# Patient Record
Sex: FEMALE | Race: WHITE | Employment: OTHER | URBAN - METROPOLITAN AREA
[De-identification: names, ages, dates, MRNs, and addresses within clinical notes are randomized per-mention and may not be internally consistent; named-entity substitution may affect disease eponyms.]

---

## 2017-05-08 LAB
CHOLEST SERPL-MCNC: 200 MG/DL (ref 100–199)
CREAT SERPL-MCNC: 0.79 MG/DL (ref 0.57–1.11)
GFR SERPL CREATININE-BSD FRML MDRD: >60 ML/MIN/1.73M2
GLUCOSE SERPL-MCNC: 250 MG/DL (ref 65–100)
HDLC SERPL-MCNC: 49 MG/DL
LDLC SERPL CALC-MCNC: 126 MG/DL
NONHDLC SERPL-MCNC: 151 MG/DL
POTASSIUM SERPL-SCNC: 3.7 MMOL/L (ref 3.5–5)
TRIGL SERPL-MCNC: 127 MG/DL

## 2017-11-30 ENCOUNTER — TRANSFERRED RECORDS (OUTPATIENT)
Dept: HEALTH INFORMATION MANAGEMENT | Facility: CLINIC | Age: 67
End: 2017-11-30

## 2017-11-30 LAB — HBA1C MFR BLD: 7.6 % (ref 0–5.7)

## 2018-01-18 ENCOUNTER — OFFICE VISIT (OUTPATIENT)
Dept: FAMILY MEDICINE | Facility: CLINIC | Age: 68
End: 2018-01-18
Payer: COMMERCIAL

## 2018-01-18 VITALS
DIASTOLIC BLOOD PRESSURE: 60 MMHG | HEART RATE: 88 BPM | SYSTOLIC BLOOD PRESSURE: 130 MMHG | HEIGHT: 65 IN | TEMPERATURE: 98 F | BODY MASS INDEX: 27.32 KG/M2 | WEIGHT: 164 LBS

## 2018-01-18 DIAGNOSIS — M81.0 AGE-RELATED OSTEOPOROSIS WITHOUT CURRENT PATHOLOGICAL FRACTURE: ICD-10-CM

## 2018-01-18 DIAGNOSIS — E78.5 HYPERLIPIDEMIA LDL GOAL <100: ICD-10-CM

## 2018-01-18 DIAGNOSIS — Z79.4 TYPE 2 DIABETES MELLITUS WITHOUT COMPLICATION, WITH LONG-TERM CURRENT USE OF INSULIN (H): Primary | ICD-10-CM

## 2018-01-18 DIAGNOSIS — E11.9 TYPE 2 DIABETES MELLITUS WITHOUT COMPLICATION, WITH LONG-TERM CURRENT USE OF INSULIN (H): Primary | ICD-10-CM

## 2018-01-18 DIAGNOSIS — Z11.59 NEED FOR HEPATITIS C SCREENING TEST: ICD-10-CM

## 2018-01-18 DIAGNOSIS — G47.00 INSOMNIA, UNSPECIFIED TYPE: ICD-10-CM

## 2018-01-18 LAB
ALBUMIN SERPL-MCNC: 3.9 G/DL (ref 3.4–5)
ALP SERPL-CCNC: 70 U/L (ref 40–150)
ALT SERPL W P-5'-P-CCNC: 16 U/L (ref 0–50)
ANION GAP SERPL CALCULATED.3IONS-SCNC: 9 MMOL/L (ref 3–14)
AST SERPL W P-5'-P-CCNC: 17 U/L (ref 0–45)
BILIRUB SERPL-MCNC: 0.8 MG/DL (ref 0.2–1.3)
BUN SERPL-MCNC: 10 MG/DL (ref 7–30)
CALCIUM SERPL-MCNC: 8.8 MG/DL (ref 8.5–10.1)
CHLORIDE SERPL-SCNC: 106 MMOL/L (ref 94–109)
CHOLEST SERPL-MCNC: 167 MG/DL
CO2 SERPL-SCNC: 23 MMOL/L (ref 20–32)
CREAT SERPL-MCNC: 0.47 MG/DL (ref 0.52–1.04)
CREAT UR-MCNC: 19 MG/DL
GFR SERPL CREATININE-BSD FRML MDRD: >90 ML/MIN/1.7M2
GLUCOSE SERPL-MCNC: 130 MG/DL (ref 70–99)
HBA1C MFR BLD: 6.4 % (ref 4.3–6)
HCV AB SERPL QL IA: NONREACTIVE
HDLC SERPL-MCNC: 68 MG/DL
LDLC SERPL CALC-MCNC: 83 MG/DL
MICROALBUMIN UR-MCNC: 9 MG/L
MICROALBUMIN/CREAT UR: 46.09 MG/G CR (ref 0–25)
NONHDLC SERPL-MCNC: 99 MG/DL
POTASSIUM SERPL-SCNC: 3.9 MMOL/L (ref 3.4–5.3)
PROT SERPL-MCNC: 7.3 G/DL (ref 6.8–8.8)
SODIUM SERPL-SCNC: 138 MMOL/L (ref 133–144)
TRIGL SERPL-MCNC: 78 MG/DL

## 2018-01-18 PROCEDURE — 80053 COMPREHEN METABOLIC PANEL: CPT | Performed by: FAMILY MEDICINE

## 2018-01-18 PROCEDURE — 86803 HEPATITIS C AB TEST: CPT | Performed by: FAMILY MEDICINE

## 2018-01-18 PROCEDURE — 99203 OFFICE O/P NEW LOW 30 MIN: CPT | Performed by: FAMILY MEDICINE

## 2018-01-18 PROCEDURE — 83036 HEMOGLOBIN GLYCOSYLATED A1C: CPT | Performed by: FAMILY MEDICINE

## 2018-01-18 PROCEDURE — 36415 COLL VENOUS BLD VENIPUNCTURE: CPT | Performed by: FAMILY MEDICINE

## 2018-01-18 PROCEDURE — 82043 UR ALBUMIN QUANTITATIVE: CPT | Performed by: FAMILY MEDICINE

## 2018-01-18 PROCEDURE — 80061 LIPID PANEL: CPT | Performed by: FAMILY MEDICINE

## 2018-01-18 RX ORDER — TRAZODONE HYDROCHLORIDE 50 MG/1
50 TABLET, FILM COATED ORAL AT BEDTIME
Qty: 60 TABLET | Refills: 0 | Status: SHIPPED | OUTPATIENT
Start: 2018-01-18 | End: 2018-04-09

## 2018-01-18 RX ORDER — ATORVASTATIN CALCIUM 40 MG/1
40 TABLET, FILM COATED ORAL DAILY
Qty: 90 TABLET | Refills: 3 | Status: SHIPPED | OUTPATIENT
Start: 2018-01-18 | End: 2018-07-20

## 2018-01-18 RX ORDER — TRAZODONE HYDROCHLORIDE 50 MG/1
50 TABLET, FILM COATED ORAL
COMMUNITY
Start: 2017-05-08 | End: 2018-01-18

## 2018-01-18 RX ORDER — CHLORAL HYDRATE 500 MG
1 CAPSULE ORAL
COMMUNITY
Start: 2012-06-19 | End: 2019-02-21

## 2018-01-18 RX ORDER — ASPIRIN 81 MG/1
81 TABLET ORAL
Status: ON HOLD | COMMUNITY
Start: 2012-01-04 | End: 2019-08-03

## 2018-01-18 RX ORDER — ALENDRONATE SODIUM 70 MG/1
70 TABLET ORAL
COMMUNITY
Start: 2017-05-08 | End: 2018-01-18

## 2018-01-18 RX ORDER — SODIUM PHOSPHATE,MONO-DIBASIC 19G-7G/118
1 ENEMA (ML) RECTAL 2 TIMES DAILY
COMMUNITY
Start: 2012-01-04 | End: 2021-02-23

## 2018-01-18 RX ORDER — DIPHENOXYLATE HYDROCHLORIDE AND ATROPINE SULFATE 2.5; .025 MG/1; MG/1
1 TABLET ORAL DAILY
COMMUNITY
Start: 2012-01-04 | End: 2022-08-04

## 2018-01-18 RX ORDER — ALENDRONATE SODIUM 70 MG/1
70 TABLET ORAL
Qty: 4 TABLET | Refills: 3 | Status: SHIPPED | OUTPATIENT
Start: 2018-01-18 | End: 2018-03-16

## 2018-01-18 RX ORDER — ATORVASTATIN CALCIUM 40 MG/1
40 TABLET, FILM COATED ORAL
COMMUNITY
Start: 2017-05-08 | End: 2018-01-18

## 2018-01-18 RX ORDER — LISINOPRIL 2.5 MG/1
2.5 TABLET ORAL DAILY
Qty: 90 TABLET | Refills: 1 | Status: SHIPPED | OUTPATIENT
Start: 2018-01-18 | End: 2018-07-20

## 2018-01-18 NOTE — PROGRESS NOTES
SUBJECTIVE:   Rajani Guo is a 67 year old female who presents to clinic today for the following health issues:    New Patient/Transfer of Care: she is from [place], [place].     She was last seen in Nov 2017 at [facility].     She moved to this past of the [facility], her insurance has changed, wants to establish care here.     Diabetes: she is Dxed with DM for more than 20 yrs ago, she is on Insulin for 20 yrs. She is on insulin vials and would like the prescription in flex pen form.   Patient is checking blood sugars: once daily.  Results are as follows:       am - . Under 100 most of the time.     Diabetic concerns: None     Symptoms of hypoglycemia (low blood sugar): none     Paresthesias (numbness or burning in feet) or sores: tingling in hands.      Date of last diabetic eye exam: due for eye exam.       BP Readings from Last 2 Encounters:   01/18/18 130/60     Hemoglobin A1C (%)   Date Value   01/18/2018 6.4 (H)      When she was first diagnosed with DM she was on BP medication however her BPs have been under goal hence the medication was discontinued.     Hyperlipidemia Follow-Up     Rate your low fat/cholesterol diet?: fair    Taking statin?  Yes, no muscle aches from statin    Other lipid medications/supplements?:  none  Exercise at work only. Diet: could be better.   Fasting today.     Osteoporosis:   Last DEXA: 12/2016. On Fosamax for 1 and 1/2 yrs.   Daily vitamin D and calcium.     Insomnia:   On trazodone. As needed.     Problem list and histories reviewed & adjusted, as indicated.  Additional history: as documented      Reviewed and updated as needed this visit by clinical staffTobacco  Allergies  Meds  Med Hx  Surg Hx  Fam Hx  Soc Hx      Reviewed and updated as needed this visit by Provider         ROS:  Constitutional, HEENT, cardiovascular, pulmonary, gi and gu systems are negative, except as otherwise noted.      OBJECTIVE:   /60 (BP Location: Left arm, Patient Position:  "Chair, Cuff Size: Adult Regular)  Pulse 88  Temp 98  F (36.7  C) (Oral)  Ht 5' 4.88\" (1.648 m)  Wt 164 lb (74.4 kg)  BMI 27.39 kg/m2  Body mass index is 27.39 kg/(m^2).  GENERAL: healthy, alert and no distress  NECK: no adenopathy, no asymmetry, masses, or scars and thyroid normal to palpation  RESP: lungs clear to auscultation - no rales, rhonchi or wheezes  CV: regular rate and rhythm, normal S1 S2, no S3 or S4, no murmur, click or rub, no peripheral edema and peripheral pulses strong  ABDOMEN: soft, nontender, no hepatosplenomegaly, no masses and bowel sounds normal  MS: no gross musculoskeletal defects noted, minimal edema  NEURO: Normal strength and tone, mentation intact and speech normal  Diabetic foot exam: normal DP and PT pulses, no trophic changes or ulcerative lesions and normal sensory exam    Results for orders placed or performed in visit on 01/18/18   Hemoglobin A1c   Result Value Ref Range    Hemoglobin A1C 6.4 (H) 4.3 - 6.0 %       ASSESSMENT/PLAN:     Rajani was seen today for establish care.    Diagnoses and all orders for this visit:    Type 2 diabetes mellitus without complication, with long-term current use of insulin (H), encouraged regular exercise and healthy diet. Not ready to quit yet.   -     Lipid panel reflex to direct LDL Fasting  -     Albumin Random Urine Quantitative with Creat Ratio  -     Hemoglobin A1c  -     Comprehensive metabolic panel (BMP + Alb, Alk Phos, ALT, AST, Total. Bili, TP)  -     OPHTHALMOLOGY ADULT REFERRAL  -     lisinopril (PRINIVIL/ZESTRIL) 2.5 MG tablet; Take 1 tablet (2.5 mg) by mouth daily  -     atorvastatin (LIPITOR) 40 MG tablet; Take 1 tablet (40 mg) by mouth daily  -     metFORMIN (GLUCOPHAGE) 1000 MG tablet; Take 1 tablet (1,000 mg) by mouth 2 times daily (with meals)  -     insulin detemir (LEVEMIR FLEXPEN/FLEXTOUCH) 100 UNIT/ML injection; Inject 25 Units Subcutaneous At Bedtime    Hyperlipidemia LDL goal <100  -     Comprehensive metabolic panel " (BMP + Alb, Alk Phos, ALT, AST, Total. Bili, TP)    Need for hepatitis C screening test  -     Hepatitis C Screen Reflex to HCV RNA Quant and Genotype    Age-related osteoporosis without current pathological fracture, DEXA in 2019.   -     alendronate (FOSAMAX) 70 MG tablet; Take 1 tablet (70 mg) by mouth every 7 days    Insomnia, unspecified type  -     traZODone (DESYREL) 50 MG tablet; Take 1 tablet (50 mg) by mouth At Bedtime      Pt has some of her most recent office visit records with her, sent for scanning, DAYANNA signed to get previous medical records.     DM f/u in 6 months.     Anna Barron MD  [facility]

## 2018-01-18 NOTE — PROGRESS NOTES
Results discussed with patient during the clinic visit.     .Anna Barron MD.   Family Physician.  [facility].

## 2018-01-18 NOTE — PATIENT INSTRUCTIONS
[facility]   Discharged by : Zayra Ren CMA        If you have any questions regarding your visit please contact your care team:     Team Gold                Clinic Hours Telephone Number     Dr. Vinita Barron 7am-7pm  Monday - Thursday   7am-5pm  Fridays  [phone]   (Appointment scheduling available 24/7)     RN Line  [phone] option 2     [facility] [place] [facility] 11am-9pm Monday-Friday Saturday-Sunday- 9am-5pm     [place] -   5pm-9pm Monday-Friday Saturday-Sunday- 9am-5pm    [phone] - [place]    [phone] - [place]       For a Price Quote for your services, please call our Xsilon Price Line at [phone].     What options do I have for visits at the clinic other than the traditional office visit?     To expand how we care for you, many of our providers are utilizing electronic visits (e-visits) and telephone visits, when medically appropriate, for interactions with their patients rather than a visit in the clinic. We also offer nurse visits for many medical concerns. Just like any other service, we will bill your insurance company for this type of visit based on time spent on the phone with your provider. Not all insurance companies cover these visits. Please check with your medical insurance if this type of visit is covered. You will be responsible for any charges that are not paid by your insurance.   E-visits via Mapkin: generally incur a $35.00 fee.     Telephone visits:   Time spent on the phone: *charged based on time that is spent on the phone in increments of 10 minutes. Estimated cost:   5-10 mins $30.00   11-20 mins. $59.00   21-30 mins. $85.00     Use Mapkin (secure email communication and access to your chart) to send your primary care provider a message or make an appointment. Ask someone on your Team how to sign up for  [employer].     As always, Thank you for trusting us with your health care needs!      [facility]:    Scheduling Appointments  Rajwinder Hunter [facility]  Call: [phone]    [facility]  Call: [phone]    [facility]  Call: [phone]    For Gastroenterology referrals   [facility] Gastroenterology   Clinics and Surgery Center, 4th Floor   [address] [place] [address]   Appointments: [phone]    WHERE TO GO FOR CARE?  Clinic    Make an appointment if you:       Are sick (cold, cough, flu, sore throat, earache or in pain).       Have a small injury (sprain, small cut, burn or broken bone).       Need a physical exam, Pap smear, vaccine or prescription refill.       Have questions about your health or medicines.    To reach us:      Call [phone] ([phone]). Open 24 hours every day. (For counseling services, call [phone].)    Log into [employer] at [email or web address]. (Visit [email or web address] to create an account.) Hospital emergency room    An emergency is a serious or life- threatening problem that must be treated right away.    Call 341 or get to the hospital if you have:      Very bad or sudden:            - Chest pain or pressure         - Bleeding         - Head or belly pain         - Dizziness or trouble seeing, walking or                          Speaking      Problems breathing      Blood in your vomit or you are coughing up blood      A major injury (knocked out, loss of a finger or limb, rape, broken bone protruding from skin)    A mental health crisis. (Or call the Mental Health Crisis line at 1-277.528.5448 or Suicide Prevention Hotline at 1-274.255.1672.)    Open 24 hours every day. You don't need an appointment.     Urgent care    Visit urgent care for sickness or small injuries when the clinic is closed. You don't need an appointment. To check hours or find an urgent care near you,  visit [email or web address]. Online care    Get online care from [facility] for more than 70 common problems, like colds, allergies and infections. Open 24 hours every day at:   [email or web address]   Need help deciding?    For advice about where to be seen, you may call your clinic and ask to speak with a nurse. We're here for you 24 hours every day.         If you are deaf or hard of hearing, please let us know. We provide many free services including sign language interpreters, oral interpreters, TTYs, telephone amplifiers, note takers and written materials.

## 2018-01-18 NOTE — MR AVS SNAPSHOT
After Visit Summary   1/18/2018    Rajani Guo    MRN: [id number]           Patient Information     Date Of Birth          1950        Visit Information        Provider Department      1/18/2018 8:00 AM Anna Barron MD [facility]        Today's Diagnoses     Type 2 diabetes mellitus without complication, with long-term current use of insulin (H)    -  1    Hyperlipidemia LDL goal <100        Need for hepatitis C screening test        Age-related osteoporosis without current pathological fracture        Insomnia, unspecified type          Care Instructions    [facility]   Discharged by : Zayra Ren CMA        If you have any questions regarding your visit please contact your care team:     Team Gold                Clinic Hours Telephone Number     Dr. Vinita Barron 7am-7pm  Monday - Thursday   7am-5pm  Fridays  [phone]   (Appointment scheduling available 24/7)     RN Line  [phone] option 2     Urgent Care - [place] - 11am-9pm Monday-Friday Saturday-Sunday- 9am-5pm     [place] -   5pm-9pm Monday-Friday Saturday-Sunday- 9am-5pm    [phone] - [place]    [phone] - [place]       For a Price Quote for your services, please call our Consumer Price Line at [phone].     What options do I have for visits at the clinic other than the traditional office visit?     To expand how we care for you, many of our providers are utilizing electronic visits (e-visits) and telephone visits, when medically appropriate, for interactions with their patients rather than a visit in the clinic. We also offer nurse visits for many medical concerns. Just like any other service, we will bill your insurance company for this type of visit based on time spent on the phone with your provider. Not all insurance companies  cover these visits. Please check with your medical insurance if this type of visit is covered. You will be responsible for any charges that are not paid by your insurance.   E-visits via Intoohart: generally incur a $35.00 fee.     Telephone visits:   Time spent on the phone: *charged based on time that is spent on the phone in increments of 10 minutes. Estimated cost:   5-10 mins $30.00   11-20 mins. $59.00   21-30 mins. $85.00     Use Baitianshit (secure email communication and access to your chart) to send your primary care provider a message or make an appointment. Ask someone on your Team how to sign up for Thumbplay.     As always, Thank you for trusting us with your health care needs!      [facility]:    Scheduling Appointments  [place] [place]  Call: [phone]    [facility]  Call: [phone]    [facility]  Call: [phone]    For Gastroenterology referrals   [facility]   Clinics and Surgery Center, 4th Floor   [address] [place] [address]   Appointments: [phone]    WHERE TO GO FOR CARE?  Clinic    Make an appointment if you:       Are sick (cold, cough, flu, sore throat, earache or in pain).       Have a small injury (sprain, small cut, burn or broken bone).       Need a physical exam, Pap smear, vaccine or prescription refill.       Have questions about your health or medicines.    To reach us:      Call [phone] ([phone]). Open 24 hours every day. (For counseling services, call [phone].)    Log into Thumbplay at Flash [email or web address]. (Visit [email or web address] to create an account.) Hospital emergency room    An emergency is a serious or life- threatening problem that must be treated right away.    Call [phone] or get to the hospital if you have:      Very bad or sudden:            - Chest pain or pressure         - Bleeding         - Head or belly pain         -  Dizziness or trouble seeing, walking or                          Speaking      Problems breathing      Blood in your vomit or you are coughing up blood      A major injury (knocked out, loss of a finger or limb, rape, broken bone protruding from skin)    A mental health crisis. (Or call the Mental Health Crisis line at 1-300.251.9526 or Suicide Prevention Hotline at 1-183.680.7714.)    Open 24 hours every day. You don't need an appointment.     Urgent care    Visit urgent care for sickness or small injuries when the clinic is closed. You don't need an appointment. To check hours or find an urgent care near you, visit [email or web address]. Online care    Get online care from [facility] for more than 70 common problems, like colds, allergies and infections. Open 24 hours every day at:   [email or web address]   Need help deciding?    For advice about where to be seen, you may call your clinic and ask to speak with a nurse. We're here for you 24 hours every day.         If you are deaf or hard of hearing, please let us know. We provide many free services including sign language interpreters, oral interpreters, TTYs, telephone amplifiers, note takers and written materials.                   Follow-ups after your visit        Additional Services     OPHTHALMOLOGY ADULT REFERRAL       Your provider has referred you to: [facility]: [facility] [place] [phone]   [email or web address]    Please be aware that coverage of these services is subject to the terms and limitations of your health insurance plan.  Call member services at your health plan with any benefit or coverage questions.      Please bring the following with you to your appointment:    (1) Any X-Rays, CTs or MRIs which have been performed.  Contact the facility where they were done to arrange for  prior to your scheduled appointment.    (2) List of current medications  (3) This referral request   (4) Any documents/labs given to you for  "this referral                  Who to contact     If you have questions or need follow up information about today's clinic visit or your schedule please contact [facility] directly at [phone].  Normal or non-critical lab and imaging results will be communicated to you by MyChart, letter or phone within 4 business days after the clinic has received the results. If you do not hear from us within 7 days, please contact the clinic through MyChart or phone. If you have a critical or abnormal lab result, we will notify you by phone as soon as possible.  Submit refill requests through Zango or call your pharmacy and they will forward the refill request to us. Please allow 3 business days for your refill to be completed.          Additional Information About Your Visit        [facility] [employer] [facility] Information     Zango lets you send messages to your doctor, view your test results, renew your prescriptions, schedule appointments and more. To sign up, go to [email or web address] . Click on \"Log in\" on the left side of the screen, which will take you to the Welcome page. Then click on \"Sign up Now\" on the right side of the page.     You will be asked to enter the access code listed below, as well as some personal information. Please follow the directions to create your username and password.     Your access code is: [email or web address]  Expires: 2018  9:07 AM     Your access code will  in 90 days. If you need help or a new code, please call your [place] clinic or [phone].        Care EveryWhere ID     This is your Care EveryWhere ID. This could be used by other organizations to access your [place] medical records  [id number]        Your Vitals Were     Pulse Temperature Height BMI (Body Mass Index)          88 98  F (36.7  C) (Oral) 5' 4.88\" (1.648 m) 27.39 kg/m2         Blood Pressure from Last 3 Encounters:   18 130/60    Weight from Last 3 Encounters:   18 164 lb (74.4 kg)            "   We Performed the Following     Albumin Random Urine Quantitative with Creat Ratio     Comprehensive metabolic panel (BMP + Alb, Alk Phos, ALT, AST, Total. Bili, TP)     Hemoglobin A1c     Hepatitis C Screen Reflex to HCV RNA Quant and Genotype     Lipid panel reflex to direct LDL Fasting     OPHTHALMOLOGY ADULT REFERRAL          Today's Medication Changes          These changes are accurate as of: 1/18/18  9:07 AM.  If you have any questions, ask your nurse or doctor.               Start taking these medicines.        Dose/Directions    lisinopril 2.5 MG tablet   Commonly known as:  PRINIVIL/Zestril   Used for:  Type 2 diabetes mellitus without complication, with long-term current use of insulin (H)   Started by:  Anna Barron MD        Dose:  2.5 mg   Take 1 tablet (2.5 mg) by mouth daily   Quantity:  90 tablet   Refills:  1         These medicines have changed or have updated prescriptions.        Dose/Directions    alendronate 70 MG tablet   Commonly known as:  FOSAMAX   This may have changed:  when to take this   Used for:  Age-related osteoporosis without current pathological fracture   Changed by:  Anna Barron MD        Dose:  70 mg   Take 1 tablet (70 mg) by mouth every 7 days   Quantity:  4 tablet   Refills:  3       atorvastatin 40 MG tablet   Commonly known as:  LIPITOR   This may have changed:  when to take this   Used for:  Type 2 diabetes mellitus without complication, with long-term current use of insulin (H)   Changed by:  Anna Barron MD        Dose:  40 mg   Take 1 tablet (40 mg) by mouth daily   Quantity:  90 tablet   Refills:  3       * insulin detemir 100 UNIT/ML injection   Commonly known as:  LEVEMIR   This may have changed:  Another medication with the same name was added. Make sure you understand how and when to take each.   Changed by:  Anna Barron MD        Inject 25 units at bedtime.   Refills:  0       * insulin detemir 100 UNIT/ML  injection   Commonly known as:  LEVEMIR FLEXPEN/FLEXTOUCH   This may have changed:  You were already taking a medication with the same name, and this prescription was added. Make sure you understand how and when to take each.   Used for:  Type 2 diabetes mellitus without complication, with long-term current use of insulin (H)   Changed by:  Anna Barron MD        Dose:  25 Units   Inject 25 Units Subcutaneous At Bedtime   Quantity:  15 mL   Refills:  1       metFORMIN 1000 MG tablet   Commonly known as:  GLUCOPHAGE   This may have changed:  when to take this   Used for:  Type 2 diabetes mellitus without complication, with long-term current use of insulin (H)   Changed by:  Anna Barron MD        Dose:  1000 mg   Take 1 tablet (1,000 mg) by mouth 2 times daily (with meals)   Quantity:  180 tablet   Refills:  1       traZODone 50 MG tablet   Commonly known as:  DESYREL   This may have changed:  when to take this   Used for:  Insomnia, unspecified type   Changed by:  Anna Barron MD        Dose:  50 mg   Take 1 tablet (50 mg) by mouth At Bedtime   Quantity:  60 tablet   Refills:  0       * Notice:  This list has 2 medication(s) that are the same as other medications prescribed for you. Read the directions carefully, and ask your doctor or other care provider to review them with you.         Where to get your medicines      These medications were sent to [facility]/pharmacy #[id number] - [place], [address] [place] [address], [facility] [address]     Phone:  [phone]     alendronate 70 MG tablet    atorvastatin 40 MG tablet    insulin detemir 100 UNIT/ML injection    lisinopril 2.5 MG tablet    metFORMIN 1000 MG tablet    traZODone 50 MG tablet                Primary Care Provider Office Phone # Fax #    [facility] [phone]       [facility] [address]        Equal Access to Services      PAMELA DOMINGUEZ : Hadii aad ku terrence Roa, waaxda luqadaha, qaybta kaalmada levybienvenidomarleen, saud swansonjeffadalgisa lindsay . So [facility] [phone].    ATENCIÓN: Si habla maxwell, tiene a rosen disposición servicios gratuitos de asistencia lingüística. Llame al [phone].    We comply with applicable federal civil rights laws and [place] laws. We do not discriminate on the basis of race, color, national origin, age, disability, sex, sexual orientation, or gender identity.            Thank you!     Thank you for choosing [facility]  for your care. Our goal is always to provide you with excellent care. Hearing back from our patients is one way we can continue to improve our services. Please take a few minutes to complete the written survey that you may receive in the mail after your visit with us. Thank you!             Your Updated Medication List - Protect others around you: Learn how to safely use, store and throw away your medicines at www.disposemymeds.org.          This list is accurate as of: 1/18/18  9:07 AM.  Always use your most recent med list.                   Brand Name Dispense Instructions for use Diagnosis    alendronate 70 MG tablet    FOSAMAX    4 tablet    Take 1 tablet (70 mg) by mouth every 7 days    Age-related osteoporosis without current pathological fracture       aspirin EC 81 MG EC tablet      Take 81 mg by mouth        atorvastatin 40 MG tablet    LIPITOR    90 tablet    Take 1 tablet (40 mg) by mouth daily    Type 2 diabetes mellitus without complication, with long-term current use of insulin (H)       calcium 500/D 500-200 MG-UNIT per tablet   Generic drug:  calcium carb 1250 mg (500 mg [place]ig)/vitamin D 200 unit      Take 1 tablet by mouth        fish oil-omega-3 fatty acids 1000 MG capsule      Take 1 capsule by mouth        glucosamine-chondroitin 500-400 MG Caps per capsule      Take 1 capsule by mouth        * insulin detemir 100 UNIT/ML injection    LEVEMIR      Inject 25 units at bedtime.        * insulin detemir 100 UNIT/ML injection    LEVEMIR FLEXPEN/FLEXTOUCH    15 mL    Inject 25 Units Subcutaneous At Bedtime    Type 2 diabetes mellitus without complication, with long-term current use of insulin (H)       lisinopril 2.5 MG tablet    PRINIVIL/Zestril    90 tablet    Take 1 tablet (2.5 mg) by mouth daily    Type 2 diabetes mellitus without complication, with long-term current use of insulin (H)       metFORMIN 1000 MG tablet    GLUCOPHAGE    180 tablet    Take 1 tablet (1,000 mg) by mouth 2 times daily (with meals)    Type 2 diabetes mellitus without complication, with long-term current use of insulin (H)       MULTI-VITAMINS Tabs      Take 1 tablet by mouth        traZODone 50 MG tablet    DESYREL    60 tablet    Take 1 tablet (50 mg) by mouth At Bedtime    Insomnia, unspecified type       * Notice:  This list has 2 medication(s) that are the same as other medications prescribed for you. Read the directions carefully, and ask your doctor or other care provider to review them with you.

## 2018-01-22 NOTE — PROGRESS NOTES
Dear Rajani Guo,     # Your cholesterol looks good.     # Hepatitis C antibody is NEGATIVE.     # sodium, potassium, kidney function and liver function are NORMAL.     # You are loosing small amount of proteins in urine, nothing to worry about at this point.     Anna Barron MD.   Family Physician.  [facility].

## 2018-02-22 ENCOUNTER — RADIANT APPOINTMENT (OUTPATIENT)
Dept: GENERAL RADIOLOGY | Facility: CLINIC | Age: 68
End: 2018-02-22
Attending: FAMILY MEDICINE
Payer: COMMERCIAL

## 2018-02-22 ENCOUNTER — OFFICE VISIT (OUTPATIENT)
Dept: FAMILY MEDICINE | Facility: CLINIC | Age: 68
End: 2018-02-22
Payer: COMMERCIAL

## 2018-02-22 VITALS
HEIGHT: 65 IN | TEMPERATURE: 98.1 F | BODY MASS INDEX: 27.46 KG/M2 | SYSTOLIC BLOOD PRESSURE: 130 MMHG | WEIGHT: 164.8 LBS | RESPIRATION RATE: 16 BRPM | DIASTOLIC BLOOD PRESSURE: 60 MMHG | OXYGEN SATURATION: 99 % | HEART RATE: 71 BPM

## 2018-02-22 DIAGNOSIS — R20.9 DISTURBANCE OF SKIN SENSATION: ICD-10-CM

## 2018-02-22 DIAGNOSIS — M79.644 PAIN OF FINGER OF RIGHT HAND: ICD-10-CM

## 2018-02-22 DIAGNOSIS — G56.02 CARPAL TUNNEL SYNDROME OF LEFT WRIST: ICD-10-CM

## 2018-02-22 DIAGNOSIS — M65.321 TRIGGER FINGER, RIGHT INDEX FINGER: Primary | ICD-10-CM

## 2018-02-22 DIAGNOSIS — M65.321 TRIGGER FINGER, RIGHT INDEX FINGER: ICD-10-CM

## 2018-02-22 PROCEDURE — 99214 OFFICE O/P EST MOD 30 MIN: CPT | Performed by: FAMILY MEDICINE

## 2018-02-22 PROCEDURE — 73130 X-RAY EXAM OF HAND: CPT | Mod: RT

## 2018-02-22 RX ORDER — PREDNISONE 20 MG/1
TABLET ORAL
Qty: 20 TABLET | Refills: 0 | Status: SHIPPED | OUTPATIENT
Start: 2018-02-22 | End: 2018-05-31

## 2018-02-22 ASSESSMENT — PAIN SCALES - GENERAL: PAINLEVEL: EXTREME PAIN (8)

## 2018-02-22 NOTE — MR AVS SNAPSHOT
After Visit Summary   2/22/2018    Rajani Guo    MRN: [id number]           Patient Information     Date Of Birth          1950        Visit Information        Provider Department      2/22/2018 9:20 AM Anna Barron MD [facility]        Today's Diagnoses     Trigger finger, right index finger    -  1    Pain of finger of right hand        Carpal tunnel syndrome of left wrist        Disturbance of skin sensation          Care Instructions      Carpal Tunnel Syndrome    Carpal tunnel syndrome is a painful condition of the wrist and arm. It is caused by pressure on the median nerve.  The median nerve is one of the nerves that give feeling and movement to the hand. It passes through a tunnel in the wrist called the carpal tunnel. This tunnel is made up of bones and ligaments. Narrowing of this tunnel or swelling of the tissues inside the tunnel puts pressure on the median nerve. This causes numbness, pins and needles, or electric shooting pains in your hand and forearm. Often the pain is worse at night and may wake you when you are asleep.  Carpal tunnel syndrome may occur during pregnancy and with use of birth control pills. It is more common in workers who must often bend their wrists. It is also common in people who work with power tools that cause strong vibrations.  Home care    Rest the painful wrist. Avoid repeated bending of the wrist back and forth. This puts pressure on the median nerve. Avoid using power tools with strong vibrations.    If you were given a splint, wear it at night while you sleep. You may also wear it during the day for comfort.    Move your fingers and wrists often to avoid stiffness.    Elevate your arms on pillows when you lie down.    Try using the unaffected hand more.    Try not to hold your wrists in a bent, downward position.    Sometimes changes in the work place may ease symptoms. If you type most of the day, it may help to change  the position of your keyboard or add a wrist support. Your wrist should be in a neutral position and not bent back when typing.    You may use over-the-counter pain medicine to treat pain and inflammation, unless another medicine was prescribed. Anti-inflammatory pain medicines, such as ibuprofen or naproxen may be more effective than acetaminophen, which treats pain, but not inflammation. If you have chronic liver or kidney disease or ever had a stomach ulcer or GI bleeding, talk with your doctor before using these medicines.    Opioid pain medicine will only give temporary relief and does not treat the problem. If pain continues, you may need a shot of a steroid drug into your wrist.    If the above methods fail, you may need surgery. This will open the carpal tunnel and release the pressure on the trapped nerve.  Follow-up care  Follow up with your healthcare provider, or as advised, if the pain doesn t begin to improve within the next week.  If X-rays were taken, you will be notified of any new findings that may affect your care.  When to seek medical advice  Call your healthcare provider right away if any of these occur:    Pain not improving with the above treatment    Fingers or hand become cold, blue, numb, or tingly    Your whole arm becomes swollen or weak  Date Last Reviewed: 11/23/2015 2000-2017 [employer]. [address] [place] [address], [place] [address]. All rights reserved. This information is not intended as a substitute for professional medical care. Always follow your healthcare professional's instructions.      [facility]   Discharged by : Staci Pro MA    Paper scripts provided to patient : no     If you have any questions regarding your visit please contact your care team:     Team Gold                [facility] Hours Telephone Number     Dr. Vinita Barron 7am-7pm  Monday - Thursday    7am-5pm  Fridays  [phone]   (Appointment scheduling available 24/7)     RN Line  [phone] option 2     Urgent Care - [place] Gil and [place] - 11am-9pm Monday-Friday Saturday-Sunday- 9am-5pm     [place] -   5pm-9pm Monday-Friday Saturday-Sunday- 9am-5pm    [phone] - [place] Gil    [phone] - [place]       For a Price Quote for your services, please call our Consumer Price Line at [phone].     What options do I have for visits at the clinic other than the traditional office visit?     To expand how we care for you, many of our providers are utilizing electronic visits (e-visits) and telephone visits, when medically appropriate, for interactions with their patients rather than a visit in the clinic. We also offer nurse visits for many medical concerns. Just like any other service, we will bill your insurance company for this type of visit based on time spent on the phone with your provider. Not all insurance companies cover these visits. Please check with your medical insurance if this type of visit is covered. You will be responsible for any charges that are not paid by your insurance.   E-visits via Tinker Games: generally incur a $35.00 fee.     Telephone visits:   Time spent on the phone: *charged based on time that is spent on the phone in increments of 10 minutes. Estimated cost:   5-10 mins $30.00   11-20 mins. $59.00   21-30 mins. $85.00     Use Appsindept (secure email communication and access to your chart) to send your primary care provider a message or make an appointment. Ask someone on your Team how to sign up for Tinker Games.     As always, Thank you for trusting us with your health care needs!      [facility]:    Scheduling Appointments  [facility]  Call: [phone]    [facility]  Call: [phone]    [facility]  Call: [phone]    For Gastroenterology  [facility] G[facility]enterology   Clinics and Surgery Center, 4th Floor   [address] [place] [address]   Appointments: [phone]    WHERE TO GO FOR CARE?  Clinic    Make an appointment if you:       Are sick (cold, cough, flu, sore throat, earache or in pain).       Have a small injury (sprain, small cut, burn or broken bone).       Need a physical exam, Pap smear, vaccine or prescription refill.       Have questions about your health or medicines.    To reach us:      Call [phone] ([phone]). Open 24 hours every day. (For counseling services, call [phone].)    Log into "[employer] at Formatta. (Visit Project Manager to create an account.) Hospital emergency room    An emergency is a serious or life- threatening problem that must be treated right away.    Call 911 or get to the hospital if you have:      Very bad or sudden:            - Chest pain or pressure         - Bleeding         - Head or belly pain         - Dizziness or trouble seeing, walking or                          Speaking      Problems breathing      Blood in your vomit or you are coughing up blood      A major injury (knocked out, loss of a finger or limb, rape, broken bone protruding from skin)    A mental health crisis. (Or call the Mental Health Crisis line at 1-973.816.4812 or Suicide Prevention Hotline at 1-249.133.3830.)    Open 24 hours every day. You don't need an appointment.     Urgent care    Visit urgent care for sickness or small injuries when the clinic is closed. You don't need an appointment. To check hours or find an urgent care near you, visit [email or web address]. Online care    Get online care from [facility] for more than 70 common problems, like colds, allergies and infections. Open 24 hours every day at:   [email or web address]   Need help deciding?    For advice about where to be seen, you may call your clinic and ask to speak with a nurse. We're here for you 24 hours every day.          If you are deaf or hard of hearing, please let us know. We provide many free services including sign language interpreters, oral interpreters, TTYs, telephone amplifiers, note takers and written materials.                   Follow-ups after your visit        Additional Services     ORTHO  REFERRAL       [facility] is referring you to the Orthopedic  Services at [facility].       The  Representative will assist you in the coordination of your Orthopedic and Musculoskeletal Care as prescribed by your physician.    The  Representative will call you within 1 business day to help schedule your appointment, or you may contact the  Representative at:    All areas ~ [phone]     Type of Referral : Non Surgical       Timeframe requested: 1 - 2 days    Coverage of these services is subject to the terms and limitations of your health insurance plan.  Please call member services at your health plan with any benefit or coverage questions.      If X-rays, CT or MRI's have been performed, please contact the facility where they were done to arrange for , prior to your scheduled appointment.  Please bring this referral request to your appointment and present it to your specialist.                  Future tests that were ordered for you today     Open Future Orders        Priority Expected Expires Ordered    EMG Routine  3/29/2018 2/22/2018    XR Hand Right G/E 3 Views Routine 2/22/2018 2/22/2019 2/22/2018            Who to contact     If you have questions or need follow up information about today's clinic visit or your schedule please contact [facility] directly at [phone].  Normal or non-critical lab and imaging results will be communicated to you by MyChart, letter or phone within 4 business days after the clinic has received the results. If you do not hear from us within 7 days, please contact the  "clinic through [email or web address] or phone. If you have a critical or abnormal lab result, we will notify you by phone as soon as possible.  Submit refill requests through [email or web address] or call your pharmacy and they will forward the refill request to us. Please allow 3 business days for your refill to be completed.          Additional Information About Your Visit        [employer] Information     [email or web address] gives you secure access to your electronic health record. If you see a primary care provider, you can also send messages to your care team and make appointments. If you have questions, please call your primary care clinic.  If you do not have a primary care provider, please call [phone] and they will assist you.        Care EveryWhere ID     This is your Care EveryWhere ID. This could be used by other organizations to access your [facility] medical records  [id number]        Your Vitals Were     Pulse Temperature Respirations Height Pulse Oximetry BMI (Body Mass Index)    71 98.1  F (36.7  C) (Oral) 16 5' 4.75\" (1.645 m) 99% 27.64 kg/m2       Blood Pressure from Last 3 Encounters:   02/22/18 130/60   01/18/18 130/60    Weight from Last 3 Encounters:   02/22/18 164 lb 12.8 oz (74.8 kg)   01/18/18 164 lb (74.4 kg)              We Performed the Following     ORTHO  REFERRAL          Today's Medication Changes          These changes are accurate as of 2/22/18 10:09 AM.  If you have any questions, ask your nurse or doctor.               Start taking these medicines.        Dose/Directions    predniSONE 20 MG tablet   Commonly known as:  DELTASONE   Used for:  Carpal tunnel syndrome of left wrist   Started by:  Anna Barron MD        Take 3 tabs (60 mg) by mouth daily x 3 days, 2 tabs (40 mg) daily x 3 days, 1 tab (20 mg) daily x 3 days, then 1/2 tab (10 mg) x 3 days.   Quantity:  20 tablet   Refills:  0         These medicines have changed or have updated prescriptions.        Dose/Directions    insulin detemir " 100 UNIT/ML injection   Commonly known as:  LEVEMIR FLEXPEN/FLEXTOUCH   This may have changed:  Another medication with the same name was removed. Continue taking this medication, and follow the directions you see here.   Used for:  Type 2 diabetes mellitus without complication, with long-term current use of insulin (H)   Changed by:  Anna Barron MD        Dose:  25 Units   Inject 25 Units Subcutaneous At Bedtime   Quantity:  15 mL   Refills:  1            Where to get your medicines      These medications were sent to [facility]/pharmacy #[id number] - [place], [place] - [facility] OF [address], [place] [address]     Phone:  [phone]     predniSONE 20 MG tablet                Primary Care Provider Office Phone # Fax #    [facility] [phone]       [address] [facility] [address]        Equal Access to Services     PAMELA DOMINGUEZ : Hadii devorah alvarado hadasho Sojett, waaxda luqadaha, qaybta kaalmada adeegyada, saud lindsay . So [facility] [phone].    ATENCIÓN: Si habla español, tiene a rosen disposición servicios gratuitos de asistencia lingüística. Oren al [phone].    We comply with applicable federal civil rights laws and [place] laws. We do not discriminate on the basis of race, color, national origin, age, disability, sex, sexual orientation, or gender identity.            Thank you!     Thank you for choosing [facility]  for your care. Our goal is always to provide you with excellent care. Hearing back from our patients is one way we can continue to improve our services. Please take a few minutes to complete the written survey that you may receive in the mail after your visit with us. Thank you!             Your Updated Medication List - Protect others around you: Learn how to safely use, store and throw away your medicines at www.disposemymeds.org.          This list is  accurate as of 2/22/18 10:09 AM.  Always use your most recent med list.                   Brand Name Dispense Instructions for use Diagnosis    alendronate 70 MG tablet    FOSAMAX    4 tablet    Take 1 tablet (70 mg) by mouth every 7 days    Age-related osteoporosis without current pathological fracture       aspirin EC 81 MG EC tablet      Take 81 mg by mouth        atorvastatin 40 MG tablet    LIPITOR    90 tablet    Take 1 tablet (40 mg) by mouth daily    Type 2 diabetes mellitus without complication, with long-term current use of insulin (H)       calcium 500/D 500-200 MG-UNIT per tablet   Generic drug:  calcium carb 1250 mg (500 mg [place])/vitamin D 200 unit      Take 1 tablet by mouth        fish oil-omega-3 fatty acids 1000 MG capsule      Take 1 capsule by mouth        glucosamine-chondroitin 500-400 MG Caps per capsule      Take 1 capsule by mouth        insulin detemir 100 UNIT/ML injection    LEVEMIR FLEXPEN/FLEXTOUCH    15 mL    Inject 25 Units Subcutaneous At Bedtime    Type 2 diabetes mellitus without complication, with long-term current use of insulin (H)       lisinopril 2.5 MG tablet    PRINIVIL/Zestril    90 tablet    Take 1 tablet (2.5 mg) by mouth daily    Type 2 diabetes mellitus without complication, with long-term current use of insulin (H)       metFORMIN 1000 MG tablet    GLUCOPHAGE    180 tablet    Take 1 tablet (1,000 mg) by mouth 2 times daily (with meals)    Type 2 diabetes mellitus without complication, with long-term current use of insulin (H)       MULTI-VITAMINS Tabs      Take 1 tablet by mouth        predniSONE 20 MG tablet    DELTASONE    20 tablet    Take 3 tabs (60 mg) by mouth daily x 3 days, 2 tabs (40 mg) daily x 3 days, 1 tab (20 mg) daily x 3 days, then 1/2 tab (10 mg) x 3 days.    Carpal tunnel syndrome of left wrist       traZODone 50 MG tablet    DESYREL    60 tablet    Take 1 tablet (50 mg) by mouth At Bedtime    Insomnia, unspecified type

## 2018-02-22 NOTE — PATIENT INSTRUCTIONS
Carpal Tunnel Syndrome    Carpal tunnel syndrome is a painful condition of the wrist and arm. It is caused by pressure on the median nerve.  The median nerve is one of the nerves that give feeling and movement to the hand. It passes through a tunnel in the wrist called the carpal tunnel. This tunnel is made up of bones and ligaments. Narrowing of this tunnel or swelling of the tissues inside the tunnel puts pressure on the median nerve. This causes numbness, pins and needles, or electric shooting pains in your hand and forearm. Often the pain is worse at night and may wake you when you are asleep.  Carpal tunnel syndrome may occur during pregnancy and with use of birth control pills. It is more common in workers who must often bend their wrists. It is also common in people who work with power tools that cause strong vibrations.  Home care    Rest the painful wrist. Avoid repeated bending of the wrist back and forth. This puts pressure on the median nerve. Avoid using power tools with strong vibrations.    If you were given a splint, wear it at night while you sleep. You may also wear it during the day for comfort.    Move your fingers and wrists often to avoid stiffness.    Elevate your arms on pillows when you lie down.    Try using the unaffected hand more.    Try not to hold your wrists in a bent, downward position.    Sometimes changes in the work place may ease symptoms. If you type most of the day, it may help to change the position of your keyboard or add a wrist support. Your wrist should be in a neutral position and not bent back when typing.    You may use over-the-counter pain medicine to treat pain and inflammation, unless another medicine was prescribed. Anti-inflammatory pain medicines, such as ibuprofen or naproxen may be more effective than acetaminophen, which treats pain, but not inflammation. If you have chronic liver or kidney disease or ever had a stomach ulcer or GI bleeding, talk with your  doctor before using these medicines.    Opioid pain medicine will only give temporary relief and does not treat the problem. If pain continues, you may need a shot of a steroid drug into your wrist.    If the above methods fail, you may need surgery. This will open the carpal tunnel and release the pressure on the trapped nerve.  Follow-up care  Follow up with your healthcare provider, or as advised, if the pain doesn t begin to improve within the next week.  If X-rays were taken, you will be notified of any new findings that may affect your care.  When to seek medical advice  Call your healthcare provider right away if any of these occur:    Pain not improving with the above treatment    Fingers or hand become cold, blue, numb, or tingly    Your whole arm becomes swollen or weak  Date Last Reviewed: 11/23/2015 2000-2017 [employer]. [address] [place] [address]. All rights reserved. This information is not intended as a substitute for professional medical care. Always follow your healthcare professional's instructions.      [facility]   Discharged by : Staci Pro MA    Paper scripts provided to patient : no     If you have any questions regarding your visit please contact your care team:     Team Gold                Clinic Hours Telephone Number     Dr. Vinita Barron 7am-7pm  Monday - Thursday   7am-5pm  Fridays  [phone]   (Appointment scheduling available 24/7)     RN Line  [phone] option 2     Urgent Care - [place] Gil and [place] - 11am-9pm Monday-Friday Saturday-Sunday- 9am-5pm     [place] -   5pm-9pm Monday-Friday Saturday-Sunday- 9am-5pm    [phone] - [place] Gil    [phone] - [place]       For a Price Quote for your services, please call our Consumer Price Line at [phone].     What options do I have for visits at the clinic  other than the traditional office visit?     To expand how we care for you, many of our providers are utilizing electronic visits (e-visits) and telephone visits, when medically appropriate, for interactions with their patients rather than a visit in the clinic. We also offer nurse visits for many medical concerns. Just like any other service, we will bill your insurance company for this type of visit based on time spent on the phone with your provider. Not all insurance companies cover these visits. Please check with your medical insurance if this type of visit is covered. You will be responsible for any charges that are not paid by your insurance.   E-visits via Aspire: generally incur a $35.00 fee.     Telephone visits:   Time spent on the phone: *charged based on time that is spent on the phone in increments of 10 minutes. Estimated cost:   5-10 mins $30.00   11-20 mins. $59.00   21-30 mins. $85.00     Use Aspire (secure email communication and access to your chart) to send your primary care provider a message or make an appointment. Ask someone on your Team how to sign up for [employer].     As always, Thank you for trusting us with your health care needs!      [facility]:    Scheduling Appointments  [facility]  Call: [phone]    [facility]  Call: [phone]    [facility]  Call: [phone]    For Gastroenterology referrals   [facility] Gastroenterology   Clinics and Surgery Center, 4th Floor   [address] [place] [address]   Appointments: [phone]    WHERE TO GO FOR CARE?  Clinic    Make an appointment if you:       Are sick (cold, cough, flu, sore throat, earache or in pain).       Have a small injury (sprain, small cut, burn or broken bone).       Need a physical exam, Pap smear, vaccine or prescription refill.       Have questions about your health or medicines.    To reach us:      Call  [phone] ([phone]). Open 24 hours every day. (For counseling services, call [phone].)    Log into [employer] at [email or web address]. (Visit [email or web address] to create an account.) Hospital emergency room    An emergency is a serious or life- threatening problem that must be treated right away.    Call 911 or get to the hospital if you have:      Very bad or sudden:            - Chest pain or pressure         - Bleeding         - Head or belly pain         - Dizziness or trouble seeing, walking or                          Speaking      Problems breathing      Blood in your vomit or you are coughing up blood      A major injury (knocked out, loss of a finger or limb, rape, broken bone protruding from skin)    A mental health crisis. (Or call the Mental Health Crisis line at 1-250.831.5592 or Suicide Prevention Hotline at 1-933.506.8403.)    Open 24 hours every day. You don't need an appointment.     Urgent care    Visit urgent care for sickness or small injuries when the clinic is closed. You don't need an appointment. To check hours or find an urgent care near you, visit [email or web address]. Online care    Get online care from [facility] for more than 70 common problems, like colds, allergies and infections. Open 24 hours every day at:   [email or web address]   Need help deciding?    For advice about where to be seen, you may call your clinic and ask to speak with a nurse. We're here for you 24 hours every day.         If you are deaf or hard of hearing, please let us know. We provide many free services including sign language interpreters, oral interpreters, TTYs, telephone amplifiers, note takers and written materials.

## 2018-02-22 NOTE — PROGRESS NOTES
SUBJECTIVE:   Rajani Guo is a 67 year old female who presents to clinic today for the following health issues:      Concern - left wrist pain and  Right index finger pain   Onset: weeks     Description:   Pain left wrist and swelling in right index finger     Intensity: moderate, severe    Progression of Symptoms:  worsening    Accompanying Signs & Symptoms:  Hard to do things with hands     Previous history of similar problem:   Has had carpal tunnel Dx, and triggrer finger     Precipitating factors:   Worsened by: movement     Alleviating factors:  Improved by: using a wrist splint, no help    Therapies Tried and outcome: tyelonal and ibuprofen      Trigger fingers on both hands. She was diagnosed 2 years ago.   Injection on the right index finger: it did not work very well.   She was managing pain with OTC pain meds.  Right index finger and middle 3 fingers on the left side are unable to make straight .   Recently she has been having significant pain at the base of the index finger.      Left hand she had Carpel Tunnel.   Finger tips are numb on ly on left side, it is worse at night.   She has been using wrist splint on the left wrist and glove on the right hand to help with her symptoms.     Problem list and histories reviewed & adjusted, as indicated.  Additional history: as documented    Patient Active Problem List   Diagnosis     Type 2 diabetes mellitus without complication, with long-term current use of insulin (H)     Hyperlipidemia LDL goal <100     Age-related osteoporosis without current pathological fracture     Insomnia, unspecified type     No past surgical history on file.    Social History   Substance Use Topics     Smoking status: Current Every Day Smoker     Smokeless tobacco: Never Used     Alcohol use Yes     No family history on file.      Current Outpatient Prescriptions   Medication Sig Dispense Refill     aspirin EC 81 MG EC tablet Take 81 mg by mouth       Multiple Vitamin  (MULTI-VITAMINS) TABS Take 1 tablet by mouth       fish oil-omega-3 fatty acids 1000 MG capsule Take 1 capsule by mouth       calcium carb 1250 mg, 500 mg [place],/vitamin D 200 unit (CALCIUM 500/D) 500-200 MG-UNIT per tablet Take 1 tablet by mouth       glucosamine-chondroitin 500-400 MG CAPS per capsule Take 1 capsule by mouth       lisinopril (PRINIVIL/ZESTRIL) 2.5 MG tablet Take 1 tablet (2.5 mg) by mouth daily 90 tablet 1     alendronate (FOSAMAX) 70 MG tablet Take 1 tablet (70 mg) by mouth every 7 days 4 tablet 3     atorvastatin (LIPITOR) 40 MG tablet Take 1 tablet (40 mg) by mouth daily 90 tablet 3     metFORMIN (GLUCOPHAGE) 1000 MG tablet Take 1 tablet (1,000 mg) by mouth 2 times daily (with meals) 180 tablet 1     traZODone (DESYREL) 50 MG tablet Take 1 tablet (50 mg) by mouth At Bedtime 60 tablet 0     insulin detemir (LEVEMIR FLEXPEN/FLEXTOUCH) 100 UNIT/ML injection Inject 25 Units Subcutaneous At Bedtime 15 mL 1     [DISCONTINUED] insulin detemir (LEVEMIR) 100 UNIT/ML injection Inject 25 units at bedtime.       Allergies   Allergen Reactions     Indomethacin Other (See Comments)     Dizziness and disorientation     Tramadol Nausea and Vomiting     Recent Labs   Lab Test  01/18/18   0835   A1C  6.4*   LDL  83   HDL  68   TRIG  78   ALT  16   CR  0.47*   GFRESTIMATED  >90   GFRESTBLACK  >90   POTASSIUM  3.9      BP Readings from Last 3 Encounters:   02/22/18 130/60   01/18/18 130/60    Wt Readings from Last 3 Encounters:   02/22/18 164 lb 12.8 oz (74.8 kg)   01/18/18 164 lb (74.4 kg)                  Labs reviewed in EPIC    Reviewed and updated as needed this visit by clinical staff  Tobacco  Allergies  Meds       Reviewed and updated as needed this visit by Provider         ROS:  Constitutional, HEENT, cardiovascular, pulmonary, gi and gu systems are negative, except as otherwise noted.    OBJECTIVE:     /60 (BP Location: Right arm, Patient Position: Chair, Cuff Size: Adult Regular)  Pulse 71   "Temp 98.1  F (36.7  C) (Oral)  Resp 16  Ht 5' 4.75\" (1.645 m)  Wt 164 lb 12.8 oz (74.8 kg)  SpO2 99%  BMI 27.64 kg/m2  Body mass index is 27.64 kg/(m^2).  GENERAL: healthy, alert and no distress  Right hand: significant tenderness MCP joint, mild tenderness IP joints.   Left hand: mild tenderness on the flexor aspect of wrist .   Tinel's amd Phalen's: causes tingling in left finger tips.   Bilateral hand muscle strength is equal .     ASSESSMENT/PLAN:         ICD-10-CM    1. Trigger finger, right index finger M65.321 XR Hand Right G/E 3 Views     ORTHO  REFERRAL     [facility]   2. Pain of finger of right hand M79.644 XR Hand Right G/E 3 Views     ORTHO  REFERRAL     [facility]   3. Carpal tunnel syndrome of left wrist G56.02 predniSONE (DELTASONE) 20 MG tablet   4. Disturbance of skin sensation R20.9 EMG     Referred to Ortho  for trigger finger injections.     Carpal tunnel: fore recent worsening: oral prednisone, continue wrist splint, get EMG done.     See phone note attached to hand x ray result.       Anna Barron MD  [facility]  "

## 2018-02-22 NOTE — NURSING NOTE
"Chief Complaint   Patient presents with     Musculoskeletal Problem     carpal tunnel in left wrist      Finger     right index swelling         Initial /60 (BP Location: Right arm, Patient Position: Chair, Cuff Size: Adult Regular)  Pulse 71  Temp 98.1  F (36.7  C) (Oral)  Resp 16  Ht 5' 4.75\" (1.645 m)  Wt 164 lb 12.8 oz (74.8 kg)  SpO2 99%  BMI 27.64 kg/m2 Estimated body mass index is 27.64 kg/(m^2) as calculated from the following:    Height as of this encounter: 5' 4.75\" (1.645 m).    Weight as of this encounter: 164 lb 12.8 oz (74.8 kg).  Medication Reconciliation: complete     Zayra Ren CMA    "

## 2018-02-23 ASSESSMENT — PATIENT HEALTH QUESTIONNAIRE - PHQ9: SUM OF ALL RESPONSES TO PHQ QUESTIONS 1-9: 9

## 2018-02-23 NOTE — PROGRESS NOTES
Dear Rajani Guo,     Your recent finger x ray is attached. It shows arthritis changes of the index finger joint as expected.     Get seen by hand physical therapy. Call: [phone] to schedule hand physical therapy appointment.     See orthopedic specialist for trigger finger injection as referred.     Anna Barron MD.   Family Physician.  [facility].

## 2018-02-26 ENCOUNTER — OFFICE VISIT (OUTPATIENT)
Dept: ORTHOPEDICS | Facility: CLINIC | Age: 68
End: 2018-02-26
Payer: COMMERCIAL

## 2018-02-26 VITALS
BODY MASS INDEX: 27.49 KG/M2 | SYSTOLIC BLOOD PRESSURE: 128 MMHG | WEIGHT: 165 LBS | DIASTOLIC BLOOD PRESSURE: 72 MMHG | HEIGHT: 65 IN

## 2018-02-26 DIAGNOSIS — M65.321 TRIGGER FINGER, RIGHT INDEX FINGER: Primary | ICD-10-CM

## 2018-02-26 PROCEDURE — 99203 OFFICE O/P NEW LOW 30 MIN: CPT | Performed by: PEDIATRICS

## 2018-02-26 NOTE — PROGRESS NOTES
Sports Medicine Clinic Visit    PCP: Clinic, [facility]    Rajani Guo is a 67 year old female who is seen  in consultation at the request of  Anna Barron M.D. presenting with right index finger triggering.  Finger has been triggering for about 6 months.  Does have a history of trigger fingers, including an injection in that finger about 2-3 years ago, and other fingers have had the release.  Does also have left hand numbness and tingling in her middle ring and small finger.  Currently on an oral steroid for this issue and states it has been improving.  Does have a history of CTS, with a release on her right.    Has had some improvement with the oral steroids for her trigger finger as well   Patient is right hand dominant.     **  Did have a Corticosteroid injection into the right index finger before, 3 years ago. Currently, MCP joint of the right index finger is most painful, has noted swelling.  Has tried splinting in the past. Currently taking oral steroid, on day 4 of 12.  Oral steroid is giving some relief.  Did have trigger finger releases on both the long finger and the ring finger of both hands. Has tried Physical Therapy and injection for the right index finger trigger finger in the past. Does not recall having injections for other trigger digits.    **  Prior surgery ulnar wrist, near ulnar styloid. Pt notes in remote past she had surgery on ulnar wrist.    Injury: ongoing with gradual onset     Location of Pain: right index finger   Duration of Pain: 6 month(s)  Rating of Pain at worst: 9/10  Rating of Pain Currently: 1/10  Symptoms are better with: Other medications: oral steroid   Symptoms are worse with: grasping and grabbing   Additional Features:   Positive:swelling,  popping, catching and locking   Negative: bruising, grinding, instability, numbness, weakness, pain in other joints and systemic symptoms  Other evaluation and/or treatments so far consists of: injection, oral  "steroid   Prior History of related problems: HX of injection and releases on other fingers.      Social History: retired from factory job, works at [employer]     Review of Systems  Musculoskeletal: as above  Remainder of review of systems is negative including constitutional, CV, pulmonary, GI, Skin and Neurologic except as noted in HPI or medical history.    This document serves as a record of the services and decisions personally performed and made by Ori Castaneda DO, CAQ. It was created on his behalf by Ronnie Langford, a trained medical scribe. The creation of this document is based the provider's statements to the medical scribe.  Ronnie Langford February 26, 2018 11:14 AM      Patient Active Problem List   Diagnosis     Type 2 diabetes mellitus without complication, with long-term current use of insulin (H)     Hyperlipidemia LDL goal <100     Age-related osteoporosis without current pathological fracture     Insomnia, unspecified type     PMHx: above  PSHx: above  Fam hx: noncontributory    Social History     Social History     Marital status: Single     Spouse name: N/A     Number of children: N/A     Years of education: N/A     Occupational History     Not on file.     Social History Main Topics     Smoking status: Current Every Day Smoker     Smokeless tobacco: Never Used     Alcohol use Yes     Drug use: No     Sexual activity: No     Other Topics Concern     Not on file     Social History Narrative       Objective  /72  Ht 5' 4.75\" (1.645 m)  Wt 165 lb (74.8 kg)  BMI 27.67 kg/m2    GENERAL APPEARANCE: healthy, alert and no distress   GAIT: NORMAL  SKIN: no suspicious lesions or rashes  NEURO: Normal strength and tone, mentation intact and speech normal  PSYCH:  mentation appears normal and affect normal/bright  HEENT: no scleral icterus  CV: no extremity edema   RESP: nonlabored breathing    Exam:  Hand/wrist (right):    Inspection:  Mild triggering of the index finger, palpable flexor " tendon nodule. mild 2nd MCP joint area swelling, some swelling 3rd MCP. No ecchymosis    Surgical scars from prior trigger finger release long and ring fingers; also ulnar wrist, near ulnar styloid    Motion:  Forearm, wrist grossly intact  Digit motion lacking some composite flexion index finger, minimal limitation end ROM, stiff and tight   Translation of the 2nd MCP - no pain     Sensation:  Grossly intact    Radial pulses normal, +2/4, capillary refill brisk.    Palpation:  Nontender throughout the hand and wrist     Radiology:  Visualized radiographs of right index finger from 2/22/2018, and reviewed the images with the patient.  Impression: mild first CMC degenerative change. Also abnormal calcification in the area adjacent to the ulnar styloid consistent with change from prior surgery.    Results for orders placed or performed in visit on 02/22/18   XR Hand Right G/E 3 Views    Narrative    XR HAND RT G/E 3 VW 2/22/2018 10:47 AM    HISTORY: Pain.    COMPARISON: None.      Impression    IMPRESSION: No evidence of acute fracture or malalignment. Moderate  degenerative changes of the first carpometacarpal joint.    BRIAN NAPOLES MD         Assessment:  1. Trigger finger, right index finger         Plan:  Discussed the assessment with the patient. Improving with oral steroid, being used for concurrent left UE issues. Plan to primarily continue with oral steroid, use splinting prn.    Plain films of the area reviewed with the patient.    Discussed:  *Symptom Treatment - Ice, heat, Over the Counter Medications   *Activity Modification   *Support - padding, Alumafoam splint at night time   *Steroid - finish out Oral steroid vs Corticosteroid injection; hold on injection for now   *Rehab - Home Exercises program     Topical Treatments: Ice preferred, heat prn   Over the counter medication: Patient's preferred OTC medication as directed on packaging.  Prescription Medication: complete oral steroid course  Activity  Modification: as discussed   Rehab: begin Home Exercise Program from previous episodes of trigger finger   Medical Equipment: patient has Alumafoam splint, may use at night time   Follow up: in ~1 month if not improving; otherwise, as needed   Questions answered. The patient indicates understanding of these issues and agrees with the plan.     Patient Instructions   Right index finger trigger finger    We discussed:  Treating symptoms (may try heat if desired, icing preferred if comfortable)  Modifying activities (consider use of padded glove for comfort with activities; limiting direct pressure through this area)  Use of support (nighttime splinting)  Use of steroid (continue/complete prednisone; can consider an injection pending result of using prednisone)  Rehab (may do prior home exercises from therapy, if desired)    Will leave follow up as needed. May return to clinic if not getting continued improvement with prednisone, or for other questions, concerns.      Ori Castaneda DO, CAQ    CC: Anna Barron      Disclaimer: This note consists of symbols derived from keyboarding, dictation and/or voice recognition software. As a result, there may be errors in the script that have gone undetected. Please consider this when interpreting information found in this chart.    The information in this document, created by the medical scribe for me, accurately reflects the services I personally performed and the decisions made by me. I have reviewed and approved this document for accuracy.   Ori Castaneda DO, CAQ

## 2018-02-26 NOTE — MR AVS SNAPSHOT
After Visit Summary   2/26/2018    Rajani Guo    MRN: [id number]           Patient Information     Date Of Birth          1950        Visit Information        Provider Department      2/26/2018 8:00 AM Ori Castaneda, DO [facility]        Today's Diagnoses     Trigger finger, right index finger    -  1      Care Instructions    Right index finger trigger finger    We discussed:  Treating symptoms (may try heat if desired, icing preferred if comfortable)  Modifying activities (consider use of padded glove for comfort with activities; limiting direct pressure through this area)  Use of support (nighttime splinting)  Use of steroid (continue/complete prednisone; can consider an injection pending result of using prednisone)  Rehab (may do prior home exercises from therapy, if desired)    Will leave follow up as needed. May return to clinic if not getting continued improvement with prednisone, or for other questions, concerns.          Follow-ups after your visit        Follow-up notes from your care team     Return if symptoms worsen or fail to improve.      Who to contact     If you have questions or need follow up information about today's clinic visit or your schedule please contact [facility] directly at [phone].  Normal or non-critical lab and imaging results will be communicated to you by MyChart, letter or phone within 4 business days after the clinic has received the results. If you do not hear from us within 7 days, please contact the clinic through MyChart or phone. If you have a critical or abnormal lab result, we will notify you by phone as soon as possible.  Submit refill requests through [employer] or call your pharmacy and they will forward the refill request to us. Please allow 3 business days for your refill to be completed.          Additional Information About Your Visit        MyChart Information      "[employer] gives you secure access to your electronic health record. If you see a primary care provider, you can also send messages to your care team and make appointments. If you have questions, please call your primary care clinic.  If you do not have a primary care provider, please call [phone] and they will assist you.        Care EveryWhere ID     This is your Care EveryWhere ID. This could be used by other organizations to access your [facility] medical records  [id number]        Your Vitals Were     Height BMI (Body Mass Index)                5' 4.75\" (1.645 m) 27.67 kg/m2           Blood Pressure from Last 3 Encounters:   02/26/18 128/72   02/22/18 130/60   01/18/18 130/60    Weight from Last 3 Encounters:   02/26/18 165 lb (74.8 kg)   02/22/18 164 lb 12.8 oz (74.8 kg)   01/18/18 164 lb (74.4 kg)              Today, you had the following     No orders found for display       Primary Care Provider Office Phone # Fax #    [facility] [phone]       [address] [place] [id number]        Equal Access to Services     PAMELA DOMINGUEZ AH: Hadii aad ku hadasho Somaryali, waaxda luqadaha, qaybta kaalmada adeegyada, saud marieen deidre lindsay ah. So [facility] [phone].    ATENCIÓN: Si habla español, tiene a rosen disposición servicios gratuitos de asistencia lingüística. Oren al [phone].    We comply with applicable federal civil rights laws and [place] laws. We do not discriminate on the basis of race, color, national origin, age, disability, sex, sexual orientation, or gender identity.            Thank you!     Thank you for choosing [facility]  for your care. Our goal is always to provide you with excellent care. Hearing back from our patients is one way we can continue to improve our services. Please take a few minutes to complete the written survey that you may receive in the mail after your visit with us. Thank you!      "        Your Updated Medication List - Protect others around you: Learn how to safely use, store and throw away your medicines at www.disposemymeds.org.          This list is accurate as of 2/26/18  9:28 AM.  Always use your most recent med list.                   Brand Name Dispense Instructions for use Diagnosis    alendronate 70 MG tablet    FOSAMAX    4 tablet    Take 1 tablet (70 mg) by mouth every 7 days    Age-related osteoporosis without current pathological fracture       aspirin EC 81 MG EC tablet      Take 81 mg by mouth        atorvastatin 40 MG tablet    LIPITOR    90 tablet    Take 1 tablet (40 mg) by mouth daily    Type 2 diabetes mellitus without complication, with long-term current use of insulin (H)       calcium 500/D 500-200 MG-UNIT per tablet   Generic drug:  calcium carb 1250 mg (500 mg [place])/vitamin D 200 unit      Take 1 tablet by mouth        fish oil-omega-3 fatty acids 1000 MG capsule      Take 1 capsule by mouth        glucosamine-chondroitin 500-400 MG Caps per capsule      Take 1 capsule by mouth        insulin detemir 100 UNIT/ML injection    LEVEMIR FLEXPEN/FLEXTOUCH    15 mL    Inject 25 Units Subcutaneous At Bedtime    Type 2 diabetes mellitus without complication, with long-term current use of insulin (H)       lisinopril 2.5 MG tablet    PRINIVIL/Zestril    90 tablet    Take 1 tablet (2.5 mg) by mouth daily    Type 2 diabetes mellitus without complication, with long-term current use of insulin (H)       metFORMIN 1000 MG tablet    GLUCOPHAGE    180 tablet    Take 1 tablet (1,000 mg) by mouth 2 times daily (with meals)    Type 2 diabetes mellitus without complication, with long-term current use of insulin (H)       MULTI-VITAMINS Tabs      Take 1 tablet by mouth        predniSONE 20 MG tablet    DELTASONE    20 tablet    Take 3 tabs (60 mg) by mouth daily x 3 days, 2 tabs (40 mg) daily x 3 days, 1 tab (20 mg) daily x 3 days, then 1/2 tab (10 mg) x 3 days.    Carpal tunnel syndrome  of left wrist       traZODone 50 MG tablet    DESYREL    60 tablet    Take 1 tablet (50 mg) by mouth At Bedtime    Insomnia, unspecified type

## 2018-02-26 NOTE — PATIENT INSTRUCTIONS
Right index finger trigger finger    We discussed:  Treating symptoms (may try heat if desired, icing preferred if comfortable)  Modifying activities (consider use of padded glove for comfort with activities; limiting direct pressure through this area)  Use of support (nighttime splinting)  Use of steroid (continue/complete prednisone; can consider an injection pending result of using prednisone)  Rehab (may do prior home exercises from therapy, if desired)    Will leave follow up as needed. May return to clinic if not getting continued improvement with prednisone, or for other questions, concerns.

## 2018-02-26 NOTE — LETTER
2/26/2018         RE: Rajani Guo  [address]  [facility] [address]        Dear Colleague,    Thank you for referring your patient, Rajani Guo, to the [facility]. Please see a copy of my visit note below.    Sports Medicine Clinic Visit    PCP: Clinic, [facility]    Rajani Guo is a 67 year old female who is seen  in consultation at the request of  Anna Barron M.D. presenting with right index finger triggering.  Finger has been triggering for about 6 months.  Does have a history of trigger fingers, including an injection in that finger about 2-3 years ago, and other fingers have had the release.  Does also have left hand numbness and tingling in her middle ring and small finger.  Currently on an oral steroid for this issue and states it has been improving.  Does have a history of CTS, with a release on her right.    Has had some improvement with the oral steroids for her trigger finger as well   Patient is right hand dominant.     **  Did have a Corticosteroid injection into the right index finger before, 3 years ago. Currently, MCP joint of the right index finger is most painful, has noted swelling.  Has tried splinting in the past. Currently taking oral steroid, on day 4 of 12.  Oral steroid is giving some relief.  Did have trigger finger releases on both the long finger and the ring finger of both hands. Has tried Physical Therapy and injection for the right index finger trigger finger in the past. Does not recall having injections for other trigger digits.    **  Prior surgery ulnar wrist, near ulnar styloid. Pt notes in remote past she had surgery on ulnar wrist.    Injury: ongoing with gradual onset     Location of Pain: right index finger   Duration of Pain: 6 month(s)  Rating of Pain at worst: 9/10  Rating of Pain Currently: 1/10  Symptoms are better with: Other medications: oral steroid   Symptoms are worse with: grasping and grabbing  "  Additional Features:   Positive:swelling,  popping, catching and locking   Negative: bruising, grinding, instability, numbness, weakness, pain in other joints and systemic symptoms  Other evaluation and/or treatments so far consists of: injection, oral steroid   Prior History of related problems: HX of injection and releases on other fingers.      Social History: retired from factory job, works at [employer]     Review of Systems  Musculoskeletal: as above  Remainder of review of systems is negative including constitutional, CV, pulmonary, GI, Skin and Neurologic except as noted in HPI or medical history.    This document serves as a record of the services and decisions personally performed and made by Ori Castaneda DO, CAQ. It was created on his behalf by Ronnie Langford, a trained medical scribe. The creation of this document is based the provider's statements to the medical scribe.  Ronnie Langford February 26, 2018 11:14 AM      Patient Active Problem List   Diagnosis     Type 2 diabetes mellitus without complication, with long-term current use of insulin (H)     Hyperlipidemia LDL goal <100     Age-related osteoporosis without current pathological fracture     Insomnia, unspecified type     PMHx: above  PSHx: above  Fam hx: noncontributory    Social History     Social History     Marital status: Single     Spouse name: N/A     Number of children: N/A     Years of education: N/A     Occupational History     Not on file.     Social History Main Topics     Smoking status: Current Every Day Smoker     Smokeless tobacco: Never Used     Alcohol use Yes     Drug use: No     Sexual activity: No     Other Topics Concern     Not on file     Social History Narrative       Objective  /72  Ht 5' 4.75\" (1.645 m)  Wt 165 lb (74.8 kg)  BMI 27.67 kg/m2    GENERAL APPEARANCE: healthy, alert and no distress   GAIT: NORMAL  SKIN: no suspicious lesions or rashes  NEURO: Normal strength and tone, mentation intact and " speech normal  PSYCH:  mentation appears normal and affect normal/bright  HEENT: no scleral icterus  CV: no extremity edema   RESP: nonlabored breathing    Exam:  Hand/wrist (right):    Inspection:  Mild triggering of the index finger, palpable flexor tendon nodule. mild 2nd MCP joint area swelling, some swelling 3rd MCP. No ecchymosis    Surgical scars from prior trigger finger release long and ring fingers; also ulnar wrist, near ulnar styloid    Motion:  Forearm, wrist grossly intact  Digit motion lacking some composite flexion index finger, minimal limitation end ROM, stiff and tight   Translation of the 2nd MCP - no pain     Sensation:  Grossly intact    Radial pulses normal, +2/4, capillary refill brisk.    Palpation:  Nontender throughout the hand and wrist     Radiology:  Visualized radiographs of right index finger from 2/22/2018, and reviewed the images with the patient.  Impression: mild first CMC degenerative change. Also abnormal calcification in the area adjacent to the ulnar styloid consistent with change from prior surgery.    Results for orders placed or performed in visit on 02/22/18   XR Hand Right G/E 3 Views    Narrative    XR HAND RT G/E 3 VW 2/22/2018 10:47 AM    HISTORY: Pain.    COMPARISON: None.      Impression    IMPRESSION: No evidence of acute fracture or malalignment. Moderate  degenerative changes of the first carpometacarpal joint.    BRIAN NAPOLES MD         Assessment:  1. Trigger finger, right index finger         Plan:  Discussed the assessment with the patient. Improving with oral steroid, being used for concurrent left UE issues. Plan to primarily continue with oral steroid, use splinting prn.    Plain films of the area reviewed with the patient.    Discussed:  *Symptom Treatment - Ice, heat, Over the Counter Medications   *Activity Modification   *Support - padding, Alumafoam splint at night time   *Steroid - finish out Oral steroid vs Corticosteroid injection; hold on  injection for now   *Rehab - Home Exercises program     Topical Treatments: Ice preferred, heat prn   Over the counter medication: Patient's preferred OTC medication as directed on packaging.  Prescription Medication: complete oral steroid course  Activity Modification: as discussed   Rehab: begin Home Exercise Program from previous episodes of trigger finger   Medical Equipment: patient has Alumafoam splint, may use at night time   Follow up: in ~1 month if not improving; otherwise, as needed   Questions answered. The patient indicates understanding of these issues and agrees with the plan.     Patient Instructions   Right index finger trigger finger    We discussed:  Treating symptoms (may try heat if desired, icing preferred if comfortable)  Modifying activities (consider use of padded glove for comfort with activities; limiting direct pressure through this area)  Use of support (nighttime splinting)  Use of steroid (continue/complete prednisone; can consider an injection pending result of using prednisone)  Rehab (may do prior home exercises from therapy, if desired)    Will leave follow up as needed. May return to clinic if not getting continued improvement with prednisone, or for other questions, concerns.      Ori Castaneda DO, CAQ    CC: Anna Barron      Disclaimer: This note consists of symbols derived from keyboarding, dictation and/or voice recognition software. As a result, there may be errors in the script that have gone undetected. Please consider this when interpreting information found in this chart.    The information in this document, created by the medical scribe for me, accurately reflects the services I personally performed and the decisions made by me. I have reviewed and approved this document for accuracy.   Ori Castaneda DO, CAQ     Again, thank you for allowing me to participate in the care of your patient.        Sincerely,        Ori Castaneda DO

## 2018-03-16 DIAGNOSIS — M81.0 AGE-RELATED OSTEOPOROSIS WITHOUT CURRENT PATHOLOGICAL FRACTURE: ICD-10-CM

## 2018-03-17 NOTE — TELEPHONE ENCOUNTER
"Requested Prescriptions   Pending Prescriptions Disp Refills     alendronate (FOSAMAX) 70 MG tablet [Pharmacy Med Name: ALENDRONATE SODIUM 70 MG TAB] 4 tablet 1    Last Written Prescription Date:  1-18-18  Last Fill Quantity: 4,  # refills: 3   Last office visit: 2/22/2018 with prescribing provider:     Future Office Visit:     Sig: TAKE 1 TABLET (70 MG) BY MOUTH EVERY 7 DAYS    Bisphosphonates Failed    3/16/2018  6:13 PM       Failed - Dexa on file within past 2 years    Please review last Dexa result.          Failed - Normal serum creatinine on file within past 12 months    Recent Labs   Lab Test  01/18/18   0835   CR  0.47*            Passed - Recent (12 mo) or future (30 days) visit within the authorizing provider's specialty    Patient had office visit in the last 12 months or has a visit in the next 30 days with authorizing provider or within the authorizing provider's specialty.  See \"Patient Info\" tab in inbasket, or \"Choose Columns\" in Meds & Orders section of the refill encounter.           Passed - Patient is age 18 or older          "

## 2018-03-21 ENCOUNTER — THERAPY VISIT (OUTPATIENT)
Dept: OCCUPATIONAL THERAPY | Facility: CLINIC | Age: 68
End: 2018-03-21
Attending: FAMILY MEDICINE
Payer: COMMERCIAL

## 2018-03-21 DIAGNOSIS — M79.642 BILATERAL HAND PAIN: Primary | ICD-10-CM

## 2018-03-21 DIAGNOSIS — G56.02 CARPAL TUNNEL SYNDROME OF LEFT WRIST: ICD-10-CM

## 2018-03-21 DIAGNOSIS — M65.321 TRIGGER FINGER, RIGHT INDEX FINGER: ICD-10-CM

## 2018-03-21 DIAGNOSIS — M79.641 BILATERAL HAND PAIN: Primary | ICD-10-CM

## 2018-03-21 PROCEDURE — 97165 OT EVAL LOW COMPLEX 30 MIN: CPT | Mod: GO | Performed by: OCCUPATIONAL THERAPIST

## 2018-03-21 PROCEDURE — 97110 THERAPEUTIC EXERCISES: CPT | Mod: GO | Performed by: OCCUPATIONAL THERAPIST

## 2018-03-21 PROCEDURE — 97140 MANUAL THERAPY 1/> REGIONS: CPT | Mod: GO | Performed by: OCCUPATIONAL THERAPIST

## 2018-03-21 RX ORDER — ALENDRONATE SODIUM 70 MG/1
TABLET ORAL
Qty: 4 TABLET | Refills: 3 | Status: SHIPPED | OUTPATIENT
Start: 2018-03-21 | End: 2018-07-20

## 2018-03-21 NOTE — PROGRESS NOTES
Hand Therapy Initial Evaluation    Current Date:  3/21/2018    Subjective:  Rajani Guo is a 67 year old right hand dominant female.    Diagnosis:   Right index trigger finger and left CTS  DOI:  2/23/18 (therapy referral)    Patient reports symptoms of pain, stiffness/loss of motion, weakness/loss of strength, edema, numbness and tingling  of the right index finger and left hand which occurred due to gradual onset over the past 3 years. Since onset symptoms are gradually getting worse in the past 6 months.  Special tests:  x-ray.  Previous treatment: Oral steriods which improved symptoms in right but not left hand.  General health as reported by patient is good.  Pertinent medical history includes:rheumatoid arthritis, osteoporosis, diabetes, smoking, numbness/tingling, pain at rest/night  Medical allergies:  See EMR.  Surgical history: orthopedic: L shoulder.  Medication history: bone density, sleep, pain, high blood pressure, insulin, metformin, Lipitor.    Occupational Profile Information:  Current occupation is retired from   Prior functional level:  independent-shared household chores  Barriers include:none  Mobility: No difficulty  Transportation: drives    Functional Outcome Measure:  Upper Extremity Functional Index  SCORE:   Column Totals: 63/80  (A lower score indicates greater disability.)    Objective:  ROM:  Index Finger 3/21/18   AROM(PROM) Right   MCP -10/80   PIP 0/95   DIP 0/40     Strength:   (Measured in pounds)   3/21/18    Trials Left Right   1  2  3 36+  35+  37+ 55-  47-  54-   Average: 36 52     Stage of Stenosing Tenosynovitis (SST):  Stage 1:  Normal  Stage 2:  Uneven motion of tendon  Stage 3:  Triggering, clicking, catching  Stage 4:  Locking in extension or flexion; unlocked by active motion  Stage 5:  Locking in extension or flexion; unlocked by passive motion  Stage 6:  Finger locked in extension or flexion  Right 3/21/18   Triggering of index finger Stage 3      Palpation:  Right 3/21/18   A1 pulley index finger +     Sensation:  Decreased Median Nerve distribution left hand    Special Tests:  Left 3/21/18   Tinels at CT ++   Phalens ++ 30 seconds   Median nerve compression at CT -   Varghese test for lumbrical incursion  (fist x 30 secs) -   Median nerve compression at Pronator +   ULTT median neve + end range     Pain Report:  VAS(0-10) 3/21/18   At Rest: 0/10 right  8-9/10 left   With Use: 3-4/10 right  8-9/10 left   Location:  Left hand and right index finger  Pain Quality:  Right index finger pressure, left hand tingling  Frequency: intermittent right index, constant in left  Pain is worst:  daytime for bilateral hands, nighttime and mornings for left ahnd  Exacerbated by:  Cold, overuse  Relieved by:  otc medications  Progression:  Gradually worsening  Assessment:  Patient presents with symptoms consistent with diagnosis of right index trigger finger and left CTS, with conservative intervention.     Patient's limitations or Problem List includes:  Pain, Decreased ROM/motion, Increased edema, Weakness, Sensory disturbance, Decreased , Decreased pinch and Tightness in musculature of the right index finger and left hand which interferes with the patient's ability to perform Self Care Tasks (dressing), Sleep Patterns, Recreational Activities and Household Chores as compared to previous level of function.    Rehab Potential:  Good - Return to full activity, some limitations    Patient will benefit from skilled Occupational Therapy to increase ROM, flexibility, overall strength,  strength, pinch strength and sensation and decrease pain and edema to return to previous activity level and resume normal daily tasks and to reach their rehab potential.    Barriers to Learning:  No barrier    Communication Issues:  Patient appears to be able to clearly communicate and understand verbal and written communication and follow directions correctly.    Assessment of  Occupational Performance:  5 or more Performance Deficits  Identified Performance Deficits: dressing, home establishment and management, meal preparation and cleanup, sleep and leisure activities      Clinical Decision Making (Complexity): Low complexity    Treatment Explanation:  The following has been discussed with the patient:  RX ordered/plan of care  Anticipated outcomes  Possible risks and side effects    Plan:  Frequency:  1 X week, once daily  Duration:  for 6 weeks  Treatment Plan:    Modalities:  US and Paraffin  Therapeutic Exercise:  AROM, AAROM, PROM, Tendon Gliding, Blocking, Isotonics and Isometrics  Neuromuscular re-education:  Nerve Gliding, Posture and Kinesiotaping  Manual Techniques:  Friction massage, Myofascial release and Decongestive  Orthotic Fabrication:  Finger based orthosis and Forearm based orthosis  Self Care:  Self Care Tasks, Ergonomic Considerations and Diagnostic Education    Discharge Plan:  Achieve all LTG.  Independent in home treatment program.  Reach maximal therapeutic benefit.    Home Exercise Program:  Right:  Ice to A1 pulley/palm for inflammation  Decongestive and TFM to palm and A1 pulley  PROM finger flexion to avoid triggering  Avoid triggering with ADL's    Left:  Tendon gliding with 3 fists and FDS  Forearm flexor stretch   Partial proximal median nerve gliding  Wear prefab wrist splint sleeping  Avoid prolonged wrist flexion with ADL's    Next Visit:  See in 1 week  Consider oval 8 orthosis or extension gutter sleeping for right index and JENNIFER lumbrical block orthosis for right wrist/hand  Add MFR to left forearm flexors and lumbrical stretches

## 2018-03-21 NOTE — MR AVS SNAPSHOT
After Visit Summary   3/21/2018    Rajani Guo    MRN: [id number]           Patient Information     Date Of Birth          1950        Visit Information        Provider Department      3/21/2018 10:30 AM Zofia Topete [facility] [place]        Today's Diagnoses     Bilateral hand pain    -  1    Trigger finger, right index finger        Carpal tunnel syndrome of left wrist           Follow-ups after your visit        Your next 10 appointments already scheduled     Mar 28, 2018 10:30 AM CDT   [facility] with Zofia Topete   [facility] [place] ([facility] [place] [facility])    [address]  [place] [address] [phone]              Who to contact     If you have questions or need follow up information about today's clinic visit or your schedule please contact [facility] [place] directly at [phone].  Normal or non-critical lab and imaging results will be communicated to you by MyChart, letter or phone within 4 business days after the clinic has received the results. If you do not hear from us within 7 days, please contact the clinic through Clozehart or phone. If you have a critical or abnormal lab result, we will notify you by phone as soon as possible.  Submit refill requests through Private Outlet or call your pharmacy and they will forward the refill request to us. Please allow 3 business days for your refill to be completed.          Additional Information About Your Visit        Clozehart Information     Private Outlet gives you secure access to your electronic health record. If you see a primary care provider, you can also send messages to your care team and make appointments. If you have questions, please call your primary care clinic.  If you do not have a primary care provider, please call [phone] and they will assist you.        Care EveryWhere ID     This is your Care  EveryWhere ID. This could be used by other organizations to access your [place] medical records  [id number]         Blood Pressure from Last 3 Encounters:   02/26/18 128/72   02/22/18 130/60   01/18/18 130/60    Weight from Last 3 Encounters:   02/26/18 74.8 kg (165 lb)   02/22/18 74.8 kg (164 lb 12.8 oz)   01/18/18 74.4 kg (164 lb)              We Performed the Following     HC OT EVAL, LOW COMPLEXITY     [facility] INITIAL EVAL REPORT     MANUAL THER TECH,1+REGIONS,EA 15 MIN     THERAPEUTIC EXERCISES        Primary Care Provider Office Phone # Fax #    [facility] [phone]       [address] [place] [id number]        Equal Access to Services     PAMELA DOMINGUEZ : Hadii devorah alvarado hadasho Soomaali, waaxda luqadaha, qaybta kaalmada adeegyada, saud sosa. So [facility] [phone].    ATENCIÓN: Si habla español, tiene a rosen disposición servicios gratuitos de asistencia lingüística. Llame al [phone].    We comply with applicable federal civil rights laws and [place] laws. We do not discriminate on the basis of race, color, national origin, age, disability, sex, sexual orientation, or gender identity.            Thank you!     Thank you for choosing [facility]  for your care. Our goal is always to provide you with excellent care. Hearing back from our patients is one way we can continue to improve our services. Please take a few minutes to complete the written survey that you may receive in the mail after your visit with us. Thank you!             Your Updated Medication List - Protect others around you: Learn how to safely use, store and throw away your medicines at www.disposemymeds.org.          This list is accurate as of 3/21/18 12:40 PM.  Always use your most recent med list.                   Brand Name Dispense Instructions for use Diagnosis    alendronate 70 MG tablet    FOSAMAX    4 tablet    Take 1  tablet (70 mg) by mouth every 7 days    Age-related osteoporosis without current pathological fracture       aspirin EC 81 MG EC tablet      Take 81 mg by mouth        atorvastatin 40 MG tablet    LIPITOR    90 tablet    Take 1 tablet (40 mg) by mouth daily    Type 2 diabetes mellitus without complication, with long-term current use of insulin (H)       calcium 500/D 500-200 MG-UNIT per tablet   Generic drug:  calcium carb 1250 mg (500 mg [place])/vitamin D 200 unit      Take 1 tablet by mouth        fish oil-omega-3 fatty acids 1000 MG capsule      Take 1 capsule by mouth        glucosamine-chondroitin 500-400 MG Caps per capsule      Take 1 capsule by mouth        insulin detemir 100 UNIT/ML injection    LEVEMIR FLEXPEN/FLEXTOUCH    15 mL    Inject 25 Units Subcutaneous At Bedtime    Type 2 diabetes mellitus without complication, with long-term current use of insulin (H)       lisinopril 2.5 MG tablet    PRINIVIL/Zestril    90 tablet    Take 1 tablet (2.5 mg) by mouth daily    Type 2 diabetes mellitus without complication, with long-term current use of insulin (H)       metFORMIN 1000 MG tablet    GLUCOPHAGE    180 tablet    Take 1 tablet (1,000 mg) by mouth 2 times daily (with meals)    Type 2 diabetes mellitus without complication, with long-term current use of insulin (H)       MULTI-VITAMINS Tabs      Take 1 tablet by mouth        predniSONE 20 MG tablet    DELTASONE    20 tablet    Take 3 tabs (60 mg) by mouth daily x 3 days, 2 tabs (40 mg) daily x 3 days, 1 tab (20 mg) daily x 3 days, then 1/2 tab (10 mg) x 3 days.    Carpal tunnel syndrome of left wrist       traZODone 50 MG tablet    DESYREL    60 tablet    Take 1 tablet (50 mg) by mouth At Bedtime    Insomnia, unspecified type

## 2018-03-28 ENCOUNTER — THERAPY VISIT (OUTPATIENT)
Dept: OCCUPATIONAL THERAPY | Facility: CLINIC | Age: 68
End: 2018-03-28
Attending: FAMILY MEDICINE
Payer: COMMERCIAL

## 2018-03-28 DIAGNOSIS — M79.642 BILATERAL HAND PAIN: ICD-10-CM

## 2018-03-28 DIAGNOSIS — G56.02 CARPAL TUNNEL SYNDROME OF LEFT WRIST: ICD-10-CM

## 2018-03-28 DIAGNOSIS — M65.321 TRIGGER FINGER, RIGHT INDEX FINGER: ICD-10-CM

## 2018-03-28 DIAGNOSIS — M79.641 BILATERAL HAND PAIN: ICD-10-CM

## 2018-03-28 PROCEDURE — 97763 ORTHC/PROSTC MGMT SBSQ ENC: CPT | Mod: GO | Performed by: OCCUPATIONAL THERAPIST

## 2018-03-28 PROCEDURE — 97110 THERAPEUTIC EXERCISES: CPT | Mod: GO | Performed by: OCCUPATIONAL THERAPIST

## 2018-03-28 NOTE — MR AVS SNAPSHOT
After Visit Summary   3/28/2018    Rajani Guo    MRN: [id number]           Patient Information     Date Of Birth          1950        Visit Information        Provider Department      3/28/2018 10:30 AM Zofia Topete [facility] [place]        Today's Diagnoses     Bilateral hand pain        Trigger finger, right index finger        Carpal tunnel syndrome of left wrist           Follow-ups after your visit        Your next 10 appointments already scheduled     Apr 11, 2018 10:30 AM CDT   [facility] Hand with Zofia Topete   [facility] [place] ([facility] [place] [facility])    [address]  [place] [address] [phone]              Who to contact     If you have questions or need follow up information about today's clinic visit or your schedule please contact [facility] [place] directly at [phone].  Normal or non-critical lab and imaging results will be communicated to you by Mekitechart, letter or phone within 4 business days after the clinic has received the results. If you do not hear from us within 7 days, please contact the clinic through Mekitechart or phone. If you have a critical or abnormal lab result, we will notify you by phone as soon as possible.  Submit refill requests through Collax or call your pharmacy and they will forward the refill request to us. Please allow 3 business days for your refill to be completed.          Additional Information About Your Visit        Mekitechart Information     Collax gives you secure access to your electronic health record. If you see a primary care provider, you can also send messages to your care team and make appointments. If you have questions, please call your primary care clinic.  If you do not have a primary care provider, please call [phone] and they will assist you.        Care EveryWhere ID     This is your Care  EveryWhere ID. This could be used by other organizations to access your [place] medical records  [id number]         Blood Pressure from Last 3 Encounters:   02/26/18 128/72   02/22/18 130/60   01/18/18 130/60    Weight from Last 3 Encounters:   02/26/18 74.8 kg (165 lb)   02/22/18 74.8 kg (164 lb 12.8 oz)   01/18/18 74.4 kg (164 lb)              We Performed the Following     C OT ORTHOTICS/PROSTH MGMT &/TRAING SBSQ ENCTR, EA 15 MIN     THERAPEUTIC EXERCISES        Primary Care Provider Office Phone # Fax #    [place] [facility] [phone]       [address] [place] [address]        Equal Access to Services     PAMELA DOMINGUEZ : Hadluis leivao Sojett, waaxda luqadaha, qaybta kaalmada adeegyada, saud sosa. So [facility] [phone].    ATENCIÓN: Si habla español, tiene a rosen disposición servicios gratuitos de asistencia lingüística. Llame al [phone].    We comply with applicable federal civil rights laws and [place] laws. We do not discriminate on the basis of race, color, national origin, age, disability, sex, sexual orientation, or gender identity.            Thank you!     Thank you for choosing [facility] [facility]  for your care. Our goal is always to provide you with excellent care. Hearing back from our patients is one way we can continue to improve our services. Please take a few minutes to complete the written survey that you may receive in the mail after your visit with us. Thank you!             Your Updated Medication List - Protect others around you: Learn how to safely use, store and throw away your medicines at www.disposemymeds.org.          This list is accurate as of 3/28/18 11:01 AM.  Always use your most recent med list.                   Brand Name Dispense Instructions for use Diagnosis    alendronate 70 MG tablet    FOSAMAX    4 tablet    TAKE 1 TABLET (70 MG) BY MOUTH EVERY 7 DAYS     Age-related osteoporosis without current pathological fracture       aspirin EC 81 MG EC tablet      Take 81 mg by mouth        atorvastatin 40 MG tablet    LIPITOR    90 tablet    Take 1 tablet (40 mg) by mouth daily    Type 2 diabetes mellitus without complication, with long-term current use of insulin (H)       calcium 500/D 500-200 MG-UNIT per tablet   Generic drug:  calcium carb 1250 mg (500 mg [place])/vitamin D 200 unit      Take 1 tablet by mouth        fish oil-omega-3 fatty acids 1000 MG capsule      Take 1 capsule by mouth        glucosamine-chondroitin 500-400 MG Caps per capsule      Take 1 capsule by mouth        insulin detemir 100 UNIT/ML injection    LEVEMIR FLEXPEN/FLEXTOUCH    15 mL    Inject 25 Units Subcutaneous At Bedtime    Type 2 diabetes mellitus without complication, with long-term current use of insulin (H)       lisinopril 2.5 MG tablet    PRINIVIL/Zestril    90 tablet    Take 1 tablet (2.5 mg) by mouth daily    Type 2 diabetes mellitus without complication, with long-term current use of insulin (H)       metFORMIN 1000 MG tablet    GLUCOPHAGE    180 tablet    Take 1 tablet (1,000 mg) by mouth 2 times daily (with meals)    Type 2 diabetes mellitus without complication, with long-term current use of insulin (H)       MULTI-VITAMINS Tabs      Take 1 tablet by mouth        predniSONE 20 MG tablet    DELTASONE    20 tablet    Take 3 tabs (60 mg) by mouth daily x 3 days, 2 tabs (40 mg) daily x 3 days, 1 tab (20 mg) daily x 3 days, then 1/2 tab (10 mg) x 3 days.    Carpal tunnel syndrome of left wrist       traZODone 50 MG tablet    DESYREL    60 tablet    Take 1 tablet (50 mg) by mouth At Bedtime    Insomnia, unspecified type

## 2018-03-28 NOTE — PROGRESS NOTES
SOAP note objective information for 3/28/2018:    ROM:  Index Finger 3/21/18 3/28/18   AROM(PROM) Right Right   MCP -10/80 0/80   PIP 0/95 0/105   DIP 0/40 0/55     Stage of Stenosing Tenosynovitis (SST):  Stage 1:  Normal  Stage 2:  Uneven motion of tendon  Stage 3:  Triggering, clicking, catching  Stage 4:  Locking in extension or flexion; unlocked by active motion  Stage 5:  Locking in extension or flexion; unlocked by passive motion  Stage 6:  Finger locked in extension or flexion  Right 3/21/18 3/28/18   Triggering of index finger Stage 3 Stage 2     Palpation:  Right 3/21/18 3/28/18   A1 pulley index finger + +     Sensation:  Continues to be decreased Median Nerve distribution left hand    Special Tests:  Left 3/21/18 3/28/18   Tinels at CT ++ +   Phalens ++ 30 seconds ++ 25 secs   Median nerve compression at CT - NT   Varghese test for lumbrical incursion  (fist x 30 secs) - NT   Median nerve compression at Pronator + + slight   ULTT median neve + end range NT     Pain Report:  VAS(0-10) 3/21/18 3/28/18   At Rest: 0/10 right  8-9/10 left 7/10 right  2/10 left   With Use: 3-4/10 right  8-9/10 left 7/10 right  5-11/10 left   Location:  Left hand and right index finger    Home Exercise Program:  Right:  Ice to A1 pulley/palm for inflammation  Decongestive and TFM to palm and A1 pulley  PROM finger flexion to avoid triggering  Wear extension gutter orthosis index finger sleeping  Avoid triggering with ADL's    Left:  MFR to forearm flexors with tennis ball  Tendon gliding with 3 fists and FDS  Forearm flexor stretch   Partial proximal median nerve gliding, increase as sabrina  Lumbrical stretchs  Wear prefab wrist splint sleeping  Avoid prolonged wrist flexion with ADL's    Next Visit:  See in 2 weeks  Assess response to extension gutter sleeping for right index and MFR to left forearm flexors and lumbrical stretches  Consider oval 8 orthosis right index if triggering increases or JENNIFER lumbrical block orthosis for left  wrist/hand sleeping    Please refer to the daily flowsheet for treatment today, total treatment time and time spent performing 1:1 timed codes.

## 2018-04-09 DIAGNOSIS — G47.00 INSOMNIA, UNSPECIFIED TYPE: ICD-10-CM

## 2018-04-09 NOTE — TELEPHONE ENCOUNTER
"Requested Prescriptions   Pending Prescriptions Disp Refills     traZODone (DESYREL) 50 MG tablet [Pharmacy Med Name: TRAZODONE 50 MG TABLET] 60 tablet 0    Last Written Prescription Date:  1-18-18  Last Fill Quantity: 60,  # refills: 0   Last office visit: 2/22/2018 with prescribing provider:     Future Office Visit:     Sig: TAKE 1 TABLET (50 MG) BY MOUTH AT BEDTIME    Serotonin Modulators Passed    4/9/2018  4:15 PM       Passed - Recent (12 mo) or future (30 days) visit within the authorizing provider's specialty    Patient had office visit in the last 12 months or has a visit in the next 30 days with authorizing provider or within the authorizing provider's specialty.  See \"Patient Info\" tab in inbasket, or \"Choose Columns\" in Meds & Orders section of the refill encounter.           Passed - Patient is age 18 or older       Passed - No active pregnancy on record       Passed - No positive pregnancy test in past 12 months          "

## 2018-04-10 NOTE — TELEPHONE ENCOUNTER
Route to provider. OV note from 1/18/18 states follow up for DM in 6 months. 60 tablets were prescribed. Do you want sooner follow up for insomnia?    Fausto Toledo RN

## 2018-04-11 ENCOUNTER — THERAPY VISIT (OUTPATIENT)
Dept: OCCUPATIONAL THERAPY | Facility: CLINIC | Age: 68
End: 2018-04-11
Payer: COMMERCIAL

## 2018-04-11 DIAGNOSIS — G56.02 CARPAL TUNNEL SYNDROME OF LEFT WRIST: ICD-10-CM

## 2018-04-11 DIAGNOSIS — M65.321 TRIGGER FINGER, RIGHT INDEX FINGER: ICD-10-CM

## 2018-04-11 DIAGNOSIS — M79.641 BILATERAL HAND PAIN: ICD-10-CM

## 2018-04-11 DIAGNOSIS — M79.642 BILATERAL HAND PAIN: ICD-10-CM

## 2018-04-11 PROCEDURE — 97760 ORTHOTIC MGMT&TRAING 1ST ENC: CPT | Mod: GO | Performed by: OCCUPATIONAL THERAPIST

## 2018-04-11 PROCEDURE — 97110 THERAPEUTIC EXERCISES: CPT | Mod: GO | Performed by: OCCUPATIONAL THERAPIST

## 2018-04-11 RX ORDER — TRAZODONE HYDROCHLORIDE 50 MG/1
TABLET, FILM COATED ORAL
Qty: 90 TABLET | Refills: 0 | Status: SHIPPED | OUTPATIENT
Start: 2018-04-11 | End: 2018-07-20

## 2018-04-11 NOTE — PROGRESS NOTES
SOAP note objective information for 4/11/2018:    ROM:  Index Finger 3/21/18 3/28/18 4/11/18   AROM(PROM) Right Right Right   MCP -10/80 0/80 0/85   PIP 0/95 0/105 0/105   DIP 0/40 0/55 0/60     Stage of Stenosing Tenosynovitis (SST):  Stage 1:  Normal  Stage 2:  Uneven motion of tendon  Stage 3:  Triggering, clicking, catching  Stage 4:  Locking in extension or flexion; unlocked by active motion  Stage 5:  Locking in extension or flexion; unlocked by passive motion  Stage 6:  Finger locked in extension or flexion  Right 3/21/18 3/28/18 4/11/18   Triggering of index finger Stage 3 Stage 2 Stage 2     Palpation:  Right 3/21/18 3/28/18 4/11/18   A1 pulley index finger + + + slight     Sensation:  Continues to be decreased Median Nerve distribution left hand especially in the mornings    Special Tests:  Left 3/21/18 3/28/18 4/11/18   Tinels at CT ++ + +   Phalens ++ 30 seconds ++ 25 secs + 30 secs   Median nerve compression at CT - NT -   Varghese test for lumbrical incursion  (fist x 30 secs) - NT +   Median nerve compression at Pronator + + slight -   ULTT median neve + end range NT NT     Pain Report:  VAS(0-10) 3/21/18 3/28/18 4/11/18   At Rest: 0/10 right  8-9/10 left 7/10 right  2/10 left 0/10 right  0/10 left   With Use: 3-4/10 right  8-9/10 left 7/10 right  5-11/10 left 2/10 right  3/10 left   Location:  Left hand and right index finger    Home Exercise Program:  Right:  Ice to A1 pulley/palm for inflammation  Decongestive and TFM to palm and A1 pulley  PROM finger flexion to avoid triggering  Wear extension gutter orthosis index finger sleeping  Avoid triggering with ADL's    Left:  MFR to forearm flexors with tennis ball  Tendon gliding with 3 fists and FDS  Forearm flexor stretch   Partial proximal median nerve gliding, increase as sabrina  Lumbrical stretches  Wear JENNIFER lumbrical block orthosis for left wrist/hand sleeping  Avoid prolonged wrist flexion with ADL's    Next Visit:  See in 2-4 weeks  Assess response  to JENNIFER lumbrical block orthosis for left wrist/hand sleeping  Progress Report and UEFI to determine POC  Consider MD f/u if continued nerve symptoms  D/C to HEP if continued progress    Please refer to the daily flowsheet for treatment today, total treatment time and time spent performing 1:1 timed codes.

## 2018-04-11 NOTE — MR AVS SNAPSHOT
After Visit Summary   4/11/2018    Rajani Guo    MRN: [id number]           Patient Information     Date Of Birth          1950        Visit Information        Provider Department      4/11/2018 10:30 AM Zofia Topete [facility] [place]        Today's Diagnoses     Bilateral hand pain        Trigger finger, right index finger        Carpal tunnel syndrome of left wrist           Follow-ups after your visit        Your next 10 appointments already scheduled     Apr 25, 2018  9:30 AM CDT   [facility] Hand with Zofia Topete   [facility] [place] ([facility] [place] [facility])    [address]  [place] [address] [phone]              Who to contact     If you have questions or need follow up information about today's clinic visit or your schedule please contact [facility] [place] directly at [phone].  Normal or non-critical lab and imaging results will be communicated to you by Lumen Biomedicalhart, letter or phone within 4 business days after the clinic has received the results. If you do not hear from us within 7 days, please contact the clinic through Lumen Biomedicalhart or phone. If you have a critical or abnormal lab result, we will notify you by phone as soon as possible.  Submit refill requests through Securant or call your pharmacy and they will forward the refill request to us. Please allow 3 business days for your refill to be completed.          Additional Information About Your Visit        Lumen Biomedicalhart Information     Securant gives you secure access to your electronic health record. If you see a primary care provider, you can also send messages to your care team and make appointments. If you have questions, please call your primary care clinic.  If you do not have a primary care provider, please call [phone] and they will assist you.        Care EveryWhere ID     This is your Care  EveryWhere ID. This could be used by other organizations to access your [facility] medical records  [id number]         Blood Pressure from Last 3 Encounters:   02/26/18 128/72   02/22/18 130/60   01/18/18 130/60    Weight from Last 3 Encounters:   02/26/18 74.8 kg (165 lb)   02/22/18 74.8 kg (164 lb 12.8 oz)   01/18/18 74.4 kg (164 lb)              We Performed the Following     ORTHOTIC MGMT AND TRAINING, EACH 15 MIN     THERAPEUTIC EXERCISES        Primary Care Provider Office Phone # Fax #    [place] [facility] [phone]       [address] [place] [address]        Equal Access to Services     PAMELA DOMINGUEZ : Eddie Roa, wablairda alma, qaybta kaalmada surya, saud sosa. So [facility] [phone].    ATENCIÓN: Si habla español, tiene a rosen disposición servicios gratuitos de asistencia lingüística. Ll[facility] [phone].    We comply with applicable federal civil rights laws and [place] laws. We do not discriminate on the basis of race, color, national origin, age, disability, sex, sexual orientation, or gender identity.            Thank you!     Thank you for choosing [place] [facility]  for your care. Our goal is always to provide you with excellent care. Hearing back from our patients is one way we can continue to improve our services. Please take a few minutes to complete the written survey that you may receive in the mail after your visit with us. Thank you!             Your Updated Medication List - Protect others around you: Learn how to safely use, store and throw away your medicines at www.disposemymeds.org.          This list is accurate as of 4/11/18 12:12 PM.  Always use your most recent med list.                   Brand Name Dispense Instructions for use Diagnosis    alendronate 70 MG tablet    FOSAMAX    4 tablet    TAKE 1 TABLET (70 MG) BY MOUTH EVERY 7 DAYS    Age-related  osteoporosis without current pathological fracture       aspirin EC 81 MG EC tablet      Take 81 mg by mouth        atorvastatin 40 MG tablet    LIPITOR    90 tablet    Take 1 tablet (40 mg) by mouth daily    Type 2 diabetes mellitus without complication, with long-term current use of insulin (H)       calcium 500/D 500-200 MG-UNIT per tablet   Generic drug:  calcium carb 1250 mg (500 mg [place])/vitamin D 200 unit      Take 1 tablet by mouth        fish oil-omega-3 fatty acids 1000 MG capsule      Take 1 capsule by mouth        glucosamine-chondroitin 500-400 MG Caps per capsule      Take 1 capsule by mouth        insulin detemir 100 UNIT/ML injection    LEVEMIR FLEXPEN/FLEXTOUCH    15 mL    Inject 25 Units Subcutaneous At Bedtime    Type 2 diabetes mellitus without complication, with long-term current use of insulin (H)       lisinopril 2.5 MG tablet    PRINIVIL/Zestril    90 tablet    Take 1 tablet (2.5 mg) by mouth daily    Type 2 diabetes mellitus without complication, with long-term current use of insulin (H)       metFORMIN 1000 MG tablet    GLUCOPHAGE    180 tablet    Take 1 tablet (1,000 mg) by mouth 2 times daily (with meals)    Type 2 diabetes mellitus without complication, with long-term current use of insulin (H)       MULTI-VITAMINS Tabs      Take 1 tablet by mouth        predniSONE 20 MG tablet    DELTASONE    20 tablet    Take 3 tabs (60 mg) by mouth daily x 3 days, 2 tabs (40 mg) daily x 3 days, 1 tab (20 mg) daily x 3 days, then 1/2 tab (10 mg) x 3 days.    Carpal tunnel syndrome of left wrist       traZODone 50 MG tablet    DESYREL    90 tablet    TAKE 1 TABLET (50 MG) BY MOUTH AT BEDTIME    Insomnia, unspecified type

## 2018-04-25 ENCOUNTER — THERAPY VISIT (OUTPATIENT)
Dept: OCCUPATIONAL THERAPY | Facility: CLINIC | Age: 68
End: 2018-04-25
Payer: COMMERCIAL

## 2018-04-25 DIAGNOSIS — M65.321 TRIGGER FINGER, RIGHT INDEX FINGER: ICD-10-CM

## 2018-04-25 DIAGNOSIS — M79.642 BILATERAL HAND PAIN: ICD-10-CM

## 2018-04-25 DIAGNOSIS — G56.02 CARPAL TUNNEL SYNDROME OF LEFT WRIST: ICD-10-CM

## 2018-04-25 DIAGNOSIS — M79.641 BILATERAL HAND PAIN: ICD-10-CM

## 2018-04-25 PROCEDURE — 97110 THERAPEUTIC EXERCISES: CPT | Mod: GO | Performed by: OCCUPATIONAL THERAPIST

## 2018-04-25 PROCEDURE — 97112 NEUROMUSCULAR REEDUCATION: CPT | Mod: GO | Performed by: OCCUPATIONAL THERAPIST

## 2018-04-25 NOTE — MR AVS SNAPSHOT
After Visit Summary   4/25/2018    Rajani Guo    MRN: [id number]           Patient Information     Date Of Birth          1950        Visit Information        Provider Department      4/25/2018 9:30 AM Zofia Topete [facility] [place]        Today's Diagnoses     Bilateral hand pain        Trigger finger, right index finger        Carpal tunnel syndrome of left wrist           Follow-ups after your visit        Your next 10 appointments already scheduled     May 01, 2018  9:00 AM CDT   Return Visit with Ori Castaneda DO   [facility] [place] ([facility] [place])    [address]  [place] [address] [phone]              Who to contact     If you have questions or need follow up information about today's clinic visit or your schedule please contact [facility] [place] directly at [phone].  Normal or non-critical lab and imaging results will be communicated to you by MyChart, letter or phone within 4 business days after the clinic has received the results. If you do not hear from us within 7 days, please contact the clinic through U.S. Photonicshart or phone. If you have a critical or abnormal lab result, we will notify you by phone as soon as possible.  Submit refill requests through meXBT / [employer] or call your pharmacy and they will forward the refill request to us. Please allow 3 business days for your refill to be completed.          Additional Information About Your Visit        [employer]on[employer] Information     meXBT / [employer] gives you secure access to your electronic health record. If you see a primary care provider, you can also send messages to your care team and make appointments. If you have questions, please call your primary care clinic.  If you do not have a primary care provider, please call [phone] and they will assist you.        Care EveryWhere ID     This is your  Care EveryWhere ID. This could be used by other organizations to access your [place] medical records  [id number]         Blood Pressure from Last 3 Encounters:   02/26/18 128/72   02/22/18 130/60   01/18/18 130/60    Weight from Last 3 Encounters:   02/26/18 74.8 kg (165 lb)   02/22/18 74.8 kg (164 lb 12.8 oz)   01/18/18 74.4 kg (164 lb)              We Performed the Following     [facility] PROGRESS NOTES REPORT     NEUROMUSCULAR RE-EDUCATION     THERAPEUTIC EXERCISES        Primary Care Provider Office Phone # Fax #    [place] [facility] [phone]       [address] [place] [address]        Equal Access to Services     PAMELA DOMINGUEZ : Eddie leivao Sojett, waaxda luqadaha, qaybta kaalmada adeegyada, saud sosa. So [facility] [phone].    ATENCIÓN: Si habla español, tiene a rosen disposición servicios gratuitos de asistencia lingüística. LlMer[facility] Health Anderson Hospital [phone].    We comply with applicable federal civil rights laws and [place] laws. We do not discriminate on the basis of race, color, national origin, age, disability, sex, sexual orientation, or gender identity.            Thank you!     Thank you for choosing [place] [facility]  for your care. Our goal is always to provide you with excellent care. Hearing back from our patients is one way we can continue to improve our services. Please take a few minutes to complete the written survey that you may receive in the mail after your visit with us. Thank you!             Your Updated Medication List - Protect others around you: Learn how to safely use, store and throw away your medicines at www.disposemymeds.org.          This list is accurate as of 4/25/18 10:48 AM.  Always use your most recent med list.                   Brand Name Dispense Instructions for use Diagnosis    alendronate 70 MG tablet    FOSAMAX    4 tablet    TAKE 1 TABLET (70 MG) BY MOUTH EVERY 7  DAYS    Age-related osteoporosis without current pathological fracture       aspirin EC 81 MG EC tablet      Take 81 mg by mouth        atorvastatin 40 MG tablet    LIPITOR    90 tablet    Take 1 tablet (40 mg) by mouth daily    Type 2 diabetes mellitus without complication, with long-term current use of insulin (H)       calcium 500/D 500-200 MG-UNIT per tablet   Generic drug:  calcium carb 1250 mg (500 mg [place])/vitamin D 200 unit      Take 1 tablet by mouth        fish oil-omega-3 fatty acids 1000 MG capsule      Take 1 capsule by mouth        glucosamine-chondroitin 500-400 MG Caps per capsule      Take 1 capsule by mouth        insulin detemir 100 UNIT/ML injection    LEVEMIR FLEXPEN/FLEXTOUCH    15 mL    Inject 25 Units Subcutaneous At Bedtime    Type 2 diabetes mellitus without complication, with long-term current use of insulin (H)       lisinopril 2.5 MG tablet    PRINIVIL/Zestril    90 tablet    Take 1 tablet (2.5 mg) by mouth daily    Type 2 diabetes mellitus without complication, with long-term current use of insulin (H)       metFORMIN 1000 MG tablet    GLUCOPHAGE    180 tablet    Take 1 tablet (1,000 mg) by mouth 2 times daily (with meals)    Type 2 diabetes mellitus without complication, with long-term current use of insulin (H)       MULTI-VITAMINS Tabs      Take 1 tablet by mouth        predniSONE 20 MG tablet    DELTASONE    20 tablet    Take 3 tabs (60 mg) by mouth daily x 3 days, 2 tabs (40 mg) daily x 3 days, 1 tab (20 mg) daily x 3 days, then 1/2 tab (10 mg) x 3 days.    Carpal tunnel syndrome of left wrist       traZODone 50 MG tablet    DESYREL    90 tablet    TAKE 1 TABLET (50 MG) BY MOUTH AT BEDTIME    Insomnia, unspecified type

## 2018-04-25 NOTE — PROGRESS NOTES
Hand Therapy Progress/Discharge Note    Current Date:  4/25/2018    Reporting period is 3/21/2018 to 4/25/2018    Diagnosis:   Right index trigger finger and left CTS  DOI:  2/23/18 (therapy referral)    Subjective:   Subjective changes noted by patient:  The hands don't seem to be any better.  I have a lot of tingling in the left hand and the right index finger is still swelling.  Functional changes noted by patient:  No Change to Sleep Patterns and Household Chores  Patient has noted adverse reaction to:  None    Functional Outcome Measure:  Upper Extremity Functional Index  SCORE:   Column Totals: 52/80  (A lower score indicates greater disability.)    Objective:  ROM:  Index Finger 3/21/18 4/25/18   AROM(PROM) Right Right   MCP -10/80 -5/75   PIP 0/95 /90   DIP 0/40 /40     Strength:   (Measured in pounds)   3/21/18  4/25/18    Trials Left Right Left Right   1  2  3 36+  35+  37+ 55-  47-  54- 40+  35+  30+ 28-  28-  27-   Average: 36 52 35 28     Stage of Stenosing Tenosynovitis (SST):  Stage 1:  Normal  Stage 2:  Uneven motion of tendon  Stage 3:  Triggering, clicking, catching  Stage 4:  Locking in extension or flexion; unlocked by active motion  Stage 5:  Locking in extension or flexion; unlocked by passive motion  Stage 6:  Finger locked in extension or flexion  Right 3/21/18 4/25/18   Triggering of index finger Stage 3 Stage 2-3     Palpation:  Right 3/21/18 4/25/18   A1 pulley index finger + + slight     Sensation:  Continues to be decreased Median Nerve distribution left hand    Special Tests:  Left 3/21/18 4/25/18   Tinels at CT ++ ++   Phalens ++ 30 seconds ++ 10 secs   Median nerve compression at CT - +   Varghese test for lumbrical incursion  (fist x 30 secs) - -   Median nerve compression at Pronator + +   ULTT median neve + end range NT     Pain Report:  VAS(0-10) 3/21/18 4/25/18   At Rest: 0/10 right  8-9/10 left 7-8/10 right  2-3/10 left   With Use: 3-4/10 right  8-9/10 left 8/10 right  8/10  left   Location:  Left hand and right index finger    Please refer to the daily flowsheet for treatment provided today.     Assessment:  Response to therapy has been lack of progress in:  Flexibility:  Continued triggering right index finger  Response to therapy has been decline in:  Paresthesias:  Median nerve - more intense numbness and tingling left hand  Pain:  intensity of pain has increased bilateral hands    Overall Assessment:  No change of symptoms has been noted.  Patient would benefit from further evaluation of ongoing triggering right index and increased CTS left hand.  STG/LTG:  STGoals have been reviewed and no progress has been made;  see goal sheet for details and changes.  I have re-evaluated this patient and find that the nature, scope, duration and intensity of the therapy is appropriate for the medical condition of the patient.    Plan:  Frequency/Duration:  Discharge from Hand Therapy; continue home program.  Patient to follow up with MD for further evaluation.    Recommendations for Continued Therapy  Home Exercise Program:  Right:  Ice to A1 pulley/palm for inflammation  Decongestive and TFM to palm and A1 pulley  PROM finger flexion to avoid triggering  Wear extension gutter orthosis index finger sleeping  Avoid triggering with ADL's    Left:  MFR to forearm flexors with tennis ball  Tendon gliding with 3 fists and FDS  Forearm flexor stretch   Partial proximal median nerve gliding, increase as sabrina  Lumbrical stretches  Wear JENNIFER lumbrical block orthosis for left wrist/hand sleeping  Avoid prolonged wrist flexion with ADL's

## 2018-05-01 ENCOUNTER — OFFICE VISIT (OUTPATIENT)
Dept: ORTHOPEDICS | Facility: CLINIC | Age: 68
End: 2018-05-01
Payer: COMMERCIAL

## 2018-05-01 VITALS
SYSTOLIC BLOOD PRESSURE: 118 MMHG | BODY MASS INDEX: 27.66 KG/M2 | HEIGHT: 65 IN | DIASTOLIC BLOOD PRESSURE: 68 MMHG | WEIGHT: 166 LBS

## 2018-05-01 DIAGNOSIS — R20.0 NUMBNESS AND TINGLING IN LEFT HAND: ICD-10-CM

## 2018-05-01 DIAGNOSIS — R20.2 NUMBNESS AND TINGLING IN LEFT HAND: ICD-10-CM

## 2018-05-01 DIAGNOSIS — M65.321 TRIGGER FINGER, RIGHT INDEX FINGER: Primary | ICD-10-CM

## 2018-05-01 PROCEDURE — 99213 OFFICE O/P EST LOW 20 MIN: CPT | Performed by: PEDIATRICS

## 2018-05-01 NOTE — PROGRESS NOTES
"Sports Medicine Clinic Visit    PCP: Clinic, [facility]    Rajani Guo is a 67 year old female who is seen in f/u up for Trigger finger, right index finger. Since last visit on 2/26/2018 patient has not continued to have triggering for her right index finger.  She has had improvement with PT.  Her biggest concern is the numbness and tingling in her left hand.  Feels that 4 of her fingers are numb, that excludes the small finger.  Worse in the morning.  Has been working with OT.  Has attempted splinting.  No change.   Patient is right hand dominant.   Has not had an EMG.  Order had been placed, but patient was not instructed on how to obtain the test.     **  Patient continues to have swelling in her left hand.     Patient recalls having an injection into her right index finger. Also, she has had a plate placed in her left upper arm and is concerned that it is related to her left hand numbness and tingling.      Review of Systems  All other systems reviewed and are negative unless noted above.    This document serves as a record of the services and decisions personally performed and made by Ori Castaneda DO, CAQ. It was created on his behalf by Ronnie Langford, a trained medical scribe. The creation of this document is based the provider's statements to the medical scribe.  Ronnie Langford May 1, 2018, 9:24 AM      No past medical history on file.  No past surgical history on file.    Objective  /68  Ht 5' 4.75\" (1.645 m)  Wt 166 lb (75.3 kg)  BMI 27.84 kg/m2    GENERAL APPEARANCE: healthy, alert and no distress   GAIT: NORMAL  SKIN: no suspicious lesions or rashes  NEURO: Normal strength and tone, mentation intact and speech normal  PSYCH:  mentation appears normal and affect normal/bright  HEENT: no scleral icterus  CV: no extremity edema   RESP: nonlabored breathing    Exam  Bilateral Hand/wrist:    Inspection:  No deformity noted.  No swelling . No ecchymosis      Motion:  Forearm " pronation grossly full, symmetric, forearm supination grossly full, symmetric.  Wrist flexion grossly full, symmetric  Wrist extension grossly full, symmetric  Wrist radial deviation grossly full, symmetric  Wrist ulnar deviation grossly full, symmetric  Digit motion   right index: full composite flexion with smooth motion, per patient there is slight clicking at the PIP joint     Sensation:  Grossly intact    Radial pulses normal, +2/4, capillary refill brisk.    Tinel positive (+) at the left wrist       Radiology  None new today.     Assessment:  1. Trigger finger, right index finger    2. Numbness and tingling in left hand        Plan:  Discussed the assessment with the patient. Suspect carpal tunnel syndrome.    Left Hand  Discussed:  *Symptom Treatment  *Activity Modification   *Imaging - EMG   *Injection - corticosteroid injection   *Referral to orthopedic surgeon     Topical Treatments: Ice prn   Over the counter medication: Patient's preferred OTC medication as directed on packaging.  EMG of the of the left upper extremity   Activity Modification: as discussed   Rehab: continue comfortable Home Exercise Program from therapy   Follow up: call with results of EMG .  Questions answered. The patient indicates understanding of these issues and agrees with the plan.     Ori Castaneda DO, CAQ      Disclaimer: This note consists of symbols derived from keyboarding, dictation and/or voice recognition software. As a result, there may be errors in the script that have gone undetected. Please consider this when interpreting information found in this chart.    The information in this document, created by the medical scribe for me, accurately reflects the services I personally performed and the decisions made by me. I have reviewed and approved this document for accuracy.   Ori Castaneda DO, CAQ

## 2018-05-01 NOTE — MR AVS SNAPSHOT
After Visit Summary   5/1/2018    Rajani Guo    MRN: [id number]           Patient Information     Date Of Birth          1950        Visit Information        Provider Department      5/1/2018 9:00 AM Ori Castaneda,  [facility]        Today's Diagnoses     Trigger finger, right index finger    -  1    Numbness and tingling in left hand           Follow-ups after your visit        Additional Services     ORTHO  REFERRAL       [facility] is referring you to the Orthopedic  Services at [facility].       The  Representative will assist you in the coordination of your Orthopedic and Musculoskeletal Care as prescribed by your physician.    The  Representative will call you within 24 hours to help schedule your appointment, or you may contact the  Representative at:    [facility] ~ [phone]  [facility] ~ [phone]  [facility] ~ [phone]  Patient needs Left upper extremity EMG to evaluate for numbness and tingling. Please schedule patient for EMG with [facility].    Please fax EMG results to: [phone].      Type of Referral : EMG      Timeframe requested: Routine     Coverage of these services is subject to the terms and limitations of your health insurance plan.  Please call member services at your health plan with any benefit or coverage questions.      If X-rays, CT or MRI's have been performed, please contact the facility where they were done to arrange for , prior to your scheduled appointment.  Please bring this referral request to your appointment and present it to your specialist.                  Who to contact     If you have questions or need follow up information about today's clinic visit or your schedule please contact [facility] directly at  "[phone].  Normal or non-critical lab and imaging results will be communicated to you by MyChart, letter or phone within 4 business days after the clinic has received the results. If you do not hear from us within 7 days, please contact the clinic through Clipsource[employer] or phone. If you have a critical or abnormal lab result, we will notify you by phone as soon as possible.  Submit refill requests through Jalbum or call your pharmacy and they will forward the refill request to us. Please allow 3 business days for your refill to be completed.          Additional Information About Your Visit        Clips[employer] Information     Jalbum gives you secure access to your electronic health record. If you see a primary care provider, you can also send messages to your care team and make appointments. If you have questions, please call your primary care clinic.  If you do not have a primary care provider, please call [phone] and they will assist you.        Care EveryWhere ID     This is your Care EveryWhere ID. This could be used by other organizations to access your [place] medical records  [id number]        Your Vitals Were     Height BMI (Body Mass Index)                5' 4.75\" (1.645 m) 27.84 kg/m2           Blood Pressure from Last 3 Encounters:   05/01/18 118/68   02/26/18 128/72   02/22/18 130/60    Weight from Last 3 Encounters:   05/01/18 166 lb (75.3 kg)   02/26/18 165 lb (74.8 kg)   02/22/18 164 lb 12.8 oz (74.8 kg)              We Performed the Following     ORTHO  REFERRAL        Primary Care Provider Office Phone # Fax #    [facility] [phone]       [address] [address]        Equal Access to Services     PAMELA DOMINGUEZ AH: Hadii devorah Roa, wablairda luqadaha, qaybta kaalmada adecarlosyada, saud sosa. So [facility] [phone].    ATENCIÓN: Si habla español, tiene a rosen disposición servicios gratuitos de " asistencia lingüística. Oren al [phone].    We comply with applicable federal civil rights laws and [place] laws. We do not discriminate on the basis of race, color, national origin, age, disability, sex, sexual orientation, or gender identity.            Thank you!     Thank you for choosing [facility]  for your care. Our goal is always to provide you with excellent care. Hearing back from our patients is one way we can continue to improve our services. Please take a few minutes to complete the written survey that you may receive in the mail after your visit with us. Thank you!             Your Updated Medication List - Protect others around you: Learn how to safely use, store and throw away your medicines at www.disposemymeds.org.          This list is accurate as of 5/1/18  9:33 AM.  Always use your most recent med list.                   Brand Name Dispense Instructions for use Diagnosis    alendronate 70 MG tablet    FOSAMAX    4 tablet    TAKE 1 TABLET (70 MG) BY MOUTH EVERY 7 DAYS    Age-related osteoporosis without current pathological fracture       aspirin EC 81 MG EC tablet      Take 81 mg by mouth        atorvastatin 40 MG tablet    LIPITOR    90 tablet    Take 1 tablet (40 mg) by mouth daily    Type 2 diabetes mellitus without complication, with long-term current use of insulin (H)       calcium 500/D 500-200 MG-UNIT per tablet   Generic drug:  calcium carb 1250 mg (500 mg [place])/vitamin D 200 unit      Take 1 tablet by mouth        fish oil-omega-3 fatty acids 1000 MG capsule      Take 1 capsule by mouth        glucosamine-chondroitin 500-400 MG Caps per capsule      Take 1 capsule by mouth        insulin detemir 100 UNIT/ML injection    LEVEMIR FLEXPEN/FLEXTOUCH    15 mL    Inject 25 Units Subcutaneous At Bedtime    Type 2 diabetes mellitus without complication, with long-term current use of insulin (H)       lisinopril 2.5 MG tablet    PRINIVIL/Zestril    90  tablet    Take 1 tablet (2.5 mg) by mouth daily    Type 2 diabetes mellitus without complication, with long-term current use of insulin (H)       metFORMIN 1000 MG tablet    GLUCOPHAGE    180 tablet    Take 1 tablet (1,000 mg) by mouth 2 times daily (with meals)    Type 2 diabetes mellitus without complication, with long-term current use of insulin (H)       MULTI-VITAMINS Tabs      Take 1 tablet by mouth        predniSONE 20 MG tablet    DELTASONE    20 tablet    Take 3 tabs (60 mg) by mouth daily x 3 days, 2 tabs (40 mg) daily x 3 days, 1 tab (20 mg) daily x 3 days, then 1/2 tab (10 mg) x 3 days.    Carpal tunnel syndrome of left wrist       traZODone 50 MG tablet    DESYREL    90 tablet    TAKE 1 TABLET (50 MG) BY MOUTH AT BEDTIME    Insomnia, unspecified type

## 2018-05-14 ENCOUNTER — TRANSFERRED RECORDS (OUTPATIENT)
Dept: HEALTH INFORMATION MANAGEMENT | Facility: CLINIC | Age: 68
End: 2018-05-14

## 2018-05-16 ENCOUNTER — TELEPHONE (OUTPATIENT)
Dept: ORTHOPEDICS | Facility: CLINIC | Age: 68
End: 2018-05-16

## 2018-05-16 DIAGNOSIS — R20.0 NUMBNESS AND TINGLING IN LEFT HAND: Primary | ICD-10-CM

## 2018-05-16 DIAGNOSIS — G56.02 CARPAL TUNNEL SYNDROME OF LEFT WRIST: ICD-10-CM

## 2018-05-16 DIAGNOSIS — R20.2 NUMBNESS AND TINGLING IN LEFT HAND: Primary | ICD-10-CM

## 2018-05-16 NOTE — TELEPHONE ENCOUNTER
Left UE EMG results received from [facility] 5/14/18    CONCLUSION:  Abnormal examination.  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  There was no evidence of plexopathy or radiculopathy involving the upper extremity.      Results placed in physician bin for review.  Fannie Herrera MS ATC

## 2018-05-18 NOTE — TELEPHONE ENCOUNTER
Carpal tunnel syndrome, moderately severe per report.  Options: 1) nighttime wrist bracing; 2) trial steroid injection; 3) rehab (has done some therapy, but has persistent symptoms); 4) referral to ortho surgeon.  With history of right carpal tunnel release, if she would consider that for persistent left side symptoms, would offer referral. Otherwise, can return to clinic for further discussion, consideration of injection.  Thanks.  Ori Castaneda DO, CAQ

## 2018-05-21 NOTE — TELEPHONE ENCOUNTER
Spoke to patient discussed EMG results and recommendations.  He would prefer to follow up with hand surgeon at this time, given that he had only modest relief with oral Prednisone.  Ortho  referral placed and scheduling info provided.  He may contact clinic with further questions or concerns.    Savage Britton ATC

## 2018-05-23 ENCOUNTER — TRANSFERRED RECORDS (OUTPATIENT)
Dept: HEALTH INFORMATION MANAGEMENT | Facility: CLINIC | Age: 68
End: 2018-05-23

## 2018-05-31 ENCOUNTER — OFFICE VISIT (OUTPATIENT)
Dept: FAMILY MEDICINE | Facility: CLINIC | Age: 68
End: 2018-05-31
Payer: COMMERCIAL

## 2018-05-31 VITALS
HEIGHT: 65 IN | TEMPERATURE: 98.2 F | DIASTOLIC BLOOD PRESSURE: 70 MMHG | OXYGEN SATURATION: 97 % | BODY MASS INDEX: 27.66 KG/M2 | HEART RATE: 75 BPM | SYSTOLIC BLOOD PRESSURE: 124 MMHG | WEIGHT: 166 LBS

## 2018-05-31 DIAGNOSIS — Z12.11 SCREEN FOR COLON CANCER: ICD-10-CM

## 2018-05-31 DIAGNOSIS — F17.200 TOBACCO USE DISORDER: ICD-10-CM

## 2018-05-31 DIAGNOSIS — Z79.4 TYPE 2 DIABETES MELLITUS WITHOUT COMPLICATION, WITH LONG-TERM CURRENT USE OF INSULIN (H): ICD-10-CM

## 2018-05-31 DIAGNOSIS — G56.02 CARPAL TUNNEL SYNDROME OF LEFT WRIST: ICD-10-CM

## 2018-05-31 DIAGNOSIS — E11.9 TYPE 2 DIABETES MELLITUS WITHOUT COMPLICATION, WITH LONG-TERM CURRENT USE OF INSULIN (H): ICD-10-CM

## 2018-05-31 DIAGNOSIS — E89.0 HISTORY OF PARTIAL THYROIDECTOMY: ICD-10-CM

## 2018-05-31 DIAGNOSIS — Z12.31 VISIT FOR SCREENING MAMMOGRAM: ICD-10-CM

## 2018-05-31 DIAGNOSIS — Z01.818 PREOP GENERAL PHYSICAL EXAM: Primary | ICD-10-CM

## 2018-05-31 LAB
BASOPHILS # BLD AUTO: 0 10E9/L (ref 0–0.2)
BASOPHILS NFR BLD AUTO: 0.2 %
DIFFERENTIAL METHOD BLD: ABNORMAL
EOSINOPHIL # BLD AUTO: 0.3 10E9/L (ref 0–0.7)
EOSINOPHIL NFR BLD AUTO: 2.5 %
ERYTHROCYTE [DISTWIDTH] IN BLOOD BY AUTOMATED COUNT: 13.8 % (ref 10–15)
HBA1C MFR BLD: 6.1 % (ref 0–5.6)
HCT VFR BLD AUTO: 40.6 % (ref 35–47)
HGB BLD-MCNC: 13.1 G/DL (ref 11.7–15.7)
LYMPHOCYTES # BLD AUTO: 2.1 10E9/L (ref 0.8–5.3)
LYMPHOCYTES NFR BLD AUTO: 18.5 %
MCH RBC QN AUTO: 30.3 PG (ref 26.5–33)
MCHC RBC AUTO-ENTMCNC: 32.3 G/DL (ref 31.5–36.5)
MCV RBC AUTO: 94 FL (ref 78–100)
MONOCYTES # BLD AUTO: 1 10E9/L (ref 0–1.3)
MONOCYTES NFR BLD AUTO: 8.9 %
NEUTROPHILS # BLD AUTO: 8 10E9/L (ref 1.6–8.3)
NEUTROPHILS NFR BLD AUTO: 69.9 %
PLATELET # BLD AUTO: 282 10E9/L (ref 150–450)
RBC # BLD AUTO: 4.32 10E12/L (ref 3.8–5.2)
WBC # BLD AUTO: 11.5 10E9/L (ref 4–11)

## 2018-05-31 PROCEDURE — 83036 HEMOGLOBIN GLYCOSYLATED A1C: CPT | Performed by: NURSE PRACTITIONER

## 2018-05-31 PROCEDURE — 84443 ASSAY THYROID STIM HORMONE: CPT | Performed by: NURSE PRACTITIONER

## 2018-05-31 PROCEDURE — 99214 OFFICE O/P EST MOD 30 MIN: CPT | Performed by: NURSE PRACTITIONER

## 2018-05-31 PROCEDURE — 36415 COLL VENOUS BLD VENIPUNCTURE: CPT | Performed by: NURSE PRACTITIONER

## 2018-05-31 PROCEDURE — 93000 ELECTROCARDIOGRAM COMPLETE: CPT | Performed by: NURSE PRACTITIONER

## 2018-05-31 PROCEDURE — 85025 COMPLETE CBC W/AUTO DIFF WBC: CPT | Performed by: NURSE PRACTITIONER

## 2018-05-31 ASSESSMENT — PAIN SCALES - GENERAL: PAINLEVEL: MODERATE PAIN (4)

## 2018-05-31 NOTE — PROGRESS NOTES
[address] [id number] [phone]  Dept: [phone]    PRE-OP EVALUATION:  Today's date: 2018    Rajani Guo (: 1950) presents for pre-operative evaluation assessment as requested by ( pateint doesn't have) .  She requires evaluation and anesthesia risk assessment prior to undergoing surgery/procedure for treatment of left wrist pain.    Fax number for surgical facility: [facility]. - [phone]   Primary Physician: [facility]  Type of Anesthesia Anticipated: Nerve Block     Patient has a Health Care Directive or Living Will:  NO    Preop Questions 2018   What are you having done? carpal tunnel release, left side   Date of Surgery/Procedure: 2018   Facility or Hospital where procedure/surgery will be performed: [facility]   1.  Do you have a history of Heart attack, stroke, stent, coronary bypass surgery, or other heart surgery? No   2.  Do you ever have any pain or discomfort in your chest? No   3.  Do you have a history of  Heart Failure? No   4.   Are you troubled by shortness of breath when:  walking on a level surface, or up a slight hill, or at night? No   5.  Do you currently have a cold, bronchitis or other respiratory infection? No   6.  Do you have a cough, shortness of breath, or wheezing? No   7.  Do you sometimes get pains in the calves of your legs when you walk? No   8. Do you or anyone in your family have previous history of blood clots? No   9.  Do you or does anyone in your family have a serious bleeding problem such as prolonged bleeding following surgeries or cuts? No   10. Have you ever had problems with anemia or been told to take iron pills? No   11. Have you had any abnormal blood loss such as black, tarry or bloody stools, or abnormal vaginal bleeding? No   12. Have you ever had a blood transfusion? No   13. Have you or any of your relatives ever had problems with anesthesia? No   14. Do  you have sleep apnea, excessive snoring or daytime drowsiness? No   15. Do you have any prosthetic heart valves? No   16. Do you have prosthetic joints? No   17. Is there any chance that you may be pregnant? No         HPI:     HPI related to upcoming procedure: Left wrist pain due to carpal tunnel and is scheduled for carpal tunnel release.  Has had this procedure done on right wrist in the past.    Diabetes:  Has been stable.  Onset 20 years ago.  Levemir 25 units daily, Metformin.  Fasting glucoses are <100.    Osteoporosis:  Fosamax for the past 6 years.    See problem list for active medical problems.  Problems all longstanding and stable, except as noted/documented.  See ROS for pertinent symptoms related to these conditions.                                                                                                                                                          .    MEDICAL HISTORY:     Patient Active Problem List    Diagnosis Date Noted     History of partial thyroidectomy 06/02/2018     Priority: Medium     Tobacco use disorder      Priority: Medium     Type 2 diabetes mellitus without complication, with long-term current use of insulin (H) 01/18/2018     Priority: Medium     Hyperlipidemia LDL goal <100 01/18/2018     Priority: Medium     Age-related osteoporosis without current pathological fracture 01/18/2018     Priority: Medium     Insomnia, unspecified type 01/18/2018     Priority: Medium      Past Medical History:   Diagnosis Date     Age-related osteoporosis without current pathological fracture 1/18/2018     History of partial thyroidectomy 6/2/2018     Hyperlipidemia LDL goal <100 1/18/2018     Insomnia, unspecified type 1/18/2018     Tobacco use disorder      Type 2 diabetes mellitus without complication, with long-term current use of insulin (H) 1/18/2018     Past Surgical History:   Procedure Laterality Date     KNEE SURGERY Right 2013    right knee torn meniscus surgery     OVARY  SURGERY  1971    1 ovary removed     RELEASE CARPAL TUNNEL Right 1988     RELEASE TRIGGER FINGER BILATERAL       SHOULDER SURGERY Left     rotator cuff repair, plate placement     THYROID SURGERY  1988    partial thyroidectomy     Current Outpatient Prescriptions   Medication Sig Dispense Refill     alendronate (FOSAMAX) 70 MG tablet TAKE 1 TABLET (70 MG) BY MOUTH EVERY 7 DAYS 4 tablet 3     aspirin EC 81 MG EC tablet Take 81 mg by mouth       atorvastatin (LIPITOR) 40 MG tablet Take 1 tablet (40 mg) by mouth daily 90 tablet 3     calcium carb 1250 mg, 500 mg [place],/vitamin D 200 unit (CALCIUM 500/D) 500-200 MG-UNIT per tablet Take 1 tablet by mouth       fish oil-omega-3 fatty acids 1000 MG capsule Take 1 capsule by mouth       glucosamine-chondroitin 500-400 MG CAPS per capsule Take 1 capsule by mouth       insulin detemir (LEVEMIR FLEXPEN/FLEXTOUCH) 100 UNIT/ML injection Inject 25 Units Subcutaneous At Bedtime 15 mL 1     lisinopril (PRINIVIL/ZESTRIL) 2.5 MG tablet Take 1 tablet (2.5 mg) by mouth daily 90 tablet 1     metFORMIN (GLUCOPHAGE) 1000 MG tablet Take 1 tablet (1,000 mg) by mouth 2 times daily (with meals) 180 tablet 1     Multiple Vitamin (MULTI-VITAMINS) TABS Take 1 tablet by mouth       traZODone (DESYREL) 50 MG tablet TAKE 1 TABLET (50 MG) BY MOUTH AT BEDTIME 90 tablet 0     OTC products: None, except as noted above    Allergies   Allergen Reactions     Indomethacin Other (See Comments)     Dizziness and disorientation     Tramadol Nausea and Vomiting      Latex Allergy: NO    Social History   Substance Use Topics     Smoking status: Current Every Day Smoker     Smokeless tobacco: Never Used     Alcohol use Yes     History   Drug Use No       REVIEW OF SYSTEMS:   Constitutional, HEENT, cardiovascular, pulmonary, gi and gu systems are negative, except as otherwise noted.    EXAM:   /70 (BP Location: Right arm, Patient Position: Chair, Cuff Size: Adult Large)  Pulse 75  Temp 98.2  F (36.8  C)  "(Oral)  Ht 5' 4.75\" (1.645 m)  Wt 166 lb (75.3 kg)  SpO2 97%  BMI 27.84 kg/m2    GENERAL APPEARANCE: healthy, alert and no distress     EYES: EOMI, PERRL     HENT: ear canals and TM's normal and nose and mouth without ulcers or lesions     NECK: no adenopathy, no asymmetry, masses, or scars and thyroid normal to palpation     RESP: lungs clear to auscultation - no rales, rhonchi or wheezes     CV: regular rates and rhythm, normal S1 S2, no S3 or S4 and no murmur, click or rub     ABDOMEN:  soft, nontender, no HSM or masses and bowel sounds normal     SKIN: no suspicious lesions or rashes     NEURO: Normal strength and tone, sensory exam grossly normal, mentation intact and speech normal     PSYCH: mentation appears normal. and affect normal/bright     LYMPHATICS: No cervical adenopathy    DIAGNOSTICS:     EKG: no acute ST/T changes c/w ischemia, there are no prior tracings available  Labs Resulted Today:   Results for orders placed or performed in visit on 05/31/18   TSH WITH FREE T4 REFLEX   Result Value Ref Range    TSH 2.72 0.40 - 4.00 mU/L   CBC with platelets and differential   Result Value Ref Range    WBC 11.5 (H) 4.0 - 11.0 10e9/L    RBC Count 4.32 3.8 - 5.2 10e12/L    Hemoglobin 13.1 11.7 - 15.7 g/dL    Hematocrit 40.6 35.0 - 47.0 %    MCV 94 78 - 100 fl    MCH 30.3 26.5 - 33.0 pg    MCHC 32.3 31.5 - 36.5 g/dL    RDW 13.8 10.0 - 15.0 %    Platelet Count 282 150 - 450 10e9/L    Diff Method Automated Method     % Neutrophils 69.9 %    % Lymphocytes 18.5 %    % Monocytes 8.9 %    % Eosinophils 2.5 %    % Basophils 0.2 %    Absolute Neutrophil 8.0 1.6 - 8.3 10e9/L    Absolute Lymphocytes 2.1 0.8 - 5.3 10e9/L    Absolute Monocytes 1.0 0.0 - 1.3 10e9/L    Absolute Eosinophils 0.3 0.0 - 0.7 10e9/L    Absolute Basophils 0.0 0.0 - 0.2 10e9/L   Hemoglobin A1c   Result Value Ref Range    Hemoglobin A1C 6.1 (H) 0 - 5.6 %       Recent Labs   Lab Test  01/18/18   0835 11/30/17 05/08/17   NA  138   --    --  "   POTASSIUM  3.9   --   3.7   CR  0.47*   --   0.79   A1C  6.4*  7.6*   --         IMPRESSION:   Reason for surgery/procedure: left carpal tunnel release  Diagnosis/reason for consult: pre-op evaluation    The proposed surgical procedure is considered INTERMEDIATE risk.    REVISED CARDIAC RISK INDEX  The patient has the following serious cardiovascular risks for perioperative complications such as (MI, PE, VFib and 3  AV Block):  Diabetes Mellitus (on Insulin)  INTERPRETATION: 1 risks: Class II (low risk - 0.9% complication rate)    The patient has the following additional risks for perioperative complications:  No identified additional risks      ICD-10-CM    1. Preop general physical exam Z01.818 EKG 12-lead complete w/read - Clinics     CBC with platelets and differential   2. Carpal tunnel syndrome of left wrist G56.02    3. Type 2 diabetes mellitus without complication, with long-term current use of insulin (H) E11.9 Hemoglobin A1c    Z79.4 CANCELED: Hemoglobin A1c   4. Tobacco use disorder F17.200    5. History of partial thyroidectomy E89.0 TSH WITH FREE T4 REFLEX   6. Visit for screening mammogram Z12.31 MA SCREENING DIGITAL BILAT - Future  (s+30)   7. Screen for colon cancer Z12.11 Fecal colorectal cancer screen (FIT)       RECOMMENDATIONS:     --Patient is to take all scheduled medications on the day of surgery EXCEPT for modifications listed below.    Diabetes Medication Use    -----Take 80% of long acting insulin (e.g. Lantus, NPH) while NPO (fasting)  ----- Hold Metformin the morning of surgery    Anticoagulant or Antiplatelet Medication Use  ASPIRIN: Discontinue ASA 7-10 days prior to procedure to reduce bleeding risk.  It should be resumed post-operatively.      Patient Instructions:    Hold Metformin the morning of your surgery    Stop the Aspirin and Fish Oil 7 days prior to your surgery    Decrease the Levemir to 20 units the evening before your surgery    APPROVAL GIVEN to proceed with proposed  procedure, without further diagnostic evaluation       Signed Electronically by: Meri Rodriguez NP    Copy of this evaluation report is provided to requesting physician.    [facility] Preop Guidelines    Revised Cardiac Risk Index

## 2018-05-31 NOTE — PATIENT INSTRUCTIONS
Hold Metformin the morning of your surgery    Stop the Aspirin and Fish Oil 7 days prior to your surgery    Decrease the Levemir to 20 units the evening before your surgery    Pre-operation Instructions:    - Stop Aspirin and NSAIDS (Ibuprofen, Motrin, Advil, Aleve, Naproxen, Naprosyn) 7 days prior to the surgery/procedure or follow surgeon's direction.    - Stop all Vitamins and Herbal Supplements (including Vitamin E, Fish Oil, Omega 3 Fatty Acids) 7 days prior to the surgery/procedure or follow surgeon's direction.    - Medications:  See above    - If you become sick before your surgery/procedure (such as a fever, cough, congestion, or sore throat) you should call your primary care provider and surgeon.    - Unless given permission by your surgeon, do not eat or drink after midnight in the evening prior to your surgery/procedure.      Before Your Surgery      Call your surgeon if there is any change in your health. This includes signs of a cold or flu (such as a sore throat, runny nose, cough, rash or fever).    Do not smoke, drink alcohol or take over the counter medicine (unless your surgeon or primary care doctor tells you to) for the 24 hours before and after surgery.    If you take prescribed drugs: Follow your doctor s orders about which medicines to take and which to stop until after surgery.    Eating and drinking prior to surgery: follow the instructions from your surgeon    Take a shower or bath the night before surgery. Use the soap your surgeon gave you to gently clean your skin. If you do not have soap from your surgeon, use your regular soap. Do not shave or scrub the surgery site.  Wear clean pajamas and have clean sheets on your bed.

## 2018-05-31 NOTE — MR AVS SNAPSHOT
After Visit Summary   5/31/2018    Rajani Guo    MRN: [id number]           Patient Information     Date Of Birth          1950        Visit Information        Provider Department      5/31/2018 9:00 AM Meri Rodriguez NP [facility]        Today's Diagnoses     Preop general physical exam    -  1    Type 2 diabetes mellitus without complication, with long-term current use of insulin (H)        Screen for colon cancer        Asymptomatic postmenopausal status        Visit for screening mammogram        History of partial thyroidectomy        Tobacco use disorder          Care Instructions    Hold Metformin the morning of your surgery    Stop the Aspirin 7 days prior to your surgery    Decrease the Levemir to 20 units the evening before your surgery    Pre-operation Instructions:    - Stop Aspirin and NSAIDS (Ibuprofen, Motrin, Advil, Aleve, Naproxen, Naprosyn) 7 days prior to the surgery/procedure or follow surgeon's direction.    - Stop all Vitamins and Herbal Supplements (including Vitamin E, Fish Oil, Omega 3 Fatty Acids) 7 days prior to the surgery/procedure or follow surgeon's direction.    - Medications:  See above    - If you become sick before your surgery/procedure (such as a fever, cough, congestion, or sore throat) you should call your primary care provider and surgeon.    - Unless given permission by your surgeon, do not eat or drink after midnight in the evening prior to your surgery/procedure.      Before Your Surgery      Call your surgeon if there is any change in your health. This includes signs of a cold or flu (such as a sore throat, runny nose, cough, rash or fever).    Do not smoke, drink alcohol or take over the counter medicine (unless your surgeon or primary care doctor tells you to) for the 24 hours before and after surgery.    If you take prescribed drugs: Follow your doctor s orders about which medicines to take and which to stop until after  surgery.    Eating and drinking prior to surgery: follow the instructions from your surgeon    Take a shower or bath the night before surgery. Use the soap your surgeon gave you to gently clean your skin. If you do not have soap from your surgeon, use your regular soap. Do not shave or scrub the surgery site.  Wear clean pajamas and have clean sheets on your bed.           Follow-ups after your visit        Future tests that were ordered for you today     Open Future Orders        Priority Expected Expires Ordered    Fecal colorectal cancer screen (FIT) Routine 6/21/2018 8/23/2018 5/31/2018    MA SCREENING DIGITAL BILAT - Future  (s+30) Routine  5/31/2019 5/31/2018            Who to contact     If you have questions or need follow up information about today's clinic visit or your schedule please contact [facility] directly at [phone].  Normal or non-critical lab and imaging results will be communicated to you by [employer], letter or phone within 4 business days after the clinic has received the results. If you do not hear from us within 7 days, please contact the clinic through [employer] or phone. If you have a critical or abnormal lab result, we will notify you by phone as soon as possible.  Submit refill requests through [employer] or call your pharmacy and they will forward the refill request to us. Please allow 3 business days for your refill to be completed.          Additional Information About Your Visit        MyChart Information     [employer] gives you secure access to your electronic health record. If you see a primary care provider, you can also send messages to your care team and make appointments. If you have questions, please call your primary care clinic.  If you do not have a primary care provider, please call [phone] and they will assist you.        Care EveryWhere ID     This is your Care EveryWhere ID. This could be used by other organizations to access your [facility]  "records  [id number]        Your Vitals Were     Pulse Temperature Height Pulse Oximetry BMI (Body Mass Index)       75 98.2  F (36.8  C) (Oral) 5' 4.75\" (1.645 m) 97% 27.84 kg/m2        Blood Pressure from Last 3 Encounters:   05/31/18 124/70   05/01/18 118/68   02/26/18 128/72    Weight from Last 3 Encounters:   05/31/18 166 lb (75.3 kg)   05/01/18 166 lb (75.3 kg)   02/26/18 165 lb (74.8 kg)              We Performed the Following     CBC with platelets and differential     EKG 12-lead complete w/read - Clinics     Hemoglobin A1c     TSH WITH FREE T4 REFLEX        Primary Care Provider Office Phone # Fax #    [facility] [phone]       [address] [facility] [address]        Equal Access to Services     PAMELA DOMINGUEZ : Hadii devorah alvarado hadasho Somaryali, waaxda luqadaha, qaybta kaalmada adeegyada, waxay jenniein haygabe lindsay . So [facility] [place] [phone].    ATENCIÓN: Si habla español, tiene a rosen disposición servicios gratuitos de asistencia lingüística. Oren al [phone].    We comply with applicable federal civil rights laws and [place] laws. We do not discriminate on the basis of race, color, national origin, age, disability, sex, sexual orientation, or gender identity.            Thank you!     Thank you for choosing [facility]  for your care. Our goal is always to provide you with excellent care. Hearing back from our patients is one way we can continue to improve our services. Please take a few minutes to complete the written survey that you may receive in the mail after your visit with us. Thank you!             Your Updated Medication List - Protect others around you: Learn how to safely use, store and throw away your medicines at www.disposemymeds.org.          This list is accurate as of 5/31/18 10:22 AM.  Always use your most recent med list.                   Brand Name Dispense Instructions for use Diagnosis    alendronate 70 MG " tablet    FOSAMAX    4 tablet    TAKE 1 TABLET (70 MG) BY MOUTH EVERY 7 DAYS    Age-related osteoporosis without current pathological fracture       aspirin 81 MG EC tablet      Take 81 mg by mouth        atorvastatin 40 MG tablet    LIPITOR    90 tablet    Take 1 tablet (40 mg) by mouth daily    Type 2 diabetes mellitus without complication, with long-term current use of insulin (H)       calcium 500/D 500-200 MG-UNIT per tablet   Generic drug:  calcium carb 1250 mg (500 mg [place])/vitamin D 200 unit      Take 1 tablet by mouth        fish oil-omega-3 fatty acids 1000 MG capsule      Take 1 capsule by mouth        glucosamine-chondroitin 500-400 MG Caps per capsule      Take 1 capsule by mouth        insulin detemir 100 UNIT/ML injection    LEVEMIR FLEXPEN/FLEXTOUCH    15 mL    Inject 25 Units Subcutaneous At Bedtime    Type 2 diabetes mellitus without complication, with long-term current use of insulin (H)       lisinopril 2.5 MG tablet    PRINIVIL/Zestril    90 tablet    Take 1 tablet (2.5 mg) by mouth daily    Type 2 diabetes mellitus without complication, with long-term current use of insulin (H)       metFORMIN 1000 MG tablet    GLUCOPHAGE    180 tablet    Take 1 tablet (1,000 mg) by mouth 2 times daily (with meals)    Type 2 diabetes mellitus without complication, with long-term current use of insulin (H)       MULTI-VITAMINS Tabs      Take 1 tablet by mouth        traZODone 50 MG tablet    DESYREL    90 tablet    TAKE 1 TABLET (50 MG) BY MOUTH AT BEDTIME    Insomnia, unspecified type

## 2018-06-01 LAB — TSH SERPL DL<=0.005 MIU/L-ACNC: 2.72 MU/L (ref 0.4–4)

## 2018-06-02 PROBLEM — E89.0 HISTORY OF PARTIAL THYROIDECTOMY: Status: ACTIVE | Noted: 2018-06-02

## 2018-06-04 ENCOUNTER — RADIANT APPOINTMENT (OUTPATIENT)
Dept: MAMMOGRAPHY | Facility: CLINIC | Age: 68
End: 2018-06-04
Attending: NURSE PRACTITIONER
Payer: COMMERCIAL

## 2018-06-04 DIAGNOSIS — Z12.31 VISIT FOR SCREENING MAMMOGRAM: ICD-10-CM

## 2018-06-04 PROCEDURE — 77067 SCR MAMMO BI INCL CAD: CPT | Mod: TC

## 2018-06-05 ENCOUNTER — TELEPHONE (OUTPATIENT)
Dept: FAMILY MEDICINE | Facility: CLINIC | Age: 68
End: 2018-06-05

## 2018-06-26 ENCOUNTER — TRANSFERRED RECORDS (OUTPATIENT)
Dept: HEALTH INFORMATION MANAGEMENT | Facility: CLINIC | Age: 68
End: 2018-06-26

## 2018-07-03 ENCOUNTER — TRANSFERRED RECORDS (OUTPATIENT)
Dept: HEALTH INFORMATION MANAGEMENT | Facility: CLINIC | Age: 68
End: 2018-07-03

## 2018-07-20 ENCOUNTER — OFFICE VISIT (OUTPATIENT)
Dept: FAMILY MEDICINE | Facility: CLINIC | Age: 68
End: 2018-07-20
Payer: COMMERCIAL

## 2018-07-20 VITALS
TEMPERATURE: 98.1 F | HEIGHT: 65 IN | WEIGHT: 162 LBS | BODY MASS INDEX: 26.99 KG/M2 | SYSTOLIC BLOOD PRESSURE: 128 MMHG | DIASTOLIC BLOOD PRESSURE: 66 MMHG | HEART RATE: 72 BPM

## 2018-07-20 DIAGNOSIS — M81.0 AGE-RELATED OSTEOPOROSIS WITHOUT CURRENT PATHOLOGICAL FRACTURE: ICD-10-CM

## 2018-07-20 DIAGNOSIS — R01.1 NEWLY RECOGNIZED MURMUR: ICD-10-CM

## 2018-07-20 DIAGNOSIS — E89.0 HISTORY OF PARTIAL THYROIDECTOMY: ICD-10-CM

## 2018-07-20 DIAGNOSIS — E11.9 TYPE 2 DIABETES MELLITUS WITHOUT COMPLICATION, WITH LONG-TERM CURRENT USE OF INSULIN (H): ICD-10-CM

## 2018-07-20 DIAGNOSIS — Z79.4 TYPE 2 DIABETES MELLITUS WITHOUT COMPLICATION, WITH LONG-TERM CURRENT USE OF INSULIN (H): ICD-10-CM

## 2018-07-20 DIAGNOSIS — F17.200 TOBACCO USE DISORDER: ICD-10-CM

## 2018-07-20 DIAGNOSIS — E78.5 HYPERLIPIDEMIA LDL GOAL <100: ICD-10-CM

## 2018-07-20 DIAGNOSIS — G47.00 INSOMNIA, UNSPECIFIED TYPE: Primary | ICD-10-CM

## 2018-07-20 PROCEDURE — 99214 OFFICE O/P EST MOD 30 MIN: CPT | Performed by: NURSE PRACTITIONER

## 2018-07-20 RX ORDER — ALENDRONATE SODIUM 70 MG/1
TABLET ORAL
Qty: 4 TABLET | Refills: 3 | Status: SHIPPED | OUTPATIENT
Start: 2018-07-20 | End: 2019-02-18

## 2018-07-20 RX ORDER — LISINOPRIL 2.5 MG/1
2.5 TABLET ORAL DAILY
Qty: 90 TABLET | Refills: 3 | Status: SHIPPED | OUTPATIENT
Start: 2018-07-20 | End: 2019-03-13

## 2018-07-20 RX ORDER — TRAZODONE HYDROCHLORIDE 50 MG/1
TABLET, FILM COATED ORAL
Qty: 90 TABLET | Refills: 3 | Status: SHIPPED | OUTPATIENT
Start: 2018-07-20 | End: 2019-07-17

## 2018-07-20 RX ORDER — ATORVASTATIN CALCIUM 40 MG/1
40 TABLET, FILM COATED ORAL DAILY
Qty: 90 TABLET | Refills: 3 | Status: SHIPPED | OUTPATIENT
Start: 2018-07-20 | End: 2019-03-13

## 2018-07-20 NOTE — PROGRESS NOTES
"  SUBJECTIVE:   Rajani Guo is a 67 year old female who presents to clinic today for the following health issues:    She is searching for a house to buy with her daughter.  If they buy a house she will probably be changing clinics.    Says someone told her they heard a murmur during her recent surgery.  Wants this checked today.  Denies chest pain, shortness of breath, leg swelling.    Diabetes Follow-up      Patient is checking blood sugars: wonderful readings.  One episode of hypoglycemia right after surgery, laying on her gurney had very low blood sugar.  Apparently patient did not decrease her long acting insulin the night before surgery as per her pre-op instructions, states she did not know to do this.  She wants to decrease her long acting insulin from 25 units to 23 units.    Diabetic concerns: None     Symptoms of hypoglycemia (low blood sugar): none     Paresthesias (numbness or burning in feet) or sores: No     Date of last diabetic eye exam: not sure, needs one done    BP Readings from Last 2 Encounters:   07/20/18 128/66   05/31/18 124/70     Hemoglobin A1C (%)   Date Value   05/31/2018 6.1 (H)   01/18/2018 6.4 (H)     LDL Cholesterol Calculated (mg/dL)   Date Value   01/18/2018 83   05/08/2017 126       Diabetes Management Resources    Hyperlipidemia Follow-Up      Rate your low fat/cholesterol diet?: \"sienna\"    Taking statin?  Yes, no muscle aches from statin    Other lipid medications/supplements?:  none      Amount of exercise or physical activity: eveyrday    Problems taking medications regularly: No    Medication side effects: none    Diet: low fat/cholesterol and diabetic      Medication Followup of Trazodone    Taking Medication as prescribed: yes    Side Effects:  None    Medication Helping Symptoms:  yes       Problem list and histories reviewed & adjusted, as indicated.  Additional history: as documented    Patient Active Problem List   Diagnosis     Type 2 diabetes mellitus without " complication, with long-term current use of insulin (H)     Hyperlipidemia LDL goal <100     Age-related osteoporosis without current pathological fracture     Insomnia, unspecified type     Tobacco use disorder     History of partial thyroidectomy     Past Surgical History:   Procedure Laterality Date     KNEE SURGERY Right 2013    right knee torn meniscus surgery     OVARY SURGERY  1971    1 ovary removed     RELEASE CARPAL TUNNEL Right 1988     RELEASE TRIGGER FINGER BILATERAL       SHOULDER SURGERY Left     rotator cuff repair, plate placement     THYROID SURGERY  1988    partial thyroidectomy       Social History   Substance Use Topics     Smoking status: Current Every Day Smoker     Smokeless tobacco: Never Used     Alcohol use Yes     No family history on file.      Current Outpatient Prescriptions   Medication Sig Dispense Refill     alendronate (FOSAMAX) 70 MG tablet TAKE 1 TABLET (70 MG) BY MOUTH EVERY 7 DAYS 4 tablet 3     aspirin EC 81 MG EC tablet Take 81 mg by mouth       atorvastatin (LIPITOR) 40 MG tablet Take 1 tablet (40 mg) by mouth daily 90 tablet 3     calcium carb 1250 mg, 500 mg [place],/vitamin D 200 unit (CALCIUM 500/D) 500-200 MG-UNIT per tablet Take 1 tablet by mouth       fish oil-omega-3 fatty acids 1000 MG capsule Take 1 capsule by mouth       glucosamine-chondroitin 500-400 MG CAPS per capsule Take 1 capsule by mouth       insulin detemir (LEVEMIR FLEXPEN/FLEXTOUCH) 100 UNIT/ML injection Inject 25 Units Subcutaneous At Bedtime 15 mL 1     lisinopril (PRINIVIL/ZESTRIL) 2.5 MG tablet Take 1 tablet (2.5 mg) by mouth daily 90 tablet 3     metFORMIN (GLUCOPHAGE) 1000 MG tablet Take 1 tablet (1,000 mg) by mouth 2 times daily (with meals) 180 tablet 1     Multiple Vitamin (MULTI-VITAMINS) TABS Take 1 tablet by mouth       traZODone (DESYREL) 50 MG tablet TAKE 1 TABLET (50 MG) BY MOUTH AT BEDTIME 90 tablet 3     Allergies   Allergen Reactions     Indomethacin Other (See Comments)     Dizziness  "and disorientation     Tramadol Nausea and Vomiting     BP Readings from Last 3 Encounters:   07/20/18 128/66   05/31/18 124/70   05/01/18 118/68    Wt Readings from Last 3 Encounters:   07/20/18 162 lb (73.5 kg)   05/31/18 166 lb (75.3 kg)   05/01/18 166 lb (75.3 kg)             Reviewed and updated as needed this visit by clinical staff  Allergies  Meds  Problems       Reviewed and updated as needed this visit by Provider  Allergies  Meds  Problems         ROS:  Constitutional, HEENT, cardiovascular, pulmonary, gi and gu systems are negative, except as otherwise noted.    OBJECTIVE:     /66  Pulse 72  Temp 98.1  F (36.7  C) (Oral)  Ht 5' 4.75\" (1.645 m)  Wt 162 lb (73.5 kg)  BMI 27.17 kg/m2  Body mass index is 27.17 kg/(m^2).  GENERAL: healthy, alert, no distress and over weight  NECK: no adenopathy and no asymmetry, masses, or scars  RESP: lungs clear to auscultation - no rales, rhonchi or wheezes  CV: regular rates and rhythm, normal S1 S2, no S3 or S4, grade 2/6 systolic murmur and no peripheral edema  MS: no gross musculoskeletal defects noted, no edema  PSYCH: mentation appears normal, affect normal/bright    ASSESSMENT/PLAN:     1. Insomnia, unspecified type  Chronic, stable, continue current treatments.    - traZODone (DESYREL) 50 MG tablet; TAKE 1 TABLET (50 MG) BY MOUTH AT BEDTIME  Dispense: 90 tablet; Refill: 3    2. Type 2 diabetes mellitus without complication, with long-term current use of insulin (H)  Chronic, stable.    - Continue atorvastatin (LIPITOR) 40 MG tablet; Take 1 tablet (40 mg) by mouth daily  Dispense: 90 tablet; Refill: 3  - Decrease from 25 units to 23 units - insulin detemir (LEVEMIR FLEXPEN/FLEXTOUCH) 100 UNIT/ML injection; Inject 23 Units Subcutaneous At Bedtime  Dispense: 15 mL; Refill: 1  - lisinopril (PRINIVIL/ZESTRIL) 2.5 MG tablet; Take 1 tablet (2.5 mg) by mouth daily  Dispense: 90 tablet; Refill: 3  - metFORMIN (GLUCOPHAGE) 1000 MG tablet; Take 1 tablet " (1,000 mg) by mouth 2 times daily (with meals)  Dispense: 180 tablet; Refill: 1  - Follow up in 4 months for recheck.    3. Age-related osteoporosis without current pathological fracture    - alendronate (FOSAMAX) 70 MG tablet; TAKE 1 TABLET (70 MG) BY MOUTH EVERY 7 DAYS  Dispense: 4 tablet; Refill: 3  - DX Hip/Pelvis/Spine; Future    4. History of partial thyroidectomy  Chronic, stable.    5. Hyperlipidemia LDL goal <100  Chronic, stable.  - Continue Statin.    6. Tobacco use disorder  - Advised on smoking cessation.    7. Newly recognized murmur    - Echocardiogram Complete; Future    Patient Instructions:  1) Bring your stool test back for the colon cancer screening    2) Call to schedule the ultrasound of the heart    3) Call to schedule the bone density scan    4) I renewed all your medication orders today    5) Decrease the Levemir to 23 units    6) Consider going to our pharmacy next door to get the new Shingles vaccine    7) Get your diabetic eye exam as you are due for this - please send us the report of it      Follow up for diabetes check in 4 months.    Meri Rodriguez, NP  [facility]

## 2018-07-20 NOTE — PATIENT INSTRUCTIONS
1) Bring your stool test back for the colon cancer screening    2) Call to schedule the ultrasound of the heart    3) Call to schedule the bone density scan    4) I renewed all your medication orders today    5) Decrease the Levemir to 23 units    6) Consider going to our pharmacy next door to get the new Shingles vaccine    7) Get your diabetic eye exam as you are due for this - please send us the report of it      Your goal Hemoglobin A1C is 6.9 or less.  Your last Hemoglobin A1C was 6.1.    Your goal Fasting Blood Sugar is less than 120.    Your goal 1-2 hours Post Meal Blood Sugar is less than 179.    * Call the clinic if your blood sugar is less than 70 twice in one week or if yoru blood sugar is over 200 for 3 consecutive days. *    [facility]   Discharged by : Dahiana Ovalles MA  Paper scripts provided to patient : None    If you have any questions regarding your visit please contact your care team:     Team Gold                Clinic Hours Telephone Number     Dr. Vinita Rodriguez, CNP 7am-7pm  Monday - Thursday   7am-5pm  Fridays  [phone]   (Appointment scheduling available 24/7)     RN Line  [phone] option 2     Urgent Care - [place] - 11am-9pm Monday-Friday Saturday-Sunday- 9am-5pm     [place] -   5pm-9pm Monday-Friday Saturday-Sunday- 9am-5pm    [phone] - [place]    [phone] - [place]       For a Price Quote for your services, please call our Consumer Price Line at [phone].     What options do I have for visits at the clinic other than the traditional office visit?     To expand how we care for you, many of our providers are utilizing electronic visits (e-visits) and telephone visits, when medically appropriate, for interactions with their patients rather than a visit in the clinic. We also offer nurse visits for many medical concerns. Just like any other  service, we will bill your insurance company for this type of visit based on time spent on the phone with your provider. Not all insurance companies cover these visits. Please check with your medical insurance if this type of visit is covered. You will be responsible for any charges that are not paid by your insurance.   E-visits via [employer]: generally incur a $35.00 fee.     Telephone visits:  Time spent on the phone: *charged based on time that is spent on the phone in increments of 10 minutes. Estimated cost:   5-10 mins $30.00   11-20 mins. $59.00   21-30 mins. $85.00       Use [employer] (secure email communication and access to your chart) to send your primary care provider a message or make an appointment. Ask someone on your Team how to sign up for [employer].     As always, Thank you for trusting us with your health care needs!      [facility] [facility]:    Scheduling Appointments  [facility]  Call: [phone]    [facility]  Call: [phone]    [facility]  Call: [phone]    For Gastroenterology referrals   [facility]   [facility], 4th Floor   [address] [place] [id number]   Appointments: [phone]    WHERE TO GO FOR CARE?  Clinic    Make an appointment if you:       Are sick (cold, cough, flu, sore throat, earache or in pain).       Have a small injury (sprain, small cut, burn or broken bone).       Need a physical exam, Pap smear, vaccine or prescription refill.       Have questions about your health or medicines.    To reach us:      Call [phone] ([phone]). Open 24 hours every day. (For counseling services, call [phone].)    Log into [employer] at [email or web address]. (Visit [email or web address] to create an account.) Hospital emergency room    An emergency is a serious or life- threatening problem that must be treated right away.    Call 994 or get to the  hospital if you have:      Very bad or sudden:            - Chest pain or pressure         - Bleeding         - Head or belly pain         - Dizziness or trouble seeing, walking or                          Speaking      Problems breathing      Blood in your vomit or you are coughing up blood      A major injury (knocked out, loss of a finger or limb, rape, broken bone protruding from skin)    A mental health crisis. (Or call the Mental Health Crisis line at 1-567.478.5139 or Suicide Prevention Hotline at 1-810.417.7651.)    Open 24 hours every day. You don't need an appointment.     Urgent care    Visit urgent care for sickness or small injuries when the clinic is closed. You don't need an appointment. To check hours or find an urgent care near you, visit [email or web address]. Online care    Get online care from [facility] for more than 70 common problems, like colds, allergies and infections. Open 24 hours every day at:   [email or web address]   Need help deciding?    For advice about where to be seen, you may call your clinic and ask to speak with a nurse. We're here for you 24 hours every day.         If you are deaf or hard of hearing, please let us know. We provide many free services including sign language interpreters, oral interpreters, TTYs, telephone amplifiers, note takers and written materials.

## 2018-07-20 NOTE — MR AVS SNAPSHOT
After Visit Summary   7/20/2018    Rajani Guo    MRN: [id number]           Patient Information     Date Of Birth          1950        Visit Information        Provider Department      7/20/2018 10:20 AM Meri Rodriguez NP [facility]        Today's Diagnoses     Insomnia, unspecified type    -  1    Type 2 diabetes mellitus without complication, with long-term current use of insulin (H)        Age-related osteoporosis without current pathological fracture        History of partial thyroidectomy        Hyperlipidemia LDL goal <100        Tobacco use disorder        Newly recognized murmur          Care Instructions    1) Bring your stool test back for the colon cancer screening    2) Call to schedule the ultrasound of the heart    3) Call to schedule the bone density scan    4) I renewed all your medication orders today    5) Decrease the Levemir to 23 units    6) Consider going to our pharmacy next door to get the new Shingles vaccine    7) Get your diabetic eye exam as you are due for this - please send us the report of it      Your goal Hemoglobin A1C is 6.9 or less.  Your last Hemoglobin A1C was 6.1.    Your goal Fasting Blood Sugar is less than 120.    Your goal 1-2 hours Post Meal Blood Sugar is less than 179.    * Call the clinic if your blood sugar is less than 70 twice in one week or if yoru blood sugar is over 200 for 3 consecutive days. *    [facility]   Discharged by : Dahiana Ovalles MA  Paper scripts provided to patient : None    If you have any questions regarding your visit please contact your care team:     Team Gold                Clinic Hours Telephone Number     Dr. Vinita Rodriguez CNP 7am-7pm  Monday - Thursday   7am-5pm  Fridays  [phone]   (Appointment scheduling available 24/7)     RN Line  [phone] option 2     Urgent Care - [facility] -  11am-9pm Monday-Friday Saturday-Sunday- 9am-5pm     [place] -   5pm-9pm Monday-Friday Saturday-Sunday- 9am-5pm    [phone] - [place] Gil    [phone] - [place]       For a Price Quote for your services, please call our Consumer Price Line at [phone].     What options do I have for visits at the clinic other than the traditional office visit?     To expand how we care for you, many of our providers are utilizing electronic visits (e-visits) and telephone visits, when medically appropriate, for interactions with their patients rather than a visit in the clinic. We also offer nurse visits for many medical concerns. Just like any other service, we will bill your insurance company for this type of visit based on time spent on the phone with your provider. Not all insurance companies cover these visits. Please check with your medical insurance if this type of visit is covered. You will be responsible for any charges that are not paid by your insurance.   E-visits via Ninjathat: generally incur a $35.00 fee.     Telephone visits:  Time spent on the phone: *charged based on time that is spent on the phone in increments of 10 minutes. Estimated cost:   5-10 mins $30.00   11-20 mins. $59.00   21-30 mins. $85.00       Use Evomailt (secure email communication and access to your chart) to send your primary care provider a message or make an appointment. Ask someone on your Team how to sign up for Ninjathat.     As always, Thank you for trusting us with your health care needs!      [facility]:    Scheduling Appointments  [place]  Call: [phone]    [place] [facility]  Call: [phone]    [facility]  Call: [phone]    For Gastroenterology referrals   [facility]   Clinics and Surgery Center, 4th Floor   [address] [place] [address]   Appointments: [phone]    WHERE TO GO FOR  CARE?  Clinic    Make an appointment if you:       Are sick (cold, cough, flu, sore throat, earache or in pain).       Have a small injury (sprain, small cut, burn or broken bone).       Need a physical exam, Pap smear, vaccine or prescription refill.       Have questions about your health or medicines.    To reach us:      Call [phone] ([phone]). Open 24 hours every day. (For counseling services, call [phone].)    Log into [employer] at [employer]. (Visit [email or web address] to create an account.) Hospital emergency room    An emergency is a serious or life- threatening problem that must be treated right away.    Call 598 or get to the hospital if you have:      Very bad or sudden:            - Chest pain or pressure         - Bleeding         - Head or belly pain         - Dizziness or trouble seeing, walking or                          Speaking      Problems breathing      Blood in your vomit or you are coughing up blood      A major injury (knocked out, loss of a finger or limb, rape, broken bone protruding from skin)    A mental health crisis. (Or call the Mental Health Crisis line at 1-581.157.5315 or Suicide Prevention Hotline at 1-517.216.7173.)    Open 24 hours every day. You don't need an appointment.     Urgent care    Visit urgent care for sickness or small injuries when the clinic is closed. You don't need an appointment. To check hours or find an urgent care near you, visit [email or web address]. Online care    Get online care from [facility] for more than 70 common problems, like colds, allergies and infections. Open 24 hours every day at:   [email or web address]   Need help deciding?    For advice about where to be seen, you may call your clinic and ask to speak with a nurse. We're here for you 24 hours every day.         If you are deaf or hard of hearing, please let us know. We provide many free services including sign language interpreters, oral interpreters, TTYs, telephone  "amplifiers, note takers and written materials.                   Follow-ups after your visit        Future tests that were ordered for you today     Open Future Orders        Priority Expected Expires Ordered    Echocardiogram Complete Routine  7/20/2019 7/20/2018    DX Hip/Pelvis/Spine Routine  7/20/2019 7/20/2018            Who to contact     If you have questions or need follow up information about today's clinic visit or your schedule please contact [facility] directly at [phone].  Normal or non-critical lab and imaging results will be communicated to you by Dragon Insidehart, letter or phone within 4 business days after the clinic has received the results. If you do not hear from us within 7 days, please contact the clinic through Sigasit or phone. If you have a critical or abnormal lab result, we will notify you by phone as soon as possible.  Submit refill requests through [facility] or call your pharmacy and they will forward the refill request to us. Please allow 3 business days for your refill to be completed.          Additional Information About Your Visit        Dragon Insidehart Information     [facility] gives you secure access to your electronic health record. If you see a primary care provider, you can also send messages to your care team and make appointments. If you have questions, please call your primary care clinic.  If you do not have a primary care provider, please call [phone] and they will assist you.        Care EveryWhere ID     This is your Care EveryWhere ID. This could be used by other organizations to access your [facility] medical records  [id number]        Your Vitals Were     Pulse Temperature Height BMI (Body Mass Index)          72 98.1  F (36.7  C) (Oral) 5' 4.75\" (1.645 m) 27.17 kg/m2         Blood Pressure from Last 3 Encounters:   07/20/18 128/66   05/31/18 124/70   05/01/18 118/68    Weight from Last 3 Encounters:   07/20/18 162 lb (73.5 kg)   05/31/18 166 lb (75.3 kg) "   05/01/18 166 lb (75.3 kg)                 Today's Medication Changes          These changes are accurate as of 7/20/18 11:10 AM.  If you have any questions, ask your nurse or doctor.               These medicines have changed or have updated prescriptions.        Dose/Directions    insulin detemir 100 UNIT/ML injection   Commonly known as:  LEVEMIR FLEXPEN/FLEXTOUCH   This may have changed:  how much to take   Used for:  Type 2 diabetes mellitus without complication, with long-term current use of insulin (H)        Dose:  23 Units   Inject 23 Units Subcutaneous At Bedtime   Quantity:  15 mL   Refills:  1            Where to get your medicines      These medications were sent to [id number] - [place], [address] OF [address] [address]     Phone:  [phone]     alendronate 70 MG tablet    atorvastatin 40 MG tablet    insulin detemir 100 UNIT/ML injection    lisinopril 2.5 MG tablet    metFORMIN 1000 MG tablet    traZODone 50 MG tablet                Primary Care Provider Office Phone # Fax #    [facility] [phone]       [address] [facility] [address]        Equal Access to Services     PAMELA DOMINGUEZ AH: Hadii aad ku hadasho Soomaali, waaxda luqadaha, qaybta kaalmada adeegyada, waxay idiin haylissetten deidre sosa. So [facility] [phone].    ATENCIÓN: Si habla español, tiene a rosen disposición servicios gratuitos de asistencia lingüística. Oren al [phone].    We comply with applicable federal civil rights laws and [place] laws. We do not discriminate on the basis of race, color, national origin, age, disability, sex, sexual orientation, or gender identity.            Thank you!     Thank you for choosing [facility]  for your care. Our goal is always to provide you with excellent care. Hearing back from our patients is one way we can continue to improve our services.  Please take a few minutes to complete the written survey that you may receive in the mail after your visit with us. Thank you!             Your Updated Medication List - Protect others around you: Learn how to safely use, store and throw away your medicines at www.disposemymeds.org.          This list is accurate as of 7/20/18 11:10 AM.  Always use your most recent med list.                   Brand Name Dispense Instructions for use Diagnosis    alendronate 70 MG tablet    FOSAMAX    4 tablet    TAKE 1 TABLET (70 MG) BY MOUTH EVERY 7 DAYS    Age-related osteoporosis without current pathological fracture       aspirin 81 MG EC tablet      Take 81 mg by mouth        atorvastatin 40 MG tablet    LIPITOR    90 tablet    Take 1 tablet (40 mg) by mouth daily    Type 2 diabetes mellitus without complication, with long-term current use of insulin (H)       calcium 500/D 500-200 MG-UNIT per tablet   Generic drug:  calcium carb 1250 mg (500 mg [place])/vitamin D 200 unit      Take 1 tablet by mouth        fish oil-omega-3 fatty acids 1000 MG capsule      Take 1 capsule by mouth        glucosamine-chondroitin 500-400 MG Caps per capsule      Take 1 capsule by mouth        insulin detemir 100 UNIT/ML injection    LEVEMIR FLEXPEN/FLEXTOUCH    15 mL    Inject 23 Units Subcutaneous At Bedtime    Type 2 diabetes mellitus without complication, with long-term current use of insulin (H)       lisinopril 2.5 MG tablet    PRINIVIL/Zestril    90 tablet    Take 1 tablet (2.5 mg) by mouth daily    Type 2 diabetes mellitus without complication, with long-term current use of insulin (H)       metFORMIN 1000 MG tablet    GLUCOPHAGE    180 tablet    Take 1 tablet (1,000 mg) by mouth 2 times daily (with meals)    Type 2 diabetes mellitus without complication, with long-term current use of insulin (H)       MULTI-VITAMINS Tabs      Take 1 tablet by mouth        traZODone 50 MG tablet    DESYREL    90 tablet    TAKE 1 TABLET (50 MG) BY MOUTH AT  BEDTIME    Insomnia, unspecified type

## 2018-07-21 PROBLEM — R01.1 NEWLY RECOGNIZED MURMUR: Status: ACTIVE | Noted: 2018-07-21

## 2018-08-29 ENCOUNTER — TRANSFERRED RECORDS (OUTPATIENT)
Dept: HEALTH INFORMATION MANAGEMENT | Facility: CLINIC | Age: 68
End: 2018-08-29

## 2018-11-13 ENCOUNTER — MYC MEDICAL ADVICE (OUTPATIENT)
Dept: FAMILY MEDICINE | Facility: CLINIC | Age: 68
End: 2018-11-13

## 2018-11-13 NOTE — TELEPHONE ENCOUNTER
Panel Management Review      Patient has the following on her problem list:     Diabetes    ASA: Passed    Last A1C  Lab Results   Component Value Date    A1C 6.1 05/31/2018    A1C 6.4 01/18/2018    A1C 7.6 11/30/2017     A1C tested: Passed    Last LDL:    Lab Results   Component Value Date    CHOL 167 01/18/2018     Lab Results   Component Value Date    HDL 68 01/18/2018     Lab Results   Component Value Date    LDL 83 01/18/2018     Lab Results   Component Value Date    TRIG 78 01/18/2018     No results found for: CHOLHDLRATIO  Lab Results   Component Value Date    NHDL 99 01/18/2018       Is the patient on a Statin? YES             Is the patient on Aspirin? YES    Medications     HMG CoA Reductase Inhibitors    atorvastatin (LIPITOR) 40 MG tablet    Salicylates    aspirin EC 81 MG EC tablet          Last three blood pressure readings:  BP Readings from Last 3 Encounters:   07/20/18 128/66   05/31/18 124/70   05/01/18 118/68       Date of last diabetes office visit: 7/20/18     Tobacco History:     History   Smoking Status     Current Every Day Smoker   Smokeless Tobacco     Never Used           Composite cancer screening  Chart review shows that this patient is due/due soon for the following Colonoscopy OR FIT  Summary:    Patient is due/failing the following:   Tobacco and A1C, FIT    Action needed:   Patient needs office visit for Diabetes Check.    Type of outreach:    Sent [facility] message.    Questions for provider review:    None                                                                                                                                      Abbey Ovalles MA       Chart routed to Care Team .

## 2018-11-13 NOTE — LETTER
[facility]  [address] [place] [address]  [phone]                                                                                                November 20, 2018    Rajani Agrawalutson  [address] [facility] [address]        Dear Ms. Guo,      In order to ensure we are providing the best quality care, we have reviewed your chart and see that you are due for:    1 Diabetes Check  2 FIT (stool card)    Please call the clinic at your earliest convenience to schedule an appointment.    Thank you for trusting us with your health care.    Sincerely,    Meri Rodriguez, DNP, APRN, CNP/mv

## 2019-02-18 DIAGNOSIS — M81.0 AGE-RELATED OSTEOPOROSIS WITHOUT CURRENT PATHOLOGICAL FRACTURE: ICD-10-CM

## 2019-02-18 RX ORDER — ALENDRONATE SODIUM 70 MG/1
TABLET ORAL
Qty: 4 TABLET | Refills: 0 | Status: SHIPPED | OUTPATIENT
Start: 2019-02-18 | End: 2019-04-01

## 2019-02-18 NOTE — TELEPHONE ENCOUNTER
"Requested Prescriptions   Pending Prescriptions Disp Refills     alendronate (FOSAMAX) 70 MG tablet 4 tablet 3    Last Written Prescription Date:  7-20-18  Last Fill Quantity: 4,  # refills: 3   Last office visit: 7/20/2018 with prescribing provider:     Future Office Visit:     Sig: TAKE 1 TABLET (70 MG) BY MOUTH EVERY 7 DAYS    Bisphosphonates Failed - 2/18/2019 12:57 PM       Failed - Recent (12 mo) or future (30 days) visit within the authorizing provider's specialty    Patient had office visit in the last 12 months or has a visit in the next 30 days with authorizing provider or within the authorizing provider's specialty.  See \"Patient Info\" tab in inbasket, or \"Choose Columns\" in Meds & Orders section of the refill encounter.             Failed - Dexa on file within past 2 years    Please review last Dexa result.          Failed - Normal serum creatinine on file within past 12 months    Recent Labs   Lab Test 01/18/18  0835   CR 0.47*            Passed - Medication is active on med list       Passed - Patient is age 18 or older          "

## 2019-02-19 NOTE — TELEPHONE ENCOUNTER
Routing refill request to provider for review/approval because:  Labs out of range:  Creatinine.  Please review.  Thank you.  Gladys Díaz RN

## 2019-02-19 NOTE — TELEPHONE ENCOUNTER
Fosamax refill approved for 1 month.  Bone density screen was ordered 7/20/18 and recommend getting this done for additional refills.    She is also due for a diabetes visit.    Please call patient to let her know.    Meri Rodriguez, DNP, APRN, CNP

## 2019-02-21 ENCOUNTER — OFFICE VISIT (OUTPATIENT)
Dept: FAMILY MEDICINE | Facility: CLINIC | Age: 69
End: 2019-02-21
Payer: COMMERCIAL

## 2019-02-21 VITALS
RESPIRATION RATE: 14 BRPM | HEIGHT: 65 IN | SYSTOLIC BLOOD PRESSURE: 140 MMHG | TEMPERATURE: 97.8 F | HEART RATE: 120 BPM | WEIGHT: 160 LBS | DIASTOLIC BLOOD PRESSURE: 60 MMHG | OXYGEN SATURATION: 99 % | BODY MASS INDEX: 26.66 KG/M2

## 2019-02-21 DIAGNOSIS — I73.9 CLAUDICATION OF LEFT LOWER EXTREMITY (H): ICD-10-CM

## 2019-02-21 DIAGNOSIS — E11.9 TYPE 2 DIABETES MELLITUS WITHOUT COMPLICATION, WITH LONG-TERM CURRENT USE OF INSULIN (H): ICD-10-CM

## 2019-02-21 DIAGNOSIS — I73.9 PVD (PERIPHERAL VASCULAR DISEASE) (H): ICD-10-CM

## 2019-02-21 DIAGNOSIS — Z87.891 PERSONAL HISTORY OF TOBACCO USE: ICD-10-CM

## 2019-02-21 DIAGNOSIS — R93.89 ABNORMAL CT SCAN: ICD-10-CM

## 2019-02-21 DIAGNOSIS — Z71.6 TOBACCO ABUSE COUNSELING: ICD-10-CM

## 2019-02-21 DIAGNOSIS — M79.605 PAIN OF LEFT LOWER EXTREMITY: Primary | ICD-10-CM

## 2019-02-21 DIAGNOSIS — Z79.4 TYPE 2 DIABETES MELLITUS WITHOUT COMPLICATION, WITH LONG-TERM CURRENT USE OF INSULIN (H): ICD-10-CM

## 2019-02-21 DIAGNOSIS — I10 ESSENTIAL HYPERTENSION: ICD-10-CM

## 2019-02-21 PROCEDURE — G0296 VISIT TO DETERM LDCT ELIG: HCPCS | Performed by: FAMILY MEDICINE

## 2019-02-21 PROCEDURE — 99214 OFFICE O/P EST MOD 30 MIN: CPT | Performed by: FAMILY MEDICINE

## 2019-02-21 RX ORDER — GABAPENTIN 300 MG/1
300 CAPSULE ORAL DAILY
Refills: 0 | COMMUNITY
Start: 2019-02-06 | End: 2019-03-18

## 2019-02-21 ASSESSMENT — MIFFLIN-ST. JEOR: SCORE: 1257.67

## 2019-02-21 NOTE — PATIENT INSTRUCTIONS
HOW TO QUIT SMOKING  Smoking is one of the hardest habits to break. About half of all those who have ever smoked have been able to quit, and most of those (about 70%) who still smoke want to quit. Here are some of the best ways to stop smoking.     KEEP TRYING:  It takes most smokers about 8 tries before they are finally able to fully quit. So, the more often you try and fail, the better your chance of quitting the next time! So, don't give up!    GO COLD TURKEY:  Most ex-smokers quit cold turkey. Trying to cut back gradually doesn't seem to work as well, perhaps because it continues the smoking habit. Also, it is possible to fool yourself by inhaling more while smoking fewer cigarettes. This results in the same amount of nicotine in your body!    GET SUPPORT:  Support programs can make an important difference, especially for the heavy smoker. These groups offer lectures, methods to change your behavior and peer support. Call the free national Quitline for more information. 800-QUIT-NOW (252-000-0486). Low-cost or free programs are offered by many hospitals, local chapters of the American Lung Association (042-835-9392) and the American Cancer Society (168-162-8344). Support at home is important too. Non-smokers can help by offering praise and encouragement. If the smoker fails to quit, encourage them to try again!    OVER-THE-COUNTER MEDICINES:  For those who can't quit on their own, Nicotine Replacement Therapy (NRT) may make quitting much easier. Certain aids such as the nicotine patch, gum and lozenge are available without a prescription. However, it is best to use these under the guidance of your doctor. The skin patch provides a steady supply of nicotine to the body. Nicotine gum and lozenge gives temporary bursts of low levels of nicotine. Both methods take the edge off the craving for cigarettes. WARNING: If you feel symptoms of nicotine overdose, such as nausea, vomiting, dizziness, weakness, or fast  heartbeat, stop using these and see your doctor.    PRESCRIPTION MEDICINES:  After evaluating your smoking patterns and prior attempts at quitting, your doctor may offer a prescription medicine such as bupropion (Zyban, Wellbutrin), varenicline (Chantix, Champix), a niocotine inhaler or nasal spray. Each has its unique advantage and side effects which your doctor can review with you.    HEALTH BENEFITS OF QUITTING:  The benefits of quitting start right away and keep improving the longer you go without smokin minutes: blood pressure and pulse return to normal  8 hours: oxygen levels return to normal  2 days: ability to smell and taste begins to improve as damaged nerves start to regrow  2-3 weeks: circulation and lung function improves  1-9 months: decreased cough, congestion and shortness of breath; less tired  1 year: risk of heart attack decreases by half  5 years: risk of lung cancer decreases by half; risk of stroke becomes the same as a non-smoker  For information about how to quit smoking, visit the following links:  [employer] [place] ,   Clearing the Air, Quit Smoking Today   - an online booklet. http://www.smokefree.gov/pubs/clearing_the_air.pdf  Smokefree.gov http://smokefree.gov/  QuitNet http://www.quitnet.com/    0564-4608 Scotty Vasquez, [address] [place] [address], [place] [address]. All rights reserved. This information is not intended as a substitute for professional medical care. Always follow your healthcare professional's instructions.    The Benefits of Living Smoke Free  What do you want to gain from quitting? Check off some reasons to quit.  Health Benefits  ___ Reduce my risk of lung cancer, heart disease, chronic lung disease  ___ Have fewer wrinkles and softer skin  ___ Improve my sense of taste and smell  ___ For pregnant women--reduce the risk of having a miscarriage, stillbirth, premature birth, or low-birth-weight baby  Personal Benefits  ___ Feel more in control of my  life  ___ Have better-smelling hair, breath, clothes, home, and car  ___ Save time by not having to take smoke breaks, buy cigarettes, or hunt for a light  ___ Have whiter teeth  Family Benefits  ___ Reduce my children s respiratory tract infections  ___ Set a good example for my children  ___ Reduce my family s cancer risk  Financial Benefits  ___ Save hundreds of dollars each year that would be spent on cigarettes  ___ Save money on medical bills  ___ Save on life, health, and car insurance premiums    Those Dollars Add Up!  Cigarettes are expensive, and getting more expensive all the time. Do you realize how much money you are spending on cigarettes per year? What is the average amount you spend on a pack of cigarettes? What is the average number of packs that you smoke per day? Using your answers to these questions, fill in this formula to help you find out:  ($ _____ per pack) ×  ( _____ number of packs per day) × (365 days) =  $ _____ yearly cost of smoking  Besides tobacco, there are other costs, including extra cleaning bills and replacement costs for clothing and furniture; medical expenses for smoking-related illnesses; and higher health, life, and car insurance premiums.    Cigars and Pipes Count Too!  Cigars and pipes are also dangerous. So are smokeless (chewing) tobacco and snuff. All of these products contain nicotine, a highly addictive substance that has harmful effects on your body. Quitting smoking means giving up all tobacco products.      3774-5009 [address], [place] [address]. All rights reserved. This information is not intended as a substitute for professional medical care. Always follow your healthcare professional's instructions.  Lung Cancer Screening   Frequently Asked Questions  If you are at high-risk for lung cancer, getting screened with low-dose computed tomography (LDCT) every year can help save your life. This handout offers answers to some of the most  common questions about lung cancer screening. If you have other questions, please call [phone] ([phone]).     What is it?  Lung cancer screening uses special X-ray technology to create an image of your lung tissue. The exam is quick and easy and takes less than 10 seconds. We don t give you any medicine or use any needles. You can eat before and after the exam. You don t need to change your clothes as long as the clothing on your chest doesn t contain metal. But, you do need to be able to hold your breath for at least 6 seconds during the exam.    What is the goal of lung cancer screening?  The goal of lung cancer screening is to save lives. Many times, lung cancer is not found until a person starts having physical symptoms. Lung cancer screening can help detect lung cancer in the earliest stages when it may be easier to treat.    Who should be screened for lung cancer?  We suggest lung cancer screening for anyone who is at high-risk for lung cancer. You are in the high-risk group if you:      are between the ages of 55 and 79, and    have smoked at least 1 pack of cigarettes a day for 30 or more years, and    still smoke or have quit within the past 15 years.    However, if you have a new cough or shortness of breath, you should talk to your doctor before being screened.    Some national lung health advocacy groups also recommend screening for people ages 50 to 79 who have smoked an average of 1 pack of cigarettes a day for 20 years. They must also have at least 1 other risk factor for lung cancer, not including exposure to secondhand smoke. Other risk factors are having had cancer in the past, emphysema, pulmonary fibrosis, COPD, a family history of lung cancer, or exposure to certain materials such as arsenic, asbestos, beryllium, cadmium, chromium, diesel fumes, nickel, radon or silica. Your care team can help you know if you have one of these risk factors.     Why does it matter if I have  symptoms?  Certain symptoms can be a sign that you have a condition in your lungs that should be checked and treated by your doctor. These symptoms include fever, chest pain, a new or changing cough, shortness of breath that you have never felt before, coughing up blood or unexplained weight loss. Having any of these symptoms can greatly affect the results of lung cancer screening.       Should all smokers get an LDCT lung cancer screening exam?  It depends. Lung cancer screening is for a very specific group of men and women who have a history of heavy smoking over a long period of time (see  Who should be screened for lung cancer  above).  I am in the high-risk group, but have been diagnosed with cancer in the past. Is LDCT lung cancer screening right for me?  In some cases, you should not have LDCT lung screening, such as when your doctor is already following your cancer with CT scan studies. Your doctor will help you decide if LDCT lung screening is right for you.  Do I need to have a screening exam every year?  Yes. If you are in the high-risk group described earlier, you should get an LDCT lung cancer screening exam every year until you are 79, or are no longer willing or able to undergo screening and possible procedures to diagnose and treat lung cancer.  How effective is LDCT at preventing death from lung cancer?  Studies have shown that LDCT lung cancer screening can lower the risk of death from lung cancer by 20 percent in people who are at high-risk.  What are the risks?  There are some risks and limitations of LDCT lung cancer screening. We want to make sure you understand the risks and benefits, so please let us know if you have any questions. Your doctor may want to talk with you more about these risks.    Radiation exposure: As with any exam that uses radiation, there is a very small increased risk of cancer. The amount of radiation in LDCT is small--about the same amount a person would get from a  mammogram. Your doctor orders the exam when he or she feels the potential benefits outweigh the risks.    False negatives: No test is perfect, including LDCT. It is possible that you may have a medical condition, including lung cancer, that is not found during your exam. This is called a false negative result.    False positives and more testing: LDCT very often finds something in the lung that could be cancer, but in fact is not. This is called a false positive result. False positive tests often cause anxiety. To make sure these findings are not cancer, you may need to have more tests. These tests will be done only if you give us permission. Sometimes patients need a treatment that can have side effects, such as a biopsy. For more information on false positives, see  What can I expect from the results?     Findings not related to lung cancer: Your LDCT exam also takes pictures of areas of your body next to your lungs. In a very small number of cases, the CT scan will show an abnormal finding in one of these areas, such as your kidneys, adrenal glands, liver or thyroid. This finding may not be serious, but you may need more tests. Your doctor can help you decide what other tests you may need, if any.  What can I expect from the results?  About 1 out of 4 LDCT exams will find something that may need more tests. Most of the time, these findings are lung nodules. Lung nodules are very small collections of tissue in the lung. These nodules are very common, and the vast majority--more than 97 percent--are not cancer (benign). Most are normal lymph nodes or small areas of scarring from past infections.  But, if a small lung nodule is found to be cancer, the cancer can be cured more than 90 percent of the time. To know if the nodule is cancer, we may need to get more images before your next yearly screening exam. If the nodule has suspicious features (for example, it is large, has an odd shape or grows over time), we will  refer you to a specialist for further testing.  Will my doctor also get the results?  Yes. Your doctor will get a copy of your results.  Is it okay to keep smoking now that there s a cancer screening exam?  No. Tobacco is one of the strongest cancer-causing agents. It causes not only lung cancer, but other cancers and cardiovascular (heart) diseases as well. The damage caused by smoking builds over time. This means that the longer you smoke, the higher your risk of disease. While it is never too late to quit, the sooner you quit, the better.  Where can I find help to quit smoking?  The best way to prevent lung cancer is to stop smoking. If you have already quit smoking, congratulations and keep it up! For help on quitting smoking, please call [employer] at 7-891-961-EVPW ([phone]) or the American Cancer Society at 1-519.954.5572 to find local resources near you.  One-on-one health coaching:  If you d prefer to work individually with a health care provider on tobacco cessation, we offer:      Medication Therapy Management:  Our specially trained pharmacists work closely with you and your doctor to help you quit smoking.  Call [phone] or [phone] (toll free).     Can Do: Health coaching offered by [facility].  [email or web address]-do-[email or web address]     - - -

## 2019-02-21 NOTE — PROGRESS NOTES
SUBJECTIVE:   Rajani Guo is a 68 year old female who presents to clinic today for the following health issues:        ED/UC Followup:    Facility:   emergency Dept.  Date of visit: 2-18-19  Reason for visit: Left leg pain / numbness  Current Status: No improvement  Pt was advised to see vascular but has Not made appointment   Pt has pain left Lower  Extremity with walking and her Left leg is cold  Pt  Has Known Diabetes,Hyperlipidemia and has a 52 pack year smoking History     ER notes reviewed     Pt  Has a appointment with Dr Lance for Diabetes check and establish care-she does not want labs done Today    Problem list and histories reviewed & adjusted, as indicated.  Additional history: as documented    Patient Active Problem List   Diagnosis     Type 2 diabetes mellitus without complication, with long-term current use of insulin (H)     Hyperlipidemia LDL goal <100     Age-related osteoporosis without current pathological fracture     Insomnia, unspecified type     Tobacco use disorder     History of partial thyroidectomy     Newly recognized murmur     Past Surgical History:   Procedure Laterality Date     KNEE SURGERY Right 2013    right knee torn meniscus surgery     OVARY SURGERY  1971    1 ovary removed     RELEASE CARPAL TUNNEL Right 1988     RELEASE TRIGGER FINGER BILATERAL       SHOULDER SURGERY Left     rotator cuff repair, plate placement     THYROID SURGERY  1988    partial thyroidectomy       Social History     Tobacco Use     Smoking status: Current Every Day Smoker     Packs/day: 1.50     Years: 52.00     Pack years: 78.00     Smokeless tobacco: Never Used   Substance Use Topics     Alcohol use: Yes     History reviewed. No pertinent family history.      Current Outpatient Medications   Medication Sig Dispense Refill     alendronate (FOSAMAX) 70 MG tablet TAKE 1 TABLET (70 MG) BY MOUTH EVERY 7 DAYS.  Please schedule follow up in clinic for additional refills 4 tablet 0     aspirin EC 81 MG  EC tablet Take 81 mg by mouth       atorvastatin (LIPITOR) 40 MG tablet Take 1 tablet (40 mg) by mouth daily 90 tablet 3     calcium carb 1250 mg, 500 mg [place],/vitamin D 200 unit (CALCIUM 500/D) 500-200 MG-UNIT per tablet Take 1 tablet by mouth       gabapentin (NEURONTIN) 300 MG capsule 300 mg daily  0     glucosamine-chondroitin 500-400 MG CAPS per capsule Take 1 capsule by mouth       insulin detemir (LEVEMIR FLEXPEN/FLEXTOUCH) 100 UNIT/ML injection Inject 23 Units Subcutaneous At Bedtime 15 mL 1     lisinopril (PRINIVIL/ZESTRIL) 2.5 MG tablet Take 1 tablet (2.5 mg) by mouth daily 90 tablet 3     MELATONIN PO Take 10 mg by mouth At Bedtime       metFORMIN (GLUCOPHAGE) 1000 MG tablet Take 1 tablet (1,000 mg) by mouth 2 times daily (with meals) 180 tablet 1     Multiple Vitamin (MULTI-VITAMINS) TABS Take 1 tablet by mouth       traZODone (DESYREL) 50 MG tablet TAKE 1 TABLET (50 MG) BY MOUTH AT BEDTIME 90 tablet 3     Allergies   Allergen Reactions     Indomethacin Other (See Comments)     Dizziness and disorientation     Tramadol Nausea and Vomiting     Recent Labs   Lab Test 05/31/18  1003 01/18/18  0835 11/30/17 05/08/17   A1C 6.1* 6.4* 7.6*  --    LDL  --  83  --  126   HDL  --  68  --  49   TRIG  --  78  --  127   ALT  --  16  --   --    CR  --  0.47*  --  0.79   GFRESTIMATED  --  >90  --  >60   GFRESTBLACK  --  >90  --  >60   POTASSIUM  --  3.9  --  3.7   TSH 2.72  --   --   --       BP Readings from Last 3 Encounters:   02/21/19 140/60   07/20/18 128/66   05/31/18 124/70    Wt Readings from Last 3 Encounters:   02/21/19 72.6 kg (160 lb)   07/20/18 73.5 kg (162 lb)   05/31/18 75.3 kg (166 lb)                  Labs reviewed in EPIC    Reviewed and updated as needed this visit by clinical staff  Tobacco  Med Hx  Surg Hx  Fam Hx  Soc Hx      Reviewed and updated as needed this visit by Provider         ROS:  CONSTITUTIONAL: NEGATIVE for fever, chills, change in weight  RESP: NEGATIVE for significant cough or  "SOB  CV: NEGATIVE for chest pain, palpitations or peripheral edema  GI: NEGATIVE for nausea, abdominal pain, heartburn, or change in bowel habits  MUSCULOSKELETAL: as above    OBJECTIVE:     /60   Pulse 120   Temp 97.8  F (36.6  C) (Oral)   Resp 14   Ht 1.645 m (5' 4.75\")   Wt 72.6 kg (160 lb)   SpO2 99%   Breastfeeding? No   BMI 26.83 kg/m    Body mass index is 26.83 kg/m .  GENERAL: healthy, alert and no distress  NECK: no adenopathy, no asymmetry, masses, or scars and thyroid normal to palpation  RESP: lungs clear to auscultation - no rales, rhonchi or wheezes  CV: regular rate and rhythm, normal S1 S2, no S3 or S4, no murmur, click or rub, no peripheral edema and peripheral pulses strong  ABDOMEN: soft, nontender, no hepatosplenomegaly, no masses and bowel sounds normal  SKIN: no suspicious lesions or rashes  Left Lower Extremity is cool to touch  could feel a Dorsalis Pedis and Posterior Tibial    Diagnostic Test Results:  Pending     ASSESSMENT/PLAN:         Tobacco Cessation:   reports that she has been smoking.  She has a 78.00 pack-year smoking history. she has never used smokeless tobacco.  Tobacco Cessation Action Plan: Information offered: Patient not interested at this time      1. Pain of left lower extremity  Claudication     2. Claudication of left lower extremity (H)  Advised schedule arterial Doppler and to follow up with Vascular-will review  Doppler results  - US Lower Extremity Arterial Duplex Left; Future    3. Tobacco abuse counseling  Discussed that risk for PAD is smoking, Diabetes,HTN and high cholesterol  Pt says she is thinking of quitting but declines help today    4. Type 2 diabetes mellitus without complication, with long-term current use of insulin (H)  Labs due  Pt says she will do it when she comes for Physical  Her accuchecks have been doing good    5. Personal history of tobacco use  As above  Advised CT chest  Screen -she will check with her Insurance regarding " coverage  - Prof fee: Shared Decisionmaking for Lung Cancer Screening  - CT Chest Lung Cancer Scrn Low Dose wo; Future  - Okay for Smoking Cessation Study (PLUTO) to Contact Patient    6. Essential hypertension  Borderline High  Was Good in ER  Advised ancillary or Pharmacy BP check in 2 weeks  Follow up PMD if High    Pt will be establishing care with Dr Natalio Coley MD  [facility]

## 2019-02-21 NOTE — PROGRESS NOTES
Lung Cancer Screening Shared Decision Making Visit     Rajani Guo is eligible for lung cancer screening on the basis of the information provided in my signed lung cancer screening order.     I have discussed with patient the risks and benefits of screening for lung cancer with low-dose CT.     The risks include:  radiation exposure: one low dose chest CT has as much ionizing radiation as about 15 chest x-rays or 6 months of background radiation living in [place]    false positives: 96% of positive findings/nodules are NOT cancer, but some might still require additional diagnostic evaluation, including biopsy  over-diagnosis: some slow growing cancers that might never have been clinically significant will be detected and treated unnecessarily     The benefit of early detection of lung cancer is contingent upon adherence to annual screening or more frequent follow up if indicated.     Furthermore, reaping the benefits of screening requires Rajani Guo to be willing and physically able to undergo diagnostic procedures, if indicated. Although no specific guide is available for determining severity of comorbidities, it is reasonable to withhold screening in patients who have greater mortality risk from other diseases.     We did discuss that the only way to prevent lung cancer is to not smoke. Smoking cessation assistance was offered.

## 2019-02-22 ENCOUNTER — ANCILLARY PROCEDURE (OUTPATIENT)
Dept: CT IMAGING | Facility: CLINIC | Age: 69
End: 2019-02-22
Attending: FAMILY MEDICINE
Payer: COMMERCIAL

## 2019-02-22 ENCOUNTER — ANCILLARY PROCEDURE (OUTPATIENT)
Dept: ULTRASOUND IMAGING | Facility: CLINIC | Age: 69
End: 2019-02-22
Attending: FAMILY MEDICINE
Payer: COMMERCIAL

## 2019-02-22 DIAGNOSIS — I73.9 CLAUDICATION OF LEFT LOWER EXTREMITY (H): ICD-10-CM

## 2019-02-22 DIAGNOSIS — Z87.891 PERSONAL HISTORY OF TOBACCO USE: ICD-10-CM

## 2019-02-22 PROCEDURE — G0297 LDCT FOR LUNG CA SCREEN: HCPCS | Mod: TC

## 2019-02-22 PROCEDURE — 93926 LOWER EXTREMITY STUDY: CPT | Mod: LT

## 2019-02-25 ENCOUNTER — TELEPHONE (OUTPATIENT)
Dept: OTHER | Facility: CLINIC | Age: 69
End: 2019-02-25

## 2019-02-25 DIAGNOSIS — I73.9 PAD (PERIPHERAL ARTERY DISEASE) (H): Primary | ICD-10-CM

## 2019-02-25 NOTE — TELEPHONE ENCOUNTER
Pt referred to [facility] by Tamiko Coley MD  for PVD, claudication, left leg pain     Patient has US LLE arterial in [facility] on 2/22/19 showing occlusion of left SFA.     Pt needs to be scheduled for MONICA with exercise and consult with vascular surgery.  Will route to scheduling to coordinate an appointment within 24 hours.    MATA VasquezN, RN

## 2019-02-28 ENCOUNTER — ANCILLARY PROCEDURE (OUTPATIENT)
Dept: MRI IMAGING | Facility: CLINIC | Age: 69
End: 2019-02-28
Attending: FAMILY MEDICINE
Payer: COMMERCIAL

## 2019-02-28 DIAGNOSIS — R93.89 ABNORMAL CT SCAN: ICD-10-CM

## 2019-02-28 PROCEDURE — A9585 GADOBUTROL INJECTION: HCPCS

## 2019-02-28 PROCEDURE — 72157 MRI CHEST SPINE W/O & W/DYE: CPT | Mod: TC

## 2019-02-28 RX ORDER — GADOBUTROL 604.72 MG/ML
7.5 INJECTION INTRAVENOUS ONCE
Status: COMPLETED | OUTPATIENT
Start: 2019-02-28 | End: 2019-02-28

## 2019-02-28 RX ADMIN — GADOBUTROL 7.5 ML: 604.72 INJECTION INTRAVENOUS at 15:22

## 2019-03-01 ENCOUNTER — OFFICE VISIT (OUTPATIENT)
Dept: OTHER | Facility: CLINIC | Age: 69
DRG: 272 | End: 2019-03-01
Attending: SURGERY
Payer: COMMERCIAL

## 2019-03-01 ENCOUNTER — HOSPITAL ENCOUNTER (OUTPATIENT)
Dept: ULTRASOUND IMAGING | Facility: CLINIC | Age: 69
Discharge: HOME | DRG: 272 | End: 2019-03-01
Attending: SURGERY | Admitting: SURGERY
Payer: COMMERCIAL

## 2019-03-01 ENCOUNTER — TRANSFERRED RECORDS (OUTPATIENT)
Dept: HEALTH INFORMATION MANAGEMENT | Facility: CLINIC | Age: 69
End: 2019-03-01

## 2019-03-01 ENCOUNTER — TELEPHONE (OUTPATIENT)
Dept: FAMILY MEDICINE | Facility: CLINIC | Age: 69
End: 2019-03-01

## 2019-03-01 ENCOUNTER — TELEPHONE (OUTPATIENT)
Dept: OTHER | Facility: CLINIC | Age: 69
End: 2019-03-01

## 2019-03-01 VITALS — SYSTOLIC BLOOD PRESSURE: 160 MMHG | DIASTOLIC BLOOD PRESSURE: 75 MMHG | HEART RATE: 90 BPM

## 2019-03-01 DIAGNOSIS — F17.200 TOBACCO USE DISORDER: ICD-10-CM

## 2019-03-01 DIAGNOSIS — I73.9 CLAUDICATION OF LEFT LOWER EXTREMITY (H): Primary | ICD-10-CM

## 2019-03-01 DIAGNOSIS — I73.9 PAD (PERIPHERAL ARTERY DISEASE) (H): ICD-10-CM

## 2019-03-01 DIAGNOSIS — E78.5 HYPERLIPIDEMIA LDL GOAL <100: ICD-10-CM

## 2019-03-01 PROCEDURE — 93924 LWR XTR VASC STDY BILAT: CPT

## 2019-03-01 PROCEDURE — 99204 OFFICE O/P NEW MOD 45 MIN: CPT | Mod: ZP | Performed by: SURGERY

## 2019-03-01 PROCEDURE — G0463 HOSPITAL OUTPT CLINIC VISIT: HCPCS

## 2019-03-01 PROCEDURE — G0463 HOSPITAL OUTPT CLINIC VISIT: HCPCS | Mod: 25

## 2019-03-01 NOTE — TELEPHONE ENCOUNTER
Pt called back to the RN hotline. Gave her provider's message as written. Agrees with plan. She would like to continue to see Dr. Coley instead of seeing Dr. Lance. Physical scheduled for 3/7/19 with Dr. Coley.    Klaudia Pickett RN  [facility]

## 2019-03-01 NOTE — TELEPHONE ENCOUNTER
Notes recorded by Margareth Alejo RN on 3/1/2019 at 10:34 AM CST  Left message for patient to call RN hotline [phone].   See TE.     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 This does not require further evaluation.    Chen Alamo, ZEUS Alejo RN

## 2019-03-01 NOTE — NURSING NOTE
"Rajani Guo is a 68 year old female who presents for:  Chief Complaint   Patient presents with     Consult     MONICA w/ ex (9:45 [facility]; 10:30 [facility]) Hx PVD, Occlusion of left SFA on LLE arterial US 2/22/19 *Patient declined earlier offered appts        Vitals:    Vitals:    03/01/19 1021 03/01/19 1022   BP: 146/76 160/75   BP Location: Right arm Left arm   Patient Position: Chair Chair   Cuff Size: Adult Regular Adult Regular   Pulse:  90       BMI:  Estimated body mass index is 26.83 kg/m  as calculated from the following:    Height as of 2/21/19: 5' 4.75\" (1.645 m).    Weight as of 2/21/19: 160 lb (72.6 kg).    Pain Score:  Data Unavailable        Anita Warren MA    "

## 2019-03-01 NOTE — RESULT ENCOUNTER NOTE
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 This does not require further evaluation.    Chen Alamo, CNP

## 2019-03-01 NOTE — PROGRESS NOTES
VASCULAR SURGERY CLINIC CONSULTATION    VASCULAR SURGEON: Grant Serrato MD    LOCATION:  [facility]    Rajani Guo   Medical Record #:  [id number]  YOB: 1950  Age:  68 year old     Date of Service: 3/1/2019    PRIMARY CARE PROVIDER: Opal Chan    Reason for visit:  Left leg pain with walking    IMPRESSION:  67 yo female who has a history of tobacco abuse and presents with left lower leg pain with walking short distances < 1/2 block for 1 to 2 week duration.    RECOMMENDATION: I discussed with Rajani the findings of the arterial duplex and ABIs with exercise which suggest severe peripheral arterial disease of the lower leg with an MONICA of 0.4 for the drops to 0.24 with walking.  Currently takes aspirin medication she continues to smoke and have informed her that she needs to quit smoking.  She is interested in quitting and plans to talk to her primary about assistance with this.  Additionally I think she should undergo a left lower extremity angiogram given the acuteness of these findings as she previously can walk any distance without limitation.  He is to get an angiogram of the left lower extremity Monday with possible intervention and possible thrombolysis.  I did talk to her about the potential of finding acute thrombus within a diseased artery in the left leg and that depending on distribution of disease thrombolysis may be necessary at that time.  The ultrasound definitely shows a long segment occlusion of the left SFA.      HPI:  Rajani Guo is a 68 year old female who was seen today in consultation regarding acute onset of short distance claudication of the left lower leg that started 1-2 weeks ago.  Prior to 2 weeks ago she can walk any distance without limitation however she noticed a sharp uncomfortable pain in her left leg just below the knee and now she developed significant numbness and tingling in the left foot when she walks  even short distance.  She says it is really negatively affecting work and she is having a hard time completing her tasks at work at this time.  She is also noticed that she gets significant pain in the left toe at night while sleeping and lying flat and this improves with the foot in a dependent position.  The patient is an active tobacco user at this time.  The patient today is denying rest pain in the left foot while sitting in clinic.    PHH:    Past Medical History:   Diagnosis Date     Age-related osteoporosis without current pathological fracture 1/18/2018     History of partial thyroidectomy 6/2/2018     Hyperlipidemia LDL goal <100 1/18/2018     Insomnia, unspecified type 1/18/2018     Tobacco use disorder      Type 2 diabetes mellitus without complication, with long-term current use of insulin (H) 1/18/2018        Past Surgical History:   Procedure Laterality Date     KNEE SURGERY Right 2013    right knee torn meniscus surgery     OVARY SURGERY  1971    1 ovary removed     RELEASE CARPAL TUNNEL Right 1988     RELEASE TRIGGER FINGER BILATERAL       SHOULDER SURGERY Left     rotator cuff repair, plate placement     THYROID SURGERY  1988    partial thyroidectomy       ALLERGIES:  Indomethacin and Tramadol    MEDS:    Current Outpatient Medications:      alendronate (FOSAMAX) 70 MG tablet, TAKE 1 TABLET (70 MG) BY MOUTH EVERY 7 DAYS.  Please schedule follow up in clinic for additional refills, Disp: 4 tablet, Rfl: 0     aspirin EC 81 MG EC tablet, Take 81 mg by mouth, Disp: , Rfl:      atorvastatin (LIPITOR) 40 MG tablet, Take 1 tablet (40 mg) by mouth daily, Disp: 90 tablet, Rfl: 3     calcium carb 1250 mg, 500 mg [place],/vitamin D 200 unit (CALCIUM 500/D) 500-200 MG-UNIT per tablet, Take 1 tablet by mouth, Disp: , Rfl:      gabapentin (NEURONTIN) 300 MG capsule, 300 mg daily, Disp: , Rfl: 0     glucosamine-chondroitin 500-400 MG CAPS per capsule, Take 1 capsule by mouth, Disp: , Rfl:      insulin detemir  (LEVEMIR FLEXPEN/FLEXTOUCH) 100 UNIT/ML injection, Inject 23 Units Subcutaneous At Bedtime, Disp: 15 mL, Rfl: 1     lisinopril (PRINIVIL/ZESTRIL) 2.5 MG tablet, Take 1 tablet (2.5 mg) by mouth daily, Disp: 90 tablet, Rfl: 3     MELATONIN PO, Take 10 mg by mouth At Bedtime, Disp: , Rfl:      metFORMIN (GLUCOPHAGE) 1000 MG tablet, Take 1 tablet (1,000 mg) by mouth 2 times daily (with meals), Disp: 180 tablet, Rfl: 1     Multiple Vitamin (MULTI-VITAMINS) TABS, Take 1 tablet by mouth, Disp: , Rfl:      traZODone (DESYREL) 50 MG tablet, TAKE 1 TABLET (50 MG) BY MOUTH AT BEDTIME, Disp: 90 tablet, Rfl: 3  No current facility-administered medications for this visit.     SOCIAL HABITS:    History   Smoking Status     Current Every Day Smoker     Packs/day: 1.50     Years: 52.00   Smokeless Tobacco     Never Used     Social History    Substance and Sexual Activity      Alcohol use: Yes      History   Drug Use No       FAMILY HISTORY:  No family history on file.    REVIEW OF SYSTEMS:    A 12 point ROS was reviewed and except for what is listed in the HPI above, all others are negative    PE:  /75 (BP Location: Left arm, Patient Position: Chair, Cuff Size: Adult Regular)   Pulse 90   Breastfeeding? No   Wt Readings from Last 1 Encounters:   02/21/19 160 lb (72.6 kg)     There is no height or weight on file to calculate BMI.    EXAM:  GENERAL: This is a well-developed 68 year old female who appears her stated age  EYES: Grossly normal.  MOUTH: Buccal mucosa normal   CARDIAC:  NS1 S2, No Murmur  CHEST/LUNG:  Clear lung fields bilaterally   GASTROINTESINAL (ABDOMEN): Soft, non-tender, B/S present, no pulsatile mass  MUSCULOSKELETAL: Grossly normal and both lower extremities are intact.  No numbness or tingling in the feet at this time.  Motor is grossly intact, she has a slight left leg limp when walking in the exam room.   HEME/LYMPH: No lymphedema  NEUROLOGIC: Focally intact, Alert and oriented x 3.   PSYCH: appropriate  affect  INTEGUMENT: No open lesions or ulcers.  Dependent rubor and elevation pallor of the left foot.  Pulse Exam:     Radial: Left 2   Right  2    Femoral: Left 2   Right  2    DP: Left 0   Right  1     PT:   Left 0   Right  1    Left foot has a monophasic signal in the PT and no signal found in the left DP.            DIAGNOSTIC STUDIES:     Images:  Us Monica Doppler With Exercise Bilateral    Result Date: 3/1/2019  ULTRASOUND ANKLE-BRACHIAL INDEX DOPPLER WITH EXERCISE BILATERAL  3/1/2019 10:06 AM HISTORY: History of PVD (peripheral vascular disease). Occlusion of left SFA on left lower extremity arterial ultrasound 2/22/2019. PAD (peripheral artery disease) (H). COMPARISON: None. FINDINGS: Right MONICA: 1.15. Left MONICA: 0.44. Waveforms: Triphasic on the right, monophasic on the left. Exercise: Patient exercised on a treadmill for 1.3 minutes at 1.5 miles per hour at a 10% incline. Patient stopped at 1.3 minutes due to severe left calf pain. Right exercise MONICA: 1.29. Left exercise MONICA: 0.24     IMPRESSION: 1. Right resting and exercise ABIs are normal without evidence of arterial insufficiency. 2. Left resting and exercise ABIs show severe arterial insufficiency. MONICA CRITERIA: >0.95 Normal 0.90 - 0.94 Mild 0.5 - 0.89 Moderate 0.2 - 0.49 Severe <0.2 Critical DAE YAO DO    Mr Thoracic Spine W/o & W Contrast    Result Date: 3/1/2019  MRI THORACIC SPINE WITHOUT AND WITH CONTRAST  2/28/2019 3:41 PM HISTORY:  Abnormal CT chest, lesion at T11. TECHNIQUE: Multiplanar multisequence MRI of the thoracic spine without and with 7.5 mL Gadavist. COMPARISON: Chest CT 2/22/2019 FINDINGS:  T2 hyperintense, enhancing lesion within the right aspect of the T11 vertebral body is present corresponding to sclerotic lesion on prior CT which demonstrates suggestion of corduroy pattern. The lesion is T1 hypointense relative to other vertebral bodies, however there are areas of T1 hyperintensity within the lesion consistent with  intralesional fat. No evidence of cortical deformity or pedicular extension. Bone marrow is otherwise unremarkable. Multilevel minimal disc height loss is present. No significant spinal canal or foraminal stenosis. Thoracic cord is normal in contour and signal. Paraspinal soft tissues are unremarkable.     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 benign bone hemangioma). SAM CAMARGO MD    Ct Chest Lung Cancer Scrn Low Dose Wo    Result Date: 2/22/2019  CT CHEST LUNG CANCER SCREEN LOW DOSE WITHOUT CONTRAST  2/22/2019 12:17 PM HISTORY:  Personal history of tobacco use. TECHNIQUE:  Scans obtained from the apices through the diaphragm without IV contrast. Low dose CT chest technique was utilized. Radiation dose for this scan was reduced using automated exposure control, adjustment of the mA and/or kV according to patient size, or iterative reconstruction technique. COMPARISON:  None. FINDINGS:  Lungs: Medial right upper lobe nodule is 0.4 cm, series 6 image 97. 0.2 cm right upper lobe nodule, image 136. Posteromedial right middle lobe 0.4 cm nodule, image 213. 0.5 cm nodule with faint calcification superior segment left lower lobe, image 117. This could just be a granuloma. Additional findings: Moderate coronary artery calcifications. Mitral annular and thoracic aortic calcifications. No enlarged lymph nodes identified. No effusions. Upper abdomen images limited by low-dose technique but show no acute abnormalities. T11 2.2 cm indeterminate sclerotic bony lesion, series 2 image 49.     IMPRESSION: 1. ACR Assessment Category:  Lung-RADS Category 2. Benign appearance or behavior. Recommendation: Continue annual screening, if clinically relevant (please order exam code IMG 2290). 2. Significant Incidental Finding(s):  Category S: Yes. There is an indeterminate sclerotic bone lesion at T11 vertebral body. Consider  "further assessment with bone scan versus MRI. There are also moderate coronary artery calcifications.  Download the \"LungRADS Assessment Categories\" table at this site: http://www.acr.org/Quality-Safety/Resources/LungRADS TRAN GUZMAN MD    Us Lower Extremity Arterial Duplex Left    Result Date: 2/22/2019  ULTRASOUND LOWER EXTREMITY ARTERIAL DUPLEX LEFT  2/22/2019 1:22 PM HISTORY:  Left leg claudication. COMPARISON: None. FINDINGS: The left external iliac artery, common femoral artery and profunda femoris arteries are patent with multiphasic waveforms. There appears to be a long segment occlusion of the left superficial femoral artery. Poststenotic waveforms are identified in the left popliteal artery and tibial arteries.     IMPRESSION: Long segment occlusion of the left superficial femoral artery. BOGDAN DIETZ MD      LABS:      Sodium   Date Value Ref Range Status   01/18/2018 138 133 - 144 mmol/L Final     Urea Nitrogen   Date Value Ref Range Status   01/18/2018 10 7 - 30 mg/dL Final     Hemoglobin   Date Value Ref Range Status   05/31/2018 13.1 11.7 - 15.7 g/dL Final     Platelet Count   Date Value Ref Range Status   05/31/2018 282 150 - 450 10e9/L Final       Grant Serrato MD  VASCULAR SURGERY     "

## 2019-03-01 NOTE — TELEPHONE ENCOUNTER
Type of surgery: left leg angiogram, possible intervention  Location of surgery: [facility]  Date and time of surgery: 618795 at 10:30a  Surgeon: Dr Sebastian Huber  Pre-Op Appt Date: 022119  Post-Op Appt Date: unknown    Packet sent out: given to pt 3/1/19  Pre-cert/Authorization completed:  Sent for pa submission  Date: 030119 Gita Decker -  at [facility]

## 2019-03-01 NOTE — LETTER
[facility] [address]  [place] [address]  Phone: [phone]  Fax: [phone]      2019       Re: Rajani Guo - 1950  [facility]     Rajani Guo   Medical Record #:  [id number]  YOB: 1950  Age:  68 year old      Date of Service: 3/1/2019     PRIMARY CARE PROVIDER: Dustin Woodland [place]     Reason for visit:  Left leg pain with walking     IMPRESSION:  69 yo female who has a history of tobacco abuse and presents with left lower leg pain with walking short distances < 1/2 block for 1 to 2 week duration.     RECOMMENDATION: I discussed with Rajani the findings of the arterial duplex and ABIs with exercise which suggest severe peripheral arterial disease of the lower leg with an MONICA of 0.4 for the drops to 0.24 with walking.  Currently takes aspirin medication she continues to smoke and have informed her that she needs to quit smoking.  She is interested in quitting and plans to talk to her primary about assistance with this.  Additionally I think she should undergo a left lower extremity angiogram given the acuteness of these findings as she previously can walk any distance without limitation.  He is to get an angiogram of the left lower extremity Monday with possible intervention and possible thrombolysis.  I did talk to her about the potential of finding acute thrombus within a diseased artery in the left leg and that depending on distribution of disease thrombolysis may be necessary at that time.  The ultrasound definitely shows a long segment occlusion of the left SFA.       HPI:  Rajani Guo is a 68 year old female who was seen today in consultation regarding acute onset of short distance claudication of the left lower leg that started 1-2 weeks ago.  Prior to 2 weeks ago she can walk any distance without limitation however she noticed a sharp uncomfortable pain in her left leg  just below the knee and now she developed significant numbness and tingling in the left foot when she walks even short distance.  She says it is really negatively affecting work and she is having a hard time completing her tasks at work at this time.  She is also noticed that she gets significant pain in the left toe at night while sleeping and lying flat and this improves with the foot in a dependent position.  The patient is an active tobacco user at this time.  The patient today is denying rest pain in the left foot while sitting in clinic.     ALLERGIES:  Indomethacin and Tramadol     MEDS:    Current Outpatient Medications:      alendronate (FOSAMAX) 70 MG tablet, TAKE 1 TABLET (70 MG) BY MOUTH EVERY 7 DAYS.  Please schedule follow up in clinic for additional refills, Disp: 4 tablet, Rfl: 0     aspirin EC 81 MG EC tablet, Take 81 mg by mouth, Disp: , Rfl:      atorvastatin (LIPITOR) 40 MG tablet, Take 1 tablet (40 mg) by mouth daily, Disp: 90 tablet, Rfl: 3     calcium carb 1250 mg, 500 mg [place],/vitamin D 200 unit (CALCIUM 500/D) 500-200 MG-UNIT per tablet, Take 1 tablet by mouth, Disp: , Rfl:      gabapentin (NEURONTIN) 300 MG capsule, 300 mg daily, Disp: , Rfl: 0     glucosamine-chondroitin 500-400 MG CAPS per capsule, Take 1 capsule by mouth, Disp: , Rfl:      insulin detemir (LEVEMIR FLEXPEN/FLEXTOUCH) 100 UNIT/ML injection, Inject 23 Units Subcutaneous At Bedtime, Disp: 15 mL, Rfl: 1     lisinopril (PRINIVIL/ZESTRIL) 2.5 MG tablet, Take 1 tablet (2.5 mg) by mouth daily, Disp: 90 tablet, Rfl: 3     MELATONIN PO, Take 10 mg by mouth At Bedtime, Disp: , Rfl:      metFORMIN (GLUCOPHAGE) 1000 MG tablet, Take 1 tablet (1,000 mg) by mouth 2 times daily (with meals), Disp: 180 tablet, Rfl: 1     Multiple Vitamin (MULTI-VITAMINS) TABS, Take 1 tablet by mouth, Disp: , Rfl:      traZODone (DESYREL) 50 MG tablet, TAKE 1 TABLET (50 MG) BY MOUTH AT BEDTIME, Disp: 90 tablet, Rfl: 3  No current facility-administered  medications for this visit.       REVIEW OF SYSTEMS:    A 12 point ROS was reviewed and except for what is listed in the HPI above, all others are negative     PE:  /75 (BP Location: Left arm, Patient Position: Chair, Cuff Size: Adult Regular)   Pulse 90   Breastfeeding? No       Wt Readings from Last 1 Encounters:   02/21/19 160 lb (72.6 kg)      There is no height or weight on file to calculate BMI.     EXAM:  GENERAL: This is a well-developed 68 year old female who appears her stated age  EYES: Grossly normal.  MOUTH: Buccal mucosa normal   CARDIAC:  NS1 S2, No Murmur  CHEST/LUNG:  Clear lung fields bilaterally   GASTROINTESINAL (ABDOMEN): Soft, non-tender, B/S present, no pulsatile mass  MUSCULOSKELETAL: Grossly normal and both lower extremities are intact.  No numbness or tingling in the feet at this time.  Motor is grossly intact, she has a slight left leg limp when walking in the exam room.   HEME/LYMPH: No lymphedema  NEUROLOGIC: Focally intact, Alert and oriented x 3.   PSYCH: appropriate affect  INTEGUMENT: No open lesions or ulcers.  Dependent rubor and elevation pallor of the left foot.  Pulse Exam:      Radial: Left 2              Right  2     Femoral: Left 2              Right  2     DP:      Left 0              Right  1     PT:       Left 0              Right  1     Left foot has a monophasic signal in the PT and no signal found in the left DP.        DIAGNOSTIC STUDIES:      Images:  Us Monica Doppler With Exercise Bilateral     Result Date: 3/1/2019  ULTRASOUND ANKLE-BRACHIAL INDEX DOPPLER WITH EXERCISE BILATERAL 3/1/2019 10:06 AM HISTORY: History of PVD (peripheral vascular disease). Occlusion of left SFA on left lower extremity arterial ultrasound 2/22/2019. PAD (peripheral artery disease) (H). COMPARISON: None. FINDINGS: Right MONICA: 1.15. Left MONICA: 0.44. Waveforms: Triphasic on the right, monophasic on the left. Exercise: Patient exercised on a treadmill for 1.3 minutes at 1.5 miles per hour  at a 10% incline. Patient stopped at 1.3 minutes due to severe left calf pain. Right exercise MONICA: 1.29. Left exercise MONICA: 0.24      IMPRESSION: 1. Right resting and exercise ABIs are normal without evidence of arterial insufficiency. 2. Left resting and exercise ABIs show severe arterial insufficiency. MONICA CRITERIA: >0.95 Normal 0.90 - 0.94 Mild 0.5 - 0.89 Moderate 0.2 - 0.49 Severe <0.2 Critical DAE YAO DO     Mr Thoracic Spine W/o & W Contrast     Result Date: 3/1/2019  MRI THORACIC SPINE WITHOUT AND WITH CONTRAST  2/28/2019 3:41 PM HISTORY: Abnormal CT chest, lesion at T11. TECHNIQUE: Multiplanar multisequence MRI of the thoracic spine without and with 7.5 mL Gadavist. COMPARISON: Chest CT 2/22/2019 FINDINGS:  T2 hyperintense, enhancing lesion within the right aspect of the T11 vertebral body is present corresponding to sclerotic lesion on prior CT which demonstrates suggestion of corduroy pattern. The lesion is T1 hypointense relative to other vertebral bodies, however there are areas of T1 hyperintensity within the lesion consistent with intralesional fat. No evidence of cortical deformity or pedicular extension. Bone marrow is otherwise unremarkable. Multilevel minimal disc height loss is present. No significant spinal canal or foraminal stenosis. Thoracic cord is normal in contour and signal. Paraspinal soft tissues are unremarkable.      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 benign bone hemangioma). SAM CAMARGO MD     Ct Chest Lung Cancer Scrn Low Dose Wo     Result Date: 2/22/2019  CT CHEST LUNG CANCER SCREEN LOW DOSE WITHOUT CONTRAST  2/22/2019 12:17 PM HISTORY:  Personal history of tobacco use. TECHNIQUE:  Scans obtained from the apices through the diaphragm without IV contrast. Low dose CT chest technique was utilized. Radiation dose for this scan was reduced using automated exposure  "control, adjustment of the mA and/or kV according to patient size, or iterative reconstruction technique. COMPARISON: None. FINDINGS:  Lungs: Medial right upper lobe nodule is 0.4 cm, series 6 image 97. 0.2 cm right upper lobe nodule, image 136. Posteromedial right middle lobe 0.4 cm nodule, image 213. 0.5 cm nodule with faint calcification superior segment left lower lobe, image 117. This could just be a granuloma. Additional findings: Moderate coronary artery calcifications. Mitral annular and thoracic aortic calcifications. No enlarged lymph nodes identified. No effusions. Upper abdomen images limited by low-dose technique but show no acute abnormalities. T11 2.2 cm indeterminate sclerotic bony lesion, series 2 image 49.      IMPRESSION: 1. ACR Assessment Category:  Lung-RADS Category 2. Benign appearance or behavior. Recommendation: Continue annual screening, if clinically relevant (please order exam code IMG 2290). 2. Significant Incidental Finding(s):  Category S: Yes. There is an indeterminate sclerotic bone lesion at T11 vertebral body. Consider further assessment with bone scan versus MRI. There are also moderate coronary artery calcifications.  Download the \"LungRADS Assessment Categories\" table at this site: http://www.acr.org/Quality-Safety/Resources/LungRADS TRAN GUZMAN MD     Us Lower Extremity Arterial Duplex Left     Result Date: 2/22/2019  ULTRASOUND LOWER EXTREMITY ARTERIAL DUPLEX LEFT  2/22/2019 1:22 PM HISTORY:  Left leg claudication. COMPARISON: None. FINDINGS: The left external iliac artery, common femoral artery and profunda femoris arteries are patent with multiphasic waveforms. There appears to be a long segment occlusion of the left superficial femoral artery. Poststenotic waveforms are identified in the left popliteal artery and tibial arteries.      IMPRESSION: Long segment occlusion of the left superficial femoral artery. BOGDAN DIETZ MD        LABS:            Sodium   Date Value " Ref Range Status   01/18/2018 138 133 - 144 mmol/L Final            Urea Nitrogen   Date Value Ref Range Status   01/18/2018 10 7 - 30 mg/dL Final            Hemoglobin   Date Value Ref Range Status   05/31/2018 13.1 11.7 - 15.7 g/dL Final            Platelet Count   Date Value Ref Range Status   05/31/2018 282 150 - 450 10e9/L Final         Grant Serrato MD  VASCULAR SURGERY

## 2019-03-04 ENCOUNTER — APPOINTMENT (OUTPATIENT)
Dept: INTERVENTIONAL RADIOLOGY/VASCULAR | Facility: CLINIC | Age: 69
DRG: 272 | End: 2019-03-04
Attending: SURGERY
Payer: COMMERCIAL

## 2019-03-04 ENCOUNTER — HOSPITAL ENCOUNTER (INPATIENT)
Facility: CLINIC | Age: 69
LOS: 3 days | Discharge: HOME | DRG: 272 | End: 2019-03-07
Attending: RADIOLOGY | Admitting: INTERNAL MEDICINE
Payer: COMMERCIAL

## 2019-03-04 DIAGNOSIS — I74.9 ARTERIAL THROMBOSIS (H): ICD-10-CM

## 2019-03-04 DIAGNOSIS — F17.200 TOBACCO USE DISORDER: Primary | ICD-10-CM

## 2019-03-04 DIAGNOSIS — I70.212 ATHEROSCLEROSIS OF NATIVE ARTERY OF LEFT LOWER EXTREMITY WITH INTERMITTENT CLAUDICATION (H): ICD-10-CM

## 2019-03-04 LAB
APTT PPP: 32 SEC (ref 22–37)
CHOLEST SERPL-MCNC: 122 MG/DL
CREAT SERPL-MCNC: 0.56 MG/DL (ref 0.52–1.04)
ERYTHROCYTE [DISTWIDTH] IN BLOOD BY AUTOMATED COUNT: 13.7 % (ref 10–15)
GFR SERPL CREATININE-BSD FRML MDRD: >90 ML/MIN/{1.73_M2}
GLUCOSE BLDC GLUCOMTR-MCNC: 135 MG/DL (ref 70–99)
GLUCOSE BLDC GLUCOMTR-MCNC: 159 MG/DL (ref 70–99)
GLUCOSE BLDC GLUCOMTR-MCNC: 94 MG/DL (ref 70–99)
HBA1C MFR BLD: 6.5 % (ref 0–5.6)
HCT VFR BLD AUTO: 39.7 % (ref 35–47)
HDLC SERPL-MCNC: 59 MG/DL
HGB BLD-MCNC: 13.3 G/DL (ref 11.7–15.7)
INR PPP: 1 (ref 0.86–1.14)
LACTATE BLD-SCNC: 0.6 MMOL/L (ref 0.7–2)
LDLC SERPL CALC-MCNC: 44 MG/DL
MCH RBC QN AUTO: 30.3 PG (ref 26.5–33)
MCHC RBC AUTO-ENTMCNC: 33.5 G/DL (ref 31.5–36.5)
MCV RBC AUTO: 90 FL (ref 78–100)
NONHDLC SERPL-MCNC: 63 MG/DL
PLATELET # BLD AUTO: 304 10E9/L (ref 150–450)
RBC # BLD AUTO: 4.39 10E12/L (ref 3.8–5.2)
TRIGL SERPL-MCNC: 94 MG/DL
WBC # BLD AUTO: 12.3 10E9/L (ref 4–11)

## 2019-03-04 PROCEDURE — 12000000 ZZH R&B MED SURG/OB

## 2019-03-04 PROCEDURE — 27210804 ZZH SHEATH CR3

## 2019-03-04 PROCEDURE — 04CL3ZZ EXTIRPATION OF MATTER FROM LEFT FEMORAL ARTERY, PERCUTANEOUS APPROACH: ICD-10-PCS | Performed by: RADIOLOGY

## 2019-03-04 PROCEDURE — 75710 ARTERY X-RAYS ARM/LEG: CPT | Mod: LT

## 2019-03-04 PROCEDURE — 25000125 ZZHC RX 250

## 2019-03-04 PROCEDURE — 83036 HEMOGLOBIN GLYCOSYLATED A1C: CPT | Performed by: RADIOLOGY

## 2019-03-04 PROCEDURE — 27210896 ZZH CATH CR9

## 2019-03-04 PROCEDURE — C1769 GUIDE WIRE: HCPCS

## 2019-03-04 PROCEDURE — 25000128 H RX IP 250 OP 636: Performed by: NURSE PRACTITIONER

## 2019-03-04 PROCEDURE — 27210808 ZZH SHEATH CR7

## 2019-03-04 PROCEDURE — 25500064 ZZH RX 255 OP 636: Performed by: RADIOLOGY

## 2019-03-04 PROCEDURE — 36415 COLL VENOUS BLD VENIPUNCTURE: CPT | Performed by: RADIOLOGY

## 2019-03-04 PROCEDURE — 36415 COLL VENOUS BLD VENIPUNCTURE: CPT

## 2019-03-04 PROCEDURE — 83605 ASSAY OF LACTIC ACID: CPT | Performed by: RADIOLOGY

## 2019-03-04 PROCEDURE — 85610 PROTHROMBIN TIME: CPT | Performed by: RADIOLOGY

## 2019-03-04 PROCEDURE — 80061 LIPID PANEL: CPT | Performed by: RADIOLOGY

## 2019-03-04 PROCEDURE — 047L3ZZ DILATION OF LEFT FEMORAL ARTERY, PERCUTANEOUS APPROACH: ICD-10-PCS | Performed by: RADIOLOGY

## 2019-03-04 PROCEDURE — 25000132 ZZH RX MED GY IP 250 OP 250 PS 637: Performed by: RADIOLOGY

## 2019-03-04 PROCEDURE — 25000132 ZZH RX MED GY IP 250 OP 250 PS 637: Performed by: INTERNAL MEDICINE

## 2019-03-04 PROCEDURE — 00000146 ZZHCL STATISTIC GLUCOSE BY METER IP

## 2019-03-04 PROCEDURE — 25000128 H RX IP 250 OP 636: Performed by: RADIOLOGY

## 2019-03-04 PROCEDURE — 25800030 ZZH RX IP 258 OP 636: Performed by: RADIOLOGY

## 2019-03-04 PROCEDURE — 82565 ASSAY OF CREATININE: CPT | Performed by: RADIOLOGY

## 2019-03-04 PROCEDURE — 25000128 H RX IP 250 OP 636

## 2019-03-04 PROCEDURE — 3E05317 INTRODUCTION OF OTHER THROMBOLYTIC INTO PERIPHERAL ARTERY, PERCUTANEOUS APPROACH: ICD-10-PCS | Performed by: RADIOLOGY

## 2019-03-04 PROCEDURE — 27210886 ZZH ACCESSORY CR5

## 2019-03-04 PROCEDURE — 25000131 ZZH RX MED GY IP 250 OP 636 PS 637: Performed by: INTERNAL MEDICINE

## 2019-03-04 PROCEDURE — 27210742 ZZH CATH CR1

## 2019-03-04 PROCEDURE — 99223 1ST HOSP IP/OBS HIGH 75: CPT | Performed by: INTERNAL MEDICINE

## 2019-03-04 PROCEDURE — 85730 THROMBOPLASTIN TIME PARTIAL: CPT | Performed by: RADIOLOGY

## 2019-03-04 PROCEDURE — 40000853 ZZH STATISTIC ANGIOGRAM, STENT, VERTEBRO PLASTY

## 2019-03-04 PROCEDURE — 85027 COMPLETE CBC AUTOMATED: CPT | Performed by: RADIOLOGY

## 2019-03-04 PROCEDURE — 27210906 ZZH KIT CR8

## 2019-03-04 RX ORDER — ALENDRONATE SODIUM 70 MG/1
70 TABLET ORAL WEEKLY
Status: DISCONTINUED | OUTPATIENT
Start: 2019-03-05 | End: 2019-03-04 | Stop reason: CLARIF

## 2019-03-04 RX ORDER — NICOTINE 21 MG/24HR
1 PATCH, TRANSDERMAL 24 HOURS TRANSDERMAL EVERY 24 HOURS
Qty: 30 PATCH | Refills: 1 | Status: SHIPPED | OUTPATIENT
Start: 2019-03-04 | End: 2019-08-01

## 2019-03-04 RX ORDER — DIPHENHYDRAMINE HYDROCHLORIDE 50 MG/ML
25 INJECTION INTRAMUSCULAR; INTRAVENOUS EVERY 4 HOURS PRN
Status: DISCONTINUED | OUTPATIENT
Start: 2019-03-04 | End: 2019-03-07 | Stop reason: HOSPADM

## 2019-03-04 RX ORDER — DIPHENHYDRAMINE HCL 25 MG
25 CAPSULE ORAL EVERY 4 HOURS PRN
Status: DISCONTINUED | OUTPATIENT
Start: 2019-03-04 | End: 2019-03-07 | Stop reason: HOSPADM

## 2019-03-04 RX ORDER — PROCHLORPERAZINE 25 MG
12.5 SUPPOSITORY, RECTAL RECTAL EVERY 12 HOURS PRN
Status: DISCONTINUED | OUTPATIENT
Start: 2019-03-04 | End: 2019-03-04

## 2019-03-04 RX ORDER — ONDANSETRON 2 MG/ML
4 INJECTION INTRAMUSCULAR; INTRAVENOUS EVERY 6 HOURS PRN
Status: DISCONTINUED | OUTPATIENT
Start: 2019-03-04 | End: 2019-03-04

## 2019-03-04 RX ORDER — POTASSIUM CHLORIDE 1500 MG/1
20-40 TABLET, EXTENDED RELEASE ORAL
Status: DISCONTINUED | OUTPATIENT
Start: 2019-03-04 | End: 2019-03-07 | Stop reason: HOSPADM

## 2019-03-04 RX ORDER — LIDOCAINE HYDROCHLORIDE 10 MG/ML
INJECTION, SOLUTION INFILTRATION; PERINEURAL
Status: DISCONTINUED
Start: 2019-03-04 | End: 2019-03-04 | Stop reason: HOSPADM

## 2019-03-04 RX ORDER — BUPROPION HYDROCHLORIDE 150 MG/1
150 TABLET, FILM COATED, EXTENDED RELEASE ORAL 2 TIMES DAILY
Qty: 60 TABLET | Refills: 3 | Status: SHIPPED | OUTPATIENT
Start: 2019-03-04 | End: 2019-04-01

## 2019-03-04 RX ORDER — BISACODYL 10 MG
10 SUPPOSITORY, RECTAL RECTAL DAILY PRN
Status: DISCONTINUED | OUTPATIENT
Start: 2019-03-04 | End: 2019-03-07 | Stop reason: HOSPADM

## 2019-03-04 RX ORDER — BISACODYL 5 MG
5 TABLET, DELAYED RELEASE (ENTERIC COATED) ORAL DAILY PRN
Status: DISCONTINUED | OUTPATIENT
Start: 2019-03-04 | End: 2019-03-07 | Stop reason: HOSPADM

## 2019-03-04 RX ORDER — POTASSIUM CHLORIDE 7.45 MG/ML
10 INJECTION INTRAVENOUS
Status: DISCONTINUED | OUTPATIENT
Start: 2019-03-04 | End: 2019-03-07 | Stop reason: HOSPADM

## 2019-03-04 RX ORDER — POTASSIUM CHLORIDE 1.5 G/1.58G
20-40 POWDER, FOR SOLUTION ORAL
Status: DISCONTINUED | OUTPATIENT
Start: 2019-03-04 | End: 2019-03-07 | Stop reason: HOSPADM

## 2019-03-04 RX ORDER — NALOXONE HYDROCHLORIDE 0.4 MG/ML
.1-.4 INJECTION, SOLUTION INTRAMUSCULAR; INTRAVENOUS; SUBCUTANEOUS
Status: DISCONTINUED | OUTPATIENT
Start: 2019-03-04 | End: 2019-03-04 | Stop reason: HOSPADM

## 2019-03-04 RX ORDER — NICOTINE POLACRILEX 4 MG
15-30 LOZENGE BUCCAL
Status: DISCONTINUED | OUTPATIENT
Start: 2019-03-04 | End: 2019-03-04

## 2019-03-04 RX ORDER — OXYCODONE HYDROCHLORIDE 5 MG/1
5-10 TABLET ORAL
Status: DISCONTINUED | OUTPATIENT
Start: 2019-03-04 | End: 2019-03-07 | Stop reason: HOSPADM

## 2019-03-04 RX ORDER — IODIXANOL 320 MG/ML
150 INJECTION, SOLUTION INTRAVASCULAR ONCE
Status: COMPLETED | OUTPATIENT
Start: 2019-03-04 | End: 2019-03-04

## 2019-03-04 RX ORDER — ONDANSETRON 4 MG/1
4 TABLET, ORALLY DISINTEGRATING ORAL EVERY 6 HOURS PRN
Status: DISCONTINUED | OUTPATIENT
Start: 2019-03-04 | End: 2019-03-07 | Stop reason: HOSPADM

## 2019-03-04 RX ORDER — SODIUM PHOSPHATE,MONO-DIBASIC 19G-7G/118
1 ENEMA (ML) RECTAL DAILY
Status: DISCONTINUED | OUTPATIENT
Start: 2019-03-04 | End: 2019-03-05

## 2019-03-04 RX ORDER — ATORVASTATIN CALCIUM 40 MG/1
40 TABLET, FILM COATED ORAL DAILY
Status: DISCONTINUED | OUTPATIENT
Start: 2019-03-04 | End: 2019-03-07 | Stop reason: HOSPADM

## 2019-03-04 RX ORDER — POTASSIUM CL/LIDO/0.9 % NACL 10MEQ/0.1L
10 INTRAVENOUS SOLUTION, PIGGYBACK (ML) INTRAVENOUS
Status: DISCONTINUED | OUTPATIENT
Start: 2019-03-04 | End: 2019-03-07 | Stop reason: HOSPADM

## 2019-03-04 RX ORDER — METOCLOPRAMIDE 5 MG/1
5 TABLET ORAL EVERY 6 HOURS PRN
Status: DISCONTINUED | OUTPATIENT
Start: 2019-03-04 | End: 2019-03-07 | Stop reason: HOSPADM

## 2019-03-04 RX ORDER — NALOXONE HYDROCHLORIDE 0.4 MG/ML
.1-.4 INJECTION, SOLUTION INTRAMUSCULAR; INTRAVENOUS; SUBCUTANEOUS
Status: DISCONTINUED | OUTPATIENT
Start: 2019-03-04 | End: 2019-03-04

## 2019-03-04 RX ORDER — TRAZODONE HYDROCHLORIDE 50 MG/1
50 TABLET, FILM COATED ORAL AT BEDTIME
Status: DISCONTINUED | OUTPATIENT
Start: 2019-03-04 | End: 2019-03-07 | Stop reason: HOSPADM

## 2019-03-04 RX ORDER — POTASSIUM CHLORIDE 29.8 MG/ML
20 INJECTION INTRAVENOUS
Status: DISCONTINUED | OUTPATIENT
Start: 2019-03-04 | End: 2019-03-07 | Stop reason: HOSPADM

## 2019-03-04 RX ORDER — NICOTINE 21 MG/24HR
1 PATCH, TRANSDERMAL 24 HOURS TRANSDERMAL DAILY
Status: DISCONTINUED | OUTPATIENT
Start: 2019-03-04 | End: 2019-03-07 | Stop reason: HOSPADM

## 2019-03-04 RX ORDER — GABAPENTIN 300 MG/1
300 CAPSULE ORAL DAILY
Status: DISCONTINUED | OUTPATIENT
Start: 2019-03-04 | End: 2019-03-05

## 2019-03-04 RX ORDER — NALOXONE HYDROCHLORIDE 0.4 MG/ML
.1-.4 INJECTION, SOLUTION INTRAMUSCULAR; INTRAVENOUS; SUBCUTANEOUS
Status: DISCONTINUED | OUTPATIENT
Start: 2019-03-04 | End: 2019-03-07 | Stop reason: HOSPADM

## 2019-03-04 RX ORDER — HEPARIN SODIUM 10000 [USP'U]/100ML
500 INJECTION, SOLUTION INTRAVENOUS CONTINUOUS
Status: DISCONTINUED | OUTPATIENT
Start: 2019-03-04 | End: 2019-03-05

## 2019-03-04 RX ORDER — POLYETHYLENE GLYCOL 3350 17 G/17G
17 POWDER, FOR SOLUTION ORAL DAILY PRN
Status: DISCONTINUED | OUTPATIENT
Start: 2019-03-04 | End: 2019-03-07 | Stop reason: HOSPADM

## 2019-03-04 RX ORDER — BISACODYL 5 MG
15 TABLET, DELAYED RELEASE (ENTERIC COATED) ORAL DAILY PRN
Status: DISCONTINUED | OUTPATIENT
Start: 2019-03-04 | End: 2019-03-07 | Stop reason: HOSPADM

## 2019-03-04 RX ORDER — FLUMAZENIL 0.1 MG/ML
0.2 INJECTION, SOLUTION INTRAVENOUS
Status: DISCONTINUED | OUTPATIENT
Start: 2019-03-04 | End: 2019-03-04 | Stop reason: HOSPADM

## 2019-03-04 RX ORDER — LISINOPRIL 2.5 MG/1
2.5 TABLET ORAL DAILY
Status: DISCONTINUED | OUTPATIENT
Start: 2019-03-04 | End: 2019-03-07 | Stop reason: HOSPADM

## 2019-03-04 RX ORDER — DEXTROSE MONOHYDRATE 25 G/50ML
25-50 INJECTION, SOLUTION INTRAVENOUS
Status: DISCONTINUED | OUTPATIENT
Start: 2019-03-04 | End: 2019-03-07 | Stop reason: HOSPADM

## 2019-03-04 RX ORDER — PROCHLORPERAZINE 25 MG
12.5 SUPPOSITORY, RECTAL RECTAL EVERY 12 HOURS PRN
Status: DISCONTINUED | OUTPATIENT
Start: 2019-03-04 | End: 2019-03-07 | Stop reason: HOSPADM

## 2019-03-04 RX ORDER — DOCUSATE SODIUM 100 MG/1
100 CAPSULE, LIQUID FILLED ORAL 2 TIMES DAILY
Status: DISCONTINUED | OUTPATIENT
Start: 2019-03-04 | End: 2019-03-07 | Stop reason: HOSPADM

## 2019-03-04 RX ORDER — ONDANSETRON 4 MG/1
4 TABLET, ORALLY DISINTEGRATING ORAL EVERY 6 HOURS PRN
Status: DISCONTINUED | OUTPATIENT
Start: 2019-03-04 | End: 2019-03-04

## 2019-03-04 RX ORDER — PROCHLORPERAZINE MALEATE 5 MG
5 TABLET ORAL EVERY 6 HOURS PRN
Status: DISCONTINUED | OUTPATIENT
Start: 2019-03-04 | End: 2019-03-07 | Stop reason: HOSPADM

## 2019-03-04 RX ORDER — DEXTROSE MONOHYDRATE 25 G/50ML
25-50 INJECTION, SOLUTION INTRAVENOUS
Status: DISCONTINUED | OUTPATIENT
Start: 2019-03-04 | End: 2019-03-04

## 2019-03-04 RX ORDER — NICOTINE POLACRILEX 4 MG
15-30 LOZENGE BUCCAL
Status: DISCONTINUED | OUTPATIENT
Start: 2019-03-04 | End: 2019-03-04 | Stop reason: HOSPADM

## 2019-03-04 RX ORDER — LIDOCAINE 40 MG/G
CREAM TOPICAL
Status: DISCONTINUED | OUTPATIENT
Start: 2019-03-04 | End: 2019-03-04 | Stop reason: HOSPADM

## 2019-03-04 RX ORDER — NICOTINE 21 MG/24HR
1 PATCH, TRANSDERMAL 24 HOURS TRANSDERMAL EVERY 24 HOURS
Qty: 30 PATCH | Refills: 1 | Status: SHIPPED | OUTPATIENT
Start: 2019-03-04 | End: 2019-04-01

## 2019-03-04 RX ORDER — LIDOCAINE HYDROCHLORIDE 10 MG/ML
1-30 INJECTION, SOLUTION EPIDURAL; INFILTRATION; INTRACAUDAL; PERINEURAL
Status: COMPLETED | OUTPATIENT
Start: 2019-03-04 | End: 2019-03-04

## 2019-03-04 RX ORDER — BISACODYL 5 MG
10 TABLET, DELAYED RELEASE (ENTERIC COATED) ORAL DAILY PRN
Status: DISCONTINUED | OUTPATIENT
Start: 2019-03-04 | End: 2019-03-07 | Stop reason: HOSPADM

## 2019-03-04 RX ORDER — SODIUM CHLORIDE 9 MG/ML
INJECTION, SOLUTION INTRAVENOUS CONTINUOUS
Status: DISCONTINUED | OUTPATIENT
Start: 2019-03-04 | End: 2019-03-05

## 2019-03-04 RX ORDER — NICOTINE POLACRILEX 4 MG
15-30 LOZENGE BUCCAL
Status: DISCONTINUED | OUTPATIENT
Start: 2019-03-04 | End: 2019-03-07 | Stop reason: HOSPADM

## 2019-03-04 RX ORDER — SODIUM CHLORIDE 9 MG/ML
INJECTION, SOLUTION INTRAVENOUS CONTINUOUS
Status: DISCONTINUED | OUTPATIENT
Start: 2019-03-04 | End: 2019-03-04 | Stop reason: HOSPADM

## 2019-03-04 RX ORDER — ONDANSETRON 2 MG/ML
4 INJECTION INTRAMUSCULAR; INTRAVENOUS EVERY 6 HOURS PRN
Status: DISCONTINUED | OUTPATIENT
Start: 2019-03-04 | End: 2019-03-07 | Stop reason: HOSPADM

## 2019-03-04 RX ORDER — PROCHLORPERAZINE MALEATE 5 MG
5 TABLET ORAL EVERY 6 HOURS PRN
Status: DISCONTINUED | OUTPATIENT
Start: 2019-03-04 | End: 2019-03-04

## 2019-03-04 RX ORDER — FENTANYL CITRATE 50 UG/ML
25-50 INJECTION, SOLUTION INTRAMUSCULAR; INTRAVENOUS EVERY 5 MIN PRN
Status: DISCONTINUED | OUTPATIENT
Start: 2019-03-04 | End: 2019-03-04 | Stop reason: HOSPADM

## 2019-03-04 RX ORDER — DEXTROSE MONOHYDRATE 25 G/50ML
25-50 INJECTION, SOLUTION INTRAVENOUS
Status: DISCONTINUED | OUTPATIENT
Start: 2019-03-04 | End: 2019-03-04 | Stop reason: HOSPADM

## 2019-03-04 RX ORDER — MULTIVITAMIN,THERAPEUTIC
1 TABLET ORAL DAILY
Status: DISCONTINUED | OUTPATIENT
Start: 2019-03-04 | End: 2019-03-07 | Stop reason: HOSPADM

## 2019-03-04 RX ORDER — METOCLOPRAMIDE HYDROCHLORIDE 5 MG/ML
5 INJECTION INTRAMUSCULAR; INTRAVENOUS EVERY 6 HOURS PRN
Status: DISCONTINUED | OUTPATIENT
Start: 2019-03-04 | End: 2019-03-07 | Stop reason: HOSPADM

## 2019-03-04 RX ORDER — FENTANYL CITRATE 50 UG/ML
INJECTION, SOLUTION INTRAMUSCULAR; INTRAVENOUS
Status: DISCONTINUED
Start: 2019-03-04 | End: 2019-03-04 | Stop reason: HOSPADM

## 2019-03-04 RX ORDER — HYDROMORPHONE HYDROCHLORIDE 1 MG/ML
.3-.5 INJECTION, SOLUTION INTRAMUSCULAR; INTRAVENOUS; SUBCUTANEOUS
Status: DISCONTINUED | OUTPATIENT
Start: 2019-03-04 | End: 2019-03-04

## 2019-03-04 RX ORDER — ACETAMINOPHEN 325 MG/1
650 TABLET ORAL EVERY 4 HOURS PRN
Status: DISCONTINUED | OUTPATIENT
Start: 2019-03-04 | End: 2019-03-07 | Stop reason: HOSPADM

## 2019-03-04 RX ADMIN — FENTANYL CITRATE 25 MCG: 50 INJECTION, SOLUTION INTRAMUSCULAR; INTRAVENOUS at 11:22

## 2019-03-04 RX ADMIN — THERA TABS 1 TABLET: TAB at 14:42

## 2019-03-04 RX ADMIN — MIDAZOLAM HYDROCHLORIDE 0.5 MG: 1 INJECTION, SOLUTION INTRAMUSCULAR; INTRAVENOUS at 11:22

## 2019-03-04 RX ADMIN — TRAZODONE HYDROCHLORIDE 50 MG: 50 TABLET ORAL at 22:17

## 2019-03-04 RX ADMIN — IODIXANOL 55 ML: 320 INJECTION, SOLUTION INTRAVASCULAR at 12:06

## 2019-03-04 RX ADMIN — ACETAMINOPHEN 650 MG: 325 TABLET, FILM COATED ORAL at 22:27

## 2019-03-04 RX ADMIN — SODIUM CHLORIDE: 9 INJECTION, SOLUTION INTRAVENOUS at 20:06

## 2019-03-04 RX ADMIN — HEPARIN SODIUM 10000 UNITS: 10000 INJECTION INTRAVENOUS; SUBCUTANEOUS at 11:20

## 2019-03-04 RX ADMIN — GABAPENTIN 300 MG: 300 CAPSULE ORAL at 14:42

## 2019-03-04 RX ADMIN — MIDAZOLAM HYDROCHLORIDE 0.5 MG: 1 INJECTION, SOLUTION INTRAMUSCULAR; INTRAVENOUS at 12:01

## 2019-03-04 RX ADMIN — Medication 1 TABLET: at 14:42

## 2019-03-04 RX ADMIN — INSULIN ASPART 1 UNITS: 100 INJECTION, SOLUTION INTRAVENOUS; SUBCUTANEOUS at 18:51

## 2019-03-04 RX ADMIN — MIDAZOLAM HYDROCHLORIDE 0.5 MG: 1 INJECTION, SOLUTION INTRAMUSCULAR; INTRAVENOUS at 11:27

## 2019-03-04 RX ADMIN — DOCUSATE SODIUM 100 MG: 100 CAPSULE, LIQUID FILLED ORAL at 22:17

## 2019-03-04 RX ADMIN — HEPARIN SODIUM 500 UNITS/HR: 10000 INJECTION, SOLUTION INTRAVENOUS at 12:35

## 2019-03-04 RX ADMIN — LIDOCAINE HYDROCHLORIDE 8 ML: 10 INJECTION, SOLUTION EPIDURAL; INFILTRATION; INTRACAUDAL; PERINEURAL at 12:11

## 2019-03-04 RX ADMIN — LISINOPRIL 2.5 MG: 2.5 TABLET ORAL at 14:42

## 2019-03-04 RX ADMIN — LIDOCAINE HYDROCHLORIDE 8 ML: 10 INJECTION, SOLUTION INFILTRATION; PERINEURAL at 12:11

## 2019-03-04 RX ADMIN — ALTEPLASE 0.5 MG/HR: KIT at 12:41

## 2019-03-04 RX ADMIN — ATORVASTATIN CALCIUM 40 MG: 40 TABLET, FILM COATED ORAL at 14:42

## 2019-03-04 RX ADMIN — INSULIN DETEMIR 23 UNITS: 100 INJECTION, SOLUTION SUBCUTANEOUS at 22:17

## 2019-03-04 RX ADMIN — MELATONIN 5 MG TABLET 10 MG: at 22:17

## 2019-03-04 RX ADMIN — FENTANYL CITRATE 25 MCG: 50 INJECTION, SOLUTION INTRAMUSCULAR; INTRAVENOUS at 12:02

## 2019-03-04 RX ADMIN — FENTANYL CITRATE 25 MCG: 50 INJECTION, SOLUTION INTRAMUSCULAR; INTRAVENOUS at 11:47

## 2019-03-04 RX ADMIN — ACETAMINOPHEN 650 MG: 325 TABLET, FILM COATED ORAL at 14:42

## 2019-03-04 RX ADMIN — MIDAZOLAM HYDROCHLORIDE 0.5 MG: 1 INJECTION, SOLUTION INTRAMUSCULAR; INTRAVENOUS at 11:46

## 2019-03-04 RX ADMIN — FENTANYL CITRATE 25 MCG: 50 INJECTION, SOLUTION INTRAMUSCULAR; INTRAVENOUS at 11:26

## 2019-03-04 RX ADMIN — NICOTINE 1 PATCH: 21 PATCH, EXTENDED RELEASE TRANSDERMAL at 16:43

## 2019-03-04 RX ADMIN — ALTEPLASE 0.5 MG/HR: KIT at 23:32

## 2019-03-04 RX ADMIN — SODIUM CHLORIDE: 9 INJECTION, SOLUTION INTRAVENOUS at 09:45

## 2019-03-04 RX ADMIN — Medication 1 CAPSULE: at 14:42

## 2019-03-04 RX ADMIN — OXYCODONE HYDROCHLORIDE 5 MG: 5 TABLET ORAL at 23:22

## 2019-03-04 ASSESSMENT — ACTIVITIES OF DAILY LIVING (ADL)
TRANSFERRING: 0-->INDEPENDENT
BATHING: 0-->INDEPENDENT
RETIRED_COMMUNICATION: 0-->UNDERSTANDS/COMMUNICATES WITHOUT DIFFICULTY
SWALLOWING: 0-->SWALLOWS FOODS/LIQUIDS WITHOUT DIFFICULTY
ADLS_ACUITY_SCORE: 11
COGNITION: 0 - NO COGNITION ISSUES REPORTED
AMBULATION: 0-->INDEPENDENT
FALL_HISTORY_WITHIN_LAST_SIX_MONTHS: NO
DRESS: 0-->INDEPENDENT
ADLS_ACUITY_SCORE: 17
TOILETING: 0-->INDEPENDENT
RETIRED_EATING: 0-->INDEPENDENT

## 2019-03-04 ASSESSMENT — MIFFLIN-ST. JEOR: SCORE: 1242.13

## 2019-03-04 NOTE — PROGRESS NOTES
Interventional Radiology Pre-Procedure Sedation Assessment   Time of Assessment: 10:45 AM    Expected Level: Moderate Sedation    Indication: Sedation is required for the following type of Procedure: Left leg angiogram with possible angioplasty, stent, catheter directed thrombolysis, thrombectomy with IV moderate sedation    Sedation and procedural consent: Risks, benefits and alternatives were discussed with Patient and Relative Daughter    PO Intake: Appropriately NPO for procedure    ASA Class: Class 2 - MILD SYSTEMIC DISEASE, NO ACUTE PROBLEMS, NO FUNCTIONAL LIMITATIONS.    Mallampati: Grade 1:  Soft palate, uvula, tonsillar pillars, and posterior pharyngeal wall visible    Lungs: Lungs Clear with good breath sounds bilaterally    Heart: Normal heart sounds and rate    History and physical reviewed and no updates needed. I have reviewed the lab findings, diagnostic data, medications, and the plan for sedation. I have determined this patient to be an appropriate candidate for the planned sedation and procedure and have reassessed the patient IMMEDIATELY PRIOR to sedation and procedure.    Mariana Harris, APRN CNP

## 2019-03-04 NOTE — PHARMACY-PHARMACOTHERAPY NOTE
"The following home medications were NOT continued on inpatient admission per \"Discontinuation of nonessential home medications during hospitalization\" policy: FOSAMAX    If a therapeutic holiday is deemed inappropriate per the prescriber, please notify the pharmacist regarding the medication order.    The pharmacist is available to answer any questions and/or concerns the patient may have regarding discontinuation of non-essential medications.    Please ensure that these medications are restarted as needed upon discharge via the medication reconciliation discharge process and included on the discharge medication reconciliation report.    Thank you,  Lisa Whyte    "

## 2019-03-04 NOTE — PROGRESS NOTES
Here with daughter for Left LE angio. Pt denies pain. NPO, Allergies noted, No fall risk. Lungs CTA, S1S2. Doppler LE pulses per Narcisa with Vascular medicine. Reviewed contrast information with pt, states understanding.

## 2019-03-04 NOTE — PROCEDURES
RADIOLOGY POST PROCEDURE NOTE w/ SEDATION  Patient name: Rajani Guo  MRN: [id number]  : 1950    Pre-procedure diagnosis: LLE claudication  Post-procedure diagnosis: Same    Procedure Date/Time: 2019  1:19 PM  Procedure: Right groin (CFA) puncture.  6 Fr sheath.  Abdominal aortogram and LLE angiogram.  Occlusion of left SFA.  20 cm infusion length catheter placed.  No complications.  tPA at 0.5 mg/hour.  500 U of heparin through sheath.  Will Followup in AM  Estimated blood loss: < 5 ml  Specimen(s) collected with description:  None    I determined this patient to be an appropriate candidate for the planned sedation and procedure and reassessed the patient IMMEDIATELY PRIOR to sedation and procedure.     The patient tolerated the procedure well with no immediate complications.  Significant findings:  Please see above.  See imaging dictation for procedural details.    Provider name: Sebastian Palomo  Assistant(s):None

## 2019-03-04 NOTE — PROGRESS NOTES
Rajani Guo is a 68 year old woman with a history of type 2 diabetes, hyperlipidemia, hypertension, osteoporosis, current smoker and recent diagnosis of peripheral arterial disease with a 6 month h/o left calf claudication with exercise that got worse over time. A couple weeks ago she had increased pain behind the left knee and imaging studies were done which showed moderate to severe ischemia of the LLE. An angiogram was done today and catheter directed lysis was started of the left superior femoral artery.     She is being seen today in IR follow up.     S: I have no pain. My left foot feels warmer than before. The worst part was my bladder couldn't wait to use a bed pain so I feel better with a whittaker catheter in.     O: Temp:  [98.2  F (36.8  C)-98.5  F (36.9  C)] 98.5  F (36.9  C)  Pulse:  [79-90] 87  Heart Rate:  [77-86] 84  Resp:  [8-29] 29  BP: (108-148)/(55-94) 134/73  SpO2:  [92 %-98 %] 93 %    Labs, notes, imaging, and VSs reviewed    ROUTINE ICU LABS (Last four results)  CMP  Recent Labs   Lab 03/04/19  0930   CR 0.56   GFRESTIMATED >90   GFRESTBLACK >90     CBC  Recent Labs   Lab 03/04/19  0930   WBC 12.3*   RBC 4.39   HGB 13.3   HCT 39.7   MCV 90   MCH 30.3   MCHC 33.5   RDW 13.7        INR  Recent Labs   Lab 03/04/19  0930   INR 1.00     Arterial Blood GasNo lab results found in last 7 days.    General-Alert, oriented, very pleasant woman in NAD  Right groin-tegaderm covered gauze dressing over sheath. Some blood noted on dressing. TPA and heparin infusing correctly into infusion catheter and sheath.   Feet very warm bilaterally. Palpable pulses right DP. Doppler in left. CMS normal     A/P: Rajnai Guo is a 68 year old woman with a history of type 2 diabetes, hyperlipidemia, hypertension, osteoporosis, current smoker and recent diagnosis of peripheral arterial disease with a 6 month h/o left calf claudication with exercise that got worse over time. A couple weeks ago she had increased pain  behind the left knee and imaging studies were done which showed moderate to severe ischemia of the LLE. An angiogram was done today and catheter directed lysis was started of the left superior femoral artery.       Patient is comfortable with normal CMS bilaterally and no ischemic pain. Will return to IR tomorrow mid morning to early afternoon with possible intervention depending on angiogram results. Discussed with patient and she is aware of the plan.     Thanks [facility] Interventional Radiology CNP ([phone]) (phone [phone])

## 2019-03-04 NOTE — IR NOTE
Interventional Radiology Intra-procedural Nursing Note    Patient Name: Rajani Guo  Medical Record Number: [id number]  Today's Date: March 4, 2019    Start Time: 1125  End of procedure time: 1205  Procedure: Left leg angiogram  Report given to: Hien SERNA, station 33    Other Notes: Pt transferred to IR table. Prepped and draped appropriately. Monitoring equipment applied. NC applied. No complaints of pain at this time. Timeout recorded.     IV (established prior to arrival) is patent.     6f sheath to right femoral. Right groin site is C/D/I, soft. 5f infusion catheter for TPA infusion started. Heparin infusion to sheath started.    Calf circumferance measured prior to infusions is 36 cm.    VSS throughout. Pt. Tolerated procedure well. Transferred to station 33 with RN staff.    Medications given:    Fentanyl 100 mcg IV  Versed 2 mg IV  Heparin 500 units/hr to sheath  TPA 0.5mg to catheter

## 2019-03-04 NOTE — CONSULTS
[facility]    Vascular Medicine Consultation     Attestation: I have examined the patient independently of Sparkle Colon PA-C and agree with the examination and plan as delineated below.    Tab Barajas MD      Date of Admission:  3/4/2019  Date of Consult (When I saw the patient): 03/04/19    Assessment & Plan   1. Peripheral arterial disease with acute on chronic lifestyle limiting claudication of the left lower extremity    She presented today for an angiogram. She will be admitted for CD lysis. She should continue aspirin. Any other antiplatelet therapy will be under the direction of Dr. Serrato depending on completion angiogram results. We will obtain a TTE and /or CTA chest/abdomen/pelvis after lysis is completed to look for a cardio or atheroembolic source. She would benefit upon discharge with enrollment in a supervised exercise program to increase her pain-free walking distance. A referral will be placed for her. She was advised to stop smoking as well.      2. Ongoing tobacco use    She has been smoking for most of her life. She knows she must quit, but has had a difficult time doing so. She tried Chantix awhile back and quit for 5 months, but unfortunately restarted after stopping the Chantix. When she went back on it, she developed significant depression and had to stop it. She has tried patches alone, but was unsuccessful. She knows that she will never be able to quit cold turkey. She is willing to try Wellbutrin to help take away the desire to smoke in combination with nicotine patches to assist with weaning her body from nicotine. These will be prescribed for her.     3. Hyperlipidemia    Her lipids are presently well controlled on Atorvastatin 40 mg daily with an LDL of 44. She should continue the same.     4. Type 2 diabetes    Her diabetes is presently well controlled in the outpatient setting with an A1C of 6.5% while on Metformin and Levemir. She should continue the  same as an outpt. Her metformin will be held due to her contrast needs while hospitalized.       Reason for Consult   Reason for consult: Asked by Dr. Serrato to evaluate and help manage vascular risk factors in this 68 year old female smoker with a history of well controlled diabetes and lipids now presenting with acute worsening of left lower extremity claudication and undergoing an angiogram.     Primary Care Physician   [facility]      History of Present Illness   Rajani Guo is a 68 year old female smoker with a history of hyperlipidemia, diabetes, hypertension, insomnia, and osteoporosis who admits to having left calf claudication for the last 6 months. However, this significantly worsened approximately 1-2 weeks ago to where she can walk no more than 1/2 block before having to sit down. She denies any rest pain or non-healing wounds. ABIs were normal on the right, but 0.4 at rest on the left, dropping to 0.24 with exercise. Ultrasound was consistent with a long segment occlusion of her left SFA. An angiogram was recommended, for which she presented today.     Past Medical History   Past Medical History:   Diagnosis Date     Age-related osteoporosis without current pathological fracture 1/18/2018     History of partial thyroidectomy 6/2/2018     Hyperlipidemia LDL goal <100 1/18/2018     Insomnia, unspecified type 1/18/2018     Tobacco use disorder      Type 2 diabetes mellitus without complication, with long-term current use of insulin (H) 1/18/2018       Past Surgical History   Past Surgical History:   Procedure Laterality Date     KNEE SURGERY Right 2013    right knee torn meniscus surgery     OVARY SURGERY  1971    1 ovary removed     RELEASE CARPAL TUNNEL Right 1988     RELEASE TRIGGER FINGER BILATERAL       SHOULDER SURGERY Left     rotator cuff repair, plate placement     THYROID SURGERY  1988    partial thyroidectomy       Prior to Admission Medications   Prior to Admission Medications    Prescriptions Last Dose Informant Patient Reported? Taking?   MELATONIN PO   Yes No   Sig: Take 10 mg by mouth At Bedtime   Multiple Vitamin (MULTI-VITAMINS) TABS   Yes No   Sig: Take 1 tablet by mouth   alendronate (FOSAMAX) 70 MG tablet   No No   Sig: TAKE 1 TABLET (70 MG) BY MOUTH EVERY 7 DAYS.  Please schedule follow up in clinic for additional refills   aspirin EC 81 MG EC tablet   Yes No   Sig: Take 81 mg by mouth   atorvastatin (LIPITOR) 40 MG tablet   No No   Sig: Take 1 tablet (40 mg) by mouth daily   calcium carb 1250 mg, 500 mg [place],/vitamin D 200 unit (CALCIUM 500/D) 500-200 MG-UNIT per tablet   Yes No   Sig: Take 1 tablet by mouth   gabapentin (NEURONTIN) 300 MG capsule   Yes No   Si mg daily   glucosamine-chondroitin 500-400 MG CAPS per capsule   Yes No   Sig: Take 1 capsule by mouth   insulin detemir (LEVEMIR FLEXPEN/FLEXTOUCH) 100 UNIT/ML injection   No No   Sig: Inject 23 Units Subcutaneous At Bedtime   lisinopril (PRINIVIL/ZESTRIL) 2.5 MG tablet   No No   Sig: Take 1 tablet (2.5 mg) by mouth daily   metFORMIN (GLUCOPHAGE) 1000 MG tablet   No No   Sig: Take 1 tablet (1,000 mg) by mouth 2 times daily (with meals)   traZODone (DESYREL) 50 MG tablet   No No   Sig: TAKE 1 TABLET (50 MG) BY MOUTH AT BEDTIME      Facility-Administered Medications: None     Allergies   Allergies   Allergen Reactions     Indomethacin Other (See Comments)     Dizziness and disorientation     Tramadol Nausea and Vomiting       Social History   Rajani Guo  reports that she has been smoking.  She has a 78.00 pack-year smoking history. she has never used smokeless tobacco. She reports that she drinks alcohol. She reports that she does not use drugs.    Family History   No family history on file.    Review of Systems   The 10 point Review of Systems is negative other than noted in the HPI or here.     Physical Exam   Temp: 98.2  F (36.8  C) Temp src: Oral BP: 146/63 Pulse: 84   Resp: 16 SpO2: 97 % O2 Device: None (Room  air)    Vital Signs with Ranges  Temp:  [98.2  F (36.8  C)] 98.2  F (36.8  C)  Pulse:  [84] 84  Resp:  [16] 16  BP: (146-148)/(61-63) 146/63  SpO2:  [97 %] 97 %  159 lbs 3.2 oz    Constitutional: awake, alert, cooperative, no apparent distress, and appears stated age  Eyes: Lids and lashes normal, pupils equal, round and reactive to light, extra ocular muscles intact, sclera clear, conjunctiva normal  ENT: normocepalic, without obvious abnormality, oropharynx pink and moist  Hematologic / Lymphatic: no lymphadenopathy  Respiratory: No increased work of breathing, good air exchange, clear to auscultation bilaterally, no crackles or wheezing  Cardiovascular: regular rate and rhythm, normal S1 and S2 and no murmur noted  GI: Normal bowel sounds, soft, non-distended, non-tender  Skin: no redness, warmth, or swelling, no rashes. Both great toenails removed earlier due to ingrown toenails.   Musculoskeletal: There is no redness, warmth, or swelling of the joints.  Full range of motion noted.  Motor strength is 5 out of 5 all extremities bilaterally.  Tone is normal.  Neurologic: Awake, alert, oriented to name, place and time.  Cranial nerves II-XII are grossly intact.  Motor is 5 out of 5 bilaterally.    Neuropsychiatric:  Normal affect, memory, insight.  Pulses: Palpable right DP. Multiphasic right PT. Monophasic left DP/PT pulses by doppler. No carotid bruits appreciated.     Data   Most Recent 3 CBC's:  Recent Labs   Lab Test 03/04/19  0930 05/31/18  1003   WBC 12.3* 11.5*   HGB 13.3 13.1   MCV 90 94    282     Most Recent 3 BMP's:  Recent Labs   Lab Test 03/04/19  0930 01/18/18  0835 05/08/17   NA  --  138  --    POTASSIUM  --  3.9 3.7   CHLORIDE  --  106  --    CO2  --  23  --    BUN  --  10  --    CR 0.56 0.47* 0.79   ANIONGAP  --  9  --    KAYLEEN  --  8.8  --    GLC  --  130* 250*     Most Recent 3 INR's:  Recent Labs   Lab Test 03/04/19  0930   INR 1.00     Most Recent Cholesterol Panel:  Recent Labs   Lab  Test 03/04/19  0930   CHOL 122   LDL 44   HDL 59   TRIG 94     Most Recent Hemoglobin A1c:  Recent Labs   Lab Test 03/04/19  0930   A1C 6.5*

## 2019-03-05 ENCOUNTER — APPOINTMENT (OUTPATIENT)
Dept: INTERVENTIONAL RADIOLOGY/VASCULAR | Facility: CLINIC | Age: 69
DRG: 272 | End: 2019-03-05
Attending: RADIOLOGY
Payer: COMMERCIAL

## 2019-03-05 LAB
ANION GAP SERPL CALCULATED.3IONS-SCNC: 5 MMOL/L (ref 3–14)
BASOPHILS # BLD AUTO: 0 10E9/L (ref 0–0.2)
BASOPHILS NFR BLD AUTO: 0.2 %
BUN SERPL-MCNC: 10 MG/DL (ref 7–30)
CALCIUM SERPL-MCNC: 8.2 MG/DL (ref 8.5–10.1)
CHLORIDE SERPL-SCNC: 114 MMOL/L (ref 94–109)
CO2 SERPL-SCNC: 24 MMOL/L (ref 20–32)
CREAT SERPL-MCNC: 0.54 MG/DL (ref 0.52–1.04)
DIFFERENTIAL METHOD BLD: ABNORMAL
EOSINOPHIL # BLD AUTO: 0.3 10E9/L (ref 0–0.7)
EOSINOPHIL NFR BLD AUTO: 2.3 %
ERYTHROCYTE [DISTWIDTH] IN BLOOD BY AUTOMATED COUNT: 13.6 % (ref 10–15)
ERYTHROCYTE [DISTWIDTH] IN BLOOD BY AUTOMATED COUNT: 13.8 % (ref 10–15)
GFR SERPL CREATININE-BSD FRML MDRD: >90 ML/MIN/{1.73_M2}
GLUCOSE BLDC GLUCOMTR-MCNC: 177 MG/DL (ref 70–99)
GLUCOSE BLDC GLUCOMTR-MCNC: 219 MG/DL (ref 70–99)
GLUCOSE BLDC GLUCOMTR-MCNC: 97 MG/DL (ref 70–99)
GLUCOSE SERPL-MCNC: 82 MG/DL (ref 70–99)
HCT VFR BLD AUTO: 40.1 % (ref 35–47)
HCT VFR BLD AUTO: 40.1 % (ref 35–47)
HGB BLD-MCNC: 13.1 G/DL (ref 11.7–15.7)
HGB BLD-MCNC: 13.5 G/DL (ref 11.7–15.7)
IMM GRANULOCYTES # BLD: 0.1 10E9/L (ref 0–0.4)
IMM GRANULOCYTES NFR BLD: 0.4 %
LMWH PPP CHRO-ACNC: <0.1 IU/ML
LYMPHOCYTES # BLD AUTO: 2.3 10E9/L (ref 0.8–5.3)
LYMPHOCYTES NFR BLD AUTO: 18.5 %
MCH RBC QN AUTO: 29.9 PG (ref 26.5–33)
MCH RBC QN AUTO: 30.3 PG (ref 26.5–33)
MCHC RBC AUTO-ENTMCNC: 32.7 G/DL (ref 31.5–36.5)
MCHC RBC AUTO-ENTMCNC: 33.7 G/DL (ref 31.5–36.5)
MCV RBC AUTO: 90 FL (ref 78–100)
MCV RBC AUTO: 92 FL (ref 78–100)
MONOCYTES # BLD AUTO: 1.2 10E9/L (ref 0–1.3)
MONOCYTES NFR BLD AUTO: 9.4 %
NEUTROPHILS # BLD AUTO: 8.6 10E9/L (ref 1.6–8.3)
NEUTROPHILS NFR BLD AUTO: 69.2 %
NRBC # BLD AUTO: 0 10*3/UL
NRBC BLD AUTO-RTO: 0 /100
PLATELET # BLD AUTO: 249 10E9/L (ref 150–450)
PLATELET # BLD AUTO: 276 10E9/L (ref 150–450)
POTASSIUM SERPL-SCNC: 4.4 MMOL/L (ref 3.4–5.3)
RBC # BLD AUTO: 4.38 10E12/L (ref 3.8–5.2)
RBC # BLD AUTO: 4.45 10E12/L (ref 3.8–5.2)
SODIUM SERPL-SCNC: 143 MMOL/L (ref 133–144)
WBC # BLD AUTO: 12.1 10E9/L (ref 4–11)
WBC # BLD AUTO: 12.4 10E9/L (ref 4–11)

## 2019-03-05 PROCEDURE — 85520 HEPARIN ASSAY: CPT | Performed by: INTERNAL MEDICINE

## 2019-03-05 PROCEDURE — 25000132 ZZH RX MED GY IP 250 OP 250 PS 637: Performed by: INTERNAL MEDICINE

## 2019-03-05 PROCEDURE — 85025 COMPLETE CBC W/AUTO DIFF WBC: CPT | Performed by: INTERNAL MEDICINE

## 2019-03-05 PROCEDURE — 85027 COMPLETE CBC AUTOMATED: CPT | Performed by: RADIOLOGY

## 2019-03-05 PROCEDURE — 36415 COLL VENOUS BLD VENIPUNCTURE: CPT | Performed by: RADIOLOGY

## 2019-03-05 PROCEDURE — 25000128 H RX IP 250 OP 636: Performed by: RADIOLOGY

## 2019-03-05 PROCEDURE — C1760 CLOSURE DEV, VASC: HCPCS

## 2019-03-05 PROCEDURE — 25000132 ZZH RX MED GY IP 250 OP 250 PS 637: Performed by: RADIOLOGY

## 2019-03-05 PROCEDURE — 36415 COLL VENOUS BLD VENIPUNCTURE: CPT | Performed by: INTERNAL MEDICINE

## 2019-03-05 PROCEDURE — 25000131 ZZH RX MED GY IP 250 OP 636 PS 637: Performed by: INTERNAL MEDICINE

## 2019-03-05 PROCEDURE — 80048 BASIC METABOLIC PNL TOTAL CA: CPT | Performed by: INTERNAL MEDICINE

## 2019-03-05 PROCEDURE — 27210906 ZZH KIT CR8

## 2019-03-05 PROCEDURE — 25000125 ZZHC RX 250

## 2019-03-05 PROCEDURE — 27210845 ZZH DEVICE INFLATION CR5

## 2019-03-05 PROCEDURE — 25800030 ZZH RX IP 258 OP 636: Performed by: RADIOLOGY

## 2019-03-05 PROCEDURE — C1757 CATH, THROMBECTOMY/EMBOLECT: HCPCS

## 2019-03-05 PROCEDURE — 00000146 ZZHCL STATISTIC GLUCOSE BY METER IP

## 2019-03-05 PROCEDURE — 37214 CESSJ THERAPY CATH REMOVAL: CPT

## 2019-03-05 PROCEDURE — 12000000 ZZH R&B MED SURG/OB

## 2019-03-05 PROCEDURE — 99233 SBSQ HOSP IP/OBS HIGH 50: CPT | Performed by: INTERNAL MEDICINE

## 2019-03-05 PROCEDURE — C1769 GUIDE WIRE: HCPCS

## 2019-03-05 PROCEDURE — 25500064 ZZH RX 255 OP 636: Performed by: RADIOLOGY

## 2019-03-05 RX ORDER — LIDOCAINE HYDROCHLORIDE 10 MG/ML
INJECTION, SOLUTION INFILTRATION; PERINEURAL
Status: DISCONTINUED
Start: 2019-03-05 | End: 2019-03-05 | Stop reason: HOSPADM

## 2019-03-05 RX ORDER — CEFAZOLIN SODIUM 2 G/100ML
2 INJECTION, SOLUTION INTRAVENOUS ONCE
Status: COMPLETED | OUTPATIENT
Start: 2019-03-05 | End: 2019-03-05

## 2019-03-05 RX ORDER — METOPROLOL TARTRATE 1 MG/ML
5 INJECTION, SOLUTION INTRAVENOUS EVERY 6 HOURS PRN
Status: DISCONTINUED | OUTPATIENT
Start: 2019-03-05 | End: 2019-03-07 | Stop reason: HOSPADM

## 2019-03-05 RX ORDER — SODIUM CHLORIDE 9 MG/ML
INJECTION, SOLUTION INTRAVENOUS CONTINUOUS
Status: DISCONTINUED | OUTPATIENT
Start: 2019-03-05 | End: 2019-03-07 | Stop reason: HOSPADM

## 2019-03-05 RX ORDER — CEFAZOLIN SODIUM 2 G/100ML
INJECTION, SOLUTION INTRAVENOUS
Status: DISCONTINUED
Start: 2019-03-05 | End: 2019-03-05 | Stop reason: HOSPADM

## 2019-03-05 RX ORDER — ACETAMINOPHEN 500 MG
500 TABLET ORAL EVERY 6 HOURS PRN
Status: DISCONTINUED | OUTPATIENT
Start: 2019-03-05 | End: 2019-03-05

## 2019-03-05 RX ORDER — FLUMAZENIL 0.1 MG/ML
0.2 INJECTION, SOLUTION INTRAVENOUS
Status: DISCONTINUED | OUTPATIENT
Start: 2019-03-05 | End: 2019-03-05 | Stop reason: HOSPADM

## 2019-03-05 RX ORDER — IODIXANOL 320 MG/ML
50 INJECTION, SOLUTION INTRAVASCULAR ONCE
Status: COMPLETED | OUTPATIENT
Start: 2019-03-05 | End: 2019-03-05

## 2019-03-05 RX ORDER — FENTANYL CITRATE 50 UG/ML
25 INJECTION, SOLUTION INTRAMUSCULAR; INTRAVENOUS EVERY 5 MIN PRN
Status: ACTIVE | OUTPATIENT
Start: 2019-03-05 | End: 2019-03-06

## 2019-03-05 RX ORDER — FENTANYL CITRATE 50 UG/ML
50 INJECTION, SOLUTION INTRAMUSCULAR; INTRAVENOUS
Status: ACTIVE | OUTPATIENT
Start: 2019-03-05 | End: 2019-03-06

## 2019-03-05 RX ORDER — NICOTINE POLACRILEX 4 MG
15-30 LOZENGE BUCCAL
Status: DISCONTINUED | OUTPATIENT
Start: 2019-03-05 | End: 2019-03-05

## 2019-03-05 RX ORDER — DEXTROSE MONOHYDRATE 25 G/50ML
25-50 INJECTION, SOLUTION INTRAVENOUS
Status: DISCONTINUED | OUTPATIENT
Start: 2019-03-05 | End: 2019-03-05

## 2019-03-05 RX ORDER — FENTANYL CITRATE 50 UG/ML
INJECTION, SOLUTION INTRAMUSCULAR; INTRAVENOUS
Status: DISCONTINUED
Start: 2019-03-05 | End: 2019-03-05 | Stop reason: HOSPADM

## 2019-03-05 RX ADMIN — IODIXANOL 55 ML: 320 INJECTION, SOLUTION INTRAVASCULAR at 13:48

## 2019-03-05 RX ADMIN — PROCHLORPERAZINE EDISYLATE 5 MG: 5 INJECTION INTRAMUSCULAR; INTRAVENOUS at 17:04

## 2019-03-05 RX ADMIN — ACETAMINOPHEN 650 MG: 325 TABLET, FILM COATED ORAL at 17:05

## 2019-03-05 RX ADMIN — ACETAMINOPHEN 650 MG: 325 TABLET, FILM COATED ORAL at 08:19

## 2019-03-05 RX ADMIN — LIDOCAINE HYDROCHLORIDE 4 ML: 10 INJECTION, SOLUTION INFILTRATION; PERINEURAL at 13:44

## 2019-03-05 RX ADMIN — ONDANSETRON 4 MG: 2 INJECTION INTRAMUSCULAR; INTRAVENOUS at 17:04

## 2019-03-05 RX ADMIN — LISINOPRIL 2.5 MG: 2.5 TABLET ORAL at 08:19

## 2019-03-05 RX ADMIN — MELATONIN 5 MG TABLET 10 MG: at 21:52

## 2019-03-05 RX ADMIN — CEFAZOLIN SODIUM 2 G: 2 INJECTION, SOLUTION INTRAVENOUS at 13:46

## 2019-03-05 RX ADMIN — SODIUM CHLORIDE: 9 INJECTION, SOLUTION INTRAVENOUS at 09:29

## 2019-03-05 RX ADMIN — GABAPENTIN 300 MG: 300 CAPSULE ORAL at 08:19

## 2019-03-05 RX ADMIN — HEPARIN SODIUM 10000 UNITS: 10000 INJECTION INTRAVENOUS; SUBCUTANEOUS at 12:59

## 2019-03-05 RX ADMIN — INSULIN ASPART 2 UNITS: 100 INJECTION, SOLUTION INTRAVENOUS; SUBCUTANEOUS at 19:05

## 2019-03-05 RX ADMIN — NICOTINE 1 PATCH: 21 PATCH, EXTENDED RELEASE TRANSDERMAL at 08:20

## 2019-03-05 RX ADMIN — OXYCODONE HYDROCHLORIDE 5 MG: 5 TABLET ORAL at 04:16

## 2019-03-05 RX ADMIN — OXYCODONE HYDROCHLORIDE 10 MG: 5 TABLET ORAL at 08:19

## 2019-03-05 RX ADMIN — ATORVASTATIN CALCIUM 40 MG: 40 TABLET, FILM COATED ORAL at 08:19

## 2019-03-05 RX ADMIN — OXYCODONE HYDROCHLORIDE 5 MG: 5 TABLET ORAL at 12:26

## 2019-03-05 RX ADMIN — ALTEPLASE 0.5 MG/HR: KIT at 09:29

## 2019-03-05 RX ADMIN — HEPARIN SODIUM 750 UNITS/HR: 10000 INJECTION, SOLUTION INTRAVENOUS at 19:05

## 2019-03-05 RX ADMIN — INSULIN DETEMIR 23 UNITS: 100 INJECTION, SOLUTION SUBCUTANEOUS at 21:52

## 2019-03-05 RX ADMIN — ONDANSETRON 4 MG: 2 INJECTION INTRAMUSCULAR; INTRAVENOUS at 09:39

## 2019-03-05 RX ADMIN — DOCUSATE SODIUM 100 MG: 100 CAPSULE, LIQUID FILLED ORAL at 21:52

## 2019-03-05 RX ADMIN — TRAZODONE HYDROCHLORIDE 50 MG: 50 TABLET ORAL at 21:52

## 2019-03-05 ASSESSMENT — ACTIVITIES OF DAILY LIVING (ADL)
ADLS_ACUITY_SCORE: 11

## 2019-03-05 ASSESSMENT — MIFFLIN-ST. JEOR: SCORE: 1250

## 2019-03-05 NOTE — NURSING NOTE
Patient Education    Procedure: left lower extremity angiogram with possible intervention.  Diagnosis: severe peripheral arterial disease  Anticoagulation Instruction: n/a  Pre-Operative Physical Exam: You need to have a pre-op physical exam within 30 days of your procedure. Your procedure may be cancelled if you do not have a current History and Physical. Call your PCP's office to schedule.  Allergies:  Updated in Epic  Bowel Prep: n/a  Post Procedure Education: [facility] post-procedure fact sheet reviewed with patient.    Learner(s):patient  Method: Listening  Barriers to Learning:No Barrier  Outcome: Patient did verbalize understanding of above education.    Kelli Lopez, MATAN, RN

## 2019-03-05 NOTE — PROGRESS NOTES
[facility]    Vascular Medicine Progress Note    Attestation: I have examined the patient independently of Sparkle Colon PA-C and agree with the examination and plan as delineated below.    Tab Barajas MD        Date of Service (when I saw the patient): 03/05/2019     Assessment & Plan   1. Peripheral arterial disease with acute on chronic lifestyle limiting claudication of the left lower extremity s/p initiation of catheter-directed lysis 3/4/19     Assessment:     -Pain controlled with oral pain meds presently.   -Pain in toes (likely from reperfusion) and back (from bedrest)  -Pulses left foot much improved from yesterday.     Plan:     -Await lysis recheck later today.   -PCA for pain if needed.   -Obtain a TTE and /or CTA chest/abdomen/pelvis after lysis is completed to look for a cardio or atheroembolic source.   -She would benefit upon discharge with enrollment in a supervised exercise program to increase her pain-free walking distance. A referral will be placed for her.   -Needs to stop smoking      2. Ongoing tobacco use     Assessment: Smoking 50+ years, up to almost 2 packs per day. She knows she must quit, but has had a difficult time doing so. She tried Chantix awhile back and quit for 5 months, but unfortunately restarted after stopping the Chantix. When she went back on it, she developed significant depression and had to stop it. She has tried patches alone, but was unsuccessful. She knows that she will never be able to quit cold turkey.     Plan: Wellbutrin and nicotine patches      3. Hyperlipidemia     Assessment: Well controlled with LDL 44    Plan: Continue Atorvastatin 40 mg daily.      4. Type 2 diabetes     Assessment: A1C 6.5% - well controlled on Metformin and Levemir as outpatient.     Plan: Holding metformin due to contrast. Continue Levemir and sliding scale insulin as needed.        Interval History   Doing ok. Pain in foot and back overnight. Nausea this am  with empty stomach and oxycodone.     Physical Exam   Temp: 98.5  F (36.9  C) Temp src: Oral BP: 152/84 Pulse: 80 Heart Rate: 82 Resp: 18 SpO2: 92 % O2 Device: None (Room air)    Vitals:    03/04/19 0905 03/05/19 0638   Weight: 72.2 kg (159 lb 3.2 oz) 73 kg (160 lb 15 oz)     Vital Signs with Ranges  Temp:  [98  F (36.7  C)-99  F (37.2  C)] 98.5  F (36.9  C)  Pulse:  [76-90] 80  Heart Rate:  [74-86] 82  Resp:  [8-29] 18  BP: (108-152)/(55-94) 152/84  SpO2:  [92 %-98 %] 92 %  I/O last 3 completed shifts:  In: 3373.42 [P.O.:390; I.V.:2983.42]  Out: 3800 [Urine:3800]    Constitutional: Awake, alert, cooperative, no apparent distress, and appears stated age.  Eyes: Lids and lashes normal, sclera clear, conjunctiva normal.  ENT: Normocephalic, without obvious abnormality, atraumatic, oral pharynx with moist mucus membranes  Respiratory: No increased work of breathing, good air exchange, clear to auscultation bilaterally, no crackles or wheezing.  Cardiovascular: Regular rate and rhythm, normal S1 and S2, no S3 or S4, and no murmur noted.  GI: Normal bowel sounds, soft, non-distended, non-tender  Genitourinary:  Barajas catheter with clear, yellow urine.   Skin: No bruising or bleeding, normal skin color, texture, turgor, no redness, warmth, or swelling, no rashes, sheath site ok.   Musculoskeletal: There is no redness, warmth, or swelling of the joints.    Neurologic: Awake, alert, oriented to name, place and time.  Cranial nerves II-XII are grossly intact.    Neuropsychiatric: Calm, normal eye contact, alert, normal affect, oriented to self, place, time and situation, memory for past and recent events intact and thought process normal.  Pulses: Multiphasic strong pedal pulses bilaterally.     Medications     alteplase (ACTIVASE) 5 mg/500 mL infusion (LINE 1) 0.5 mg/hr (03/05/19 0929)     heparin 500 Units/hr (03/05/19 0800)     HYDROmorphone       - MEDICATION INSTRUCTIONS -       sodium chloride 75 mL/hr at 03/05/19 0929        atorvastatin  40 mg Oral Daily     calcium carbonate 600 mg-vitamin D 400 units  1 tablet Oral Daily     docusate sodium  100 mg Oral BID     gabapentin  300 mg Oral Daily     glucosamine-chondroitin  1 capsule Oral Daily     insulin aspart  1-7 Units Subcutaneous TID AC     insulin aspart  1-5 Units Subcutaneous At Bedtime     insulin detemir  23 Units Subcutaneous At Bedtime     lisinopril  2.5 mg Oral Daily     melatonin  10 mg Oral At Bedtime     multivitamin, therapeutic  1 tablet Oral Daily     nicotine  1 patch Transdermal Daily     nicotine   Transdermal Q8H     nicotine   Transdermal Daily     traZODone  50 mg Oral At Bedtime       Data   Recent Labs   Lab 03/05/19  0605 03/04/19  0930   WBC 12.4* 12.3*   HGB 13.1 13.3   MCV 92 90    304   INR  --  1.00     --    POTASSIUM 4.4  --    CHLORIDE 114*  --    CO2 24  --    BUN 10  --    CR 0.54 0.56   ANIONGAP 5  --    KAYLEEN 8.2*  --    GLC 82  --      Recent Results (from the past 24 hour(s))   IR Lower Extremity Angiogram Left    Narrative    INTERVENTIONAL RADIOLOGY LOWER EXTREMITY ANGIOGRAM LEFT   3/4/2019  12:11 PM    HISTORY:  68-year-old woman with left lower extremity claudication.  Patient has had symptoms for 6 months, though 2 weeks prior symptoms  worsened. Patient saw Dr. Serrato in clinic on March 1, 2019 with  diminished MONICA and duplex ultrasound demonstrating occluded left  superficial femoral artery. Concern was raised for acute on chronic  presentation and plan was made for angiogram.    TECHNIQUE: Patient was brought to the interventional radiology  department and informed consent obtained. Patient was placed in a  supine position. Skin overlying the right groin was prepped and draped  in standard sterile fashion. 1% lidocaine was used for local  anesthesia. Given possibility of thrombolysis, ultrasound was used to  visualize the right common femoral artery and image stored for  documentation. With continuous ultrasound  guidance, micropuncture kit  was used to access and over a series of maneuvers, 6 [place] vascular  sheath was placed. Over a guidewire, pigtail catheter was advanced to  the level of the renal arteries where abdominal angiogram performed.  Pigtail catheter was pulled back to the distal abdominal aorta where  pelvic angiogram performed. Renal double curve catheter was used to  cross the aortic bifurcation and placed in the left external iliac  artery where angiogram performed throughout the left lower extremity.  Area of occlusion was noted throughout majority of the superficial  femoral artery. A Glidewire was placed into the superficial femoral  artery and easily crossed through the area of occlusion. A Hammer wire  was placed and the sheath was exchanged for a 6 [place] Antione sheath  which was placed in the left common femoral artery. A 100 cm x 20 cm  infusion length catheter was placed throughout the SFA. Completion  angiogram performed demonstrating appropriate position and thrombus in  the proximal aspect of the SFA with mid and distal aspects with  minimal thrombus. Plan will be for TPA at 0.5 mg/hour and heparin  through the crossover sheath a 500 units/hour. Patient will return the  following day.    Sedation: 2 mg IV Versed, 100 mcg IV fentanyl.  Sedation time: 43 minutes  Please note the above medications were administered by the  interventional radiology staff under my direct supervision. Patient's  vital signs were monitored and remained stable throughout the  procedure.  Fluoroscopic time: 3.4 minutes  Total fluoroscopic dose: 62.5 mGy  Contrast: 55 mL of Visipaque administered intra-arterially without  complication.  Local anesthetic: 8 mL of 1% lidocaine    FINDINGS: Total of 9 spot fluoroscopic images and angiogram sequences  obtained throughout the procedure. Abdominal aortic angiogram  demonstrates patent bilateral renal arteries. Abdominal aorta is  patent and nonaneurysmal, though with  atherosclerotic plaque  throughout the infrarenal segment. Both common iliac, internal iliac,  external iliac arteries are patent. Occlusion throughout majority of  the left superficial femoral artery. The left common femoral, profunda  femoral, popliteal, and runoff arteries are patent.  Runoff arteries  are small in caliber. Infusion catheter then placed demonstrating  occlusion with mixed thrombus in the proximal SFA.      Impression    IMPRESSION:  1. Occlusion of left SFA, found to be in the proximal segment. We will  begin TPA for thrombolysis. Suspect acute on chronic stenosis and  occlusion. TPA will be at 0.5 mg/hour and heparin through the  crossover sheath at 500 units/hour. The patient will return the  following day for repeat angiogram.  2. No inflow disease in the left lower extremity.  3. Popliteal and runoff arteries are patent. Runoff arteries are  small, though patent.     SNEHAL FERRARA MD

## 2019-03-05 NOTE — PLAN OF CARE
A&O.  AVSS, IMC, SR on the monitor, on RA.  TPA at 0.5mg/hr, Heparin 500units/hr, infusing through right groin, dressing with marked drainage unchanged.  Left calf at 35cm.  On bed rest, leg kept straight at all times.  Log rolled & repositioned as tolerated.  Pulses per doppler.  Neuro & CMS intact.  Indwelling urinary catheter intact & patent, good UO overnight, clear, yellow.  Pain managed with oxycodone with relief.  Slept in between care.

## 2019-03-05 NOTE — PROCEDURES
RADIOLOGY POST PROCEDURE NOTE    Patient name: Rajani Guo  MRN: [id number]  : 1950    Pre-procedure diagnosis: occluded left SFA, continuation of thrombolysis.   Post-procedure diagnosis: Same    Procedure Date/Time: 2019  1:57 PM  Procedure: left lower extremity angiogram with angiojet of the left SFA and angioplasty of the proximal left SFA.   Estimated blood loss: None  Specimen(s) collected with description: none    The patient tolerated the procedure well with no immediate complications.  Significant findings:none    See imaging dictation for procedural details.    Provider name: Klaudia Langford  Assistant(s):None

## 2019-03-05 NOTE — PLAN OF CARE
A&O. VSS. Afebrile. Denies LLE pain, however, has chronic back pain, which is exacerbated by lying flat. Tylenol given twice with improvement. Patient on bedrest with HOB at 15-20 degrees, legs flat. Neuro's and CMS intact. Color pink. Alteplase and heparin infusing to right femoral site. Right groin site intact. No further drainage, marked. Left calf measures at 35.5 cm. Barajas placed. Good urine output, clear yellow. NS infusing per PIV. Tolerating mod carb diet. NPO at midnight.

## 2019-03-05 NOTE — PROGRESS NOTES
HOSPITALIST CONSULT CHART CHECK:    Hospitalist service was consulted for after hours cross coverage only. We will peripherally follow and chart check.     - For medical concerns during business hours, call the [facility] at [phone] to have the rounding/on call Vascular Medicine (NOT VASCULAR SURGERY) MD paged.    - After business hours, for medical concerns on this patient, please page Hospitalist staff.    - For vascular surgical questions, please page the appropriate surgeon (primary vascular surgeon or on call vascular surgeon) based upon the time of day.       Connor Harding PA-C

## 2019-03-05 NOTE — PROGRESS NOTES
Interventional Radiology Intra-procedural Nursing Note    Patient Name: Rajani Guo  Medical Record Number: [id number]  Today's Date: March 5, 2019    Start Time: 1315  End of procedure time: 1349  Procedure: Thrombolytic therapy follow up, left lower extremity angiogram  Report given to: Gianni SERNA, [address]  Time pt departs:  1405    Other Notes: Patient in IR for left lower extremity angiogram and lytic follow up. Vital signs, allergies, and labs reviewed. TPA and heparin infusions stopped on arrival to IR. Patient slightly drowsy, 5 mg Oxycodone given at 1230 prior to arrival (see eMAR).  Angiojet and angioplasty to left SFA without complication. Patient tolerated procedure.     Sheath removed at 1345, angioseal placed in R femoral artery for hemostasis. Site C/D/I. Vital signs stable throughout procedure.     Pre- and Post-procedure pulses documented in flowsheets.    No sedation given for procedure    Fatemeh Sanchez RN

## 2019-03-06 ENCOUNTER — APPOINTMENT (OUTPATIENT)
Dept: CT IMAGING | Facility: CLINIC | Age: 69
DRG: 272 | End: 2019-03-06
Attending: PHYSICIAN ASSISTANT
Payer: COMMERCIAL

## 2019-03-06 DIAGNOSIS — I73.9 PAD (PERIPHERAL ARTERY DISEASE) (H): Primary | ICD-10-CM

## 2019-03-06 LAB
BASOPHILS # BLD AUTO: 0 10E9/L (ref 0–0.2)
BASOPHILS NFR BLD AUTO: 0.2 %
CREAT SERPL-MCNC: 0.52 MG/DL (ref 0.52–1.04)
DIFFERENTIAL METHOD BLD: ABNORMAL
EOSINOPHIL # BLD AUTO: 0.2 10E9/L (ref 0–0.7)
EOSINOPHIL NFR BLD AUTO: 1.7 %
ERYTHROCYTE [DISTWIDTH] IN BLOOD BY AUTOMATED COUNT: 13.8 % (ref 10–15)
GFR SERPL CREATININE-BSD FRML MDRD: >90 ML/MIN/{1.73_M2}
GLUCOSE BLDC GLUCOMTR-MCNC: 115 MG/DL (ref 70–99)
GLUCOSE BLDC GLUCOMTR-MCNC: 157 MG/DL (ref 70–99)
GLUCOSE BLDC GLUCOMTR-MCNC: 163 MG/DL (ref 70–99)
GLUCOSE BLDC GLUCOMTR-MCNC: 172 MG/DL (ref 70–99)
GLUCOSE BLDC GLUCOMTR-MCNC: 80 MG/DL (ref 70–99)
HCT VFR BLD AUTO: 38.5 % (ref 35–47)
HGB BLD-MCNC: 12.9 G/DL (ref 11.7–15.7)
IMM GRANULOCYTES # BLD: 0 10E9/L (ref 0–0.4)
IMM GRANULOCYTES NFR BLD: 0.2 %
LMWH PPP CHRO-ACNC: 0.14 IU/ML
LMWH PPP CHRO-ACNC: 0.28 IU/ML
LMWH PPP CHRO-ACNC: <0.1 IU/ML
LYMPHOCYTES # BLD AUTO: 2.4 10E9/L (ref 0.8–5.3)
LYMPHOCYTES NFR BLD AUTO: 18.2 %
MCH RBC QN AUTO: 30.1 PG (ref 26.5–33)
MCHC RBC AUTO-ENTMCNC: 33.5 G/DL (ref 31.5–36.5)
MCV RBC AUTO: 90 FL (ref 78–100)
MONOCYTES # BLD AUTO: 1 10E9/L (ref 0–1.3)
MONOCYTES NFR BLD AUTO: 7.4 %
NEUTROPHILS # BLD AUTO: 9.5 10E9/L (ref 1.6–8.3)
NEUTROPHILS NFR BLD AUTO: 72.3 %
NRBC # BLD AUTO: 0 10*3/UL
NRBC BLD AUTO-RTO: 0 /100
PLATELET # BLD AUTO: 258 10E9/L (ref 150–450)
RBC # BLD AUTO: 4.28 10E12/L (ref 3.8–5.2)
WBC # BLD AUTO: 13.2 10E9/L (ref 4–11)

## 2019-03-06 PROCEDURE — 36415 COLL VENOUS BLD VENIPUNCTURE: CPT | Performed by: INTERNAL MEDICINE

## 2019-03-06 PROCEDURE — 25000128 H RX IP 250 OP 636: Performed by: RADIOLOGY

## 2019-03-06 PROCEDURE — 25000131 ZZH RX MED GY IP 250 OP 636 PS 637: Performed by: INTERNAL MEDICINE

## 2019-03-06 PROCEDURE — 85520 HEPARIN ASSAY: CPT | Performed by: RADIOLOGY

## 2019-03-06 PROCEDURE — 25000132 ZZH RX MED GY IP 250 OP 250 PS 637: Performed by: RADIOLOGY

## 2019-03-06 PROCEDURE — 12000000 ZZH R&B MED SURG/OB

## 2019-03-06 PROCEDURE — 00000146 ZZHCL STATISTIC GLUCOSE BY METER IP

## 2019-03-06 PROCEDURE — 85520 HEPARIN ASSAY: CPT | Performed by: PHYSICIAN ASSISTANT

## 2019-03-06 PROCEDURE — 99233 SBSQ HOSP IP/OBS HIGH 50: CPT | Performed by: INTERNAL MEDICINE

## 2019-03-06 PROCEDURE — 25000125 ZZHC RX 250: Performed by: RADIOLOGY

## 2019-03-06 PROCEDURE — 85025 COMPLETE CBC W/AUTO DIFF WBC: CPT | Performed by: RADIOLOGY

## 2019-03-06 PROCEDURE — 82565 ASSAY OF CREATININE: CPT | Performed by: RADIOLOGY

## 2019-03-06 PROCEDURE — 36415 COLL VENOUS BLD VENIPUNCTURE: CPT | Performed by: PHYSICIAN ASSISTANT

## 2019-03-06 PROCEDURE — 25000132 ZZH RX MED GY IP 250 OP 250 PS 637: Performed by: INTERNAL MEDICINE

## 2019-03-06 PROCEDURE — 85520 HEPARIN ASSAY: CPT | Performed by: NURSE PRACTITIONER

## 2019-03-06 PROCEDURE — 85520 HEPARIN ASSAY: CPT | Performed by: INTERNAL MEDICINE

## 2019-03-06 PROCEDURE — 74174 CTA ABD&PLVS W/CONTRAST: CPT

## 2019-03-06 PROCEDURE — 36415 COLL VENOUS BLD VENIPUNCTURE: CPT | Performed by: NURSE PRACTITIONER

## 2019-03-06 RX ORDER — IOPAMIDOL 755 MG/ML
80 INJECTION, SOLUTION INTRAVASCULAR ONCE
Status: COMPLETED | OUTPATIENT
Start: 2019-03-06 | End: 2019-03-06

## 2019-03-06 RX ADMIN — ATORVASTATIN CALCIUM 40 MG: 40 TABLET, FILM COATED ORAL at 07:35

## 2019-03-06 RX ADMIN — IOPAMIDOL 80 ML: 755 INJECTION, SOLUTION INTRAVENOUS at 15:14

## 2019-03-06 RX ADMIN — ACETAMINOPHEN 650 MG: 325 TABLET, FILM COATED ORAL at 07:36

## 2019-03-06 RX ADMIN — SODIUM CHLORIDE, PRESERVATIVE FREE 80 ML: 5 INJECTION INTRAVENOUS at 15:14

## 2019-03-06 RX ADMIN — Medication 1850 UNITS: at 09:01

## 2019-03-06 RX ADMIN — LISINOPRIL 2.5 MG: 2.5 TABLET ORAL at 07:36

## 2019-03-06 RX ADMIN — INSULIN ASPART 1 UNITS: 100 INJECTION, SOLUTION INTRAVENOUS; SUBCUTANEOUS at 07:35

## 2019-03-06 RX ADMIN — NICOTINE 1 PATCH: 21 PATCH, EXTENDED RELEASE TRANSDERMAL at 07:35

## 2019-03-06 RX ADMIN — INSULIN ASPART 1 UNITS: 100 INJECTION, SOLUTION INTRAVENOUS; SUBCUTANEOUS at 11:12

## 2019-03-06 RX ADMIN — DOCUSATE SODIUM 100 MG: 100 CAPSULE, LIQUID FILLED ORAL at 07:36

## 2019-03-06 RX ADMIN — MELATONIN 5 MG TABLET 10 MG: at 21:51

## 2019-03-06 RX ADMIN — INSULIN DETEMIR 23 UNITS: 100 INJECTION, SOLUTION SUBCUTANEOUS at 21:56

## 2019-03-06 RX ADMIN — TRAZODONE HYDROCHLORIDE 50 MG: 50 TABLET ORAL at 21:51

## 2019-03-06 RX ADMIN — HEPARIN SODIUM 1000 UNITS/HR: 10000 INJECTION, SOLUTION INTRAVENOUS at 18:26

## 2019-03-06 RX ADMIN — DOCUSATE SODIUM 100 MG: 100 CAPSULE, LIQUID FILLED ORAL at 21:51

## 2019-03-06 ASSESSMENT — ACTIVITIES OF DAILY LIVING (ADL)
ADLS_ACUITY_SCORE: 11

## 2019-03-06 NOTE — PROGRESS NOTES
HOSPITALIST CONSULT CHART CHECK:  3/6/2019      Hospitalist service was consulted for cross coverage only. We will peripherally follow and chart check throughout the week.        - For vascular medical concerns during business hours M-F, call the [facility] at [phone] to have the rounding/on call Vascular Medicine (NOT VASCULAR SURGERY) MD paged.      - After business hours M-F, for medical concerns on this patient, please page hospitalist staff.      - For vascular surgical questions, please page the appropriate surgeon (primary vascular surgeon or on call vascular surgeon) based upon the time of day.           FRANCESCA Pearson, CNP  Hospitalist Service  [facility]  3/6/2019   7:32 AM  [phone]

## 2019-03-06 NOTE — PLAN OF CARE
A&Ox4. VSS on RA. Denies pain. BLE CMS intact, palpable pulses, no numbness/tingling. R groin site, angioseal CDI, soft, flat. Neuros intact. Barajas catheter removed, voiding well. Up with SBA. BS active, passing gas, denies nausea, diabetic diet. Hep gtt @ 8.5, hep10a at 0730. Bgm checks, levermir and sliding scale. TELE SR. Nicotine patch R arm. Plan for home 3-6.

## 2019-03-06 NOTE — PLAN OF CARE
A&O, AVSS on RA. Mildly hypertensive at times although not meeting PRN parameters. Off bedrest and IMC. CMS/neuros intact. DPs now palpable, feet warm, minimal tingling sensation in left hallux. R groin site angio sealed, CDI, soft. Left leg 35.5 cm-no change. Pain controlled with tylenol and 5-10mg oxy, main c/o back pain although relieved now sitting up in bed. Nausea with small amount bile-like emesis x2 after procedure, somewhat relieved with antiemetics, cool cloth and aromatherapy. Pt currently ordering dinner and plan for up in chair once nausea has passed. Barajas to be removed. Will cont to monitor.

## 2019-03-06 NOTE — PROGRESS NOTES
[facility]    Vascular Medicine Progress Note    Date of Service (when I saw the patient): 03/06/2019     Assessment & Plan   1. Peripheral arterial disease with acute on chronic lifestyle limiting claudication of the left lower extremity s/p succesful catheter-directed lysis and angioplasty of proximal left SFA 3/4/19-3/5/19     Assessment:   -Lysis stopped 3/5/19. Now with warm foot and palpable pedal pulses.   -Pain controlled with oral pain meds presently.   -No further claudication post-procedure.     Plan:   -Continue IV heparin for now. Will need to transition to oral - await findings on imaging today.   -Obtain a TTE and CTA chest/abdomen/pelvis today to look for a cardio or atheroembolic source.   -She would benefit upon discharge with enrollment in a supervised exercise program to increase her pain-free walking distance. A referral will be placed for her.   -Needs to stop smoking      2. Ongoing tobacco use     Assessment: Smoking 50+ years, up to almost 2 packs per day. She knows she must quit, but has had a difficult time doing so. She tried Chantix awhile back and quit for 5 months, but unfortunately restarted after stopping the Chantix. When she went back on it, she developed significant depression and had to stop it. She has tried patches alone, but was unsuccessful. She knows that she will never be able to quit cold turkey.     Plan: Wellbutrin and nicotine patches      3. Hyperlipidemia     Assessment: Well controlled with LDL 44    Plan: Continue Atorvastatin 40 mg daily.      4. Type 2 diabetes     Assessment: A1C 6.5% - well controlled on Metformin and Levemir as outpatient.     Plan: Holding metformin due to contrast. Continue Levemir and sliding scale insulin as needed.        Interval History   Doing ok. Pain in foot gone now. Nausea improved. Palpable pedal pulses. Hoping to discharge soon. Voiding. Eating.     Physical Exam   Temp: 98.2  F (36.8  C) Temp src: Oral BP: 104/58  Pulse: 69 Heart Rate: 75 Resp: 16 SpO2: 98 % O2 Device: None (Room air) Oxygen Delivery: 2 LPM  Vitals:    03/04/19 0905 03/05/19 0638   Weight: 72.2 kg (159 lb 3.2 oz) 73 kg (160 lb 15 oz)     Vital Signs with Ranges  Temp:  [97.6  F (36.4  C)-98.7  F (37.1  C)] 98.2  F (36.8  C)  Pulse:  [69-90] 69  Heart Rate:  [75-95] 75  Resp:  [9-30] 16  BP: (100-154)/(54-82) 104/58  SpO2:  [90 %-98 %] 98 %  I/O last 3 completed shifts:  In: 876.67 [P.O.:390; I.V.:486.67]  Out: 2250 [Urine:2250]    Constitutional: Awake, alert, cooperative, no apparent distress, and appears stated age.  Eyes: Lids and lashes normal, sclera clear, conjunctiva normal.  ENT: Normocephalic, without obvious abnormality, atraumatic, oral pharynx with moist mucus membranes  Respiratory: No increased work of breathing, good air exchange, clear to auscultation bilaterally, no crackles or wheezing.  Cardiovascular: Regular rate and rhythm, normal S1 and S2, no S3 or S4, and no murmur noted.  GI: Normal bowel sounds, soft, non-distended, non-tender  Genitourinary:  Barajas catheter with clear, yellow urine.   Skin: No bruising or bleeding, normal skin color, texture, turgor, no redness, warmth, or swelling, no rashes, sheath site ok.   Musculoskeletal: There is no redness, warmth, or swelling of the joints.    Neurologic: Awake, alert, oriented to name, place and time.  Cranial nerves II-XII are grossly intact.    Neuropsychiatric: Calm, normal eye contact, alert, normal affect, oriented to self, place, time and situation, memory for past and recent events intact and thought process normal.  Pulses: Multiphasic strong pedal pulses bilaterally.     Medications     HEParin 1,000 Units/hr (03/06/19 0901)     HYDROmorphone       - MEDICATION INSTRUCTIONS -       sodium chloride Stopped (03/05/19 1952)       atorvastatin  40 mg Oral Daily     calcium carbonate 600 mg-vitamin D 400 units  1 tablet Oral Daily     docusate sodium  100 mg Oral BID     fentaNYL  50  mcg Intravenous Once within 24 hrs     insulin aspart  1-7 Units Subcutaneous TID AC     insulin aspart  1-5 Units Subcutaneous At Bedtime     insulin detemir  23 Units Subcutaneous At Bedtime     lisinopril  2.5 mg Oral Daily     melatonin  10 mg Oral At Bedtime     multivitamin, therapeutic  1 tablet Oral Daily     nicotine  1 patch Transdermal Daily     nicotine   Transdermal Q8H     nicotine   Transdermal Daily     traZODone  50 mg Oral At Bedtime       Data   Recent Labs   Lab 03/06/19  0710 03/05/19  1649 03/05/19  0605 03/04/19  0930   WBC 13.2* 12.1* 12.4* 12.3*   HGB 12.9 13.5 13.1 13.3   MCV 90 90 92 90    249 276 304   INR  --   --   --  1.00   NA  --   --  143  --    POTASSIUM  --   --  4.4  --    CHLORIDE  --   --  114*  --    CO2  --   --  24  --    BUN  --   --  10  --    CR 0.52  --  0.54 0.56   ANIONGAP  --   --  5  --    KAYLEEN  --   --  8.2*  --    GLC  --   --  82  --      No results found for this or any previous visit (from the past 24 hour(s)).

## 2019-03-07 ENCOUNTER — APPOINTMENT (OUTPATIENT)
Dept: CARDIOLOGY | Facility: CLINIC | Age: 69
DRG: 272 | End: 2019-03-07
Attending: PHYSICIAN ASSISTANT
Payer: COMMERCIAL

## 2019-03-07 VITALS
TEMPERATURE: 98.3 F | OXYGEN SATURATION: 97 % | RESPIRATION RATE: 16 BRPM | DIASTOLIC BLOOD PRESSURE: 75 MMHG | HEIGHT: 64 IN | BODY MASS INDEX: 26.95 KG/M2 | HEART RATE: 79 BPM | SYSTOLIC BLOOD PRESSURE: 142 MMHG | WEIGHT: 157.85 LBS

## 2019-03-07 LAB
BASOPHILS # BLD AUTO: 0 10E9/L (ref 0–0.2)
BASOPHILS NFR BLD AUTO: 0.2 %
CREAT SERPL-MCNC: 0.49 MG/DL (ref 0.52–1.04)
DIFFERENTIAL METHOD BLD: NORMAL
EOSINOPHIL # BLD AUTO: 0.3 10E9/L (ref 0–0.7)
EOSINOPHIL NFR BLD AUTO: 3.3 %
ERYTHROCYTE [DISTWIDTH] IN BLOOD BY AUTOMATED COUNT: 13.7 % (ref 10–15)
GFR SERPL CREATININE-BSD FRML MDRD: >90 ML/MIN/{1.73_M2}
GLUCOSE BLDC GLUCOMTR-MCNC: 109 MG/DL (ref 70–99)
GLUCOSE BLDC GLUCOMTR-MCNC: 121 MG/DL (ref 70–99)
HCT VFR BLD AUTO: 36.6 % (ref 35–47)
HGB BLD-MCNC: 12.1 G/DL (ref 11.7–15.7)
IMM GRANULOCYTES # BLD: 0 10E9/L (ref 0–0.4)
IMM GRANULOCYTES NFR BLD: 0.3 %
LMWH PPP CHRO-ACNC: 0.21 IU/ML
LYMPHOCYTES # BLD AUTO: 2.1 10E9/L (ref 0.8–5.3)
LYMPHOCYTES NFR BLD AUTO: 20.3 %
MCH RBC QN AUTO: 29.8 PG (ref 26.5–33)
MCHC RBC AUTO-ENTMCNC: 33.1 G/DL (ref 31.5–36.5)
MCV RBC AUTO: 90 FL (ref 78–100)
MONOCYTES # BLD AUTO: 1.1 10E9/L (ref 0–1.3)
MONOCYTES NFR BLD AUTO: 10.9 %
NEUTROPHILS # BLD AUTO: 6.6 10E9/L (ref 1.6–8.3)
NEUTROPHILS NFR BLD AUTO: 65 %
NRBC # BLD AUTO: 0 10*3/UL
NRBC BLD AUTO-RTO: 0 /100
PLATELET # BLD AUTO: 239 10E9/L (ref 150–450)
RBC # BLD AUTO: 4.06 10E12/L (ref 3.8–5.2)
WBC # BLD AUTO: 10.2 10E9/L (ref 4–11)

## 2019-03-07 PROCEDURE — 36415 COLL VENOUS BLD VENIPUNCTURE: CPT | Performed by: RADIOLOGY

## 2019-03-07 PROCEDURE — 25500064 ZZH RX 255 OP 636: Performed by: RADIOLOGY

## 2019-03-07 PROCEDURE — 85025 COMPLETE CBC W/AUTO DIFF WBC: CPT | Performed by: RADIOLOGY

## 2019-03-07 PROCEDURE — 25000132 ZZH RX MED GY IP 250 OP 250 PS 637: Performed by: INTERNAL MEDICINE

## 2019-03-07 PROCEDURE — 40000264 ECHOCARDIOGRAM COMPLETE

## 2019-03-07 PROCEDURE — 25000132 ZZH RX MED GY IP 250 OP 250 PS 637: Performed by: RADIOLOGY

## 2019-03-07 PROCEDURE — 99233 SBSQ HOSP IP/OBS HIGH 50: CPT | Performed by: INTERNAL MEDICINE

## 2019-03-07 PROCEDURE — 85520 HEPARIN ASSAY: CPT | Performed by: RADIOLOGY

## 2019-03-07 PROCEDURE — 82565 ASSAY OF CREATININE: CPT | Performed by: RADIOLOGY

## 2019-03-07 PROCEDURE — 00000146 ZZHCL STATISTIC GLUCOSE BY METER IP

## 2019-03-07 PROCEDURE — 93306 TTE W/DOPPLER COMPLETE: CPT | Mod: 26 | Performed by: INTERNAL MEDICINE

## 2019-03-07 RX ADMIN — THERA TABS 1 TABLET: TAB at 08:32

## 2019-03-07 RX ADMIN — HUMAN ALBUMIN MICROSPHERES AND PERFLUTREN 9 ML: 10; .22 INJECTION, SOLUTION INTRAVENOUS at 11:24

## 2019-03-07 RX ADMIN — ATORVASTATIN CALCIUM 40 MG: 40 TABLET, FILM COATED ORAL at 08:33

## 2019-03-07 RX ADMIN — Medication 1 TABLET: at 08:33

## 2019-03-07 RX ADMIN — NICOTINE 1 PATCH: 21 PATCH, EXTENDED RELEASE TRANSDERMAL at 08:32

## 2019-03-07 RX ADMIN — DOCUSATE SODIUM 100 MG: 100 CAPSULE, LIQUID FILLED ORAL at 08:33

## 2019-03-07 RX ADMIN — LISINOPRIL 2.5 MG: 2.5 TABLET ORAL at 08:32

## 2019-03-07 ASSESSMENT — ACTIVITIES OF DAILY LIVING (ADL)
ADLS_ACUITY_SCORE: 11

## 2019-03-07 ASSESSMENT — MIFFLIN-ST. JEOR: SCORE: 1236

## 2019-03-07 NOTE — PLAN OF CARE
A/Ox4. AVSS. Denies pain. Angioseal on right groin site. Hep running @ 10. BLE measured with no increase in size. Up independent. Tolerating mod-carb diet. LS-clear/diminished. BS+, Flatus+. Voiding adequately. Tele-NSR

## 2019-03-07 NOTE — PROGRESS NOTES
[facility]    Vascular Medicine Progress Note    Attestation: I have examined the patient independently of Sparkle Colon PA-C and agree with the examination and plan as delineated below.    Tab Barajas MD      Date of Service (when I saw the patient): 03/07/2019     Assessment & Plan   1. Peripheral arterial disease with acute on chronic lifestyle limiting claudication of the left lower extremity s/p succesful catheter-directed lysis and angioplasty of proximal left SFA 3/4/19-3/5/19     Assessment:   -Lysis stopped 3/5/19. Now with warm foot and palpable pedal pulses.   -Pain controlled with oral pain meds presently.   -No further claudication post-procedure.   -CTA chest/abdomen/pelvis : no atheroembolic source.     Plan:     -Continue IV heparin for now. Will need to transition to oral - await findings on imaging.  -Awaiting TTE (ordered yesterday and never completed) and CTA chest/abdomen/pelvis results to look for a cardio or atheroembolic source.   -She would benefit upon discharge with enrollment in a supervised exercise program to increase her pain-free walking distance. A referral will be placed for her.   -Needs to stop smoking   -Follow up with IR with imaging prior to appointment in one month.     2. Ongoing tobacco use     Assessment: Smoking 50+ years, up to almost 2 packs per day. She knows she must quit, but has had a difficult time doing so. She tried Chantix awhile back and quit for 5 months, but unfortunately restarted after stopping the Chantix. When she went back on it, she developed significant depression and had to stop it. She has tried patches alone, but was unsuccessful. She knows that she will never be able to quit cold turkey.     Plan: Wellbutrin and nicotine patches      3. Hyperlipidemia     Assessment: Well controlled with LDL 44    Plan: Continue Atorvastatin 40 mg daily.      4. Type 2 diabetes     Assessment: A1C 6.5% - well controlled on Metformin and  Levemir as outpatient.     Plan: Holding metformin due to contrast. Continue Levemir and sliding scale insulin as needed.        Interval History   Doing ok. Pain in foot gone now. Palpable pedal pulses. Hoping to discharge soon. Voiding. Eating. CTA done yesterday afternoon. Echo still not done.     Physical Exam   Temp: 98  F (36.7  C) Temp src: Oral BP: 137/58 Pulse: 73 Heart Rate: 76 Resp: 16 SpO2: 97 % O2 Device: None (Room air)    Vitals:    03/04/19 0905 03/05/19 0638 03/07/19 0540   Weight: 72.2 kg (159 lb 3.2 oz) 73 kg (160 lb 15 oz) 71.6 kg (157 lb 13.6 oz)     Vital Signs with Ranges  Temp:  [98  F (36.7  C)-98.5  F (36.9  C)] 98  F (36.7  C)  Pulse:  [66-79] 73  Heart Rate:  [67-76] 76  Resp:  [16] 16  BP: (104-141)/(54-70) 137/58  SpO2:  [95 %-98 %] 97 %  I/O last 3 completed shifts:  In: 980 [P.O.:980]  Out: -     Constitutional: Awake, alert, cooperative, no apparent distress, and appears stated age.  Eyes: Lids and lashes normal, sclera clear, conjunctiva normal.  ENT: Normocephalic, without obvious abnormality, atraumatic, oral pharynx with moist mucus membranes  Respiratory: No increased work of breathing, good air exchange, clear to auscultation bilaterally, no crackles or wheezing.  Cardiovascular: Regular rate and rhythm, normal S1 and S2, no S3 or S4, and no murmur noted.  GI: Normal bowel sounds, soft, non-distended, non-tender  Genitourinary:  Barajas catheter removed  Skin: No bruising or bleeding, normal skin color, texture, turgor, no redness, warmth, or swelling, no rashes, sheath site ok.   Musculoskeletal: There is no redness, warmth, or swelling of the joints.    Neurologic: Awake, alert, oriented to name, place and time.  Cranial nerves II-XII are grossly intact.    Neuropsychiatric: Calm, normal eye contact, alert, normal affect, oriented to self, place, time and situation, memory for past and recent events intact and thought process normal.  Pulses: Palpable strong pedal pulses  bilaterally.     Medications     HEParin 1,000 Units/hr (03/06/19 1826)     - MEDICATION INSTRUCTIONS -       sodium chloride Stopped (03/05/19 1952)       atorvastatin  40 mg Oral Daily     calcium carbonate 600 mg-vitamin D 400 units  1 tablet Oral Daily     docusate sodium  100 mg Oral BID     insulin aspart  1-7 Units Subcutaneous TID AC     insulin aspart  1-5 Units Subcutaneous At Bedtime     insulin detemir  23 Units Subcutaneous At Bedtime     lisinopril  2.5 mg Oral Daily     melatonin  10 mg Oral At Bedtime     multivitamin, therapeutic  1 tablet Oral Daily     nicotine  1 patch Transdermal Daily     nicotine   Transdermal Q8H     nicotine   Transdermal Daily     traZODone  50 mg Oral At Bedtime       Data   Recent Labs   Lab 03/07/19  0738 03/06/19  0710 03/05/19  1649 03/05/19  0605 03/04/19  0930   WBC 10.2 13.2* 12.1* 12.4* 12.3*   HGB 12.1 12.9 13.5 13.1 13.3   MCV 90 90 90 92 90    258 249 276 304   INR  --   --   --   --  1.00   NA  --   --   --  143  --    POTASSIUM  --   --   --  4.4  --    CHLORIDE  --   --   --  114*  --    CO2  --   --   --  24  --    BUN  --   --   --  10  --    CR 0.49* 0.52  --  0.54 0.56   ANIONGAP  --   --   --  5  --    KAYLEEN  --   --   --  8.2*  --    GLC  --   --   --  82  --      No results found for this or any previous visit (from the past 24 hour(s)).

## 2019-03-07 NOTE — PROGRESS NOTES
Rajani Guo is a 68 year old woman with a history of type 2 diabetes, hyperlipidemia, hypertension, osteoporosis, current smoker and recent diagnosis of peripheral arterial disease with a 6 month h/o left calf claudication with exercise that got worse over time. A couple weeks ago she had increased pain behind the left knee and imaging studies were done which showed moderate to severe ischemia of the LLE. An angiogram was done today and catheter directed lysis was started of the left superior femoral artery which was finished and clot cleared successfully on 3/5 along with SFA angioplasty. She has been claudication free since. Imaging done by vascular medicine to look for possible embolic sources.     She is being seen in IR follow up today.    S: I'm feeling great. Feet are warm. Walking well. Anxious about not smoking at home but I need to continue that. Everyone has been so nice here and explained what is going on.     O: Temp:  [98  F (36.7  C)-98.5  F (36.9  C)] 98.3  F (36.8  C)  Pulse:  [66-79] 79  Heart Rate:  [67-76] 76  Resp:  [16] 16  BP: (105-142)/(54-75) 142/75  SpO2:  [95 %-97 %] 97 %    Labs, notes, imaging, IOs and VSs reviewed    ROUTINE ICU LABS (Last four results)  CMP  Recent Labs   Lab 03/07/19 0738 03/06/19  0710 03/05/19  0605 03/04/19  0930   NA  --   --  143  --    POTASSIUM  --   --  4.4  --    CHLORIDE  --   --  114*  --    CO2  --   --  24  --    ANIONGAP  --   --  5  --    GLC  --   --  82  --    BUN  --   --  10  --    CR 0.49* 0.52 0.54 0.56   GFRESTIMATED >90 >90 >90 >90   GFRESTBLACK >90 >90 >90 >90   KAYLEEN  --   --  8.2*  --      CBC  Recent Labs   Lab 03/07/19 0738 03/06/19  0710 03/05/19  1649 03/05/19  0605   WBC 10.2 13.2* 12.1* 12.4*   RBC 4.06 4.28 4.45 4.38   HGB 12.1 12.9 13.5 13.1   HCT 36.6 38.5 40.1 40.1   MCV 90 90 90 92   MCH 29.8 30.1 30.3 29.9   MCHC 33.1 33.5 33.7 32.7   RDW 13.7 13.8 13.6 13.8    258 249 276     INR  Recent Labs   Lab 03/04/19  0930   INR  1.00     Arterial Blood GasNo lab results found in last 7 days.    General- Alert, oriented, pleasant woman in NAD who appears very comfortable  Lower extremities warm. Positive palpable pedal pulse in left foot stronger on right.     A/P: Rajani Guo is a 68 year old woman with a history of type 2 diabetes, hyperlipidemia, hypertension, osteoporosis, current smoker and recent diagnosis of peripheral arterial disease with a 6 month h/o left calf claudication with exercise that got worse over time. A couple weeks ago she had increased pain behind the left knee and imaging studies were done which showed moderate to severe ischemia of the LLE. An angiogram was done today and catheter directed lysis was started of the left superior femoral artery which was finished and clot cleared successfully on 3/5 along with SFA angioplasty. She has been claudication free since. Imaging done by vascular medicine to look for possible embolic sources.    Successful catheter directed thrombolysis and angioplasty of left SFA. Appreciate care and vascular management of the vascular medicine team.     IR will follow up with Rajani in a month with an US and MONICA which will be coordinated with vascular medicine. Rajani will be contacted.     Thanks [facility] Interventional Radiology CNP ([phone]) (phone [phone])

## 2019-03-07 NOTE — PLAN OF CARE
Pt. A & O x 4.  VSS.  Heparin stopped/saline locked.  Independent.  LS diminished.  Pt. Educated on discharge orders.  Pt. Given two prescriptions.  Pt. Discharged to home w/ daughter.

## 2019-03-07 NOTE — PLAN OF CARE
A/O x 4, vss, a-febrile, denies pain, up in the hallway independently, S/P rt groin approach angiogram, angio-seal in place, rt groin intact, no hematoma, patient on heparin at 1000 units/hr, Tele NSR, tolerating diet well, blood sugars stable, voiding adequately, BS +, passing gas, possible discharge to home this afternoon.

## 2019-03-07 NOTE — DISCHARGE SUMMARY
[facility]    Attestation: I have examined the patient independently of Sparkle Colon PA-C and agree with the examination and plan as delineated below. 35 minutes total care on today's date.    Tab Barajas MD      Discharge Summary  Vascular Medicine    Date of Admission:  3/4/2019  Date of Discharge:  3/7/2019  Discharging Provider: Tab Barajas MD  Date of Service (when I saw the patient): 03/07/19    Discharge Diagnoses   1. Peripheral arterial disease with acute on chronic lifestyle limiting claudication of the left lower extremity s/p succesful catheter-directed lysis and angioplasty of proximal left SFA 3/4/19-3/5/19     2. Ongoing tobacco use     3. Hyperlipidemia    4. Type 2 diabetes      Hospital Course   Rajani Guo was admitted on 3/4/2019.  The following problems were addressed during her hospitalization:    1. Peripheral arterial disease with acute on chronic lifestyle limiting claudication of the left lower extremity s/p succesful catheter-directed lysis and angioplasty of proximal left SFA 3/4/19-3/5/19    She presented on 3/4/19 for an angiogram. Acute occlusion of the left SFA was noted and catheter-directed lysis was initiated. She was admitted. Lysis was continued for 24 hours and angioplasty of the proximal left SFA was undertaken. Upon completion, she had a warm left foot with strong palpable pulses. Her claudication had resolved. She should continue aspirin.     Etiology of the acute occlusion is not entirely clear. A CTA of the chest/abdomen/pelvis was completed with no clear atheroembolic source. She also had a TTE. This was significant for a severely dilated left atrium and aneurysmal atrial septum. This raises the question of a cardioembolic source due to possible paroxysmal atrial fibrillation. Discussed with Cardiology. If further evaluation is needed, a KIM could be pursued to look for atrial thrombus. However, management remains unchanged  whether a KIM shows this or not, which would be Eliquis 5 mg BID. Therefore, will hold off on KIM at this time as it would put her at unnecessary risk. A 30 day Ziopatch event monitor will be placed on her prior to discharge to evaluate her heart rhythm. She should then follow up with Dr. Barajas of Vascular Medicine as already scheduled on 4/16/19 to review these results.     She will also need to follow up with Interventional Radiology in one month with pre-clinic MONICA's and arterial duplex. Interventional Radiology will be calling her to schedule this. Advised her that she must stop smoking (see below).      2. Ongoing tobacco use     She has been smoking for most of her life. She knows she must quit, but has had a difficult time doing so. She tried Chantix awhile back and quit for 5 months, but unfortunately restarted after stopping the Chantix. When she went back on it, she developed significant depression and had to stop it. She has tried patches alone, but was unsuccessful. She knows that she will never be able to quit cold turkey. She is willing to try Wellbutrin to help take away the desire to smoke in combination with nicotine patches to assist with weaning her body from nicotine. These will be prescribed for her.     3. Hyperlipidemia     Her lipids are presently well controlled on Atorvastatin 40 mg daily with an LDL of 44. She should continue the same.      4. Type 2 diabetes    Her diabetes is presently well controlled in the outpatient setting with an A1C of 6.5% while on Metformin and Levemir. She should continue the same upon discharge. Her metformin was held due to her contrast needs while hospitalized.       Code Status   Full Code    Primary Care Physician   [facility]      Time Spent on this Encounter   Spent greater than 30 minutes discharging this patient.    Discharge Disposition   Discharged to home  Condition at discharge: Stable    Consultations This Hospital Stay   HOSPITALIST  IP CONSULT  PHARMACY IP CONSULT  PHARMACY TO DOSE HEPARIN  SMOKING CESSATION PROGRAM IP CONSULT    Discharge Orders      Discharge Instructions    1. Start taking Bupropion (wellbutrin/Zyban) to decrease your interest in smoking. Take one tablet daily for 4 days, then increase to twice daily.     2. Utilize nicotine patches to slowly wean your body from nicotine. Start with 21 mg patches daily for 30-60 days, then 14 mg patches for 30-60 days, and then finally 7 mg patches for 30-60 days.     3. You would benefit from enrolling in a supervised exercise program. A referral will be placed for you.     Reason for your hospital stay    Lysis of lower extremity arterial clot.     Follow-up and recommended labs and tests     Follow up with Dr. Barajas of Vascular Medicine 1 week after heart monitor is turned in. Please call [phone] with any questions or if you have to reschedule.    Follow up with Interventional Radiology. They will call you to schedule appointment and imaging.     Activity    Your activity upon discharge: activity as tolerated     Full Code     Zio Patch Holter Adult Pediatric Greater than 48 hrs    Please place on patient prior to discharge from hospital today. Thanks!     Diet    Follow this diet upon discharge:       Moderate Consistent CHO Diet     Discharge Medications   Current Discharge Medication List      START taking these medications    Details   apixaban ANTICOAGULANT (ELIQUIS) 5 MG tablet Take 1 tablet (5 mg) by mouth 2 times daily  Qty: 60 tablet, Refills: 3    Associated Diagnoses: Arterial thrombosis (H)      buPROPion (ZYBAN) 150 MG 12 hr tablet Take 1 tablet (150 mg) by mouth 2 times daily Take once daily for first 4 days, then twice daily after that.  Qty: 60 tablet, Refills: 3    Associated Diagnoses: Tobacco use disorder      nicotine (NICODERM CQ) 14 MG/24HR 24 hr patch Place 1 patch onto the skin every 24 hours Step 2  Qty: 30 patch, Refills: 1    Associated Diagnoses: Tobacco  use disorder      nicotine (NICODERM CQ) 21 MG/24HR 24 hr patch Place 1 patch onto the skin every 24 hours Step 1  Qty: 30 patch, Refills: 1    Associated Diagnoses: Tobacco use disorder      nicotine (NICODERM CQ) 7 MG/24HR 24 hr patch Place 1 patch onto the skin every 24 hours Step 3  Qty: 30 patch, Refills: 1    Associated Diagnoses: Tobacco use disorder         CONTINUE these medications which have NOT CHANGED    Details   alendronate (FOSAMAX) 70 MG tablet TAKE 1 TABLET (70 MG) BY MOUTH EVERY 7 DAYS.  Please schedule follow up in clinic for additional refills  Qty: 4 tablet, Refills: 0    Associated Diagnoses: Age-related osteoporosis without current pathological fracture      aspirin EC 81 MG EC tablet Take 81 mg by mouth      atorvastatin (LIPITOR) 40 MG tablet Take 1 tablet (40 mg) by mouth daily  Qty: 90 tablet, Refills: 3    Associated Diagnoses: Type 2 diabetes mellitus without complication, with long-term current use of insulin (H)      calcium carb 1250 mg, 500 mg Tribe,/vitamin D 200 unit (CALCIUM 500/D) 500-200 MG-UNIT per tablet Take 1 tablet by mouth      gabapentin (NEURONTIN) 300 MG capsule 300 mg daily  Refills: 0      glucosamine-chondroitin 500-400 MG CAPS per capsule Take 1 capsule by mouth      insulin detemir (LEVEMIR FLEXPEN/FLEXTOUCH) 100 UNIT/ML injection Inject 23 Units Subcutaneous At Bedtime  Qty: 15 mL, Refills: 1    Comments: Profile Rx: patient will contact pharmacy when needed  Associated Diagnoses: Type 2 diabetes mellitus without complication, with long-term current use of insulin (H)      lisinopril (PRINIVIL/ZESTRIL) 2.5 MG tablet Take 1 tablet (2.5 mg) by mouth daily  Qty: 90 tablet, Refills: 3    Associated Diagnoses: Type 2 diabetes mellitus without complication, with long-term current use of insulin (H)      MELATONIN PO Take 10 mg by mouth At Bedtime      metFORMIN (GLUCOPHAGE) 1000 MG tablet Take 1 tablet (1,000 mg) by mouth 2 times daily (with meals)  Qty: 180 tablet,  Refills: 1    Associated Diagnoses: Type 2 diabetes mellitus without complication, with long-term current use of insulin (H)      Multiple Vitamin (MULTI-VITAMINS) TABS Take 1 tablet by mouth      traZODone (DESYREL) 50 MG tablet TAKE 1 TABLET (50 MG) BY MOUTH AT BEDTIME  Qty: 90 tablet, Refills: 3    Associated Diagnoses: Insomnia, unspecified type           Allergies   Allergies   Allergen Reactions     Indomethacin Other (See Comments)     Dizziness and disorientation     Tramadol Nausea and Vomiting     Data   Most Recent 3 CBC's:  Recent Labs   Lab Test 03/07/19  0738 03/06/19  0710 03/05/19  1649   WBC 10.2 13.2* 12.1*   HGB 12.1 12.9 13.5   MCV 90 90 90    258 249      Most Recent 3 BMP's:  Recent Labs   Lab Test 03/07/19  0738 03/06/19  0710 03/05/19  0605  01/18/18  0835 05/08/17   NA  --   --  143  --  138  --    POTASSIUM  --   --  4.4  --  3.9 3.7   CHLORIDE  --   --  114*  --  106  --    CO2  --   --  24  --  23  --    BUN  --   --  10  --  10  --    CR 0.49* 0.52 0.54   < > 0.47* 0.79   ANIONGAP  --   --  5  --  9  --    KAYLEEN  --   --  8.2*  --  8.8  --    GLC  --   --  82  --  130* 250*    < > = values in this interval not displayed.     Most Recent 2 LFT's:  Recent Labs   Lab Test 01/18/18  0835   AST 17   ALT 16   ALKPHOS 70   BILITOTAL 0.8     Most Recent INR's and Anticoagulation Dosing History:  Anticoagulation Dose History     Recent Dosing and Labs Latest Ref Rng & Units 3/4/2019    INR 0.86 - 1.14 1.00        Most Recent Cholesterol Panel:  Recent Labs   Lab Test 03/04/19  0930   CHOL 122   LDL 44   HDL 59   TRIG 94     Most Recent TSH, T4 and A1c Labs:  Recent Labs   Lab Test 03/04/19  0930 05/31/18  1003   TSH  --  2.72   A1C 6.5* 6.1*     Results for orders placed or performed during the hospital encounter of 03/04/19   IR Lower Extremity Angiogram Left    Narrative    INTERVENTIONAL RADIOLOGY LOWER EXTREMITY ANGIOGRAM LEFT   3/4/2019  12:11 PM    HISTORY:  68-year-old woman with left  lower extremity claudication.  Patient has had symptoms for 6 months, though 2 weeks prior symptoms  worsened. Patient saw Dr. Serrato in clinic on March 1, 2019 with  diminished MONICA and duplex ultrasound demonstrating occluded left  superficial femoral artery. Concern was raised for acute on chronic  presentation and plan was made for angiogram.    TECHNIQUE: Patient was brought to the interventional radiology  department and informed consent obtained. Patient was placed in a  supine position. Skin overlying the right groin was prepped and draped  in standard sterile fashion. 1% lidocaine was used for local  anesthesia. Given possibility of thrombolysis, ultrasound was used to  visualize the right common femoral artery and image stored for  documentation. With continuous ultrasound guidance, micropuncture kit  was used to access and over a series of maneuvers, 6 Russian vascular  sheath was placed. Over a guidewire, pigtail catheter was advanced to  the level of the renal arteries where abdominal angiogram performed.  Pigtail catheter was pulled back to the distal abdominal aorta where  pelvic angiogram performed. Renal double curve catheter was used to  cross the aortic bifurcation and placed in the left external iliac  artery where angiogram performed throughout the left lower extremity.  Area of occlusion was noted throughout majority of the superficial  femoral artery. A Glidewire was placed into the superficial femoral  artery and easily crossed through the area of occlusion. A Hammer wire  was placed and the sheath was exchanged for a 6 Russian Antione sheath  which was placed in the left common femoral artery. A 100 cm x 20 cm  infusion length catheter was placed throughout the SFA. Completion  angiogram performed demonstrating appropriate position and thrombus in  the proximal aspect of the SFA with mid and distal aspects with  minimal thrombus. Plan will be for TPA at 0.5 mg/hour and heparin  through the  crossover sheath a 500 units/hour. Patient will return the  following day.    Sedation: 2 mg IV Versed, 100 mcg IV fentanyl.  Sedation time: 43 minutes  Please note the above medications were administered by the  interventional radiology staff under my direct supervision. Patient's  vital signs were monitored and remained stable throughout the  procedure.  Fluoroscopic time: 3.4 minutes  Total fluoroscopic dose: 62.5 mGy  Contrast: 55 mL of Visipaque administered intra-arterially without  complication.  Local anesthetic: 8 mL of 1% lidocaine    FINDINGS: Total of 9 spot fluoroscopic images and angiogram sequences  obtained throughout the procedure. Abdominal aortic angiogram  demonstrates patent bilateral renal arteries. Abdominal aorta is  patent and nonaneurysmal, though with atherosclerotic plaque  throughout the infrarenal segment. Both common iliac, internal iliac,  external iliac arteries are patent. Occlusion throughout majority of  the left superficial femoral artery. The left common femoral, profunda  femoral, popliteal, and runoff arteries are patent.  Runoff arteries  are small in caliber. Infusion catheter then placed demonstrating  occlusion with mixed thrombus in the proximal SFA.      Impression    IMPRESSION:  1. Occlusion of left SFA, found to be in the proximal segment. We will  begin TPA for thrombolysis. Suspect acute on chronic stenosis and  occlusion. TPA will be at 0.5 mg/hour and heparin through the  crossover sheath at 500 units/hour. The patient will return the  following day for repeat angiogram.  2. No inflow disease in the left lower extremity.  3. Popliteal and runoff arteries are patent. Runoff arteries are  small, though patent.     SNEHAL FERRARA MD   IR Angiogram through Catheter Follow Up    Narrative    INTERVENTIONAL RADIOGRAPHY ANGIOGRAM THROUGH CATHETER FOLLOW UP   3/5/2019 1:47 PM    PROCEDURE:  1. Left lower extremity arteriography  2. Mechanical thrombectomy with  AngioJet.  3. Angioplasty of the proximal superficial femoral artery.    MEDICATIONS: 4 1% Lidocaine SQ    CONTRAST: 55 mL Visipaque 320 IA    FLUOROSCOPY TIME: 3.8 minutes, 26 mGy    COMPLICATIONS: None    CLINICAL HISTORY/INDICATION: 68-year-old female with superficial  femoral artery occlusion who had catheter-directed thrombolysis  initiated on previous day. Patient presents for thrombolysis  progression evaluation.     PROCEDURE AND FINDINGS:  Following a discussion of the risks,  benefits, indications, and alternatives to treatment, appropriate  informed consent was obtained from the patient. The patient was  brought to the interventional radiology suite and placed supine on the  table. The right groin, indwelling sheath and catheters were prepped  and draped in a sterile fashion.  A timeout was performed per  universal protocol policy to confirm the correct patient, site and  procedure to be performed.    Preliminary fluoroscopic images were obtained demonstrating the  indwelling infusion catheter to be unchanged in position from the  previous day. The infusion catheter was removed over a guidewire and a  digital subtraction angiogram of the left lower extremity was  performed via the vascular sheath. This demonstrated severe stenosis  of the proximal superficial femoral artery. A small amount of thrombus  is noted within the proximal superficial femoral artery and distal  superficial femoral artery. Popliteal artery is patent. Three-vessel  runoff.    Mechanical thrombectomy was performed with 6 [place] AngioJet  throughout the superficial femoral artery. Post images show no  residual thrombus. There is persistent severe stenosis of the  superficial femoral artery proximally. Over the wire a 6 x 40 mm  balloon was advanced across the proximal superficial femoral artery  stenosis and angioplasty was performed. Post images show significant  improvement in the appearance of the proximal superficial  femoral  artery. The remainder of the superficial femoral artery is patent.  Popliteal artery is patent. There is three-vessel runoff. All  catheters and wires were removed. Angiography of the right groin was  performed. Hemostasis was achieved with a 6 Welsh Angio-Seal.     Throughout the procedure, the patient was monitored by a radiology  nurse for cardiac rhythm and oxygen saturation which remained stable.  The patient tolerated the procedure well and left interventional  radiology in stable condition.      Impression    IMPRESSION:  Resolution of thrombus throughout the superficial femoral  artery. Severe stenosis of the proximal superficial femoral artery,  improved status post angioplasty.    DAE YAO DO

## 2019-03-07 NOTE — PLAN OF CARE
A&O. VSS. Lung sounds diminished. Bowel sounds active, gas+, BM-. Denies pain. Up independently. Tolerating mod carb diet. Angio site soft, CDI. DP pulses palpable, PT pulses faintly palpable, normal with doppler. Left leg circumference inc. Slightly to 36.7 after sitting in chair, pt in bed with leg elevated. CMS/neuro intact. Heparin drip at 10ml/hr.

## 2019-03-07 NOTE — PROGRESS NOTES
HOSPITALIST CONSULT CHART CHECK:     Hospitalist service was consulted for cross coverage only. We will peripherally follow and chart check throughout the week.      - For vascular medical concerns during business hours M-F, call the [facility] at [phone] to have the rounding/on call Vascular Medicine (NOT VASCULAR SURGERY) MD paged.     - After business hours M-F, for medical concerns on this patient, please page hospitalist staff.     - For vascular surgical questions, please page the appropriate surgeon (primary vascular surgeon or on call vascular surgeon) based upon the time of day.

## 2019-03-08 ENCOUNTER — TELEPHONE (OUTPATIENT)
Dept: OTHER | Facility: CLINIC | Age: 69
End: 2019-03-08

## 2019-03-08 DIAGNOSIS — I74.9 ARTERIAL THROMBOSIS (H): Primary | ICD-10-CM

## 2019-03-08 NOTE — TELEPHONE ENCOUNTER
Per Sparkle Colon PA-C 30 day event monitor ordered and routed to scheduling to contact patient and schedule.    Angelia Lopez BSN, RN

## 2019-03-11 ENCOUNTER — HOSPITAL ENCOUNTER (OUTPATIENT)
Dept: CARDIOLOGY | Facility: CLINIC | Age: 69
Discharge: HOME | End: 2019-03-11
Attending: PHYSICIAN ASSISTANT | Admitting: PHYSICIAN ASSISTANT
Payer: COMMERCIAL

## 2019-03-11 DIAGNOSIS — I74.9 ARTERIAL THROMBOSIS (H): ICD-10-CM

## 2019-03-11 PROCEDURE — 0298T ZIO PATCH HOLTER ADULT PEDIATRIC GREATER THAN 48 HRS: CPT | Performed by: INTERNAL MEDICINE

## 2019-03-11 PROCEDURE — 0296T ZIO PATCH HOLTER ADULT PEDIATRIC GREATER THAN 48 HRS: CPT

## 2019-03-13 ENCOUNTER — OFFICE VISIT (OUTPATIENT)
Dept: FAMILY MEDICINE | Facility: CLINIC | Age: 69
End: 2019-03-13
Payer: COMMERCIAL

## 2019-03-13 VITALS
BODY MASS INDEX: 27.31 KG/M2 | TEMPERATURE: 97.5 F | HEIGHT: 64 IN | HEART RATE: 83 BPM | WEIGHT: 160 LBS | OXYGEN SATURATION: 98 % | DIASTOLIC BLOOD PRESSURE: 58 MMHG | RESPIRATION RATE: 16 BRPM | SYSTOLIC BLOOD PRESSURE: 122 MMHG

## 2019-03-13 DIAGNOSIS — Z23 NEED FOR PROPHYLACTIC VACCINATION AGAINST STREPTOCOCCUS PNEUMONIAE (PNEUMOCOCCUS): ICD-10-CM

## 2019-03-13 DIAGNOSIS — E11.9 TYPE 2 DIABETES MELLITUS WITHOUT COMPLICATION, WITH LONG-TERM CURRENT USE OF INSULIN (H): ICD-10-CM

## 2019-03-13 DIAGNOSIS — I10 ESSENTIAL HYPERTENSION: ICD-10-CM

## 2019-03-13 DIAGNOSIS — N30.00 ACUTE CYSTITIS WITHOUT HEMATURIA: ICD-10-CM

## 2019-03-13 DIAGNOSIS — Z13.89 SCREENING FOR DIABETIC PERIPHERAL NEUROPATHY: ICD-10-CM

## 2019-03-13 DIAGNOSIS — Z79.4 TYPE 2 DIABETES MELLITUS WITHOUT COMPLICATION, WITH LONG-TERM CURRENT USE OF INSULIN (H): ICD-10-CM

## 2019-03-13 DIAGNOSIS — R32 URINARY INCONTINENCE, UNSPECIFIED TYPE: Primary | ICD-10-CM

## 2019-03-13 DIAGNOSIS — Z12.11 SCREEN FOR COLON CANCER: ICD-10-CM

## 2019-03-13 DIAGNOSIS — R82.90 NONSPECIFIC FINDING ON EXAMINATION OF URINE: ICD-10-CM

## 2019-03-13 DIAGNOSIS — I73.9 PAD (PERIPHERAL ARTERY DISEASE) (H): ICD-10-CM

## 2019-03-13 DIAGNOSIS — Z00.01 ENCOUNTER FOR ROUTINE ADULT PHYSICAL EXAM WITH ABNORMAL FINDINGS: ICD-10-CM

## 2019-03-13 DIAGNOSIS — H91.90 HEARING PROBLEM, UNSPECIFIED LATERALITY: ICD-10-CM

## 2019-03-13 DIAGNOSIS — Z78.0 ASYMPTOMATIC POSTMENOPAUSAL STATUS: ICD-10-CM

## 2019-03-13 DIAGNOSIS — E78.5 HYPERLIPIDEMIA LDL GOAL <100: ICD-10-CM

## 2019-03-13 DIAGNOSIS — F17.200 TOBACCO USE DISORDER: ICD-10-CM

## 2019-03-13 LAB
ALBUMIN UR-MCNC: NEGATIVE MG/DL
APPEARANCE UR: CLEAR
BACTERIA #/AREA URNS HPF: ABNORMAL /HPF
BILIRUB UR QL STRIP: NEGATIVE
COLOR UR AUTO: YELLOW
CREAT UR-MCNC: 37 MG/DL
GLUCOSE UR STRIP-MCNC: NEGATIVE MG/DL
HGB UR QL STRIP: NEGATIVE
KETONES UR STRIP-MCNC: NEGATIVE MG/DL
LEUKOCYTE ESTERASE UR QL STRIP: ABNORMAL
MICROALBUMIN UR-MCNC: 6 MG/L
MICROALBUMIN/CREAT UR: 15.07 MG/G CR (ref 0–25)
NITRATE UR QL: POSITIVE
NON-SQ EPI CELLS #/AREA URNS LPF: ABNORMAL /LPF
PH UR STRIP: 5.5 PH (ref 5–7)
RBC #/AREA URNS AUTO: ABNORMAL /HPF
SOURCE: ABNORMAL
SP GR UR STRIP: <=1.005 (ref 1–1.03)
UROBILINOGEN UR STRIP-ACNC: 0.2 EU/DL (ref 0.2–1)
WBC #/AREA URNS AUTO: ABNORMAL /HPF

## 2019-03-13 PROCEDURE — 87088 URINE BACTERIA CULTURE: CPT | Performed by: FAMILY MEDICINE

## 2019-03-13 PROCEDURE — 99207 C FOOT EXAM  NO CHARGE: CPT | Mod: 25 | Performed by: FAMILY MEDICINE

## 2019-03-13 PROCEDURE — 87186 SC STD MICRODIL/AGAR DIL: CPT | Performed by: FAMILY MEDICINE

## 2019-03-13 PROCEDURE — 87086 URINE CULTURE/COLONY COUNT: CPT | Performed by: FAMILY MEDICINE

## 2019-03-13 PROCEDURE — 99213 OFFICE O/P EST LOW 20 MIN: CPT | Mod: 25 | Performed by: FAMILY MEDICINE

## 2019-03-13 PROCEDURE — G0438 PPPS, INITIAL VISIT: HCPCS | Performed by: FAMILY MEDICINE

## 2019-03-13 PROCEDURE — 81001 URINALYSIS AUTO W/SCOPE: CPT | Performed by: FAMILY MEDICINE

## 2019-03-13 PROCEDURE — 90732 PPSV23 VACC 2 YRS+ SUBQ/IM: CPT | Performed by: FAMILY MEDICINE

## 2019-03-13 PROCEDURE — G0009 ADMIN PNEUMOCOCCAL VACCINE: HCPCS | Performed by: FAMILY MEDICINE

## 2019-03-13 PROCEDURE — 82043 UR ALBUMIN QUANTITATIVE: CPT | Performed by: FAMILY MEDICINE

## 2019-03-13 RX ORDER — LISINOPRIL 2.5 MG/1
2.5 TABLET ORAL DAILY
Qty: 90 TABLET | Refills: 3 | Status: SHIPPED | OUTPATIENT
Start: 2019-03-13 | End: 2020-03-26

## 2019-03-13 RX ORDER — ATORVASTATIN CALCIUM 40 MG/1
40 TABLET, FILM COATED ORAL DAILY
Qty: 90 TABLET | Refills: 3 | Status: SHIPPED | OUTPATIENT
Start: 2019-03-13 | End: 2020-03-26

## 2019-03-13 ASSESSMENT — ACTIVITIES OF DAILY LIVING (ADL): CURRENT_FUNCTION: LAUNDRY REQUIRES ASSISTANCE

## 2019-03-13 ASSESSMENT — ENCOUNTER SYMPTOMS
SHORTNESS OF BREATH: 0
DIARRHEA: 0
EYE PAIN: 0
CONSTIPATION: 0
FEVER: 0
WEAKNESS: 1
PARESTHESIAS: 1
JOINT SWELLING: 0
CHILLS: 0
WEAKNESS: 0
HEMATOCHEZIA: 0
NERVOUS/ANXIOUS: 1
HEADACHES: 0
NAUSEA: 0
HEMATURIA: 0
DYSURIA: 0
HEARTBURN: 0
ARTHRALGIAS: 0
DIZZINESS: 0
MYALGIAS: 1
COUGH: 0
ABDOMINAL PAIN: 0
PALPITATIONS: 0
FREQUENCY: 1
SORE THROAT: 0

## 2019-03-13 ASSESSMENT — MIFFLIN-ST. JEOR: SCORE: 1245.76

## 2019-03-13 ASSESSMENT — PATIENT HEALTH QUESTIONNAIRE - PHQ9: SUM OF ALL RESPONSES TO PHQ QUESTIONS 1-9: 3

## 2019-03-13 NOTE — PROGRESS NOTES
"SUBJECTIVE:   Rajani Guo is a 68 year old female who presents for Preventive Visit.  Are you in the first 12 months of your Medicare coverage?  No    Annual Wellness Visit     In general, how would you rate your overall health?  Fair    Frequency of exercise:  None    Do you usually eat at least 4 servings of fruit and vegetables a day, include whole grains    & fiber and avoid regularly eating high fat or \"junk\" foods?  Yes    Taking medications regularly:  Yes    Medication side effects:  Other    Ability to successfully perform activities of daily living:  Laundry requires assistance    Home Safety:  No safety concerns identified    Hearing Impairment:  Feel that people are mumbling or not speaking clearly, need to ask people to speak up or repeat themselves and difficulty understanding soft or whispered speech    In the past 6 months, have you been bothered by leaking of urine? Yes    In general, how would you rate your overall mental or emotional health?  Good    PHQ-2 Total Score: 0    Additional concerns today:  Yes    Do you feel safe in your environment? Yes    Do you have a Health Care Directive? Yes: Advance Directive has been received and scanned.    Fall risk  Fallen 2 or more times in the past year?: No  Any fall with injury in the past year?: No    Cognitive Screening   1) Repeat 3 items (Leader, Season, Table)    2) Clock draw: NORMAL  3) 3 item recall: Recalls 1 object   Results: NORMAL clock, 1-2 items recalled: COGNITIVE IMPAIRMENT LESS LIKELY    Mini-CogTM Copyright COLEEN Forte. Licensed by the author for use in [facility]; reprinted with permission (kelton@.[facility]). All rights reserved.      Do you have sleep apnea, excessive snoring or daytime drowsiness?: no    Reviewed and updated as needed this visit by clinical staff  Tobacco  Allergies  Meds  Med Hx  Surg Hx  Fam Hx  Soc Hx        Reviewed and updated as needed this visit by Provider        Social History     Tobacco Use "     Smoking status: Current Every Day Smoker     Packs/day: 1.50     Years: 52.00     Pack years: 78.00     Smokeless tobacco: Never Used   Substance Use Topics     Alcohol use: Yes       Alcohol Use 3/13/2019   If you drink alcohol do you typically have greater than 3 drinks per day OR greater than 7 drinks per week? No     Pt has had angioplasty of occlusion of Left SFA-see surgery Notes  She is doing well  She is Trying to quit smoking and on zyban and patch  She has had Itching since she went on zyban  No sob   No wheezingCurrent providers sharing in care for this patient include:   1. Peripheral arterial disease with acute on chronic lifestyle limiting claudication of the left lower extremity s/p succesful catheter-directed lysis and angioplasty of proximal left SFA 3/4/19-3/5/19     Diabetes Follow-up      Patient is checking blood sugars: doing well    Diabetic concerns: None     Symptoms of hypoglycemia (low blood sugar): none     Paresthesias (numbness or burning in feet) or sores: No      BP Readings from Last 2 Encounters:   03/13/19 122/58   03/07/19 142/75     Hemoglobin A1C (%)   Date Value   03/04/2019 6.5 (H)   05/31/2018 6.1 (H)     LDL Cholesterol Calculated (mg/dL)   Date Value   03/04/2019 44   01/18/2018 83       Diabetes Management Resources  Hyperlipidemia Follow-Up      Rate your low fat/cholesterol diet?: good    Taking statin?  Yes, no muscle aches from statin    Other lipid medications/supplements?:  none    Hypertension Follow-up      Outpatient blood pressures are not being checked.    Low Salt Diet: no added salt  Pt is s/p angioplasty SFA left leg and here for a follow up     Patient Care Team:  Tamiko Coley MD as PCP - General (Family Practice)  Meri Rodriguez NP as Assigned PCP    The following health maintenance items are reviewed in Epic and correct as of today:  Health Maintenance   Topic Date Due     EYE EXAM Q1 YEAR  12/20/1951     COLON CANCER SCREEN (SYSTEM ASSIGNED)   12/20/2001     ZOSTER IMMUNIZATION (2 of 3) 01/19/2016     MEDICARE ANNUAL WELLNESS VISIT  12/20/2016     DEXA SCAN SCREENING (SYSTEM ASSIGNED)  12/20/2016     PNEUMOCOCCAL IMMUNIZATION 65+ LOW/MEDIUM RISK (2 of 2 - PPSV23) 11/30/2018     FOOT EXAM Q1 YEAR  01/18/2019     CMP Q1 YR  01/18/2019     MICROALBUMIN Q1 YEAR  01/18/2019     A1C Q3 MO  06/04/2019     PHQ-2 Q1 YR  02/21/2020     LUNG CANCER SCREENING ANNUAL  02/22/2020     FALL RISK ASSESSMENT  03/01/2020     LIPID MONITORING Q1 YEAR  03/04/2020     TSH W/ FREE T4 REFLEX Q2 YEAR  05/31/2020     MAMMO SCREEN Q2 YR (SYSTEM ASSIGNED)  06/04/2020     DTAP/TDAP/TD IMMUNIZATION (3 - Td) 06/30/2021     ADVANCE DIRECTIVE PLANNING Q5 YRS  03/05/2024     INFLUENZA VACCINE  Completed     HEPATITIS C SCREENING  Completed     IPV IMMUNIZATION  Aged Out     MENINGITIS IMMUNIZATION  Aged Out     Labs reviewed in EPIC  BP Readings from Last 3 Encounters:   03/13/19 122/58   03/07/19 142/75   03/01/19 160/75    Wt Readings from Last 3 Encounters:   03/13/19 72.6 kg (160 lb)   03/07/19 71.6 kg (157 lb 13.6 oz)   02/21/19 72.6 kg (160 lb)                  Patient Active Problem List   Diagnosis     Type 2 diabetes mellitus without complication, with long-term current use of insulin (H)     Hyperlipidemia LDL goal <100     Age-related osteoporosis without current pathological fracture     Insomnia, unspecified type     Tobacco use disorder     History of partial thyroidectomy     Newly recognized murmur     Essential hypertension     PVD (peripheral vascular disease) (H)     Claudication of left lower extremity (H)     Past Surgical History:   Procedure Laterality Date     IR ANGIOGRAM THROUGH CATHETER FOLLOW UP  3/5/2019     IR LOWER EXTREMITY ANGIOGRAM LEFT  3/4/2019     KNEE SURGERY Right 2013    right knee torn meniscus surgery     OVARY SURGERY  1971    1 ovary removed     RELEASE CARPAL TUNNEL Right 1988     RELEASE TRIGGER FINGER BILATERAL       SHOULDER SURGERY Left      rotator cuff repair, plate placement     THYROID SURGERY  1988    partial thyroidectomy       Social History     Tobacco Use     Smoking status: Current Every Day Smoker     Packs/day: 1.50     Years: 52.00     Pack years: 78.00     Smokeless tobacco: Never Used   Substance Use Topics     Alcohol use: Yes     History reviewed. No pertinent family history.      Current Outpatient Medications   Medication Sig Dispense Refill     alendronate (FOSAMAX) 70 MG tablet TAKE 1 TABLET (70 MG) BY MOUTH EVERY 7 DAYS.  Please schedule follow up in clinic for additional refills 4 tablet 0     apixaban ANTICOAGULANT (ELIQUIS) 5 MG tablet Take 1 tablet (5 mg) by mouth 2 times daily 60 tablet 3     aspirin EC 81 MG EC tablet Take 81 mg by mouth       atorvastatin (LIPITOR) 40 MG tablet Take 1 tablet (40 mg) by mouth daily 90 tablet 3     buPROPion (ZYBAN) 150 MG 12 hr tablet Take 1 tablet (150 mg) by mouth 2 times daily Take once daily for first 4 days, then twice daily after that. 60 tablet 3     calcium carb 1250 mg, 500 mg [place],/vitamin D 200 unit (CALCIUM 500/D) 500-200 MG-UNIT per tablet Take 1 tablet by mouth       gabapentin (NEURONTIN) 300 MG capsule 300 mg daily  0     glucosamine-chondroitin 500-400 MG CAPS per capsule Take 1 capsule by mouth       insulin detemir (LEVEMIR FLEXPEN/FLEXTOUCH) 100 UNIT/ML pen Inject 23 Units Subcutaneous At Bedtime 15 mL 1     lisinopril (PRINIVIL/ZESTRIL) 2.5 MG tablet Take 1 tablet (2.5 mg) by mouth daily 90 tablet 3     MELATONIN PO Take 10 mg by mouth At Bedtime       metFORMIN (GLUCOPHAGE) 1000 MG tablet Take 1 tablet (1,000 mg) by mouth 2 times daily (with meals) 180 tablet 1     Multiple Vitamin (MULTI-VITAMINS) TABS Take 1 tablet by mouth       nicotine (NICODERM CQ) 14 MG/24HR 24 hr patch Place 1 patch onto the skin every 24 hours Step 2 30 patch 1     nicotine (NICODERM CQ) 21 MG/24HR 24 hr patch Place 1 patch onto the skin every 24 hours Step 1 30 patch 1     nicotine (NICODERM  CQ) 7 MG/24HR 24 hr patch Place 1 patch onto the skin every 24 hours Step 3 30 patch 1     traZODone (DESYREL) 50 MG tablet TAKE 1 TABLET (50 MG) BY MOUTH AT BEDTIME 90 tablet 3     Allergies   Allergen Reactions     Indomethacin Other (See Comments)     Dizziness and disorientation     Tramadol Nausea and Vomiting     Recent Labs   Lab Test 03/07/19  0738 03/06/19  0710 03/05/19  0605 03/04/19  0930 05/31/18  1003 01/18/18  0835  05/08/17   A1C  --   --   --  6.5* 6.1* 6.4*   < >  --    LDL  --   --   --  44  --  83  --  126   HDL  --   --   --  59  --  68  --  49   TRIG  --   --   --  94  --  78  --  127   ALT  --   --   --   --   --  16  --   --    CR 0.49* 0.52 0.54 0.56  --  0.47*  --  0.79   GFRESTIMATED >90 >90 >90 >90  --  >90  --  >60   GFRESTBLACK >90 >90 >90 >90  --  >90  --  >60   POTASSIUM  --   --  4.4  --   --  3.9  --  3.7   TSH  --   --   --   --  2.72  --   --   --     < > = values in this interval not displayed.          Review of Systems   Constitutional: Negative for chills and fever.   HENT: Negative for congestion, ear pain, hearing loss and sore throat.    Eyes: Positive for visual disturbance (making appointment with eye Physician). Negative for pain.   Respiratory: Negative for cough and shortness of breath.    Cardiovascular: Negative for chest pain, palpitations and peripheral edema.   Gastrointestinal: Negative for abdominal pain, constipation, diarrhea, heartburn, hematochezia and nausea.   Genitourinary: Positive for urgency. Negative for dysuria, genital sores and hematuria. Frequency: has incontinence  -has pelvic Relaxation.   Musculoskeletal: Positive for myalgias. Negative for arthralgias and joint swelling. Back pain: arthritis.   Skin: Positive for rash.   Neurological: Positive for paresthesias. Negative for dizziness, weakness and headaches.   Psychiatric/Behavioral: Positive for mood changes. The patient is nervous/anxious (depends on situation -does not need medicines as she  "Feels okay).      CONSTITUTIONAL: NEGATIVE for fever, chills, change in weight  INTEGUMENTARY/SKIN: NEGATIVE for worrisome rashes, moles or lesions  ENT/MOUTH: NEGATIVE for ear, mouth and throat problems  RESP: NEGATIVE for significant cough or SOB  CV: NEGATIVE for chest pain, palpitations or peripheral edema  GI: NEGATIVE for nausea, abdominal pain, heartburn, or change in bowel habits  : has Urinary Incontinence Long time  MUSCULOSKELETAL: arthritis  NEURO: NEGATIVE for weakness, dizziness or paresthesias  HEME/ALLERGY/IMMUNE: NEGATIVE for bleeding problems  PSYCHIATRIC: has anxiety but declines meds  ROS otherwise negative    OBJECTIVE:   /58   Pulse 83   Temp 97.5  F (36.4  C) (Oral)   Resp 16   Ht 1.626 m (5' 4\")   Wt 72.6 kg (160 lb)   SpO2 98%   Breastfeeding? No   BMI 27.46 kg/m   Estimated body mass index is 27.46 kg/m  as calculated from the following:    Height as of this encounter: 1.626 m (5' 4\").    Weight as of this encounter: 72.6 kg (160 lb).  Physical Exam  GENERAL APPEARANCE: healthy, alert and no distress  EYES: Eyes grossly normal to inspection, PERRL and conjunctivae and sclerae normal  HENT: ear canals and TM's normal, nose and mouth without ulcers or lesions, oropharynx clear and oral mucous membranes moist  NECK: no adenopathy, no asymmetry, masses, or scars and thyroid normal to palpation  RESP: lungs clear to auscultation - no rales, rhonchi or wheezes  BREAST: normal without masses, tenderness or nipple discharge and no palpable axillary masses or adenopathy  CV: regular rate and rhythm, normal S1 S2, no S3 or S4, no murmur, click or rub, no peripheral edema and peripheral pulses strong  ABDOMEN: soft, nontender, no hepatosplenomegaly, no masses and bowel sounds normal  MS: no musculoskeletal defects are noted and gait is age appropriate without ataxia  SKIN: no suspicious lesions or rashes  NEURO: Normal strength and tone, sensory exam grossly normal, mentation intact " and speech normal  PSYCH: mentation appears normal and affect normal/bright    Diagnostic Test Results:  Pending     ASSESSMENT / PLAN:   1. Encounter for routine adult physical exam with abnormal findings      2. Type 2 diabetes mellitus without complication, with long-term current use of insulin (H)  Refilled    - Albumin Random Urine Quantitative with Creat Ratio  - insulin detemir (LEVEMIR FLEXPEN/FLEXTOUCH) 100 UNIT/ML pen; Inject 23 Units Subcutaneous At Bedtime  Dispense: 15 mL; Refill: 1  - lisinopril (PRINIVIL/ZESTRIL) 2.5 MG tablet; Take 1 tablet (2.5 mg) by mouth daily  Dispense: 90 tablet; Refill: 3  - atorvastatin (LIPITOR) 40 MG tablet; Take 1 tablet (40 mg) by mouth daily  Dispense: 90 tablet; Refill: 3    3. Hyperlipidemia LDL goal < 70  On statins    4. Tobacco use disorder  Pt was signed up for quit plan  She will stop wellbutrin and see if Itching better  If not better -Please call vascular surgeon about barbara    5. Essential hypertension  Stable     6. PAD (peripheral artery disease) (H)  S/p angioplasty Left SFA and doing well    7. Hearing problem, unspecified laterality  Referral done to audiology  - AUDIOLOGY ADULT REFERRAL    8. Screen for colon cancer  Pt has darian    9. Asymptomatic postmenopausal status    - DEXA HIP/PELVIS/SPINE - Future; Future    10. Need for prophylactic vaccination against Streptococcus pneumoniae (pneumococcus)  Advised     11. Screening for diabetic peripheral neuropathy    - FOOT EXAM  NO CHARGE [13906.717]    End of Life Planning:  Patient currently has an advanced directive: No.  I have verified the patient's ablity to prepare an advanced directive/make health care decisions.  Literature was provided to assist patient in preparing an advanced directive.    COUNSELING:  Reviewed preventive health counseling, as reflected in patient instructions       Regular exercise       Healthy diet/nutrition       Vision screening       Hearing screening       Dental  "care       Bladder control       Fall risk prevention       Immunizations    Vaccinated for: Pneumococcal             Osteoporosis Prevention/Bone Health       Colon cancer screening       The ASCVD Risk score (Suffolkefraín MUNGUIA Jr., et al., 2013) failed to calculate for the following reasons:    The valid total cholesterol range is 130 to 320 mg/dL       Advanced Planning     BP Readings from Last 1 Encounters:   03/13/19 122/58     Estimated body mass index is 27.46 kg/m  as calculated from the following:    Height as of this encounter: 1.626 m (5' 4\").    Weight as of this encounter: 72.6 kg (160 lb).           reports that she has been smoking.  She has a 78.00 pack-year smoking history. she has never used smokeless tobacco.  Tobacco Cessation Action Plan: Phone counseling: Place order for QuitPlan (Tobacco Cessation [facility] Referral [id number])    Appropriate preventive services were discussed with this patient, including applicable screening as appropriate for cardiovascular disease, diabetes, osteopenia/osteoporosis, and glaucoma.  As appropriate for age/gender, discussed screening for colorectal cancer, prostate cancer, breast cancer, and cervical cancer. Checklist reviewing preventive services available has been given to the patient.    Reviewed patients plan of care and provided an AVS. The Intermediate Care Plan ( asthma action plan, low back pain action plan, and migraine action plan) for Rajani meets the Care Plan requirement. This Care Plan has been established and reviewed with the Patient.    Counseling Resources:  ATP IV Guidelines  Pooled Cohorts Equation Calculator  Breast Cancer Risk Calculator  FRAX Risk Assessment  ICSI Preventive Guidelines  Dietary Guidelines for Americans, 2010  USDA's MyPlate  ASA Prophylaxis  Lung CA Screening    Tamiko Coley MD  [facility]  "

## 2019-03-14 LAB
BACTERIA SPEC CULT: ABNORMAL
BACTERIA SPEC CULT: ABNORMAL
SPECIMEN SOURCE: ABNORMAL

## 2019-03-14 RX ORDER — SULFAMETHOXAZOLE/TRIMETHOPRIM 800-160 MG
1 TABLET ORAL 2 TIMES DAILY
Qty: 14 TABLET | Refills: 0 | Status: SHIPPED | OUTPATIENT
Start: 2019-03-14 | End: 2019-08-01

## 2019-03-15 ENCOUNTER — ANCILLARY PROCEDURE (OUTPATIENT)
Dept: BONE DENSITY | Facility: CLINIC | Age: 69
End: 2019-03-15
Attending: FAMILY MEDICINE
Payer: COMMERCIAL

## 2019-03-15 ENCOUNTER — ANCILLARY PROCEDURE (OUTPATIENT)
Dept: MAMMOGRAPHY | Facility: CLINIC | Age: 69
End: 2019-03-15
Payer: COMMERCIAL

## 2019-03-15 DIAGNOSIS — Z12.31 VISIT FOR SCREENING MAMMOGRAM: ICD-10-CM

## 2019-03-15 DIAGNOSIS — Z78.0 ASYMPTOMATIC POSTMENOPAUSAL STATUS: ICD-10-CM

## 2019-03-15 PROCEDURE — 77067 SCR MAMMO BI INCL CAD: CPT | Mod: TC

## 2019-03-15 PROCEDURE — 77080 DXA BONE DENSITY AXIAL: CPT | Performed by: FAMILY MEDICINE

## 2019-03-18 DIAGNOSIS — I73.9 CLAUDICATION OF LEFT LOWER EXTREMITY (H): Primary | ICD-10-CM

## 2019-03-18 RX ORDER — GABAPENTIN 300 MG/1
CAPSULE ORAL
Qty: 80 CAPSULE | Refills: 0 | Status: SHIPPED | OUTPATIENT
Start: 2019-03-18 | End: 2019-04-28

## 2019-03-18 NOTE — TELEPHONE ENCOUNTER
gabapentin (NEURONTIN) 300 MG capsule      Last Written Prescription Date:  2/6/2019  Last Fill Quantity: ?,   # refills: 0  Last Office Visit: 3/13/2019  Future Office visit:       Routing refill request to provider for review/approval because:  Drug not on the [facility], P or [facility] refill protocol or controlled substance  Medication is reported/historical

## 2019-03-19 ENCOUNTER — TELEPHONE (OUTPATIENT)
Dept: OTHER | Facility: CLINIC | Age: 69
End: 2019-03-19

## 2019-03-19 DIAGNOSIS — I73.9 CLAUDICATION OF LEFT LOWER EXTREMITY (H): Primary | ICD-10-CM

## 2019-03-20 ENCOUNTER — OFFICE VISIT (OUTPATIENT)
Dept: OTOLARYNGOLOGY | Facility: CLINIC | Age: 69
End: 2019-03-20
Payer: COMMERCIAL

## 2019-03-20 ENCOUNTER — TELEPHONE (OUTPATIENT)
Dept: FAMILY MEDICINE | Facility: CLINIC | Age: 69
End: 2019-03-20

## 2019-03-20 ENCOUNTER — OFFICE VISIT (OUTPATIENT)
Dept: AUDIOLOGY | Facility: CLINIC | Age: 69
End: 2019-03-20
Payer: COMMERCIAL

## 2019-03-20 DIAGNOSIS — H90.3 SENSORINEURAL HEARING LOSS, BILATERAL: Primary | ICD-10-CM

## 2019-03-20 DIAGNOSIS — R59.1 LYMPHADENOPATHY: ICD-10-CM

## 2019-03-20 DIAGNOSIS — H90.3 SENSORINEURAL HEARING LOSS (SNHL) OF BOTH EARS: Primary | ICD-10-CM

## 2019-03-20 PROCEDURE — 92567 TYMPANOMETRY: CPT | Mod: 52 | Performed by: AUDIOLOGIST

## 2019-03-20 PROCEDURE — 99207 ZZC NO CHARGE LOS: CPT | Performed by: AUDIOLOGIST

## 2019-03-20 PROCEDURE — 92557 COMPREHENSIVE HEARING TEST: CPT | Performed by: AUDIOLOGIST

## 2019-03-20 PROCEDURE — 99203 OFFICE O/P NEW LOW 30 MIN: CPT | Performed by: OTOLARYNGOLOGY

## 2019-03-20 NOTE — LETTER
April 8, 2019    Rajani Guo  [address] [facility] [address]    Dear Rajani,    We care about your health and have reviewed your health plan. We have reviewed your medical conditions, medication list, and lab results and are making recommendations based on this review, to better manage your health.    You are in particular need of attention regarding:  - Scheduling a Colon Cancer Screening (Colonoscopy only) [phone]      Here is a list of Health Maintenance topics that are due now or due soon:  Health Maintenance Due   Topic Date Due     EYE EXAM Q1 YEAR  12/20/1951     COLON CANCER SCREEN (SYSTEM ASSIGNED)  12/20/2001     ZOSTER IMMUNIZATION (2 of 3) 01/19/2016     We will be calling you in the next couple of weeks to help you schedule any appointments that are needed.  Please call us at [phone] (or use [email or web address]) to address the above recommendations.     Thank you for trusting [facility] with your healthcare needs. We appreciate the opportunity to serve you and look forward to supporting you in the future.    Healthy Regards,    Your Care Team/kb

## 2019-03-20 NOTE — TELEPHONE ENCOUNTER
Panel Management Review      Patient has the following on her problem list: None      Composite cancer screening  Chart review shows that this patient is due/due soon for the following Colonoscopy  Summary:    Patient is due/failing the following:   COLONOSCOPY    Action needed:   Patient needs office visit for Colon screen .    Type of outreach:    Sent [employer] message.    Questions for provider review:    None                                                                                                                                    Zayra Ren CMA      Letter mailed to patient, as My-Chart message has not been read.    Zayra Ren CMA

## 2019-03-20 NOTE — PROGRESS NOTES
AUDIOLOGY REPORT:    Patient was referred to Audiology from ENT by Sai Barnett MD for a hearing examination. Patient reports decreased hearing bilaterally and denies, otalgia, otorrhea, tinnitus.    Testing:    Otoscopy:   Otoscopic exam indicates ears are clear of cerumen bilaterally     Tympanograms:    RIGHT: Could not seal     LEFT:   Could not seal    Thresholds:   Pure Tone Thresholds assessed using conventional audiometry with good  reliability from 250-8000 Hz bilaterally using insert earphones     RIGHT:  mild sensorineural hearing loss    LEFT:    mild sensorineural hearing loss    Speech Reception Threshold:    RIGHT: 30 dB HL    LEFT:   25 dB HL    Word Recognition Score:     RIGHT: 100% at 65 dB HL using NU-6 recorded word list.    LEFT:   100% at 65 dB HL using NU-6 recorded word list.    Discussed results with the patient. Patient is a hearing aid candidate and was given the [facility] hearing aid info packet.  If she would like a trial period with hearing aids, patient was encouraged to schedule a hearing aid consultation appointment.    Patient was returned to ENT for follow up.     Javed Cesar MA, CCC-A  [place] Licensed Audiologist #[id number]  3/20/2019

## 2019-03-20 NOTE — PATIENT INSTRUCTIONS
General Scheduling Information  To schedule your CT/MRI scan, please contact [place] [facility] at [phone]   [address] [place] [address]    To schedule your Surgery, please contact our Specialty Schedulers at [phone]    ENT Clinic Locations Clinic Hours Telephone Number     [facility]  [address] [place] [address]   Tuesday:       8:00am -- 4:00pm    Wednesday:  8:00am - 4:00pm   To schedule an appointment with   Dr. Barnett,   please contact our   Specialty Scheduling Department at:     [phone]       [facility]  [address] [place] [address]   Friday:          8:00am - 4:00pm         Urgent Care Locations Clinic Hours Telephone Numbers     [facility]  [address] [place] [address]     Monday-Friday:     11:00pm - 9:00pm    Saturday-Sunday:  9:00am - 5:00pm   [phone]     [facility]  [address] [place] [address]     Monday-Friday:      5:00pm - 9:00pm     Saturday-Sunday:  9:00am - 5:00pm   [phone]

## 2019-03-20 NOTE — PROGRESS NOTES
Chief Complaint - Hearing loss    History of Present Illness - Rajani Guo is a 68 year old female who presents to me today with hearing loss in both ears.  It has been present and noticeable for approximately years, slowly worsening. The patient has noticed increased difficulty hearing certain sounds and difficulty in understanding others. There is no history of recent head trauma, or chronic ear disease or ear surgery.  With regards to recreational, , and work-related noise exposure had minimal. No family history of hearing loss at a young age. The patient denies otorrhea, otalgia.     On exam she had a right level 2 palpable lymph node, possibly 1-1/2 or 2 cm.  She states it has been like this for years.  It has not bothered her.  It does not appear to be growing.  She is a smoker and has done so for more than 50 years.    Past Medical History -   Patient Active Problem List   Diagnosis     Type 2 diabetes mellitus without complication, with long-term current use of insulin (H)     Hyperlipidemia LDL goal <100     Age-related osteoporosis without current pathological fracture     Insomnia, unspecified type     Tobacco use disorder     History of partial thyroidectomy     Newly recognized murmur     Essential hypertension     PVD (peripheral vascular disease) (H)     Claudication of left lower extremity (H)       Current Medications -   Current Outpatient Medications:      alendronate (FOSAMAX) 70 MG tablet, TAKE 1 TABLET (70 MG) BY MOUTH EVERY 7 DAYS.  Please schedule follow up in clinic for additional refills, Disp: 4 tablet, Rfl: 0     apixaban ANTICOAGULANT (ELIQUIS) 5 MG tablet, Take 1 tablet (5 mg) by mouth 2 times daily, Disp: 60 tablet, Rfl: 3     aspirin EC 81 MG EC tablet, Take 81 mg by mouth, Disp: , Rfl:      atorvastatin (LIPITOR) 40 MG tablet, Take 1 tablet (40 mg) by mouth daily, Disp: 90 tablet, Rfl: 3     buPROPion (ZYBAN) 150 MG 12 hr tablet, Take 1 tablet (150 mg) by mouth 2 times  daily Take once daily for first 4 days, then twice daily after that., Disp: 60 tablet, Rfl: 3     calcium carb 1250 mg, 500 mg [place],/vitamin D 200 unit (CALCIUM 500/D) 500-200 MG-UNIT per tablet, Take 1 tablet by mouth, Disp: , Rfl:      gabapentin (NEURONTIN) 300 MG capsule, TAKE 1 CAPSULE ONE OR TWO TIMES DAILY, Disp: 80 capsule, Rfl: 0     glucosamine-chondroitin 500-400 MG CAPS per capsule, Take 1 capsule by mouth, Disp: , Rfl:      insulin detemir (LEVEMIR FLEXPEN/FLEXTOUCH) 100 UNIT/ML pen, Inject 23 Units Subcutaneous At Bedtime, Disp: 15 mL, Rfl: 1     lisinopril (PRINIVIL/ZESTRIL) 2.5 MG tablet, Take 1 tablet (2.5 mg) by mouth daily, Disp: 90 tablet, Rfl: 3     MELATONIN PO, Take 10 mg by mouth At Bedtime, Disp: , Rfl:      metFORMIN (GLUCOPHAGE) 1000 MG tablet, Take 1 tablet (1,000 mg) by mouth 2 times daily (with meals), Disp: 180 tablet, Rfl: 1     Multiple Vitamin (MULTI-VITAMINS) TABS, Take 1 tablet by mouth, Disp: , Rfl:      nicotine (NICODERM CQ) 14 MG/24HR 24 hr patch, Place 1 patch onto the skin every 24 hours Step 2, Disp: 30 patch, Rfl: 1     nicotine (NICODERM CQ) 21 MG/24HR 24 hr patch, Place 1 patch onto the skin every 24 hours Step 1, Disp: 30 patch, Rfl: 1     nicotine (NICODERM CQ) 7 MG/24HR 24 hr patch, Place 1 patch onto the skin every 24 hours Step 3, Disp: 30 patch, Rfl: 1     sulfamethoxazole-trimethoprim (BACTRIM DS/SEPTRA DS) 800-160 MG tablet, Take 1 tablet by mouth 2 times daily for 7 days, Disp: 14 tablet, Rfl: 0     traZODone (DESYREL) 50 MG tablet, TAKE 1 TABLET (50 MG) BY MOUTH AT BEDTIME, Disp: 90 tablet, Rfl: 3    Allergies -   Allergies   Allergen Reactions     Indomethacin Other (See Comments)     Dizziness and disorientation     Tramadol Nausea and Vomiting       Social History -   Social History     Socioeconomic History     Marital status: Single     Spouse name: Not on file     Number of children: Not on file     Years of education: Not on file     Highest education  level: Not on file   Occupational History     Not on file   Social Needs     Financial resource strain: Not on file     Food insecurity:     Worry: Not on file     Inability: Not on file     Transportation needs:     Medical: Not on file     Non-medical: Not on file   Tobacco Use     Smoking status: Current Every Day Smoker     Packs/day: 1.50     Years: 52.00     Pack years: 78.00     Smokeless tobacco: Never Used   Substance and Sexual Activity     Alcohol use: Yes     Drug use: No     Sexual activity: No   Lifestyle     Physical activity:     Days per week: Not on file     Minutes per session: Not on file     Stress: Not on file   Relationships     Social connections:     Talks on phone: Not on file     Gets together: Not on file     Attends Ep[employer] service: Not on file     Active member of club or organization: Not on file     Attends meetings of clubs or organizations: Not on file     Relationship status: Not on file     Intimate partner violence:     Fear of current or ex partner: Not on file     Emotionally abused: Not on file     Physically abused: Not on file     Forced sexual activity: Not on file   Other Topics Concern     Parent/sibling w/ CABG, MI or angioplasty before 65F 55M? Not Asked   Social History Narrative     Not on file       Family History - see HPI    Review of Systems - As per HPI and PMHx, she denies any oral sores.  Otherwise 7 system review of the head and neck negative.    Physical Exam  General - The patient is in no distress.  Alert and oriented to person and place, answers questions and cooperates with examination appropriately.   Voice and Breathing - The patient was breathing comfortably without the use of accessory muscles. There was no wheezing, stridor, or stertor.  The patients voice was clear and strong.  Ears - The auricles are normal. The tympanic membranes are normal in appearance, bony landmarks are intact.  No retraction, perforation, or masses.  No fluid or purulence  was seen in the external canal or the middle ear. No evidence of infection of the middle ear or external canal, cerumen was normal in appearance.  Eyes - Extraocular movements intact.  Sclera were not icteric or injected.  Mouth - Examination of the oral cavity showed pink, healthy mucosa. No lesions or ulcerations noted.  The tongue was mobile and midline.  Throat - The walls of the oropharynx were smooth, symmetric, and had no lesions or ulcerations. The uvula was midline on elevation.    Neck -she has a palpable right level 2 lymph node, ovoid, but possibly 1/2-2 cm.  It appears deep to the SCM so I do not think it is a parotid gland mass but that is also possible.  No other palpable lymphadenopathy of the cervical neck.  Neurological - Cranial nerves 2 through 12 were grossly intact. House-Brackmann grade 1 out of 6 bilaterally.       Audiologic Studies - An audiogram and tympanogram were performed today as part of the evaluation and personally reviewed. The tympanogram shows a normal Type A curve, with normal canal volume and middle ear pressure.  There is no sign of eustachian tube dysfunction or middle ear effusion.  The audiogram showed mostly symmetric mild sensorineural hearing loss across all frequencies.    Assessment and Plan - Rajani Guo is a 68 year old female who presents to me today with hearing loss.  This is most consistent with presbycusis. I can find no evidence of serious CNS disorders or other complicating factors that could be causing this.  We spent the remainder of today's visit on education. We discussed hearing protection in noisy environments.  Repeat audiogram 1 year.  She can consider hearing aid consult but I get the impression she is not ready for that yet.    Physical exam she was noted to have a right level 2 1.5-2 cm neck mass.  This is likely a lymph node that she feels is been present for years without changing.  However given the fact that she is a smoker and its size I  recommend CT neck.  I will call her with the results of this.    Sai Barnett MD  Otolaryngology  [facility]

## 2019-03-20 NOTE — LETTER
3/20/2019         RE: Rajani Guo  [address] [facility] [address]        Dear Colleague,    Thank you for referring your patient, Rajani Guo, to the [facility] [place]. Please see a copy of my visit note below.    Chief Complaint - Hearing loss    History of Present Illness - Rajani Guo is a 68 year old female who presents to me today with hearing loss in both ears.  It has been present and noticeable for approximately years, slowly worsening. The patient has noticed increased difficulty hearing certain sounds and difficulty in understanding others. There is no history of recent head trauma, or chronic ear disease or ear surgery.  With regards to recreational, , and work-related noise exposure had minimal. No family history of hearing loss at a young age. The patient denies otorrhea, otalgia.     On exam she had a right level 2 palpable lymph node, possibly 1-1/2 or 2 cm.  She states it has been like this for years.  It has not bothered her.  It does not appear to be growing.  She is a smoker and has done so for more than 50 years.    Past Medical History -   Patient Active Problem List   Diagnosis     Type 2 diabetes mellitus without complication, with long-term current use of insulin (H)     Hyperlipidemia LDL goal <100     Age-related osteoporosis without current pathological fracture     Insomnia, unspecified type     Tobacco use disorder     History of partial thyroidectomy     Newly recognized murmur     Essential hypertension     PVD (peripheral vascular disease) (H)     Claudication of left lower extremity (H)       Current Medications -   Current Outpatient Medications:      alendronate (FOSAMAX) 70 MG tablet, TAKE 1 TABLET (70 MG) BY MOUTH EVERY 7 DAYS.  Please schedule follow up in clinic for additional refills, Disp: 4 tablet, Rfl: 0     apixaban ANTICOAGULANT (ELIQUIS) 5 MG tablet, Take 1 tablet (5 mg) by mouth 2 times daily, Disp: 60 tablet, Rfl: 3     aspirin EC 81 MG  EC tablet, Take 81 mg by mouth, Disp: , Rfl:      atorvastatin (LIPITOR) 40 MG tablet, Take 1 tablet (40 mg) by mouth daily, Disp: 90 tablet, Rfl: 3     buPROPion (ZYBAN) 150 MG 12 hr tablet, Take 1 tablet (150 mg) by mouth 2 times daily Take once daily for first 4 days, then twice daily after that., Disp: 60 tablet, Rfl: 3     calcium carb 1250 mg, 500 mg [place],/vitamin D 200 unit (CALCIUM 500/D) 500-200 MG-UNIT per tablet, Take 1 tablet by mouth, Disp: , Rfl:      gabapentin (NEURONTIN) 300 MG capsule, TAKE 1 CAPSULE ONE OR TWO TIMES DAILY, Disp: 80 capsule, Rfl: 0     glucosamine-chondroitin 500-400 MG CAPS per capsule, Take 1 capsule by mouth, Disp: , Rfl:      insulin detemir (LEVEMIR FLEXPEN/FLEXTOUCH) 100 UNIT/ML pen, Inject 23 Units Subcutaneous At Bedtime, Disp: 15 mL, Rfl: 1     lisinopril (PRINIVIL/ZESTRIL) 2.5 MG tablet, Take 1 tablet (2.5 mg) by mouth daily, Disp: 90 tablet, Rfl: 3     MELATONIN PO, Take 10 mg by mouth At Bedtime, Disp: , Rfl:      metFORMIN (GLUCOPHAGE) 1000 MG tablet, Take 1 tablet (1,000 mg) by mouth 2 times daily (with meals), Disp: 180 tablet, Rfl: 1     Multiple Vitamin (MULTI-VITAMINS) TABS, Take 1 tablet by mouth, Disp: , Rfl:      nicotine (NICODERM CQ) 14 MG/24HR 24 hr patch, Place 1 patch onto the skin every 24 hours Step 2, Disp: 30 patch, Rfl: 1     nicotine (NICODERM CQ) 21 MG/24HR 24 hr patch, Place 1 patch onto the skin every 24 hours Step 1, Disp: 30 patch, Rfl: 1     nicotine (NICODERM CQ) 7 MG/24HR 24 hr patch, Place 1 patch onto the skin every 24 hours Step 3, Disp: 30 patch, Rfl: 1     sulfamethoxazole-trimethoprim (BACTRIM DS/SEPTRA DS) 800-160 MG tablet, Take 1 tablet by mouth 2 times daily for 7 days, Disp: 14 tablet, Rfl: 0     traZODone (DESYREL) 50 MG tablet, TAKE 1 TABLET (50 MG) BY MOUTH AT BEDTIME, Disp: 90 tablet, Rfl: 3    Allergies -   Allergies   Allergen Reactions     Indomethacin Other (See Comments)     Dizziness and disorientation     Tramadol Nausea  and Vomiting       Social History -   Social History     Socioeconomic History     Marital status: Single     Spouse name: Not on file     Number of children: Not on file     Years of education: Not on file     Highest education level: Not on file   Occupational History     Not on file   Social Needs     Financial resource strain: Not on file     Food insecurity:     Worry: Not on file     Inability: Not on file     Transportation needs:     Medical: Not on file     Non-medical: Not on file   Tobacco Use     Smoking status: Current Every Day Smoker     Packs/day: 1.50     Years: 52.00     Pack years: 78.00     Smokeless tobacco: Never Used   Substance and Sexual Activity     Alcohol use: Yes     Drug use: No     Sexual activity: No   Lifestyle     Physical activity:     Days per week: Not on file     Minutes per session: Not on file     Stress: Not on file   Relationships     Social connections:     Talks on phone: Not on file     Gets together: Not on file     Attends Bahai service: Not on file     Active member of club or organization: Not on file     Attends meetings of clubs or organizations: Not on file     Relationship status: Not on file     Intimate partner violence:     Fear of current or ex partner: Not on file     Emotionally abused: Not on file     Physically abused: Not on file     Forced sexual activity: Not on file   Other Topics Concern     Parent/sibling w/ CABG, MI or angioplasty before 65F 55M? Not Asked   Social History Narrative     Not on file       Family History - see HPI    Review of Systems - As per HPI and PMHx, she denies any oral sores.  Otherwise 7 system review of the head and neck negative.    Physical Exam  General - The patient is in no distress.  Alert and oriented to person and place, answers questions and cooperates with examination appropriately.   Voice and Breathing - The patient was breathing comfortably without the use of accessory muscles. There was no wheezing,  stridor, or stertor.  The patients voice was clear and strong.  Ears - The auricles are normal. The tympanic membranes are normal in appearance, bony landmarks are intact.  No retraction, perforation, or masses.  No fluid or purulence was seen in the external canal or the middle ear. No evidence of infection of the middle ear or external canal, cerumen was normal in appearance.  Eyes - Extraocular movements intact.  Sclera were not icteric or injected.  Mouth - Examination of the oral cavity showed pink, healthy mucosa. No lesions or ulcerations noted.  The tongue was mobile and midline.  Throat - The walls of the oropharynx were smooth, symmetric, and had no lesions or ulcerations. The uvula was midline on elevation.    Neck -she has a palpable right level 2 lymph node, ovoid, but possibly 1/2-2 cm.  It appears deep to the SCM so I do not think it is a parotid gland mass but that is also possible.  No other palpable lymphadenopathy of the cervical neck.  Neurological - Cranial nerves 2 through 12 were grossly intact. House-Brackmann grade 1 out of 6 bilaterally.       Audiologic Studies - An audiogram and tympanogram were performed today as part of the evaluation and personally reviewed. The tympanogram shows a normal Type A curve, with normal canal volume and middle ear pressure.  There is no sign of eustachian tube dysfunction or middle ear effusion.  The audiogram showed mostly symmetric mild sensorineural hearing loss across all frequencies.    Assessment and Plan - Rajani Guo is a 68 year old female who presents to me today with hearing loss.  This is most consistent with presbycusis. I can find no evidence of serious CNS disorders or other complicating factors that could be causing this.  We spent the remainder of today's visit on education. We discussed hearing protection in noisy environments.  Repeat audiogram 1 year.  She can consider hearing aid consult but I get the impression she is not ready for  that yet.    Physical exam she was noted to have a right level 2 1.5-2 cm neck mass.  This is likely a lymph node that she feels is been present for years without changing.  However given the fact that she is a smoker and its size I recommend CT neck.  I will call her with the results of this.    Sai Barnett MD  Otolaryngology  [facility]        Again, thank you for allowing me to participate in the care of your patient.        Sincerely,        Sai Barnett MD

## 2019-04-01 ENCOUNTER — MYC REFILL (OUTPATIENT)
Dept: FAMILY MEDICINE | Facility: CLINIC | Age: 69
End: 2019-04-01

## 2019-04-01 ENCOUNTER — MYC REFILL (OUTPATIENT)
Dept: INTERVENTIONAL RADIOLOGY/VASCULAR | Facility: CLINIC | Age: 69
End: 2019-04-01

## 2019-04-01 DIAGNOSIS — E11.9 TYPE 2 DIABETES MELLITUS WITHOUT COMPLICATION, WITH LONG-TERM CURRENT USE OF INSULIN (H): ICD-10-CM

## 2019-04-01 DIAGNOSIS — I74.9 ARTERIAL THROMBOSIS (H): ICD-10-CM

## 2019-04-01 DIAGNOSIS — Z79.4 TYPE 2 DIABETES MELLITUS WITHOUT COMPLICATION, WITH LONG-TERM CURRENT USE OF INSULIN (H): ICD-10-CM

## 2019-04-01 DIAGNOSIS — M81.0 AGE-RELATED OSTEOPOROSIS WITHOUT CURRENT PATHOLOGICAL FRACTURE: ICD-10-CM

## 2019-04-01 DIAGNOSIS — F17.200 TOBACCO USE DISORDER: ICD-10-CM

## 2019-04-01 DIAGNOSIS — G47.00 INSOMNIA, UNSPECIFIED TYPE: ICD-10-CM

## 2019-04-01 RX ORDER — LISINOPRIL 2.5 MG/1
2.5 TABLET ORAL DAILY
Qty: 90 TABLET | Refills: 3 | Status: CANCELLED | OUTPATIENT
Start: 2019-04-01

## 2019-04-01 RX ORDER — TRAZODONE HYDROCHLORIDE 50 MG/1
TABLET, FILM COATED ORAL
Qty: 90 TABLET | Refills: 3 | Status: CANCELLED | OUTPATIENT
Start: 2019-04-01

## 2019-04-01 NOTE — TELEPHONE ENCOUNTER
"Requested Prescriptions   Pending Prescriptions Disp Refills     traZODone (DESYREL) 50 MG tablet  Last Written Prescription Date:  7/20/18  Last Fill Quantity: 90,  # refills: 3   Last office visit: 3/13/2019 with prescribing provider:  Brijesh   Future Office Visit:     90 tablet 3     Sig: TAKE 1 TABLET (50 MG) BY MOUTH AT BEDTIME    Serotonin Modulators Passed - 4/1/2019  1:25 PM       Passed - Recent (12 mo) or future (30 days) visit within the authorizing provider's specialty    Patient had office visit in the last 12 months or has a visit in the next 30 days with authorizing provider or within the authorizing provider's specialty.  See \"Patient Info\" tab in inbasket, or \"Choose Columns\" in Meds & Orders section of the refill encounter.             Passed - Medication is active on med list       Passed - Patient is age 18 or older       Passed - No active pregnancy on record       Passed - No positive pregnancy test in past 12 months        metFORMIN (GLUCOPHAGE) 1000 MG tablet  Last Written Prescription Date:  7/20/18  Last Fill Quantity: 180,  # refills: 1   Last office visit: 3/13/2019 with prescribing provider:  Brijesh   Future Office Visit:     180 tablet 1     Sig: Take 1 tablet (1,000 mg) by mouth 2 times daily (with meals)    Biguanide Agents Passed - 4/1/2019  1:25 PM       Passed - Blood pressure less than 140/90 in past 6 months    BP Readings from Last 3 Encounters:   03/13/19 122/58   03/07/19 142/75   03/01/19 160/75                Passed - Patient has documented LDL within the past 12 mos.    Recent Labs   Lab Test 03/04/19  0930   LDL 44            Passed - Patient has had a Microalbumin in the past 15 mos.    Recent Labs   Lab Test 03/13/19  1215   MICROL 6   UMALCR 15.07            Passed - Patient is age 10 or older       Passed - Patient has documented A1c within the specified period of time.    If HgbA1C is 8 or greater, it needs to be on file within the past 3 months.  If less than 8, must " "be on file within the past 6 months.     Recent Labs   Lab Test 03/04/19  0930   A1C 6.5*            Passed - Patient's CR is NOT>1.4 OR Patient's EGFR is NOT<45 within past 12 mos.    Recent Labs   Lab Test 03/07/19  0738   GFRESTIMATED >90   GFRESTBLACK >90       Recent Labs   Lab Test 03/07/19  0738   CR 0.49*            Passed - Patient does NOT have a diagnosis of CHF.       Passed - Medication is active on med list       Passed - Patient is not pregnant       Passed - Patient has not had a positive pregnancy test within the past 12 mos.        Passed - Recent (6 mo) or future (30 days) visit within the authorizing provider's specialty    Patient had office visit in the last 6 months or has a visit in the next 30 days with authorizing provider or within the authorizing provider's specialty.  See \"Patient Info\" tab in inbasket, or \"Choose Columns\" in Meds & Orders section of the refill encounter.            alendronate (FOSAMAX) 70 MG tablet  Last Written Prescription Date:  2/18/19  Last Fill Quantity: 4,  # refills: 0   Last office visit: 3/13/2019 with prescribing provider:  Brijesh   Future Office Visit:     4 tablet 0     Sig: TAKE 1 TABLET (70 MG) BY MOUTH EVERY 7 DAYS.  Please schedule follow up in clinic for additional refills    Bisphosphonates Failed - 4/1/2019  1:25 PM       Failed - Normal serum creatinine on file within past 12 months    Recent Labs   Lab Test 03/07/19  0738   CR 0.49*            Passed - Recent (12 mo) or future (30 days) visit within the authorizing provider's specialty    Patient had office visit in the last 12 months or has a visit in the next 30 days with authorizing provider or within the authorizing provider's specialty.  See \"Patient Info\" tab in inbasket, or \"Choose Columns\" in Meds & Orders section of the refill encounter.             Passed - Dexa on file within past 2 years    Please review last Dexa result.          Passed - Medication is active on med list       Passed - " Patient is age 18 or older

## 2019-04-01 NOTE — TELEPHONE ENCOUNTER
Noted refill request.  Noted pt has recent rx for these medications but it was sent to the local pharmacy.  Called pt to check if she needs to have rx changed to another location. Left message for pt to call back.    Filomena Torrez RN on 4/1/2019 at 2:22 PM

## 2019-04-02 RX ORDER — ALENDRONATE SODIUM 70 MG/1
TABLET ORAL
Qty: 4 TABLET | Refills: 0 | Status: SHIPPED | OUTPATIENT
Start: 2019-04-02 | End: 2019-04-27

## 2019-04-02 NOTE — TELEPHONE ENCOUNTER
Not due for trazodone refill. Routing the rest to PCP, patient last saw her for these issues.    Fausto Toledo RN

## 2019-04-03 RX ORDER — BUPROPION HYDROCHLORIDE 150 MG/1
150 TABLET, FILM COATED, EXTENDED RELEASE ORAL 2 TIMES DAILY
Qty: 60 TABLET | Refills: 3 | Status: SHIPPED | OUTPATIENT
Start: 2019-04-03 | End: 2019-09-30

## 2019-04-03 RX ORDER — NICOTINE 21 MG/24HR
1 PATCH, TRANSDERMAL 24 HOURS TRANSDERMAL EVERY 24 HOURS
Qty: 30 PATCH | Refills: 1 | Status: SHIPPED | OUTPATIENT
Start: 2019-04-03 | End: 2019-08-01

## 2019-04-08 ENCOUNTER — OFFICE VISIT (OUTPATIENT)
Dept: OTHER | Facility: CLINIC | Age: 69
End: 2019-04-08
Attending: RADIOLOGY
Payer: COMMERCIAL

## 2019-04-08 ENCOUNTER — HOSPITAL ENCOUNTER (OUTPATIENT)
Dept: ULTRASOUND IMAGING | Facility: CLINIC | Age: 69
Discharge: HOME | End: 2019-04-08
Attending: RADIOLOGY | Admitting: RADIOLOGY
Payer: COMMERCIAL

## 2019-04-08 ENCOUNTER — OFFICE VISIT (OUTPATIENT)
Dept: OTHER | Facility: CLINIC | Age: 69
End: 2019-04-08
Attending: INTERNAL MEDICINE
Payer: COMMERCIAL

## 2019-04-08 ENCOUNTER — HOSPITAL ENCOUNTER (OUTPATIENT)
Dept: ULTRASOUND IMAGING | Facility: CLINIC | Age: 69
End: 2019-04-08
Attending: RADIOLOGY
Payer: COMMERCIAL

## 2019-04-08 VITALS
SYSTOLIC BLOOD PRESSURE: 122 MMHG | HEART RATE: 80 BPM | OXYGEN SATURATION: 98 % | DIASTOLIC BLOOD PRESSURE: 71 MMHG | WEIGHT: 160 LBS | BODY MASS INDEX: 27.46 KG/M2

## 2019-04-08 VITALS
WEIGHT: 160 LBS | DIASTOLIC BLOOD PRESSURE: 71 MMHG | BODY MASS INDEX: 27.46 KG/M2 | HEART RATE: 80 BPM | OXYGEN SATURATION: 98 % | SYSTOLIC BLOOD PRESSURE: 122 MMHG

## 2019-04-08 DIAGNOSIS — I73.9 PAD (PERIPHERAL ARTERY DISEASE) (H): ICD-10-CM

## 2019-04-08 DIAGNOSIS — R09.89 BILATERAL CAROTID BRUITS: ICD-10-CM

## 2019-04-08 DIAGNOSIS — F17.200 TOBACCO USE DISORDER: ICD-10-CM

## 2019-04-08 DIAGNOSIS — R09.89 BILATERAL CAROTID BRUITS: Primary | ICD-10-CM

## 2019-04-08 DIAGNOSIS — I73.9 PAD (PERIPHERAL ARTERY DISEASE) (H): Primary | ICD-10-CM

## 2019-04-08 PROCEDURE — 99214 OFFICE O/P EST MOD 30 MIN: CPT | Mod: 25 | Performed by: INTERNAL MEDICINE

## 2019-04-08 PROCEDURE — 99406 BEHAV CHNG SMOKING 3-10 MIN: CPT | Mod: ZP | Performed by: INTERNAL MEDICINE

## 2019-04-08 PROCEDURE — 93926 LOWER EXTREMITY STUDY: CPT | Mod: LT

## 2019-04-08 PROCEDURE — 93880 EXTRACRANIAL BILAT STUDY: CPT

## 2019-04-08 PROCEDURE — 93924 LWR XTR VASC STDY BILAT: CPT

## 2019-04-08 PROCEDURE — G0463 HOSPITAL OUTPT CLINIC VISIT: HCPCS

## 2019-04-08 NOTE — NURSING NOTE
Samples given to patient of Eliquis  2.5 mg 20 boxes,  2020 Lot # [id number]  5 mg  2 boxes Exp 2021 Lot # [id number]  Patient states understanding of taking total dose of 5 mg twice a day with food.  Insert given to patient with side effects  Frantz Jaeger RN, BSN

## 2019-04-08 NOTE — NURSING NOTE
"Rajani Guo is a 68 year old female who presents for:  Chief Complaint   Patient presents with     Consult     MONICA w/ex, LLE art US (11:45 VHC, 1:00 JMW, 1:40 CAF) s/p left SFA angioplasty with lysis done on 3/5/2019 with Dr. Langford. 1 mo f/u; Karl f/u heart monitor & arterial thrombus        Vitals:    Vitals:    04/08/19 1302 04/08/19 1303   BP: 121/60 122/71   BP Location: Right arm Left arm   Patient Position: Chair Chair   Cuff Size: Adult Regular Adult Regular   Pulse: 81 80   SpO2: 98%    Weight: 160 lb (72.6 kg)        BMI:  Estimated body mass index is 27.46 kg/m  as calculated from the following:    Height as of 3/13/19: 5' 4\" (1.626 m).    Weight as of this encounter: 160 lb (72.6 kg).    Pain Score:  Data Unavailable        Anita Warren MA    "

## 2019-04-08 NOTE — PROGRESS NOTES
Rajani Guo is a 68 year old female who is presenting at the current time to discuss her diagnosi(es) of        PAD (peripheral artery disease) (H)  Tobacco use disorder .      Review Of Systems  Skin: negative  Eyes: negative  Ears/Nose/Throat: negative  Respiratory: No shortness of breath, dyspnea on exertion, cough, or hemoptysis  Cardiovascular: negative  Gastrointestinal: negative  Genitourinary: negative  Musculoskeletal: negative  Neurologic: negative  Psychiatric: negative  Hematologic/Lymphatic/Immunologic: negative  Endocrine: negative     PAST MEDICAL HISTORY:                  Past Medical History:   Diagnosis Date     Age-related osteoporosis without current pathological fracture 1/18/2018     History of partial thyroidectomy 6/2/2018     Hyperlipidemia LDL goal <100 1/18/2018     Insomnia, unspecified type 1/18/2018     Tobacco use disorder      Type 2 diabetes mellitus without complication, with long-term current use of insulin (H) 1/18/2018       PAST SURGICAL HISTORY:                  Past Surgical History:   Procedure Laterality Date     IR ANGIOGRAM THROUGH CATHETER FOLLOW UP  3/5/2019     IR LOWER EXTREMITY ANGIOGRAM LEFT  3/4/2019     KNEE SURGERY Right 2013    right knee torn meniscus surgery     OVARY SURGERY  1971    1 ovary removed     RELEASE CARPAL TUNNEL Right 1988     RELEASE TRIGGER FINGER BILATERAL       SHOULDER SURGERY Left     rotator cuff repair, plate placement     THYROID SURGERY  1988    partial thyroidectomy       CURRENT MEDICATIONS:                  Current Outpatient Medications   Medication Sig Dispense Refill     alendronate (FOSAMAX) 70 MG tablet TAKE 1 TABLET (70 MG) BY MOUTH EVERY 7 DAYS.  Please schedule follow up in clinic for additional refills 4 tablet 0     apixaban ANTICOAGULANT (ELIQUIS) 5 MG tablet Take 1 tablet (5 mg) by mouth 2 times daily 60 tablet 3     aspirin EC 81 MG EC tablet Take 81 mg by mouth       atorvastatin (LIPITOR) 40 MG tablet Take 1 tablet  (40 mg) by mouth daily 90 tablet 3     buPROPion (ZYBAN) 150 MG 12 hr tablet Take 1 tablet (150 mg) by mouth 2 times daily Take once daily for first 4 days, then twice daily after that. 60 tablet 3     calcium carb 1250 mg, 500 mg [place],/vitamin D 200 unit (CALCIUM 500/D) 500-200 MG-UNIT per tablet Take 1 tablet by mouth       gabapentin (NEURONTIN) 300 MG capsule TAKE 1 CAPSULE ONE OR TWO TIMES DAILY 80 capsule 0     glucosamine-chondroitin 500-400 MG CAPS per capsule Take 1 capsule by mouth       insulin detemir (LEVEMIR FLEXPEN/FLEXTOUCH) 100 UNIT/ML pen Inject 23 Units Subcutaneous At Bedtime 15 mL 1     lisinopril (PRINIVIL/ZESTRIL) 2.5 MG tablet Take 1 tablet (2.5 mg) by mouth daily 90 tablet 3     MELATONIN PO Take 10 mg by mouth At Bedtime       metFORMIN (GLUCOPHAGE) 1000 MG tablet Take 1 tablet (1,000 mg) by mouth 2 times daily (with meals) 180 tablet 1     Multiple Vitamin (MULTI-VITAMINS) TABS Take 1 tablet by mouth       nicotine (NICODERM CQ) 14 MG/24HR 24 hr patch Place 1 patch onto the skin every 24 hours Step 2 30 patch 1     nicotine (NICODERM CQ) 21 MG/24HR 24 hr patch Place 1 patch onto the skin every 24 hours Step 1 30 patch 1     nicotine (NICODERM CQ) 7 MG/24HR 24 hr patch Place 1 patch onto the skin every 24 hours Step 3 30 patch 1     traZODone (DESYREL) 50 MG tablet TAKE 1 TABLET (50 MG) BY MOUTH AT BEDTIME 90 tablet 3       ALLERGIES:                  Allergies   Allergen Reactions     Indomethacin Other (See Comments)     Dizziness and disorientation     Tramadol Nausea and Vomiting       SOCIAL HISTORY:                  Social History     Socioeconomic History     Marital status: Single     Spouse name: Not on file     Number of children: Not on file     Years of education: Not on file     Highest education level: Not on file   Occupational History     Not on file   Social Needs     Financial resource strain: Not on file     Food insecurity:     Worry: Not on file     Inability: Not on  file     Transportation needs:     Medical: Not on file     Non-medical: Not on file   Tobacco Use     Smoking status: Current Every Day Smoker     Packs/day: 1.50     Years: 52.00     Pack years: 78.00     Smokeless tobacco: Never Used   Substance and Sexual Activity     Alcohol use: Yes     Drug use: No     Sexual activity: Never   Lifestyle     Physical activity:     Days per week: Not on file     Minutes per session: Not on file     Stress: Not on file   Relationships     Social connections:     Talks on phone: Not on file     Gets together: Not on file     Attends Alevism service: Not on file     Active member of club or organization: Not on file     Attends meetings of clubs or organizations: Not on file     Relationship status: Not on file     Intimate partner violence:     Fear of current or ex partner: Not on file     Emotionally abused: Not on file     Physically abused: Not on file     Forced sexual activity: Not on file   Other Topics Concern     Parent/sibling w/ CABG, MI or angioplasty before 65F 55M? Not Asked   Social History Narrative     Not on file       FAMILY HISTORY:                 No family history on file.      Physical exam Reveals:    O/P: WNL  HEENT: WNL  NECK: No JVD, thyromegaly, or lymphadenopathy  HEART: RRR,2/6 RUSB systolic murmur consistent with known AS, gallops, or rubs  LUNGS: CTA bilaterally without rales, wheezes, or rhonchi  GI: NABS, nondistended, nontender, soft  EXT:without cyanosis, clubbing, or edema  NEURO: nonfocal  : no flank tenderness     ULTRASOUND MONICA DOPPLER WITH EXERCISE BILATERAL April 8, 2019 11:58 AM      HISTORY: Status post left SFA angioplasty with lysis done on 3/5/2019  with Dr. Langford. One month followup. PAD (peripheral artery disease)  (H).     COMPARISON: 3/1/2019.     FINDINGS:  Right MONICA: 1.11, previously 1.15.  Left MONICA: 1.05, previously 0.44.     Waveforms: Triphasic bilaterally.     Exercise: Patient exercised on a treadmill for 3 minutes  at 1.5 miles  per hour and a 10% incline. Patient complained of knee weakness at 1  minute and 30 seconds and patient requested to stop at 3 minutes due  to shortness of breath.     Right exercise MONICA: 0.98, previously 1.29.  Left exercise MONICA: 0.94, previously 0.24.                                                                       IMPRESSION:     1. Right resting and exercise ABIs are normal without evidence of  arterial insufficiency. There is a slight drop in MONICA on postexercise,  suggesting there might be a mild component of exercised induced  arterial insufficiency.    2. Left resting MONICA is normal with mild exercise-induced arterial  insufficiency. This is significantly improved when compared to the  prior exam.     MONICA CRITERIA:  >0.95 Normal  0.90 - 0.94 Mild  0.5 - 0.89 Moderate  0.2 - 0.49 Severe  <0.2 Critical    ULTRASOUND LOWER EXTREMITY ARTERIAL DUPLEX LEFT   4/8/2019 11:58 AM      HISTORY: Status post left SFA angioplasty with lysis done on 3/5/2019  with Dr. Langford.  One month followup. PAD (peripheral artery  disease) (H).     COMPARISON: Angiogram 3/5/2019.     FINDINGS: Color Doppler and spectral waveform analysis was performed  throughout the left superficial femoral artery. Elevated velocities  are noted in the proximal left superficial femoral artery with a  diameter of 3.1 mm. Diameters otherwise range between 3.1 and 4.9 mm.  Inflow and outflow arteries are patent.                                                                      IMPRESSION: Slightly elevated velocities in the proximal left  superficial femoral artery with no significant visualized narrowing.  Distal waveforms remain multiphasic.    A/P:    (I73.9) PAD S/P LLE acute arterial occlusion with successful CD lysis 3-5-19 with left SFA PTA upon completion angiography.  (primary encounter diagnosis)  Comment: doing well, needs to stop smoking; this was embolic without a defined source. See my discharge summary. CTA was  nondiagnostic. TTE showed a dilated LA and aneurysmal septum. Doing a KIM would not alter management, which is that she needs AC. Her LLE is patent. She is doing excellently clinically. She had a ZioPatch which showed SVT self terminating, but no AF. Due to cardiac imaging, I still suspect PAF not caught on monitoring, and have recommended she continue Eliquis, which her insurance will not cover. Accordingly, I gave her samples. She is told to not smoke as her insurance also does not cover her cigarettes, yet she chooses to purchase those....RTC three months  Plan: HC SMOKING CESSATION COUNSELING, ASYMPTOMATIC,         3-10 MIN            (F17.200) Tobacco use disorder  Comment: stop smoking. 5 minutes counselling. She knows she'll lose her legs if she keeps smoking.  Plan: HC SMOKING CESSATION COUNSELING, ASYMPTOMATIC,         3-10 MIN

## 2019-04-08 NOTE — NURSING NOTE
"Rajani Guo is a 68 year old female who presents for:  Chief Complaint   Patient presents with     Consult     MONICA w/ex, LLE art US (11:45 VHC, 1:00 JMW, 1:40 CAF) s/p left SFA angioplasty with lysis done on 3/5/2019 with Dr. Langford. 1 mo f/u; Karl f/u heart monitor & arterial thrombus         Vitals:    Vitals:    04/08/19 1255 04/08/19 1256   BP: 121/60 122/71   BP Location: Right arm Left arm   Patient Position: Chair Chair   Cuff Size: Adult Regular Adult Regular   Pulse: 81 80   SpO2: 98%    Weight: 160 lb (72.6 kg)        BMI:  Estimated body mass index is 27.46 kg/m  as calculated from the following:    Height as of 3/13/19: 5' 4\" (1.626 m).    Weight as of this encounter: 160 lb (72.6 kg).    Pain Score:  Data Unavailable        Anita Warren MA    "

## 2019-04-09 ENCOUNTER — OFFICE VISIT (OUTPATIENT)
Dept: OPTOMETRY | Facility: CLINIC | Age: 69
End: 2019-04-09
Payer: COMMERCIAL

## 2019-04-09 ENCOUNTER — DOCUMENTATION ONLY (OUTPATIENT)
Dept: OPTOMETRY | Facility: CLINIC | Age: 69
End: 2019-04-09

## 2019-04-09 DIAGNOSIS — H25.813 COMBINED FORMS OF AGE-RELATED CATARACT OF BOTH EYES: ICD-10-CM

## 2019-04-09 DIAGNOSIS — H52.13 MYOPIA OF BOTH EYES: ICD-10-CM

## 2019-04-09 DIAGNOSIS — Z01.01 ENCOUNTER FOR EXAMINATION OF EYES AND VISION WITH ABNORMAL FINDINGS: Primary | ICD-10-CM

## 2019-04-09 DIAGNOSIS — H52.223 REGULAR ASTIGMATISM OF BOTH EYES: ICD-10-CM

## 2019-04-09 DIAGNOSIS — H52.4 PRESBYOPIA: ICD-10-CM

## 2019-04-09 DIAGNOSIS — E11.3293 MILD NONPROLIFERATIVE DIABETIC RETINOPATHY OF BOTH EYES WITHOUT MACULAR EDEMA ASSOCIATED WITH TYPE 2 DIABETES MELLITUS (H): ICD-10-CM

## 2019-04-09 PROCEDURE — 92310 CONTACT LENS FITTING OU: CPT | Mod: GA | Performed by: OPTOMETRIST

## 2019-04-09 PROCEDURE — 92004 COMPRE OPH EXAM NEW PT 1/>: CPT | Performed by: OPTOMETRIST

## 2019-04-09 PROCEDURE — 92015 DETERMINE REFRACTIVE STATE: CPT | Performed by: OPTOMETRIST

## 2019-04-09 ASSESSMENT — REFRACTION_CURRENTRX
OD_SPHERE: -0.50
OS_CYLINDER: SPHERE
OD_CYLINDER: SPHERE
OD_BRAND: J&J ACUVUE OASYS BC 8.4, D 14.0
OS_BRAND: J&J ACUVUE OASYS BC 8.4, D 14.0
OS_SPHERE: -2.25

## 2019-04-09 ASSESSMENT — VISUAL ACUITY
OD_SC: 20/20 -2
METHOD: SNELLEN - LINEAR
OS_CC: 20/30
OS_SC: 20/150
CORRECTION_TYPE: GLASSES
OD_CC: 20/30
OD_CC: 20/30
OS_CC: 20/25
OD_SC: 20/125
OD_CC+: -1
OS_SC: 20/20 -1

## 2019-04-09 ASSESSMENT — REFRACTION_MANIFEST
OD_CYLINDER: +0.50
OS_SPHERE: -2.75
OD_SPHERE: -2.75
OD_ADD: +2.50
OS_AXIS: 125
OS_ADD: +2.50
OD_AXIS: 030
OS_CYLINDER: +1.00

## 2019-04-09 ASSESSMENT — REFRACTION_WEARINGRX
OD_AXIS: 016
OS_ADD: +2.50
OD_CYLINDER: +0.50
OS_SPHERE: -2.50
OS_AXIS: 118
OD_SPHERE: -2.50
OD_ADD: +2.50
OS_CYLINDER: +1.00
SPECS_TYPE: PAL

## 2019-04-09 ASSESSMENT — TONOMETRY
OS_IOP_MMHG: 21
IOP_METHOD: APPLANATION
OD_IOP_MMHG: 23

## 2019-04-09 ASSESSMENT — CUP TO DISC RATIO
OD_RATIO: 0.45
OS_RATIO: 0.4

## 2019-04-09 ASSESSMENT — EXTERNAL EXAM - LEFT EYE: OS_EXAM: NORMAL

## 2019-04-09 ASSESSMENT — CONF VISUAL FIELD
OD_NORMAL: 1
OS_NORMAL: 1

## 2019-04-09 ASSESSMENT — EXTERNAL EXAM - RIGHT EYE: OD_EXAM: NORMAL

## 2019-04-09 ASSESSMENT — SLIT LAMP EXAM - LIDS
COMMENTS: NORMAL
COMMENTS: NORMAL

## 2019-04-09 NOTE — PROGRESS NOTES
Patient Name: Rajani Guo  Patient MRN: [id number]  Patient : 1950    HPI: This is a 68 year old year old female presenting for follow-up evaluation of left lower leg claudication.  The patient was referred to me by Dr. Grant Serrato with the [facility] for an angiogram for decrease pulses in the left foot.  On 3/4/2018 an angiogram was performed which showed occlusion of the left superficial femoral artery.  Catheter directed lysis therapy was initiated.  On 3/5/2019 the patient was brought back to Interventional radiology for a lytic check and follow up angiogram.  It showed resolution of the thrombus throughout the superficial femoral artery. There was a severe stenosis of the proximal superficial femoral artery which was treated with a 6 x 40 mm balloon. Post images showed significant improvement in the appearance of the proximal superficial femoral artery.  The remainder of the superficial femoral is patient.  There is a three-vessel runoff.    Today, the patient presents to the clinic for 1 month follow up.  She states, she is doing well.  Significant improvement in her left leg symptoms.  She is able to walk without claudication and denies rest pain. She is very happy with the outcome.      She is still smoking but has decreased from 2 packs a day to 5 cigarettes a day.  She is determined to quit smoking and using Nicotine patches as an aid.      She currently on Eliquis which is being managed by Dr. Barajas.  She denies any unusual bleeding.      OBJECTIVE:   /71 (BP Location: Left arm, Patient Position: Chair, Cuff Size: Adult Regular)   Pulse 80   Wt 160 lb (72.6 kg)   SpO2 98%   Breastfeeding? No   BMI 27.46 kg/m     Current Outpatient Medications   Medication     alendronate (FOSAMAX) 70 MG tablet     apixaban ANTICOAGULANT (ELIQUIS) 5 MG tablet     aspirin EC 81 MG EC tablet     atorvastatin (LIPITOR) 40 MG tablet     buPROPion (ZYBAN) 150 MG 12 hr tablet     calcium carb  1250 mg, 500 mg [place],/vitamin D 200 unit (CALCIUM 500/D) 500-200 MG-UNIT per tablet     gabapentin (NEURONTIN) 300 MG capsule     glucosamine-chondroitin 500-400 MG CAPS per capsule     insulin detemir (LEVEMIR FLEXPEN/FLEXTOUCH) 100 UNIT/ML pen     lisinopril (PRINIVIL/ZESTRIL) 2.5 MG tablet     MELATONIN PO     metFORMIN (GLUCOPHAGE) 1000 MG tablet     Multiple Vitamin (MULTI-VITAMINS) TABS     nicotine (NICODERM CQ) 14 MG/24HR 24 hr patch     nicotine (NICODERM CQ) 21 MG/24HR 24 hr patch     nicotine (NICODERM CQ) 7 MG/24HR 24 hr patch     traZODone (DESYREL) 50 MG tablet     No current facility-administered medications for this visit.      Patient Active Problem List   Diagnosis     Type 2 diabetes mellitus without complication, with long-term current use of insulin (H)     Hyperlipidemia LDL goal <100     Age-related osteoporosis without current pathological fracture     Insomnia, unspecified type     Tobacco use disorder     History of partial thyroidectomy     Newly recognized murmur     Essential hypertension     PVD (peripheral vascular disease) (H)     Claudication of left lower extremity (H)         General Appearance: WDWN female in NAD  Lower Extremity: feet are warm and pink, skin is intact.  Pulses:  Bilateral femoral: 2+  Dorsal pedis : 2+  Posterior tibial: 1+  Possible bilateral carotid bruit    Imaging:   Study Result     ULTRASOUND MONICA DOPPLER WITH EXERCISE BILATERAL April 8, 2019 11:58 AM      HISTORY: Status post left SFA angioplasty with lysis done on 3/5/2019  with Dr. Langford. One month followup. PAD (peripheral artery disease)  (H).     COMPARISON: 3/1/2019.     FINDINGS:  Right MONICA: 1.11, previously 1.15.  Left MONICA: 1.05, previously 0.44.     Waveforms: Triphasic bilaterally.     Exercise: Patient exercised on a treadmill for 3 minutes at 1.5 miles  per hour and a 10% incline. Patient complained of knee weakness at 1  minute and 30 seconds and patient requested to stop at 3 minutes  due  to shortness of breath.     Right exercise MONICA: 0.98, previously 1.29.  Left exercise MONICA: 0.94, previously 0.24.                                                                       IMPRESSION:   1. Right resting and exercise ABIs are normal without evidence of  arterial insufficiency. There is a slight drop in MONICA on postexercise,  suggesting there might be a mild component of exercised induced  arterial insufficiency.  2. Left resting MONICA is normal with mild exercise-induced arterial  insufficiency. This is significantly improved when compared to the  prior exam.     MONIAC CRITERIA:  >0.95 Normal  0.90 - 0.94 Mild  0.5 - 0.89 Moderate  0.2 - 0.49 Severe  <0.2 Critical     DAE LANGFORD DO       Results for orders placed or performed during the hospital encounter of 04/08/19   US Lower Extremity Arterial Duplex Left    Narrative    ULTRASOUND LOWER EXTREMITY ARTERIAL DUPLEX LEFT   4/8/2019 11:58 AM     HISTORY: Status post left SFA angioplasty with lysis done on 3/5/2019  with Dr. Langford.  One month followup. PAD (peripheral artery  disease) (H).    COMPARISON: Angiogram 3/5/2019.    FINDINGS: Color Doppler and spectral waveform analysis was performed  throughout the left superficial femoral artery. Elevated velocities  are noted in the proximal left superficial femoral artery with a  diameter of 3.1 mm. Diameters otherwise range between 3.1 and 4.9 mm.  Inflow and outflow arteries are patent.      Impression    IMPRESSION: Slightly elevated velocities in the proximal left  superficial femoral artery with no significant visualized narrowing.  Distal waveforms remain multiphasic.    DAE LANGFORD DO     BILATERAL CAROTID ULTRASOUND   4/8/2019 2:06 PM      HISTORY: Bilateral carotid bruit.       COMPARISON: None.     RIGHT CAROTID FINDINGS:  Minimal plaque at the carotid bulb and  internal carotid artery  Right ICA PSV:  114  cm/sec.  Right ICA EDV:  28 cm/sec.  Right ICA/CCA PSV Ratio:  1.22     These indicate less than 50% diameter stenosis of the right ICA.    Right Vertebral: Antegrade flow.   Right ECA: Antegrade flow.      LEFT CAROTID FINDINGS:  Minimal plaque at the carotid bulb and  internal carotid artery  Left ICA PSV:  105  cm/sec.  Left ICA EDV:  29 cm/sec.  Left ICA/CCA PSV Ratio:  1.06    These indicate less than 50% diameter stenosis of the left ICA.    Left Vertebral: Antegrade flow.   Left ECA: Antegrade flow.      Causes of Decreased Accuracy:   None.                                                                       IMPRESSION:    1. Less than 50% diameter stenosis of the right ICA relative to the  distal ICA diameter.   2. Less than 50% diameter stenosis of the left ICA relative to the  distal ICA diameter.      DAE YAO, DO    Assessment/Plan  (R09.89) Bilateral carotid bruits  (primary encounter diagnosis  Plan: US Carotid Bilateral: completed.     This is a pleasant 68 year old lady who is doing well following catheter directed lysis therapy and angioplasty of the left superficial femoral artery.  We discussed her imaging that was performed.  Her MONICA has improved significantly in the left leg. Resting indices today in the left leg is 1.04 compared to previous .44  With exercise .94 compared to previous .24.  Her right leg continues to be normal.  Her arterial duplex of the left leg shows slightly elevated velocities in the proximal left superficial femoral artery with no significant visualized narrowing.  Distal waveforms remains multiphasic.    I recommend that we follow up in 6 months with repeat arterial duplex of the left leg and MONICA's.  She is instructed to contact us sooner if she develops calf claudication, or any other concerns.  Continue to work on quitting smoking completely.         I met with the patient for 30 minutes of which >50% of the time was used to discuss treatment options and/or coordination of care.    Thank you for the consult. We will continue to  monitor this patient for their vascular needs.    Dr. Klaudia Langford DO  Pager: [phone]  Interventional Radiology   [facility]  [phone]

## 2019-04-09 NOTE — PROGRESS NOTES
Chief Complaint   Patient presents with     Diabetic Eye Exam     Accompanied by self  Previous contact lens wearer? Yes:   Comfort of contact lenses : Good, patient currently wears AV Oasys.  Satisfied with current lenses: Yes        Last Eye Exam: 4 years ago  Dilated Previously: Yes    What are you currently using to see?  Glasses-4 years old,  contacts    Distance Vision Acuity: Noticed gradual change in both eyes    Near Vision Acuity: Not satisfied     Eye Comfort: good  Do you use eye drops? : No  Occupation or Hobbies: retired/      Dorie Rios, Optometric Tech     Medical, surgical and family histories reviewed and updated 4/9/2019.       OBJECTIVE: See Ophthalmology exam    ASSESSMENT:    ICD-10-CM    1. Encounter for examination of eyes and vision with abnormal findings Z01.01    2. Mild nonproliferative diabetic retinopathy of both eyes without macular edema associated with type 2 diabetes mellitus (H) E11.3293    3. Combined forms of age-related cataract of both eyes H25.813    4. Myopia of both eyes H52.13    5. Regular astigmatism of both eyes H52.223    6. Presbyopia H52.4       PLAN:     Patient Instructions   Patient educated on importance of good blood sugar control.  Letter sent to primary care provider with diabetic eye exam report.     You have the start of mild cataracts.  You may notice some blurred vision or glare with night driving.  It is important that you wear good sunglasses to protect your eyes from the ultraviolet light from the sun. I recommend that you return in 1 year for an eye exam unless there are any sudden changes in your vision.         Patient is current monovision contact lens wearer (Acuvue Oasys). Wears lens in left eye only.   Begin trial of Acuvue Oasys lenses in each eye. Trial both lenses for a few days, then try wearing just the left lens for a few days to compare which combination you prefer. If no benefit from having right lens, will consider giving Rx  for left eye only.  Do not sleep or swim in contacts.   Remove lenses and clean in solution every night.   Maximum wear time of 12 hours per day.   Return in 1-2 weeks for contact lens follow-up.    Glasses prescription provided today.   Mild change, optional to get new glasses.     The affects of the dilating drops last for 4- 6 hours.  You will be more sensitive to light and vision will be blurry up close.  Mydriatic sunglasses were given if needed.      Edilberto Ford O.D.  [address] [place]  [address]    [phone]

## 2019-04-09 NOTE — PATIENT INSTRUCTIONS
Patient educated on importance of good blood sugar control.  Letter sent to primary care provider with diabetic eye exam report.     You have the start of mild cataracts.  You may notice some blurred vision or glare with night driving.  It is important that you wear good sunglasses to protect your eyes from the ultraviolet light from the sun. I recommend that you return in 1 year for an eye exam unless there are any sudden changes in your vision.         Patient is current monovision contact lens wearer (Acuvue Oasys). Wears lens in left eye only.   Begin trial of Acuvue Oasys lenses in each eye. Trial both lenses for a few days, then try wearing just the left lens for a few days to compare which combination you prefer. If no benefit from having right lens, will consider giving Rx for left eye only.  Do not sleep or swim in contacts.   Remove lenses and clean in solution every night.   Maximum wear time of 12 hours per day.   Return in 1-2 weeks for contact lens follow-up.    Glasses prescription provided today.   Mild change, optional to get new glasses.     The affects of the dilating drops last for 4- 6 hours.  You will be more sensitive to light and vision will be blurry up close.  Mydriatic sunglasses were given if needed.      Edilberto Ford O.D.  [address] [place]  [id number] [phone]

## 2019-04-09 NOTE — LETTER
4/9/2019         RE: Rajani Guo  [address] [facility] [address]        Dear Colleague,    Thank you for referring your patient, Rajani Guo, to the [facility]. Please see a copy of my visit note below.    Chief Complaint   Patient presents with     Diabetic Eye Exam     Accompanied by self  Previous contact lens wearer? Yes:   Comfort of contact lenses : Good, patient currently wears AV Oasys.  Satisfied with current lenses: Yes        Last Eye Exam: 4 years ago  Dilated Previously: Yes    What are you currently using to see?  Glasses-4 years old,  contacts    Distance Vision Acuity: Noticed gradual change in both eyes    Near Vision Acuity: Not satisfied     Eye Comfort: good  Do you use eye drops? : No  Occupation or Hobbies: retired/      Dorie Rios, [employer]     Medical, surgical and family histories reviewed and updated 4/9/2019.       OBJECTIVE: See Ophthalmology exam    ASSESSMENT:    ICD-10-CM    1. Encounter for examination of eyes and vision with abnormal findings Z01.01    2. Mild nonproliferative diabetic retinopathy of both eyes without macular edema associated with type 2 diabetes mellitus (H) E11.3293    3. Combined forms of age-related cataract of both eyes H25.813    4. Myopia of both eyes H52.13    5. Regular astigmatism of both eyes H52.223    6. Presbyopia H52.4       PLAN:     Patient Instructions   Patient educated on importance of good blood sugar control.  Letter sent to primary care provider with diabetic eye exam report.     You have the start of mild cataracts.  You may notice some blurred vision or glare with night driving.  It is important that you wear good sunglasses to protect your eyes from the ultraviolet light from the sun. I recommend that you return in 1 year for an eye exam unless there are any sudden changes in your vision.         Patient is current monovision contact lens wearer (Acuvue Oasys). Wears lens in left eye only.    Begin trial of Acuvue Oasys lenses in each eye. Trial both lenses for a few days, then try wearing just the left lens for a few days to compare which combination you prefer. If no benefit from having right lens, will consider giving Rx for left eye only.  Do not sleep or swim in contacts.   Remove lenses and clean in solution every night.   Maximum wear time of 12 hours per day.   Return in 1-2 weeks for contact lens follow-up.    Glasses prescription provided today.   Mild change, optional to get new glasses.     The affects of the dilating drops last for 4- 6 hours.  You will be more sensitive to light and vision will be blurry up close.  Mydriatic sunglasses were given if needed.      Edilberto Ford O.D.  [address] [place]  [address] [phone]                         Again, thank you for allowing me to participate in the care of your patient.        Sincerely,        Edilberto Ford, OD

## 2019-04-27 DIAGNOSIS — M81.0 AGE-RELATED OSTEOPOROSIS WITHOUT CURRENT PATHOLOGICAL FRACTURE: ICD-10-CM

## 2019-04-28 DIAGNOSIS — I73.9 CLAUDICATION OF LEFT LOWER EXTREMITY (H): ICD-10-CM

## 2019-04-29 RX ORDER — ALENDRONATE SODIUM 70 MG/1
TABLET ORAL
Qty: 4 TABLET | Refills: 3 | Status: SHIPPED | OUTPATIENT
Start: 2019-04-29 | End: 2019-08-15

## 2019-04-29 RX ORDER — GABAPENTIN 300 MG/1
CAPSULE ORAL
Qty: 80 CAPSULE | Refills: 0 | Status: SHIPPED | OUTPATIENT
Start: 2019-04-29 | End: 2019-06-06

## 2019-04-29 NOTE — TELEPHONE ENCOUNTER
gabapentin (NEURONTIN) 300 MG capsule     Last Written Prescription Date:  03/18/2019  Last Fill Quantity: 80,   # refills: 0  Last Office Visit: 03/13/2019  Future Office visit:       Routing refill request to provider for review/approval because:  Drug not on the [facility], [facility] or [facility] refill protocol or controlled substance

## 2019-04-29 NOTE — TELEPHONE ENCOUNTER
"Alendronate refill request    Patient was last seen by Dr. Coley on 3/13/19 and OV notes says to f/u in 6 months (due around 9/13/19)  Last prescription written on 4/2/19 was only for #4 tabs without any refills and note stating \"Please schedule follow up in clinic for additional refills\"  However, this note was also on the prescription prior  Creatinine out of range    Creatinine   Date Value Ref Range Status   03/07/2019 0.49 (L) 0.52 - 1.04 mg/dL Final       Please advise if ok for refills    Elvira Naranjo RN    "

## 2019-06-06 DIAGNOSIS — I73.9 CLAUDICATION OF LEFT LOWER EXTREMITY (H): ICD-10-CM

## 2019-06-06 RX ORDER — GABAPENTIN 300 MG/1
CAPSULE ORAL
Qty: 80 CAPSULE | Refills: 0 | Status: SHIPPED | OUTPATIENT
Start: 2019-06-06 | End: 2019-07-17

## 2019-06-06 NOTE — TELEPHONE ENCOUNTER
gabapentin (NEURONTIN) 300 MG capsule      Last Written Prescription Date:  4-  Last Fill Quantity: 80,   # refills: 0  Last Office Visit: 2-  Future Office visit:    Next 5 appointments (look out 90 days)    Jul 16, 2019  1:10 PM CDT  Return Visit with Tab Barajas MD  [facility] ([facility] [address] [place]  [facility] [address] [phone]           Routing refill request to provider for review/approval because:  Drug not on the [facility], [facility] or [facility] refill protocol or controlled substance

## 2019-07-16 ENCOUNTER — OFFICE VISIT (OUTPATIENT)
Dept: OTHER | Facility: CLINIC | Age: 69
End: 2019-07-16
Attending: INTERNAL MEDICINE
Payer: COMMERCIAL

## 2019-07-16 VITALS
DIASTOLIC BLOOD PRESSURE: 59 MMHG | HEIGHT: 64 IN | HEART RATE: 89 BPM | SYSTOLIC BLOOD PRESSURE: 118 MMHG | OXYGEN SATURATION: 100 % | BODY MASS INDEX: 25.95 KG/M2 | RESPIRATION RATE: 16 BRPM | WEIGHT: 152 LBS

## 2019-07-16 DIAGNOSIS — F17.200 TOBACCO USE DISORDER: ICD-10-CM

## 2019-07-16 DIAGNOSIS — I73.9 PAD (PERIPHERAL ARTERY DISEASE) (H): ICD-10-CM

## 2019-07-16 PROCEDURE — 99214 OFFICE O/P EST MOD 30 MIN: CPT | Mod: 25 | Performed by: INTERNAL MEDICINE

## 2019-07-16 PROCEDURE — 99406 BEHAV CHNG SMOKING 3-10 MIN: CPT | Mod: ZP | Performed by: INTERNAL MEDICINE

## 2019-07-16 PROCEDURE — G0463 HOSPITAL OUTPT CLINIC VISIT: HCPCS

## 2019-07-16 ASSESSMENT — MIFFLIN-ST. JEOR: SCORE: 1209.47

## 2019-07-16 NOTE — NURSING NOTE
3 Boxes of Eliquis 5 mg provided to patient per verbal order from Dr. Barajas.  (No more available from supply).    Lot [id number]  Expires August 2021.

## 2019-07-16 NOTE — PROGRESS NOTES
Rajani Guo is a 68 year old female who is presenting at the current time to discuss her diagnosi(es) of        PAD (peripheral artery disease) (H)  Tobacco use disorder       Review Of Systems  Skin: negative  Eyes: negative  Ears/Nose/Throat: negative  Respiratory: No shortness of breath, dyspnea on exertion, cough, or hemoptysis  Cardiovascular: negative  Gastrointestinal: negative  Genitourinary: negative  Musculoskeletal: negative  Neurologic: negative  Psychiatric: negative  Hematologic/Lymphatic/Immunologic: negative  Endocrine: negative    PAST MEDICAL HISTORY:                  Past Medical History:   Diagnosis Date     Age-related osteoporosis without current pathological fracture 1/18/2018     History of partial thyroidectomy 6/2/2018     Hyperlipidemia LDL goal <100 1/18/2018     Hypertension      Insomnia, unspecified type 1/18/2018     Tobacco use disorder      Type 2 diabetes mellitus without complication, with long-term current use of insulin (H) 1/18/2018       PAST SURGICAL HISTORY:                  Past Surgical History:   Procedure Laterality Date     IR ANGIOGRAM THROUGH CATHETER FOLLOW UP  3/5/2019     IR LOWER EXTREMITY ANGIOGRAM LEFT  3/4/2019     KNEE SURGERY Right 2013    right knee torn meniscus surgery     OVARY SURGERY  1971    1 ovary removed     RELEASE CARPAL TUNNEL Right 1988     RELEASE TRIGGER FINGER BILATERAL       SHOULDER SURGERY Left     rotator cuff repair, plate placement     THYROID SURGERY  1988    partial thyroidectomy       CURRENT MEDICATIONS:                  Current Outpatient Medications   Medication Sig Dispense Refill     alendronate (FOSAMAX) 70 MG tablet TAKE 1 TAB BY MOUTH EVERY 7 DAYS. 4 tablet 3     apixaban ANTICOAGULANT (ELIQUIS) 5 MG tablet Take 1 tablet (5 mg) by mouth 2 times daily 180 tablet 3     aspirin EC 81 MG EC tablet Take 81 mg by mouth       atorvastatin (LIPITOR) 40 MG tablet Take 1 tablet (40 mg) by mouth daily 90 tablet 3     buPROPion (ZYBAN)  150 MG 12 hr tablet Take 1 tablet (150 mg) by mouth 2 times daily Take once daily for first 4 days, then twice daily after that. 60 tablet 3     calcium carb 1250 mg, 500 mg Hoh,/vitamin D 200 unit (CALCIUM 500/D) 500-200 MG-UNIT per tablet Take 1 tablet by mouth       gabapentin (NEURONTIN) 300 MG capsule TAKE 1 CAPSULE BY MOUTH ONE OR TWO TIMES DAILY 80 capsule 0     glucosamine-chondroitin 500-400 MG CAPS per capsule Take 1 capsule by mouth       insulin detemir (LEVEMIR FLEXPEN/FLEXTOUCH) 100 UNIT/ML pen Inject 23 Units Subcutaneous At Bedtime 15 mL 1     lisinopril (PRINIVIL/ZESTRIL) 2.5 MG tablet Take 1 tablet (2.5 mg) by mouth daily 90 tablet 3     MELATONIN PO Take 10 mg by mouth At Bedtime       metFORMIN (GLUCOPHAGE) 1000 MG tablet Take 1 tablet (1,000 mg) by mouth 2 times daily (with meals) 180 tablet 1     Multiple Vitamin (MULTI-VITAMINS) TABS Take 1 tablet by mouth       nicotine (NICODERM CQ) 14 MG/24HR 24 hr patch Place 1 patch onto the skin every 24 hours Step 2 30 patch 1     nicotine (NICODERM CQ) 21 MG/24HR 24 hr patch Place 1 patch onto the skin every 24 hours Step 1 30 patch 1     nicotine (NICODERM CQ) 7 MG/24HR 24 hr patch Place 1 patch onto the skin every 24 hours Step 3 30 patch 1     traZODone (DESYREL) 50 MG tablet TAKE 1 TABLET (50 MG) BY MOUTH AT BEDTIME 90 tablet 3       ALLERGIES:                  Allergies   Allergen Reactions     Indomethacin Other (See Comments)     Dizziness and disorientation     Tramadol Nausea and Vomiting       SOCIAL HISTORY:                  Social History     Socioeconomic History     Marital status: Single     Spouse name: Not on file     Number of children: Not on file     Years of education: Not on file     Highest education level: Not on file   Occupational History     Not on file   Social Needs     Financial resource strain: Not on file     Food insecurity:     Worry: Not on file     Inability: Not on file     Transportation needs:     Medical: Not on  file     Non-medical: Not on file   Tobacco Use     Smoking status: Current Every Day Smoker     Packs/day: 1.50     Years: 52.00     Pack years: 78.00     Smokeless tobacco: Never Used   Substance and Sexual Activity     Alcohol use: Yes     Drug use: No     Sexual activity: Never   Lifestyle     Physical activity:     Days per week: Not on file     Minutes per session: Not on file     Stress: Not on file   Relationships     Social connections:     Talks on phone: Not on file     Gets together: Not on file     Attends Oriental orthodox service: Not on file     Active member of club or organization: Not on file     Attends meetings of clubs or organizations: Not on file     Relationship status: Not on file     Intimate partner violence:     Fear of current or ex partner: Not on file     Emotionally abused: Not on file     Physically abused: Not on file     Forced sexual activity: Not on file   Other Topics Concern     Parent/sibling w/ CABG, MI or angioplasty before 65F 55M? Not Asked   Social History Narrative     Not on file       FAMILY HISTORY:                   Family History   Problem Relation Age of Onset     Glaucoma No family hx of      Macular Degeneration No family hx of           Physical exam Reveals:    O/P: WNL  HEENT: WNL  NECK: No JVD, thyromegaly, or lymphadenopathy  HEART: RRR, 1/6 diastolic flow murmur over aortic area consistent with known AI , gallops, or rubs  LUNGS: CTA bilaterally without rales, wheezes, or rhonchi  GI: NABS, nondistended, nontender, soft  EXT:without cyanosis, clubbing, or edema  NEURO: nonfocal  : no flank tenderness    2 plus DP and PT pulses bilaterally.       A/P:    (I73.9) PAD S/P LLE acute arterial occlusion with successful CD lysis 3-5-19 with left SFA PTA upon completion angiography.  Comment: See my last note. This is likely cardioembolic due to AF which we have not been able to catch on a ZioPatch; She is told to quit smoking. She will be ACd.  Plan: apixaban  ANTICOAGULANT (ELIQUIS) 5 MG tablet,          SMOKING CESSATION COUNSELING, ASYMPTOMATIC,         3-10 MIN            (F17.200) Tobacco use disorder  Comment: RTC in October for arterial duplex and stress ABIs  with Dr. Langford. We will provide her with samples of Eliquis  Plan:  SMOKING CESSATION COUNSELING, ASYMPTOMATIC,         3-10 MIN        She was told continued tobacco use will increase risk of limb loss.

## 2019-07-16 NOTE — PROGRESS NOTES
"Rajani Guo is a 68 year old female who presents for:  Chief Complaint   Patient presents with     RECHECK     .3 month f/u. [facility] 05/21/19        Vitals:    Vitals:    07/16/19 1257   BP: 118/59   BP Location: Left arm   Patient Position: Chair   Cuff Size: Adult Regular   Pulse: 89   Resp: 16   SpO2: 100%   Weight: 152 lb (68.9 kg)   Height: 5' 4\" (1.626 m)       BMI:  Estimated body mass index is 26.09 kg/m  as calculated from the following:    Height as of this encounter: 5' 4\" (1.626 m).    Weight as of this encounter: 152 lb (68.9 kg).    Pain Score:  Data Unavailable        Conner Dodge MA    "

## 2019-07-17 ENCOUNTER — TELEPHONE (OUTPATIENT)
Dept: OTHER | Facility: CLINIC | Age: 69
End: 2019-07-17

## 2019-07-17 DIAGNOSIS — I73.9 CLAUDICATION OF LEFT LOWER EXTREMITY (H): ICD-10-CM

## 2019-07-17 DIAGNOSIS — G47.00 INSOMNIA, UNSPECIFIED TYPE: ICD-10-CM

## 2019-07-17 RX ORDER — TRAZODONE HYDROCHLORIDE 50 MG/1
TABLET, FILM COATED ORAL
Qty: 90 TABLET | Refills: 0 | Status: SHIPPED | OUTPATIENT
Start: 2019-07-17 | End: 2019-09-02

## 2019-07-17 NOTE — TELEPHONE ENCOUNTER
Routing refill request to provider for review/approval because:  Drug not on the [facility] refill protocol     Requested Prescriptions   Pending Prescriptions Disp Refills     gabapentin (NEURONTIN) 300 MG capsule [Pharmacy Med Name: GABAPENTIN 300 MG CAPSULE]  Last Written Prescription Date:  6/6/19  Last Fill Quantity: 80,  # refills: 0   Last office visit: 3/13/2019 with prescribing provider:     Future Office Visit:   80 capsule 0     Sig: TAKE 1 CAPSULE BY MOUTH ONE OR TWO TIMES DAILY       There is no refill protocol information for this order        Lesley Ortega RN - BC

## 2019-07-17 NOTE — TELEPHONE ENCOUNTER
Reason for call:  Medication   If this is a refill request, has the caller requested the refill from the pharmacy already? Yes  Will the patient be using a [facility]? No  Name of the pharmacy and phone number for the current request: [facility] [place]- [phone]    Name of the medication requested: trazodone     Other request: Pharmacy told patient to contact clinic for a new Rx     Phone number to reach patient:  Home number on file [phone] (home)    Best Time:  After 10:00 am    Can we leave a detailed message on this number?  YES

## 2019-07-18 RX ORDER — GABAPENTIN 300 MG/1
CAPSULE ORAL
Qty: 80 CAPSULE | Refills: 0 | Status: SHIPPED | OUTPATIENT
Start: 2019-07-18 | End: 2019-08-21 | Stop reason: ALTCHOICE

## 2019-07-25 DIAGNOSIS — E11.9 TYPE 2 DIABETES MELLITUS WITHOUT COMPLICATION, WITH LONG-TERM CURRENT USE OF INSULIN (H): ICD-10-CM

## 2019-07-25 DIAGNOSIS — Z79.4 TYPE 2 DIABETES MELLITUS WITHOUT COMPLICATION, WITH LONG-TERM CURRENT USE OF INSULIN (H): ICD-10-CM

## 2019-07-25 RX ORDER — INSULIN DETEMIR 100 [IU]/ML
INJECTION, SOLUTION SUBCUTANEOUS
Qty: 15 ML | Refills: 0 | Status: ON HOLD | OUTPATIENT
Start: 2019-07-25 | End: 2019-08-03

## 2019-07-30 ENCOUNTER — NURSE TRIAGE (OUTPATIENT)
Dept: FAMILY MEDICINE | Facility: CLINIC | Age: 69
End: 2019-07-30

## 2019-07-30 ENCOUNTER — OFFICE VISIT (OUTPATIENT)
Dept: FAMILY MEDICINE | Facility: CLINIC | Age: 69
End: 2019-07-30
Payer: COMMERCIAL

## 2019-07-30 VITALS
RESPIRATION RATE: 20 BRPM | WEIGHT: 155.2 LBS | BODY MASS INDEX: 26.5 KG/M2 | HEIGHT: 64 IN | TEMPERATURE: 97.6 F | OXYGEN SATURATION: 100 % | DIASTOLIC BLOOD PRESSURE: 60 MMHG | SYSTOLIC BLOOD PRESSURE: 122 MMHG | HEART RATE: 100 BPM

## 2019-07-30 DIAGNOSIS — E11.9 TYPE 2 DIABETES MELLITUS WITHOUT COMPLICATION, WITH LONG-TERM CURRENT USE OF INSULIN (H): ICD-10-CM

## 2019-07-30 DIAGNOSIS — R82.90 NONSPECIFIC FINDING ON EXAMINATION OF URINE: ICD-10-CM

## 2019-07-30 DIAGNOSIS — Z12.11 SCREEN FOR COLON CANCER: ICD-10-CM

## 2019-07-30 DIAGNOSIS — R06.09 DYSPNEA ON EXERTION: Primary | ICD-10-CM

## 2019-07-30 DIAGNOSIS — Z79.4 TYPE 2 DIABETES MELLITUS WITHOUT COMPLICATION, WITH LONG-TERM CURRENT USE OF INSULIN (H): ICD-10-CM

## 2019-07-30 DIAGNOSIS — R09.89 RIGHT CAROTID BRUIT: ICD-10-CM

## 2019-07-30 LAB
ALBUMIN UR-MCNC: NEGATIVE MG/DL
APPEARANCE UR: ABNORMAL
BACTERIA #/AREA URNS HPF: ABNORMAL /HPF
BILIRUB UR QL STRIP: NEGATIVE
COLOR UR AUTO: YELLOW
GLUCOSE UR STRIP-MCNC: NEGATIVE MG/DL
HBA1C MFR BLD: 5.3 % (ref 0–5.6)
HGB UR QL STRIP: ABNORMAL
KETONES UR STRIP-MCNC: NEGATIVE MG/DL
LEUKOCYTE ESTERASE UR QL STRIP: ABNORMAL
NITRATE UR QL: POSITIVE
NON-SQ EPI CELLS #/AREA URNS LPF: ABNORMAL /LPF
PH UR STRIP: 5.5 PH (ref 5–7)
RBC #/AREA URNS AUTO: ABNORMAL /HPF
SOURCE: ABNORMAL
SP GR UR STRIP: 1.02 (ref 1–1.03)
TSH SERPL DL<=0.005 MIU/L-ACNC: 3.42 MU/L (ref 0.4–4)
UROBILINOGEN UR STRIP-ACNC: 0.2 EU/DL (ref 0.2–1)
WBC #/AREA URNS AUTO: ABNORMAL /HPF

## 2019-07-30 PROCEDURE — 99214 OFFICE O/P EST MOD 30 MIN: CPT | Performed by: PHYSICIAN ASSISTANT

## 2019-07-30 PROCEDURE — 81001 URINALYSIS AUTO W/SCOPE: CPT | Performed by: PHYSICIAN ASSISTANT

## 2019-07-30 PROCEDURE — 84443 ASSAY THYROID STIM HORMONE: CPT | Performed by: PHYSICIAN ASSISTANT

## 2019-07-30 PROCEDURE — 36415 COLL VENOUS BLD VENIPUNCTURE: CPT | Performed by: PHYSICIAN ASSISTANT

## 2019-07-30 PROCEDURE — 87086 URINE CULTURE/COLONY COUNT: CPT | Performed by: PHYSICIAN ASSISTANT

## 2019-07-30 PROCEDURE — 83036 HEMOGLOBIN GLYCOSYLATED A1C: CPT | Performed by: PHYSICIAN ASSISTANT

## 2019-07-30 ASSESSMENT — MIFFLIN-ST. JEOR: SCORE: 1223.98

## 2019-07-30 NOTE — PROGRESS NOTES
"Subjective     Rajani Guo is a 68 year old female who presents to clinic today for the following health issues:    HPI   Patient presents with:  Fatigue: x 1 week  Diabetes: having high blood sugar  Eye Problem: having vision issue x 1 week    MR reviewed.     Review of Systems   ROS COMP: Constitutional, HEENT, cardiovascular, pulmonary, gi and gu systems are negative, except as otherwise noted.      Objective    /60   Pulse 100   Temp 97.6  F (36.4  C) (Oral)   Resp 20   Ht 1.626 m (5' 4\")   Wt 70.4 kg (155 lb 3.2 oz)   SpO2 100%   BMI 26.64 kg/m    Body mass index is 26.64 kg/m .  Physical Exam   GENERAL: healthy, alert and no distress  NECK: no adenopathy, no asymmetry, masses, or scars, thyroid normal to palpation and carotid bruit noted:  right  RESP: lungs clear to auscultation - no rales, rhonchi or wheezes  CV: regular rate and rhythm, normal S1 S2, no S3 or S4, no murmur, click or rub, no peripheral edema and peripheral pulses strong  SKIN: no suspicious lesions or rashes    Diagnostic Test Results:  Results for orders placed or performed in visit on 07/30/19   HEMOGLOBIN A1C   Result Value Ref Range    Hemoglobin A1C 5.3 0 - 5.6 %   *UA reflex to Microscopic and Culture ([place] [facility] (except [place] and [place])   Result Value Ref Range    Color Urine Yellow     Appearance Urine Cloudy     Glucose Urine Negative NEG^Negative mg/dL    Bilirubin Urine Negative NEG^Negative    Ketones Urine Negative NEG^Negative mg/dL    Specific Gravity Urine 1.020 1.003 - 1.035    Blood Urine Trace (A) NEG^Negative    pH Urine 5.5 5.0 - 7.0 pH    Protein Albumin Urine Negative NEG^Negative mg/dL    Urobilinogen Urine 0.2 0.2 - 1.0 EU/dL    Nitrite Urine Positive (A) NEG^Negative    Leukocyte Esterase Urine Small (A) NEG^Negative    Source Midstream Urine    TSH with free T4 reflex   Result Value Ref Range    TSH 3.42 0.40 - 4.00 mU/L   Urine Microscopic   Result Value Ref Range    WBC " Urine 25-50 (A) OTO5^0 - 5 /HPF    RBC Urine 5-10 (A) OTO2^O - 2 /HPF    Squamous Epithelial /LPF Urine Few FEW^Few /LPF    Bacteria Urine Many (A) NEG^Negative /HPF   Urine Culture Aerobic Bacterial   Result Value Ref Range    Specimen Description Midstream Urine     Culture Micro       Referred to [facility], await final report.           Assessment & Plan     1. Dyspnea on exertion  - Echocardiogram Exercise Stress; Future  - Urine Microscopic    2. Type 2 diabetes mellitus without complication, with long-term current use of insulin (H)  - HEMOGLOBIN A1C  - *UA reflex to Microscopic and Culture ([place] [facility] [place] [facility] (except [place] and [place])  - TSH with free T4 reflex  - DIABETES EDUCATOR REFERRAL    3. Right carotid bruit  - US Carotid Bilateral; Future    4. Screen for colon cancer  - GASTROENTEROLOGY ADULT REF PROCEDURE ONLY    5. Nonspecific finding on examination of urine  -Bactrim DS 1 po bid added.   - Urine Culture Aerobic Bacterial     Use medication as directed.  Follow up on US  Follow up per DM education.  Follow up in 1 week  Patient amenable to this follow up plan.           No follow-ups on file.    Emelyn Overton PA-C  [facility]

## 2019-07-30 NOTE — TELEPHONE ENCOUNTER
Called and spoke with patient. Reports that for the past week her eyes have been very sensitive to light and it feels like her eyes have been dilated like they do at the eye doctor but patient has not recently had her eye dilated.  Reports her BG readings have been very sporadic and her fasting AM BGs have been between 70-80 but then it'll jump to 231 later in the day.   Patient is currently taking 18-19 units of Levemir and Metformin 2000 units daily.   Patient has also been feeing very fatigued/weak.   Advised to keep appointment for today. Patient has someone driving her.   Pt agreed.    Additional Information    Negative: Complete loss of vision in 1 or both eyes    Negative: Severe eye pain    Negative: Severe headache    Negative: Double vision    Negative: Blurred vision or visual changes and present now and sudden onset or new (e.g., minutes, hours, days) (Exception: seeing floaters / black specks OR previously diagnosed migraine headaches with same symptoms)    Negative: Patient sounds very sick or weak to the triager    Negative: Flashes of light  (Exception: brief from pressing on the eyeball)    Negative: Eye pain and brief (now gone) blurred vision or visual changes    Negative: Taking digoxin (e.g., Lanoxin, Digitek, Cardoxin, Lanoxicaps; Toloxin in [place]) and blurred vision, yellow vision, or yellow-green halos    Negative: Many floaters in the eye    Negative: Headache and age > 50    Negative: Jaw pain while eating and age > 50    Patient wants to be seen    Negative: Unconscious or difficult to awaken    Negative: Seizure occurs    Negative: Acting confused (e.g., disoriented, slurred speech)    Negative: Very weak (can't stand)    Negative: Sounds like a life-threatening emergency to the triager    Negative: Vomiting and signs of dehydration (e.g., no urine > 12 hours, very dry mouth, dark urine, etc.)    Negative: Low blood sugar symptoms persist > 30 minutes AND using low blood sugar Care  Advice    Negative: Low blood glucose persists > 30 minutes AND using low blood sugar Care Advice    Negative: Patient sounds very sick or weak to the triager    Negative: Diabetes medication overdose (e.g., insulin error) and triager unable to answer question    Negative: Caller has URGENT medication question about med that PCP prescribed and triager unable to answer question    Patient wants to be seen    Protocols used: VISION LOSS OR CHANGE-A-OH, DIABETES - LOW BLOOD SUGAR-A-OH    Margareth Galan RN

## 2019-07-31 ENCOUNTER — TELEPHONE (OUTPATIENT)
Dept: FAMILY MEDICINE | Facility: CLINIC | Age: 69
End: 2019-07-31

## 2019-07-31 DIAGNOSIS — N39.0 URINARY TRACT INFECTION WITHOUT HEMATURIA, SITE UNSPECIFIED: Primary | ICD-10-CM

## 2019-07-31 LAB
BACTERIA SPEC CULT: NORMAL
SPECIMEN SOURCE: NORMAL

## 2019-07-31 RX ORDER — SULFAMETHOXAZOLE/TRIMETHOPRIM 800-160 MG
1 TABLET ORAL 2 TIMES DAILY
Qty: 6 TABLET | Refills: 0 | Status: ON HOLD | OUTPATIENT
Start: 2019-07-31 | End: 2019-08-03

## 2019-07-31 NOTE — TELEPHONE ENCOUNTER
Patient is calling to speak with nurse or pcp today she is still having weakness, vision change, wondering if she needs to be seen again or go to ED    Please call patient to discuss  Thank you

## 2019-07-31 NOTE — TELEPHONE ENCOUNTER
"Called and spoke with patient. Patient was seen yesterday by JAYNA Will for fatigue, vision changes and fluctuating BG levels.  Patient states the symptoms are much the same as yesterday but it feels like \"my body feels like I have no muscles and am feeling really weak\"   Advised her she needs to schedule the US and gave # to schedule.   Patient wondering if she should come back to the clinic or go to the ED or what next steps are.    Margareth Galan RN  "

## 2019-07-31 NOTE — TELEPHONE ENCOUNTER
Per patient she felt dizzy this morning but that has resolved  She went to the pharmacy to  her abx for UTI and had her BP checked then  BP was 95/44 and HR was 96  Is on lisinopril 2.5mg daily and has already taken it this morning  Advised her to push fluids as this can help bring up her BP as well as help with her UTI  Advised to call if BP continues to be low if she is having dizziness/lightheadedness  Patient verbalized understanding.    Elvira Naranjo RN

## 2019-07-31 NOTE — TELEPHONE ENCOUNTER
Let Patient know that abx were sent to her pharmacy for UTI but may need to be changed when Cx results come back.  Increase hydration.  Follow up in 1 week for recheck but follow up sooner if sx worsen or persist.  Isrrael MICHEL

## 2019-07-31 NOTE — TELEPHONE ENCOUNTER
Diabetes Education Scheduling Outreach #1:    Call to patient to schedule. Left message with phone number to call to schedule.    Plan for 2nd outreach attempt within 1 week.    Chen Camejo  [facility]  Diabetes and Nutrition Scheduling

## 2019-08-01 ENCOUNTER — APPOINTMENT (OUTPATIENT)
Dept: GENERAL RADIOLOGY | Facility: CLINIC | Age: 69
DRG: 392 | End: 2019-08-01
Attending: INTERNAL MEDICINE
Payer: COMMERCIAL

## 2019-08-01 ENCOUNTER — HOSPITAL ENCOUNTER (INPATIENT)
Facility: CLINIC | Age: 69
LOS: 2 days | Discharge: HOME | DRG: 392 | End: 2019-08-03
Attending: EMERGENCY MEDICINE | Admitting: INTERNAL MEDICINE
Payer: COMMERCIAL

## 2019-08-01 ENCOUNTER — ANCILLARY PROCEDURE (OUTPATIENT)
Dept: ULTRASOUND IMAGING | Facility: CLINIC | Age: 69
End: 2019-08-01
Attending: PHYSICIAN ASSISTANT
Payer: COMMERCIAL

## 2019-08-01 ENCOUNTER — NURSE TRIAGE (OUTPATIENT)
Dept: FAMILY MEDICINE | Facility: CLINIC | Age: 69
End: 2019-08-01

## 2019-08-01 ENCOUNTER — APPOINTMENT (OUTPATIENT)
Dept: MRI IMAGING | Facility: CLINIC | Age: 69
DRG: 392 | End: 2019-08-01
Attending: EMERGENCY MEDICINE
Payer: COMMERCIAL

## 2019-08-01 ENCOUNTER — TELEPHONE (OUTPATIENT)
Dept: FAMILY MEDICINE | Facility: CLINIC | Age: 69
End: 2019-08-01

## 2019-08-01 DIAGNOSIS — D64.9 ANEMIA, UNSPECIFIED TYPE: ICD-10-CM

## 2019-08-01 DIAGNOSIS — I73.9 PAD (PERIPHERAL ARTERY DISEASE) (H): ICD-10-CM

## 2019-08-01 DIAGNOSIS — E11.9 TYPE 2 DIABETES MELLITUS WITHOUT COMPLICATION, WITH LONG-TERM CURRENT USE OF INSULIN (H): ICD-10-CM

## 2019-08-01 DIAGNOSIS — Z79.4 TYPE 2 DIABETES MELLITUS WITHOUT COMPLICATION, WITH LONG-TERM CURRENT USE OF INSULIN (H): ICD-10-CM

## 2019-08-01 DIAGNOSIS — K29.80 DUODENITIS: Primary | ICD-10-CM

## 2019-08-01 DIAGNOSIS — R09.89 RIGHT CAROTID BRUIT: ICD-10-CM

## 2019-08-01 LAB
ABO + RH BLD: NORMAL
ABO + RH BLD: NORMAL
ANION GAP SERPL CALCULATED.3IONS-SCNC: 8 MMOL/L (ref 3–14)
BASOPHILS # BLD AUTO: 0 10E9/L (ref 0–0.2)
BASOPHILS NFR BLD AUTO: 0.2 %
BLD GP AB SCN SERPL QL: NORMAL
BLD PROD TYP BPU: NORMAL
BLD UNIT ID BPU: 0
BLD UNIT ID BPU: 0
BLOOD BANK CMNT PATIENT-IMP: NORMAL
BLOOD PRODUCT CODE: NORMAL
BLOOD PRODUCT CODE: NORMAL
BPU ID: NORMAL
BPU ID: NORMAL
BUN SERPL-MCNC: 18 MG/DL (ref 7–30)
CALCIUM SERPL-MCNC: 8.8 MG/DL (ref 8.5–10.1)
CHLORIDE SERPL-SCNC: 106 MMOL/L (ref 94–109)
CO2 SERPL-SCNC: 23 MMOL/L (ref 20–32)
CREAT SERPL-MCNC: 0.74 MG/DL (ref 0.52–1.04)
DIFFERENTIAL METHOD BLD: ABNORMAL
EOSINOPHIL # BLD AUTO: 0.2 10E9/L (ref 0–0.7)
EOSINOPHIL NFR BLD AUTO: 1.3 %
ERYTHROCYTE [DISTWIDTH] IN BLOOD BY AUTOMATED COUNT: 14.9 % (ref 10–15)
GFR SERPL CREATININE-BSD FRML MDRD: 84 ML/MIN/{1.73_M2}
GLUCOSE BLDC GLUCOMTR-MCNC: 114 MG/DL (ref 70–99)
GLUCOSE BLDC GLUCOMTR-MCNC: 115 MG/DL (ref 70–99)
GLUCOSE SERPL-MCNC: 134 MG/DL (ref 70–99)
HCT VFR BLD AUTO: 19.9 % (ref 35–47)
HEMOCCULT STL QL: POSITIVE
HGB BLD-MCNC: 6.3 G/DL (ref 11.7–15.7)
HGB BLD-MCNC: 6.3 G/DL (ref 11.7–15.7)
IMM GRANULOCYTES # BLD: 0 10E9/L (ref 0–0.4)
IMM GRANULOCYTES NFR BLD: 0.3 %
INTERPRETATION ECG - MUSE: NORMAL
LYMPHOCYTES # BLD AUTO: 2.2 10E9/L (ref 0.8–5.3)
LYMPHOCYTES NFR BLD AUTO: 18.2 %
MCH RBC QN AUTO: 27.4 PG (ref 26.5–33)
MCHC RBC AUTO-ENTMCNC: 31.7 G/DL (ref 31.5–36.5)
MCV RBC AUTO: 87 FL (ref 78–100)
MONOCYTES # BLD AUTO: 0.9 10E9/L (ref 0–1.3)
MONOCYTES NFR BLD AUTO: 7.5 %
NEUTROPHILS # BLD AUTO: 8.6 10E9/L (ref 1.6–8.3)
NEUTROPHILS NFR BLD AUTO: 72.5 %
NRBC # BLD AUTO: 0 10*3/UL
NRBC BLD AUTO-RTO: 0 /100
NUM BPU REQUESTED: 2
PLATELET # BLD AUTO: 371 10E9/L (ref 150–450)
POTASSIUM SERPL-SCNC: 4.1 MMOL/L (ref 3.4–5.3)
RADIOLOGIST FLAGS: ABNORMAL
RBC # BLD AUTO: 2.3 10E12/L (ref 3.8–5.2)
SODIUM SERPL-SCNC: 137 MMOL/L (ref 133–144)
SPECIMEN EXP DATE BLD: NORMAL
TRANSFUSION STATUS PATIENT QL: NORMAL
WBC # BLD AUTO: 11.8 10E9/L (ref 4–11)

## 2019-08-01 PROCEDURE — C9113 INJ PANTOPRAZOLE SODIUM, VIA: HCPCS | Performed by: EMERGENCY MEDICINE

## 2019-08-01 PROCEDURE — 25500064 ZZH RX 255 OP 636: Performed by: EMERGENCY MEDICINE

## 2019-08-01 PROCEDURE — 93005 ELECTROCARDIOGRAM TRACING: CPT

## 2019-08-01 PROCEDURE — 86850 RBC ANTIBODY SCREEN: CPT | Performed by: EMERGENCY MEDICINE

## 2019-08-01 PROCEDURE — 25800030 ZZH RX IP 258 OP 636: Performed by: INTERNAL MEDICINE

## 2019-08-01 PROCEDURE — 96361 HYDRATE IV INFUSION ADD-ON: CPT

## 2019-08-01 PROCEDURE — 25000125 ZZHC RX 250

## 2019-08-01 PROCEDURE — A9585 GADOBUTROL INJECTION: HCPCS | Performed by: EMERGENCY MEDICINE

## 2019-08-01 PROCEDURE — 85025 COMPLETE CBC W/AUTO DIFF WBC: CPT | Performed by: EMERGENCY MEDICINE

## 2019-08-01 PROCEDURE — 25000131 ZZH RX MED GY IP 250 OP 636 PS 637: Performed by: PHYSICIAN ASSISTANT

## 2019-08-01 PROCEDURE — 80048 BASIC METABOLIC PNL TOTAL CA: CPT | Performed by: EMERGENCY MEDICINE

## 2019-08-01 PROCEDURE — 99223 1ST HOSP IP/OBS HIGH 75: CPT | Performed by: INTERNAL MEDICINE

## 2019-08-01 PROCEDURE — 99285 EMERGENCY DEPT VISIT HI MDM: CPT | Mod: 25

## 2019-08-01 PROCEDURE — 25000128 H RX IP 250 OP 636: Performed by: INTERNAL MEDICINE

## 2019-08-01 PROCEDURE — 86900 BLOOD TYPING SEROLOGIC ABO: CPT | Performed by: EMERGENCY MEDICINE

## 2019-08-01 PROCEDURE — 25000132 ZZH RX MED GY IP 250 OP 250 PS 637: Performed by: PHYSICIAN ASSISTANT

## 2019-08-01 PROCEDURE — 86923 COMPATIBILITY TEST ELECTRIC: CPT | Performed by: EMERGENCY MEDICINE

## 2019-08-01 PROCEDURE — 71045 X-RAY EXAM CHEST 1 VIEW: CPT

## 2019-08-01 PROCEDURE — 85018 HEMOGLOBIN: CPT | Performed by: PHYSICIAN ASSISTANT

## 2019-08-01 PROCEDURE — 93880 EXTRACRANIAL BILAT STUDY: CPT

## 2019-08-01 PROCEDURE — 94640 AIRWAY INHALATION TREATMENT: CPT

## 2019-08-01 PROCEDURE — 40000275 ZZH STATISTIC RCP TIME EA 10 MIN

## 2019-08-01 PROCEDURE — 25000128 H RX IP 250 OP 636: Performed by: EMERGENCY MEDICINE

## 2019-08-01 PROCEDURE — P9016 RBC LEUKOCYTES REDUCED: HCPCS | Performed by: EMERGENCY MEDICINE

## 2019-08-01 PROCEDURE — 82272 OCCULT BLD FECES 1-3 TESTS: CPT | Performed by: EMERGENCY MEDICINE

## 2019-08-01 PROCEDURE — C9113 INJ PANTOPRAZOLE SODIUM, VIA: HCPCS | Performed by: INTERNAL MEDICINE

## 2019-08-01 PROCEDURE — 25800030 ZZH RX IP 258 OP 636: Performed by: PHYSICIAN ASSISTANT

## 2019-08-01 PROCEDURE — 36430 TRANSFUSION BLD/BLD COMPNT: CPT

## 2019-08-01 PROCEDURE — 99223 1ST HOSP IP/OBS HIGH 75: CPT | Mod: AI | Performed by: INTERNAL MEDICINE

## 2019-08-01 PROCEDURE — 00000146 ZZHCL STATISTIC GLUCOSE BY METER IP

## 2019-08-01 PROCEDURE — 70553 MRI BRAIN STEM W/O & W/DYE: CPT

## 2019-08-01 PROCEDURE — 86901 BLOOD TYPING SEROLOGIC RH(D): CPT | Performed by: EMERGENCY MEDICINE

## 2019-08-01 PROCEDURE — 96365 THER/PROPH/DIAG IV INF INIT: CPT

## 2019-08-01 PROCEDURE — 12000000 ZZH R&B MED SURG/OB

## 2019-08-01 PROCEDURE — 36415 COLL VENOUS BLD VENIPUNCTURE: CPT | Performed by: PHYSICIAN ASSISTANT

## 2019-08-01 PROCEDURE — 25800030 ZZH RX IP 258 OP 636: Performed by: EMERGENCY MEDICINE

## 2019-08-01 RX ORDER — GADOBUTROL 604.72 MG/ML
7 INJECTION INTRAVENOUS ONCE
Status: COMPLETED | OUTPATIENT
Start: 2019-08-01 | End: 2019-08-01

## 2019-08-01 RX ORDER — LIDOCAINE 40 MG/G
CREAM TOPICAL
Status: DISCONTINUED | OUTPATIENT
Start: 2019-08-01 | End: 2019-08-03

## 2019-08-01 RX ORDER — TRAZODONE HYDROCHLORIDE 50 MG/1
50 TABLET, FILM COATED ORAL AT BEDTIME
Status: DISCONTINUED | OUTPATIENT
Start: 2019-08-01 | End: 2019-08-03 | Stop reason: HOSPADM

## 2019-08-01 RX ORDER — GABAPENTIN 300 MG/1
300 CAPSULE ORAL 2 TIMES DAILY
Status: DISCONTINUED | OUTPATIENT
Start: 2019-08-01 | End: 2019-08-03 | Stop reason: HOSPADM

## 2019-08-01 RX ORDER — ONDANSETRON 4 MG/1
4 TABLET, ORALLY DISINTEGRATING ORAL EVERY 6 HOURS PRN
Status: DISCONTINUED | OUTPATIENT
Start: 2019-08-01 | End: 2019-08-03 | Stop reason: HOSPADM

## 2019-08-01 RX ORDER — ACETAMINOPHEN 325 MG/1
650 TABLET ORAL EVERY 4 HOURS PRN
Status: DISCONTINUED | OUTPATIENT
Start: 2019-08-01 | End: 2019-08-03 | Stop reason: HOSPADM

## 2019-08-01 RX ORDER — PHENOL 1.4 %
10 AEROSOL, SPRAY (ML) MUCOUS MEMBRANE AT BEDTIME
COMMUNITY

## 2019-08-01 RX ORDER — ALBUTEROL SULFATE 0.83 MG/ML
SOLUTION RESPIRATORY (INHALATION)
Status: COMPLETED
Start: 2019-08-01 | End: 2019-08-01

## 2019-08-01 RX ORDER — NICOTINE POLACRILEX 4 MG
15-30 LOZENGE BUCCAL
Status: DISCONTINUED | OUTPATIENT
Start: 2019-08-01 | End: 2019-08-03 | Stop reason: HOSPADM

## 2019-08-01 RX ORDER — NICOTINE 21 MG/24HR
1 PATCH, TRANSDERMAL 24 HOURS TRANSDERMAL DAILY
Status: DISCONTINUED | OUTPATIENT
Start: 2019-08-01 | End: 2019-08-03 | Stop reason: HOSPADM

## 2019-08-01 RX ORDER — NALOXONE HYDROCHLORIDE 0.4 MG/ML
.1-.4 INJECTION, SOLUTION INTRAMUSCULAR; INTRAVENOUS; SUBCUTANEOUS
Status: DISCONTINUED | OUTPATIENT
Start: 2019-08-01 | End: 2019-08-03 | Stop reason: HOSPADM

## 2019-08-01 RX ORDER — ACETAMINOPHEN 650 MG/1
650 SUPPOSITORY RECTAL EVERY 4 HOURS PRN
Status: DISCONTINUED | OUTPATIENT
Start: 2019-08-01 | End: 2019-08-03 | Stop reason: HOSPADM

## 2019-08-01 RX ORDER — ALBUTEROL SULFATE 5 MG/ML
2.5 SOLUTION RESPIRATORY (INHALATION) EVERY 6 HOURS PRN
Status: DISCONTINUED | OUTPATIENT
Start: 2019-08-01 | End: 2019-08-03 | Stop reason: HOSPADM

## 2019-08-01 RX ORDER — SODIUM CHLORIDE 9 MG/ML
INJECTION, SOLUTION INTRAVENOUS CONTINUOUS
Status: DISCONTINUED | OUTPATIENT
Start: 2019-08-01 | End: 2019-08-01

## 2019-08-01 RX ORDER — ONDANSETRON 2 MG/ML
4 INJECTION INTRAMUSCULAR; INTRAVENOUS EVERY 6 HOURS PRN
Status: DISCONTINUED | OUTPATIENT
Start: 2019-08-01 | End: 2019-08-03 | Stop reason: HOSPADM

## 2019-08-01 RX ORDER — LIDOCAINE 40 MG/G
CREAM TOPICAL
Status: DISCONTINUED | OUTPATIENT
Start: 2019-08-01 | End: 2019-08-03 | Stop reason: HOSPADM

## 2019-08-01 RX ORDER — FUROSEMIDE 10 MG/ML
40 INJECTION INTRAMUSCULAR; INTRAVENOUS ONCE
Status: COMPLETED | OUTPATIENT
Start: 2019-08-01 | End: 2019-08-01

## 2019-08-01 RX ORDER — ATORVASTATIN CALCIUM 40 MG/1
40 TABLET, FILM COATED ORAL DAILY
Status: DISCONTINUED | OUTPATIENT
Start: 2019-08-01 | End: 2019-08-03 | Stop reason: HOSPADM

## 2019-08-01 RX ORDER — BUPROPION HYDROCHLORIDE 150 MG/1
150 TABLET, EXTENDED RELEASE ORAL 2 TIMES DAILY
Status: DISCONTINUED | OUTPATIENT
Start: 2019-08-01 | End: 2019-08-03 | Stop reason: HOSPADM

## 2019-08-01 RX ORDER — DEXTROSE MONOHYDRATE 25 G/50ML
25-50 INJECTION, SOLUTION INTRAVENOUS
Status: DISCONTINUED | OUTPATIENT
Start: 2019-08-01 | End: 2019-08-03 | Stop reason: HOSPADM

## 2019-08-01 RX ADMIN — BUPROPION HYDROCHLORIDE 150 MG: 150 TABLET, FILM COATED, EXTENDED RELEASE ORAL at 22:08

## 2019-08-01 RX ADMIN — SODIUM CHLORIDE 80 MG: 9 INJECTION, SOLUTION INTRAVENOUS at 13:33

## 2019-08-01 RX ADMIN — TRAZODONE HYDROCHLORIDE 50 MG: 50 TABLET ORAL at 22:08

## 2019-08-01 RX ADMIN — INSULIN DETEMIR 5 UNITS: 100 INJECTION, SOLUTION SUBCUTANEOUS at 22:06

## 2019-08-01 RX ADMIN — GABAPENTIN 300 MG: 300 CAPSULE ORAL at 22:08

## 2019-08-01 RX ADMIN — GADOBUTROL 7 ML: 604.72 INJECTION INTRAVENOUS at 11:18

## 2019-08-01 RX ADMIN — FUROSEMIDE 40 MG: 10 INJECTION, SOLUTION INTRAVENOUS at 22:50

## 2019-08-01 RX ADMIN — ACETAMINOPHEN 650 MG: 325 TABLET, FILM COATED ORAL at 22:08

## 2019-08-01 RX ADMIN — SODIUM CHLORIDE 8 MG/HR: 9 INJECTION, SOLUTION INTRAVENOUS at 16:22

## 2019-08-01 RX ADMIN — SODIUM CHLORIDE 1000 ML: 9 INJECTION, SOLUTION INTRAVENOUS at 12:38

## 2019-08-01 RX ADMIN — ALBUTEROL SULFATE 2.5 MG: 2.5 SOLUTION RESPIRATORY (INHALATION) at 23:40

## 2019-08-01 RX ADMIN — ATORVASTATIN CALCIUM 40 MG: 40 TABLET, FILM COATED ORAL at 22:08

## 2019-08-01 RX ADMIN — SODIUM CHLORIDE 500 ML: 9 INJECTION, SOLUTION INTRAVENOUS at 10:29

## 2019-08-01 RX ADMIN — NICOTINE 1 PATCH: 21 PATCH, EXTENDED RELEASE TRANSDERMAL at 15:54

## 2019-08-01 RX ADMIN — SODIUM CHLORIDE: 9 INJECTION, SOLUTION INTRAVENOUS at 16:22

## 2019-08-01 ASSESSMENT — MIFFLIN-ST. JEOR
SCORE: 1223.08
SCORE: 1236.68

## 2019-08-01 ASSESSMENT — ENCOUNTER SYMPTOMS
FEVER: 0
WEAKNESS: 1
DIZZINESS: 1
VOMITING: 0
ABDOMINAL PAIN: 0
NAUSEA: 0
HEADACHES: 1
PHOTOPHOBIA: 1

## 2019-08-01 ASSESSMENT — ACTIVITIES OF DAILY LIVING (ADL)
COGNITION: 0 - NO COGNITION ISSUES REPORTED
SWALLOWING: 0-->SWALLOWS FOODS/LIQUIDS WITHOUT DIFFICULTY
BATHING: 0-->INDEPENDENT
DRESS: 0-->INDEPENDENT
AMBULATION: 0-->INDEPENDENT
ADLS_ACUITY_SCORE: 13
TRANSFERRING: 0-->INDEPENDENT
RETIRED_COMMUNICATION: 0-->UNDERSTANDS/COMMUNICATES WITHOUT DIFFICULTY
ADLS_ACUITY_SCORE: 13
TOILETING: 0-->INDEPENDENT
FALL_HISTORY_WITHIN_LAST_SIX_MONTHS: NO
RETIRED_EATING: 0-->INDEPENDENT

## 2019-08-01 NOTE — TELEPHONE ENCOUNTER
----- Message from Emelyn Overton PA-C sent at 8/1/2019  4:37 PM CDT -----  Please schedule follow up to discuss test results.  Isrrael MICHEL

## 2019-08-01 NOTE — PROVIDER NOTIFICATION
MD Notification    Notified Person: MD    Notified Person Name: Dr. Anderson    Notification Date/Time: 8/1/2019 at 1803    Notification Interaction: Phone call    Purpose of Notification: Lab calls with critical lab value for Hgb of 6.3 after receiving 1 unit of blood. Will give another unit of blood. When do you want the recheck Hgb after unit of blood?    Orders Received: Per MD, give another unit of blood then recheck Hgb 1.5 hours after unit of blood has transfused. Then restart the every 6 hour Hgb checks.    Comments:    Addendum: MD is notified of temperature increase to 99.2 degrees F following first unit of blood transfused. Per MD, not concerned at this time.

## 2019-08-01 NOTE — H&P
Admitted:     08/01/2019      PRIMARY CARE PROVIDER:  Tamiko Coley MD      CHIEF COMPLAINT:  Lightheadedness.      History obtained from the patient and chart review.      HISTORY OF PRESENT ILLNESS:  Rajani Guo is a very pleasant 68-year-old female with past medical history of left lower extremity acute arterial occlusion in 03/2019, treated with lytic therapy and anticoagulation with aspirin and Eliquis, who presented to the Emergency Department with complaints of dizziness.  As above, she underwent lytic therapy for an arterial occlusion earlier this spring.  It was felt that the acute occlusion may be related to undiagnosed atrial fibrillation.  She was initiated on dual therapy with aspirin 81 mg and Eliquis and has remained anticoagulated.  She notes for the past couple of weeks, she has had progressive dizziness.  She describes this as a dysequilibrium, primarily when she stands.  She notes generalized muscle weakness and some dyspnea on exertion.  She was seen 2 days ago at her primary care provider clinic.  She had an abnormal urinalysis and was diagnosed with a urinary tract infection, though she had no urinary symptoms.  Additionally, an exercise stress test was ordered to further assess her dyspnea on exertion.  Today, her dizziness seemed much more severe and she noted some changes in her vision.  It was not blurry, it just felt as though her vision was off so she elected to present to the Emergency Department.  In the Emergency Department, she was evaluated by Dr. Thomas.  Vitals were stable.  Given her reports of visual changes and dysequilibrium, an MRI was obtained, which was negative for acute stroke.  Her laboratory evaluation, however, returned with a hemoglobin of 6.3, down from 12.1 in March.  Hemoccult was positive.  Given the above, admission was requested for further evaluation.      At present, the patient is evaluated in the Emergency Department.  Offers no complaints at this time.   Denies any recent issues with melena or hematochezia.  She had undergone a colonoscopy several years ago and is due again.  No previous history of GI bleed.  She does not take NSAIDs in addition to her dual anticoagulation.  Drinks minimal alcohol.  Does continue to smoke.      PAST MEDICAL HISTORY:   1.  History of left lower extremity acute arterial occlusion, treated with lytic therapy in 03/2019.   2.  Insulin-dependent type 2 diabetes.  A1c 07/30 was 5.3.   3.  Dyslipidemia.   4.  Hypertension.   5.  Ongoing tobacco dependence.      PRIOR TO ADMISSION MEDICATIONS:    Prior to Admission medications    Medication Sig Last Dose Taking? Auth Provider   alendronate (FOSAMAX) 70 MG tablet TAKE 1 TAB BY MOUTH EVERY 7 DAYS. 7/29/2019 Yes Tamiko Coley MD   apixaban ANTICOAGULANT (ELIQUIS) 5 MG tablet Take 1 tablet (5 mg) by mouth 2 times daily 8/1/2019 at am Yes Tab Barajas MD   aspirin EC 81 MG EC tablet Take 81 mg by mouth 7/31/2019 at pm Yes Reported, Patient   atorvastatin (LIPITOR) 40 MG tablet Take 1 tablet (40 mg) by mouth daily 7/31/2019 at bedtime Yes Tamiko Coley MD   buPROPion (ZYBAN) 150 MG 12 hr tablet Take 1 tablet (150 mg) by mouth 2 times daily Take once daily for first 4 days, then twice daily after that. 8/1/2019 at am Yes Sparkle Colon PA-C   calcium carb 1250 mg, 500 mg [place],/vitamin D 200 unit (CALCIUM 500/D) 500-200 MG-UNIT per tablet Take 1 tablet by mouth 2 times daily  8/1/2019 at am Yes Reported, Patient   gabapentin (NEURONTIN) 300 MG capsule TAKE 1 CAPSULE BY MOUTH ONE OR TWO TIMES DAILY  Patient taking differently: TAKE 1 CAPSULE BY MOUTH TWO TIMES DAILY 8/1/2019 at am Yes Tamiko Coley MD   glucosamine-chondroitin 500-400 MG CAPS per capsule Take 1 capsule by mouth 2 times daily  8/1/2019 at am Yes Reported, Patient   LEVEMIR FLEXTOUCH 100 UNIT/ML pen INJECT 23 UNITS SUBCUTANEOUS AT BEDTIME  Patient taking differently: INJECT 18-19 UNITS SUBCUTANEOUS AT BEDTIME  7/31/2019 at bedtime Yes Taimko Coley MD   lisinopril (PRINIVIL/ZESTRIL) 2.5 MG tablet Take 1 tablet (2.5 mg) by mouth daily 8/1/2019 at am Yes Tamiko Coley MD   Melatonin 10 MG TABS tablet Take 10 mg by mouth At Bedtime 7/31/2019 at bedtime Yes Unknown, Entered By History   metFORMIN (GLUCOPHAGE) 1000 MG tablet Take 1 tablet (1,000 mg) by mouth 2 times daily (with meals) 8/1/2019 at am Yes Tamiko Coley MD   Multiple Vitamin (MULTI-VITAMINS) TABS Take 1 tablet by mouth daily  8/1/2019 at am Yes Reported, Patient   sulfamethoxazole-trimethoprim (BACTRIM DS/SEPTRA DS) 800-160 MG tablet Take 1 tablet by mouth 2 times daily 8/1/2019 at am Yes Emelyn Overton PA-C   traZODone (DESYREL) 50 MG tablet TAKE 1 TABLET (50 MG) BY MOUTH AT BEDTIME 7/31/2019 at hs Yes Tamiko Coley MD             ALLERGIES:     1.  INDOMETHACIN.   2.  TRAMADOL.      PAST SURGICAL HISTORY:   1.  Partial thyroidectomy.   2.  Rotator cuff repair.   3.  Trigger finger release.   4.  Carpal tunnel.   5.  Oophorectomy.   6.  Knee surgery.   7.  Meniscus repair.      FAMILY HISTORY:  Extensive family history of diabetes.      SOCIAL HISTORY:  She continues to be a daily smoker, smokes 1 to 1-1/2 packs per day, has been smoking since the age of 12.  She drinks minimal alcohol.  She works at [employer].      REVIEW OF SYSTEMS:  A 10-point review of systems was completed.  Pertinent positives are noted in HPI, all other systems negative.      PHYSICAL EXAMINATION:   GENERAL:  Rajani Guo is a well-developed, well-nourished 68-year-old female who is lying comfortably in bed.   VITAL SIGNS:  Blood pressure is 105/50, pulse 95, O2 sat is 95%.   HEAD:  Normocephalic and atraumatic.   CARDIOVASCULAR:  Regular rate and rhythm, no murmurs.   PULMONARY:  Normal effort.   LUNGS:  Clear.   ABDOMEN:  Soft, nontender.   EXTREMITIES:  Moves all 4 extremities.  Dorsalis pedis and radial pulses are palpated bilaterally.   NEUROLOGIC:  Alert and  oriented.  Cranial nerves II-XII are grossly intact.      LABORATORY DATA:  Reviewed in Epic.      ASSESSMENT:  Rajani Guo is a 68-year-old female with past medical history of left lower extremity arterial occlusion in 03/2019, on anticoagulation with Eliquis and aspirin, who presented to the Emergency Department for dizziness and is found to have a hemoglobin of 6.3, concerning for possible gastrointestinal bleed.      1.  Acute anemia with concern for gastrointestinal bleed.  The patient presented with several weeks of increasing dizziness, shortness of breath and generalized weakness.  Hemoglobin noted to be 6.3.  Suspect likely secondary to gastrointestinal bleed in the setting of anticoagulation with Eliquis and aspirin.  We will admit under inpatient status.  Hold aspirin and Eliquis.  She has been consented and will receive a unit of packed red blood cells.  We will monitor hemoglobin every 6 hours and transfuse if hemoglobin remains less than 7.  She will be on a Protonix drip.  We will consult [facility] to assist with possible endoscopy and/or colonoscopy.  She will be on a clear liquid diet and n.p.o. at midnight.   2.  History of left lower extremity acute arterial occlusion, status post lytic therapy in 03/2019.  Follows with Dr. Barajas of [facility].  Maintained on Eliquis and aspirin.  We will need to hold aspirin and Eliquis due to acute bleed.  I will consult Vascular Medicine to assist with Rajani to safely resume anticoagulation if both aspirin and Eliquis are necessary.   3.  Insulin-dependent type 2 diabetes.  A1c is 5.3.  Prior to admission regimen includes metformin 1000 mg twice daily and Levemir units at bedtime.  She will be n.p.o. at midnight on a clear liquid diet.  Her A1c is tightly controlled at 5.3.  We will hold prior to admission metformin.  Place on low-dose Levemir 5 units at bedtime.  Sliding scale insulin will be available with meals and at bedtime.   4.   Hypertension.  Prior to admission regimen includes lisinopril 2.5 mg daily.  We will hold for now due to concern for GI bleed.   5.  Tobacco dependence.  Continues to smoke.  Encouraged cessation.  Nicotine patch has been made available.   6.  Abnormal urinalysis.  Was seen by her primary care provider 2 days ago for dyspnea on exertion.  Urinalysis was abnormal at that time.  The patient started on Bactrim, though had no urinary symptoms.  Urine culture has now returned with mixed urogenital kaylin.  We will discontinue antibiotics.   7.  Deep venous thrombosis prophylaxis:  PCDs.      CODE STATUS:  Full code.      The patient was discussed with Dr. Tran Urrutia of the Hospitalist Service, who independently interviewed and examined the patient.  He is in agreement with the above plan.         TRAN URRUTIA DO       As dictated by YULISSA SANTIAGO PA-C            D: 2019   T: 2019   MT: EMLLY      Name:     CRISTIAN GAMING   MRN:      [id number]        Account:      [id number]   :      1950        Admitted:     2019                   Document: [id number]       cc: Tamiko Coley MD

## 2019-08-01 NOTE — CONSULTS
[facility]    Vascular Medicine Consultation     Date of Admission:  8/1/2019  Date of Consult (When I saw the patient): 08/01/19         Physician Supervisory Attestation:   I have reviewed and discussed with the physician assistant their history, physical and plan and independently interviewed and examined Rajani Guo and agree with the plan as stated in the physician assistant note.    We were Asked by Gretchen Mcknight PA-C (hospitalist) to evaluate and provide recommendations regarding anticoagulation in this 68 year old female smoker on Eliquis and aspirin for presumed embolic acute limb ischemia admitted with a gastrointestinal bleed, she is symptomatic and HGB 6.3    She presented with severe weakness, lightheadedness, shortness of breath with exertion, hypotension, and tachycardia. Her hemoglobin was noted to be 6.3, which is down from baseline of 12-13. She has been on both Eliquis 5 mg BID and aspirin 81 mg daily for her recent acute limb ischemia thought secondary to embolic event from presumed atrial fibrillation. Occult blood was noted in the stool. Anticoagulation and aspirin will be held.getting blood transfusion.     No ecchymosis or bruises, abdominal exam is benign.  Daughter at bedside.    Claudication symptoms completely resolved  Bilateral lower extremities are well perfused    At present our recommendations,    Agree with holding aspirin and apixaban, currently both are contraindicated.  GI work-up as you are  PPI  She must quit smoking discussed again with the patient  She has no documented A. fib on her event monitor but noted SVT 10 run beats.  Cardiac monitoring while in the hospital  Continue statin, A1c is excellent range continue tight control of blood sugars  Will decide if further based on GI evaluation     Thank you for the consultation     We Will follow with you    Copy of this dictation to hospitalist service and Dr. Barajas, primary MD    Willy Begum,  MD, [facility]  Vascular medicine service     8/1/2019          Assessment & Plan   1.  Acute anemia with concern for gastrointestinal bleed    She presented with severe weakness, lightheadedness, shortness of breath with exertion, hypotension, and tachycardia. Her hemoglobin was noted to be 6.3, which is down from baseline of 12-13. She has been on both Eliquis 5 mg BID and aspirin 81 mg daily for her recent acute limb ischemia thought secondary to embolic event from presumed atrial fibrillation. Occult blood was noted in the stool. Anticoagulation and aspirin will be held. Gastroenterology to see her.     2. Peripheral arterial disease with acute on chronic lifestyle limiting claudication of the left lower extremity s/p succesful catheter-directed lysis and angioplasty of proximal left SFA 3/4/19-3/5/19    This was likely embolic in nature and presumed to be secondary to paroxysmal atrial fibrillation given TTE results of a severely dilated left atrium and aneurysmal atrial septum. (However, 14 day heart monitor only noted 10 beat run of SVT). She has remained on Eliquis as a result of this as well as aspirin 81 mg daily. Her severe claudication has since resolved. She last saw Dr. Barajas in the Vascular Clinic July 16 and she is due for an arterial duplex and stress ABIs along with appointment with Dr. Langford of IR in October.     3. Ongoing tobacco use     She has been smoking for most of her life. She knows she must quit, but has had a difficult time doing so. She tried Chantix awhile back and quit for 5 months, but unfortunately restarted after stopping the Chantix. When she went back on it, she developed significant depression and had to stop it. She has tried patches alone, but was unsuccessful. She also states that they are too expensive. She knows that she will never be able to quit cold turkey. She started Wellbutrin in March 2019 to help take away the desire to smoke. However, she still continues to  smoke. She rolls her own cigarettes, so it is much cheaper for her. Once again, the importance of smoking cessation was stressed.      4. Hyperlipidemia     Her lipids are presently well controlled on Atorvastatin 40 mg daily with an LDL of 44. She should continue the same.      5. Type 2 diabetes     Her diabetes is presently well controlled in the outpatient setting with an A1C of 5.3% while on Metformin and Levemir. Hospitalist service will be managing diabetes this admission.     Reason for Consult   Reason for consult: Asked by Gretchen Mcknight PA-C (hospitalist) to evaluate and provide recommendations regarding anticoagulation in this 68 year old female smoker on Eliquis and aspirin for presumed embolic acute limb ischemia admitted with a gastrointestinal bleed.     Primary Care Physician   Tamiko Coley      History of Present Illness   Rajani Guo is a 68 year old female smoker with a history of diabetes, hyperlipidemia, and hypertension known to our service from when she presented on 3/4/19 for an angiogram. Acute occlusion of the left SFA was noted and catheter-directed lysis was initiated. She was admitted. Lysis was continued for 24 hours and angioplasty of the proximal left SFA was undertaken. Upon completion, she had a warm left foot with strong palpable pulses. Her claudication had resolved. She was placed on aspirin.      Etiology of the acute occlusion was not entirely clear. A CTA of the chest/abdomen/pelvis was completed with no clear atheroembolic source. She also had a TTE. This was significant for a severely dilated left atrium and aneurysmal atrial septum. This raises the question of a cardioembolic source due to possible paroxysmal atrial fibrillation. Discussed with Cardiology. If further evaluation is needed, a KIM could be pursued to look for atrial thrombus. However, management remains unchanged whether a KIM shows this or not, which would be Eliquis 5 mg BID. Therefore, it was decided to  hold off on KIM at this time as it would put her at unnecessary risk. A 30 day Ziopatch event monitor was placed on her prior to discharge. This did not demonstrate any episodes of atrial fibrillation, but did show self-terminating SVT. However, due to her echocardiogram findings, it was still presumed paroxysmal atrial fibrillation and she was continued on Eliquis.     She appeared to be doing well and was seen in 3 month follow up with Dr. Barajas on 7/16/19. She had no complaints at that time. However, in the past 1-2 weeks she has become severely weak and has developed shortness of breath with exertion. She was treated with a short course of antibiotics for a UTI without improvement in symptoms. She was hypotensive at home. She presented to the ER and was noted to have a hemoglobin of 6.3. She denies any blood in her stool or black stools. However, occult blood was noted in the stool in the ER. Her last dose of Eliquis was this morning. Besides her weakness and shortness of breath, she feels well. She has been admitted and is currently receiving a unit of blood.     Past Medical History   Past Medical History:   Diagnosis Date     Age-related osteoporosis without current pathological fracture 1/18/2018     History of partial thyroidectomy 6/2/2018     Hyperlipidemia LDL goal <100 1/18/2018     Hypertension      Insomnia, unspecified type 1/18/2018     Tobacco use disorder      Type 2 diabetes mellitus without complication, with long-term current use of insulin (H) 1/18/2018       Past Surgical History   Past Surgical History:   Procedure Laterality Date     IR ANGIOGRAM THROUGH CATHETER FOLLOW UP  3/5/2019     IR LOWER EXTREMITY ANGIOGRAM LEFT  3/4/2019     KNEE SURGERY Right 2013    right knee torn meniscus surgery     OVARY SURGERY  1971    1 ovary removed     RELEASE CARPAL TUNNEL Right 1988     RELEASE TRIGGER FINGER BILATERAL       SHOULDER SURGERY Left     rotator cuff repair, plate placement     THYROID  SURGERY  1988    partial thyroidectomy       Prior to Admission Medications   Prior to Admission Medications   Prescriptions Last Dose Informant Patient Reported? Taking?   LEVEMIR FLEXTOUCH 100 UNIT/ML pen 7/31/2019 at bedtime Self No Yes   Sig: INJECT 23 UNITS SUBCUTANEOUS AT BEDTIME   Patient taking differently: INJECT 18-19 UNITS SUBCUTANEOUS AT BEDTIME   Melatonin 10 MG TABS tablet 7/31/2019 at bedtime Self Yes Yes   Sig: Take 10 mg by mouth At Bedtime   Multiple Vitamin (MULTI-VITAMINS) TABS 8/1/2019 at am Self Yes Yes   Sig: Take 1 tablet by mouth daily    alendronate (FOSAMAX) 70 MG tablet 7/29/2019 Self No Yes   Sig: TAKE 1 TAB BY MOUTH EVERY 7 DAYS.   apixaban ANTICOAGULANT (ELIQUIS) 5 MG tablet 8/1/2019 at am Self No Yes   Sig: Take 1 tablet (5 mg) by mouth 2 times daily   aspirin EC 81 MG EC tablet 7/31/2019 at pm Self Yes Yes   Sig: Take 81 mg by mouth   atorvastatin (LIPITOR) 40 MG tablet 7/31/2019 at bedtime Self No Yes   Sig: Take 1 tablet (40 mg) by mouth daily   buPROPion (ZYBAN) 150 MG 12 hr tablet 8/1/2019 at am Self No Yes   Sig: Take 1 tablet (150 mg) by mouth 2 times daily Take once daily for first 4 days, then twice daily after that.   calcium carb 1250 mg, 500 mg [place],/vitamin D 200 unit (CALCIUM 500/D) 500-200 MG-UNIT per tablet 8/1/2019 at am Self Yes Yes   Sig: Take 1 tablet by mouth 2 times daily    gabapentin (NEURONTIN) 300 MG capsule 8/1/2019 at am Self No Yes   Sig: TAKE 1 CAPSULE BY MOUTH ONE OR TWO TIMES DAILY   Patient taking differently: TAKE 1 CAPSULE BY MOUTH TWO TIMES DAILY   glucosamine-chondroitin 500-400 MG CAPS per capsule 8/1/2019 at am Self Yes Yes   Sig: Take 1 capsule by mouth 2 times daily    lisinopril (PRINIVIL/ZESTRIL) 2.5 MG tablet 8/1/2019 at am Self No Yes   Sig: Take 1 tablet (2.5 mg) by mouth daily   metFORMIN (GLUCOPHAGE) 1000 MG tablet 8/1/2019 at am Self No Yes   Sig: Take 1 tablet (1,000 mg) by mouth 2 times daily (with meals)    sulfamethoxazole-trimethoprim (BACTRIM DS/SEPTRA DS) 800-160 MG tablet 8/1/2019 at am Self No Yes   Sig: Take 1 tablet by mouth 2 times daily   traZODone (DESYREL) 50 MG tablet 7/31/2019 at hs Self No Yes   Sig: TAKE 1 TABLET (50 MG) BY MOUTH AT BEDTIME      Facility-Administered Medications: None     Allergies   Allergies   Allergen Reactions     Indomethacin Other (See Comments)     Dizziness and disorientation     Tramadol Nausea and Vomiting       Social History   Rajani Guo  reports that she has been smoking.  She has a 78.00 pack-year smoking history. She has never used smokeless tobacco. She reports that she drinks alcohol. She reports that she does not use drugs.    Family History   Family History   Problem Relation Age of Onset     Glaucoma No family hx of      Macular Degeneration No family hx of        Review of Systems   The 10 point Review of Systems is negative other than noted in the HPI or here.     Physical Exam   Temp: 98  F (36.7  C) Temp src: Oral BP: 139/59 Pulse: 103   Resp: 16 SpO2: 92 % O2 Device: None (Room air)    Vital Signs with Ranges  Temp:  [97.8  F (36.6  C)-98.4  F (36.9  C)] 98  F (36.7  C)  Pulse:  [] 103  Resp:  [16] 16  BP: ()/(31-68) 139/59  SpO2:  [92 %-100 %] 92 %  158 lbs 0 oz    Constitutional: awake, alert, cooperative, no apparent distress, and appears stated age  Eyes: Lids and lashes normal, pupils equal, round and reactive to light, extra ocular muscles intact, sclera clear, conjunctiva normal  ENT: normocepalic, without obvious abnormality, oropharynx pink and moist  Hematologic / Lymphatic: no lymphadenopathy  Respiratory: No increased work of breathing, good air exchange, clear to auscultation bilaterally, no crackles or wheezing  Cardiovascular: regular rate and rhythm, normal S1 and S2 and no murmur noted  GI: Normal bowel sounds, soft, non-distended, non-tender  Skin: no redness, warmth, or swelling, no rashes and no lesions  Musculoskeletal:  There is no redness, warmth, or swelling of the joints.  Full range of motion noted.  Motor strength is 5 out of 5 all extremities bilaterally.  Tone is normal.  Neurologic: Awake, alert, oriented to name, place and time.  Cranial nerves II-XII are grossly intact.  Motor is 5 out of 5 bilaterally.    Neuropsychiatric:  Normal affect, memory, insight.      Data   Most Recent 3 CBC's:  Recent Labs   Lab Test 08/01/19  1028 03/07/19  0738 03/06/19  0710   WBC 11.8* 10.2 13.2*   HGB 6.3* 12.1 12.9   MCV 87 90 90    239 258     Most Recent 3 BMP's:  Recent Labs   Lab Test 08/01/19  1028 03/07/19  0738 03/06/19  0710 03/05/19  0605  01/18/18  0835     --   --  143  --  138   POTASSIUM 4.1  --   --  4.4  --  3.9   CHLORIDE 106  --   --  114*  --  106   CO2 23  --   --  24  --  23   BUN 18  --   --  10  --  10   CR 0.74 0.49* 0.52 0.54   < > 0.47*   ANIONGAP 8  --   --  5  --  9   KAYLEEN 8.8  --   --  8.2*  --  8.8   *  --   --  82  --  130*    < > = values in this interval not displayed.     Most Recent 3 INR's:  Recent Labs   Lab Test 03/04/19  0930   INR 1.00     Most Recent Cholesterol Panel:  Recent Labs   Lab Test 03/04/19  0930   CHOL 122   LDL 44   HDL 59   TRIG 94     Most Recent Hemoglobin A1c:  Recent Labs   Lab Test 07/30/19  1441   A1C 5.3

## 2019-08-01 NOTE — CONSULTS
GASTROENTEROLOGY CONSULTATION     Rajani Guo  [address] [facility] [address]  68 year old female    Admission Date/Time: 8/1/2019  Primary Care Provider: Tamiko Coley    We were asked to see the patient in consultation by Dr. Mcknight for evaluation of anemia.        HPI:  Rajani Guo is a 68 year old female who presented to [facility] today with over a week of increasing shortness of breath and fatigue. She was found to have a hemoglobin of 6.3.  In March 2019 her hemoglobin was 12.1.    The patient has a past medical history significant for diabetes. In March 2019 she had an arterial clot in her lower left leg that required lysis. She has been on 81mg ASA and Eliquis since that time.    She states that once she may have noted a dark stool, other than that one time she has had increased constipation over the past few months but no other bowel changes. She has had loss of appetite over the past few weeks and thinks she has lost some weight.    On my exam the patient has just returned from the bathroom and is visibly short of breath.    The patient had a colonoscopy at age 51 and not since.  She did have a CTA of her chest, abd pelvis done in March that did not note any bowel abnormalities, but it was not a CT directed at the colon.      ROS: A comprehensive ten point review of systems was negative aside from those in mentioned in the HPI.      MEDICATIONS:   No current facility-administered medications on file prior to encounter.   Current Outpatient Medications on File Prior to Encounter:  alendronate (FOSAMAX) 70 MG tablet TAKE 1 TAB BY MOUTH EVERY 7 DAYS.   apixaban ANTICOAGULANT (ELIQUIS) 5 MG tablet Take 1 tablet (5 mg) by mouth 2 times daily   aspirin EC 81 MG EC tablet Take 81 mg by mouth   atorvastatin (LIPITOR) 40 MG tablet Take 1 tablet (40 mg) by mouth daily   buPROPion (ZYBAN) 150 MG 12 hr tablet Take 1 tablet (150 mg) by mouth 2 times daily Take once daily for first 4 days, then twice  daily after that.   calcium carb 1250 mg, 500 mg [place],/vitamin D 200 unit (CALCIUM 500/D) 500-200 MG-UNIT per tablet Take 1 tablet by mouth 2 times daily    gabapentin (NEURONTIN) 300 MG capsule TAKE 1 CAPSULE BY MOUTH ONE OR TWO TIMES DAILY (Patient taking differently: TAKE 1 CAPSULE BY MOUTH TWO TIMES DAILY)   glucosamine-chondroitin 500-400 MG CAPS per capsule Take 1 capsule by mouth 2 times daily    LEVEMIR FLEXTOUCH 100 UNIT/ML pen INJECT 23 UNITS SUBCUTANEOUS AT BEDTIME (Patient taking differently: INJECT 18-19 UNITS SUBCUTANEOUS AT BEDTIME)   lisinopril (PRINIVIL/ZESTRIL) 2.5 MG tablet Take 1 tablet (2.5 mg) by mouth daily   Melatonin 10 MG TABS tablet Take 10 mg by mouth At Bedtime   metFORMIN (GLUCOPHAGE) 1000 MG tablet Take 1 tablet (1,000 mg) by mouth 2 times daily (with meals)   Multiple Vitamin (MULTI-VITAMINS) TABS Take 1 tablet by mouth daily    sulfamethoxazole-trimethoprim (BACTRIM DS/SEPTRA DS) 800-160 MG tablet Take 1 tablet by mouth 2 times daily   traZODone (DESYREL) 50 MG tablet TAKE 1 TABLET (50 MG) BY MOUTH AT BEDTIME       ALLERGIES:   Allergies   Allergen Reactions     Indomethacin Other (See Comments)     Dizziness and disorientation     Tramadol Nausea and Vomiting       Past Medical History:   Diagnosis Date     Age-related osteoporosis without current pathological fracture 1/18/2018     History of partial thyroidectomy 6/2/2018     Hyperlipidemia LDL goal <100 1/18/2018     Hypertension      Insomnia, unspecified type 1/18/2018     Tobacco use disorder      Type 2 diabetes mellitus without complication, with long-term current use of insulin (H) 1/18/2018       Past Surgical History:   Procedure Laterality Date     IR ANGIOGRAM THROUGH CATHETER FOLLOW UP  3/5/2019     IR LOWER EXTREMITY ANGIOGRAM LEFT  3/4/2019     KNEE SURGERY Right 2013    right knee torn meniscus surgery     OVARY SURGERY  1971    1 ovary removed     RELEASE CARPAL TUNNEL Right 1988     RELEASE TRIGGER FINGER  "BILATERAL       SHOULDER SURGERY Left     rotator cuff repair, plate placement     THYROID SURGERY  1988    partial thyroidectomy         SOCIAL HISTORY:  Social History     Tobacco Use     Smoking status: Current Some Day Smoker     Packs/day: 1.50     Years: 52.00     Pack years: 78.00     Smokeless tobacco: Never Used   Substance Use Topics     Alcohol use: Yes     Drug use: No       FAMILY HISTORY:  No known family history of GI disease    PHYSICAL EXAM:   /63   Pulse 89   Temp 99.2  F (37.3  C)   Resp 16   Ht 1.626 m (5' 4\")   Wt 71.7 kg (158 lb)   SpO2 96%   BMI 27.12 kg/m      Constitutional: mildly short of breath  Cardiovascular: RRR, normal S1 and S2, no r/c/g/m  Respiratory: CTAB  Psychiatric: mentation appears normal and affect normal/bright  Head: Normocephalic. Atraumatic.    Neck:  normal size, trachea midline  Eyes:  , no icterus  ENT: pharynx normal without erythema or exudate  Abdomen:   Auscultation: normal  Appearance: normal  Palpation: normal  NEURO: grossly negative  SKIN: pale            ADDITIONAL COMMENTS:   I reviewed the patient's new clinical lab test results.   Recent Labs   Lab Test 08/01/19  1028 03/07/19  0738 03/06/19  0710  03/04/19  0930   WBC 11.8* 10.2 13.2*   < > 12.3*   HGB 6.3* 12.1 12.9   < > 13.3   MCV 87 90 90   < > 90    239 258   < > 304   INR  --   --   --   --  1.00    < > = values in this interval not displayed.     Recent Labs   Lab Test 08/01/19  1028 03/07/19  0738 03/06/19  0710 03/05/19  0605  01/18/18  0835     --   --  143  --  138   POTASSIUM 4.1  --   --  4.4  --  3.9   CHLORIDE 106  --   --  114*  --  106   CO2 23  --   --  24  --  23   BUN 18  --   --  10  --  10   CR 0.74 0.49* 0.52 0.54   < > 0.47*   ANIONGAP 8  --   --  5  --  9   KAYLEEN 8.8  --   --  8.2*  --  8.8   *  --   --  82  --  130*    < > = values in this interval not displayed.     Recent Labs   Lab Test 07/30/19  1440 03/13/19  1226 01/18/18  0835   ALBUMIN  --   " --  3.9   BILITOTAL  --   --  0.8   ALT  --   --  16   AST  --   --  17   ALKPHOS  --   --  70   PROTEIN Negative Negative  --              .    CONSULTATION ASSESSMENT AND PLAN:    Active Problems:    Anemia    Assessment: Patient with 6g drop in hemoglobin since March when she was started on ASA and Eliquis for an arterial leg clot.  Drop in hemoglobin likely subacute, she has not noted any change in bowels and has had increasing shortness of breath over the past 2 weeks. Denies dysphagia, abdominal pain or dark stools. No colonoscopy in 17 years.    I would recommend EGD and colonoscopy. We can do EGD tomorrow. Unfortunately, patient is short of breath when I just saw her. She had been returning from the bathroom and is short of breath with any exertion.  I think it would be impossible for her to safely prep for a colonoscopy this evening.  We can start with the EGD tomorrow and determine timing of the colonoscopy based on tomorrow's findings.      Plan:   - EGD tomorrow  - continue IV PPi for now  - NPO after midnight  - cannot prep for colonoscopy tonight due to shortness of breath, timing of colonoscopy TBD after EGD            Marie Todd MD  [facility]  Office:  [phone]  Cell/Pager:  [phone]

## 2019-08-01 NOTE — TELEPHONE ENCOUNTER
Spoke with pt. Seen yesterday. Issues have been her BP and her vision. BP was normal when seen. This am when she took BP it was 85/44 and her vision is like when her eyes were dilated, but eyes were not dilated. Her body is almost non functional, has no strength at all. Loses her balance when she walks. Very slight headaches. No loss of vision. Has had the weakness feeling for 1 week and is getting progressively worse. HR has been  no cold, clammy or pale skin. Something fell on her chest so is having chest pain, but does not think it is heart relate. Pt will have daughter drive her to [facility].    Reason for Disposition    Systolic BP < 90 and feeling dizzy, lightheaded, or weak    Shock suspected (e.g., cold/pale/clammy skin, too weak to stand, low BP, rapid pulse)     No cold or clammy skin, but systolic <90, rapid HR , lightheaded and weak    Chest pain     States she had an injury to her chest, so has pain from this, but does not think it is cardiac related    Additional Information    Negative: Started suddenly after an allergic medicine, an allergic food, or bee sting    Negative: Difficult to awaken or acting confused  (e.g., disoriented, slurred speech)    Negative: Fainted    Protocols used: LOW BLOOD PRESSURE-A-OH    Klaudia Pickett RN  [facility]

## 2019-08-01 NOTE — PROGRESS NOTES
RECEIVING UNIT ED HANDOFF REVIEW    ED Nurse Handoff Report was reviewed by: Brandon Mejias on August 1, 2019 at 2:01 PM     Chart reviewed, ok to send pt

## 2019-08-01 NOTE — ED PROVIDER NOTES
History     Chief Complaint:  Dizziness     HPI   Rajani Guo is a 68 year old female, with a history of DVT, hypertension, hyperlipidemia, type 2 diabetes, and on Eliquis, who presents with her daughter to the ED for evaluation of dizziness. The patient reports she was diagnosed with a left leg DVT 5 months ago. She had the clot removed by vascular surgery, Dr. Barajas. She has been on Eliquis since. The patient notes she developed sensation that her eyes are constantly dilated a week ago. She has associated photosensitivity. She also has been having dizziness, generalized weakness, unsteady gait, and intermittent headaches. The patient denies any fever, blurry vision, nausea, vomiting, chest pain, abdominal pain, or other symptoms/complaints. She denies history of a-fib.      Echocardiogram Complete, 3/4/2019  Interpretation Summary  1. The left ventricle is normal in size. The visual ejection fraction is  estimated at 60%.  2. The right ventricle is normal in structure, function and size.  3. The atrial septum is aneurysmal. A contrast injection (Bubble Study) was  performed that was negative for flow across the interatrial septum.  4. Mitral leaflets are moderately thickened with restricted opening. There is  moderate mitral stenosis. Mean 9mmHg @ HR 80.  5. There is moderate (2+) aortic regurgitation.  6. Mild valvular aortic stenosis. Mean 12mmHg, Vmax 2.4m/s, KRISTOFER 1.5cm2.  7. Normal IVC size with reduced respiratory collapse.  No previous echo for comparison.  Connor Langford MD    Allergies:  Indomethacin: dizziness & disorientation   Tramadol: nausea & vomiting     Medications:    Fosamax  Eliquis  Aspirin 81mg  Atorvastatin  Zyban  Calcium  Gabapentin  Glucosamine-chondroitin  Levemir  Lisinopril   Melatonin  Metformin  Multivitamin  Trazodone  Bactrim     Past Medical History:    Osteoporosis  HTN  HLD  Type 2 DM  PVD  DVT     Past Surgical History:    Partial thyroidectomy   Right knee meniscus tear  "repair  Oophorectomy   Right carpal tunnel release  Bilateral trigger finger release  Left rotator cuff repair w/ plate placement    Family History:    History reviewed. No pertinent family history.     Social History:  Smoking status: Current some day smoker, 1.50ppd  Alcohol use: Yes  Presents to ED with daughter    Marital Status:  Single     Review of Systems   Constitutional: Negative for fever.   Eyes: Positive for photophobia and visual disturbance.   Cardiovascular: Negative for chest pain.   Gastrointestinal: Negative for abdominal pain, nausea and vomiting.   Neurological: Positive for dizziness, weakness and headaches.   All other systems reviewed and are negative.    Physical Exam     Patient Vitals for the past 24 hrs:   BP Temp Temp src Pulse Resp SpO2 Height Weight   08/01/19 1245 100/50 -- -- 89 -- 97 % -- --   08/01/19 1235 97/52 -- -- 96 -- -- -- --   08/01/19 1230 94/50 -- -- 95 -- 95 % -- --   08/01/19 1215 103/50 -- -- 94 -- 95 % -- --   08/01/19 1210 -- -- -- -- -- 96 % -- --   08/01/19 1204 101/47 -- -- 90 -- 99 % -- --   08/01/19 1200 104/44 -- -- -- -- -- -- --   08/01/19 1003 (!) 109/31 98  F (36.7  C) Oral 99 16 100 % 1.626 m (5' 4\") 70.3 kg (155 lb)     Physical Exam  General: Sitting comfortably on stretcher.   Head:  The scalp, face, and head appear normal  Eyes:  The pupils are equal, round, and reactive to light    There is no nystagmus    Extraocular muscles are intact    Visual fields are normal to confrontational testing.     Conjunctivae and sclerae are normal  ENT:    The nose is normal    Pinnae are normal    The oropharynx is normal    Uvula is in the midline  Neck:  Normal range of motion    There is no nuchal rigidity noted    There is no midline cervical spine pain/tenderness    No mass is detected  CV:  Regular rate and underlying rhythm     There is no evidence of Atrial Fibrillation    Normal S1 and S2    No S3 or S4    No pathological murmur detected  Resp:  Lungs are " clear    There is no tachypnea    Non-labored breathing    No rales    No wheezing   GI:  Abdomen is soft, there is no rigidity    No distension    No tympani    No rebound tenderness     Non-surgical without peritoneal features  MS:  No major joint effusions    No asymmetric leg swelling    No calf tenderness  Skin:  No rash or acute skin lesions noted  Rectal:  There is very little stool in the rectal vault.  I was able to obtain a few dots of stool mixed    with mucus, brown in appearance, no melena, no hematochezia.  Neuro: NIHSS:    LOC:  Alert      Answers questions correctly      Obeys commands correctly    Gaze:  Normal. No palsy or forced deviation    Visual:  No visual loss, no hemianopsia    Facial:  Normal.  No palsy    Motor:  No drift, weakness, all four extremities tested    Ataxia:  No limb ataxia. Subjective ataxia with ambulation.       Normal finger-to-nose and heel-to-shin.     Sensory: Normal    Speech: No aphasia      No dysarthria    Inattention: No neglect    Total:  0  Psych:  Awake. Alert.  Normal affect.  Appropriate interactions.  Lymph: No anterior or posterior cervical lymphadenopathy noted    Emergency Department Course     ECG (10:34:21):  Rate 92 bpm. NY interval 158. QRS duration 82. QT/QTc 380/469. P-R-T axes 84 53 55. Normal sinus rhythm with sinus arrhythmia. Normal ECG. Significant change compared to EKG dated 5/31/18. Interpreted at 1036 by Joon Thomas MD.    Imaging:  Radiographic findings were communicated with the patient and family who voiced understanding of the findings.    MR Brain w/o & w Contrast  IMPRESSION: Normal brain MRI. As read by Radiology.     Laboratory:  Laboratory findings were communicated with the patient and family who voiced understanding of the findings.    ABO/Rh type & screen: A negative, Antibody negative     CBC: WBC 11.8(H), HGB 6.3(LL), o/w WNL ()  BMP: Glucose 134(H), o/w WNL (Creatinine 0.74)     Occult blood stool: Positive      Interventions:  1029: NS 500mL Bolus IV  1238: NS 1L Bolus IV  1333: Protonix 80mg IV    Emergency Department Course:  Past medical records, nursing notes, and vitals reviewed.  1012: I performed an exam of the patient and obtained history, as documented above.    1028: IV inserted and blood drawn.    1034: EKG obtained.     The patient was sent for a brain MRI while in the emergency department, findings above.    1232: I rechecked the patient. Explained findings to patient and daughter.    1235: Rectal exam was performed here in the emergency department. This was performed with male chaperone at bedside.    1305: I spoke to Dr. Urrutia of the hospitalist service who accepts the patient for admission.     1310: I rechecked the patient. Explained plan to patient and daughter.    Patient received a blood transfusion.     Findings and plan explained to the Patient and daughter who consents to admission. Discussed the patient with Dr. Urrutia, who will admit the patient to a [facility] bed for further monitoring, evaluation, and treatment.    Impression & Plan      Medical Decision Making:  Patient presents with profound generalized weakness and feelings of ataxia.  The patient does have a recollection of some dark stools about a week ago.  She is on Eliquis for history of DVT.  Patient underwent MRI imaging given both ophthalmic findings and feelings of marked instability and ataxia.  The MRI is negative.  Her hemoglobin returned at 6 which is down from 12 in March.  She has micro-guaiac positive from below.  Patient likely has gastrointestinal losses, most likely an upper GI source such as a bleeding ulcer.  The patient's blood pressure has been soft in the 100 systolic range.  Blood transfusion is initiated.  Protonix is given IV.  I am going to hold off on Kcentra at this juncture as the bleeding is not life-threatening and the utility of the medication in the setting is still questionable.  Patient will be admitted to  the hospitalist at this time.    Diagnosis:    ICD-10-CM   1. Anemia, unspecified type D64.9     Disposition: Patient admitted to a med bed by Dr. Ghada Naranjo  8/1/2019    EMERGENCY DEPARTMENT    Scribe Disclosure:  I, Madelaine Naranjo, am serving as a scribe at 10:12 AM on 8/1/2019 to document services personally performed by Joon Thomas MD based on my observations and the provider's statements to me.        Joon Thomas MD  08/01/19 4525

## 2019-08-01 NOTE — ED NOTES
DATE:  8/1/2019   TIME OF RECEIPT FROM LAB:  1047  LAB TEST: HGB  LAB VALUE:  6.3  RESULTS GIVEN WITH READ-BACK TO (PROVIDER):  [facility] LAB VALUE REPORTED TO PROVIDER:   1045

## 2019-08-01 NOTE — PLAN OF CARE
Admission    Patient arrives to room 615-1 via cart from ED   Care plan note: vss, except for tachycardia and DESAI with activities. On RA. +BS, lungs clear. Transfusing 1 unit of PRBC when arrived in unit. Will have Hgb recheck at 1800. GI/vascular following. NPO after midnight for possible EGD in am. SBA x1 due to c/o dizziness. A&O x4, calls appropriately. Skin intact, pale. Daughter at bedside when admitting MD was here.     Inpatient nursing criteria listed below were met:    PCD's Documented: Yes  Skin issues/needs documented :NA  Isolation education started/completed NA  Patient allergies verified with patient: Yes  Verified completion of [place] Risk Assessment Tool:  Yes  Verified completion of Guardianship screening tool: Yes  Fall Prevention: Care plan updated, Education given and documented Yes  Care Plan initiated: Yes  Home medications documented in belongings flowsheet: NA  Patient belongings documented in belongings flowsheet: Yes  Reminder note (belongings/ medications) placed in discharge instructions:Yes  Admission profile/ required documentation complete: Yes  Bedside Report Letter given and explained to patient Yes

## 2019-08-01 NOTE — TELEPHONE ENCOUNTER
Reason for call:  Symptom   Symptom or request: Low blood pressure 85/.44    Duration (how long have symptoms been present): this morning  Have you been treated for this before? NO    Additional comments: na    Phone number to reach patient:  Home number on file [phone] (home)    Best Time:  any    Can we leave a detailed message on this number?  YES

## 2019-08-01 NOTE — TELEPHONE ENCOUNTER
Called and spoke to patient. Patient stated that she is at [facility] right now due to having a low hemoglobin.    Huddled and informed message with Emelyn Overton. Per Emelyn: Patient need to find out who is the MD name who will be taking care of patient and patient will call clinic to let clinic know of the name and Emelyn will call the MD @ the [facility] tomorrow morning.    Called and informed patient of plan and patient agrees.  Samira Ovalles, CMA

## 2019-08-01 NOTE — PROGRESS NOTES
Hospitalist cross cover  Called for hemoglobin of 6.3  Admitted for acute hemoglobin drop\anemia.  Seen by vascular surgery.  Patient on aspirin and anticoagulation which have been stopped.  Seen by GI and EGD tomorrow.  Initial hemoglobin 6.3.  Received 1 unit of blood and hemoglobin now 6.3.  Patient tolerating transfusion.  Vital signs normal.  Discussed with RN.  Plan continue with every 6 hours hemoglobins.  Transfuse another 1 unit of blood.  Next hemoglobin check 1 to 2 hours after transfusion.  Call hospitalist with results.  Dennis Anderson MD

## 2019-08-01 NOTE — PROVIDER NOTIFICATION
MARILU Notification    Notified Person: MARILU    Notified Person Name: Gretchen Palmcezar MARILU    Notification Date/Time: 8/1/2019 at 1643    Notification Interaction: Web page    Purpose of Notification: US carotid results showing right thyroid nodule with microcalcifications, recommend thyroid ultrasound for further evaluation. Also, 1 unit of blood completed at about 1600. Do you want to order Hgb check for now or wait until scheduled Hgb at 1800?    Orders Received: Per Gretchen, patient to f/u outpatient regarding thyroid nodule and okay for next Hgb check at 1800.    Comments:

## 2019-08-01 NOTE — PHARMACY-ADMISSION MEDICATION HISTORY
Admission medication history interview status for the 8/1/2019  admission is complete. See EPIC admission navigator for prior to admission medications     Medication history source reliability:Good    Actions taken by pharmacist (provider contacted, etc):Chart review. Face to face interview with patient and daughter.      Additional medication history information not noted on PTA med list :None    Medication reconciliation/reorder completed by provider prior to medication history? Yes    Time spent in this activity: 10 mins    Prior to Admission medications    Medication Sig Last Dose Taking? Auth Provider   alendronate (FOSAMAX) 70 MG tablet TAKE 1 TAB BY MOUTH EVERY 7 DAYS. 7/29/2019 Yes Tamiko Coley MD   apixaban ANTICOAGULANT (ELIQUIS) 5 MG tablet Take 1 tablet (5 mg) by mouth 2 times daily 8/1/2019 at am Yes Tab Barajas MD   aspirin EC 81 MG EC tablet Take 81 mg by mouth 7/31/2019 at pm Yes Reported, Patient   atorvastatin (LIPITOR) 40 MG tablet Take 1 tablet (40 mg) by mouth daily 7/31/2019 at bedtime Yes Tamiko Coley MD   buPROPion (ZYBAN) 150 MG 12 hr tablet Take 1 tablet (150 mg) by mouth 2 times daily Take once daily for first 4 days, then twice daily after that. 8/1/2019 at am Yes Sparkle Colon PA-C   calcium carb 1250 mg, 500 mg [place],/vitamin D 200 unit (CALCIUM 500/D) 500-200 MG-UNIT per tablet Take 1 tablet by mouth 2 times daily  8/1/2019 at am Yes Reported, Patient   gabapentin (NEURONTIN) 300 MG capsule TAKE 1 CAPSULE BY MOUTH ONE OR TWO TIMES DAILY  Patient taking differently: TAKE 1 CAPSULE BY MOUTH TWO TIMES DAILY 8/1/2019 at am Yes Tamiko Coley MD   glucosamine-chondroitin 500-400 MG CAPS per capsule Take 1 capsule by mouth 2 times daily  8/1/2019 at am Yes Reported, Patient   LEVEMIR FLEXTOUCH 100 UNIT/ML pen INJECT 23 UNITS SUBCUTANEOUS AT BEDTIME  Patient taking differently: INJECT 18-19 UNITS SUBCUTANEOUS AT BEDTIME 7/31/2019 at bedtime Yes Tamiko Coley MD    lisinopril (PRINIVIL/ZESTRIL) 2.5 MG tablet Take 1 tablet (2.5 mg) by mouth daily 8/1/2019 at am Yes Tamiko Coley MD   Melatonin 10 MG TABS tablet Take 10 mg by mouth At Bedtime 7/31/2019 at bedtime Yes Unknown, Entered By History   metFORMIN (GLUCOPHAGE) 1000 MG tablet Take 1 tablet (1,000 mg) by mouth 2 times daily (with meals) 8/1/2019 at am Yes Tamiko Coley MD   Multiple Vitamin (MULTI-VITAMINS) TABS Take 1 tablet by mouth daily  8/1/2019 at am Yes Reported, Patient   sulfamethoxazole-trimethoprim (BACTRIM DS/SEPTRA DS) 800-160 MG tablet Take 1 tablet by mouth 2 times daily 8/1/2019 at am Yes Emelyn Overton PA-C   traZODone (DESYREL) 50 MG tablet TAKE 1 TABLET (50 MG) BY MOUTH AT BEDTIME 7/31/2019 at hs Yes Tamiko Coley MD

## 2019-08-01 NOTE — ED TRIAGE NOTES
"Pt for past week been feeling lightheaded, generalized weakness, balance \"off\" and vision changes complaining of brightness with slight HA  "

## 2019-08-01 NOTE — ED NOTES
"[facility]  ED Nurse Handoff Report    ED Chief complaint: Dizziness      ED Diagnosis:   Final diagnoses:   Anemia, unspecified type       Code Status: Full Code    Allergies:   Allergies   Allergen Reactions     Indomethacin Other (See Comments)     Dizziness and disorientation     Tramadol Nausea and Vomiting       Activity level - Baseline/Home:  Independent  Activity Level - Current:   Independent    Patient's Preferred language: English   Needed?: No    Isolation: No  Infection: Not Applicable  Bariatric?: No    Vital Signs:   Vitals:    08/01/19 1215 08/01/19 1230 08/01/19 1235 08/01/19 1245   BP: 103/50 94/50 97/52 100/50   Pulse: 94 95 96 89   Resp:       Temp:       TempSrc:       SpO2: 95% 95%  97%   Weight:       Height:           Cardiac Rhythm: ,        Pain level: 0-10 Pain Scale: 8    Is this patient confused?: No   Does this patient have a guardian?  No         If yes, is there guardianship documents in the Epic \"Code/ACP\" activity?  N/A         Guardian Notified?  N/A  Post - Suicide Severity Rating Scale Completed?  Yes  If yes, what color did the patient score?  White    Patient Report: Initial Complaint:   68 year old female, with a history of DVT, hypertension, type 2 diabetes presents with her daughter to the ER for evaluation of dizziness. The patient reports she was diagnosed with a left leg DVT 5 months ago and is on Eliquis. She had the clot removed by vascular surgery, Dr. Barajas. The patient notes she developed sensation that her eyes are constantly dilated a week ago. She has associated photosensitivity. She also has been having dizziness, generalized weakness, unsteady gait, and intermittent headaches.     Focused Assessment:   Alert and oriented times 3  Generalized weakness with dizziness at times   Denies any bloody stools or bloody vomit   VSS   Tests Performed:   Labs   MRI    Abnormal Results:   Abnormal Labs Reviewed   CBC WITH PLATELETS " DIFFERENTIAL - Abnormal; Notable for the following components:       Result Value    WBC 11.8 (*)     RBC Count 2.30 (*)     Hemoglobin 6.3 (*)     Hematocrit 19.9 (*)     Absolute Neutrophil 8.6 (*)     All other components within normal limits   BASIC METABOLIC PANEL - Abnormal; Notable for the following components:    Glucose 134 (*)     All other components within normal limits   OCCULT BLOOD STOOL - Abnormal; Notable for the following components:    Occult Blood Positive (*)     All other components within normal limits     Treatments provided:   NS 2000 ml   PRBC's to be started    Family Comments:   Daughter at bedside    OBS brochure/video discussed/provided to patient/family: No              Name of person given brochure if not patient:                 Relationship to patient:       ED Medications:   Medications   lidocaine 1 % 0.1-1 mL (has no administration in time range)   lidocaine (LMX4) cream (has no administration in time range)   sodium chloride (PF) 0.9% PF flush 3 mL (has no administration in time range)   sodium chloride (PF) 0.9% PF flush 3 mL (has no administration in time range)   0.9% sodium chloride BOLUS (1,000 mLs Intravenous New Bag 8/1/19 1238)   0.9% sodium chloride BOLUS (0 mLs Intravenous Stopped 8/1/19 1116)   gadobutrol (GADAVIST) injection 7 mL (7 mLs Intravenous Given 8/1/19 1118)   sodium chloride (PF) 0.9% PF flush 10 mL (10 mLs Intravenous Given 8/1/19 1118)       Drips infusing?:  No    For the majority of the shift this patient was Green.   Interventions performed were   .    Severe Sepsis OR Septic Shock Diagnosis Present: No    To be done/followed up on inpatient unit:        ED NURSE PHONE NUMBER:   [phone]

## 2019-08-02 LAB
ANION GAP SERPL CALCULATED.3IONS-SCNC: 3 MMOL/L (ref 3–14)
BUN SERPL-MCNC: 10 MG/DL (ref 7–30)
CALCIUM SERPL-MCNC: 7.8 MG/DL (ref 8.5–10.1)
CHLORIDE SERPL-SCNC: 111 MMOL/L (ref 94–109)
CO2 SERPL-SCNC: 26 MMOL/L (ref 20–32)
CREAT SERPL-MCNC: 0.65 MG/DL (ref 0.52–1.04)
GFR SERPL CREATININE-BSD FRML MDRD: >90 ML/MIN/{1.73_M2}
GLUCOSE BLDC GLUCOMTR-MCNC: 104 MG/DL (ref 70–99)
GLUCOSE BLDC GLUCOMTR-MCNC: 115 MG/DL (ref 70–99)
GLUCOSE BLDC GLUCOMTR-MCNC: 115 MG/DL (ref 70–99)
GLUCOSE BLDC GLUCOMTR-MCNC: 121 MG/DL (ref 70–99)
GLUCOSE BLDC GLUCOMTR-MCNC: 321 MG/DL (ref 70–99)
GLUCOSE SERPL-MCNC: 103 MG/DL (ref 70–99)
HGB BLD-MCNC: 7.4 G/DL (ref 11.7–15.7)
HGB BLD-MCNC: 7.6 G/DL (ref 11.7–15.7)
HGB BLD-MCNC: 8 G/DL (ref 11.7–15.7)
HGB BLD-MCNC: 8.2 G/DL (ref 11.7–15.7)
POTASSIUM SERPL-SCNC: 3.6 MMOL/L (ref 3.4–5.3)
SODIUM SERPL-SCNC: 140 MMOL/L (ref 133–144)
UPPER GI ENDOSCOPY: NORMAL

## 2019-08-02 PROCEDURE — 00000146 ZZHCL STATISTIC GLUCOSE BY METER IP

## 2019-08-02 PROCEDURE — 43255 EGD CONTROL BLEEDING ANY: CPT | Performed by: INTERNAL MEDICINE

## 2019-08-02 PROCEDURE — 25000125 ZZHC RX 250: Performed by: INTERNAL MEDICINE

## 2019-08-02 PROCEDURE — 36415 COLL VENOUS BLD VENIPUNCTURE: CPT | Performed by: PHYSICIAN ASSISTANT

## 2019-08-02 PROCEDURE — G0500 MOD SEDAT ENDO SERVICE >5YRS: HCPCS | Performed by: INTERNAL MEDICINE

## 2019-08-02 PROCEDURE — 25000128 H RX IP 250 OP 636: Performed by: INTERNAL MEDICINE

## 2019-08-02 PROCEDURE — 80048 BASIC METABOLIC PNL TOTAL CA: CPT | Performed by: PHYSICIAN ASSISTANT

## 2019-08-02 PROCEDURE — 0W3P8ZZ CONTROL BLEEDING IN GASTROINTESTINAL TRACT, VIA NATURAL OR ARTIFICIAL OPENING ENDOSCOPIC: ICD-10-PCS | Performed by: INTERNAL MEDICINE

## 2019-08-02 PROCEDURE — 85018 HEMOGLOBIN: CPT | Performed by: INTERNAL MEDICINE

## 2019-08-02 PROCEDURE — 25000132 ZZH RX MED GY IP 250 OP 250 PS 637: Performed by: PHYSICIAN ASSISTANT

## 2019-08-02 PROCEDURE — 85018 HEMOGLOBIN: CPT | Performed by: PHYSICIAN ASSISTANT

## 2019-08-02 PROCEDURE — 85018 HEMOGLOBIN: CPT | Performed by: HOSPITALIST

## 2019-08-02 PROCEDURE — 36415 COLL VENOUS BLD VENIPUNCTURE: CPT | Performed by: INTERNAL MEDICINE

## 2019-08-02 PROCEDURE — 99232 SBSQ HOSP IP/OBS MODERATE 35: CPT | Performed by: HOSPITALIST

## 2019-08-02 PROCEDURE — 25000131 ZZH RX MED GY IP 250 OP 636 PS 637: Performed by: HOSPITALIST

## 2019-08-02 PROCEDURE — 25000131 ZZH RX MED GY IP 250 OP 636 PS 637: Performed by: PHYSICIAN ASSISTANT

## 2019-08-02 PROCEDURE — 27210582 ZZH DEVICE CLIP RESOLUTION, EACH: Performed by: INTERNAL MEDICINE

## 2019-08-02 PROCEDURE — 36415 COLL VENOUS BLD VENIPUNCTURE: CPT | Performed by: HOSPITALIST

## 2019-08-02 PROCEDURE — 99233 SBSQ HOSP IP/OBS HIGH 50: CPT | Performed by: INTERNAL MEDICINE

## 2019-08-02 PROCEDURE — C9113 INJ PANTOPRAZOLE SODIUM, VIA: HCPCS | Performed by: INTERNAL MEDICINE

## 2019-08-02 PROCEDURE — 25800030 ZZH RX IP 258 OP 636: Performed by: INTERNAL MEDICINE

## 2019-08-02 PROCEDURE — 12000000 ZZH R&B MED SURG/OB

## 2019-08-02 RX ORDER — FLUMAZENIL 0.1 MG/ML
0.2 INJECTION, SOLUTION INTRAVENOUS
Status: CANCELLED | OUTPATIENT
Start: 2019-08-02 | End: 2019-08-03

## 2019-08-02 RX ORDER — FLUMAZENIL 0.1 MG/ML
0.2 INJECTION, SOLUTION INTRAVENOUS
Status: DISCONTINUED | OUTPATIENT
Start: 2019-08-02 | End: 2019-08-03

## 2019-08-02 RX ORDER — NALOXONE HYDROCHLORIDE 0.4 MG/ML
.1-.4 INJECTION, SOLUTION INTRAMUSCULAR; INTRAVENOUS; SUBCUTANEOUS
Status: CANCELLED | OUTPATIENT
Start: 2019-08-02 | End: 2019-08-03

## 2019-08-02 RX ORDER — FENTANYL CITRATE 50 UG/ML
25 INJECTION, SOLUTION INTRAMUSCULAR; INTRAVENOUS EVERY 5 MIN PRN
Status: DISCONTINUED | OUTPATIENT
Start: 2019-08-02 | End: 2019-08-03

## 2019-08-02 RX ORDER — NALOXONE HYDROCHLORIDE 0.4 MG/ML
.1-.4 INJECTION, SOLUTION INTRAMUSCULAR; INTRAVENOUS; SUBCUTANEOUS
Status: DISCONTINUED | OUTPATIENT
Start: 2019-08-02 | End: 2019-08-03

## 2019-08-02 RX ORDER — LIDOCAINE 40 MG/G
CREAM TOPICAL
Status: DISCONTINUED | OUTPATIENT
Start: 2019-08-02 | End: 2019-08-02 | Stop reason: HOSPADM

## 2019-08-02 RX ORDER — FENTANYL CITRATE 50 UG/ML
50 INJECTION, SOLUTION INTRAMUSCULAR; INTRAVENOUS
Status: DISCONTINUED | OUTPATIENT
Start: 2019-08-02 | End: 2019-08-03

## 2019-08-02 RX ORDER — FENTANYL CITRATE 50 UG/ML
INJECTION, SOLUTION INTRAMUSCULAR; INTRAVENOUS PRN
Status: DISCONTINUED | OUTPATIENT
Start: 2019-08-02 | End: 2019-08-02 | Stop reason: HOSPADM

## 2019-08-02 RX ADMIN — ACETAMINOPHEN 650 MG: 325 TABLET, FILM COATED ORAL at 08:42

## 2019-08-02 RX ADMIN — BUPROPION HYDROCHLORIDE 150 MG: 150 TABLET, FILM COATED, EXTENDED RELEASE ORAL at 08:42

## 2019-08-02 RX ADMIN — GABAPENTIN 300 MG: 300 CAPSULE ORAL at 08:42

## 2019-08-02 RX ADMIN — TRAZODONE HYDROCHLORIDE 50 MG: 50 TABLET ORAL at 21:35

## 2019-08-02 RX ADMIN — INSULIN ASPART 4 UNITS: 100 INJECTION, SOLUTION INTRAVENOUS; SUBCUTANEOUS at 18:11

## 2019-08-02 RX ADMIN — SODIUM CHLORIDE 8 MG/HR: 9 INJECTION, SOLUTION INTRAVENOUS at 03:39

## 2019-08-02 RX ADMIN — ATORVASTATIN CALCIUM 40 MG: 40 TABLET, FILM COATED ORAL at 21:35

## 2019-08-02 RX ADMIN — SODIUM CHLORIDE 8 MG/HR: 9 INJECTION, SOLUTION INTRAVENOUS at 13:59

## 2019-08-02 RX ADMIN — INSULIN DETEMIR 10 UNITS: 100 INJECTION, SOLUTION SUBCUTANEOUS at 21:35

## 2019-08-02 RX ADMIN — GABAPENTIN 300 MG: 300 CAPSULE ORAL at 21:35

## 2019-08-02 RX ADMIN — BUPROPION HYDROCHLORIDE 150 MG: 150 TABLET, FILM COATED, EXTENDED RELEASE ORAL at 21:35

## 2019-08-02 RX ADMIN — NICOTINE 1 PATCH: 21 PATCH, EXTENDED RELEASE TRANSDERMAL at 08:43

## 2019-08-02 ASSESSMENT — ACTIVITIES OF DAILY LIVING (ADL)
ADLS_ACUITY_SCORE: 15
ADLS_ACUITY_SCORE: 13
ADLS_ACUITY_SCORE: 15
ADLS_ACUITY_SCORE: 13
ADLS_ACUITY_SCORE: 15

## 2019-08-02 NOTE — PROGRESS NOTES
Patient receiving second unit of PRBC due to low hemoglobin, and nursing concerned about increased shortness of breath, the patient is on 2 L of oxygen with concern of fluid overload.  Will get a chest x-ray portable now and try 40 mg of IV Lasix x1 . discussed with nursing, they would contact me if anything changes in patient's status, also PRN albuterol ordered.

## 2019-08-02 NOTE — PROCEDURES
PRE-PROCEDURE NOTE    CHIEF COMPLAINT / REASON FOR PROCEDURE:  anemia    History and Physical Reviewed:  yes    PRE-SEDATION ASSESSMENT:    Lung Exam:  normal  Heart Exam:  normal  Mallampati Airway Classification:  2   Previous reaction to anesthesia/sedation:   none  Sedation plan based on assessment:  moderate  Comment(s):  none  ASA Classification:  3    IMPRESSION:  Rule out upper GI bleeding    PLAN:  egd    Marie Todd MD

## 2019-08-02 NOTE — PROGRESS NOTES
[facility]    Hospitalist Progress Note      Assessment & Plan   Rajani Guo is a 68 year old female who was admitted on 8/1/2019 for GI bleed, found to have hemoglobin of 6.3.    Subacute blood loss anemia  Duodenitis  Several week hx dizziness, SOB, weakness. Has been on eliquis and ASA for hx arterial occlusion since March of this year. Hgb 6.3 on admit, received 2 units PRBCs with improvement to mid 7s. Last hgb was near 12 in March when she was started on blood thinner. GI consulted, underwent EGD 8/2 and found to have duodenitis with small erosions and one clip was placed for actively bleeding spot.  - Appreciate GI and vascular input  - Stop aspirin   - PPI gtt for rest of today,IV PPI BID on 8/3 AM. Plan for 40mg omeprazole BID with treatment for one month, then decreasing to 40mg once daily thereafter  - Outpatient colonoscopy will be arranged by GI  - Monitor Hgb q8h, next check 2200 and again in AM  - CBC one week after discharge at PCP office  - Resume Eliquis if Hgb stable or improved in one week.  - Full liquid diet for now    History of left lower extremity acute arterial occlusion, status post lytic therapy in 03/2019  Follows with Dr. Barajas of [facility]  - Appreciate vascular medicine consult  - Plan repeat CBC in one week, if Hgb stable or improved, can resume eliquis at that time  - Stop ASA    Insulin-dependent type 2 diabetes  A1c 5.3.  PTA metformin 1000 mg twice daily and Levemir 18-19 units at bedtime.  - 10 units levemir tonight as she is on full liquid diet  - Continue medium resistance sliding scale insulin    Hypertension  PTA lisinopril 2.5 mg daily  - Hold for now, resume in AM if BP and Hgb stable.    Tobacco dependence  Continues to smoke.  Encouraged cessation.    - Nicotine patch has been made available.     Right thyroid nodule with microcalcifications (seen on US carotid)  - Thyroid US as outpatient, discussed with primary PA--she will work  up.    Abnormal urinalysis  Seen by PCP 2 days ago for dyspnea on exertion. UA showed positive nitrites, small leuk esterase, 25-50 WBCs, many bacteria, but she was without symptoms. Started on bactrim. Urine cx with mixed urogenital kaylin.  - no further treatment necessary.    DVT Prophylaxis: Pneumatic Compression Devices  Code Status: Full Code  Expected discharge: 24-48 hours, recommended to prior living arrangement once Hgb stable.    Amparo Dandy, DO  Text Page (7am - 6pm)    Interval History   Patient seen and examined. Seen prior to EGD along with daughter. Denied nausea, vomiting, fevers, chills, abdominal pain, chest pain, shortness of breath. Still feels weak, but not as weak as she did when she first came in. She reported hunger as she had been NPO for procedure.    -Data reviewed today: I reviewed all new labs and imaging results over the last 24 hours. I personally reviewed no images or EKG's today.    Physical Exam   Temp: 98.4  F (36.9  C) Temp src: Oral BP: 113/65 Pulse: 111 Heart Rate: 107 Resp: 16 SpO2: 94 % O2 Device: None (Room air) Oxygen Delivery: 2 LPM  Vitals:    08/01/19 1003 08/01/19 1435   Weight: 70.3 kg (155 lb) 71.7 kg (158 lb)     Vital Signs with Ranges  Temp:  [98.3  F (36.8  C)-99.6  F (37.6  C)] 98.4  F (36.9  C)  Pulse:  [] 111  Heart Rate:  [] 107  Resp:  [11-26] 16  BP: (104-160)/(51-82) 113/65  SpO2:  [92 %-97 %] 94 %  I/O last 3 completed shifts:  In: 817.7 [I.V.:192.7]  Out: 1500 [Urine:1500]    Constitutional: Awake, alert, cooperative, no apparent distress, slightly pale  Respiratory: Clear to auscultation bilaterally, no crackles or wheezing  Cardiovascular: Regular rate and rhythm, normal S1 and S2, and no murmur noted  GI: Normal bowel sounds, soft, non-distended, non-tender  Skin/Integumen: No rashes, no cyanosis, no edema  Other:     Medications     pantoprazole (PROTONIX) infusion ADULT/PEDS GREATER than or EQUAL to 45 kg 8 mg/hr (08/02/19 2936)        atorvastatin  40 mg Oral Daily     buPROPion  150 mg Oral BID     fentaNYL  50 mcg Intravenous Once within 24 hrs     gabapentin  300 mg Oral BID     insulin aspart  1-7 Units Subcutaneous TID AC     insulin aspart  1-5 Units Subcutaneous At Bedtime     insulin detemir  10 Units Subcutaneous At Bedtime     midazolam  1 mg Intravenous Once within 24 hrs     nicotine  1 patch Transdermal Daily     nicotine   Transdermal Q8H     nicotine   Transdermal Daily     sodium chloride (PF)  3 mL Intracatheter Q8H     sodium chloride (PF)  3 mL Intracatheter Q8H     traZODone  50 mg Oral At Bedtime       Data   Recent Labs   Lab 08/02/19  1256 08/02/19  0539 08/02/19  0053  08/01/19  1028   WBC  --   --   --   --  11.8*   HGB 7.4* 7.6* 8.0*   < > 6.3*   MCV  --   --   --   --  87   PLT  --   --   --   --  371   NA  --  140  --   --  137   POTASSIUM  --  3.6  --   --  4.1   CHLORIDE  --  111*  --   --  106   CO2  --  26  --   --  23   BUN  --  10  --   --  18   CR  --  0.65  --   --  0.74   ANIONGAP  --  3  --   --  8   KAYLEEN  --  7.8*  --   --  8.8   GLC  --  103*  --   --  134*    < > = values in this interval not displayed.       Recent Results (from the past 24 hour(s))   XR Chest Port 1 View    Narrative    XR CHEST PORT 1 VW  8/1/2019 11:19 PM     HISTORY: Shortness of breath.    COMPARISON: None.    FINDINGS: Upright portable chest. The heart size is normal. There is  interstitial disease in the mid and lower lungs. The lungs are  otherwise clear. Small bilateral pleural effusions. No pneumothorax.  Surgical clips projected over the left superior mediastinum.  Postoperative changes in the left humerus.      Impression    IMPRESSION: Probable interstitial pulmonary edema.    SAIMA DUMONT MD

## 2019-08-02 NOTE — PLAN OF CARE
Cognitive Concerns/ Orientation :  A&Ox4   BEHAVIOR & AGGRESSION TOOL COLOR: Green  CIWA SCORE: NA    ABNL VS/O2: VSS on RA,   MOBILITY: Up with SBA  PAIN MANAGMENT: Tylenol for pain with good result  DIET: FL   BOWEL/BLADDER: Continent, voiding via BR, no BM this shift  ABNL LAB/BG:  Hgb 8.0, , 115.  DRAIN/DEVICES: PIV, protonix gtt  TELEMETRY RHYTHM: NSR  SKIN: Intact  TESTS/PROCEDURES: Upper endoscopy today  D/C DAY/GOALS/PLACE: Pending, GI and Vascular following.   OTHER IMPORTANT INFO:   Full liquids today. Tomorrow can advance .Can consider restarting ASA in about 1-2 days, chewable preferred. Restart Eliquis in 1-2 days if ok with vascular surgery.

## 2019-08-02 NOTE — PLAN OF CARE
"DATE & TIME: 8/1/2019 9630-7194                          Cognitive Concerns/ Orientation :  A&Ox4   BEHAVIOR & AGGRESSION TOOL COLOR: Green  CIWA SCORE: NA    ABNL VS/O2: VSS on RA except slightly elevated temperature of 99.2 degrees F; MD macias  MOBILITY: Up with SBA  PAIN MANAGMENT: C/o L shoulder pain from \"metal bar that hit her at home when her and her daughter were moving furniture.\"  DIET: Clears until midnight, then NPO for EGD tomorrow  BOWEL/BLADDER: Continent, voiding via BR  ABNL LAB/BG: See epic, last Hgb 6.3; serial Hgb checks  DRAIN/DEVICES: PIV, infusing with NS and protonix gtt  TELEMETRY RHYTHM: NSR  SKIN: Intact, pale  TESTS/PROCEDURES: EGD tomorrow  D/C DAY/GOALS/PLACE: Pending  OTHER IMPORTANT INFO: 1 unit of blood transfused. Recheck Hgb 6.3. Another unit prepared for transfusion. Awaiting placement of second PIV for blood transfusion. LS clear. Infrequent nonproductive cough noted. DESAI. GI and Vascular following. BG checks. +1 edema to BLE.  "

## 2019-08-02 NOTE — PROGRESS NOTES
GI Brief Note    Please see full procedure note and photos under Procedures tab.    EGD completed, 100 mcg fentanyl and 1mg versed.    Findings:  - normal esophagus and stomach  - small area of erosions in duodenal bulb, one actively bleeding and clip placed    A/P:  Duodenitis likely cause of anemia. Likely caused by aspirin and exacerbated by Eliquis.  - would continue to hold ASA and Eliquis today if ok with vascular surgery  - full liquids today  - continue PPI drip today  - change to PPI 40mg BID IV tomorrow and advance diet if all stable  - would ideally wait until 8/4 before restarting ASA/Eliquis, can start 8/3 if Vascular surgery concerned  - please use chewable 81mg ASA when ASA restarted  - avoidance of all NSAIDs for pain  - at discharge omeprazole 40mg BID for one month then 40mg daily thereafter  - outpatient colonoscopy (we will arrange)    Marie Todd MD  [facility]  Cell/Pager: [phone]  After 5pm please call [phone]

## 2019-08-02 NOTE — PROVIDER NOTIFICATION
MD Notification    Notified Person: MD    Notified Person Name: Nitin    Notification Date/Time: 2329, 8/1/2019    Notification Interaction:     Purpose of Notification: update of chest xray results, continue IVF now that blood is done?    Orders Received: discontinue IVF    Comments:

## 2019-08-02 NOTE — PLAN OF CARE
DATE & TIME: 8/1-8/2/19 4025-3248    Cognitive Concerns/ Orientation :  A&Ox4   BEHAVIOR & AGGRESSION TOOL COLOR: Green  CIWA SCORE: NA   ABNL VS/O2: VSS on RA,   MOBILITY: Up with SBA  PAIN MANAGMENT: C/o L shoulder pain from metal bar that hit her at home.  DIET: Clears until midnight, then NPO for upper endoscopy tomorrow  BOWEL/BLADDER: Continent, voiding via BR, no BM this shift  ABNL LAB/BG:  Hgb 6.3; received 1 unit PRBC, recheck 8.0, 7.6. , 115  DRAIN/DEVICES: PIV, protonix gtt  TELEMETRY RHYTHM: NSR  SKIN: Intact  TESTS/PROCEDURES: Upper endoscopy today  D/C DAY/GOALS/PLACE: Pending, GI and Vascular following.   OTHER IMPORTANT INFO:   Received 1 additional unit of blood for hgb 6.3. After about 3 hours, pt C/o SOB, RR 20s-30s. HR 120s. LS with Fine crackles and some mild wheezes noted. MD notified. Lasix given. PRN neb given. Breathing improved after lasix, unit of blood completed. HR and RR reduced to wdl after lasix and neb. IVF discontinued. Recheck hgb 8.

## 2019-08-02 NOTE — PROGRESS NOTES
[facility]    Vascular Medicine Progress Note    Date of Service (when I saw the patient): 08/02/2019          Physician Supervisory Attestation:   I have reviewed and discussed with the physician assistant their history, physical and plan and independently interviewed and examined Rajani Guo and agree with the plan as stated in the physician assistant note.    She underwent endoscopy today noted duodenitis and erosion clipped  Continue PPI and follow GI recommendations  Hemoglobin today 7.4, received 2 units of PRBC yesterday    Hold eliquis and and aspirin    She should get CBC test as an outpatient in a week at primary office and if hemoglobin remains stable or improves consider restarting Eliquis  same dose as before (no need for aspirin while on the Eliquis)  Please arrange lab tests at the time of discharge    -To get colonoscopy as outpatient.     -Should follow up with Dr. Barajas in about 4-6 weeks.    Vascular medicine service will sign off, please call us with any questions [phone]    Patient care time spent 35 minutes today    Willy Begum MD , [facility]    8/2/2019           Assessment & Plan   1.  Acute anemia with concern for gastrointestinal bleed     Assessment:   -Hgb 6.3 on admission with occult blood in stool. Hypotensive, tachycardic, lightheaded, SOB.   -Feeling better after PRBCs.   -Hgb 7.4 today.   -EGD with duodenitis. Erosion clipped.     Plan:   -Per GI  -PPI  -Monitor hgb.  -To get colonoscopy as outpatient.   -Should follow up with Dr. Barajas in about 4-6 weeks.     2. Peripheral arterial disease with acute on chronic lifestyle limiting claudication of the left lower extremity s/p succesful catheter-directed lysis and angioplasty of proximal left SFA 3/4/19-3/5/19     Assessment: This was likely embolic in nature and presumed to be secondary to paroxysmal atrial fibrillation given TTE results of a severely dilated left atrium and aneurysmal atrial  septum. (However, 14 day heart monitor only noted 10 beat run of SVT). She has remained on Eliquis as a result of this as well as aspirin 81 mg daily. Her severe claudication has since resolved.     Plan: She last saw [facility]. Barajas [facility] July 16 and she is due for an arterial duplex and stress ABIs along with appointment with Dr. Langford of IR in October. Can restart ASA 8/4 and then if tolerating ok, can restart Eliquis 3 days later.      3. Ongoing tobacco use     Assessment: She has been smoking for most of her life. She knows she must quit, but has had a difficult time doing so. She tried Chantix awhile back and quit for 5 months, but unfortunately restarted after stopping the Chantix. When she went back on it, she developed significant depression and had to stop it. She has tried patches alone, but was unsuccessful. She also states that they are too expensive. She knows that she will never be able to quit cold turkey. She started Wellbutrin in March 2019 to help take away the desire to smoke. However, she still continues to smoke. She rolls her own cigarettes, so it is much cheaper for her.   Plan: Once again, the importance of smoking cessation was stressed.      4. Hyperlipidemia     Assessment: LDL 44- at goal.   Plan: Continue Atorvastatin 40 mg daily     5. Type 2 diabetes     Assessment: Diabetes is presently well controlled in the outpatient setting with an A1C of 5.3% while on Metformin and Levemir.   Plan: Hospitalist service managing diabetes this admission.     Interval History   Doing ok. Undergoing EGD today.     Physical Exam   Temp: 98.3  F (36.8  C) Temp src: Oral BP: 124/58 Pulse: 111 Heart Rate: 110 Resp: 16 SpO2: 95 % O2 Device: Nasal cannula Oxygen Delivery: 2 LPM  Vitals:    08/01/19 1003 08/01/19 1435   Weight: 70.3 kg (155 lb) 71.7 kg (158 lb)     Vital Signs with Ranges  Temp:  [97.7  F (36.5  C)-99.6  F (37.6  C)] 98.3  F (36.8  C)  Pulse:  [] 111  Heart Rate:   [] 110  Resp:  [11-26] 16  BP: (104-160)/(51-82) 124/58  SpO2:  [92 %-97 %] 95 %  I/O last 3 completed shifts:  In: 1226 [I.V.:101; IV Piggyback:500]  Out: 1500 [Urine:1500]    Constitutional: Awake, alert, cooperative, no apparent distress, and appears stated age.  Eyes: Lids and lashes normal, sclera clear, conjunctiva normal.  ENT: Normocephalic, without obvious abnormality, atraumatic, oral pharynx with moist mucus membranes  Respiratory: No increased work of breathing, good air exchange, clear to auscultation bilaterally, no crackles or wheezing.  Cardiovascular: Regular rate and rhythm, normal S1 and S2, no S3 or S4, and no murmur noted.  GI: Normal bowel sounds, soft, non-distended, non-tender  Skin: No bruising or bleeding, normal skin color, texture, turgor, no redness, warmth, or swelling, no rashes.  Musculoskeletal: There is no redness, warmth, or swelling of the joints.    Neurologic: Awake, alert, oriented to name, place and time.  Cranial nerves II-XII are grossly intact.    Neuropsychiatric: Calm, normal eye contact, alert, normal affect, oriented to self, place, time and situation, memory for past and recent events intact and thought process normal.    Medications     pantoprazole (PROTONIX) infusion ADULT/PEDS GREATER than or EQUAL to 45 kg 8 mg/hr (08/02/19 1359)       atorvastatin  40 mg Oral Daily     buPROPion  150 mg Oral BID     fentaNYL  50 mcg Intravenous Once within 24 hrs     gabapentin  300 mg Oral BID     insulin aspart  1-7 Units Subcutaneous TID AC     insulin aspart  1-5 Units Subcutaneous At Bedtime     insulin detemir  5 Units Subcutaneous At Bedtime     midazolam  1 mg Intravenous Once within 24 hrs     nicotine  1 patch Transdermal Daily     nicotine   Transdermal Q8H     nicotine   Transdermal Daily     sodium chloride (PF)  3 mL Intracatheter Q8H     sodium chloride (PF)  3 mL Intracatheter Q8H     traZODone  50 mg Oral At Bedtime       Data   Recent Labs   Lab  08/02/19  1256 08/02/19  0539 08/02/19  0053  08/01/19  1028   WBC  --   --   --   --  11.8*   HGB 7.4* 7.6* 8.0*   < > 6.3*   MCV  --   --   --   --  87   PLT  --   --   --   --  371   NA  --  140  --   --  137   POTASSIUM  --  3.6  --   --  4.1   CHLORIDE  --  111*  --   --  106   CO2  --  26  --   --  23   BUN  --  10  --   --  18   CR  --  0.65  --   --  0.74   ANIONGAP  --  3  --   --  8   KAYLEEN  --  7.8*  --   --  8.8   GLC  --  103*  --   --  134*    < > = values in this interval not displayed.     Recent Results (from the past 24 hour(s))   XR Chest Port 1 View    Narrative    XR CHEST PORT 1 VW  8/1/2019 11:19 PM     HISTORY: Shortness of breath.    COMPARISON: None.    FINDINGS: Upright portable chest. The heart size is normal. There is  interstitial disease in the mid and lower lungs. The lungs are  otherwise clear. Small bilateral pleural effusions. No pneumothorax.  Surgical clips projected over the left superior mediastinum.  Postoperative changes in the left humerus.      Impression    IMPRESSION: Probable interstitial pulmonary edema.    SAIMA DUMONT MD

## 2019-08-02 NOTE — PROVIDER NOTIFICATION
MD Notification    Notified Person: MD    Notified Person Name: Nitin    Notification Date/Time: paged at [phone], 8/1/2019    Notification Interaction: spoke on telephone    Purpose of Notification: Pt receiving unit of PRBC. Pt c/o SOB, very dyspneic, unable to lie down. O2 sats 90 on RA, placed on 2 L O2. Tachycardic, HR 120s    Orders Received: 40mg Lasix, stat chest xray, prn nebs as needed, finish unit of blood after lasix is given    Comments: LS with some mild wheezes and fine crackles. +2 BLE-  unchanged from previously. This is the patient's 2nd unit of blood today.

## 2019-08-02 NOTE — PROGRESS NOTES
GI Brief Note    Vascular surgery note reviewed - no plans to restart ASA.  Possibly restart Eliquis in 1 week.    - Would keep on PPI drip today, change to IV BID PPI tomorrow and advance diet.  - If all stable (and stable hemoglobin) could likely discharge later tomorrow   - at discharge omeprazole 40mg BID for one month then once daily  - we will contact patient about outpatient colonoscopy    We will not actively follow. Please call if we can be of further assistance. Dr. Savage rounding this weekend.    Marie Todd MD  [facility]  Cell/Pager: [phone]  After 5pm please call [phone]

## 2019-08-03 VITALS
HEIGHT: 64 IN | BODY MASS INDEX: 26.98 KG/M2 | DIASTOLIC BLOOD PRESSURE: 70 MMHG | OXYGEN SATURATION: 97 % | HEART RATE: 111 BPM | SYSTOLIC BLOOD PRESSURE: 118 MMHG | TEMPERATURE: 98.4 F | WEIGHT: 158 LBS | RESPIRATION RATE: 16 BRPM

## 2019-08-03 LAB
ANION GAP SERPL CALCULATED.3IONS-SCNC: 5 MMOL/L (ref 3–14)
BUN SERPL-MCNC: 6 MG/DL (ref 7–30)
CALCIUM SERPL-MCNC: 8 MG/DL (ref 8.5–10.1)
CHLORIDE SERPL-SCNC: 109 MMOL/L (ref 94–109)
CO2 SERPL-SCNC: 24 MMOL/L (ref 20–32)
CREAT SERPL-MCNC: 0.57 MG/DL (ref 0.52–1.04)
ERYTHROCYTE [DISTWIDTH] IN BLOOD BY AUTOMATED COUNT: 15.1 % (ref 10–15)
GFR SERPL CREATININE-BSD FRML MDRD: >90 ML/MIN/{1.73_M2}
GLUCOSE BLDC GLUCOMTR-MCNC: 147 MG/DL (ref 70–99)
GLUCOSE BLDC GLUCOMTR-MCNC: 191 MG/DL (ref 70–99)
GLUCOSE BLDC GLUCOMTR-MCNC: 99 MG/DL (ref 70–99)
GLUCOSE SERPL-MCNC: 172 MG/DL (ref 70–99)
HCT VFR BLD AUTO: 25.4 % (ref 35–47)
HGB BLD-MCNC: 8.1 G/DL (ref 11.7–15.7)
MCH RBC QN AUTO: 27 PG (ref 26.5–33)
MCHC RBC AUTO-ENTMCNC: 31.9 G/DL (ref 31.5–36.5)
MCV RBC AUTO: 85 FL (ref 78–100)
PLATELET # BLD AUTO: 345 10E9/L (ref 150–450)
POTASSIUM SERPL-SCNC: 4 MMOL/L (ref 3.4–5.3)
RBC # BLD AUTO: 3 10E12/L (ref 3.8–5.2)
SODIUM SERPL-SCNC: 138 MMOL/L (ref 133–144)
WBC # BLD AUTO: 9.5 10E9/L (ref 4–11)

## 2019-08-03 PROCEDURE — 99239 HOSP IP/OBS DSCHRG MGMT >30: CPT | Performed by: HOSPITALIST

## 2019-08-03 PROCEDURE — 36415 COLL VENOUS BLD VENIPUNCTURE: CPT | Performed by: HOSPITALIST

## 2019-08-03 PROCEDURE — 25000128 H RX IP 250 OP 636: Performed by: INTERNAL MEDICINE

## 2019-08-03 PROCEDURE — 25000132 ZZH RX MED GY IP 250 OP 250 PS 637: Performed by: PHYSICIAN ASSISTANT

## 2019-08-03 PROCEDURE — C9113 INJ PANTOPRAZOLE SODIUM, VIA: HCPCS | Performed by: INTERNAL MEDICINE

## 2019-08-03 PROCEDURE — 80048 BASIC METABOLIC PNL TOTAL CA: CPT | Performed by: HOSPITALIST

## 2019-08-03 PROCEDURE — C9113 INJ PANTOPRAZOLE SODIUM, VIA: HCPCS | Performed by: HOSPITALIST

## 2019-08-03 PROCEDURE — 25800030 ZZH RX IP 258 OP 636: Performed by: INTERNAL MEDICINE

## 2019-08-03 PROCEDURE — 25000128 H RX IP 250 OP 636: Performed by: HOSPITALIST

## 2019-08-03 PROCEDURE — 85027 COMPLETE CBC AUTOMATED: CPT | Performed by: HOSPITALIST

## 2019-08-03 PROCEDURE — 00000146 ZZHCL STATISTIC GLUCOSE BY METER IP

## 2019-08-03 RX ORDER — PANTOPRAZOLE SODIUM 40 MG/1
40 TABLET, DELAYED RELEASE ORAL 2 TIMES DAILY
Qty: 60 TABLET | Refills: 0 | Status: ON HOLD | OUTPATIENT
Start: 2019-08-03 | End: 2019-09-04

## 2019-08-03 RX ADMIN — GABAPENTIN 300 MG: 300 CAPSULE ORAL at 09:35

## 2019-08-03 RX ADMIN — BUPROPION HYDROCHLORIDE 150 MG: 150 TABLET, FILM COATED, EXTENDED RELEASE ORAL at 09:35

## 2019-08-03 RX ADMIN — SODIUM CHLORIDE 8 MG/HR: 9 INJECTION, SOLUTION INTRAVENOUS at 00:33

## 2019-08-03 RX ADMIN — NICOTINE 1 PATCH: 21 PATCH, EXTENDED RELEASE TRANSDERMAL at 09:35

## 2019-08-03 RX ADMIN — INSULIN ASPART 1 UNITS: 100 INJECTION, SOLUTION INTRAVENOUS; SUBCUTANEOUS at 13:05

## 2019-08-03 RX ADMIN — PANTOPRAZOLE SODIUM 40 MG: 40 INJECTION, POWDER, FOR SOLUTION INTRAVENOUS at 09:35

## 2019-08-03 RX ADMIN — ACETAMINOPHEN 650 MG: 325 TABLET, FILM COATED ORAL at 10:20

## 2019-08-03 ASSESSMENT — ACTIVITIES OF DAILY LIVING (ADL)
ADLS_ACUITY_SCORE: 15

## 2019-08-03 NOTE — PLAN OF CARE
Discharge orders in. AVS to be reviewed with pt and daughter who will be providing ride. Ordered medications filled at hospital.

## 2019-08-03 NOTE — PROGRESS NOTES
.Discharge    Patient discharged to home with ride from daughter.   Care plan note completed.     Listed belongings gathered and returned to patient. Yes  Care Plan and Patient education resolved: Yes  Prescriptions if needed, hard copies sent with patient  Yes  Home and hospital acquired medications returned to patient: NA  Medication Bin checked and emptied on discharge Yes  Follow up appointment made for patient: Yes    AVS reviewed with pt and daughter and they reported good understanding. 1 medication and filled for pt.

## 2019-08-03 NOTE — PLAN OF CARE
DATE & TIME: 2300-0730                 Cognitive Concerns/ Orientation : A&Ox4   BEHAVIOR & AGGRESSION TOOL COLOR: green  CIWA SCORE: n/a     ABNL VS/O2: VS on RA; Bp 96/49  MOBILITY: SBA to BR  PAIN MANAGMENT: denied  DIET: Full Liquid  BOWEL/BLADDER: WDL, no bloody stools noted  ABNL LAB/BG: Hgb 8.2 BG 99  DRAIN/DEVICES: IV infusing Protonix at 10  TELEMETRY RHYTHM: ST  SKIN: WDL  TESTS/PROCEDURES: EGD done 8/2, found duodenitis and clipped one bleeding site  D/C DAY/GOALS/PLACE: Pending stable hgb  OTHER IMPORTANT INFO: Vasc Med- make outpatient appt before discharge. CC-making outpatient appts. GI-signed off.

## 2019-08-03 NOTE — DISCHARGE SUMMARY
[facility]    Discharge Summary  Hospitalist    Date of Admission:  8/1/2019  Date of Discharge:  8/3/2019  Discharging Provider: Amparo Dorman DO  Date of Service (when I saw the patient): 08/03/19    Discharge Diagnoses   Subacute blood loss anemia  Erosive duodenitis  Hx left lower extremity acute arterial occlusion s/p lytic therapy March 2019  Insulin-dependent type 2 diabetes  Hypertension  Tobacco dependence  Right thyroid nodule with microcalcifications  Abnormal Urinalysis    History of Present Illness   Rajani Guo is an 68 year old female who presented with hemoglobin of 6.3, found to have duodenitis with erosions    Hospital Course   Rajani Guo was admitted on 8/1/2019.  The following problems were addressed during her hospitalization:    Subacute blood loss anemia  Erosive duodenitis  Several week hx dizziness, SOB, weakness. Has been on eliquis and ASA for hx arterial occlusion since March of this year. Hgb 6.3 on admit, received 2 units PRBCs with improvement and stabilization to near 8. Last hgb was near 12 in March when she was started on blood thinner. GI consulted, underwent EGD 8/2 and found to have duodenitis with small erosions and one clip was placed for actively bleeding spot.  - Appreciate GI and vascular input  - Stop aspirin going forward  - Plan for 40mg pantoprazole BID with treatment for one month, then decreasing to 40mg once daily thereafter  - Outpatient colonoscopy will be arranged by GI  - CBC one week after discharge at PCP office  - Resume Eliquis if Hgb stable or improved in one week.  - Mechanical soft diet for one week, then resume regular diet     History of left lower extremity acute arterial occlusion, status post lytic therapy in 03/2019  Follows with Dr. Barajas of [facility]  - Vascular medicine consulted this admit, follow up in 4-6 weeks with Dr Barajas.  - Plan repeat CBC in one week, if Hgb stable or improved, can resume eliquis at  that time  - Stop ASA     Insulin-dependent type 2 diabetes  A1c 5.3.  PTA metformin 1000 mg twice daily and Levemir 18-19 units at bedtime.  - Resume PTA dosing on discharge     Hypertension  PTA lisinopril 2.5 mg daily  - Resume on discharge, BPs stable     Tobacco dependence  Continues to smoke.  Encouraged cessation.    - Offered prescription for nicotine patch on discharge and she declines due to cost. States she has some at home that she can use and she can get them over the counter at [facility].     Right thyroid nodule with microcalcifications (seen on US carotid)  - Thyroid US as outpatient, discussed with primary PA--she will work up.     Abnormal urinalysis  Seen by PCP 2 days prior to admit for dyspnea on exertion. UA showed positive nitrites, small leuk esterase, 25-50 WBCs, many bacteria, but she was without symptoms. Started on bactrim. Urine cx with mixed urogenital kaylin.  - no further treatment necessary.    Amparo Dorman, DO    Significant Results and Procedures   EGD 8/2/2019:   The Z-line was regular and was found 37 cm from the incisors.        The examined esophagus was normal.        The entire examined stomach was normal.        Multiple localized erosions with stigmata of recent bleeding were found        in the duodenal bulb. They appeared to be healing, there was one deeper        erosion oozing blood, to stop active bleeding, one hemostatic clip was        successfully placed. There was no bleeding at the end of the procedure.     Pending Results   NA    Code Status   Full Code       Primary Care Physician   Tamiko Coley    Physical Exam   Temp: 98.4  F (36.9  C) Temp src: Oral BP: 118/70   Heart Rate: 115 Resp: 16 SpO2: 97 % O2 Device: None (Room air)    Vitals:    08/01/19 1003 08/01/19 1435   Weight: 70.3 kg (155 lb) 71.7 kg (158 lb)     Vital Signs with Ranges  Temp:  [98.4  F (36.9  C)-98.8  F (37.1  C)] 98.4  F (36.9  C)  Heart Rate:  [105-115] 115  Resp:  [16-18] 16  BP:  ()/(49-70) 118/70  SpO2:  [93 %-97 %] 97 %  I/O last 3 completed shifts:  In: 240 [P.O.:240]  Out: -     Constitutional: Awake, alert, cooperative, no apparent distress.  Eyes: Conjunctiva and pupils examined and normal.  HEENT: Moist mucous membranes, normal dentition.  Respiratory: Clear to auscultation bilaterally, no crackles or wheezing.  Cardiovascular: Regular rate and rhythm, normal S1 and S2, and no murmur noted.  GI: Soft, non-distended, non-tender, normal bowel sounds.  Lymph/Hematologic: No anterior cervical or supraclavicular adenopathy.  Skin: No rashes, no cyanosis, no edema.  Musculoskeletal: No joint swelling, erythema or tenderness.  Neurologic: Cranial nerves 2-12 intact, normal strength and sensation.  Psychiatric: Alert, oriented to person, place and time, no obvious anxiety or depression.    Discharge Disposition   Discharged to home  Condition at discharge: Stable    Consultations This Hospital Stay   GASTROENTEROLOGY IP CONSULT  [facility] IP CONSULT    Time Spent on this Encounter   I, Amparo Dorman, personally saw the patient today and spent greater than 30 minutes discharging this patient.    Discharge Orders      CBC with platelets    Last Lab Result: Hemoglobin (g/dL)       Date                     Value                 08/03/2019               8.1 (L)          ----------     GASTROENTEROLOGY ADULT REF CONSULT ONLY      Reason for your hospital stay    Blood loss anemia secondary to gastointestinal bleed (duodentitis with erosions)     Activity    Your activity upon discharge: activity as tolerated     When to contact your care team    Return to the ED if you feel more fatigued, weak, or notice blood in your stools.     Discharge Instructions    Quit smoking. Use your nicotine patches and get them over the counter if needed.    Stop taking aspirin, or any other NSAIDs (like ibuprofen or aleve). Do not resume eliquis until your PCP gives you the okay based on your  CBC     Follow-up and recommended labs and tests     Follow up with primary care provider, Tamiko Coley, within 7 days to evaluate medication change and for hospital follow- up.  The following labs/tests are recommended: CBC in one week. Ultrasound workup as outpatient for thyroid nodule.    Follow up with Dr Barajas for vascular surgery in 4-6 weeks, [phone]. Call to schedule appointment.    Follow up with Dr Todd with [facility] for colonoscopy, they will call you to schedule.     Full Code     Diet    Follow this diet upon discharge: 4402-7402 Calories: Moderate Consistent CHO (4-6 CHO units/meal); Mechanical/Dental Soft Diet (for one more week), then return to your regular diet.     Discharge Medications   Current Discharge Medication List      START taking these medications    Details   pantoprazole (PROTONIX) 40 MG EC tablet Take 1 tablet (40 mg) by mouth 2 times daily  Qty: 60 tablet, Refills: 0    Comments: Future refills by PCP Dr. Tamiko Coley with phone number [phone].  Associated Diagnoses: Duodenitis         CONTINUE these medications which have CHANGED    Details   apixaban ANTICOAGULANT (ELIQUIS) 5 MG tablet Take 1 tablet (5 mg) by mouth 2 times daily Hold until follow up labs with PCP. If blood count stable, PCP will direct you to restart medication  Qty: 180 tablet, Refills: 3    Associated Diagnoses: PAD (peripheral artery disease) (H)      insulin detemir (LEVEMIR FLEXTOUCH) 100 UNIT/ML pen INJECT 18-19 UNITS SUBCUTANEOUS AT BEDTIME  Qty: 15 mL, Refills: 0    Associated Diagnoses: Type 2 diabetes mellitus without complication, with long-term current use of insulin (H)         CONTINUE these medications which have NOT CHANGED    Details   alendronate (FOSAMAX) 70 MG tablet TAKE 1 TAB BY MOUTH EVERY 7 DAYS.  Qty: 4 tablet, Refills: 3    Associated Diagnoses: Age-related osteoporosis without current pathological fracture      atorvastatin (LIPITOR) 40 MG tablet Take 1 tablet (40 mg) by mouth  daily  Qty: 90 tablet, Refills: 3    Associated Diagnoses: Type 2 diabetes mellitus without complication, with long-term current use of insulin (H)      buPROPion (ZYBAN) 150 MG 12 hr tablet Take 1 tablet (150 mg) by mouth 2 times daily Take once daily for first 4 days, then twice daily after that.  Qty: 60 tablet, Refills: 3    Associated Diagnoses: Tobacco use disorder      calcium carb 1250 mg, 500 mg [place],/vitamin D 200 unit (CALCIUM 500/D) 500-200 MG-UNIT per tablet Take 1 tablet by mouth 2 times daily       gabapentin (NEURONTIN) 300 MG capsule TAKE 1 CAPSULE BY MOUTH ONE OR TWO TIMES DAILY  Qty: 80 capsule, Refills: 0    Associated Diagnoses: Claudication of left lower extremity (H)      glucosamine-chondroitin 500-400 MG CAPS per capsule Take 1 capsule by mouth 2 times daily       lisinopril (PRINIVIL/ZESTRIL) 2.5 MG tablet Take 1 tablet (2.5 mg) by mouth daily  Qty: 90 tablet, Refills: 3    Associated Diagnoses: Type 2 diabetes mellitus without complication, with long-term current use of insulin (H)      Melatonin 10 MG TABS tablet Take 10 mg by mouth At Bedtime      metFORMIN (GLUCOPHAGE) 1000 MG tablet Take 1 tablet (1,000 mg) by mouth 2 times daily (with meals)  Qty: 180 tablet, Refills: 1    Associated Diagnoses: Type 2 diabetes mellitus without complication, with long-term current use of insulin (H)      Multiple Vitamin (MULTI-VITAMINS) TABS Take 1 tablet by mouth daily       traZODone (DESYREL) 50 MG tablet TAKE 1 TABLET (50 MG) BY MOUTH AT BEDTIME  Qty: 90 tablet, Refills: 0    Associated Diagnoses: Insomnia, unspecified type         STOP taking these medications       aspirin EC 81 MG EC tablet Comments:   Reason for Stopping:         sulfamethoxazole-trimethoprim (BACTRIM DS/SEPTRA DS) 800-160 MG tablet Comments:   Reason for Stopping:             Allergies   Allergies   Allergen Reactions     Indomethacin Other (See Comments)     Dizziness and disorientation     Tramadol Nausea and Vomiting      Data   Most Recent 3 CBC's:  Recent Labs   Lab Test 08/03/19  0856 08/02/19  2306 08/02/19  1256  08/01/19  1028 03/07/19  0738   WBC 9.5  --   --   --  11.8* 10.2   HGB 8.1* 8.2* 7.4*   < > 6.3* 12.1   MCV 85  --   --   --  87 90     --   --   --  371 239    < > = values in this interval not displayed.      Most Recent 3 BMP's:  Recent Labs   Lab Test 08/03/19  0856 08/02/19  0539 08/01/19  1028    140 137   POTASSIUM 4.0 3.6 4.1   CHLORIDE 109 111* 106   CO2 24 26 23   BUN 6* 10 18   CR 0.57 0.65 0.74   ANIONGAP 5 3 8   KAYLEEN 8.0* 7.8* 8.8   * 103* 134*     Most Recent 2 LFT's:  Recent Labs   Lab Test 01/18/18  0835   AST 17   ALT 16   ALKPHOS 70   BILITOTAL 0.8     Most Recent INR's and Anticoagulation Dosing History:  Anticoagulation Dose History     Recent Dosing and Labs Latest Ref Rng & Units 3/4/2019    INR 0.86 - 1.14 1.00        Most Recent 3 Troponin's:No lab results found.  Most Recent Cholesterol Panel:  Recent Labs   Lab Test 03/04/19  0930   CHOL 122   LDL 44   HDL 59   TRIG 94     Most Recent 6 Bacteria Isolates From Any Culture (See EPIC Reports for Culture Details):  Recent Labs   Lab Test 07/30/19  1440 03/13/19  1226   CULT >100,000 colonies/mL  mixed urogenital kaylin   >100,000 colonies/mL  Escherichia coli  *  10,000 to 50,000 colonies/mL  mixed urogenital kaylin  Susceptibility testing not routinely done       Most Recent TSH, T4 and A1c Labs:  Recent Labs   Lab Test 07/30/19  1441   TSH 3.42   A1C 5.3     Results for orders placed or performed during the hospital encounter of 08/01/19   MR Brain w/o & w Contrast    Narrative    MRI OF THE BRAIN WITHOUT AND WITH CONTRAST August 1, 2019 11:58 AM     HISTORY: Vision abnormality, ataxia. Stroke. On Eliquis. No headache.     TECHNIQUE: Multisequence, multiplanar MRI images of the brain were  acquired before and after the administration of IV gadolinium (7 mL  Gadavist).    COMPARISON: None.    FINDINGS: The ventricles and  basal cisterns are normal in  configuration. There is no midline shift. There are no extra-axial  fluid collections. Gray-white differentiation is well maintained.  There is no evidence for stroke or acute intracranial hemorrhage.  There is no abnormal contrast enhancement in the brain or its  coverings.    There is no sinusitis or mastoiditis.      Impression    IMPRESSION: Normal brain MRI.    DINO MAY MD   XR Chest Port 1 View    Narrative    XR CHEST PORT 1 VW  8/1/2019 11:19 PM     HISTORY: Shortness of breath.    COMPARISON: None.    FINDINGS: Upright portable chest. The heart size is normal. There is  interstitial disease in the mid and lower lungs. The lungs are  otherwise clear. Small bilateral pleural effusions. No pneumothorax.  Surgical clips projected over the left superior mediastinum.  Postoperative changes in the left humerus.      Impression    IMPRESSION: Probable interstitial pulmonary edema.    SAIMA DUMONT MD

## 2019-08-03 NOTE — PROGRESS NOTES
DATE & TIME: 8/3 Day   Cognitive Concerns/ Orientation : A&Ox4   BEHAVIOR & AGGRESSION TOOL COLOR: Green  CIWA SCORE: NA  ABNL VS/O2: VSS on RA  MOBILITY: Ind  PAIN MANAGMENT: Tylenol X1 for neck pain  DIET: Mod carb  BOWEL/BLADDER: Continent- no BM  ABNL LAB/BG: WNL  DRAIN/DEVICES: PIV SL  TELEMETRY RHYTHM: ST  SKIN: WNL  TESTS/PROCEDURES: NA  D/C DAY/GOALS/PLACE: Today to home  OTHER IMPORTANT INFO:

## 2019-08-05 ENCOUNTER — PATIENT OUTREACH (OUTPATIENT)
Dept: CARE COORDINATION | Facility: CLINIC | Age: 69
End: 2019-08-05

## 2019-08-05 NOTE — PROGRESS NOTES
Clinic Care Coordination Contact  [facility]/Voicemail       Clinical Data: Care Coordinator Outreach    Outreach attempted x 1.  Left message on voicemail with call back information and requested return call.   Instructed patient to make PCP appointment this week as she needs lab work in follow up.    Patient was inpatient at [facility] from 8/1/19 to 8/3/19 with diagnoses of : Subacute blood loss anemia  Erosive duodenitis  Hx left lower extremity acute arterial occlusion s/p lytic therapy March 2019  Insulin-dependent type 2 diabetes  Hypertension  Tobacco dependence  Right thyroid nodule with microcalcifications  Abnormal Urinalysis    Plan:  Care Coordinator will try to reach patient again in 1-2 business days.    Jeanne Nunes RN, [facility] RN Coordinator  [facility]   8/5/2019    11:10 AM  [phone]

## 2019-08-05 NOTE — LETTER
[place] CARE COORDINATION  [address] [address]    August 6, 2019    Rajani Brant  [address] [facility] [address]      Dear Rajani,    I am a clinic care coordinator who works with Tamiko Coley MD at [facility]. I wanted to introduce myself and provide you with my contact information for you to be able to call me with any questions or concerns. I wanted to introduce myself and provide you with my contact information so that you can call me with questions or concerns about your health care. Below is a description of clinic care coordination and how I can further assist you.     The clinic care coordinator is a registered nurse and/or  who understand the health care system. The goal of clinic care coordination is to help you manage your health and improve access to the [facility] system in the most efficient manner. The registered nurse can assist you in meeting your health care goals by providing education, coordinating services, and strengthening the communication among your providers. The  can assist you with financial, behavioral, psychosocial, chemical dependency, counseling, and/or psychiatric resources.    Please feel free to contact me at [phone], with any questions or concerns. We at [facility] are focused on providing you with the highest-quality healthcare experience possible and that all starts with you.     Sincerely,     Jeanne Nunes RN, [facility] RN Coordinator  [facility]     [phone]

## 2019-08-06 NOTE — PROGRESS NOTES
Clinic Care Coordination Contact  [facility]/Voicemail       Clinical Data: Care Coordinator Outreach    Outreach attempted x 2.  Left message on voicemail with call back information and requested return call.    Plan: Care Coordinator will mail out care coordination introduction letter with care coordinator contact information and explanation of care coordination services.     Care Coordinator will do no further outreaches at this time.    Jeanne Nunes RN, [facility] RN Coordinator  [facility]   8/6/2019    3:00 PM  [phone]

## 2019-08-08 ENCOUNTER — TELEPHONE (OUTPATIENT)
Dept: OTHER | Facility: CLINIC | Age: 69
End: 2019-08-08

## 2019-08-08 NOTE — TELEPHONE ENCOUNTER
Diabetes Education Scheduling Outreach #2:    Call to patient to schedule. Left message with phone number to call to schedule.    Chen Camejo  [facility]  Diabetes and Nutrition Scheduling

## 2019-08-08 NOTE — TELEPHONE ENCOUNTER
This is likely cardioembolic due to occult AF. As such, she does not require bridging. She may hold it for three days.

## 2019-08-08 NOTE — TELEPHONE ENCOUNTER
Called Cornel LANDAVERDE to relay hold order for Rajani's anticoagulation per Dr. Barajas.    Kristal Pereyra RN BSN

## 2019-08-08 NOTE — TELEPHONE ENCOUNTER
SNEHAL Betancourt (Madelaine) at [phone] called requesting hold orders for Eliquis for a colonoscopy for Rajani.    Their typical hold is three days.  Does Rajani need bridging?    Routing to Dr. Barajas to advise.  Kristal Pereyra RN BSN

## 2019-08-12 ENCOUNTER — TELEPHONE (OUTPATIENT)
Dept: FAMILY MEDICINE | Facility: CLINIC | Age: 69
End: 2019-08-12

## 2019-08-12 NOTE — TELEPHONE ENCOUNTER
Reason for call:  Other   Patient called regarding (reason for call): call back  Additional comments: Patient is having a stress test tomorrow and is calling because she would like to know what restrictions/medications she needs to be off of for the test, please call back and advise.      Phone number to reach patient:  Home number on file [phone] (home)    Best Time:  any    Can we leave a detailed message on this number?  YES

## 2019-08-12 NOTE — TELEPHONE ENCOUNTER
Patient has stress test tomorrow 8/13/19.  Patient wondering if she needs to change dose of levemir insulin prior to stress test  Per appt notes:    6. For patients with diabetes: - If you take insulin, call your diabetes care team. Ask if you should take a   dose the morning of your test. - If you take diabetes medicine by mouth, dont take it on the morning of your test. Bring it with you to take after the test. (If you have questions, call your diabetes care team)     Margareth Galan RN

## 2019-08-13 ENCOUNTER — ANCILLARY PROCEDURE (OUTPATIENT)
Dept: CARDIOLOGY | Facility: CLINIC | Age: 69
End: 2019-08-13
Attending: PHYSICIAN ASSISTANT
Payer: COMMERCIAL

## 2019-08-13 DIAGNOSIS — R06.09 DYSPNEA ON EXERTION: ICD-10-CM

## 2019-08-13 PROCEDURE — 93321 DOPPLER ECHO F-UP/LMTD STD: CPT | Performed by: INTERNAL MEDICINE

## 2019-08-13 PROCEDURE — 93308 TTE F-UP OR LMTD: CPT | Performed by: INTERNAL MEDICINE

## 2019-08-13 PROCEDURE — 93325 DOPPLER ECHO COLOR FLOW MAPG: CPT | Performed by: INTERNAL MEDICINE

## 2019-08-13 NOTE — TELEPHONE ENCOUNTER
Called and spoke to patient. Informed message and patient verbally understand. Patient stated she has an appointment today for Echo and she will make an appointment at the  when checking in.  Samira Ovalles CMA

## 2019-08-13 NOTE — TELEPHONE ENCOUNTER
Patient's appointment is at 1:00 PM today.   Patient only takes insulin at night.    Margareth Galan RN

## 2019-08-15 DIAGNOSIS — M81.0 AGE-RELATED OSTEOPOROSIS WITHOUT CURRENT PATHOLOGICAL FRACTURE: ICD-10-CM

## 2019-08-16 DIAGNOSIS — I34.2 NON-RHEUMATIC MITRAL VALVE STENOSIS: Primary | ICD-10-CM

## 2019-08-16 RX ORDER — ALENDRONATE SODIUM 70 MG/1
TABLET ORAL
Qty: 4 TABLET | Refills: 3 | Status: SHIPPED | OUTPATIENT
Start: 2019-08-16 | End: 2019-12-09

## 2019-08-20 NOTE — PROGRESS NOTES
"Subjective     Rajani Guo is a 68 year old female who presents to clinic today for the following health issues:    [facility]       Hospital Follow-up Visit:    Hospital/Nursing Home/IP Rehab Facility: [facility]  Date of Admission: 08/01/2019  Date of Discharge: 08/03/2019  Reason(s) for Admission: low hemoglobin            Problems taking medications regularly:  None       Medication changes since discharge: None       Problems adhering to non-medication therapy:  None    Summary of hospitalization:  [facility] discharge summary reviewed  Diagnostic Tests/Treatments reviewed.  Follow up needed: CBC, Cardiology and Colonoscopy  Other Healthcare Providers Involved in Patient s Care:         Specialist appointment - Cardiology  Update since discharge: improved.     Post Discharge Medication Reconciliation: discharge medications reconciled, continue medications without change.  Plan of care communicated with patient     Coding guidelines for this visit:  Type of Medical   Decision Making Face-to-Face Visit       within 7 Days of discharge Face-to-Face Visit        within 14 days of discharge   Moderate Complexity [id number]   High Complexity [id number]              Review of Systems   ROS COMP: Constitutional, HEENT, cardiovascular, pulmonary, gi and gu systems are negative, except as otherwise noted.      Objective    /80   Pulse 102   Temp 97.4  F (36.3  C) (Oral)   Resp 20   Ht 1.626 m (5' 4\")   Wt 69.1 kg (152 lb 6.4 oz)   SpO2 100%   BMI 26.16 kg/m    Body mass index is 26.16 kg/m .  Physical Exam   GENERAL: healthy, alert and no distress  NECK: no adenopathy, no asymmetry, masses, or scars and thyroid normal to palpation  RESP: lungs clear to auscultation - no rales, rhonchi or wheezes  CV: regular rates and rhythm, normal S1 S2, no S3 or S4, grade 3/6 MARLEY murmur heard best over the Mitral, peripheral pulses strong and no peripheral edema  MS: no gross musculoskeletal defects " noted, no edema  SKIN: no suspicious lesions or rashes    Diagnostic Test Results:  none         Assessment & Plan     1. Gastrointestinal hemorrhage associated with duodenal ulcer  - PAF COMPLETED  - CBC with platelets and differential    2. Primary osteoarthritis involving multiple joints  - PAF COMPLETED  - acetaminophen (TYLENOL) 650 MG CR tablet; Take 1 tablet (650 mg) by mouth every 8 hours as needed  Dispense: 60 tablet; Refill: 1    3. Type 2 diabetes mellitus without complication, with long-term current use of insulin (H)    4. Mitral valve stenosis, unspecified etiology  Follow-up with Cardiology     Repeat CBC in 3 weeks.   Patient amenable to this follow up plan.       Follow up in 4-6 months, sooner if needed.             No follow-ups on file.    Emelyn Overton PA-C  [facility]

## 2019-08-21 ENCOUNTER — OFFICE VISIT (OUTPATIENT)
Dept: FAMILY MEDICINE | Facility: CLINIC | Age: 69
End: 2019-08-21
Payer: COMMERCIAL

## 2019-08-21 VITALS
DIASTOLIC BLOOD PRESSURE: 80 MMHG | TEMPERATURE: 97.4 F | WEIGHT: 152.4 LBS | SYSTOLIC BLOOD PRESSURE: 130 MMHG | HEIGHT: 64 IN | RESPIRATION RATE: 20 BRPM | OXYGEN SATURATION: 100 % | BODY MASS INDEX: 26.02 KG/M2 | HEART RATE: 102 BPM

## 2019-08-21 DIAGNOSIS — Z79.4 TYPE 2 DIABETES MELLITUS WITHOUT COMPLICATION, WITH LONG-TERM CURRENT USE OF INSULIN (H): ICD-10-CM

## 2019-08-21 DIAGNOSIS — K26.4 GASTROINTESTINAL HEMORRHAGE ASSOCIATED WITH DUODENAL ULCER: Primary | ICD-10-CM

## 2019-08-21 DIAGNOSIS — E11.9 TYPE 2 DIABETES MELLITUS WITHOUT COMPLICATION, WITH LONG-TERM CURRENT USE OF INSULIN (H): ICD-10-CM

## 2019-08-21 DIAGNOSIS — I05.0 MITRAL VALVE STENOSIS, UNSPECIFIED ETIOLOGY: ICD-10-CM

## 2019-08-21 DIAGNOSIS — M15.0 PRIMARY OSTEOARTHRITIS INVOLVING MULTIPLE JOINTS: ICD-10-CM

## 2019-08-21 PROCEDURE — 99214 OFFICE O/P EST MOD 30 MIN: CPT | Performed by: PHYSICIAN ASSISTANT

## 2019-08-21 PROCEDURE — 99207 C PAF COMPLETED  NO CHARGE: CPT | Performed by: PHYSICIAN ASSISTANT

## 2019-08-21 RX ORDER — SENNOSIDES 8.6 MG
650 CAPSULE ORAL EVERY 8 HOURS PRN
Qty: 60 TABLET | Refills: 1 | Status: SHIPPED | OUTPATIENT
Start: 2019-08-21 | End: 2019-09-02

## 2019-08-21 ASSESSMENT — MIFFLIN-ST. JEOR: SCORE: 1211.28

## 2019-08-25 DIAGNOSIS — I73.9 CLAUDICATION OF LEFT LOWER EXTREMITY (H): ICD-10-CM

## 2019-08-25 DIAGNOSIS — I74.9 ARTERIAL THROMBOSIS (H): ICD-10-CM

## 2019-08-26 RX ORDER — APIXABAN 5 MG/1
TABLET, FILM COATED ORAL
Qty: 60 TABLET | Refills: 3 | Status: ON HOLD | OUTPATIENT
Start: 2019-08-26 | End: 2019-09-04

## 2019-08-26 RX ORDER — GABAPENTIN 300 MG/1
CAPSULE ORAL
Qty: 80 CAPSULE | Refills: 0 | Status: SHIPPED | OUTPATIENT
Start: 2019-08-26 | End: 2019-09-02

## 2019-08-26 NOTE — TELEPHONE ENCOUNTER
apixaban ANTICOAGULANT (ELIQUIS) 5 MG tablet       Last Written Prescription Date: 8/12/19  Last Fill Quantity: 180,  # refills: 3   Last office visit: 8/1/2019 with prescribing provider:  7/16/19    Future Office Visit:   Next 5 appointments (look out 90 days)    Sep 05, 2019  1:10 PM CDT  Return Visit with Tab Barajas MD  [facility] ([facility] [address]  [place] [id number] [phone]           Requested Prescriptions   Pending Prescriptions Disp Refills     ELIQUIS 5 MG tablet [Pharmacy Med Name: ELIQUIS 5 MG TABLET] 60 tablet 3     Sig: TAKE 1 TABLET (5 MG) BY MOUTH 2 TIMES DAILY       Direct Oral Anticoagulant Agents Passed - 8/26/2019  8:55 AM        Passed - Normal Platelets on file in past 12 months     Recent Labs   Lab Test 08/03/19  0856                  Passed - Medication is active on med list        Passed - Patient is 18-79 years of age        Passed - Serum creatinine less than or equal to 1.4 on file in past 12 mos     Recent Labs   Lab Test 08/03/19  0856   CR 0.57             Passed - Weight is greater than 60 kg for the past year     Wt Readings from Last 3 Encounters:   08/21/19 152 lb 6.4 oz (69.1 kg)   08/01/19 158 lb (71.7 kg)   07/30/19 155 lb 3.2 oz (70.4 kg)             Passed - No active pregnancy on record        Passed - No positive pregnancy test within past 12 months        Passed - Recent (6 mo) or future (30 days) visit within the authorizing provider's specialty        Prescription approved per [facility] Refill Protocol.

## 2019-08-26 NOTE — TELEPHONE ENCOUNTER
gabapentin (NEURONTIN) 300 MG capsule       Last Written Prescription Date:  7/18/2019  Last Fill Quantity: 80,   # refills: 0  Last Office Visit: 8/21/2019  Future Office visit:    Next 5 appointments (look out 90 days)    Sep 05, 2019  1:10 PM CDT  Return Visit with Tab Barajas MD  [facility] ([facility]) [address] [place]  [facility] [address]  [phone]           Routing refill request to provider for review/approval because:  Drug not active on patient's medication list

## 2019-09-02 ENCOUNTER — APPOINTMENT (OUTPATIENT)
Dept: GENERAL RADIOLOGY | Facility: CLINIC | Age: 69
DRG: 378 | End: 2019-09-02
Attending: EMERGENCY MEDICINE
Payer: COMMERCIAL

## 2019-09-02 ENCOUNTER — HOSPITAL ENCOUNTER (INPATIENT)
Facility: CLINIC | Age: 69
LOS: 2 days | Discharge: HOME | DRG: 378 | End: 2019-09-04
Attending: EMERGENCY MEDICINE | Admitting: HOSPITALIST
Payer: COMMERCIAL

## 2019-09-02 DIAGNOSIS — I73.9 PVD (PERIPHERAL VASCULAR DISEASE) (H): ICD-10-CM

## 2019-09-02 DIAGNOSIS — Z79.01 LONG TERM CURRENT USE OF ANTICOAGULANT THERAPY: ICD-10-CM

## 2019-09-02 DIAGNOSIS — D62 ACUTE BLOOD LOSS ANEMIA: ICD-10-CM

## 2019-09-02 DIAGNOSIS — K29.80 DUODENITIS: ICD-10-CM

## 2019-09-02 DIAGNOSIS — K92.2 GASTROINTESTINAL HEMORRHAGE, UNSPECIFIED GASTROINTESTINAL HEMORRHAGE TYPE: ICD-10-CM

## 2019-09-02 DIAGNOSIS — F17.200 TOBACCO USE DISORDER: Primary | ICD-10-CM

## 2019-09-02 DIAGNOSIS — D62 ANEMIA DUE TO BLOOD LOSS, ACUTE: ICD-10-CM

## 2019-09-02 LAB
ABO + RH BLD: NORMAL
ABO + RH BLD: NORMAL
ANION GAP SERPL CALCULATED.3IONS-SCNC: 6 MMOL/L (ref 3–14)
BASOPHILS # BLD AUTO: 0 10E9/L (ref 0–0.2)
BASOPHILS NFR BLD AUTO: 0.1 %
BLD GP AB SCN SERPL QL: NORMAL
BLD PROD TYP BPU: NORMAL
BLD PROD TYP BPU: NORMAL
BLD UNIT ID BPU: 0
BLOOD BANK CMNT PATIENT-IMP: NORMAL
BLOOD PRODUCT CODE: NORMAL
BPU ID: NORMAL
BUN SERPL-MCNC: 8 MG/DL (ref 7–30)
CALCIUM SERPL-MCNC: 8.5 MG/DL (ref 8.5–10.1)
CHLORIDE SERPL-SCNC: 108 MMOL/L (ref 94–109)
CO2 SERPL-SCNC: 26 MMOL/L (ref 20–32)
CREAT SERPL-MCNC: 0.56 MG/DL (ref 0.52–1.04)
DIFFERENTIAL METHOD BLD: ABNORMAL
EOSINOPHIL # BLD AUTO: 0.3 10E9/L (ref 0–0.7)
EOSINOPHIL NFR BLD AUTO: 4.2 %
ERYTHROCYTE [DISTWIDTH] IN BLOOD BY AUTOMATED COUNT: 17.7 % (ref 10–15)
ERYTHROCYTE [DISTWIDTH] IN BLOOD BY AUTOMATED COUNT: 18.2 % (ref 10–15)
GFR SERPL CREATININE-BSD FRML MDRD: >90 ML/MIN/{1.73_M2}
GLUCOSE BLDC GLUCOMTR-MCNC: 106 MG/DL (ref 70–99)
GLUCOSE SERPL-MCNC: 99 MG/DL (ref 70–99)
HCT VFR BLD AUTO: 24.5 % (ref 35–47)
HCT VFR BLD AUTO: 25.7 % (ref 35–47)
HEMOCCULT STL QL: POSITIVE
HGB BLD-MCNC: 7.5 G/DL (ref 11.7–15.7)
HGB BLD-MCNC: 8.2 G/DL (ref 11.7–15.7)
IMM GRANULOCYTES # BLD: 0 10E9/L (ref 0–0.4)
IMM GRANULOCYTES NFR BLD: 0.1 %
INTERPRETATION ECG - MUSE: NORMAL
LYMPHOCYTES # BLD AUTO: 1.4 10E9/L (ref 0.8–5.3)
LYMPHOCYTES NFR BLD AUTO: 18.8 %
MCH RBC QN AUTO: 23.7 PG (ref 26.5–33)
MCH RBC QN AUTO: 25.1 PG (ref 26.5–33)
MCHC RBC AUTO-ENTMCNC: 30.6 G/DL (ref 31.5–36.5)
MCHC RBC AUTO-ENTMCNC: 31.9 G/DL (ref 31.5–36.5)
MCV RBC AUTO: 78 FL (ref 78–100)
MCV RBC AUTO: 79 FL (ref 78–100)
MONOCYTES # BLD AUTO: 0.8 10E9/L (ref 0–1.3)
MONOCYTES NFR BLD AUTO: 11.6 %
NEUTROPHILS # BLD AUTO: 4.7 10E9/L (ref 1.6–8.3)
NEUTROPHILS NFR BLD AUTO: 65.2 %
NRBC # BLD AUTO: 0 10*3/UL
NRBC BLD AUTO-RTO: 0 /100
NT-PROBNP SERPL-MCNC: 342 PG/ML (ref 0–900)
NUM BPU REQUESTED: 1
PLATELET # BLD AUTO: 337 10E9/L (ref 150–450)
PLATELET # BLD AUTO: 368 10E9/L (ref 150–450)
POTASSIUM SERPL-SCNC: 3.5 MMOL/L (ref 3.4–5.3)
RBC # BLD AUTO: 3.16 10E12/L (ref 3.8–5.2)
RBC # BLD AUTO: 3.27 10E12/L (ref 3.8–5.2)
SODIUM SERPL-SCNC: 140 MMOL/L (ref 133–144)
SPECIMEN EXP DATE BLD: NORMAL
TRANSFUSION STATUS PATIENT QL: NORMAL
TRANSFUSION STATUS PATIENT QL: NORMAL
WBC # BLD AUTO: 7.2 10E9/L (ref 4–11)
WBC # BLD AUTO: 7.4 10E9/L (ref 4–11)

## 2019-09-02 PROCEDURE — 71046 X-RAY EXAM CHEST 2 VIEWS: CPT

## 2019-09-02 PROCEDURE — C9113 INJ PANTOPRAZOLE SODIUM, VIA: HCPCS | Performed by: EMERGENCY MEDICINE

## 2019-09-02 PROCEDURE — 96374 THER/PROPH/DIAG INJ IV PUSH: CPT

## 2019-09-02 PROCEDURE — 86901 BLOOD TYPING SEROLOGIC RH(D): CPT | Performed by: EMERGENCY MEDICINE

## 2019-09-02 PROCEDURE — 25800030 ZZH RX IP 258 OP 636: Performed by: HOSPITALIST

## 2019-09-02 PROCEDURE — 94640 AIRWAY INHALATION TREATMENT: CPT

## 2019-09-02 PROCEDURE — 99285 EMERGENCY DEPT VISIT HI MDM: CPT | Mod: 25

## 2019-09-02 PROCEDURE — 83880 ASSAY OF NATRIURETIC PEPTIDE: CPT | Performed by: EMERGENCY MEDICINE

## 2019-09-02 PROCEDURE — 99221 1ST HOSP IP/OBS SF/LOW 40: CPT | Mod: AI | Performed by: HOSPITALIST

## 2019-09-02 PROCEDURE — P9016 RBC LEUKOCYTES REDUCED: HCPCS | Performed by: EMERGENCY MEDICINE

## 2019-09-02 PROCEDURE — 86923 COMPATIBILITY TEST ELECTRIC: CPT | Performed by: EMERGENCY MEDICINE

## 2019-09-02 PROCEDURE — 25000132 ZZH RX MED GY IP 250 OP 250 PS 637: Performed by: INTERNAL MEDICINE

## 2019-09-02 PROCEDURE — 82272 OCCULT BLD FECES 1-3 TESTS: CPT | Performed by: EMERGENCY MEDICINE

## 2019-09-02 PROCEDURE — 12000000 ZZH R&B MED SURG/OB

## 2019-09-02 PROCEDURE — 85027 COMPLETE CBC AUTOMATED: CPT | Performed by: INTERNAL MEDICINE

## 2019-09-02 PROCEDURE — C9113 INJ PANTOPRAZOLE SODIUM, VIA: HCPCS | Performed by: HOSPITALIST

## 2019-09-02 PROCEDURE — 85025 COMPLETE CBC W/AUTO DIFF WBC: CPT | Performed by: EMERGENCY MEDICINE

## 2019-09-02 PROCEDURE — 25000128 H RX IP 250 OP 636: Performed by: HOSPITALIST

## 2019-09-02 PROCEDURE — 86850 RBC ANTIBODY SCREEN: CPT | Performed by: EMERGENCY MEDICINE

## 2019-09-02 PROCEDURE — 93005 ELECTROCARDIOGRAM TRACING: CPT

## 2019-09-02 PROCEDURE — 00000146 ZZHCL STATISTIC GLUCOSE BY METER IP

## 2019-09-02 PROCEDURE — 25000132 ZZH RX MED GY IP 250 OP 250 PS 637: Performed by: HOSPITALIST

## 2019-09-02 PROCEDURE — 25000128 H RX IP 250 OP 636: Performed by: EMERGENCY MEDICINE

## 2019-09-02 PROCEDURE — 25000125 ZZHC RX 250: Performed by: EMERGENCY MEDICINE

## 2019-09-02 PROCEDURE — 36415 COLL VENOUS BLD VENIPUNCTURE: CPT | Performed by: INTERNAL MEDICINE

## 2019-09-02 PROCEDURE — 86900 BLOOD TYPING SEROLOGIC ABO: CPT | Performed by: EMERGENCY MEDICINE

## 2019-09-02 PROCEDURE — 99207 ZZC CDG-HISTORY COMP: MEETS EXP. PROB FOCUSED- DOWN CODED LACK OF PFSH: CPT | Performed by: HOSPITALIST

## 2019-09-02 PROCEDURE — 36430 TRANSFUSION BLD/BLD COMPNT: CPT

## 2019-09-02 PROCEDURE — 80048 BASIC METABOLIC PNL TOTAL CA: CPT | Performed by: EMERGENCY MEDICINE

## 2019-09-02 RX ORDER — NICOTINE POLACRILEX 4 MG
15-30 LOZENGE BUCCAL
Status: DISCONTINUED | OUTPATIENT
Start: 2019-09-02 | End: 2019-09-04 | Stop reason: HOSPADM

## 2019-09-02 RX ORDER — LIDOCAINE 40 MG/G
CREAM TOPICAL
Status: DISCONTINUED | OUTPATIENT
Start: 2019-09-02 | End: 2019-09-04 | Stop reason: HOSPADM

## 2019-09-02 RX ORDER — TRAZODONE HYDROCHLORIDE 50 MG/1
50-100 TABLET, FILM COATED ORAL AT BEDTIME
COMMUNITY
End: 2020-07-09

## 2019-09-02 RX ORDER — IPRATROPIUM BROMIDE AND ALBUTEROL SULFATE 2.5; .5 MG/3ML; MG/3ML
3 SOLUTION RESPIRATORY (INHALATION) ONCE
Status: COMPLETED | OUTPATIENT
Start: 2019-09-02 | End: 2019-09-02

## 2019-09-02 RX ORDER — ACETAMINOPHEN 325 MG/1
650 TABLET ORAL EVERY 4 HOURS PRN
Status: DISCONTINUED | OUTPATIENT
Start: 2019-09-02 | End: 2019-09-04 | Stop reason: HOSPADM

## 2019-09-02 RX ORDER — AMOXICILLIN 250 MG
1 CAPSULE ORAL 2 TIMES DAILY
Status: DISCONTINUED | OUTPATIENT
Start: 2019-09-02 | End: 2019-09-04 | Stop reason: HOSPADM

## 2019-09-02 RX ORDER — ONDANSETRON 4 MG/1
4 TABLET, ORALLY DISINTEGRATING ORAL EVERY 6 HOURS PRN
Status: DISCONTINUED | OUTPATIENT
Start: 2019-09-02 | End: 2019-09-04 | Stop reason: HOSPADM

## 2019-09-02 RX ORDER — SODIUM CHLORIDE 9 MG/ML
INJECTION, SOLUTION INTRAVENOUS CONTINUOUS
Status: DISCONTINUED | OUTPATIENT
Start: 2019-09-02 | End: 2019-09-04 | Stop reason: HOSPADM

## 2019-09-02 RX ORDER — ATORVASTATIN CALCIUM 40 MG/1
40 TABLET, FILM COATED ORAL DAILY
Status: DISCONTINUED | OUTPATIENT
Start: 2019-09-02 | End: 2019-09-04 | Stop reason: HOSPADM

## 2019-09-02 RX ORDER — NALOXONE HYDROCHLORIDE 0.4 MG/ML
.1-.4 INJECTION, SOLUTION INTRAMUSCULAR; INTRAVENOUS; SUBCUTANEOUS
Status: DISCONTINUED | OUTPATIENT
Start: 2019-09-02 | End: 2019-09-02

## 2019-09-02 RX ORDER — SENNOSIDES 8.6 MG
1300 CAPSULE ORAL EVERY 8 HOURS PRN
Status: ON HOLD | COMMUNITY
End: 2020-11-16

## 2019-09-02 RX ORDER — ONDANSETRON 2 MG/ML
4 INJECTION INTRAMUSCULAR; INTRAVENOUS EVERY 6 HOURS PRN
Status: DISCONTINUED | OUTPATIENT
Start: 2019-09-02 | End: 2019-09-04 | Stop reason: HOSPADM

## 2019-09-02 RX ORDER — BUPROPION HYDROCHLORIDE 150 MG/1
150 TABLET, EXTENDED RELEASE ORAL 2 TIMES DAILY
Status: DISCONTINUED | OUTPATIENT
Start: 2019-09-02 | End: 2019-09-04 | Stop reason: HOSPADM

## 2019-09-02 RX ORDER — FLUMAZENIL 0.1 MG/ML
0.2 INJECTION, SOLUTION INTRAVENOUS
Status: DISCONTINUED | OUTPATIENT
Start: 2019-09-02 | End: 2019-09-02

## 2019-09-02 RX ORDER — ACETAMINOPHEN 325 MG/1
650 TABLET ORAL EVERY 8 HOURS
Status: DISCONTINUED | OUTPATIENT
Start: 2019-09-02 | End: 2019-09-04 | Stop reason: HOSPADM

## 2019-09-02 RX ORDER — TRAZODONE HYDROCHLORIDE 50 MG/1
50-100 TABLET, FILM COATED ORAL AT BEDTIME
Status: DISCONTINUED | OUTPATIENT
Start: 2019-09-02 | End: 2019-09-04 | Stop reason: HOSPADM

## 2019-09-02 RX ORDER — DEXTROSE MONOHYDRATE 25 G/50ML
25-50 INJECTION, SOLUTION INTRAVENOUS
Status: DISCONTINUED | OUTPATIENT
Start: 2019-09-02 | End: 2019-09-04 | Stop reason: HOSPADM

## 2019-09-02 RX ORDER — AMOXICILLIN 250 MG
2 CAPSULE ORAL 2 TIMES DAILY
Status: DISCONTINUED | OUTPATIENT
Start: 2019-09-02 | End: 2019-09-04 | Stop reason: HOSPADM

## 2019-09-02 RX ORDER — NALOXONE HYDROCHLORIDE 0.4 MG/ML
.1-.4 INJECTION, SOLUTION INTRAMUSCULAR; INTRAVENOUS; SUBCUTANEOUS
Status: DISCONTINUED | OUTPATIENT
Start: 2019-09-02 | End: 2019-09-04 | Stop reason: HOSPADM

## 2019-09-02 RX ADMIN — ATORVASTATIN CALCIUM 40 MG: 40 TABLET, FILM COATED ORAL at 21:38

## 2019-09-02 RX ADMIN — TRAZODONE HYDROCHLORIDE 100 MG: 50 TABLET ORAL at 21:38

## 2019-09-02 RX ADMIN — GUAIFENESIN 10 ML: 200 SOLUTION ORAL at 23:35

## 2019-09-02 RX ADMIN — BUPROPION HYDROCHLORIDE 150 MG: 150 TABLET, EXTENDED RELEASE ORAL at 21:38

## 2019-09-02 RX ADMIN — SENNOSIDES AND DOCUSATE SODIUM 1 TABLET: 8.6; 5 TABLET ORAL at 21:38

## 2019-09-02 RX ADMIN — SODIUM CHLORIDE: 9 INJECTION, SOLUTION INTRAVENOUS at 21:38

## 2019-09-02 RX ADMIN — ACETAMINOPHEN 650 MG: 325 TABLET ORAL at 21:38

## 2019-09-02 RX ADMIN — PANTOPRAZOLE SODIUM 8 MG/HR: 40 INJECTION, POWDER, FOR SOLUTION INTRAVENOUS at 21:38

## 2019-09-02 RX ADMIN — PANTOPRAZOLE SODIUM 80 MG: 40 INJECTION, POWDER, FOR SOLUTION INTRAVENOUS at 20:00

## 2019-09-02 RX ADMIN — IPRATROPIUM BROMIDE AND ALBUTEROL SULFATE 3 ML: .5; 3 SOLUTION RESPIRATORY (INHALATION) at 17:42

## 2019-09-02 ASSESSMENT — ENCOUNTER SYMPTOMS
COUGH: 1
VOMITING: 0
SHORTNESS OF BREATH: 1
FEVER: 1
BLOOD IN STOOL: 0

## 2019-09-02 ASSESSMENT — MIFFLIN-ST. JEOR: SCORE: 1209.47

## 2019-09-02 NOTE — ED PROVIDER NOTES
History     Chief Complaint:  Shortness of Breath    HPI   Rajani Guo is a 68 year old female, with a history of hypertension, hyperlipidemia, anemia, diabetes, and claudication of left lower extremity, who is a current some day smoker, who is currently on Eliquis, who presents to the ED for evaluation of shortness of breath. The patient states that she has been feeling increased shortness of breath that has been getting progressively worse. She states that it took her almost an hour to take a shower this morning and that she cannot walk to the end of the [place]ol[place] without becoming short of breath, which prompted her presentation to the ED. The patient also reports a sick contact, and that she herself was sick last week with cold symptoms of cough and fever which has been improving. The patient was here at [facility] ED on 8/1/2019 and was diagnosed with erosive duodenitis and anemia, for which she was given 2 units of blood after being admitted. The patient denies any vomiting or bloody stool but states she feels similar to how she did then.    Allergies:  Indomethacin, dizziness and disorientation  Tramadol, nausea and vomiting     Medications:    Fosamax  Eliquis  Lipitor  Zyban  Calcium 500/D  Neurontin  Levimir Flextouch  Prinivil  Melatonin  Metformin  Multi-Vit  Protonix  Desyrel    Past Medical History:    Osteoporosis  Hx of partial thyroidectomy  Hyperlipidemia  Hypertension  Insomnia  Tobacco use disorder  Type 2 diabetes  PVD  Anemia  Claudication of left lower extremity    Past Surgical History:    Angiogram through catheter follow up  Lower extremity angiogram left  Right knee, torn meniscus  Oophorectomy, unilateral  Release carpal tunnel  Release trigger finger bilateral  Rotator cuff repair  Partial thyroidectomy    Family History:    No past pertinent family history.    Social History:  Current some day smoker, 1.5 packs per day.   Positive for alcohol use.   Negative for drug use.  Marital Status:   "Single [1]     Review of Systems   Constitutional: Positive for fever.   Respiratory: Positive for cough and shortness of breath.    Gastrointestinal: Negative for blood in stool and vomiting.   All other systems reviewed and are negative.        Physical Exam     Patient Vitals for the past 24 hrs:   BP Temp Temp src Pulse Heart Rate Resp SpO2 Height Weight   09/02/19 2013 133/59 98  F (36.7  C) Oral 119 -- 16 94 % -- --   09/02/19 2000 138/73 -- -- 121 -- -- 94 % -- --   09/02/19 1945 (!) 145/71 -- -- 124 -- -- 96 % -- --   09/02/19 1930 (!) 142/77 -- -- 124 -- -- 97 % -- --   09/02/19 1915 (!) 140/71 -- -- 122 -- -- 95 % -- --   09/02/19 1913 -- 98.1  F (36.7  C) Oral -- -- -- -- -- --   09/02/19 1900 127/64 -- -- 120 -- -- -- -- --   09/02/19 1832 -- -- -- 89 -- -- -- -- --   09/02/19 1830 123/59 98.1  F (36.7  C) Oral -- -- -- 95 % -- --   09/02/19 1600 130/54 97.3  F (36.3  C) -- -- 99 18 96 % 1.626 m (5' 4\") 68.9 kg (152 lb)     Physical Exam  Eyes:               Sclera white; Pupils are equal and round; conjunctiva pale  ENT:                External ears and nares normal  CV:                  Rate as above with regular rhythm   Resp:               End expiratory wheeze L > R                          Non-labored, no retractions or accessory muscle use  GI:                   Abdomen is soft, non-tender, non-distended                          No rebound tenderness or peritoneal features                          Rectal: dark brown stool on LESLIE  MS:                  Moves all extremities  Skin:                Warm and dry  Neuro:             Speech is normal and fluent. No apparent deficit.    Emergency Department Course   ECG:  Indication: Anemia  Time: 1840  Vent. Rate 93 bpm. TX interval 148. QRS duration 80. QT/QTc 372/462. P-R-T axis 75 59 54.  Sinus rhythm with premature atrial complexes. Cannot rule out anterior infarct, age undetermined. Abnormal ECG. No significant change compared to EKG dated 8/1/2019. " Read time: 1859    Imaging:  Radiographic findings were communicated with the patient who voiced understanding of the findings.  XR Chest 2 views:   No acute abnormality. Lungs are well-inflated and clear.  Previously demonstrated opacities have resolved. Heart size is normal.  Left shoulder hardware noted. Lips are present along the expected  location of the thyroid gland as per radiology.    Laboratory:  CBC: WBC: 7.2, HGB: 7.5 (L), PLT: 368  BMP: AWNL (Creatinine: 0.56)  Nt probnp inpatient: 342  ABO/Rh type and screen: A negative    Occult blood stool: positive    Interventions:  1742 Duoneb solution 3 mL Nebulization   2000 Protonix 80 mg IV injection     Emergency Department Course:  Nursing notes and vitals reviewed. (1700) I performed an exam of the patient as documented above.     IV inserted. Medicine administered as documented above. Blood drawn. This was sent to the lab for further testing, results above.     The patient was sent for a chest x-ray while in the emergency department, findings above.     1901 I spoke with [facility] about the patient.      Findings and plan explained to the Patient who consents to admission.     1918: Discussed the patient with Dr. Emanuel, who will admit the patient to a medical bed for further monitoring, evaluation, and treatment.     Impression & Plan    Medical Decision Making:  Rajani Guo is a 68 year old female who presents for evaluation of shortness of breath and fatigue that feels similar to her past episode of anemia. She is also wheezing on exam with recent upper respiratory symptoms. X-ray was obtained with no evidence of pneumonia or pleural effusion. She was given a Duoneb for resolving bronchitis, but was not severe enough to require steroids. Furthermore these would likely be contraindicated in the setting of GI bleed while on blood thinners. Case was discussed with [facility], who will follow along, but agrees that there is no reason  for emergent scope at this time unless she becomes unstable. Blood transfusion was ordered and admission arranged.  Protonix given.    Diagnosis:    ICD-10-CM   1. Anemia due to blood loss, acute D62   2. Gastrointestinal hemorrhage, unspecified gastrointestinal hemorrhage type K92.2   3. Long term current use of anticoagulant therapy Z79.01       Disposition:  The patient was admitted.    Scribe Disclosure:  I, Karla Yates, am serving as a scribe on 9/2/2019 at 5:00 PM to personally document services performed by Dorie Campos MD based on my observations and the provider's statements to me.     Karla Yates  9/2/2019    EMERGENCY DEPARTMENT       Dorie Campos MD  09/02/19 0257

## 2019-09-02 NOTE — ED TRIAGE NOTES
Pt comes in for anemia. Was seen here at the beginning of August and received 2 units. Pt is having SOB. Feeling very weak

## 2019-09-02 NOTE — PHARMACY-ADMISSION MEDICATION HISTORY
Admission medication history interview status for the 9/2/2019  admission is complete. See EPIC admission navigator for prior to admission medications     Medication history source reliability:Moderate    Actions taken by pharmacist (provider contacted, etc):  Spoke w/ patient.  Reviewed recent fill history through [facility].       Additional medication history information not noted on PTA med list :None    Medication reconciliation/reorder completed by provider prior to medication history? No    Time spent in this activity: 20 minutes    Prior to Admission medications    Medication Sig Last Dose Taking? Auth Provider   acetaminophen (ACETAMINOPHEN 8 HOUR) 650 MG CR tablet Take 650 mg by mouth every 8 hours 9/1/2019 at pm Yes Unknown, Entered By History   alendronate (FOSAMAX) 70 MG tablet TAKE 1 TAB BY MOUTH EVERY 7 DAYS. 8/26/2019 Yes Tamiko Coley MD   atorvastatin (LIPITOR) 40 MG tablet Take 1 tablet (40 mg) by mouth daily 9/1/2019 at Unknown time Yes Tamiko Coley MD   buPROPion (ZYBAN) 150 MG 12 hr tablet Take 1 tablet (150 mg) by mouth 2 times daily Take once daily for first 4 days, then twice daily after that. 9/1/2019 at pm  Yes Sparkle Colon PA-C   calcium carb 1250 mg, 500 mg [place],/vitamin D 200 unit (CALCIUM 500/D) 500-200 MG-UNIT per tablet Take 1 tablet by mouth 2 times daily  9/1/2019 at Unknown time Yes Reported, Patient   ELIQUIS 5 MG tablet TAKE 1 TABLET (5 MG) BY MOUTH 2 TIMES DAILY 9/1/2019 at pm Yes Sparkle Colon PA-C   glucosamine-chondroitin 500-400 MG CAPS per capsule Take 1 capsule by mouth 2 times daily  9/1/2019 at Unknown time Yes Reported, Patient   insulin detemir (LEVEMIR FLEXTOUCH) 100 UNIT/ML pen INJECT 18-19 UNITS SUBCUTANEOUS AT BEDTIME 9/1/2019 at pm (19 units) Yes Amparo Dorman,    lisinopril (PRINIVIL/ZESTRIL) 2.5 MG tablet Take 1 tablet (2.5 mg) by mouth daily 9/1/2019 at Unknown time Yes Tamiko Coley MD   Melatonin 10 MG TABS tablet Take 10 mg by  mouth At Bedtime 9/1/2019 at Unknown time Yes Unknown, Entered By History   metFORMIN (GLUCOPHAGE) 1000 MG tablet Take 1 tablet (1,000 mg) by mouth 2 times daily (with meals) 9/1/2019 at Unknown time Yes Tamiko Coley MD   Multiple Vitamin (MULTI-VITAMINS) TABS Take 1 tablet by mouth daily  9/1/2019 at Unknown time Yes Reported, Patient   pantoprazole (PROTONIX) 40 MG EC tablet Take 1 tablet (40 mg) by mouth 2 times daily 9/1/2019 at Unknown time Yes Amparo Dorman DO   traZODone (DESYREL) 50 MG tablet Take  mg by mouth At Bedtime 9/1/2019 at hs Yes Unknown, Entered By History     Anita Ni, PharmD, BCPS

## 2019-09-03 PROBLEM — I70.90 ARTERIAL OCCLUSION: Status: ACTIVE | Noted: 2019-09-03

## 2019-09-03 PROBLEM — D62 ACUTE BLOOD LOSS ANEMIA: Status: ACTIVE | Noted: 2019-09-03

## 2019-09-03 LAB
ALBUMIN UR-MCNC: NEGATIVE MG/DL
ANION GAP SERPL CALCULATED.3IONS-SCNC: 2 MMOL/L (ref 3–14)
APPEARANCE UR: ABNORMAL
BACTERIA #/AREA URNS HPF: ABNORMAL /HPF
BILIRUB UR QL STRIP: NEGATIVE
BUN SERPL-MCNC: 5 MG/DL (ref 7–30)
CALCIUM SERPL-MCNC: 8.3 MG/DL (ref 8.5–10.1)
CHLORIDE SERPL-SCNC: 110 MMOL/L (ref 94–109)
CO2 SERPL-SCNC: 28 MMOL/L (ref 20–32)
COLOR UR AUTO: YELLOW
CREAT SERPL-MCNC: 0.54 MG/DL (ref 0.52–1.04)
GFR SERPL CREATININE-BSD FRML MDRD: >90 ML/MIN/{1.73_M2}
GLUCOSE BLDC GLUCOMTR-MCNC: 104 MG/DL (ref 70–99)
GLUCOSE BLDC GLUCOMTR-MCNC: 115 MG/DL (ref 70–99)
GLUCOSE BLDC GLUCOMTR-MCNC: 122 MG/DL (ref 70–99)
GLUCOSE BLDC GLUCOMTR-MCNC: 128 MG/DL (ref 70–99)
GLUCOSE BLDC GLUCOMTR-MCNC: 151 MG/DL (ref 70–99)
GLUCOSE BLDC GLUCOMTR-MCNC: 168 MG/DL (ref 70–99)
GLUCOSE SERPL-MCNC: 125 MG/DL (ref 70–99)
GLUCOSE UR STRIP-MCNC: NEGATIVE MG/DL
HGB UR QL STRIP: NEGATIVE
KETONES UR STRIP-MCNC: NEGATIVE MG/DL
LEUKOCYTE ESTERASE UR QL STRIP: NEGATIVE
MUCOUS THREADS #/AREA URNS LPF: PRESENT /LPF
NITRATE UR QL: NEGATIVE
PH UR STRIP: 6 PH (ref 5–7)
POTASSIUM SERPL-SCNC: 3.6 MMOL/L (ref 3.4–5.3)
RBC #/AREA URNS AUTO: 1 /HPF (ref 0–2)
SODIUM SERPL-SCNC: 140 MMOL/L (ref 133–144)
SOURCE: ABNORMAL
SP GR UR STRIP: 1.01 (ref 1–1.03)
SQUAMOUS #/AREA URNS AUTO: 1 /HPF (ref 0–1)
UROBILINOGEN UR STRIP-MCNC: NORMAL MG/DL (ref 0–2)
WBC #/AREA URNS AUTO: 2 /HPF (ref 0–5)

## 2019-09-03 PROCEDURE — 80048 BASIC METABOLIC PNL TOTAL CA: CPT | Performed by: HOSPITALIST

## 2019-09-03 PROCEDURE — 81001 URINALYSIS AUTO W/SCOPE: CPT | Performed by: INTERNAL MEDICINE

## 2019-09-03 PROCEDURE — 25800030 ZZH RX IP 258 OP 636: Performed by: HOSPITALIST

## 2019-09-03 PROCEDURE — C9113 INJ PANTOPRAZOLE SODIUM, VIA: HCPCS | Performed by: HOSPITALIST

## 2019-09-03 PROCEDURE — 25000132 ZZH RX MED GY IP 250 OP 250 PS 637: Performed by: INTERNAL MEDICINE

## 2019-09-03 PROCEDURE — 25000132 ZZH RX MED GY IP 250 OP 250 PS 637: Performed by: HOSPITALIST

## 2019-09-03 PROCEDURE — 36415 COLL VENOUS BLD VENIPUNCTURE: CPT | Performed by: HOSPITALIST

## 2019-09-03 PROCEDURE — 12000000 ZZH R&B MED SURG/OB

## 2019-09-03 PROCEDURE — 99232 SBSQ HOSP IP/OBS MODERATE 35: CPT | Performed by: INTERNAL MEDICINE

## 2019-09-03 PROCEDURE — 25000132 ZZH RX MED GY IP 250 OP 250 PS 637: Performed by: NURSE PRACTITIONER

## 2019-09-03 PROCEDURE — 25000128 H RX IP 250 OP 636: Performed by: HOSPITALIST

## 2019-09-03 PROCEDURE — 00000146 ZZHCL STATISTIC GLUCOSE BY METER IP

## 2019-09-03 RX ORDER — MAGNESIUM CARB/ALUMINUM HYDROX 105-160MG
296 TABLET,CHEWABLE ORAL ONCE
Status: COMPLETED | OUTPATIENT
Start: 2019-09-04 | End: 2019-09-04

## 2019-09-03 RX ORDER — POLYETHYLENE GLYCOL 3350 17 G/17G
238 POWDER, FOR SOLUTION ORAL ONCE
Status: COMPLETED | OUTPATIENT
Start: 2019-09-03 | End: 2019-09-03

## 2019-09-03 RX ORDER — LIDOCAINE 40 MG/G
CREAM TOPICAL
Status: DISCONTINUED | OUTPATIENT
Start: 2019-09-03 | End: 2019-09-04 | Stop reason: HOSPADM

## 2019-09-03 RX ORDER — NICOTINE 21 MG/24HR
1 PATCH, TRANSDERMAL 24 HOURS TRANSDERMAL DAILY
Status: DISCONTINUED | OUTPATIENT
Start: 2019-09-03 | End: 2019-09-04 | Stop reason: HOSPADM

## 2019-09-03 RX ADMIN — SENNOSIDES AND DOCUSATE SODIUM 1 TABLET: 8.6; 5 TABLET ORAL at 08:32

## 2019-09-03 RX ADMIN — PANTOPRAZOLE SODIUM 8 MG/HR: 40 INJECTION, POWDER, FOR SOLUTION INTRAVENOUS at 16:04

## 2019-09-03 RX ADMIN — SODIUM CHLORIDE: 9 INJECTION, SOLUTION INTRAVENOUS at 16:02

## 2019-09-03 RX ADMIN — GUAIFENESIN 10 ML: 200 SOLUTION ORAL at 12:30

## 2019-09-03 RX ADMIN — ACETAMINOPHEN 650 MG: 325 TABLET ORAL at 12:26

## 2019-09-03 RX ADMIN — ACETAMINOPHEN 650 MG: 325 TABLET ORAL at 05:59

## 2019-09-03 RX ADMIN — TRAZODONE HYDROCHLORIDE 100 MG: 50 TABLET ORAL at 21:09

## 2019-09-03 RX ADMIN — GUAIFENESIN 10 ML: 200 SOLUTION ORAL at 21:09

## 2019-09-03 RX ADMIN — BUPROPION HYDROCHLORIDE 150 MG: 150 TABLET, EXTENDED RELEASE ORAL at 21:09

## 2019-09-03 RX ADMIN — ACETAMINOPHEN 650 MG: 325 TABLET ORAL at 21:09

## 2019-09-03 RX ADMIN — GUAIFENESIN 10 ML: 200 SOLUTION ORAL at 03:35

## 2019-09-03 RX ADMIN — NICOTINE 1 PATCH: 21 PATCH, EXTENDED RELEASE TRANSDERMAL at 09:09

## 2019-09-03 RX ADMIN — POLYETHYLENE GLYCOL 3350 238 G: 17 POWDER, FOR SOLUTION ORAL at 15:47

## 2019-09-03 RX ADMIN — ATORVASTATIN CALCIUM 40 MG: 40 TABLET, FILM COATED ORAL at 08:32

## 2019-09-03 RX ADMIN — PANTOPRAZOLE SODIUM 8 MG/HR: 40 INJECTION, POWDER, FOR SOLUTION INTRAVENOUS at 08:29

## 2019-09-03 RX ADMIN — BUPROPION HYDROCHLORIDE 150 MG: 150 TABLET, EXTENDED RELEASE ORAL at 08:32

## 2019-09-03 ASSESSMENT — ACTIVITIES OF DAILY LIVING (ADL)
ADLS_ACUITY_SCORE: 15

## 2019-09-03 NOTE — DISCHARGE INSTRUCTIONS
9/2/19- All belongings kept with patient including clothing, shoes, cell phone, and cell phone . Daughter brought her purse home.

## 2019-09-03 NOTE — PROGRESS NOTES
RECEIVING UNIT ED HANDOFF REVIEW    ED Nurse Handoff Report was reviewed by: ZORAN LAGUERRE RN on September 2, 2019 at 7:43 PM

## 2019-09-03 NOTE — H&P
[facility]    History and Physical  Hospitalist       Date of Admission:  9/2/2019    Assessment & Plan      68 years old male with past Joon history of peptic ulcer disease with erosive duodenitis, history of lower limb ischemia on Eliquis and history of diabetes and hyperlipidemia presented with symptomatic anemia with shortness of breath hemoglobin at admission 7.5.    1.  Acute blood loss anemia likely secondary to GI bleed:    Pt  was recently admitted with acute blood loss anemia had EGD on 8/2/2019 show duodenitis with a small erosion and had a clips at that time    Resented with symptomatic anemia hemoglobin at admission 7.5 by the time he was discharged a few weeks ago was 8.1    We will start patient on Protonix drip    CBC every 8 hours    Consider transfusion for hemoglobin less than 7    Avoid aspirin or anticoagulation    Admission patient plan to have colonoscopy as outpatient    Appreciate GI recommendation was consulted    N.p.o.      2.History of left lower extremity acute arterial occlusion, status post lytic therapy in 03/2019  As above Eliquis on hold  Continue with statin     3. Insulin-dependent type 2 diabetes  Hold home doses of insulin and metformin n.p.o. sliding scale insulin  Hold lisinopril     4. Tobacco dependence  Continues to smoke.  Encouraged cessation.          DVT Prophylaxis: VTE Prophylaxis contraindicated due to bleed SCD   Code Status: Full Code      Rob Emanuel MD    Primary Care Physician   Tamiko Coley    Chief Complaint   SOB     History is obtained from the patient    History of Present Illness   68 years old male with past Joon history of peptic ulcer disease with erosive duodenitis, history of lower limb ischemia on Eliquis and history of diabetes and hyperlipidemia presented with symptomatic anemia with shortness of breath hemoglobin at admission 7.5. Pt admitted for Acute blood loss anemia likely secondary to GI bleed:  Pt  was recently  admitted with acute blood loss anemia had EGD on 8/2/2019 show duodenitis with a small erosion and had a clips at that time.  The patient states that she has been feeling increased shortness of breath that has been getting progressively worse. She states that it took her almost an hour to take a shower this morning and that she cannot walk to the end of the [place] without becoming short of breath, which prompted her presentation to the ED. The patient also reports a sick contact, and that she herself was sick last week with cold symptoms of cough and fever. The patient was here at [facility] ED on 8/1/2019 and was diagnosed with erosive duodenitis and anemia, for which she was given 2 units of blood after being admitted. The patient denies any vomiting or bloody stool.    Past Medical History    Past Medical History:   Diagnosis Date     Age-related osteoporosis without current pathological fracture 1/18/2018     History of partial thyroidectomy 6/2/2018     Hyperlipidemia LDL goal <100 1/18/2018     Hypertension      Insomnia, unspecified type 1/18/2018     Tobacco use disorder      Type 2 diabetes mellitus without complication, with long-term current use of insulin (H) 1/18/2018       Past Surgical History   Past Surgical History     Prior to Admission Medications   Prior to Admission Medications   Prescriptions Last Dose Informant Patient Reported? Taking?   ELIQUIS 5 MG tablet 9/1/2019 at pm Self No Yes   Sig: TAKE 1 TABLET (5 MG) BY MOUTH 2 TIMES DAILY   Melatonin 10 MG TABS tablet 9/1/2019 at Unknown time Self Yes Yes   Sig: Take 10 mg by mouth At Bedtime   Multiple Vitamin (MULTI-VITAMINS) TABS 9/1/2019 at Unknown time Self Yes Yes   Sig: Take 1 tablet by mouth daily    acetaminophen (ACETAMINOPHEN 8 HOUR) 650 MG CR tablet 9/1/2019 at pm Self Yes Yes   Sig: Take 650 mg by mouth every 8 hours   alendronate (FOSAMAX) 70 MG tablet 8/26/2019 Self No Yes   Sig: TAKE 1 TAB BY MOUTH EVERY 7 DAYS.   atorvastatin (LIPITOR) 40  MG tablet 9/1/2019 at Unknown time Self No Yes   Sig: Take 1 tablet (40 mg) by mouth daily   buPROPion (ZYBAN) 150 MG 12 hr tablet 9/1/2019 at pm  Self No Yes   Sig: Take 1 tablet (150 mg) by mouth 2 times daily Take once daily for first 4 days, then twice daily after that.   calcium carb 1250 mg, 500 mg [place],/vitamin D 200 unit (CALCIUM 500/D) 500-200 MG-UNIT per tablet 9/1/2019 at Unknown time Self Yes Yes   Sig: Take 1 tablet by mouth 2 times daily    glucosamine-chondroitin 500-400 MG CAPS per capsule 9/1/2019 at Unknown time Self Yes Yes   Sig: Take 1 capsule by mouth 2 times daily    insulin detemir (LEVEMIR FLEXTOUCH) 100 UNIT/ML pen 9/1/2019 at pm (19 units) Self No Yes   Sig: INJECT 18-19 UNITS SUBCUTANEOUS AT BEDTIME   lisinopril (PRINIVIL/ZESTRIL) 2.5 MG tablet 9/1/2019 at Unknown time Self No Yes   Sig: Take 1 tablet (2.5 mg) by mouth daily   metFORMIN (GLUCOPHAGE) 1000 MG tablet 9/1/2019 at Unknown time Self No Yes   Sig: Take 1 tablet (1,000 mg) by mouth 2 times daily (with meals)   pantoprazole (PROTONIX) 40 MG EC tablet 9/1/2019 at Unknown time Self No Yes   Sig: Take 1 tablet (40 mg) by mouth 2 times daily   traZODone (DESYREL) 50 MG tablet 9/1/2019 at hs Self Yes Yes   Sig: Take  mg by mouth At Bedtime      Facility-Administered Medications: None     Allergies   Allergies   Allergen Reactions     Indomethacin Other (See Comments)     Dizziness and disorientation     Tramadol Nausea and Vomiting       Social History   Rajani Guo  reports that she has been smoking.  She has a 78.00 pack-year smoking history. She has never used smokeless tobacco. She reports that she drinks alcohol. She reports that she does not use drugs.    Family History   Rajani Guo family history is not on file.    Review of Systems   The 10 point Review of Systems is negative other than noted in the HPI or here.     Physical Exam   Temp: 98.1  F (36.7  C) Temp src: Oral BP: 123/59 Pulse: 89 Heart Rate: 99 Resp: 18  SpO2: 95 % O2 Device: None (Room air)    Vital Signs with Ranges  Temp:  [97.3  F (36.3  C)-98.1  F (36.7  C)] 98.1  F (36.7  C)  Pulse:  [89] 89  Heart Rate:  [99] 99  Resp:  [18] 18  BP: (123-130)/(54-59) 123/59  SpO2:  [95 %-96 %] 95 %  152 lbs 0 oz    Constitutional: Awake, alert, cooperative, no apparent distress.  Eyes: Conjunctiva and pupils examined and normal.pale   HEENT: Moist mucous membranes, normal dentition.  Respiratory: Clear to auscultation bilaterally, no crackles or wheezing.  Cardiovascular: ,normal S1 and S2, and + murmur noted.  GI: Soft, non-distended, non-tender, normal bowel sounds.  Skin: no edema.  Neurologic: AXOX3       Data   Data reviewed today:  I personally reviewed no images or EKG's today.  Recent Labs   Lab 09/02/19  1730   WBC 7.2   HGB 7.5*   MCV 78         POTASSIUM 3.5   CHLORIDE 108   CO2 26   BUN 8   CR 0.56   ANIONGAP 6   KAYLEEN 8.5   GLC 99       Imaging:  Recent Results (from the past 24 hour(s))   XR Chest 2 Views    Narrative    CHEST TWO VIEW   9/2/2019 5:22 PM     HISTORY: Wheeze, cough, short of breath.    COMPARISON: 8/1/2019      Impression    IMPRESSION: No acute abnormality. Lungs are well-inflated and clear.  Previously demonstrated opacities have resolved. Heart size is normal.  Left shoulder hardware noted. Lips are present along the expected  location of the thyroid gland.    SAM CAMARGO MD

## 2019-09-03 NOTE — PROGRESS NOTES
DATE & TIME: 9/3/19 1300   Cognitive Concerns/ Orientation : A&O  BEHAVIOR & AGGRESSION TOOL COLOR: green  CIWA SCORE: NA   ABNL VS/O2: VSS, on RA  MOBILITY: Up with SBA  PAIN MANAGMENT: Denies pain  DIET: clear liquid  BOWEL/BLADDER: Up to commode  ABNL LAB/BG: Hb 8.2  BGM's ok  DRAIN/DEVICES: Protonix gtt and NS at 100cc/hr  TELEMETRY RHYTHM: NA  SKIN: No issue  TESTS/PROCEDURES: Will be prepping this afternoon for a colonscopy.  D/C DAY/GOALS/PLACE: unknown yet til test results are back.  OTHER IMPORTANT INFO: Urine cloudy and strong odor, UA sent this AM. Crackles left base, Exp wheezes anteriorly. Freq cough, nonproductive, Robitussin helps.

## 2019-09-03 NOTE — PLAN OF CARE
.Admission    Patient arrives to room 637-1 via cart from ED.  Care plan note:     Inpatient nursing criteria listed below were met:    PCD's Documented: Yes  Skin issues/needs documented :NA  Isolation education started/completed NA  Patient allergies verified with patient: Yes  Verified completion of [place] Risk Assessment Tool:  Yes  Verified completion of Guardianship screening tool: Yes  Fall Prevention: Care plan updated, Education given and documented Yes  Care Plan initiated: Yes  Home medications documented in belongings flowsheet: NA  Patient belongings documented in belongings flowsheet: Yes  Reminder note (belongings/ medications) placed in discharge instructions:Yes  Admission profile/ required documentation complete: Yes  Bedside Report Letter given and explained to patient No     Cognitive Concerns/ Orientation : A/O x 4  BEHAVIOR & AGGRESSION TOOL COLOR: Green   CIWA SCORE: N/A  ABNL VS/O2: VSS on RA   MOBILITY: SBA to BSC  PAIN MANAGMENT: Pt denies pain and N/V. Dyspneic on exertion.   DIET: NPO  BOWEL/BLADDER: Continent of B/B  ABNL LAB/BG: /104/128, Hgb 8.2 recheck this AM.   DRAIN/DEVICES: PIV infusing. Protonix 8mg/hr.  ml/hr  TELEMETRY RHYTHM: N/A  SKIN: Intact   TESTS/PROCEDURES: GI consulted, possible procedure today.   D/C DAY/GOALS/PLACE: Arrived today from ED.   OTHER IMPORTANT INFO: PRN Robitussin x 2 given for frequent productive cough. Abdomen soft/nontender. Was in the hospital beginning of August for GI bleed. Will continue to monitor.

## 2019-09-03 NOTE — CONSULTS
GASTROENTEROLOGY CONSULTATION     Rajani Guo   [address] [facility] [address]   68 year old female   Admission Date/Time: 9/2/2019   Encounter Date: 9/3/2019  Primary Care Provider: Tamiko Coley     We were asked to see the patient in consultation by Dr. Emanuel for evaluation of gastrointestinal bleeding.     HISTORY OF PRESENT ILLNES: Rajani Guo is a 68 year old female with  lower limb ischemia on Eliquis, type 2 diabetes, hyperlipidemia, and recently diagnosed erosive duodenitis, who presented to [facility] 9/2/19 with shortness of breath and weakness.  The patient presented to the hospital last month with shortness of breath and was found to be anemic with a hemoglobin of 6.3 on ASA 81mg and Eliquis (March 2019 hemoglobin 12.1). EGD 8/2/2019 showed multiple localized erosions with stigmata of recent bleeding found in the duodenal bulb. They appeared to be healing. There was one deeper erosion with oozing blood, treated with one hemostatic clip. She was discharged 8/3 with hemoglobin of 8.1. It was recommended she continue omeprazole 40mg BID for one month, daily thereafter, and arrange an outpatient colonoscopy. Eliquis was held for one week. ASA was stopped.  In March 2019 she had an arterial clot in her lower left leg that required lysis. She was placed Aspirin 81mg and Eliquis at that time.   Since discharge, she has been very weak. She continued to have shortness of breath, which has gradually worsened over the month. She began ill with a dry cough and fever 8/29. She denies abdominal pain, nausea, vomiting, dysphagia, or a change in her bowel habits. She has one bowel movement a day. No melena or hematochezia.   Laboratory work on admission showed a hemoglobin of 7.5, MCV 78, WBC 7.2, platelets 368k. BMP normal with creatinine 0.56, BUN 8. Occult blood test positive. She was given one unit of blood on admission, hemoglobin 8.2.   She continues to smoke 1 pack of cigarettes per day. Minimal alcohol  use.   Last colonoscopy was at age 51.   Past Medical History  Past Medical History:   Diagnosis Date     Age-related osteoporosis without current pathological fracture 1/18/2018     History of partial thyroidectomy 6/2/2018     Hyperlipidemia LDL goal <100 1/18/2018     Hypertension      Insomnia, unspecified type 1/18/2018     Tobacco use disorder      Type 2 diabetes mellitus without complication, with long-term current use of insulin (H) 1/18/2018       Past Surgical History  Past Surgical History:   Procedure Laterality Date     IR ANGIOGRAM THROUGH CATHETER FOLLOW UP  3/5/2019     IR LOWER EXTREMITY ANGIOGRAM LEFT  3/4/2019     KNEE SURGERY Right 2013    right knee torn meniscus surgery     OVARY SURGERY  1971    1 ovary removed     RELEASE CARPAL TUNNEL Right 1988     RELEASE TRIGGER FINGER BILATERAL       SHOULDER SURGERY Left     rotator cuff repair, plate placement     THYROID SURGERY  1988    partial thyroidectomy       Family History  Family History   Problem Relation Age of Onset     Glaucoma No family hx of      Macular Degeneration No family hx of        Social History  Social History     Socioeconomic History     Marital status: Single     Spouse name: Not on file     Number of children: Not on file     Years of education: Not on file     Highest education level: Not on file   Occupational History     Not on file   Social Needs     Financial resource strain: Not on file     Food insecurity:     Worry: Not on file     Inability: Not on file     Transportation needs:     Medical: Not on file     Non-medical: Not on file   Tobacco Use     Smoking status: Current Some Day Smoker     Packs/day: 1.50     Years: 52.00     Pack years: 78.00     Smokeless tobacco: Never Used   Substance and Sexual Activity     Alcohol use: Yes     Drug use: No     Sexual activity: Never   Lifestyle     Physical activity:     Days per week: Not on file     Minutes per session: Not on file     Stress: Not on file    Relationships     Social connections:     Talks on phone: Not on file     Gets together: Not on file     Attends Congregation service: Not on file     Active member of club or organization: Not on file     Attends meetings of clubs or organizations: Not on file     Relationship status: Not on file     Intimate partner violence:     Fear of current or ex partner: Not on file     Emotionally abused: Not on file     Physically abused: Not on file     Forced sexual activity: Not on file   Other Topics Concern     Parent/sibling w/ CABG, MI or angioplasty before 65F 55M? Not Asked   Social History Narrative     Not on file       Medications  Prior to Admission medications    Medication Sig Start Date End Date Taking? Authorizing Provider   acetaminophen (ACETAMINOPHEN 8 HOUR) 650 MG CR tablet Take 650 mg by mouth every 8 hours   Yes Unknown, Entered By History   alendronate (FOSAMAX) 70 MG tablet TAKE 1 TAB BY MOUTH EVERY 7 DAYS. 8/16/19  Yes Tamiko Coley MD   atorvastatin (LIPITOR) 40 MG tablet Take 1 tablet (40 mg) by mouth daily 3/13/19  Yes Tamiko Coley MD   buPROPion (ZYBAN) 150 MG 12 hr tablet Take 1 tablet (150 mg) by mouth 2 times daily Take once daily for first 4 days, then twice daily after that. 4/3/19  Yes Sparkle Colon PA-C   calcium carb 1250 mg, 500 mg [place],/vitamin D 200 unit (CALCIUM 500/D) 500-200 MG-UNIT per tablet Take 1 tablet by mouth 2 times daily  1/4/12  Yes Reported, Patient   ELIQUIS 5 MG tablet TAKE 1 TABLET (5 MG) BY MOUTH 2 TIMES DAILY 8/26/19  Yes Sparkle Colon PA-C   glucosamine-chondroitin 500-400 MG CAPS per capsule Take 1 capsule by mouth 2 times daily  1/4/12  Yes Reported, Patient   insulin detemir (LEVEMIR FLEXTOUCH) 100 UNIT/ML pen INJECT 18-19 UNITS SUBCUTANEOUS AT BEDTIME 8/3/19  Yes Amparo Dorman,    lisinopril (PRINIVIL/ZESTRIL) 2.5 MG tablet Take 1 tablet (2.5 mg) by mouth daily 3/13/19  Yes Tamiko Coley MD   Melatonin 10 MG TABS tablet Take 10 mg by  "mouth At Bedtime   Yes Unknown, Entered By History   metFORMIN (GLUCOPHAGE) 1000 MG tablet Take 1 tablet (1,000 mg) by mouth 2 times daily (with meals) 4/2/19  Yes Tamiko Coley MD   Multiple Vitamin (MULTI-VITAMINS) TABS Take 1 tablet by mouth daily  1/4/12  Yes Reported, Patient   pantoprazole (PROTONIX) 40 MG EC tablet Take 1 tablet (40 mg) by mouth 2 times daily 8/3/19  Yes Amparo Dorman DO   traZODone (DESYREL) 50 MG tablet Take  mg by mouth At Bedtime   Yes Unknown, Entered By History       Allergies:  Indomethacin and Tramadol    ROS: A ten point review of systems was obtained and negative other than the symptoms noted above in the HPI.     PHYSICAL EXAM:   /74 (BP Location: Left arm)   Pulse 106   Temp 97  F (36.1  C) (Oral)   Resp 16   Ht 1.626 m (5' 4\")   Wt 68.9 kg (152 lb)   SpO2 95%   BMI 26.09 kg/m     Constitutional: alert, no acute distress  Cardiovascular: regular rate and rhythm, +murmur, no edema   Respiratory: inspiratory and expiratory wheezes throughout   Psychiatric: normal pleasant affect  Head: atraumatic, normocephalic  Neck: supple, no thyromegaly  ENT: mucous membranes are moist, no oral lesions are noted, missing teeth   Abdomen: soft, non-tender, non-distended, normally active bowel sounds  Neuro: Neurologically intact grossly  Skin: warm, dry, no rashes are noted    LABORATORY DATA  I reviewed the patient's new clinical lab test results.   Recent Labs   Lab Test 09/02/19  2248 09/02/19  1730 08/03/19  0856  03/04/19  0930   WBC 7.4 7.2 9.5   < > 12.3*   HGB 8.2* 7.5* 8.1*   < > 13.3   MCV 79 78 85   < > 90    368 345   < > 304   INR  --   --   --   --  1.00    < > = values in this interval not displayed.      Recent Labs   Lab Test 09/03/19  0735 09/02/19  1730 08/03/19  0856    140 138   POTASSIUM 3.6 3.5 4.0   CHLORIDE 110* 108 109   CO2 28 26 24   BUN 5* 8 6*   CR 0.54 0.56 0.57   ANIONGAP 2* 6 5   KAYLEEN 8.3* 8.5 8.0*      Recent Labs   Lab " Test 07/30/19  1440 03/13/19  1226 01/18/18  0835   ALBUMIN  --   --  3.9   BILITOTAL  --   --  0.8   ALT  --   --  16   AST  --   --  17   ALKPHOS  --   --  70   PROTEIN Negative Negative  --    EGD 8/2/2019   Findings:        The Z-line was regular and was found 37 cm from the incisors.        The examined esophagus was normal.        The entire examined stomach was normal.        Multiple localized erosions with stigmata of recent bleeding were found        in the duodenal bulb. They appeared to be healing, there was one deeper        erosion oozing blood, to stop active bleeding, one hemostatic clip was        successfully placed. There was no bleeding at the end of the procedure.                                                                                     Impression:               - Likely erosive duodednitis causing the anemia.                             This may have been caused by the aspirin, likely                             exacerbated by Eliquis.   Recommendation:           Continue PPI drip today given bleeding seen during                             EGD today. Full liquids today.                             Tomorrow can advance diet.                             Can consider restarting ASA in about 1-2 days,                             chewable preferred. Restart Eliquis in 1-2 days if                             ok with vascular surgery     IMAGING  CTA Chest/Abdomen/Pelvis  ABDOMEN: Evaluation of the solid organ parenchyma is limited secondary  to contrast bolus timing. The spleen, kidneys, adrenal glands, liver,  gallbladder and pancreas show no focal abnormality. No intrahepatic or  extrahepatic biliary dilatation. No intraperitoneal free air or free  fluid.     Small and large bowel are normal in caliber without evidence of  obstruction. Punctate focus of air in the anterior bladder likely from  recent instrumentation. No abdominal or pelvic lymphadenopathy.       IMPRESSION:  1. Thoracic  aorta is patent without evidence of aneurysm, dissection  or stenosis. Atherosclerotic disease is seen throughout the thoracic  aorta.  2. Patent abdominal aorta without evidence of aneurysm, stenosis or  dissection. Atherosclerotic disease is seen throughout the abdominal  aorta.  3. Patent bilateral common iliac, external iliac and internal iliac  arteries. Bilateral common femoral arteries are patent.  4. Focal area of moderate stenosis of the proximal left superficial  femoral artery.  5. Scattered bilateral pulmonary nodules are unchanged.    IMPRESSION: 68 year old with a history of diabetes, embolic acute limb ischemia, and recently diagnosed erosive duodenitis last month, who presents with shortness of breath, significant weakness, and fatigue. Hemoglobin fairly stable since discharge one month ago. Occult blood positive. She was given one unit of blood and hemoglobin jim from 7.5-->8.2. Given she is also overdue for a screening colonoscopy, we will pursue a repeat EGD to ensure healing of previously diagnosed duodenitis and colonoscopy to evaluate the colon tomorrow.     PLAN/RECOMMENDATIONS:   -Clear liquids today. NPO at midnight   -Miralax/Gatorade prep today  -Continue Protonix gtt  -EGD/Colonoscopy   -Monitor hemoglobin and transfuse as needed    I will discuss the patient's findings and plan with Dr. Todd who will also independently see and examine the patient.     Thank you for involving me in this patient's care, please do not hesitate to call with questions or concerns.     Marci Del Valle, CNP  [facility]   Cell: [phone] until 1PM   After 1PM, please call Dr. Todd. After 5PM: [phone]

## 2019-09-03 NOTE — ED TRIAGE NOTES
"[facility]  ED Nurse Handoff Report    ED Chief complaint: Anemia      ED Diagnosis:   Final diagnoses:   Anemia due to blood loss, acute   Gastrointestinal hemorrhage, unspecified gastrointestinal hemorrhage type   Long term current use of anticoagulant therapy       Code Status: Full Code    Allergies:   Allergies   Allergen Reactions     Indomethacin Other (See Comments)     Dizziness and disorientation     Tramadol Nausea and Vomiting       Activity level - Baseline/Home:  Independent  Activity Level - Current:   Independent    Patient's Preferred language: eng   Needed?: No    Isolation: Yes  Infection: Not Applicable  Bariatric?: No    Vital Signs:   Vitals:    09/02/19 1600 09/02/19 1830 09/02/19 1832   BP: 130/54 123/59    Pulse:   89   Resp: 18     Temp: 97.3  F (36.3  C) 98.1  F (36.7  C)    TempSrc:  Oral    SpO2: 96% 95%    Weight: 68.9 kg (152 lb)     Height: 1.626 m (5' 4\")         Cardiac Rhythm: ,        Pain level:      Is this patient confused?: No   Does this patient have a guardian?  No         If yes, is there guardianship documents in the Epic \"Code/ACP\" activity?  N/A         Guardian Notified?  N/A  [place] - Suicide Severity Rating Scale Completed?  Yes  If yes, what color did the patient score?  White    Patient Report: Initial Complaint: feeling weak and sob, has been anemic in the past .   Focused Assessment: alert and calm vitals stable   Tests Performed: labs, chest xray .   Abnormal Results:   Abnormal Labs Reviewed   CBC WITH PLATELETS DIFFERENTIAL - Abnormal; Notable for the following components:       Result Value    RBC Count 3.16 (*)     Hemoglobin 7.5 (*)     Hematocrit 24.5 (*)     MCH 23.7 (*)     MCHC 30.6 (*)     RDW 17.7 (*)     All other components within normal limits   OCCULT BLOOD STOOL - Abnormal; Notable for the following components:    Occult Blood Positive (*)     All other components within normal limits     Treatments provided: one unit " of blood one neb treatment     Family Comments: dtr present and updated     OBS brochure/video discussed/provided to patient/family: N/A              Name of person given brochure if not patient: na              Relationship to patient: na    ED Medications:   Medications   pantoprazole (PROTONIX) 40 mg IV push injection (has no administration in time range)   ipratropium - albuterol 0.5 mg/2.5 mg/3 mL (DUONEB) neb solution 3 mL (3 mLs Nebulization Given 9/2/19 1742)       Drips infusing?:  Yes    For the majority of the shift this patient was Green.   Interventions performed were white .    Severe Sepsis OR Septic Shock Diagnosis Present: No    To be done/followed up on inpatient unit:  billyior finish  Blood if it was not done completely in ed     ED NURSE PHONE NUMBER: [phone]

## 2019-09-03 NOTE — PROGRESS NOTES
MD Notification    Notified Person: MD    Notified Person Name:Dr. Malone     Notification Date/Time: 9/2/19 @ 2315    Notification Interaction: On-call system     Purpose of Notification: Patient requesting PRN cough medicine for productive cough.     Orders Received: PRN Robitussin    Comments:

## 2019-09-04 ENCOUNTER — ANESTHESIA EVENT (OUTPATIENT)
Dept: GASTROENTEROLOGY | Facility: CLINIC | Age: 69
DRG: 378 | End: 2019-09-04
Payer: COMMERCIAL

## 2019-09-04 ENCOUNTER — ANESTHESIA (OUTPATIENT)
Dept: GASTROENTEROLOGY | Facility: CLINIC | Age: 69
DRG: 378 | End: 2019-09-04
Payer: COMMERCIAL

## 2019-09-04 VITALS
OXYGEN SATURATION: 95 % | BODY MASS INDEX: 24.34 KG/M2 | RESPIRATION RATE: 16 BRPM | SYSTOLIC BLOOD PRESSURE: 129 MMHG | DIASTOLIC BLOOD PRESSURE: 67 MMHG | TEMPERATURE: 98 F | HEIGHT: 64 IN | WEIGHT: 142.6 LBS | HEART RATE: 84 BPM

## 2019-09-04 LAB
ANION GAP SERPL CALCULATED.3IONS-SCNC: 7 MMOL/L (ref 3–14)
BUN SERPL-MCNC: 3 MG/DL (ref 7–30)
CALCIUM SERPL-MCNC: 8.1 MG/DL (ref 8.5–10.1)
CHLORIDE SERPL-SCNC: 111 MMOL/L (ref 94–109)
CO2 SERPL-SCNC: 22 MMOL/L (ref 20–32)
COLONOSCOPY: NORMAL
CREAT SERPL-MCNC: 0.54 MG/DL (ref 0.52–1.04)
ERYTHROCYTE [DISTWIDTH] IN BLOOD BY AUTOMATED COUNT: 18.5 % (ref 10–15)
GFR SERPL CREATININE-BSD FRML MDRD: >90 ML/MIN/{1.73_M2}
GLUCOSE BLDC GLUCOMTR-MCNC: 102 MG/DL (ref 70–99)
GLUCOSE BLDC GLUCOMTR-MCNC: 125 MG/DL (ref 70–99)
GLUCOSE BLDC GLUCOMTR-MCNC: 134 MG/DL (ref 70–99)
GLUCOSE BLDC GLUCOMTR-MCNC: 93 MG/DL (ref 70–99)
GLUCOSE SERPL-MCNC: 140 MG/DL (ref 70–99)
HCT VFR BLD AUTO: 26.6 % (ref 35–47)
HGB BLD-MCNC: 8.3 G/DL (ref 11.7–15.7)
MCH RBC QN AUTO: 24.6 PG (ref 26.5–33)
MCHC RBC AUTO-ENTMCNC: 31.2 G/DL (ref 31.5–36.5)
MCV RBC AUTO: 79 FL (ref 78–100)
PLATELET # BLD AUTO: 319 10E9/L (ref 150–450)
POTASSIUM SERPL-SCNC: 3.3 MMOL/L (ref 3.4–5.3)
RBC # BLD AUTO: 3.38 10E12/L (ref 3.8–5.2)
SODIUM SERPL-SCNC: 140 MMOL/L (ref 133–144)
UPPER GI ENDOSCOPY: NORMAL
WBC # BLD AUTO: 7.1 10E9/L (ref 4–11)

## 2019-09-04 PROCEDURE — 25000132 ZZH RX MED GY IP 250 OP 250 PS 637: Performed by: NURSE PRACTITIONER

## 2019-09-04 PROCEDURE — 99239 HOSP IP/OBS DSCHRG MGMT >30: CPT | Performed by: INTERNAL MEDICINE

## 2019-09-04 PROCEDURE — 36415 COLL VENOUS BLD VENIPUNCTURE: CPT | Performed by: INTERNAL MEDICINE

## 2019-09-04 PROCEDURE — 37000009 ZZH ANESTHESIA TECHNICAL FEE, EACH ADDTL 15 MIN: Performed by: INTERNAL MEDICINE

## 2019-09-04 PROCEDURE — 0DJD8ZZ INSPECTION OF LOWER INTESTINAL TRACT, VIA NATURAL OR ARTIFICIAL OPENING ENDOSCOPIC: ICD-10-PCS | Performed by: INTERNAL MEDICINE

## 2019-09-04 PROCEDURE — 25000128 H RX IP 250 OP 636: Performed by: NURSE ANESTHETIST, CERTIFIED REGISTERED

## 2019-09-04 PROCEDURE — 00000146 ZZHCL STATISTIC GLUCOSE BY METER IP

## 2019-09-04 PROCEDURE — 25000132 ZZH RX MED GY IP 250 OP 250 PS 637: Performed by: INTERNAL MEDICINE

## 2019-09-04 PROCEDURE — 99207 ZZC CDG-CHARGE REQUIRED MANUAL ENTRY: CPT | Performed by: INTERNAL MEDICINE

## 2019-09-04 PROCEDURE — 80048 BASIC METABOLIC PNL TOTAL CA: CPT | Performed by: INTERNAL MEDICINE

## 2019-09-04 PROCEDURE — 45378 DIAGNOSTIC COLONOSCOPY: CPT | Performed by: INTERNAL MEDICINE

## 2019-09-04 PROCEDURE — 25000132 ZZH RX MED GY IP 250 OP 250 PS 637: Performed by: HOSPITALIST

## 2019-09-04 PROCEDURE — 0DJ08ZZ INSPECTION OF UPPER INTESTINAL TRACT, VIA NATURAL OR ARTIFICIAL OPENING ENDOSCOPIC: ICD-10-PCS | Performed by: INTERNAL MEDICINE

## 2019-09-04 PROCEDURE — 25800030 ZZH RX IP 258 OP 636: Performed by: NURSE ANESTHETIST, CERTIFIED REGISTERED

## 2019-09-04 PROCEDURE — 25000125 ZZHC RX 250: Performed by: NURSE ANESTHETIST, CERTIFIED REGISTERED

## 2019-09-04 PROCEDURE — 25800030 ZZH RX IP 258 OP 636: Performed by: HOSPITALIST

## 2019-09-04 PROCEDURE — 43235 EGD DIAGNOSTIC BRUSH WASH: CPT | Performed by: INTERNAL MEDICINE

## 2019-09-04 PROCEDURE — 40000010 ZZH STATISTIC ANES STAT CODE-CRNA PER MINUTE: Performed by: INTERNAL MEDICINE

## 2019-09-04 PROCEDURE — 37000008 ZZH ANESTHESIA TECHNICAL FEE, 1ST 30 MIN: Performed by: INTERNAL MEDICINE

## 2019-09-04 PROCEDURE — 85027 COMPLETE CBC AUTOMATED: CPT | Performed by: INTERNAL MEDICINE

## 2019-09-04 RX ORDER — NICOTINE 21 MG/24HR
1 PATCH, TRANSDERMAL 24 HOURS TRANSDERMAL DAILY
Start: 2019-09-05 | End: 2020-09-16

## 2019-09-04 RX ORDER — ONDANSETRON 4 MG/1
4 TABLET, ORALLY DISINTEGRATING ORAL EVERY 30 MIN PRN
Status: CANCELLED | OUTPATIENT
Start: 2019-09-04

## 2019-09-04 RX ORDER — HYDRALAZINE HYDROCHLORIDE 20 MG/ML
2.5-5 INJECTION INTRAMUSCULAR; INTRAVENOUS EVERY 10 MIN PRN
Status: CANCELLED | OUTPATIENT
Start: 2019-09-04

## 2019-09-04 RX ORDER — PROPOFOL 10 MG/ML
INJECTION, EMULSION INTRAVENOUS CONTINUOUS PRN
Status: DISCONTINUED | OUTPATIENT
Start: 2019-09-04 | End: 2019-09-04

## 2019-09-04 RX ORDER — ONDANSETRON 2 MG/ML
4 INJECTION INTRAMUSCULAR; INTRAVENOUS EVERY 30 MIN PRN
Status: CANCELLED | OUTPATIENT
Start: 2019-09-04

## 2019-09-04 RX ORDER — ALBUTEROL SULFATE 0.83 MG/ML
2.5 SOLUTION RESPIRATORY (INHALATION) EVERY 4 HOURS PRN
Status: CANCELLED | OUTPATIENT
Start: 2019-09-04

## 2019-09-04 RX ORDER — FERROUS SULFATE 325(65) MG
325 TABLET, DELAYED RELEASE (ENTERIC COATED) ORAL DAILY
Qty: 60 TABLET | Refills: 0 | Status: SHIPPED | OUTPATIENT
Start: 2019-09-04 | End: 2019-10-18

## 2019-09-04 RX ORDER — NALOXONE HYDROCHLORIDE 0.4 MG/ML
.1-.4 INJECTION, SOLUTION INTRAMUSCULAR; INTRAVENOUS; SUBCUTANEOUS
Status: CANCELLED | OUTPATIENT
Start: 2019-09-04 | End: 2019-09-05

## 2019-09-04 RX ORDER — PANTOPRAZOLE SODIUM 40 MG/1
40 TABLET, DELAYED RELEASE ORAL DAILY
Qty: 30 TABLET | Refills: 3 | Status: SHIPPED | OUTPATIENT
Start: 2019-09-04 | End: 2019-10-18

## 2019-09-04 RX ORDER — ONDANSETRON 2 MG/ML
INJECTION INTRAMUSCULAR; INTRAVENOUS PRN
Status: DISCONTINUED | OUTPATIENT
Start: 2019-09-04 | End: 2019-09-04

## 2019-09-04 RX ORDER — POTASSIUM CHLORIDE 1500 MG/1
40 TABLET, EXTENDED RELEASE ORAL ONCE
Status: COMPLETED | OUTPATIENT
Start: 2019-09-04 | End: 2019-09-04

## 2019-09-04 RX ORDER — HYDROMORPHONE HYDROCHLORIDE 1 MG/ML
.3-.5 INJECTION, SOLUTION INTRAMUSCULAR; INTRAVENOUS; SUBCUTANEOUS EVERY 10 MIN PRN
Status: CANCELLED | OUTPATIENT
Start: 2019-09-04

## 2019-09-04 RX ORDER — FENTANYL CITRATE 50 UG/ML
25-50 INJECTION, SOLUTION INTRAMUSCULAR; INTRAVENOUS
Status: CANCELLED | OUTPATIENT
Start: 2019-09-04

## 2019-09-04 RX ORDER — MEPERIDINE HYDROCHLORIDE 25 MG/ML
12.5 INJECTION INTRAMUSCULAR; INTRAVENOUS; SUBCUTANEOUS
Status: CANCELLED | OUTPATIENT
Start: 2019-09-04

## 2019-09-04 RX ORDER — POTASSIUM CHLORIDE 1500 MG/1
20 TABLET, EXTENDED RELEASE ORAL ONCE
Status: COMPLETED | OUTPATIENT
Start: 2019-09-04 | End: 2019-09-04

## 2019-09-04 RX ORDER — SODIUM CHLORIDE, SODIUM LACTATE, POTASSIUM CHLORIDE, CALCIUM CHLORIDE 600; 310; 30; 20 MG/100ML; MG/100ML; MG/100ML; MG/100ML
INJECTION, SOLUTION INTRAVENOUS CONTINUOUS
Status: CANCELLED | OUTPATIENT
Start: 2019-09-04

## 2019-09-04 RX ADMIN — POTASSIUM CHLORIDE 40 MEQ: 1500 TABLET, EXTENDED RELEASE ORAL at 13:38

## 2019-09-04 RX ADMIN — PROPOFOL 100 MCG/KG/MIN: 10 INJECTION, EMULSION INTRAVENOUS at 12:07

## 2019-09-04 RX ADMIN — MAGNESIUM CITRATE 296 ML: 1.75 LIQUID ORAL at 06:12

## 2019-09-04 RX ADMIN — POTASSIUM CHLORIDE 20 MEQ: 1500 TABLET, EXTENDED RELEASE ORAL at 15:16

## 2019-09-04 RX ADMIN — PHENYLEPHRINE HYDROCHLORIDE 100 MCG: 10 INJECTION INTRAVENOUS at 12:36

## 2019-09-04 RX ADMIN — ATORVASTATIN CALCIUM 40 MG: 40 TABLET, FILM COATED ORAL at 09:05

## 2019-09-04 RX ADMIN — MIDAZOLAM 1 MG: 1 INJECTION INTRAMUSCULAR; INTRAVENOUS at 12:12

## 2019-09-04 RX ADMIN — SODIUM CHLORIDE: 9 INJECTION, SOLUTION INTRAVENOUS at 02:07

## 2019-09-04 RX ADMIN — Medication 8 MCG: at 12:16

## 2019-09-04 RX ADMIN — MIDAZOLAM 1 MG: 1 INJECTION INTRAMUSCULAR; INTRAVENOUS at 12:02

## 2019-09-04 RX ADMIN — NICOTINE 1 PATCH: 21 PATCH, EXTENDED RELEASE TRANSDERMAL at 09:05

## 2019-09-04 RX ADMIN — Medication 4 MCG: at 12:07

## 2019-09-04 RX ADMIN — SODIUM CHLORIDE: 9 INJECTION, SOLUTION INTRAVENOUS at 11:08

## 2019-09-04 RX ADMIN — ONDANSETRON 4 MG: 2 INJECTION INTRAMUSCULAR; INTRAVENOUS at 12:16

## 2019-09-04 RX ADMIN — BUPROPION HYDROCHLORIDE 150 MG: 150 TABLET, EXTENDED RELEASE ORAL at 09:05

## 2019-09-04 RX ADMIN — ACETAMINOPHEN 650 MG: 325 TABLET ORAL at 13:38

## 2019-09-04 RX ADMIN — Medication 8 MCG: at 12:02

## 2019-09-04 ASSESSMENT — ACTIVITIES OF DAILY LIVING (ADL)
ADLS_ACUITY_SCORE: 15

## 2019-09-04 ASSESSMENT — ENCOUNTER SYMPTOMS
DYSRHYTHMIAS: 0
SEIZURES: 0

## 2019-09-04 ASSESSMENT — MIFFLIN-ST. JEOR: SCORE: 1166.83

## 2019-09-04 ASSESSMENT — LIFESTYLE VARIABLES: TOBACCO_USE: 1

## 2019-09-04 NOTE — PROCEDURES
GI Brief Note    EGD and Colonoscopy completed, for full report see Procedures tab.    Findings:  - unremarkable EGD, duodenitis has healed. No evidence of upper GI bleeding  - diverticulosis throughout the colon.  Brown stool in colon and terminal ileum, no signs of lower GI bleeding.    A/P:  No signs of GI bleeding, hemoglobin stable. Will plan on outpatient small bowel pill camera study assuming patient remains stable.  - continue omeprazole 40mg every morning  - from GI standpoint, ok to resume Eliquis  - take aspirin with food  - outpatient small bowel pill camera study    Marie Todd MD  [facility]  Cell/Pager: [phone]  After 5pm please call [phone]

## 2019-09-04 NOTE — ANESTHESIA PREPROCEDURE EVALUATION
Anesthesia Pre-Procedure Evaluation    Patient: Rajani Guo   MRN: [id number] : 1950          Preoperative Diagnosis: Anemia    Procedure(s):  COLONOSCOPY  ESOPHAGOGASTRODUODENOSCOPY (EGD)    Past Medical History:   Diagnosis Date     Age-related osteoporosis without current pathological fracture 2018     Clot     in  left leg     History of partial thyroidectomy 2018     Hyperlipidemia LDL goal <100 2018     Hypertension      Insomnia, unspecified type 2018     Tobacco use disorder      Type 2 diabetes mellitus without complication, with long-term current use of insulin (H) 2018     Past Surgical History:   Procedure Laterality Date     IR ANGIOGRAM THROUGH CATHETER FOLLOW UP  3/5/2019     IR LOWER EXTREMITY ANGIOGRAM LEFT  3/4/2019     KNEE SURGERY Right     right knee torn meniscus surgery     OVARY SURGERY  1971    1 ovary removed     RELEASE CARPAL TUNNEL Right      RELEASE TRIGGER FINGER BILATERAL       SHOULDER SURGERY Left     rotator cuff repair, plate placement     THYROID SURGERY      partial thyroidectomy       Anesthesia Evaluation     . Pt has had prior anesthetic. Type: General    No history of anesthetic complications          ROS/MED HX    ENT/Pulmonary:     (+)tobacco use, Current use , recent URI unresolved afebrile: . .   (-) sleep apnea   Neurologic:      (-) seizures, CVA, Neuropathy and migraines   Cardiovascular:     (+) hypertension-Peripheral Vascular Disease (claudication)-- Other and Non Symptomatic, --. : . . . :. .      (-) CAD, DESAI, arrhythmias and dyslipidemia   METS/Exercise Tolerance:     Hematologic:     (+) Anemia, -     (-) history of blood clots and other hematologic disorder   Musculoskeletal:   (+) arthritis,  other musculoskeletal- osteoporosis      GI/Hepatic:     (+) Other GI/Hepatic GI Bleed     (-) GERD and liver disease   Renal/Genitourinary:         Endo:     (+) type II DM thyroid problem (hx thyroidectomy - not on  "medication) .   (-) obesity   Psychiatric:        (-) psychiatric history   Infectious Disease:         Malignancy:         Other:                          Physical Exam  Normal systems: cardiovascular and pulmonary    Airway   Mallampati: III  TM distance: >3 FB  Neck ROM: full    Dental   (+) missing, upper dentures and lower dentures    Cardiovascular   Rhythm and rate: regular and normal  (-) no murmur    Pulmonary    breath sounds clear to auscultation            Lab Results   Component Value Date    WBC 7.1 09/04/2019    HGB 8.3 (L) 09/04/2019    HCT 26.6 (L) 09/04/2019     09/04/2019     09/04/2019    POTASSIUM 3.3 (L) 09/04/2019    CHLORIDE 111 (H) 09/04/2019    CO2 22 09/04/2019    BUN 3 (L) 09/04/2019    CR 0.54 09/04/2019     (H) 09/04/2019    KAYLEEN 8.1 (L) 09/04/2019    ALBUMIN 3.9 01/18/2018    PROTTOTAL 7.3 01/18/2018    ALT 16 01/18/2018    AST 17 01/18/2018    ALKPHOS 70 01/18/2018    BILITOTAL 0.8 01/18/2018    PTT 32 03/04/2019    INR 1.00 03/04/2019    TSH 3.42 07/30/2019       Preop Vitals  BP Readings from Last 3 Encounters:   09/04/19 137/75   08/21/19 130/80   08/03/19 118/70    Pulse Readings from Last 3 Encounters:   09/04/19 90   08/21/19 102   08/02/19 111      Resp Readings from Last 3 Encounters:   09/04/19 18   08/21/19 20   08/03/19 16    SpO2 Readings from Last 3 Encounters:   09/04/19 94%   08/21/19 100%   08/03/19 97%      Temp Readings from Last 1 Encounters:   09/04/19 36.7  C (98  F) (Oral)    Ht Readings from Last 1 Encounters:   09/02/19 1.626 m (5' 4\")      Wt Readings from Last 1 Encounters:   09/04/19 64.7 kg (142 lb 9.6 oz)    Estimated body mass index is 24.48 kg/m  as calculated from the following:    Height as of this encounter: 1.626 m (5' 4\").    Weight as of this encounter: 64.7 kg (142 lb 9.6 oz).       Anesthesia Plan      History & Physical Review      ASA Status:  3 .    NPO Status:  > 8 hours    Plan for MAC Reason for MAC:  Deep or markedly " invasive procedure (G8)  PONV prophylaxis:  Ondansetron (or other 5HT-3)       Postoperative Care  Postoperative pain management:  Multi-modal analgesia.      Consents  Anesthetic plan, risks, benefits and alternatives discussed with:  Patient..                 Cordelia Julien MD

## 2019-09-04 NOTE — PLAN OF CARE
Date/Time: 9/3419 1200  Cognitive Concerns/ Orientation : A/O x 4  BEHAVIOR & AGGRESSION TOOL COLOR: Green   CIWA SCORE: N/A  ABNL VS/O2: VSS on RA   MOBILITY: SBA to BSC  PAIN MANAGMENT: Pt denies pain and N/V. Dyspneic on exertion.   DIET: NPO   BOWEL/BLADDER: Continent of B/B to BSC. Stool dark brown/watery after bowel prep  ABNL LAB/BG:  Hgb 8.3, K+ 3.3  DRAIN/DEVICES: PIV infusing. Protonix gtt discontinued and switched to IV BID (See MAR)  TELEMETRY RHYTHM: N/A  SKIN: Intact   TESTS/PROCEDURES: Pt at  EGD and Colonoscopy curently.  D/C DAY/GOALS/PLACE: Pending procedures.   OTHER IMPORTANT INFO: PRN Robitussin x 1 given for frequent productive cough. Abdomen soft, pt reports cramping after colonoscopy prep. Will continue to monitor. Fine crackles LLL, otherwise clear.

## 2019-09-04 NOTE — PROCEDURES
PRE-PROCEDURE NOTE    CHIEF COMPLAINT / REASON FOR PROCEDURE:  Anemia, history of duodenities with bleeding    History and Physical Reviewed:  yes    PRE-SEDATION ASSESSMENT:    Lung Exam:  cough  Heart Exam:  normal  Mallampati Airway Classification:  3   Previous reaction to anesthesia/sedation:   no  Sedation plan based on assessment:  MAC  Comment(s):  Risks of procedure discussed  ASA Classification:  3    IMPRESSION:  Rule out GI bleeding    PLAN:  egd/colon    Marie Todd MD, MD

## 2019-09-04 NOTE — ANESTHESIA CARE TRANSFER NOTE
Patient: Rajani Guo    Procedure(s):  COLONOSCOPY  ESOPHAGOGASTRODUODENOSCOPY (EGD)    Diagnosis: Anemia  Diagnosis Additional Information: No value filed.    Anesthesia Type:   MAC     Note:  Airway :Room Air  Patient transferred to:PS Recovery  Comments: To recovery. Vss. Denies pain or nv. Report given to RN assuming care of ptHandoff Report: Identifed the Patient, Identified the Reponsible Provider, Reviewed the pertinent medical history, Discussed the surgical course, Reviewed Intra-OP anesthesia mangement and issues during anesthesia, Set expectations for post-procedure period and Allowed opportunity for questions and acknowledgement of understanding      Vitals: (Last set prior to Anesthesia Care Transfer)    CRNA VITALS  9/4/2019 1211 - 9/4/2019 1244      9/4/2019             Pulse:  86    Ht Rate:  82    SpO2:  97 %    Resp Rate (set):  10                Electronically Signed By: FRANCESCA Jonas CRNA  September 4, 2019  12:44 PM

## 2019-09-04 NOTE — ANESTHESIA POSTPROCEDURE EVALUATION
Patient: Rajani Guo    Procedure(s):  COLONOSCOPY  ESOPHAGOGASTRODUODENOSCOPY (EGD)    Diagnosis:Anemia  Diagnosis Additional Information: No value filed.    Anesthesia Type:  MAC    Note:  Anesthesia Post Evaluation    Patient location during evaluation: Phase 2  Patient participation: Able to fully participate in evaluation  Level of consciousness: awake and alert  Pain management: adequate  Airway patency: patent  Cardiovascular status: acceptable  Respiratory status: acceptable  Hydration status: acceptable  PONV: none     Anesthetic complications: None          Last vitals:  Vitals:    09/04/19 1310 09/04/19 1327 09/04/19 1453   BP: 128/69 122/62 129/67   Pulse: 85 92 84   Resp: 24 16 16   Temp:  36.4  C (97.5  F) 36.7  C (98  F)   SpO2: 94% 95% 95%         Electronically Signed By: Cordelia Julien MD  September 4, 2019  3:16 PM

## 2019-09-04 NOTE — PLAN OF CARE
Discharge    Patient discharged to home via wheelchair with daughter  Care plan note: See POC note    Listed belongings gathered and returned to patient. Yes  Care Plan and Patient education resolved: Yes  Prescriptions if needed, hard copies sent with patient  NA  Home and hospital acquired medications returned to patient: Filled RX's sent with pt.  Medication Bin checked and emptied on discharge Yes  Follow up appointment made for patient: Yes

## 2019-09-04 NOTE — PLAN OF CARE
Date/Time: 9/3/19 4018-2925  Cognitive Concerns/ Orientation : A/O x 4  BEHAVIOR & AGGRESSION TOOL COLOR: Green   CIWA SCORE: N/A  ABNL VS/O2: VSS on RA   MOBILITY: SBA to BSC  PAIN MANAGMENT: Pt denies pain and N/V. Dyspneic on exertion.   DIET: NPO   BOWEL/BLADDER: Continent of B/B to BSC. Stool dark brown/watery. Multiple BMs throughout shift.   ABNL LAB/BG: /93/125 Hgb 8.2, recheck this AM  DRAIN/DEVICES: PIV infusing. Protonix gtt discontinued and switched to IV BID (See MAR)  TELEMETRY RHYTHM: N/A  SKIN: Intact   TESTS/PROCEDURES: EGD and Colonoscopy today. Colonoscopy prep completed on day shift.   D/C DAY/GOALS/PLACE: Pending procedures.   OTHER IMPORTANT INFO: PRN Robitussin x 1 given for frequent productive cough. Abdomen soft, pt reports cramping after colonoscopy prep. Will continue to monitor.

## 2019-09-04 NOTE — DISCHARGE SUMMARY
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 Pt admitted for Acute blood loss anemia likely secondary to GI bleed:  Pt  was recently admitted with acute blood loss anemia had EGD on 8/2/2019 show duodenitis with a small erosion and had a clips at that time.  The patient states that she has been feeling increased shortness of breath that has been getting progressively worse. She has COPD and continues to smoke.     Hospital Course   Admitted to medical floor, transfused 1 unit of PRBC's and repeat hgb 8.2.  No melena while here.  Seen by [facility], repeated EGD and no bleeding seen and duodenitis completely healed on her current Protonix 40 mg bid.      Will decrease Protonix to 40 mg daily.  Hgb 8.3 at discharge and will start Iron Sulfate 325 mg bid for 1 month to help with anemia.  Strongly encouraged smoking cessation, as this may be contributing to PUD.  She is using Nicotine patch, and will use Chantix (starter pack prescribed on discharge).  Her daughter lives with her -- she smokes as well, and she plans to quit at this time as well.      The patient had TPA for an occluded superficial femoral artery on 3/4/19, and when the  vessel opened up had angioplasty of a tight osteal lesion, and no claudication since then.  At the time it was thought she might have PAF so was kept on Eliquis, and subsequently did have 30 day cardiac event monitor with no afib seen.  In retrospect it appears the vessel occlusion was probably related to the severe ostial lesion, and will stop Eliquis (as this is her 2nd admission for GI bleed) and in 3 days start Aspirin 81 mg Enteric Coated for her PVD.  If future thrombotic events would then go back to Eliquis.      Louis Islas MD, MD  Pager: [phone]  Cell Phone:  [phone]       Significant Results and Procedures   As above    Pending Results   These results will be followed up by Dr. Islas  Unresulted Labs Ordered in the Past 30 Days of this Admission     No orders found from 8/3/2019 to 9/3/2019.          Code Status   Full Code       Primary Care Physician   Tamiko Coley    Physical Exam   Temp: 98  F (36.7  C) Temp src: Oral BP: 129/67 Pulse: 84 Heart Rate: 85 Resp: 16 SpO2: 95 % O2 Device: None (Room air)    Vitals:    09/02/19 1600 09/04/19 0515   Weight: 68.9 kg (152 lb) 64.7 kg (142 lb 9.6 oz)     Vital Signs with Ranges  Temp:  [97.5  F (36.4  C)-98.8  F (37.1  C)] 98  F (36.7  C)  Pulse:  [84-93] 84  Heart Rate:  [83-99] 85  Resp:  [16-45] 16  BP: (116-142)/(59-80) 129/67  SpO2:  [93 %-97 %] 95 %  I/O last 3 completed shifts:  In: 4579 [P.O.:1900; I.V.:7209]  Out: 100 [Urine:100]    Exam on discharge:   Lungs clear  CV with reg S1S2  No abdominal tenderness    Discharge Disposition   Discharged to home  Condition at discharge: Good    Consultations This Hospital Stay   GASTROENTEROLOGY IP CONSULT    Time Spent on this Encounter   I spent a total of 35 minutes discharging this patient.     Discharge Orders      Reason for your hospital stay    Gastrointestinal bleeding, potentially aggravated by being on Eliquis, and anemia from blood loss.     Follow-up and recommended labs and  tests     Follow up with primary care provider, Tamiko Coley, in 5 days, check CBC then, and review smoking cessation at that point.      Can discontinue future appointments with Dr. Barajas unless further problems with leg circulation.     Activity    Your activity upon discharge: activity as tolerated     Discharge Instructions    Call Dr. Paul if any medical questions at Cell Phone [phone].     Full Code     Diet    Follow this diet upon discharge: Orders Placed This Encounter      Regular Diet Adult     Discharge Medications   Current Discharge Medication List      START taking these medications    Details   aspirin (ASA) 81 MG EC tablet Take 1 tablet (81 mg) by mouth daily  Qty: 100 tablet, Refills: 3    Comments: Future refills by PCP Dr. Tamiko Coley with phone number [phone].  Associated Diagnoses: PVD (peripheral vascular disease) (H)      ferrous sulfate (FE TABS) 325 (65 Fe) MG EC tablet Take 1 tablet (325 mg) by mouth daily  Qty: 60 tablet, Refills: 0    Comments: Take with food  Associated Diagnoses: Acute blood loss anemia      nicotine (NICODERM CQ) 21 MG/24HR 24 hr patch Place 1 patch onto the skin daily    Comments: For 1 week  Associated Diagnoses: Tobacco use disorder      varenicline (CHANTIX MIREYA) 0.5 MG X 11 & 1 MG X 42 tablet Take 0.5 mg tab daily for 3 days, THEN 0.5 mg tab twice daily for 4 days, THEN 1 mg twice daily.  Qty: 53 tablet, Refills: 0    Associated Diagnoses: Tobacco use disorder         CONTINUE these medications which have CHANGED    Details   pantoprazole (PROTONIX) 40 MG EC tablet Take 1 tablet (40 mg) by mouth daily  Qty: 30 tablet, Refills: 3    Comments: Future refills by PCP Dr. Tamiko Coley with phone number [phone].  Associated Diagnoses: Duodenitis         CONTINUE these medications which have NOT CHANGED    Details   acetaminophen (ACETAMINOPHEN 8 HOUR) 650 MG CR tablet Take 650 mg by mouth every 8 hours      alendronate (FOSAMAX) 70 MG tablet TAKE 1 TAB  BY MOUTH EVERY 7 DAYS.  Qty: 4 tablet, Refills: 3    Associated Diagnoses: Age-related osteoporosis without current pathological fracture      atorvastatin (LIPITOR) 40 MG tablet Take 1 tablet (40 mg) by mouth daily  Qty: 90 tablet, Refills: 3    Associated Diagnoses: Type 2 diabetes mellitus without complication, with long-term current use of insulin (H)      buPROPion (ZYBAN) 150 MG 12 hr tablet Take 1 tablet (150 mg) by mouth 2 times daily Take once daily for first 4 days, then twice daily after that.  Qty: 60 tablet, Refills: 3    Associated Diagnoses: Tobacco use disorder      calcium carb 1250 mg, 500 mg [place],/vitamin D 200 unit (CALCIUM 500/D) 500-200 MG-UNIT per tablet Take 1 tablet by mouth 2 times daily       glucosamine-chondroitin 500-400 MG CAPS per capsule Take 1 capsule by mouth 2 times daily       insulin detemir (LEVEMIR FLEXTOUCH) 100 UNIT/ML pen INJECT 18-19 UNITS SUBCUTANEOUS AT BEDTIME  Qty: 15 mL, Refills: 0    Associated Diagnoses: Type 2 diabetes mellitus without complication, with long-term current use of insulin (H)      lisinopril (PRINIVIL/ZESTRIL) 2.5 MG tablet Take 1 tablet (2.5 mg) by mouth daily  Qty: 90 tablet, Refills: 3    Associated Diagnoses: Type 2 diabetes mellitus without complication, with long-term current use of insulin (H)      Melatonin 10 MG TABS tablet Take 10 mg by mouth At Bedtime      metFORMIN (GLUCOPHAGE) 1000 MG tablet Take 1 tablet (1,000 mg) by mouth 2 times daily (with meals)  Qty: 180 tablet, Refills: 1    Associated Diagnoses: Type 2 diabetes mellitus without complication, with long-term current use of insulin (H)      Multiple Vitamin (MULTI-VITAMINS) TABS Take 1 tablet by mouth daily       traZODone (DESYREL) 50 MG tablet Take  mg by mouth At Bedtime         STOP taking these medications       ELIQUIS 5 MG tablet Comments:   Reason for Stopping:             Allergies   Allergies   Allergen Reactions     Indomethacin Other (See Comments)     Dizziness  and disorientation     Tramadol Nausea and Vomiting     Data   Most Recent 3 CBC's:  Recent Labs   Lab Test 09/04/19  0820 09/02/19  2248 09/02/19  1730   WBC 7.1 7.4 7.2   HGB 8.3* 8.2* 7.5*   MCV 79 79 78    337 368      Most Recent 3 BMP's:  Recent Labs   Lab Test 09/04/19  0820 09/03/19  0735 09/02/19  1730    140 140   POTASSIUM 3.3* 3.6 3.5   CHLORIDE 111* 110* 108   CO2 22 28 26   BUN 3* 5* 8   CR 0.54 0.54 0.56   ANIONGAP 7 2* 6   KAYLEEN 8.1* 8.3* 8.5   * 125* 99     Most Recent 2 LFT's:  Recent Labs   Lab Test 01/18/18  0835   AST 17   ALT 16   ALKPHOS 70   BILITOTAL 0.8     Most Recent INR's and Anticoagulation Dosing History:  Anticoagulation Dose History     Recent Dosing and Labs Latest Ref Rng & Units 3/4/2019    INR 0.86 - 1.14 1.00        Most Recent 3 Troponin's:No lab results found.  Most Recent Cholesterol Panel:  Recent Labs   Lab Test 03/04/19  0930   CHOL 122   LDL 44   HDL 59   TRIG 94     Most Recent 6 Bacteria Isolates From Any Culture (See EPIC Reports for Culture Details):  Recent Labs   Lab Test 07/30/19  1440 03/13/19  1226   CULT >100,000 colonies/mL  mixed urogenital kaylin   >100,000 colonies/mL  Escherichia coli  *  10,000 to 50,000 colonies/mL  mixed urogenital kaylin  Susceptibility testing not routinely done       Most Recent TSH, T4 and A1c Labs:  Recent Labs   Lab Test 07/30/19  1441   TSH 3.42   A1C 5.3

## 2019-09-04 NOTE — PROGRESS NOTES
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     DVT Prophylaxis: Pneumatic Compression Devices  Code Status: Full Code    Disposition: Expected discharge in 1 day once hgb stable .    Louis Islas MD  Pager [phone]  Cell Phone [phone]  Text Page (7am to 6pm)    Interval History   No abdominal pain, no melena reported today.      Physical Exam   Temp: 98.8  F (37.1  C) Temp src: Oral BP: 139/76 Pulse: 106 Heart Rate: 87 Resp: 16 SpO2: 96 % O2 Device: None (Room air)    Vitals:    09/02/19 1600   Weight: 68.9 kg (152 lb)     Vital Signs with Ranges  Temp:  [97  F (36.1  C)-98.8  F (37.1  C)] 98.8  F (37.1  C)  Pulse:  [106-108] 106  Heart Rate:  [87-99] 87  Resp:  [16] 16  BP: (132-142)/(68-83) 139/76  SpO2:  [95 %-98 %] 96 %  I/O last 3 completed shifts:  In: 431.67   Out: 400 [Urine:400]    # Pain Assessment:  Current Pain Score 9/3/2019   Patient currently in pain? denies   Pain score (0-10) -   Rajani riley pain level was assessed and she currently denies pain.        Constitutional: Awake, alert, cooperative, no apparent distress  Respiratory: Clear to auscultation  bilaterally, no crackles or wheezing  Cardiovascular: Regular rate and rhythm, normal S1 and S2, and no murmur noted  GI: Normal bowel sounds, soft, non-distended, non-tender  Extrem: No calf tenderness, no ankle edema  Neuro: Ox3, no focal motor or sensory deficits    Medications     - MEDICATION INSTRUCTIONS -       - MEDICATION INSTRUCTIONS -       - MEDICATION INSTRUCTIONS -       pantoprazole (PROTONIX) infusion ADULT/PEDS GREATER than or EQUAL to 45 kg 8 mg/hr (09/03/19 1604)     sodium chloride 100 mL/hr at 09/03/19 1602       acetaminophen  650 mg Oral Q8H     atorvastatin  40 mg Oral Daily     buPROPion  150 mg Oral BID     insulin aspart  1-4 Units Subcutaneous Q4H     [START ON 9/4/2019] magnesium citrate  296 mL Oral Once     nicotine  1 patch Transdermal Daily     nicotine   Transdermal Q8H     [START ON 9/4/2019] nicotine   Transdermal Daily     senna-docusate  1 tablet Oral BID    Or     senna-docusate  2 tablet Oral BID     sodium chloride (PF)  3 mL Intracatheter Q8H     sodium chloride (PF)  3 mL Intracatheter Q8H     traZODone   mg Oral At Bedtime       Data   Recent Labs   Lab 09/03/19  0735 09/02/19  2248 09/02/19  1730   WBC  --  7.4 7.2   HGB  --  8.2* 7.5*   MCV  --  79 78   PLT  --  337 368     --  140   POTASSIUM 3.6  --  3.5   CHLORIDE 110*  --  108   CO2 28  --  26   BUN 5*  --  8   CR 0.54  --  0.56   ANIONGAP 2*  --  6   KAYLEEN 8.3*  --  8.5   *  --  99       Imaging:   No results found for this or any previous visit (from the past 24 hour(s)).

## 2019-09-05 ENCOUNTER — PATIENT OUTREACH (OUTPATIENT)
Dept: CARE COORDINATION | Facility: CLINIC | Age: 69
End: 2019-09-05

## 2019-09-05 ENCOUNTER — OFFICE VISIT (OUTPATIENT)
Dept: OTHER | Facility: CLINIC | Age: 69
End: 2019-09-05
Attending: INTERNAL MEDICINE
Payer: COMMERCIAL

## 2019-09-05 VITALS
RESPIRATION RATE: 16 BRPM | HEIGHT: 64 IN | SYSTOLIC BLOOD PRESSURE: 146 MMHG | HEART RATE: 95 BPM | BODY MASS INDEX: 25.27 KG/M2 | WEIGHT: 148 LBS | DIASTOLIC BLOOD PRESSURE: 74 MMHG | OXYGEN SATURATION: 95 %

## 2019-09-05 DIAGNOSIS — E78.5 HYPERLIPIDEMIA LDL GOAL <100: ICD-10-CM

## 2019-09-05 DIAGNOSIS — F17.200 TOBACCO USE DISORDER: ICD-10-CM

## 2019-09-05 DIAGNOSIS — K29.80 DUODENITIS: ICD-10-CM

## 2019-09-05 DIAGNOSIS — R09.89 BILATERAL CAROTID BRUITS: ICD-10-CM

## 2019-09-05 DIAGNOSIS — I73.9 PAD (PERIPHERAL ARTERY DISEASE) (H): Primary | ICD-10-CM

## 2019-09-05 PROCEDURE — G0463 HOSPITAL OUTPT CLINIC VISIT: HCPCS

## 2019-09-05 PROCEDURE — 99215 OFFICE O/P EST HI 40 MIN: CPT | Mod: 25 | Performed by: INTERNAL MEDICINE

## 2019-09-05 PROCEDURE — 99406 BEHAV CHNG SMOKING 3-10 MIN: CPT | Mod: ZP | Performed by: INTERNAL MEDICINE

## 2019-09-05 ASSESSMENT — MIFFLIN-ST. JEOR: SCORE: 1191.32

## 2019-09-05 NOTE — PROGRESS NOTES
Clinic Care Coordination Contact  [facility] [[email or web address]]/Voicemail    Referral Source: IP Report    Clinical Data: Care Coordinator Outreach    Outreach attempted x 1.  Left message on patient's voicemail with call back information and requested return call.    Patient was inpatient at [facility] from 9/2/19 to 9/4/19 with gastrointestinal bleeding. Patient had colonoscopy. Eliquis was stopped    Plan:  Care Coordinator will try to reach patient again in 1-2 business days.  Jeanne Nunes RN, CCM - Primary Care Clinic RN Coordinator  [facility]   9/5/2019    10:43 AM  [phone]

## 2019-09-05 NOTE — PROGRESS NOTES
"Rajani Guo is a 68 year old female who presents for:  Chief Complaint   Patient presents with     RECHECK     4 week hospital follow up *jmw        Vitals:    Vitals:    09/05/19 1258   BP: (!) 146/74   BP Location: Right arm   Patient Position: Chair   Cuff Size: Adult Regular   Pulse: 95   Resp: 16   SpO2: 95%   Weight: 148 lb (67.1 kg)   Height: 5' 4\" (1.626 m)       BMI:  Estimated body mass index is 25.4 kg/m  as calculated from the following:    Height as of this encounter: 5' 4\" (1.626 m).    Weight as of this encounter: 148 lb (67.1 kg).    Pain Score:  Data Unavailable        Conner Dodge CMA    "

## 2019-09-05 NOTE — LETTER
[facility]  [address] [facility] [address]  September 6, 2019    Rajani Brant  [address] [place] [address]      Dear Rajani,    I am a clinic care coordinator who works with Tamiko Coley MD at [facility]. I have been trying to reach you recently to introduce Clinic Care Coordination and to see if there was anything I could assist you with.  I wanted to introduce myself and provide you with my contact information so that you can call me with questions or concerns about your health care. Below is a description of clinic care coordination and how I can further assist you.     The clinic care coordinator is a registered nurse and/or  who understand the health care system. The goal of clinic care coordination is to help you manage your health and improve access to the [facility] system in the most efficient manner. The registered nurse can assist you in meeting your health care goals by providing education, coordinating services, and strengthening the communication among your providers. The  can assist you with financial, behavioral, psychosocial, chemical dependency, counseling, and/or psychiatric resources.    Please feel free to contact me at [phone], with any questions or concerns. We at [facility] are focused on providing you with the highest-quality healthcare experience possible and that all starts with you.     Sincerely,     Jeanne Nunes RN, [facility] - Primary Care Clinic RN Coordinator  [facility]   [phone]

## 2019-09-05 NOTE — PROGRESS NOTES
Rajani Guo is a 68 year old female who is presenting at the current time to discuss her diagnosi(es) of        PAD (peripheral artery disease) (H)  Duodenitis  Tobacco use disorder  Bilateral carotid bruits  Hyperlipidemia LDL goal <100     Her most recent hospital discharge summary of yesterday is not yet completed. Review of records reveals EGD and colonoscopy without source of bleeding. Eliquis was discontinued . We were not consulted. She was placed on ASA and a PPI. She denies palpitations .      Review Of Systems  Skin: negative  Eyes: negative  Ears/Nose/Throat: negative  Respiratory: No shortness of breath, dyspnea on exertion, cough, or hemoptysis  Cardiovascular: negative  Gastrointestinal: negative  Genitourinary: negative  Musculoskeletal: negative  Neurologic: negative  Psychiatric: negative  Hematologic/Lymphatic/Immunologic: negative  Endocrine: negative     PAST MEDICAL HISTORY:                  Past Medical History:   Diagnosis Date     Age-related osteoporosis without current pathological fracture 1/18/2018     Clot     in  left leg     History of partial thyroidectomy 6/2/2018     Hyperlipidemia LDL goal <100 1/18/2018     Hypertension      Insomnia, unspecified type 1/18/2018     Tobacco use disorder      Type 2 diabetes mellitus without complication, with long-term current use of insulin (H) 1/18/2018       PAST SURGICAL HISTORY:                  Past Surgical History:   Procedure Laterality Date     COLONOSCOPY N/A 9/4/2019    Procedure: COLONOSCOPY;  Surgeon: Marie Todd MD;  Location:  GI     ESOPHAGOSCOPY, GASTROSCOPY, DUODENOSCOPY (EGD), COMBINED N/A 9/4/2019    Procedure: ESOPHAGOGASTRODUODENOSCOPY (EGD);  Surgeon: Marie Todd MD;  Location:  GI     IR ANGIOGRAM THROUGH CATHETER FOLLOW UP  3/5/2019     IR LOWER EXTREMITY ANGIOGRAM LEFT  3/4/2019     KNEE SURGERY Right 2013    right knee torn meniscus surgery     OVARY SURGERY  1971    1 ovary removed      RELEASE CARPAL TUNNEL Right 1988     RELEASE TRIGGER FINGER BILATERAL       SHOULDER SURGERY Left     rotator cuff repair, plate placement     THYROID SURGERY  1988    partial thyroidectomy       CURRENT MEDICATIONS:                  Current Outpatient Medications   Medication Sig Dispense Refill     acetaminophen (ACETAMINOPHEN 8 HOUR) 650 MG CR tablet Take 650 mg by mouth every 8 hours       alendronate (FOSAMAX) 70 MG tablet TAKE 1 TAB BY MOUTH EVERY 7 DAYS. 4 tablet 3     [START ON 9/7/2019] aspirin (ASA) 81 MG EC tablet Take 1 tablet (81 mg) by mouth daily 100 tablet 3     atorvastatin (LIPITOR) 40 MG tablet Take 1 tablet (40 mg) by mouth daily 90 tablet 3     buPROPion (ZYBAN) 150 MG 12 hr tablet Take 1 tablet (150 mg) by mouth 2 times daily Take once daily for first 4 days, then twice daily after that. 60 tablet 3     calcium carb 1250 mg, 500 mg [place],/vitamin D 200 unit (CALCIUM 500/D) 500-200 MG-UNIT per tablet Take 1 tablet by mouth 2 times daily        ferrous sulfate (FE TABS) 325 (65 Fe) MG EC tablet Take 1 tablet (325 mg) by mouth daily 60 tablet 0     glucosamine-chondroitin 500-400 MG CAPS per capsule Take 1 capsule by mouth 2 times daily        insulin detemir (LEVEMIR FLEXTOUCH) 100 UNIT/ML pen INJECT 18-19 UNITS SUBCUTANEOUS AT BEDTIME 15 mL 0     lisinopril (PRINIVIL/ZESTRIL) 2.5 MG tablet Take 1 tablet (2.5 mg) by mouth daily 90 tablet 3     Melatonin 10 MG TABS tablet Take 10 mg by mouth At Bedtime       metFORMIN (GLUCOPHAGE) 1000 MG tablet Take 1 tablet (1,000 mg) by mouth 2 times daily (with meals) 180 tablet 1     Multiple Vitamin (MULTI-VITAMINS) TABS Take 1 tablet by mouth daily        nicotine (NICODERM CQ) 21 MG/24HR 24 hr patch Place 1 patch onto the skin daily       pantoprazole (PROTONIX) 40 MG EC tablet Take 1 tablet (40 mg) by mouth daily 30 tablet 3     traZODone (DESYREL) 50 MG tablet Take  mg by mouth At Bedtime       varenicline (CHANTIX MIREYA) 0.5 MG X 11 & 1 MG X 42  tablet Take 0.5 mg tab daily for 3 days, THEN 0.5 mg tab twice daily for 4 days, THEN 1 mg twice daily. 53 tablet 0       ALLERGIES:                  Allergies   Allergen Reactions     Indomethacin Other (See Comments)     Dizziness and disorientation     Tramadol Nausea and Vomiting       SOCIAL HISTORY:                  Social History     Socioeconomic History     Marital status: Single     Spouse name: Not on file     Number of children: Not on file     Years of education: Not on file     Highest education level: Not on file   Occupational History     Not on file   Social Needs     Financial resource strain: Not on file     Food insecurity:     Worry: Not on file     Inability: Not on file     Transportation needs:     Medical: Not on file     Non-medical: Not on file   Tobacco Use     Smoking status: Current Some Day Smoker     Packs/day: 1.50     Years: 52.00     Pack years: 78.00     Smokeless tobacco: Never Used   Substance and Sexual Activity     Alcohol use: Yes     Drug use: No     Sexual activity: Never   Lifestyle     Physical activity:     Days per week: Not on file     Minutes per session: Not on file     Stress: Not on file   Relationships     Social connections:     Talks on phone: Not on file     Gets together: Not on file     Attends Mandaen service: Not on file     Active member of club or organization: Not on file     Attends meetings of clubs or organizations: Not on file     Relationship status: Not on file     Intimate partner violence:     Fear of current or ex partner: Not on file     Emotionally abused: Not on file     Physically abused: Not on file     Forced sexual activity: Not on file   Other Topics Concern     Parent/sibling w/ CABG, MI or angioplasty before 65F 55M? Not Asked   Social History Narrative     Not on file       FAMILY HISTORY:                   Family History   Problem Relation Age of Onset     Glaucoma No family hx of      Macular Degeneration No family hx of           Physical exam Reveals:    O/P: WNL  HEENT: WNL  NECK: No JVD, thyromegaly, or lymphadenopathy  HEART: RRR, no murmurs, gallops, or rubs  LUNGS: CTA bilaterally without rales, wheezes, or rhonchi  GI: NABS, nondistended, nontender, soft  EXT:without cyanosis, clubbing, or edema  NEURO: nonfocal  : no flank tenderness      A/P:    (I73.9) PAD S/P LLE acute arterial occlusion with successful CD lysis 3-5-19 with left SFA PTA upon completion angiography.  (primary encounter diagnosis)  Comment: I still suspect she is going in and out of AF. See comments pertaining to AC below under GI bleed. Check event monitor. Remain off of AC until senein f/u. She is due for stress ABIs and LLE arterial duplex approximately 10-8-19 with Dr. Contreras. See me after event monitor and labs have retuned. She is again advised of likelihood of eventual amputation if she continues to smoke  Plan: Cardiac Event Monitor Adult Pediatric, LipoFit         by NMR, CRP cardiac risk, Lipoprotein (a), HC         SMOKING CESSATION COUNSELING, ASYMPTOMATIC,         3-10 MIN           (K29.80) GI bleed while on ELiquis 8/2/19 secondary to EGD proven duodenitis with recurrent GI bleed after Eliquis restarted and normal EGD/colonscopy 9/3/19  Comment: She has had two GI bleeds while continuing to smoke on Eliquis. Her Eliquis has been held, her tobacco use is continuing. She is advised of need to stop smoking. She is advised that if she truly goes in and out of AF , then she does need to be ACd. Given sever mitral stenosis seen on last TTE 8-2019, I might consider warfarin AC, along with exacting attention to tobacco cessation.  Plan: Continue Protonix, await MateooPatch 30 day event monitor, RTC in October after imaging, labs, event monitor    (F17.200) Tobacco use disorder  Comment: it is again reiterated to her the improtance of tobacco cessation  Plan: HC SMOKING CESSATION COUNSELING, ASYMPTOMATIC,         3-10 MIN           (R09.89) Bilateral  carotid bruits  Comment: this is mild carotid stensois  Plan: repeat duplex two years    (E78.5) Hyperlipidemia LDL goal <100  Comment: check the below  Plan: LipoFit by NMR, CRP cardiac risk, Lipoprotein         (a)               Greater than one half the forty minutes total spent on the pt's visit were spent providing education and counselling to the patient regarding the above matters. Extended time secondary to multiple hospitalizations occurring wherein I was not involved in said care, records review, answering patient questions, etc..

## 2019-09-06 ENCOUNTER — HOSPITAL ENCOUNTER (OUTPATIENT)
Dept: CARDIOLOGY | Facility: CLINIC | Age: 69
Discharge: HOME | End: 2019-09-06
Attending: INTERNAL MEDICINE | Admitting: INTERNAL MEDICINE
Payer: COMMERCIAL

## 2019-09-06 DIAGNOSIS — I73.9 PAD (PERIPHERAL ARTERY DISEASE) (H): ICD-10-CM

## 2019-09-06 PROCEDURE — 93272 ECG/REVIEW INTERPRET ONLY: CPT | Performed by: INTERNAL MEDICINE

## 2019-09-06 PROCEDURE — 93270 REMOTE 30 DAY ECG REV/REPORT: CPT

## 2019-09-06 NOTE — PROGRESS NOTES
Subjective     Rajani Guo is a 68 year old female who presents to clinic today for the following health issues:    HPI   ED/UC Followup:    Facility:  [facility]  Date of visit: 9-2-2019 to 9-4-2019  Reason for visit: internal bleeding and hemoglobin   Current Status: same      Principal Problem:    GIB (gastrointestinal bleeding)  Active Problems:    Type 2 diabetes mellitus without complication, with long-term current use of insulin (H)    Tobacco use disorder    Essential hypertension    Acute blood loss anemia    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 3/4/2019      Still feels Tired  Pt is still smoking    Patient Active Problem List   Diagnosis     Type 2 diabetes mellitus without complication, with long-term current use of insulin (H)     Hyperlipidemia LDL goal <100     Age-related osteoporosis without current pathological fracture     Insomnia, unspecified type     Tobacco use disorder     History of partial thyroidectomy     Newly recognized murmur     Essential hypertension     PVD (peripheral vascular disease) (H)     Claudication of left lower extremity (H)     Anemia     GIB (gastrointestinal bleeding)     Acute blood loss anemia     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 3/4/2019      Past Surgical History:   Procedure Laterality Date     COLONOSCOPY N/A 9/4/2019    Procedure: COLONOSCOPY;  Surgeon: Marie Todd MD;  Location:  GI     ESOPHAGOSCOPY, GASTROSCOPY, DUODENOSCOPY (EGD), COMBINED N/A 9/4/2019    Procedure: ESOPHAGOGASTRODUODENOSCOPY (EGD);  Surgeon: Marie Todd MD;  Location:  GI     IR ANGIOGRAM THROUGH CATHETER FOLLOW UP  3/5/2019     IR LOWER EXTREMITY ANGIOGRAM LEFT  3/4/2019     KNEE SURGERY Right 2013    right knee torn meniscus surgery     OVARY SURGERY  1971    1 ovary removed     RELEASE CARPAL TUNNEL Right 1988     RELEASE TRIGGER FINGER BILATERAL       SHOULDER SURGERY Left     rotator cuff repair, plate  placement     THYROID SURGERY  1988    partial thyroidectomy       Social History     Tobacco Use     Smoking status: Current Some Day Smoker     Packs/day: 1.50     Years: 52.00     Pack years: 78.00     Smokeless tobacco: Never Used   Substance Use Topics     Alcohol use: Yes     Family History   Problem Relation Age of Onset     Glaucoma No family hx of      Macular Degeneration No family hx of          Current Outpatient Medications   Medication Sig Dispense Refill     acetaminophen (ACETAMINOPHEN 8 HOUR) 650 MG CR tablet Take 650 mg by mouth every 8 hours       alendronate (FOSAMAX) 70 MG tablet TAKE 1 TAB BY MOUTH EVERY 7 DAYS. 4 tablet 3     aspirin (ASA) 81 MG EC tablet Take 1 tablet (81 mg) by mouth daily 100 tablet 3     atorvastatin (LIPITOR) 40 MG tablet Take 1 tablet (40 mg) by mouth daily 90 tablet 3     buPROPion (ZYBAN) 150 MG 12 hr tablet Take 1 tablet (150 mg) by mouth 2 times daily Take once daily for first 4 days, then twice daily after that. 60 tablet 3     calcium carb 1250 mg, 500 mg [place],/vitamin D 200 unit (CALCIUM 500/D) 500-200 MG-UNIT per tablet Take 1 tablet by mouth 2 times daily        ferrous sulfate (FE TABS) 325 (65 Fe) MG EC tablet Take 1 tablet (325 mg) by mouth daily 60 tablet 0     glucosamine-chondroitin 500-400 MG CAPS per capsule Take 1 capsule by mouth 2 times daily        insulin detemir (LEVEMIR FLEXTOUCH) 100 UNIT/ML pen INJECT 18-19 UNITS SUBCUTANEOUS AT BEDTIME 15 mL 0     lisinopril (PRINIVIL/ZESTRIL) 2.5 MG tablet Take 1 tablet (2.5 mg) by mouth daily 90 tablet 3     Melatonin 10 MG TABS tablet Take 10 mg by mouth At Bedtime       metFORMIN (GLUCOPHAGE) 1000 MG tablet Take 1 tablet (1,000 mg) by mouth 2 times daily (with meals) 180 tablet 1     Multiple Vitamin (MULTI-VITAMINS) TABS Take 1 tablet by mouth daily        nicotine (NICODERM CQ) 21 MG/24HR 24 hr patch Place 1 patch onto the skin daily       oxybutynin (DITROPAN) 5 MG tablet Take 1 tablet (5 mg) by mouth 2  times daily 60 tablet 0     pantoprazole (PROTONIX) 40 MG EC tablet Take 1 tablet (40 mg) by mouth daily 30 tablet 3     sulfamethoxazole-trimethoprim (BACTRIM DS/SEPTRA DS) 800-160 MG tablet Take 1 tablet by mouth 2 times daily for 7 days 14 tablet 0     traZODone (DESYREL) 50 MG tablet Take  mg by mouth At Bedtime       varenicline (CHANTIX MIREYA) 0.5 MG X 11 & 1 MG X 42 tablet Take 0.5 mg tab daily for 3 days, THEN 0.5 mg tab twice daily for 4 days, THEN 1 mg twice daily. 53 tablet 0     Allergies   Allergen Reactions     Indomethacin Other (See Comments)     Dizziness and disorientation     Tramadol Nausea and Vomiting     Recent Labs   Lab Test 09/04/19  0820 09/03/19  0735  07/30/19  1441  03/04/19  0930 05/31/18  1003 01/18/18  0835  05/08/17   A1C  --   --   --  5.3  --  6.5* 6.1* 6.4*   < >  --    LDL  --   --   --   --   --  44  --  83  --  126   HDL  --   --   --   --   --  59  --  68  --  49   TRIG  --   --   --   --   --  94  --  78  --  127   ALT  --   --   --   --   --   --   --  16  --   --    CR 0.54 0.54   < >  --    < > 0.56  --  0.47*  --  0.79   GFRESTIMATED >90 >90   < >  --    < > >90  --  >90  --  >60   GFRESTBLACK >90 >90   < >  --    < > >90  --  >90  --  >60   POTASSIUM 3.3* 3.6   < >  --    < >  --   --  3.9  --  3.7   TSH  --   --   --  3.42  --   --  2.72  --   --   --     < > = values in this interval not displayed.      BP Readings from Last 3 Encounters:   09/13/19 112/56   09/05/19 (!) 146/74   09/04/19 129/67    Wt Readings from Last 3 Encounters:   09/13/19 65.7 kg (144 lb 12.8 oz)   09/05/19 67.1 kg (148 lb)   09/04/19 64.7 kg (142 lb 9.6 oz)                    Pt  Has  PVD-was on elliquis which has been stopped  Pt also has Urge Urinary Incontinence for a Long time and is getting worse  No I dysuria  Reviewed and updated as needed this visit by Provider         Review of Systems   ROS COMP: CONSTITUTIONAL: NEGATIVE for fever, chills, change in weight  INTEGUMENTARY/SKIN:  "NEGATIVE for worrisome rashes, moles or lesions  ENT/MOUTH: NEGATIVE for ear, mouth and throat problems  RESP:pt still has sob  CV: NEGATIVE for chest pain, palpitations or peripheral edema  GI: NEGATIVE for nausea, abdominal pain, heartburn, or change in bowel habits  PSYCHIATRIC: NEGATIVE for changes in mood or affect   as above  ROS otherwise negative      Objective    /56   Pulse 97   Temp 97.8  F (36.6  C) (Oral)   Resp 14   Ht 1.626 m (5' 4\")   Wt 65.7 kg (144 lb 12.8 oz)   SpO2 100%   BMI 24.85 kg/m    Body mass index is 24.85 kg/m .  Physical Exam   GENERAL: healthy, alert and no distress  EYES: Eyes grossly normal to inspection, PERRL and conjunctivae and sclerae normal  NECK: no adenopathy, no asymmetry, masses, or scars and thyroid normal to palpation  RESP: lungs clear to auscultation - no rales, rhonchi or wheezes  CV: regular rate and rhythm, normal S1 S2, no S3 or S4, no murmur, click or rub, no peripheral edema and peripheral pulses strong  ABDOMEN: soft, nontender, no hepatosplenomegaly, no masses and bowel sounds normal  MS: no gross musculoskeletal defects noted, no edema  SKIN: no suspicious lesions or rashes  PSYCH: mentation appears normal, affect normal/bright    Diagnostic Test Results:  Labs reviewed in Epic  Results for orders placed or performed in visit on 09/13/19 (from the past 24 hour(s))   *UA reflex to Microscopic and Culture ([place] and [facility] (except [place] and [place])   Result Value Ref Range    Color Urine Yellow     Appearance Urine Clear     Glucose Urine Negative NEG^Negative mg/dL    Bilirubin Urine Negative NEG^Negative    Ketones Urine Negative NEG^Negative mg/dL    Specific Gravity Urine 1.020 1.003 - 1.035    Blood Urine Negative NEG^Negative    pH Urine 6.0 5.0 - 7.0 pH    Protein Albumin Urine Negative NEG^Negative mg/dL    Urobilinogen Urine 0.2 0.2 - 1.0 EU/dL    Nitrite Urine Negative NEG^Negative    Leukocyte Esterase Urine Large (A) " NEG^Negative    Source Midstream Urine    Urine Microscopic   Result Value Ref Range    WBC Urine 25-50 (A) OTO5^0 - 5 /HPF    RBC Urine O - 2 OTO2^O - 2 /HPF    Squamous Epithelial /LPF Urine Moderate (A) FEW^Few /LPF    Bacteria Urine Many (A) NEG^Negative /HPF   CBC with platelets   Result Value Ref Range    WBC 11.3 (H) 4.0 - 11.0 10e9/L    RBC Count 3.32 (L) 3.8 - 5.2 10e12/L    Hemoglobin 8.0 (L) 11.7 - 15.7 g/dL    Hematocrit 26.7 (L) 35.0 - 47.0 %    MCV 80 78 - 100 fl    MCH 24.1 (L) 26.5 - 33.0 pg    MCHC 30.0 (L) 31.5 - 36.5 g/dL    RDW 20.8 (H) 10.0 - 15.0 %    Platelet Count 617 (H) 150 - 450 10e9/L           Assessment & Plan     1. Acute blood loss anemia  Advised recheck HGB in 4 days  If any worsening SOB go to ER  - CBC with platelets  - Urine Microscopic  - CBC with platelets; Future    2. Gastrointestinal hemorrhage associated with duodenal ulcer  Had EGD-notes reviewed     3. PVD (peripheral vascular disease) (H)  On asa    4. 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 Duodenitis  On Protonix    6. Urge incontinence of urine    - *UA reflex to Microscopic and Culture ([place] and [facility] (except [place] and [place])  - oxybutynin (DITROPAN) 5 MG tablet; Take 1 tablet (5 mg) by mouth 2 times daily  Dispense: 60 tablet; Refill: 0    7. Nonspecific finding on examination of urine    - Urine Culture Aerobic Bacterial    8. Need for prophylactic vaccination and inoculation against influenza  Advised   - INFLUENZA (HIGH DOSE) 3 VALENT VACCINE [05284]    9. Acute cystitis without hematuria  SEE [facility] care orders  The potential side effects of this medication have been discussed with the patient.  Call if any significant problems with these are experienced.  Follow up if not better  - sulfamethoxazole-trimethoprim (BACTRIM DS/SEPTRA DS) 800-160 MG tablet; Take 1 tablet by mouth 2 times daily for 7 days  Dispense: 14 tablet; Refill: 0     Tobacco Cessation:   reports  that she has been smoking.  She has a 78.00 pack-year smoking history. She has never used smokeless tobacco.  Tobacco Cessation Action Plan: Pharmacotherapies : Chantix            Return in about 5 days (around 9/18/2019) for Lab Work.    Tamiko Coley MD  [facility]

## 2019-09-06 NOTE — PROGRESS NOTES
Clinic Care Coordination Contact  [facility]/Voicemail    Referral Source: IP Report    Clinical Data: Care Coordinator Outreach    Outreach attempted x 2.  Left message on patient's voicemail with call back information and requested return call.    Patient was seen by vascular clinic on 9/5/19. She remains off Eliquis. Patient continues to smoke, was encouraged to stop. Patient is having 30 day event monitor placed today 9/6/19.  Patient has follow up appointment with PCP scheduled for 9/13/19    Plan: Care Coordinator will send care coordination introduction letter with care coordinator contact information and explanation of care coordination services via mail. Care Coordinator will do no further outreaches at this time.    Jeanne Nunes RN, [facility] RN Coordinator  [facility]   9/6/2019    9:35 AM  [phone]

## 2019-09-13 ENCOUNTER — TELEPHONE (OUTPATIENT)
Dept: FAMILY MEDICINE | Facility: CLINIC | Age: 69
End: 2019-09-13

## 2019-09-13 ENCOUNTER — OFFICE VISIT (OUTPATIENT)
Dept: FAMILY MEDICINE | Facility: CLINIC | Age: 69
End: 2019-09-13
Payer: COMMERCIAL

## 2019-09-13 VITALS
BODY MASS INDEX: 24.72 KG/M2 | RESPIRATION RATE: 14 BRPM | DIASTOLIC BLOOD PRESSURE: 56 MMHG | WEIGHT: 144.8 LBS | HEIGHT: 64 IN | SYSTOLIC BLOOD PRESSURE: 112 MMHG | OXYGEN SATURATION: 100 % | TEMPERATURE: 97.8 F | HEART RATE: 97 BPM

## 2019-09-13 DIAGNOSIS — N30.00 ACUTE CYSTITIS WITHOUT HEMATURIA: ICD-10-CM

## 2019-09-13 DIAGNOSIS — I73.9 PVD (PERIPHERAL VASCULAR DISEASE) (H): ICD-10-CM

## 2019-09-13 DIAGNOSIS — Z23 NEED FOR PROPHYLACTIC VACCINATION AND INOCULATION AGAINST INFLUENZA: ICD-10-CM

## 2019-09-13 DIAGNOSIS — I70.90 ARTERIAL OCCLUSION: ICD-10-CM

## 2019-09-13 DIAGNOSIS — D62 ACUTE BLOOD LOSS ANEMIA: Primary | ICD-10-CM

## 2019-09-13 DIAGNOSIS — N39.41 URGE INCONTINENCE OF URINE: ICD-10-CM

## 2019-09-13 DIAGNOSIS — K29.80 DUODENITIS: ICD-10-CM

## 2019-09-13 DIAGNOSIS — K26.4 GASTROINTESTINAL HEMORRHAGE ASSOCIATED WITH DUODENAL ULCER: ICD-10-CM

## 2019-09-13 DIAGNOSIS — R82.90 NONSPECIFIC FINDING ON EXAMINATION OF URINE: ICD-10-CM

## 2019-09-13 LAB
ALBUMIN UR-MCNC: NEGATIVE MG/DL
APPEARANCE UR: CLEAR
BACTERIA #/AREA URNS HPF: ABNORMAL /HPF
BILIRUB UR QL STRIP: NEGATIVE
COLOR UR AUTO: YELLOW
ERYTHROCYTE [DISTWIDTH] IN BLOOD BY AUTOMATED COUNT: 20.8 % (ref 10–15)
GLUCOSE UR STRIP-MCNC: NEGATIVE MG/DL
HCT VFR BLD AUTO: 26.7 % (ref 35–47)
HGB BLD-MCNC: 8 G/DL (ref 11.7–15.7)
HGB UR QL STRIP: NEGATIVE
KETONES UR STRIP-MCNC: NEGATIVE MG/DL
LEUKOCYTE ESTERASE UR QL STRIP: ABNORMAL
MCH RBC QN AUTO: 24.1 PG (ref 26.5–33)
MCHC RBC AUTO-ENTMCNC: 30 G/DL (ref 31.5–36.5)
MCV RBC AUTO: 80 FL (ref 78–100)
NITRATE UR QL: NEGATIVE
NON-SQ EPI CELLS #/AREA URNS LPF: ABNORMAL /LPF
PH UR STRIP: 6 PH (ref 5–7)
PLATELET # BLD AUTO: 617 10E9/L (ref 150–450)
RBC # BLD AUTO: 3.32 10E12/L (ref 3.8–5.2)
RBC #/AREA URNS AUTO: ABNORMAL /HPF
SOURCE: ABNORMAL
SP GR UR STRIP: 1.02 (ref 1–1.03)
UROBILINOGEN UR STRIP-ACNC: 0.2 EU/DL (ref 0.2–1)
WBC # BLD AUTO: 11.3 10E9/L (ref 4–11)
WBC #/AREA URNS AUTO: ABNORMAL /HPF

## 2019-09-13 PROCEDURE — G0008 ADMIN INFLUENZA VIRUS VAC: HCPCS | Performed by: FAMILY MEDICINE

## 2019-09-13 PROCEDURE — 81001 URINALYSIS AUTO W/SCOPE: CPT | Performed by: FAMILY MEDICINE

## 2019-09-13 PROCEDURE — 90662 IIV NO PRSV INCREASED AG IM: CPT | Performed by: FAMILY MEDICINE

## 2019-09-13 PROCEDURE — 87186 SC STD MICRODIL/AGAR DIL: CPT | Performed by: FAMILY MEDICINE

## 2019-09-13 PROCEDURE — 36415 COLL VENOUS BLD VENIPUNCTURE: CPT | Performed by: FAMILY MEDICINE

## 2019-09-13 PROCEDURE — 99214 OFFICE O/P EST MOD 30 MIN: CPT | Mod: 25 | Performed by: FAMILY MEDICINE

## 2019-09-13 PROCEDURE — 85027 COMPLETE CBC AUTOMATED: CPT | Performed by: FAMILY MEDICINE

## 2019-09-13 PROCEDURE — 87086 URINE CULTURE/COLONY COUNT: CPT | Performed by: FAMILY MEDICINE

## 2019-09-13 PROCEDURE — 87088 URINE BACTERIA CULTURE: CPT | Performed by: FAMILY MEDICINE

## 2019-09-13 RX ORDER — SULFAMETHOXAZOLE/TRIMETHOPRIM 800-160 MG
1 TABLET ORAL 2 TIMES DAILY
Qty: 14 TABLET | Refills: 0 | Status: SHIPPED | OUTPATIENT
Start: 2019-09-13 | End: 2019-09-17

## 2019-09-13 RX ORDER — OXYBUTYNIN CHLORIDE 5 MG/1
5 TABLET ORAL 2 TIMES DAILY
Qty: 60 TABLET | Refills: 0 | Status: SHIPPED | OUTPATIENT
Start: 2019-09-13 | End: 2019-10-13

## 2019-09-13 ASSESSMENT — MIFFLIN-ST. JEOR: SCORE: 1176.81

## 2019-09-13 NOTE — TELEPHONE ENCOUNTER
Left detailed message for patient with information per OK in demographics section.  Advised to call RN hotline [phone] If questions.     Margareth Galan RN

## 2019-09-13 NOTE — TELEPHONE ENCOUNTER
Please call Pt-possible UTI on ua   Take septra   Drink Lots of fluids   UC is pending     Dr. Coley

## 2019-09-16 ENCOUNTER — TELEPHONE (OUTPATIENT)
Dept: FAMILY MEDICINE | Facility: CLINIC | Age: 69
End: 2019-09-16

## 2019-09-16 DIAGNOSIS — I73.9 CLAUDICATION OF LEFT LOWER EXTREMITY (H): ICD-10-CM

## 2019-09-16 DIAGNOSIS — G47.00 INSOMNIA, UNSPECIFIED TYPE: Primary | ICD-10-CM

## 2019-09-16 RX ORDER — TRAZODONE HYDROCHLORIDE 50 MG/1
50-100 TABLET, FILM COATED ORAL AT BEDTIME
Qty: 90 TABLET | Refills: 1 | Status: SHIPPED | OUTPATIENT
Start: 2019-09-16 | End: 2019-12-07

## 2019-09-16 RX ORDER — GABAPENTIN 300 MG/1
CAPSULE ORAL
Qty: 90 CAPSULE | Refills: 1 | Status: SHIPPED | OUTPATIENT
Start: 2019-09-16 | End: 2020-01-09

## 2019-09-16 NOTE — TELEPHONE ENCOUNTER
Called and spoke with patient & notified of provider's results as written.   Verbalizes understanding & no further questions.     Margareth Galan RN

## 2019-09-16 NOTE — TELEPHONE ENCOUNTER
Reason for call:  Symptom   Symptom or request: Restless leg syndrome and not being able to sleep    Duration (how long have symptoms been present): since Friday was taken off Trazodone and the Melatonin does not work for her. She was also taken of the Gabapentin and states she needs that for her restless legs. Please call.  Have you been treated for this before? Yes    Additional comments: Please call to discuss medications    Phone number to reach patient:  Home number on file [phone] (home)    Best Time:  any    Can we leave a detailed message on this number?  YES

## 2019-09-16 NOTE — TELEPHONE ENCOUNTER
Huddled with Dr. Coley.   OK for Trazodone and gabapentin.   Rx's sent to pharmacy.   Patient notified.     Margareth Galan RN

## 2019-09-16 NOTE — TELEPHONE ENCOUNTER
Patient was seen on 9/13/19 by Dr. Coley.   Patient was previously prescribed trazodone 50 mg, 1 tablet at bedtime but hospital notes state take  mg at bedtime.     Patient was also previously prescribed gabapentin 300 mg 1-2 times daily but was discontinued on 9/2/19 at hospital.    Margareth Galan RN

## 2019-09-17 LAB
BACTERIA SPEC CULT: ABNORMAL
SPECIMEN SOURCE: ABNORMAL

## 2019-09-17 RX ORDER — LEVOFLOXACIN 250 MG/1
250 TABLET, FILM COATED ORAL DAILY
Qty: 5 TABLET | Refills: 0 | Status: SHIPPED | OUTPATIENT
Start: 2019-09-17 | End: 2019-09-22

## 2019-09-17 NOTE — TELEPHONE ENCOUNTER
Spoke with patient. Reports that pharmacy did not receive gabapentin Rx and then she stated her Trazodone Rx needs to be 100 mg tablets because she is under so much stress and needs the increased dose.   Current Rx states Trazodone 50 mg tablets, take 1-2 at night.     Margareth Galan RN

## 2019-09-17 NOTE — TELEPHONE ENCOUNTER
Huddled with Dr. Coley.   Patient can just take 2 of the 50 mg tablets. No need to send in 100 mg tablets.  Patient should take the lowest dose if possible.     Patient notified & verbalized understanding.     Margareth Galan RN

## 2019-09-20 DIAGNOSIS — D62 ACUTE BLOOD LOSS ANEMIA: ICD-10-CM

## 2019-09-20 LAB
ERYTHROCYTE [DISTWIDTH] IN BLOOD BY AUTOMATED COUNT: 22 % (ref 10–15)
HCT VFR BLD AUTO: 28.9 % (ref 35–47)
HGB BLD-MCNC: 8.4 G/DL (ref 11.7–15.7)
MCH RBC QN AUTO: 23.5 PG (ref 26.5–33)
MCHC RBC AUTO-ENTMCNC: 29.1 G/DL (ref 31.5–36.5)
MCV RBC AUTO: 81 FL (ref 78–100)
PLATELET # BLD AUTO: 549 10E9/L (ref 150–450)
RBC # BLD AUTO: 3.58 10E12/L (ref 3.8–5.2)
WBC # BLD AUTO: 10.5 10E9/L (ref 4–11)

## 2019-09-20 PROCEDURE — 36415 COLL VENOUS BLD VENIPUNCTURE: CPT | Performed by: FAMILY MEDICINE

## 2019-09-20 PROCEDURE — 85027 COMPLETE CBC AUTOMATED: CPT | Performed by: FAMILY MEDICINE

## 2019-09-24 DIAGNOSIS — E11.9 TYPE 2 DIABETES MELLITUS WITHOUT COMPLICATION, WITH LONG-TERM CURRENT USE OF INSULIN (H): ICD-10-CM

## 2019-09-24 DIAGNOSIS — Z79.4 TYPE 2 DIABETES MELLITUS WITHOUT COMPLICATION, WITH LONG-TERM CURRENT USE OF INSULIN (H): ICD-10-CM

## 2019-09-30 DIAGNOSIS — F17.200 TOBACCO USE DISORDER: ICD-10-CM

## 2019-09-30 DIAGNOSIS — E78.5 HYPERLIPIDEMIA LDL GOAL <100: ICD-10-CM

## 2019-09-30 DIAGNOSIS — D62 ACUTE BLOOD LOSS ANEMIA: ICD-10-CM

## 2019-09-30 DIAGNOSIS — K29.80 DUODENITIS: ICD-10-CM

## 2019-09-30 DIAGNOSIS — Z79.4 TYPE 2 DIABETES MELLITUS WITHOUT COMPLICATION, WITH LONG-TERM CURRENT USE OF INSULIN (H): ICD-10-CM

## 2019-09-30 DIAGNOSIS — E11.9 TYPE 2 DIABETES MELLITUS WITHOUT COMPLICATION, WITH LONG-TERM CURRENT USE OF INSULIN (H): ICD-10-CM

## 2019-09-30 DIAGNOSIS — I73.9 PAD (PERIPHERAL ARTERY DISEASE) (H): ICD-10-CM

## 2019-09-30 LAB — CRP SERPL HS-MCNC: 0.7 MG/L

## 2019-09-30 PROCEDURE — 99000 SPECIMEN HANDLING OFFICE-LAB: CPT | Performed by: INTERNAL MEDICINE

## 2019-09-30 PROCEDURE — 83704 LIPOPROTEIN BLD QUAN PART: CPT | Mod: 90 | Performed by: INTERNAL MEDICINE

## 2019-09-30 PROCEDURE — 80061 LIPID PANEL: CPT | Mod: 59 | Performed by: INTERNAL MEDICINE

## 2019-09-30 PROCEDURE — 83695 ASSAY OF LIPOPROTEIN(A): CPT | Mod: 90 | Performed by: INTERNAL MEDICINE

## 2019-09-30 PROCEDURE — 36415 COLL VENOUS BLD VENIPUNCTURE: CPT | Performed by: INTERNAL MEDICINE

## 2019-09-30 PROCEDURE — 86141 C-REACTIVE PROTEIN HS: CPT | Performed by: INTERNAL MEDICINE

## 2019-09-30 RX ORDER — BUPROPION HYDROCHLORIDE 150 MG/1
TABLET, FILM COATED, EXTENDED RELEASE ORAL
Qty: 60 TABLET | Refills: 3 | Status: SHIPPED | OUTPATIENT
Start: 2019-09-30 | End: 2020-01-23

## 2019-09-30 NOTE — TELEPHONE ENCOUNTER
buPROPion (ZYBAN) 150 MG 12 hr tablet    Last Written Prescription Date:  4/3/19  Last Fill Quantity: 60,  # refills: 3   Last office visit: 9/5/19    Future Office Visit:   Next 5 appointments (look out 90 days)    Oct 10, 2019 11:20 AM CDT  Office Visit with Tamiko Coley MD  [facility] [address]  [place] [address]  [phone]   Oct 14, 2019  1:10 PM CDT  Return Visit with Tab Barajas MD  [facility] ([facility]) [address]  [facility] [id number] [phone]         Unable to fill per [facility] protocol.  Medication and pharmacy loaded.    Kristal Pereyra RN BSN  [facility]

## 2019-10-01 LAB — LPA SERPL-MCNC: <6 MG/DL

## 2019-10-03 LAB
CHOLEST SERPL-MCNC: 130 MG/DL
HDL SERPL QN: 9.7 NM
HDL SERPL-SCNC: 33.1 UMOL/L
HDLC SERPL-MCNC: 58 MG/DL (ref 40–59)
HLD.LARGE SERPL-SCNC: 10.2 UMOL/L
LDL SERPL QN: 21.4 NM
LDL SERPL-SCNC: 610 NMOL/L
LDL SMALL SERPL-SCNC: <165 NMOL/L
LDLC SERPL CALC-MCNC: 60 MG/DL
PATHOLOGY STUDY: ABNORMAL
TRIGL SERPL-MCNC: 62 MG/DL (ref 30–149)
VLDL LARGE SERPL-SCNC: 2.1 NMOL/L
VLDL SERPL QN: 51.5 NM

## 2019-10-03 NOTE — TELEPHONE ENCOUNTER
Routing refill request to provider for review/approval because:  Clarify correct dose.  Marci Irby RN

## 2019-10-13 DIAGNOSIS — G47.00 INSOMNIA, UNSPECIFIED TYPE: ICD-10-CM

## 2019-10-13 DIAGNOSIS — N39.41 URGE INCONTINENCE OF URINE: ICD-10-CM

## 2019-10-14 NOTE — TELEPHONE ENCOUNTER
Controlled Substance Refill Request for traZODone (DESYREL) 50 MG tablet  Problem List Complete:  No     PROVIDER TO CONSIDER COMPLETION OF PROBLEM LIST AND OVERVIEW/CONTROLLED SUBSTANCE AGREEMENT    Last Written Prescription Date:  9/16/2019  Last Fill Quantity: 90,   # refills: 1    THE MOST RECENT OFFICE VISIT MUST BE WITHIN THE PAST 3 MONTHS. AT LEAST ONE FACE TO FACE VISIT MUST OCCUR EVERY 6 MONTHS. ADDITIONAL VISITS CAN BE VIRTUAL.  (THIS STATEMENT SHOULD BE DELETED.)    Last Office Visit with [facility] primary care provider: 9/13/2019    Future Office visit:   Next 5 appointments (look out 90 days)    Oct 21, 2019  2:10 PM CDT  Return Visit with Tab Barajas MD  [facility] ([facility] [address]  [place] [address]  [phone]          Controlled substance agreement:   Encounter-Level CSA:    There are no encounter-level csa.     Patient-Level CSA:    There are no patient-level csa.         Last Urine Drug Screen: No results found for: CDAUT, No results found for: COMDAT, No results found for: THC13, PCP13, COC13, MAMP13, OPI13, AMP13, BZO13, TCA13, MTD13, BAR13, OXY13, PPX13, BUP13     Processing:  unknown     [email or web address]       checked in past 3 months?  No, route to RN

## 2019-10-16 RX ORDER — OXYBUTYNIN CHLORIDE 5 MG/1
TABLET ORAL
Qty: 90 TABLET | Refills: 0 | Status: SHIPPED | OUTPATIENT
Start: 2019-10-16 | End: 2019-11-25

## 2019-10-16 RX ORDER — TRAZODONE HYDROCHLORIDE 50 MG/1
TABLET, FILM COATED ORAL
Qty: 90 TABLET | Refills: 0 | OUTPATIENT
Start: 2019-10-16

## 2019-10-16 NOTE — TELEPHONE ENCOUNTER
Note sent to pharmacy for trazodone: Rx was sent on 09/16/19 electronically for 90 tablets, 1 refill. Please dispense from current Rx or call [phone] with questions.    Prescription approved per [facility] Ref[place] Protocol. (oxybutynin)

## 2019-10-18 RX ORDER — PANTOPRAZOLE SODIUM 40 MG/1
40 TABLET, DELAYED RELEASE ORAL DAILY
Qty: 30 TABLET | Refills: 0 | Status: SHIPPED | OUTPATIENT
Start: 2019-10-18 | End: 2019-11-11

## 2019-10-18 RX ORDER — FERROUS SULFATE 325(65) MG
325 TABLET, DELAYED RELEASE (ENTERIC COATED) ORAL DAILY
Qty: 60 TABLET | Refills: 0 | Status: SHIPPED | OUTPATIENT
Start: 2019-10-18 | End: 2020-09-16

## 2019-10-18 NOTE — TELEPHONE ENCOUNTER
Reason for Call:  Medication or medication refill:    Do you use a [facility]?  Name of the pharmacy and phone number for the current request:    [facility], [phone]    Name of the medication requested:   Pantopraxole 40 MG   Ferrous Sulfate     Other request: n/a     Can we leave a detailed message on this number? YES    Phone number patient can be reached at: Home number on file [phone] (home)    Best Time: Any     Call taken on 10/18/2019 at 11:19 AM by Cathy Burns

## 2019-10-18 NOTE — TELEPHONE ENCOUNTER
"Routing refill request to provider for review/approval because:  Ferrous sulfate previously prescribed by another provider  No diagnosis of osteoporosis on record    Requested Prescriptions   Pending Prescriptions Disp Refills     ferrous sulfate (FE TABS) 325 (65 Fe) MG EC tablet 60 tablet 0     Sig: Take 1 tablet (325 mg) by mouth daily       Iron Supplements Passed - 10/18/2019 12:13 PM        Passed - Patient is 12 years of age or older        Passed - Recent (12 mo) or future (30 days) visit within the authorizing provider's specialty     Patient has had an office visit with the authorizing provider or a provider within the authorizing providers department within the previous 12 mos or has a future within next 30 days. See \"Patient Info\" tab in inbasket, or \"Choose Columns\" in Meds & Orders section of the refill encounter.              Passed - Hgb OR Hct on record within the past 12 mos.     Patient need only have had a HGB or HCT on file in the past 12 mos. That result does not need to be normal.    Recent Labs   Lab Test 09/20/19  1041 09/13/19  1004 09/04/19  0820   HGB 8.4* 8.0* 8.3*     Recent Labs   Lab Test 09/20/19  1041 09/13/19  1004 09/04/19  0820   HCT 28.9* 26.7* 26.6*       Please verify a HGB or HCT has been checked SINCE THE LAST DOSE CHANGE.            Passed - Medication is active on med list        pantoprazole (PROTONIX) 40 MG EC tablet 30 tablet 3     Sig: Take 1 tablet (40 mg) by mouth daily       PPI Protocol Failed - 10/18/2019 12:13 PM        Failed - No diagnosis of osteoporosis on record        Passed - Not on Clopidogrel (unless Pantoprazole ordered)        Passed - Recent (12 mo) or future (30 days) visit within the authorizing provider's specialty     Patient has had an office visit with the authorizing provider or a provider within the authorizing providers department within the previous 12 mos or has a future within next 30 days. See \"Patient Info\" tab in inbasket, or \"Choose " "Columns\" in Meds & Orders section of the refill encounter.              Passed - Medication is active on med list        Passed - Patient is age 18 or older        Passed - No active pregnacy on record        Passed - No positive pregnancy test in past 12 months      Signed Prescriptions Disp Refills    insulin detemir (LEVEMIR FLEXTOUCH) 100 UNIT/ML pen 17.1 mL 0     Sig: Inject 19 Units Subcutaneous At Bedtime       Long Acting Insulin Protocol Passed - 10/2/2019  9:12 PM        Passed - Blood pressure less than 140/90 in past 6 months     BP Readings from Last 3 Encounters:   09/13/19 112/56   09/05/19 (!) 146/74   09/04/19 129/67                 Passed - LDL on file in past 12 months     Recent Labs   Lab Test 03/04/19  0930   LDL 44             Passed - Microalbumin on file in past 12 months     Recent Labs   Lab Test 03/13/19  1215   MICROL 6   UMALCR 15.07             Passed - Serum creatinine on file in past 12 months     Recent Labs   Lab Test 09/04/19  0820   CR 0.54             Passed - HgbA1C in past 3 or 6 months     If HgbA1C is 8 or greater, it needs to be on file within the past 3 months.  If less than 8, must be on file within the past 6 months.     Recent Labs   Lab Test 07/30/19  1441   A1C 5.3             Passed - Medication is active on med list        Passed - Patient is age 18 or older        Passed - Recent (6 mo) or future (30 days) visit within the authorizing provider's specialty     Patient had office visit in the last 6 months or has a visit in the next 30 days with authorizing provider or within the authorizing provider's specialty.  See \"Patient Info\" tab in inbasket, or \"Choose Columns\" in Meds & Orders section of the refill encounter.            Margareth Galan RN  "

## 2019-10-21 ENCOUNTER — OFFICE VISIT (OUTPATIENT)
Dept: OTHER | Facility: CLINIC | Age: 69
End: 2019-10-21
Attending: INTERNAL MEDICINE
Payer: COMMERCIAL

## 2019-10-21 VITALS
RESPIRATION RATE: 16 BRPM | HEART RATE: 86 BPM | WEIGHT: 149 LBS | SYSTOLIC BLOOD PRESSURE: 123 MMHG | DIASTOLIC BLOOD PRESSURE: 69 MMHG | HEIGHT: 64 IN | OXYGEN SATURATION: 99 % | BODY MASS INDEX: 25.44 KG/M2

## 2019-10-21 DIAGNOSIS — I73.9 PAD (PERIPHERAL ARTERY DISEASE) (H): Primary | ICD-10-CM

## 2019-10-21 PROCEDURE — G0463 HOSPITAL OUTPT CLINIC VISIT: HCPCS

## 2019-10-21 PROCEDURE — 99214 OFFICE O/P EST MOD 30 MIN: CPT | Mod: ZP | Performed by: INTERNAL MEDICINE

## 2019-10-21 ASSESSMENT — MIFFLIN-ST. JEOR: SCORE: 1195.86

## 2019-10-21 NOTE — PROGRESS NOTES
"Rajani Guo is a 68 year old female who presents for:  Chief Complaint   Patient presents with     RECHECK     f/u 30 day zio patch done 09/06/19, labs done 09/30/19. panfilo 09/05/19        Vitals:    Vitals:    10/21/19 1413   BP: 123/69   BP Location: Right arm   Patient Position: Chair   Cuff Size: Adult Regular   Pulse: 86   Resp: 16   SpO2: 99%   Weight: 149 lb (67.6 kg)   Height: 5' 4\" (1.626 m)       BMI:  Estimated body mass index is 25.58 kg/m  as calculated from the following:    Height as of this encounter: 5' 4\" (1.626 m).    Weight as of this encounter: 149 lb (67.6 kg).    Pain Score:  Data Unavailable        Conner Dodge CMA    "

## 2019-10-21 NOTE — PROGRESS NOTES
Rajani Guo is a 68 year old female who is presenting at the current time to discuss her diagnosi(es) of     PAD (peripheral artery disease) (H)     Review Of Systems  Skin: negative  Eyes: negative  Ears/Nose/Throat: negative  Respiratory: No shortness of breath, dyspnea on exertion, cough, or hemoptysis  Cardiovascular: negative  Gastrointestinal: negative  Genitourinary: negative  Musculoskeletal: negative  Neurologic: negative  Psychiatric: negative  Hematologic/Lymphatic/Immunologic: negative  Endocrine: negative     PAST MEDICAL HISTORY:                  Past Medical History:   Diagnosis Date     Age-related osteoporosis without current pathological fracture 1/18/2018     Clot     in  left leg     History of partial thyroidectomy 6/2/2018     Hyperlipidemia LDL goal <100 1/18/2018     Hypertension      Insomnia, unspecified type 1/18/2018     Tobacco use disorder      Type 2 diabetes mellitus without complication, with long-term current use of insulin (H) 1/18/2018       PAST SURGICAL HISTORY:                  Past Surgical History:   Procedure Laterality Date     COLONOSCOPY N/A 9/4/2019    Procedure: COLONOSCOPY;  Surgeon: Marie Todd MD;  Location:  GI     ESOPHAGOSCOPY, GASTROSCOPY, DUODENOSCOPY (EGD), COMBINED N/A 9/4/2019    Procedure: ESOPHAGOGASTRODUODENOSCOPY (EGD);  Surgeon: Marie Todd MD;  Location:  GI     IR ANGIOGRAM THROUGH CATHETER FOLLOW UP  3/5/2019     IR LOWER EXTREMITY ANGIOGRAM LEFT  3/4/2019     KNEE SURGERY Right 2013    right knee torn meniscus surgery     OVARY SURGERY  1971    1 ovary removed     RELEASE CARPAL TUNNEL Right 1988     RELEASE TRIGGER FINGER BILATERAL       SHOULDER SURGERY Left     rotator cuff repair, plate placement     THYROID SURGERY  1988    partial thyroidectomy       CURRENT MEDICATIONS:                  Current Outpatient Medications   Medication Sig Dispense Refill     acetaminophen (ACETAMINOPHEN 8 HOUR) 650 MG CR tablet Take  650 mg by mouth every 8 hours       alendronate (FOSAMAX) 70 MG tablet TAKE 1 TAB BY MOUTH EVERY 7 DAYS. 4 tablet 3     aspirin (ASA) 81 MG EC tablet Take 1 tablet (81 mg) by mouth daily 100 tablet 3     atorvastatin (LIPITOR) 40 MG tablet Take 1 tablet (40 mg) by mouth daily 90 tablet 3     buPROPion (ZYBAN) 150 MG 12 hr tablet TAKE 1 TABLET (150 MG) BY MOUTH ONCE DAILY X4 DAYS, THEN TWICE DAILY AFTER THAT. 60 tablet 3     calcium carb 1250 mg, 500 mg [place],/vitamin D 200 unit (CALCIUM 500/D) 500-200 MG-UNIT per tablet Take 1 tablet by mouth 2 times daily        ferrous sulfate (FE TABS) 325 (65 Fe) MG EC tablet Take 1 tablet (325 mg) by mouth daily 60 tablet 0     gabapentin (NEURONTIN) 300 MG capsule TAKE 1 CAPSULE (300 MG) BY MOUTH ONE OR TWO TIMES DAILY 90 capsule 1     glucosamine-chondroitin 500-400 MG CAPS per capsule Take 1 capsule by mouth 2 times daily        insulin detemir (LEVEMIR FLEXTOUCH) 100 UNIT/ML pen Inject 19 Units Subcutaneous At Bedtime 17.1 mL 0     insulin detemir (LEVEMIR FLEXTOUCH) 100 UNIT/ML pen INJECT 18-19 UNITS SUBCUTANEOUS AT BEDTIME 15 mL 0     lisinopril (PRINIVIL/ZESTRIL) 2.5 MG tablet Take 1 tablet (2.5 mg) by mouth daily 90 tablet 3     Melatonin 10 MG TABS tablet Take 10 mg by mouth At Bedtime       metFORMIN (GLUCOPHAGE) 1000 MG tablet TAKE 1 TABLET (1,000 MG) BY MOUTH 2 TIMES DAILY (WITH MEALS) 180 tablet 0     Multiple Vitamin (MULTI-VITAMINS) TABS Take 1 tablet by mouth daily        nicotine (NICODERM CQ) 21 MG/24HR 24 hr patch Place 1 patch onto the skin daily       oxybutynin (DITROPAN) 5 MG tablet TAKE 1 TABLET BY MOUTH TWICE A DAY 90 tablet 0     pantoprazole (PROTONIX) 40 MG EC tablet Take 1 tablet (40 mg) by mouth daily 30 tablet 0     traZODone (DESYREL) 50 MG tablet Take 1-2 tablets ( mg) by mouth At Bedtime 90 tablet 1     traZODone (DESYREL) 50 MG tablet Take  mg by mouth At Bedtime       varenicline (CHANTIX MIREYA) 0.5 MG X 11 & 1 MG X 42 tablet Take  0.5 mg tab daily for 3 days, THEN 0.5 mg tab twice daily for 4 days, THEN 1 mg twice daily. 53 tablet 0       ALLERGIES:                  Allergies   Allergen Reactions     Indomethacin Other (See Comments)     Dizziness and disorientation     Tramadol Nausea and Vomiting       SOCIAL HISTORY:                  Social History     Socioeconomic History     Marital status: Single     Spouse name: Not on file     Number of children: Not on file     Years of education: Not on file     Highest education level: Not on file   Occupational History     Not on file   Social Needs     Financial resource strain: Not on file     Food insecurity:     Worry: Not on file     Inability: Not on file     Transportation needs:     Medical: Not on file     Non-medical: Not on file   Tobacco Use     Smoking status: Current Some Day Smoker     Packs/day: 1.50     Years: 52.00     Pack years: 78.00     Smokeless tobacco: Never Used   Substance and Sexual Activity     Alcohol use: Yes     Drug use: No     Sexual activity: Never   Lifestyle     Physical activity:     Days per week: Not on file     Minutes per session: Not on file     Stress: Not on file   Relationships     Social connections:     Talks on phone: Not on file     Gets together: Not on file     Attends Mosque service: Not on file     Active member of club or organization: Not on file     Attends meetings of clubs or organizations: Not on file     Relationship status: Not on file     Intimate partner violence:     Fear of current or ex partner: Not on file     Emotionally abused: Not on file     Physically abused: Not on file     Forced sexual activity: Not on file   Other Topics Concern     Parent/sibling w/ CABG, MI or angioplasty before 65F 55M? Not Asked   Social History Narrative     Not on file       FAMILY HISTORY:                   Family History   Problem Relation Age of Onset     Glaucoma No family hx of      Macular Degeneration No family hx of           Physical  exam Reveals:    O/P: WNL  HEENT: WNL  NECK: No JVD, thyromegaly, or lymphadenopathy  HEART: RRR, no murmurs, gallops, or rubs  LUNGS: CTA bilaterally without rales, wheezes, or rhonchi  GI: NABS, nondistended, nontender, soft  EXT:without cyanosis, clubbing, or edema  NEURO: nonfocal  : no flank tenderness      A/P:    (I73.9) PAD S/P LLE acute arterial occlusion with successful CD lysis 3-5-19 with left SFA PTA upon completion angiography.  (primary encounter diagnosis)  Comment: her ZioPatch was reviewed. She had no AF. She did have runs of SVT which she tolerates and does not want to go on meds for  Plan: no AC needed. RTC prn. Imaging and f/u with IR will be arranged.

## 2019-10-29 ENCOUNTER — HOSPITAL ENCOUNTER (OUTPATIENT)
Dept: ULTRASOUND IMAGING | Facility: CLINIC | Age: 69
End: 2019-10-29
Attending: RADIOLOGY
Payer: COMMERCIAL

## 2019-10-29 ENCOUNTER — HOSPITAL ENCOUNTER (OUTPATIENT)
Dept: ULTRASOUND IMAGING | Facility: CLINIC | Age: 69
Discharge: HOME | End: 2019-10-29
Attending: RADIOLOGY | Admitting: RADIOLOGY
Payer: COMMERCIAL

## 2019-10-29 DIAGNOSIS — I73.9 PAD (PERIPHERAL ARTERY DISEASE) (H): ICD-10-CM

## 2019-10-29 DIAGNOSIS — G47.00 INSOMNIA, UNSPECIFIED TYPE: ICD-10-CM

## 2019-10-29 PROCEDURE — 93924 LWR XTR VASC STDY BILAT: CPT

## 2019-10-29 PROCEDURE — 93926 LOWER EXTREMITY STUDY: CPT | Mod: LT

## 2019-10-29 NOTE — TELEPHONE ENCOUNTER
Reason for call:  Medication   If this is a refill request, has the caller requested the refill from the pharmacy already? Yes  Will the patient be using a [facility]? No  Name of the pharmacy and phone number for the current request: [id number] - [place], [address] [place] [facility]  [phone]    Name of the medication requested: Trazadone    Other request: Pharmacy told patient Rx was denied and to call the clinic    Phone number to reach patient:  Home number on file [phone] (home)    Best Time:  After 2:30 pm    Can we leave a detailed message on this number?  YES

## 2019-10-30 RX ORDER — TRAZODONE HYDROCHLORIDE 50 MG/1
50-100 TABLET, FILM COATED ORAL AT BEDTIME
Qty: 90 TABLET | Refills: 1 | OUTPATIENT
Start: 2019-10-30

## 2019-10-30 NOTE — TELEPHONE ENCOUNTER
traZODone (DESYREL) 50 MG tablet 90 tablet 1 9/16/2019  --   Sig - Route: Take 1-2 tablets ( mg) by mouth At Bedtime - Oral   Sent to pharmacy as: traZODone (DESYREL) 50 MG tablet   Class: E-Prescribe   Order: [id number]   E-Prescribing Status: Receipt confirmed by pharmacy (9/16/2019  3:11 PM CDT)     Should have refills available at pharmacy. Please notify patient to contact pharmacy.     Margareth Galan RN

## 2019-10-30 NOTE — TELEPHONE ENCOUNTER
Patient picked up rx today. Please disregard.  Klaudia ZAMORA CMA ([facility])

## 2019-11-25 DIAGNOSIS — N39.41 URGE INCONTINENCE OF URINE: ICD-10-CM

## 2019-11-26 RX ORDER — OXYBUTYNIN CHLORIDE 5 MG/1
TABLET ORAL
Qty: 180 TABLET | Refills: 0 | Status: SHIPPED | OUTPATIENT
Start: 2019-11-26 | End: 2020-02-25

## 2019-11-26 NOTE — TELEPHONE ENCOUNTER
Medication is being filled for 1 time refill only due to:  patient to have office visit every 6 months per protocol while on oxybutinin   Nicole Rock RN

## 2019-12-07 DIAGNOSIS — G47.00 INSOMNIA, UNSPECIFIED TYPE: ICD-10-CM

## 2019-12-09 DIAGNOSIS — M81.0 AGE-RELATED OSTEOPOROSIS WITHOUT CURRENT PATHOLOGICAL FRACTURE: ICD-10-CM

## 2019-12-09 RX ORDER — ALENDRONATE SODIUM 70 MG/1
TABLET ORAL
Qty: 4 TABLET | Refills: 2 | Status: SHIPPED | OUTPATIENT
Start: 2019-12-09 | End: 2020-03-03

## 2019-12-09 RX ORDER — TRAZODONE HYDROCHLORIDE 50 MG/1
50-100 TABLET, FILM COATED ORAL AT BEDTIME
Qty: 90 TABLET | Refills: 1 | Status: SHIPPED | OUTPATIENT
Start: 2019-12-09 | End: 2020-03-06

## 2019-12-10 DIAGNOSIS — K29.80 DUODENITIS: ICD-10-CM

## 2019-12-10 RX ORDER — PANTOPRAZOLE SODIUM 40 MG/1
TABLET, DELAYED RELEASE ORAL
Qty: 30 TABLET | Refills: 0 | Status: SHIPPED | OUTPATIENT
Start: 2019-12-10 | End: 2020-01-10

## 2019-12-10 NOTE — LETTER
Dear Rajani,       Your provider has sent a 30 day mackenzie refill of pantoprazole. You are due for an appointment for further refills. Appointment options could include: an in person office visit, telephone visit or Evisit through [facility]. Please contact the clinic to schedule an appointment for further refills.     Sincerely,     [facility]

## 2019-12-10 NOTE — TELEPHONE ENCOUNTER
"Routing refill request to provider for review/approval because:  Failed [facility] refill protocol, see below:    Requested Prescriptions   Pending Prescriptions Disp Refills     pantoprazole (PROTONIX) 40 MG EC tablet [Pharmacy Med Name: PANTOPRAZOLE SOD DR 40 MG TAB]  Last Written Prescription Date:  11/11/19  Last Fill Quantity: 30,  # refills: 0   Last office visit: 9/13/2019 with prescribing provider:     Future Office Visit:   30 tablet 0     Sig: TAKE 1 TABLET BY MOUTH EVERY DAY       PPI Protocol Failed - 12/10/2019  8:17 AM        Failed - No diagnosis of osteoporosis on record        Passed - Not on Clopidogrel (unless Pantoprazole ordered)        Passed - Recent (12 mo) or future (30 days) visit within the authorizing provider's specialty     Patient has had an office visit with the authorizing provider or a provider within the authorizing providers department within the previous 12 mos or has a future within next 30 days. See \"Patient Info\" tab in inbasket, or \"Choose Columns\" in Meds & Orders section of the refill encounter.              Passed - Medication is active on med list        Passed - Patient is age 18 or older        Passed - No active pregnacy on record        Passed - No positive pregnancy test in past 12 months        Lesley Ortega RN - BC    "

## 2019-12-16 DIAGNOSIS — Z79.4 TYPE 2 DIABETES MELLITUS WITHOUT COMPLICATION, WITH LONG-TERM CURRENT USE OF INSULIN (H): ICD-10-CM

## 2019-12-16 DIAGNOSIS — E11.9 TYPE 2 DIABETES MELLITUS WITHOUT COMPLICATION, WITH LONG-TERM CURRENT USE OF INSULIN (H): ICD-10-CM

## 2019-12-18 RX ORDER — INSULIN DETEMIR 100 [IU]/ML
INJECTION, SOLUTION SUBCUTANEOUS
Qty: 18 ML | Refills: 0 | Status: SHIPPED | OUTPATIENT
Start: 2019-12-18 | End: 2020-03-13

## 2019-12-24 DIAGNOSIS — Z79.4 TYPE 2 DIABETES MELLITUS WITHOUT COMPLICATION, WITH LONG-TERM CURRENT USE OF INSULIN (H): ICD-10-CM

## 2019-12-24 DIAGNOSIS — E11.9 TYPE 2 DIABETES MELLITUS WITHOUT COMPLICATION, WITH LONG-TERM CURRENT USE OF INSULIN (H): ICD-10-CM

## 2019-12-24 NOTE — LETTER
December 26, 2019      Rajani Guo  [address] [facility] [place] [address]      Dear Rajani,       Your provider has sent a 30 day mackenzie refill of metFORMIN (GLUCOPHAGE) 1000 MG tablet. You are due for an appointment for further refills. Appointment options could include: an in person office visit, telephone visit or Evisit through [facility]. Please contact the clinic to schedule an appointment for further refills.       Sincerely,        Tamiko Coley MD

## 2020-01-08 DIAGNOSIS — K29.80 DUODENITIS: ICD-10-CM

## 2020-01-09 NOTE — TELEPHONE ENCOUNTER
"Pending Prescriptions:                       Disp   Refills    pantoprazole (PROTONIX) 40 MG EC tablet [*30 tab*0            Sig: TAKE 1 TABLET BY MOUTH EVERY DAY    Routing refill request to provider for review/approval because:  Dx of osteoporosis on record.    Requested Prescriptions   Pending Prescriptions Disp Refills     pantoprazole (PROTONIX) 40 MG EC tablet [Pharmacy Med Name: PANTOPRAZOLE SOD DR 40 MG TAB] 30 tablet 0     Sig: TAKE 1 TABLET BY MOUTH EVERY DAY       PPI Protocol Failed - 1/8/2020  8:54 AM        Failed - No diagnosis of osteoporosis on record        Passed - Not on Clopidogrel (unless Pantoprazole ordered)        Passed - Recent (12 mo) or future (30 days) visit within the authorizing provider's specialty     Patient has had an office visit with the authorizing provider or a provider within the authorizing providers department within the previous 12 mos or has a future within next 30 days. See \"Patient Info\" tab in inbasket, or \"Choose Columns\" in Meds & Orders section of the refill encounter.              Passed - Medication is active on med list        Passed - Patient is age 18 or older        Passed - No active pregnacy on record        Passed - No positive pregnancy test in past 12 months        Sparkle Rose RN    "

## 2020-01-10 RX ORDER — PANTOPRAZOLE SODIUM 40 MG/1
TABLET, DELAYED RELEASE ORAL
Qty: 30 TABLET | Refills: 0 | Status: SHIPPED | OUTPATIENT
Start: 2020-01-10 | End: 2020-02-06

## 2020-01-10 NOTE — TELEPHONE ENCOUNTER
Has history of GIB   She is on treatment for Ostoearthritis  Needs follow up with primary for further prescriptions

## 2020-01-23 DIAGNOSIS — F17.200 TOBACCO USE DISORDER: ICD-10-CM

## 2020-01-23 DIAGNOSIS — E11.9 TYPE 2 DIABETES MELLITUS WITHOUT COMPLICATION, WITH LONG-TERM CURRENT USE OF INSULIN (H): ICD-10-CM

## 2020-01-23 DIAGNOSIS — Z79.4 TYPE 2 DIABETES MELLITUS WITHOUT COMPLICATION, WITH LONG-TERM CURRENT USE OF INSULIN (H): ICD-10-CM

## 2020-01-23 RX ORDER — BUPROPION HYDROCHLORIDE 150 MG/1
TABLET, FILM COATED, EXTENDED RELEASE ORAL
Qty: 60 TABLET | Refills: 3 | Status: SHIPPED | OUTPATIENT
Start: 2020-01-23 | End: 2020-05-13

## 2020-02-06 DIAGNOSIS — K29.80 DUODENITIS: ICD-10-CM

## 2020-02-06 NOTE — TELEPHONE ENCOUNTER
Disp Refills Start End REGULO   pantoprazole (PROTONIX) 40 MG EC tablet 30 tablet 0 1/10/2020  No   Sig: TAKE 1 TABLET BY MOUTH EVERY DAY   Sent to pharmacy as: pantoprazole (PROTONIX) 40 MG EC tablet   Class: E-Prescribe   Notes to Pharmacy: (follow up with PCP for further refills)   Order: [id number]   E-Prescribing Status: Receipt confirmed by pharmacy (1/10/2020  2:25 PM CST)     Eri Bello MA on 2/6/2020 at 10:33 AM

## 2020-02-07 RX ORDER — PANTOPRAZOLE SODIUM 40 MG/1
40 TABLET, DELAYED RELEASE ORAL DAILY
Qty: 90 TABLET | Refills: 1 | Status: SHIPPED | OUTPATIENT
Start: 2020-02-07 | End: 2020-06-12

## 2020-02-22 DIAGNOSIS — N39.41 URGE INCONTINENCE OF URINE: ICD-10-CM

## 2020-02-25 RX ORDER — OXYBUTYNIN CHLORIDE 5 MG/1
TABLET ORAL
Qty: 180 TABLET | Refills: 0 | Status: SHIPPED | OUTPATIENT
Start: 2020-02-25 | End: 2020-05-19

## 2020-02-26 ENCOUNTER — OFFICE VISIT (OUTPATIENT)
Dept: FAMILY MEDICINE | Facility: CLINIC | Age: 70
End: 2020-02-26
Payer: COMMERCIAL

## 2020-02-26 VITALS
HEART RATE: 89 BPM | TEMPERATURE: 97.7 F | BODY MASS INDEX: 25.95 KG/M2 | WEIGHT: 152 LBS | DIASTOLIC BLOOD PRESSURE: 60 MMHG | RESPIRATION RATE: 18 BRPM | SYSTOLIC BLOOD PRESSURE: 130 MMHG | OXYGEN SATURATION: 100 % | HEIGHT: 64 IN

## 2020-02-26 DIAGNOSIS — N81.4 CYSTOCELE WITH PROLAPSE: Primary | ICD-10-CM

## 2020-02-26 DIAGNOSIS — E11.9 TYPE 2 DIABETES MELLITUS WITHOUT COMPLICATION, WITH LONG-TERM CURRENT USE OF INSULIN (H): ICD-10-CM

## 2020-02-26 DIAGNOSIS — Z79.4 TYPE 2 DIABETES MELLITUS WITHOUT COMPLICATION, WITH LONG-TERM CURRENT USE OF INSULIN (H): ICD-10-CM

## 2020-02-26 PROCEDURE — 99213 OFFICE O/P EST LOW 20 MIN: CPT | Performed by: FAMILY MEDICINE

## 2020-02-26 ASSESSMENT — MIFFLIN-ST. JEOR: SCORE: 1204.47

## 2020-02-26 NOTE — PROGRESS NOTES
Subjective     Rajani Guo is a 69 year old female who presents to clinic today for the following health issues:    HPI   Chief Complaint   Patient presents with     Vaginal Problem   has a Prolapsed uterus  Comes all the way Outside  Had had Urinary Leaking  Has Urinary Urgency  No cough      Patient Active Problem List   Diagnosis     Type 2 diabetes mellitus without complication, with long-term current use of insulin (H)     Hyperlipidemia LDL goal <100     Age-related osteoporosis without current pathological fracture     Insomnia, unspecified type     Tobacco use disorder     History of partial thyroidectomy     Newly recognized murmur     Essential hypertension     PVD (peripheral vascular disease) (H)     Claudication of left lower extremity (H)     Anemia     GIB (gastrointestinal bleeding)     Acute blood loss anemia     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 3/4/2019      Past Surgical History:   Procedure Laterality Date     COLONOSCOPY N/A 9/4/2019    Procedure: COLONOSCOPY;  Surgeon: Marie Todd MD;  Location:  GI     ESOPHAGOSCOPY, GASTROSCOPY, DUODENOSCOPY (EGD), COMBINED N/A 9/4/2019    Procedure: ESOPHAGOGASTRODUODENOSCOPY (EGD);  Surgeon: Marie Todd MD;  Location:  GI     IR ANGIOGRAM THROUGH CATHETER FOLLOW UP  3/5/2019     IR LOWER EXTREMITY ANGIOGRAM LEFT  3/4/2019     KNEE SURGERY Right 2013    right knee torn meniscus surgery     OVARY SURGERY  1971    1 ovary removed     RELEASE CARPAL TUNNEL Right 1988     RELEASE TRIGGER FINGER BILATERAL       SHOULDER SURGERY Left     rotator cuff repair, plate placement     THYROID SURGERY  1988    partial thyroidectomy       Social History     Tobacco Use     Smoking status: Current Some Day Smoker     Packs/day: 1.50     Years: 52.00     Pack years: 78.00     Smokeless tobacco: Never Used   Substance Use Topics     Alcohol use: Yes     Family History   Problem Relation Age of Onset     Glaucoma No family hx  of      Macular Degeneration No family hx of          Current Outpatient Medications   Medication Sig Dispense Refill     acetaminophen (ACETAMINOPHEN 8 HOUR) 650 MG CR tablet Take 650 mg by mouth every 8 hours       alendronate (FOSAMAX) 70 MG tablet TAKE 1 TABLET BY MOUTH EVERY 7 DAYS 4 tablet 2     atorvastatin (LIPITOR) 40 MG tablet Take 1 tablet (40 mg) by mouth daily 90 tablet 3     buPROPion (ZYBAN) 150 MG 12 hr tablet TAKE 1 TABLET (150 MG) BY MOUTH ONCE DAILY X4 DAYS, THEN TWICE DAILY AFTER THAT. 60 tablet 3     calcium carb 1250 mg, 500 mg [place],/vitamin D 200 unit (CALCIUM 500/D) 500-200 MG-UNIT per tablet Take 1 tablet by mouth 2 times daily        [START ON 3/9/2020] gabapentin (NEURONTIN) 300 MG capsule TAKE 1 CAPSULE (300 MG) BY MOUTH ONE OR TWO TIMES DAILY 90 capsule 1     glucosamine-chondroitin 500-400 MG CAPS per capsule Take 1 capsule by mouth 2 times daily        insulin pen needle (29G X 12MM) 29G X 12MM miscellaneous Use 1 pen needles daily or as directed. 100 each 11     LEVEMIR FLEXTOUCH 100 UNIT/ML pen INJECT 19 UNITS SUBCUTANEOUS AT BEDTIME 18 mL 0     lisinopril (PRINIVIL/ZESTRIL) 2.5 MG tablet Take 1 tablet (2.5 mg) by mouth daily 90 tablet 3     METFORMIN 1000 MG PO tablet TAKE 1 TABLET BY MOUTH TWICE A DAY WITH MEALS 60 tablet 0     Multiple Vitamin (MULTI-VITAMINS) TABS Take 1 tablet by mouth daily        oxybutynin (DITROPAN) 5 MG tablet TAKE 1 TABLET BY MOUTH TWICE A  tablet 0     pantoprazole (PROTONIX) 40 MG EC tablet Take 1 tablet (40 mg) by mouth daily 90 tablet 1     traZODone (DESYREL) 50 MG tablet TAKE 1-2 TABLETS ( MG) BY MOUTH AT BEDTIME 90 tablet 1     aspirin (ASA) 81 MG EC tablet Take 1 tablet (81 mg) by mouth daily 100 tablet 3     ferrous sulfate (FE TABS) 325 (65 Fe) MG EC tablet Take 1 tablet (325 mg) by mouth daily 60 tablet 0     Melatonin 10 MG TABS tablet Take 10 mg by mouth At Bedtime       nicotine (NICODERM CQ) 21 MG/24HR 24 hr patch Place 1 patch  "onto the skin daily       traZODone (DESYREL) 50 MG tablet Take  mg by mouth At Bedtime       varenicline (CHANTIX MIREYA) 0.5 MG X 11 & 1 MG X 42 tablet Take 0.5 mg tab daily for 3 days, THEN 0.5 mg tab twice daily for 4 days, THEN 1 mg twice daily. 53 tablet 0     Allergies   Allergen Reactions     Indomethacin Other (See Comments)     Dizziness and disorientation     Tramadol Nausea and Vomiting     Recent Labs   Lab Test 09/04/19  0820 09/03/19  0735  07/30/19  1441  03/04/19  0930 05/31/18  1003 01/18/18  0835  05/08/17   A1C  --   --   --  5.3  --  6.5* 6.1* 6.4*   < >  --    LDL  --   --   --   --   --  44  --  83  --  126   HDL  --   --   --   --   --  59  --  68  --  49   TRIG  --   --   --   --   --  94  --  78  --  127   ALT  --   --   --   --   --   --   --  16  --   --    CR 0.54 0.54   < >  --    < > 0.56  --  0.47*  --  0.79   GFRESTIMATED >90 >90   < >  --    < > >90  --  >90  --  >60   GFRESTBLACK >90 >90   < >  --    < > >90  --  >90  --  >60   POTASSIUM 3.3* 3.6   < >  --    < >  --   --  3.9  --  3.7   TSH  --   --   --  3.42  --   --  2.72  --   --   --     < > = values in this interval not displayed.      BP Readings from Last 3 Encounters:   02/26/20 130/60   10/21/19 123/69   09/13/19 112/56    Wt Readings from Last 3 Encounters:   02/26/20 68.9 kg (152 lb)   10/21/19 67.6 kg (149 lb)   09/13/19 65.7 kg (144 lb 12.8 oz)                    Reviewed and updated as needed this visit by Provider         Review of Systems   ROS COMP: CONSTITUTIONAL: NEGATIVE for fever, chills, change in weight  RESP: NEGATIVE for significant cough or SOB  GI: NEGATIVE for nausea, abdominal pain, heartburn, or change in bowel habits  : as above      Objective    /60   Pulse 89   Temp 97.7  F (36.5  C) (Oral)   Resp 18   Ht 1.626 m (5' 4\")   Wt 68.9 kg (152 lb)   SpO2 100%   BMI 26.09 kg/m    Body mass index is 26.09 kg/m .  Physical Exam   GENERAL: healthy, alert and no distress  ABDOMEN: soft, " "nontender, no hepatosplenomegaly, no masses and bowel sounds normal   (female): vaginal mucosal atrophy and cystocele present with Prolapse     Diagnostic Test Results:  Pending         Assessment & Plan     1. Cystocele with prolapse  Advised see GYN  - OB/GYN REFERRAL  - *UA reflex to Microscopic and Culture ([place] and [facility] (except [place] and [place])    2. Type 2 diabetes mellitus without complication, with long-term current use of insulin (H)  Pt advise make appointment for labs  - insulin pen needle (29G X 12MM) 29G X 12MM miscellaneous; Use 1 pen needles daily or as directed.  Dispense: 100 each; Refill: 11     BMI:   Estimated body mass index is 26.09 kg/m  as calculated from the following:    Height as of this encounter: 1.626 m (5' 4\").    Weight as of this encounter: 68.9 kg (152 lb).               Return in about 3 weeks (around 3/18/2020) for Medicare wellness Exam, Diabetes.    Tamiko Coley MD  [facility] [place]    "

## 2020-02-27 ENCOUNTER — OFFICE VISIT (OUTPATIENT)
Dept: OBGYN | Facility: CLINIC | Age: 70
End: 2020-02-27
Attending: FAMILY MEDICINE
Payer: COMMERCIAL

## 2020-02-27 VITALS
SYSTOLIC BLOOD PRESSURE: 126 MMHG | DIASTOLIC BLOOD PRESSURE: 77 MMHG | HEART RATE: 98 BPM | WEIGHT: 156.2 LBS | BODY MASS INDEX: 26.81 KG/M2

## 2020-02-27 DIAGNOSIS — N81.89 OTHER FEMALE GENITAL PROLAPSE: Primary | ICD-10-CM

## 2020-02-27 DIAGNOSIS — R32 URINARY INCONTINENCE, UNSPECIFIED TYPE: ICD-10-CM

## 2020-02-27 DIAGNOSIS — N81.4 CYSTOCELE WITH PROLAPSE: ICD-10-CM

## 2020-02-27 DIAGNOSIS — R82.90 NONSPECIFIC FINDING ON EXAMINATION OF URINE: Primary | ICD-10-CM

## 2020-02-27 DIAGNOSIS — K59.02 CONSTIPATION DUE TO OUTLET DYSFUNCTION: ICD-10-CM

## 2020-02-27 DIAGNOSIS — N81.6 RECTOCELE: ICD-10-CM

## 2020-02-27 DIAGNOSIS — N81.11 MIDLINE CYSTOCELE: ICD-10-CM

## 2020-02-27 LAB
ALBUMIN UR-MCNC: NEGATIVE MG/DL
APPEARANCE UR: ABNORMAL
BACTERIA #/AREA URNS HPF: ABNORMAL /HPF
BILIRUB UR QL STRIP: NEGATIVE
COLOR UR AUTO: YELLOW
GLUCOSE UR STRIP-MCNC: NEGATIVE MG/DL
HGB UR QL STRIP: NEGATIVE
KETONES UR STRIP-MCNC: NEGATIVE MG/DL
LEUKOCYTE ESTERASE UR QL STRIP: ABNORMAL
NITRATE UR QL: NEGATIVE
PH UR STRIP: 5.5 PH (ref 5–7)
RBC #/AREA URNS AUTO: ABNORMAL /HPF
SOURCE: ABNORMAL
SP GR UR STRIP: >1.03 (ref 1–1.03)
UROBILINOGEN UR STRIP-ACNC: 0.2 EU/DL (ref 0.2–1)
WBC #/AREA URNS AUTO: ABNORMAL /HPF

## 2020-02-27 PROCEDURE — 81001 URINALYSIS AUTO W/SCOPE: CPT | Performed by: FAMILY MEDICINE

## 2020-02-27 PROCEDURE — 87088 URINE BACTERIA CULTURE: CPT | Performed by: FAMILY MEDICINE

## 2020-02-27 PROCEDURE — 87086 URINE CULTURE/COLONY COUNT: CPT | Performed by: FAMILY MEDICINE

## 2020-02-27 PROCEDURE — 99203 OFFICE O/P NEW LOW 30 MIN: CPT | Performed by: OBSTETRICS & GYNECOLOGY

## 2020-02-27 PROCEDURE — 87186 SC STD MICRODIL/AGAR DIL: CPT | Performed by: FAMILY MEDICINE

## 2020-02-27 RX ORDER — NITROFURANTOIN 25; 75 MG/1; MG/1
100 CAPSULE ORAL 2 TIMES DAILY
Qty: 14 CAPSULE | Refills: 0 | Status: SHIPPED | OUTPATIENT
Start: 2020-02-27 | End: 2020-03-02

## 2020-02-27 NOTE — PROGRESS NOTES
Rajani is a 69 year old  referred here by Dr. Coley for consultation regarding urinary incontinence. She had had pelvic prolapse x 10 years. In the large 3 weeks she has had difficulty voiding coinciding with worsened constipation. She stake stool softeners.    ROS: Ten point review of systems was reviewed and negative except the above.        Past Medical History:   Diagnosis Date     Age-related osteoporosis without current pathological fracture 2018     Clot     in  left leg     History of partial thyroidectomy 2018     Hyperlipidemia LDL goal <100 2018     Hypertension      Insomnia, unspecified type 2018     Tobacco use disorder      Type 2 diabetes mellitus without complication, with long-term current use of insulin (H) 2018     Past Surgical History:   Procedure Laterality Date     COLONOSCOPY N/A 2019    Procedure: COLONOSCOPY;  Surgeon: Marie Todd MD;  Location:  GI     ESOPHAGOSCOPY, GASTROSCOPY, DUODENOSCOPY (EGD), COMBINED N/A 2019    Procedure: ESOPHAGOGASTRODUODENOSCOPY (EGD);  Surgeon: Marie Todd MD;  Location:  GI     IR ANGIOGRAM THROUGH CATHETER FOLLOW UP  3/5/2019     IR LOWER EXTREMITY ANGIOGRAM LEFT  3/4/2019     KNEE SURGERY Right     right knee torn meniscus surgery     OVARY SURGERY  1971    1 ovary removed     RELEASE CARPAL TUNNEL Right      RELEASE TRIGGER FINGER BILATERAL       SHOULDER SURGERY Left     rotator cuff repair, plate placement     THYROID SURGERY      partial thyroidectomy     Patient Active Problem List   Diagnosis     Type 2 diabetes mellitus without complication, with long-term current use of insulin (H)     Hyperlipidemia LDL goal <100     Age-related osteoporosis without current pathological fracture     Insomnia, unspecified type     Tobacco use disorder     History of partial thyroidectomy     Newly recognized murmur     Essential hypertension     PVD (peripheral vascular disease) (H)      Claudication of left lower extremity (H)     Anemia     GIB (gastrointestinal bleeding)     Acute blood loss anemia     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    SH: - tob, - EtOH,     FH: Her family history was reviewed and documented in its appropriate chart area.    PE: /77   Pulse 98   Wt 70.9 kg (156 lb 3.2 oz)   Breastfeeding No   BMI 26.81 kg/m    Body mass index is 26.81 kg/m .    General Appearance:  healthy, alert, active, no distress  HEENT: NCAT  Abdomen: Soft, nontender.  Normal bowel sounds.  No masses  Pelvic:       - Ext: NEFG,        - Urethra: no lesions, no masses       - Urethral Meatus: normal appearance,        - Bladder:  tenderness,  masses       - Vagina: pink, moist, normal rugae,  lesions,  discharge       - Cervix: no lesions, parous       - Uterus: small sized, AV, mobile, normal contour       - Adnexa: no masses, no tenderness       - Rectal: ,Full of very hard stool about 5 cm into the posterior vagina.       - Pelvic support: moderate cystocele, Jan rectocele  Obscuring vaginal introitus  Due to stool., moderate uterine prolapse  Results for orders placed or performed in visit on 02/27/20   *UA reflex to Microscopic and Culture ([place] and [facility] (except [place] and [place])     Status: Abnormal   Result Value Ref Range    Color Urine Yellow     Appearance Urine Slightly Cloudy     Glucose Urine Negative NEG^Negative mg/dL    Bilirubin Urine Negative NEG^Negative    Ketones Urine Negative NEG^Negative mg/dL    Specific Gravity Urine >1.030 1.003 - 1.035    Blood Urine Negative NEG^Negative    pH Urine 5.5 5.0 - 7.0 pH    Protein Albumin Urine Negative NEG^Negative mg/dL    Urobilinogen Urine 0.2 0.2 - 1.0 EU/dL    Nitrite Urine Negative NEG^Negative    Leukocyte Esterase Urine Trace (A) NEG^Negative    Source  Midstream Urine    Urine Microscopic     Status: Abnormal   Result Value Ref Range    WBC Urine 5-10 (A) OTO5^0 - 5 /HPF    RBC Urine O - 2 OTO2^O - 2 /HPF    Bacteria Urine Many (A) NEG^Negative /HPF       A/P    ICD-10-CM    1. Other female genital prolapse N81.89 nitroFURantoin macrocrystal-monohydrate (MACROBID) 100 MG capsule   2. Urinary incontinence, unspecified type R32 nitroFURantoin macrocrystal-monohydrate (MACROBID) 100 MG capsule   3. Constipation due to outlet dysfunction K59.02    4. Midline cystocele N81.11    5. Rectocele N81.6      Treat UTI   Recommended Evacuating hard stool with miralax followed by twice daily stool softeners.  She will get OTC.  I discussed Kegel exercises as a way to increase the strength of the pelvic floor muscles.  I explained how this can affect bladder and urethral support and the way this is linked to bladder control.  I diddemonstrate how to do the exercises appropriately.  She expresses understanding.    Discussed also pessary use if needed.   We also discussed Urology referral for pelvic floor reconstruction.    return to clinic in 1-2 months.     ACOG pamphlet provided    30 minutes was spent face to face with the patient today discussing her history, diagnosis, and follow-up plan as noted above.  Over 50% of the visit was spent in counseling and coordination of care.        CEPHAS AGBEH, MD.

## 2020-02-27 NOTE — PATIENT INSTRUCTIONS
If you have any questions regarding your visit, Please contact your care team.  [facility]: [phone]  [facility] CLINIC HOURS TELEPHONE NUMBER   Cephas Agbeh, M.D.      Kendra Carrero-  [place]-         Monday-[place]    8:00a.m-4:45 p.m    Tuesday--[place]     8:00a.m-4:45 p.m.    Thursday-[place]    8:00a.m-4:45 p.m.    Friday-[place]    8:00a.m-4:45 p.m    [facility]   [address] [place] [address]   [phone]-Ask for [facility]   Fax [phone]   Lcntkqh-[phone]     [facility]   [address] [place] [address] [place] [address]   [phone]    [facility]  [address] [facility] [address]  [phone]  [phone]   Urgent Care locations:    [facility] Monday-Friday  5 pm - 9 pm  Saturday and Sunday   9 am - 5 pm   Monday-Friday   5 pm - 9 pm  Saturday and Sunday  9 am - 5 pm    [phone]   If you need a medication refill, please contact your pharmacy. Please allow 3 business days for your refill to be completed.  As always, Thank you for trusting us with your healthcare needs!

## 2020-03-02 DIAGNOSIS — N30.00 ACUTE CYSTITIS WITHOUT HEMATURIA: Primary | ICD-10-CM

## 2020-03-02 LAB
BACTERIA SPEC CULT: ABNORMAL
BACTERIA SPEC CULT: ABNORMAL
SPECIMEN SOURCE: ABNORMAL

## 2020-03-02 RX ORDER — AMOXICILLIN 500 MG/1
500 CAPSULE ORAL 2 TIMES DAILY
Qty: 10 CAPSULE | Refills: 0 | Status: SHIPPED | OUTPATIENT
Start: 2020-03-02 | End: 2020-06-11

## 2020-03-03 DIAGNOSIS — M81.0 AGE-RELATED OSTEOPOROSIS WITHOUT CURRENT PATHOLOGICAL FRACTURE: ICD-10-CM

## 2020-03-03 RX ORDER — ALENDRONATE SODIUM 70 MG/1
TABLET ORAL
Qty: 4 TABLET | Refills: 2 | Status: SHIPPED | OUTPATIENT
Start: 2020-03-03 | End: 2020-05-20

## 2020-03-05 DIAGNOSIS — G47.00 INSOMNIA, UNSPECIFIED TYPE: ICD-10-CM

## 2020-03-06 RX ORDER — TRAZODONE HYDROCHLORIDE 50 MG/1
50-100 TABLET, FILM COATED ORAL AT BEDTIME
Qty: 90 TABLET | Refills: 1 | Status: SHIPPED | OUTPATIENT
Start: 2020-03-06 | End: 2020-04-15

## 2020-03-06 NOTE — TELEPHONE ENCOUNTER
"Prescription approved per [facility] Refill Protocol.    Requested Prescriptions   Pending Prescriptions Disp Refills     traZODone (DESYREL) 50 MG tablet [Pharmacy Med Name: TRAZODONE 50 MG TABLET] 90 tablet 1     Sig: TAKE 1-2 TABLETS ( MG) BY MOUTH AT BEDTIME       Serotonin Modulators Passed - 3/5/2020  8:12 AM        Passed - Recent (12 mo) or future (30 days) visit within the authorizing provider's specialty     Patient has had an office visit with the authorizing provider or a provider within the authorizing providers department within the previous 12 mos or has a future within next 30 days. See \"Patient Info\" tab in inbasket, or \"Choose Columns\" in Meds & Orders section of the refill encounter.              Passed - Medication is active on med list        Passed - Patient is age 18 or older        Passed - No active pregnancy on record        Passed - No positive pregnancy test in past 12 months        "

## 2020-03-11 ENCOUNTER — HEALTH MAINTENANCE LETTER (OUTPATIENT)
Age: 70
End: 2020-03-11

## 2020-03-12 DIAGNOSIS — Z79.4 TYPE 2 DIABETES MELLITUS WITHOUT COMPLICATION, WITH LONG-TERM CURRENT USE OF INSULIN (H): ICD-10-CM

## 2020-03-12 DIAGNOSIS — E11.9 TYPE 2 DIABETES MELLITUS WITHOUT COMPLICATION, WITH LONG-TERM CURRENT USE OF INSULIN (H): ICD-10-CM

## 2020-03-13 RX ORDER — INSULIN DETEMIR 100 [IU]/ML
INJECTION, SOLUTION SUBCUTANEOUS
Qty: 18 ML | Refills: 0 | Status: SHIPPED | OUTPATIENT
Start: 2020-03-13 | End: 2020-04-08

## 2020-03-26 DIAGNOSIS — E11.9 TYPE 2 DIABETES MELLITUS WITHOUT COMPLICATION, WITH LONG-TERM CURRENT USE OF INSULIN (H): ICD-10-CM

## 2020-03-26 DIAGNOSIS — Z79.4 TYPE 2 DIABETES MELLITUS WITHOUT COMPLICATION, WITH LONG-TERM CURRENT USE OF INSULIN (H): ICD-10-CM

## 2020-03-26 RX ORDER — ATORVASTATIN CALCIUM 40 MG/1
TABLET, FILM COATED ORAL
Qty: 90 TABLET | Refills: 3 | Status: SHIPPED | OUTPATIENT
Start: 2020-03-26 | End: 2021-03-01

## 2020-03-26 RX ORDER — LISINOPRIL 2.5 MG/1
TABLET ORAL
Qty: 90 TABLET | Refills: 3 | Status: ON HOLD | OUTPATIENT
Start: 2020-03-26 | End: 2020-09-18

## 2020-03-26 NOTE — TELEPHONE ENCOUNTER
Prescription approved per [facility] Refill Protocol.    Narcisa Hall, PharmD  PGY1 Medication Therapy Management Resident  Voicemail: [phone]

## 2020-03-31 DIAGNOSIS — I73.9 CLAUDICATION OF LEFT LOWER EXTREMITY (H): ICD-10-CM

## 2020-03-31 RX ORDER — GABAPENTIN 300 MG/1
CAPSULE ORAL
Qty: 90 CAPSULE | Refills: 1 | Status: SHIPPED | OUTPATIENT
Start: 2020-03-31 | End: 2020-06-17

## 2020-03-31 NOTE — TELEPHONE ENCOUNTER
Dr Coley,  Called pt to reschedule the appointment from 4/8/20 to telephone or video visit but pt wants to reschedule to office wellness visit for A1C blood draw. Made appointment for 6/17/20 for wellness inperson visit She needs Gabapentin refill. She was wondering does she needs to make telephone visit or it is ok just to come in on 6/17/20. Please advice.    Gauri LARA CMA

## 2020-04-06 ENCOUNTER — TELEPHONE (OUTPATIENT)
Dept: FAMILY MEDICINE | Facility: CLINIC | Age: 70
End: 2020-04-06

## 2020-04-06 NOTE — TELEPHONE ENCOUNTER
Reason for Call:  Form, our goal is to have forms completed with 72 hours, however, some forms may require a visit or additional information.    Type of letter, form or note:  medical [place] Dept of Public [employer]y    Who is the form from?: Patient    Where did the form come from: Patient or family brought in       What clinic location was the form placed at?: [facility]    Where the form was placed: Given to physician    What number is listed as a contact on the form?: [phone] Rajani       Additional comments: Please call when completed    Call taken on 4/6/2020 at 9:56 AM by Enedelia Sotelo

## 2020-04-07 NOTE — PROGRESS NOTES
"Subjective     Rajani Guo is a 69 year old female who is being evaluated via a billable telephone visit.      The patient has been notified of following:     \"This telephone visit will be conducted via a call between you and your physician/provider. We have found that certain health care needs can be provided without the need for a physical exam.  This service lets us provide the care you need with a short phone conversation.  If a prescription is necessary we can send it directly to your pharmacy.  If lab work is needed we can place an order for that and you can then stop by our lab to have the test done at a later time.    Telephone visits are billed at different rates depending on your insurance coverage. During this emergency period, for some insurers they may be billed the same as an in-person visit.  Please reach out to your insurance provider with any questions.    If during the course of the call the physician/provider feels a telephone visit is not appropriate, you will not be charged for this service.\"    Patient has given verbal consent for Telephone visit?  Yes  9:20 AM-start visit  9.42 AM-end Visvioletta Guo complains of   Chief Complaint   Patient presents with     Recheck Medication       ALLERGIES  Indomethacin and Tramadol    Diabetes Follow-up    How often are you checking your blood sugar? One time daily  What time of day are you checking your blood sugars (select all that apply)?    Have you had any blood sugars above 200?  No  Have you had any blood sugars below 70?  No    What symptoms do you notice when your blood sugar is low?  Has Not had low sugars    What concerns do you have today about your diabetes? None     Do you have any of these symptoms? (Select all that apply)  No numbness or tingling in feet.  No redness, sores or blisters on feet.  No complaints of excessive thirst.  No reports of blurry vision.  No significant changes to weight.    Have you had a diabetic eye " exam in the last 12 months? Yes-  Location: [facility]          Hyperlipidemia Follow-Up      Are you regularly taking any medication or supplement to lower your cholesterol?   Yes- statins    Are you having muscle aches or other side effects that you think could be caused by your cholesterol lowering medication?  No    Hypertension Follow-up      Do you check your blood pressure regularly outside of the clinic? Yes     Are you following a low salt diet? Yes    Are your blood pressures ever more than 140 on the top number (systolic) OR more   than 90 on the bottom number (diastolic), for example 140/90? No  Are normal     BP Readings from Last 2 Encounters:   02/27/20 126/77   02/26/20 130/60     Hemoglobin A1C (%)   Date Value   07/30/2019 5.3   03/04/2019 6.5 (H)     LDL Cholesterol Calculated (mg/dL)   Date Value   03/04/2019 44   01/18/2018 83     Pt has Retinopathy and UTD with eye exams    Pt is feeling stressed due To COVID 19 news  PHQ9 done  Doing well on Wellbutrin and wants to continue This  Pt also has anemia  Was advised to check hemoglobin last year but has Not followed Up  Pt will make appointment to check hemoglobin  Pt has had no GI issues  Feels Fine      Patient Active Problem List   Diagnosis     Type 2 diabetes mellitus without complication, with long-term current use of insulin (H)     Hyperlipidemia LDL goal <100     Age-related osteoporosis without current pathological fracture     Insomnia, unspecified type     Tobacco use disorder     History of partial thyroidectomy     Newly recognized murmur     Essential hypertension     PVD (peripheral vascular disease) (H)     Claudication of left lower extremity (H)     Anemia     GIB (gastrointestinal bleeding)     Acute blood loss anemia     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 3/4/2019      Past Surgical History:   Procedure Laterality Date     COLONOSCOPY N/A 9/4/2019    Procedure: COLONOSCOPY;  Surgeon: Marie Todd  MD Josee;  Location:  GI     ESOPHAGOSCOPY, GASTROSCOPY, DUODENOSCOPY (EGD), COMBINED N/A 9/4/2019    Procedure: ESOPHAGOGASTRODUODENOSCOPY (EGD);  Surgeon: Marie Todd MD;  Location:  GI     IR ANGIOGRAM THROUGH CATHETER FOLLOW UP  3/5/2019     IR LOWER EXTREMITY ANGIOGRAM LEFT  3/4/2019     KNEE SURGERY Right 2013    right knee torn meniscus surgery     OVARY SURGERY  1971    1 ovary removed     RELEASE CARPAL TUNNEL Right 1988     RELEASE TRIGGER FINGER BILATERAL       SHOULDER SURGERY Left     rotator cuff repair, plate placement     THYROID SURGERY  1988    partial thyroidectomy       Social History     Tobacco Use     Smoking status: Current Some Day Smoker     Packs/day: 1.50     Years: 52.00     Pack years: 78.00     Smokeless tobacco: Never Used   Substance Use Topics     Alcohol use: Yes     Family History   Problem Relation Age of Onset     Glaucoma No family hx of      Macular Degeneration No family hx of          Current Outpatient Medications   Medication Sig Dispense Refill     alendronate (FOSAMAX) 70 MG tablet TAKE 1 TABLET BY MOUTH ONE TIME PER WEEK 4 tablet 2     aspirin (ASA) 81 MG EC tablet Take 1 tablet (81 mg) by mouth daily 100 tablet 3     atorvastatin (LIPITOR) 40 MG tablet TAKE 1 TABLET BY MOUTH EVERY DAY 90 tablet 3     buPROPion (ZYBAN) 150 MG 12 hr tablet TAKE 1 TABLET (150 MG) BY MOUTH ONCE DAILY X4 DAYS, THEN TWICE DAILY AFTER THAT. 60 tablet 3     calcium carb 1250 mg, 500 mg [place],/vitamin D 200 unit (CALCIUM 500/D) 500-200 MG-UNIT per tablet Take 1 tablet by mouth 2 times daily        ferrous sulfate (FE TABS) 325 (65 Fe) MG EC tablet Take 1 tablet (325 mg) by mouth daily 60 tablet 0     gabapentin (NEURONTIN) 300 MG capsule TAKE 1 CAPSULE (300 MG) BY MOUTH ONE OR TWO TIMES DAILY 90 capsule 1     glucosamine-chondroitin 500-400 MG CAPS per capsule Take 1 capsule by mouth 2 times daily        insulin detemir (LEVEMIR FLEXTOUCH) 100 UNIT/ML pen INJECT 16 UNITS  SUBCUTANEOUS AT BEDTIME 18 mL 0     insulin pen needle (29G X 12MM) 29G X 12MM miscellaneous Use 1 pen needles daily or as directed. 100 each 11     lisinopril (ZESTRIL) 2.5 MG tablet TAKE 1 TABLET BY MOUTH EVERY DAY 90 tablet 3     Melatonin 10 MG TABS tablet Take 10 mg by mouth At Bedtime       metFORMIN (GLUCOPHAGE) 1000 MG tablet TAKE 1 TABLET BY MOUTH TWICE A DAY WITH MEALS 60 tablet 1     Multiple Vitamin (MULTI-VITAMINS) TABS Take 1 tablet by mouth daily        oxybutynin (DITROPAN) 5 MG tablet TAKE 1 TABLET BY MOUTH TWICE A  tablet 0     pantoprazole (PROTONIX) 40 MG EC tablet Take 1 tablet (40 mg) by mouth daily 90 tablet 1     traZODone (DESYREL) 50 MG tablet TAKE 1-2 TABLETS ( MG) BY MOUTH AT BEDTIME 90 tablet 1     traZODone (DESYREL) 50 MG tablet Take  mg by mouth At Bedtime       acetaminophen (ACETAMINOPHEN 8 HOUR) 650 MG CR tablet Take 650 mg by mouth every 8 hours       nicotine (NICODERM CQ) 21 MG/24HR 24 hr patch Place 1 patch onto the skin daily (Patient not taking: Reported on 4/8/2020)       varenicline (CHANTIX MIREYA) 0.5 MG X 11 & 1 MG X 42 tablet Take 0.5 mg tab daily for 3 days, THEN 0.5 mg tab twice daily for 4 days, THEN 1 mg twice daily. (Patient not taking: Reported on 4/8/2020) 53 tablet 0     Allergies   Allergen Reactions     Indomethacin Other (See Comments)     Dizziness and disorientation     Tramadol Nausea and Vomiting     Recent Labs   Lab Test 09/04/19  0820 09/03/19  0735  07/30/19  1441  03/04/19  0930 05/31/18  1003 01/18/18  0835  05/08/17   A1C  --   --   --  5.3  --  6.5* 6.1* 6.4*   < >  --    LDL  --   --   --   --   --  44  --  83  --  126   HDL  --   --   --   --   --  59  --  68  --  49   TRIG  --   --   --   --   --  94  --  78  --  127   ALT  --   --   --   --   --   --   --  16  --   --    CR 0.54 0.54   < >  --    < > 0.56  --  0.47*  --  0.79   GFRESTIMATED >90 >90   < >  --    < > >90  --  >90  --  >60   GFRESTBLACK >90 >90   < >  --    < >  >90  --  >90  --  >60   POTASSIUM 3.3* 3.6   < >  --    < >  --   --  3.9  --  3.7   TSH  --   --   --  3.42  --   --  2.72  --   --   --     < > = values in this interval not displayed.      BP Readings from Last 3 Encounters:   02/27/20 126/77   02/26/20 130/60   10/21/19 123/69    Wt Readings from Last 3 Encounters:   02/27/20 70.9 kg (156 lb 3.2 oz)   02/26/20 68.9 kg (152 lb)   10/21/19 67.6 kg (149 lb)                    Reviewed and updated as needed this visit by Provider  Tobacco  Allergies  Meds  Problems  Med Hx  Surg Hx  Fam Hx         Review of Systems   ROS COMP: CONSTITUTIONAL: NEGATIVE for fever, chills, change in weight  ENT/MOUTH: NEGATIVE for ear, mouth and throat problems  RESP: NEGATIVE for significant cough or SOB  CV: NEGATIVE for chest pain, palpitations or peripheral edema  GI: NEGATIVE for nausea, abdominal pain, heartburn, or change in bowel habits  MUSCULOSKELETAL: NEGATIVE for significant arthralgias or myalgia  ROS otherwise negative       Objective   Reported vitals:  There were no vitals taken for this visit.   Telephone visit  Diagnostic Test Results:  Labs reviewed in Epic        Assessment/Plan:  1. Type 2 diabetes mellitus with mild nonproliferative retinopathy of both eyes, with long-term current use of insulin, macular edema presence unspecified (H)  Advised decrease Insulin as discussed to 16 Units  Keep accuchecks between 100-150  If less than 100 decrease Insulin further  - insulin detemir (LEVEMIR FLEXTOUCH) 100 UNIT/ML pen; INJECT 16 UNITS SUBCUTANEOUS AT BEDTIME  Dispense: 18 mL; Refill: 0  - metFORMIN (GLUCOPHAGE) 1000 MG tablet; TAKE 1 TABLET BY MOUTH TWICE A DAY WITH MEALS  Dispense: 60 tablet; Refill: 1  - Hemoglobin A1c; Future  - Comprehensive metabolic panel; Future  - Lipid panel reflex to direct LDL Fasting; Future  - Albumin Random Urine Quantitative with Creat Ratio; Future    2. Tobacco use disorder  Discussed quitting  Pt due for CT scan Lung ca  screen    3. Hyperlipidemia LDL goal <100  Pending   Lipid  - Comprehensive metabolic panel; Future    4. Essential hypertension  Stable at Home  - Comprehensive metabolic panel; Future    5. Iron deficiency anemia, unspecified iron deficiency anemia type  Will check  Pt feels Fine  Has been taking Irontablets  - CBC with platelets differential; Future    6. Personal history of tobacco use  Will do when safe to come to clinic  - Prof fee: Shared Decisionmaking for Lung Cancer Screening  - CT Chest Lung Cancer Scrn Low Dose wo; Future  - Okay for Smoking Cessation Study (PLUTO) to Contact Patient    Return in about 1 month (around 5/8/2020) for Lab Work.    Pt will make a lab appointment when Lock down is over  See me in 3 months  Phone call duration:  23 minutes  PA for Levemir  will be sent also  Tamiko Coley MD          Lung Cancer Screening Shared Decision Making Visit     Rajani Guo is eligible for lung cancer screening on the basis of the information provided in my signed lung cancer screening order.     I have discussed with patient the risks and benefits of screening for lung cancer with low-dose CT.     The risks include:  radiation exposure: one low dose chest CT has as much ionizing radiation as about 15 chest x-rays or 6 months of background radiation living in [place]    false positives: 96% of positive findings/nodules are NOT cancer, but some might still require additional diagnostic evaluation, including biopsy  over-diagnosis: some slow growing cancers that might never have been clinically significant will be detected and treated unnecessarily     The benefit of early detection of lung cancer is contingent upon adherence to annual screening or more frequent follow up if indicated.     Furthermore, reaping the benefits of screening requires Rajani Guo to be willing and physically able to undergo diagnostic procedures, if indicated. Although no specific guide is available for determining  severity of comorbidities, it is reasonable to withhold screening in patients who have greater mortality risk from other diseases.     We did discuss that the only way to prevent lung cancer is to not smoke. Smoking cessation assistance was offered.

## 2020-04-08 ENCOUNTER — VIRTUAL VISIT (OUTPATIENT)
Dept: FAMILY MEDICINE | Facility: CLINIC | Age: 70
End: 2020-04-08
Payer: COMMERCIAL

## 2020-04-08 DIAGNOSIS — E78.5 HYPERLIPIDEMIA LDL GOAL <100: ICD-10-CM

## 2020-04-08 DIAGNOSIS — E11.3293 TYPE 2 DIABETES MELLITUS WITH MILD NONPROLIFERATIVE RETINOPATHY OF BOTH EYES, WITH LONG-TERM CURRENT USE OF INSULIN, MACULAR EDEMA PRESENCE UNSPECIFIED (H): Primary | ICD-10-CM

## 2020-04-08 DIAGNOSIS — Z79.4 TYPE 2 DIABETES MELLITUS WITH MILD NONPROLIFERATIVE RETINOPATHY OF BOTH EYES, WITH LONG-TERM CURRENT USE OF INSULIN, MACULAR EDEMA PRESENCE UNSPECIFIED (H): Primary | ICD-10-CM

## 2020-04-08 DIAGNOSIS — Z87.19 HISTORY OF GI BLEED: ICD-10-CM

## 2020-04-08 DIAGNOSIS — I10 ESSENTIAL HYPERTENSION: ICD-10-CM

## 2020-04-08 DIAGNOSIS — D50.9 IRON DEFICIENCY ANEMIA, UNSPECIFIED IRON DEFICIENCY ANEMIA TYPE: ICD-10-CM

## 2020-04-08 DIAGNOSIS — Z87.891 PERSONAL HISTORY OF TOBACCO USE: ICD-10-CM

## 2020-04-08 DIAGNOSIS — F17.200 TOBACCO USE DISORDER: ICD-10-CM

## 2020-04-08 PROCEDURE — G0296 VISIT TO DETERM LDCT ELIG: HCPCS | Performed by: FAMILY MEDICINE

## 2020-04-08 PROCEDURE — 99214 OFFICE O/P EST MOD 30 MIN: CPT | Mod: TEL | Performed by: FAMILY MEDICINE

## 2020-04-08 ASSESSMENT — PATIENT HEALTH QUESTIONNAIRE - PHQ9: SUM OF ALL RESPONSES TO PHQ QUESTIONS 1-9: 4

## 2020-04-08 NOTE — PATIENT INSTRUCTIONS

## 2020-04-09 ENCOUNTER — TELEPHONE (OUTPATIENT)
Dept: FAMILY MEDICINE | Facility: CLINIC | Age: 70
End: 2020-04-09

## 2020-04-09 NOTE — TELEPHONE ENCOUNTER
PA Initiation    Medication: PA  Insurance Company: [employer] - Phone [phone] Fax [phone]  Pharmacy Filling the Rx: [id number] - [address] [facility]  Filling Pharmacy Phone: [phone]  Filling Pharmacy Fax:    Start Date: 4/9/2020

## 2020-04-09 NOTE — TELEPHONE ENCOUNTER
Prior Authorization Approval    Authorization Effective Date: 3/10/2020  Authorization Expiration Date: 4/9/2021  Medication: Levemir  Approved Dose/Quantity:   Reference #: [id number]   Insurance Company: [employer] - Phone [phone] Fax [phone]  Expected CoPay:       CoPay Card Available:      Foundation Assistance Needed:    Which Pharmacy is filling the prescription (Not needed for infusion/clinic administered): [id number] - [place] [address] [address] [facility]  Pharmacy Notified: Yes  Patient Notified: Yes, **Instructed pharmacy to notify patient when script is ready to /ship.**

## 2020-04-09 NOTE — TELEPHONE ENCOUNTER
Prior Authorization Retail Medication Request    Medication/Dose: insulin detemir (LEVEMIR FLEXTOUCH) 100 UNIT/ML pen   ICD code (if different than what is on RX):    Previously Tried and Failed:    Rationale:      Insurance Name:  [employer]   Insurance ID:  [id number]       Pharmacy Information (if different than what is on RX)  Name:  [id number]    Phone: [phone]

## 2020-04-10 ENCOUNTER — TELEPHONE (OUTPATIENT)
Dept: LAB | Facility: CLINIC | Age: 70
End: 2020-04-10

## 2020-04-10 NOTE — TELEPHONE ENCOUNTER
After reaching out to the patient. She noted she is unable to do a video visit as she dosen't have the equipment. She also reported to have just had a virtual visit with Tamiko Coley MD on 4/8/2020.    Let the patient know that this will be sent back to the provider to advise.    Dr. Coley please advise on answers to the questions below to complete the form.

## 2020-04-10 NOTE — TELEPHONE ENCOUNTER
..Left message for patient to call in regards to 24 hour pre-appointment COVID screening. Patient has an appointment at [facility] on 4/13/2020.  Please ask infection screening questions and update appointment notes on [facility] lab schedule.

## 2020-04-10 NOTE — TELEPHONE ENCOUNTER
Form returned on 4/9/2020. Patient needs a video visit to complete. Per provider patient is overdue for visit.    Funmilayo Ferrari,

## 2020-04-10 NOTE — TELEPHONE ENCOUNTER
Called and spoke with Rajani    She is scheduled for labs on 4/13/2020. Form is at the providers desk to complete.    Funmilayo Ferrari,

## 2020-04-13 DIAGNOSIS — I10 ESSENTIAL HYPERTENSION: ICD-10-CM

## 2020-04-13 DIAGNOSIS — D50.9 IRON DEFICIENCY ANEMIA, UNSPECIFIED IRON DEFICIENCY ANEMIA TYPE: ICD-10-CM

## 2020-04-13 DIAGNOSIS — Z79.4 TYPE 2 DIABETES MELLITUS WITH MILD NONPROLIFERATIVE RETINOPATHY OF BOTH EYES, WITH LONG-TERM CURRENT USE OF INSULIN, MACULAR EDEMA PRESENCE UNSPECIFIED (H): ICD-10-CM

## 2020-04-13 DIAGNOSIS — E11.3293 TYPE 2 DIABETES MELLITUS WITH MILD NONPROLIFERATIVE RETINOPATHY OF BOTH EYES, WITH LONG-TERM CURRENT USE OF INSULIN, MACULAR EDEMA PRESENCE UNSPECIFIED (H): ICD-10-CM

## 2020-04-13 DIAGNOSIS — E78.5 HYPERLIPIDEMIA LDL GOAL <100: ICD-10-CM

## 2020-04-13 LAB
ALBUMIN SERPL-MCNC: 3.6 G/DL (ref 3.4–5)
ALP SERPL-CCNC: 57 U/L (ref 40–150)
ALT SERPL W P-5'-P-CCNC: 15 U/L (ref 0–50)
ANION GAP SERPL CALCULATED.3IONS-SCNC: 4 MMOL/L (ref 3–14)
AST SERPL W P-5'-P-CCNC: 12 U/L (ref 0–45)
BASOPHILS # BLD AUTO: 0 10E9/L (ref 0–0.2)
BASOPHILS NFR BLD AUTO: 0.3 %
BILIRUB SERPL-MCNC: 0.3 MG/DL (ref 0.2–1.3)
BUN SERPL-MCNC: 12 MG/DL (ref 7–30)
CALCIUM SERPL-MCNC: 8.6 MG/DL (ref 8.5–10.1)
CHLORIDE SERPL-SCNC: 106 MMOL/L (ref 94–109)
CHOLEST SERPL-MCNC: 125 MG/DL
CO2 SERPL-SCNC: 29 MMOL/L (ref 20–32)
CREAT SERPL-MCNC: 0.6 MG/DL (ref 0.52–1.04)
CREAT UR-MCNC: 36 MG/DL
DIFFERENTIAL METHOD BLD: ABNORMAL
EOSINOPHIL # BLD AUTO: 0.5 10E9/L (ref 0–0.7)
EOSINOPHIL NFR BLD AUTO: 4.6 %
ERYTHROCYTE [DISTWIDTH] IN BLOOD BY AUTOMATED COUNT: 15.2 % (ref 10–15)
GFR SERPL CREATININE-BSD FRML MDRD: >90 ML/MIN/{1.73_M2}
GLUCOSE SERPL-MCNC: 124 MG/DL (ref 70–99)
HBA1C MFR BLD: 5.8 % (ref 0–5.6)
HCT VFR BLD AUTO: 38.3 % (ref 35–47)
HDLC SERPL-MCNC: 58 MG/DL
HGB BLD-MCNC: 12.3 G/DL (ref 11.7–15.7)
LDLC SERPL CALC-MCNC: 54 MG/DL
LYMPHOCYTES # BLD AUTO: 1.7 10E9/L (ref 0.8–5.3)
LYMPHOCYTES NFR BLD AUTO: 17.2 %
MCH RBC QN AUTO: 29 PG (ref 26.5–33)
MCHC RBC AUTO-ENTMCNC: 32.1 G/DL (ref 31.5–36.5)
MCV RBC AUTO: 90 FL (ref 78–100)
MICROALBUMIN UR-MCNC: 33 MG/L
MICROALBUMIN/CREAT UR: 92.5 MG/G CR (ref 0–25)
MONOCYTES # BLD AUTO: 1.1 10E9/L (ref 0–1.3)
MONOCYTES NFR BLD AUTO: 11.1 %
NEUTROPHILS # BLD AUTO: 6.7 10E9/L (ref 1.6–8.3)
NEUTROPHILS NFR BLD AUTO: 66.8 %
NONHDLC SERPL-MCNC: 67 MG/DL
PLATELET # BLD AUTO: 328 10E9/L (ref 150–450)
POTASSIUM SERPL-SCNC: 3.6 MMOL/L (ref 3.4–5.3)
PROT SERPL-MCNC: 6.8 G/DL (ref 6.8–8.8)
RBC # BLD AUTO: 4.24 10E12/L (ref 3.8–5.2)
SODIUM SERPL-SCNC: 139 MMOL/L (ref 133–144)
TRIGL SERPL-MCNC: 66 MG/DL
WBC # BLD AUTO: 10 10E9/L (ref 4–11)

## 2020-04-13 PROCEDURE — 83036 HEMOGLOBIN GLYCOSYLATED A1C: CPT | Performed by: FAMILY MEDICINE

## 2020-04-13 PROCEDURE — 80053 COMPREHEN METABOLIC PANEL: CPT | Performed by: FAMILY MEDICINE

## 2020-04-13 PROCEDURE — 85025 COMPLETE CBC W/AUTO DIFF WBC: CPT | Performed by: FAMILY MEDICINE

## 2020-04-13 PROCEDURE — 80061 LIPID PANEL: CPT | Performed by: FAMILY MEDICINE

## 2020-04-13 PROCEDURE — 82043 UR ALBUMIN QUANTITATIVE: CPT | Performed by: FAMILY MEDICINE

## 2020-04-13 PROCEDURE — 36415 COLL VENOUS BLD VENIPUNCTURE: CPT | Performed by: FAMILY MEDICINE

## 2020-04-14 DIAGNOSIS — G47.00 INSOMNIA, UNSPECIFIED TYPE: ICD-10-CM

## 2020-04-14 NOTE — TELEPHONE ENCOUNTER
Duplicate     Disp  Refills  Start  End  REGULO    traZODone (DESYREL) 50 MG tablet  90 tablet  1  3/6/2020   No    Sig - Route: TAKE 1-2 TABLETS ( MG) BY MOUTH AT BEDTIME - Oral    Sent to pharmacy as: traZODone (DESYREL) 50 MG tablet    Class: E-Prescribe    Order: [id number]    E-Prescribing Status: Receipt confirmed by pharmacy (3/6/2020  8:57 AM CST)

## 2020-04-15 RX ORDER — TRAZODONE HYDROCHLORIDE 50 MG/1
50-100 TABLET, FILM COATED ORAL AT BEDTIME
Qty: 90 TABLET | Refills: 1 | Status: SHIPPED | OUTPATIENT
Start: 2020-04-15 | End: 2020-06-11

## 2020-04-15 NOTE — TELEPHONE ENCOUNTER
"Prescription approved per [facility] Refill Protocol.  Requested Prescriptions   Pending Prescriptions Disp Refills     traZODone (DESYREL) 50 MG tablet [Pharmacy Med Name: TRAZODONE 50 MG TABLET] 90 tablet 1     Sig: TAKE 1-2 TABLETS ( MG) BY MOUTH AT BEDTIME       Serotonin Modulators Passed - 4/14/2020  9:28 AM        Passed - Recent (12 mo) or future (30 days) visit within the authorizing provider's specialty     Patient has had an office visit with the authorizing provider or a provider within the authorizing providers department within the previous 12 mos or has a future within next 30 days. See \"Patient Info\" tab in inbasket, or \"Choose Columns\" in Meds & Orders section of the refill encounter.              Passed - Medication is active on med list        Passed - Patient is age 18 or older        Passed - No active pregnancy on record        Passed - No positive pregnancy test in past 12 months           "

## 2020-04-16 NOTE — TELEPHONE ENCOUNTER
The Form has been completed by the provider, confirmed faxed to the fax number on the form and listed below and a copy has been sent to be added to the chart. Funmilayo Ferrari,     Sent a copy to [address] [place] [address]    Called and spoke with the patient. She reported to had started on Insulin 6/1999.    This was updated on her form. Funmilayo Ferrari,

## 2020-04-27 ENCOUNTER — HOSPITAL ENCOUNTER (OUTPATIENT)
Dept: CT IMAGING | Facility: CLINIC | Age: 70
Discharge: HOME | End: 2020-04-27
Attending: FAMILY MEDICINE | Admitting: FAMILY MEDICINE
Payer: COMMERCIAL

## 2020-04-27 DIAGNOSIS — Z87.891 PERSONAL HISTORY OF TOBACCO USE: ICD-10-CM

## 2020-04-27 PROBLEM — K92.2 GIB (GASTROINTESTINAL BLEEDING): Status: RESOLVED | Noted: 2019-09-02 | Resolved: 2020-04-27

## 2020-04-27 PROCEDURE — G0297 LDCT FOR LUNG CA SCREEN: HCPCS

## 2020-05-13 DIAGNOSIS — F17.200 TOBACCO USE DISORDER: ICD-10-CM

## 2020-05-13 RX ORDER — BUPROPION HYDROCHLORIDE 150 MG/1
150 TABLET, FILM COATED, EXTENDED RELEASE ORAL 2 TIMES DAILY
Qty: 60 TABLET | Refills: 3 | Status: SHIPPED | OUTPATIENT
Start: 2020-05-13 | End: 2020-09-09

## 2020-05-13 NOTE — TELEPHONE ENCOUNTER
buPROPion (ZYBAN) 150 MG 12 hr tablet  Last Written Prescription Date:  1/23/20  Last Fill Quantity: 60,  # refills: 3   Last office visit: 10/21/19    Future Office Visit:   Next 5 appointments (look out 90 days)    Jun 17, 2020  8:00 AM CDT  PHYSICAL with Tamiko Coley MD  [facility] [address]  [place] [id number] [phone]           Unable to fill per [facility] protocol.  Medication and pharmacy loaded.

## 2020-05-18 DIAGNOSIS — N39.41 URGE INCONTINENCE OF URINE: ICD-10-CM

## 2020-05-19 DIAGNOSIS — M81.0 AGE-RELATED OSTEOPOROSIS WITHOUT CURRENT PATHOLOGICAL FRACTURE: ICD-10-CM

## 2020-05-19 RX ORDER — OXYBUTYNIN CHLORIDE 5 MG/1
TABLET ORAL
Qty: 180 TABLET | Refills: 0 | Status: SHIPPED | OUTPATIENT
Start: 2020-05-19 | End: 2020-06-17

## 2020-05-20 RX ORDER — ALENDRONATE SODIUM 70 MG/1
TABLET ORAL
Qty: 12 TABLET | Refills: 0 | Status: SHIPPED | OUTPATIENT
Start: 2020-05-20 | End: 2020-06-17

## 2020-06-09 DIAGNOSIS — E11.3293 TYPE 2 DIABETES MELLITUS WITH MILD NONPROLIFERATIVE RETINOPATHY OF BOTH EYES, WITH LONG-TERM CURRENT USE OF INSULIN, MACULAR EDEMA PRESENCE UNSPECIFIED (H): ICD-10-CM

## 2020-06-09 DIAGNOSIS — Z79.4 TYPE 2 DIABETES MELLITUS WITH MILD NONPROLIFERATIVE RETINOPATHY OF BOTH EYES, WITH LONG-TERM CURRENT USE OF INSULIN, MACULAR EDEMA PRESENCE UNSPECIFIED (H): ICD-10-CM

## 2020-06-09 NOTE — TELEPHONE ENCOUNTER
Duplicate.   Outpatient Medication Detail      Disp  Refills  Start  End  REGULO    metFORMIN (GLUCOPHAGE) 1000 MG tablet  60 tablet  1  4/8/2020   No    Sig: TAKE 1 TABLET BY MOUTH TWICE A DAY WITH MEALS    Sent to pharmacy as: metFORMIN (GLUCOPHAGE) 1000 MG tablet    Class: E-Prescribe    Order: [id number]    E-Prescribing Status: Receipt confirmed by pharmacy (4/8/2020  9:31 AM CDT)    Printout Tracking     External Result Report    Medication Administration Instructions     TAKE 1 TABLET BY MOUTH TWICE A DAY WITH MEALS    Pharmacy     [id number] - [address] [facility]    Associated Diagnoses     Type 2 diabetes mellitus with mild nonproliferative retinopathy of both eyes, with long-term current use of insulin, macular edema presence unspecified (H) [E11.3293, Z79.4]  - Primary         An Josr, CMA

## 2020-06-10 NOTE — PROGRESS NOTES
Subjective     Rajani Guo is a 69 year old female who presents to clinic today for the following health issues:    HPI   URINARY TRACT SYMPTOMS  Onset: 2-3 months    Description:   Painful urination (Dysuria): YES           Frequency: no   Blood in urine (Hematuria): no   urgency    Intensity: severe    Progression of Symptoms:  worsening    Accompanying Signs & Symptoms:  Fever/chills: no   Flank pain no   Nausea and vomiting: no   Any vaginal symptoms: none  Abdominal/Pelvic Pain:no    History:   History of frequent UTI's: no   History of kidney stones: no   Sexually Active: no   Possibility of pregnancy: No    Precipitating factors:       Therapies Tried and outcome: has seen GYN for prolapse-notes reviewed       Patient Active Problem List   Diagnosis     Type 2 diabetes mellitus without complication, with long-term current use of insulin (H)     Hyperlipidemia LDL goal <70     Age-related osteoporosis without current pathological fracture     Insomnia, unspecified type     Tobacco use disorder     History of partial thyroidectomy     Newly recognized murmur     Essential hypertension     PVD (peripheral vascular disease) (H)     Claudication of left lower extremity (H)     History of anemia     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 3/4/2019      History of GI bleed     Past Surgical History:   Procedure Laterality Date     COLONOSCOPY N/A 9/4/2019    Procedure: COLONOSCOPY;  Surgeon: Marie Todd MD;  Location:  GI     ESOPHAGOSCOPY, GASTROSCOPY, DUODENOSCOPY (EGD), COMBINED N/A 9/4/2019    Procedure: ESOPHAGOGASTRODUODENOSCOPY (EGD);  Surgeon: Marie Todd MD;  Location:  GI     IR ANGIOGRAM THROUGH CATHETER FOLLOW UP  3/5/2019     IR LOWER EXTREMITY ANGIOGRAM LEFT  3/4/2019     KNEE SURGERY Right 2013    right knee torn meniscus surgery     OVARY SURGERY  1971    1 ovary removed     RELEASE CARPAL TUNNEL Right 1988     RELEASE TRIGGER FINGER BILATERAL       SHOULDER  SURGERY Left     rotator cuff repair, plate placement     THYROID SURGERY  1988    partial thyroidectomy       Social History     Tobacco Use     Smoking status: Current Some Day Smoker     Packs/day: 1.50     Years: 52.00     Pack years: 78.00     Smokeless tobacco: Never Used   Substance Use Topics     Alcohol use: Yes     Family History   Problem Relation Age of Onset     Glaucoma No family hx of      Macular Degeneration No family hx of          Current Outpatient Medications   Medication Sig Dispense Refill     alendronate (FOSAMAX) 70 MG tablet TAKE 1 TABLET BY MOUTH ONE TIME PER WEEK 12 tablet 0     aspirin (ASA) 81 MG EC tablet Take 1 tablet (81 mg) by mouth daily 100 tablet 3     atorvastatin (LIPITOR) 40 MG tablet TAKE 1 TABLET BY MOUTH EVERY DAY 90 tablet 3     calcium carb 1250 mg, 500 mg [place],/vitamin D 200 unit (CALCIUM 500/D) 500-200 MG-UNIT per tablet Take 1 tablet by mouth 2 times daily        ferrous sulfate (FE TABS) 325 (65 Fe) MG EC tablet Take 1 tablet (325 mg) by mouth daily 60 tablet 0     gabapentin (NEURONTIN) 300 MG capsule TAKE 1 CAPSULE (300 MG) BY MOUTH ONE OR TWO TIMES DAILY 90 capsule 1     glucosamine-chondroitin 500-400 MG CAPS per capsule Take 1 capsule by mouth 2 times daily        insulin detemir (LEVEMIR FLEXTOUCH) 100 UNIT/ML pen INJECT 16 UNITS SUBCUTANEOUS AT BEDTIME (Patient taking differently: INJECT 14 UNITS SUBCUTANEOUS AT BEDTIME) 18 mL 0     insulin pen needle (29G X 12MM) 29G X 12MM miscellaneous Use 1 pen needles daily or as directed. 100 each 11     levofloxacin (LEVAQUIN) 250 MG tablet Take 1 tablet (250 mg) by mouth daily for 5 days 5 tablet 0     lisinopril (ZESTRIL) 2.5 MG tablet TAKE 1 TABLET BY MOUTH EVERY DAY 90 tablet 3     Melatonin 10 MG TABS tablet Take 10 mg by mouth At Bedtime       metFORMIN (GLUCOPHAGE) 1000 MG tablet TAKE 1 TABLET BY MOUTH TWICE A DAY WITH MEALS 60 tablet 0     Multiple Vitamin (MULTI-VITAMINS) TABS Take 1 tablet by mouth daily         oxybutynin (DITROPAN) 5 MG tablet TAKE 1 TABLET BY MOUTH TWICE A  tablet 0     traZODone (DESYREL) 50 MG tablet Take  mg by mouth At Bedtime       acetaminophen (ACETAMINOPHEN 8 HOUR) 650 MG CR tablet Take 650 mg by mouth every 8 hours       buPROPion (ZYBAN) 150 MG 12 hr tablet Take 1 tablet (150 mg) by mouth 2 times daily (Patient not taking: Reported on 6/11/2020) 60 tablet 3     nicotine (NICODERM CQ) 21 MG/24HR 24 hr patch Place 1 patch onto the skin daily (Patient not taking: Reported on 4/8/2020)       pantoprazole (PROTONIX) 40 MG EC tablet Take 1 tablet (40 mg) by mouth daily (Patient not taking: Reported on 6/11/2020) 90 tablet 1     varenicline (CHANTIX MIREYA) 0.5 MG X 11 & 1 MG X 42 tablet Take 0.5 mg tab daily for 3 days, THEN 0.5 mg tab twice daily for 4 days, THEN 1 mg twice daily. (Patient not taking: Reported on 4/8/2020) 53 tablet 0     Allergies   Allergen Reactions     Indomethacin Other (See Comments)     Dizziness and disorientation     Tramadol Nausea and Vomiting     Recent Labs   Lab Test 04/13/20  0914 09/04/19  0820  07/30/19  1441  03/04/19  0930 05/31/18  1003 01/18/18  0835   A1C 5.8*  --   --  5.3  --  6.5* 6.1* 6.4*   LDL 54  --   --   --   --  44  --  83   HDL 58  --   --   --   --  59  --  68   TRIG 66  --   --   --   --  94  --  78   ALT 15  --   --   --   --   --   --  16   CR 0.60 0.54   < >  --    < > 0.56  --  0.47*   GFRESTIMATED >90 >90   < >  --    < > >90  --  >90   GFRESTBLACK >90 >90   < >  --    < > >90  --  >90   POTASSIUM 3.6 3.3*   < >  --    < >  --   --  3.9   TSH  --   --   --  3.42  --   --  2.72  --     < > = values in this interval not displayed.      BP Readings from Last 3 Encounters:   06/11/20 136/60   02/27/20 126/77   02/26/20 130/60    Wt Readings from Last 3 Encounters:   06/11/20 68 kg (150 lb)   02/27/20 70.9 kg (156 lb 3.2 oz)   02/26/20 68.9 kg (152 lb)                    Reviewed and updated as needed this visit by Provider  Tobacco   "Allergies  Meds  Problems  Med Hx  Surg Hx  Fam Hx         Review of Systems   Rest of the ROS is Negative except see above and Problem list [stable]        Objective    /60   Pulse 96   Temp 97.9  F (36.6  C) (Oral)   Resp 14   Ht 1.626 m (5' 4\")   Wt 68 kg (150 lb)   SpO2 95%   Breastfeeding No   BMI 25.75 kg/m    Body mass index is 25.75 kg/m .  Physical Exam   GENERAL: healthy, alert and no distress  RESP: lungs clear to auscultation - no rales, rhonchi or wheezes  CV: regular rate and rhythm, normal S1 S2, no S3 or S4, no murmur, click or rub, no peripheral edema and peripheral pulses strong  ABDOMEN: soft, nontender, no hepatosplenomegaly, no masses and bowel sounds normal  MS: no gross musculoskeletal defects noted, no edema  No flank tenderness   Diagnostic Test Results:  Labs reviewed in Epic  Results for orders placed or performed in visit on 06/11/20 (from the past 24 hour(s))   *UA reflex to Microscopic and Culture ([place] [facility] (except [place] and [place])    Specimen: Midstream Urine   Result Value Ref Range    Color Urine Yellow     Appearance Urine Cloudy     Glucose Urine Negative NEG^Negative mg/dL    Bilirubin Urine Negative NEG^Negative    Ketones Urine Negative NEG^Negative mg/dL    Specific Gravity Urine 1.025 1.003 - 1.035    Blood Urine Negative NEG^Negative    pH Urine 8.0 (H) 5.0 - 7.0 pH    Protein Albumin Urine 30 (A) NEG^Negative mg/dL    Urobilinogen Urine 0.2 0.2 - 1.0 EU/dL    Nitrite Urine Negative NEG^Negative    Leukocyte Esterase Urine Moderate (A) NEG^Negative    Source Midstream Urine    Urine Microscopic   Result Value Ref Range    WBC Urine  (A) OTO5^0 - 5 /HPF    RBC Urine O - 2 OTO2^O - 2 /HPF    Squamous Epithelial /LPF Urine Few FEW^Few /LPF    Bacteria Urine Many (A) NEG^Negative /HPF           Assessment & Plan     1. Dysuria    - *UA reflex to Microscopic and Culture ([place] [facility] (except [place] and " [place])  - Urine Microscopic  - Urine Culture Aerobic Bacterial    2. Type 2 diabetes mellitus without complication, with long-term current use of insulin (H)  Stable   - OPHTHALMOLOGY ADULT REFERRAL    3. Acute cystitis without hematuria  SEE [facility] care orders  The potential side effects of this medication have been discussed with the patient.  Call if any significant problems with these are experienced.  Drink fluids  - levofloxacin (LEVAQUIN) 250 MG tablet; Take 1 tablet (250 mg) by mouth daily for 5 days  Dispense: 5 tablet; Refill: 0    4. Cystocele with prolapse  As above  - UROLOGY ADULT REFERRAL    5. Rectocele  - UROLOGY ADULT REFERRAL       Return in about 2 months (around 8/11/2020) for Med check.    Tamiko Coley MD  [facility]

## 2020-06-11 ENCOUNTER — OFFICE VISIT (OUTPATIENT)
Dept: FAMILY MEDICINE | Facility: CLINIC | Age: 70
End: 2020-06-11
Payer: COMMERCIAL

## 2020-06-11 VITALS
SYSTOLIC BLOOD PRESSURE: 136 MMHG | TEMPERATURE: 97.9 F | WEIGHT: 150 LBS | RESPIRATION RATE: 14 BRPM | BODY MASS INDEX: 25.61 KG/M2 | DIASTOLIC BLOOD PRESSURE: 60 MMHG | HEIGHT: 64 IN | OXYGEN SATURATION: 95 % | HEART RATE: 96 BPM

## 2020-06-11 DIAGNOSIS — N30.00 ACUTE CYSTITIS WITHOUT HEMATURIA: ICD-10-CM

## 2020-06-11 DIAGNOSIS — E11.9 TYPE 2 DIABETES MELLITUS WITHOUT COMPLICATION, WITH LONG-TERM CURRENT USE OF INSULIN (H): ICD-10-CM

## 2020-06-11 DIAGNOSIS — Z79.4 TYPE 2 DIABETES MELLITUS WITHOUT COMPLICATION, WITH LONG-TERM CURRENT USE OF INSULIN (H): ICD-10-CM

## 2020-06-11 DIAGNOSIS — K29.80 DUODENITIS: ICD-10-CM

## 2020-06-11 DIAGNOSIS — N81.4 CYSTOCELE WITH PROLAPSE: ICD-10-CM

## 2020-06-11 DIAGNOSIS — R30.0 DYSURIA: Primary | ICD-10-CM

## 2020-06-11 DIAGNOSIS — N81.6 RECTOCELE: ICD-10-CM

## 2020-06-11 PROBLEM — Z86.2 HISTORY OF ANEMIA: Status: ACTIVE | Noted: 2019-09-03

## 2020-06-11 PROBLEM — D64.9 ANEMIA: Status: RESOLVED | Noted: 2019-08-01 | Resolved: 2020-06-11

## 2020-06-11 LAB
ALBUMIN UR-MCNC: 30 MG/DL
APPEARANCE UR: ABNORMAL
BACTERIA #/AREA URNS HPF: ABNORMAL /HPF
BILIRUB UR QL STRIP: NEGATIVE
COLOR UR AUTO: YELLOW
GLUCOSE UR STRIP-MCNC: NEGATIVE MG/DL
HGB UR QL STRIP: NEGATIVE
KETONES UR STRIP-MCNC: NEGATIVE MG/DL
LEUKOCYTE ESTERASE UR QL STRIP: ABNORMAL
NITRATE UR QL: NEGATIVE
NON-SQ EPI CELLS #/AREA URNS LPF: ABNORMAL /LPF
PH UR STRIP: 8 PH (ref 5–7)
RBC #/AREA URNS AUTO: ABNORMAL /HPF
SOURCE: ABNORMAL
SP GR UR STRIP: 1.02 (ref 1–1.03)
UROBILINOGEN UR STRIP-ACNC: 0.2 EU/DL (ref 0.2–1)
WBC #/AREA URNS AUTO: ABNORMAL /HPF

## 2020-06-11 PROCEDURE — 81001 URINALYSIS AUTO W/SCOPE: CPT | Performed by: FAMILY MEDICINE

## 2020-06-11 PROCEDURE — 99213 OFFICE O/P EST LOW 20 MIN: CPT | Performed by: FAMILY MEDICINE

## 2020-06-11 PROCEDURE — 87086 URINE CULTURE/COLONY COUNT: CPT | Performed by: FAMILY MEDICINE

## 2020-06-11 RX ORDER — LEVOFLOXACIN 250 MG/1
250 TABLET, FILM COATED ORAL DAILY
Qty: 5 TABLET | Refills: 0 | Status: SHIPPED | OUTPATIENT
Start: 2020-06-11 | End: 2020-06-16

## 2020-06-11 ASSESSMENT — MIFFLIN-ST. JEOR: SCORE: 1195.4

## 2020-06-12 LAB
BACTERIA SPEC CULT: NORMAL
BACTERIA SPEC CULT: NORMAL
SPECIMEN SOURCE: NORMAL

## 2020-06-12 RX ORDER — PANTOPRAZOLE SODIUM 40 MG/1
TABLET, DELAYED RELEASE ORAL
Qty: 90 TABLET | Refills: 1 | Status: SHIPPED | OUTPATIENT
Start: 2020-06-12 | End: 2021-01-13

## 2020-06-12 NOTE — TELEPHONE ENCOUNTER
Routing refill request to provider for review/approval because:  Failed - No diagnosis of osteoporosis on record

## 2020-06-16 NOTE — PROGRESS NOTES
"Rajani Guo is a 69 year old female who is being evaluated via a billable video visit.      The patient has been notified of following:     \"This video visit will be conducted via a call between you and your physician/provider. We have found that certain health care needs can be provided without the need for an in-person physical exam.  This service lets us provide the care you need with a video conversation.  If a prescription is necessary we can send it directly to your pharmacy.  If lab work is needed we can place an order for that and you can then stop by our lab to have the test done at a later time.    Video visits are billed at different rates depending on your insurance coverage.  Please reach out to your insurance provider with any questions.    If during the course of the call the physician/provider feels a video visit is not appropriate, you will not be charged for this service.\"    Patient has given verbal consent for Video visit? Yes    Will anyone else be joining your video visit? No      Subjective     Rajani Guo is a 69 year old female who presents today via video visit for the following health issues:    HPI   Chief Complaint   Patient presents with     Recheck Medication     pt is not sure for what medication       Diabetes Follow-up    How often are you checking your blood sugar? One time daily  What time of day are you checking your blood sugars (select all that apply)?  Before meals  Have you had any blood sugars above 200?  No  Have you had any blood sugars below 70?  No    What symptoms do you notice when your blood sugar is low?  None    What concerns do you have today about your diabetes? None     Do you have any of these symptoms? (Select all that apply)  No numbness or tingling in feet.  No redness, sores or blisters on feet.  No complaints of excessive thirst.  No reports of blurry vision.  No significant changes to weight.    Have you had a diabetic eye exam in the last 12 months? " No                Hyperlipidemia Follow-Up      Are you regularly taking any medication or supplement to lower your cholesterol?   Yes- Atorvastatin mg     Are you having muscle aches or other side effects that you think could be caused by your cholesterol lowering medication?  No    Hypertension Follow-up      Do you check your blood pressure regularly outside of the clinic? Yes     Are you following a low salt diet? Yes  sometimesAre your blood pressures ever more than 140 on the top number (systolic) OR more   than 90 on the bottom number (diastolic), for example 140/90? No    BP Readings from Last 2 Encounters:   06/11/20 136/60   02/27/20 126/77     Hemoglobin A1C (%)   Date Value   04/13/2020 5.8 (H)   07/30/2019 5.3     LDL Cholesterol Calculated (mg/dL)   Date Value   04/13/2020 54   03/04/2019 44         How many servings of fruits and vegetables do you eat daily?  4 or more    On average, how many sweetened beverages do you drink each day (Examples: soda, juice, sweet tea, etc.  Do NOT count diet or artificially sweetened beverages)?   0    How many days per week do you exercise enough to make your heart beat faster? 3 or less    How many minutes a day do you exercise enough to make your heart beat faster? 9 or less    How many days per week do you miss taking your medication? 0    Video Start Time: 12.02 PM    Doing well on meds for osteoporosis    Patient Active Problem List   Diagnosis     Type 2 diabetes mellitus without complication, with long-term current use of insulin (H)     Hyperlipidemia LDL goal <70     Age-related osteoporosis without current pathological fracture     Insomnia, unspecified type     Tobacco use disorder     History of partial thyroidectomy     Newly recognized murmur     Essential hypertension     PVD (peripheral vascular disease) (H)     Claudication of left lower extremity (H)     History of anemia     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 3/4/2019      History  of GI bleed     Past Surgical History:   Procedure Laterality Date     COLONOSCOPY N/A 9/4/2019    Procedure: COLONOSCOPY;  Surgeon: Marie Todd MD;  Location:  GI     ESOPHAGOSCOPY, GASTROSCOPY, DUODENOSCOPY (EGD), COMBINED N/A 9/4/2019    Procedure: ESOPHAGOGASTRODUODENOSCOPY (EGD);  Surgeon: Marie Todd MD;  Location:  GI     IR ANGIOGRAM THROUGH CATHETER FOLLOW UP  3/5/2019     IR LOWER EXTREMITY ANGIOGRAM LEFT  3/4/2019     KNEE SURGERY Right 2013    right knee torn meniscus surgery     OVARY SURGERY  1971    1 ovary removed     RELEASE CARPAL TUNNEL Right 1988     RELEASE TRIGGER FINGER BILATERAL       SHOULDER SURGERY Left     rotator cuff repair, plate placement     THYROID SURGERY  1988    partial thyroidectomy       Social History     Tobacco Use     Smoking status: Current Some Day Smoker     Packs/day: 1.50     Years: 52.00     Pack years: 78.00     Smokeless tobacco: Never Used   Substance Use Topics     Alcohol use: Yes     Family History   Problem Relation Age of Onset     Glaucoma No family hx of      Macular Degeneration No family hx of          Current Outpatient Medications   Medication Sig Dispense Refill     acetaminophen (ACETAMINOPHEN 8 HOUR) 650 MG CR tablet Take 650 mg by mouth every 8 hours       alendronate (FOSAMAX) 70 MG tablet TAKE 1 TABLET BY MOUTH ONE TIME PER WEEK 12 tablet 0     aspirin (ASA) 81 MG EC tablet Take 1 tablet (81 mg) by mouth daily 100 tablet 3     atorvastatin (LIPITOR) 40 MG tablet TAKE 1 TABLET BY MOUTH EVERY DAY 90 tablet 3     calcium carb 1250 mg, 500 mg [place],/vitamin D 200 unit (CALCIUM 500/D) 500-200 MG-UNIT per tablet Take 1 tablet by mouth 2 times daily        ferrous sulfate (FE TABS) 325 (65 Fe) MG EC tablet Take 1 tablet (325 mg) by mouth daily 60 tablet 0     gabapentin (NEURONTIN) 300 MG capsule TAKE 1 CAPSULE (300 MG) BY MOUTH ONE OR TWO TIMES DAILY 90 capsule 1     glucosamine-chondroitin 500-400 MG CAPS per capsule Take 1  capsule by mouth 2 times daily        insulin detemir (LEVEMIR FLEXTOUCH) 100 UNIT/ML pen INJECT 14 UNITS SUBCUTANEOUS AT BEDTIME 18 mL 1     insulin pen needle (29G X 12MM) 29G X 12MM miscellaneous Use 1 pen needles daily or as directed. 100 each 11     lisinopril (ZESTRIL) 2.5 MG tablet TAKE 1 TABLET BY MOUTH EVERY DAY 90 tablet 3     Melatonin 10 MG TABS tablet Take 10 mg by mouth At Bedtime       metFORMIN (GLUCOPHAGE) 1000 MG tablet TAKE 1 TABLET BY MOUTH TWICE A DAY WITH MEALS 180 tablet 1     Multiple Vitamin (MULTI-VITAMINS) TABS Take 1 tablet by mouth daily        oxybutynin (DITROPAN) 5 MG tablet TAKE 1 TABLET BY MOUTH TWICE A  tablet 3     pantoprazole (PROTONIX) 40 MG EC tablet TAKE 1 TABLET BY MOUTH EVERY DAY 90 tablet 1     traZODone (DESYREL) 50 MG tablet Take  mg by mouth At Bedtime       buPROPion (ZYBAN) 150 MG 12 hr tablet Take 1 tablet (150 mg) by mouth 2 times daily (Patient not taking: Reported on 6/11/2020) 60 tablet 3     nicotine (NICODERM CQ) 21 MG/24HR 24 hr patch Place 1 patch onto the skin daily (Patient not taking: Reported on 4/8/2020)       varenicline (CHANTIX MIREYA) 0.5 MG X 11 & 1 MG X 42 tablet Take 0.5 mg tab daily for 3 days, THEN 0.5 mg tab twice daily for 4 days, THEN 1 mg twice daily. (Patient not taking: Reported on 4/8/2020) 53 tablet 0     Allergies   Allergen Reactions     Indomethacin Other (See Comments)     Dizziness and disorientation     Tramadol Nausea and Vomiting     Recent Labs   Lab Test 04/13/20  0914 09/04/19  0820  07/30/19  1441  03/04/19  0930 05/31/18  1003 01/18/18  0835   A1C 5.8*  --   --  5.3  --  6.5* 6.1* 6.4*   LDL 54  --   --   --   --  44  --  83   HDL 58  --   --   --   --  59  --  68   TRIG 66  --   --   --   --  94  --  78   ALT 15  --   --   --   --   --   --  16   CR 0.60 0.54   < >  --    < > 0.56  --  0.47*   GFRESTIMATED >90 >90   < >  --    < > >90  --  >90   GFRESTBLACK >90 >90   < >  --    < > >90  --  >90   POTASSIUM 3.6  "3.3*   < >  --    < >  --   --  3.9   TSH  --   --   --  3.42  --   --  2.72  --     < > = values in this interval not displayed.      BP Readings from Last 3 Encounters:   06/11/20 136/60   02/27/20 126/77   02/26/20 130/60    Wt Readings from Last 3 Encounters:   06/11/20 68 kg (150 lb)   02/27/20 70.9 kg (156 lb 3.2 oz)   02/26/20 68.9 kg (152 lb)                    Reviewed and updated as needed this visit by Provider  Tobacco  Allergies  Meds  Problems  Med Hx  Surg Hx  Fam Hx         Review of Systems   CONSTITUTIONAL: NEGATIVE for fever, chills, change in weight  ENT/MOUTH: NEGATIVE for ear, mouth and throat problems  RESP: NEGATIVE for significant cough or SOB  CV: NEGATIVE for chest pain, palpitations or peripheral edema  GI: NEGATIVE for nausea, abdominal pain, heartburn, or change in bowel habits  PSYCHIATRIC: NEGATIVE for changes in mood or affect  ROS otherwise negative      Objective    There were no vitals taken for this visit.  Estimated body mass index is 25.75 kg/m  as calculated from the following:    Height as of 6/11/20: 1.626 m (5' 4\").    Weight as of 6/11/20: 68 kg (150 lb).  Physical Exam   /72  GENERAL: Healthy, alert and no distress  EYES: Eyes grossly normal to inspection.  No discharge or erythema, or obvious scleral/conjunctival abnormalities.  RESP: No audible wheeze, cough, or visible cyanosis.  No visible retractions or increased work of breathing.    SKIN: Visible skin clear. No significant rash, abnormal pigmentation or lesions.  NEURO: Cranial nerves grossly intact.  Mentation and speech appropriate for age.  PSYCH: Mentation appears normal, affect normal/bright, judgement and insight intact, normal speech and appearance well-groomed.      Diagnostic Test Results:  Labs reviewed in Epic        Assessment & Plan     1. Type 2 diabetes mellitus without complication, with long-term current use of insulin (H)  Pt will schedule lab appointment    - metFORMIN (GLUCOPHAGE) " 1000 MG tablet; TAKE 1 TABLET BY MOUTH TWICE A DAY WITH MEALS  Dispense: 180 tablet; Refill: 1  - Hemoglobin A1c; Future    2. Essential hypertension  Stable     3. Hyperlipidemia LDL goal <70  At goal    4. Insomnia, unspecified type  Stable     5. Tobacco use disorder  Pt ahs been advised many times to quit but has declined help    6. Age-related osteoporosis without current pathological fracture  Stable   - alendronate (FOSAMAX) 70 MG tablet; TAKE 1 TABLET BY MOUTH ONE TIME PER WEEK  Dispense: 12 tablet; Refill: 0    7. Urge incontinence of urine  Refilled  Has appointment with urology  - oxybutynin (DITROPAN) 5 MG tablet; TAKE 1 TABLET BY MOUTH TWICE A DAY  Dispense: 180 tablet; Refill: 3    8. Type 2 diabetes mellitus with mild nonproliferative retinopathy of both eyes, with long-term current use of insulin, macular edema presence unspecified (H)  Advised eye check appointment   - insulin detemir (LEVEMIR FLEXTOUCH) 100 UNIT/ML pen; INJECT 14 UNITS SUBCUTANEOUS AT BEDTIME  Dispense: 18 mL; Refill: 1    9. Visit for screening mammogram  Advised   - MA Screening Digital Bilateral; Future    10. Claudication of left lower extremity (H)  On gabapentin  Stable        Return in about 3 months (around 9/17/2020) for Diabetes.    Tamiko Coley MD  [facility]      Video-Visit Details    Type of service:  Video Visit    Video End Time:12.10 PM    Originating Location (pt. Location): Home    Distant Location (provider location):  [facility]     Platform used for Video Visit: AmWell    Return in about 3 months (around 9/17/2020) for Diabetes.     12:36 PM-visit completed with orders and chart review  Tamiko Coley MD

## 2020-06-17 ENCOUNTER — VIRTUAL VISIT (OUTPATIENT)
Dept: FAMILY MEDICINE | Facility: CLINIC | Age: 70
End: 2020-06-17
Payer: COMMERCIAL

## 2020-06-17 ENCOUNTER — E-VISIT (OUTPATIENT)
Dept: FAMILY MEDICINE | Facility: CLINIC | Age: 70
End: 2020-06-17

## 2020-06-17 DIAGNOSIS — E11.3293 TYPE 2 DIABETES MELLITUS WITH MILD NONPROLIFERATIVE RETINOPATHY OF BOTH EYES, WITH LONG-TERM CURRENT USE OF INSULIN, MACULAR EDEMA PRESENCE UNSPECIFIED (H): ICD-10-CM

## 2020-06-17 DIAGNOSIS — I73.9 CLAUDICATION OF LEFT LOWER EXTREMITY (H): ICD-10-CM

## 2020-06-17 DIAGNOSIS — Z12.31 VISIT FOR SCREENING MAMMOGRAM: ICD-10-CM

## 2020-06-17 DIAGNOSIS — E78.5 HYPERLIPIDEMIA LDL GOAL <70: ICD-10-CM

## 2020-06-17 DIAGNOSIS — F17.200 TOBACCO USE DISORDER: ICD-10-CM

## 2020-06-17 DIAGNOSIS — E11.9 TYPE 2 DIABETES MELLITUS WITHOUT COMPLICATION, WITH LONG-TERM CURRENT USE OF INSULIN (H): ICD-10-CM

## 2020-06-17 DIAGNOSIS — Z79.4 TYPE 2 DIABETES MELLITUS WITHOUT COMPLICATION, WITH LONG-TERM CURRENT USE OF INSULIN (H): ICD-10-CM

## 2020-06-17 DIAGNOSIS — G47.00 INSOMNIA, UNSPECIFIED TYPE: ICD-10-CM

## 2020-06-17 DIAGNOSIS — R30.0 DYSURIA: Primary | ICD-10-CM

## 2020-06-17 DIAGNOSIS — I10 ESSENTIAL HYPERTENSION: Primary | ICD-10-CM

## 2020-06-17 DIAGNOSIS — N39.41 URGE INCONTINENCE OF URINE: ICD-10-CM

## 2020-06-17 DIAGNOSIS — M81.0 AGE-RELATED OSTEOPOROSIS WITHOUT CURRENT PATHOLOGICAL FRACTURE: ICD-10-CM

## 2020-06-17 DIAGNOSIS — Z79.4 TYPE 2 DIABETES MELLITUS WITH MILD NONPROLIFERATIVE RETINOPATHY OF BOTH EYES, WITH LONG-TERM CURRENT USE OF INSULIN, MACULAR EDEMA PRESENCE UNSPECIFIED (H): ICD-10-CM

## 2020-06-17 PROCEDURE — 99207 ZZC NON-BILLABLE SERV PER CHARTING: CPT | Performed by: FAMILY MEDICINE

## 2020-06-17 PROCEDURE — 99214 OFFICE O/P EST MOD 30 MIN: CPT | Mod: GT | Performed by: FAMILY MEDICINE

## 2020-06-17 RX ORDER — GABAPENTIN 300 MG/1
CAPSULE ORAL
Qty: 90 CAPSULE | Refills: 1 | Status: SHIPPED | OUTPATIENT
Start: 2020-06-17 | End: 2020-12-28

## 2020-06-17 RX ORDER — ALENDRONATE SODIUM 70 MG/1
TABLET ORAL
Qty: 12 TABLET | Refills: 0 | Status: SHIPPED | OUTPATIENT
Start: 2020-06-17 | End: 2020-11-02

## 2020-06-17 RX ORDER — OXYBUTYNIN CHLORIDE 5 MG/1
TABLET ORAL
Qty: 180 TABLET | Refills: 3 | Status: SHIPPED | OUTPATIENT
Start: 2020-06-17 | End: 2021-08-09

## 2020-07-01 ENCOUNTER — OFFICE VISIT (OUTPATIENT)
Dept: UROLOGY | Facility: CLINIC | Age: 70
End: 2020-07-01
Payer: COMMERCIAL

## 2020-07-01 DIAGNOSIS — R30.0 DYSURIA: Primary | ICD-10-CM

## 2020-07-01 LAB
ALBUMIN UR-MCNC: NEGATIVE MG/DL
AMORPH CRY #/AREA URNS HPF: ABNORMAL /HPF
APPEARANCE UR: ABNORMAL
BACTERIA #/AREA URNS HPF: ABNORMAL /HPF
BILIRUB UR QL STRIP: NEGATIVE
COLOR UR AUTO: ABNORMAL
GLUCOSE UR STRIP-MCNC: NEGATIVE MG/DL
HGB UR QL STRIP: ABNORMAL
KETONES UR STRIP-MCNC: NEGATIVE MG/DL
LEUKOCYTE ESTERASE UR QL STRIP: ABNORMAL
NITRATE UR QL: POSITIVE
PH UR STRIP: 8.5 PH (ref 5–7)
RBC #/AREA URNS AUTO: ABNORMAL /HPF
SOURCE: ABNORMAL
SP GR UR STRIP: 1.02 (ref 1–1.03)
UROBILINOGEN UR STRIP-ACNC: 0.2 EU/DL (ref 0.2–1)
WBC #/AREA URNS AUTO: ABNORMAL /HPF

## 2020-07-01 PROCEDURE — 81001 URINALYSIS AUTO W/SCOPE: CPT | Performed by: UROLOGY

## 2020-07-01 PROCEDURE — 52000 CYSTOURETHROSCOPY: CPT | Performed by: UROLOGY

## 2020-07-01 PROCEDURE — 99205 OFFICE O/P NEW HI 60 MIN: CPT | Mod: 25 | Performed by: UROLOGY

## 2020-07-01 NOTE — PROGRESS NOTES
"Rajani Guo is a 69 year old female seen in consultation for prolapse. Consult from Tamiko Coley.      Pt with several yr hx progressive pelvic prolapse. Sx's include difficulty starting urinary stream and urination, constipation, feeling \"like something is falling out.\"     Seen recently by Gyn, advised to start aggressive bowel program for rx of chronic constipation. Never seen by . No hx bladder surgery. On ditropan 5 mg PO BID for nocturia for the last year or so; not sure if it is helping.    Denies dysuria, gross hematuria. Voids about q 2 hours. Nocturia x 2-6.     Hx one vag delivery, no hyster, not on HRT.     Hx vascular thrombosis. Began smoking at age 12, still smoking.     Chronic constipation; yogurt and breakfast bar for breakfast.      Past Medical History:   Diagnosis Date     Age-related osteoporosis without current pathological fracture 1/18/2018     Clot     in  left leg     History of partial thyroidectomy 6/2/2018     Hyperlipidemia LDL goal <100 1/18/2018     Hypertension      Insomnia, unspecified type 1/18/2018     Tobacco use disorder      Type 2 diabetes mellitus without complication, with long-term current use of insulin (H) 1/18/2018       Past Surgical History:   Procedure Laterality Date     COLONOSCOPY N/A 9/4/2019    Procedure: COLONOSCOPY;  Surgeon: Marie Todd MD;  Location:  GI     ESOPHAGOSCOPY, GASTROSCOPY, DUODENOSCOPY (EGD), COMBINED N/A 9/4/2019    Procedure: ESOPHAGOGASTRODUODENOSCOPY (EGD);  Surgeon: Marie Todd MD;  Location:  GI     IR ANGIOGRAM THROUGH CATHETER FOLLOW UP  3/5/2019     IR LOWER EXTREMITY ANGIOGRAM LEFT  3/4/2019     KNEE SURGERY Right 2013    right knee torn meniscus surgery     OVARY SURGERY  1971    1 ovary removed     RELEASE CARPAL TUNNEL Right 1988     RELEASE TRIGGER FINGER BILATERAL       SHOULDER SURGERY Left     rotator cuff repair, plate placement     THYROID SURGERY  1988    partial thyroidectomy       Social " History     Socioeconomic History     Marital status: Single     Spouse name: Not on file     Number of children: Not on file     Years of education: Not on file     Highest education level: Not on file   Occupational History     Not on file   Social Needs     Financial resource strain: Not on file     Food insecurity     Worry: Not on file     Inability: Not on file     Transportation needs     Medical: Not on file     Non-medical: Not on file   Tobacco Use     Smoking status: Current Some Day Smoker     Packs/day: 1.50     Years: 52.00     Pack years: 78.00     Smokeless tobacco: Never Used   Substance and Sexual Activity     Alcohol use: Yes     Drug use: No     Sexual activity: Never   Lifestyle     Physical activity     Days per week: Not on file     Minutes per session: Not on file     Stress: Not on file   Relationships     Social connections     Talks on phone: Not on file     Gets together: Not on file     Attends Islam service: Not on file     Active member of club or organization: Not on file     Attends meetings of clubs or organizations: Not on file     Relationship status: Not on file     Intimate partner violence     Fear of current or ex partner: Not on file     Emotionally abused: Not on file     Physically abused: Not on file     Forced sexual activity: Not on file   Other Topics Concern     Parent/sibling w/ CABG, MI or angioplasty before 65F 55M? Not Asked   Social History Narrative     Not on file       Current Outpatient Medications   Medication Sig Dispense Refill     acetaminophen (ACETAMINOPHEN 8 HOUR) 650 MG CR tablet Take 650 mg by mouth every 8 hours       alendronate (FOSAMAX) 70 MG tablet TAKE 1 TABLET BY MOUTH ONE TIME PER WEEK 12 tablet 0     aspirin (ASA) 81 MG EC tablet Take 1 tablet (81 mg) by mouth daily 100 tablet 3     atorvastatin (LIPITOR) 40 MG tablet TAKE 1 TABLET BY MOUTH EVERY DAY 90 tablet 3     buPROPion (ZYBAN) 150 MG 12 hr tablet Take 1 tablet (150 mg) by mouth 2  times daily (Patient not taking: Reported on 6/11/2020) 60 tablet 3     calcium carb 1250 mg, 500 mg Circle,/vitamin D 200 unit (CALCIUM 500/D) 500-200 MG-UNIT per tablet Take 1 tablet by mouth 2 times daily        ferrous sulfate (FE TABS) 325 (65 Fe) MG EC tablet Take 1 tablet (325 mg) by mouth daily 60 tablet 0     gabapentin (NEURONTIN) 300 MG capsule TAKE 1 CAPSULE (300 MG) BY MOUTH ONE OR TWO TIMES DAILY 90 capsule 1     glucosamine-chondroitin 500-400 MG CAPS per capsule Take 1 capsule by mouth 2 times daily        insulin detemir (LEVEMIR FLEXTOUCH) 100 UNIT/ML pen INJECT 14 UNITS SUBCUTANEOUS AT BEDTIME 18 mL 1     insulin pen needle (29G X 12MM) 29G X 12MM miscellaneous Use 1 pen needles daily or as directed. 100 each 11     lisinopril (ZESTRIL) 2.5 MG tablet TAKE 1 TABLET BY MOUTH EVERY DAY 90 tablet 3     Melatonin 10 MG TABS tablet Take 10 mg by mouth At Bedtime       metFORMIN (GLUCOPHAGE) 1000 MG tablet TAKE 1 TABLET BY MOUTH TWICE A DAY WITH MEALS 180 tablet 1     Multiple Vitamin (MULTI-VITAMINS) TABS Take 1 tablet by mouth daily        nicotine (NICODERM CQ) 21 MG/24HR 24 hr patch Place 1 patch onto the skin daily (Patient not taking: Reported on 4/8/2020)       oxybutynin (DITROPAN) 5 MG tablet TAKE 1 TABLET BY MOUTH TWICE A  tablet 3     pantoprazole (PROTONIX) 40 MG EC tablet TAKE 1 TABLET BY MOUTH EVERY DAY 90 tablet 1     traZODone (DESYREL) 50 MG tablet Take  mg by mouth At Bedtime       varenicline (CHANTIX MIREYA) 0.5 MG X 11 & 1 MG X 42 tablet Take 0.5 mg tab daily for 3 days, THEN 0.5 mg tab twice daily for 4 days, THEN 1 mg twice daily. (Patient not taking: Reported on 4/8/2020) 53 tablet 0       Physical Exam:    GENL: NAD.    ABD: Soft, non-tender, no masses.    EG: Poorly-estrogenized, no masses.    VAGINA: Poorly-estrogenized, no masses.    BN HYPERMOBILITY: Mild.    CYSTOCELE: Grade 3.    APICAL PROLAPSE: Moderate.    RECTOCELE: Large.    BIMANUAL: No mass or  tenderness.    Cysto:    (Informed consent obtained. Pause for cause performed)   Sterile prep.    17 Fr scope inserted through urethra. Systematic examination w 70 degree lens.   PVR: 250 cc   MUCOSA: Normal without lesion   ORIFICES: Normal location and morphology   CAPACITY: 850 cc; no pain with filling   Scope withdrawn without untoward effect.    (Pt tolerated procedure without difficulty).      Results for orders placed or performed in visit on 07/01/20   UA reflex to Microscopic     Status: Abnormal   Result Value Ref Range    Color Urine Salma     Appearance Urine Cloudy     Glucose Urine Negative NEG^Negative mg/dL    Bilirubin Urine Negative NEG^Negative    Ketones Urine Negative NEG^Negative mg/dL    Specific Gravity Urine 1.020 1.003 - 1.035    Blood Urine Trace (A) NEG^Negative    pH Urine 8.5 (H) 5.0 - 7.0 pH    Protein Albumin Urine Negative NEG^Negative mg/dL    Urobilinogen Urine 0.2 0.2 - 1.0 EU/dL    Nitrite Urine Positive (A) NEG^Negative    Leukocyte Esterase Urine Trace (A) NEG^Negative    Source Catheterized Urine    Urine Microscopic     Status: Abnormal   Result Value Ref Range    WBC Urine 0 - 5 OTO5^0 - 5 /HPF    RBC Urine O - 2 OTO2^O - 2 /HPF    Bacteria Urine Many (A) NEG^Negative /HPF    Amorphous Crystals Many (A) NEG^Negative /HPF         IMP:  1. Symptomatic pelvic prolapse  2. Generous bladder capacity, suboptimal bladder emptying  3. Hx vascular dz, ongoing tobacco use  4. DM, other medical issues      PLAN:  1. Discussed situation with patient in detail.  2. Consider trial pessary; discussed in some detail; will refer to Gyn for fitting  3. Stop ditropan today; pt expresses understanding  4. RTC to us only PRN  5. 60 minutes spent with patient, more than 50% in counseling and coordination of care for prolapse, which did not include time spent for the procedure.

## 2020-07-08 ENCOUNTER — OFFICE VISIT (OUTPATIENT)
Dept: OBGYN | Facility: CLINIC | Age: 70
End: 2020-07-08
Payer: COMMERCIAL

## 2020-07-08 VITALS
SYSTOLIC BLOOD PRESSURE: 135 MMHG | DIASTOLIC BLOOD PRESSURE: 70 MMHG | WEIGHT: 147.8 LBS | HEART RATE: 91 BPM | OXYGEN SATURATION: 96 % | BODY MASS INDEX: 25.37 KG/M2

## 2020-07-08 DIAGNOSIS — N81.89 OTHER FEMALE GENITAL PROLAPSE: Primary | ICD-10-CM

## 2020-07-08 DIAGNOSIS — R33.9 INCOMPLETE EMPTYING OF BLADDER: ICD-10-CM

## 2020-07-08 PROCEDURE — 99213 OFFICE O/P EST LOW 20 MIN: CPT | Mod: 25 | Performed by: OBSTETRICS & GYNECOLOGY

## 2020-07-08 PROCEDURE — 51798 US URINE CAPACITY MEASURE: CPT | Performed by: OBSTETRICS & GYNECOLOGY

## 2020-07-08 PROCEDURE — A4561 PESSARY RUBBER, ANY TYPE: HCPCS | Performed by: OBSTETRICS & GYNECOLOGY

## 2020-07-08 PROCEDURE — 57160 INSERT PESSARY/OTHER DEVICE: CPT | Performed by: OBSTETRICS & GYNECOLOGY

## 2020-07-08 NOTE — PROGRESS NOTES
Bladder Scan performed. 623 maximum residual urine detected after 3 scans. MD informed. Verbal to teach patient to self catheterize.   Nurse teaching given on catheterization and the patient expresses understanding and acceptance of instructions.  Patient was able to properly catheterize, drained 575 ml.     Yessenia Ibrahim RN 7/8/2020 3:23 PM

## 2020-07-08 NOTE — PATIENT INSTRUCTIONS
Patient Education     Discharge Instructions: Self-Catheterization for Women  Your doctor has prescribed self-catheterization for you because you are having trouble urinating naturally. This problem can be caused by injury, disease, infection, recent surgery (especially urinary incontinence or prolapse procedures) hysterectomy, or other conditions.  Many people urinate by self-catheterization (also called intermittent catheterization). Self-catheterization simply means inserting a clean, thin, flexible tube (catheter) into the bladder to empty urine. This helps you empty your bladder when it won t empty by itself or empty all the way. You were shown in the hospital how to do this procedure. The steps below should help you remember how to do it properly.     Lubricate catheter       Insert catheter       Empty urine      Gather your supplies  You will need the following:    Soap and warm water or a moist towelette    Clean catheter    Water-soluble lubricating jelly (not petroleum jelly)    Mirror    Toilet or basin  Get ready    Wash your hands and your genital area. Use warm soapy water. You can also use a moist towelette. As always, wash from front to back.    Lubricate the catheter with the water-soluble lubricating jelly.  ? Lubricate 2 to 4 inches of the catheter tip.  ? Place the other end of the catheter over the toilet or basin.  Empty your bladder    Spread the labia (the lips or folds at the opening of your vagina). Use a mirror or your index finger to find the urethra (urinary tract opening).    Slowly insert the catheter into your urethra. If it doesn t go in, take a deep breath and bear down as if to trying to urinate.    If you feel a sharp pain, remove the catheter and try again.    Empty your bladder.  ? When the urine starts to flow, stop inserting the catheter.  ? When the urine stops flowing, slowly remove the catheter.  Catheter care  If you use a disposable catheter, use a new one each time  you empty your bladder. Throw the catheter away when you re done. If your catheters are reusable, do the following after each use:    Wash your hands with soap and warm water.    Clean the catheter with soap and warm water.    Rinse the catheter, making sure there is no soap left inside or on it.    Dry the outside of the catheter.    Store the catheter in a clean, dry container, such as a re-sealable plastic bag.    Throw away a catheter if the plastic looks cloudy.    Wash your hands again. If you used a basin, wash it out.  Follow-up care  Make a follow-up appointment as directed by our staff.  When to call your healthcare provider  Call your healthcare provider right away if you have any of the following:    Fever of 100.4 F (38.0 C) or higher, or as advised by your healthcare provider    Chills    Burning in the urinary tract or pubic area    Nausea and vomiting    Aching in the lower back    Sediment or mucus in the urine    Cloudy urine    Bloody (pink or red) or foul-smelling urine   Date Last Reviewed: 1/1/2017 2000-2019 [employer]. [address] [place] [id number]. All rights reserved. This information is not intended as a substitute for professional medical care. Always follow your healthcare professional's instructions.

## 2020-07-09 DIAGNOSIS — G47.00 INSOMNIA, UNSPECIFIED: ICD-10-CM

## 2020-07-09 RX ORDER — TRAZODONE HYDROCHLORIDE 50 MG/1
TABLET, FILM COATED ORAL
Qty: 180 TABLET | Refills: 1 | Status: ON HOLD | OUTPATIENT
Start: 2020-07-09 | End: 2021-01-06

## 2020-07-10 ENCOUNTER — OFFICE VISIT (OUTPATIENT)
Dept: UROLOGY | Facility: CLINIC | Age: 70
End: 2020-07-10
Payer: COMMERCIAL

## 2020-07-10 DIAGNOSIS — R33.9 URINARY RETENTION: ICD-10-CM

## 2020-07-10 DIAGNOSIS — R30.0 DYSURIA: Primary | ICD-10-CM

## 2020-07-10 PROCEDURE — 51702 INSERT TEMP BLADDER CATH: CPT | Performed by: OBSTETRICS & GYNECOLOGY

## 2020-07-10 RX ORDER — SULFAMETHOXAZOLE/TRIMETHOPRIM 800-160 MG
1 TABLET ORAL DAILY
Qty: 10 TABLET | Refills: 0 | Status: SHIPPED | OUTPATIENT
Start: 2020-07-10 | End: 2020-08-04

## 2020-07-10 NOTE — PROGRESS NOTES
Catheter insertion documentation on 7/10/2020:    Rajani Guo presents to the clinic for catheter insertion.  Reason for insertion: urinary retention patient was unable to self catheterize.   Order has been verified. Verbal   Catheter successfully inserted into the urethral meatus in the usual sterile fashion without immediate complication.  Type of catheter placed: 16 Citizen of [place] indwelling catheter  Urine is cloudy in color.  550 cc's of urine output returned.  Balloon was filled with 10cc's of normal saline.  Securement device placed for the catheter.  The patient tolerated the procedure and was instructed to return or call for pain, fever, leakage or decreased urine flow    Yessenia Ibrahim RN

## 2020-07-10 NOTE — PATIENT INSTRUCTIONS
Patient Education     Emptying and Cleaning Your Urinary Catheter Bag  You have an indwelling urinary catheter. This drains urine from your bladder into a bag. The bag can be one that is used at your bedside. Or it can be a smaller bag that is strapped to your leg. Follow the steps below to empty and clean a urinary bag.       Drain Clean tube Clean catheter   Step 1. Drain the bag    Wash your hands well with soap and water to prevent infecting the urinary catheter and bag.    If the short drainage tube is inserted into a pocket on the bag, take the drainage tube out of the pocket.    Hold the drainage tube over a toilet or measuring container. Open the valve.    Don t touch the tip of the valve or let it touch the toilet or container.    Wash your hands again.  Step 2. Clean the drainage tube    When the bag is empty, clean the tip of the drainage valve with an alcohol wipe.    Close the valve.    Reinsert the drainage tube into the pocket, if there is one.  Step 3. Clean your skin    Wash your hands well before and after cleaning your skin.    If you have a catheter (such as a Barajas) that enters through the urethra, clean the urethral area with soap and water 1 time(s) daily as you were taught by your healthcare provider. You should also clean after every bowel movement to prevent infection.  ? Don't pull on the tubing when cleaning so you don t injure the urethra.  ? Don t apply antibiotic ointment or any other antibacterial product to the urethra.  ? Don t use lubricant on the urethra.  ? Don t apply powder to the genital area or to the tubing.    If you have a suprapubic catheter, your healthcare provider will tell you how to clean your skin around the catheter. This is a catheter that was surgically placed into the bladder through the lower abdomen.  Step 4. Check and clean the catheter tubing    Check the tubing. If there are kinks, cracks, clogs, or you can t see into the tubing, you ll need to change to  new tubing as you were shown by your healthcare provider.    If the current tubing can still be used, wash it with soap and water. Always wash the tubing in the direction away from your body. Don't pull on the tubing.    Dry the tubing with a clean washcloth or paper towel.  Step 5. Clean the drainage bag    Have a clean backup bag or other drainage device ready.    Follow these steps:  ? Wash your hands well with soap and water.  ? Disconnect the bag from the catheter tubing. Connect the tubing to the backup bag or drainage device.  ? Drain any remaining urine from the bag you just disconnected. Close the drainage valve.  ? Pour some warm (not hot) soapy water into the bag. Swish the soap around, being sure to get the corners of the bag.  ? Open the drainage valve to drain the soap. Close the valve.:  ? Use a certain solution to clean the bag if your healthcare provider recommends one. Recommended solutions may include:     2 parts vinegar and 3 parts water    1 tablespoon of chlorine bleach mixed with a half cup of water  ? Ask your healthcare provider how often you should clean your bag and what solution you should use to reduce odor and keep your bag free of germs.  ? Shake the solution a bit and allow it to remain in the bag for 30 minutes.  ? Drain the solution and rinse the bag with cold tap water.  ? Hang the bag to drain and air-dry.  When to call your healthcare provider  Call your healthcare provider right away if you have any of the following:    Little or no urine flowing into the bag    Urine leaking where the catheter enters the body    Pain, burning, or redness of the area where the catheter enters the body    Bloody urine (a trace of blood is normal)    Cloudy or foul-smelling urine, or sand-like grains in your urine    Pain in your lower back or lower abdomen    Your catheter falls out    Fever of 100.4 F (38 C) or higher, or as directed by your healthcare provider    Shaking chills  Date Last  Reviewed: 1/1/2017 2000-2019 [employer]. [address] [place] [address], [place] [address]. All rights reserved. This information is not intended as a substitute for professional medical care. Always follow your healthcare professional's instructions.

## 2020-07-14 NOTE — PROGRESS NOTES
Rajani Guo is a 69 year old female, .  She is referred to me in consultation regarding pelvic floor relaxation and management options.  She has had pelvic relaxation/prolapse for 10 years.  She has had associated pelvic pressure issues, difficulty initiating urination.  She reports that she has had increasingly worsening voiding difficulties. she voids a little and  Then still feels that she needs to continue to void and then voids a little again.   She has had constipation issues in the past that she has been using stool softeners for.   She is also a smoker. She does not intend to stop smoking at this time.     We reviewed the ACOG pamphlet regarding prolapse and the options that are available.  We reviewed the use of Kegel exercises and the mechanical supports and the pessary supports.  We also reviewed the surgical approaches.  The goals and limitations of each are discussed.  She is interested in the pessary.        Past Medical History:   Diagnosis Date     Age-related osteoporosis without current pathological fracture 2018     Clot     in  left leg     Constipation      History of partial thyroidectomy 2018     Hyperlipidemia LDL goal <100 2018     Hypertension      Insomnia, unspecified type 2018     Tobacco use disorder      Type 2 diabetes mellitus without complication, with long-term current use of insulin (H) 2018       Past Surgical History:   Procedure Laterality Date     COLONOSCOPY N/A 2019    Procedure: COLONOSCOPY;  Surgeon: Marie Todd MD;  Location:  GI     ESOPHAGOSCOPY, GASTROSCOPY, DUODENOSCOPY (EGD), COMBINED N/A 2019    Procedure: ESOPHAGOGASTRODUODENOSCOPY (EGD);  Surgeon: Marie Todd MD;  Location:  GI     IR ANGIOGRAM THROUGH CATHETER FOLLOW UP  3/5/2019     IR LOWER EXTREMITY ANGIOGRAM LEFT  3/4/2019     KNEE SURGERY Right     right knee torn meniscus surgery     OVARY SURGERY  1971    1 ovary removed     RELEASE  CARPAL TUNNEL Right 1988     RELEASE TRIGGER FINGER BILATERAL       SHOULDER SURGERY Left     rotator cuff repair, plate placement     THYROID SURGERY  1988    partial thyroidectomy          Allergies   Allergen Reactions     Indomethacin Other (See Comments)     Dizziness and disorientation     Tramadol Nausea and Vomiting       Current Outpatient Medications   Medication Sig Dispense Refill     acetaminophen (ACETAMINOPHEN 8 HOUR) 650 MG CR tablet Take 650 mg by mouth every 8 hours       alendronate (FOSAMAX) 70 MG tablet TAKE 1 TABLET BY MOUTH ONE TIME PER WEEK 12 tablet 0     aspirin (ASA) 81 MG EC tablet Take 1 tablet (81 mg) by mouth daily 100 tablet 3     atorvastatin (LIPITOR) 40 MG tablet TAKE 1 TABLET BY MOUTH EVERY DAY 90 tablet 3     buPROPion (ZYBAN) 150 MG 12 hr tablet Take 1 tablet (150 mg) by mouth 2 times daily 60 tablet 3     calcium carb 1250 mg, 500 mg [place],/vitamin D 200 unit (CALCIUM 500/D) 500-200 MG-UNIT per tablet Take 1 tablet by mouth 2 times daily        ferrous sulfate (FE TABS) 325 (65 Fe) MG EC tablet Take 1 tablet (325 mg) by mouth daily 60 tablet 0     gabapentin (NEURONTIN) 300 MG capsule TAKE 1 CAPSULE (300 MG) BY MOUTH ONE OR TWO TIMES DAILY 90 capsule 1     glucosamine-chondroitin 500-400 MG CAPS per capsule Take 1 capsule by mouth 2 times daily        insulin detemir (LEVEMIR FLEXTOUCH) 100 UNIT/ML pen INJECT 14 UNITS SUBCUTANEOUS AT BEDTIME 18 mL 1     insulin pen needle (29G X 12MM) 29G X 12MM miscellaneous Use 1 pen needles daily or as directed. 100 each 11     lisinopril (ZESTRIL) 2.5 MG tablet TAKE 1 TABLET BY MOUTH EVERY DAY 90 tablet 3     Melatonin 10 MG TABS tablet Take 10 mg by mouth At Bedtime       metFORMIN (GLUCOPHAGE) 1000 MG tablet TAKE 1 TABLET BY MOUTH TWICE A DAY WITH MEALS 180 tablet 1     Multiple Vitamin (MULTI-VITAMINS) TABS Take 1 tablet by mouth daily        varenicline (CHANTIX MIREYA) 0.5 MG X 11 & 1 MG X 42 tablet Take 0.5 mg tab daily for 3 days, THEN  0.5 mg tab twice daily for 4 days, THEN 1 mg twice daily. 53 tablet 0     nicotine (NICODERM CQ) 21 MG/24HR 24 hr patch Place 1 patch onto the skin daily (Patient not taking: Reported on 4/8/2020)       oxybutynin (DITROPAN) 5 MG tablet TAKE 1 TABLET BY MOUTH TWICE A DAY (Patient not taking: Reported on 7/8/2020) 180 tablet 3     pantoprazole (PROTONIX) 40 MG EC tablet TAKE 1 TABLET BY MOUTH EVERY DAY (Patient not taking: Reported on 7/8/2020) 90 tablet 1     sulfamethoxazole-trimethoprim (BACTRIM DS) 800-160 MG tablet Take 1 tablet by mouth daily for 10 days 10 tablet 0     traZODone (DESYREL) 50 MG tablet TAKE 1-2 TABLETS ( MG) BY MOUTH AT BEDTIME 180 tablet 1       Social History     Socioeconomic History     Marital status: Single     Spouse name: Not on file     Number of children: Not on file     Years of education: Not on file     Highest education level: Not on file   Occupational History     Not on file   Social Needs     Financial resource strain: Not on file     Food insecurity     Worry: Not on file     Inability: Not on file     Transportation needs     Medical: Not on file     Non-medical: Not on file   Tobacco Use     Smoking status: Current Every Day Smoker     Packs/day: 1.50     Years: 52.00     Pack years: 78.00     Smokeless tobacco: Never Used   Substance and Sexual Activity     Alcohol use: Yes     Drug use: No     Sexual activity: Never   Lifestyle     Physical activity     Days per week: Not on file     Minutes per session: Not on file     Stress: Not on file   Relationships     Social connections     Talks on phone: Not on file     Gets together: Not on file     Attends Gnosticist service: Not on file     Active member of club or organization: Not on file     Attends meetings of clubs or organizations: Not on file     Relationship status: Not on file     Intimate partner violence     Fear of current or ex partner: Not on file     Emotionally abused: Not on file     Physically abused:  Not on file     Forced sexual activity: Not on file   Other Topics Concern     Parent/sibling w/ CABG, MI or angioplasty before 65F 55M? Not Asked   Social History Narrative     Not on file       Family History   Problem Relation Age of Onset     Glaucoma No family hx of      Macular Degeneration No family hx of        Review of Systems:  10 point ROS of systems including Constitutional, Eyes, Respiratory, Cardiovascular, Gastroenterology, Genitourinary, Integumentary, Muscularskeletal, Psychiatric were all negative except for pertinent positives noted in my HPI and in the PMH.      Exam  /70 (BP Location: Right arm, Cuff Size: Adult Regular)   Pulse 91   Wt 67 kg (147 lb 12.8 oz)   SpO2 96%   BMI 25.37 kg/m    General:  WNWD female, NAD  Alert  Oriented x 3  Gait:  Normal  Skin:  Normal skin turgor  HEENT:  NC/AT, EOMI  Abdomen:  Non-tender, non-distended.  Vulva: No external lesions, normal hair distribution, no adenopathy  BUS:  Normal, no masses noted  Urethra:  No hypermobility seen  Urethral meatus:  No masses noted  Vagina: Moist, pink, no abnormal discharge, well rugated, no lesions.  Grade 2 cystocele, grade 3 rectocele, and moderate uterine prolapse.   Cervix: Smooth, pink, no visible lesions  Uterus: Normal size, anteverted, non-tender, mobile  Ovaries: No mass, non-tender, mobile  Perianal:  No masses noted  Rectal exam: No mass, non-tender, normal sphincter tone   Rectovaginal exam:  Confirms the above.   Extremities:  No clubbing, no cyanosis and no edema.        Assessment  Pelvic relaxation  Incomplete emptying of the bladder  Urinary retention      Plan  The patient and I reviewed the management options and she would like to have the pessary.    The bladder scan is performed due to her sensation of inadequate elimination of the urine.  The bladder scan returned with 623 ml residual.   This was with the pessary in place.  Since the pessary is a fitting pessary, she is not able to take this  one with her and we will order the Gelhorn that works for her.    The self catheterization was reviewed with her and the RN was able to teach her.    She will need to return for the pessary to be placed once the pessary ordered, comes in.     Hugo Patrick MD        PROCEDURE  The pessary fitting was discussed with percy and the goals and limitations.  She is interested in proceeding.  The 2.75 Gelhorn pessary was placed and she did not feel the pessary and she did not think it shifted during her movements through and around the clinic.    The Gelhorn fitting  pessary was removed  RTC for the placement of the silicon pessary once it arrives.     Hugo Patrick MD    PROCEDURE  Bladder scan.   Please see RN note.  I was present for the bladder scan procedure.   Hugo Patrick MD

## 2020-07-20 ENCOUNTER — TELEPHONE (OUTPATIENT)
Dept: UROLOGY | Facility: CLINIC | Age: 70
End: 2020-07-20

## 2020-07-20 DIAGNOSIS — R30.0 DYSURIA: Primary | ICD-10-CM

## 2020-07-20 NOTE — TELEPHONE ENCOUNTER
Pt called with complaints of bladder pain about q half hour.  No pessary in place, this is on order. Every time she stands up she has a sharp pain. I let her know these are likely bladder spasms. She can try OTC pain medications and heating pad to her lower abd. She has thrown out all of her oxybutynin medication as she was told to stop. I let her know I would ask the MD and call her back.    Mary Salazar RN Specialty Triage 7/20/2020 8:42 AM

## 2020-07-21 RX ORDER — OXYBUTYNIN CHLORIDE 5 MG/1
5 TABLET ORAL DAILY
Qty: 60 TABLET | Refills: 0 | Status: SHIPPED | OUTPATIENT
Start: 2020-07-21 | End: 2020-09-16

## 2020-07-21 NOTE — TELEPHONE ENCOUNTER
Returned call to pt and let her know the plan of Ditropan ER 5 mg one PO q day x 60 days   She verbalized understanding.    Mary Salazar RN Specialty Triage 7/21/2020 4:08 PM

## 2020-07-21 NOTE — TELEPHONE ENCOUNTER
Pt call and is still having extreme pain. She is having extreme urges and is upset that she is in pain. I let her know what was going on. We decided if we did not hear back for the MD today pt will go to the ER for her pain.    Mary Salazar RN Specialty Triage 7/21/2020 3:44 PM

## 2020-07-24 ENCOUNTER — OFFICE VISIT (OUTPATIENT)
Dept: OBGYN | Facility: CLINIC | Age: 70
End: 2020-07-24
Payer: COMMERCIAL

## 2020-07-24 VITALS
WEIGHT: 148 LBS | OXYGEN SATURATION: 99 % | BODY MASS INDEX: 25.4 KG/M2 | SYSTOLIC BLOOD PRESSURE: 133 MMHG | HEART RATE: 83 BPM | DIASTOLIC BLOOD PRESSURE: 68 MMHG

## 2020-07-24 DIAGNOSIS — R33.9 INCOMPLETE EMPTYING OF BLADDER: ICD-10-CM

## 2020-07-24 DIAGNOSIS — N81.6 RECTOCELE: ICD-10-CM

## 2020-07-24 DIAGNOSIS — N81.11 MIDLINE CYSTOCELE: Primary | ICD-10-CM

## 2020-07-24 PROCEDURE — 99213 OFFICE O/P EST LOW 20 MIN: CPT | Performed by: OBSTETRICS & GYNECOLOGY

## 2020-07-24 PROCEDURE — A4562 PESSARY, NON RUBBER,ANY TYPE: HCPCS | Performed by: OBSTETRICS & GYNECOLOGY

## 2020-07-24 NOTE — PROGRESS NOTES
Rajani Guo is a 69 year old female, G1 1.  She presents today for follow up regarding pelvic relaxation and abnormal bladder function.  She previously had 623 ml residual urine.  She attempted self catheterization, but was unable to perform the procedures, so the whittaker was inserted.  She has had some bladder discomfort.  She has used the Ditropan, but she does not like the side effects.      The patient's medical history, social history and family history are reviewed and no updates at this time.      Review of Systems:  10 point ROS of systems including Constitutional, Eyes, Respiratory, Cardiovascular, Gastroenterology, Genitourinary, Integumentary, Muscularskeletal, Psychiatric were all negative except for pertinent positives noted in my HPI and in the PMH.    Exam  /68 (BP Location: Right arm, Cuff Size: Adult Regular)   Pulse 83   Wt 67.1 kg (148 lb)   SpO2 99%   BMI 25.40 kg/m    General:  WNWD female, NAD  Alert  Oriented x 3  Gait:  Normal  Skin:  Normal skin turgor  HEENT:  NC/AT, EOMI  Abdomen:  Non-tender, non-distended.  Vulva: No external lesions, normal hair distribution, no adenopathy  BUS:  Normal, no masses noted  Urethra:  No hypermobility seen  Urethral meatus:  No masses noted  Vagina: Moist, pink, no abnormal discharge, well rugated, no lesions  Extremities:  No clubbing, no cyanosis and no edema.      Assessment  Pelvic relaxation (cystocele, rectocele)  Urinary retention    Plan  The 2 3/4 inch Milex Gelhorn pessary was placed.  She walked around the clinic and she thought the pessary worked well and she did not feel the pessary.   She is going to stop the Ditropan.  I suggest to use Pyridium for the bladder discomfort.  Instructions were reviewed with her.  Whittaker is left in at this time.    She is going to return to see me early in the next week for the pessary check and to discontinue the whittaker.       Hugo Patrick MD

## 2020-07-28 ENCOUNTER — OFFICE VISIT (OUTPATIENT)
Dept: OBGYN | Facility: CLINIC | Age: 70
End: 2020-07-28
Payer: COMMERCIAL

## 2020-07-28 VITALS
WEIGHT: 149.2 LBS | SYSTOLIC BLOOD PRESSURE: 132 MMHG | BODY MASS INDEX: 25.61 KG/M2 | OXYGEN SATURATION: 98 % | DIASTOLIC BLOOD PRESSURE: 72 MMHG | HEART RATE: 86 BPM

## 2020-07-28 DIAGNOSIS — N81.6 RECTOCELE: ICD-10-CM

## 2020-07-28 DIAGNOSIS — N81.11 MIDLINE CYSTOCELE: ICD-10-CM

## 2020-07-28 DIAGNOSIS — R39.15 URINARY URGENCY: ICD-10-CM

## 2020-07-28 DIAGNOSIS — N81.89 PELVIC RELAXATION: Primary | ICD-10-CM

## 2020-07-28 PROCEDURE — 99214 OFFICE O/P EST MOD 30 MIN: CPT | Performed by: OBSTETRICS & GYNECOLOGY

## 2020-08-02 NOTE — PROGRESS NOTES
Eloise Guo is a 69-year-old female G1, P1.  She has pelvic relaxation and abnormal bladder function.  Her appointment was for tomorrow, but she does not feel that she is able to continue with the symptoms and urgency until then as her urgency symptoms have worsened since I saw her last week.  She had the catheter placed 2 weeks ago to give the bladder some rest, since she had the high residual urine of 623 ml.    She states that the urgency is very intense even at resting, standing makes it more intense.  She reports that Tylenol has helped very little and the Pyridium has been of no help.  She has had Ditropan and she does not feel that it has worked and she did not like the side effects.  Since the pessary was placed last week, the pelvic support has improved, but she wonders if the pessary has contributed to her urgency.        Past Medical History:   Diagnosis Date     Age-related osteoporosis without current pathological fracture 1/18/2018     Clot     in  left leg     Constipation      History of partial thyroidectomy 6/2/2018     Hyperlipidemia LDL goal <100 1/18/2018     Hypertension      Insomnia, unspecified type 1/18/2018     Tobacco use disorder      Type 2 diabetes mellitus without complication, with long-term current use of insulin (H) 1/18/2018       Past Surgical History:   Procedure Laterality Date     COLONOSCOPY N/A 9/4/2019    Procedure: COLONOSCOPY;  Surgeon: Marie Todd MD;  Location:  GI     ESOPHAGOSCOPY, GASTROSCOPY, DUODENOSCOPY (EGD), COMBINED N/A 9/4/2019    Procedure: ESOPHAGOGASTRODUODENOSCOPY (EGD);  Surgeon: Marie Todd MD;  Location:  GI     IR ANGIOGRAM THROUGH CATHETER FOLLOW UP  3/5/2019     IR LOWER EXTREMITY ANGIOGRAM LEFT  3/4/2019     KNEE SURGERY Right 2013    right knee torn meniscus surgery     OVARY SURGERY  1971    1 ovary removed     RELEASE CARPAL TUNNEL Right 1988     RELEASE TRIGGER FINGER BILATERAL       SHOULDER SURGERY Left      rotator cuff repair, plate placement     THYROID SURGERY  1988    partial thyroidectomy     Current Outpatient Medications   Medication Sig Dispense Refill     acetaminophen (ACETAMINOPHEN 8 HOUR) 650 MG CR tablet Take 650 mg by mouth every 8 hours       alendronate (FOSAMAX) 70 MG tablet TAKE 1 TABLET BY MOUTH ONE TIME PER WEEK 12 tablet 0     aspirin (ASA) 81 MG EC tablet Take 1 tablet (81 mg) by mouth daily 100 tablet 3     atorvastatin (LIPITOR) 40 MG tablet TAKE 1 TABLET BY MOUTH EVERY DAY 90 tablet 3     buPROPion (ZYBAN) 150 MG 12 hr tablet Take 1 tablet (150 mg) by mouth 2 times daily 60 tablet 3     calcium carb 1250 mg, 500 mg [place],/vitamin D 200 unit (CALCIUM 500/D) 500-200 MG-UNIT per tablet Take 1 tablet by mouth 2 times daily        ferrous sulfate (FE TABS) 325 (65 Fe) MG EC tablet Take 1 tablet (325 mg) by mouth daily 60 tablet 0     gabapentin (NEURONTIN) 300 MG capsule TAKE 1 CAPSULE (300 MG) BY MOUTH ONE OR TWO TIMES DAILY 90 capsule 1     glucosamine-chondroitin 500-400 MG CAPS per capsule Take 1 capsule by mouth 2 times daily        insulin detemir (LEVEMIR FLEXTOUCH) 100 UNIT/ML pen INJECT 14 UNITS SUBCUTANEOUS AT BEDTIME 18 mL 1     insulin pen needle (29G X 12MM) 29G X 12MM miscellaneous Use 1 pen needles daily or as directed. 100 each 11     lisinopril (ZESTRIL) 2.5 MG tablet TAKE 1 TABLET BY MOUTH EVERY DAY 90 tablet 3     Melatonin 10 MG TABS tablet Take 10 mg by mouth At Bedtime       metFORMIN (GLUCOPHAGE) 1000 MG tablet TAKE 1 TABLET BY MOUTH TWICE A DAY WITH MEALS 180 tablet 1     Multiple Vitamin (MULTI-VITAMINS) TABS Take 1 tablet by mouth daily        nicotine (NICODERM CQ) 21 MG/24HR 24 hr patch Place 1 patch onto the skin daily       oxybutynin (DITROPAN) 5 MG tablet Take 1 tablet (5 mg) by mouth daily 60 tablet 0     oxybutynin (DITROPAN) 5 MG tablet TAKE 1 TABLET BY MOUTH TWICE A  tablet 3     pantoprazole (PROTONIX) 40 MG EC tablet TAKE 1 TABLET BY MOUTH EVERY DAY 90  tablet 1     traZODone (DESYREL) 50 MG tablet TAKE 1-2 TABLETS ( MG) BY MOUTH AT BEDTIME 180 tablet 1     varenicline (CHANTIX MIREYA) 0.5 MG X 11 & 1 MG X 42 tablet Take 0.5 mg tab daily for 3 days, THEN 0.5 mg tab twice daily for 4 days, THEN 1 mg twice daily. 53 tablet 0       Social History     Socioeconomic History     Marital status: Single     Spouse name: Not on file     Number of children: Not on file     Years of education: Not on file     Highest education level: Not on file   Occupational History     Not on file   Social Needs     Financial resource strain: Not on file     Food insecurity     Worry: Not on file     Inability: Not on file     Transportation needs     Medical: Not on file     Non-medical: Not on file   Tobacco Use     Smoking status: Current Every Day Smoker     Packs/day: 1.50     Years: 52.00     Pack years: 78.00     Smokeless tobacco: Never Used   Substance and Sexual Activity     Alcohol use: Yes     Drug use: No     Sexual activity: Never   Lifestyle     Physical activity     Days per week: Not on file     Minutes per session: Not on file     Stress: Not on file   Relationships     Social connections     Talks on phone: Not on file     Gets together: Not on file     Attends Islam service: Not on file     Active member of club or organization: Not on file     Attends meetings of clubs or organizations: Not on file     Relationship status: Not on file     Intimate partner violence     Fear of current or ex partner: Not on file     Emotionally abused: Not on file     Physically abused: Not on file     Forced sexual activity: Not on file   Other Topics Concern     Parent/sibling w/ CABG, MI or angioplasty before 65F 55M? Not Asked   Social History Narrative     Not on file       Family History   Problem Relation Age of Onset     Glaucoma No family hx of      Macular Degeneration No family hx of        Review of Systems:  10 point ROS of systems including Constitutional, Eyes,  Respiratory, Cardiovascular, Gastroenterology, Genitourinary, Integumentary, Muscularskeletal, Psychiatric were all negative except for pertinent positives noted in my HPI and in the PMH.      Exam  /72 (BP Location: Right arm, Cuff Size: Adult Regular)   Pulse 86   Wt 67.7 kg (149 lb 3.2 oz)   SpO2 98%   BMI 25.61 kg/m    General:  WNWD female, NAD  Alert  Oriented x 3  Gait:  Normal  Skin:  Normal skin turgor  HEENT:  NC/AT, EOMI  Abdomen:  Non-tender, non-distended.  Vulva: No external lesions, normal hair distribution, no adenopathy  BUS:  Normal, no masses noted  Urethra:  No hypermobility seen  Urethral meatus:  No masses noted  Vagina: Moist, pink, no abnormal discharge, well rugated, no lesions, pessary seems to be in correct position.    Perianal:  No masses noted  Extremities:  No clubbing, no cyanosis and no edema.      Assessment  Pelvic relaxation with uterine prolapse, cystocele and rectocele  Urinary urgency      Plan  Since the pessary seems to be in good position, and she had such discomfort when I placed the pessary, I suggest to her that the pessary remain in place and not take it out to do the customary pessary check, after the initial placement.    I suggest to remove the whittaker this am.   I suggest that she use the self catheterization that she previously leaned when Urology RN taught her the self catheterization [when she had the trial pessary in place].   She states that she remembers how to do it.  She will monitor how much she voids and if not much volume, then to self catheterize.  If she is unable to self catheterize, I have made arrangements with Urology to have the RN see her this afternoon.   If she is having problems, then I want to see her back tomorrow am.    I took out her whittaker this am.    She might need to consider an alternative anti-cholinergic such as Detrol LA or Vesicare.  If the above management is working, then I want to see her back in 3 months.    Questions seem  to be answered.     Hugo Patrick MD

## 2020-08-04 ENCOUNTER — OFFICE VISIT (OUTPATIENT)
Dept: OBGYN | Facility: CLINIC | Age: 70
End: 2020-08-04
Payer: COMMERCIAL

## 2020-08-04 VITALS
SYSTOLIC BLOOD PRESSURE: 119 MMHG | BODY MASS INDEX: 26.61 KG/M2 | WEIGHT: 155 LBS | OXYGEN SATURATION: 98 % | HEART RATE: 82 BPM | DIASTOLIC BLOOD PRESSURE: 70 MMHG

## 2020-08-04 DIAGNOSIS — N81.89 PELVIC RELAXATION: Primary | ICD-10-CM

## 2020-08-04 DIAGNOSIS — N81.6 RECTOCELE: ICD-10-CM

## 2020-08-04 DIAGNOSIS — Z96.0 PRESENCE OF PESSARY: ICD-10-CM

## 2020-08-04 DIAGNOSIS — N81.11 MIDLINE CYSTOCELE: ICD-10-CM

## 2020-08-04 PROCEDURE — 99213 OFFICE O/P EST LOW 20 MIN: CPT | Performed by: OBSTETRICS & GYNECOLOGY

## 2020-08-08 NOTE — PROGRESS NOTES
Rajani Guo is a 69 year old female, .  She presents today stating her pessary has shifted and is coming out.  She reached down and felt the pessary, which she is using for pelvic relaxation and abnormal bladder function.  She states that she has not felt the pessary, other than reaching down and tactile touching it.  She states that she has not felt it otherwise.  She has also noted that she has not had problems with urination, and she seems to have complete emptying of bladder.  She has not had any bladder discomfort either.  She does not have any other concerns.     The patient's medical history, social history and family history are reviewed and no new updates at this time.      Review of Systems:  10 point ROS of systems including Constitutional, Eyes, Respiratory, Cardiovascular, Gastroenterology, Genitourinary, Integumentary, Muscularskeletal, Psychiatric were all negative except for pertinent positives noted in my HPI and in the PMH.      Exam  /70 (BP Location: Right arm, Patient Position: Chair, Cuff Size: Adult Regular)   Pulse 82   Wt 70.3 kg (155 lb)   SpO2 98%   Breastfeeding No   BMI 26.61 kg/m    General:  WNWD female, NAD  Alert  Oriented x 3  Gait:  Normal  Skin:  Normal skin turgor  HEENT:  NC/AT, EOMI  Abdomen:  Non-tender, non-distended.  Vulva: No external lesions, normal hair distribution, no adenopathy  BUS:  Normal, no masses noted  Urethra:  No hypermobility seen  Urethral meatus:  No masses noted  Vagina: the stalk of the Gellhorn pessary is seen.  The pessary was palpated and seems to be in correct position and seems to be an excellent fit.   Perianal:  No masses noted  Extremities:  No clubbing, no cyanosis and no edema.      Assessment  pessary check  Pelvic relaxation  Cystocele  Rectocele      Plan  I discussed the findings and the pessary with Rajani.  I reviewed with her the fit and how it is to sit in the vagina.  The knob of the stalk of the pessary is seen and  palpated.   It seems to be an excellent fit and without other symptoms, I suggest to leave the pessary at this time.   She seems reassured and her questions seem to be answered.     TT 15 min  CT greater than 50%    Hugo Patirck MD

## 2020-09-06 DIAGNOSIS — F17.200 TOBACCO USE DISORDER: ICD-10-CM

## 2020-09-08 NOTE — TELEPHONE ENCOUNTER
buPROPion (ZYBAN) 150 MG 12 hr tablet  Last Written Prescription Date:  5/13/20  Last Fill Quantity: 60,  # refills: 3     Last office visit: 10/21/19  Follow up PRN for (I73.9) PAD S/P LLE acute arterial occlusion with successful CD lysis 3-5-19 with left SFA PTA upon completion angiography     Rx routed to Dr. Tamiko Coley, PCP.    Next 5 appointments (look out 90 days)    Oct 16, 2020  7:30 AM CDT  Office Visit with Hugo Patrick MD  [facility] [place] ([facility]) [address] [place] [address]  [place] [address]  [phone]

## 2020-09-09 RX ORDER — BUPROPION HYDROCHLORIDE 150 MG/1
TABLET, FILM COATED, EXTENDED RELEASE ORAL
Qty: 60 TABLET | Refills: 3 | Status: SHIPPED | OUTPATIENT
Start: 2020-09-09 | End: 2021-01-08

## 2020-09-16 ENCOUNTER — E-VISIT (OUTPATIENT)
Dept: FAMILY MEDICINE | Facility: CLINIC | Age: 70
End: 2020-09-16
Payer: COMMERCIAL

## 2020-09-16 ENCOUNTER — APPOINTMENT (OUTPATIENT)
Dept: GENERAL RADIOLOGY | Facility: CLINIC | Age: 70
DRG: 378 | End: 2020-09-16
Attending: NURSE PRACTITIONER
Payer: COMMERCIAL

## 2020-09-16 ENCOUNTER — HOSPITAL ENCOUNTER (INPATIENT)
Facility: CLINIC | Age: 70
LOS: 2 days | Discharge: HOME | DRG: 378 | End: 2020-09-18
Attending: NURSE PRACTITIONER | Admitting: HOSPITALIST
Payer: COMMERCIAL

## 2020-09-16 ENCOUNTER — VIRTUAL VISIT (OUTPATIENT)
Dept: FAMILY MEDICINE | Facility: CLINIC | Age: 70
End: 2020-09-16
Payer: COMMERCIAL

## 2020-09-16 DIAGNOSIS — E11.9 TYPE 2 DIABETES MELLITUS WITHOUT COMPLICATION, WITH LONG-TERM CURRENT USE OF INSULIN (H): ICD-10-CM

## 2020-09-16 DIAGNOSIS — R53.83 OTHER FATIGUE: ICD-10-CM

## 2020-09-16 DIAGNOSIS — R53.1 WEAKNESS: Primary | ICD-10-CM

## 2020-09-16 DIAGNOSIS — I42.8 OTHER CARDIOMYOPATHIES (H): ICD-10-CM

## 2020-09-16 DIAGNOSIS — D64.9 ANEMIA: ICD-10-CM

## 2020-09-16 DIAGNOSIS — K92.1 MELENA: ICD-10-CM

## 2020-09-16 DIAGNOSIS — R00.0 TACHYCARDIA: ICD-10-CM

## 2020-09-16 DIAGNOSIS — Z87.19 HISTORY OF GI BLEED: ICD-10-CM

## 2020-09-16 DIAGNOSIS — Z79.4 TYPE 2 DIABETES MELLITUS WITHOUT COMPLICATION, WITH LONG-TERM CURRENT USE OF INSULIN (H): ICD-10-CM

## 2020-09-16 DIAGNOSIS — F17.200 TOBACCO USE DISORDER: ICD-10-CM

## 2020-09-16 LAB
ABO + RH BLD: NORMAL
ABO + RH BLD: NORMAL
ALBUMIN SERPL-MCNC: 3.8 G/DL (ref 3.4–5)
ALP SERPL-CCNC: 51 U/L (ref 40–150)
ALT SERPL W P-5'-P-CCNC: 17 U/L (ref 0–50)
ANION GAP SERPL CALCULATED.3IONS-SCNC: 7 MMOL/L (ref 3–14)
AST SERPL W P-5'-P-CCNC: 12 U/L (ref 0–45)
BASOPHILS # BLD AUTO: 0 10E9/L (ref 0–0.2)
BASOPHILS NFR BLD AUTO: 0.2 %
BILIRUB SERPL-MCNC: 0.3 MG/DL (ref 0.2–1.3)
BLD GP AB SCN SERPL QL: NORMAL
BLD PROD TYP BPU: NORMAL
BLD PROD TYP BPU: NORMAL
BLD UNIT ID BPU: 0
BLOOD BANK CMNT PATIENT-IMP: NORMAL
BLOOD PRODUCT CODE: NORMAL
BPU ID: NORMAL
BUN SERPL-MCNC: 24 MG/DL (ref 7–30)
CALCIUM SERPL-MCNC: 8.7 MG/DL (ref 8.5–10.1)
CHLORIDE SERPL-SCNC: 108 MMOL/L (ref 94–109)
CO2 SERPL-SCNC: 24 MMOL/L (ref 20–32)
CREAT SERPL-MCNC: 0.71 MG/DL (ref 0.52–1.04)
DIFFERENTIAL METHOD BLD: ABNORMAL
EOSINOPHIL # BLD AUTO: 0.1 10E9/L (ref 0–0.7)
EOSINOPHIL NFR BLD AUTO: 1.1 %
ERYTHROCYTE [DISTWIDTH] IN BLOOD BY AUTOMATED COUNT: 15.9 % (ref 10–15)
GFR SERPL CREATININE-BSD FRML MDRD: 87 ML/MIN/{1.73_M2}
GLUCOSE BLDC GLUCOMTR-MCNC: 258 MG/DL (ref 70–99)
GLUCOSE SERPL-MCNC: 95 MG/DL (ref 70–99)
HBA1C MFR BLD: 5.7 % (ref 0–5.6)
HCT VFR BLD AUTO: 24.1 % (ref 35–47)
HEMOCCULT STL QL: POSITIVE
HGB BLD-MCNC: 7.2 G/DL (ref 11.7–15.7)
IMM GRANULOCYTES # BLD: 0 10E9/L (ref 0–0.4)
IMM GRANULOCYTES NFR BLD: 0.2 %
INTERPRETATION ECG - MUSE: NORMAL
LYMPHOCYTES # BLD AUTO: 1.9 10E9/L (ref 0.8–5.3)
LYMPHOCYTES NFR BLD AUTO: 20.8 %
MCH RBC QN AUTO: 25.1 PG (ref 26.5–33)
MCHC RBC AUTO-ENTMCNC: 29.9 G/DL (ref 31.5–36.5)
MCV RBC AUTO: 84 FL (ref 78–100)
MONOCYTES # BLD AUTO: 0.8 10E9/L (ref 0–1.3)
MONOCYTES NFR BLD AUTO: 9 %
NEUTROPHILS # BLD AUTO: 6.2 10E9/L (ref 1.6–8.3)
NEUTROPHILS NFR BLD AUTO: 68.7 %
NRBC # BLD AUTO: 0 10*3/UL
NRBC BLD AUTO-RTO: 0 /100
NUM BPU REQUESTED: 1
PLATELET # BLD AUTO: 441 10E9/L (ref 150–450)
POTASSIUM SERPL-SCNC: 4.2 MMOL/L (ref 3.4–5.3)
PROT SERPL-MCNC: 7.1 G/DL (ref 6.8–8.8)
RBC # BLD AUTO: 2.87 10E12/L (ref 3.8–5.2)
SODIUM SERPL-SCNC: 139 MMOL/L (ref 133–144)
SPECIMEN EXP DATE BLD: NORMAL
TRANSFUSION STATUS PATIENT QL: NORMAL
TRANSFUSION STATUS PATIENT QL: NORMAL
WBC # BLD AUTO: 9.1 10E9/L (ref 4–11)

## 2020-09-16 PROCEDURE — 83036 HEMOGLOBIN GLYCOSYLATED A1C: CPT | Performed by: NURSE PRACTITIONER

## 2020-09-16 PROCEDURE — U0003 INFECTIOUS AGENT DETECTION BY NUCLEIC ACID (DNA OR RNA); SEVERE ACUTE RESPIRATORY SYNDROME CORONAVIRUS 2 (SARS-COV-2) (CORONAVIRUS DISEASE [COVID-19]), AMPLIFIED PROBE TECHNIQUE, MAKING USE OF HIGH THROUGHPUT TECHNOLOGIES AS DESCRIBED BY CMS-2020-01-R: HCPCS | Performed by: NURSE PRACTITIONER

## 2020-09-16 PROCEDURE — 25800030 ZZH RX IP 258 OP 636: Performed by: NURSE PRACTITIONER

## 2020-09-16 PROCEDURE — C9803 HOPD COVID-19 SPEC COLLECT: HCPCS

## 2020-09-16 PROCEDURE — C9113 INJ PANTOPRAZOLE SODIUM, VIA: HCPCS | Performed by: HOSPITALIST

## 2020-09-16 PROCEDURE — 99223 1ST HOSP IP/OBS HIGH 75: CPT | Mod: AI | Performed by: HOSPITALIST

## 2020-09-16 PROCEDURE — P9016 RBC LEUKOCYTES REDUCED: HCPCS | Performed by: NURSE PRACTITIONER

## 2020-09-16 PROCEDURE — 86900 BLOOD TYPING SEROLOGIC ABO: CPT | Performed by: NURSE PRACTITIONER

## 2020-09-16 PROCEDURE — 12000000 ZZH R&B MED SURG/OB

## 2020-09-16 PROCEDURE — 80053 COMPREHEN METABOLIC PANEL: CPT | Performed by: NURSE PRACTITIONER

## 2020-09-16 PROCEDURE — 25000131 ZZH RX MED GY IP 250 OP 636 PS 637: Performed by: HOSPITALIST

## 2020-09-16 PROCEDURE — 96374 THER/PROPH/DIAG INJ IV PUSH: CPT

## 2020-09-16 PROCEDURE — 99285 EMERGENCY DEPT VISIT HI MDM: CPT | Mod: 25

## 2020-09-16 PROCEDURE — 86901 BLOOD TYPING SEROLOGIC RH(D): CPT | Performed by: NURSE PRACTITIONER

## 2020-09-16 PROCEDURE — 96361 HYDRATE IV INFUSION ADD-ON: CPT

## 2020-09-16 PROCEDURE — 71046 X-RAY EXAM CHEST 2 VIEWS: CPT

## 2020-09-16 PROCEDURE — 00000146 ZZHCL STATISTIC GLUCOSE BY METER IP

## 2020-09-16 PROCEDURE — 93005 ELECTROCARDIOGRAM TRACING: CPT

## 2020-09-16 PROCEDURE — 25000132 ZZH RX MED GY IP 250 OP 250 PS 637: Performed by: HOSPITALIST

## 2020-09-16 PROCEDURE — 25800030 ZZH RX IP 258 OP 636: Performed by: HOSPITALIST

## 2020-09-16 PROCEDURE — 85025 COMPLETE CBC W/AUTO DIFF WBC: CPT | Performed by: NURSE PRACTITIONER

## 2020-09-16 PROCEDURE — 99214 OFFICE O/P EST MOD 30 MIN: CPT | Mod: 95 | Performed by: FAMILY MEDICINE

## 2020-09-16 PROCEDURE — 86850 RBC ANTIBODY SCREEN: CPT | Performed by: NURSE PRACTITIONER

## 2020-09-16 PROCEDURE — 25000128 H RX IP 250 OP 636: Performed by: HOSPITALIST

## 2020-09-16 PROCEDURE — 86923 COMPATIBILITY TEST ELECTRIC: CPT | Performed by: NURSE PRACTITIONER

## 2020-09-16 PROCEDURE — 25000128 H RX IP 250 OP 636: Performed by: NURSE PRACTITIONER

## 2020-09-16 PROCEDURE — C9113 INJ PANTOPRAZOLE SODIUM, VIA: HCPCS | Performed by: NURSE PRACTITIONER

## 2020-09-16 PROCEDURE — 82272 OCCULT BLD FECES 1-3 TESTS: CPT | Performed by: NURSE PRACTITIONER

## 2020-09-16 RX ORDER — ONDANSETRON 4 MG/1
4 TABLET, ORALLY DISINTEGRATING ORAL EVERY 6 HOURS PRN
Status: DISCONTINUED | OUTPATIENT
Start: 2020-09-16 | End: 2020-09-18 | Stop reason: HOSPADM

## 2020-09-16 RX ORDER — NICOTINE 21 MG/24HR
1 PATCH, TRANSDERMAL 24 HOURS TRANSDERMAL DAILY
Status: DISCONTINUED | OUTPATIENT
Start: 2020-09-16 | End: 2020-09-18 | Stop reason: HOSPADM

## 2020-09-16 RX ORDER — DEXTROSE MONOHYDRATE 25 G/50ML
25-50 INJECTION, SOLUTION INTRAVENOUS
Status: DISCONTINUED | OUTPATIENT
Start: 2020-09-16 | End: 2020-09-18 | Stop reason: HOSPADM

## 2020-09-16 RX ORDER — ACETAMINOPHEN 650 MG/1
650 SUPPOSITORY RECTAL EVERY 4 HOURS PRN
Status: DISCONTINUED | OUTPATIENT
Start: 2020-09-16 | End: 2020-09-18 | Stop reason: HOSPADM

## 2020-09-16 RX ORDER — NICOTINE POLACRILEX 4 MG
15-30 LOZENGE BUCCAL
Status: DISCONTINUED | OUTPATIENT
Start: 2020-09-16 | End: 2020-09-18

## 2020-09-16 RX ORDER — ONDANSETRON 2 MG/ML
4 INJECTION INTRAMUSCULAR; INTRAVENOUS EVERY 6 HOURS PRN
Status: DISCONTINUED | OUTPATIENT
Start: 2020-09-16 | End: 2020-09-18 | Stop reason: HOSPADM

## 2020-09-16 RX ORDER — SODIUM CHLORIDE 9 MG/ML
INJECTION, SOLUTION INTRAVENOUS CONTINUOUS
Status: DISCONTINUED | OUTPATIENT
Start: 2020-09-16 | End: 2020-09-17

## 2020-09-16 RX ORDER — TRAZODONE HYDROCHLORIDE 50 MG/1
50 TABLET, FILM COATED ORAL AT BEDTIME
Status: DISCONTINUED | OUTPATIENT
Start: 2020-09-16 | End: 2020-09-18 | Stop reason: HOSPADM

## 2020-09-16 RX ORDER — BUPROPION HYDROCHLORIDE 150 MG/1
150 TABLET, EXTENDED RELEASE ORAL 2 TIMES DAILY
Status: DISCONTINUED | OUTPATIENT
Start: 2020-09-16 | End: 2020-09-18 | Stop reason: HOSPADM

## 2020-09-16 RX ORDER — DEXTROSE MONOHYDRATE 25 G/50ML
25-50 INJECTION, SOLUTION INTRAVENOUS
Status: DISCONTINUED | OUTPATIENT
Start: 2020-09-16 | End: 2020-09-18

## 2020-09-16 RX ORDER — GABAPENTIN 300 MG/1
300 CAPSULE ORAL AT BEDTIME
Status: DISCONTINUED | OUTPATIENT
Start: 2020-09-16 | End: 2020-09-18 | Stop reason: HOSPADM

## 2020-09-16 RX ORDER — ACETAMINOPHEN 325 MG/1
650 TABLET ORAL EVERY 4 HOURS PRN
Status: DISCONTINUED | OUTPATIENT
Start: 2020-09-16 | End: 2020-09-18 | Stop reason: HOSPADM

## 2020-09-16 RX ORDER — NICOTINE POLACRILEX 4 MG
15-30 LOZENGE BUCCAL
Status: DISCONTINUED | OUTPATIENT
Start: 2020-09-16 | End: 2020-09-18 | Stop reason: HOSPADM

## 2020-09-16 RX ORDER — LIDOCAINE 40 MG/G
CREAM TOPICAL
Status: DISCONTINUED | OUTPATIENT
Start: 2020-09-16 | End: 2020-09-18 | Stop reason: HOSPADM

## 2020-09-16 RX ORDER — NALOXONE HYDROCHLORIDE 0.4 MG/ML
.1-.4 INJECTION, SOLUTION INTRAMUSCULAR; INTRAVENOUS; SUBCUTANEOUS
Status: DISCONTINUED | OUTPATIENT
Start: 2020-09-16 | End: 2020-09-18 | Stop reason: HOSPADM

## 2020-09-16 RX ORDER — FERROUS SULFATE 325(65) MG
325 TABLET ORAL 2 TIMES DAILY
COMMUNITY
End: 2022-08-04

## 2020-09-16 RX ADMIN — PANTOPRAZOLE SODIUM 40 MG: 40 INJECTION, POWDER, FOR SOLUTION INTRAVENOUS at 15:12

## 2020-09-16 RX ADMIN — SODIUM CHLORIDE: 9 INJECTION, SOLUTION INTRAVENOUS at 21:30

## 2020-09-16 RX ADMIN — SODIUM CHLORIDE: 9 INJECTION, SOLUTION INTRAVENOUS at 22:15

## 2020-09-16 RX ADMIN — BUPROPION HYDROCHLORIDE 150 MG: 150 TABLET, EXTENDED RELEASE ORAL at 21:52

## 2020-09-16 RX ADMIN — NICOTINE 1 PATCH: 21 PATCH, EXTENDED RELEASE TRANSDERMAL at 21:51

## 2020-09-16 RX ADMIN — GABAPENTIN 300 MG: 300 CAPSULE ORAL at 21:52

## 2020-09-16 RX ADMIN — PANTOPRAZOLE SODIUM 40 MG: 40 INJECTION, POWDER, FOR SOLUTION INTRAVENOUS at 22:23

## 2020-09-16 RX ADMIN — INSULIN DETEMIR 5 UNITS: 100 INJECTION, SOLUTION SUBCUTANEOUS at 22:23

## 2020-09-16 RX ADMIN — TRAZODONE HYDROCHLORIDE 50 MG: 50 TABLET ORAL at 21:52

## 2020-09-16 RX ADMIN — SODIUM CHLORIDE 1000 ML: 9 INJECTION, SOLUTION INTRAVENOUS at 15:11

## 2020-09-16 RX ADMIN — INSULIN ASPART 2 UNITS: 100 INJECTION, SOLUTION INTRAVENOUS; SUBCUTANEOUS at 22:23

## 2020-09-16 ASSESSMENT — MIFFLIN-ST. JEOR: SCORE: 1181.79

## 2020-09-16 ASSESSMENT — ENCOUNTER SYMPTOMS
WEAKNESS: 1
SHORTNESS OF BREATH: 1
FATIGUE: 1

## 2020-09-16 NOTE — PROGRESS NOTES
RECEIVING UNIT ED HANDOFF REVIEW    ED Nurse Handoff Report was reviewed by: Kim Vuong RN on September 16, 2020 at 4:45 PM

## 2020-09-16 NOTE — ED PROVIDER NOTES
History   Chief Complaint:  Generalized Weakness        HPI   Rajani Guo is a 69 year old female with history of hypertension, hyperlipidemia, GI bleed, type 2 diabetes, anemia, and tobacco use disorder who presents for evaluation of generalized weakness. The patient reports she had a GI bleed with anemia one year prior. She says it took her months to get back to baseline. She states for the past two months she has felt fatigued, weak, and dizzy which are the same symptoms she had prior to her GI bleed. She says she has had difficulty getting around her house lately secondary to weakness causing her to be short of breath. Today she noted acute worsening of symptoms including lightheadedness.  She noted her blood pressure was 93/52 with heart rate of 171 at home this morning.  She states her stools have been dark for several weeks. Due to these symptoms she presented to the emergency department today. She states she hasn't had good oral intake lately. She endorses shortness of breath secondary to weakness. She denies chest pain, fever, and urinary symptoms. Of note, she is on aspirin, and previously was on Eliquis for a DVT but was discontinued after her GI bleeding.      Allergies:  Indomethacin  Tramadol    Medications:   Fosamax   Aspirin 81 mg   Lipitor  Zyban  Neurontin  Insulin pen  Lisinopril  Metformin  Ditropan  Desyrel    Past Medical History:    Osteoporosis age related  Clot   Constipation  Hypertension   Hyperlipidemia   Insomnia  Tobacco use disorder  Type 2 diabetes mellitus  Peripheral vascular disease  Claudication of left lower extremity  Anemia  GI bleed   Cardiomyopathies     Past Surgical History:    EGD  IR angiogram through catheter follow up  IR lower extremity angiogram left  Right knee torn meniscus surgery  1 ovary removed  Release carpal tunnel  Release trigger finger bilateral  Rotator cuff repair, plate placement  Partial thyroidectomy     Family History:    Diabetes  Lung  "cancer  Breast cancer    Social History:  Smoking status: Current every day Smoker  Smokeless Tobacco: Never Used  Alcohol use: yes  Drug use: no  PCP: Tamiko Coley  Marital Status: Single  Presents to the ED with daughter      Review of Systems   Constitutional: Positive for fatigue.   Respiratory: Positive for shortness of breath.    Neurological: Positive for weakness.   All other systems reviewed and are negative.      Physical Exam     Patient Vitals for the past 24 hrs:   BP Temp Temp src Pulse Resp SpO2 Height Weight   09/16/20 1600 134/79 -- -- 112 18 95 % -- --   09/16/20 1430 111/65 -- -- 110 14 100 % -- --   09/16/20 1327 114/54 97.8  F (36.6  C) Temporal 111 18 100 % 1.626 m (5' 4\") 66.7 kg (147 lb)       Physical Exam  Nursing notes reviewed. Vitals reviewed.  General: Alert. Well kept. Pale  Eyes:  Conjunctiva non-injected, non-icteric.  Neck/Throat: Moist mucous membranes.  Normal voice.  Cardiac: Regular rhythm. Normal heart sounds with no murmur/rubs/click.   Pulmonary: Clear and equal breath sounds bilaterally. No crackles/rales. No wheezing  Abdomen: Soft. Non-distended. Non-tender to palpation. No masses. No guarding or rebound.  : normal rectal tone.  Melena  Musculoskeletal: Normal gross range of motion of all 4 extremities.    Neurological: Alert and oriented x4.   Skin: Warm and dry without rashes or petechiae. Normal appearance of visualized exposed skin.  Psych: Affect normal. Good eye contact.    Emergency Department Course     ECG:  Indication: generalized weakness  Completed at 1412.  Read at 1434.   Sinus tachycardia. Cannot rule out anterior infarct, age undetermined. Abnormal ECG.   Rate 114 bpm. WY interval 184. QRS duration 86. QT/QTc 328/452. P-R-T axes 58 64  43.       Laboratory:  Laboratory findings were communicated with the patient who voiced understanding of the findings.    CBC: WBC 9.1, HGB 7.2 (L),    CMP: WNL. (Creatinine 0.71)  ABO/Rh type and screen: ABO A, Rh " negative, antibody screen Negative     Occult blood stool: Positive     Asymptomatic COVID-19 Virus (Coronavirus) by PCR: Pending     Interventions:  1511 NS Bolus 1,000mL IV   1512 Protonix 40 mg IV      Emergency Department Course:  Nursing notes and vitals reviewed.  1448: I performed an exam of the patient as documented above.      IV was inserted and blood was drawn for laboratory testing, results above.     1512 I spoke with Dr. Ibrahim of the GI service  regarding patient's presentation, findings, and plan of care.     1616  I spoke with Dr. Bertrand of the Hospitalist service regarding patient's presentation, findings, and plan of care.     Findings and plan explained to the Patient who consents to admission. Discussed the patient with Dr. Bertrand, who will admit the patient to a Med/surg bed for further monitoring, evaluation, and treatment.   I personally reviewed the laboratory results with the Patient and answered all related questions prior to admisison.    Impression & Plan      Covid-19  Rajani Guo was evaluated during a global COVID-19 pandemic, which necessitated consideration that the patient might be at risk for infection with the SARS-CoV-2 virus that causes COVID-19.   Applicable protocols for evaluation were followed during the patient's care.   COVID-19 was considered as part of the patient's evaluation. The plan for testing is:  a test was obtained during this visit.    Medical Decision Making:  Rajani Guo is a 69 year old female who presents to the emergency department today for evaluation of weakness and fatigue for over a month. She notes she has been able to go to work the last couple of weeks, but has had increasing fatigue and today noted extreme fatigue, heart rate 140-170 at home with a blood pressure of 93/52 with activity. The patient was admitted in August and September of last year, had an upper GI endoscopy completed and showed duodenitis. At that time she was anticoagulated on  Eliquis. Since that time she is no longer anticoagulated. This was for a left lower extremity DVT. She is currently only on aspirin. On exam, she is pale, tachycardic at 120, she is not hypotensive, but hemoglobin does return at 7.2. This is down from April 13th, which was 12.3. She received a liter of IV fluids, and continued to have tachycardia at 110 in sinus rhythm. Secondary to her symptomatic anemia I will give her 1 unit transfusion of red blood cells, and I talked to Dr. Bertrand who will admit her. I did speak with [facility] who indicate she doesn't acutely need endoscopy. She was given Protonix IV.     Diagnosis:    ICD-10-CM    1. Anemia  D64.9 ABO/Rh type and screen     Blood component     Blood component   2. Melena  K92.1    3. Tachycardia  R00.0        Disposition:   Admitted under the supervision of Dr. Bertrand     Scribe Disclosure:  I, Roxy Lamar, am serving as a scribe at 2:27 PM on 9/16/2020 to document services personally performed by Emma Bradshaw CNP based on my observations and the provider's statements to me.    Roxy Lamar  9/16/2020  [facility] EMERGENCY DEPARTMENT     Emma Bradshaw CNP  09/16/20 [id number]

## 2020-09-16 NOTE — ED NOTES
"[facility]  ED Nurse Handoff Report    ED Chief complaint: Generalized Weakness (weakness and dizziness for 1 1/2 month )      ED Diagnosis:   Final diagnoses:   Anemia   Melena   Tachycardia       Code Status: Full Code    Allergies:   Allergies   Allergen Reactions     Indomethacin Other (See Comments)     Dizziness and disorientation     Tramadol Nausea and Vomiting       Patient Story: Pt has had generalized weakness over the last month  Focused Assessment:  Alert and oriented.  Pt did desat, on 2L NC. VSS.  Up independently.  Pt c/o increased weakness over the last month.  No blood in stool noted.      Treatments and/or interventions provided: protonix, 1L NS  Patient's response to treatments and/or interventions:     To be done/followed up on inpatient unit:  see epic        Activity level - Baseline/Home:  Independent  Activity Level - Current:   Stand with Assist    Patient's Preferred language: English   Needed?: No    Isolation: None  Infection: Not Applicable  Bariatric?: No    Vital Signs:   Vitals:    09/16/20 1327 09/16/20 1430 09/16/20 1600   BP: 114/54 111/65 134/79   Pulse: 111 110 112   Resp: 18 14 18   Temp: 97.8  F (36.6  C)     TempSrc: Temporal     SpO2: 100% 100% 95%   Weight: 66.7 kg (147 lb)     Height: 1.626 m (5' 4\")         Cardiac Rhythm:     Was the PSS-3 completed:   Yes  What interventions are required if any?               Family Comments: daughter at bedside  OBS brochure/video discussed/provided to patient/family: Yes              Name of person given brochure if not patient:               Relationship to patient:     For the majority of the shift this patient's behavior was Green.   Behavioral interventions performed were .    ED NURSE PHONE NUMBER: [phone]       "

## 2020-09-16 NOTE — PROGRESS NOTES
"  Rajani Guo is a 69 year old female who is being evaluated via a billable video visit.      The patient has been notified of following:     \"This video visit will be conducted via a call between you and your physician/provider. We have found that certain health care needs can be provided without the need for an in-person physical exam.  This service lets us provide the care you need with a video conversation.  If a prescription is necessary we can send it directly to your pharmacy.  If lab work is needed we can place an order for that and you can then stop by our lab to have the test done at a later time.    Video visits are billed at different rates depending on your insurance coverage.  Please reach out to your insurance provider with any questions.    If during the course of the call the physician/provider feels a video visit is not appropriate, you will not be charged for this service.\"    Patient has given verbal consent for Video visit? Yes  How would you like to obtain your AVS? MyChart  If you are dropped from the video visit, the video invite should be resent to: Text to cell phone: [phone]  Will anyone else be joining your video visit? No    Subjective     Rajani Guo is a 69 year old female who presents today via video visit for the following health issues:  This is a 69-year-old white female with known history of diabetes mellitus type 2, history of GI bleed complaint complains of weakness and fatigue for over a month.    Patient states that she had a GI bleed several years ago.  At that time she did get blood transfusion.  She feels fatigued like she did before.  She has had some shortness of breath with activity.  Denies any chest pains.  Denies any melena, no hematochezia, no abdominal pain, no rectal bleeding.  Patient's blood pressure this a.m. was 93 /52 with a pulse of 171.  Now her blood pressure is 111/60 with a pulse of 126.    HPI    Concern - weakness/ anemia  Onset: a week " ago  Description: weakness, legs cramps  Intensity: moderate  Progression of Symptoms:  worsening  Accompanying Signs & Symptoms: cramps wake up at night  Previous history of similar problem: yes  Precipitating factors:        Worsened by: moving being active  Alleviating factors:        Improved by: lying down  Therapies tried and outcome: increased the iron to see if it makes better    Video Start Time: 11.36 AM  Patient has known diabetes and her Accu-Cheks have been averaging around 127.  She has had no hypoglycemic reactions.  Review of Systems   CONSTITUTIONAL: NEGATIVE for fever, chills, change in weight  INTEGUMENTARY/SKIN: NEGATIVE for worrisome rashes, moles or lesions  ENT/MOUTH: NEGATIVE for ear, mouth and throat problems  RESP: NEGATIVE for significant cough or SOB  CV: NEGATIVE for chest pain, palpitations or peripheral edema  GI: NEGATIVE for nausea, abdominal pain, heartburn, or change in bowel habits  PSYCHIATRIC: NEGATIVE for changes in mood or affect  ROS otherwise negative      Objective    Vitals - Patient Reported  Systolic (Patient Reported): 111  Diastolic (Patient Reported): 66  Pulse (Patient Reported): 126      Vitals:  No vitals were obtained today due to virtual visit.    Physical Exam     GENERAL: alert, no distress and elderly  EYES: Eyes grossly normal to inspection.  No discharge or erythema, or obvious scleral/conjunctival abnormalities.  RESP: No audible wheeze, cough, or visible cyanosis.  No visible retractions or increased work of breathing.    SKIN: Visible skin clear. No significant rash, abnormal pigmentation or lesions.  NEURO: Cranial nerves grossly intact.  Mentation and speech appropriate for age.  PSYCH: Mentation appears normal, affect normal/bright, judgement and insight intact, normal speech and appearance well-groomed.      Pending         Assessment & Plan     ICD-10-CM    1. Weakness  R53.1    2. Other fatigue  R53.83    3. Tobacco use disorder  F17.200    4. Type  "2 diabetes mellitus without complication, with long-term current use of insulin (H)  E11.9     Z79.4    5. History of GI bleed  Z87.19    6. Other cardiomyopathies (H)  I42.8      Patient advised that with her complaints of weakness/fatigue and shortness of breath she should be seen in the emergency room right now.  Patient prefers her daughter drive her to the emergency room.  She is going to go to [facility] ER for further evaluation and management.  ? GI bleed vs Other  Pt needs Labs /EKG  Tobacco Cessation:   reports that she has been smoking. She has a 78.00 pack-year smoking history. She has never used smokeless tobacco.  Tobacco Cessation Action Plan: Information offered: Patient not interested at this time      BMI:   Estimated body mass index is 26.61 kg/m  as calculated from the following:    Height as of 6/11/20: 1.626 m (5' 4\").    Weight as of 8/4/20: 70.3 kg (155 lb).               No follow-ups on file.    Tamiko Coley MD  [facility]      Video-Visit Details    Type of service:  Video Visit    Video End Time:11.46AM    Originating Location (pt. Location): Home    Distant Location (provider location):  [facility]     Platform used for Video Visit: Alen          "

## 2020-09-16 NOTE — H&P
"      [facility]    History and Physical - Hospitalist Service       Date of Admission:  9/16/2020    Assessment & Plan   Rajani Guo is a 69 year old female with history of anemia and GI bleed, HTN, HL, T2DM, tobacco use, and LLE DVT previously on apixaban who presented to the ED with 3 weeks of weakness, dizziness, and fatigue. Her Hgb was found to be 7.2 (12 in April 2020) and she was occult blood positive.  ER provider discussed with [facility] who will plan on likely endoscopy tomorrow.    Generalized weakness, dizziness, and fatigue  Acute anemia likely secondary to GI bleed  History of GI bleed - duodenal ulcer requiring transfusion August 2019, September 2019  A few weeks of increasing fatigue and generalized weakness. Some low BP and tachycardia noted at home this morning. She takes ASA daily. Previously on apixaban for arterial occlusion of LLE but no other blood \"thinning\" agents. Melenic stool noted on rectal in ED and occult blood positive.  Hgb 7.2 today; in April 2020 was 12.3.  Almost exactly a year ago was 8 during prior GI bleed.  Mildly tachy (sinus) in ED and BP stable with IVF.  - Type and cross done in ED, consented also  - will receive 1 unit PRBC in ED  - PPI IV BID  - [facility] consulted (contacted from ED provider upon admission)  - IVF  - NPO at midnight  - hemoglobin trending q 6 hours  - conditional PRBC unit for <7 (ordered)    Hypertension  Hyperlipidemia  Stable upon admission. PTA statin and lisinopril noted.   - hold PTA statin and ACE-I for  now    Type 2 diabetes mellitus  Hgb A1c 5.8% in April 2020. Will update A1c.   - PTA insulin and metformin noted, holding metformin  - PTA detemir 10 units will be decreased to 5 uhs given upcoming NPO  - sliding scale insulin and POC QIDCML or every four hours if NPO    PAD  Prior arterial occlusion of LLE s/p L SFA angioplasty with lysis March 2019  Tobacco use - current smoker (1.5ppd)  -previously on apixaban and followed with Dr" Barajas.  No new symptoms with left leg. SOB as above. PTA aspirin obviously held.  - stop smoking, cessation aids available - nicotine patch 21mg ordered as requested  - Encourage cessation to live longer and healthier.     Mitral stenosis  Aortic stenosis  Murmur noted on exam, reportedly not new for her. Echo done Sept 2019 showed EF normal, various valvular abnormalities. Appears that cardiology consult was placed but possibly never attended/scheduled last year.   - consider repeat echo after resolving anemia and GI bleed; also reschedule cardiology visit as outpatient??    Asymptomatic COVID-19 screening  Felt to be low risk. No special precautions on admission.  - NP PCR swab obtained in ED.      Diet: NPO at midnight, mod carb for now seems reasonable  DVT Prophylaxis: ambulate as able with RN, PCD contraindicated due to PAD and blood thinners contraindicated due to anemia and GI bleed  Baraajs Catheter: not present  Code Status: Full Code         Disposition Plan   Expected discharge: 2-3 days pending Hgb stability and GI evaluation  Entered: Sudheer Bertrand MD 09/16/2020, 4:16 PM     The patient's care was discussed with the Bedside Nurse, Patient and ED provider.    Sudheer Bertrand MD  [facility]    ______________________________________________________________________    Chief Complaint   Generalized weakness and fatigue    History is obtained from the patient    History of Present Illness   Rajani Guo is a 69 year old female with history of anemia and GI bleed, HTN, HL, T2DM, tobacco use, and LLE DVT previously on apixaban who presented to the ED with 3-4 weeks of generalized weakness, dizziness, shortness of breath, and fatigue.  The symptoms have increased progressively over the last few weeks.  She denies any new coughing, fevers, chills, loss of taste or smell, or sick contacts.  She reports compliance with her medication regimen.  She denies any chest pain, palpitations,  abdominal pain, nausea, vomiting or diarrhea.  Does endorse some black stools but has not seen any blood in the stool.  She confirms she was previously on apixaban after her left lower extremity arterial occlusion in March 2019 but was subsequently stopped due to GI bleed in August and September 2019.  Since then she has only been on a baby aspirin with that regard.  She unfortunately continues to smoke about 1.5 packs/day which is actually down from 2 packs/day.    In the ED the patient was seen by Emma Bradshaw CNP with whom I have discussed the case. Patient is afebrile, mildly tachy around 110 (sinus), and otherwise hemodynamically stable and saturating normally on RA. Labs notable for Hgb of 7.2 and occult blood positive on rectal exam.  CMP unremarkable - BUN 24. Type and screen was done and ED provider alerted [facility] on call - plan on likely endoscopy tomorrow. She will be brought into the hospital as an inpatient and has 1 unit of PRBC infusing now.  Asymptomatic COVID-19 swab was obtained in the ED.    Review of Systems    The 10 point Review of Systems is negative other than noted in the HPI or here.     Past Medical History    I have reviewed this patient's medical history and updated it with pertinent information if needed.   Past Medical History:   Diagnosis Date     Age-related osteoporosis without current pathological fracture 1/18/2018     Clot     in  left leg     Constipation      History of partial thyroidectomy 6/2/2018     Hyperlipidemia LDL goal <100 1/18/2018     Hypertension      Insomnia, unspecified type 1/18/2018     Tobacco use disorder      Type 2 diabetes mellitus without complication, with long-term current use of insulin (H) 1/18/2018       Past Surgical History   I have reviewed this patient's surgical history and updated it with pertinent information if needed.  Past Surgical History:   Procedure Laterality Date     COLONOSCOPY N/A 9/4/2019    Procedure: COLONOSCOPY;  Surgeon:  Marie Todd MD;  Location:  GI     ESOPHAGOSCOPY, GASTROSCOPY, DUODENOSCOPY (EGD), COMBINED N/A 9/4/2019    Procedure: ESOPHAGOGASTRODUODENOSCOPY (EGD);  Surgeon: Marie Todd MD;  Location:  GI     IR ANGIOGRAM THROUGH CATHETER FOLLOW UP  3/5/2019     IR LOWER EXTREMITY ANGIOGRAM LEFT  3/4/2019     KNEE SURGERY Right 2013    right knee torn meniscus surgery     OVARY SURGERY  1971    1 ovary removed     RELEASE CARPAL TUNNEL Right 1988     RELEASE TRIGGER FINGER BILATERAL       SHOULDER SURGERY Left     rotator cuff repair, plate placement     THYROID SURGERY  1988    partial thyroidectomy       Social History   I have reviewed this patient's social history and updated it with pertinent information if needed.  Social History     Tobacco Use     Smoking status: Current Every Day Smoker     Packs/day: 1.50     Years: 52.00     Pack years: 78.00     Smokeless tobacco: Never Used   Substance Use Topics     Alcohol use: Yes     Drug use: No       Family History     Diabetes  Lung cancer  Breast cancer    Prior to Admission Medications   Prior to Admission Medications   Prescriptions Last Dose Informant Patient Reported? Taking?   Melatonin 10 MG TABS tablet  Self Yes No   Sig: Take 10 mg by mouth At Bedtime   Multiple Vitamin (MULTI-VITAMINS) TABS  Self Yes No   Sig: Take 1 tablet by mouth daily    acetaminophen (ACETAMINOPHEN 8 HOUR) 650 MG CR tablet  Self Yes No   Sig: Take 650 mg by mouth every 8 hours   alendronate (FOSAMAX) 70 MG tablet   No No   Sig: TAKE 1 TABLET BY MOUTH ONE TIME PER WEEK   aspirin (ASA) 81 MG EC tablet   No No   Sig: Take 1 tablet (81 mg) by mouth daily   atorvastatin (LIPITOR) 40 MG tablet   No No   Sig: TAKE 1 TABLET BY MOUTH EVERY DAY   buPROPion (ZYBAN) 150 MG 12 hr tablet   No No   Sig: TAKE 1 TABLET BY MOUTH 2 TIMES DAILY   calcium carb 1250 mg, 500 mg [place],/vitamin D 200 unit (CALCIUM 500/D) 500-200 MG-UNIT per tablet  Self Yes No   Sig: Take 1 tablet by mouth  2 times daily    ferrous sulfate (FE TABS) 325 (65 Fe) MG EC tablet   No No   Sig: Take 1 tablet (325 mg) by mouth daily   gabapentin (NEURONTIN) 300 MG capsule   No No   Sig: TAKE 1 CAPSULE (300 MG) BY MOUTH ONE OR TWO TIMES DAILY   glucosamine-chondroitin 500-400 MG CAPS per capsule  Self Yes No   Sig: Take 1 capsule by mouth 2 times daily    insulin detemir (LEVEMIR FLEXTOUCH) 100 UNIT/ML pen   No No   Sig: INJECT 14 UNITS SUBCUTANEOUS AT BEDTIME   insulin pen needle (29G X 12MM) 29G X 12MM miscellaneous   No No   Sig: Use 1 pen needles daily or as directed.   lisinopril (ZESTRIL) 2.5 MG tablet   No No   Sig: TAKE 1 TABLET BY MOUTH EVERY DAY   metFORMIN (GLUCOPHAGE) 1000 MG tablet   No No   Sig: TAKE 1 TABLET BY MOUTH TWICE A DAY WITH MEALS   nicotine (NICODERM CQ) 21 MG/24HR 24 hr patch   No No   Sig: Place 1 patch onto the skin daily   Patient not taking: Reported on 9/16/2020   oxybutynin (DITROPAN) 5 MG tablet   No No   Sig: TAKE 1 TABLET BY MOUTH TWICE A DAY   Patient not taking: Reported on 9/16/2020   oxybutynin (DITROPAN) 5 MG tablet   No No   Sig: Take 1 tablet (5 mg) by mouth daily   pantoprazole (PROTONIX) 40 MG EC tablet   No No   Sig: TAKE 1 TABLET BY MOUTH EVERY DAY   traZODone (DESYREL) 50 MG tablet   No No   Sig: TAKE 1-2 TABLETS ( MG) BY MOUTH AT BEDTIME   varenicline (CHANTIX MIREYA) 0.5 MG X 11 & 1 MG X 42 tablet   No No   Sig: Take 0.5 mg tab daily for 3 days, THEN 0.5 mg tab twice daily for 4 days, THEN 1 mg twice daily.   Patient not taking: Reported on 9/16/2020      Facility-Administered Medications: None     Allergies   Allergies   Allergen Reactions     Indomethacin Other (See Comments)     Dizziness and disorientation     Tramadol Nausea and Vomiting       Physical Exam   Vital Signs: Temp: 97.8  F (36.6  C) Temp src: Temporal BP: 111/65 Pulse: 110   Resp: 14 SpO2: 100 % O2 Device: None (Room air)    Weight: 147 lbs 0 oz    Gen: NAD, pleasant  HEENT: Normocephalic, EOMI, MMM  Resp:  no crackles,  no wheezes, no increased work of resp  CV: S1S2 heard, reg rhythm, reg rate, no pitting pedal edema; systolic murmur noted  Abdo: soft, nontender, nondistended, bowel sounds present  Ext: calves nontender, well perfused; R distal great toe ~8mm diameter abrasion noted  Neuro: AAOx3, CN grossly intact, no facial asymmetry      Data   Data reviewed today: I reviewed all medications, new labs and imaging results over the last 24 hours. I personally reviewed no images or EKG's today.    Recent Labs   Lab 09/16/20  1425   WBC 9.1   HGB 7.2*   MCV 84         POTASSIUM 4.2   CHLORIDE 108   CO2 24   BUN 24   CR 0.71   ANIONGAP 7   KAYLEEN 8.7   GLC 95   ALBUMIN 3.8   PROTTOTAL 7.1   BILITOTAL 0.3   ALKPHOS 51   ALT 17   AST 12     No results found for this or any previous visit (from the past 24 hour(s)).

## 2020-09-16 NOTE — PHARMACY-ADMISSION MEDICATION HISTORY
Pharmacy Medication History  Admission medication history interview status for the 9/16/2020  admission is complete. See EPIC admission navigator for prior to admission medications     Medication history sources: Patient and Surescripts  Medication history source reliability: Good  Adherence assessment: Good    Significant changes made to the medication list:  Modified apap to prn. Increased iron from daily to bid. Decreased levemir from 14 units to 10 units. Removed duplicate ditropan daily order. Modified gabapentin  From one or two times daily to at bedtime      Additional medication history information:   none    Medication reconciliation completed by provider prior to medication history? No    Time spent in this activity: 10 minutes      Prior to Admission medications    Medication Sig Last Dose Taking? Auth Provider   acetaminophen (ACETAMINOPHEN 8 HOUR) 650 MG CR tablet Take 1,300 mg by mouth every 8 hours as needed  prn Yes Unknown, Entered By History   alendronate (FOSAMAX) 70 MG tablet TAKE 1 TABLET BY MOUTH ONE TIME PER WEEK 9/14/2020 at am Yes Tamiko Coley MD   aspirin (ASA) 81 MG EC tablet Take 1 tablet (81 mg) by mouth daily 9/15/2020 at hs Yes Louis Paul MD   atorvastatin (LIPITOR) 40 MG tablet TAKE 1 TABLET BY MOUTH EVERY DAY 9/16/2020 at am Yes Tamiko Coley MD   buPROPion (ZYBAN) 150 MG 12 hr tablet TAKE 1 TABLET BY MOUTH 2 TIMES DAILY 9/16/2020 at am Yes Tamiko Coley MD   calcium carb 1250 mg, 500 mg [place],/vitamin D 200 unit (CALCIUM 500/D) 500-200 MG-UNIT per tablet Take 1 tablet by mouth 2 times daily  9/16/2020 at am Yes Reported, Patient   ferrous sulfate (FEROSUL) 325 (65 Fe) MG tablet Take 325 mg by mouth 2 times daily 9/16/2020 at am Yes Unknown, Entered By History   gabapentin (NEURONTIN) 300 MG capsule TAKE 1 CAPSULE (300 MG) BY MOUTH ONE OR TWO TIMES DAILY  Patient taking differently: Take 300 mg by mouth At Bedtime  9/15/2020 at hs Yes Tamiko Coley MD    glucosamine-chondroitin 500-400 MG CAPS per capsule Take 1 capsule by mouth 2 times daily  9/16/2020 at am Yes Reported, Patient   insulin detemir (LEVEMIR PEN) 100 UNIT/ML pen Inject 10 Units Subcutaneous At Bedtime 9/15/2020 at hs Yes Unknown, Entered By History   lisinopril (ZESTRIL) 2.5 MG tablet TAKE 1 TABLET BY MOUTH EVERY DAY 9/15/2020 at hs Yes Tamiko Coley MD   Melatonin 10 MG TABS tablet Take 10 mg by mouth At Bedtime 9/15/2020 at hs Yes Unknown, Entered By History   metFORMIN (GLUCOPHAGE) 1000 MG tablet TAKE 1 TABLET BY MOUTH TWICE A DAY WITH MEALS 9/16/2020 at am Yes Tamiko Coley MD   Multiple Vitamin (MULTI-VITAMINS) TABS Take 1 tablet by mouth daily  9/16/2020 at am Yes Reported, Patient   oxybutynin (DITROPAN) 5 MG tablet TAKE 1 TABLET BY MOUTH TWICE A DAY 9/16/2020 at am Yes Tamiko Coley MD   pantoprazole (PROTONIX) 40 MG EC tablet TAKE 1 TABLET BY MOUTH EVERY DAY 9/16/2020 at am Yes Tamiko Coley MD   traZODone (DESYREL) 50 MG tablet TAKE 1-2 TABLETS ( MG) BY MOUTH AT BEDTIME 9/15/2020 at hs Yes Tamiko Coley MD   insulin pen needle (29G X 12MM) 29G X 12MM miscellaneous Use 1 pen needles daily or as directed.   Tamiko Coley MD

## 2020-09-17 ENCOUNTER — ANESTHESIA EVENT (OUTPATIENT)
Dept: GASTROENTEROLOGY | Facility: CLINIC | Age: 70
DRG: 378 | End: 2020-09-17
Payer: COMMERCIAL

## 2020-09-17 ENCOUNTER — ANESTHESIA (OUTPATIENT)
Dept: GASTROENTEROLOGY | Facility: CLINIC | Age: 70
DRG: 378 | End: 2020-09-17
Payer: COMMERCIAL

## 2020-09-17 LAB
ANION GAP SERPL CALCULATED.3IONS-SCNC: 6 MMOL/L (ref 3–14)
BUN SERPL-MCNC: 13 MG/DL (ref 7–30)
CALCIUM SERPL-MCNC: 8 MG/DL (ref 8.5–10.1)
CHLORIDE SERPL-SCNC: 113 MMOL/L (ref 94–109)
CO2 SERPL-SCNC: 24 MMOL/L (ref 20–32)
CREAT SERPL-MCNC: 0.56 MG/DL (ref 0.52–1.04)
ERYTHROCYTE [DISTWIDTH] IN BLOOD BY AUTOMATED COUNT: 15.7 % (ref 10–15)
GFR SERPL CREATININE-BSD FRML MDRD: >90 ML/MIN/{1.73_M2}
GLUCOSE BLDC GLUCOMTR-MCNC: 105 MG/DL (ref 70–99)
GLUCOSE BLDC GLUCOMTR-MCNC: 146 MG/DL (ref 70–99)
GLUCOSE BLDC GLUCOMTR-MCNC: 93 MG/DL (ref 70–99)
GLUCOSE BLDC GLUCOMTR-MCNC: 93 MG/DL (ref 70–99)
GLUCOSE SERPL-MCNC: 87 MG/DL (ref 70–99)
HCT VFR BLD AUTO: 26.1 % (ref 35–47)
HGB BLD-MCNC: 7.5 G/DL (ref 11.7–15.7)
HGB BLD-MCNC: 7.8 G/DL (ref 11.7–15.7)
HGB BLD-MCNC: 8 G/DL (ref 11.7–15.7)
LABORATORY COMMENT REPORT: NORMAL
MCH RBC QN AUTO: 25.9 PG (ref 26.5–33)
MCHC RBC AUTO-ENTMCNC: 30.7 G/DL (ref 31.5–36.5)
MCV RBC AUTO: 85 FL (ref 78–100)
PLATELET # BLD AUTO: 400 10E9/L (ref 150–450)
POTASSIUM SERPL-SCNC: 3.7 MMOL/L (ref 3.4–5.3)
RBC # BLD AUTO: 3.09 10E12/L (ref 3.8–5.2)
SARS-COV-2 RNA SPEC QL NAA+PROBE: NEGATIVE
SARS-COV-2 RNA SPEC QL NAA+PROBE: NORMAL
SODIUM SERPL-SCNC: 143 MMOL/L (ref 133–144)
SPECIMEN SOURCE: NORMAL
SPECIMEN SOURCE: NORMAL
UPPER GI ENDOSCOPY: NORMAL
WBC # BLD AUTO: 8.9 10E9/L (ref 4–11)

## 2020-09-17 PROCEDURE — 12000000 ZZH R&B MED SURG/OB

## 2020-09-17 PROCEDURE — 99232 SBSQ HOSP IP/OBS MODERATE 35: CPT | Performed by: INTERNAL MEDICINE

## 2020-09-17 PROCEDURE — 85018 HEMOGLOBIN: CPT | Performed by: HOSPITALIST

## 2020-09-17 PROCEDURE — 25000128 H RX IP 250 OP 636: Performed by: ANESTHESIOLOGY

## 2020-09-17 PROCEDURE — 85018 HEMOGLOBIN: CPT | Performed by: INTERNAL MEDICINE

## 2020-09-17 PROCEDURE — 25000132 ZZH RX MED GY IP 250 OP 250 PS 637: Performed by: HOSPITALIST

## 2020-09-17 PROCEDURE — 36415 COLL VENOUS BLD VENIPUNCTURE: CPT | Performed by: INTERNAL MEDICINE

## 2020-09-17 PROCEDURE — 37000008 ZZH ANESTHESIA TECHNICAL FEE, 1ST 30 MIN: Performed by: INTERNAL MEDICINE

## 2020-09-17 PROCEDURE — 25000125 ZZHC RX 250: Performed by: NURSE ANESTHETIST, CERTIFIED REGISTERED

## 2020-09-17 PROCEDURE — 25000128 H RX IP 250 OP 636: Performed by: HOSPITALIST

## 2020-09-17 PROCEDURE — 25000128 H RX IP 250 OP 636: Performed by: NURSE ANESTHETIST, CERTIFIED REGISTERED

## 2020-09-17 PROCEDURE — 00000146 ZZHCL STATISTIC GLUCOSE BY METER IP

## 2020-09-17 PROCEDURE — C9113 INJ PANTOPRAZOLE SODIUM, VIA: HCPCS | Performed by: HOSPITALIST

## 2020-09-17 PROCEDURE — 80048 BASIC METABOLIC PNL TOTAL CA: CPT | Performed by: HOSPITALIST

## 2020-09-17 PROCEDURE — 25800030 ZZH RX IP 258 OP 636: Performed by: HOSPITALIST

## 2020-09-17 PROCEDURE — 0DJ08ZZ INSPECTION OF UPPER INTESTINAL TRACT, VIA NATURAL OR ARTIFICIAL OPENING ENDOSCOPIC: ICD-10-PCS | Performed by: INTERNAL MEDICINE

## 2020-09-17 PROCEDURE — 25800030 ZZH RX IP 258 OP 636: Performed by: NURSE ANESTHETIST, CERTIFIED REGISTERED

## 2020-09-17 PROCEDURE — 36415 COLL VENOUS BLD VENIPUNCTURE: CPT | Performed by: HOSPITALIST

## 2020-09-17 PROCEDURE — 40000010 ZZH STATISTIC ANES STAT CODE-CRNA PER MINUTE: Performed by: INTERNAL MEDICINE

## 2020-09-17 PROCEDURE — 43235 EGD DIAGNOSTIC BRUSH WASH: CPT | Performed by: INTERNAL MEDICINE

## 2020-09-17 PROCEDURE — 85027 COMPLETE CBC AUTOMATED: CPT | Performed by: HOSPITALIST

## 2020-09-17 RX ORDER — PROPOFOL 10 MG/ML
INJECTION, EMULSION INTRAVENOUS CONTINUOUS PRN
Status: DISCONTINUED | OUTPATIENT
Start: 2020-09-17 | End: 2020-09-17

## 2020-09-17 RX ORDER — PROPOFOL 10 MG/ML
INJECTION, EMULSION INTRAVENOUS PRN
Status: DISCONTINUED | OUTPATIENT
Start: 2020-09-17 | End: 2020-09-17

## 2020-09-17 RX ORDER — LIDOCAINE HYDROCHLORIDE 20 MG/ML
INJECTION, SOLUTION INFILTRATION; PERINEURAL PRN
Status: DISCONTINUED | OUTPATIENT
Start: 2020-09-17 | End: 2020-09-17

## 2020-09-17 RX ORDER — ONDANSETRON 2 MG/ML
INJECTION INTRAMUSCULAR; INTRAVENOUS PRN
Status: DISCONTINUED | OUTPATIENT
Start: 2020-09-17 | End: 2020-09-17

## 2020-09-17 RX ORDER — SODIUM CHLORIDE, SODIUM LACTATE, POTASSIUM CHLORIDE, CALCIUM CHLORIDE 600; 310; 30; 20 MG/100ML; MG/100ML; MG/100ML; MG/100ML
INJECTION, SOLUTION INTRAVENOUS CONTINUOUS PRN
Status: DISCONTINUED | OUTPATIENT
Start: 2020-09-17 | End: 2020-09-17

## 2020-09-17 RX ORDER — FUROSEMIDE 10 MG/ML
10 INJECTION INTRAMUSCULAR; INTRAVENOUS ONCE
Status: COMPLETED | OUTPATIENT
Start: 2020-09-17 | End: 2020-09-17

## 2020-09-17 RX ADMIN — LIDOCAINE HYDROCHLORIDE 80 MG: 20 INJECTION, SOLUTION INFILTRATION; PERINEURAL at 14:43

## 2020-09-17 RX ADMIN — PROPOFOL 10 MG: 10 INJECTION, EMULSION INTRAVENOUS at 14:49

## 2020-09-17 RX ADMIN — PROPOFOL 100 MCG/KG/MIN: 10 INJECTION, EMULSION INTRAVENOUS at 14:43

## 2020-09-17 RX ADMIN — BUPROPION HYDROCHLORIDE 150 MG: 150 TABLET, EXTENDED RELEASE ORAL at 21:29

## 2020-09-17 RX ADMIN — DEXMEDETOMIDINE HYDROCHLORIDE 4 MCG: 100 INJECTION, SOLUTION INTRAVENOUS at 14:45

## 2020-09-17 RX ADMIN — FUROSEMIDE 10 MG: 10 INJECTION, SOLUTION INTRAVENOUS at 13:44

## 2020-09-17 RX ADMIN — DEXMEDETOMIDINE HYDROCHLORIDE 8 MCG: 100 INJECTION, SOLUTION INTRAVENOUS at 14:41

## 2020-09-17 RX ADMIN — DEXMEDETOMIDINE HYDROCHLORIDE 8 MCG: 100 INJECTION, SOLUTION INTRAVENOUS at 14:44

## 2020-09-17 RX ADMIN — TRAZODONE HYDROCHLORIDE 50 MG: 50 TABLET ORAL at 21:30

## 2020-09-17 RX ADMIN — ONDANSETRON 4 MG: 2 INJECTION INTRAMUSCULAR; INTRAVENOUS at 14:51

## 2020-09-17 RX ADMIN — ACETAMINOPHEN 650 MG: 325 TABLET, FILM COATED ORAL at 21:44

## 2020-09-17 RX ADMIN — PROPOFOL 30 MG: 10 INJECTION, EMULSION INTRAVENOUS at 14:46

## 2020-09-17 RX ADMIN — PANTOPRAZOLE SODIUM 40 MG: 40 INJECTION, POWDER, FOR SOLUTION INTRAVENOUS at 09:12

## 2020-09-17 RX ADMIN — PANTOPRAZOLE SODIUM 40 MG: 40 INJECTION, POWDER, FOR SOLUTION INTRAVENOUS at 21:30

## 2020-09-17 RX ADMIN — BUPROPION HYDROCHLORIDE 150 MG: 150 TABLET, EXTENDED RELEASE ORAL at 09:12

## 2020-09-17 RX ADMIN — SODIUM CHLORIDE, POTASSIUM CHLORIDE, SODIUM LACTATE AND CALCIUM CHLORIDE: 600; 310; 30; 20 INJECTION, SOLUTION INTRAVENOUS at 14:40

## 2020-09-17 RX ADMIN — NICOTINE 1 PATCH: 21 PATCH, EXTENDED RELEASE TRANSDERMAL at 09:14

## 2020-09-17 RX ADMIN — GABAPENTIN 300 MG: 300 CAPSULE ORAL at 21:30

## 2020-09-17 RX ADMIN — SODIUM CHLORIDE: 9 INJECTION, SOLUTION INTRAVENOUS at 08:01

## 2020-09-17 ASSESSMENT — ACTIVITIES OF DAILY LIVING (ADL)
ADLS_ACUITY_SCORE: 15
ADLS_ACUITY_SCORE: 13
ADLS_ACUITY_SCORE: 15
ADLS_ACUITY_SCORE: 13

## 2020-09-17 ASSESSMENT — ENCOUNTER SYMPTOMS
SEIZURES: 0
DYSRHYTHMIAS: 0

## 2020-09-17 ASSESSMENT — LIFESTYLE VARIABLES: TOBACCO_USE: 1

## 2020-09-17 ASSESSMENT — MIFFLIN-ST. JEOR: SCORE: 1179.57

## 2020-09-17 NOTE — PLAN OF CARE
A&Ox4. VSS on RA. Tele: NSR. Denies pain, N/V. Up independently to the bathroom. Hgb 8, Q6 hgb checks. Next Hgb check at 1800. L PIV SL. NPO for endo, currently down for procedure. Awaiting plans per endo results. Continue to monitor.

## 2020-09-17 NOTE — PROGRESS NOTES
"      [facility]    Hospitalist Progress Note    Date of Service (when I saw the patient): 09/17/2020    Assessment & Plan   Rajani Guo is a 69 year old female who was admitted on 9/16/2020.    Assessment & Plan     Rajani Guo is a 69 year old female with history of anemia and GI bleed, HTN, HL, T2DM, tobacco use, and LLE DVT previously on apixaban who presented to the ED with 3 weeks of weakness, dizziness, and fatigue. Her Hgb was found to be 7.2 (12 in April 2020) and she was occult blood positive.  ER provider discussed with [facility] who will plan on likely endoscopy tomorrow.     Generalized weakness, dizziness, and fatigue  Acute anemia likely secondary to GI bleed  History of GI bleed - duodenal ulcer requiring transfusion August 2019, September 2019  A few weeks of increasing fatigue and generalized weakness. Some low BP and tachycardia noted at home this morning. She takes ASA daily. Previously on apixaban for arterial occlusion of LLE but no other blood \"thinning\" agents. Melenic stool noted on rectal in ED and occult blood positive.  Hgb 7.2 today; in April 2020 was 12.3.  Almost exactly a year ago was 8 during prior GI bleed.  Mildly tachy (sinus) in ED and BP stable with IVF.  - Type and cross done in ED, consented also  -  received 1 unit PRBC in ED  - PPI IV BID  - [facility] consulted (contacted from ED provider upon admission)  - IVF  - NPO at midnight  - hemoglobin trending q 6 hours  - conditional PRBC unit for <7 (ordered)  hgb stable at 8.0 today    EGD to be done today   D/dhaval IVF as pt felt SOB today . Lungs clear on exam so no diuretics given     Hypertension  Hyperlipidemia  Stable upon admission. PTA statin and lisinopril noted.   - hold PTA statin and ACE-I for  now     Type 2 diabetes mellitus  Hgb A1c 5.8% in April 2020. Will update A1c.   - PTA insulin and metformin noted, holding metformin  - PTA detemir 10 units will be decreased to 5 uhs given upcoming NPO  - sliding " scale insulin and POC QIDCML or every four hours if NPO     PAD  Prior arterial occlusion of LLE s/p L SFA angioplasty with lysis March 2019  Tobacco use - current smoker (1.5ppd)  -previously on apixaban and followed with Dr Barajas.  No new symptoms with left leg. SOB as above. PTA aspirin obviously held.  - stop smoking, cessation aids available - nicotine patch 21mg ordered as requested  - Encourage cessation to live longer and healthier.      Mitral stenosis  Aortic stenosis  Murmur noted on exam, reportedly not new for her. Echo done Sept 2019 showed EF normal, various valvular abnormalities. Appears that cardiology consult was placed but possibly never attended/scheduled last year.   - consider repeat echo after resolving anemia and GI bleed; also reschedule cardiology visit as outpatient??     Asymptomatic COVID-19 returned negative   Felt to be low risk. No special precautions on admission.  - NP PCR swab obtained in ED.        Diet: NPO at midnight, mod carb for now seems reasonable  DVT Prophylaxis: ambulate as able with RN, PCD contraindicated due to PAD and blood thinners contraindicated due to anemia and GI bleed  Barjaas Catheter: not present  Code Status: Full Code             Disposition Plan     Expected discharge: 1-2 days  After EGD and hgb remains stable     Estela Bo MD  [phone] (P)      Interval History   Patient c/o SOB . No apparent respiratory distress. Resting comfortably sitting in bed . Appears pale     -Data reviewed today: I reviewed all new labs and imaging results over the last 24 hours. I personally reviewed no images or EKG's today.    Physical Exam   Temp: 97.8  F (36.6  C) Temp src: Oral BP: 132/60 Pulse: 122   Resp: 24 SpO2: 96 % O2 Device: None (Room air)    Vitals:    09/16/20 1327 09/17/20 0657   Weight: 66.7 kg (147 lb) 66.5 kg (146 lb 8.2 oz)     Vital Signs with Ranges  Temp:  [97.4  F (36.3  C)-98.1  F (36.7  C)] 97.8  F (36.6  C)  Pulse:  [] 122  Resp:   [11-25] 24  BP: (115-138)/(51-81) 132/60  SpO2:  [95 %-100 %] 96 %  No intake/output data recorded.    Constitutional: Awake, alert, cooperative, no apparent distress, pallor+  Respiratory: Clear to auscultation bilaterally, no crackles or wheezing  Cardiovascular: Regular rate and rhythm, normal S1 and S2, and 3+ murmur noted  GI: Normal bowel sounds, soft, non-distended, non-tender  Skin/Integumen: No rashes, no cyanosis, no edema  Other:     Medications     - MEDICATION INSTRUCTIONS -         buPROPion  150 mg Oral BID     gabapentin  300 mg Oral At Bedtime     insulin aspart  1-7 Units Subcutaneous TID AC     insulin aspart  1-5 Units Subcutaneous At Bedtime     insulin detemir  5 Units Subcutaneous At Bedtime     nicotine  1 patch Transdermal Daily     nicotine   Transdermal Q8H     pantoprazole (PROTONIX) IV  40 mg Intravenous BID     sodium chloride (PF)  3 mL Intracatheter Q8H     traZODone  50 mg Oral At Bedtime       Data   Recent Labs   Lab 09/17/20  0558 09/17/20  0032 09/16/20  1425   WBC 8.9  --  9.1   HGB 8.0* 7.8* 7.2*   MCV 85  --  84     --  441     --  139   POTASSIUM 3.7  --  4.2   CHLORIDE 113*  --  108   CO2 24  --  24   BUN 13  --  24   CR 0.56  --  0.71   ANIONGAP 6  --  7   KAYLEEN 8.0*  --  8.7   GLC 87  --  95   ALBUMIN  --   --  3.8   PROTTOTAL  --   --  7.1   BILITOTAL  --   --  0.3   ALKPHOS  --   --  51   ALT  --   --  17   AST  --   --  12       Recent Results (from the past 24 hour(s))   Chest XR,  PA & LAT    Narrative    CHEST TWO VIEW   9/16/2020 6:44 PM     HISTORY: Shortness of breath.    COMPARISON: Chest x-ray 9/2/2019.      Impression    IMPRESSION: PA and lateral views of the chest. Lungs are clear. Heart  is normal in size. Trace bilateral pleural effusions are present and  new since prior exam. No pneumothorax.     ELIAS GARCIA MD

## 2020-09-17 NOTE — ANESTHESIA CARE TRANSFER NOTE
Patient: Rajani Guo    Procedure(s):  ESOPHAGOGASTRODUODENOSCOPY (EGD)    Diagnosis: GI bleed [K92.2]  Diagnosis Additional Information: No value filed.    Anesthesia Type:   MAC     Note:  Airway :Room Air  Patient transferred to:Phase II  Comments: At end of procedure, spontaneous respirations, patient alert to voice, able to follow commands. Patient breathing room air to PACU. SpO2, NiBP, and EKG monitors and alarms on and functioning, report on patient's clinical status given to PACU RN, RN questions answered.Handoff Report: Identifed the Patient, Identified the Reponsible Provider, Reviewed the pertinent medical history, Discussed the surgical course, Reviewed Intra-OP anesthesia mangement and issues during anesthesia, Set expectations for post-procedure period and Allowed opportunity for questions and acknowledgement of understanding      Vitals: (Last set prior to Anesthesia Care Transfer)    CRNA VITALS  9/17/2020 1424 - 9/17/2020 1458      9/17/2020             Pulse:  105    Ht Rate:  105    SpO2:  100 %    Resp Rate (set):  10          122/70      Electronically Signed By: FRANCESCA Rooney CRNA  September 17, 2020  2:58 PM

## 2020-09-17 NOTE — ANESTHESIA PREPROCEDURE EVALUATION
Anesthesia Pre-Procedure Evaluation    Patient: Rajani Guo   MRN: [id number] : 1950          Preoperative Diagnosis: GI bleed [K92.2]    Procedure(s):  ESOPHAGOGASTRODUODENOSCOPY (EGD)    Past Medical History:   Diagnosis Date     Age-related osteoporosis without current pathological fracture 2018     Clot     in  left leg     Constipation      History of partial thyroidectomy 2018     Hyperlipidemia LDL goal <100 2018     Hypertension      Insomnia, unspecified type 2018     Tobacco use disorder      Type 2 diabetes mellitus without complication, with long-term current use of insulin (H) 2018     Past Surgical History:   Procedure Laterality Date     COLONOSCOPY N/A 2019    Procedure: COLONOSCOPY;  Surgeon: Marie Todd MD;  Location:  GI     ESOPHAGOSCOPY, GASTROSCOPY, DUODENOSCOPY (EGD), COMBINED N/A 2019    Procedure: ESOPHAGOGASTRODUODENOSCOPY (EGD);  Surgeon: Marie Todd MD;  Location:  GI     IR ANGIOGRAM THROUGH CATHETER FOLLOW UP  3/5/2019     IR LOWER EXTREMITY ANGIOGRAM LEFT  3/4/2019     KNEE SURGERY Right     right knee torn meniscus surgery     OVARY SURGERY  1971    1 ovary removed     RELEASE CARPAL TUNNEL Right      RELEASE TRIGGER FINGER BILATERAL       SHOULDER SURGERY Left     rotator cuff repair, plate placement     THYROID SURGERY      partial thyroidectomy     Echo 2019  Interpretation Summary     1. Global and regional left ventricular function is normal with an EF of 60-  65%.  2. Right ventricular function, chamber size, wall motion, and thickness are  normal.  3. Moderate to severe mitral annular calcification is present. There is  moderate to severe mitral valve stenosis with mean gradient of 14 mm Hg at 93  bpm. The gradient is likely exaggerated by AI.  4. Moderate mitral insufficiency is present.  5. Moderate aortic insufficiency and mild aortic stenosis are present, mean  gradient 14mmHg, peak  velocity 2.4m/s, KRISTOFER 1.6cm2.  6. Estimated pulmonary artery systolic pressure is 51 mmHg plus right atrial  pressure suggestive of moderate (pulmonary artery systolic pressure 50-75mmHg)  pulmonary hypertension.  7. IVC diameter >2.1 cm collapsing <50% with sniff suggests a high RA pressure  estimated at 15 mmHg or greater.     Compared to ECHO on 03/2019, the mean gradient across the mitral valve has  increased; however, HR is in the 90's today and was in the 80's on 03/2019.  _____________________________________________________________________________  __        Left Ventricle  Global and regional left ventricular function is normal with an EF of 60-65%.  Left ventricular wall thickness is normal. Left ventricular size is normal.  Diastolic function not assessed due to significant mitral annular  calcification.     Right Ventricle  Right ventricular function, chamber size, wall motion, and thickness are  normal.     Atria  Severe left atrial enlargement is present. Mild to moderate right atrial  enlargement is present.     Mitral Valve  Moderate to severe mitral annular calcification is present. Moderate mitral  insufficiency is present. There is moderate to severe mitral valve stenosis  with mean gradient of 14 mm Hg. However, HR is elevated at 93 bpm. Pressure  half time is normal; however, this could overestimate valve area given aortic  regurgitation and likely decreased LV compliance.        Aortic Valve  There is moderate aortic valve calcification. Moderate aortic insufficiency is  present. There is mild aortic stenosis with mean gradient of 14 mm Hg, peak  velocity of 2.4 m/sec, and KRISTOFER of 1.56 cm^2.     Tricuspid Valve  Mild tricuspid insufficiency is present. Moderate (pulmonary artery systolic  pressure 50-75mmHg) pulmonary hypertension is present. Right ventricular  systolic pressure is 51mmHg above the right atrial pressure.     Pulmonic Valve  PV is not well seen; however, there does not appear to  be significant stenosis  or regurgitation.     Vessels  Normal diameter aortic root and proximal ascending aorta. Dilation of the  inferior vena cava is present with abnormal respiratory variation in diameter.  IVC diameter >2.1 cm collapsing <50% with sniff suggests a high RA pressure  estimated at 15 mmHg or greater.     Pericardium  No pericardial effusion is present.        Compared to Previous Study  Compared to ECHO on 03/2019, the mean gradient across the mitral valve has  increased; however, HR is in the 90's today and was in the 80's on 03/2019.    16-SEP-2020 14:12:45 [facility] ROUTINE RECORD  Sinus tachycardia  Cannot rule out Anterior infarct (cited on or before 02-SEP-2019)  Abnormal ECG  When compared with ECG of 02-SEP-2019 18:40,  Premature atrial complexes are no longer Present  Anesthesia Evaluation     . Pt has had prior anesthetic. Type: General    No history of anesthetic complications          ROS/MED HX    ENT/Pulmonary:     (+)tobacco use, Current use , unresolved afebrile: . .   (-) sleep apnea and recent URI   Neurologic: Comment: insomnia     (-) seizures, CVA, Neuropathy and migraines   Cardiovascular: Comment: Patient feels breathing is more labored    (+) Dyslipidemia, hypertension-Peripheral Vascular Disease (claudication)-- Other and Non Symptomatic, --. : . . DESAI, . :. valvular problems/murmurs type: AS mitral stenosis:.      (-) CAD and arrhythmias   METS/Exercise Tolerance:     Hematologic:     (+) History of blood clots (LLE DVT on apixaban) pt is anticoagulated, Anemia (transfused one unit PRBC in ER), -     (-) other hematologic disorder   Musculoskeletal:   (+) arthritis,  other musculoskeletal- osteoporosis      GI/Hepatic: Comment: Hx of GI bleed requiring transfusion 8/2019 and 9/2019    (+) Other GI/Hepatic GI Bleed     (-) GERD and liver disease   Renal/Genitourinary:         Endo:     (+) type II DM thyroid problem (hx thyroidectomy - not on medication) .  "  (-) obesity   Psychiatric:        (-) psychiatric history   Infectious Disease:         Malignancy:         Other:                          Physical Exam  Normal systems: cardiovascular    Airway   Mallampati: II  TM distance: >3 FB  Neck ROM: full    Dental   (+) upper dentures and lower dentures    Cardiovascular   Rhythm and rate: regular and normal  (+) murmur       Pulmonary    breath sounds clear to auscultation(+) rales     PE comment: Bilateral crackles            Lab Results   Component Value Date    WBC 8.9 09/17/2020    HGB 8.0 (L) 09/17/2020    HCT 26.1 (L) 09/17/2020     09/17/2020    CRP 0.7 09/30/2019     09/17/2020    POTASSIUM 3.7 09/17/2020    CHLORIDE 113 (H) 09/17/2020    CO2 24 09/17/2020    BUN 13 09/17/2020    CR 0.56 09/17/2020    GLC 87 09/17/2020    KAYLEEN 8.0 (L) 09/17/2020    ALBUMIN 3.8 09/16/2020    PROTTOTAL 7.1 09/16/2020    ALT 17 09/16/2020    AST 12 09/16/2020    ALKPHOS 51 09/16/2020    BILITOTAL 0.3 09/16/2020    PTT 32 03/04/2019    INR 1.00 03/04/2019    TSH 3.42 07/30/2019       Preop Vitals  BP Readings from Last 3 Encounters:   09/17/20 124/61   08/04/20 119/70   07/28/20 132/72    Pulse Readings from Last 3 Encounters:   09/17/20 74   08/04/20 82   07/28/20 86      Resp Readings from Last 3 Encounters:   09/17/20 16   06/11/20 14   02/26/20 18    SpO2 Readings from Last 3 Encounters:   09/17/20 98%   08/04/20 98%   07/28/20 98%      Temp Readings from Last 1 Encounters:   09/17/20 36.6  C (97.8  F) (Oral)    Ht Readings from Last 1 Encounters:   09/16/20 1.626 m (5' 4\")      Wt Readings from Last 1 Encounters:   09/17/20 66.5 kg (146 lb 8.2 oz)    Estimated body mass index is 25.15 kg/m  as calculated from the following:    Height as of this encounter: 1.626 m (5' 4\").    Weight as of this encounter: 66.5 kg (146 lb 8.2 oz).       Anesthesia Plan      History & Physical Review  History and physical reviewed and following examination; no interval change.    ASA " Status:  4 .    NPO Status:  > 8 hours    Plan for MAC Reason for MAC:  Deep or markedly invasive procedure (G8)  PONV prophylaxis:  Ondansetron (or other 5HT-3)  Will give lasix 10 mg        Postoperative Care  Postoperative pain management:  IV analgesics.      Consents  Anesthetic plan, risks, benefits and alternatives discussed with:  Patient..                 Joon Tillman MD

## 2020-09-17 NOTE — UTILIZATION REVIEW
Admission Status; Secondary Review Determination       Under the authority of the Utilization Management Committee, the utilization review process indicated a secondary review on the above patient. The review outcome is based on review of the medical records, discussions with staff, and applying clinical experience noted on the date of the review.     (x) Inpatient Status Appropriate - This patient's medical care is consistent with medical management for inpatient care and reasonable inpatient medical practice.     RATIONALE FOR DETERMINATION   69 year old female with history of anemia and GI bleed, HTN, HL, T2DM, tobacco use, and LLE DVT previously on apixaban who presented to the ED with 3 weeks of weakness, dizziness, and fatigue. Her Hgb was found to be 7.2 (12 in April 2020) and she was occult blood positive. The patient was started on IV PPIs and admitted to the floor for further evaluation The patient has similar episode that occurred in August 2019. At that time she underwent upper endoscopy which revealed erosive duodenitis which was felt to be causing the anemia and was likely exacerbated by her need for anticoagulation.   Patient was recently on apixaban for DVT, has multiple comorbidities including valvular heart disease both mitral and aortic, hypertension, diabetes mellitus type 2.  He required blood transfusion and complex evaluation and management including n.p.o. status, intravenous fluid, intravenous PPI, evaluation for cope with prior to endoscopy. The expected length of stay at the time of admission was more than 2 nights because of the severity of illness, intensity of service provided, and risk for adverse outcome. Inpatient admission is appropriate.     This document was produced using voice recognition software       The information on this document is developed by the utilization review team in order for the business office to ensure compliance. This only denotes the appropriateness of  proper admission status and does not reflect the quality of care rendered.   The definitions of Inpatient Status and Observation Status used in making the determination above are those provided in the CMS Coverage Manual, Chapter 1 and Chapter 6, section 70.4.   Sincerely,   TIERRA ESCALANTE MD   System Medical Director   Utilization [facility].

## 2020-09-17 NOTE — PLAN OF CARE
AxO x4. VSS, RA. Tele NSR. Denies pain. Denies nausea, SOB. Monitoring Hgb q 6 hours. SBA. NPO at midnight. IVF infusing. Resting between cares. Discharge pending plan, Hgb levels.

## 2020-09-17 NOTE — ANESTHESIA POSTPROCEDURE EVALUATION
Patient: Rajani Guo    Procedure(s):  ESOPHAGOGASTRODUODENOSCOPY (EGD)    Diagnosis:GI bleed [K92.2]  Diagnosis Additional Information: No value filed.    Anesthesia Type:  MAC    Note:  Anesthesia Post Evaluation    Patient location during evaluation: PACU  Patient participation: Able to fully participate in evaluation  Level of consciousness: awake  Pain management: adequate  Airway patency: patent  Cardiovascular status: tachycardic and acceptable  Respiratory status: acceptable  Hydration status: acceptable  PONV: none     Anesthetic complications: None          Last vitals:  Vitals:    09/17/20 1510 09/17/20 1512 09/17/20 1600   BP: 106/53 107/83 122/65   Pulse: 110 79 101   Resp: 24 21 20   Temp:   36.3  C (97.3  F)   SpO2: 96% 95% 95%         Electronically Signed By: Joon Tillman MD  September 17, 2020  6:07 PM

## 2020-09-17 NOTE — CONSULTS
GASTROENTEROLOGY CONSULTATION     Rajani Guo   [address] [facility] [address]   69 year old female   Admission Date/Time: 9/16/2020   Encounter Date: 9/17/2020  Primary Care Provider: Tamiko Coley     Referring / Attending Physician: Sudheer Bertrand   We were asked to see the patient in consultation by Dr. Bertrand for evaluation of anemia.     HPI: Rajani Guo is a 69 year old female with a past medical history significant for hypertension, hyperlipidemia, type 2 diabetes mellitus, tobacco use, left lower extremity DVT recently on apixaban who presented to the emergency department with worsening weakness, dizziness and fatigue.  The patient states that her symptoms began about 3 to 4 weeks ago and have been progressively worse over the last several weeks.  She denies any hematochezia or melena.  In the emergency department the patient was found to be mildly tachycardic but otherwise was hemodynamically stable.  Laboratory evaluation was notable for a hemoglobin of 7.2 which is down from 12 in April of this year.  Guaiac was done in the ED which was positive.  The patient was started on IV PPIs and admitted to the floor for further evaluation hemoglobin this morning was 8.0.  The patient has similar episode that occurred in August 2019.  At that time she underwent upper endoscopy which revealed erosive duodenitis which was felt to be causing the anemia and was likely exacerbated by her need for anticoagulation.  Repeat endoscopy about a month later did reveal healing of the duodenitis.    Past Medical History  Past Medical History:   Diagnosis Date     Age-related osteoporosis without current pathological fracture 1/18/2018     Clot     in  left leg     Constipation      History of partial thyroidectomy 6/2/2018     Hyperlipidemia LDL goal <100 1/18/2018     Hypertension      Insomnia, unspecified type 1/18/2018     Tobacco use disorder      Type 2 diabetes mellitus without complication, with long-term  current use of insulin (H) 1/18/2018       Past Surgical History  Past Surgical History:   Procedure Laterality Date     COLONOSCOPY N/A 9/4/2019    Procedure: COLONOSCOPY;  Surgeon: Marie Todd MD;  Location:  GI     ESOPHAGOSCOPY, GASTROSCOPY, DUODENOSCOPY (EGD), COMBINED N/A 9/4/2019    Procedure: ESOPHAGOGASTRODUODENOSCOPY (EGD);  Surgeon: Marie Todd MD;  Location:  GI     IR ANGIOGRAM THROUGH CATHETER FOLLOW UP  3/5/2019     IR LOWER EXTREMITY ANGIOGRAM LEFT  3/4/2019     KNEE SURGERY Right 2013    right knee torn meniscus surgery     OVARY SURGERY  1971    1 ovary removed     RELEASE CARPAL TUNNEL Right 1988     RELEASE TRIGGER FINGER BILATERAL       SHOULDER SURGERY Left     rotator cuff repair, plate placement     THYROID SURGERY  1988    partial thyroidectomy       Family History  Family History   Problem Relation Age of Onset     Glaucoma No family hx of      Macular Degeneration No family hx of        Social History  Social History     Socioeconomic History     Marital status: Single     Spouse name: Not on file     Number of children: Not on file     Years of education: Not on file     Highest education level: Not on file   Occupational History     Not on file   Social Needs     Financial resource strain: Not on file     Food insecurity     Worry: Not on file     Inability: Not on file     Transportation needs     Medical: Not on file     Non-medical: Not on file   Tobacco Use     Smoking status: Current Every Day Smoker     Packs/day: 1.50     Years: 52.00     Pack years: 78.00     Smokeless tobacco: Never Used   Substance and Sexual Activity     Alcohol use: Yes     Drug use: No     Sexual activity: Never   Lifestyle     Physical activity     Days per week: Not on file     Minutes per session: Not on file     Stress: Not on file   Relationships     Social connections     Talks on phone: Not on file     Gets together: Not on file     Attends Yarsanism service: Not on file      Active member of club or organization: Not on file     Attends meetings of clubs or organizations: Not on file     Relationship status: Not on file     Intimate partner violence     Fear of current or ex partner: Not on file     Emotionally abused: Not on file     Physically abused: Not on file     Forced sexual activity: Not on file   Other Topics Concern     Parent/sibling w/ CABG, MI or angioplasty before 65F 55M? Not Asked   Social History Narrative     Not on file       Medications  Prior to Admission medications    Medication Sig Start Date End Date Taking? Authorizing Provider   acetaminophen (ACETAMINOPHEN 8 HOUR) 650 MG CR tablet Take 1,300 mg by mouth every 8 hours as needed    Yes Unknown, Entered By History   alendronate (FOSAMAX) 70 MG tablet TAKE 1 TABLET BY MOUTH ONE TIME PER WEEK 6/17/20  Yes Tamiko Coley MD   aspirin (ASA) 81 MG EC tablet Take 1 tablet (81 mg) by mouth daily 9/7/19  Yes Louis Paul MD   atorvastatin (LIPITOR) 40 MG tablet TAKE 1 TABLET BY MOUTH EVERY DAY 3/26/20  Yes Tamiko Coley MD   buPROPion (ZYBAN) 150 MG 12 hr tablet TAKE 1 TABLET BY MOUTH 2 TIMES DAILY 9/9/20  Yes Tamiko Coley MD   calcium carb 1250 mg, 500 mg [place],/vitamin D 200 unit (CALCIUM 500/D) 500-200 MG-UNIT per tablet Take 1 tablet by mouth 2 times daily  1/4/12  Yes Reported, Patient   ferrous sulfate (FEROSUL) 325 (65 Fe) MG tablet Take 325 mg by mouth 2 times daily   Yes Unknown, Entered By History   gabapentin (NEURONTIN) 300 MG capsule TAKE 1 CAPSULE (300 MG) BY MOUTH ONE OR TWO TIMES DAILY  Patient taking differently: Take 300 mg by mouth At Bedtime  6/17/20  Yes Tamiko Coley MD   glucosamine-chondroitin 500-400 MG CAPS per capsule Take 1 capsule by mouth 2 times daily  1/4/12  Yes Reported, Patient   insulin detemir (LEVEMIR PEN) 100 UNIT/ML pen Inject 10 Units Subcutaneous At Bedtime   Yes Unknown, Entered By History   lisinopril (ZESTRIL) 2.5 MG tablet TAKE 1 TABLET BY MOUTH EVERY DAY  "3/26/20  Yes Tamiko Coley MD   Melatonin 10 MG TABS tablet Take 10 mg by mouth At Bedtime   Yes Unknown, Entered By History   metFORMIN (GLUCOPHAGE) 1000 MG tablet TAKE 1 TABLET BY MOUTH TWICE A DAY WITH MEALS 6/17/20  Yes Tamiko Coley MD   Multiple Vitamin (MULTI-VITAMINS) TABS Take 1 tablet by mouth daily  1/4/12  Yes Reported, Patient   oxybutynin (DITROPAN) 5 MG tablet TAKE 1 TABLET BY MOUTH TWICE A DAY 6/17/20  Yes Tamiko Coley MD   pantoprazole (PROTONIX) 40 MG EC tablet TAKE 1 TABLET BY MOUTH EVERY DAY 6/12/20  Yes Tamiko Coley MD   traZODone (DESYREL) 50 MG tablet TAKE 1-2 TABLETS ( MG) BY MOUTH AT BEDTIME 7/9/20  Yes Tamiko Coley MD   insulin pen needle (29G X 12MM) 29G X 12MM miscellaneous Use 1 pen needles daily or as directed. 2/26/20   Tamiko Coley MD       Allergies:  Indomethacin and Tramadol    ROS: A ten point review of systems was obtained and negative other than the symptoms noted above in the HPI.     Physical Exam:   /61   Pulse 74   Temp 97.8  F (36.6  C) (Oral)   Resp 16   Ht 1.626 m (5' 4\")   Wt 66.5 kg (146 lb 8.2 oz)   SpO2 98%   BMI 25.15 kg/m     Constitutional: elderly, up in chair, alert, no acute distress  Cardiovascular: regular rate and rhythm, no murmurs,rubs or gallops  Respiratory: clear to auscultation bilaterally  Psychiatric: normal pleasant affect  Head: atraumatic, normocephalic  Neck: supple, no thyromegaly  ENT: mucous membranes are moist, no oral lesions are noted  Abdomen: soft, non-tender, non-distended, normally active bowel sound. No masses or hepatosplenomegaly is appreciated. No rebound tenderness or guarding  Neuro: Neurologically intact grossly  Skin: warm, dry, no rashes are noted    ADDITIONAL COMMENTS:   I reviewed the patient's new clinical lab test results.   Recent Labs   Lab Test 09/17/20  0558 09/17/20  0032 09/16/20  1425 04/13/20  0914  03/04/19  0930   WBC 8.9  --  9.1 10.0   < > 12.3*   HGB 8.0* 7.8* 7.2* 12.3   < > 13.3   MCV " 85  --  84 90   < > 90     --  441 328   < > 304   INR  --   --   --   --   --  1.00    < > = values in this interval not displayed.      Recent Labs   Lab Test 09/17/20  0558 09/16/20  1425 04/13/20  0914    139 139   POTASSIUM 3.7 4.2 3.6   CHLORIDE 113* 108 106   CO2 24 24 29   BUN 13 24 12   CR 0.56 0.71 0.60   ANIONGAP 6 7 4   KAYLEEN 8.0* 8.7 8.6      Recent Labs   Lab Test 09/16/20  1425 07/01/20  1401 06/11/20  1310 04/13/20  0914 02/27/20  0720  01/18/18  0835   ALBUMIN 3.8  --   --  3.6  --   --  3.9   BILITOTAL 0.3  --   --  0.3  --   --  0.8   ALT 17  --   --  15  --   --  16   AST 12  --   --  12  --   --  17   ALKPHOS 51  --   --  57  --   --  70   PROTEIN  --  Negative 30*  --  Negative   < >  --     < > = values in this interval not displayed.     8/2/19 EGD ([place])  Impression:               - Likely erosive duodednitis causing the anemia.                             This may have been caused by the aspirin, likely                             exacerbated by Eliquis.      9/4/19 EGD ([facility])  Findings:        The examined esophagus was normal.        A small hiatal hernia was present. The stomach was otherwise normal.        The examined duodenum was normal. A clip was in place from EGD several        weeks ago, erosions have all healed.     9/4/19 Colonoscopy ([facility])  Impression:               - Diverticulosis throughout the colon                             - Brown stool throughout the colon, no evidence of                             GI bleeding                             - Brown stool from ileum                             - internal hemorrhoids                             No sign of active GI bleeding.     Assessment: 61-year-old female presenting with weakness and dizziness found to have acute blood loss anemia.  The patient has been having darker appearing stools intermittently for several months.  She does have risk factors for peptic ulcer disease including her age and ongoing  tobacco use.  An AVM or a dieulafoy lesion is also possible.  She does appear hemodynamically stable at this time.     Plan:   -NPO  -Awaiting COVID result  -EGD later today with Dr Ibrahim  -Follow hgb and stool output  -PPI IV BID  -Hold anticoagulation  -Tobacco cessation  -[facility] following    I discussed the patient's findings and plan with Dr. Ten Ibrahim who will also independently see and examine the patient.     Sudheer Schroeder PA-C  [facility]  Cell:  [phone] Monday through Friday 9442-9213  Office: [phone]

## 2020-09-18 VITALS
BODY MASS INDEX: 24.19 KG/M2 | DIASTOLIC BLOOD PRESSURE: 55 MMHG | OXYGEN SATURATION: 92 % | TEMPERATURE: 98.1 F | RESPIRATION RATE: 16 BRPM | HEART RATE: 87 BPM | HEIGHT: 64 IN | WEIGHT: 141.7 LBS | SYSTOLIC BLOOD PRESSURE: 111 MMHG

## 2020-09-18 LAB
ERYTHROCYTE [DISTWIDTH] IN BLOOD BY AUTOMATED COUNT: 15.9 % (ref 10–15)
GLUCOSE BLDC GLUCOMTR-MCNC: 127 MG/DL (ref 70–99)
GLUCOSE BLDC GLUCOMTR-MCNC: 131 MG/DL (ref 70–99)
HCT VFR BLD AUTO: 25.3 % (ref 35–47)
HGB BLD-MCNC: 7.7 G/DL (ref 11.7–15.7)
HGB BLD-MCNC: 7.7 G/DL (ref 11.7–15.7)
MCH RBC QN AUTO: 26.1 PG (ref 26.5–33)
MCHC RBC AUTO-ENTMCNC: 30.4 G/DL (ref 31.5–36.5)
MCV RBC AUTO: 86 FL (ref 78–100)
PLATELET # BLD AUTO: 368 10E9/L (ref 150–450)
RBC # BLD AUTO: 2.95 10E12/L (ref 3.8–5.2)
WBC # BLD AUTO: 7.6 10E9/L (ref 4–11)

## 2020-09-18 PROCEDURE — 25000132 ZZH RX MED GY IP 250 OP 250 PS 637: Performed by: HOSPITALIST

## 2020-09-18 PROCEDURE — 00000146 ZZHCL STATISTIC GLUCOSE BY METER IP

## 2020-09-18 PROCEDURE — C9113 INJ PANTOPRAZOLE SODIUM, VIA: HCPCS | Performed by: HOSPITALIST

## 2020-09-18 PROCEDURE — 36415 COLL VENOUS BLD VENIPUNCTURE: CPT | Performed by: INTERNAL MEDICINE

## 2020-09-18 PROCEDURE — 85018 HEMOGLOBIN: CPT | Performed by: INTERNAL MEDICINE

## 2020-09-18 PROCEDURE — 85027 COMPLETE CBC AUTOMATED: CPT | Performed by: INTERNAL MEDICINE

## 2020-09-18 PROCEDURE — 99239 HOSP IP/OBS DSCHRG MGMT >30: CPT | Performed by: INTERNAL MEDICINE

## 2020-09-18 PROCEDURE — 25000128 H RX IP 250 OP 636: Performed by: HOSPITALIST

## 2020-09-18 RX ADMIN — NICOTINE 1 PATCH: 21 PATCH, EXTENDED RELEASE TRANSDERMAL at 08:34

## 2020-09-18 RX ADMIN — BUPROPION HYDROCHLORIDE 150 MG: 150 TABLET, EXTENDED RELEASE ORAL at 08:34

## 2020-09-18 RX ADMIN — PANTOPRAZOLE SODIUM 40 MG: 40 INJECTION, POWDER, FOR SOLUTION INTRAVENOUS at 08:34

## 2020-09-18 ASSESSMENT — MIFFLIN-ST. JEOR: SCORE: 1157.75

## 2020-09-18 ASSESSMENT — ACTIVITIES OF DAILY LIVING (ADL)
ADLS_ACUITY_SCORE: 13

## 2020-09-18 NOTE — PROGRESS NOTES
MD Notification    Notified Person: MD    Notified Person Name: on call answering service, Dr. De Los Santos    Notification Date/Time: 9/17/2020 7840    Notification Interaction: on call answering service    Purpose of Notification: Patient is NPO, and the blood sugar is 105. Will hold the Novolog. Do recommend holding the Levemir tonight as well?  Thanks!    Orders Received:    Comments:

## 2020-09-18 NOTE — DISCHARGE SUMMARY
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  The following labs/tests are recommended: CBC/BMP.    Follow-up with [facility] in 1 to 2 weeks for PillCam and evaluation of her   bleeding  Follow-up with cardiologist next available           Unresulted Labs Ordered in the Past 30 Days of this Admission     No orders found from 8/17/2020 to 9/17/2020.        Discharge Disposition   Discharged to home  Condition at discharge: Stable    Hospital Course   Rajani Guo is a 69 year old female with history of anemia and GI bleed, HTN, HL, T2DM, tobacco use, and Peripheral arterial disease with acute on chronic lifestyle limiting claudication of the left lower extremity s/p succesful catheter-directed lysis and angioplasty of proximal left SFA 3/4/19-3/5/19 previously on apixaban but later changed to aspirin who presented to the ED with 3 weeks of weakness, dizziness, and fatigue. Her Hgb was found to be 7.2 (12 in April 2020) and she was occult blood positive.  ER provider discussed with [facility] who will plan on likely endoscopy tomorrow.     Generalized weakness, dizziness, and fatigue  Acute anemia likely secondary to GI bleed  History of GI bleed - duodenal ulcer  "requiring transfusion August 2019, September 2019  A few weeks of increasing fatigue and generalized weakness. Some low BP and tachycardia noted at home this morning. She takes ASA daily. Previously on apixaban for arterial occlusion of LLE but no other blood \"thinning\" agents. Melenic stool noted on rectal in ED and occult blood positive.  Hgb 7.2 at presentation, in April 2020 was 12.3.  Almost a year ago had GI bleed from duodenal ulcer.  -  received 1 unit PRBC in ED and started on IV twice daily PPI  -[facility] followed while in-house, had EGD which was unremarkable  -Hemoglobin trended and remained stable around 7.7 prior to discharge  -Patient actually felt better and so was discharged to follow-up with her PCP and with [facility] for PillCam     Hypertension  Hyperlipidemia  -PTA statin resumed at discharge, however PTA lisinopril held at discharge due to soft/normal blood pressure while in-house.  Advised patient to follow-up with PCP prior to starting back on ACE inhibitor     Type 2 diabetes mellitus  Hgb A1c 5.7 during this admission.   - PTA insulin and metformin resumed at discharge     PAD  Prior arterial occlusion of LLE s/p L SFA angioplasty with lysis March 2019  Tobacco use - current smoker (1.5ppd)  -previously on apixaban and followed with Dr Barajas.  No new symptoms with left leg.PTA aspirin resumed at discharge as okayed by GI  - Encourage cessation to live longer and healthier.      Mitral stenosis  Aortic stenosis  Murmur noted on exam, reportedly not new for her. Echo done Sept 2019 showed EF normal, various valvular abnormalities(with progression of mitral valve stenosis). Appears that cardiology consult was placed by her PCP but possibly never attended/scheduled last year.   -Patient was asymptomatic on the day of discharge so was discharged to follow-up with her PCP and her cardiologist  -Advised patient to reschedule appointment with cardiologist that she had missed last " year     Consultations This Hospital Stay   GASTROENTEROLOGY IP CONSULT  SMOKING CESSATION PROGRAM IP CONSULT    Code Status   Full Code    Time Spent on this Encounter   I, Neeta Ann MD, personally saw the patient today and spent greater than 30 minutes discharging this patient.       Neeta Ann MD  [facility]  ______________________________________________________________________    Physical Exam   Vital Signs: Temp: 98.1  F (36.7  C) Temp src: Oral BP: 111/55 Pulse: 87   Resp: 16 SpO2: 92 % O2 Device: None (Room air)    Weight: 141 lbs 11.2 oz  Exam:  Constitutional: Awake, alert and no distress. Appears comfortable  Head: Normocephalic. No masses, lesions, tenderness or abnormalities  ENT: ENT exam normal, no neck nodes or sinus tenderness  Cardiovascular: RRR.  3+ systolic murmurs, no rubs or JVD  Respiratory: Normal WOB,b/l equal air entry, no wheezes or crackles   Gastrointestinal: Abdomen soft, non-tender. BS normal. No masses, organomegaly  : Deferred  Extremities : No edema , no clubbing or cyanosis         Primary Care Physician   Tamiko Coley    Discharge Orders      Reason for your hospital stay    GI bleed, anemia     Follow-up and recommended labs and tests     Follow up with primary care provider, Tamiko Coley, within 7 days for hospital follow- up.  The following labs/tests are recommended: CBC/BMP.    Follow-up with [facility] in 1 to 2 weeks for PillCam and evaluation of her bleeding     Activity    Your activity upon discharge: activity as tolerated     Monitor and record    blood pressure daily and readings to PCP for any medication adjustment.  Your PTA lisinopril has been discontinued secondary to low/soft blood pressure.  Follow-up with PCP prior to restarting it  Blood sugars ACHS and readings to PCP for any medication adjustment     Full Code    As documented during admission     Diet    Follow this diet upon discharge: Orders Placed This Encounter      Advance Diet as  Tolerated: Regular Diet Adult; 4737-5384 Calories: Moderate Consistent CHO (4-6 CHO units/meal)       Significant Results and Procedures   Results for orders placed or performed during the hospital encounter of 09/16/20   Chest XR,  PA & LAT    Narrative    CHEST TWO VIEW   9/16/2020 6:44 PM     HISTORY: Shortness of breath.    COMPARISON: Chest x-ray 9/2/2019.      Impression    IMPRESSION: PA and lateral views of the chest. Lungs are clear. Heart  is normal in size. Trace bilateral pleural effusions are present and  new since prior exam. No pneumothorax.     ELIAS GARCIA MD       Discharge Medications   Current Discharge Medication List      CONTINUE these medications which have NOT CHANGED    Details   acetaminophen (ACETAMINOPHEN 8 HOUR) 650 MG CR tablet Take 1,300 mg by mouth every 8 hours as needed       alendronate (FOSAMAX) 70 MG tablet TAKE 1 TABLET BY MOUTH ONE TIME PER WEEK  Qty: 12 tablet, Refills: 0    Associated Diagnoses: Age-related osteoporosis without current pathological fracture      aspirin (ASA) 81 MG EC tablet Take 1 tablet (81 mg) by mouth daily  Qty: 100 tablet, Refills: 3    Comments: Future refills by PCP Dr. Tamiko Coley with phone number [phone].  Associated Diagnoses: PVD (peripheral vascular disease) (H)      atorvastatin (LIPITOR) 40 MG tablet TAKE 1 TABLET BY MOUTH EVERY DAY  Qty: 90 tablet, Refills: 3    Associated Diagnoses: Type 2 diabetes mellitus without complication, with long-term current use of insulin (H)      buPROPion (ZYBAN) 150 MG 12 hr tablet TAKE 1 TABLET BY MOUTH 2 TIMES DAILY  Qty: 60 tablet, Refills: 3    Associated Diagnoses: Tobacco use disorder      calcium carb 1250 mg, 500 mg Jackson,/vitamin D 200 unit (CALCIUM 500/D) 500-200 MG-UNIT per tablet Take 1 tablet by mouth 2 times daily       ferrous sulfate (FEROSUL) 325 (65 Fe) MG tablet Take 325 mg by mouth 2 times daily      gabapentin (NEURONTIN) 300 MG capsule TAKE 1 CAPSULE (300 MG) BY MOUTH ONE OR TWO TIMES  DAILY  Qty: 90 capsule, Refills: 1      glucosamine-chondroitin 500-400 MG CAPS per capsule Take 1 capsule by mouth 2 times daily       insulin detemir (LEVEMIR PEN) 100 UNIT/ML pen Inject 10 Units Subcutaneous At Bedtime      Melatonin 10 MG TABS tablet Take 10 mg by mouth At Bedtime      metFORMIN (GLUCOPHAGE) 1000 MG tablet TAKE 1 TABLET BY MOUTH TWICE A DAY WITH MEALS  Qty: 180 tablet, Refills: 1    Associated Diagnoses: Type 2 diabetes mellitus without complication, with long-term current use of insulin (H)      Multiple Vitamin (MULTI-VITAMINS) TABS Take 1 tablet by mouth daily       oxybutynin (DITROPAN) 5 MG tablet TAKE 1 TABLET BY MOUTH TWICE A DAY  Qty: 180 tablet, Refills: 3    Associated Diagnoses: Urge incontinence of urine      pantoprazole (PROTONIX) 40 MG EC tablet TAKE 1 TABLET BY MOUTH EVERY DAY  Qty: 90 tablet, Refills: 1    Comments: DX Code Needed  .  Associated Diagnoses: Duodenitis      traZODone (DESYREL) 50 MG tablet TAKE 1-2 TABLETS ( MG) BY MOUTH AT BEDTIME  Qty: 180 tablet, Refills: 1    Associated Diagnoses: Insomnia, unspecified      insulin pen needle (29G X 12MM) 29G X 12MM miscellaneous Use 1 pen needles daily or as directed.  Qty: 100 each, Refills: 11    Associated Diagnoses: Type 2 diabetes mellitus without complication, with long-term current use of insulin (H)         STOP taking these medications       lisinopril (ZESTRIL) 2.5 MG tablet Comments:   Reason for Stopping:             Allergies   Allergies   Allergen Reactions     Indomethacin Other (See Comments)     Dizziness and disorientation     Tramadol Nausea and Vomiting

## 2020-09-18 NOTE — PROGRESS NOTES
"GASTROENTEROLOGY PROGRESS NOTE     SUBJECTIVE: Feeling well today. Denies nausea, vomiting or abdominal pain. Tolerating diet. No further signs of bleeding     OBJECTIVE:   /55 (BP Location: Right arm)   Pulse 87   Temp 98.1  F (36.7  C) (Oral)   Resp 16   Ht 1.626 m (5' 4\")   Wt 64.3 kg (141 lb 11.2 oz)   SpO2 92%   BMI 24.32 kg/m     Temp (24hrs), Av.5  F (36.4  C), Min:97.1  F (36.2  C), Max:98.1  F (36.7  C)     Patient Vitals for the past 72 hrs:   Weight   20 0700 64.3 kg (141 lb 11.2 oz)   20 0657 66.5 kg (146 lb 8.2 oz)   20 1327 66.7 kg (147 lb)        Intake/Output Summary (Last 24 hours) at 2020 1143  Last data filed at 2020 0707  Gross per 24 hour   Intake 200 ml   Output 400 ml   Net -200 ml      PHYSICAL EXAM   Constitutional: Age appropriate, in bed, no acute distress  Abdomen: Soft, non-tender, non-distended, normally active bowel sounds. No masses or hepatosplenomegaly appreciated. No guarding or rebound tenderness.    Additional Comments:   ROS, FH, SH: See initial GI consult for details.     I have reviewed the patient's new clinical lab results:   Recent Labs   Lab Test 20  0720 20  0153 20  19420  14219  0930   WBC 7.6  --   --  8.9  --  9.1   < > 12.3*   HGB 7.7* 7.7* 7.5* 8.0*   < > 7.2*   < > 13.3   MCV 86  --   --  85  --  84   < > 90     --   --  400  --  441   < > 304   INR  --   --   --   --   --   --   --  1.00    < > = values in this interval not displayed.      Recent Labs   Lab Test 20  0520  14220  0914    139 139   POTASSIUM 3.7 4.2 3.6   CHLORIDE 113* 108 106   CO2 24 24 29   BUN 13 24 12   CR 0.56 0.71 0.60   ANIONGAP 6 7 4   KAYLEEN 8.0* 8.7 8.6      Recent Labs   Lab Test 20  1425 20  1401 20  1310 20  0914 20  0720  18  0835   ALBUMIN 3.8  --   --  3.6  --   --  3.9   BILITOTAL 0.3  --   --  0.3  --   --  0.8   ALT " 17  --   --  15  --   --  16   AST 12  --   --  12  --   --  17   ALKPHOS 51  --   --  57  --   --  70   PROTEIN  --  Negative 30*  --  Negative   < >  --     < > = values in this interval not displayed.      8/2/19 EGD ([place])  Impression:               - Likely erosive duodednitis causing the anemia.                             This may have been caused by the aspirin, likely                             exacerbated by Eliquis.      9/4/19 EGD ([place])  Findings:        The examined esophagus was normal.        A small hiatal hernia was present. The stomach was otherwise normal.        The examined duodenum was normal. A clip was in place from EGD several        weeks ago, erosions have all healed.      9/4/19 Colonoscopy ([place])  Impression:               - Diverticulosis throughout the colon                             - Brown stool throughout the colon, no evidence of                             GI bleeding                             - Brown stool from ileum                             - internal hemorrhoids                             No sign of active GI bleeding.     9/17/20 EGD ([place])  Findings:        The esophagus was normal.        The stomach was normal.        The examined duodenum was normal.      Assessment: 61-year-old female presenting with weakness and dizziness found to have acute blood loss anemia.  The patient has been having darker appearing stools intermittently for several months.  EGD negative. Hgb stable. Could be a small bowel AVM that is intermittently oozing.    Plan:   -Diet as tolerated  -ok to restart ASA  -Tobacco cessation  -Outpatient small bowel pillcam. Our office will call to arrange  -Ok to discharge today from GI standpoint. Please call with questions.     Sudheer Schroeder PA-C  [facility]  Cell:  [phone] Monday through Friday 2163-3987  Office: [phone]

## 2020-09-18 NOTE — PLAN OF CARE
Primary Diagnosis: Acute Anemia  Orientation: AxOx4  Aggression Stop Light: Green  VSS on RA  Mobility: ind  Pain Management: 5/10 headache, PRN tylenol given x1, pt reported decrease in pain  Diet: NPO, except meds & ice chips  Bowel/Bladder: Continent. Up to bathroom  Abnormal Lab/Assessments: Hgb Q6: 8, 7.5. Next recheck is at 0000. , 105.   Tele: NSR  Drain/Device/Wound: L PIV SL  Consults: GI  D/C Day/Goals/Place: Pending endo results

## 2020-09-18 NOTE — PROVIDER NOTIFICATION
MD Notification    Notified Person: MD    Notified Person Name: on call answering service    Notification Date/Time: 9/17/2020 4782    Notification Interaction: on call answering service    Purpose of Notification: Patient is NPO, and the blood sugar is 105. Will hold the Novolog. Do recommend holding the Levemir tonight as well?  Thanks!    Orders Received: Hold Levemir as well    Comments:

## 2020-09-18 NOTE — PROGRESS NOTES
Patient discharged at 1:10 PM to home  IV was discontinued. Pain at time of discharge was 0. Belongings returned to patient.  Discharge instructions and medications reviewed with patient.  Patient verbalized understanding and all questions were answered.   At time of discharge, patient condition was stable and left the unit  Escorted by family.

## 2020-09-18 NOTE — PLAN OF CARE
A/Ox4. VSS on RA. Tele: NSR. Denies pain. Up independently. NPO ex ice chips/meds. Continent of B/B, no BM overnight. Hgb 7.7 w/ q6hr checks. L PIV SL. EGD completed, GI following. Plan pending. Pt sleeping overnight.

## 2020-09-29 ENCOUNTER — OFFICE VISIT (OUTPATIENT)
Dept: FAMILY MEDICINE | Facility: CLINIC | Age: 70
End: 2020-09-29
Payer: COMMERCIAL

## 2020-09-29 ENCOUNTER — HOSPITAL ENCOUNTER (EMERGENCY)
Facility: CLINIC | Age: 70
Discharge: HOME | End: 2020-09-29
Attending: EMERGENCY MEDICINE | Admitting: EMERGENCY MEDICINE
Payer: COMMERCIAL

## 2020-09-29 VITALS
SYSTOLIC BLOOD PRESSURE: 114 MMHG | RESPIRATION RATE: 18 BRPM | DIASTOLIC BLOOD PRESSURE: 50 MMHG | TEMPERATURE: 98 F | HEART RATE: 98 BPM | OXYGEN SATURATION: 99 % | HEIGHT: 64 IN | BODY MASS INDEX: 25.1 KG/M2 | WEIGHT: 147 LBS

## 2020-09-29 VITALS
DIASTOLIC BLOOD PRESSURE: 63 MMHG | BODY MASS INDEX: 25.1 KG/M2 | HEIGHT: 64 IN | OXYGEN SATURATION: 100 % | WEIGHT: 147 LBS | SYSTOLIC BLOOD PRESSURE: 136 MMHG | HEART RATE: 100 BPM | TEMPERATURE: 98.5 F | RESPIRATION RATE: 20 BRPM

## 2020-09-29 DIAGNOSIS — D64.9 ACUTE ANEMIA: ICD-10-CM

## 2020-09-29 DIAGNOSIS — R53.83 OTHER FATIGUE: ICD-10-CM

## 2020-09-29 DIAGNOSIS — I34.0 MODERATE MITRAL INSUFFICIENCY: ICD-10-CM

## 2020-09-29 DIAGNOSIS — I10 ESSENTIAL HYPERTENSION: ICD-10-CM

## 2020-09-29 DIAGNOSIS — I73.9 PVD (PERIPHERAL VASCULAR DISEASE) (H): ICD-10-CM

## 2020-09-29 DIAGNOSIS — I05.0 SEVERE MITRAL STENOSIS BY PRIOR ECHOCARDIOGRAM: ICD-10-CM

## 2020-09-29 DIAGNOSIS — K92.2 GASTROINTESTINAL HEMORRHAGE, UNSPECIFIED GASTROINTESTINAL HEMORRHAGE TYPE: Primary | ICD-10-CM

## 2020-09-29 DIAGNOSIS — I35.0 MILD AORTIC STENOSIS: ICD-10-CM

## 2020-09-29 DIAGNOSIS — R53.1 WEAKNESS: ICD-10-CM

## 2020-09-29 DIAGNOSIS — I35.1 NONRHEUMATIC AORTIC VALVE INSUFFICIENCY: ICD-10-CM

## 2020-09-29 DIAGNOSIS — I73.9 CLAUDICATION OF LEFT LOWER EXTREMITY (H): ICD-10-CM

## 2020-09-29 DIAGNOSIS — K92.1 GASTROINTESTINAL HEMORRHAGE WITH MELENA: ICD-10-CM

## 2020-09-29 DIAGNOSIS — D62 ANEMIA DUE TO BLOOD LOSS, ACUTE: ICD-10-CM

## 2020-09-29 LAB
ABO + RH BLD: NORMAL
ABO + RH BLD: NORMAL
ANION GAP SERPL CALCULATED.3IONS-SCNC: 5 MMOL/L (ref 3–14)
ANION GAP SERPL CALCULATED.3IONS-SCNC: 9 MMOL/L (ref 3–14)
APTT PPP: 30 SEC (ref 22–37)
BASOPHILS # BLD AUTO: 0 10E9/L (ref 0–0.2)
BASOPHILS NFR BLD AUTO: 0.2 %
BLD GP AB SCN SERPL QL: NORMAL
BLOOD BANK CMNT PATIENT-IMP: NORMAL
BUN SERPL-MCNC: 13 MG/DL (ref 7–30)
BUN SERPL-MCNC: 14 MG/DL (ref 7–30)
CALCIUM SERPL-MCNC: 8.5 MG/DL (ref 8.5–10.1)
CALCIUM SERPL-MCNC: 8.5 MG/DL (ref 8.5–10.1)
CHLORIDE SERPL-SCNC: 108 MMOL/L (ref 94–109)
CHLORIDE SERPL-SCNC: 109 MMOL/L (ref 94–109)
CO2 SERPL-SCNC: 23 MMOL/L (ref 20–32)
CO2 SERPL-SCNC: 26 MMOL/L (ref 20–32)
CREAT SERPL-MCNC: 0.59 MG/DL (ref 0.52–1.04)
CREAT SERPL-MCNC: 0.66 MG/DL (ref 0.52–1.04)
DIFFERENTIAL METHOD BLD: ABNORMAL
EOSINOPHIL # BLD AUTO: 0.2 10E9/L (ref 0–0.7)
EOSINOPHIL NFR BLD AUTO: 1.8 %
ERYTHROCYTE [DISTWIDTH] IN BLOOD BY AUTOMATED COUNT: 17.1 % (ref 10–15)
ERYTHROCYTE [DISTWIDTH] IN BLOOD BY AUTOMATED COUNT: 17.4 % (ref 10–15)
GFR SERPL CREATININE-BSD FRML MDRD: >90 ML/MIN/{1.73_M2}
GFR SERPL CREATININE-BSD FRML MDRD: >90 ML/MIN/{1.73_M2}
GLUCOSE SERPL-MCNC: 102 MG/DL (ref 70–99)
GLUCOSE SERPL-MCNC: 114 MG/DL (ref 70–99)
HCT VFR BLD AUTO: 24.1 % (ref 35–47)
HCT VFR BLD AUTO: 25.2 % (ref 35–47)
HEMOCCULT STL QL: POSITIVE
HGB BLD-MCNC: 7 G/DL (ref 11.7–15.7)
HGB BLD-MCNC: 7.5 G/DL (ref 11.7–15.7)
IMM GRANULOCYTES # BLD: 0 10E9/L (ref 0–0.4)
IMM GRANULOCYTES NFR BLD: 0.3 %
INR PPP: 1.13 (ref 0.86–1.14)
LYMPHOCYTES # BLD AUTO: 1.2 10E9/L (ref 0.8–5.3)
LYMPHOCYTES NFR BLD AUTO: 12.6 %
MCH RBC QN AUTO: 25.1 PG (ref 26.5–33)
MCH RBC QN AUTO: 25.2 PG (ref 26.5–33)
MCHC RBC AUTO-ENTMCNC: 29 G/DL (ref 31.5–36.5)
MCHC RBC AUTO-ENTMCNC: 29.8 G/DL (ref 31.5–36.5)
MCV RBC AUTO: 85 FL (ref 78–100)
MCV RBC AUTO: 86 FL (ref 78–100)
MONOCYTES # BLD AUTO: 0.9 10E9/L (ref 0–1.3)
MONOCYTES NFR BLD AUTO: 8.8 %
NEUTROPHILS # BLD AUTO: 7.3 10E9/L (ref 1.6–8.3)
NEUTROPHILS NFR BLD AUTO: 76.3 %
NRBC # BLD AUTO: 0 10*3/UL
NRBC BLD AUTO-RTO: 0 /100
PLATELET # BLD AUTO: 472 10E9/L (ref 150–450)
PLATELET # BLD AUTO: 515 10E9/L (ref 150–450)
POTASSIUM SERPL-SCNC: 3.9 MMOL/L (ref 3.4–5.3)
POTASSIUM SERPL-SCNC: 4 MMOL/L (ref 3.4–5.3)
RBC # BLD AUTO: 2.79 10E12/L (ref 3.8–5.2)
RBC # BLD AUTO: 2.98 10E12/L (ref 3.8–5.2)
SODIUM SERPL-SCNC: 139 MMOL/L (ref 133–144)
SODIUM SERPL-SCNC: 141 MMOL/L (ref 133–144)
SPECIMEN EXP DATE BLD: NORMAL
WBC # BLD AUTO: 12.1 10E9/L (ref 4–11)
WBC # BLD AUTO: 9.6 10E9/L (ref 4–11)

## 2020-09-29 PROCEDURE — 85027 COMPLETE CBC AUTOMATED: CPT | Performed by: FAMILY MEDICINE

## 2020-09-29 PROCEDURE — 85730 THROMBOPLASTIN TIME PARTIAL: CPT | Performed by: EMERGENCY MEDICINE

## 2020-09-29 PROCEDURE — 86900 BLOOD TYPING SEROLOGIC ABO: CPT | Performed by: EMERGENCY MEDICINE

## 2020-09-29 PROCEDURE — 80048 BASIC METABOLIC PNL TOTAL CA: CPT | Performed by: EMERGENCY MEDICINE

## 2020-09-29 PROCEDURE — 80048 BASIC METABOLIC PNL TOTAL CA: CPT | Performed by: FAMILY MEDICINE

## 2020-09-29 PROCEDURE — 82272 OCCULT BLD FECES 1-3 TESTS: CPT | Performed by: EMERGENCY MEDICINE

## 2020-09-29 PROCEDURE — 99283 EMERGENCY DEPT VISIT LOW MDM: CPT | Mod: 25

## 2020-09-29 PROCEDURE — 85025 COMPLETE CBC W/AUTO DIFF WBC: CPT | Performed by: EMERGENCY MEDICINE

## 2020-09-29 PROCEDURE — 36415 COLL VENOUS BLD VENIPUNCTURE: CPT | Performed by: FAMILY MEDICINE

## 2020-09-29 PROCEDURE — 86850 RBC ANTIBODY SCREEN: CPT | Performed by: EMERGENCY MEDICINE

## 2020-09-29 PROCEDURE — 85610 PROTHROMBIN TIME: CPT | Performed by: EMERGENCY MEDICINE

## 2020-09-29 PROCEDURE — 25800030 ZZH RX IP 258 OP 636: Performed by: EMERGENCY MEDICINE

## 2020-09-29 PROCEDURE — 86901 BLOOD TYPING SEROLOGIC RH(D): CPT | Performed by: EMERGENCY MEDICINE

## 2020-09-29 PROCEDURE — 99215 OFFICE O/P EST HI 40 MIN: CPT | Performed by: FAMILY MEDICINE

## 2020-09-29 PROCEDURE — 96360 HYDRATION IV INFUSION INIT: CPT

## 2020-09-29 RX ORDER — PNEUMOCOCCAL 13-VALENT CONJUGATE VACCINE 2.2; 2.2; 2.2; 2.2; 2.2; 4.4; 2.2; 2.2; 2.2; 2.2; 2.2; 2.2; 2.2 UG/.5ML; UG/.5ML; UG/.5ML; UG/.5ML; UG/.5ML; UG/.5ML; UG/.5ML; UG/.5ML; UG/.5ML; UG/.5ML; UG/.5ML; UG/.5ML; UG/.5ML
INJECTION, SUSPENSION INTRAMUSCULAR
COMMUNITY
Start: 2020-08-25 | End: 2020-10-08

## 2020-09-29 RX ORDER — TETANUS TOXOID, REDUCED DIPHTHERIA TOXOID AND ACELLULAR PERTUSSIS VACCINE, ADSORBED 5; 2.5; 8; 8; 2.5 [IU]/.5ML; [IU]/.5ML; UG/.5ML; UG/.5ML; UG/.5ML
SUSPENSION INTRAMUSCULAR
COMMUNITY
Start: 2020-08-25 | End: 2020-10-08

## 2020-09-29 RX ADMIN — SODIUM CHLORIDE 500 ML: 9 INJECTION, SOLUTION INTRAVENOUS at 18:04

## 2020-09-29 ASSESSMENT — ENCOUNTER SYMPTOMS
BLOOD IN STOOL: 0
COUGH: 0
ROS GI COMMENTS: BLACK STOOL
CONSTIPATION: 1
SHORTNESS OF BREATH: 1
FEVER: 0
FATIGUE: 1
WEAKNESS: 1
VOMITING: 0

## 2020-09-29 ASSESSMENT — MIFFLIN-ST. JEOR
SCORE: 1181.79
SCORE: 1181.79

## 2020-09-29 ASSESSMENT — PAIN SCALES - GENERAL: PAINLEVEL: NO PAIN (0)

## 2020-09-29 NOTE — PROGRESS NOTES
Subjective     Rajani Guo is a 69 year old female who presents to clinic today for the following health issues:    HPI   This is a 70 YO white Female here for a follow up From hospital after a GI Bleed  She feels weak and has sob with activity  Had melena x 1 but also on Iron tablets  No chest pain  Had 1 Unit Blood Transfusion in Hospital  Does not feel any better      Hospital Follow-up Visit:    Hospital/Nursing Home/IP Rehab Facility: [facility]  Date of Admission: 9/16/20  Date of Discharge: 9/18/20  Reason(s) for Admission: GI bleeding    Acute anemia likely secondary to GI bleed  History of recurrent GI bleed with history of duodenal ulcer  History of hypertension  Hyperlipidemia  Type 2 diabetes mellitus  Prior history of atrial occlusion of left lower extremity status post L SFA angioplasty with lysis in March 2019  Current tobacco use  Peripheral arterial disease  Mitral stenosis  Aortic stenosis     Was your hospitalization related to COVID-19? No   Problems taking medications regularly:  None  Medication changes since discharge: Stopped Lisinopril  Problems adhering to non-medication therapy:  None    Summary of hospitalization:  [facility] discharge summary reviewed  Diagnostic Tests/Treatments reviewed.  Follow up needed: essence-Pt has Not made any appointment   Other Healthcare Providers Involved in Patient s Care:         None  Update since discharge: feels weak  No sob  No chest pain Post Discharge Medication Reconciliation: discharge medications reconciled, continue medications without change.  Plan of care communicated with patient        Pt has Not made any appointment as recommended   Past medical history, family history, medications and social history reviewed today and updated in EPIC.    Her Blood Pressure is stable   She is not on Lisinopril  Blood sugars are in the range of 140-150-Pt has Known Diabetes  No Hypoglycemia  Pt is taking Iron tablets  Black stool could  "also be due to Iron-but her hemoglobin has alao decreased    Review of Systems   CONSTITUTIONAL:NEGATIVE for fever, chills, change in weight-has fatigue/weakness  ENT/MOUTH: NEGATIVE for ear, mouth and throat problems  RESP: NEGATIVE for significant cough or SOB with activity  CV: NEGATIVE for chest pain, palpitations or peripheral edema  GI: had melena x 1 since discharge  No abdominal pain  MUSCULOSKELETAL: NEGATIVE for significant arthralgias or myalgia  PSYCHIATRIC: NEGATIVE for changes in mood or affect  ROS otherwise negative      Objective    /50   Pulse 98   Temp 98  F (36.7  C) (Oral)   Resp 18   Ht 1.626 m (5' 4\")   Wt 66.7 kg (147 lb)   SpO2 99%   BMI 25.23 kg/m    Body mass index is 25.23 kg/m .  Physical Exam   GENERAL: alert / pale  EYES: Eyes grossly normal to inspection, PERRL and conjunctivae and sclerae normal  NECK: no adenopathy, no asymmetry, masses, or scars and thyroid normal to palpation  RESP: lungs clear to auscultation - no rales, rhonchi or wheezes  CV: regular rate and rhythm, normal S1 S2,  Mid diastolic murmur at apex with a early diastolic Murmur LSB,, no peripheral edema and peripheral pulses strong  ABDOMEN: soft, nontender, no hepatosplenomegaly, no masses and bowel sounds normal  MS: no gross musculoskeletal defects noted, no edema  SKIN: no suspicious lesions or rashes  NEURO: Normal strength and tone, mentation intact and speech normal  PSYCH: mentation appears normal    Results for orders placed or performed in visit on 09/29/20 (from the past 24 hour(s))   CBC with platelets   Result Value Ref Range    WBC 12.1 (H) 4.0 - 11.0 10e9/L    RBC Count 2.79 (L) 3.8 - 5.2 10e12/L    Hemoglobin 7.0 (LL) 11.7 - 15.7 g/dL    Hematocrit 24.1 (L) 35.0 - 47.0 %    MCV 86 78 - 100 fl    MCH 25.1 (L) 26.5 - 33.0 pg    MCHC 29.0 (L) 31.5 - 36.5 g/dL    RDW 17.4 (H) 10.0 - 15.0 %    Platelet Count 472 (H) 150 - 450 10e9/L           Assessment & Plan     Rajani was seen today for " hospital f/u.    Diagnoses and all orders for this visit:    Gastrointestinal hemorrhage, unspecified gastrointestinal hemorrhage type  -     CBC with platelets  -     Basic metabolic panel  -     GASTROENTEROLOGY ADULT REF CONSULT ONLY; Future    Acute anemia    Severe mitral stenosis by prior echocardiogram  -     Echocardiogram Complete; Future  -     CARDIOLOGY EVAL ADULT REFERRAL; Future    Claudication of left lower extremity (H)    Moderate mitral insufficiency    Nonrheumatic aortic valve insufficiency  Comments:  moderate      Mild aortic stenosis    Essential hypertension    PVD (peripheral vascular disease) (H)    Other fatigue    Weakness       Pt has been advised to go back to the Hospital  She needs Blood Transfusion and Further evaluation for ongoing melena vs Black stool from taking Iron tablets  However her hemoglobin has Decreased  Advised ambulance which she declined  She says her daughter will Drive her  I have also recommended that she needs to See Cardiologist when she gets Discharged for her Valvular issues  Also advised  a echocardiogram  Pt will GO TO ER Today  Tamiko Coley MD  [facility]

## 2020-09-29 NOTE — PATIENT INSTRUCTIONS
Please go TO ER now as You do need Blood Transfusion and further evaluation    Please make appointment  for echocardiogram  Please make appointment with gastroenterologist ASAP  Please make appointment with Cardiologist for Heart valve Problems-You have severe Mitral stenosis and other valve Problems  Tamiko Coley MD  '

## 2020-09-29 NOTE — ED PROVIDER NOTES
History     Chief Complaint:  Low Hemoglobin     The history is provided by the patient and medical records.     Rajani Guo is a 69 year old female with a history of PAD, DM, and HTN who presents for evaluation of low hemoglobin and weakness. Rajani was admitted to this hospital 9/16/20 to 9/18/20 for anemia presumed to be related to a GI bleed. She had EGD, impression as noted below, with plan to follow up with GI for a Pill Cam. She is on aspirin but has stopped her previous Eliquis.    Upon discharge, Rajani felt generally well for a few days, but over the last week she has had significant weakness and fatigue. She has felt constipated with a hard, black bowel movement, without hematochezia, yesterday. This constipation causes some mild abdominal discomfort.     She presented to her primary care this morning due to her weakness. There, she had labs and was referred to the ER. Notably, she also has aortic stenosis and admits this could also be contribution to her fatigue as exertion does cause shortness of breath. She denies fever, cough, emesis, or leg swelling.     09/18/20 (At Discharge):  CBC: WBC 7.6, HGB 7.7 (L),      09/29/20 (Today as Outpatient):  CBC: WBC 12.1 (H), HGB 7.0 (LL),  (H)     9/17/20 EGD Impression:  Normal esophagus.   Normal stomach.   Normal examined duodenum.     8/13/2019 Echocardigram Impression:   1. Global and regional left ventricular function is normal with an EF of 60-  65%.  2. Right ventricular function, chamber size, wall motion, and thickness are  normal.  3. Moderate to severe mitral annular calcification is present. There is  moderate to severe mitral valve stenosis with mean gradient of 14 mm Hg at 93  bpm. The gradient is likely exaggerated by AI.  4. Moderate mitral insufficiency is present.  5. Moderate aortic insufficiency and mild aortic stenosis are present, mean  gradient 14mmHg, peak velocity 2.4m/s, KRISTOFER 1.6cm2.  6. Estimated pulmonary artery systolic  pressure is 51 mmHg plus right atrial  pressure suggestive of moderate (pulmonary artery systolic pressure 50-75mmHg)  pulmonary hypertension.  7. IVC diameter >2.1 cm collapsing <50% with sniff suggests a high RA pressure  estimated at 15 mmHg or greater.    Allergies:  Indomethacin  Tramadol     Medications:   Fosamax   Aspirin 81 mg   Lipitor  Zyban  Neurontin  Insulin pen  Lisinopril  Metformin  Ditropan  Desyrel     Past Medical History:    Osteoporosis age related  Clot   Constipation  Hypertension   Hyperlipidemia   Insomnia  Tobacco use disorder  Type 2 diabetes mellitus  Peripheral vascular disease  Claudication of left lower extremity  Anemia  GI bleed   Cardiomyopathies      Past Surgical History:    EGD  IR angiogram through catheter follow up  IR lower extremity angiogram left  Right knee torn meniscus surgery  1 ovary removed  Release carpal tunnel  Release trigger finger bilateral  Rotator cuff repair, plate placement  Partial thyroidectomy      Family History:    Diabetes  Lung cancer  Breast cancer     Social History:  Smoking status: Current every day Smoker  Smokeless Tobacco: Never Used  Alcohol use: yes  Drug use: no  PCP: Tamiko Coley  Marital Status: Single  Presents to the ED with her daughter    Review of Systems   Constitutional: Positive for fatigue. Negative for fever.   Respiratory: Positive for shortness of breath. Negative for cough.    Cardiovascular: Negative for leg swelling.   Gastrointestinal: Positive for abdominal pain (mild discomfort she relates to constipation) and constipation. Negative for blood in stool and vomiting.        Black stool   Neurological: Positive for weakness.   All other systems reviewed and are negative.    Physical Exam     Patient Vitals for the past 24 hrs:   BP Temp Temp src Pulse Resp SpO2 Height Weight   09/29/20 1925 136/63 -- -- 100 -- 100 % -- --   09/29/20 1900 -- -- -- -- -- 98 % -- --   09/29/20 1830 -- -- -- -- -- 100 % -- --   09/29/20 1800 --  "-- -- -- -- 100 % -- --   09/29/20 1758 -- 98.5  F (36.9  C) Oral -- -- -- -- --   09/29/20 1735 124/51 -- -- 98 20 97 % 1.626 m (5' 4\") 66.7 kg (147 lb)      Physical Exam  General: Well-developed and well-nourished. Well appearing elderly  woman. Cooperative.  Head:  Atraumatic.  Eyes:  Conjunctivae, lids, and sclerae are normal.  Neck:  Supple. Normal range of motion.  CV:  Regular rate and rhythm. Systolic murmur without rubs or gallops detected.  Resp:  No respiratory distress. Clear to auscultation bilaterally without decreased breath sounds, wheezing, rales, or rhonchi.  GI:  Soft. Non-distended. Non-tender.   Rectal: Formed stool in the rectal vault that is melanotic in appearance.  No perianal masses, fissures, or tenderness.   MS:  Normal ROM.   Skin:  Warm. Non-diaphoretic. No pallor.  Neuro:  Awake. A&Ox3. Normal strength.  Psych: Normal mood and affect. Normal speech.  Vitals reviewed.    Emergency Department Course     Laboratory:  Laboratory findings were communicated with the patient who voiced understanding of the findings.    CBC: WBC: 9.6, HGB: 7.5 (L) , PLT: 515 (H)   BMP: Glucose 102 (H)o/w WNL (Creatinine: 0.66)  INR: 1.13  PTT: 30     ABO: A Rh: neg AntiB: neg   Occult blood stool: Positive  (A)    Interventions:   1804  mL IV     Emergency Department Course:    1731 Nursing notes and vitals reviewed.    1745 I performed an exam of the patient as documented above.     1750 I performed a rectal exam of the patient as documented above it the presence of a female chaperone.      1750 The patient provided a stool sample for laboratory testing as documented above.     1750 IV was inserted and blood was drawn for laboratory testing, results above.    1900 Patient rechecked and updated.  Patient prefers discharge over admission.     1900 Findings and plan explained to the patient. Patient discharged home with instructions regarding supportive care, medications, and reasons to return. " The importance of close follow-up was reviewed.    Impression & Plan     Medical Decision Making:  Rajani is a 69-year-old female who was admitted to this hospital from 9/16/20 to 9/18/20 for suspected GI bleed with associated anemia.  She had a negative endoscopy and was discharged with plan to have a PillCam.  Her hemoglobin at time of discharge was 7.7.  She states she felt well for several days but over the last week or so has had lots of weakness and fatigue.  When she saw her primary care provider and reported persistent melanotic stools, she was sent back to the emergency department.  Hemoglobin as an outpatient was 7.0.  She denies other change in symptoms and appears quite well on exam.  She has no focal findings aside from rectal exam with fairly melanotic stool that is hemoccult positive. She has no abdominal tenderness to warrant abdominal imaging. Fortunately, her hemoglobin today is 7.5.  This is grossly unchanged from when she was discharged 11 days ago at 7.7.  Coagulation studies are reassuring as are electrolytes and kidney function.  It should be noted that BUN is normal despite evidence that she has an upper GI bleed.  She was given IV fluids while undergoing her work-up but did not require further interventions.      I discussed with Rajani her laboratory values and evidence that she likely has a small or slow GI bleed.  I discussed with her it is reasonable to admit for further work-up versus discharge and arrange the PillCam as previously scheduled.  She prefers discharge.  She understands the importance of arranging the PillCam and continuing her work-up with GI.  I have also encouraged her to see her regular doctor for repeat hemoglobin in the next 2 to 3 days to ensure it is remaining stable between 7 and 8.  She is on aspirin which I believe is for peripheral artery disease requiring angioplasty.  I have recommended she call vascular medicine to see if the aspirin can be held or if it is  imperative she continue this in the setting of anemia. She has aortic stenosis which could be contributing to her fatigue as well and she plans to see cardiology, though I suspect the anemia is the greater contributor. She agrees to this plan for follow-up and close outpatient monitoring with continued work-up although she understands a low threshold for return if she has worsening of symptoms, her hemoglobin drops on recheck with her primary care provider, or if she has any new or concerning symptoms.  All questions answered.  Prefers discharge to admission.    Diagnosis:    ICD-10-CM    1. Anemia due to blood loss, acute  D62    2. Gastrointestinal hemorrhage with melena  K92.1         Disposition:  Discharged home.    Scribe Disclosure:  I, Matt Marcos, am serving as a scribe at 5:39 PM on 9/29/2020 to document services personally performed by Sloane Price MD based on my observations and the provider's statements to me.     Sloane Calderón MD  09/30/20 [id number]

## 2020-09-29 NOTE — ED AVS SNAPSHOT
Emergency Department  [address] [place] [id number]  Phone:  [phone]  Fax:  [phone]                                    Rajani Guo   MRN: [id number]    Department:   Emergency Department   Date of Visit:  9/29/2020           After Visit Summary Signature Page    I have received my discharge instructions, and my questions have been answered. I have discussed any challenges I see with this plan with the nurse or doctor.    ..........................................................................................................................................  Patient/Patient Representative Signature      ..........................................................................................................................................  Patient Representative Print Name and Relationship to Patient    ..................................................               ................................................  Date                                   Time    ..........................................................................................................................................  Reviewed by Signature/Title    ...................................................              ..............................................  Date                                               Time          22EPIC Rev 08/18

## 2020-09-30 ENCOUNTER — TELEPHONE (OUTPATIENT)
Dept: CARDIOLOGY | Facility: CLINIC | Age: 70
End: 2020-09-30

## 2020-09-30 ASSESSMENT — ENCOUNTER SYMPTOMS: ABDOMINAL PAIN: 1

## 2020-09-30 NOTE — DISCHARGE INSTRUCTIONS
Call Dr. Barajas to ask if you can stop the aspirin.  Call GI to arrange pillcam.  Call your regular doctor for repeat hemoglobin in 2-3 days (before the weekend).  If your symptoms become unbearable or worsen or if you have follow-up with your doctor and your hemoglobin drops again then you need to return to the emergency department.  Monitor closely for blood in your stools, abdominal pain, fever, or any other new or concerning symptoms which would require your to return to the emergency department.

## 2020-09-30 NOTE — TELEPHONE ENCOUNTER
Work que Cardiology referral noted , then   Left message to return clinic call to .  Huy Arceo L.P.N.

## 2020-10-06 ENCOUNTER — TRANSFERRED RECORDS (OUTPATIENT)
Dept: HEALTH INFORMATION MANAGEMENT | Facility: CLINIC | Age: 70
End: 2020-10-06

## 2020-10-08 ENCOUNTER — APPOINTMENT (OUTPATIENT)
Dept: GENERAL RADIOLOGY | Facility: CLINIC | Age: 70
End: 2020-10-08
Attending: NURSE PRACTITIONER
Payer: COMMERCIAL

## 2020-10-08 ENCOUNTER — NURSE TRIAGE (OUTPATIENT)
Dept: FAMILY MEDICINE | Facility: CLINIC | Age: 70
End: 2020-10-08

## 2020-10-08 ENCOUNTER — HOSPITAL ENCOUNTER (EMERGENCY)
Facility: CLINIC | Age: 70
Discharge: HOME | End: 2020-10-08
Payer: COMMERCIAL

## 2020-10-08 ENCOUNTER — HOSPITAL ENCOUNTER (OUTPATIENT)
Facility: CLINIC | Age: 70
Setting detail: OBSERVATION
Discharge: HOME | End: 2020-10-09
Attending: NURSE PRACTITIONER | Admitting: INTERNAL MEDICINE
Payer: COMMERCIAL

## 2020-10-08 VITALS
DIASTOLIC BLOOD PRESSURE: 54 MMHG | BODY MASS INDEX: 24.75 KG/M2 | HEIGHT: 64 IN | SYSTOLIC BLOOD PRESSURE: 127 MMHG | TEMPERATURE: 97.9 F | WEIGHT: 145 LBS | RESPIRATION RATE: 18 BRPM | HEART RATE: 92 BPM

## 2020-10-08 DIAGNOSIS — R60.9 EDEMA: ICD-10-CM

## 2020-10-08 DIAGNOSIS — D64.9 ANEMIA: ICD-10-CM

## 2020-10-08 DIAGNOSIS — D50.0 IRON DEFICIENCY ANEMIA DUE TO CHRONIC BLOOD LOSS: ICD-10-CM

## 2020-10-08 DIAGNOSIS — K59.00 CONSTIPATION, UNSPECIFIED CONSTIPATION TYPE: Primary | ICD-10-CM

## 2020-10-08 DIAGNOSIS — R79.89 ELEVATED BRAIN NATRIURETIC PEPTIDE (BNP) LEVEL: ICD-10-CM

## 2020-10-08 DIAGNOSIS — F17.200 SMOKER: ICD-10-CM

## 2020-10-08 PROBLEM — I38 HEART FAILURE DUE TO VALVULAR DISEASE (H): Status: ACTIVE | Noted: 2020-10-08

## 2020-10-08 PROBLEM — E11.9 TYPE 2 DIABETES MELLITUS WITHOUT COMPLICATION, WITH LONG-TERM CURRENT USE OF INSULIN (H): Status: RESOLVED | Noted: 2018-01-18 | Resolved: 2020-10-08

## 2020-10-08 PROBLEM — R53.83 FATIGUE: Status: ACTIVE | Noted: 2020-10-08

## 2020-10-08 PROBLEM — I50.9 HEART FAILURE DUE TO VALVULAR DISEASE (H): Status: ACTIVE | Noted: 2020-10-08

## 2020-10-08 PROBLEM — D50.9 ANEMIA, IRON DEFICIENCY: Status: ACTIVE | Noted: 2020-10-08

## 2020-10-08 PROBLEM — Z79.4 TYPE 2 DIABETES MELLITUS WITHOUT COMPLICATION, WITH LONG-TERM CURRENT USE OF INSULIN (H): Status: RESOLVED | Noted: 2018-01-18 | Resolved: 2020-10-08

## 2020-10-08 PROBLEM — Z87.19 HISTORY OF GI BLEED: Status: RESOLVED | Noted: 2020-04-08 | Resolved: 2020-10-08

## 2020-10-08 LAB
ABO + RH BLD: NORMAL
ABO + RH BLD: NORMAL
ANION GAP SERPL CALCULATED.3IONS-SCNC: 6 MMOL/L (ref 3–14)
BASOPHILS # BLD AUTO: 0 10E9/L (ref 0–0.2)
BASOPHILS NFR BLD AUTO: 0.2 %
BLD GP AB SCN SERPL QL: NORMAL
BLD PROD TYP BPU: NORMAL
BLD PROD TYP BPU: NORMAL
BLD UNIT ID BPU: 0
BLOOD BANK CMNT PATIENT-IMP: NORMAL
BLOOD PRODUCT CODE: NORMAL
BPU ID: NORMAL
BUN SERPL-MCNC: 16 MG/DL (ref 7–30)
CALCIUM SERPL-MCNC: 9.1 MG/DL (ref 8.5–10.1)
CHLORIDE SERPL-SCNC: 107 MMOL/L (ref 94–109)
CO2 SERPL-SCNC: 26 MMOL/L (ref 20–32)
CREAT SERPL-MCNC: 0.6 MG/DL (ref 0.52–1.04)
DIFFERENTIAL METHOD BLD: ABNORMAL
EOSINOPHIL # BLD AUTO: 0.1 10E9/L (ref 0–0.7)
EOSINOPHIL NFR BLD AUTO: 1 %
ERYTHROCYTE [DISTWIDTH] IN BLOOD BY AUTOMATED COUNT: 17.9 % (ref 10–15)
GFR SERPL CREATININE-BSD FRML MDRD: >90 ML/MIN/{1.73_M2}
GLUCOSE BLDC GLUCOMTR-MCNC: 188 MG/DL (ref 70–99)
GLUCOSE SERPL-MCNC: 106 MG/DL (ref 70–99)
HCT VFR BLD AUTO: 21.1 % (ref 35–47)
HGB BLD-MCNC: 6.2 G/DL (ref 11.7–15.7)
HGB BLD-MCNC: 6.8 G/DL (ref 11.7–15.7)
IMM GRANULOCYTES # BLD: 0 10E9/L (ref 0–0.4)
IMM GRANULOCYTES NFR BLD: 0.2 %
INTERPRETATION ECG - MUSE: NORMAL
IRON SATN MFR SERPL: 4 % (ref 15–46)
IRON SERPL-MCNC: 14 UG/DL (ref 35–180)
LYMPHOCYTES # BLD AUTO: 1.1 10E9/L (ref 0.8–5.3)
LYMPHOCYTES NFR BLD AUTO: 12.9 %
MCH RBC QN AUTO: 23.4 PG (ref 26.5–33)
MCHC RBC AUTO-ENTMCNC: 29.4 G/DL (ref 31.5–36.5)
MCV RBC AUTO: 80 FL (ref 78–100)
MONOCYTES # BLD AUTO: 0.8 10E9/L (ref 0–1.3)
MONOCYTES NFR BLD AUTO: 9.6 %
NEUTROPHILS # BLD AUTO: 6.6 10E9/L (ref 1.6–8.3)
NEUTROPHILS NFR BLD AUTO: 76.1 %
NRBC # BLD AUTO: 0 10*3/UL
NRBC BLD AUTO-RTO: 0 /100
NT-PROBNP SERPL-MCNC: 2253 PG/ML (ref 0–900)
NUM BPU REQUESTED: 2
PLATELET # BLD AUTO: 481 10E9/L (ref 150–450)
POTASSIUM SERPL-SCNC: 4.1 MMOL/L (ref 3.4–5.3)
RBC # BLD AUTO: 2.65 10E12/L (ref 3.8–5.2)
SODIUM SERPL-SCNC: 139 MMOL/L (ref 133–144)
SPECIMEN EXP DATE BLD: NORMAL
TIBC SERPL-MCNC: 392 UG/DL (ref 240–430)
TRANSFUSION STATUS PATIENT QL: NORMAL
TRANSFUSION STATUS PATIENT QL: NORMAL
VIT B12 SERPL-MCNC: 457 PG/ML (ref 193–986)
WBC # BLD AUTO: 8.7 10E9/L (ref 4–11)

## 2020-10-08 PROCEDURE — 86923 COMPATIBILITY TEST ELECTRIC: CPT | Performed by: NURSE PRACTITIONER

## 2020-10-08 PROCEDURE — 36430 TRANSFUSION BLD/BLD COMPNT: CPT

## 2020-10-08 PROCEDURE — 99285 EMERGENCY DEPT VISIT HI MDM: CPT | Mod: 25

## 2020-10-08 PROCEDURE — 93005 ELECTROCARDIOGRAM TRACING: CPT

## 2020-10-08 PROCEDURE — 71046 X-RAY EXAM CHEST 2 VIEWS: CPT

## 2020-10-08 PROCEDURE — 999N001017 HC STATISTIC GLUCOSE BY METER IP

## 2020-10-08 PROCEDURE — 83550 IRON BINDING TEST: CPT | Performed by: INTERNAL MEDICINE

## 2020-10-08 PROCEDURE — G0378 HOSPITAL OBSERVATION PER HR: HCPCS

## 2020-10-08 PROCEDURE — 250N000011 HC RX IP 250 OP 636: Performed by: INTERNAL MEDICINE

## 2020-10-08 PROCEDURE — 82607 VITAMIN B-12: CPT | Performed by: INTERNAL MEDICINE

## 2020-10-08 PROCEDURE — P9016 RBC LEUKOCYTES REDUCED: HCPCS | Performed by: NURSE PRACTITIONER

## 2020-10-08 PROCEDURE — 250N000012 HC RX MED GY IP 250 OP 636 PS 637: Performed by: INTERNAL MEDICINE

## 2020-10-08 PROCEDURE — 86900 BLOOD TYPING SEROLOGIC ABO: CPT | Performed by: NURSE PRACTITIONER

## 2020-10-08 PROCEDURE — U0003 INFECTIOUS AGENT DETECTION BY NUCLEIC ACID (DNA OR RNA); SEVERE ACUTE RESPIRATORY SYNDROME CORONAVIRUS 2 (SARS-COV-2) (CORONAVIRUS DISEASE [COVID-19]), AMPLIFIED PROBE TECHNIQUE, MAKING USE OF HIGH THROUGHPUT TECHNOLOGIES AS DESCRIBED BY CMS-2020-01-R: HCPCS | Performed by: NURSE PRACTITIONER

## 2020-10-08 PROCEDURE — 83010 ASSAY OF HAPTOGLOBIN QUANT: CPT | Performed by: INTERNAL MEDICINE

## 2020-10-08 PROCEDURE — 96372 THER/PROPH/DIAG INJ SC/IM: CPT | Performed by: INTERNAL MEDICINE

## 2020-10-08 PROCEDURE — 99220 PR INITIAL OBSERVATION CARE,LEVEL III: CPT | Performed by: INTERNAL MEDICINE

## 2020-10-08 PROCEDURE — 250N000013 HC RX MED GY IP 250 OP 250 PS 637: Performed by: INTERNAL MEDICINE

## 2020-10-08 PROCEDURE — 36415 COLL VENOUS BLD VENIPUNCTURE: CPT | Performed by: INTERNAL MEDICINE

## 2020-10-08 PROCEDURE — 86850 RBC ANTIBODY SCREEN: CPT | Performed by: NURSE PRACTITIONER

## 2020-10-08 PROCEDURE — 80048 BASIC METABOLIC PNL TOTAL CA: CPT | Performed by: EMERGENCY MEDICINE

## 2020-10-08 PROCEDURE — 83880 ASSAY OF NATRIURETIC PEPTIDE: CPT | Performed by: EMERGENCY MEDICINE

## 2020-10-08 PROCEDURE — C9803 HOPD COVID-19 SPEC COLLECT: HCPCS

## 2020-10-08 PROCEDURE — 86901 BLOOD TYPING SEROLOGIC RH(D): CPT | Performed by: NURSE PRACTITIONER

## 2020-10-08 PROCEDURE — 85018 HEMOGLOBIN: CPT | Performed by: INTERNAL MEDICINE

## 2020-10-08 PROCEDURE — 85025 COMPLETE CBC W/AUTO DIFF WBC: CPT | Performed by: EMERGENCY MEDICINE

## 2020-10-08 PROCEDURE — 96374 THER/PROPH/DIAG INJ IV PUSH: CPT

## 2020-10-08 PROCEDURE — 83540 ASSAY OF IRON: CPT | Performed by: INTERNAL MEDICINE

## 2020-10-08 RX ORDER — OXYBUTYNIN CHLORIDE 5 MG/1
5 TABLET ORAL 2 TIMES DAILY
Status: DISCONTINUED | OUTPATIENT
Start: 2020-10-08 | End: 2020-10-09 | Stop reason: HOSPADM

## 2020-10-08 RX ORDER — ACETAMINOPHEN 650 MG/1
650 SUPPOSITORY RECTAL EVERY 4 HOURS PRN
Status: DISCONTINUED | OUTPATIENT
Start: 2020-10-08 | End: 2020-10-09 | Stop reason: HOSPADM

## 2020-10-08 RX ORDER — NICOTINE POLACRILEX 4 MG
15-30 LOZENGE BUCCAL
Status: DISCONTINUED | OUTPATIENT
Start: 2020-10-08 | End: 2020-10-09 | Stop reason: HOSPADM

## 2020-10-08 RX ORDER — BUPROPION HYDROCHLORIDE 150 MG/1
150 TABLET, FILM COATED, EXTENDED RELEASE ORAL 2 TIMES DAILY
Status: DISCONTINUED | OUTPATIENT
Start: 2020-10-08 | End: 2020-10-08

## 2020-10-08 RX ORDER — NALOXONE HYDROCHLORIDE 0.4 MG/ML
.1-.4 INJECTION, SOLUTION INTRAMUSCULAR; INTRAVENOUS; SUBCUTANEOUS
Status: DISCONTINUED | OUTPATIENT
Start: 2020-10-08 | End: 2020-10-09 | Stop reason: HOSPADM

## 2020-10-08 RX ORDER — NICOTINE 21 MG/24HR
1 PATCH, TRANSDERMAL 24 HOURS TRANSDERMAL DAILY
Status: DISCONTINUED | OUTPATIENT
Start: 2020-10-08 | End: 2020-10-09 | Stop reason: HOSPADM

## 2020-10-08 RX ORDER — ONDANSETRON 2 MG/ML
4 INJECTION INTRAMUSCULAR; INTRAVENOUS EVERY 6 HOURS PRN
Status: DISCONTINUED | OUTPATIENT
Start: 2020-10-08 | End: 2020-10-09 | Stop reason: HOSPADM

## 2020-10-08 RX ORDER — PANTOPRAZOLE SODIUM 40 MG/1
40 TABLET, DELAYED RELEASE ORAL DAILY
Status: DISCONTINUED | OUTPATIENT
Start: 2020-10-09 | End: 2020-10-09 | Stop reason: HOSPADM

## 2020-10-08 RX ORDER — DEXTROSE MONOHYDRATE 25 G/50ML
25-50 INJECTION, SOLUTION INTRAVENOUS
Status: DISCONTINUED | OUTPATIENT
Start: 2020-10-08 | End: 2020-10-09 | Stop reason: HOSPADM

## 2020-10-08 RX ORDER — ONDANSETRON 4 MG/1
4 TABLET, ORALLY DISINTEGRATING ORAL EVERY 6 HOURS PRN
Status: DISCONTINUED | OUTPATIENT
Start: 2020-10-08 | End: 2020-10-09 | Stop reason: HOSPADM

## 2020-10-08 RX ORDER — FUROSEMIDE 10 MG/ML
20 INJECTION INTRAMUSCULAR; INTRAVENOUS ONCE
Status: COMPLETED | OUTPATIENT
Start: 2020-10-08 | End: 2020-10-08

## 2020-10-08 RX ORDER — TRAZODONE HYDROCHLORIDE 50 MG/1
50-100 TABLET, FILM COATED ORAL
Status: DISCONTINUED | OUTPATIENT
Start: 2020-10-08 | End: 2020-10-09 | Stop reason: HOSPADM

## 2020-10-08 RX ORDER — ATORVASTATIN CALCIUM 40 MG/1
40 TABLET, FILM COATED ORAL DAILY
Status: DISCONTINUED | OUTPATIENT
Start: 2020-10-09 | End: 2020-10-09 | Stop reason: HOSPADM

## 2020-10-08 RX ORDER — BUPROPION HYDROCHLORIDE 150 MG/1
150 TABLET, EXTENDED RELEASE ORAL 2 TIMES DAILY
Status: DISCONTINUED | OUTPATIENT
Start: 2020-10-08 | End: 2020-10-09 | Stop reason: HOSPADM

## 2020-10-08 RX ORDER — ACETAMINOPHEN 325 MG/1
650 TABLET ORAL EVERY 4 HOURS PRN
Status: DISCONTINUED | OUTPATIENT
Start: 2020-10-08 | End: 2020-10-09 | Stop reason: HOSPADM

## 2020-10-08 RX ORDER — AMOXICILLIN 250 MG
2 CAPSULE ORAL 2 TIMES DAILY
Status: DISCONTINUED | OUTPATIENT
Start: 2020-10-08 | End: 2020-10-09 | Stop reason: HOSPADM

## 2020-10-08 RX ORDER — POLYETHYLENE GLYCOL 3350 17 G/17G
17 POWDER, FOR SOLUTION ORAL 2 TIMES DAILY PRN
Status: DISCONTINUED | OUTPATIENT
Start: 2020-10-08 | End: 2020-10-09 | Stop reason: HOSPADM

## 2020-10-08 RX ORDER — GABAPENTIN 300 MG/1
300 CAPSULE ORAL AT BEDTIME
Status: DISCONTINUED | OUTPATIENT
Start: 2020-10-08 | End: 2020-10-09 | Stop reason: HOSPADM

## 2020-10-08 RX ADMIN — BUPROPION HYDROCHLORIDE 150 MG: 150 TABLET, EXTENDED RELEASE ORAL at 21:59

## 2020-10-08 RX ADMIN — INSULIN DETEMIR 5 UNITS: 100 INJECTION, SOLUTION SUBCUTANEOUS at 21:55

## 2020-10-08 RX ADMIN — OXYBUTYNIN CHLORIDE 5 MG: 5 TABLET ORAL at 21:39

## 2020-10-08 RX ADMIN — NICOTINE 1 PATCH: 21 PATCH, EXTENDED RELEASE TRANSDERMAL at 19:14

## 2020-10-08 RX ADMIN — FUROSEMIDE 20 MG: 10 INJECTION, SOLUTION INTRAVENOUS at 20:08

## 2020-10-08 RX ADMIN — GABAPENTIN 300 MG: 300 CAPSULE ORAL at 21:40

## 2020-10-08 RX ADMIN — CALCIUM CARBONATE 600 MG (1,500 MG)-VITAMIN D3 400 UNIT TABLET 1 TABLET: at 21:40

## 2020-10-08 SDOH — HEALTH STABILITY: MENTAL HEALTH: HOW MANY STANDARD DRINKS CONTAINING ALCOHOL DO YOU HAVE ON A TYPICAL DAY?: NOT ASKED

## 2020-10-08 SDOH — HEALTH STABILITY: MENTAL HEALTH: HOW OFTEN DO YOU HAVE A DRINK CONTAINING ALCOHOL?: NOT ASKED

## 2020-10-08 SDOH — ECONOMIC STABILITY: INCOME INSECURITY: HOW HARD IS IT FOR YOU TO PAY FOR THE VERY BASICS LIKE FOOD, HOUSING, MEDICAL CARE, AND HEATING?: NOT ASKED

## 2020-10-08 SDOH — ECONOMIC STABILITY: FOOD INSECURITY: WITHIN THE PAST 12 MONTHS, THE FOOD YOU BOUGHT JUST DIDN'T LAST AND YOU DIDN'T HAVE MONEY TO GET MORE.: NOT ASKED

## 2020-10-08 SDOH — ECONOMIC STABILITY: FOOD INSECURITY: WITHIN THE PAST 12 MONTHS, YOU WORRIED THAT YOUR FOOD WOULD RUN OUT BEFORE YOU GOT THE MONEY TO BUY MORE.: NOT ASKED

## 2020-10-08 SDOH — ECONOMIC STABILITY: TRANSPORTATION INSECURITY
IN THE PAST 12 MONTHS, HAS THE LACK OF TRANSPORTATION KEPT YOU FROM MEDICAL APPOINTMENTS OR FROM GETTING MEDICATIONS?: NOT ASKED

## 2020-10-08 SDOH — ECONOMIC STABILITY: TRANSPORTATION INSECURITY
IN THE PAST 12 MONTHS, HAS LACK OF TRANSPORTATION KEPT YOU FROM MEETINGS, WORK, OR FROM GETTING THINGS NEEDED FOR DAILY LIVING?: NOT ASKED

## 2020-10-08 SDOH — HEALTH STABILITY: MENTAL HEALTH: HOW OFTEN DO YOU HAVE 6 OR MORE DRINKS ON ONE OCCASION?: NOT ASKED

## 2020-10-08 SDOH — ECONOMIC STABILITY: TRANSPORTATION INSECURITY
IN THE PAST 12 MONTHS, HAS LACK OF TRANSPORTATION KEPT YOU FROM MEDICAL APPOINTMENTS OR FROM GETTING MEDICATIONS?: NOT ASKED

## 2020-10-08 SDOH — ECONOMIC STABILITY: FOOD INSECURITY: WITHIN THE PAST 12 MONTHS, YOU WORRIED THAT YOUR FOOD WOULD RUN OUT BEFORE YOU GOT MONEY TO BUY MORE.: NOT ASKED

## 2020-10-08 SDOH — ECONOMIC STABILITY: FOOD INSECURITY: HOW HARD IS IT FOR YOU TO PAY FOR THE VERY BASICS LIKE FOOD, HOUSING, MEDICAL CARE, AND HEATING?: NOT ASKED

## 2020-10-08 SDOH — HEALTH STABILITY: MENTAL HEALTH: HOW OFTEN DO YOU HAVE SIX OR MORE DRINKS ON ONE OCCASION?: NOT ASKED

## 2020-10-08 SDOH — HEALTH STABILITY: MENTAL HEALTH: HOW MANY DRINKS CONTAINING ALCOHOL DO YOU HAVE ON A TYPICAL DAY WHEN YOU ARE DRINKING?: NOT ASKED

## 2020-10-08 ASSESSMENT — MIFFLIN-ST. JEOR
SCORE: 1172.72
SCORE: 1172.72
SCORE: 1183.15

## 2020-10-08 ASSESSMENT — ENCOUNTER SYMPTOMS
SHORTNESS OF BREATH: 1
FEVER: 0
WEAKNESS: 1

## 2020-10-08 ASSESSMENT — ACTIVITIES OF DAILY LIVING (ADL): LACK_OF_TRANSPORTATION: NOT ASKED

## 2020-10-08 NOTE — ED TRIAGE NOTES
Triage Assessment & Note:    There were no vitals taken for this visit.    Patient presents with: C/o low Hgb. PT reports a hx of GI bleeding and yesterday she swallowed a pill cam. PT reports increased fatigue.     Home Treatments/Remedies: None    Febrile / Afebrile? Afebrile     Duration of C/o:  Several days     Stevan Doss RN  October 8, 2020

## 2020-10-08 NOTE — ED NOTES
Pt stating she would like to leave and be seen at another hospital. Reviewed Declination of Medical Screening Examination form with pt and signed. Reviewed risks of low hemoglobin and advised pt she can return to our ED at any time. Pt verbalized understanding.

## 2020-10-08 NOTE — H&P
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    -- Echo in AM, and cardiology consult      Severe Mitral Sten -- mean grad 14 mm hg Echo 8/13/19    Aortic insufficiency, Moderate -- Echo 8/13/19    Aortic stenosis, Mild -- Echo 8/13/19      Anemia, iron deficiency   -- transfuse 2 units PRBC's, Lasix 20 mg IV in between   -- IV Venofer   -- will check Haptoglobin (possible hemolysis 2nd to valvular disease      Moderate Pulm HTN -- Echo 8/13/19 with PAP 51 plus RAP    Active Problems:    Smoker -- still smokes 1 ppd, needs to quit   -- Nicotine patch, consider using it on discharge (tried chantix and got depressed)          Fatigue -- related to anemia      SOB (shortness of breath) -- suspect related to CHF       Plan:  As above    DVT Prophylaxis: Pneumatic Compression Devices  Code Status: Full Code    Disposition: Expected discharge in 1-2 days pending findings on Echo    Louis Islas MD  Pager: [phone]  Cell Phone:  [phone]     Primary Care Physician   Tamiko Coley    Chief Complaint   Fatigue, Short of breath    History is obtained from Patient    History of Present Illness   69 year old female with history of anemia, GI bleed, Type 2 diabetes, hypertension, and hyperlipidemia who presents for evaluation of shortness of breath weakness and found to have Hgb 6.2.  Hospitalized 9/16 to 9/18/20 with anemia, seen by [facility], EGD normal, had colonoscopy 9/4/19 which showed diverticulosis and internal hemorrhoids, and was on Eliqus but stopped, but continues on aspirin 81 mg daily.  She stopped Iron pills on 9/16/20, but still reports black bowel movement -- constipated with BM every 4-5 days.  Does not take pepto-bismal.  Turned in Pill Cam  yesterday.   9/16   Hgb 7.2    Got 1 unit PRBC's    hgb 7.7 on discharge 9/18 9/29   Hgb 7.0    Got 1 unit PRBC's     She notes this SOB at night has been present for several weeks but she had onset of bilateral lower extremity edema starting this weekend.  It is now taking her 10-15 minutes to catch her breath after walking even to the bathroom.  Her home blood pressure yesterday was 83/33.     Last Echocardiogram 8/13/13 showed:  1. Global and regional left ventricular function is normal with an EF of 60-  65%.  2. Right ventricular function, chamber size, wall motion, and thickness are  normal.  3. Moderate to severe mitral annular calcification is present. There is  moderate to severe mitral valve stenosis with mean gradient of 14 mm Hg at 93  bpm. The gradient is likely exaggerated by AI.  4. Moderate mitral insufficiency is present.  5. Moderate aortic insufficiency and mild aortic stenosis are present, mean  gradient 14mmHg, peak velocity 2.4m/s, KRISTOFER 1.6cm2.  6. Estimated pulmonary artery systolic pressure is 51 mmHg plus right atrial  pressure suggestive of moderate (pulmonary artery systolic pressure 50-75mmHg)  pulmonary hypertension.  7. IVC diameter >2.1 cm collapsing <50% with sniff suggests a high RA pressure  estimated at 15 mmHg or greater.     In ER, HR 92 with /54, O2 sat 100%, and Hgb 6.2 with MCV 80, Plt's 481, Iron 14 with TIBC 392 with 4% saturation, and BNP 2253.  CXR with small bilateral effusions.      PAST MEDICAL HISTORY    Past Medical History:   Diagnosis Date     Age-related osteoporosis without current pathological fracture 01/18/2018     Constipation      DM type 2, on Insulin 1997     DVT left leg      History of partial thyroidectomy 06/02/2018     Hyperlipidemia LDL goal <100 01/18/2018     Hypertension      Insomnia, unspecified type 01/18/2018     Tobacco use disorder         PAST SURGICAL HISTORY    Past Surgical History:   Procedure Laterality Date     COLONOSCOPY N/A  9/4/2019    Procedure: COLONOSCOPY;  Surgeon: Marie Todd MD;  Location:  GI     ESOPHAGOSCOPY, GASTROSCOPY, DUODENOSCOPY (EGD), COMBINED N/A 9/4/2019    Procedure: ESOPHAGOGASTRODUODENOSCOPY (EGD);  Surgeon: Marie Todd MD;  Location:  GI     ESOPHAGOSCOPY, GASTROSCOPY, DUODENOSCOPY (EGD), COMBINED N/A 9/17/2020    Procedure: ESOPHAGOGASTRODUODENOSCOPY (EGD);  Surgeon: Ten Ibrahim DO;  Location:  GI     IR ANGIOGRAM THROUGH CATHETER FOLLOW UP  3/5/2019     IR LOWER EXTREMITY ANGIOGRAM LEFT  3/4/2019     KNEE SURGERY Right 2013    right knee torn meniscus surgery     OVARY SURGERY  1971    1 ovary removed     RELEASE CARPAL TUNNEL Right 1988     RELEASE TRIGGER FINGER BILATERAL       SHOULDER SURGERY Left     rotator cuff repair, plate placement     THYROID SURGERY  1988    partial thyroidectomy        Prior to Admission Medications   Prior to Admission Medications   Prescriptions Last Dose Informant Patient Reported? Taking?   Melatonin 10 MG TABS tablet 10/7/2020 at hs Self Yes Yes   Sig: Take 10 mg by mouth At Bedtime   Multiple Vitamin (MULTI-VITAMINS) TABS 10/8/2020 at am Self Yes Yes   Sig: Take 1 tablet by mouth daily    acetaminophen (ACETAMINOPHEN 8 HOUR) 650 MG CR tablet prn Self Yes Yes   Sig: Take 1,300 mg by mouth every 8 hours as needed    alendronate (FOSAMAX) 70 MG tablet 10/5/2020  No Yes   Sig: TAKE 1 TABLET BY MOUTH ONE TIME PER WEEK   aspirin (ASA) 81 MG EC tablet HOLD  No No   Sig: Take 1 tablet (81 mg) by mouth daily   atorvastatin (LIPITOR) 40 MG tablet 10/8/2020 at am  No Yes   Sig: TAKE 1 TABLET BY MOUTH EVERY DAY   buPROPion (ZYBAN) 150 MG 12 hr tablet 10/8/2020 at am  No Yes   Sig: TAKE 1 TABLET BY MOUTH 2 TIMES DAILY   calcium carb 1250 mg, 500 mg [place],/vitamin D 200 unit (CALCIUM 500/D) 500-200 MG-UNIT per tablet 10/8/2020 at am Self Yes Yes   Sig: Take 1 tablet by mouth 2 times daily    ferrous sulfate (FEROSUL) 325 (65 Fe) MG tablet HOLD  Yes No    Sig: Take 325 mg by mouth 2 times daily   gabapentin (NEURONTIN) 300 MG capsule 10/7/2020 at hs  No Yes   Sig: TAKE 1 CAPSULE (300 MG) BY MOUTH ONE OR TWO TIMES DAILY   Patient taking differently: Take 300 mg by mouth At Bedtime    glucosamine-chondroitin 500-400 MG CAPS per capsule 10/8/2020 at am Self Yes Yes   Sig: Take 1 capsule by mouth 2 times daily    insulin detemir (LEVEMIR PEN) 100 UNIT/ML pen 10/7/2020 at hs  Yes Yes   Sig: Inject 5 Units Subcutaneous At Bedtime    insulin pen needle (29G X 12MM) 29G X 12MM miscellaneous   No No   Sig: Use 1 pen needles daily or as directed.   metFORMIN (GLUCOPHAGE) 1000 MG tablet 10/8/2020 at am  No Yes   Sig: TAKE 1 TABLET BY MOUTH TWICE A DAY WITH MEALS   oxybutynin (DITROPAN) 5 MG tablet 10/8/2020 at am  No Yes   Sig: TAKE 1 TABLET BY MOUTH TWICE A DAY   pantoprazole (PROTONIX) 40 MG EC tablet 10/8/2020 at am  No Yes   Sig: TAKE 1 TABLET BY MOUTH EVERY DAY   traZODone (DESYREL) 50 MG tablet 10/7/2020 at hs  No Yes   Sig: TAKE 1-2 TABLETS ( MG) BY MOUTH AT BEDTIME      Facility-Administered Medications: None     Allergies   Allergies   Allergen Reactions     Indomethacin Other (See Comments)     Dizziness and disorientation     Tramadol Nausea and Vomiting       SOCIAL HISTORY    Social History     Social History Narrative    , has one daughter who lives with her in patient's house.  Is full code.  (last updated 10/8/2020)       Social History     Tobacco Use     Smoking status: Current Every Day Smoker     Packs/day: 1.00     Years: 52.00     Pack years: 52.00     Smokeless tobacco: Never Used   Substance Use Topics     Alcohol use: Yes     Comment: 2 glasses of wine a week     Drug use: No        FAMILY HISTORY    Family History   Problem Relation Age of Onset     Diabetes Type 2  Mother      Lung Cancer Father      Diabetes Sister      Diabetes Brother      Diabetes Brother      Diabetes Type 2  Brother      Glaucoma No family hx of      Macular  Degeneration No family hx of         Review of Systems   The 10 point Review of Systems is negative other than noted in the HPI or here.       PHYSICAL EXAM     Temp: 97.9  F (36.6  C) Temp src: Oral BP: 114/63 Pulse: 119   Resp: 16 SpO2: 97 % O2 Device: None (Room air)    Vital Signs with Ranges  Temp:  [97.9  F (36.6  C)-98.1  F (36.7  C)] 97.9  F (36.6  C)  Pulse:  [] 119  Resp:  [16-18] 16  BP: (113-133)/(46-73) 114/63  SpO2:  [95 %-100 %] 97 %  145 lbs 0 oz    Constitutional: Awake, alert, cooperative, no apparent distress at rest  Eyes: Conjunctiva and pupils examined and normal.  HEENT: Moist mucous membranes, normal dentition.  Respiratory: Clear to auscultation bilaterally, no crackles or wheezing.  Cardiovascular: Regular rate and rhythm, normal S1 and S2, and 3/6 murmur loudest at Right sternal border 2nd ICS, no carotid bruits.  1+ bilateral ankle edema.   GI: Soft, non-distended, non-tender, normal bowel sounds.  Lymph/Hematologic: No anterior cervical, supraclavicular or axillary adenopathy.  Skin: No rashes, no cyanosis.   Musculoskeletal: No joint swelling, erythema or tenderness.  Neurologic: Alert, Ox3, Cranial nerves 2-12 intact, no focal weakness or numbness  Psychiatric:  No obvious anxiety or depression.    Data   Data reviewed today:  I personally reviewed the EKG tracing showing NSR with reat of 94.  Recent Labs   Lab 10/08/20  1438   WBC 8.7   HGB 6.2*   MCV 80   *      POTASSIUM 4.1   CHLORIDE 107   CO2 26   BUN 16   CR 0.60   ANIONGAP 6   KAYLEEN 9.1   *       Imaging:  Recent Results (from the past 24 hour(s))   Chest XR,  PA & LAT    Narrative    XR CHEST TWO VIEWS   10/8/2020 4:05 PM     HISTORY: Dyspnea. Evaluate for edema.    COMPARISON: Chest x-ray 9/16/2020.      Impression    Impression: Two views of the chest. Small bilateral pleural effusions  and bibasilar atelectasis are present. Lungs are otherwise clear.  Heart size appears within normal limits and is  unchanged. No evidence  of pneumothorax. Postop changes from ORIF proximal left humeral  fracture is noted.    ELIAS GARCIA MD

## 2020-10-08 NOTE — PHARMACY-ADMISSION MEDICATION HISTORY
Pharmacy Medication History  Admission medication history interview status for the 10/8/2020  admission is complete. See EPIC admission navigator for prior to admission medications       Medication history sources: Patient and Surescripts  Location of interview: phone  Medication history source reliability: Moderate  Adherence assessment: Good    Significant changes made to the medication list:  Decreased levemir from 10 to 5 units per pt.  ASA and Iron on hold due to anemia.        Additional medication history information:   Lisinopril was stopped on discharge 9/18/20    Medication reconciliation completed by provider prior to medication history? No    Time spent in this activity: 15 minutes      Prior to Admission medications    Medication Sig Last Dose Taking? Auth Provider   alendronate (FOSAMAX) 70 MG tablet TAKE 1 TABLET BY MOUTH ONE TIME PER WEEK 10/5/2020 Yes Tamiko Coley MD   atorvastatin (LIPITOR) 40 MG tablet TAKE 1 TABLET BY MOUTH EVERY DAY 10/8/2020 at am Yes Tamiko Coley MD   buPROPion (ZYBAN) 150 MG 12 hr tablet TAKE 1 TABLET BY MOUTH 2 TIMES DAILY 10/8/2020 at am Yes Tamiko Coley MD   calcium carb 1250 mg, 500 mg [place],/vitamin D 200 unit (CALCIUM 500/D) 500-200 MG-UNIT per tablet Take 1 tablet by mouth 2 times daily  10/8/2020 at am Yes Reported, Patient   gabapentin (NEURONTIN) 300 MG capsule TAKE 1 CAPSULE (300 MG) BY MOUTH ONE OR TWO TIMES DAILY  Patient taking differently: Take 300 mg by mouth At Bedtime  10/7/2020 at hs Yes Tamiko Coley MD   glucosamine-chondroitin 500-400 MG CAPS per capsule Take 1 capsule by mouth 2 times daily  10/8/2020 at am Yes Reported, Patient   insulin detemir (LEVEMIR PEN) 100 UNIT/ML pen Inject 5 Units Subcutaneous At Bedtime  10/7/2020 at hs Yes Unknown, Entered By History   Melatonin 10 MG TABS tablet Take 10 mg by mouth At Bedtime 10/7/2020 at hs Yes Unknown, Entered By History   metFORMIN (GLUCOPHAGE) 1000 MG tablet TAKE 1 TABLET BY MOUTH TWICE A DAY WITH  MEALS 10/8/2020 at am Yes Tamiko Coley MD   Multiple Vitamin (MULTI-VITAMINS) TABS Take 1 tablet by mouth daily  10/8/2020 at am Yes Reported, Patient   oxybutynin (DITROPAN) 5 MG tablet TAKE 1 TABLET BY MOUTH TWICE A DAY 10/8/2020 at am Yes Tamiko Coley MD   pantoprazole (PROTONIX) 40 MG EC tablet TAKE 1 TABLET BY MOUTH EVERY DAY 10/8/2020 at am Yes Tamiko Coley MD   traZODone (DESYREL) 50 MG tablet TAKE 1-2 TABLETS ( MG) BY MOUTH AT BEDTIME 10/7/2020 at hs Yes Tamiko Coley MD   acetaminophen (ACETAMINOPHEN 8 HOUR) 650 MG CR tablet Take 1,300 mg by mouth every 8 hours as needed  prn Yes Unknown, Entered By History   aspirin (ASA) 81 MG EC tablet Take 1 tablet (81 mg) by mouth daily HOLD  Louis Paul MD   ferrous sulfate (FEROSUL) 325 (65 Fe) MG tablet Take 325 mg by mouth 2 times daily HOLD  Unknown, Entered By History   insulin pen needle (29G X 12MM) 29G X 12MM miscellaneous Use 1 pen needles daily or as directed.   Tamiko Coley MD

## 2020-10-08 NOTE — ED NOTES
"[facility]  ED Nurse Handoff Report    ED Chief complaint: Shortness of Breath and Abnormal Labs      ED Diagnosis:   Final diagnoses:   Anemia   Elevated brain natriuretic peptide (BNP) level   Edema       Code Status: Full Code    Allergies:   Allergies   Allergen Reactions     Indomethacin Other (See Comments)     Dizziness and disorientation     Tramadol Nausea and Vomiting       Patient Story: SOB and low HGB  Focused Assessment:  Patient comes in with SOB and generalized weakness HGB 6.2, will give one unit of blood in the ER and another on the floor.  Patient will be admitted to 66 for observation and GI workup.  Up ind and alert and orientated.      Treatments and/or interventions provided: HGB and labs with unit of blood and lasix to be order for CHF  Patient's response to treatments and/or interventions: Fair    To be done/followed up on inpatient unit:  NA    Does this patient have any cognitive concerns?: NONE    Activity level - Baseline/Home:  Independent  Activity Level - Current:   Independent    Patient's Preferred language: English   Needed?: No    Isolation: None  Infection: Not Applicable  Bariatric?: No    Vital Signs:   Vitals:    10/08/20 1427   BP: 120/46   Pulse: 88   Resp: 16   Temp: 98.1  F (36.7  C)   TempSrc: Oral   SpO2: 100%   Weight: 65.8 kg (145 lb)   Height: 1.626 m (5' 4\")       Labs Ordered and Resulted from Time of ED Arrival Up to the Time of Departure from the ED   CBC WITH PLATELETS DIFFERENTIAL - Abnormal; Notable for the following components:       Result Value    RBC Count 2.65 (*)     Hemoglobin 6.2 (*)     Hematocrit 21.1 (*)     MCH 23.4 (*)     MCHC 29.4 (*)     RDW 17.9 (*)     Platelet Count 481 (*)     All other components within normal limits   BASIC METABOLIC PANEL - Abnormal; Notable for the following components:    Glucose 106 (*)     All other components within normal limits   NT PROBNP INPATIENT - Abnormal; Notable for the following " components:    N-Terminal Pro BNP Inpatient 2,253 (*)     All other components within normal limits   IRON AND IRON BINDING CAPACITY   VITAMIN B12   HAPTOGLOBIN   COVID-19 VIRUS (CORONAVIRUS) BY PCR   PERIPHERAL IV CATHETER   ABO/RH TYPE AND SCREEN   RED BLOOD CELL PREPARE ORDER UNIT   BLOOD COMPONENT       Was the PSS-3 completed:   Yes  What interventions are required if any?               Family Comments: Daughter is at the bedside  OBS brochure/video discussed/provided to patient/family: Yes              Name of person given brochure if not patient: Patient and Daughter      For the majority of the shift this patient's behavior was Green.   Behavioral interventions performed were NA.    ED NURSE PHONE NUMBER: [id number]

## 2020-10-08 NOTE — PROGRESS NOTES
Admission    Patient arrives to room 605-1 via cart from ED.  Care plan note: Admitted due to low hgb and SOB    Inpatient nursing criteria listed below were met:    PCD's Documented: NA  Skin issues/needs documented :Yes  Isolation education started/completed NA  Patient allergies verified with patient: NA  Verified completion of [place] Risk Assessment Tool:  Yes  Verified completion of Guardianship screening tool: Yes  Fall Prevention: Care plan updated, Education given and documented Yes  Care Plan initiated: Yes  Home medications documented in belongings flowsheet: Yes  Patient belongings documented in belongings flowsheet: Yes  Reminder note (belongings/ medications) placed in discharge instructions:NA  Admission profile/ required documentation complete: Yes  Visitor Designated? Yes  If patient is a 72 hour hold/Commitment are belongings removed from room and locked up? NA

## 2020-10-08 NOTE — TELEPHONE ENCOUNTER
"She denies SOB at rest, but has woke up with SOB when needing to use the bathroom. She waits at edge of bed until it resolves then is able to walk to bathroom. It takes 10-15 min for her to catch her breath.  Her BP yesterday was 83/33 when checked with home monitor. She knows her hgb is low and has had a few infusions this year. She is having weakness, denies dizziness.  She will have daughter take her to ED.   Reason for Disposition    Any history of prior \"blood clot\" in leg or lungs     History of blood clot in leg    MODERATE difficulty breathing (e.g., speaks in phrases, SOB even at rest, pulse 100-120) of new onset or worse than normal     Was having SOB when went to hospital a on the 29th, SOB has worsened since then.  When she wakes up in the middle of the night she has to catch her breath before getting up to use the bathroom    Additional Information    Negative: Chest pain    Negative: Wheezing (high pitched whistling sound) and previous asthma attacks or use of asthma medicines    Negative: Difficulty breathing and only present when coughing    Negative: Difficulty breathing and only from stuffy or runny nose    Negative: Breathing stopped and hasn't returned    Negative: Choking on something    Negative: SEVERE difficulty breathing (e.g., struggling for each breath, speaks in single words, pulse > 120)    Negative: Bluish (or gray) lips or face    Negative: Difficult to awaken or acting confused (e.g., disoriented, slurred speech)    Negative: Passed out (i.e., fainted, collapsed and was not responding)    Negative: Wheezing started suddenly after medicine, an allergic food, or bee sting    Negative: Stridor    Negative: Slow, shallow and weak breathing    Negative: Sounds like a life-threatening emergency to the triager    Negative: Wheezing can be heard across the room    Negative: Drooling or spitting out saliva (because can't swallow)    Negative: Fever > 103 F (39.4 C)    Negative: Fever > 101 F " "(38.3 C) and over 60 years of age    Negative: Fever > 100.0 F (37.8 C) and bedridden (e.g., nursing home patient, stroke, chronic illness, recovering from surgery)    Negative: Fever > 100.0 F (37.8 C) and diabetes mellitus or weak immune system (e.g., HIV positive, cancer chemo, splenectomy, organ transplant, chronic steroids)    Negative: Periods where breathing stops and then resumes normally and bedridden (e.g., nursing home patient, CVA)    Negative: Pregnant or postpartum (from 0 to 6 weeks after delivery)    Negative: Patient sounds very sick or weak to the triager    Negative: Extra heart beats OR irregular heart beating   (i.e., \"palpitations\")    Negative: Recent long-distance travel with prolonged time in car, bus, plane, or train (i.e., within past 2 weeks; 6 or  more hours duration)    Negative: Major surgery in the past month    Negative: Hip or leg fracture in past 2 months (e.g., or had cast on leg or ankle)    Negative: Recent illness requiring prolonged bedrest (i.e., immobilization)    Protocols used: BREATHING DIFFICULTY-A-MARTI Cross RN    "

## 2020-10-08 NOTE — TELEPHONE ENCOUNTER
Reason for call:  Symptom   Symptom or request: Shortness of breath, weakness  Bp 83/33 yesterday when taken   Tested Negative for covid   Was in ER last week for this and was sent home       Duration (how long have symptoms been present): over 1 week   Have you been treated for this before? Yes    Additional comments: n/a     Phone number to reach patient:  Home number on file [phone] (home)    Best Time:  any    Can we leave a detailed message on this number?  YES    Travel screening: Negative

## 2020-10-08 NOTE — ED PROVIDER NOTES
History   Chief Complaint:  Shortness of Breath and Abnormal Labs       HPI   Rajani Guo is a 69 year old female with history of anemia, GI bleed, Type 2 diabetes, hypertension, and hyperlipidemia who presents for evaluation of shortness of breath and abnormal labs.  She reports yesterday she swallowed a pill camera with [employer] to locate the source of her GI bleed in follow-up from her admission from 9/16 to 9/18 about three weeks prior. She did have to be transfused 1 unit PRBC at that time, and has since stopped her Eliquis. The patient reports she was again seen in the emergency department on 9/29/20, and noted her hemoglobin was low at 7.5. She says since her visit to the emergency department last week she has had worsening weakness, and shortness of breath. She says she wakes up in the middle of the night, and has to sit up on the side of her bed just to wait until she can breathe well enough to lay back down. She notes this SOB at night has been present for several weeks but she had onset of bilateral lower extremity edema starting this weekend.  It is now taking her 10-15 minutes to catch her breath after walking even to the bathroom.  Her home blood pressure yesterday was 83/33.  She is no longer have black/tarry stools, and reports this is back to normal. However, she hasn't been eating as well lately. Additionally, she is scheduled for a cardiac ECHO on 10/13, and follow up with her cardiologist on 10/25. She denies chest pain, and fever.     ECHO 8/13/2019  Interpretation Summary     1. Global and regional left ventricular function is normal with an EF of 60-  65%.  2. Right ventricular function, chamber size, wall motion, and thickness are  normal.  3. Moderate to severe mitral annular calcification is present. There is  moderate to severe mitral valve stenosis with mean gradient of 14 mm Hg at 93  bpm. The gradient is likely exaggerated by AI.  4. Moderate mitral insufficiency is present.  5.  Moderate aortic insufficiency and mild aortic stenosis are present, mean  gradient 14mmHg, peak velocity 2.4m/s, KRISTOFER 1.6cm2.  6. Estimated pulmonary artery systolic pressure is 51 mmHg plus right atrial  pressure suggestive of moderate (pulmonary artery systolic pressure 50-75mmHg)  pulmonary hypertension.  7. IVC diameter >2.1 cm collapsing <50% with sniff suggests a high RA pressure  estimated at 15 mmHg or greater.     Compared to ECHO on 03/2019, the mean gradient across the mitral valve has  increased; however, HR is in the 90's today and was in the 80's on 03/2019.    Upper GI endoscopy 9/0219  Findings:        The examined esophagus was normal.        A small hiatal hernia was present. The stomach was otherwise normal.        The examined duodenum was normal. A clip was in place from EGD several        weeks ago, erosions have all healed.    Colonoscopy 9/0219  Findings:        The perianal and digital rectal examinations were normal.        Multiple small and large-mouthed diverticula were found in the sigmoid        colon, descending colon and ascending colon. The very distal terminal        ileum was seen, limited views due to brown stool, no sign of bleeding.        Internal hemorrhoids were found during retroflexion. The hemorrhoids        were small.        The exam was otherwise without abnormality on direct and retroflexion        views.     Allergies:  Indomethacin  Tramadol    Medications:   Fosamax  Aspirin 81 mg   Lipitor  Gabapentin  Insulin pen needle  Metformin  Ditropan  Protonix  Desyrel    Past Medical History:    Osteoporosis, age related  Clot   Constipation  Hyperlipidemia   Insomnia  Hypertension   Tobacco use disorder  Type 2 diabetes  Murmur  Peripheral vascular disease  Claudication of left lower extremity  Anemia  Left sup fem art occlusion  GI bleed  Cardiomyopathies  Severe mitral stenosis  non rheumatic aortic valve insufficiency    Past Surgical History:    EGD x2  IR angiogram  "through catheter follow up  IR lower extremity angiogram left  Right knee surgery  1 oophorectomy  Release carpal tunnel  Release trigger finger bilateral  Rotator cuff repair  Thyroid surgery, partial     Family History:    Diabetes  Breast Cancer    Social History:  Smoking status: Current Every Day Smoker  Smokeless Tobacco: Never Used  Alcohol use: Yes  Drug use: No  PCP: Tamiko Coley  Marital Status:   Presents to the ED with her daughter      Review of Systems   Constitutional: Negative for fever.   Respiratory: Positive for shortness of breath.    Cardiovascular: Negative for chest pain.   Neurological: Positive for weakness.   All other systems reviewed and are negative.    Physical Exam     Patient Vitals for the past 24 hrs:   BP Temp Temp src Pulse Resp SpO2 Height Weight   10/08/20 1700 118/54 -- -- 80 -- 97 % -- --   10/08/20 1650 -- -- -- -- -- 98 % -- --   10/08/20 1645 113/53 -- -- 80 -- 96 % -- --   10/08/20 1640 119/52 -- -- -- -- -- -- --   10/08/20 1427 120/46 98.1  F (36.7  C) Oral 88 16 100 % 1.626 m (5' 4\") 65.8 kg (145 lb)     Physical Exam  Nursing notes reviewed. Vitals reviewed.  General: Pale. Alert. Well kept.  Eyes:  Conjunctiva non-injected, non-icteric.  Neck/Throat: Moist mucous membranes.  Normal voice.  Cardiac: Regular rhythm. 3/6 murmur.  2+ bilateral lower extremity edema.   Pulmonary: Clear and equal breath sounds bilaterally. No crackles/rales. No wheezing  Abdomen: Soft. Non-distended. Non-tender to palpation. No masses. No guarding or rebound.  Musculoskeletal: Normal gross range of motion of all 4 extremities.    Neurological: Alert and oriented x4.   Skin: Warm and dry without rashes or petechiae. Normal appearance of visualized exposed skin.  Psych: Affect normal. Good eye contact.    Emergency Department Course     ECG:  Indication: shortness of breath   Completed at 1625.  Read at 1628.   Normal sinus rhythm. Normal ECG.  Rate 94 bpm. AR interval 158. QRS " duration 86. QT/QTc 356/445. P-R-T axes 61 47 54.     Imaging:  Radiology findings were communicated with the patient who voiced understanding of the findings.    Chest X-Ray PA, & LATViews:   Impression: Two views of the chest. Small bilateral pleural effusions   and bibasilar atelectasis are present. Lungs are otherwise clear.   Heart size appears within normal limits and is unchanged. No evidence   of pneumothorax. Postop changes from ORIF proximal left humeral   fracture is noted.  reading per radiology.     Laboratory:  Laboratory findings were communicated with the patient who voiced understanding of the findings.    ABO/Rh type and screen: ABO A, Rh Negative, Antibody Negative   CBC: WBC 8.7, HGB 6.2 (LL),  (H)   BNP: 2253 (H)  BMP: Glucose 106 (H) o/w WNL (Creatinine 0.60)     Iron and iron binding capacity: Iron 14 (L), Iron Saturation Index 4 (L), o/w WNL  Vitamin B12: Pending  Haptoglobin: Pending    Asymptomatic COVID-19 Virus (Coronavirus) by PCR: Pending     Emergency Department Course:  Nursing notes and vitals reviewed.  1455: I performed an exam of the patient as documented above.     IV was inserted and blood was drawn for laboratory testing, results above.    The patient was sent for a Chest XR while in the emergency department, results above.     The patient was swabbed for covid-19.     EKG obtained in the ED, see results above.      1600 Patient rechecked and updated.      1614 I spoke with Dr. Paul of the Hospitalist service regarding patient's presentation, findings, and plan of care.     1636 Patient rechecked and updated.      Findings and plan explained to the Patient who consents to admission. Discussed the patient with Dr. Paul, who will admit the patient to an observation bed for further monitoring, evaluation, and treatment.   I personally reviewed the laboratory and imaging results with the Patient and answered all related questions prior to admission.    Impression &  Plan        Covid-19  Rajani Guo was evaluated during a global COVID-19 pandemic, which necessitated consideration that the patient might be at risk for infection with the SARS-CoV-2 virus that causes COVID-19.   Applicable protocols for evaluation were followed during the patient's care.   COVID-19 was considered as part of the patient's evaluation. The plan for testing is:  a test was obtained during this visit.      Medical Decision Making:  Rajani Guo is a 69 year old female with a history of type 2 diabetes, mitral stenosis, aortic stenosis, partial thyroidectomy, peripheral vascular disease with claudication of left lower extremity, and superior femoral artery occlusion not currently anticoagulated other than aspirin who presents for evaluation of weakness and shortness of breath. On exam the patient is pale, she has no hypoxia currently, or hypotension. She does have new onset bilateral lower extremity edema 2+ with normal pulses and sensation. Initial lab work today reveals hemoglobin of 6.2 down from 7.5 one month ago. Her BMP is otherwise unremarkable. EKG shows sinus rhythm, but BNP today is 2253 up from 342 at last reading. Chest x-ray does show small bilateral pleural effusions and bi-basilar atelectasis with no signs of pneumonia. The patient was ordered one unit packed red blood cells, and I spoke with Dr. Paul, hospitalist, who accepts her for admission with likely lasix, a second unit PRBC, cardiology and GI referrals. The patient and her daughter are in agreement with this plan.     Diagnosis:    ICD-10-CM    1. Anemia  D64.9 ABO/Rh type and screen     Iron and iron binding capacity     Vitamin B12     Haptoglobin     Blood component     Blood component   2. Elevated brain natriuretic peptide (BNP) level  R79.89    3. Edema  R60.9        Disposition:   Admitted under the supervision of Dr. Paul.     Scribe Disclosure:  ISAIAH, Roxy Lamar, am serving as a scribe at 3:00 PM on 10/8/2020 to  document services personally performed by Emma Bradshaw, DENICE based on my observations and the provider's statements to me.    Roxy Lamar  10/8/2020  [facility] EMERGENCY DEPARTMENT     Emma Bradshaw CNP  10/08/20 8223

## 2020-10-08 NOTE — ED TRIAGE NOTES
Pt was seen in the ED last week and hgb was 7.5. today feeling tired SOB. Pt bp at home was 85/33. Yesterday had pill cam.

## 2020-10-09 ENCOUNTER — APPOINTMENT (OUTPATIENT)
Dept: CARDIOLOGY | Facility: CLINIC | Age: 70
End: 2020-10-09
Attending: INTERNAL MEDICINE
Payer: COMMERCIAL

## 2020-10-09 ENCOUNTER — TELEPHONE (OUTPATIENT)
Dept: CARDIOLOGY | Facility: CLINIC | Age: 70
End: 2020-10-09

## 2020-10-09 ENCOUNTER — TRANSFERRED RECORDS (OUTPATIENT)
Dept: HEALTH INFORMATION MANAGEMENT | Facility: CLINIC | Age: 70
End: 2020-10-09

## 2020-10-09 VITALS
RESPIRATION RATE: 16 BRPM | HEIGHT: 64 IN | HEART RATE: 126 BPM | BODY MASS INDEX: 25.15 KG/M2 | OXYGEN SATURATION: 92 % | WEIGHT: 147.3 LBS | DIASTOLIC BLOOD PRESSURE: 66 MMHG | SYSTOLIC BLOOD PRESSURE: 120 MMHG | TEMPERATURE: 98 F

## 2020-10-09 LAB
ERYTHROCYTE [DISTWIDTH] IN BLOOD BY AUTOMATED COUNT: 17.3 % (ref 10–15)
GLUCOSE BLDC GLUCOMTR-MCNC: 122 MG/DL (ref 70–99)
HAPTOGLOB SERPL-MCNC: 181 MG/DL (ref 32–197)
HCT VFR BLD AUTO: 26.1 % (ref 35–47)
HGB BLD-MCNC: 8.2 G/DL (ref 11.7–15.7)
LABORATORY COMMENT REPORT: NORMAL
MCH RBC QN AUTO: 25 PG (ref 26.5–33)
MCHC RBC AUTO-ENTMCNC: 31.4 G/DL (ref 31.5–36.5)
MCV RBC AUTO: 80 FL (ref 78–100)
PLATELET # BLD AUTO: 421 10E9/L (ref 150–450)
RBC # BLD AUTO: 3.28 10E12/L (ref 3.8–5.2)
SARS-COV-2 RNA SPEC QL NAA+PROBE: NEGATIVE
SARS-COV-2 RNA SPEC QL NAA+PROBE: NORMAL
SPECIMEN SOURCE: NORMAL
SPECIMEN SOURCE: NORMAL
WBC # BLD AUTO: 7.1 10E9/L (ref 4–11)

## 2020-10-09 PROCEDURE — 36430 TRANSFUSION BLD/BLD COMPNT: CPT

## 2020-10-09 PROCEDURE — 250N000013 HC RX MED GY IP 250 OP 250 PS 637: Performed by: INTERNAL MEDICINE

## 2020-10-09 PROCEDURE — 93306 TTE W/DOPPLER COMPLETE: CPT

## 2020-10-09 PROCEDURE — 36415 COLL VENOUS BLD VENIPUNCTURE: CPT | Performed by: INTERNAL MEDICINE

## 2020-10-09 PROCEDURE — 96375 TX/PRO/DX INJ NEW DRUG ADDON: CPT

## 2020-10-09 PROCEDURE — G0378 HOSPITAL OBSERVATION PER HR: HCPCS

## 2020-10-09 PROCEDURE — 96376 TX/PRO/DX INJ SAME DRUG ADON: CPT | Mod: 59

## 2020-10-09 PROCEDURE — 85027 COMPLETE CBC AUTOMATED: CPT | Performed by: INTERNAL MEDICINE

## 2020-10-09 PROCEDURE — 250N000011 HC RX IP 250 OP 636: Performed by: INTERNAL MEDICINE

## 2020-10-09 PROCEDURE — 99217 PR OBSERVATION CARE DISCHARGE: CPT | Performed by: INTERNAL MEDICINE

## 2020-10-09 PROCEDURE — 258N000003 HC RX IP 258 OP 636: Performed by: INTERNAL MEDICINE

## 2020-10-09 PROCEDURE — 93306 TTE W/DOPPLER COMPLETE: CPT | Mod: 26 | Performed by: INTERNAL MEDICINE

## 2020-10-09 PROCEDURE — P9016 RBC LEUKOCYTES REDUCED: HCPCS | Performed by: NURSE PRACTITIONER

## 2020-10-09 PROCEDURE — 999N001017 HC STATISTIC GLUCOSE BY METER IP

## 2020-10-09 PROCEDURE — 99222 1ST HOSP IP/OBS MODERATE 55: CPT | Performed by: INTERNAL MEDICINE

## 2020-10-09 RX ORDER — NICOTINE 21 MG/24HR
1 PATCH, TRANSDERMAL 24 HOURS TRANSDERMAL DAILY
Qty: 30 PATCH | Refills: 1 | Status: SHIPPED | OUTPATIENT
Start: 2020-10-10 | End: 2021-08-16

## 2020-10-09 RX ORDER — AMOXICILLIN 250 MG
1-2 CAPSULE ORAL 2 TIMES DAILY
Qty: 100 TABLET | Refills: 1 | Status: SHIPPED | OUTPATIENT
Start: 2020-10-09 | End: 2021-04-19

## 2020-10-09 RX ORDER — ASCORBIC ACID 500 MG
500 TABLET ORAL DAILY
Qty: 100 TABLET | Refills: 1 | Status: SHIPPED | OUTPATIENT
Start: 2020-10-09 | End: 2022-01-25

## 2020-10-09 RX ADMIN — PANTOPRAZOLE SODIUM 40 MG: 40 TABLET, DELAYED RELEASE ORAL at 08:26

## 2020-10-09 RX ADMIN — OXYBUTYNIN CHLORIDE 5 MG: 5 TABLET ORAL at 08:26

## 2020-10-09 RX ADMIN — DOCUSATE SODIUM 50 MG AND SENNOSIDES 8.6 MG 2 TABLET: 8.6; 5 TABLET, FILM COATED ORAL at 08:25

## 2020-10-09 RX ADMIN — CALCIUM CARBONATE 600 MG (1,500 MG)-VITAMIN D3 400 UNIT TABLET 1 TABLET: at 08:25

## 2020-10-09 RX ADMIN — IRON SUCROSE 300 MG: 20 INJECTION, SOLUTION INTRAVENOUS at 00:10

## 2020-10-09 RX ADMIN — ATORVASTATIN CALCIUM 40 MG: 40 TABLET, FILM COATED ORAL at 08:25

## 2020-10-09 RX ADMIN — NICOTINE 1 PATCH: 21 PATCH, EXTENDED RELEASE TRANSDERMAL at 08:25

## 2020-10-09 RX ADMIN — IRON SUCROSE 300 MG: 20 INJECTION, SOLUTION INTRAVENOUS at 14:54

## 2020-10-09 RX ADMIN — BUPROPION HYDROCHLORIDE 150 MG: 150 TABLET, EXTENDED RELEASE ORAL at 08:26

## 2020-10-09 RX ADMIN — ACETAMINOPHEN 650 MG: 325 TABLET, FILM COATED ORAL at 00:46

## 2020-10-09 NOTE — PLAN OF CARE
DATE & TIME: 10/9/20, 7622-1540   Cognitive Concerns/ Orientation : Alert and oriented x4.   BEHAVIOR & AGGRESSION TOOL COLOR: Green, pleasant  ABNL VS/O2: VSS, O2 wnl RA   MOBILITY: Up independently, steady. C/o mild light headedness this morning, resolved. Calls appropriately.   PAIN MANAGMENT:Denies  DIET: Regular  BOWEL/BLADDER:Continent  ABNL LAB/BG: Hgb 8.2 (6.2 on admission) s/p 2nd unit PRBC, no signs of active bleeding.   DRAIN/DEVICES: IV access x1 saline locked.   SKIN: pale, +2 edema Lt foot, Trace edema BLE.   TESTS/PROCEDURES: Echo complete done today.   D/C DAY/GOALS/PLACE: 1-2 days pending cardiology work up.   OTHER IMPORTANT INFO: Observation status. Nicotine patch Rt arm. DESAI but lung sounds clear. Murmur detected. Will receive IV Venofer 300 mg x1 today. Cardiology following.

## 2020-10-09 NOTE — CONSULTS
[facility]    Cardiology Consultation     Date of Admission:  10/8/2020  Date of Consult (When I saw the patient): 10/09/20    Assessment & Plan   Rajani Guo is a 69 year old female who was admitted on 10/8/2020. I was asked to see the patient for shortness of breath and valvular heart disease found on echo  PMH: aortic stenosis; mitral stenosis, GI bleeding, source unclear, partial thyroidectomy, HTN, dyslipidemia, DM, osteoporosis, superficial left femoral artery occlusion in 3-2019 treated with thrombectomy and placed on Eliquis.      1. Significant valvular heart disease   A. Severe Mitral stenosis with mean gradient 17.7mmHg, heart rate 86; 2-3+ MR  B. Severe Aortic stenosis KRISTOFER, mean gradient 26.7mmHg; KRISTOFER 0.88cm2  C. Preserved EF  D. Pulmonary Hypertension likely due to the above however long time smoker as well    I have spoken Dr. Avila.  This anatomy is not amendable to TAVR or balloon valvuloplasty mitral valve because of the significant degrees of calcification and a significant degrees of valvular insufficiency in both.  The patient I believe should be referred back to [facility] cardiology which was the plan. I will try to move up the date and I talked to Dr. Avila about that.  She will need to be seen and get a left and right heart catheterization and check for her coronary artery disease.  The one confounding issue here is that there is absolutely no doubt that she needs mitral and aortic valve replacement but there is this ongoing issue of GI bleeding. I believe she has had a camera study through [facility] but this will need to be confirmed.  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 Dr. Avila understands this and he agrees that after her transfusion if she is stable she can be discharged and get an appointment at [facility]. The issue about GI bleeding is can be a significant issue with valvular heart disease obviously it would be bioprosthetic,   "not mechanical valve.    [facility]    Code Status    Full Code    Reason for Consult   Reason for consult: shortness of breath; valvular heart disease    Primary Care Physician   Tamiko Coley      History of Present Illness        This patient presents with dyspnea on exertion and recurrent GI bleeds here for transfusion.  We wish to thank Dr. Paul who finally put the story together along with us.  This is a most unfortunate story.  This woman has been seen numerous times at [facility] for recurrent GI bleed, [place] [facility] evaluation. She was seen a year and a half ago because of an embolism down the femoral artery was seen by vascular MD and of course she was seen at her [facility] clinic by her MD and PA.  Over a year ago it was discovered that she had a murmur and she had dyspnea on exertion noted by her [facility] in [place]. She was sent for stress echo test at [facility] and they immediately discovered that she had severe heart valve disease, canceled the stress echo  and instead ordered a limited transthoracic echo and the results were relayed back to the [facility] clinic.        The [facility] sent the patient a my chart note that she needed to make a cardiology appointment although the patient states she has absolutely no memory for this. We did document through the [facility] my chart that she did acknowledge that message. Unfortunately this critical heart disease was \"lost in the shuffle\" when she had recurrent GI bleeds.     She was on Eliquis because of an embolism down her leg, started in March, 2019 and apparently they did not associate this embolism down her leg and the murmur suggestive of valvular heart disease.      All the rest of the evaluations/hospitalizations have been done regarding her GI bleeding until Dr. Paul  finally saw the 2 echocardiograms that both showed critical heart valve disease. I did look in the [facility] past medical history " "and there was nothing listed about this heart valve disease.  We are called because of this valve disease and I have since talked to Dr. Paul    She has had progressive dyspnea but she has ongoing GI bleeding in the process of work up and had an admission Hemoglobin of 6.2.    She was  scheduled for a stress echo 2019 and I can see thye convert to a limited echo. That echo at the [place] was incomplete but at the time, she had moderately severe to severe mitral stenosis at least moderate MR they only called mild to moderate aortic valve stenosis but the valve visually was significantly narrowed. They did not do a full Doppler study so I believe they under-called the degree of aortic stenosis and there was moderate aortic insufficiency along with moderate to severe pulmonary hypertension.  The current echo here is much more complete( the reader is referred to the report). There is severe left atrial enlargement,  severe mitral annular calcification, severe mitral valve stenosis which likely did progress from last year; there is at least moderate mitral valve insufficiency. There is moderately severe to  severe aortic valve stenosis with at least moderate aortic valve insufficiency.  There is pulmonary hypertension identified,  fortunately LV and RV function are preserved.    No doubt the arterial embolism down her leg from 2019 was a reflection of  mitral valve stenosis.Tthere is no documented history of atrial fibrillation.  The patient denies a previous history of rheumatic fever.  Since she was 1 of multiple children she states, her \"mother would have told her if she had rheumatic fever\".  She reports no syncopal events no chest pain.          Past Medical History   I have reviewed this patient's medical history and updated.   Past Medical History:   Diagnosis Date     Age-related osteoporosis without current pathological fracture 01/18/2018     Constipation      DM type 2, on Insulin 1997     DVT left leg  "     History of partial thyroidectomy 06/02/2018     Hyperlipidemia LDL goal <100 01/18/2018     Hypertension      Insomnia, unspecified type 01/18/2018     Tobacco use disorder          Past Surgical History   I have reviewed this patient's surgical history and updated it with pertinent information if needed.  Past Surgical History:   Procedure Laterality Date     COLONOSCOPY N/A 9/4/2019    Procedure: COLONOSCOPY;  Surgeon: Marie Todd MD;  Location:  GI     ESOPHAGOSCOPY, GASTROSCOPY, DUODENOSCOPY (EGD), COMBINED N/A 9/4/2019    Procedure: ESOPHAGOGASTRODUODENOSCOPY (EGD);  Surgeon: Marie Todd MD;  Location:  GI     ESOPHAGOSCOPY, GASTROSCOPY, DUODENOSCOPY (EGD), COMBINED N/A 9/17/2020    Procedure: ESOPHAGOGASTRODUODENOSCOPY (EGD);  Surgeon: Ten Ibrahim DO;  Location:  GI     IR ANGIOGRAM THROUGH CATHETER FOLLOW UP  3/5/2019     IR LOWER EXTREMITY ANGIOGRAM LEFT  3/4/2019     KNEE SURGERY Right 2013    right knee torn meniscus surgery     OVARY SURGERY  1971    1 ovary removed     RELEASE CARPAL TUNNEL Right 1988     RELEASE TRIGGER FINGER BILATERAL       SHOULDER SURGERY Left     rotator cuff repair, plate placement     THYROID SURGERY  1988    partial thyroidectomy       Prior to Admission Medications   Prior to Admission Medications   Prescriptions Last Dose Informant Patient Reported? Taking?   Melatonin 10 MG TABS tablet 10/7/2020 at hs Self Yes Yes   Sig: Take 10 mg by mouth At Bedtime   Multiple Vitamin (MULTI-VITAMINS) TABS 10/8/2020 at am Self Yes Yes   Sig: Take 1 tablet by mouth daily    acetaminophen (ACETAMINOPHEN 8 HOUR) 650 MG CR tablet prn Self Yes Yes   Sig: Take 1,300 mg by mouth every 8 hours as needed    alendronate (FOSAMAX) 70 MG tablet 10/5/2020  No Yes   Sig: TAKE 1 TABLET BY MOUTH ONE TIME PER WEEK   aspirin (ASA) 81 MG EC tablet HOLD  No No   Sig: Take 1 tablet (81 mg) by mouth daily   atorvastatin (LIPITOR) 40 MG tablet 10/8/2020 at am  No Yes    Sig: TAKE 1 TABLET BY MOUTH EVERY DAY   buPROPion (ZYBAN) 150 MG 12 hr tablet 10/8/2020 at am  No Yes   Sig: TAKE 1 TABLET BY MOUTH 2 TIMES DAILY   calcium carb 1250 mg, 500 mg [place],/vitamin D 200 unit (CALCIUM 500/D) 500-200 MG-UNIT per tablet 10/8/2020 at am Self Yes Yes   Sig: Take 1 tablet by mouth 2 times daily    ferrous sulfate (FEROSUL) 325 (65 Fe) MG tablet HOLD  Yes No   Sig: Take 325 mg by mouth 2 times daily   gabapentin (NEURONTIN) 300 MG capsule 10/7/2020 at hs  No Yes   Sig: TAKE 1 CAPSULE (300 MG) BY MOUTH ONE OR TWO TIMES DAILY   Patient taking differently: Take 300 mg by mouth At Bedtime    glucosamine-chondroitin 500-400 MG CAPS per capsule 10/8/2020 at am Self Yes Yes   Sig: Take 1 capsule by mouth 2 times daily    insulin detemir (LEVEMIR PEN) 100 UNIT/ML pen 10/7/2020 at hs  Yes Yes   Sig: Inject 5 Units Subcutaneous At Bedtime    insulin pen needle (29G X 12MM) 29G X 12MM miscellaneous   No No   Sig: Use 1 pen needles daily or as directed.   metFORMIN (GLUCOPHAGE) 1000 MG tablet 10/8/2020 at am  No Yes   Sig: TAKE 1 TABLET BY MOUTH TWICE A DAY WITH MEALS   oxybutynin (DITROPAN) 5 MG tablet 10/8/2020 at am  No Yes   Sig: TAKE 1 TABLET BY MOUTH TWICE A DAY   pantoprazole (PROTONIX) 40 MG EC tablet 10/8/2020 at am  No Yes   Sig: TAKE 1 TABLET BY MOUTH EVERY DAY   traZODone (DESYREL) 50 MG tablet 10/7/2020 at hs  No Yes   Sig: TAKE 1-2 TABLETS ( MG) BY MOUTH AT BEDTIME      Facility-Administered Medications: None     Allergies   Allergies   Allergen Reactions     Indomethacin Other (See Comments)     Dizziness and disorientation     Tramadol Nausea and Vomiting       Social History   ;ongoing one PPD somker    Family History     Family History   Problem Relation Age of Onset     Diabetes Type 2  Mother      Lung Cancer Father      Diabetes Sister      Diabetes Brother      Diabetes Brother      Diabetes Type 2  Brother      Glaucoma No family hx of      Macular Degeneration No  family hx of          Review of Systems   The 10 point Review of Systems is negative other than noted in the HPI     Physical Exam   Temp: 98.1  F (36.7  C) Temp src: Oral BP: 111/52 Pulse: 87   Resp: 16 SpO2: 94 % O2 Device: None (Room air) Oxygen Delivery: 2 LPM  Vital Signs with Ranges  Temp:  [97.9  F (36.6  C)-98.8  F (37.1  C)] 98.1  F (36.7  C)  Pulse:  [] 87  Resp:  [16] 16  BP: ()/(47-73) 111/52  SpO2:  [87 %-98 %] 94 %  147 lbs 4.8 oz    Constitutional     alert and oriented, in no acute distress.      Skin     warm and dry     ENT     Noted- pallor; no cyanosis,  Poor dentition     Neck    Supple, previous thyroid scar noted, JVP mild inc, transmission of aortic murmur to neck, suspect there is not a separate carotid bruit but difficult to tell     Chest     no tenderness to palpation      Lungs  Rales bilat bases      Cardiac  regular rhythm, S1 normal, S2 normal, No S3 or S4, Two systolic murmurs -  One  Is mid peak and one is pan systolic. There is a pan diastolic murmur with pre-systolic  Accentuation. There is a very loud opening snap (less likely s3) with a  Short S2-open snap interval.  Occ ectopy noted  JVP is mildly increased. The carotid upstrokes are delayed and the aortic murmur is hear into the carotids     Abdomen     abdomen soft, bowel sounds normoactive, no hepatosplenomegaly--liver not pulsatile     Extremities and Back     no clubbing, cyanosis. 1+edema observed (she is wearing socks that somewhat compress  Ankles)      Neurological     no gross motor deficits noted, affect appropriate, oriented to time, person and place.     Data   Recent Labs   Lab Test 10/09/20  0744 10/08/20  2248 10/08/20  1438 09/29/20  1750 03/04/19  0930 03/04/19  0930   WBC 7.1  --  8.7 9.6   < > 12.3*   HGB 8.2* 6.8* 6.2* 7.5*   < > 13.3   MCV 80  --  80 85   < > 90     --  481* 515*   < > 304   INR  --   --   --  1.13  --  1.00    < > = values in this interval not displayed.      Recent  Labs   Lab Test 10/08/20  1438 20  1750    139   POTASSIUM 4.1 3.9   CHLORIDE 107 108   BUN 16 14   CR 0.60 0.66     Recent Labs   Lab 10/08/20  1438   *     Recent Labs   Lab Test 20  1425 20  0914   ALT 17 15   AST 12 12     No results found for: TROPI    No lab results found.    Lab Results   Component Value Date    CHOL 125 2020     Lab Results   Component Value Date    HDL 58 2020     Lab Results   Component Value Date    LDL 54 2020     Lab Results   Component Value Date    TRIG 66 2020     No results found for: CHOLHDLRATIO       TSH   Date Value Ref Range Status   2019 3.42 0.40 - 4.00 mU/L Final       Recent Results (from the past 24 hour(s))   Chest XR,  PA & LAT    Narrative    XR CHEST TWO VIEWS   10/8/2020 4:05 PM     HISTORY: Dyspnea. Evaluate for edema.    COMPARISON: Chest x-ray 2020.      Impression    Impression: Two views of the chest. Small bilateral pleural effusions  and bibasilar atelectasis are present. Lungs are otherwise clear.  Heart size appears within normal limits and is unchanged. No evidence  of pneumothorax. Postop changes from ORIF proximal left humeral  fracture is noted.    ELIAS GARCIA MD   Echocardiogram Complete    Narrative    [id number]  [id number]  [id number]  [id number]^ZELDA^SANTINO^[facility]  Echocardiography Laboratory  [address] [place] [address]        Name: CRISTIAN GAMING  MRN: [id number]  : 1950  Study Date: 10/09/2020 11:06 AM  Age: 69 yrs  Gender: Female  Patient Location: [facility]  Reason For Study: SOB  Ordering Physician: SANTINO NDIAYE  Performed By: Vee Kitchen     BSA: 1.7 m2  Height: 64 in  Weight: 145 lb  HR: 86  BP: 133/73 mmHg  _____________________________________________________________________________  __        Procedure  Complete Portable Echo Adult.  _____________________________________________________________________________  __         Interpretation Summary     There is severe mitral stenosis.  Severe valvular aortic stenosis.  There is moderate to mod-severe (2-3+) mitral regurgitation.  There is moderate (2+) aortic regurgitation.  There is severe mitral annular calcification.  The visual ejection fraction is estimated at 60-65%.  Left ventricular systolic function is normal.  Right ventricular systolic pressure is elevated, consistent with moderate  pulmonary hypertension.  The ascending aorta is Mildly dilated.  The degree of aortic stenosis has progressed to severe stenosis.  _____________________________________________________________________________  __        Left Ventricle  The left ventricle is normal in size. There is normal left ventricular wall  thickness. The visual ejection fraction is estimated at 60-65%. Left  ventricular systolic function is normal. Diastolic function not assessed due  to significant mitral annular calcification. Septal motion is consistent with  conduction abnormality.     Right Ventricle  The right ventricle is normal in size and function.     Atria  The left atrium is severely dilated. Right atrial size is normal. The  interatrial septum bows toward the right atrium, consistent with increased  left atrial pressure.     Mitral Valve  There is severe mitral annular calcification. There is moderate to mod-severe  (2-3+) mitral regurgitation. The mean mitral valve gradient is 17.7 mmHg.  There is severe mitral stenosis. (heart rate 86 bpm).        Tricuspid Valve  There is mild (1+) tricuspid regurgitation. The right ventricular systolic  pressure is approximated at 35mmHg plus the right atrial pressure. IVC  diameter >2.1 cm collapsing <50% with sniff suggests a high RA pressure  estimated at 15 mmHg or greater. Right ventricular systolic pressure is  elevated, consistent with moderate pulmonary hypertension.     Aortic Valve  The aortic valve is not well visualized. The aortic valve appears  severely  calcified and with marked restriction of the aortic valve leaflets.The  subcostal view suggests that the aortic avlve is most likely trileaflet. There  is moderate (2+) aortic regurgitation. The peak AoV pressure gradient is 44.0  mmHg. The mean AoV pressure gradient is 26.7 mmHg. The calculated aortic valve  are is 0.88 cm^2. Severe valvular aortic stenosis.     Pulmonic Valve  There is trace pulmonic valvular regurgitation.     Vessels  The aortic root is normal size. The ascending aorta is Mildly dilated. IVC  diameter >2.1 cm collapsing <50% with sniff suggests a high RA pressure  estimated at 15 mmHg or greater.     Pericardium  There is no pericardial effusion.        Rhythm  Sinus rhythm was noted.  _____________________________________________________________________________  __  MMode/2D Measurements & Calculations     IVSd: 1.0 cm  LVIDd: 5.3 cm  LVIDs: 3.0 cm  LVPWd: 0.97 cm  FS: 43.3 %  LV mass(C)d: 202.1 grams  LV mass(C)dI: 118.4 grams/m2  Ao root diam: 2.8 cm  LA dimension: 4.7 cm  asc Aorta Diam: 3.8 cm  LA/Ao: 1.7  LVOT diam: 1.9 cm  LVOT area: 2.7 cm2  LA Volume (BP): 117.0 ml  LA Volume Index (BP): 68.4 ml/m2  RWT: 0.36           Doppler Measurements & Calculations  MV E max hermelindo: 235.0 cm/sec  MV A max hermelindo: 181.6 cm/sec  MV E/A: 1.3  MV max P.9 mmHg  MV mean P.7 mmHg  MV V2 VTI: 76.0 cm  MVA(VTI): 0.88 cm2  MV dec slope: 1096 cm/sec2  MV dec time: 0.21 sec  Ao V2 max: 330.0 cm/sec  Ao max P.0 mmHg  Ao V2 mean: 245.2 cm/sec  Ao mean P.7 mmHg  Ao V2 VTI: 76.5 cm  KRISTOFER(I,D): 0.88 cm2  KRISTOFER(V,D): 0.88 cm2  AI P1/2t: 255.3 msec  LV V1 max P.5 mmHg  LV V1 max: 106.2 cm/sec  LV V1 VTI: 24.4 cm  SV(LVOT): 67.1 ml  SI(LVOT): 39.3 ml/m2  TR max hermelindo: 271.9 cm/sec  TR max P.7 mmHg  AV Hermelindo Ratio (DI): 0.32  KRISTOFER Index (cm2/m2): 0.51              _____________________________________________________________________________  __        Report approved by: Jovani Garg,  Guille 10/09/2020 01:00 PM          EKG: sinus rhythm

## 2020-10-09 NOTE — CONSULTS
Cardio dictated  Discussed with dr mehta  Probably home after transfusion and referral back to [facility] cardiology

## 2020-10-09 NOTE — PLAN OF CARE
Discharge    Patient discharged to home via car with daughter  Care plan note A&O4, pt is calm and cooperative. VSS on RA. Denies pain. Behavioral aggression tool green. Up independently in room, calls appropriately. Pt infusing iron prior to discharge. Hgb 8.2. + for heart murmur, LS clear, +BS. Trace edema BLE. Pt will follow-up with [place] cardiology and PCP.     Listed belongings gathered and returned to patient. Yes  Care Plan and Patient education resolved: Yes  Prescriptions if needed, hard copies sent with patient  Yes  Home and hospital acquired medications returned to patient: Yes  Medication Bin checked and emptied on discharge Yes  Follow up appointment made for patient: Yes

## 2020-10-09 NOTE — TELEPHONE ENCOUNTER
Health Call Center    Phone Message    May a detailed message be left on voicemail: yes     Reason for Call: Other: Dr. Terry Islas calling to get pt in sooner at cardio [facility]. (currently scheduled 11/25) Dr. Islas works out of [facility] and stated he got the okay to move the pts appt up but did not have any dates/times. He is hoping to get the pt in within the next few weeks. I attempted to schedule but did not have any openings. Please call back pt to discuss sooner options or call back Dr. Islas at [phone] with questions. Thank you     Action Taken: Message routed to:  [facility]    Travel Screening: Not Applicable

## 2020-10-09 NOTE — UTILIZATION REVIEW
Concurrent stay review; Secondary Review Determination     [facility]          Under the authority of the Utilization Management Committee, the utilization review process indicated a secondary review on the above patient.  The review outcome is based on review of the medical records, discussions with staff, and applying clinical experience noted on the date of the review.          (x) Observation Status Appropriate - Concurrent stay review    RATIONALE FOR DETERMINATION      69 year old female with anemia with suspected GI bleed, who presents with with fatigue and SOB. superficial left femoral artery occlusion in 3-2019 treated with thrombectomy and placed on Eliquis. g 6.2 s/p 2 units RBC, Hg 8.2. 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      This document was produced using voice recognition software       The information on this document is developed by the utilization review team in order for the business office to ensure compliance.  This only denotes the appropriateness of proper admission status and does not reflect the quality of care rendered.         The definitions of Inpatient Status and Observation Status used in making the determination above are those provided in the CMS Coverage Manual, Chapter 1 and Chapter 6, section 70.4.      Sincerely,     TIERRA ESCALANTE MD    System Medical Director  Utilization Management  [facility].

## 2020-10-09 NOTE — PROGRESS NOTES
DATE & TIME: 10/8/20. PM shift.    IET: Regular  Cognitive Concerns/ Orientation : A&Ox 4   BEHAVIOR & AGGRESSION TOOL COLOR: Green.  CIWA SCORE: NA   ABNL VS/O2: VSS except HR tachy  MOBILITY: A x 1 with belt. SOB with ambulation. Denied dizziness. Lung sound clear.  PAIN MANAGMENT: Denies  DBOWEL/BLADDER: continent of bladder. Bedside commode. No Bm.  ABNL LAB/BG: . HGB recheck  DRAIN/DEVICES: PIV on right. SL  TELEMETRY RHYTHM: NA  SKIN: bruise  TESTS/PROCEDURES: Echocardiogram tomorrow.  D/C DAY/GOALS/PLACE: pending  OTHER IMPORTANT INFO: patient received 1 Unit of PRBC. No reaction. Blood bank requested to recheck HGB before transfusing 2 unit of blood.. Hgb result pending. Patient still need 2 unit to be transfuse. Nicotine patch on left arm.  MD/RN ROUNDING SIGNED OFF D/E SHIFT: NA  COMMIT TO SIT DONE AND SIGNED OFF Yes

## 2020-10-09 NOTE — DISCHARGE SUMMARY
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  Hospitalized 9/16 to 9/18/20 with anemia, seen by [facility], EGD normal, had colonoscopy 9/4/19 which showed diverticulosis and internal hemorrhoids, and was on Eliqus but stopped, but continues on aspirin 81 mg daily.  She stopped Iron pills on 9/16/20, but still reports black bowel movement -- constipated with BM every 4-5 days.  Does not take pepto-bismal.  Turned in [facility] yesterday.   9/16   Hgb 7.2    Got 1 unit PRBC's    hgb 7.7 on discharge 9/18 9/29   Hgb 7.0    Got 1 unit PRBC's      She notes this SOB at night has been present for several weeks but she had onset of bilateral lower extremity edema starting this weekend.  It is now taking her 10-15 minutes to catch her breath after walking even to the bathroom.  Her home blood pressure yesterday was 83/33.      Last Echocardiogram 8/13/13 showed:  1. Global and regional left ventricular function is normal with an EF of 60-  65%.  2. Right ventricular function, chamber size, wall motion, and thickness  are  normal.  3. Moderate to severe mitral annular calcification is present. There is  moderate to severe mitral valve stenosis with mean gradient of 14 mm Hg at 93  bpm. The gradient is likely exaggerated by AI.  4. Moderate mitral insufficiency is present.  5. Moderate aortic insufficiency and mild aortic stenosis are present, mean  gradient 14mmHg, peak velocity 2.4m/s, KRISTOFER 1.6cm2.  6. Estimated pulmonary artery systolic pressure is 51 mmHg plus right atrial  pressure suggestive of moderate (pulmonary artery systolic pressure 50-75mmHg)  pulmonary hypertension.  7. IVC diameter >2.1 cm collapsing <50% with sniff suggests a high RA pressure  estimated at 15 mmHg or greater.     In ER, HR 92 with /54, O2 sat 100%, and Hgb 6.2 with MCV 80, Plt's 481, Iron 14 with TIBC 392 with 4% saturation, and BNP 2253.  CXR with small bilateral effusions.       Hospital Course   Admitted to observation, and anemia treated with 2 units PRBC, and IV Venofer 300 mg for 2 doses to treat her underlying iron deficiency, and restarted on her oral iron.  Her haptoglobin was normal, so doubt hemolysis related to valvular disease.     Had Echo here which showed:  There is severe mitral stenosis.  Severe valvular aortic stenosis.  There is moderate to mod-severe (2-3+) mitral regurgitation.  There is moderate (2+) aortic regurgitation.  There is severe mitral annular calcification.  The visual ejection fraction is estimated at 60-65%.  Left ventricular systolic function is normal.  Right ventricular systolic pressure is elevated, consistent with moderate  pulmonary hypertension.  The ascending aorta is Mildly dilated.  The degree of aortic stenosis has progressed to severe stenosis.    Dr. Lane reviewed the echo results and suspect she probably has underlying rheumatic heart disease, and she had a cardiology appointment with [facility] in [place] for Nov 25, but given the severity of her condition the clinic was call and she  will be seen within 2 weeks.  Dr. Lane suspects she will need a surgical replacement of both Aortic and Mitral valve, and the hope is her GI evaluation will be completed prior to that.  Given her severe aortic stenosis, it is possible her GI bleed may be related to AVM's associated with that (Heyde's syndrome).  Hgb 8.2 at discharge.       Louis Islas MD, MD  Pager: [phone]  Cell Phone:  [phone]       Significant Results and Procedures   As above    Pending Results   These results will be followed up by Dr. Islas  Unresulted Labs Ordered in the Past 30 Days of this Admission     No orders found from 9/8/2020 to 10/9/2020.          Code Status   Full Code       Primary Care Physician   Tamiko Coley    Physical Exam                      Vitals:    10/08/20 1427 10/08/20 1909   Weight: 65.8 kg (145 lb) 66.8 kg (147 lb 4.8 oz)     Vital Signs with Ranges     I/O last 3 completed shifts:  In: 1423 [P.O.:480]  Out: -     Exam on discharge:   Lungs clear  CV with 3/6 holo-systolic murmer with opening click   Ankle with 1+ bilateral ankle edema    Discharge Disposition   Discharged to home  Condition at discharge: Fair    Consultations This Hospital Stay   CARDIOLOGY IP CONSULT    Time Spent on this Encounter   I spent a total of 35 minutes discharging this patient.     Discharge Orders      Reason for your hospital stay    Anemia, and heart failure related to worsening Aortic and Mitral Valve disease.     Follow-up and recommended labs and tests     Follow up with primary care provider, Tamiko Coley, in 5 days, check CBC then, and follow-up with Cardiology at the [facility] -- they will move up your appointment to be in 2 weeks (instead of Nov 25).  Call the clinic at [phone] if questions.     Activity    Your activity upon discharge: activity as tolerated     Discharge Instructions    Call Dr. Islas if any medical questions at Cell Phone [phone].     Full Code     Diet    Follow  this diet upon discharge:       No added salt (3 gm sodium diet)     Discharge Medications   Discharge Medication List as of 10/9/2020  4:43 PM      START taking these medications    Details   nicotine (NICODERM CQ) 21 MG/24HR 24 hr patch Place 1 patch onto the skin daily, Disp-30 patch, R-1, E-Prescribe      senna-docusate (SENOKOT-S/PERICOLACE) 8.6-50 MG tablet Take 1-2 tablets by mouth 2 times daily, Disp-100 tablet, R-1, E-PrescribeToo prevent constipation      vitamin C (ASCORBIC ACID) 500 MG tablet Take 1 tablet (500 mg) by mouth daily, Disp-100 tablet, R-1, E-Prescribe         CONTINUE these medications which have NOT CHANGED    Details   acetaminophen (ACETAMINOPHEN 8 HOUR) 650 MG CR tablet Take 1,300 mg by mouth every 8 hours as needed , Historical      alendronate (FOSAMAX) 70 MG tablet TAKE 1 TABLET BY MOUTH ONE TIME PER WEEK, Disp-12 tablet,R-0, E-Prescribe      aspirin (ASA) 81 MG EC tablet Take 1 tablet (81 mg) by mouth daily, Disp-100 tablet, R-3, E-PrescribeFuture refills by PCP Dr. Tamiko Coley with phone number [phone].      atorvastatin (LIPITOR) 40 MG tablet TAKE 1 TABLET BY MOUTH EVERY DAY, Disp-90 tablet,R-3, E-Prescribe      buPROPion (ZYBAN) 150 MG 12 hr tablet TAKE 1 TABLET BY MOUTH 2 TIMES DAILY, Disp-60 tablet,R-3, E-Prescribe      calcium carb 1250 mg, 500 mg [place],/vitamin D 200 unit (CALCIUM 500/D) 500-200 MG-UNIT per tablet Take 1 tablet by mouth 2 times daily , Historical      ferrous sulfate (FEROSUL) 325 (65 Fe) MG tablet Take 325 mg by mouth 2 times daily, Historical      gabapentin (NEURONTIN) 300 MG capsule TAKE 1 CAPSULE (300 MG) BY MOUTH ONE OR TWO TIMES DAILY, Disp-90 capsule,R-1, E-Prescribe      glucosamine-chondroitin 500-400 MG CAPS per capsule Take 1 capsule by mouth 2 times daily , Historical      insulin detemir (LEVEMIR PEN) 100 UNIT/ML pen Inject 5 Units Subcutaneous At Bedtime , Historical      insulin pen needle (29G X 12MM) 29G X 12MM miscellaneous Use 1 pen  needles daily or as directed.Disp-100 each, [id number]      Melatonin 10 MG TABS tablet Take 10 mg by mouth At Bedtime, Historical      metFORMIN (GLUCOPHAGE) 1000 MG tablet TAKE 1 TABLET BY MOUTH TWICE A DAY WITH MEALS, Disp-180 tablet,R-1, E-Prescribe      Multiple Vitamin (MULTI-VITAMINS) TABS Take 1 tablet by mouth daily , Historical      oxybutynin (DITROPAN) 5 MG tablet TAKE 1 TABLET BY MOUTH TWICE A DAY, Disp-180 tablet,R-3, E-Prescribe      pantoprazole (PROTONIX) 40 MG EC tablet TAKE 1 TABLET BY MOUTH EVERY DAY, Disp-90 tablet,R-1, E-PrescribeDX Code Needed  .      traZODone (DESYREL) 50 MG tablet TAKE 1-2 TABLETS ( MG) BY MOUTH AT BEDTIME, Disp-180 tablet,R-1, E-Prescribe           Allergies   Allergies   Allergen Reactions     Indomethacin Other (See Comments)     Dizziness and disorientation     Tramadol Nausea and Vomiting     Data   Most Recent 3 CBC's:  Recent Labs   Lab Test 10/09/20  0744 10/08/20  2248 10/08/20  1438 09/29/20  1750   WBC 7.1  --  8.7 9.6   HGB 8.2* 6.8* 6.2* 7.5*   MCV 80  --  80 85     --  481* 515*      Most Recent 3 BMP's:  Recent Labs   Lab Test 10/08/20  1438 09/29/20  1750 09/29/20  1302    139 141   POTASSIUM 4.1 3.9 4.0   CHLORIDE 107 108 109   CO2 26 26 23   BUN 16 14 13   CR 0.60 0.66 0.59   ANIONGAP 6 5 9   KAYLEEN 9.1 8.5 8.5   * 102* 114*     Most Recent 2 LFT's:  Recent Labs   Lab Test 09/16/20  1425 04/13/20  0914   AST 12 12   ALT 17 15   ALKPHOS 51 57   BILITOTAL 0.3 0.3     Most Recent INR's and Anticoagulation Dosing History:  Anticoagulation Dose History     Recent Dosing and Labs Latest Ref Rng & Units 3/4/2019 9/29/2020    INR 0.86 - 1.14 1.00 1.13        Most Recent 3 Troponin's:No lab results found.  Most Recent Cholesterol Panel:  Recent Labs   Lab Test 04/13/20  0914   CHOL 125   LDL 54   HDL 58   TRIG 66     Most Recent 6 Bacteria Isolates From Any Culture (See EPIC Reports for Culture Details):  Recent Labs   Lab Test  06/11/20  1339 02/27/20  0720 09/13/19  1000 07/30/19  1440 03/13/19  1226   CULT >100,000 colonies/mL  mixed urogenital kaylin    Susceptibility testing not routinely done >100,000 colonies/mL  Aerococcus urinae  Identification obtained by MALDI-TOF mass spectrometry research use only database. Test   characteristics determined and verified by the [facility]   ([facility]) [place], [place].  *  <10,000 colonies/mL  urogenital kaylin  Susceptibility testing not routinely done   >100,000 colonies/mL  Aerococcus urinae  Identification obtained by MALDI-TOF mass spectrometry research use only database. Test   characteristics determined and verified by the [facility]   ([facility]) [place], [place].  * >100,000 colonies/mL  mixed urogenital kaylin   >100,000 colonies/mL  Escherichia coli  *  10,000 to 50,000 colonies/mL  mixed urogenital kaylin  Susceptibility testing not routinely done       Most Recent TSH, T4 and A1c Labs:  Recent Labs   Lab Test 09/16/20  1425 07/30/19  1441 07/30/19  1441   TSH  --   --  3.42   A1C 5.7*   < > 5.3    < > = values in this interval not displayed.

## 2020-10-09 NOTE — PLAN OF CARE
DATE & TIME: 10/8 Night   Cognitive Concerns/ Orientation : Alert and oriented   BEHAVIOR & AGGRESSION TOOL COLOR: Green, pleasant  ABNL VS/O2: Soft BP but improved with iron and PRBC, Other VSS on room air (2L NC overnight due to desat while sleeping)  MOBILITY: SBA to commode  PAIN MANAGMENT:Denies  DIET: Regular  BOWEL/BLADDER:Continent, up to bedside commode. No Bm.  ABNL LAB/BG: Hgb 6.8 - received 2nd unit PRBC, recheck this AM  DRAIN/DEVICES: IV LUE SL  SKIN: pale, bruise. BLE +2 edema.   TESTS/PROCEDURES: Echo and cardiology consult today   D/C DAY/GOALS/PLACE: pending  OTHER IMPORTANT INFO: patient received 2nd unit of PRBC. Nicotine patch in place. Tylenol once for complaints of restless legs. DESAI but lung sounds clear. Murmur detected.

## 2020-10-09 NOTE — PLAN OF CARE
IET: Regular  Cognitive Concerns/ Orientation : A&Ox 4   BEHAVIOR & AGGRESSION TOOL COLOR: Green.  CIWA SCORE: NA   ABNL VS/O2: VSS except HR tachy  MOBILITY: A x 1 with belt. SOB with ambulation. Denied dizziness. Lung sound clear.  PAIN MANAGMENT: Denies  DBOWEL/BLADDER: continent of bladder. Bedside commode. No Bm.  ABNL LAB/BG: . HGB recheck  DRAIN/DEVICES: PIV on right. SL  TELEMETRY RHYTHM: NA  SKIN: bruise. BLE +2 edema. Gave iv Lasix once.  TESTS/PROCEDURES: Echocardiogram tomorrow.  D/C DAY/GOALS/PLACE: pending  OTHER IMPORTANT INFO: patient received 1 Unit of PRBC. No reaction. Blood bank requested to recheck HGB before transfusing 2nd unit of blood.. Hgb result pending. Patient still need 2nd unit to be transfuse. Nicotine patch on left arm.  MD/RN ROUNDING SIGNED OFF D/E SHIFT: NA  COMMIT TO SIT DONE AND SIGNED OFF Yes

## 2020-10-09 NOTE — TELEPHONE ENCOUNTER
Lora Pal MD. ,MS,FABRIDGER,[facility]  10/19/20 new  visit available per RN. Then Left message to return clinic call to .  Huy Arceo L.P.N.

## 2020-10-10 PROBLEM — I74.3 EMBOLISM AND THROMBOSIS OF ARTERIES OF LOWER EXTREMITY (H): Status: ACTIVE | Noted: 2020-10-10

## 2020-10-10 LAB
BLD PROD TYP BPU: NORMAL
BLD UNIT ID BPU: 0
BLOOD PRODUCT CODE: NORMAL
BPU ID: NORMAL
TRANSFUSION STATUS PATIENT QL: NORMAL
TRANSFUSION STATUS PATIENT QL: NORMAL

## 2020-10-13 ENCOUNTER — OFFICE VISIT (OUTPATIENT)
Dept: FAMILY MEDICINE | Facility: CLINIC | Age: 70
End: 2020-10-13
Payer: COMMERCIAL

## 2020-10-13 VITALS
SYSTOLIC BLOOD PRESSURE: 110 MMHG | WEIGHT: 148 LBS | TEMPERATURE: 97.8 F | HEIGHT: 64 IN | HEART RATE: 117 BPM | BODY MASS INDEX: 25.27 KG/M2 | OXYGEN SATURATION: 94 % | RESPIRATION RATE: 22 BRPM | DIASTOLIC BLOOD PRESSURE: 58 MMHG

## 2020-10-13 DIAGNOSIS — I06.0 RHEUMATIC AORTIC STENOSIS: ICD-10-CM

## 2020-10-13 DIAGNOSIS — I05.0 SEVERE MITRAL STENOSIS BY PRIOR ECHOCARDIOGRAM: ICD-10-CM

## 2020-10-13 DIAGNOSIS — I35.1 AORTIC VALVE INSUFFICIENCY, ETIOLOGY OF CARDIAC VALVE DISEASE UNSPECIFIED: ICD-10-CM

## 2020-10-13 DIAGNOSIS — K92.2 CHRONIC GI BLEEDING: ICD-10-CM

## 2020-10-13 DIAGNOSIS — I34.0 MITRAL VALVE INSUFFICIENCY, UNSPECIFIED ETIOLOGY: ICD-10-CM

## 2020-10-13 DIAGNOSIS — I50.9 HEART FAILURE DUE TO VALVULAR DISEASE, UNSPECIFIED HEART FAILURE TYPE (H): ICD-10-CM

## 2020-10-13 DIAGNOSIS — I38 HEART FAILURE DUE TO VALVULAR DISEASE, UNSPECIFIED HEART FAILURE TYPE (H): ICD-10-CM

## 2020-10-13 DIAGNOSIS — D50.9 IRON DEFICIENCY ANEMIA, UNSPECIFIED IRON DEFICIENCY ANEMIA TYPE: Primary | ICD-10-CM

## 2020-10-13 LAB
ERYTHROCYTE [DISTWIDTH] IN BLOOD BY AUTOMATED COUNT: 22.3 % (ref 10–15)
HCT VFR BLD AUTO: 31 % (ref 35–47)
HGB BLD-MCNC: 9 G/DL (ref 11.7–15.7)
HGB BLD-MCNC: 9 G/DL (ref 11.7–15.7)
MCH RBC QN AUTO: 25.4 PG (ref 26.5–33)
MCHC RBC AUTO-ENTMCNC: 29 G/DL (ref 31.5–36.5)
MCV RBC AUTO: 87 FL (ref 78–100)
PLATELET # BLD AUTO: 366 10E9/L (ref 150–450)
RBC # BLD AUTO: 3.55 10E12/L (ref 3.8–5.2)
WBC # BLD AUTO: 13.1 10E9/L (ref 4–11)

## 2020-10-13 PROCEDURE — 99214 OFFICE O/P EST MOD 30 MIN: CPT | Performed by: FAMILY MEDICINE

## 2020-10-13 PROCEDURE — 85027 COMPLETE CBC AUTOMATED: CPT | Performed by: FAMILY MEDICINE

## 2020-10-13 PROCEDURE — 85018 HEMOGLOBIN: CPT | Mod: 59 | Performed by: FAMILY MEDICINE

## 2020-10-13 PROCEDURE — 36415 COLL VENOUS BLD VENIPUNCTURE: CPT | Performed by: FAMILY MEDICINE

## 2020-10-13 ASSESSMENT — MIFFLIN-ST. JEOR: SCORE: 1186.32

## 2020-10-13 ASSESSMENT — PAIN SCALES - GENERAL: PAINLEVEL: NO PAIN (0)

## 2020-10-13 NOTE — PROGRESS NOTES
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 No   Problems taking medications regularly:  None  Medication changes since discharge: None  Problems adhering to non-medication therapy:  None    Summary of hospitalization:  [facility] discharge summary reviewed  Diagnostic Tests/Treatments reviewed.  Follow up needed: cardiology and gastroenterology  Other Healthcare Providers Involved in Patient s Care:         None  Update since discharge: stable. Post Discharge Medication Reconciliation: discharge medications reconciled, continue medications without change.  Plan of care communicated with patient        Review of Systems   CONSTITUTIONAL: NEGATIVE for fever, chills, change in weight  ENT/MOUTH: NEGATIVE for ear, mouth and throat problems  RESP:sob due to anemia  No PND or orthopnea  Weight is stable   CV: NEGATIVE for chest pain, palpitations o  GI: NEGATIVE for nausea, abdominal pain, heartburn, or change in bowel habits  No blood in stool    MUSCULOSKELETAL: NEGATIVE for significant arthralgias or myalgia    Rest of the ROS is Negative  "except see above and Problem list [stable]    Objective    /58   Pulse 117   Temp 97.8  F (36.6  C) (Oral)   Resp 22   Ht 1.626 m (5' 4\")   Wt 67.1 kg (148 lb)   SpO2 94%   BMI 25.40 kg/m    Body mass index is 25.4 kg/m .  Physical Exam   GENERAL: healthy, alert and no distress  NECK: no adenopathy, no asymmetry, masses, or scars and thyroid normal to palpation  RESP: lungs clear to auscultation - no rales, rhonchi or wheezes  CV: regular rate and rhythm, normal S1 S2, no S3 or S4, no murmur, click or rub, no peripheral edema and peripheral pulses strong  ABDOMEN: soft, nontender, no hepatosplenomegaly, no masses and bowel sounds normal  MS: no gross musculoskeletal defects noted, 1 + pedal edema    Results for orders placed or performed in visit on 10/13/20 (from the past 24 hour(s))   Hemoglobin   Result Value Ref Range    Hemoglobin 9.0 (L) 11.7 - 15.7 g/dL   CBC with platelets   Result Value Ref Range    WBC 13.1 (H) 4.0 - 11.0 10e9/L    RBC Count 3.55 (L) 3.8 - 5.2 10e12/L    Hemoglobin 9.0 (L) 11.7 - 15.7 g/dL    Hematocrit 31.0 (L) 35.0 - 47.0 %    MCV 87 78 - 100 fl    MCH 25.4 (L) 26.5 - 33.0 pg    MCHC 29.0 (L) 31.5 - 36.5 g/dL    RDW 22.3 (H) 10.0 - 15.0 %    Platelet Count 366 150 - 450 10e9/L           Assessment & Plan     Iron deficiency anemia, unspecified iron deficiency anemia type  Advised contune Iron tablets  Follow up HGB check 1 month  If sob worse or any  Change go to ER  - Hemoglobin  - CBC with platelets    Severe Mitral Stenosis   Has appointment with Cardiology in a week    Rheumatic aortic stenosis  As above    Aortic valve insufficiency, etiology of cardiac valve disease unspecified  As above    Mitral valve insufficiency, unspecified etiology  As above    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, unspecified heart failure type (H)  And anemia  Pt will need valve replacement after anemia ha Improved  Advised check daily weight   If any worsening -go to ER    Chronic GI " bleeding  Pt advised to follow up about capsule  study with GI      Return in about 1 month (around 11/13/2020) for recheck.    Tamiko Coley MD  [facility]

## 2020-10-14 ENCOUNTER — TELEPHONE (OUTPATIENT)
Dept: CARDIOLOGY | Facility: CLINIC | Age: 70
End: 2020-10-14

## 2020-10-14 NOTE — TELEPHONE ENCOUNTER
Left message to return clinic call to .  Huy Arceo L.P.N.    Lora Abdi MD. ,MS,FABRIDGER,[facility]  Visit to be changed to Thanh Morse MD.,Cardiology  Visit tues. per RN. . 2 visits scheduled at this time .Huy Arceo L.P.N.,[facility].

## 2020-10-15 ENCOUNTER — TELEPHONE (OUTPATIENT)
Dept: CARDIOLOGY | Facility: CLINIC | Age: 70
End: 2020-10-15

## 2020-10-15 NOTE — TELEPHONE ENCOUNTER
Called patient to introduce myself and let her know that after her telephone visit with Dr Morse in [place] the CV surgery team would be contacting her regarding seeing a surgeon pending Dr Morse's recommendations.  Patient verbalized understanding.  Patient given contact information for this writer if she thinks of any questions. Patient also encouraged to continue to work on quitting smoking as this is highly recommended prior to heart surgery.  Patient agreed to plan.

## 2020-10-16 ENCOUNTER — OFFICE VISIT (OUTPATIENT)
Dept: OBGYN | Facility: CLINIC | Age: 70
End: 2020-10-16
Payer: COMMERCIAL

## 2020-10-16 VITALS
BODY MASS INDEX: 25.23 KG/M2 | HEART RATE: 110 BPM | DIASTOLIC BLOOD PRESSURE: 78 MMHG | OXYGEN SATURATION: 98 % | WEIGHT: 147 LBS | SYSTOLIC BLOOD PRESSURE: 147 MMHG

## 2020-10-16 DIAGNOSIS — N81.89 PELVIC RELAXATION: Primary | ICD-10-CM

## 2020-10-16 DIAGNOSIS — N81.6 RECTOCELE: ICD-10-CM

## 2020-10-16 DIAGNOSIS — Z96.0 PRESENCE OF PESSARY: ICD-10-CM

## 2020-10-16 DIAGNOSIS — N81.11 MIDLINE CYSTOCELE: ICD-10-CM

## 2020-10-16 PROCEDURE — 99212 OFFICE O/P EST SF 10 MIN: CPT | Performed by: OBSTETRICS & GYNECOLOGY

## 2020-10-16 NOTE — PROGRESS NOTES
Rajani Guo is a 69 year old female, .  She presents today for follow up regarding pelvic relaxation.  She has been doing well with the pessary.  Her symptoms have significantly improved and she has been happy using the pessary.  She has not had any unusual discharge, incontinence, or bleeding.   She does not have any bladder discomfort.      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     GI bleed      History of partial thyroidectomy 2018     Hyperlipidemia LDL goal <100 2018     Hypertension      Insomnia, unspecified type 2018     Mitral regurgitation      Mitral stenosis      Pulmonary hypertension (H)      Tobacco use disorder      Past Surgical History:   Procedure Laterality Date     COLONOSCOPY N/A 2019    Procedure: COLONOSCOPY;  Surgeon: Marie Todd MD;  Location:  GI     ESOPHAGOSCOPY, GASTROSCOPY, DUODENOSCOPY (EGD), COMBINED N/A 2019    Procedure: ESOPHAGOGASTRODUODENOSCOPY (EGD);  Surgeon: Marie Todd MD;  Location:  GI     ESOPHAGOSCOPY, GASTROSCOPY, DUODENOSCOPY (EGD), COMBINED N/A 2020    Procedure: ESOPHAGOGASTRODUODENOSCOPY (EGD);  Surgeon: Ten Ibrahim DO;  Location:  GI     IR ANGIOGRAM THROUGH CATHETER FOLLOW UP  3/5/2019     IR LOWER EXTREMITY ANGIOGRAM LEFT  3/4/2019     KNEE SURGERY Right     right knee torn meniscus surgery     OVARY SURGERY  1971    1 ovary removed     RELEASE CARPAL TUNNEL Right      RELEASE TRIGGER FINGER BILATERAL       SHOULDER SURGERY Left     rotator cuff repair, plate placement     THYROID  SURGERY  1988    partial thyroidectomy     Current Outpatient Medications   Medication Sig Dispense Refill     acetaminophen (ACETAMINOPHEN 8 HOUR) 650 MG CR tablet Take 1,300 mg by mouth every 8 hours as needed        atorvastatin (LIPITOR) 40 MG tablet TAKE 1 TABLET BY MOUTH EVERY DAY 90 tablet 3     buPROPion (ZYBAN) 150 MG 12 hr tablet TAKE 1 TABLET BY MOUTH 2 TIMES DAILY 60 tablet 3     calcium carb 1250 mg, 500 mg [place],/vitamin D 200 unit (CALCIUM 500/D) 500-200 MG-UNIT per tablet Take 1 tablet by mouth 2 times daily        ferrous sulfate (FEROSUL) 325 (65 Fe) MG tablet Take 325 mg by mouth 2 times daily       gabapentin (NEURONTIN) 300 MG capsule TAKE 1 CAPSULE (300 MG) BY MOUTH ONE OR TWO TIMES DAILY (Patient taking differently: Take 300 mg by mouth At Bedtime ) 90 capsule 1     glucosamine-chondroitin 500-400 MG CAPS per capsule Take 1 capsule by mouth 2 times daily        insulin detemir (LEVEMIR PEN) 100 UNIT/ML pen Inject 5 Units Subcutaneous At Bedtime        insulin pen needle (29G X 12MM) 29G X 12MM miscellaneous Use 1 pen needles daily or as directed. 100 each 11     Melatonin 10 MG TABS tablet Take 10 mg by mouth At Bedtime       metFORMIN (GLUCOPHAGE) 1000 MG tablet TAKE 1 TABLET BY MOUTH TWICE A DAY WITH MEALS 180 tablet 1     Multiple Vitamin (MULTI-VITAMINS) TABS Take 1 tablet by mouth daily        nicotine (NICODERM CQ) 21 MG/24HR 24 hr patch Place 1 patch onto the skin daily 30 patch 1     oxybutynin (DITROPAN) 5 MG tablet TAKE 1 TABLET BY MOUTH TWICE A  tablet 3     pantoprazole (PROTONIX) 40 MG EC tablet TAKE 1 TABLET BY MOUTH EVERY DAY 90 tablet 1     senna-docusate (SENOKOT-S/PERICOLACE) 8.6-50 MG tablet Take 1-2 tablets by mouth 2 times daily 100 tablet 1     traZODone (DESYREL) 50 MG tablet TAKE 1-2 TABLETS ( MG) BY MOUTH AT BEDTIME 180 tablet 1     vitamin C (ASCORBIC ACID) 500 MG tablet Take 1 tablet (500 mg) by mouth daily 100 tablet 1     alendronate (FOSAMAX) 70 MG  tablet TAKE 1 TABLET BY MOUTH ONE TIME PER WEEK (Patient not taking: Reported on 10/16/2020) 12 tablet 0     aspirin (ASA) 81 MG EC tablet Take 1 tablet (81 mg) by mouth daily (Patient not taking: Reported on 10/13/2020) 100 tablet 3     Social History     Socioeconomic History     Marital status:      Spouse name: Not on file     Number of children: 1     Years of education: Not on file     Highest education level: Not on file   Occupational History     Not on file   Social Needs     Financial resource strain: Not on file     Food insecurity     Worry: Not on file     Inability: Not on file     Transportation needs     Medical: Not on file     Non-medical: Not on file   Tobacco Use     Smoking status: Current Every Day Smoker     Packs/day: 1.00     Years: 52.00     Pack years: 52.00     Smokeless tobacco: Never Used   Substance and Sexual Activity     Alcohol use: Yes     Comment: 2 glasses of wine a week     Drug use: No     Sexual activity: Never   Lifestyle     Physical activity     Days per week: Not on file     Minutes per session: Not on file     Stress: Not on file   Relationships     Social connections     Talks on phone: Not on file     Gets together: Not on file     Attends Congregation service: Not on file     Active member of club or organization: Not on file     Attends meetings of clubs or organizations: Not on file     Relationship status: Not on file     Intimate partner violence     Fear of current or ex partner: Not on file     Emotionally abused: Not on file     Physically abused: Not on file     Forced sexual activity: Not on file   Other Topics Concern     Parent/sibling w/ CABG, MI or angioplasty before 65F 55M? Not Asked   Social History Narrative    , has one daughter who lives with her in patient's house.  Is full code.  (last updated 10/8/2020)      Family History   Problem Relation Age of Onset     Diabetes Type 2  Mother      Lung Cancer Father      Diabetes Sister       Diabetes Brother      Diabetes Brother      Diabetes Type 2  Brother      Glaucoma No family hx of      Macular Degeneration No family hx of        Review of Systems:  10 point ROS of systems including Constitutional, Eyes, Respiratory, Cardiovascular, Gastroenterology, Genitourinary, Integumentary, Muscularskeletal, Psychiatric were all negative except for pertinent positives noted in my HPI and in the PMH.      Exam  BP (!) 147/78 (BP Location: Right arm, Cuff Size: Adult Regular)   Pulse 110   Wt 66.7 kg (147 lb)   SpO2 98%   BMI 25.23 kg/m    General:  WNWD female, NAD  Alert  Oriented x 3  Gait:  Normal  Skin:  Normal skin turgor  HEENT:  NC/AT, EOMI  Abdomen:  Non-tender, non-distended.  Vulva: No external lesions, normal hair distribution, no adenopathy  BUS:  Normal, no masses noted  Urethra:  No hypermobility seen  Urethral meatus:  No masses noted  Vagina: the stalk of the Gellhorn pessary is seen.  The pessary was removed and cleaned.  The vaginal inspection did not reveal any abrasions or other lesions  Perianal:  No masses noted  Extremities:  No clubbing, no cyanosis and no edema.      Assessment  pessary check  Pelvic relaxation  Cystocele  Rectocele      Plan  The pessary was removed and cleaned and reinserted.   RTC 3-4 months for pessary check (she has other medical issues she is dealing with, in addition to the COVID-19 pandemic, making her an at risk patient.   Questions seem to be answered.     Hugo Patrick MD

## 2020-10-20 ENCOUNTER — VIRTUAL VISIT (OUTPATIENT)
Dept: CARDIOLOGY | Facility: CLINIC | Age: 70
End: 2020-10-20
Payer: COMMERCIAL

## 2020-10-20 DIAGNOSIS — R06.09 DYSPNEA ON EXERTION: ICD-10-CM

## 2020-10-20 DIAGNOSIS — F17.200 CURRENT SMOKER: ICD-10-CM

## 2020-10-20 DIAGNOSIS — I35.0 SEVERE AORTIC STENOSIS: Primary | ICD-10-CM

## 2020-10-20 DIAGNOSIS — I73.9 PERIPHERAL ARTERIAL DISEASE (H): ICD-10-CM

## 2020-10-20 DIAGNOSIS — I05.0 SEVERE MITRAL STENOSIS BY PRIOR ECHOCARDIOGRAM: ICD-10-CM

## 2020-10-20 DIAGNOSIS — D50.0 IRON DEFICIENCY ANEMIA DUE TO CHRONIC BLOOD LOSS: ICD-10-CM

## 2020-10-20 DIAGNOSIS — I34.0 SEVERE MITRAL REGURGITATION: ICD-10-CM

## 2020-10-20 DIAGNOSIS — K92.2 RECURRENT GASTROINTESTINAL HEMORRHAGE: ICD-10-CM

## 2020-10-20 DIAGNOSIS — I35.1 MODERATE AORTIC INSUFFICIENCY: ICD-10-CM

## 2020-10-20 PROCEDURE — 99214 OFFICE O/P EST MOD 30 MIN: CPT | Mod: 95 | Performed by: INTERNAL MEDICINE

## 2020-10-20 NOTE — PROGRESS NOTES
"Rajani Guo is a 69 year old female who is being evaluated via a billable telephone visit.      The patient has been notified of following:     \"This telephone visit will be conducted via a call between you and your physician/provider. We have found that certain health care needs can be provided without the need for a physical exam.  This service lets us provide the care you need with a short phone conversation.  If a prescription is necessary we can send it directly to your pharmacy.  If lab work is needed we can place an order for that and you can then stop by our lab to have the test done at a later time.    Telephone visits are billed at different rates depending on your insurance coverage. During this emergency period, for some insurers they may be billed the same as an in-person visit.  Please reach out to your insurance provider with any questions.    If during the course of the call the physician/provider feels a telephone visit is not appropriate, you will not be charged for this service.\"    Patient has given verbal consent for Telephone visit?  Yes    How would you like to obtain your AVS? Mail a copy    Phone call duration: 30 minutes (phone call started 1:30 PM)    Referring provider: Tamiko Coley MD    Chief complaint: Dyspnea on exertion    HPI: Ms. Rajani Guo is a 69 year old  female with PMH significant for aortic and mitral valve stenosis, aortic and mitral regurgitation, recurrent GI bleeding, hypertension, type 2 diabetes, peripheral arterial disease status left SFA angioplasty in 2019.    Patient is being seen today through telephone encounter at request of Dr. Coley to discuss the recent echocardiogram showing severe mitral and severe aortic valve disease.  Prior echocardiograms dating back to March 2019 showed evidence of both aortic and mitral valve disease however the most recent study on 10/9/2020 reports worsening valve disease.      Patient tells me that over the last 6 months or so " she feels short of breath even walking in a grocery store.  She cannot do her ADLs without shortness of breath.  Reports occasional chest pressure.  Reports feeling dizzy from time to time.  No syncope.  Reports occasional palpitations.    Patient has history of multiple GI bleeding since 2019.  Patient was recently admitted to [facility] on 9/16/2020 with weakness and dizziness.  She was found to have hemoglobin drop to 7.2.  Hemoglobin remained stable throughout the hospitalization.  Patient received PRBC in the ED and had EGD which was unremarkable.  Subsequently she was readmitted again on 10/8/2020 with fatigue and shortness of breath and found to have hemoglobin at 6.2.  Patient received 2 more units and hemoglobin on discharge was 7.7.  Since being discharged she followed-up with [facility] and had pill camera study which showed 2 small AVMs in the small bowel.  There were not active bleeding.  Last colonoscopy on 9/4/2019 showed diverticulosis throughout the colon.  There were no evidence of GI bleeding.    Patient presented with acute limb ischemia in March 2019 and found to have acute occlusion of left SFA.  Patient underwent catheter directed lysis and angioplasty.  She was initially on apixaban which was later transitioned to aspirin.    Most recent labs 10/13/2020 show hemoglobin improved to 9 with normal platelet count.  BMP is normal.  Lipid profile is normal.     The patient's risk factor profile is: (+) HTN, (+) diabetes, (+) hyperlipidemia, (+) tobacco use.    I have personally reviewed echocardiogram 10/9/2020 which shows normal biventricular function, severe calcific aortic stenosis, moderate aortic regurgitation, moderate to severe mitral regurgitation and severe mitral annular calcification and severe mitral stenosis.    Medications, personal, family, and social history reviewed with patient and revised.    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     GI bleed      History of partial thyroidectomy 06/02/2018     Hyperlipidemia LDL goal <100 01/18/2018     Hypertension      Insomnia, unspecified type 01/18/2018     Mitral regurgitation      Mitral stenosis      Pulmonary hypertension (H)      Tobacco use disorder        CURRENT MEDICATIONS:  Current Outpatient Medications   Medication Sig Dispense Refill     acetaminophen (ACETAMINOPHEN 8 HOUR) 650 MG CR tablet Take 1,300 mg by mouth every 8 hours as needed        atorvastatin (LIPITOR) 40 MG tablet TAKE 1 TABLET BY MOUTH EVERY DAY 90 tablet 3     buPROPion (ZYBAN) 150 MG 12 hr tablet TAKE 1 TABLET BY MOUTH 2 TIMES DAILY 60 tablet 3     calcium carb 1250 mg, 500 mg [place],/vitamin D 200 unit (CALCIUM 500/D) 500-200 MG-UNIT per tablet Take 1 tablet by mouth 2 times daily        ferrous sulfate (FEROSUL) 325 (65 Fe) MG tablet Take 325 mg by mouth 2 times daily       gabapentin (NEURONTIN) 300 MG capsule TAKE 1 CAPSULE (300 MG) BY MOUTH ONE OR TWO TIMES DAILY (Patient taking differently: Take 300 mg by mouth At Bedtime ) 90 capsule 1     glucosamine-chondroitin 500-400 MG CAPS per capsule Take 1 capsule by mouth 2 times daily        insulin detemir (LEVEMIR PEN) 100 UNIT/ML pen Inject 5 Units Subcutaneous At Bedtime        Melatonin 10 MG TABS tablet Take 10 mg by mouth At Bedtime       metFORMIN (GLUCOPHAGE) 1000 MG tablet TAKE 1 TABLET BY MOUTH TWICE A DAY WITH MEALS 180 tablet 1     Multiple Vitamin (MULTI-VITAMINS) TABS Take 1 tablet by mouth daily        nicotine (NICODERM CQ) 21 MG/24HR 24 hr patch Place 1 patch onto the  skin daily 30 patch 1     oxybutynin (DITROPAN) 5 MG tablet TAKE 1 TABLET BY MOUTH TWICE A  tablet 3     pantoprazole (PROTONIX) 40 MG EC tablet TAKE 1 TABLET BY MOUTH EVERY DAY 90 tablet 1     senna-docusate (SENOKOT-S/PERICOLACE) 8.6-50 MG tablet Take 1-2 tablets by mouth 2 times daily 100 tablet 1     traZODone (DESYREL) 50 MG tablet TAKE 1-2 TABLETS ( MG) BY MOUTH AT BEDTIME 180 tablet 1     vitamin C (ASCORBIC ACID) 500 MG tablet Take 1 tablet (500 mg) by mouth daily 100 tablet 1     alendronate (FOSAMAX) 70 MG tablet TAKE 1 TABLET BY MOUTH ONE TIME PER WEEK (Patient not taking: Reported on 10/16/2020) 12 tablet 0     aspirin (ASA) 81 MG EC tablet Take 1 tablet (81 mg) by mouth daily (Patient not taking: Reported on 10/13/2020) 100 tablet 3     insulin pen needle (29G X 12MM) 29G X 12MM miscellaneous Use 1 pen needles daily or as directed. 100 each 11       PAST SURGICAL HISTORY:  Past Surgical History:   Procedure Laterality Date     COLONOSCOPY N/A 9/4/2019    Procedure: COLONOSCOPY;  Surgeon: Marie Todd MD;  Location:  GI     ESOPHAGOSCOPY, GASTROSCOPY, DUODENOSCOPY (EGD), COMBINED N/A 9/4/2019    Procedure: ESOPHAGOGASTRODUODENOSCOPY (EGD);  Surgeon: Marie Todd MD;  Location:  GI     ESOPHAGOSCOPY, GASTROSCOPY, DUODENOSCOPY (EGD), COMBINED N/A 9/17/2020    Procedure: ESOPHAGOGASTRODUODENOSCOPY (EGD);  Surgeon: Ten Ibrahim DO;  Location:  GI     IR ANGIOGRAM THROUGH CATHETER FOLLOW UP  3/5/2019     IR LOWER EXTREMITY ANGIOGRAM LEFT  3/4/2019     KNEE SURGERY Right 2013    right knee torn meniscus surgery     OVARY SURGERY  1971    1 ovary removed     RELEASE CARPAL TUNNEL Right 1988     RELEASE TRIGGER FINGER BILATERAL       SHOULDER SURGERY Left     rotator cuff repair, plate placement     THYROID SURGERY  1988    partial thyroidectomy       ALLERGIES:     Allergies   Allergen Reactions     Indomethacin Other (See Comments)     Dizziness and  disorientation     Tramadol Nausea and Vomiting       FAMILY HISTORY:  Family History   Problem Relation Age of Onset     Diabetes Type 2  Mother      Lung Cancer Father      Diabetes Sister      Diabetes Brother      Diabetes Brother      Diabetes Type 2  Brother      Glaucoma No family hx of      Macular Degeneration No family hx of          SOCIAL HISTORY:  Social History     Tobacco Use     Smoking status: Current Every Day Smoker     Packs/day: 0.50     Years: 52.00     Pack years: 26.00     Smokeless tobacco: Never Used     Tobacco comment: using with patches   Substance Use Topics     Alcohol use: Yes     Comment: 2 glasses of wine a week     Drug use: No       ROS:   Constitutional: No fever, chills, or sweats. Weight stable.   ENT: No visual disturbance, ear ache, epistaxis, sore throat.   Cardiovascular: As per HPI.   Respiratory: No cough, hemoptysis.    GI: No nausea, vomiting, hematemesis, melena, or hematochezia.   : No hematuria.   Integument: Negative.   Psychiatric: Negative.   Hematologic:  No easy bruising, no easy bleeding.  Neuro: Negative.   Musculoskeletal: No myalgia.       I have reviewed the labs and personally reviewed the imaging below and made my comment in the assessment and plan.    Labs:  CBC RESULTS:   Lab Results   Component Value Date    WBC 13.1 (H) 10/13/2020    RBC 3.55 (L) 10/13/2020    HGB 9.0 (L) 10/13/2020    HGB 9.0 (L) 10/13/2020    HCT 31.0 (L) 10/13/2020    MCV 87 10/13/2020    MCH 25.4 (L) 10/13/2020    MCHC 29.0 (L) 10/13/2020    RDW 22.3 (H) 10/13/2020     10/13/2020       BMP RESULTS:  Lab Results   Component Value Date     10/08/2020    POTASSIUM 4.1 10/08/2020    CHLORIDE 107 10/08/2020    CO2 26 10/08/2020    ANIONGAP 6 10/08/2020     (H) 10/08/2020    BUN 16 10/08/2020    CR 0.60 10/08/2020    GFRESTIMATED >90 10/08/2020    GFRESTBLACK >90 10/08/2020    KAYLEEN 9.1 10/08/2020        INR RESULTS:  Lab Results   Component Value Date    INR 1.13  09/29/2020    INR 1.00 03/04/2019       Echocardiogram [facility] 10/9/2020  There is severe mitral stenosis.  Severe valvular aortic stenosis.  There is moderate to mod-severe (2-3+) mitral regurgitation.  There is moderate (2+) aortic regurgitation.  There is severe mitral annular calcification.  The visual ejection fraction is estimated at 60-65%.  Left ventricular systolic function is normal.  Right ventricular systolic pressure is elevated, consistent with moderate  pulmonary hypertension.  The ascending aorta is Mildly dilated.  The degree of aortic stenosis has progressed to severe stenosis.      EKG 10/8/2020: sinus rhythm, normal.    Assessment and Plan:   Ms. Rajani Guo is a 69 year old  female with PMH significant for current smoker, aortic and mitral valve stenosis, aortic and mitral regurgitation, recurrent GI bleeding, hypertension, type 2 diabetes, and peripheral arterial disease status left SFA angioplasty in 3/2019.    1.  Severe aortic and mitral valve disease ( Severe aortic stenosis with moderate aortic regurgitation, severe mitral regurgitation and severe mitral stenosis): The patient reports worsening shortness of breath over the last 6 months.  Today I have discussed with the patient that likely anemia and severe valvular disease is contributing to her symptoms.  She has significant history of smoking for more than 57 years.  She is still an active smoker.  She might have coronary artery disease and obstructive CAD which might be contributing to worsening DESAI.  Therefore I recommended coronary angiogram.  Risks of coronary angiogram, including but not limited to, death, MI, stroke, emergent bypass, bleeding and contrast related kidney injury were discussed and patient is agreeable to proceed.  We will also do a right heart cath due to severe mitral stenosis and severe mitral regurgitation.      2.  Current smoker: No plans to quit.    3.  Recurrent GI bleeding: Follows with [facility].  So far  recent EGD is unremarkable.  PillCam showed only few small AVMs.  Hemoglobin stable at 9.  Patient remains on aspirin, atorvastatin, insulin, and iron.    Plan summary:  Right heart cath  Coronary angiogram  No medication changes  Return to clinic face-to-face after the procedures above.    Total phone call duration 30 minutes.    Please donot hesitate to contact me if you have any questions or concerns. Again, thank you for allowing me to participate in the care of your patient.    Thanh GARNER MD  [facility] Division of Cardiology  Pager [phone]

## 2020-10-20 NOTE — LETTER
"10/20/2020      RE: Rajani Guo  [address] [facility] [address]       Dear Colleague,    Thank you for the opportunity to participate in the care of your patient, Rajani Guo, at the [facility] HEART [facility]. Please see a copy of my visit note below.    Rajani Guo is a 69 year old female who is being evaluated via a billable telephone visit.      The patient has been notified of following:     \"This telephone visit will be conducted via a call between you and your physician/provider. We have found that certain health care needs can be provided without the need for a physical exam.  This service lets us provide the care you need with a short phone conversation.  If a prescription is necessary we can send it directly to your pharmacy.  If lab work is needed we can place an order for that and you can then stop by our lab to have the test done at a later time.    Telephone visits are billed at different rates depending on your insurance coverage. During this emergency period, for some insurers they may be billed the same as an in-person visit.  Please reach out to your insurance provider with any questions.    If during the course of the call the physician/provider feels a telephone visit is not appropriate, you will not be charged for this service.\"    Patient has given verbal consent for Telephone visit?  Yes    How would you like to obtain your AVS? Mail a copy    Phone call duration: 30 minutes (phone call started 1:30 PM)    Referring provider: Tamiko Coley MD    Chief complaint: Dyspnea on exertion    HPI: Ms. Rajani Guo is a 69 year old  female with PMH significant for aortic and mitral valve stenosis, aortic and mitral regurgitation, recurrent GI bleeding, hypertension, type 2 diabetes, peripheral arterial disease status left SFA angioplasty in 2019.    Patient is being seen today through telephone encounter at request of  " Brijesh to discuss the recent echocardiogram showing severe mitral and severe aortic valve disease.  Prior echocardiograms dating back to March 2019 showed evidence of both aortic and mitral valve disease however the most recent study on 10/9/2020 reports worsening valve disease.      Patient tells me that over the last 6 months or so she feels short of breath even walking in a grocery store.  She cannot do her ADLs without shortness of breath.  Reports occasional chest pressure.  Reports feeling dizzy from time to time.  No syncope.  Reports occasional palpitations.    Patient has history of multiple GI bleeding since 2019.  Patient was recently admitted to [facility] on 9/16/2020 with weakness and dizziness.  She was found to have hemoglobin drop to 7.2.  Hemoglobin remained stable throughout the hospitalization.  Patient received PRBC in the ED and had EGD which was unremarkable.  Subsequently she was readmitted again on 10/8/2020 with fatigue and shortness of breath and found to have hemoglobin at 6.2.  Patient received 2 more units and hemoglobin on discharge was 7.7.  Since being discharged she followed-up with [facility] and had pill camera study which showed 2 small AVMs in the small bowel.  There were not active bleeding.  Last colonoscopy on 9/4/2019 showed diverticulosis throughout the colon.  There were no evidence of GI bleeding.    Patient presented with acute limb ischemia in March 2019 and found to have acute occlusion of left SFA.  Patient underwent catheter directed lysis and angioplasty.  She was initially on apixaban which was later transitioned to aspirin.    Most recent labs 10/13/2020 show hemoglobin improved to 9 with normal platelet count.  BMP is normal.  Lipid profile is normal.     The patient's risk factor profile is: (+) HTN, (+) diabetes, (+) hyperlipidemia, (+) tobacco use.    I have personally reviewed echocardiogram 10/9/2020 which shows normal biventricular function, severe  calcific aortic stenosis, moderate aortic regurgitation, moderate to severe mitral regurgitation and severe mitral annular calcification and severe mitral stenosis.    Medications, personal, family, and social history reviewed with patient and revised.    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     GI bleed      History of partial thyroidectomy 06/02/2018     Hyperlipidemia LDL goal <100 01/18/2018     Hypertension      Insomnia, unspecified type 01/18/2018     Mitral regurgitation      Mitral stenosis      Pulmonary hypertension (H)      Tobacco use disorder        CURRENT MEDICATIONS:  Current Outpatient Medications   Medication Sig Dispense Refill     acetaminophen (ACETAMINOPHEN 8 HOUR) 650 MG CR tablet Take 1,300 mg by mouth every 8 hours as needed        atorvastatin (LIPITOR) 40 MG tablet TAKE 1 TABLET BY MOUTH EVERY DAY 90 tablet 3     buPROPion (ZYBAN) 150 MG 12 hr tablet TAKE 1 TABLET BY MOUTH 2 TIMES DAILY 60 tablet 3     calcium carb 1250 mg, 500 mg [place],/vitamin D 200 unit (CALCIUM 500/D) 500-200 MG-UNIT per tablet Take 1 tablet by mouth 2 times daily        ferrous sulfate (FEROSUL) 325 (65 Fe) MG tablet Take 325 mg by mouth 2 times daily       gabapentin (NEURONTIN) 300 MG capsule TAKE 1 CAPSULE (300 MG) BY MOUTH ONE OR TWO TIMES DAILY (Patient taking differently: Take 300 mg by mouth At Bedtime ) 90 capsule 1     glucosamine-chondroitin 500-400 MG CAPS per capsule Take 1 capsule by mouth 2 times daily        insulin detemir (LEVEMIR PEN) 100 UNIT/ML pen Inject 5 Units Subcutaneous  At Bedtime        Melatonin 10 MG TABS tablet Take 10 mg by mouth At Bedtime       metFORMIN (GLUCOPHAGE) 1000 MG tablet TAKE 1 TABLET BY MOUTH TWICE A DAY WITH MEALS 180 tablet 1     Multiple Vitamin (MULTI-VITAMINS) TABS Take 1 tablet by mouth daily        nicotine (NICODERM CQ) 21 MG/24HR 24 hr patch Place 1 patch onto the skin daily 30 patch 1     oxybutynin (DITROPAN) 5 MG tablet TAKE 1 TABLET BY MOUTH TWICE A  tablet 3     pantoprazole (PROTONIX) 40 MG EC tablet TAKE 1 TABLET BY MOUTH EVERY DAY 90 tablet 1     senna-docusate (SENOKOT-S/PERICOLACE) 8.6-50 MG tablet Take 1-2 tablets by mouth 2 times daily 100 tablet 1     traZODone (DESYREL) 50 MG tablet TAKE 1-2 TABLETS ( MG) BY MOUTH AT BEDTIME 180 tablet 1     vitamin C (ASCORBIC ACID) 500 MG tablet Take 1 tablet (500 mg) by mouth daily 100 tablet 1     alendronate (FOSAMAX) 70 MG tablet TAKE 1 TABLET BY MOUTH ONE TIME PER WEEK (Patient not taking: Reported on 10/16/2020) 12 tablet 0     aspirin (ASA) 81 MG EC tablet Take 1 tablet (81 mg) by mouth daily (Patient not taking: Reported on 10/13/2020) 100 tablet 3     insulin pen needle (29G X 12MM) 29G X 12MM miscellaneous Use 1 pen needles daily or as directed. 100 each 11       PAST SURGICAL HISTORY:  Past Surgical History:   Procedure Laterality Date     COLONOSCOPY N/A 9/4/2019    Procedure: COLONOSCOPY;  Surgeon: Marie Todd MD;  Location:  GI     ESOPHAGOSCOPY, GASTROSCOPY, DUODENOSCOPY (EGD), COMBINED N/A 9/4/2019    Procedure: ESOPHAGOGASTRODUODENOSCOPY (EGD);  Surgeon: Marie Todd MD;  Location:  GI     ESOPHAGOSCOPY, GASTROSCOPY, DUODENOSCOPY (EGD), COMBINED N/A 9/17/2020    Procedure: ESOPHAGOGASTRODUODENOSCOPY (EGD);  Surgeon: Ten Ibrahim DO;  Location:  GI     IR ANGIOGRAM THROUGH CATHETER FOLLOW UP  3/5/2019     IR LOWER EXTREMITY ANGIOGRAM LEFT  3/4/2019     KNEE SURGERY Right 2013    right knee torn meniscus surgery     OVARY SURGERY  1971    1  ovary removed     RELEASE CARPAL TUNNEL Right 1988     RELEASE TRIGGER FINGER BILATERAL       SHOULDER SURGERY Left     rotator cuff repair, plate placement     THYROID SURGERY  1988    partial thyroidectomy       ALLERGIES:     Allergies   Allergen Reactions     Indomethacin Other (See Comments)     Dizziness and disorientation     Tramadol Nausea and Vomiting       FAMILY HISTORY:  Family History   Problem Relation Age of Onset     Diabetes Type 2  Mother      Lung Cancer Father      Diabetes Sister      Diabetes Brother      Diabetes Brother      Diabetes Type 2  Brother      Glaucoma No family hx of      Macular Degeneration No family hx of          SOCIAL HISTORY:  Social History     Tobacco Use     Smoking status: Current Every Day Smoker     Packs/day: 0.50     Years: 52.00     Pack years: 26.00     Smokeless tobacco: Never Used     Tobacco comment: using with patches   Substance Use Topics     Alcohol use: Yes     Comment: 2 glasses of wine a week     Drug use: No       ROS:   Constitutional: No fever, chills, or sweats. Weight stable.   ENT: No visual disturbance, ear ache, epistaxis, sore throat.   Cardiovascular: As per HPI.   Respiratory: No cough, hemoptysis.    GI: No nausea, vomiting, hematemesis, melena, or hematochezia.   : No hematuria.   Integument: Negative.   Psychiatric: Negative.   Hematologic:  No easy bruising, no easy bleeding.  Neuro: Negative.   Musculoskeletal: No myalgia.       I have reviewed the labs and personally reviewed the imaging below and made my comment in the assessment and plan.    Labs:  CBC RESULTS:   Lab Results   Component Value Date    WBC 13.1 (H) 10/13/2020    RBC 3.55 (L) 10/13/2020    HGB 9.0 (L) 10/13/2020    HGB 9.0 (L) 10/13/2020    HCT 31.0 (L) 10/13/2020    MCV 87 10/13/2020    MCH 25.4 (L) 10/13/2020    MCHC 29.0 (L) 10/13/2020    RDW 22.3 (H) 10/13/2020     10/13/2020       BMP RESULTS:  Lab Results   Component Value Date     10/08/2020     POTASSIUM 4.1 10/08/2020    CHLORIDE 107 10/08/2020    CO2 26 10/08/2020    ANIONGAP 6 10/08/2020     (H) 10/08/2020    BUN 16 10/08/2020    CR 0.60 10/08/2020    GFRESTIMATED >90 10/08/2020    GFRESTBLACK >90 10/08/2020    KAYLEEN 9.1 10/08/2020        INR RESULTS:  Lab Results   Component Value Date    INR 1.13 09/29/2020    INR 1.00 03/04/2019       Echocardiogram [facility] 10/9/2020  There is severe mitral stenosis.  Severe valvular aortic stenosis.  There is moderate to mod-severe (2-3+) mitral regurgitation.  There is moderate (2+) aortic regurgitation.  There is severe mitral annular calcification.  The visual ejection fraction is estimated at 60-65%.  Left ventricular systolic function is normal.  Right ventricular systolic pressure is elevated, consistent with moderate  pulmonary hypertension.  The ascending aorta is Mildly dilated.  The degree of aortic stenosis has progressed to severe stenosis.      EKG 10/8/2020: sinus rhythm, normal.    Assessment and Plan:   Ms. Rajani Guo is a 69 year old  female with PMH significant for current smoker, aortic and mitral valve stenosis, aortic and mitral regurgitation, recurrent GI bleeding, hypertension, type 2 diabetes, and peripheral arterial disease status left SFA angioplasty in 3/2019.    1.  Severe aortic and mitral valve disease ( Severe aortic stenosis with moderate aortic regurgitation, severe mitral regurgitation and severe mitral stenosis): The patient reports worsening shortness of breath over the last 6 months.  Today I have discussed with the patient that likely anemia and severe valvular disease is contributing to her symptoms.  She has significant history of smoking for more than 57 years.  She is still an active smoker.  She might have coronary artery disease and obstructive CAD which might be contributing to worsening DESAI.  Therefore I recommended coronary angiogram.  Risks of coronary angiogram, including but not limited to, death, MI, stroke,  emergent bypass, bleeding and contrast related kidney injury were discussed and patient is agreeable to proceed.  We will also do a right heart cath due to severe mitral stenosis and severe mitral regurgitation.      2.  Current smoker: No plans to quit.    3.  Recurrent GI bleeding: Follows with [facility].  So far recent EGD is unremarkable.  PillCam showed only few small AVMs.  Hemoglobin stable at 9.  Patient remains on aspirin, atorvastatin, insulin, and iron.    Plan summary:  Right heart cath  Coronary angiogram  No medication changes  Return to clinic face-to-face after the procedures above.    Total phone call duration 30 minutes.    Please donot hesitate to contact me if you have any questions or concerns. Again, thank you for allowing me to participate in the care of your patient.    Thanh GARNER MD  [facility] Division of Cardiology  Pager [phone]      Please do not hesitate to contact me if you have any questions/concerns.     Sincerely,     Thanh Garner MD

## 2020-10-20 NOTE — PATIENT INSTRUCTIONS
Thank you for coming to the [facility] @ [facility]; please note the following instructions:    1.  Recommend coronary angiogram and right heart catheterization.  This will be scheduled at [facility].     2. Dr Can will see you in clinic (face-to-face clinic appointment ) after the results of the angiogram are available to review with you.    3.  Let us know if you have questions.    If you have any questions regarding your visit please contact your care team:     Cardiology  Telephone Number   Gretchen GU, RN  Eloise BACA, RN   Lynette CARROLL, SOURAV SKELTON, SOURAV GOLDBERG, LPN   [phone] (option 1)   For scheduling appts:     [phone] (select option 1)       For the Device Clinic (Pacemakers and ICD's)  RN's :  Gita Machado   During business hours: [phone]    *After business hours:  [phone] (select option 4)      Normal test result notifications will be released via [place] or mailed within 7 business days.  All other test results, will be communicated via telephone once reviewed by your cardiologist.    If you need a medication refill please contact your pharmacy.  Please allow 3 business days for your refill to be completed.    As always, thank you for trusting us with your health care needs!

## 2020-10-21 ENCOUNTER — MYC MEDICAL ADVICE (OUTPATIENT)
Dept: CARDIOLOGY | Facility: CLINIC | Age: 70
End: 2020-10-21

## 2020-10-21 PROBLEM — R06.09 DYSPNEA ON EXERTION: Status: ACTIVE | Noted: 2020-10-21

## 2020-10-21 PROBLEM — I35.0 SEVERE AORTIC STENOSIS: Status: ACTIVE | Noted: 2020-10-21

## 2020-10-21 PROBLEM — I35.1 MODERATE AORTIC INSUFFICIENCY: Status: ACTIVE | Noted: 2020-10-21

## 2020-10-21 RX ORDER — ASPIRIN 81 MG/1
81 TABLET ORAL DAILY
Status: CANCELLED | OUTPATIENT
Start: 2020-10-21 | End: 2020-10-23

## 2020-10-21 RX ORDER — LIDOCAINE 40 MG/G
CREAM TOPICAL
Status: CANCELLED | OUTPATIENT
Start: 2020-10-21

## 2020-10-21 RX ORDER — SODIUM CHLORIDE 9 MG/ML
INJECTION, SOLUTION INTRAVENOUS CONTINUOUS
Status: CANCELLED | OUTPATIENT
Start: 2020-10-21

## 2020-10-23 DIAGNOSIS — Z11.59 ENCOUNTER FOR SCREENING FOR OTHER VIRAL DISEASES: Primary | ICD-10-CM

## 2020-10-31 DIAGNOSIS — M81.0 AGE-RELATED OSTEOPOROSIS WITHOUT CURRENT PATHOLOGICAL FRACTURE: ICD-10-CM

## 2020-11-02 RX ORDER — ALENDRONATE SODIUM 70 MG/1
TABLET ORAL
Qty: 12 TABLET | Refills: 0 | Status: SHIPPED | OUTPATIENT
Start: 2020-11-02 | End: 2020-12-04

## 2020-11-12 DIAGNOSIS — Z11.59 ENCOUNTER FOR SCREENING FOR OTHER VIRAL DISEASES: ICD-10-CM

## 2020-11-12 PROCEDURE — U0003 INFECTIOUS AGENT DETECTION BY NUCLEIC ACID (DNA OR RNA); SEVERE ACUTE RESPIRATORY SYNDROME CORONAVIRUS 2 (SARS-COV-2) (CORONAVIRUS DISEASE [COVID-19]), AMPLIFIED PROBE TECHNIQUE, MAKING USE OF HIGH THROUGHPUT TECHNOLOGIES AS DESCRIBED BY CMS-2020-01-R: HCPCS | Performed by: INTERNAL MEDICINE

## 2020-11-13 ENCOUNTER — TELEPHONE (OUTPATIENT)
Dept: CARDIOLOGY | Facility: CLINIC | Age: 70
End: 2020-11-13

## 2020-11-13 LAB
SARS-COV-2 RNA SPEC QL NAA+PROBE: NOT DETECTED
SPECIMEN SOURCE: NORMAL

## 2020-11-13 NOTE — TELEPHONE ENCOUNTER
Call complete for pre procedure reminder, travel screen and updated visitor policy.  COVID in process

## 2020-11-16 ENCOUNTER — APPOINTMENT (OUTPATIENT)
Dept: LAB | Facility: CLINIC | Age: 70
End: 2020-11-16
Attending: INTERNAL MEDICINE
Payer: COMMERCIAL

## 2020-11-16 ENCOUNTER — HOSPITAL ENCOUNTER (OUTPATIENT)
Facility: CLINIC | Age: 70
Discharge: HOME | End: 2020-11-16
Attending: INTERNAL MEDICINE | Admitting: INTERNAL MEDICINE
Payer: COMMERCIAL

## 2020-11-16 ENCOUNTER — APPOINTMENT (OUTPATIENT)
Dept: MEDSURG UNIT | Facility: CLINIC | Age: 70
End: 2020-11-16
Attending: INTERNAL MEDICINE
Payer: COMMERCIAL

## 2020-11-16 VITALS
WEIGHT: 146 LBS | SYSTOLIC BLOOD PRESSURE: 152 MMHG | TEMPERATURE: 98 F | HEIGHT: 64 IN | HEART RATE: 79 BPM | DIASTOLIC BLOOD PRESSURE: 76 MMHG | BODY MASS INDEX: 24.92 KG/M2 | OXYGEN SATURATION: 97 % | RESPIRATION RATE: 17 BRPM

## 2020-11-16 DIAGNOSIS — R06.09 DYSPNEA ON EXERTION: ICD-10-CM

## 2020-11-16 DIAGNOSIS — I35.1 MODERATE AORTIC INSUFFICIENCY: ICD-10-CM

## 2020-11-16 DIAGNOSIS — I35.0 SEVERE AORTIC STENOSIS: ICD-10-CM

## 2020-11-16 DIAGNOSIS — Z98.890 S/P CORONARY ANGIOGRAM: Primary | ICD-10-CM

## 2020-11-16 LAB
ANION GAP SERPL CALCULATED.3IONS-SCNC: 6 MMOL/L (ref 3–14)
BASOPHILS # BLD AUTO: 0 10E9/L (ref 0–0.2)
BASOPHILS NFR BLD AUTO: 0.2 %
BUN SERPL-MCNC: 10 MG/DL (ref 7–30)
CALCIUM SERPL-MCNC: 8.9 MG/DL (ref 8.5–10.1)
CHLORIDE SERPL-SCNC: 107 MMOL/L (ref 94–109)
CO2 SERPL-SCNC: 26 MMOL/L (ref 20–32)
CREAT SERPL-MCNC: 0.62 MG/DL (ref 0.52–1.04)
DIFFERENTIAL METHOD BLD: ABNORMAL
EOSINOPHIL # BLD AUTO: 0.1 10E9/L (ref 0–0.7)
EOSINOPHIL NFR BLD AUTO: 1.5 %
ERYTHROCYTE [DISTWIDTH] IN BLOOD BY AUTOMATED COUNT: 22.1 % (ref 10–15)
GFR SERPL CREATININE-BSD FRML MDRD: >90 ML/MIN/{1.73_M2}
GLUCOSE SERPL-MCNC: 121 MG/DL (ref 70–99)
HCT VFR BLD AUTO: 38.1 % (ref 35–47)
HGB BLD-MCNC: 11.5 G/DL (ref 11.7–15.7)
IMM GRANULOCYTES # BLD: 0 10E9/L (ref 0–0.4)
IMM GRANULOCYTES NFR BLD: 0.3 %
INR PPP: 1.18 (ref 0.86–1.14)
KCT BLD-ACNC: 180 SEC (ref 75–150)
LYMPHOCYTES # BLD AUTO: 1 10E9/L (ref 0.8–5.3)
LYMPHOCYTES NFR BLD AUTO: 11.8 %
MCH RBC QN AUTO: 26.9 PG (ref 26.5–33)
MCHC RBC AUTO-ENTMCNC: 30.2 G/DL (ref 31.5–36.5)
MCV RBC AUTO: 89 FL (ref 78–100)
MONOCYTES # BLD AUTO: 0.8 10E9/L (ref 0–1.3)
MONOCYTES NFR BLD AUTO: 8.9 %
NEUTROPHILS # BLD AUTO: 6.8 10E9/L (ref 1.6–8.3)
NEUTROPHILS NFR BLD AUTO: 77.3 %
NRBC # BLD AUTO: 0 10*3/UL
NRBC BLD AUTO-RTO: 0 /100
PLATELET # BLD AUTO: 325 10E9/L (ref 150–450)
POTASSIUM SERPL-SCNC: 3.4 MMOL/L (ref 3.4–5.3)
RBC # BLD AUTO: 4.28 10E12/L (ref 3.8–5.2)
SODIUM SERPL-SCNC: 139 MMOL/L (ref 133–144)
WBC # BLD AUTO: 8.7 10E9/L (ref 4–11)

## 2020-11-16 PROCEDURE — 999N000142 HC STATISTIC PROCEDURE PREP ONLY

## 2020-11-16 PROCEDURE — 85347 COAGULATION TIME ACTIVATED: CPT

## 2020-11-16 PROCEDURE — C1760 CLOSURE DEV, VASC: HCPCS | Performed by: INTERNAL MEDICINE

## 2020-11-16 PROCEDURE — 250N000009 HC RX 250: Performed by: INTERNAL MEDICINE

## 2020-11-16 PROCEDURE — 999N000054 HC STATISTIC EKG NON-CHARGEABLE

## 2020-11-16 PROCEDURE — 36415 COLL VENOUS BLD VENIPUNCTURE: CPT | Performed by: INTERNAL MEDICINE

## 2020-11-16 PROCEDURE — 272N000001 HC OR GENERAL SUPPLY STERILE: Performed by: INTERNAL MEDICINE

## 2020-11-16 PROCEDURE — 85610 PROTHROMBIN TIME: CPT | Performed by: INTERNAL MEDICINE

## 2020-11-16 PROCEDURE — 85025 COMPLETE CBC W/AUTO DIFF WBC: CPT | Performed by: INTERNAL MEDICINE

## 2020-11-16 PROCEDURE — 80048 BASIC METABOLIC PNL TOTAL CA: CPT | Performed by: INTERNAL MEDICINE

## 2020-11-16 PROCEDURE — 99152 MOD SED SAME PHYS/QHP 5/>YRS: CPT | Performed by: INTERNAL MEDICINE

## 2020-11-16 PROCEDURE — 250N000011 HC RX IP 250 OP 636: Performed by: INTERNAL MEDICINE

## 2020-11-16 PROCEDURE — C1769 GUIDE WIRE: HCPCS | Performed by: INTERNAL MEDICINE

## 2020-11-16 PROCEDURE — 93460 R&L HRT ART/VENTRICLE ANGIO: CPT | Mod: 26 | Performed by: INTERNAL MEDICINE

## 2020-11-16 PROCEDURE — 99152 MOD SED SAME PHYS/QHP 5/>YRS: CPT | Mod: 59 | Performed by: INTERNAL MEDICINE

## 2020-11-16 PROCEDURE — 99153 MOD SED SAME PHYS/QHP EA: CPT | Performed by: INTERNAL MEDICINE

## 2020-11-16 PROCEDURE — 93005 ELECTROCARDIOGRAM TRACING: CPT

## 2020-11-16 PROCEDURE — 258N000003 HC RX IP 258 OP 636: Performed by: NURSE PRACTITIONER

## 2020-11-16 PROCEDURE — 93460 R&L HRT ART/VENTRICLE ANGIO: CPT | Performed by: INTERNAL MEDICINE

## 2020-11-16 PROCEDURE — 999N000128 HC STATISTIC PERIPHERAL IV START W/O US GUIDANCE

## 2020-11-16 PROCEDURE — C1894 INTRO/SHEATH, NON-LASER: HCPCS | Performed by: INTERNAL MEDICINE

## 2020-11-16 PROCEDURE — 93010 ELECTROCARDIOGRAM REPORT: CPT | Performed by: INTERNAL MEDICINE

## 2020-11-16 PROCEDURE — 250N000013 HC RX MED GY IP 250 OP 250 PS 637: Performed by: NURSE PRACTITIONER

## 2020-11-16 RX ORDER — FENTANYL CITRATE 50 UG/ML
INJECTION, SOLUTION INTRAMUSCULAR; INTRAVENOUS
Status: DISCONTINUED | OUTPATIENT
Start: 2020-11-16 | End: 2020-11-16 | Stop reason: HOSPADM

## 2020-11-16 RX ORDER — NALOXONE HYDROCHLORIDE 0.4 MG/ML
.2-.4 INJECTION, SOLUTION INTRAMUSCULAR; INTRAVENOUS; SUBCUTANEOUS
Status: DISCONTINUED | OUTPATIENT
Start: 2020-11-16 | End: 2020-11-16 | Stop reason: HOSPADM

## 2020-11-16 RX ORDER — SODIUM CHLORIDE 9 MG/ML
INJECTION, SOLUTION INTRAVENOUS CONTINUOUS
Status: DISCONTINUED | OUTPATIENT
Start: 2020-11-16 | End: 2020-11-16 | Stop reason: HOSPADM

## 2020-11-16 RX ORDER — ONDANSETRON 2 MG/ML
4 INJECTION INTRAMUSCULAR; INTRAVENOUS ONCE
Status: DISCONTINUED | OUTPATIENT
Start: 2020-11-16 | End: 2020-11-16 | Stop reason: HOSPADM

## 2020-11-16 RX ORDER — POTASSIUM CHLORIDE 750 MG/1
40 TABLET, EXTENDED RELEASE ORAL ONCE
Status: COMPLETED | OUTPATIENT
Start: 2020-11-16 | End: 2020-11-16

## 2020-11-16 RX ORDER — LIDOCAINE 40 MG/G
CREAM TOPICAL
Status: DISCONTINUED | OUTPATIENT
Start: 2020-11-16 | End: 2020-11-16 | Stop reason: HOSPADM

## 2020-11-16 RX ORDER — FLUMAZENIL 0.1 MG/ML
0.2 INJECTION, SOLUTION INTRAVENOUS
Status: DISCONTINUED | OUTPATIENT
Start: 2020-11-16 | End: 2020-11-16 | Stop reason: HOSPADM

## 2020-11-16 RX ORDER — ATROPINE SULFATE 0.1 MG/ML
0.5 INJECTION INTRAVENOUS
Status: DISCONTINUED | OUTPATIENT
Start: 2020-11-16 | End: 2020-11-16 | Stop reason: HOSPADM

## 2020-11-16 RX ORDER — ASPIRIN 81 MG/1
81 TABLET, CHEWABLE ORAL ONCE
Status: COMPLETED | OUTPATIENT
Start: 2020-11-16 | End: 2020-11-16

## 2020-11-16 RX ORDER — HEPARIN SODIUM 1000 [USP'U]/ML
INJECTION, SOLUTION INTRAVENOUS; SUBCUTANEOUS
Status: DISCONTINUED | OUTPATIENT
Start: 2020-11-16 | End: 2020-11-16 | Stop reason: HOSPADM

## 2020-11-16 RX ORDER — ASPIRIN 81 MG/1
81 TABLET ORAL DAILY
Status: DISCONTINUED | OUTPATIENT
Start: 2020-11-16 | End: 2020-11-16 | Stop reason: HOSPADM

## 2020-11-16 RX ORDER — NALOXONE HYDROCHLORIDE 0.4 MG/ML
.1-.4 INJECTION, SOLUTION INTRAMUSCULAR; INTRAVENOUS; SUBCUTANEOUS
Status: DISCONTINUED | OUTPATIENT
Start: 2020-11-16 | End: 2020-11-16

## 2020-11-16 RX ORDER — IOPAMIDOL 755 MG/ML
INJECTION, SOLUTION INTRAVASCULAR
Status: DISCONTINUED | OUTPATIENT
Start: 2020-11-16 | End: 2020-11-16 | Stop reason: HOSPADM

## 2020-11-16 RX ORDER — ACETAMINOPHEN 325 MG/1
650 TABLET ORAL EVERY 4 HOURS PRN
Status: DISCONTINUED | OUTPATIENT
Start: 2020-11-16 | End: 2020-11-16 | Stop reason: HOSPADM

## 2020-11-16 RX ORDER — FENTANYL CITRATE 50 UG/ML
25-50 INJECTION, SOLUTION INTRAMUSCULAR; INTRAVENOUS
Status: ACTIVE | OUTPATIENT
Start: 2020-11-16 | End: 2020-11-16

## 2020-11-16 RX ADMIN — LIDOCAINE: 40 CREAM TOPICAL at 09:14

## 2020-11-16 RX ADMIN — SODIUM CHLORIDE: 9 INJECTION, SOLUTION INTRAVENOUS at 12:55

## 2020-11-16 RX ADMIN — POTASSIUM CHLORIDE 40 MEQ: 750 TABLET, EXTENDED RELEASE ORAL at 09:37

## 2020-11-16 RX ADMIN — ASPIRIN 81 MG 81 MG: 81 TABLET ORAL at 09:32

## 2020-11-16 ASSESSMENT — MIFFLIN-ST. JEOR: SCORE: 1177.25

## 2020-11-16 NOTE — PROGRESS NOTES
Prepped for cors/RH.  Denies pain. Daughter with her.  Consent needed.  Labs complete.  H & P current.

## 2020-11-16 NOTE — PROGRESS NOTES
D/I/A: Pt roomed on 3C in [address].  Arrived via litter and accompanied by Tx staff On/Off: Off monitor.  VSSA.  Rhythm upon arrival SR on monitor.  Denies pain or sob.  Reviewed activity restrictions and when to notify RN, ie-changes to breathing or increased chest pressure or chest pain.  CCL access:  RFA.  P: Continue to monitor status.  Discharge to home once meeting criteria.

## 2020-11-16 NOTE — PRE-PROCEDURE
GENERAL PRE-PROCEDURE:   Procedure:  Coronary angiogram, right heart catheterization, left heart catheterization, possible percutaneous coronary intervention    Written consent obtained?: Yes    Risks and benefits: Risks, benefits and alternatives were discussed    Consent given by:  Patient  Patient states understanding of procedure being performed: Yes    Patient's understanding of procedure matches consent: Yes    Procedure consent matches procedure scheduled: Yes    Expected level of sedation:  Moderate  Appropriately NPO:  Yes  ASA Class:  Class 3- Severe systemic disease, definite functional limitations  Mallampati  :  Grade 1- soft palate, uvula, tonsillar pillars, and posterior pharyngeal wall visible  Lungs:  Lungs clear with good breath sounds bilaterally  Heart:  Normal heart sounds and rate  History & Physical reviewed:  History and physical reviewed and no updates needed  Statement of review:  I have reviewed the lab findings, diagnostic data, medications, and the plan for sedation    FRANCESCA Dawn CNP

## 2020-11-16 NOTE — DISCHARGE INSTRUCTIONS
"  Discharge Instructions for Cardiac Catheterization   Cardiac catheterization is an invasive procedure to evaluate for certain heart problems involving the hearts chambers, valves, and blood vessels.  A thin, flexible tube (catheter) is put in a blood vessel in your groin or arm. Once the catheter is advanced into the heart measurements can be taken to assess blood flow, pressure, and oxygen. The healthcare provider can inject contrast fluid into your blood, which then flows to your heart. X-rays pictures can then be taken of your heart.  Often \"coronary angiography\" is performed as part of a cardiac catheterization which looks for blocked areas in the arteries that send blood to the heart. If a significant blockage is found your doctor may attempt to open up the artery which often involves placing a stent. Your provider will review the results of your procedure with you. Be sure to ask any questions you have before you leave. This sheet will help you take care of yourself at home.  Home care    Don't drive or make any important decisions for at least 24 hours after getting any type of sedation or anesthesia.     Arrange to have a responsible adult drive you home after your procedure.    Only do light and easy activities for the next  2 to 3 days. Ask for help with chores and errands while you recover. Have someone drive you to your appointments.    Don't lift anything heavy until your healthcare team tells you when it's safe to lift again.    Ask your healthcare team when you can expect to return to work. Unless your job involves lifting, you may be able to return to your normal activities within a couple of days.    Take your medicines as directed. Don't skip doses.    Drink  6 to 8 glasses of water a day. This is to help flush the contrast dye out of your body. Call your healthcare team if your urine has any change in color.    Take your temperature each day for 7 days. If you feel cold and clammy or start " sweating, take your temperature right away and call your healthcare team.    Check your incisions every day for signs of infection. These include redness, swelling, and drainage. It's normal to have a small bruise or bump where the catheter was inserted. A bruise that's getting larger is not normal and should be reported to your healthcare team. If you see blood forming in the incision, call your healthcare team. Go to the emergency department if you have uncontrolled bleeding from the artery site. This is especially true if you take medicines that make it hard for your blood to clot. Examples are aspirin, clopidogrel, and warfarin.    Eat a healthy diet. Make sure it's low in fat, salt, and cholesterol. Ask your healthcare team for diet information.    Stop smoking. Enroll in a stop-smoking program or ask your healthcare team for help. Stop-smoking programs can be life saving.    Exercise as your healthcare team tells you to. Your healthcare team may recommend you start a cardiac rehabilitation program. Cardiac rehab is an exercise program in which trained healthcare staff watch your progress and stress on your heart while you exercise. Ask your team how to enroll.    Don't swim or take baths until your healthcare team says it s OK. You can shower the day after the procedure. Keep the site clean and dry. This keeps the incision from getting wet and infected until the skin and artery can heal.    Be sure to follow all after-care instructions.     Follow-up care    Make a follow-up appointment as advised by our staff. It's common to have a follow-up appointment 2 to 4 weeks after an angioplasty or coronary stent procedure.    Make a yearly appointment, too. This is to make sure you are still doing well and not having any new symptoms.    Don't wait for a follow-up appointment if your medicines aren't working or you are having heart-related symptoms.    When to seek medical care  Call your healthcare provider right  away if you have any of the following:    Chest pain    Constant or increasing pain or numbness in your leg    Fever of 100.4  F ( 38.0 C) or higher, or as directed by your healthcare provider    Symptoms of infection. These include redness, swelling, drainage, or warmth at the incision site.    Shortness of breath    A leg that feels cold or appears blue    Bleeding, bruising, or a lot of swelling where the catheter was inserted    Blood in your urine    Black or tarry stools    Any unusual bleeding  Pedro last reviewed this educational content on 10/1/2019    8990-6486 [employer]. [address] [place] [address]. All rights reserved. This information is not intended as a substitute for professional medical care. Always follow your healthcare professional's instructions.

## 2020-11-16 NOTE — PROGRESS NOTES
D/I/A:  Patient is tolerating liquids and foods, ambulating, urinating, puncture sites are stable ( no bleeding and no hematoma) and patient has a .  A+O x4 and making needs known.  CCL access sites C/D/I; no bleeding or hematoma; CMS intact.  VSSA.  SR on monitor.  IV access removed.  Education completed and outlined in AVS or handout: medications reviewed with patient.  Questions answered prior to discharge.  Belongings returned to patient at discharge.    P: Discharged to self care.  Patient to follow up with appts as per discharge instruction.

## 2020-11-16 NOTE — Clinical Note
dry, intact, no bleeding and no hematoma. 6fr RFA removed sheath, angio-seal placed, 7fr RFV sheath removed manual pressure applied, hemostasis achieved, bandage placed.

## 2020-11-17 ENCOUNTER — TELEPHONE (OUTPATIENT)
Dept: CARDIOLOGY | Facility: CLINIC | Age: 70
End: 2020-11-17

## 2020-11-17 LAB — INTERPRETATION ECG - MUSE: NORMAL

## 2020-11-17 NOTE — TELEPHONE ENCOUNTER
I called and talked to the patient on the phone today.  I discussed the results of the coronary angiogram from yesterday.  Patient tells me that her shortness of breath has improved since I saw her.  She is overall feeling well from the procedure she had yesterday.  I have discussed with the patient that she has severe mitral and severe aortic valve disease which require aortic and mitral valve replacement.  I will refer her to CV surgeon Dr. Luc Lara.  Patient is agreeable to proceed.  I have an appointment with her in December.  We will keep that appointment.    TANNER GARNER MD

## 2020-11-17 NOTE — TELEPHONE ENCOUNTER
[facility] for patient to call CV RN regarding setting up an appointment with Dr. Lara, contact info provided.    ----- Message from Eloise Christensen RN sent at 11/17/2020 11:49 AM CST -----  Regarding: RE: Schedule with surgeon  Anita,     See Dr Morse note from today   Looks like she would like her to see Dr Lara- pt agrees.     Thanks      Let me know if you need me to do anything.    Eloise  ----- Message -----  From: Anita Martin RN  Sent: 10/21/2020   8:36 AM CST  To: Yisel Will CMA, Gretchen Amos RN, #  Subject: Schedule with surgeon                            Hi,    I see the patient had a virtural visit with Dr Morse yesterday and is going to be scheduled for a RHC and angiogram.  Let me know when Dr Morse would like this patient seen by a surgeon.    Thanks, Anita

## 2020-11-19 ENCOUNTER — TELEPHONE (OUTPATIENT)
Dept: CARDIOLOGY | Facility: CLINIC | Age: 70
End: 2020-11-19

## 2020-11-19 NOTE — TELEPHONE ENCOUNTER
Appointment made for 11/30 with Dr. Lara.    ----- Message from Eloise Christensen RN sent at 11/17/2020  2:31 PM CST -----  Regarding: RE: Schedule with surgeon  prudencio  ----- Message -----  From: Veronica Lovett RN  Sent: 11/17/2020  11:55 AM CST  To: Eloise Christensen, KAREEM, Anita Martin, RN  Subject: RE: Schedule with surgeon                        Hi,    I'm covering for Anita while she is off. We will get the patient scheduled, thank you.    Veronica  ----- Message -----  From: Eloise Christensen RN  Sent: 11/17/2020  11:49 AM CST  To: Anita Martin RN  Subject: RE: Schedule with surgeon                        Anita,     See Dr Morse note from today   Looks like she would like her to see Dr Lara- pt agrees.     Thanks      Let me know if you need me to do anything.    Eloise  ----- Message -----  From: Anita Martin, KAREEM  Sent: 10/21/2020   8:36 AM CST  To: Yisel Will CMA, Gretchen Amos RN, #  Subject: Schedule with surgeon                            Hi,    I see the patient had a virtural visit with Dr Morse yesterday and is going to be scheduled for a RHC and angiogram.  Let me know when Dr Morse would like this patient seen by a surgeon.    Thanks, Anita

## 2020-11-19 NOTE — TELEPHONE ENCOUNTER
Left message with daughter to have her call [facility] RN regarding appointment with Dr. Lara. Contact info provided.

## 2020-11-30 ENCOUNTER — PRE VISIT (OUTPATIENT)
Dept: CARDIOLOGY | Facility: CLINIC | Age: 70
End: 2020-11-30

## 2020-11-30 ENCOUNTER — VIRTUAL VISIT (OUTPATIENT)
Dept: CARDIOLOGY | Facility: CLINIC | Age: 70
End: 2020-11-30
Attending: SURGERY
Payer: COMMERCIAL

## 2020-11-30 DIAGNOSIS — I35.0 SEVERE AORTIC STENOSIS: Primary | ICD-10-CM

## 2020-11-30 PROCEDURE — 99207 PR NO BILLABLE SERVICE THIS VISIT: CPT | Mod: 95 | Performed by: SURGERY

## 2020-11-30 NOTE — PROGRESS NOTES
"Rajani Guo is a 69 year old female who is being evaluated via a billable video visit.      The patient has been notified of following:     \"This video visit will be conducted via a call between you and your physician/provider. We have found that certain health care needs can be provided without the need for an in-person physical exam.  This service lets us provide the care you need with a video conversation.  If a prescription is necessary we can send it directly to your pharmacy.  If lab work is needed we can place an order for that and you can then stop by our lab to have the test done at a later time.    Video visits are billed at different rates depending on your insurance coverage.  Please reach out to your insurance provider with any questions.    If during the course of the call the physician/provider feels a video visit is not appropriate, you will not be charged for this service.\"    Patient has given verbal consent for Video visit? Yes  How would you like to obtain your AVS? MyChart  If you are dropped from the video visit, the video invite should be resent to: Send to e-mail at: [email or web address]  Will anyone else be joining your video visit? No     HPI   Rajani Guo is a 69 year old female who was referred for evaluation of aortic and mitral stenosis.   She recently underwent cardiac cath that showed severe disease. She was seen by Dr. Lara, and the above procedure is now planned. She complains of worsening DESAI and dizziness.   History is obtained from the patient and chart review.   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     GI bleed       History of partial thyroidectomy 06/02/2018     Hyperlipidemia LDL goal <100 01/18/2018     Hypertension       Insomnia, unspecified type 01/18/2018     Mitral regurgitation       Mitral stenosis       Pulmonary hypertension (H)       Tobacco use disorder     Past Surgical History         Past Surgical History             Past Surgical History:   Procedure Laterality Date     COLONOSCOPY N/A 9/4/2019     Procedure: COLONOSCOPY; Surgeon: Marie Todd MD; Location:  GI     CV CORONARY ANGIOGRAM N/A 11/16/2020     Procedure: CV CORONARY ANGIOGRAM; Surgeon: Joey Rivas MD; Location:  HEART CARDIAC CATH LAB     CV FRACTIONAL FLOW RESERVE N/A 11/16/2020     Procedure: Fractional Flow Reserve; Surgeon: Joey Rivas MD; Location:  HEART CARDIAC CATH LAB     CV RIGHT HEART CATH N/A 11/16/2020     Procedure: CV RIGHT HEART CATH; Surgeon: Joey Rivas MD; Location:  HEART CARDIAC CATH LAB     ESOPHAGOSCOPY, GASTROSCOPY, DUODENOSCOPY (EGD), COMBINED N/A 9/4/2019     Procedure: ESOPHAGOGASTRODUODENOSCOPY (EGD); Surgeon: Marie Todd MD; Location:  GI     ESOPHAGOSCOPY, GASTROSCOPY, DUODENOSCOPY (EGD), COMBINED N/A 9/17/2020     Procedure: ESOPHAGOGASTRODUODENOSCOPY (EGD); Surgeon: Ten Ibrahim DO; Location:  GI     IR ANGIOGRAM THROUGH CATHETER FOLLOW UP   3/5/2019     IR LOWER EXTREMITY ANGIOGRAM LEFT   3/4/2019     KNEE SURGERY Right 2013     right knee torn meniscus surgery     OVARY SURGERY   1971     1 ovary removed     RELEASE CARPAL TUNNEL Right 1988     RELEASE TRIGGER FINGER BILATERAL         SHOULDER SURGERY Left       rotator cuff repair, plate placement     THYROID SURGERY   1988     partial thyroidectomy      Prior to Admission Medications          Active Medications               Current Outpatient Medications   Medication Sig Dispense Refill      aspirin (ASA) 81 MG EC tablet Take 1 tablet (81 mg) by mouth daily (Patient taking differently: Take 81 mg by mouth every evening ) 100 tablet 3     atorvastatin (LIPITOR) 40 MG tablet TAKE 1 TABLET BY MOUTH EVERY DAY 90 tablet 3     buPROPion (ZYBAN) 150 MG 12 hr tablet TAKE 1 TABLET BY MOUTH 2 TIMES DAILY 60 tablet 3     calcium carb 1250 mg, 500 mg [place],/vitamin D 200 unit (CALCIUM 500/D) 500-200 MG-UNIT per tablet Take 1 tablet by mouth 2 times daily          ferrous sulfate (FEROSUL) 325 (65 Fe) MG tablet Take 325 mg by mouth 2 times daily         gabapentin (NEURONTIN) 300 MG capsule TAKE 1 CAPSULE (300 MG) BY MOUTH ONE OR TWO TIMES DAILY (Patient taking differently: Take 300 mg by mouth At Bedtime ) 90 capsule 1     glucosamine-chondroitin 500-400 MG CAPS per capsule Take 1 capsule by mouth 2 times daily          insulin detemir (LEVEMIR PEN) 100 UNIT/ML pen Inject 5 Units Subcutaneous At Bedtime          Melatonin 10 MG TABS tablet Take 10 mg by mouth At Bedtime         metFORMIN (GLUCOPHAGE) 1000 MG tablet TAKE 1 TABLET BY MOUTH TWICE A DAY WITH MEALS 180 tablet 1     Multiple Vitamin (MULTI-VITAMINS) TABS Take 1 tablet by mouth daily          nicotine (NICODERM CQ) 21 MG/24HR 24 hr patch Place 1 patch onto the skin daily 30 patch 1     oxybutynin (DITROPAN) 5 MG tablet TAKE 1 TABLET BY MOUTH TWICE A  tablet 3     pantoprazole (PROTONIX) 40 MG EC tablet TAKE 1 TABLET BY MOUTH EVERY DAY 90 tablet 1     senna-docusate (SENOKOT-S/PERICOLACE) 8.6-50 MG tablet Take 1-2 tablets by mouth 2 times daily (Patient taking differently: Take 1 tablet by mouth 2 times daily ) 100 tablet 1     traZODone (DESYREL) 50 MG tablet TAKE 1-2 TABLETS ( MG) BY MOUTH AT BEDTIME 180 tablet 1     vitamin C (ASCORBIC ACID) 500 MG tablet Take 1 tablet (500 mg) by mouth daily 100 tablet 1     insulin pen needle (29G X 12MM) 29G X 12MM miscellaneous Use 1 pen needles daily or as directed. 100 each 11   Allergies              Allergies   Allergen Reactions     Indomethacin Other (See Comments)       Dizziness and disorientation     Tramadol        ROS   10 point review of systems is negative other than noted in the HPI or here.    Physical Exam   Physical exam was not done as this was a video visit during a global pandemic.    ECHO 10/9/20   There is severe mitral stenosis.   Severe valvular aortic stenosis.   There is moderate to mod-severe (2-3+) mitral regurgitation.   There is moderate (2+) aortic regurgitation.   There is severe mitral annular calcification.   The visual ejection fraction is estimated at 60-65%.   Left ventricular systolic function is normal.   Right ventricular systolic pressure is elevated, consistent with moderate   pulmonary hypertension.   The ascending aorta is Mildly dilated.   The degree of aortic stenosis has progressed to severe stenosis.   EKG 11/2020   Sinus rhythm   Possible Left atrial enlargement   Anterolateral infarct , age undetermined   Abnormal ECG   Cardiac cath 11/16/20   Right sided filling pressures are mildly elevated.   Severely elevated PCWP with V-wave to 40 mmHg. LVEDP moderately elevated at 20 mmHg.   Severe mitral stenosis with MV gradient of 18 mmHg   Severe aortic stenosis with AV gradient of 38 mmHg and KRISTOFER 0.65 cm^2.   Non-obstructive coronary disease   Recommend a surgical consultation for severe AV and MV disease.   Outside records reviewed from: care everywhere   ASSESSMENT and PLAN   Rajani Guo is a 69 year old female with severe aortic and mitral stenosis. I agree with the recommendation to proceed with aortic and mitral valve replacement. She will need a coronary angiogram and CT chest as part of her preop assessment, She will see me for a full visit after these tests are done to discuss surgery in greater detail.

## 2020-11-30 NOTE — NURSING NOTE
Patient seen today for consultation for MVR and AVR    Surgery procedure explained to patient via video visit and all questions and concerns were answered and addressed.     Valve choices were discussed with the patient, mechanical and bioprosthetic. Risks and benefits of both explained.  Will discuss at in person visit mechanical or bioprosthetic valve choice.     Risks and benefits of surgery were discussed with patient (and all present) including risk of death, stroke, bleeding, cardiac ischemia, wound infection, renal failure, arrhythmias and possible pacemaker implantation, risk for this patient ~ 6-7%. Patient accepts these risks and is willing to proceed with surgery.     Will call patient to review pre surgery tests and procedures needed and will schedule patient for in person clinic visit.  Then will have  call patient to schedule surgery and pre op tests.      Pre surgery preparation folder with instructions for surgery preparation and recovery will be mailed to the patient.     Patient and  verbalized understanding of all instructions and will call with any questions or concerns.

## 2020-11-30 NOTE — LETTER
"11/30/2020      RE: Rajani Guo  [address] [facility] [address]       Dear Colleague,    Thank you for the opportunity to participate in the care of your patient, Rajain Guo, at the [facility]. Please see a copy of my visit note below.    Rajani Guo is a 69 year old female who is being evaluated via a billable video visit.      The patient has been notified of following:     \"This video visit will be conducted via a call between you and your physician/provider. We have found that certain health care needs can be provided without the need for an in-person physical exam.  This service lets us provide the care you need with a video conversation.  If a prescription is necessary we can send it directly to your pharmacy.  If lab work is needed we can place an order for that and you can then stop by our lab to have the test done at a later time.    Video visits are billed at different rates depending on your insurance coverage.  Please reach out to your insurance provider with any questions.    If during the course of the call the physician/provider feels a video visit is not appropriate, you will not be charged for this service.\"    Patient has given verbal consent for Video visit? Yes  How would you like to obtain your AVS? MyChart  If you are dropped from the video visit, the video invite should be resent to: Send to e-mail at: [email or web address]  Will anyone else be joining your video visit? No     HPI   Rajani Guo is a 69 year old female who was referred for evaluation of aortic and mitral stenosis.   She recently underwent cardiac cath that showed severe disease. She was seen by Dr. Lara, and the above procedure is now planned. She complains of worsening DESAI and dizziness.   History is obtained from the patient and chart review.   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     GI bleed       History of partial thyroidectomy 06/02/2018     Hyperlipidemia LDL goal <100 01/18/2018     Hypertension       Insomnia, unspecified type 01/18/2018     Mitral regurgitation       Mitral stenosis       Pulmonary hypertension (H)       Tobacco use disorder     Past Surgical History         Past Surgical History             Past Surgical History:   Procedure Laterality Date     COLONOSCOPY N/A 9/4/2019     Procedure: COLONOSCOPY; Surgeon: Marie Todd MD; Location:  GI     CV CORONARY ANGIOGRAM N/A 11/16/2020     Procedure: CV CORONARY ANGIOGRAM; Surgeon: Joey Rivas MD; Location:  HEART CARDIAC CATH LAB     CV FRACTIONAL FLOW RESERVE N/A 11/16/2020     Procedure: Fractional Flow Reserve; Surgeon: Joey Rivas MD; Location:  HEART CARDIAC CATH LAB     CV RIGHT HEART CATH N/A 11/16/2020     Procedure: CV RIGHT HEART CATH; Surgeon: Joey Rivas MD; Location:  HEART CARDIAC CATH LAB     ESOPHAGOSCOPY, GASTROSCOPY, DUODENOSCOPY (EGD), COMBINED N/A 9/4/2019     Procedure: ESOPHAGOGASTRODUODENOSCOPY (EGD); Surgeon: Marie Todd MD; Location:  GI     ESOPHAGOSCOPY, GASTROSCOPY, DUODENOSCOPY (EGD), COMBINED N/A 9/17/2020     Procedure: ESOPHAGOGASTRODUODENOSCOPY (EGD); Surgeon: Ten Ibrahim DO; Location:  GI     IR ANGIOGRAM THROUGH CATHETER FOLLOW UP   3/5/2019     IR LOWER EXTREMITY ANGIOGRAM LEFT   3/4/2019     KNEE SURGERY Right 2013     right knee torn meniscus surgery     OVARY SURGERY   1971     1 ovary removed      RELEASE CARPAL TUNNEL Right 1988     RELEASE TRIGGER FINGER BILATERAL         SHOULDER SURGERY Left       rotator cuff repair, plate placement     THYROID SURGERY   1988     partial thyroidectomy      Prior to Admission Medications          Active Medications               Current Outpatient Medications   Medication Sig Dispense Refill     aspirin (ASA) 81 MG EC tablet Take 1 tablet (81 mg) by mouth daily (Patient taking differently: Take 81 mg by mouth every evening ) 100 tablet 3     atorvastatin (LIPITOR) 40 MG tablet TAKE 1 TABLET BY MOUTH EVERY DAY 90 tablet 3     buPROPion (ZYBAN) 150 MG 12 hr tablet TAKE 1 TABLET BY MOUTH 2 TIMES DAILY 60 tablet 3     calcium carb 1250 mg, 500 mg [place],/vitamin D 200 unit (CALCIUM 500/D) 500-200 MG-UNIT per tablet Take 1 tablet by mouth 2 times daily          ferrous sulfate (FEROSUL) 325 (65 Fe) MG tablet Take 325 mg by mouth 2 times daily         gabapentin (NEURONTIN) 300 MG capsule TAKE 1 CAPSULE (300 MG) BY MOUTH ONE OR TWO TIMES DAILY (Patient taking differently: Take 300 mg by mouth At Bedtime ) 90 capsule 1     glucosamine-chondroitin 500-400 MG CAPS per capsule Take 1 capsule by mouth 2 times daily          insulin detemir (LEVEMIR PEN) 100 UNIT/ML pen Inject 5 Units Subcutaneous At Bedtime          Melatonin 10 MG TABS tablet Take 10 mg by mouth At Bedtime         metFORMIN (GLUCOPHAGE) 1000 MG tablet TAKE 1 TABLET BY MOUTH TWICE A DAY WITH MEALS 180 tablet 1     Multiple Vitamin (MULTI-VITAMINS) TABS Take 1 tablet by mouth daily          nicotine (NICODERM CQ) 21 MG/24HR 24 hr patch Place 1 patch onto the skin daily 30 patch 1     oxybutynin (DITROPAN) 5 MG tablet TAKE 1 TABLET BY MOUTH TWICE A  tablet 3     pantoprazole (PROTONIX) 40 MG EC tablet TAKE 1 TABLET BY MOUTH EVERY DAY 90 tablet 1     senna-docusate (SENOKOT-S/PERICOLACE) 8.6-50 MG tablet Take 1-2 tablets by mouth 2 times daily (Patient taking differently: Take 1 tablet by mouth 2 times daily ) 100  tablet 1     traZODone (DESYREL) 50 MG tablet TAKE 1-2 TABLETS ( MG) BY MOUTH AT BEDTIME 180 tablet 1     vitamin C (ASCORBIC ACID) 500 MG tablet Take 1 tablet (500 mg) by mouth daily 100 tablet 1     insulin pen needle (29G X 12MM) 29G X 12MM miscellaneous Use 1 pen needles daily or as directed. 100 each 11   Allergies             Allergies   Allergen Reactions     Indomethacin Other (See Comments)       Dizziness and disorientation     Tramadol        ROS   10 point review of systems is negative other than noted in the HPI or here.    Physical Exam   Physical exam was not done as this was a video visit during a global pandemic.    ECHO 10/9/20   There is severe mitral stenosis.   Severe valvular aortic stenosis.   There is moderate to mod-severe (2-3+) mitral regurgitation.   There is moderate (2+) aortic regurgitation.   There is severe mitral annular calcification.   The visual ejection fraction is estimated at 60-65%.   Left ventricular systolic function is normal.   Right ventricular systolic pressure is elevated, consistent with moderate   pulmonary hypertension.   The ascending aorta is Mildly dilated.   The degree of aortic stenosis has progressed to severe stenosis.   EKG 11/2020   Sinus rhythm   Possible Left atrial enlargement   Anterolateral infarct , age undetermined   Abnormal ECG   Cardiac cath 11/16/20   Right sided filling pressures are mildly elevated.   Severely elevated PCWP with V-wave to 40 mmHg. LVEDP moderately elevated at 20 mmHg.   Severe mitral stenosis with MV gradient of 18 mmHg   Severe aortic stenosis with AV gradient of 38 mmHg and KRISTOFER 0.65 cm^2.   Non-obstructive coronary disease   Recommend a surgical consultation for severe AV and MV disease.   Outside records reviewed from: care everywhere   ASSESSMENT and PLAN   Rajani Guo is a 69 year old female with severe aortic and mitral stenosis. I agree with the recommendation to proceed with aortic and mitral valve replacement.  She will need a coronary angiogram and CT chest as part of her preop assessment, She will see me for a full visit after these tests are done to discuss surgery in greater detail.      Please do not hesitate to contact me if you have any questions/concerns.     Sincerely,     Luc Lara MD

## 2020-12-01 ENCOUNTER — CARE COORDINATION (OUTPATIENT)
Dept: CARDIOLOGY | Facility: CLINIC | Age: 70
End: 2020-12-01

## 2020-12-01 ENCOUNTER — PREP FOR PROCEDURE (OUTPATIENT)
Dept: CARDIOLOGY | Facility: CLINIC | Age: 70
End: 2020-12-01

## 2020-12-01 DIAGNOSIS — Z01.810 PRE-OPERATIVE CARDIOVASCULAR EXAMINATION: ICD-10-CM

## 2020-12-01 DIAGNOSIS — I35.0 SEVERE AORTIC STENOSIS: ICD-10-CM

## 2020-12-01 DIAGNOSIS — R09.89 OTHER SPECIFIED SYMPTOMS AND SIGNS INVOLVING THE CIRCULATORY AND RESPIRATORY SYSTEMS: ICD-10-CM

## 2020-12-01 DIAGNOSIS — R06.02 SHORTNESS OF BREATH: ICD-10-CM

## 2020-12-01 DIAGNOSIS — I05.0 SEVERE MITRAL STENOSIS BY PRIOR ECHOCARDIOGRAM: Primary | ICD-10-CM

## 2020-12-01 DIAGNOSIS — I08.0 MITRAL AND AORTIC INCOMPETENCE: Primary | ICD-10-CM

## 2020-12-01 NOTE — PROGRESS NOTES
Per Dr Lara he would like patient to have her surgery the week of the 14th or the week of the 28th as he will be out of town beginning 12/22 and wants to be here to keep an eye on the patient so does not want to do surgery on her on the 21st.  Called and left voicemail message with patient.  Waiting for return call.     Spoke to patient this am and surgery was rescheduled to Friday 12/11.  Patient will have all pre op tests on Monday 12/07 including her COVID test.  Emailed patient all surgery instructions and will meet with patient in clinic on Monday 12/07 to answer any questions.  Patient agreed to all appointments and plans.

## 2020-12-01 NOTE — LETTER
December 2, 2020        Dear HERRERA Gerard [facility]    CARDIOTHORACIC SURGERY PRE-OP INSTRUCTIONS  Your Heart Surgery is scheduled with Dr Luc Lara  On Friday December 11th at 7:30 am.  Please arrive at 5:30 am.      Report to the information desk in the front lobby of the hospital at [address] [facility], [place] [address]. When you walk in the main entrance of the hospital it is directly in front of you. Ask for an escort or the  can help direct you. You will then be escorted or directed to  on the third floor for your surgery preparation.     At this time due to the Coronavirus, No one will be allowed to accompany you into the hospital and no visitors will will allowed during your stay. Visiting policies will be strictly enforced.  This policy is subject to change as the COVID virus continues to evolve.     COVID 19 TESTING  You will be required to undergo COVID 19 testing prior the angiogram and your surgery.  You will be contacted to schedule this test within 48 hours of your surgery.      After the COVID test please protect yourself by following the CDC recommendations for disease prevention.  Wash hands regularly with soap and water and use hand  if soap and water is not available, wear a face mask, separate yourself from others by 6 feet and keep your hands away from your face.      Call your surgery coordinator Anita Martin RN or [facility] ([phone]) if you develop any of the following symptoms; fever, cough, shortness of breath, sore throat, runny or stuffy nose, muscle or body aches, headaches, fatigue, vomiting or diarrhea.      You will spend approximately 5 - 7 days in the hospital depending on how quickly you recover.      INSTRUCTIONS PRIOR TO SURGERY      MEDICATIONS    ASPIRIN is okay to take up to the day before your surgery but NOT the day of your surgery. Take your other medications as you normally would until the day of  surgery unless instructed otherwise. If you do not take aspirin do not start aspirin unless instructed otherwise.  TAKE YOUR LAST DOSE OF ASPIRIN ON Thursday December 10th.       STOP TAKING these medications. STOP ALL ANTIINFLAMMATORY MEDICATIONS: (Ibuprofen, Aleve, Advil, Celebrex, Votaren, Ketoprofen, and Naproxen).  Stop ALL SUPPLEMENTS 10 days prior to your surgery. This includes stopping Co-Q 10, vitamin E and all fish oil supplements.   Please check the labels on any OTC eye vitamins you may be taking.  They often contain vitamin E and should be stopped 10 days prior to surgery.        HISTORY AND PHYSICAL:  LABS and IMAGING  All tests and procedures must be within 30 days of your surgery date.     You do NOT need to fast for the blood work.      You have a pre-op clinic visit beginning at 11:30 am on Monday 12/07 in the [facility], [address] [place] [address]. A  or Coordinator will assist you. The Pre Assessment/Anesthesia Clinic is on the fifth floor.  The laboratory and radiology is on the first floor.      Your schedule is as follows:    11:30 am PFTs    1:15 pm Labs and COVID test.    1:45 pm Chest X-ray    2:00 pm Carotid Ultra Sound    3:00 pm Dr Lara    3:20 pm EKG    3:40 pm CT Chest without contrast    3:45 pm PAC (Pre Assessment and Anesthesia Clinic)      DIABETIC  INSTRUCTIONS  If your primary care has given you instructions please follow those.  Otherwise  No oral diabetic medication the morning of surgery.  If you take long acting insulin in the evening, only take half of your dose. We will check your glucose upon arrival.     DO NOT EAT ANY SOLID FOODS AFTER MIDNIGHT or the morning of your surgery. NO MILK, MILK PRODUCTS, SMOOTHIES 8 HOURS PRIOR TO SURGERY.              DO NOT EAT ANYTHING, however you may have any clear liquids; black coffee (sugar okay), clear tea, water, sprite, ginger ale, apple juice, Gatorade or any clear liquid up to two  hours before your surgery time. (No colleen tea) No milk, or milk products, no smoothies or juice with pulp.         MEDICATIONS THE MORNING OF SURGERY  You do not need to take any medications the am of your surgery.      BELONGINGS  Do not bring personal belongings, jewelry, money, valuables, toiletries or medications to the hospital the morning of your surgery. You may pack a bag and give it to a family member or friend to bring the following day or when needed.   Please remove all jewelry, including body piercings. Cautery instruments are used during surgery that may pose a risk of burns if a body piercing is left in during surgery.     Do bring a photo ID and insurance card.  A copy of your health care directives, if you have one.  Glasses and hearing aids (bring cases).  You cannot wear contact lenses during the surgery.  Inhaler(s) and eye drops if you use them, tell the staff about them when you arrive. Bring your CPAP machine or breathing device, if you use them.  If you have a pacemaker or ICD (cardiac defibrillator) bring the ID card.  If you have an implanted stimulator, bring the remote control.  If you are a legal guardian bring a copy of the certified (court-stamped) guardianship papers.      Call Ailin our patient , with any questions or concerns about scheduling. She can be reached by phone at [phone] between 8 AM and 4:30PM Monday through Friday. Our answering service will  calls outside of those business hours.   If you have questions about your medications, test results or have a change in your health status call Anita Martin RN at [phone] during regular business hours.      On weekends or after 4:30 please call [phone] and ask the  to page the Cardiothoracic Fellow, Nurse Practitioner, Physician Assistant or Staff on call that weekend.       Please feel free to call me or our office with any questions.     Thank you,         Anita Martin,  RN  Cardiothoracic Surgery Coordinator  [phone]  Direct Phone  [phone]  Fax

## 2020-12-01 NOTE — PROGRESS NOTES
Called and spoke to patient regarding seeing Dr Lara in clinic on 12/07 for in person visit.  Also discussed surgery date and pre op tests.  Patient has requested 12/21 for surgery and would like to get her pre op done on 12/07 when she is here for her clinic visit.  If everything cannot be completed on the 7th she is able to return to complete everything.  Patient will need PAC, labs, EKG, CT, CXR, Carotid US and PFTs.  Orders entered and note sent to scheduling to schedule surgery and pre op.

## 2020-12-01 NOTE — TELEPHONE ENCOUNTER
FUTURE VISIT INFORMATION      SURGERY INFORMATION:  himanshu Poncho fuller    Consult: virtual visit 20    RECORDS REQUESTED FROM:       Primary Care Provider: Tamiko Coley MD- [facility]    Pertinent Medical History: murmur, hypertension, PVD, severe mitral stenosis, heart failure    Most recent EKG+ Tracin20    Most recent ECHO: 10/8/20    Most recent Coronary Angiogram: 20

## 2020-12-02 DIAGNOSIS — Z11.59 ENCOUNTER FOR SCREENING FOR OTHER VIRAL DISEASES: Primary | ICD-10-CM

## 2020-12-02 PROBLEM — I08.0 MITRAL AND AORTIC INCOMPETENCE: Status: ACTIVE | Noted: 2020-12-02

## 2020-12-02 ASSESSMENT — ENCOUNTER SYMPTOMS
PALPITATIONS: 0
HALLUCINATIONS: 0
LEG PAIN: 1
SLEEP DISTURBANCES DUE TO BREATHING: 1
FLANK PAIN: 0
LOSS OF CONSCIOUSNESS: 0
TREMORS: 0
SEIZURES: 0
INSOMNIA: 0
ORTHOPNEA: 1
EYE REDNESS: 0
PANIC: 0
SWOLLEN GLANDS: 0
DYSPNEA ON EXERTION: 1
POLYDIPSIA: 0
FATIGUE: 1
DOUBLE VISION: 0
MYALGIAS: 1
SYNCOPE: 0
BRUISES/BLEEDS EASILY: 0
MEMORY LOSS: 1
LIGHT-HEADEDNESS: 1
DIZZINESS: 1
CHILLS: 0
DEPRESSION: 1
EXERCISE INTOLERANCE: 1
SHORTNESS OF BREATH: 1
ALTERED TEMPERATURE REGULATION: 1
DYSURIA: 0
MUSCLE WEAKNESS: 1
STIFFNESS: 1
DISTURBANCES IN COORDINATION: 1
TINGLING: 0
HEMOPTYSIS: 0
INCREASED ENERGY: 1
ARTHRALGIAS: 1
SNORES LOUDLY: 0
POLYPHAGIA: 1
NUMBNESS: 0
NECK PAIN: 1
BACK PAIN: 1
NIGHT SWEATS: 0
WEIGHT LOSS: 0
FEVER: 0
HYPERTENSION: 0
DECREASED APPETITE: 0
HEADACHES: 0
DIFFICULTY URINATING: 0
SPUTUM PRODUCTION: 1
WEIGHT GAIN: 1
SPEECH CHANGE: 0
NERVOUS/ANXIOUS: 1
EYE PAIN: 0
COUGH: 0
WHEEZING: 0
JOINT SWELLING: 1
POSTURAL DYSPNEA: 1
HYPOTENSION: 1
DECREASED CONCENTRATION: 1
HEMATURIA: 0
WEAKNESS: 1
EYE IRRITATION: 0
MUSCLE CRAMPS: 1
PARALYSIS: 0
COUGH DISTURBING SLEEP: 0
EYE WATERING: 0

## 2020-12-04 DIAGNOSIS — I35.0 SEVERE AORTIC STENOSIS: ICD-10-CM

## 2020-12-04 DIAGNOSIS — I05.0 SEVERE MITRAL STENOSIS BY PRIOR ECHOCARDIOGRAM: Primary | ICD-10-CM

## 2020-12-04 RX ORDER — ACETAMINOPHEN 325 MG/1
975 TABLET ORAL ONCE
Status: CANCELLED | OUTPATIENT
Start: 2020-12-04 | End: 2020-12-04

## 2020-12-04 RX ORDER — CEFAZOLIN SODIUM 1 G/50ML
1 INJECTION, SOLUTION INTRAVENOUS SEE ADMIN INSTRUCTIONS
Status: CANCELLED | OUTPATIENT
Start: 2020-12-04

## 2020-12-04 RX ORDER — CEFAZOLIN SODIUM 2 G/50ML
2 SOLUTION INTRAVENOUS
Status: CANCELLED | OUTPATIENT
Start: 2020-12-04

## 2020-12-04 RX ORDER — LIDOCAINE 40 MG/G
CREAM TOPICAL
Status: CANCELLED | OUTPATIENT
Start: 2020-12-04

## 2020-12-04 RX ORDER — FAMOTIDINE 20 MG/1
20 TABLET, FILM COATED ORAL
Status: CANCELLED | OUTPATIENT
Start: 2020-12-04

## 2020-12-04 RX ORDER — NOREPINEPHRINE BITARTRATE 0.06 MG/ML
0.03-0.4 INJECTION, SOLUTION INTRAVENOUS CONTINUOUS
Status: CANCELLED | OUTPATIENT
Start: 2020-12-29

## 2020-12-04 RX ORDER — PHENYLEPHRINE HCL IN 0.9% NACL 50MG/250ML
0.5-6 PLASTIC BAG, INJECTION (ML) INTRAVENOUS CONTINUOUS
Status: CANCELLED | OUTPATIENT
Start: 2020-12-29

## 2020-12-04 RX ORDER — CHLORHEXIDINE GLUCONATE ORAL RINSE 1.2 MG/ML
10 SOLUTION DENTAL ONCE
Status: CANCELLED | OUTPATIENT
Start: 2020-12-04 | End: 2020-12-04

## 2020-12-04 RX ORDER — DEXMEDETOMIDINE HYDROCHLORIDE 4 UG/ML
0.2-1.2 INJECTION, SOLUTION INTRAVENOUS CONTINUOUS
Status: CANCELLED | OUTPATIENT
Start: 2020-12-29

## 2020-12-04 RX ORDER — GABAPENTIN 100 MG/1
100 CAPSULE ORAL
Status: CANCELLED | OUTPATIENT
Start: 2020-12-04

## 2020-12-04 NOTE — PROGRESS NOTES
Preoperative Assessment Center Medication History Note    Medication history completed via phone call on December 4, 2020 by this writer. See Epic admission navigator for prior to admission medications. Operating room staff will still need to confirm medications and last dose information on day of surgery.     Medication history interview sources:  patient, payor information.     Changes made to PTA medication list (reason)  Added: none  Deleted: fosamax (stopped by provider about 1-2 months ago),   Changed: senna-docusate dose,     Additional medication history information (including reliability of information, actions taken by pharmacist):    -- No recent (within 30 days) course of antibiotics  -- No recent (within 30 days) course of systemic steroids  -- Patient declines being on any other prescription or over-the-counter medications    Prior to Admission medications    Medication Sig Last Dose Taking? Auth Provider   aspirin (ASA) 81 MG EC tablet Take 1 tablet (81 mg) by mouth daily  Patient taking differently: Take 81 mg by mouth every evening  Taking Yes Louis Paul MD   atorvastatin (LIPITOR) 40 MG tablet TAKE 1 TABLET BY MOUTH EVERY DAY Taking Yes Tamiko Coley MD   buPROPion (ZYBAN) 150 MG 12 hr tablet TAKE 1 TABLET BY MOUTH 2 TIMES DAILY Taking Yes Tamiko Coley MD   calcium carb 1250 mg, 500 mg [place],/vitamin D 200 unit (CALCIUM 500/D) 500-200 MG-UNIT per tablet Take 1 tablet by mouth 2 times daily  Taking Yes Reported, Patient   ferrous sulfate (FEROSUL) 325 (65 Fe) MG tablet Take 325 mg by mouth 2 times daily Taking Yes Unknown, Entered By History   gabapentin (NEURONTIN) 300 MG capsule TAKE 1 CAPSULE (300 MG) BY MOUTH ONE OR TWO TIMES DAILY  Patient taking differently: Take 300 mg by mouth At Bedtime  Taking Yes Tamiko Coley MD   glucosamine-chondroitin 500-400 MG CAPS per capsule Take 1 capsule by mouth 2 times daily  Taking Yes Reported, Patient   insulin detemir (LEVEMIR PEN) 100  UNIT/ML pen Inject 5 Units Subcutaneous At Bedtime  Taking Yes Unknown, Entered By History   Melatonin 10 MG TABS tablet Take 10 mg by mouth At Bedtime Taking Yes Unknown, Entered By History   metFORMIN (GLUCOPHAGE) 1000 MG tablet TAKE 1 TABLET BY MOUTH TWICE A DAY WITH MEALS Taking Yes Tamiko Coley MD   Multiple Vitamin (MULTI-VITAMINS) TABS Take 1 tablet by mouth daily  Taking Yes Reported, Patient   nicotine (NICODERM CQ) 21 MG/24HR 24 hr patch Place 1 patch onto the skin daily Taking Yes Louis Paul MD   oxybutynin (DITROPAN) 5 MG tablet TAKE 1 TABLET BY MOUTH TWICE A DAY Taking Yes Tamiko Coley MD   pantoprazole (PROTONIX) 40 MG EC tablet TAKE 1 TABLET BY MOUTH EVERY DAY Taking Yes Tamiko Coley MD   senna-docusate (SENOKOT-S/PERICOLACE) 8.6-50 MG tablet Take 1-2 tablets by mouth 2 times daily  Patient taking differently: Take 1 tablet by mouth 2 times daily  Taking Yes Louis Paul MD   traZODone (DESYREL) 50 MG tablet TAKE 1-2 TABLETS ( MG) BY MOUTH AT BEDTIME Taking Yes Tamiko Coley MD   vitamin C (ASCORBIC ACID) 500 MG tablet Take 1 tablet (500 mg) by mouth daily Taking Yes Louis Paul MD   insulin pen needle (29G X 12MM) 29G X 12MM miscellaneous Use 1 pen needles daily or as directed.   Tamiko Coley MD          Medication history completed by: Ryan Mitchell, [facility]

## 2020-12-07 ENCOUNTER — ANCILLARY PROCEDURE (OUTPATIENT)
Dept: CT IMAGING | Facility: CLINIC | Age: 70
End: 2020-12-07
Attending: SURGERY
Payer: COMMERCIAL

## 2020-12-07 ENCOUNTER — ANESTHESIA EVENT (OUTPATIENT)
Dept: SURGERY | Facility: CLINIC | Age: 70
DRG: 219 | End: 2020-12-07
Payer: COMMERCIAL

## 2020-12-07 ENCOUNTER — PRE VISIT (OUTPATIENT)
Dept: SURGERY | Facility: CLINIC | Age: 70
End: 2020-12-07

## 2020-12-07 ENCOUNTER — OFFICE VISIT (OUTPATIENT)
Dept: SURGERY | Facility: CLINIC | Age: 70
End: 2020-12-07
Payer: COMMERCIAL

## 2020-12-07 ENCOUNTER — ANCILLARY PROCEDURE (OUTPATIENT)
Dept: ULTRASOUND IMAGING | Facility: CLINIC | Age: 70
End: 2020-12-07
Attending: SURGERY
Payer: COMMERCIAL

## 2020-12-07 ENCOUNTER — OFFICE VISIT (OUTPATIENT)
Dept: CARDIOLOGY | Facility: CLINIC | Age: 70
End: 2020-12-07
Attending: SURGERY
Payer: COMMERCIAL

## 2020-12-07 ENCOUNTER — ANCILLARY PROCEDURE (OUTPATIENT)
Dept: GENERAL RADIOLOGY | Facility: CLINIC | Age: 70
End: 2020-12-07
Attending: SURGERY
Payer: COMMERCIAL

## 2020-12-07 VITALS
TEMPERATURE: 97.9 F | HEIGHT: 65 IN | OXYGEN SATURATION: 96 % | SYSTOLIC BLOOD PRESSURE: 135 MMHG | BODY MASS INDEX: 24.79 KG/M2 | WEIGHT: 148.8 LBS | DIASTOLIC BLOOD PRESSURE: 62 MMHG | RESPIRATION RATE: 16 BRPM | HEART RATE: 101 BPM

## 2020-12-07 VITALS
HEIGHT: 64 IN | BODY MASS INDEX: 25.44 KG/M2 | SYSTOLIC BLOOD PRESSURE: 134 MMHG | HEART RATE: 86 BPM | DIASTOLIC BLOOD PRESSURE: 71 MMHG | OXYGEN SATURATION: 95 % | WEIGHT: 149 LBS

## 2020-12-07 DIAGNOSIS — I05.0 SEVERE MITRAL STENOSIS BY PRIOR ECHOCARDIOGRAM: Primary | ICD-10-CM

## 2020-12-07 DIAGNOSIS — Z01.818 PREOP EXAMINATION: Primary | ICD-10-CM

## 2020-12-07 DIAGNOSIS — R09.89 OTHER SPECIFIED SYMPTOMS AND SIGNS INVOLVING THE CIRCULATORY AND RESPIRATORY SYSTEMS: ICD-10-CM

## 2020-12-07 DIAGNOSIS — I05.0 SEVERE MITRAL STENOSIS BY PRIOR ECHOCARDIOGRAM: ICD-10-CM

## 2020-12-07 DIAGNOSIS — I35.0 SEVERE AORTIC STENOSIS: ICD-10-CM

## 2020-12-07 DIAGNOSIS — I35.0 AORTIC VALVE STENOSIS, ETIOLOGY OF CARDIAC VALVE DISEASE UNSPECIFIED: ICD-10-CM

## 2020-12-07 DIAGNOSIS — Z01.810 PRE-OPERATIVE CARDIOVASCULAR EXAMINATION: ICD-10-CM

## 2020-12-07 DIAGNOSIS — Z11.59 ENCOUNTER FOR SCREENING FOR OTHER VIRAL DISEASES: ICD-10-CM

## 2020-12-07 DIAGNOSIS — I35.0 SEVERE AORTIC STENOSIS: Primary | ICD-10-CM

## 2020-12-07 DIAGNOSIS — R06.02 SHORTNESS OF BREATH: ICD-10-CM

## 2020-12-07 DIAGNOSIS — Z01.818 PRE-OP EVALUATION: Primary | ICD-10-CM

## 2020-12-07 LAB
ALBUMIN SERPL-MCNC: 3.7 G/DL (ref 3.4–5)
ALP SERPL-CCNC: 61 U/L (ref 40–150)
ALT SERPL W P-5'-P-CCNC: 17 U/L (ref 0–50)
ANION GAP SERPL CALCULATED.3IONS-SCNC: 4 MMOL/L (ref 3–14)
APTT PPP: 30 SEC (ref 22–37)
AST SERPL W P-5'-P-CCNC: 10 U/L (ref 0–45)
BILIRUB DIRECT SERPL-MCNC: <0.1 MG/DL (ref 0–0.2)
BILIRUB SERPL-MCNC: 0.3 MG/DL (ref 0.2–1.3)
BUN SERPL-MCNC: 10 MG/DL (ref 7–30)
CALCIUM SERPL-MCNC: 8.7 MG/DL (ref 8.5–10.1)
CHLORIDE SERPL-SCNC: 110 MMOL/L (ref 94–109)
CO2 SERPL-SCNC: 28 MMOL/L (ref 20–32)
CREAT SERPL-MCNC: 0.58 MG/DL (ref 0.52–1.04)
DLCOUNC-%PRED-PRE: 70 %
DLCOUNC-PRE: 13.84 ML/MIN/MMHG
DLCOUNC-PRED: 19.61 ML/MIN/MMHG
ERV-%PRED-PRE: 70 %
ERV-PRE: 0.49 L
ERV-PRED: 0.7 L
ERYTHROCYTE [DISTWIDTH] IN BLOOD BY AUTOMATED COUNT: 21.3 % (ref 10–15)
EXPTIME-PRE: 7.04 SEC
FEF2575-%PRED-PRE: 73 %
FEF2575-PRE: 1.42 L/SEC
FEF2575-PRED: 1.95 L/SEC
FEFMAX-%PRED-PRE: 83 %
FEFMAX-PRE: 4.86 L/SEC
FEFMAX-PRED: 5.79 L/SEC
FEV1-%PRED-PRE: 75 %
FEV1-PRE: 1.72 L
FEV1FEV6-PRE: 77 %
FEV1FEV6-PRED: 79 %
FEV1FVC-PRE: 77 %
FEV1FVC-PRED: 78 %
FEV1SVC-PRE: 74 %
FEV1SVC-PRED: 73 %
FIFMAX-PRE: 4.68 L/SEC
FRCPLETH-%PRED-PRE: 112 %
FRCPLETH-PRE: 3.04 L
FRCPLETH-PRED: 2.71 L
FVC-%PRED-PRE: 76 %
FVC-PRE: 2.23 L
FVC-PRED: 2.91 L
GFR SERPL CREATININE-BSD FRML MDRD: >90 ML/MIN/{1.73_M2}
GLUCOSE SERPL-MCNC: 90 MG/DL (ref 70–99)
HCT VFR BLD AUTO: 38 % (ref 35–47)
HGB BLD-MCNC: 11.3 G/DL (ref 11.7–15.7)
IC-%PRED-PRE: 76 %
IC-PRE: 1.83 L
IC-PRED: 2.4 L
INR PPP: 1.13 (ref 0.86–1.14)
MCH RBC QN AUTO: 26.7 PG (ref 26.5–33)
MCHC RBC AUTO-ENTMCNC: 29.7 G/DL (ref 31.5–36.5)
MCV RBC AUTO: 90 FL (ref 78–100)
PLATELET # BLD AUTO: 272 10E9/L (ref 150–450)
POTASSIUM SERPL-SCNC: 4 MMOL/L (ref 3.4–5.3)
PROT SERPL-MCNC: 7 G/DL (ref 6.8–8.8)
RBC # BLD AUTO: 4.24 10E12/L (ref 3.8–5.2)
RVPLETH-%PRED-PRE: 125 %
RVPLETH-PRE: 2.55 L
RVPLETH-PRED: 2.03 L
SODIUM SERPL-SCNC: 142 MMOL/L (ref 133–144)
TLCPLETH-%PRED-PRE: 98 %
TLCPLETH-PRE: 4.87 L
TLCPLETH-PRED: 4.94 L
VA-%PRED-PRE: 82 %
VA-PRE: 3.91 L
VC-%PRED-PRE: 74 %
VC-PRE: 2.32 L
VC-PRED: 3.1 L
WBC # BLD AUTO: 8.1 10E9/L (ref 4–11)

## 2020-12-07 PROCEDURE — 80048 BASIC METABOLIC PNL TOTAL CA: CPT | Performed by: PATHOLOGY

## 2020-12-07 PROCEDURE — 36415 COLL VENOUS BLD VENIPUNCTURE: CPT | Performed by: PATHOLOGY

## 2020-12-07 PROCEDURE — 71046 X-RAY EXAM CHEST 2 VIEWS: CPT | Mod: GC | Performed by: RADIOLOGY

## 2020-12-07 PROCEDURE — 85730 THROMBOPLASTIN TIME PARTIAL: CPT | Performed by: PATHOLOGY

## 2020-12-07 PROCEDURE — 86901 BLOOD TYPING SEROLOGIC RH(D): CPT | Performed by: PATHOLOGY

## 2020-12-07 PROCEDURE — 85027 COMPLETE CBC AUTOMATED: CPT | Performed by: PATHOLOGY

## 2020-12-07 PROCEDURE — 94729 DIFFUSING CAPACITY: CPT | Performed by: INTERNAL MEDICINE

## 2020-12-07 PROCEDURE — 94726 PLETHYSMOGRAPHY LUNG VOLUMES: CPT | Performed by: INTERNAL MEDICINE

## 2020-12-07 PROCEDURE — U0003 INFECTIOUS AGENT DETECTION BY NUCLEIC ACID (DNA OR RNA); SEVERE ACUTE RESPIRATORY SYNDROME CORONAVIRUS 2 (SARS-COV-2) (CORONAVIRUS DISEASE [COVID-19]), AMPLIFIED PROBE TECHNIQUE, MAKING USE OF HIGH THROUGHPUT TECHNOLOGIES AS DESCRIBED BY CMS-2020-01-R: HCPCS | Performed by: PATHOLOGY

## 2020-12-07 PROCEDURE — 93880 EXTRACRANIAL BILAT STUDY: CPT | Performed by: RADIOLOGY

## 2020-12-07 PROCEDURE — 86850 RBC ANTIBODY SCREEN: CPT | Performed by: PATHOLOGY

## 2020-12-07 PROCEDURE — 99207 PR NON-BILLABLE SERV PER CHARTING: CPT | Performed by: SURGERY

## 2020-12-07 PROCEDURE — G0463 HOSPITAL OUTPT CLINIC VISIT: HCPCS

## 2020-12-07 PROCEDURE — 94375 RESPIRATORY FLOW VOLUME LOOP: CPT | Performed by: INTERNAL MEDICINE

## 2020-12-07 PROCEDURE — 86900 BLOOD TYPING SEROLOGIC ABO: CPT | Performed by: PATHOLOGY

## 2020-12-07 PROCEDURE — 80076 HEPATIC FUNCTION PANEL: CPT | Performed by: PATHOLOGY

## 2020-12-07 PROCEDURE — 71250 CT THORAX DX C-: CPT | Mod: GC | Performed by: RADIOLOGY

## 2020-12-07 PROCEDURE — 99203 OFFICE O/P NEW LOW 30 MIN: CPT | Performed by: PHYSICIAN ASSISTANT

## 2020-12-07 PROCEDURE — 85610 PROTHROMBIN TIME: CPT | Performed by: PATHOLOGY

## 2020-12-07 ASSESSMENT — MIFFLIN-ST. JEOR
SCORE: 1190.86
SCORE: 1203.33

## 2020-12-07 ASSESSMENT — LIFESTYLE VARIABLES: TOBACCO_USE: 1

## 2020-12-07 ASSESSMENT — PAIN SCALES - GENERAL: PAINLEVEL: NO PAIN (0)

## 2020-12-07 NOTE — ANESTHESIA PREPROCEDURE EVALUATION
"Anesthesia Pre-Procedure Evaluation    Patient: Rajani Guo   MRN:     [id number] Gender:   female   Age:    69 year old :      1950        Preoperative Diagnosis: Mitral and aortic incompetence [I08.0]   Procedure(s):  REPLACEMENT, AORTIC AND MITRAL VALVES     LABS:  CBC:   Lab Results   Component Value Date    WBC 8.1 2020    WBC 8.7 2020    HGB 11.3 (L) 2020    HGB 11.5 (L) 2020    HCT 38.0 2020    HCT 38.1 2020     2020     2020     BMP:   Lab Results   Component Value Date     2020     2020    POTASSIUM 4.0 2020    POTASSIUM 3.4 2020    CHLORIDE 110 (H) 2020    CHLORIDE 107 2020    CO2 28 2020    CO2 26 2020    BUN 10 2020    BUN 10 2020    CR 0.58 2020    CR 0.62 2020    GLC 90 2020     (H) 2020     COAGS:   Lab Results   Component Value Date    PTT 30 2020    INR 1.13 2020     POC:   Lab Results   Component Value Date     (H) 10/09/2020     OTHER:   Lab Results   Component Value Date    A1C 5.7 (H) 2020    KAYLEEN 8.7 2020    ALBUMIN 3.7 2020    PROTTOTAL 7.0 2020    ALT 17 2020    AST 10 2020    ALKPHOS 61 2020    BILITOTAL 0.3 2020    TSH 3.42 2019    CRP 0.7 2019        Preop Vitals    BP Readings from Last 3 Encounters:   20 134/71   20 (!) 152/76   10/16/20 (!) 147/78    Pulse Readings from Last 3 Encounters:   20 86   20 79   10/16/20 110      Resp Readings from Last 3 Encounters:   20 17   10/13/20 22   10/09/20 16    SpO2 Readings from Last 3 Encounters:   20 95%   20 97%   10/16/20 98%      Temp Readings from Last 1 Encounters:   20 98  F (36.7  C) (Oral)    Ht Readings from Last 1 Encounters:   20 1.626 m (5' 4\")      Wt Readings from Last 1 Encounters:   20 67.6 kg (149 lb)    Estimated body " "mass index is 25.58 kg/m  as calculated from the following:    Height as of an earlier encounter on 12/7/20: 1.626 m (5' 4\").    Weight as of an earlier encounter on 12/7/20: 67.6 kg (149 lb).     LDA:  Left Groin Interventional Procedure Access (Active)   Number of days: 644       Right Groin Interventional Procedure Access (Active)   Number of days: 644       Right Groin Interventional Procedure Access (Active)   Site Assessment WD 11/16/20 1400   Hemostasis management Unchanged 11/16/20 1400   Femoral Bruit not present 11/16/20 1400   CMS Right Extremity WDL 11/16/20 1400   Dorsalis Pulse - Right Leg Normal 11/16/20 1400   Posterior Tibial Pulse - Right Leg Normal 11/16/20 1400   Number of days: 21        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     GI bleed      History of partial thyroidectomy 06/02/2018     Hyperlipidemia LDL goal <100 01/18/2018     Hypertension      Insomnia, unspecified type 01/18/2018     Mitral regurgitation      Mitral stenosis      Pulmonary hypertension (H)      Tobacco use disorder       Past Surgical History:   Procedure Laterality Date     COLONOSCOPY N/A 9/4/2019    Procedure: COLONOSCOPY;  Surgeon: Marie Todd MD;  Location:  GI     CV CORONARY ANGIOGRAM N/A 11/16/2020    Procedure: CV CORONARY ANGIOGRAM;  Surgeon: Joey Rivas MD;  Location:  HEART CARDIAC CATH LAB     CV FRACTIONAL FLOW RESERVE N/A 11/16/2020    Procedure: Fractional Flow Reserve;  Surgeon: Joey Rivas MD;  Location:  HEART CARDIAC CATH LAB     CV RIGHT HEART CATH N/A 11/16/2020    " Procedure: CV RIGHT HEART CATH;  Surgeon: Joey Rivas MD;  Location:  HEART CARDIAC CATH LAB     ESOPHAGOSCOPY, GASTROSCOPY, DUODENOSCOPY (EGD), COMBINED N/A 9/4/2019    Procedure: ESOPHAGOGASTRODUODENOSCOPY (EGD);  Surgeon: Marie Todd MD;  Location:  GI     ESOPHAGOSCOPY, GASTROSCOPY, DUODENOSCOPY (EGD), COMBINED N/A 9/17/2020    Procedure: ESOPHAGOGASTRODUODENOSCOPY (EGD);  Surgeon: Ten Ibrahim DO;  Location:  GI     IR ANGIOGRAM THROUGH CATHETER FOLLOW UP  3/5/2019     IR LOWER EXTREMITY ANGIOGRAM LEFT  3/4/2019     KNEE SURGERY Right 2013    right knee torn meniscus surgery     OVARY SURGERY  1971    1 ovary removed     RELEASE CARPAL TUNNEL Right 1988     RELEASE TRIGGER FINGER BILATERAL       SHOULDER SURGERY Left     rotator cuff repair, plate placement     THYROID SURGERY  1988    partial thyroidectomy      Allergies   Allergen Reactions     Indomethacin Other (See Comments)     Dizziness and disorientation     Tramadol Nausea and Vomiting        Anesthesia Evaluation     . Pt has had prior anesthetic. Type: General and MAC    No history of anesthetic complications          ROS/MED HX    ENT/Pulmonary:     (+)tobacco use, Current use , . .    Neurologic:  - neg neurologic ROS     Cardiovascular:     (+) Dyslipidemia, hypertension----. Taking blood thinners : . . DESAI, . :. valvular problems/murmurs type: AS and MR . pulmonary hypertension, Previous cardiac testing Echodate:10/9/20results:There is severe mitral stenosis.     Severe valvular aortic stenosis.     There is moderate to mod-severe (2-3+) mitral regurgitation.     There is moderate (2+) aortic regurgitation.      There is severe mitral annular calcification.      The visual ejection fraction is estimated at 60-65%.      Left ventricular systolic function is normal.      Right ventricular systolic pressure is elevated, consistent with moderate      pulmonary hypertension.      The ascending aorta is Mildly dilated.       The degree of aortic stenosis has progressed to severe stenosis. date: results:ECG reviewed date:11/2020 results:SR, LAE, anterolateral infarctCath date: 11/2020 results:Right sided filling pressures are mildly elevated.    Severely elevated PCWP with V-wave to 40 mmHg. LVEDP moderately elevated at 20 mmHg.    Severe mitral stenosis with MV gradient of 18 mmHg    Severe aortic stenosis with AV gradient of 38 mmHg and KRISTOFER 0.65 cm^2.    Non-obstructive coronary disease.       Recommend a surgical consultation for severe AV and MV disease.           METS/Exercise Tolerance: Comment: Can walk for about 15 minutes before taking a break 3 - Able to walk 1-2 blocks without stopping   Hematologic: Comments: H/o arterial thrombus in 2019, but eliquis stopped due to GI bleed    (+) History of blood clots pt is not anticoagulated, Anemia, History of Transfusion no previous transfusion reaction -      Musculoskeletal: Comment: osteoporosis        GI/Hepatic: Comment: Gi bleed     (+) GERD Asymptomatic on medication,       Renal/Genitourinary:         Endo:     (+) type II DM Last HgA1c: 5.7 date: 9/2020 Using insulin - not using insulin pump Normal glucose range: 80-110s .      Psychiatric:  - neg psychiatric ROS       Infectious Disease:  - neg infectious disease ROS       Malignancy:      - no malignancy   Other:                         PHYSICAL EXAM:   Mental Status/Neuro: A/A/O   Airway: Facies: Feasible  Mallampati: I  Mouth/Opening: Full  TM distance: > 6 cm  Neck ROM: Full   Respiratory: Auscultation: CTAB     Resp. Rate: Normal     Resp. Effort: Normal      CV: Rhythm: Regular  Heart: Murmur (Systolic)  Edema: LLE; RLE   Comments:      Dental: Dentures  Dentures: Lower; Upper                Assessment:   ASA SCORE: 4    H&P: History and physical reviewed and following examination; no interval change.     Smoking Status:  Active Smoker       - patient did not smoke on day of surgery       - instructed to abstain from  smoking on day of procedure   NPO Status: NPO Appropriate     Plan:   Anes. Type:  General; Peripheral Nerve Block     Block Details: PVB   Pre-Medication: None   Induction:  IV (Standard)   Airway: ETT; Oral   Access/Monitoring: PIV; A-Line; 2nd PIV; FloTrac; MAC-Line; PAC   Maintenance: Balanced     Blood products: Blood in Room     Drips/Meds: Norepi; Epinephrine; Vasopressin     Advanced Monitoring: BIS; KIM Adult            ADULT KIM Checklist:               Absolute Contra-Indications: NONE               Relative Contra-Indications:  NONE               Final Plan: Proceed with KIM     Postop Plan:   Postop Pain: Opioids  Postop Sedation/Airway: Sedation likely       Postop Sedation: Propofol Infusion  Disposition: ICU     PONV Management:   Adult Risk Factors: Female, Postop Opioids   Prevention:, Propofol     CONSENT: Direct conversation   Plan and risks discussed with: Patient   Blood Products: Consented (ALL Blood Products)                PAC Discussion and Assessment    ASA Classification: 3  Case is suitable for: [place]  Anesthetic techniques and relevant risks discussed: GA  Invasive monitoring and risk discussed:   Types:   Possibility and Risk of blood transfusion discussed:   NPO instructions given:   Additional anesthetic preparation and risks discussed:   Needs early admission to pre-op area:   Other:     PAC Resident/NP Anesthesia Assessment:  Rajani Guo is a 69 year old male scheduled for MVR/ AVR on 12/11/20 by Dr. Lara in treatment of mitral and aortic incompetence.  PAC referral for risk assessment and optimization for anesthesia with comorbid conditions of hypertension, pulmonary hypertension, dyslipidemia, current tobacco use, h/o DVT, h/o arterial thrombus, diabetes, GERD, osteoporosis:    Pre-operative considerations:  1.  Cardiac:  Functional status- METS 3.  H/o hypertension and dyslipidemia.  Recently found to have worsening aortic stenosis and mitral regurgitation on cardiac cath.  She reports some DESAI and lightheadedness. Above procedure is now planned with Dr. Lara. Patient will take the last dose of ASA 81 mg 12/10/20 per cardiology.   2.  Pulm:  Airway feasible.  APRIL risk: low. Current tobacco use- 1 PPD. Encouraged to decrease smoking and educated not to smoke DOS. COVID testing in process per surgery team.  3.  GI:  Risk of PONV score = 2.  If > 2, anti-emetic intervention recommended. H/o GI bleed 9/2020. She completed pill study through [facility] 10/2020 and was found to have 2 small AVMs that were not actively bleeding. Denies symptoms of GI bleeding today. hgb stable today at 11.3.  GERD using protonix  4.  Heme: patient has had many blood transfusions due to anemia- most recently 10/2020. T&S negative for antibodies today. Will order repeat T&S for preop.   Also h/o DVT about 1.5 years ago, but blood thinner stopped secondary to GI bleed.  Denies any recent symptoms of DVT.   5.  Endo: diabetes is well controlled using insulin and metformin. Will hold DOS. A1c was 5.7 9/2020    VTE risk: 1.8%    Patient is optimized and is acceptable candidate for the proposed procedure.       Patient discussed with Dr. Benítez      Addendum: message sent to Anita Martin regarding patients recent GI bleed. Dr. Lara has decided to postpone surgery until 12/29/20 so patient can follow up with [facility] prior to surgery.    **For further details of assessment, testing, and physical exam please see H and P completed on same date.      June Gerard PA-C        Mid-Level Provider/Resident:   Date:   Time:     Attending Anesthesiologist Anesthesia Assessment:        Anesthesiologist:   Date:   Time:   Pass/Fail:   Disposition:     PAC Pharmacist Assessment:        Pharmacist:   Date:   Time:    June Gerard PA-C

## 2020-12-07 NOTE — PATIENT INSTRUCTIONS
Preparing for Your Surgery      Name:  Rajani Guo   MRN:  [id number]   :  1950   Today's Date:  2020       Arriving for surgery:  Surgery date:  2020  Arrival time:  5:30AM    Restrictions due to COVID 19:  No visitors are allowed at this time.    Splash parking is available for anyone with mobility limitations or disabilities.  ([place]  24 hours/ 7 days a week; [facility]  7 am- 3:30 pm, Mon- Fri)    Please come to:       [facility], [facility] [address]  [address] [place]  [address]     -    Please proceed to the Surgery Lounge on the 3rd floor. [phone]?     - ?If you are in need of directions, wheelchair or escort please stop at the Information Desk in the lobby.  Inform the information person that you are here for surgery; a wheelchair and escort will be provided to the Surgery Lounge .?     What can I eat or drink?  -  You may eat and drink normally for up to 8 hours before your surgery. (Until 12/10/2020, 11:30PM)  -  You may have clear liquids until 2 hours before surgery. (Until 2020, 5:30AM)    Examples of clear liquids:  Water  Clear broth  Juices (apple, white grape, white cranberry  and cider) without pulp  Noncarbonated, powder based beverages  (lemonade and Rony-Aid)  Sodas (Sprite, 7-Up, ginger ale and seltzer)  Coffee or tea (without milk or cream)  Gatorade    -  No Alcohol for at least 24 hours before surgery     Which medicines can I take?    Hold Multivitamins for 10 days before surgery.  Hold Supplements for 10 days before surgery.  Hold Ibuprofen (Advil, Motrin) for 10 days before surgery--unless otherwise directed by surgeon.  Hold Naproxen (Aleve) for 10 days before surgery.    -  DO NOT take these medications the day of surgery:    Aspirin     Ferrous Sulfate    Metformin (Glucophage)  Senna    -  PLEASE TAKE these medications the day of surgery:    Please take 4 Units of Detemir Insulin(Levemir) the evening before  surgery    Atorvastatin(Lipitor)   Bupropion(Zyban)    Oxybutynin(Ditropan)   Pantoprazole(Protonix)        How do I prepare myself?  - Please take 2 showers before surgery using Scrubcare or Hibiclens soap.    Use this soap only from the neck to your toes.     Leave the soap on your skin for one minute--then rinse thoroughly.      You may use your own shampoo and conditioner; no other hair products.   - Please remove all jewelry and body piercings.  - No lotions, deodorants or fragrance.  - No makeup or fingernail polish.   - Bring your ID and insurance card.    - All patients are required to have a Covid-19 test within 4 days of surgery/procedure.      -Patients will be contacted by the [facility] scheduling team within 1 week of surgery to make an appointment.      - Patients may call the Scheduling team at [phone] if they have not been scheduled within 4 days of  surgery.          Questions or Concerns:    - For any questions regarding the day of surgery or your hospital stay, please contact the Pre Admission Nursing Office at [phone].       - If you have health changes between today and your surgery please call your surgeon.       For questions after surgery please call your surgeons office.

## 2020-12-07 NOTE — NURSING NOTE
Patient seen today for consultation for AVR and MVR    Surgery procedure explained to patient  and all questions and concerns were answered and addressed.     Valve choices were discussed with the patient, mechanical and bioprosthetic. Risks and benefits of both explained.     Risks and benefits of surgery were discussed with patient (and all present) including risk of death, stroke, bleeding, cardiac ischemia, wound infection, renal failure, arrhythmias and possible pacemaker implantation. Patient accepts these risks and is willing to proceed with surgery.     Patient has completed all pre op tests and procedures today and will call her with results.     Pre surgery preparation folder with instructions for surgery preparation and recovery was emailed to the patient on 12/02/20.    Patient verbalized understanding of all instructions and will call with any questions or concerns.

## 2020-12-07 NOTE — H&P
Pre-Operative H & P     CC:  Preoperative exam to assess for increased cardiopulmonary risk while undergoing surgery and anesthesia.    Date of Encounter: 12/7/2020  Primary Care Physician:  Tamiko Coley  associated diagnosis: mitral and aortic valve incompetence     HPI  Rajani Guo is a 69 year old female who presents for pre-operative H & P in preparation for AVR/ MVR with Dr. Lara on 12/11/20 at [facility]. Patient is being evaluated for comorbid conditions of hypertension, pulmonary hypertension, dyslipidemia, current tobacco use, h/o DVT, h/o arterial thrombus, diabetes, GERD, osteoporosis      Ms. Guo has a history of mitral stenosis and aortic stenosis.  She recently underwent cardiac cath that showed severe disease.  She was seen by Dr. Lara, and the above procedure is now planned.  She reports DESAI and dizziness.    History is obtained from the patient and chart review.      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     GI bleed      History of partial thyroidectomy 06/02/2018     Hyperlipidemia LDL goal <100 01/18/2018     Hypertension      Insomnia, unspecified type 01/18/2018     Mitral regurgitation      Mitral stenosis      Pulmonary hypertension (H)      Tobacco use disorder        Past Surgical History  Past Surgical History:   Procedure Laterality Date     COLONOSCOPY N/A 9/4/2019    Procedure: COLONOSCOPY;  Surgeon: Marie Todd MD;  Location:  GI     CV CORONARY ANGIOGRAM N/A 11/16/2020    Procedure: CV CORONARY  ANGIOGRAM;  Surgeon: Joey Rivas MD;  Location:  HEART CARDIAC CATH LAB     CV FRACTIONAL FLOW RESERVE N/A 11/16/2020    Procedure: Fractional Flow Reserve;  Surgeon: Joey Rivas MD;  Location:  HEART CARDIAC CATH LAB     CV RIGHT HEART CATH N/A 11/16/2020    Procedure: CV RIGHT HEART CATH;  Surgeon: Joey Rivas MD;  Location:  HEART CARDIAC CATH LAB     ESOPHAGOSCOPY, GASTROSCOPY, DUODENOSCOPY (EGD), COMBINED N/A 9/4/2019    Procedure: ESOPHAGOGASTRODUODENOSCOPY (EGD);  Surgeon: Marie Todd MD;  Location:  GI     ESOPHAGOSCOPY, GASTROSCOPY, DUODENOSCOPY (EGD), COMBINED N/A 9/17/2020    Procedure: ESOPHAGOGASTRODUODENOSCOPY (EGD);  Surgeon: Ten Ibrahim DO;  Location:  GI     IR ANGIOGRAM THROUGH CATHETER FOLLOW UP  3/5/2019     IR LOWER EXTREMITY ANGIOGRAM LEFT  3/4/2019     KNEE SURGERY Right 2013    right knee torn meniscus surgery     OVARY SURGERY  1971    1 ovary removed     RELEASE CARPAL TUNNEL Right 1988     RELEASE TRIGGER FINGER BILATERAL       SHOULDER SURGERY Left     rotator cuff repair, plate placement     THYROID SURGERY  1988    partial thyroidectomy       Hx of Blood transfusions/reactions: Patient has a history of transfusion, but denies reactions     Hx of abnormal bleeding or anti-platelet use: ASA 81 mg    Menstrual history: No LMP recorded. Patient is postmenopausal.    Steroid use in the last year: denies    Personal or FH with difficulty with Anesthesia:  denies    Prior to Admission Medications  Current Outpatient Medications   Medication Sig Dispense Refill     aspirin (ASA) 81 MG EC tablet Take 1 tablet (81 mg) by mouth daily (Patient taking differently: Take 81 mg by mouth every evening ) 100 tablet 3     atorvastatin (LIPITOR) 40 MG tablet TAKE 1 TABLET BY MOUTH EVERY DAY 90 tablet 3     buPROPion (ZYBAN) 150 MG 12 hr tablet TAKE 1 TABLET BY MOUTH 2 TIMES DAILY 60 tablet 3     calcium carb 1250 mg, 500 mg [place],/vitamin D 200 unit  (CALCIUM 500/D) 500-200 MG-UNIT per tablet Take 1 tablet by mouth 2 times daily        ferrous sulfate (FEROSUL) 325 (65 Fe) MG tablet Take 325 mg by mouth 2 times daily       gabapentin (NEURONTIN) 300 MG capsule TAKE 1 CAPSULE (300 MG) BY MOUTH ONE OR TWO TIMES DAILY (Patient taking differently: Take 300 mg by mouth At Bedtime ) 90 capsule 1     glucosamine-chondroitin 500-400 MG CAPS per capsule Take 1 capsule by mouth 2 times daily        insulin detemir (LEVEMIR PEN) 100 UNIT/ML pen Inject 5 Units Subcutaneous At Bedtime        Melatonin 10 MG TABS tablet Take 10 mg by mouth At Bedtime       metFORMIN (GLUCOPHAGE) 1000 MG tablet TAKE 1 TABLET BY MOUTH TWICE A DAY WITH MEALS 180 tablet 1     Multiple Vitamin (MULTI-VITAMINS) TABS Take 1 tablet by mouth daily        nicotine (NICODERM CQ) 21 MG/24HR 24 hr patch Place 1 patch onto the skin daily 30 patch 1     oxybutynin (DITROPAN) 5 MG tablet TAKE 1 TABLET BY MOUTH TWICE A  tablet 3     pantoprazole (PROTONIX) 40 MG EC tablet TAKE 1 TABLET BY MOUTH EVERY DAY 90 tablet 1     senna-docusate (SENOKOT-S/PERICOLACE) 8.6-50 MG tablet Take 1-2 tablets by mouth 2 times daily (Patient taking differently: Take 1 tablet by mouth 2 times daily ) 100 tablet 1     traZODone (DESYREL) 50 MG tablet TAKE 1-2 TABLETS ( MG) BY MOUTH AT BEDTIME 180 tablet 1     vitamin C (ASCORBIC ACID) 500 MG tablet Take 1 tablet (500 mg) by mouth daily 100 tablet 1     insulin pen needle (29G X 12MM) 29G X 12MM miscellaneous Use 1 pen needles daily or as directed. 100 each 11       Allergies  Allergies   Allergen Reactions     Indomethacin Other (See Comments)     Dizziness and disorientation     Tramadol Nausea and Vomiting       Social History  Social History     Socioeconomic History     Marital status:      Spouse name: Not on file     Number of children: 1     Years of education: Not on file     Highest education level: Not on file   Occupational History     Not on file    Social Needs     Financial resource strain: Not on file     Food insecurity     Worry: Not on file     Inability: Not on file     Transportation needs     Medical: Not on file     Non-medical: Not on file   Tobacco Use     Smoking status: Current Every Day Smoker     Packs/day: 1.00     Years: 52.00     Pack years: 52.00     Smokeless tobacco: Never Used     Tobacco comment: using with patches   Substance and Sexual Activity     Alcohol use: Yes     Comment: 2 glasses of wine a week     Drug use: Yes     Types: Marijuana     Comment: occasional     Sexual activity: Never   Lifestyle     Physical activity     Days per week: Not on file     Minutes per session: Not on file     Stress: Not on file   Relationships     Social connections     Talks on phone: Not on file     Gets together: Not on file     Attends Christian service: Not on file     Active member of club or organization: Not on file     Attends meetings of clubs or organizations: Not on file     Relationship status: Not on file     Intimate partner violence     Fear of current or ex partner: Not on file     Emotionally abused: Not on file     Physically abused: Not on file     Forced sexual activity: Not on file   Other Topics Concern     Parent/sibling w/ CABG, MI or angioplasty before 65F 55M? Not Asked   Social History Narrative    , has one daughter who lives with her in patient's house.  Is full code.  (last updated 10/8/2020)        Family History  Family History   Problem Relation Age of Onset     Diabetes Type 2  Mother      Lung Cancer Father      Diabetes Sister      Coronary Artery Disease Sister      Diabetes Brother      Diabetes Brother      Diabetes Type 2  Brother      Coronary Artery Disease Sister      Asthma Sister      Glaucoma No family hx of      Macular Degeneration No family hx of      Anesthesia Reaction No family hx of        ROS/MED HX    ENT/Pulmonary:     (+)tobacco use, Current use , . .    Neurologic:  - neg  neurologic ROS     Cardiovascular:     (+) Dyslipidemia, hypertension----. Taking blood thinners : . . DESAI, . :. valvular problems/murmurs type: AS and MR . pulmonary hypertension, Previous cardiac testing Echodate:10/9/20results:There is severe mitral stenosis.     Severe valvular aortic stenosis.     There is moderate to mod-severe (2-3+) mitral regurgitation.     There is moderate (2+) aortic regurgitation.      There is severe mitral annular calcification.      The visual ejection fraction is estimated at 60-65%.      Left ventricular systolic function is normal.      Right ventricular systolic pressure is elevated, consistent with moderate      pulmonary hypertension.      The ascending aorta is Mildly dilated.      The degree of aortic stenosis has progressed to severe stenosis. date: results:ECG reviewed date:11/2020 results:SR, LAE, anterolateral infarctCath date: 11/2020 results:Right sided filling pressures are mildly elevated.    Severely elevated PCWP with V-wave to 40 mmHg. LVEDP moderately elevated at 20 mmHg.    Severe mitral stenosis with MV gradient of 18 mmHg    Severe aortic stenosis with AV gradient of 38 mmHg and KRISTOFER 0.65 cm^2.    Non-obstructive coronary disease.       Recommend a surgical consultation for severe AV and MV disease.           METS/Exercise Tolerance: Comment: Can walk for about 15 minutes before taking a break 3 - Able to walk 1-2 blocks without stopping   Hematologic: Comments: H/o arterial thrombus in 2019, but eliquis stopped due to GI bleed    (+) History of blood clots pt is not anticoagulated, Anemia, History of Transfusion no previous transfusion reaction -      Musculoskeletal: Comment: osteoporosis        GI/Hepatic:     (+) GERD Asymptomatic on medication,       Renal/Genitourinary:         Endo:     (+) type II DM Last HgA1c: 5.7 date: 9/2020 Using insulin - not using insulin pump Normal glucose range: 80-110s .      Psychiatric:  - neg psychiatric ROS      "  Infectious Disease:  - neg infectious disease ROS       Malignancy:      - no malignancy   Other:           The complete review of systems is negative other than noted in the HPI or here.   Temp: 97.9  F (36.6  C) Temp src: Oral BP: 135/62 Pulse: 101   Resp: 16 SpO2: 96 %         148 lbs 12.8 oz  5' 4.843\"   Body mass index is 24.88 kg/m .       Physical Exam  Constitutional: Awake, alert, cooperative, no apparent distress, and appears stated age.  Eyes: Pupils equal, round and reactive to light, extra ocular muscles intact, sclera clear, conjunctiva normal.  HENT: Normocephalic, oral pharynx with moist mucus membranes, edentulous- with dentures.   Respiratory: Clear to auscultation bilaterally, no crackles or wheezing.  Cardiovascular: regular rate, loud systolic murmur that radiates to the carotids. Palpable pulses to radial arteries.   GI: Normal bowel sounds, soft, non-distended, mild TTP  Genitourinary:  deferred  Skin: Warm and dry.  No rashes at anticipated surgical site.   Musculoskeletal: Full ROM of neck. There is no redness, warmth, or swelling of the exposed  joints. Gross motor strength is normal.    Neurologic: Awake, alert, oriented to name, place and time. Cranial nerves II-XII are grossly intact. Gait is normal.   Neuropsychiatric: Calm, cooperative. Normal affect.     Labs: (personally reviewed)  Component      Latest Ref Rng & Units 12/7/2020   Sodium      133 - 144 mmol/L 142   Potassium      3.4 - 5.3 mmol/L 4.0   Chloride      94 - 109 mmol/L 110 (H)   Carbon Dioxide      20 - 32 mmol/L 28   Anion Gap      3 - 14 mmol/L 4   Glucose      70 - 99 mg/dL 90   Urea Nitrogen      7 - 30 mg/dL 10   Creatinine      0.52 - 1.04 mg/dL 0.58   GFR Estimate      >60 mL/min/1.73:m2 >90   GFR Estimate If Black      >60 mL/min/1.73:m2 >90   Calcium      8.5 - 10.1 mg/dL 8.7   WBC      4.0 - 11.0 10e9/L 8.1   RBC Count      3.8 - 5.2 10e12/L 4.24   Hemoglobin      11.7 - 15.7 g/dL 11.3 (L)   Hematocrit      " 35.0 - 47.0 % 38.0   MCV      78 - 100 fl 90   MCH      26.5 - 33.0 pg 26.7   MCHC      31.5 - 36.5 g/dL 29.7 (L)   RDW      10.0 - 15.0 % 21.3 (H)   Platelet Count      150 - 450 10e9/L 272   Bilirubin Direct      0.0 - 0.2 mg/dL <0.1   Bilirubin Total      0.2 - 1.3 mg/dL 0.3   Albumin      3.4 - 5.0 g/dL 3.7   Protein Total      6.8 - 8.8 g/dL 7.0   Alkaline Phosphatase      40 - 150 U/L 61   ALT      0 - 50 U/L 17   AST      0 - 45 U/L 10   ABO       A   RH(D)       Neg   Antibody Screen       Neg   Test Valid Only At       [facility]   Specimen Expires       12/10/2020   PTT      22 - 37 sec 30   INR      0.86 - 1.14 1.13     ECHO 10/9/20    There is severe mitral stenosis.    Severe valvular aortic stenosis.    There is moderate to mod-severe (2-3+) mitral regurgitation.    There is moderate (2+) aortic regurgitation.    There is severe mitral annular calcification.    The visual ejection fraction is estimated at 60-65%.    Left ventricular systolic function is normal.    Right ventricular systolic pressure is elevated, consistent with moderate    pulmonary hypertension.    The ascending aorta is Mildly dilated.    The degree of aortic stenosis has progressed to severe stenosis.        EKG 11/2020    Sinus rhythm    Possible Left atrial enlargement    Anterolateral infarct , age undetermined    Abnormal ECG        Cardiac cath 11/16/20    Right sided filling pressures are mildly elevated.    Severely elevated PCWP with V-wave to 40 mmHg. LVEDP moderately elevated at 20 mmHg.    Severe mitral stenosis with MV gradient of 18 mmHg    Severe aortic stenosis with AV gradient of 38 mmHg and KRISTOFER 0.65 cm^2.    Non-obstructive coronary disease   Recommend a surgical consultation for severe AV and MV disease.     Outside records reviewed from: care everywhere    ASSESSMENT and PLAN  Rajani Guo is a 69 year old male scheduled for MVR/ AVR on 12/11/20 by Dr. Lara in  treatment of mitral and aortic incompetence.  PAC referral for risk assessment and optimization for anesthesia with comorbid conditions of hypertension, pulmonary hypertension, dyslipidemia, current tobacco use, h/o DVT, h/o arterial thrombus, diabetes, GERD, osteoporosis:    Pre-operative considerations:  1.  Cardiac:  Functional status- METS 3.  H/o hypertension and dyslipidemia.  Recently found to have worsening aortic stenosis and mitral regurgitation on cardiac cath. She reports some DESAI and lightheadedness. Above procedure is now planned with Dr. Lara. Patient will take the last dose of ASA 81 mg 12/10/20 per cardiology.   2.  Pulm:  Airway feasible.  APRIL risk: low. Current tobacco use- 1 PPD. Encouraged to decrease smoking and educated not to smoke DOS. COVID testing in process per surgery team.  3.  GI:  Risk of PONV score = 2.  If > 2, anti-emetic intervention recommended. H/o GI bleed 9/2020. She completed pill study through [facility] 10/2020 and was found to have 2 small AVMs that were not actively bleeding. Denies symptoms of GI bleeding today. hgb stable today at 11.3.  GERD using protonix  4.  Heme: patient has had many blood transfusions due to anemia- most recently 10/2020. T&S negative for antibodies today. Will order repeat T&S for preop.   Also h/o DVT about 1.5 years ago, but blood thinner stopped secondary to GI bleed.  Denies any recent symptoms of DVT.   5.  Endo: diabetes is well controlled using insulin and metformin. Will hold DOS. A1c was 5.7 9/2020    VTE risk: 1.8%    Patient is optimized and is acceptable candidate for the proposed procedure.       Patient discussed with Dr. Benítez      Addendum: message sent to Anita Martin regarding patients recent GI bleed. Dr. Lara has decided to postpone surgery until 12/29/20 so patient can follow up with [facility] prior to surgery.    June Gerard PA-C  Preoperative Assessment Center  [facility]  Clinic and Surgery Center  Phone:  [phone]  Fax: [phone]

## 2020-12-07 NOTE — LETTER
12/7/2020      RE: Rajani Guo  [address] [facility] [address]       Dear Colleague,    Thank you for the opportunity to participate in the care of your patient, Rajani Guo, at the [facility] HEART [facility] at [facility]. Please see a copy of my visit note below.    HPI   Rajani Guo is a 69 year old female who was referred for evaluation of aortic and mitral stenosis.   She recently underwent cardiac cath that showed severe disease. She was seen by Dr. Lara, and the above procedure is now planned. She complains of worsening DESAI and dizziness.   History is obtained from the patient and chart review.   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     GI bleed      History of partial thyroidectomy 06/02/2018     Hyperlipidemia LDL goal <100 01/18/2018     Hypertension      Insomnia, unspecified type 01/18/2018     Mitral regurgitation      Mitral stenosis      Pulmonary hypertension (H)      Tobacco use disorder    Past Surgical History   Past Surgical History         Past Surgical History:   Procedure Laterality Date     COLONOSCOPY N/A 9/4/2019    Procedure: COLONOSCOPY; Surgeon: Marie Todd MD; Location:  GI     CV CORONARY ANGIOGRAM N/A 11/16/2020    Procedure: CV CORONARY ANGIOGRAM; Surgeon: Joey Rivas MD; Location: [facility] CARDIAC CATH LAB     CV FRACTIONAL FLOW RESERVE N/A 11/16/2020    Procedure: Fractional Flow Reserve; Surgeon: Joey Rivas MD; Location: [facility]  CARDIAC CATH LAB     CV RIGHT HEART CATH N/A 11/16/2020    Procedure: CV RIGHT HEART CATH; Surgeon: Joey Rivas MD; Location:  HEART CARDIAC CATH LAB     ESOPHAGOSCOPY, GASTROSCOPY, DUODENOSCOPY (EGD), COMBINED N/A 9/4/2019    Procedure: ESOPHAGOGASTRODUODENOSCOPY (EGD); Surgeon: Marie Todd MD; Location:  GI     ESOPHAGOSCOPY, GASTROSCOPY, DUODENOSCOPY (EGD), COMBINED N/A 9/17/2020    Procedure: ESOPHAGOGASTRODUODENOSCOPY (EGD); Surgeon: Ten Ibrahim DO; Location:  GI     IR ANGIOGRAM THROUGH CATHETER FOLLOW UP  3/5/2019     IR LOWER EXTREMITY ANGIOGRAM LEFT  3/4/2019     KNEE SURGERY Right 2013    right knee torn meniscus surgery     OVARY SURGERY  1971    1 ovary removed     RELEASE CARPAL TUNNEL Right 1988     RELEASE TRIGGER FINGER BILATERAL       SHOULDER SURGERY Left     rotator cuff repair, plate placement     THYROID SURGERY  1988    partial thyroidectomy     Prior to Admission Medications   Active Medications          Current Outpatient Medications   Medication Sig Dispense Refill     aspirin (ASA) 81 MG EC tablet Take 1 tablet (81 mg) by mouth daily (Patient taking differently: Take 81 mg by mouth every evening ) 100 tablet 3     atorvastatin (LIPITOR) 40 MG tablet TAKE 1 TABLET BY MOUTH EVERY DAY 90 tablet 3     buPROPion (ZYBAN) 150 MG 12 hr tablet TAKE 1 TABLET BY MOUTH 2 TIMES DAILY 60 tablet 3     calcium carb 1250 mg, 500 mg [place],/vitamin D 200 unit (CALCIUM 500/D) 500-200 MG-UNIT per tablet Take 1 tablet by mouth 2 times daily        ferrous sulfate (FEROSUL) 325 (65 Fe) MG tablet Take 325 mg by mouth 2 times daily       gabapentin (NEURONTIN) 300 MG capsule TAKE 1 CAPSULE (300 MG) BY MOUTH ONE OR TWO TIMES DAILY (Patient taking differently: Take 300 mg by mouth At Bedtime ) 90 capsule 1     glucosamine-chondroitin 500-400 MG CAPS per capsule Take 1 capsule by mouth 2 times daily        insulin detemir (LEVEMIR PEN) 100 UNIT/ML pen Inject 5 Units Subcutaneous At  "Bedtime        Melatonin 10 MG TABS tablet Take 10 mg by mouth At Bedtime       metFORMIN (GLUCOPHAGE) 1000 MG tablet TAKE 1 TABLET BY MOUTH TWICE A DAY WITH MEALS 180 tablet 1     Multiple Vitamin (MULTI-VITAMINS) TABS Take 1 tablet by mouth daily        nicotine (NICODERM CQ) 21 MG/24HR 24 hr patch Place 1 patch onto the skin daily 30 patch 1     oxybutynin (DITROPAN) 5 MG tablet TAKE 1 TABLET BY MOUTH TWICE A  tablet 3     pantoprazole (PROTONIX) 40 MG EC tablet TAKE 1 TABLET BY MOUTH EVERY DAY 90 tablet 1     senna-docusate (SENOKOT-S/PERICOLACE) 8.6-50 MG tablet Take 1-2 tablets by mouth 2 times daily (Patient taking differently: Take 1 tablet by mouth 2 times daily ) 100 tablet 1     traZODone (DESYREL) 50 MG tablet TAKE 1-2 TABLETS ( MG) BY MOUTH AT BEDTIME 180 tablet 1     vitamin C (ASCORBIC ACID) 500 MG tablet Take 1 tablet (500 mg) by mouth daily 100 tablet 1     insulin pen needle (29G X 12MM) 29G X 12MM miscellaneous Use 1 pen needles daily or as directed. 100 each 11   Allergies         Allergies   Allergen Reactions     Indomethacin Other (See Comments)     Dizziness and disorientation     Tramadol Nausea and Vomiting     Social History   Social History                                                                                                                                                                                                                                                                                                                                 Family History   Family History                                                                                                        ROS   10 point review of systems is negative other than noted in the HPI or here.   Temp: 97.9  F (36.6  C) Temp src: Oral BP: 135/62 Pulse: 101 Resp: 16 SpO2: 96 %   148 lbs 12.8 oz 5' 4.843\" Body mass index is 24.88 kg/m .   Physical Exam   Constitutional: Awake, alert, cooperative, no " apparent distress, and appears stated age.   Eyes: Pupils equal, round and reactive to light, extra ocular muscles intact, sclera clear, conjunctiva normal.   HENT: Normocephalic, oral pharynx with moist mucus membranes, edentulous- with dentures.   Respiratory: Clear to auscultation bilaterally, no crackles or wheezing.   Cardiovascular: regular rate, loud systolic murmur that radiates to the carotids. Palpable pulses to radial arteries.   GI: Normal bowel sounds, soft, non-distended, mild TTP   Genitourinary: deferred   Skin: Warm and dry. No rashes at anticipated surgical site.   Musculoskeletal: Full ROM of neck. There is no redness, warmth, or swelling of the exposed joints. Gross motor strength is normal.   Neurologic: Awake, alert, oriented to name, place and time.     Labs:   Component  Latest Ref Rng & Units 12/7/2020   Sodium  133 - 144 mmol/L 142   Potassium  3.4 - 5.3 mmol/L 4.0   Chloride  94 - 109 mmol/L 110 (H)   Carbon Dioxide  20 - 32 mmol/L 28   Anion Gap  3 - 14 mmol/L 4   Glucose  70 - 99 mg/dL 90   Urea Nitrogen  7 - 30 mg/dL 10   Creatinine  0.52 - 1.04 mg/dL 0.58   GFR Estimate  >60 mL/min/1.73:m2 >90   GFR Estimate If Black  >60 mL/min/1.73:m2 >90   Calcium  8.5 - 10.1 mg/dL 8.7   WBC  4.0 - 11.0 10e9/L 8.1   RBC Count  3.8 - 5.2 10e12/L 4.24   Hemoglobin  11.7 - 15.7 g/dL 11.3 (L)   Hematocrit  35.0 - 47.0 % 38.0   MCV  78 - 100 fl 90   MCH  26.5 - 33.0 pg 26.7   MCHC  31.5 - 36.5 g/dL 29.7 (L)   RDW  10.0 - 15.0 % 21.3 (H)   Platelet Count  150 - 450 10e9/L 272   Bilirubin Direct  0.0 - 0.2 mg/dL <0.1   Bilirubin Total  0.2 - 1.3 mg/dL 0.3   Albumin  3.4 - 5.0 g/dL 3.7   Protein Total  6.8 - 8.8 g/dL 7.0   Alkaline Phosphatase  40 - 150 U/L 61   ALT  0 - 50 U/L 17   AST  0 - 45 U/L 10   ABO   A   RH(D)   Neg   Antibody Screen   Neg   Test Valid Only At   [facility]   Specimen Expires   12/10/2020   PTT  22 - 37 sec 30   INR  0.86 - 1.14 1.13    ECHO 10/9/20   There is severe mitral stenosis.   Severe valvular aortic stenosis.   There is moderate to mod-severe (2-3+) mitral regurgitation.   There is moderate (2+) aortic regurgitation.   There is severe mitral annular calcification.   The visual ejection fraction is estimated at 60-65%.   Left ventricular systolic function is normal.   Right ventricular systolic pressure is elevated, consistent with moderate   pulmonary hypertension.   The ascending aorta is Mildly dilated.   The degree of aortic stenosis has progressed to severe stenosis.   EKG 11/2020   Sinus rhythm   Possible Left atrial enlargement   Anterolateral infarct , age undetermined   Abnormal ECG   Cardiac cath 11/16/20   Right sided filling pressures are mildly elevated.   Severely elevated PCWP with V-wave to 40 mmHg. LVEDP moderately elevated at 20 mmHg.   Severe mitral stenosis with MV gradient of 18 mmHg   Severe aortic stenosis with AV gradient of 38 mmHg and KRISTOFER 0.65 cm^2.   Non-obstructive coronary disease   Recommend a surgical consultation for severe AV and MV disease.   Outside records reviewed from: care everywhere   ASSESSMENT and PLAN   Rajani Guo is a 69 year old female with severe aortic and mitral stenosis. I agree with the recommendation to proceed with aortic and mitral valve replacement. I discussed with risks and benefits of surgery including risks of death, bleeding, stroke, infection, renal failure and arrhythmias. She understands and is willing to proceed with surgery.   In view of her history of GI bleed, I discussed this with the GI physicians from [facility], who was okay with her taking long term coumadin. I discussed with her both tissue versus a mechanical valve. She is willing to have a mechanical valve and take life long coumadin.        Please do not hesitate to contact me if you have any questions/concerns.     Sincerely,     Luc Lara MD

## 2020-12-07 NOTE — NURSING NOTE
Chief Complaint   Patient presents with     Follow Up     in person consult [facility]      Vitals were taken and medications were reconciled.     Idalia Solano RMA  2:57 PM

## 2020-12-08 ENCOUNTER — CARE COORDINATION (OUTPATIENT)
Dept: CARDIOLOGY | Facility: CLINIC | Age: 70
End: 2020-12-08

## 2020-12-08 DIAGNOSIS — Z11.59 ENCOUNTER FOR SCREENING FOR OTHER VIRAL DISEASES: Primary | ICD-10-CM

## 2020-12-08 LAB
INTERPRETATION ECG - MUSE: NORMAL
SARS-COV-2 RNA SPEC QL NAA+PROBE: NOT DETECTED
SPECIMEN SOURCE: NORMAL

## 2020-12-08 NOTE — PROGRESS NOTES
Received the following note from PAC regarding patient having hx having intestinal AVM.    June Gerard, Anita Velazquez, KAREEM Howard,  I saw our mutual patient, Rajani, in PAC clinic in preparation for her upcoming surgery with Dr. Lara on Friday. I am sure your team is aware, she had a recent GI bleed in September and has needed multiple infusions. She completed a small bowel follow through that showed 2 small AVMs that were not actively bleeding 10/2020. It does not sound like she has followed up with [facility] since then, although they had recommended she follow up to discuss further.  Her Hgb was stable today at 11.3.  She also denies any symptoms of current GI bleeding. I discussed her case with Dr. Benítez, and she recommended we just touch base to make sure Dr. Lara knows about this history and is comfortable proceeding with surgery.   June    Reviewed note with Dr Lara and he called and spoke to patient and patient agreed to delay surgery until 12/29 and follow up with [facility] for clearance for mechanical valves and lifelong coumadin.  Patient will call after she speaks to [facility] and has appointment scheduled to see them.

## 2020-12-09 ENCOUNTER — TELEPHONE (OUTPATIENT)
Dept: CARDIOLOGY | Facility: CLINIC | Age: 70
End: 2020-12-09

## 2020-12-09 ENCOUNTER — TRANSFERRED RECORDS (OUTPATIENT)
Dept: HEALTH INFORMATION MANAGEMENT | Facility: CLINIC | Age: 70
End: 2020-12-09

## 2020-12-09 ENCOUNTER — PREP FOR PROCEDURE (OUTPATIENT)
Dept: CARDIOLOGY | Facility: CLINIC | Age: 70
End: 2020-12-09

## 2020-12-09 LAB
ABO + RH BLD: NORMAL
ABO + RH BLD: NORMAL
BLD GP AB SCN SERPL QL: NORMAL
BLOOD BANK CMNT PATIENT-IMP: NORMAL
BLOOD BANK CMNT PATIENT-IMP: NORMAL
SPECIMEN EXP DATE BLD: NORMAL

## 2020-12-09 NOTE — TELEPHONE ENCOUNTER
[facility] Call Center    Phone Message    May a detailed message be left on voicemail: yes     Reason for Call: Other: Pt would like a call back asap as he has the info the Dr needed before having procedure done. pLease reach out to pt to discuss     Action Taken: Message routed to:  [facility] Cardio    Travel Screening: Not Applicable

## 2020-12-10 NOTE — TELEPHONE ENCOUNTER
Patient had [facility] fax pill cam report regarding intestinal AVM.  Scanned into chart.  Will discuss with Dr Lara and follow up with patient.

## 2020-12-11 ENCOUNTER — ANESTHESIA (OUTPATIENT)
Dept: SURGERY | Facility: CLINIC | Age: 70
DRG: 219 | End: 2020-12-11
Payer: COMMERCIAL

## 2020-12-15 ENCOUNTER — CARE COORDINATION (OUTPATIENT)
Dept: CARDIOLOGY | Facility: CLINIC | Age: 70
End: 2020-12-15

## 2020-12-15 NOTE — PROGRESS NOTES
Per Dr Lara, after speaking to Dr Marie Todd at [facility] patient will be able to have mechanical valve replacement and take livelong coumadin.   Called patient to let her know she will be able to have mechanical valves.  Patient verbalized understanding.

## 2020-12-23 NOTE — TELEPHONE ENCOUNTER
Physical Therapy  Visit Type: initial evaluation  Precautions:  Medical precautions:  fall risk; standard precautions.    Lines:     Basic: capped IV      Lines in chart and on patient reviewed, cautions maintained throughout session.      SUBJECTIVE                                                                                                            Patient agreed to participate in therapy this date.  Patient seen on 3CD. RN Natacha aware of session.   Patient / Family Goal: return home      OBJECTIVE                                                                                                                  Arousal alertness: appropriate responses to stimuli    Affect/Behavior: appropriate, cooperative and pleasant  Patient activity tolerance: 1 to 1 activity to rest  Balance    Sitting: Static: supervision, Dynamic: supervision    Standing - Firm Surface - Eyes Open: Static: contact guard/touching/steadying assist Dynamic: contact guard/touching/steadying assist    Bed Mobility:        Supine to sit: supervision and with verbal cues  Training completed:    Tasks: supine to sit    Education details: body mechanics and patient safety    Head of bed elevated  Use of railing  Educated on log roll technique  Transfers:    Assistive devices: 1 person and gait belt    Sit to stand: supervision    Stand to sit: supervision  Training completed:    Tasks: stand to sit and sit to stand    Education details: body mechanics and patient safety  Gait/Ambulation:     Assistance: contact guard/touching/steadying assist   Assistive device: 1 person and gait belt    Distance (ft): 120    Type: unsteady    Surface: even  Training Completed:    Tasks: gait training on level surfaces    Education details: patient safety and body mechanics    Contact guard assist for safety due to balance deficits         Interventions                                                                                                       Training  Unable to refill per FM protocol.  Med and pharm loaded.    Angelia AUGUSTN, RN    [facility]  [facility]  Office: [phone]  Fax: [phone]         provided: activity tolerance, balance retraining, bed mobility training, body mechanics, functional ambulation, transfer training, gait training, use of adaptive equipment and safety training  One posterior loss of balance in standing with use of stepping strategy and moderate assist x1 for recovery  Skilled input: Verbal instruction/cues  Verbal Consent: Writer verbally educated and received verbal consent for hand placement, positioning of patient, and techniques to be performed today from patient for clothing adjustments for techniques, hand placement and palpation for techniques and therapist position for techniques as described above and how they are pertinent to the patient's plan of care.        ASSESSMENT                                                                                                                Impairments: range of motion, strength, balance deficits, safety awareness, pain, activity tolerance, coordination and endurance  Functional Limitations: all functional mobility    Elzbieta Bates seen for initial evaluation. Patient lives alone in a one story house with 4 steps to enter with 2 railings. Patient is independent at baseline with all functional mobility. Patient owns a 4ww and cane but does not use at baseline. Upon evaluation, patient demonstrating supervision/set up for bed mobility, supervision/set up for transfers, and contact guard assist for ambulation. Patient limited by pain and nausea this session. Patient with one posterior loss of balance in standing with use of stepping strategy and moderate assist x1 for recovery.      Discharge Recommendations  Recommendation for Discharge: PT WI: Home, Home therapy         PT/OT Mobility Equipment for Discharge: owns cane and 4ww      PT Identified Barriers to Discharge: pain, stairs, lives alone, decreased functional mobility     Skilled therapy is required to address these limitations in attempt to maximize the patient's  independence.  Progress: improving as expected and progressing toward goals  Predicted patient presentation: Moderate (evolving) - Patient comorbidities and complexities, as defined above, may have varying impact on steady progress for prescribed plan of care.    End of Session:   Location: in chair  Safety measures: lines intact and call light within reach  Handoff to: nurse            PLAN                                                                                                                            Suggestions for next session as indicated: Progress independence with bed mobility, transfers, ambulation with no assistive device vs cane vs walker  Trial stairs (4)    Frequency Comments: [facility] (1-2 additional sessions) (12/23)      Interventions: balance, bed mobility, body mechanics, compensatory technique education, continued evaluation, endurance training, functional transfer training, equipment eval/education, gait training, HEP train/position, patient/family training, neuromuscular re-education, ROM, safety education, stairs retraining and strengthening  Agreement to plan and goals: patient agrees with goals and treatment plan        GOALS:  Review Date: 12/30/2020  Long Term Goals: (to be met by time of discharge from hospital)  Sit to supine: Patient will complete sit to supine modified independent.  Supine to sit: Patient will complete supine to sit modified independent.  Sit to stand: Patient will complete sit to stand transfer with modified independent.   Stand to sit: Patient will complete stand to sit transfer with modified independent.   Ambulation (even): Patient will ambulate on even surface for 100 feet with 2-wheeled walker, modified independent.   Stairs: Patient will ambulate 4 steps with modified independent.     Documented in the chart in the following areas:  Services. Prior Level of Function. Pain. Assessment.

## 2020-12-26 DIAGNOSIS — I73.9 PERIPHERAL VASCULAR DISEASE, UNSPECIFIED (H): ICD-10-CM

## 2020-12-26 DIAGNOSIS — Z11.59 ENCOUNTER FOR SCREENING FOR OTHER VIRAL DISEASES: ICD-10-CM

## 2020-12-26 LAB
SARS-COV-2 RNA SPEC QL NAA+PROBE: NORMAL
SPECIMEN SOURCE: NORMAL

## 2020-12-26 PROCEDURE — U0003 INFECTIOUS AGENT DETECTION BY NUCLEIC ACID (DNA OR RNA); SEVERE ACUTE RESPIRATORY SYNDROME CORONAVIRUS 2 (SARS-COV-2) (CORONAVIRUS DISEASE [COVID-19]), AMPLIFIED PROBE TECHNIQUE, MAKING USE OF HIGH THROUGHPUT TECHNOLOGIES AS DESCRIBED BY CMS-2020-01-R: HCPCS | Performed by: FAMILY MEDICINE

## 2020-12-27 LAB
LABORATORY COMMENT REPORT: NORMAL
SARS-COV-2 RNA SPEC QL NAA+PROBE: NEGATIVE
SPECIMEN SOURCE: NORMAL

## 2020-12-28 RX ORDER — GABAPENTIN 300 MG/1
300 CAPSULE ORAL AT BEDTIME
Qty: 90 CAPSULE | Refills: 0 | Status: SHIPPED | OUTPATIENT
Start: 2020-12-28 | End: 2021-03-01

## 2020-12-29 ENCOUNTER — HOSPITAL ENCOUNTER (INPATIENT)
Facility: CLINIC | Age: 70
LOS: 8 days | Discharge: HOME | DRG: 219 | End: 2021-01-06
Attending: SURGERY | Admitting: SURGERY
Payer: COMMERCIAL

## 2020-12-29 ENCOUNTER — APPOINTMENT (OUTPATIENT)
Dept: GENERAL RADIOLOGY | Facility: CLINIC | Age: 70
DRG: 219 | End: 2020-12-29
Attending: PHYSICIAN ASSISTANT
Payer: COMMERCIAL

## 2020-12-29 DIAGNOSIS — Z98.890 S/P MVR (MITRAL VALVE REPAIR): ICD-10-CM

## 2020-12-29 DIAGNOSIS — Z95.2 H/O MECHANICAL AORTIC VALVE REPLACEMENT: ICD-10-CM

## 2020-12-29 DIAGNOSIS — Z95.2 H/O MITRAL VALVE REPLACEMENT WITH MECHANICAL VALVE: Primary | ICD-10-CM

## 2020-12-29 DIAGNOSIS — I35.0 SEVERE AORTIC STENOSIS: ICD-10-CM

## 2020-12-29 DIAGNOSIS — I05.0 SEVERE MITRAL STENOSIS BY PRIOR ECHOCARDIOGRAM: ICD-10-CM

## 2020-12-29 DIAGNOSIS — I08.0 MITRAL AND AORTIC INCOMPETENCE: ICD-10-CM

## 2020-12-29 LAB
ABO + RH BLD: NORMAL
ABO + RH BLD: NORMAL
ALBUMIN SERPL-MCNC: 2.6 G/DL (ref 3.4–5)
ALBUMIN SERPL-MCNC: 3 G/DL (ref 3.4–5)
ALP SERPL-CCNC: 44 U/L (ref 40–150)
ALP SERPL-CCNC: 51 U/L (ref 40–150)
ALT SERPL W P-5'-P-CCNC: 17 U/L (ref 0–50)
ALT SERPL W P-5'-P-CCNC: 22 U/L (ref 0–50)
ANGLE RATE OF CLOT GROWTH: 66.7 DEG (ref 59–74)
ANGLE RATE OF CLOT GROWTH: 73.3 DEG (ref 59–74)
ANION GAP SERPL CALCULATED.3IONS-SCNC: 4 MMOL/L (ref 3–14)
ANION GAP SERPL CALCULATED.3IONS-SCNC: 8 MMOL/L (ref 3–14)
APTT PPP: 33 SEC (ref 22–37)
APTT PPP: 34 SEC (ref 22–37)
APTT PPP: 38 SEC (ref 22–37)
AST SERPL W P-5'-P-CCNC: 43 U/L (ref 0–45)
AST SERPL W P-5'-P-CCNC: 59 U/L (ref 0–45)
BASE DEFICIT BLDA-SCNC: 0.3 MMOL/L
BASE DEFICIT BLDA-SCNC: 0.4 MMOL/L
BASE DEFICIT BLDA-SCNC: 0.5 MMOL/L
BASE DEFICIT BLDA-SCNC: 0.9 MMOL/L
BASE DEFICIT BLDA-SCNC: 1.3 MMOL/L
BASE DEFICIT BLDA-SCNC: 1.4 MMOL/L
BASE DEFICIT BLDA-SCNC: 2 MMOL/L
BASE DEFICIT BLDV-SCNC: 4 MMOL/L
BASE EXCESS BLDA CALC-SCNC: 0.2 MMOL/L
BASE EXCESS BLDA CALC-SCNC: 0.3 MMOL/L
BASE EXCESS BLDA CALC-SCNC: 0.7 MMOL/L
BASE EXCESS BLDA CALC-SCNC: 1.4 MMOL/L
BASE EXCESS BLDV CALC-SCNC: 0.7 MMOL/L
BASE EXCESS BLDV CALC-SCNC: 0.7 MMOL/L
BASE EXCESS BLDV CALC-SCNC: 0.8 MMOL/L
BILIRUB SERPL-MCNC: 0.4 MG/DL (ref 0.2–1.3)
BILIRUB SERPL-MCNC: 0.5 MG/DL (ref 0.2–1.3)
BLD GP AB SCN SERPL QL: NORMAL
BLD PROD TYP BPU: NORMAL
BLD UNIT ID BPU: 0
BLOOD BANK CMNT PATIENT-IMP: NORMAL
BLOOD PRODUCT CODE: NORMAL
BPU ID: NORMAL
BUN SERPL-MCNC: 11 MG/DL (ref 7–30)
BUN SERPL-MCNC: 9 MG/DL (ref 7–30)
CA-I BLD-MCNC: 4 MG/DL (ref 4.4–5.2)
CA-I BLD-MCNC: 4.1 MG/DL (ref 4.4–5.2)
CA-I BLD-MCNC: 4.2 MG/DL (ref 4.4–5.2)
CA-I BLD-MCNC: 4.2 MG/DL (ref 4.4–5.2)
CA-I BLD-MCNC: 4.3 MG/DL (ref 4.4–5.2)
CA-I BLD-MCNC: 4.5 MG/DL (ref 4.4–5.2)
CA-I BLD-MCNC: 4.7 MG/DL (ref 4.4–5.2)
CA-I BLD-MCNC: 4.8 MG/DL (ref 4.4–5.2)
CA-I BLD-MCNC: 4.9 MG/DL (ref 4.4–5.2)
CALCIUM SERPL-MCNC: 7.5 MG/DL (ref 8.5–10.1)
CALCIUM SERPL-MCNC: 8.1 MG/DL (ref 8.5–10.1)
CHLORIDE SERPL-SCNC: 112 MMOL/L (ref 94–109)
CHLORIDE SERPL-SCNC: 114 MMOL/L (ref 94–109)
CI HYPERCOAGULATION INDEX: 0.6 RATIO (ref 0–3)
CI HYPERCOAGULATION INDEX: 3 RATIO (ref 0–3)
CLOT LYSIS 30M P MA LENFR BLD TEG: 1 % (ref 0–8)
CLOT LYSIS 30M P MA LENFR BLD TEG: 2 % (ref 0–8)
CLOT STRENGTH BLD TEG: 12.7 KD/SC (ref 5.3–13.2)
CLOT STRENGTH BLD TEG: 8.2 KD/SC (ref 5.3–13.2)
CO2 SERPL-SCNC: 24 MMOL/L (ref 20–32)
CO2 SERPL-SCNC: 27 MMOL/L (ref 20–32)
CREAT SERPL-MCNC: 0.65 MG/DL (ref 0.52–1.04)
CREAT SERPL-MCNC: 0.65 MG/DL (ref 0.52–1.04)
ERYTHROCYTE [DISTWIDTH] IN BLOOD BY AUTOMATED COUNT: 18.7 % (ref 10–15)
FIBRINOGEN PPP-MCNC: 172 MG/DL (ref 200–420)
FIBRINOGEN PPP-MCNC: 183 MG/DL (ref 200–420)
GFR SERPL CREATININE-BSD FRML MDRD: >90 ML/MIN/{1.73_M2}
GFR SERPL CREATININE-BSD FRML MDRD: >90 ML/MIN/{1.73_M2}
GLUCOSE BLD-MCNC: 143 MG/DL (ref 70–99)
GLUCOSE BLD-MCNC: 147 MG/DL (ref 70–99)
GLUCOSE BLD-MCNC: 153 MG/DL (ref 70–99)
GLUCOSE BLD-MCNC: 157 MG/DL (ref 70–99)
GLUCOSE BLD-MCNC: 158 MG/DL (ref 70–99)
GLUCOSE BLD-MCNC: 162 MG/DL (ref 70–99)
GLUCOSE BLD-MCNC: 168 MG/DL (ref 70–99)
GLUCOSE BLD-MCNC: 178 MG/DL (ref 70–99)
GLUCOSE BLD-MCNC: 179 MG/DL (ref 70–99)
GLUCOSE BLDC GLUCOMTR-MCNC: 121 MG/DL (ref 70–99)
GLUCOSE BLDC GLUCOMTR-MCNC: 125 MG/DL (ref 70–99)
GLUCOSE BLDC GLUCOMTR-MCNC: 134 MG/DL (ref 70–99)
GLUCOSE BLDC GLUCOMTR-MCNC: 137 MG/DL (ref 70–99)
GLUCOSE BLDC GLUCOMTR-MCNC: 140 MG/DL (ref 70–99)
GLUCOSE BLDC GLUCOMTR-MCNC: 143 MG/DL (ref 70–99)
GLUCOSE BLDC GLUCOMTR-MCNC: 152 MG/DL (ref 70–99)
GLUCOSE BLDC GLUCOMTR-MCNC: 163 MG/DL (ref 70–99)
GLUCOSE SERPL-MCNC: 134 MG/DL (ref 70–99)
GLUCOSE SERPL-MCNC: 150 MG/DL (ref 70–99)
HBA1C MFR BLD: 5.8 % (ref 0–5.6)
HCO3 BLD-SCNC: 24 MMOL/L (ref 21–28)
HCO3 BLD-SCNC: 25 MMOL/L (ref 21–28)
HCO3 BLD-SCNC: 26 MMOL/L (ref 21–28)
HCO3 BLDV-SCNC: 23 MMOL/L (ref 21–28)
HCO3 BLDV-SCNC: 27 MMOL/L (ref 21–28)
HCT VFR BLD AUTO: 29.1 % (ref 35–47)
HCT VFR BLD AUTO: 31.2 % (ref 35–47)
HCT VFR BLD AUTO: 33.8 % (ref 35–47)
HGB BLD-MCNC: 10.2 G/DL (ref 11.7–15.7)
HGB BLD-MCNC: 11 G/DL (ref 11.7–15.7)
HGB BLD-MCNC: 11.9 G/DL (ref 11.7–15.7)
HGB BLD-MCNC: 8.2 G/DL (ref 11.7–15.7)
HGB BLD-MCNC: 8.2 G/DL (ref 11.7–15.7)
HGB BLD-MCNC: 8.3 G/DL (ref 11.7–15.7)
HGB BLD-MCNC: 8.5 G/DL (ref 11.7–15.7)
HGB BLD-MCNC: 8.6 G/DL (ref 11.7–15.7)
HGB BLD-MCNC: 8.8 G/DL (ref 11.7–15.7)
HGB BLD-MCNC: 8.8 G/DL (ref 11.7–15.7)
HGB BLD-MCNC: 8.9 G/DL (ref 11.7–15.7)
HGB BLD-MCNC: 9 G/DL (ref 11.7–15.7)
HGB BLD-MCNC: 9.6 G/DL (ref 11.7–15.7)
INR PPP: 1.25 (ref 0.86–1.14)
INR PPP: 1.52 (ref 0.86–1.14)
INR PPP: 1.75 (ref 0.86–1.14)
INTERPRETATION ECG - MUSE: NORMAL
K TIME TO SPEC CLOT STRENGTH: 1.1 MIN (ref 1–3)
K TIME TO SPEC CLOT STRENGTH: 1.7 MIN (ref 1–3)
LACTATE BLD-SCNC: 0.9 MMOL/L (ref 0.7–2)
LACTATE BLD-SCNC: 1 MMOL/L (ref 0.7–2)
LACTATE BLD-SCNC: 1.2 MMOL/L (ref 0.7–2)
LACTATE BLD-SCNC: 1.3 MMOL/L (ref 0.7–2)
LACTATE BLD-SCNC: 1.3 MMOL/L (ref 0.7–2)
LACTATE BLD-SCNC: 1.4 MMOL/L (ref 0.7–2)
LACTATE BLD-SCNC: 1.5 MMOL/L (ref 0.7–2)
LACTATE BLD-SCNC: 1.7 MMOL/L (ref 0.7–2)
LACTATE BLD-SCNC: 1.8 MMOL/L (ref 0.7–2)
LACTATE BLD-SCNC: 1.8 MMOL/L (ref 0.7–2)
LACTATE BLD-SCNC: 1.9 MMOL/L (ref 0.7–2)
LY60 LYSIS AT 60 MINUTES: 4.7 % (ref 0–15)
LY60 LYSIS AT 60 MINUTES: 5.4 % (ref 0–15)
MA MAXIMUM CLOT STRENGTH: 62.2 MM (ref 55–74)
MA MAXIMUM CLOT STRENGTH: 71.7 MM (ref 55–74)
MAGNESIUM SERPL-MCNC: 2.3 MG/DL (ref 1.6–2.3)
MAGNESIUM SERPL-MCNC: 2.3 MG/DL (ref 1.6–2.3)
MAGNESIUM SERPL-MCNC: 2.7 MG/DL (ref 1.6–2.3)
MCH RBC QN AUTO: 27.1 PG (ref 26.5–33)
MCH RBC QN AUTO: 27.3 PG (ref 26.5–33)
MCH RBC QN AUTO: 27.8 PG (ref 26.5–33)
MCHC RBC AUTO-ENTMCNC: 30.2 G/DL (ref 31.5–36.5)
MCHC RBC AUTO-ENTMCNC: 30.2 G/DL (ref 31.5–36.5)
MCHC RBC AUTO-ENTMCNC: 30.8 G/DL (ref 31.5–36.5)
MCV RBC AUTO: 90 FL (ref 78–100)
NUM BPU REQUESTED: 2
NUM BPU REQUESTED: 4
O2/TOTAL GAS SETTING VFR VENT: 100 %
O2/TOTAL GAS SETTING VFR VENT: 100 %
O2/TOTAL GAS SETTING VFR VENT: 40 %
O2/TOTAL GAS SETTING VFR VENT: 50 %
O2/TOTAL GAS SETTING VFR VENT: 50 %
O2/TOTAL GAS SETTING VFR VENT: 70 %
O2/TOTAL GAS SETTING VFR VENT: 75 %
O2/TOTAL GAS SETTING VFR VENT: NORMAL %
OXYHGB MFR BLD: 93 % (ref 92–100)
OXYHGB MFR BLD: 94 % (ref 92–100)
OXYHGB MFR BLD: 96 % (ref 92–100)
OXYHGB MFR BLDV: 54 %
OXYHGB MFR BLDV: 62 %
OXYHGB MFR BLDV: 75 %
PCO2 BLD: 38 MM HG (ref 35–45)
PCO2 BLD: 40 MM HG (ref 35–45)
PCO2 BLD: 41 MM HG (ref 35–45)
PCO2 BLD: 41 MM HG (ref 35–45)
PCO2 BLD: 43 MM HG (ref 35–45)
PCO2 BLD: 43 MM HG (ref 35–45)
PCO2 BLD: 44 MM HG (ref 35–45)
PCO2 BLD: 45 MM HG (ref 35–45)
PCO2 BLD: 46 MM HG (ref 35–45)
PCO2 BLD: 48 MM HG (ref 35–45)
PCO2 BLD: 49 MM HG (ref 35–45)
PCO2 BLDV: 46 MM HG (ref 40–50)
PCO2 BLDV: 48 MM HG (ref 40–50)
PCO2 BLDV: 48 MM HG (ref 40–50)
PCO2 BLDV: 51 MM HG (ref 40–50)
PH BLD: 7.31 PH (ref 7.35–7.45)
PH BLD: 7.32 PH (ref 7.35–7.45)
PH BLD: 7.35 PH (ref 7.35–7.45)
PH BLD: 7.36 PH (ref 7.35–7.45)
PH BLD: 7.36 PH (ref 7.35–7.45)
PH BLD: 7.37 PH (ref 7.35–7.45)
PH BLD: 7.37 PH (ref 7.35–7.45)
PH BLD: 7.38 PH (ref 7.35–7.45)
PH BLD: 7.4 PH (ref 7.35–7.45)
PH BLD: 7.4 PH (ref 7.35–7.45)
PH BLD: 7.44 PH (ref 7.35–7.45)
PH BLDV: 7.29 PH (ref 7.32–7.43)
PH BLDV: 7.33 PH (ref 7.32–7.43)
PH BLDV: 7.35 PH (ref 7.32–7.43)
PH BLDV: 7.37 PH (ref 7.32–7.43)
PHOSPHATE SERPL-MCNC: 3.6 MG/DL (ref 2.5–4.5)
PHOSPHATE SERPL-MCNC: 4.1 MG/DL (ref 2.5–4.5)
PHOSPHATE SERPL-MCNC: 4.3 MG/DL (ref 2.5–4.5)
PLATELET # BLD AUTO: 152 10E9/L (ref 150–450)
PLATELET # BLD AUTO: 168 10E9/L (ref 150–450)
PLATELET # BLD AUTO: 183 10E9/L (ref 150–450)
PO2 BLD: 110 MM HG (ref 80–105)
PO2 BLD: 281 MM HG (ref 80–105)
PO2 BLD: 291 MM HG (ref 80–105)
PO2 BLD: 417 MM HG (ref 80–105)
PO2 BLD: 418 MM HG (ref 80–105)
PO2 BLD: 433 MM HG (ref 80–105)
PO2 BLD: 456 MM HG (ref 80–105)
PO2 BLD: 457 MM HG (ref 80–105)
PO2 BLD: 458 MM HG (ref 80–105)
PO2 BLD: 85 MM HG (ref 80–105)
PO2 BLD: 91 MM HG (ref 80–105)
PO2 BLDV: 32 MM HG (ref 25–47)
PO2 BLDV: 35 MM HG (ref 25–47)
PO2 BLDV: 49 MM HG (ref 25–47)
PO2 BLDV: 50 MM HG (ref 25–47)
POTASSIUM BLD-SCNC: 3.1 MMOL/L (ref 3.4–5.3)
POTASSIUM BLD-SCNC: 3.2 MMOL/L (ref 3.4–5.3)
POTASSIUM BLD-SCNC: 3.7 MMOL/L (ref 3.4–5.3)
POTASSIUM BLD-SCNC: 4 MMOL/L (ref 3.4–5.3)
POTASSIUM BLD-SCNC: 4 MMOL/L (ref 3.4–5.3)
POTASSIUM BLD-SCNC: 4.1 MMOL/L (ref 3.4–5.3)
POTASSIUM BLD-SCNC: 4.1 MMOL/L (ref 3.4–5.3)
POTASSIUM BLD-SCNC: 4.2 MMOL/L (ref 3.4–5.3)
POTASSIUM BLD-SCNC: 4.4 MMOL/L (ref 3.4–5.3)
POTASSIUM SERPL-SCNC: 4.2 MMOL/L (ref 3.4–5.3)
POTASSIUM SERPL-SCNC: 4.2 MMOL/L (ref 3.4–5.3)
POTASSIUM SERPL-SCNC: 4.3 MMOL/L (ref 3.4–5.3)
PROT SERPL-MCNC: 4.4 G/DL (ref 6.8–8.8)
PROT SERPL-MCNC: 5.5 G/DL (ref 6.8–8.8)
R TIME UNTIL CLOT FORMS: 4.8 MIN (ref 4–9)
R TIME UNTIL CLOT FORMS: 5.6 MIN (ref 4–9)
RBC # BLD AUTO: 3.22 10E12/L (ref 3.8–5.2)
RBC # BLD AUTO: 3.45 10E12/L (ref 3.8–5.2)
RBC # BLD AUTO: 3.76 10E12/L (ref 3.8–5.2)
SODIUM BLD-SCNC: 140 MMOL/L (ref 133–144)
SODIUM BLD-SCNC: 141 MMOL/L (ref 133–144)
SODIUM BLD-SCNC: 142 MMOL/L (ref 133–144)
SODIUM BLD-SCNC: 142 MMOL/L (ref 133–144)
SODIUM SERPL-SCNC: 142 MMOL/L (ref 133–144)
SODIUM SERPL-SCNC: 146 MMOL/L (ref 133–144)
SPECIMEN EXP DATE BLD: NORMAL
TRANSFUSION STATUS PATIENT QL: NORMAL
WBC # BLD AUTO: 12.5 10E9/L (ref 4–11)
WBC # BLD AUTO: 13.6 10E9/L (ref 4–11)
WBC # BLD AUTO: 14.1 10E9/L (ref 4–11)

## 2020-12-29 PROCEDURE — 250N000011 HC RX IP 250 OP 636: Performed by: NURSE ANESTHETIST, CERTIFIED REGISTERED

## 2020-12-29 PROCEDURE — 71045 X-RAY EXAM CHEST 1 VIEW: CPT | Mod: 26 | Performed by: RADIOLOGY

## 2020-12-29 PROCEDURE — 82947 ASSAY GLUCOSE BLOOD QUANT: CPT

## 2020-12-29 PROCEDURE — 83036 HEMOGLOBIN GLYCOSYLATED A1C: CPT | Performed by: PHYSICIAN ASSISTANT

## 2020-12-29 PROCEDURE — 84100 ASSAY OF PHOSPHORUS: CPT | Performed by: SURGERY

## 2020-12-29 PROCEDURE — C9132 KCENTRA, PER I.U.: HCPCS | Performed by: ANESTHESIOLOGY

## 2020-12-29 PROCEDURE — 33405 REPLACEMENT AORTIC VALVE OPN: CPT | Mod: 51 | Performed by: SURGERY

## 2020-12-29 PROCEDURE — 88311 DECALCIFY TISSUE: CPT | Mod: TC | Performed by: SURGERY

## 2020-12-29 PROCEDURE — 999N000157 HC STATISTIC RCP TIME EA 10 MIN

## 2020-12-29 PROCEDURE — 33430 REPLACEMENT OF MITRAL VALVE: CPT | Mod: 82 | Performed by: SURGERY

## 2020-12-29 PROCEDURE — 80053 COMPREHEN METABOLIC PANEL: CPT | Performed by: SURGERY

## 2020-12-29 PROCEDURE — 02RF0JZ REPLACEMENT OF AORTIC VALVE WITH SYNTHETIC SUBSTITUTE, OPEN APPROACH: ICD-10-PCS | Performed by: SURGERY

## 2020-12-29 PROCEDURE — 82330 ASSAY OF CALCIUM: CPT | Performed by: SURGERY

## 2020-12-29 PROCEDURE — 02RG0JZ REPLACEMENT OF MITRAL VALVE WITH SYNTHETIC SUBSTITUTE, OPEN APPROACH: ICD-10-PCS | Performed by: SURGERY

## 2020-12-29 PROCEDURE — 88305 TISSUE EXAM BY PATHOLOGIST: CPT | Mod: 26 | Performed by: PATHOLOGY

## 2020-12-29 PROCEDURE — 82803 BLOOD GASES ANY COMBINATION: CPT

## 2020-12-29 PROCEDURE — 258N000003 HC RX IP 258 OP 636: Performed by: NURSE ANESTHETIST, CERTIFIED REGISTERED

## 2020-12-29 PROCEDURE — 83605 ASSAY OF LACTIC ACID: CPT | Performed by: SURGERY

## 2020-12-29 PROCEDURE — 999N000140 HC STATISTIC PRE-PROCEDURE ASSESSMENT III: Performed by: SURGERY

## 2020-12-29 PROCEDURE — 83605 ASSAY OF LACTIC ACID: CPT

## 2020-12-29 PROCEDURE — 250N000009 HC RX 250: Performed by: NURSE ANESTHETIST, CERTIFIED REGISTERED

## 2020-12-29 PROCEDURE — 250N000009 HC RX 250: Performed by: SURGERY

## 2020-12-29 PROCEDURE — 86923 COMPATIBILITY TEST ELECTRIC: CPT | Performed by: PHYSICIAN ASSISTANT

## 2020-12-29 PROCEDURE — 250N000009 HC RX 250: Performed by: STUDENT IN AN ORGANIZED HEALTH CARE EDUCATION/TRAINING PROGRAM

## 2020-12-29 PROCEDURE — 94002 VENT MGMT INPAT INIT DAY: CPT

## 2020-12-29 PROCEDURE — 85384 FIBRINOGEN ACTIVITY: CPT | Performed by: SURGERY

## 2020-12-29 PROCEDURE — 250N000015 HC RX FACTOR IP MED 636 OP 636: Performed by: ANESTHESIOLOGY

## 2020-12-29 PROCEDURE — 86901 BLOOD TYPING SEROLOGIC RH(D): CPT | Performed by: PHYSICIAN ASSISTANT

## 2020-12-29 PROCEDURE — 258N000003 HC RX IP 258 OP 636: Performed by: SURGERY

## 2020-12-29 PROCEDURE — 85396 CLOTTING ASSAY WHOLE BLOOD: CPT | Performed by: SURGERY

## 2020-12-29 PROCEDURE — 250N000002 HC ISOFLURANE, EA 15 MIN: Performed by: SURGERY

## 2020-12-29 PROCEDURE — 33430 REPLACEMENT OF MITRAL VALVE: CPT | Performed by: SURGERY

## 2020-12-29 PROCEDURE — 86900 BLOOD TYPING SEROLOGIC ABO: CPT | Performed by: PHYSICIAN ASSISTANT

## 2020-12-29 PROCEDURE — 271N000002 HC RX 271: Performed by: STUDENT IN AN ORGANIZED HEALTH CARE EDUCATION/TRAINING PROGRAM

## 2020-12-29 PROCEDURE — 5A1221Z PERFORMANCE OF CARDIAC OUTPUT, CONTINUOUS: ICD-10-PCS | Performed by: SURGERY

## 2020-12-29 PROCEDURE — 86850 RBC ANTIBODY SCREEN: CPT | Performed by: PHYSICIAN ASSISTANT

## 2020-12-29 PROCEDURE — 360N000040 HC SURGERY LEVEL 6 1ST 30 MIN - UMMC: Performed by: SURGERY

## 2020-12-29 PROCEDURE — 250N000013 HC RX MED GY IP 250 OP 250 PS 637: Performed by: PHYSICIAN ASSISTANT

## 2020-12-29 PROCEDURE — 85610 PROTHROMBIN TIME: CPT | Performed by: PHYSICIAN ASSISTANT

## 2020-12-29 PROCEDURE — 82805 BLOOD GASES W/O2 SATURATION: CPT | Performed by: SURGERY

## 2020-12-29 PROCEDURE — 93005 ELECTROCARDIOGRAM TRACING: CPT

## 2020-12-29 PROCEDURE — 250N000011 HC RX IP 250 OP 636: Performed by: SURGERY

## 2020-12-29 PROCEDURE — 93503 INSERT/PLACE HEART CATHETER: CPT

## 2020-12-29 PROCEDURE — 258N000003 HC RX IP 258 OP 636: Performed by: ANESTHESIOLOGY

## 2020-12-29 PROCEDURE — 99291 CRITICAL CARE FIRST HOUR: CPT | Mod: GC | Performed by: ANESTHESIOLOGY

## 2020-12-29 PROCEDURE — 200N000002 HC R&B ICU UMMC

## 2020-12-29 PROCEDURE — 84295 ASSAY OF SERUM SODIUM: CPT

## 2020-12-29 PROCEDURE — 84132 ASSAY OF SERUM POTASSIUM: CPT | Performed by: PHYSICIAN ASSISTANT

## 2020-12-29 PROCEDURE — 999N001017 HC STATISTIC GLUCOSE BY METER IP

## 2020-12-29 PROCEDURE — 250N000011 HC RX IP 250 OP 636

## 2020-12-29 PROCEDURE — 999N000054 HC STATISTIC EKG NON-CHARGEABLE

## 2020-12-29 PROCEDURE — 85610 PROTHROMBIN TIME: CPT | Performed by: SURGERY

## 2020-12-29 PROCEDURE — 999N000155 HC STATISTIC RAPCV CVP MONITORING

## 2020-12-29 PROCEDURE — 88311 DECALCIFY TISSUE: CPT | Mod: 26 | Performed by: PATHOLOGY

## 2020-12-29 PROCEDURE — 83605 ASSAY OF LACTIC ACID: CPT | Performed by: PHYSICIAN ASSISTANT

## 2020-12-29 PROCEDURE — 272N000001 HC OR GENERAL SUPPLY STERILE: Performed by: SURGERY

## 2020-12-29 PROCEDURE — 85730 THROMBOPLASTIN TIME PARTIAL: CPT | Performed by: SURGERY

## 2020-12-29 PROCEDURE — 36415 COLL VENOUS BLD VENIPUNCTURE: CPT | Performed by: PHYSICIAN ASSISTANT

## 2020-12-29 PROCEDURE — 999N000065 XR CHEST PORT 1 VW

## 2020-12-29 PROCEDURE — 88305 TISSUE EXAM BY PATHOLOGIST: CPT | Mod: TC | Performed by: SURGERY

## 2020-12-29 PROCEDURE — 410N000004: Performed by: SURGERY

## 2020-12-29 PROCEDURE — 250N000011 HC RX IP 250 OP 636: Performed by: ANESTHESIOLOGY

## 2020-12-29 PROCEDURE — 250N000013 HC RX MED GY IP 250 OP 250 PS 637: Performed by: SURGERY

## 2020-12-29 PROCEDURE — 84132 ASSAY OF SERUM POTASSIUM: CPT

## 2020-12-29 PROCEDURE — 83735 ASSAY OF MAGNESIUM: CPT | Performed by: PHYSICIAN ASSISTANT

## 2020-12-29 PROCEDURE — 999N000015 HC STATISTIC ARTERIAL MONITORING DAILY

## 2020-12-29 PROCEDURE — 278N000051 HC OR IMPLANT GENERAL: Performed by: SURGERY

## 2020-12-29 PROCEDURE — 999N000045 HC STATISTIC DAILY SWAN MONITORING

## 2020-12-29 PROCEDURE — 82330 ASSAY OF CALCIUM: CPT | Performed by: PHYSICIAN ASSISTANT

## 2020-12-29 PROCEDURE — 80053 COMPREHEN METABOLIC PANEL: CPT | Performed by: PHYSICIAN ASSISTANT

## 2020-12-29 PROCEDURE — C1713 ANCHOR/SCREW BN/BN,TIS/BN: HCPCS | Performed by: SURGERY

## 2020-12-29 PROCEDURE — 36415 COLL VENOUS BLD VENIPUNCTURE: CPT | Performed by: ANESTHESIOLOGY

## 2020-12-29 PROCEDURE — 85027 COMPLETE CBC AUTOMATED: CPT | Performed by: PHYSICIAN ASSISTANT

## 2020-12-29 PROCEDURE — 85018 HEMOGLOBIN: CPT | Performed by: ANESTHESIOLOGY

## 2020-12-29 PROCEDURE — P9016 RBC LEUKOCYTES REDUCED: HCPCS | Performed by: PHYSICIAN ASSISTANT

## 2020-12-29 PROCEDURE — 360N000041 HC SURGERY LEVEL 6 EA 15 ADDTL MIN - UMMC: Performed by: SURGERY

## 2020-12-29 PROCEDURE — 83735 ASSAY OF MAGNESIUM: CPT | Performed by: SURGERY

## 2020-12-29 PROCEDURE — 999N001063 HC STATISTIC THAWING COMPONENT: Performed by: SURGERY

## 2020-12-29 PROCEDURE — 82805 BLOOD GASES W/O2 SATURATION: CPT | Performed by: PHYSICIAN ASSISTANT

## 2020-12-29 PROCEDURE — 250N000011 HC RX IP 250 OP 636: Performed by: PHYSICIAN ASSISTANT

## 2020-12-29 PROCEDURE — 370N000002 HC ANESTHESIA TECHNICAL FEE, EACH ADDTL 15 MIN: Performed by: SURGERY

## 2020-12-29 PROCEDURE — 82330 ASSAY OF CALCIUM: CPT

## 2020-12-29 PROCEDURE — 84100 ASSAY OF PHOSPHORUS: CPT | Performed by: PHYSICIAN ASSISTANT

## 2020-12-29 PROCEDURE — 33405 REPLACEMENT AORTIC VALVE OPN: CPT | Mod: 82 | Performed by: SURGERY

## 2020-12-29 PROCEDURE — 370N000001 HC ANESTHESIA TECHNICAL FEE, 1ST 30 MIN: Performed by: SURGERY

## 2020-12-29 PROCEDURE — 85027 COMPLETE CBC AUTOMATED: CPT | Performed by: SURGERY

## 2020-12-29 PROCEDURE — 272N000088 HC PUMP APP ADULT PERFUSION: Performed by: SURGERY

## 2020-12-29 PROCEDURE — 85730 THROMBOPLASTIN TIME PARTIAL: CPT | Performed by: PHYSICIAN ASSISTANT

## 2020-12-29 PROCEDURE — 250N000011 HC RX IP 250 OP 636: Performed by: STUDENT IN AN ORGANIZED HEALTH CARE EDUCATION/TRAINING PROGRAM

## 2020-12-29 PROCEDURE — 410N000003 HC PER-PERFUSION 1ST 30 MIN: Performed by: SURGERY

## 2020-12-29 PROCEDURE — 272N000085 HC PACK CELL SAVER CSP: Performed by: SURGERY

## 2020-12-29 PROCEDURE — 93010 ELECTROCARDIOGRAM REPORT: CPT | Mod: 59 | Performed by: INTERNAL MEDICINE

## 2020-12-29 DEVICE — SU DEVICE ENDO COR KNOT QUICK LOAD RELOAD 030874: Type: IMPLANTABLE DEVICE | Site: CHEST | Status: FUNCTIONAL

## 2020-12-29 DEVICE — SU DEVICE ENDO COR KNOT QUICK LOAD 030850: Type: IMPLANTABLE DEVICE | Site: HEART | Status: FUNCTIONAL

## 2020-12-29 DEVICE — VALVE AORTIC REGENT FLEX-CUFF 19MM 19AGFN-756: Type: IMPLANTABLE DEVICE | Site: HEART | Status: FUNCTIONAL

## 2020-12-29 DEVICE — SU DEVICE COR-KNOT MINI 4X14MM 031350: Type: IMPLANTABLE DEVICE | Site: CHEST | Status: FUNCTIONAL

## 2020-12-29 DEVICE — VALVE MITRAL ST JUDE 27MM 27MJ-501: Type: IMPLANTABLE DEVICE | Site: CHEST | Status: FUNCTIONAL

## 2020-12-29 RX ORDER — DEXTROSE MONOHYDRATE 100 MG/ML
INJECTION, SOLUTION INTRAVENOUS CONTINUOUS PRN
Status: DISCONTINUED | OUTPATIENT
Start: 2020-12-29 | End: 2020-12-31 | Stop reason: CLARIF

## 2020-12-29 RX ORDER — FENTANYL CITRATE 50 UG/ML
25-50 INJECTION, SOLUTION INTRAMUSCULAR; INTRAVENOUS
Status: DISCONTINUED | OUTPATIENT
Start: 2020-12-29 | End: 2020-12-29 | Stop reason: HOSPADM

## 2020-12-29 RX ORDER — ASPIRIN 81 MG/1
81 TABLET, CHEWABLE ORAL DAILY
Status: DISCONTINUED | OUTPATIENT
Start: 2020-12-30 | End: 2021-01-06 | Stop reason: HOSPADM

## 2020-12-29 RX ORDER — FIBRINOGEN (HUMAN) 700-1300MG
2970 KIT INTRAVENOUS ONCE
Status: DISCONTINUED | OUTPATIENT
Start: 2020-12-29 | End: 2020-12-29

## 2020-12-29 RX ORDER — ONDANSETRON 2 MG/ML
4 INJECTION INTRAMUSCULAR; INTRAVENOUS EVERY 6 HOURS PRN
Status: DISCONTINUED | OUTPATIENT
Start: 2020-12-29 | End: 2021-01-06 | Stop reason: HOSPADM

## 2020-12-29 RX ORDER — POLYETHYLENE GLYCOL 3350 17 G/17G
17 POWDER, FOR SOLUTION ORAL DAILY
Status: DISCONTINUED | OUTPATIENT
Start: 2020-12-30 | End: 2021-01-06 | Stop reason: HOSPADM

## 2020-12-29 RX ORDER — PROCHLORPERAZINE MALEATE 5 MG
5 TABLET ORAL EVERY 6 HOURS PRN
Status: DISCONTINUED | OUTPATIENT
Start: 2020-12-29 | End: 2021-01-06 | Stop reason: HOSPADM

## 2020-12-29 RX ORDER — CEFAZOLIN SODIUM 1 G/3ML
1 INJECTION, POWDER, FOR SOLUTION INTRAMUSCULAR; INTRAVENOUS EVERY 8 HOURS
Status: COMPLETED | OUTPATIENT
Start: 2020-12-29 | End: 2020-12-30

## 2020-12-29 RX ORDER — SODIUM CHLORIDE 9 MG/ML
INJECTION, SOLUTION INTRAVENOUS CONTINUOUS
Status: DISCONTINUED | OUTPATIENT
Start: 2020-12-29 | End: 2020-12-30

## 2020-12-29 RX ORDER — PROPOFOL 10 MG/ML
5-75 INJECTION, EMULSION INTRAVENOUS CONTINUOUS
Status: DISCONTINUED | OUTPATIENT
Start: 2020-12-29 | End: 2020-12-30

## 2020-12-29 RX ORDER — GABAPENTIN 100 MG/1
100 CAPSULE ORAL
Status: COMPLETED | OUTPATIENT
Start: 2020-12-29 | End: 2020-12-29

## 2020-12-29 RX ORDER — ATORVASTATIN CALCIUM 40 MG/1
40 TABLET, FILM COATED ORAL DAILY
Status: DISCONTINUED | OUTPATIENT
Start: 2021-01-01 | End: 2021-01-06 | Stop reason: HOSPADM

## 2020-12-29 RX ORDER — FENTANYL CITRATE 50 UG/ML
INJECTION, SOLUTION INTRAMUSCULAR; INTRAVENOUS PRN
Status: DISCONTINUED | OUTPATIENT
Start: 2020-12-29 | End: 2020-12-29

## 2020-12-29 RX ORDER — LIDOCAINE HYDROCHLORIDE 20 MG/ML
INJECTION, SOLUTION INFILTRATION; PERINEURAL PRN
Status: DISCONTINUED | OUTPATIENT
Start: 2020-12-29 | End: 2020-12-29

## 2020-12-29 RX ORDER — ALBUMIN, HUMAN INJ 5% 5 %
500-1000 SOLUTION INTRAVENOUS
Status: DISCONTINUED | OUTPATIENT
Start: 2020-12-29 | End: 2020-12-31 | Stop reason: CLARIF

## 2020-12-29 RX ORDER — NICOTINE POLACRILEX 4 MG
15-30 LOZENGE BUCCAL
Status: DISCONTINUED | OUTPATIENT
Start: 2020-12-29 | End: 2021-01-06 | Stop reason: HOSPADM

## 2020-12-29 RX ORDER — DEXMEDETOMIDINE HYDROCHLORIDE 4 UG/ML
0.2-1.2 INJECTION, SOLUTION INTRAVENOUS CONTINUOUS
Status: DISCONTINUED | OUTPATIENT
Start: 2020-12-29 | End: 2020-12-29 | Stop reason: HOSPADM

## 2020-12-29 RX ORDER — ACETAMINOPHEN 325 MG/1
975 TABLET ORAL EVERY 8 HOURS
Status: DISPENSED | OUTPATIENT
Start: 2020-12-29 | End: 2021-01-01

## 2020-12-29 RX ORDER — NALOXONE HYDROCHLORIDE 0.4 MG/ML
0.2 INJECTION, SOLUTION INTRAMUSCULAR; INTRAVENOUS; SUBCUTANEOUS
Status: DISCONTINUED | OUTPATIENT
Start: 2020-12-29 | End: 2021-01-06 | Stop reason: HOSPADM

## 2020-12-29 RX ORDER — SODIUM CHLORIDE, SODIUM GLUCONATE, SODIUM ACETATE, POTASSIUM CHLORIDE AND MAGNESIUM CHLORIDE 526; 502; 368; 37; 30 MG/100ML; MG/100ML; MG/100ML; MG/100ML; MG/100ML
500 INJECTION, SOLUTION INTRAVENOUS ONCE
Status: COMPLETED | OUTPATIENT
Start: 2020-12-29 | End: 2020-12-29

## 2020-12-29 RX ORDER — MEPERIDINE HYDROCHLORIDE 25 MG/ML
12.5-25 INJECTION INTRAMUSCULAR; INTRAVENOUS; SUBCUTANEOUS
Status: DISCONTINUED | OUTPATIENT
Start: 2020-12-29 | End: 2020-12-30

## 2020-12-29 RX ORDER — FAMOTIDINE 20 MG/1
20 TABLET, FILM COATED ORAL
Status: DISCONTINUED | OUTPATIENT
Start: 2020-12-29 | End: 2020-12-29 | Stop reason: HOSPADM

## 2020-12-29 RX ORDER — OXYCODONE HYDROCHLORIDE 5 MG/1
5-10 TABLET ORAL EVERY 4 HOURS PRN
Status: DISCONTINUED | OUTPATIENT
Start: 2020-12-29 | End: 2021-01-02

## 2020-12-29 RX ORDER — LIDOCAINE 40 MG/G
CREAM TOPICAL
Status: DISCONTINUED | OUTPATIENT
Start: 2020-12-29 | End: 2021-01-06 | Stop reason: HOSPADM

## 2020-12-29 RX ORDER — NALOXONE HYDROCHLORIDE 0.4 MG/ML
0.4 INJECTION, SOLUTION INTRAMUSCULAR; INTRAVENOUS; SUBCUTANEOUS
Status: DISCONTINUED | OUTPATIENT
Start: 2020-12-29 | End: 2021-01-06 | Stop reason: HOSPADM

## 2020-12-29 RX ORDER — LIDOCAINE 40 MG/G
CREAM TOPICAL
Status: DISCONTINUED | OUTPATIENT
Start: 2020-12-29 | End: 2020-12-29 | Stop reason: HOSPADM

## 2020-12-29 RX ORDER — CHLORHEXIDINE GLUCONATE ORAL RINSE 1.2 MG/ML
10 SOLUTION DENTAL ONCE
Status: COMPLETED | OUTPATIENT
Start: 2020-12-29 | End: 2020-12-29

## 2020-12-29 RX ORDER — SODIUM CHLORIDE, SODIUM LACTATE, POTASSIUM CHLORIDE, CALCIUM CHLORIDE 600; 310; 30; 20 MG/100ML; MG/100ML; MG/100ML; MG/100ML
INJECTION, SOLUTION INTRAVENOUS CONTINUOUS
Status: DISCONTINUED | OUTPATIENT
Start: 2020-12-29 | End: 2020-12-29 | Stop reason: HOSPADM

## 2020-12-29 RX ORDER — GABAPENTIN 100 MG/1
100 CAPSULE ORAL 3 TIMES DAILY
Status: DISCONTINUED | OUTPATIENT
Start: 2020-12-29 | End: 2020-12-31

## 2020-12-29 RX ORDER — VANCOMYCIN HYDROCHLORIDE 1 G/200ML
1000 INJECTION, SOLUTION INTRAVENOUS SEE ADMIN INSTRUCTIONS
Status: DISCONTINUED | OUTPATIENT
Start: 2020-12-29 | End: 2020-12-29 | Stop reason: HOSPADM

## 2020-12-29 RX ORDER — PROPOFOL 10 MG/ML
INJECTION, EMULSION INTRAVENOUS CONTINUOUS PRN
Status: DISCONTINUED | OUTPATIENT
Start: 2020-12-29 | End: 2020-12-29

## 2020-12-29 RX ORDER — BUPROPION HYDROCHLORIDE 100 MG/1
100 TABLET ORAL 3 TIMES DAILY
Status: COMPLETED | OUTPATIENT
Start: 2020-12-30 | End: 2021-01-01

## 2020-12-29 RX ORDER — AMOXICILLIN 250 MG
2 CAPSULE ORAL 2 TIMES DAILY
Status: DISCONTINUED | OUTPATIENT
Start: 2020-12-30 | End: 2021-01-06 | Stop reason: HOSPADM

## 2020-12-29 RX ORDER — DEXTROSE MONOHYDRATE, SODIUM CHLORIDE, AND POTASSIUM CHLORIDE 50; 1.49; 4.5 G/1000ML; G/1000ML; G/1000ML
INJECTION, SOLUTION INTRAVENOUS CONTINUOUS
Status: DISCONTINUED | OUTPATIENT
Start: 2020-12-29 | End: 2020-12-30

## 2020-12-29 RX ORDER — VANCOMYCIN HYDROCHLORIDE 1 G/200ML
1000 INJECTION, SOLUTION INTRAVENOUS EVERY 12 HOURS
Status: COMPLETED | OUTPATIENT
Start: 2020-12-29 | End: 2020-12-30

## 2020-12-29 RX ORDER — PROTAMINE SULFATE 10 MG/ML
INJECTION, SOLUTION INTRAVENOUS PRN
Status: DISCONTINUED | OUTPATIENT
Start: 2020-12-29 | End: 2020-12-29

## 2020-12-29 RX ORDER — VANCOMYCIN HYDROCHLORIDE 1 G/200ML
1000 INJECTION, SOLUTION INTRAVENOUS
Status: DISCONTINUED | OUTPATIENT
Start: 2020-12-29 | End: 2020-12-29 | Stop reason: HOSPADM

## 2020-12-29 RX ORDER — PHENYLEPHRINE HCL IN 0.9% NACL 50MG/250ML
0.5-6 PLASTIC BAG, INJECTION (ML) INTRAVENOUS CONTINUOUS
Status: DISCONTINUED | OUTPATIENT
Start: 2020-12-29 | End: 2020-12-29 | Stop reason: HOSPADM

## 2020-12-29 RX ORDER — FIBRINOGEN (HUMAN) 700-1300MG
2970 KIT INTRAVENOUS ONCE
Status: COMPLETED | OUTPATIENT
Start: 2020-12-29 | End: 2020-12-29

## 2020-12-29 RX ORDER — CEFAZOLIN SODIUM 1 G/3ML
1 INJECTION, POWDER, FOR SOLUTION INTRAMUSCULAR; INTRAVENOUS SEE ADMIN INSTRUCTIONS
Status: DISCONTINUED | OUTPATIENT
Start: 2020-12-29 | End: 2020-12-29 | Stop reason: HOSPADM

## 2020-12-29 RX ORDER — BUPROPION HYDROCHLORIDE 150 MG/1
150 TABLET, FILM COATED, EXTENDED RELEASE ORAL 2 TIMES DAILY
Status: DISCONTINUED | OUTPATIENT
Start: 2020-12-30 | End: 2020-12-29

## 2020-12-29 RX ORDER — PROPOFOL 10 MG/ML
INJECTION, EMULSION INTRAVENOUS PRN
Status: DISCONTINUED | OUTPATIENT
Start: 2020-12-29 | End: 2020-12-29

## 2020-12-29 RX ORDER — FLUMAZENIL 0.1 MG/ML
0.2 INJECTION, SOLUTION INTRAVENOUS
Status: DISCONTINUED | OUTPATIENT
Start: 2020-12-29 | End: 2020-12-29 | Stop reason: HOSPADM

## 2020-12-29 RX ORDER — HYDRALAZINE HYDROCHLORIDE 20 MG/ML
10 INJECTION INTRAMUSCULAR; INTRAVENOUS EVERY 30 MIN PRN
Status: DISCONTINUED | OUTPATIENT
Start: 2020-12-29 | End: 2021-01-06 | Stop reason: HOSPADM

## 2020-12-29 RX ORDER — HEPARIN SODIUM 1000 [USP'U]/ML
INJECTION, SOLUTION INTRAVENOUS; SUBCUTANEOUS PRN
Status: DISCONTINUED | OUTPATIENT
Start: 2020-12-29 | End: 2020-12-29

## 2020-12-29 RX ORDER — HYDROMORPHONE HYDROCHLORIDE 1 MG/ML
.3-.5 INJECTION, SOLUTION INTRAMUSCULAR; INTRAVENOUS; SUBCUTANEOUS
Status: DISCONTINUED | OUTPATIENT
Start: 2020-12-29 | End: 2021-01-02

## 2020-12-29 RX ORDER — ACETAMINOPHEN 325 MG/1
650 TABLET ORAL EVERY 4 HOURS PRN
Status: DISCONTINUED | OUTPATIENT
Start: 2021-01-01 | End: 2021-01-06 | Stop reason: HOSPADM

## 2020-12-29 RX ORDER — CEFAZOLIN SODIUM 2 G/100ML
2 INJECTION, SOLUTION INTRAVENOUS
Status: COMPLETED | OUTPATIENT
Start: 2020-12-29 | End: 2020-12-29

## 2020-12-29 RX ORDER — AMOXICILLIN 250 MG
1 CAPSULE ORAL 2 TIMES DAILY
Status: DISCONTINUED | OUTPATIENT
Start: 2020-12-30 | End: 2021-01-06 | Stop reason: HOSPADM

## 2020-12-29 RX ORDER — DEXTROSE MONOHYDRATE 25 G/50ML
25-50 INJECTION, SOLUTION INTRAVENOUS
Status: DISCONTINUED | OUTPATIENT
Start: 2020-12-29 | End: 2021-01-06 | Stop reason: HOSPADM

## 2020-12-29 RX ORDER — ONDANSETRON 4 MG/1
4 TABLET, ORALLY DISINTEGRATING ORAL EVERY 6 HOURS PRN
Status: DISCONTINUED | OUTPATIENT
Start: 2020-12-29 | End: 2021-01-06 | Stop reason: HOSPADM

## 2020-12-29 RX ORDER — SODIUM CHLORIDE, SODIUM GLUCONATE, SODIUM ACETATE, POTASSIUM CHLORIDE AND MAGNESIUM CHLORIDE 526; 502; 368; 37; 30 MG/100ML; MG/100ML; MG/100ML; MG/100ML; MG/100ML
INJECTION, SOLUTION INTRAVENOUS CONTINUOUS PRN
Status: DISCONTINUED | OUTPATIENT
Start: 2020-12-29 | End: 2020-12-29

## 2020-12-29 RX ORDER — NITROGLYCERIN 10 MG/100ML
INJECTION INTRAVENOUS PRN
Status: DISCONTINUED | OUTPATIENT
Start: 2020-12-29 | End: 2020-12-29

## 2020-12-29 RX ORDER — ACETAMINOPHEN 325 MG/1
975 TABLET ORAL ONCE
Status: COMPLETED | OUTPATIENT
Start: 2020-12-29 | End: 2020-12-29

## 2020-12-29 RX ORDER — NOREPINEPHRINE BITARTRATE 0.06 MG/ML
0.03-0.4 INJECTION, SOLUTION INTRAVENOUS CONTINUOUS
Status: DISCONTINUED | OUTPATIENT
Start: 2020-12-29 | End: 2020-12-29 | Stop reason: HOSPADM

## 2020-12-29 RX ADMIN — PROPOFOL 30 MG: 10 INJECTION, EMULSION INTRAVENOUS at 09:45

## 2020-12-29 RX ADMIN — SODIUM CHLORIDE, SODIUM GLUCONATE, SODIUM ACETATE, POTASSIUM CHLORIDE AND MAGNESIUM CHLORIDE: 526; 502; 368; 37; 30 INJECTION, SOLUTION INTRAVENOUS at 14:10

## 2020-12-29 RX ADMIN — Medication 1 UNITS: at 13:39

## 2020-12-29 RX ADMIN — PROPOFOL 40 MG: 10 INJECTION, EMULSION INTRAVENOUS at 09:30

## 2020-12-29 RX ADMIN — LIDOCAINE HYDROCHLORIDE 100 MG: 20 INJECTION, SOLUTION INFILTRATION; PERINEURAL at 08:32

## 2020-12-29 RX ADMIN — FENTANYL CITRATE 250 MCG: 50 INJECTION, SOLUTION INTRAMUSCULAR; INTRAVENOUS at 08:31

## 2020-12-29 RX ADMIN — PROPOFOL 30 MCG/KG/MIN: 10 INJECTION, EMULSION INTRAVENOUS at 14:22

## 2020-12-29 RX ADMIN — ACETAMINOPHEN 975 MG: 325 TABLET, FILM COATED ORAL at 23:50

## 2020-12-29 RX ADMIN — PROTAMINE SULFATE 160 MG: 10 INJECTION, SOLUTION INTRAVENOUS at 13:08

## 2020-12-29 RX ADMIN — FENTANYL CITRATE 250 MCG: 50 INJECTION, SOLUTION INTRAMUSCULAR; INTRAVENOUS at 09:43

## 2020-12-29 RX ADMIN — MIDAZOLAM 2 MG: 1 INJECTION INTRAMUSCULAR; INTRAVENOUS at 08:03

## 2020-12-29 RX ADMIN — PROTHROMBIN, COAGULATION FACTOR VII HUMAN, COAGULATION FACTOR IX HUMAN, COAGULATION FACTOR X HUMAN, PROTEIN C, PROTEIN S HUMAN, AND WATER 556 UNITS: KIT at 13:24

## 2020-12-29 RX ADMIN — Medication 0.5 UNITS: at 09:51

## 2020-12-29 RX ADMIN — SODIUM CHLORIDE, POTASSIUM CHLORIDE, SODIUM LACTATE AND CALCIUM CHLORIDE: 600; 310; 30; 20 INJECTION, SOLUTION INTRAVENOUS at 08:03

## 2020-12-29 RX ADMIN — VANCOMYCIN HYDROCHLORIDE 1000 MG: 1 INJECTION, SOLUTION INTRAVENOUS at 20:22

## 2020-12-29 RX ADMIN — EPINEPHRINE 10 MCG: 1 INJECTION PARENTERAL at 08:33

## 2020-12-29 RX ADMIN — GABAPENTIN 100 MG: 100 CAPSULE ORAL at 06:58

## 2020-12-29 RX ADMIN — Medication 0.5 UNITS: at 13:28

## 2020-12-29 RX ADMIN — NITROGLYCERIN 50 MCG: 10 INJECTION INTRAVENOUS at 13:12

## 2020-12-29 RX ADMIN — Medication 0.03 MCG/KG/MIN: at 08:57

## 2020-12-29 RX ADMIN — Medication 0.5 UNITS: at 08:54

## 2020-12-29 RX ADMIN — CEFAZOLIN 1 G: 330 INJECTION, POWDER, FOR SOLUTION INTRAMUSCULAR; INTRAVENOUS at 22:22

## 2020-12-29 RX ADMIN — ACETAMINOPHEN 975 MG: 325 TABLET, FILM COATED ORAL at 06:57

## 2020-12-29 RX ADMIN — SODIUM CHLORIDE, POTASSIUM CHLORIDE, SODIUM LACTATE AND CALCIUM CHLORIDE: 600; 310; 30; 20 INJECTION, SOLUTION INTRAVENOUS at 14:05

## 2020-12-29 RX ADMIN — PROPOFOL 40 MG: 10 INJECTION, EMULSION INTRAVENOUS at 08:32

## 2020-12-29 RX ADMIN — HEPARIN SODIUM 26000 UNITS: 1000 INJECTION INTRAVENOUS; SUBCUTANEOUS at 09:35

## 2020-12-29 RX ADMIN — SODIUM CHLORIDE, SODIUM GLUCONATE, SODIUM ACETATE, POTASSIUM CHLORIDE AND MAGNESIUM CHLORIDE: 526; 502; 368; 37; 30 INJECTION, SOLUTION INTRAVENOUS at 13:15

## 2020-12-29 RX ADMIN — PROPOFOL 20 MG: 10 INJECTION, EMULSION INTRAVENOUS at 14:27

## 2020-12-29 RX ADMIN — SUGAMMADEX 200 MG: 100 INJECTION, SOLUTION INTRAVENOUS at 14:35

## 2020-12-29 RX ADMIN — ROCURONIUM BROMIDE 100 MG: 10 INJECTION INTRAVENOUS at 08:32

## 2020-12-29 RX ADMIN — Medication 2.4 UNITS/HR: at 12:55

## 2020-12-29 RX ADMIN — PROPOFOL 40 MG: 10 INJECTION, EMULSION INTRAVENOUS at 09:36

## 2020-12-29 RX ADMIN — Medication 1 G: at 13:20

## 2020-12-29 RX ADMIN — Medication 0.5 UNITS: at 08:46

## 2020-12-29 RX ADMIN — NOREPINEPHRINE BITARTRATE 6.4 MCG: 1 INJECTION, SOLUTION, CONCENTRATE INTRAVENOUS at 13:01

## 2020-12-29 RX ADMIN — EPINEPHRINE 10 MCG: 1 INJECTION PARENTERAL at 08:32

## 2020-12-29 RX ADMIN — HUMAN INSULIN 1 UNITS/HR: 100 INJECTION, SOLUTION SUBCUTANEOUS at 10:04

## 2020-12-29 RX ADMIN — NOREPINEPHRINE BITARTRATE 6.4 MCG: 1 INJECTION, SOLUTION, CONCENTRATE INTRAVENOUS at 08:57

## 2020-12-29 RX ADMIN — MIDAZOLAM 1 MG: 1 INJECTION INTRAMUSCULAR; INTRAVENOUS at 07:15

## 2020-12-29 RX ADMIN — CHLORHEXIDINE GLUCONATE 0.12% ORAL RINSE 10 ML: 1.2 LIQUID ORAL at 07:54

## 2020-12-29 RX ADMIN — SODIUM CHLORIDE, SODIUM GLUCONATE, SODIUM ACETATE, POTASSIUM CHLORIDE AND MAGNESIUM CHLORIDE: 526; 502; 368; 37; 30 INJECTION, SOLUTION INTRAVENOUS at 08:51

## 2020-12-29 RX ADMIN — EPINEPHRINE 0.05 MCG/KG/MIN: 1 INJECTION PARENTERAL at 12:48

## 2020-12-29 RX ADMIN — FENTANYL CITRATE 150 MCG: 50 INJECTION, SOLUTION INTRAMUSCULAR; INTRAVENOUS at 09:30

## 2020-12-29 RX ADMIN — SODIUM CHLORIDE 1.25 G/HR: 900 INJECTION, SOLUTION INTRAVENOUS at 10:10

## 2020-12-29 RX ADMIN — SODIUM CHLORIDE, SODIUM GLUCONATE, SODIUM ACETATE, POTASSIUM CHLORIDE AND MAGNESIUM CHLORIDE: 526; 502; 368; 37; 30 INJECTION, SOLUTION INTRAVENOUS at 08:53

## 2020-12-29 RX ADMIN — Medication 14 ML/HR: at 14:09

## 2020-12-29 RX ADMIN — FIBRINOGEN (HUMAN) 1 G: KIT INTRAVENOUS at 13:45

## 2020-12-29 RX ADMIN — FENTANYL CITRATE 250 MCG: 50 INJECTION, SOLUTION INTRAMUSCULAR; INTRAVENOUS at 08:30

## 2020-12-29 RX ADMIN — SODIUM CHLORIDE, SODIUM GLUCONATE, SODIUM ACETATE, POTASSIUM CHLORIDE AND MAGNESIUM CHLORIDE: 526; 502; 368; 37; 30 INJECTION, SOLUTION INTRAVENOUS at 08:03

## 2020-12-29 RX ADMIN — NICARDIPINE HYDROCHLORIDE 2.5 MG/HR: 0.2 INJECTION, SOLUTION INTRAVENOUS at 16:45

## 2020-12-29 RX ADMIN — ROCURONIUM BROMIDE 50 MG: 10 INJECTION INTRAVENOUS at 09:56

## 2020-12-29 RX ADMIN — SODIUM CHLORIDE, SODIUM GLUCONATE, SODIUM ACETATE, POTASSIUM CHLORIDE AND MAGNESIUM CHLORIDE 500 ML: 526; 502; 368; 37; 30 INJECTION, SOLUTION INTRAVENOUS at 15:00

## 2020-12-29 RX ADMIN — FENTANYL CITRATE 50 MCG: 50 INJECTION, SOLUTION INTRAMUSCULAR; INTRAVENOUS at 07:15

## 2020-12-29 RX ADMIN — Medication 1 G: at 11:16

## 2020-12-29 RX ADMIN — SODIUM CHLORIDE 7.5 G: 900 INJECTION, SOLUTION INTRAVENOUS at 09:00

## 2020-12-29 RX ADMIN — Medication 2 G: at 09:17

## 2020-12-29 RX ADMIN — Medication 12.5 MG: at 06:58

## 2020-12-29 RX ADMIN — GABAPENTIN 100 MG: 100 CAPSULE ORAL at 20:00

## 2020-12-29 RX ADMIN — GABAPENTIN 100 MG: 100 CAPSULE ORAL at 15:57

## 2020-12-29 RX ADMIN — VANCOMYCIN HYDROCHLORIDE 1000 MG: 1 INJECTION, SOLUTION INTRAVENOUS at 09:04

## 2020-12-29 RX ADMIN — ROCURONIUM BROMIDE 30 MG: 10 INJECTION INTRAVENOUS at 13:27

## 2020-12-29 RX ADMIN — ACETAMINOPHEN 975 MG: 325 TABLET, FILM COATED ORAL at 15:57

## 2020-12-29 RX ADMIN — Medication 0.5 UNITS: at 08:40

## 2020-12-29 RX ADMIN — Medication 0.5 UNITS: at 13:43

## 2020-12-29 ASSESSMENT — MIFFLIN-ST. JEOR: SCORE: 1156

## 2020-12-29 ASSESSMENT — ACTIVITIES OF DAILY LIVING (ADL)
ADLS_ACUITY_SCORE: 13
ADLS_ACUITY_SCORE: 13

## 2020-12-29 NOTE — PROGRESS NOTES
HPI   Rajani Guo is a 69 year old female who was referred for evaluation of aortic and mitral stenosis.   She recently underwent cardiac cath that showed severe disease. She was seen by Dr. Lara, and the above procedure is now planned. She complains of worsening DESAI and dizziness.   History is obtained from the patient and chart review.   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     GI bleed      History of partial thyroidectomy 06/02/2018     Hyperlipidemia LDL goal <100 01/18/2018     Hypertension      Insomnia, unspecified type 01/18/2018     Mitral regurgitation      Mitral stenosis      Pulmonary hypertension (H)      Tobacco use disorder    Past Surgical History   Past Surgical History         Past Surgical History:   Procedure Laterality Date     COLONOSCOPY N/A 9/4/2019    Procedure: COLONOSCOPY; Surgeon: Marie Todd MD; Location: [facility]     CV CORONARY ANGIOGRAM N/A 11/16/2020    Procedure: CV CORONARY ANGIOGRAM; Surgeon: Joey Rivas MD; Location: [facility] CARDIAC CATH LAB     CV FRACTIONAL FLOW RESERVE N/A 11/16/2020    Procedure: Fractional Flow Reserve; Surgeon: Joey Rivas MD; Location: [facility] CARDIAC CATH LAB     CV RIGHT HEART CATH N/A 11/16/2020    Procedure: CV RIGHT HEART CATH; Surgeon: Joey Rivas MD; Location: [facility] CARDIAC CATH LAB     ESOPHAGOSCOPY, GASTROSCOPY, DUODENOSCOPY (EGD), COMBINED N/A 9/4/2019    Procedure: ESOPHAGOGASTRODUODENOSCOPY (EGD); Surgeon: Marie Todd MD; Location: [facility]      ESOPHAGOSCOPY, GASTROSCOPY, DUODENOSCOPY (EGD), COMBINED N/A 9/17/2020    Procedure: ESOPHAGOGASTRODUODENOSCOPY (EGD); Surgeon: Ten Ibrahim DO; Location:  GI     IR ANGIOGRAM THROUGH CATHETER FOLLOW UP  3/5/2019     IR LOWER EXTREMITY ANGIOGRAM LEFT  3/4/2019     KNEE SURGERY Right 2013    right knee torn meniscus surgery     OVARY SURGERY  1971    1 ovary removed     RELEASE CARPAL TUNNEL Right 1988     RELEASE TRIGGER FINGER BILATERAL       SHOULDER SURGERY Left     rotator cuff repair, plate placement     THYROID SURGERY  1988    partial thyroidectomy     Prior to Admission Medications   Active Medications          Current Outpatient Medications   Medication Sig Dispense Refill     aspirin (ASA) 81 MG EC tablet Take 1 tablet (81 mg) by mouth daily (Patient taking differently: Take 81 mg by mouth every evening ) 100 tablet 3     atorvastatin (LIPITOR) 40 MG tablet TAKE 1 TABLET BY MOUTH EVERY DAY 90 tablet 3     buPROPion (ZYBAN) 150 MG 12 hr tablet TAKE 1 TABLET BY MOUTH 2 TIMES DAILY 60 tablet 3     calcium carb 1250 mg, 500 mg [place],/vitamin D 200 unit (CALCIUM 500/D) 500-200 MG-UNIT per tablet Take 1 tablet by mouth 2 times daily        ferrous sulfate (FEROSUL) 325 (65 Fe) MG tablet Take 325 mg by mouth 2 times daily       gabapentin (NEURONTIN) 300 MG capsule TAKE 1 CAPSULE (300 MG) BY MOUTH ONE OR TWO TIMES DAILY (Patient taking differently: Take 300 mg by mouth At Bedtime ) 90 capsule 1     glucosamine-chondroitin 500-400 MG CAPS per capsule Take 1 capsule by mouth 2 times daily        insulin detemir (LEVEMIR PEN) 100 UNIT/ML pen Inject 5 Units Subcutaneous At Bedtime        Melatonin 10 MG TABS tablet Take 10 mg by mouth At Bedtime       metFORMIN (GLUCOPHAGE) 1000 MG tablet TAKE 1 TABLET BY MOUTH TWICE A DAY WITH MEALS 180 tablet 1     Multiple Vitamin (MULTI-VITAMINS) TABS Take 1 tablet by mouth daily        nicotine (NICODERM CQ) 21 MG/24HR 24 hr patch Place 1 patch onto the skin daily 30  "patch 1     oxybutynin (DITROPAN) 5 MG tablet TAKE 1 TABLET BY MOUTH TWICE A  tablet 3     pantoprazole (PROTONIX) 40 MG EC tablet TAKE 1 TABLET BY MOUTH EVERY DAY 90 tablet 1     senna-docusate (SENOKOT-S/PERICOLACE) 8.6-50 MG tablet Take 1-2 tablets by mouth 2 times daily (Patient taking differently: Take 1 tablet by mouth 2 times daily ) 100 tablet 1     traZODone (DESYREL) 50 MG tablet TAKE 1-2 TABLETS ( MG) BY MOUTH AT BEDTIME 180 tablet 1     vitamin C (ASCORBIC ACID) 500 MG tablet Take 1 tablet (500 mg) by mouth daily 100 tablet 1     insulin pen needle (29G X 12MM) 29G X 12MM miscellaneous Use 1 pen needles daily or as directed. 100 each 11   Allergies         Allergies   Allergen Reactions     Indomethacin Other (See Comments)     Dizziness and disorientation     Tramadol Nausea and Vomiting     Social History   Social History                                                                                                                                                                                                                                                                                                                                 Family History   Family History                                                                                                        ROS   10 point review of systems is negative other than noted in the HPI or here.   Temp: 97.9  F (36.6  C) Temp src: Oral BP: 135/62 Pulse: 101 Resp: 16 SpO2: 96 %   148 lbs 12.8 oz 5' 4.843\" Body mass index is 24.88 kg/m .   Physical Exam   Constitutional: Awake, alert, cooperative, no apparent distress, and appears stated age.   Eyes: Pupils equal, round and reactive to light, extra ocular muscles intact, sclera clear, conjunctiva normal.   HENT: Normocephalic, oral pharynx with moist mucus membranes, edentulous- with dentures.   Respiratory: Clear to auscultation bilaterally, no crackles or wheezing.   Cardiovascular: " regular rate, loud systolic murmur that radiates to the carotids. Palpable pulses to radial arteries.   GI: Normal bowel sounds, soft, non-distended, mild TTP   Genitourinary: deferred   Skin: Warm and dry. No rashes at anticipated surgical site.   Musculoskeletal: Full ROM of neck. There is no redness, warmth, or swelling of the exposed joints. Gross motor strength is normal.   Neurologic: Awake, alert, oriented to name, place and time.     Labs:   Component  Latest Ref Rng & Units 12/7/2020   Sodium  133 - 144 mmol/L 142   Potassium  3.4 - 5.3 mmol/L 4.0   Chloride  94 - 109 mmol/L 110 (H)   Carbon Dioxide  20 - 32 mmol/L 28   Anion Gap  3 - 14 mmol/L 4   Glucose  70 - 99 mg/dL 90   Urea Nitrogen  7 - 30 mg/dL 10   Creatinine  0.52 - 1.04 mg/dL 0.58   GFR Estimate  >60 mL/min/1.73:m2 >90   GFR Estimate If Black  >60 mL/min/1.73:m2 >90   Calcium  8.5 - 10.1 mg/dL 8.7   WBC  4.0 - 11.0 10e9/L 8.1   RBC Count  3.8 - 5.2 10e12/L 4.24   Hemoglobin  11.7 - 15.7 g/dL 11.3 (L)   Hematocrit  35.0 - 47.0 % 38.0   MCV  78 - 100 fl 90   MCH  26.5 - 33.0 pg 26.7   MCHC  31.5 - 36.5 g/dL 29.7 (L)   RDW  10.0 - 15.0 % 21.3 (H)   Platelet Count  150 - 450 10e9/L 272   Bilirubin Direct  0.0 - 0.2 mg/dL <0.1   Bilirubin Total  0.2 - 1.3 mg/dL 0.3   Albumin  3.4 - 5.0 g/dL 3.7   Protein Total  6.8 - 8.8 g/dL 7.0   Alkaline Phosphatase  40 - 150 U/L 61   ALT  0 - 50 U/L 17   AST  0 - 45 U/L 10   ABO   A   RH(D)   Neg   Antibody Screen   Neg   Test Valid Only At   [facility]   Specimen Expires   12/10/2020   PTT  22 - 37 sec 30   INR  0.86 - 1.14 1.13   ECHO 10/9/20   There is severe mitral stenosis.   Severe valvular aortic stenosis.   There is moderate to mod-severe (2-3+) mitral regurgitation.   There is moderate (2+) aortic regurgitation.   There is severe mitral annular calcification.   The visual ejection fraction is estimated at 60-65%.   Left ventricular systolic function is  normal.   Right ventricular systolic pressure is elevated, consistent with moderate   pulmonary hypertension.   The ascending aorta is Mildly dilated.   The degree of aortic stenosis has progressed to severe stenosis.   EKG 11/2020   Sinus rhythm   Possible Left atrial enlargement   Anterolateral infarct , age undetermined   Abnormal ECG   Cardiac cath 11/16/20   Right sided filling pressures are mildly elevated.   Severely elevated PCWP with V-wave to 40 mmHg. LVEDP moderately elevated at 20 mmHg.   Severe mitral stenosis with MV gradient of 18 mmHg   Severe aortic stenosis with AV gradient of 38 mmHg and KRISTOFER 0.65 cm^2.   Non-obstructive coronary disease   Recommend a surgical consultation for severe AV and MV disease.   Outside records reviewed from: care everywhere   ASSESSMENT and PLAN   Rajani Guo is a 69 year old female with severe aortic and mitral stenosis. I agree with the recommendation to proceed with aortic and mitral valve replacement. I discussed with risks and benefits of surgery including risks of death, bleeding, stroke, infection, renal failure and arrhythmias. She understands and is willing to proceed with surgery.   In view of her history of GI bleed, I discussed this with the GI physicians from [facility], who was okay with her taking long term coumadin. I discussed with her both tissue versus a mechanical valve. She is willing to have a mechanical valve and take life long coumadin.

## 2020-12-29 NOTE — ANESTHESIA PROCEDURE NOTES
PA Catheter Insertion Note        Staff -   Anesthesiologist:  Chuy Hernandez MD  Performed By: anesthesiologist    Introducer: Introducer placed as part of procedure (SEE separate note)   Skin prep:  Chloraprep Cap, Full body drape, hand hygiene, Mask, Sterile gloves and Sterile gown    PA Catheter type:  CCO    Distance catheter advanced:  49 cm.    Appropriate RA, RV, PA  waveforms?: Yes    Dressing:  Biopatch    Complications:  None apparent

## 2020-12-29 NOTE — ANESTHESIA PROCEDURE NOTES
Peripheral Nerve Block Procedure Note      Staff -   Anesthesiologist:  Sreedhar Castle MD  Resident/Fellow: Christie Paz MD  Performed By: with residents and anesthesiologist  Procedure performed by resident/CRNA in presence of a teaching physician.    Location: Pre-op  Procedure Start/Stop TImes:      12/29/2020 7:40 AM    patient identified, IV checked, site marked, risks and benefits discussed, informed consent, monitors and equipment checked, pre-op evaluation, at physician/surgeon's request and post-op pain management      Correct Patient: Yes      Correct Position: Yes      Correct Site: Yes      Correct Procedure: Yes      Correct Laterality:  Yes    Site Marked:  Yes  Procedure details:     Procedure:  Erector spinae    ASA:  4    Laterality:  Bilateral    Position:  Prone    Sterile Prep: chloraprep, patient draped, mask and sterile gloves      Local skin infiltration:  1% lidocaine    amount (mL):  3    Insertion site: T5.    Needle:  Touhy needle    Needle gauge:  17    Needle length (inches):  3.13    Catheter gauge:  19    Catheter threaded easily: Yes      Threaded to cm at skin:  10    Ultrasound: Yes      Ultrasound used to identify targeted nerve, plexus, or vascular structure and placed a needle adjacent to it      Permanent Image entered into patiient's record      Abnormal pain on injection: No      Blood Aspirated: No      Paresthesias:  No    Bleeding at site: No      Secured:  Dermabond and Tegaderm    Complications:  None  Assessment/Narrative:      Bilateral T5 MARY

## 2020-12-29 NOTE — OR NURSING
Spoke with NATE Hernandez, patient has famotidine ordered. Patient took protonix this morning. Per [facility] hold famotidine.

## 2020-12-29 NOTE — PROGRESS NOTES
Major Shift Events:  Arrived from OR at 1445 s/p mechanical MVR and AVR, arrived w/Epi, Propofol and Insulin gtts. Gave 500ml bolus of Plasma Lyte upon arrival. Weaned off Propofol by 1545, weaned off Epi by 1630. BPs up to 140s/70s via R art-line, SBP goal of <120 - started Nicardipine at 1650 d/t SBP in 140s, BPs labile, Nicardipine back and forth between off and 2.5mg/hr. SAUL: CVP 11, PA 44/24, CI 5, SVO2 75, . Extubated at 1805 on 4L via NC, sats %. SB/SR in 60s. Drowsy, follows commands. 1 R med CT and 1 L med CT to 1 atrium, adequate output. TPW hooked up, box off. Barajas w/adequate output. Dressings c/d/i.      Plan: Keep SBP<120, monitor resp status.      GTTs: Nicardipine at 2.5mg/hr, Insulin gtt at 2unit/hr, OnQ at 14ml/hr    Lines: R radial art-line, [place], double lumen R internal jugular, PIV x2    Admitted/transferred from: OR  Reason for admission/transfer: Post-op  2 RN skin assessment: completed by Mani Tatum and Morgan Rinaldi  Result of skin assessment and interventions/actions: No major skin issues noted  Height, weight, drug calc weight: Done  Patient belongings (see Flowsheet): contacted OR for belongings  MDRO education added to care plan N/A?    For vital signs and complete assessments, please see documentation flowsheets.     Mani Tatum RN  Hours cared for: [id number]  ?

## 2020-12-29 NOTE — ANESTHESIA PROCEDURE NOTES
Airway   Date/Time: 12/29/2020 8:35 AM   Patient location during procedure: OR    Staff -   CRNA: Tab Gonsalez APRN CRNA  Performed By: CRNA    Consent for Airway   Urgency: elective    Indications and Patient Condition  Indications for airway management: silvestre-procedural  Induction type:intravenousMask difficulty assessment: 2 - vent by mask + OA or adjuvant +/- NMBA    Final Airway Details  Final airway type: endotracheal airway  Successful airway:ETT - single  Endotracheal Airway Details   ETT size (mm): 7.5  Cuffed: yes  Successful intubation technique: direct laryngoscopy  Grade View of Cords: 1  Adjucts: stylet  Measured from: gums/teeth  Secured at (cm): 21  Secured with: pink tape  Bite block used: None    Post intubation assessment   Placement verified by: capnometry, equal breath sounds and chest rise   Number of attempts at approach: 1  Number of other approaches attempted: 0  Secured with:pink tape  Ease of procedure: easy  Dentition: Intact

## 2020-12-29 NOTE — ANESTHESIA PROCEDURE NOTES
Perioperative KIM Report  Anesthesia Information  KIM probe placed and report generated by: : Chuy Hernandez MD Kiberenge, Roy Kagumba, MD  Images for this study have been archived.   Surgeon:  Luc Lara MD  Echocardiogram Comments: Post:  S/p MVR with 27mm St Abdoulaye mechanical valve, AVR 19mm St Abdoulaye mechanical valve   The LVEF is 60-65%, in the setting of hypovolemia   There is no perivalvular or mitral valve regurgitation. The MV mean gradient is 3mmHg   There is no perivalvular or aortic valve regurgitation. The mean  AV gradient is 8mmHg. Vmax 2 m/s   There is mild to moderate TR     Pre:  The LVEF is 55-60% in the setting of hypovolemia   There is severe mitral annular calcification with moderate MR and MS   There is severe calcification of the aortic valve with moderate AI and aortic stenosis   There is moderate TR   The descending aorta has severe atheromatous grade 4 disease >0.5cm with no mobile plaques      Preanesthesia Checklist:  Patient identified, IV assessed, risks and benefits discussed, monitors and equipment assessed, procedure being performed at surgeon's request and anesthesia consent obtained.  General Procedure Information  Modalities:  2D, 3D, CW Doppler and PW Doppler    Mitral valve replacement, aortic valve replacement   Echocardiographic and Doppler Measurements      Other Ventricular Findings:  LV is normal in size, with preserved systolic function   LVEF: 55-60 % by visual estimation in the setting of hypovolemia       RV is normal in size with mildly depressed systolic function        Ventricular Regional Function:  1- Basal Anteroseptal:  normal  2- Basal Anterior:  normal  3- Basal Anterolateral:  normal  4- Basal Inferolateral:  normal  5- Basal Inferior:  normal  6- Basal Inferoseptal:  normal  7- Mid Anteroseptal:  normal  8- Mid Anterior:  normal  9- Mid Anterolateral:  normal  10- Mid Inferolateral:  normal  11- Mid Inferior:  normal  12- Mid Inferoseptal:   normal  13- Apical Anterior:  normal  14- Apical Lateral:  normal  15- Apical Inferior:  normal  16- Apical Septal:  normal  17- [place]:  normal    Valves          Other Valve Findings:  AV: trileaflet         moderate AS         Max P mmHg,  Mean P mmHg, Vmax: 2.8 m/s,           AV VTI: 72.8 cm,  LVOT VTI: 22.9 cm,  DI: 0.3          moderate AI, AI P1/2t: 280ms              MV: severely calcified mitral valve and annulus          Moderate MR with posteriorly directed jet. ERO: 0.15 cm^2  Regurgitant vol: 31ml          moderate MS, mean transmitral gradient 7 mmHg at a SBP of 75mmHg           S: 12.8 cm/s,   D: 51.7 cm/s,  S/D: 0.2     TV: moderate TR.         Tricuspid valve annulus: 4.3cm     PV: no PI     Aorta: Other Aortic Findings:  The descending aorta has severe atheromatous grade 4 disease >0.5cm with no mobile plaques   There is no evidence of aortic aneurysm or dissection   Left Atrium: Size dilated.  Spontaneous echo contrast not present.  Thrombus not present.     Atrial Septum: Other atrial septal defect findings:  No PFO by CFD       Other Findings:   Pericardium:  normal.  . .  .  [place] sinus catheter present.  .  .  Post Intervention Findings  Procedure(s) performed:  Mitral Valve Repair/Replace and Aortic Valve Repair/Replace. Global function:  Improved.   Regional wall motion:  Unchanged   Surgeon(s) notified of all postintervention findings:  Yes  .  .  .   .  .  .  .  .  .  .    Patient was on the following medications:   0.05 mcg/kg/min epinephrine,  2.4 unit(s)/hr vasopressin   S/p MVR with 27mm St Abdoulaye mechanical valve, AVR 19mm St Abdoulaye mechanical valve   The LVEF is 60-65%, in the setting of hypovolemia   There is no perivalvular or mitral valve regurgitation. The MV mean gradient is 3mmHg   There is no perivalvular or aortic valve regurgitation. The mean  AV gradient is 8mmHg. Vmax 2 m/s   There is mild to moderate TR   There is no PFO   There is no evidence of aortic aneurysm or  dissection     Echocardiogram Comments  Post:  S/p MVR with 27mm St Abdoulaye mechanical valve, AVR 19mm St Abdoulaye mechanical valve   The LVEF is 60-65%, in the setting of hypovolemia   There is no perivalvular or mitral valve regurgitation. The MV mean gradient is 3mmHg   There is no perivalvular or aortic valve regurgitation. The mean  AV gradient is 8mmHg. Vmax 2 m/s   There is mild to moderate TR     Pre:  The LVEF is 55-60% in the setting of hypovolemia   There is severe mitral annular calcification with moderate MR and MS   There is severe calcification of the aortic valve with moderate AI and aortic stenosis   There is moderate TR   The descending aorta has severe atheromatous grade 4 disease >0.5cm with no mobile plaques

## 2020-12-29 NOTE — ANESTHESIA POSTPROCEDURE EVALUATION
Anesthesia POST Procedure Evaluation    Patient: Rajani Guo   MRN:     [id number] Gender:   female   Age:    70 year old :      1950        Preoperative Diagnosis: Mitral and aortic incompetence [I08.0]   Procedure(s):  Median Stertonomy, REPLACEMENT AORTIC Valve  with 19mm St Abdoulaye [place]  Mechanical Heart Valve AND MITRAL VALVE Replacement with 27mm St Abdoulaye Mechanical Heart Valve, on pump oxygenation   Postop Comments: No value filed.     Anesthesia Type: General, Peripheral Nerve Block       Disposition: ICU            ICU Sign Out: Anesthesiologist/ICU physician sign out WAS performed   Postop Pain Control:    PONV:    Neuro/Psych: Uneventful            Sign Out: PLANNED postop sedation   Airway/Respiratory: Uneventful            Sign Out: AIRWAY IN SITU/Resp. Support               Airway in situ/Resp. Support: ETT                 Reason: Planned Pre-op   CV/Hemodynamics: Uneventful            Sign Out: Acceptable CV status   Other NRE: NONE   DID A NON-ROUTINE EVENT OCCUR? No         Last Anesthesia Record Vitals:  CRNA VITALS  2020 1352 - 2020 1452      2020             Pulse:  81    ART BP:  113/74    EKG:  Sinus rhythm          Last PACU Vitals:  Vitals Value Taken Time   BP     Temp     Pulse 68 20 1459   Resp     SpO2 99 % 20 1459   Temp src     NIBP     Pulse 81 20 1450   SpO2 97 % 20 1440   Resp     Temp     Ht Rate 76 20 1442   Temp 2     Vitals shown include unvalidated device data.      Electronically Signed By: Chuy Hernandez MD, 2020, 3:01 PM

## 2020-12-29 NOTE — ANESTHESIA CARE TRANSFER NOTE
Patient: Rajani Guo    Procedure(s):  Median Stertonomy, REPLACEMENT AORTIC Valve  with 19mm St Abdoulaye [place]  Mechanical Heart Valve AND MITRAL VALVE Replacement with 27mm St Abdoulaye Mechanical Heart Valve, on pump oxygenation    Diagnosis: Mitral and aortic incompetence [I08.0]  Diagnosis Additional Information: No value filed.    Anesthesia Type:   General, Peripheral Nerve Block     Note:  Airway :ETT  Patient transferred to:ICU  Comments: Tx to 4E with full monitors, O2 10L per ambu.  To 4505, VSS, placed on ventilator, report to RN.  IV infusions reviewed with 4E RN, including epinephrine, insulin, amicar and propofol.ICU Handoff: Call for PAUSE to initiate/utilize ICU HANDOFF, Identified Patient, Identified Responsible Provider, Reviewed the Pertinent Medical History, Discussed Surgical Course, Reviewed Intra-OP Anesthesia Management and Issues during Anesthesia, Set Expectations for Post Procedure Period and Allowed Opportunity for Questions and Acknowledgement of Understanding      Vitals: (Last set prior to Anesthesia Care Transfer)    CRNA VITALS  12/29/2020 1352 - 12/29/2020 1452      12/29/2020             Pulse:  81    ART BP:  113/74    EKG:  Sinus rhythm                Electronically Signed By: FRANCESCA Burnham CRNA  December 29, 2020  3:14 PM

## 2020-12-29 NOTE — H&P
CV ICU H&P  12/29/2020      CO-MORBIDITIES:   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 3/4/2019      Other cardiomyopathies (H)     Severe Mitral Stenosis      Moderate mitral insufficiency     Aortic insufficiency, Moderate -- Echo 8/13/19     Severe Aortic Stenosis     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)     Fatigue     SOB (shortness of breath)     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)     Anemia, iron deficiency     Left leg Arterial Thrombus, Tx'd with Lytics -- 3/4/19 (?cardioembolic)     Mitral valve insufficiency, unspecified etiology     Aortic valve insufficiency, etiology of cardiac valve disease unspecified     Severe aortic stenosis     Moderate aortic insufficiency     Dyspnea on exertion     Status post coronary angiogram     Mitral and aortic incompetence       ASSESSMENT: Rajani Guo is a 70 year old female with PMH of hypertension, pulmonary hypertension, dyslipidemia, current tobacco use, h/o DVT, h/o arterial thrombus, diabetes, GERD, osteoporosis POD#0 from AVR. MVR with Dr. Lara on 12/29.      PLAN:  Neuro/ pain/ sedation:  # Pain  # Sedation  - Monitor neurological status. Notify the MD for any acute changes in exam.  - Fentanyl gtt for pain. PRN tylenol, dilaudid, oxycodone   - BL erector spinae catheters - appreciate RAPS team's assistance  - Propofol gtt for sedation, RASS goal 0 to -1  - PTA buPROPion, gabapentin    Pulmonary care:  # Tobacco dependence   - MV  - Titrate FiO2 for SpO2 >92%  - Fast track to extubation    Cardiovascular:  # HLD  # HTN  # Severe aortic  "stenosis and severe mitral stenosis- s/p AVR and MVR 12/29  # Pulmonary HTN  (PCWP with V-wave to 40 mmHg  - Monitor hemodynamic status.   - MAP >65  - Pressors: epi, vaso.   - Nicardipine gtt to keep systolic <120  - PTA atorvastatin, ASA  - PRN hydralazine    GI/Nutrition:  # H/o GI bleed September 2020  # GERD  - NPO except ice chips and medications.  - No indication for parenteral nutrition.  - Protonix    Fluids/ Electrolytes/Renal:   - TKO for IV fluid hydration.  - Urine output is adequate so far.  - Will continue to monitor intake and output.  - BMP in the AM    Endocrine:  # IDDM (A1c 5.7)  # Stress hyperglycemia  - Insulin gtt  - Hold PTA metformin     ID/ Antibiotics:  - Complete perioperative antibiotics - vancomycin and cefazolin  - No other indication for antibiotics.     Heme:  # H/o DVT and arterial thrombus - eloquis stopped due to GIB  # Anemia     - Hgb goal >7  - CBC in the AM    Prophylaxis:    - Mechanical prophylaxis for DVT.   - No chemical DVT prophylaxis due to high risk of bleeding.   - Protonix qd    Lines/ tubes/ drains:  - MAC, [place], PIV x2, Arterial line, ETT, Barajas, Mediastinal chest tubes x2    Disposition:  - CV ICU.     Patient seen, findings and plan discussed with CV ICU staff, Dr. Tracy.    Hien Pond ([facility])  Anesthesiology Resident  *[id number]    ====================================    HPI:   Per her H&P \"Ms. Guo has a history of mitral stenosis and aortic stenosis.  She recently underwent cardiac cath that showed severe disease.  She was seen by Dr. Lara, and the above procedure is now planned.  She reports DESAI and dizziness.\"    Intraoperative course was largely . EBL was 1000cc for which the patient received 3L crystalloid, 500 units kcentra, 1 gm fibryga and 280 ml cell saver.      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     GI bleed      History of partial thyroidectomy 06/02/2018     Hyperlipidemia LDL goal <100 01/18/2018     Hypertension      Insomnia, unspecified type 01/18/2018     Mitral regurgitation      Mitral stenosis      Pulmonary hypertension (H)      Tobacco use disorder        PAST SURGICAL HISTORY:   Past Surgical History:   Procedure Laterality Date     COLONOSCOPY N/A 9/4/2019    Procedure: COLONOSCOPY;  Surgeon: Marie Todd MD;  Location:  GI     CV CORONARY ANGIOGRAM N/A 11/16/2020    Procedure: CV CORONARY ANGIOGRAM;  Surgeon: Joey Rivas MD;  Location:  HEART CARDIAC CATH LAB     CV FRACTIONAL FLOW RESERVE N/A 11/16/2020    Procedure: Fractional Flow Reserve;  Surgeon: Joey Rivas MD;  Location:  HEART CARDIAC CATH LAB     CV RIGHT HEART CATH N/A 11/16/2020    Procedure: CV RIGHT HEART CATH;  Surgeon: Joey Rivas MD;  Location:  HEART CARDIAC CATH LAB     ESOPHAGOSCOPY, GASTROSCOPY, DUODENOSCOPY (EGD), COMBINED N/A 9/4/2019    Procedure: ESOPHAGOGASTRODUODENOSCOPY (EGD);  Surgeon: Marie Todd MD;  Location:  GI     ESOPHAGOSCOPY, GASTROSCOPY, DUODENOSCOPY (EGD), COMBINED N/A 9/17/2020    Procedure: ESOPHAGOGASTRODUODENOSCOPY (EGD);  Surgeon: Ten Ibrahim DO;  Location:  GI     IR ANGIOGRAM THROUGH CATHETER FOLLOW UP  3/5/2019     IR LOWER EXTREMITY ANGIOGRAM LEFT  3/4/2019     KNEE SURGERY Right 2013    right knee torn meniscus surgery     OVARY SURGERY  1971    1 ovary removed     RELEASE CARPAL TUNNEL Right 1988     RELEASE TRIGGER FINGER BILATERAL       SHOULDER SURGERY Left     rotator cuff repair, plate placement     THYROID SURGERY  1988    partial thyroidectomy        FAMILY HISTORY:   Family History   Problem Relation Age of Onset     Diabetes Type 2  Mother      Lung Cancer Father      Diabetes Sister      Coronary Artery Disease Sister      Diabetes Brother      Diabetes Brother      Diabetes Type 2  Brother      Coronary Artery Disease Sister      Asthma Sister      Glaucoma No family hx of      Macular Degeneration No family hx of      Anesthesia Reaction No family hx of        SOCIAL HISTORY:   Social History     Tobacco Use     Smoking status: Current Every Day Smoker     Packs/day: 1.00     Years: 52.00     Pack years: 52.00     Smokeless tobacco: Never Used     Tobacco comment: using with patches   Substance Use Topics     Alcohol use: Yes     Comment: 2 glasses of wine a week         OBJECTIVE:  1. VITAL SIGNS:   Temp:  [98.7  F (37.1  C)] 98.7  F (37.1  C)  Pulse:  [] 90  Resp:  [14-16] 16  BP: (135-149)/(59-76) 148/74  SpO2:  [97 %-100 %] 100 %    Ventilation Mode: CMV/AC  (Continuous Mandatory Ventilation/ Assist Control)  Rate Set (breaths/minute): 14 breaths/min  Tidal Volume Set (mL): 420 mL  PEEP (cm H2O): 5 cmH2O  Oxygen Concentration (%): 50 %  Resp: 16        2. INTAKE/ OUTPUT:   I/O last 3 completed shifts:  In: 3280 [I.V.:1000; Other:280]  Out: 650 [Urine:650]        3. PHYSICAL EXAMINATION:   General: Intubated, lying in bed, no acute distress  Neuro: Sedated  Resp: Mechanically ventilated, CTAB  CV: RRR  Abdomen: Soft, Non-distended, Non-tender  Incisions: c/d/i  Extremities: warm and well perfused    4. INVESTIGATIONS:   Arterial Blood Gases   Recent Labs   Lab 12/29/20  1437 12/29/20  1323 12/29/20  1319   WBC 13.6*  --  12.5*   HGB 9.6* 8.9* 8.8*     --  152   INR 1.52*  --  1.75*   * 140  --    POTASSIUM 4.2 4.0  --    BUN 9  --   --    CR 0.65  --   --            5. RADIOLOGY:     =========================================

## 2020-12-29 NOTE — BRIEF OP NOTE
[facility]     Brief Operative Note    Pre-operative diagnosis: Mitral and aortic incompetence [I08.0]  Post-operative diagnosis Same as pre-operative diagnosis    Procedure: Procedure(s):  Median Stertonomy, REPLACEMENT AORTIC Valve  with 19mm St Abd[place]aye [place]  Mechanical Heart Valve AND MITRAL VALVE Replacement with 27mm St Abd[place]aye Mechanical Heart Valve, on pump oxygenation  Surgeon: Surgeon(s) and Role:     * Luc Lara MD - Primary     * Chris Ken MD - Assisting     * Kim Duval MD - Assisting     * Emilia Martin PA-C - Assisting  Anesthesia: Combined General with Block   Estimated blood loss: 1000cc  Drains: Med chest tube x 2, 36fr  Specimens:   ID Type Source Tests Collected by Time Destination   A : Aortic Valve and Leaflets Tissue Other SURGICAL PATHOLOGY EXAM Luc Lara MD 12/29/2020 10:25 AM    B : Mitral Valve and Leaflets Tissue Other SURGICAL PATHOLOGY EXAM Luc Lara MD 12/29/2020 10:31 AM      Findings:   None.  Complications: None.  Implants:   Implant Name Type Inv. Item Serial No.  Lot No. LRB No. Used Action   IMP KIT SUTURE COR-KNOT MINI 4X14MM [id number] Metallic Hardware/[place] IMP KIT SUTURE COR-KNOT MINI 4X14MM [id number]  [employer] [id number] N/A 2 Implanted   VALVE MITRAL ST ABDOULAYE 27MM [id number] Valve VALVE MITRAL ST ABDOULAYE 27MM [id number] [employer]  N/A 1 Implanted   DEVICE [employer] ENDO COR KNOT QUICK LOAD RELOAD [id number] Wire DEVICE CALDERON ENDO COR KNOT QUICK LOAD RELOAD [id number]  [employer] [id number] N/A 2 Implanted   VALVE AORTIC REG[place] FLEX-CUFF 19MM [id number] Valve VALVE AORTIC REGENT FLEX-CUFF 19MM [id number] [employer]  N/A 1 Implanted   DEVICE CALDERON ENDO COR KNOT QUICK LOAD [id number] Wire DEVICE CALDERON ENDO COR KNOT QUICK LOAD [id number]  [employer] [id number] N/A 2 Implanted

## 2020-12-29 NOTE — ANESTHESIA PROCEDURE NOTES
Arterial Line Procedure Note      Staff -   Anesthesiologist:  Chuy Hernandez MD  Performed By: anesthesiologist    Location: In OR Before Induction  Procedure Start/Stop Times:      12/29/2020 8:15 AM     12/29/2020 8:24 AM    patient identified, IV checked, site marked, risks and benefits discussed, informed consent, monitors and equipment checked, pre-op evaluation and at physician/surgeon's request      Correct Patient: Yes      Correct Position: Yes      Correct Site: Yes      Correct Procedure: Yes      Correct Laterality:  Yes    Site Marked:  Yes  Line Placement:     Procedure:  Arterial Line    Insertion Site:  Radial    Insertion laterality:  Right    Skin Prep: Chloraprep      Patient Prep: patient draped, mask, sterile gloves, sterile gown and hand hygiene      Local skin infiltration:  2% lidocaine    amount (mL):  1    Ultrasound Guided?: Yes      Artery evaluated via ultrasound confirming patency.   Using realtime imaging, the artery was punctured and the needle was observed entering the artery.      A permanent image is NOT entered into the patient's record.      Catheter size:  20 gauge, 12 cm    Cath secured with: suture      Dressing:  Tegaderm    Complications:  None obvious    Arterial waveform: Yes      IBP within 10% of NIBP: Yes

## 2020-12-29 NOTE — ANESTHESIA PROCEDURE NOTES
KIM Probe Insertion Note:      Staff -   Anesthesiologist:  Chuy Hernandez MD  Performed By: anesthesiologist  Probe Number: [id number]  Probe Status PRE Insertion: NO obvious damage  Probe type:  Adult 3D    Bite block used:   Yes  Insertion Technique: Easy, no oropharyngeal manipulation  Insertion complications: None obvious    Billing Report:KIM report by Anesthesiologist (See Separate Report note)    Probe Status POST Removal: NO obvious damage

## 2020-12-29 NOTE — ANESTHESIA PROCEDURE NOTES
Central Line Procedure Note      Staff -   Anesthesiologist:  Chuy Hernandez MD  Performed By: anesthesiologist  Location: In OR after induction  Procedure Start/Stop Times:     patient identified, IV checked, site marked, risks and benefits discussed, informed consent, monitors and equipment checked, pre-op evaluation and at physician/surgeon's request      Correct Patient: Yes      Correct Position: Yes      Correct Site: Yes      Correct Procedure: Yes      Correct Laterality:  Yes    Site Marked:  Yes  Line Placement:     Procedure:  Central Line    Insertion laterality:  Right    Insertion site:  Internal Jugular    Position:  Trendelenburg    Sterility preparation included the following: hand hygiene performed prior to central venous catheter insertion, maximum sterile barriers were used: cap, mask, sterile gown, sterile gloves, and large sterile sheet, antiseptic used during central venous catheter insertion and skin prep agent completely dried prior to procedure         Injection Technique:  Ultrasound guided    Sterile Ultrasound Technique:  Sterile probe cover and Sterile gel    Vein evaluated via U/S for patency/adequacy of catheter insertion and is adequate.  Using realtime U/S imaging the vein was punctured, and needle was observed entering vein on U/S      A permanent image is NOT entered into the patient's record.      Local skin infiltration:  None    Catheter size:  9 Fr, 2 lumen 11.5 cm (MAC)    Catheter length at skin (cm):  11    Cath secured with: suture      Dressing:  Tegaderm and Biopatch    Complications:  None obvious    Blood aspirated all lumens: Yes      All Lumens Flushed: Yes      Tip termination: other      Verification method:  Placement to be verified post-op{

## 2020-12-30 ENCOUNTER — APPOINTMENT (OUTPATIENT)
Dept: PHYSICAL THERAPY | Facility: CLINIC | Age: 70
DRG: 219 | End: 2020-12-30
Attending: PHYSICIAN ASSISTANT
Payer: COMMERCIAL

## 2020-12-30 ENCOUNTER — APPOINTMENT (OUTPATIENT)
Dept: GENERAL RADIOLOGY | Facility: CLINIC | Age: 70
DRG: 219 | End: 2020-12-30
Attending: PHYSICIAN ASSISTANT
Payer: COMMERCIAL

## 2020-12-30 LAB
ALBUMIN SERPL-MCNC: 2.8 G/DL (ref 3.4–5)
ALP SERPL-CCNC: 49 U/L (ref 40–150)
ALT SERPL W P-5'-P-CCNC: 20 U/L (ref 0–50)
ANION GAP SERPL CALCULATED.3IONS-SCNC: 6 MMOL/L (ref 3–14)
AST SERPL W P-5'-P-CCNC: 62 U/L (ref 0–45)
BASE EXCESS BLDV CALC-SCNC: 2.5 MMOL/L
BILIRUB SERPL-MCNC: 0.4 MG/DL (ref 0.2–1.3)
BUN SERPL-MCNC: 9 MG/DL (ref 7–30)
CA-I BLD-MCNC: 4.5 MG/DL (ref 4.4–5.2)
CALCIUM SERPL-MCNC: 8 MG/DL (ref 8.5–10.1)
CHLORIDE SERPL-SCNC: 110 MMOL/L (ref 94–109)
CO2 SERPL-SCNC: 27 MMOL/L (ref 20–32)
CREAT SERPL-MCNC: 0.59 MG/DL (ref 0.52–1.04)
ERYTHROCYTE [DISTWIDTH] IN BLOOD BY AUTOMATED COUNT: 18.7 % (ref 10–15)
GFR SERPL CREATININE-BSD FRML MDRD: >90 ML/MIN/{1.73_M2}
GLUCOSE BLDC GLUCOMTR-MCNC: 102 MG/DL (ref 70–99)
GLUCOSE BLDC GLUCOMTR-MCNC: 103 MG/DL (ref 70–99)
GLUCOSE BLDC GLUCOMTR-MCNC: 110 MG/DL (ref 70–99)
GLUCOSE BLDC GLUCOMTR-MCNC: 134 MG/DL (ref 70–99)
GLUCOSE BLDC GLUCOMTR-MCNC: 134 MG/DL (ref 70–99)
GLUCOSE BLDC GLUCOMTR-MCNC: 135 MG/DL (ref 70–99)
GLUCOSE BLDC GLUCOMTR-MCNC: 163 MG/DL (ref 70–99)
GLUCOSE BLDC GLUCOMTR-MCNC: 165 MG/DL (ref 70–99)
GLUCOSE BLDC GLUCOMTR-MCNC: 176 MG/DL (ref 70–99)
GLUCOSE BLDC GLUCOMTR-MCNC: 179 MG/DL (ref 70–99)
GLUCOSE BLDC GLUCOMTR-MCNC: 181 MG/DL (ref 70–99)
GLUCOSE BLDC GLUCOMTR-MCNC: 185 MG/DL (ref 70–99)
GLUCOSE BLDC GLUCOMTR-MCNC: 227 MG/DL (ref 70–99)
GLUCOSE BLDC GLUCOMTR-MCNC: 60 MG/DL (ref 70–99)
GLUCOSE BLDC GLUCOMTR-MCNC: 64 MG/DL (ref 70–99)
GLUCOSE SERPL-MCNC: 102 MG/DL (ref 70–99)
HCO3 BLDV-SCNC: 27 MMOL/L (ref 21–28)
HCT VFR BLD AUTO: 32.7 % (ref 35–47)
HGB BLD-MCNC: 10.2 G/DL (ref 11.7–15.7)
MAGNESIUM SERPL-MCNC: 1.8 MG/DL (ref 1.6–2.3)
MCH RBC QN AUTO: 27.9 PG (ref 26.5–33)
MCHC RBC AUTO-ENTMCNC: 31.2 G/DL (ref 31.5–36.5)
MCV RBC AUTO: 90 FL (ref 78–100)
O2/TOTAL GAS SETTING VFR VENT: NORMAL %
OXYHGB MFR BLDV: 58 %
PCO2 BLDV: 41 MM HG (ref 40–50)
PH BLDV: 7.43 PH (ref 7.32–7.43)
PHOSPHATE SERPL-MCNC: 4.2 MG/DL (ref 2.5–4.5)
PLATELET # BLD AUTO: 164 10E9/L (ref 150–450)
PO2 BLDV: 31 MM HG (ref 25–47)
POTASSIUM SERPL-SCNC: 3.7 MMOL/L (ref 3.4–5.3)
PROT SERPL-MCNC: 5.5 G/DL (ref 6.8–8.8)
RBC # BLD AUTO: 3.65 10E12/L (ref 3.8–5.2)
SODIUM SERPL-SCNC: 143 MMOL/L (ref 133–144)
WBC # BLD AUTO: 12.6 10E9/L (ref 4–11)

## 2020-12-30 PROCEDURE — 80053 COMPREHEN METABOLIC PANEL: CPT | Performed by: PHYSICIAN ASSISTANT

## 2020-12-30 PROCEDURE — 83735 ASSAY OF MAGNESIUM: CPT | Performed by: PHYSICIAN ASSISTANT

## 2020-12-30 PROCEDURE — 250N000013 HC RX MED GY IP 250 OP 250 PS 637: Performed by: NURSE PRACTITIONER

## 2020-12-30 PROCEDURE — 71045 X-RAY EXAM CHEST 1 VIEW: CPT | Mod: 26 | Performed by: RADIOLOGY

## 2020-12-30 PROCEDURE — 999N000045 HC STATISTIC DAILY SWAN MONITORING

## 2020-12-30 PROCEDURE — 93010 ELECTROCARDIOGRAM REPORT: CPT | Mod: 59 | Performed by: INTERNAL MEDICINE

## 2020-12-30 PROCEDURE — 999N000015 HC STATISTIC ARTERIAL MONITORING DAILY

## 2020-12-30 PROCEDURE — 250N000011 HC RX IP 250 OP 636: Performed by: STUDENT IN AN ORGANIZED HEALTH CARE EDUCATION/TRAINING PROGRAM

## 2020-12-30 PROCEDURE — 250N000009 HC RX 250: Performed by: STUDENT IN AN ORGANIZED HEALTH CARE EDUCATION/TRAINING PROGRAM

## 2020-12-30 PROCEDURE — 97161 PT EVAL LOW COMPLEX 20 MIN: CPT | Mod: GP

## 2020-12-30 PROCEDURE — 97530 THERAPEUTIC ACTIVITIES: CPT | Mod: GP

## 2020-12-30 PROCEDURE — 258N000001 HC RX 258: Performed by: PHYSICIAN ASSISTANT

## 2020-12-30 PROCEDURE — 250N000011 HC RX IP 250 OP 636: Performed by: PHYSICIAN ASSISTANT

## 2020-12-30 PROCEDURE — 71045 X-RAY EXAM CHEST 1 VIEW: CPT

## 2020-12-30 PROCEDURE — 250N000009 HC RX 250: Performed by: PHYSICIAN ASSISTANT

## 2020-12-30 PROCEDURE — 250N000013 HC RX MED GY IP 250 OP 250 PS 637: Performed by: PHYSICIAN ASSISTANT

## 2020-12-30 PROCEDURE — 250N000013 HC RX MED GY IP 250 OP 250 PS 637: Performed by: STUDENT IN AN ORGANIZED HEALTH CARE EDUCATION/TRAINING PROGRAM

## 2020-12-30 PROCEDURE — 85027 COMPLETE CBC AUTOMATED: CPT | Performed by: PHYSICIAN ASSISTANT

## 2020-12-30 PROCEDURE — 84100 ASSAY OF PHOSPHORUS: CPT | Performed by: PHYSICIAN ASSISTANT

## 2020-12-30 PROCEDURE — 999N000155 HC STATISTIC RAPCV CVP MONITORING

## 2020-12-30 PROCEDURE — 82330 ASSAY OF CALCIUM: CPT | Performed by: PHYSICIAN ASSISTANT

## 2020-12-30 PROCEDURE — 999N000157 HC STATISTIC RCP TIME EA 10 MIN

## 2020-12-30 PROCEDURE — 250N000013 HC RX MED GY IP 250 OP 250 PS 637: Performed by: SURGERY

## 2020-12-30 PROCEDURE — 82805 BLOOD GASES W/O2 SATURATION: CPT | Performed by: PHYSICIAN ASSISTANT

## 2020-12-30 PROCEDURE — 214N000001 HC R&B CCU UMMC

## 2020-12-30 PROCEDURE — 999N001017 HC STATISTIC GLUCOSE BY METER IP

## 2020-12-30 PROCEDURE — 93005 ELECTROCARDIOGRAM TRACING: CPT

## 2020-12-30 RX ORDER — MAGNESIUM SULFATE 1 G/100ML
1 INJECTION INTRAVENOUS ONCE
Status: COMPLETED | OUTPATIENT
Start: 2020-12-30 | End: 2020-12-30

## 2020-12-30 RX ORDER — PANTOPRAZOLE SODIUM 40 MG/1
40 TABLET, DELAYED RELEASE ORAL
Status: DISCONTINUED | OUTPATIENT
Start: 2020-12-30 | End: 2021-01-06 | Stop reason: HOSPADM

## 2020-12-30 RX ORDER — POTASSIUM CHLORIDE 1.5 G/1.58G
40 POWDER, FOR SOLUTION ORAL ONCE
Status: COMPLETED | OUTPATIENT
Start: 2020-12-30 | End: 2020-12-30

## 2020-12-30 RX ORDER — NICOTINE 21 MG/24HR
1 PATCH, TRANSDERMAL 24 HOURS TRANSDERMAL DAILY
Status: DISCONTINUED | OUTPATIENT
Start: 2020-12-30 | End: 2021-01-03

## 2020-12-30 RX ORDER — WARFARIN SODIUM 5 MG/1
5 TABLET ORAL
Status: COMPLETED | OUTPATIENT
Start: 2020-12-30 | End: 2020-12-30

## 2020-12-30 RX ORDER — HEPARIN SODIUM 5000 [USP'U]/.5ML
5000 INJECTION, SOLUTION INTRAVENOUS; SUBCUTANEOUS EVERY 8 HOURS
Status: DISCONTINUED | OUTPATIENT
Start: 2020-12-30 | End: 2020-12-31

## 2020-12-30 RX ADMIN — BUPROPION HYDROCHLORIDE 100 MG: 100 TABLET, FILM COATED ORAL at 20:13

## 2020-12-30 RX ADMIN — Medication 1 PATCH: at 09:28

## 2020-12-30 RX ADMIN — GABAPENTIN 100 MG: 100 CAPSULE ORAL at 13:49

## 2020-12-30 RX ADMIN — PANTOPRAZOLE SODIUM 40 MG: 40 TABLET, DELAYED RELEASE ORAL at 09:35

## 2020-12-30 RX ADMIN — HYDROMORPHONE HYDROCHLORIDE 0.3 MG: 1 INJECTION, SOLUTION INTRAMUSCULAR; INTRAVENOUS; SUBCUTANEOUS at 06:12

## 2020-12-30 RX ADMIN — NICARDIPINE HYDROCHLORIDE 2.5 MG/HR: 0.2 INJECTION, SOLUTION INTRAVENOUS at 06:18

## 2020-12-30 RX ADMIN — MAGNESIUM SULFATE 1 G: 1 INJECTION INTRAVENOUS at 08:38

## 2020-12-30 RX ADMIN — OXYCODONE HYDROCHLORIDE 5 MG: 5 TABLET ORAL at 20:16

## 2020-12-30 RX ADMIN — Medication 12.5 MG: at 13:49

## 2020-12-30 RX ADMIN — GABAPENTIN 100 MG: 100 CAPSULE ORAL at 20:13

## 2020-12-30 RX ADMIN — DEXTROSE 15 G: 15 GEL ORAL at 15:09

## 2020-12-30 RX ADMIN — ACETAMINOPHEN 975 MG: 325 TABLET, FILM COATED ORAL at 23:41

## 2020-12-30 RX ADMIN — BUPROPION HYDROCHLORIDE 100 MG: 100 TABLET, FILM COATED ORAL at 09:26

## 2020-12-30 RX ADMIN — HEPARIN SODIUM 5000 UNITS: 5000 INJECTION, SOLUTION INTRAVENOUS; SUBCUTANEOUS at 21:13

## 2020-12-30 RX ADMIN — VANCOMYCIN HYDROCHLORIDE 1000 MG: 1 INJECTION, SOLUTION INTRAVENOUS at 09:35

## 2020-12-30 RX ADMIN — OXYCODONE HYDROCHLORIDE 5 MG: 5 TABLET ORAL at 05:54

## 2020-12-30 RX ADMIN — HUMAN INSULIN 2.5 UNITS/HR: 100 INJECTION, SOLUTION SUBCUTANEOUS at 17:19

## 2020-12-30 RX ADMIN — Medication 12.5 MG: at 20:13

## 2020-12-30 RX ADMIN — OXYCODONE HYDROCHLORIDE 5 MG: 5 TABLET ORAL at 21:13

## 2020-12-30 RX ADMIN — DOCUSATE SODIUM AND SENNOSIDES 2 TABLET: 8.6; 5 TABLET, FILM COATED ORAL at 20:12

## 2020-12-30 RX ADMIN — CEFAZOLIN 1 G: 330 INJECTION, POWDER, FOR SOLUTION INTRAMUSCULAR; INTRAVENOUS at 05:53

## 2020-12-30 RX ADMIN — HEPARIN SODIUM 5000 UNITS: 5000 INJECTION, SOLUTION INTRAVENOUS; SUBCUTANEOUS at 09:36

## 2020-12-30 RX ADMIN — CEFAZOLIN 1 G: 330 INJECTION, POWDER, FOR SOLUTION INTRAMUSCULAR; INTRAVENOUS at 13:49

## 2020-12-30 RX ADMIN — BUPROPION HYDROCHLORIDE 100 MG: 100 TABLET, FILM COATED ORAL at 13:49

## 2020-12-30 RX ADMIN — ASPIRIN 81 MG CHEWABLE TABLET 81 MG: 81 TABLET CHEWABLE at 08:39

## 2020-12-30 RX ADMIN — GABAPENTIN 100 MG: 100 CAPSULE ORAL at 08:38

## 2020-12-30 RX ADMIN — POLYETHYLENE GLYCOL 3350 17 G: 17 POWDER, FOR SOLUTION ORAL at 08:38

## 2020-12-30 RX ADMIN — DEXTROSE MONOHYDRATE 25 ML: 500 INJECTION PARENTERAL at 15:24

## 2020-12-30 RX ADMIN — HYDROMORPHONE HYDROCHLORIDE 0.5 MG: 1 INJECTION, SOLUTION INTRAMUSCULAR; INTRAVENOUS; SUBCUTANEOUS at 14:33

## 2020-12-30 RX ADMIN — DOCUSATE SODIUM AND SENNOSIDES 1 TABLET: 8.6; 5 TABLET, FILM COATED ORAL at 08:39

## 2020-12-30 RX ADMIN — POTASSIUM CHLORIDE 40 MEQ: 1.5 POWDER, FOR SOLUTION ORAL at 08:38

## 2020-12-30 RX ADMIN — OXYCODONE HYDROCHLORIDE 5 MG: 5 TABLET ORAL at 13:56

## 2020-12-30 RX ADMIN — WARFARIN SODIUM 5 MG: 5 TABLET ORAL at 18:28

## 2020-12-30 RX ADMIN — HEPARIN SODIUM 5000 UNITS: 5000 INJECTION, SOLUTION INTRAVENOUS; SUBCUTANEOUS at 13:49

## 2020-12-30 RX ADMIN — ACETAMINOPHEN 975 MG: 325 TABLET, FILM COATED ORAL at 08:38

## 2020-12-30 ASSESSMENT — ACTIVITIES OF DAILY LIVING (ADL)
ADLS_ACUITY_SCORE: 14

## 2020-12-30 ASSESSMENT — MIFFLIN-ST. JEOR: SCORE: 1188

## 2020-12-30 NOTE — PHARMACY-ANTICOAGULATION SERVICE
Clinical Pharmacy - Warfarin Dosing Consult     Pharmacy has been consulted to manage this patient s warfarin therapy.  Indication: Other - specify in comments(Mechanical MVR + Mechanical AVR)  Therapy Goal: INR 2.5-3.5  Warfarin Prior to Admission: No  Significant drug interactions: ancef (prolonged INR), prophylactic heparin (increased bleed risk)  Recent documented change in oral intake/nutrition: Yes(Decreased oral intake)    INR   Date Value Ref Range Status   12/29/2020 1.25 (H) 0.86 - 1.14 Final   12/29/2020 1.52 (H) 0.86 - 1.14 Final       Recommend warfarin 5 mg today.  Pharmacy will monitor Rajani Guo daily and order warfarin doses to achieve specified goal.      Please contact pharmacy as soon as possible if the warfarin needs to be held for a procedure or if the warfarin goals change.

## 2020-12-30 NOTE — PROGRESS NOTES
CV ICU PROGRESS NOTE  December 30, 2020      CO-MORBIDITIES:   Mitral and aortic incompetence  Mitral and aortic incompetence  Severe Mitral Stenosis   Severe aortic stenosis    ASSESSMENT: Rajani Guo is a 70 year old female with PMH of hypertension, pulmonary hypertension, dyslipidemia, current tobacco use, h/o DVT, h/o arterial thrombus, diabetes, GERD, osteoporosis POD#1 from AVR. MVR with mechanical valves by Dr. Lara on 12/29.    TODAY'S PROGRESS:   - Transfer to the floor later this am if she remains hemodynamically stable off pressors and nicardipine  - Stop Nicardipine, MAP goal >65  - Restart home dose atorvastatin  - Resume home doses of buPROPion, gabapentin  - Start SQH  - Start coumadin tonight, INR goal 2.5-3.5  - continue ASA 81 mg daily   - Continue insulin drip today, plan to transition to sliding scale insulin tomorrow   - Replaced Mg and K+     PLAN:  Neuro/ pain/ sedation:  # Pain  # Sedation  - Monitor neurological status. Notify the MD for any acute changes in exam.  - Discontinue Fentanyl drip  - Scheduled tylenol; prn dilaudid, oxycodone   - BL erector spinae catheters - appreciate RAPS team's assistance  - Resume home doses of buPROPion, gabapentin     Pulmonary care:  # Tobacco dependence   - Extubated 12/29  - Titrate FiO2 for SpO2 >92%  - started nicoderm prior to admission, continue      Cardiovascular:  # HLD  # HTN  # Severe aortic stenosis and severe mitral stenosis- s/p AVR and MVR 12/29  # Pulmonary HTN  (PCWP with V-wave to 40 mmHg  - Monitor hemodynamic status.   - MAP >65  - Pressors: epi, vaso.   - Nicardipine gtt to keep systolic <120  - PTA atorvastatin, ASA  - PRN hydralazine  Chest tubes: Meds X2 with 230 ml of serosang out overnight, intermittent air leak. Keep to suction.   TPW: Capped.      GI/Nutrition:  # H/o GI bleed September 2020  # GERD  - Completed pill study through [facility] 10/2020 and was found to have 2 small AVMs that were not actively bleeding. Tx with PPI    - Advance diet   - Protonix     Fluids/ Electrolytes/Renal:   - TKO for IV fluid hydration.  - Urine output is adequate so far.  - Will continue to monitor intake and output.  - BMP in the AM     Endocrine:  # IDDM (A1c 5.7)  # Stress hyperglycemia  - Insulin gtt at 1-2 U/hr, will continue today and plan to transition to sliding scale insulin tomorrow when eating more as well   - Hold PTA metformin      ID/ Antibiotics:  - Complete perioperative antibiotics - vancomycin and cefazolin  - No other indication for antibiotics.      Heme:  # H/o DVT and arterial thrombus - eloquis stopped due to GIB  # Anemia     - Hgb goal >7  - CBC in the AM  - start coumadin tonight, no IV heparin for now     Prophylaxis:    - Mechanical prophylaxis for DVT.   - No chemical DVT prophylaxis due to high risk of bleeding.   - Protonix qd     Lines/ tubes/ drains:  - Remove MAC, Flasher, Art line and Barajas,   -  PIV x2  - Mediastinal chest tubes x2    Disposition:  - Transfer to the floor later today if she remains hemodynamically stable off pressors and nicardipine     Patient seen, findings and plan discussed with CV ICU staff, Dr. Tracy.    Crissy Trejo, DNP, CNP  Cardiovascular Thoracic Surgery   [facility] [phone]      ====================================    SUBJECTIVE:   Doing well this morning, tired  Having moderate amount of pain with coughing  No nausea or emesis, just starting to taking liquids this am. BS +  No sternum click or movement    OBJECTIVE:   1. VITAL SIGNS:   Temp:  [97.5  F (36.4  C)-100.4  F (38  C)] 99  F (37.2  C)  Pulse:  [57-81] 79  Resp:  [14-16] 16  BP: ()/(46-79) 113/54  MAP:  [42 mmHg-98 mmHg] 54 mmHg  Arterial Line BP: ()/(41-94) 56/52  FiO2 (%):  [40 %-50 %] 50 %  SpO2:  [94 %-100 %] 96 %  Ventilation Mode: (S) CPAP/PS  (Continuous positive airway pressure with Pressure Support)  FiO2 (%): 50 %  Rate Set (breaths/minute): 14 breaths/min  Tidal Volume Set (mL): 420 mL  PEEP (cm H2O): 5  cmH2O  Pressure Support (cm H2O): 7 cmH2O  Oxygen Concentration (%): 50 %  Resp: 16      2. INTAKE/ OUTPUT:   I/O last 3 completed shifts:  In: 4940.71 [P.O.:90; I.V.:2070.71; Other:280]  Out: 2755 [Urine:2295; Chest Tube:460]    3. PHYSICAL EXAMINATION:   General: Sleepy, but awakens easily and is alert. Sitting up in the chair. NAD.   Neuro: A&Ox3, CHOW, sleepy.   Resp: Breathing non-labored, diminished in the bases.   CV: RRR, no murmur or rub   Abdomen: Soft, Non-distended, Non-tender  Incisions: dressing WDI over sternal incision. Chest tube dressing WDI.   Extremities: warm and well perfused.     4. INVESTIGATIONS:   CXR 12/30/2020: Findings:   Intact sternotomy wires, aortic valve and mitral valve prosthetics.  Endotracheal tube has been removed. Right IJ pulmonary arterial  catheter tip in the central right pulmonary artery. Stable mediastinal  chest tubes. Cardiac silhouette appears stable, trace left pleural  effusion. No pneumothorax. No focal opacities.                                                                  Impression:   1. Stable postoperative appearance of the chest. Pocahontas-Louisa catheter  tip in the main pulmonary artery.    Arterial Blood Gases   Recent Labs   Lab 12/29/20 1920 12/29/20 1735 12/29/20  1437 12/29/20  1323   PH 7.37 7.35 7.31* 7.32*   PCO2 43 44 49* 48*   PO2 85 110* 91 291*   HCO3 25 24 24 25     Complete Blood Count   Recent Labs   Lab 12/30/20 0317 12/29/20 1920 12/29/20  1437 12/29/20  1323 12/29/20  1319   WBC 12.6* 14.1* 13.6*  --  12.5*   HGB 10.2* 10.2* 9.6* 8.9* 8.8*    183 168  --  152     Basic Metabolic Panel  Recent Labs   Lab 12/30/20 0317 12/29/20 1920 12/29/20  1735 12/29/20  1437 12/29/20  1323    142  --  146* 140   POTASSIUM 3.7 4.2 4.3 4.2 4.0   CHLORIDE 110* 112*  --  114*  --    CO2 27 27  --  24  --    BUN 9 11  --  9  --    CR 0.59 0.65  --  0.65  --    * 150*  --  134* 157*     Liver Function Tests  Recent Labs   Lab  12/30/20  0317 12/29/20 1920 12/29/20  1437 12/29/20  1319   AST 62* 59* 43  --    ALT 20 22 17  --    ALKPHOS 49 51 44  --    BILITOTAL 0.4 0.5 0.4  --    ALBUMIN 2.8* 3.0* 2.6*  --    INR  --  1.25* 1.52* 1.75*     Pancreatic Enzymes  No lab results found in last 7 days.  Coagulation Profile  Recent Labs   Lab 12/29/20 1920 12/29/20  1437 12/29/20  1319   INR 1.25* 1.52* 1.75*   PTT 34 33 38*         5. RADIOLOGY:   Recent Results (from the past 24 hour(s))   XR Chest Port 1 View    Narrative    Exam: XR CHEST PORT 1 VW, 12/29/2020 3:17 PM    Indication: s/p AVR and MVR and new OG tube    Comparison: 12/7/2020    Findings:   New sternotomy. A new aortic valve and mitral valve prosthesis.  Endotracheal tube in the mid thoracic trachea. Right IJ pulmonary  arterial catheter tip in the central right pulmonary artery. There are  2 mediastinal chest tubes. Tip and sidehole of the enteric tube  projects over the fundus of the stomach.    Heart within normal limits in size. Lungs are clear.      Impression    Impression:   1. Postop cardiac surgery with support devices as above.  2. Lungs are clear.   3. Normal-sized heart. No pulmonary edema.    KATELYN FALLON MD       =========================================

## 2020-12-30 NOTE — PROGRESS NOTES
CLINICAL NUTRITION SERVICES - BRIEF NOTE    Received provider consult for nutrition education with comments post op cardiovascular surgery (automatic consult on post-op order set). S/p AVR, MVR on 12/29. Nutrition education not indicated.    RD will follow per LOS protocol or if re-consulted.     Day España RD, LD  [id number]  Pgr: [phone]

## 2020-12-30 NOTE — PROGRESS NOTES
"6C PT Eval     12/30/20 1500   Quick Adds   Type of Visit Initial PT Evaluation   Living Environment   People in home child(suad), adult   Current Living Arrangements house   Home Accessibility stairs to enter home;stairs within home   Number of Stairs, Main Entrance other (see comments)  (patient lethargic and with difficulty recalling # of stairs)   Transportation Anticipated family or friend will provide   Living Environment Comments Lives in house with adult daughter who is home 24 hours (worked as  - was laid off because of COVID) patient mainly stays on 1st level of home   Self-Care   Usual Activity Tolerance moderate   Current Activity Tolerance poor   Equipment Currently Used at Home none   Activity/Exercise/Self-Care Comment Patient reports IND at baseline but also states that daughter is \"a great nurse\"   Disability/Function   Hearing Difficulty or Deaf no   Wear Glasses or Blind yes   Vision Management glasses   Concentrating, Remembering or Making Decisions Difficulty no   Difficulty Communicating no   Difficulty Eating/Swallowing no   Walking or Climbing Stairs Difficulty no   Dressing/Bathing Difficulty no   Toileting issues no   Doing Errands Independently Difficulty (such as shopping) no   Fall history within last six months no   Change in Functional Status Since Onset of Current Illness/Injury yes   General Information   Onset of Illness/Injury or Date of Surgery 12/29/20   Referring Physician Quinton Handy PA   Patient/Family Therapy Goals Statement (PT) To return home with daughter   Pertinent History of Current Problem (include personal factors and/or comorbidities that impact the POC) Per EMR \" Rajani Guo is a 70 year old female with PMH of hypertension, pulmonary hypertension, dyslipidemia, current tobacco use, h/o DVT, h/o arterial thrombus, diabetes, GERD, osteoporosis POD#1 from AVR. MVR with mechanical valves by Dr. Lara on 12/29.\"   Existing Precautions/Restrictions " sternal   General Observations activity: progression to ambulate with assist   Cognition   Affect/Mental Status (Cognition) low arousal/lethargic   Follows Commands (Cognition) delayed response/completion   Cognitive Status Comments poor command following due to extreme lethargy    Pain Assessment   Patient Currently in Pain No   Posture    Posture Forward head position;Protracted shoulders   Range of Motion (ROM)   ROM Quick Adds ROM WFL   Strength   Manual Muscle Testing Quick Adds Strength WFL   Bed Mobility   Bed Mobility supine-sit;sit-supine   Supine-Sit [place] (Bed Mobility) moderate assist (50% patient effort)   Sit-Supine [place] (Bed Mobility) moderate assist (50% patient effort)   Bed Mobility Limitations decreased ability to use arms for pushing/pulling;impaired ability to control trunk for mobility   Impairments Contributing to Impaired Bed Mobility decreased strength   Transfers   Transfers sit-stand transfer   Impairments Contributing to Impaired Transfers impaired coordination;decreased strength   Sit-Stand Transfer   Sit-Stand [place] (Transfers) minimum assist (75% patient effort)   Assistive Device (Sit-Stand Transfers) other (see comments)  (PT anterior assist)   Balance   Balance Comments posterior lean in sitting, min A to correct   Clinical Impression   Criteria for Skilled Therapeutic Intervention yes, treatment indicated   PT Diagnosis (PT) impaired functional mobility   Influenced by the following impairments decreased functional strength, impaired balance, orthostasis   Functional limitations due to impairments bed mobility, transfers, gait, stairs   Clinical Presentation Stable/Uncomplicated   Clinical Presentation Rationale clinical judgement   Clinical Decision Making (Complexity) low complexity   Therapy Frequency (PT) 6x/week   Predicted Duration of Therapy Intervention (days/wks) 2 weeks   Planned Therapy Interventions (PT) balance training;bed mobility training;gait  training;ROM (range of motion);stair training;strengthening;transfer training   Anticipated Equipment Needs at Discharge (PT) walker, rolling   Risk & Benefits of therapy have been explained evaluation/treatment results reviewed;care plan/treatment goals reviewed;patient   PT Discharge Planning    PT Discharge Recommendation (DC Rec) home with assist;Transitional Care Facility   PT Rationale for DC Rec Discharge rec unclear at this time as patient limited by orthostasis. Anticipate patient will progress well in house and become appropriate for eventual d/c home with support from daughter.   PT Brief overview of current status  Ax1 for bed mobility and stand pivot transfer with FWW   Total Evaluation Time   Total Evaluation Time (Minutes) 8       Paul Ruelas PT, DPT  Pager #[phone]

## 2020-12-30 NOTE — PROGRESS NOTES
"Regional Anesthesia Pain Service Nerve Block Daily Progress Note  12/30/20      Rajani Guo is a 70 year old female with mitral and arotic incompetence and severe stenosis who is now POD #1 s/p AVR and MVR with mechanical valves; bilateral T5 erector spinae nerve block catheters placed on 12/29/20 for analgesia    Subjective    24 hour Interval History  - No acute events overnight.   - Patient sitting up in chair at bedside and reports pain \"all over\".  Received local anesthetic bolus at 0630 today. Denies LE weakness, paresthesias, circumoral numbness, metallic taste, tinnitus  - Drains: 2 mediastinal chest tubes in place      Antithrombotic or Thrombolytic Therapy ordered:   heparin ANTICOAGULANT injection 5,000 Units, SC, Q8H       There is a plan to start warfarin this evening    Analgesic Medications ordered:  Medications related to Pain Management (From now, onward)    Start     Dose/Rate Route Frequency Ordered Stop    01/01/21 0000  acetaminophen (TYLENOL) tablet 650 mg      650 mg Oral EVERY 4 HOURS PRN 12/29/20 1500      12/30/20 0800  aspirin (ASA) chewable tablet 81 mg      81 mg Oral DAILY 12/29/20 1500      12/30/20 0800  senna-docusate (SENOKOT-S/PERICOLACE) 8.6-50 MG per tablet 1 tablet      1 tablet Oral 2 TIMES DAILY 12/29/20 1500      12/30/20 0800  senna-docusate (SENOKOT-S/PERICOLACE) 8.6-50 MG per tablet 2 tablet      2 tablet Oral 2 TIMES DAILY 12/29/20 1500      12/30/20 0800  polyethylene glycol (MIRALAX) Packet 17 g      17 g Oral DAILY 12/29/20 1500      12/29/20 1530  acetaminophen (TYLENOL) tablet 975 mg      975 mg Oral EVERY 8 HOURS 12/29/20 1500 01/01/21 1529    12/29/20 1530  gabapentin (NEURONTIN) capsule 100 mg      100 mg Oral 3 TIMES DAILY 12/29/20 1500 01/01/21 1359    12/29/20 1500  oxyCODONE (ROXICODONE) tablet 5-10 mg      5-10 mg Oral EVERY 4 HOURS PRN 12/29/20 1500      12/29/20 1500  HYDROmorphone (PF) (DILAUDID) injection 0.3-0.5 mg      0.3-0.5 mg Intravenous EVERY 2 " "HOURS PRN 12/29/20 1500      12/29/20 1500  lidocaine 1 % 0.1-1 mL      0.1-1 mL Other EVERY 1 HOUR PRN 12/29/20 1500      12/29/20 1500  lidocaine (LMX4) cream       Topical EVERY 1 HOUR PRN 12/29/20 1500      12/29/20 0900  ropivacaine 0.2% (NAROPIN) 750 mL in ON-Q C-Bloc select flow ([id number] holds 600-750 mL) dual cath disposable pump      14 mL/hr  Irrigation CONTINUOUS 12/29/20 0856              Objective  Exam  Vitals:   /67   Pulse 80   Temp 99  F (37.2  C) (Pulmonary Artery)   Resp 16   Ht 1.626 m (5' 4\")   Wt 67.8 kg (149 lb 7.6 oz)   SpO2 97%   BMI 25.66 kg/m        General: Alert, oriented, NAD  MSK/Neuro: Normal Strength and overall symmetric.    Skin: bilateral erector spinae (ES) catheter sites with dressing c/d/i, no tenderness, erythema, heme, edema       Labs/Diagnostics  Heme/INR:  Recent Labs   Lab Test 12/30/20  0317 12/29/20  1920   WBC 12.6* 14.1*   RBC 3.65* 3.76*   HGB 10.2* 10.2*   HCT 32.7* 33.8*   MCV 90 90   MCH 27.9 27.1   MCHC 31.2* 30.2*   RDW 18.7* 18.7*    183       Lab Results   Component Value Date    INR 1.25 12/29/2020    INR 1.52 12/29/2020    INR 1.75 12/29/2020          Assessment/Plan  ASSESSMENT  POD #1 s/p AVR and MVR with mechanical valves; bilateral T5 erector spinae nerve block catheters placed on 12/29/20 for analgesia. CVICU Service plans to start warfarin    Pain well controlled, tolerating local anesthetic nerve block infusion and oral analgesia.  No evidence of adverse side effects related to local anesthetic continuous infusion. Patient is meeting activity goals.         PLAN  - Erector spinae nerve block infusion: continue ROpivacaine 0.2% infusion at 14mL/hr, 7 mL/hr each catheter, plan to remove catheters tomorrow AM since patient will begin warfarin tonight    patient can be evaluated to receive local anesthetic bolus Q 12 hr PRN pain control.  Bedside RN must page RAPS to request bolus    - Anticoagulation: okay to continue Heparin " subcutaneous and start warfarin as ordered. Please contact RAPS jobcode pager [phone] before ordering or making any medication changes    -  Follow up: RAPS will continue to follow and adjust as needed    Discussed with attending anesthesiologist     --  FRANCESCA Martinez [facility]  Regional Anesthesia Pain Service  12/30/2020 11:41 AM    RAPS Contact Info (24 hour job code pager is the last 4 digits) For in-house use only:   Job code ID: [email or web address] [id number]   [place] [id number]  Peds [id number]  Access Point phone: dial * [phone], enter jobcode ID, then enter call-back number.    Text: Use [employer] on the Intranet <Paging/Directory> tab and enter Jobcode ID.   If no call back at any time, contact the hospital  and ask for RAPS attending or backup

## 2020-12-30 NOTE — CONSULTS
Care Management Initial Consult    General Information  Assessment completed with: Patient,    Type of CM/SW Visit: Initial Assessment    Primary Care Provider verified and updated as needed: Yes   Readmission within the last 30 days: no previous admission in last 30 days         Communication Assessment  Patient's communication style: spoken language English   Hearing Difficulty or Deaf: no   Wear Glasses: yes    Cognitive  Cognitive/Neuro/Behavioral: .WDL except  Level of Consciousness: lethargic, Orientation: oriented x 4  Mood/Behavior: calm, behavior appropriate to situation ,Speech: spontaneous, slow, clear    Living Environment:   People in home: child(suad), adult  Dtr- Ailin      Able to return to prior arrangements:  TBD     Family/Social Support:  Care provided by: self  Provides care for: no one       Description of Support System: Supportive, Involved.    Adequate family and caregiver support    Current Resources:   Skilled Home Care Services:  None  Community Resources: None  Equipment currently used at home: none  Supplies currently used at home: None     Financial Concerns: No concerns identified   Lifestyle & Psychosocial Needs:        Socioeconomic History     Marital status:      Spouse name: Not on file     Number of children: 1     Years of education: Not on file     Highest education level: Not on file     Tobacco Use     Smoking status: Current Every Day Smoker     Packs/day: 1.00     Years: 52.00     Pack years: 52.00     Smokeless tobacco: Never Used     Tobacco comment: using with patches   Substance and Sexual Activity     Alcohol use: Yes     Comment: 2 glasses of wine a week     Drug use: Yes     Types: Marijuana     Comment: occasional     Sexual activity: Never       Additional Information:  Pt is s/p AVR, MVR on 12/29 and extubated the same day.  RNCC visited pt to introduce RNCC role and complete assessment.  Per discussion with pt, pt lives with dtr.  Pt was ind. with mariangel  and mobility prior hospitalization.  Discharge needs are not known at this time.  Awaiting PT/OT eval.  RNCC will cont to follow plan of care.

## 2020-12-30 NOTE — PROGRESS NOTES
"REGIONAL ANESTHESIA PAIN SERVICE (RAPS) EVALUATION:  - Time: 06:30 am.  Called to evaluate patient for pain  - Evaluation: Patient reports pain intensity with current therapy 6/10 at rest and 8/10 with activity  General: healthy, alert and no distress  Catheter system integrity: intact  Skin: dressing clean, dry and intact   .  Current vitals:   Vital signs:  Temp: 38  C (100.4  F) Temp src: Pulmonary Artery BP: 120/64 Pulse: 81   Resp: 16 SpO2: 94 % O2 Device: Nasal cannula Oxygen Delivery: 2 LPM Height: 162.6 cm (5' 4\") Weight: 67.8 kg (149 lb 7.6 oz)  Estimated body mass index is 25.66 kg/m  as calculated from the following:    Height as of this encounter: 1.626 m (5' 4\").    Weight as of this encounter: 67.8 kg (149 lb 7.6 oz).        - Assessment/Plan    PAIN: moderately controlled     INTERVENTION:   12 ml Bolus administered    MEDICATION: PF bupivacaine 0.25 %, total bolus 12 mL, 6 mL via each catheter    PROCEDURE: Clinician bolus via erector spinae nerve block catheter; administered without complication with negative aspirate before and between each mL.    No symptoms of local anesthetic systemic toxicity (LAST). Remained with and assessed patient for 30 min post-injection. BP, P and MAP stable    POST-PROCEDURE: Bedside RN aware of need to continue BP, P and MAP monitoring Q 10 min for an additional 30 min. Contact RAPS (jobcode ID ) if any of the following: patient experiencing any untoward effects, SBP< 90, P < 50 or > 120, MAP < 60       - patient can be evaluated to receive local anesthetic bolus Q 12 hr PRN pain not controlled with continuous infusion.  Bedside nurse must page RAPS to request bolus    Sarah Hernandez MD  [facility] anesthesia resident    RAPS Contact Info (24 hour job code pager is the last 4 digits) For in-house use only:  [employer] phone: [place] [phone], [place] [phone], [facility] [phone], then enter call-back number.    Text: Use [employer] on the Intranet <Paging/Directory> tab and enter " Jobcode ID.   If no call back at any time, contact the hospital  and ask for RAPS attending or backup

## 2020-12-30 NOTE — PROGRESS NOTES
[phone]    Text page sent to Emilia DORANTES- [phone] PT blood sugar 64, she just got transferred from , pt is lethargic, hypoglycemia protocol followed. please call RN [phone]  Emilia DORANTES called back RN, stated to keep follow the protocol and hold insulin for an hour. Will continue to monitor.

## 2020-12-30 NOTE — PROGRESS NOTES
Transfer     12/30/2020     Transferred to/from:4E  Via: Bed  Reason for transfer: appropriate for care  Family: Aware of transfer  Belongings: Sent with pt  Chart: Sent with pt  Medications: Meds from bin sent with pt  Report called to: Lesley SERNA

## 2020-12-30 NOTE — PLAN OF CARE
OT-4E: OT holding at this time per interdisciplinary discussion. Pt may only have needs for one therapy discipline. Will reschedule and initiate as appropriate.

## 2020-12-30 NOTE — PLAN OF CARE
Major shift events: Patient oriented x4 and following commands but remains drowsy. PERRLA 2mm. SR 70-80 w/ PACs. Temp V pacer wires in place and box off. Nicardipine on and off throughout the night to maintain SBP < 120. Hemodynamics q4h. CVP 7, PAP 36/16, SvO2 58%, CI 2.2, SVR 1477. X2 mediastinal CTs with 20-30 ml/hr sang output and intermittent + small leak. Breathing comfortably on 2L NC. Barajas in place with adequate output. No BM. Passed bedside swallow. Advance diet as tolerated. Negligible pain throughout the night with breakthrough pain this morning. Anesthesia bolused ropivacaine at bedside and PRNs given. Arterial line occluded, MD aware, and to be re-lined or removed today. BG controlled on insulin gtt. Plan to wean nicardipine, maintain SBP < 120, and control pain. Update team with further changes. See flowsheet for details and assessment.

## 2020-12-30 NOTE — PROGRESS NOTES
[facility], [place]  Procedure Note          Extubation:       Rajani Guo  MRN# [id number]   December 29, 2020, 6:15 PM         Patient extubated at: December 29, 2020, 6:15 PM   Supplemental Oxygen: Via nasal cannula at 4 liters per minute   Cough: The cough is strong   Secretion Mode: Able to clear   Secretion Amount: Moderate amount, thick and clear in color   Respiratory Exam:: Breath sounds: equal and clear and good aeration     Location: bilaterally   Skin Exam:: Patient color: natural   Patient Status: Currently appears comfortable   Arterial Blood Gasses: pH Arterial (pH)   Date Value   12/29/2020 7.35     pO2 Arterial (mm Hg)   Date Value   12/29/2020 110 (H)     pCO2 Arterial (mm Hg)   Date Value   12/29/2020 44     Bicarbonate Arterial (mmol/L)   Date Value   12/29/2020 24            Recorded by Sana Fermin

## 2020-12-30 NOTE — PROGRESS NOTES
"Regional Anesthesia Pain Service Nerve Block Daily Progress Note  12/30/20      Rajani Guo is a 70 year old female with mitral and arotic incompetence and severe stenosis who is now POD #1 s/p AVR and MVR with mechanical valves; bilateral T5 erector spinae nerve block catheters placed on 12/29/20 for analgesia    Subjective    24 hour Interval History  - No acute events overnight.   - Patient sitting up in chair at bedside and reports pain \"all over\".  Received local anesthetic bolus at 0630 today. Denies LE weakness, paresthesias, circumoral numbness, metallic taste, tinnitus  - Drains: 2 mediastinal chest tubes in place      Antithrombotic or Thrombolytic Therapy ordered:   heparin ANTICOAGULANT injection 5,000 Units, SC, Q8H       There is a plan to start warfarin this evening    Analgesic Medications ordered:  Medications related to Pain Management (From now, onward)    Start     Dose/Rate Route Frequency Ordered Stop    01/01/21 0000  acetaminophen (TYLENOL) tablet 650 mg      650 mg Oral EVERY 4 HOURS PRN 12/29/20 1500      12/30/20 0800  aspirin (ASA) chewable tablet 81 mg      81 mg Oral DAILY 12/29/20 1500      12/30/20 0800  senna-docusate (SENOKOT-S/PERICOLACE) 8.6-50 MG per tablet 1 tablet      1 tablet Oral 2 TIMES DAILY 12/29/20 1500      12/30/20 0800  senna-docusate (SENOKOT-S/PERICOLACE) 8.6-50 MG per tablet 2 tablet      2 tablet Oral 2 TIMES DAILY 12/29/20 1500      12/30/20 0800  polyethylene glycol (MIRALAX) Packet 17 g      17 g Oral DAILY 12/29/20 1500      12/29/20 1530  acetaminophen (TYLENOL) tablet 975 mg      975 mg Oral EVERY 8 HOURS 12/29/20 1500 01/01/21 1529    12/29/20 1530  gabapentin (NEURONTIN) capsule 100 mg      100 mg Oral 3 TIMES DAILY 12/29/20 1500 01/01/21 1359    12/29/20 1500  oxyCODONE (ROXICODONE) tablet 5-10 mg      5-10 mg Oral EVERY 4 HOURS PRN 12/29/20 1500      12/29/20 1500  HYDROmorphone (PF) (DILAUDID) injection 0.3-0.5 mg      0.3-0.5 mg Intravenous EVERY 2 " "HOURS PRN 12/29/20 1500      12/29/20 1500  lidocaine 1 % 0.1-1 mL      0.1-1 mL Other EVERY 1 HOUR PRN 12/29/20 1500      12/29/20 1500  lidocaine (LMX4) cream       Topical EVERY 1 HOUR PRN 12/29/20 1500      12/29/20 0900  ropivacaine 0.2% (NAROPIN) 750 mL in ON-Q C-Bloc select flow ([id number] holds 600-750 mL) dual cath disposable pump      14 mL/hr  Irrigation CONTINUOUS 12/29/20 0856              Objective  Exam  Vitals:   /67   Pulse 80   Temp 99  F (37.2  C) (Pulmonary Artery)   Resp 16   Ht 1.626 m (5' 4\")   Wt 67.8 kg (149 lb 7.6 oz)   SpO2 97%   BMI 25.66 kg/m        General: Alert, oriented, NAD  MSK/Neuro: Normal Strength and overall symmetric.    Skin: bilateral erector spinae (ES) catheter sites with dressing c/d/i, no tenderness, erythema, heme, edema       Labs/Diagnostics  Heme/INR:  Recent Labs   Lab Test 12/30/20  0317 12/29/20  1920   WBC 12.6* 14.1*   RBC 3.65* 3.76*   HGB 10.2* 10.2*   HCT 32.7* 33.8*   MCV 90 90   MCH 27.9 27.1   MCHC 31.2* 30.2*   RDW 18.7* 18.7*    183       Lab Results   Component Value Date    INR 1.25 12/29/2020    INR 1.52 12/29/2020    INR 1.75 12/29/2020          Assessment/Plan  ASSESSMENT  POD #1 s/p AVR and MVR with mechanical valves; bilateral T5 erector spinae nerve block catheters placed on 12/29/20 for analgesia. CVICU Service plans to start warfarin    Pain well controlled, tolerating local anesthetic nerve block infusion and oral analgesia.  No evidence of adverse side effects related to local anesthetic continuous infusion. Patient is meeting activity goals.         PLAN  - Erector spinae nerve block infusion: continue ROpivacaine 0.2% infusion at 14mL/hr, 7 mL/hr each catheter, plan to remove catheters tomorrow AM since patient will begin warfarin tonight    patient can be evaluated to receive local anesthetic bolus Q 12 hr PRN pain control.  Bedside RN must page RAPS to request bolus    - Anticoagulation: okay to continue Heparin " subcutaneous and start warfarin as ordered. Please contact RAPS jobcode pager [phone] before ordering or making any medication changes    -  Follow up: RAPS will continue to follow and adjust as needed    Bin Lozano MD    Department of Anesthesiology  Pain Management Division

## 2020-12-31 ENCOUNTER — APPOINTMENT (OUTPATIENT)
Dept: GENERAL RADIOLOGY | Facility: CLINIC | Age: 70
DRG: 219 | End: 2020-12-31
Attending: NURSE PRACTITIONER
Payer: COMMERCIAL

## 2020-12-31 ENCOUNTER — APPOINTMENT (OUTPATIENT)
Dept: CARDIOLOGY | Facility: CLINIC | Age: 70
DRG: 219 | End: 2020-12-31
Attending: PHYSICIAN ASSISTANT
Payer: COMMERCIAL

## 2020-12-31 ENCOUNTER — APPOINTMENT (OUTPATIENT)
Dept: PHYSICAL THERAPY | Facility: CLINIC | Age: 70
DRG: 219 | End: 2020-12-31
Attending: SURGERY
Payer: COMMERCIAL

## 2020-12-31 ENCOUNTER — APPOINTMENT (OUTPATIENT)
Dept: GENERAL RADIOLOGY | Facility: CLINIC | Age: 70
DRG: 219 | End: 2020-12-31
Attending: STUDENT IN AN ORGANIZED HEALTH CARE EDUCATION/TRAINING PROGRAM
Payer: COMMERCIAL

## 2020-12-31 LAB
ANION GAP SERPL CALCULATED.3IONS-SCNC: 6 MMOL/L (ref 3–14)
ANION GAP SERPL CALCULATED.3IONS-SCNC: 7 MMOL/L (ref 3–14)
BUN SERPL-MCNC: 10 MG/DL (ref 7–30)
BUN SERPL-MCNC: 15 MG/DL (ref 7–30)
CALCIUM SERPL-MCNC: 8.3 MG/DL (ref 8.5–10.1)
CALCIUM SERPL-MCNC: 8.3 MG/DL (ref 8.5–10.1)
CHLORIDE SERPL-SCNC: 107 MMOL/L (ref 94–109)
CHLORIDE SERPL-SCNC: 107 MMOL/L (ref 94–109)
CO2 SERPL-SCNC: 24 MMOL/L (ref 20–32)
CO2 SERPL-SCNC: 25 MMOL/L (ref 20–32)
COPATH REPORT: NORMAL
CREAT SERPL-MCNC: 0.66 MG/DL (ref 0.52–1.04)
CREAT SERPL-MCNC: 0.68 MG/DL (ref 0.52–1.04)
ERYTHROCYTE [DISTWIDTH] IN BLOOD BY AUTOMATED COUNT: 18.6 % (ref 10–15)
ERYTHROCYTE [DISTWIDTH] IN BLOOD BY AUTOMATED COUNT: 18.7 % (ref 10–15)
GFR SERPL CREATININE-BSD FRML MDRD: 89 ML/MIN/{1.73_M2}
GFR SERPL CREATININE-BSD FRML MDRD: >90 ML/MIN/{1.73_M2}
GLUCOSE BLDC GLUCOMTR-MCNC: 125 MG/DL (ref 70–99)
GLUCOSE BLDC GLUCOMTR-MCNC: 126 MG/DL (ref 70–99)
GLUCOSE BLDC GLUCOMTR-MCNC: 139 MG/DL (ref 70–99)
GLUCOSE BLDC GLUCOMTR-MCNC: 142 MG/DL (ref 70–99)
GLUCOSE BLDC GLUCOMTR-MCNC: 148 MG/DL (ref 70–99)
GLUCOSE BLDC GLUCOMTR-MCNC: 149 MG/DL (ref 70–99)
GLUCOSE BLDC GLUCOMTR-MCNC: 154 MG/DL (ref 70–99)
GLUCOSE BLDC GLUCOMTR-MCNC: 156 MG/DL (ref 70–99)
GLUCOSE BLDC GLUCOMTR-MCNC: 198 MG/DL (ref 70–99)
GLUCOSE BLDC GLUCOMTR-MCNC: 216 MG/DL (ref 70–99)
GLUCOSE SERPL-MCNC: 135 MG/DL (ref 70–99)
GLUCOSE SERPL-MCNC: 193 MG/DL (ref 70–99)
HCT VFR BLD AUTO: 30.4 % (ref 35–47)
HCT VFR BLD AUTO: 32.1 % (ref 35–47)
HGB BLD-MCNC: 10 G/DL (ref 11.7–15.7)
HGB BLD-MCNC: 9.4 G/DL (ref 11.7–15.7)
INR PPP: 1.59 (ref 0.86–1.14)
INR PPP: 1.85 (ref 0.86–1.14)
INTERPRETATION ECG - MUSE: NORMAL
INTERPRETATION ECG - MUSE: NORMAL
LACTATE BLD-SCNC: 1.9 MMOL/L (ref 0.7–2)
MAGNESIUM SERPL-MCNC: 1.7 MG/DL (ref 1.6–2.3)
MAGNESIUM SERPL-MCNC: 1.8 MG/DL (ref 1.6–2.3)
MCH RBC QN AUTO: 27.4 PG (ref 26.5–33)
MCH RBC QN AUTO: 28.1 PG (ref 26.5–33)
MCHC RBC AUTO-ENTMCNC: 30.9 G/DL (ref 31.5–36.5)
MCHC RBC AUTO-ENTMCNC: 31.2 G/DL (ref 31.5–36.5)
MCV RBC AUTO: 89 FL (ref 78–100)
MCV RBC AUTO: 90 FL (ref 78–100)
PHOSPHATE SERPL-MCNC: 3.6 MG/DL (ref 2.5–4.5)
PLATELET # BLD AUTO: 150 10E9/L (ref 150–450)
PLATELET # BLD AUTO: 183 10E9/L (ref 150–450)
POTASSIUM SERPL-SCNC: 4 MMOL/L (ref 3.4–5.3)
POTASSIUM SERPL-SCNC: 4.1 MMOL/L (ref 3.4–5.3)
RADIOLOGIST FLAGS: ABNORMAL
RBC # BLD AUTO: 3.43 10E12/L (ref 3.8–5.2)
RBC # BLD AUTO: 3.56 10E12/L (ref 3.8–5.2)
SODIUM SERPL-SCNC: 137 MMOL/L (ref 133–144)
SODIUM SERPL-SCNC: 139 MMOL/L (ref 133–144)
TROPONIN I SERPL-MCNC: 1.98 UG/L (ref 0–0.04)
WBC # BLD AUTO: 12.4 10E9/L (ref 4–11)
WBC # BLD AUTO: 13.8 10E9/L (ref 4–11)

## 2020-12-31 PROCEDURE — 255N000002 HC RX 255 OP 636: Performed by: INTERNAL MEDICINE

## 2020-12-31 PROCEDURE — 250N000013 HC RX MED GY IP 250 OP 250 PS 637: Performed by: NURSE PRACTITIONER

## 2020-12-31 PROCEDURE — 250N000011 HC RX IP 250 OP 636: Performed by: STUDENT IN AN ORGANIZED HEALTH CARE EDUCATION/TRAINING PROGRAM

## 2020-12-31 PROCEDURE — 85610 PROTHROMBIN TIME: CPT | Performed by: STUDENT IN AN ORGANIZED HEALTH CARE EDUCATION/TRAINING PROGRAM

## 2020-12-31 PROCEDURE — 36415 COLL VENOUS BLD VENIPUNCTURE: CPT | Performed by: SURGERY

## 2020-12-31 PROCEDURE — 250N000012 HC RX MED GY IP 250 OP 636 PS 637: Performed by: PHYSICIAN ASSISTANT

## 2020-12-31 PROCEDURE — 85027 COMPLETE CBC AUTOMATED: CPT | Performed by: STUDENT IN AN ORGANIZED HEALTH CARE EDUCATION/TRAINING PROGRAM

## 2020-12-31 PROCEDURE — 999N000208 ECHOCARDIOGRAM COMPLETE

## 2020-12-31 PROCEDURE — 80048 BASIC METABOLIC PNL TOTAL CA: CPT | Performed by: SURGERY

## 2020-12-31 PROCEDURE — 999N000065 XR CHEST PORT 1 VW

## 2020-12-31 PROCEDURE — 93306 TTE W/DOPPLER COMPLETE: CPT | Mod: 26 | Performed by: INTERNAL MEDICINE

## 2020-12-31 PROCEDURE — 93010 ELECTROCARDIOGRAM REPORT: CPT | Performed by: INTERNAL MEDICINE

## 2020-12-31 PROCEDURE — 93005 ELECTROCARDIOGRAM TRACING: CPT

## 2020-12-31 PROCEDURE — 83605 ASSAY OF LACTIC ACID: CPT | Performed by: SURGERY

## 2020-12-31 PROCEDURE — 80048 BASIC METABOLIC PNL TOTAL CA: CPT | Performed by: PHYSICIAN ASSISTANT

## 2020-12-31 PROCEDURE — 250N000011 HC RX IP 250 OP 636: Performed by: PHYSICIAN ASSISTANT

## 2020-12-31 PROCEDURE — 83735 ASSAY OF MAGNESIUM: CPT | Performed by: PHYSICIAN ASSISTANT

## 2020-12-31 PROCEDURE — 999N001017 HC STATISTIC GLUCOSE BY METER IP

## 2020-12-31 PROCEDURE — 83735 ASSAY OF MAGNESIUM: CPT | Performed by: SURGERY

## 2020-12-31 PROCEDURE — 85027 COMPLETE CBC AUTOMATED: CPT | Performed by: PHYSICIAN ASSISTANT

## 2020-12-31 PROCEDURE — 250N000013 HC RX MED GY IP 250 OP 250 PS 637: Performed by: PHYSICIAN ASSISTANT

## 2020-12-31 PROCEDURE — 84484 ASSAY OF TROPONIN QUANT: CPT | Performed by: SURGERY

## 2020-12-31 PROCEDURE — 71045 X-RAY EXAM CHEST 1 VIEW: CPT | Mod: 26 | Performed by: RADIOLOGY

## 2020-12-31 PROCEDURE — 250N000009 HC RX 250: Performed by: PHYSICIAN ASSISTANT

## 2020-12-31 PROCEDURE — 36415 COLL VENOUS BLD VENIPUNCTURE: CPT | Performed by: STUDENT IN AN ORGANIZED HEALTH CARE EDUCATION/TRAINING PROGRAM

## 2020-12-31 PROCEDURE — 258N000003 HC RX IP 258 OP 636: Performed by: STUDENT IN AN ORGANIZED HEALTH CARE EDUCATION/TRAINING PROGRAM

## 2020-12-31 PROCEDURE — 250N000013 HC RX MED GY IP 250 OP 250 PS 637: Performed by: STUDENT IN AN ORGANIZED HEALTH CARE EDUCATION/TRAINING PROGRAM

## 2020-12-31 PROCEDURE — 200N000002 HC R&B ICU UMMC

## 2020-12-31 PROCEDURE — 71045 X-RAY EXAM CHEST 1 VIEW: CPT | Mod: 26

## 2020-12-31 PROCEDURE — 97530 THERAPEUTIC ACTIVITIES: CPT | Mod: GP

## 2020-12-31 PROCEDURE — 250N000013 HC RX MED GY IP 250 OP 250 PS 637: Performed by: SURGERY

## 2020-12-31 PROCEDURE — 71045 X-RAY EXAM CHEST 1 VIEW: CPT

## 2020-12-31 PROCEDURE — 84100 ASSAY OF PHOSPHORUS: CPT | Performed by: PHYSICIAN ASSISTANT

## 2020-12-31 RX ORDER — WARFARIN SODIUM 5 MG/1
5 TABLET ORAL
Status: COMPLETED | OUTPATIENT
Start: 2020-12-31 | End: 2020-12-31

## 2020-12-31 RX ORDER — NICOTINE POLACRILEX 4 MG
15-30 LOZENGE BUCCAL
Status: DISCONTINUED | OUTPATIENT
Start: 2020-12-31 | End: 2020-12-31 | Stop reason: CLARIF

## 2020-12-31 RX ORDER — HEPARIN SODIUM 5000 [USP'U]/.5ML
5000 INJECTION, SOLUTION INTRAVENOUS; SUBCUTANEOUS EVERY 8 HOURS
Status: DISCONTINUED | OUTPATIENT
Start: 2020-12-31 | End: 2021-01-03

## 2020-12-31 RX ORDER — FUROSEMIDE 20 MG
20 TABLET ORAL ONCE
Status: COMPLETED | OUTPATIENT
Start: 2020-12-31 | End: 2020-12-31

## 2020-12-31 RX ORDER — MAGNESIUM SULFATE HEPTAHYDRATE 40 MG/ML
2 INJECTION, SOLUTION INTRAVENOUS ONCE
Status: COMPLETED | OUTPATIENT
Start: 2020-12-31 | End: 2020-12-31

## 2020-12-31 RX ORDER — DEXTROSE MONOHYDRATE 25 G/50ML
25-50 INJECTION, SOLUTION INTRAVENOUS
Status: DISCONTINUED | OUTPATIENT
Start: 2020-12-31 | End: 2020-12-31 | Stop reason: CLARIF

## 2020-12-31 RX ORDER — LANOLIN ALCOHOL/MO/W.PET/CERES
3 CREAM (GRAM) TOPICAL AT BEDTIME
Status: DISCONTINUED | OUTPATIENT
Start: 2020-12-31 | End: 2021-01-04

## 2020-12-31 RX ADMIN — Medication 12.5 MG: at 19:52

## 2020-12-31 RX ADMIN — INSULIN ASPART 2 UNITS: 100 INJECTION, SOLUTION INTRAVENOUS; SUBCUTANEOUS at 15:36

## 2020-12-31 RX ADMIN — Medication 12.5 MG: at 08:41

## 2020-12-31 RX ADMIN — DOCUSATE SODIUM AND SENNOSIDES 2 TABLET: 8.6; 5 TABLET, FILM COATED ORAL at 08:41

## 2020-12-31 RX ADMIN — HYDROMORPHONE HYDROCHLORIDE 0.5 MG: 1 INJECTION, SOLUTION INTRAMUSCULAR; INTRAVENOUS; SUBCUTANEOUS at 04:47

## 2020-12-31 RX ADMIN — BUPROPION HYDROCHLORIDE 100 MG: 100 TABLET, FILM COATED ORAL at 19:52

## 2020-12-31 RX ADMIN — MAGNESIUM SULFATE IN WATER 2 G: 40 INJECTION, SOLUTION INTRAVENOUS at 10:06

## 2020-12-31 RX ADMIN — INSULIN ASPART 1 UNITS: 100 INJECTION, SOLUTION INTRAVENOUS; SUBCUTANEOUS at 19:24

## 2020-12-31 RX ADMIN — BUPROPION HYDROCHLORIDE 100 MG: 100 TABLET, FILM COATED ORAL at 08:41

## 2020-12-31 RX ADMIN — MAGNESIUM SULFATE IN WATER 2 G: 40 INJECTION, SOLUTION INTRAVENOUS at 23:41

## 2020-12-31 RX ADMIN — INSULIN ASPART 2 UNITS: 100 INJECTION, SOLUTION INTRAVENOUS; SUBCUTANEOUS at 21:45

## 2020-12-31 RX ADMIN — WARFARIN SODIUM 5 MG: 5 TABLET ORAL at 19:23

## 2020-12-31 RX ADMIN — DOCUSATE SODIUM AND SENNOSIDES 2 TABLET: 8.6; 5 TABLET, FILM COATED ORAL at 19:52

## 2020-12-31 RX ADMIN — ACETAMINOPHEN 975 MG: 325 TABLET, FILM COATED ORAL at 08:40

## 2020-12-31 RX ADMIN — OXYCODONE HYDROCHLORIDE 5 MG: 5 TABLET ORAL at 19:52

## 2020-12-31 RX ADMIN — OXYCODONE HYDROCHLORIDE 5 MG: 5 TABLET ORAL at 21:45

## 2020-12-31 RX ADMIN — HUMAN ALBUMIN MICROSPHERES AND PERFLUTREN 5 ML: 10; .22 INJECTION, SOLUTION INTRAVENOUS at 11:30

## 2020-12-31 RX ADMIN — ACETAMINOPHEN 975 MG: 325 TABLET, FILM COATED ORAL at 15:33

## 2020-12-31 RX ADMIN — FUROSEMIDE 20 MG: 20 TABLET ORAL at 10:49

## 2020-12-31 RX ADMIN — MELATONIN TAB 3 MG 3 MG: 3 TAB at 21:44

## 2020-12-31 RX ADMIN — AMIODARONE HYDROCHLORIDE 1 MG/MIN: 50 INJECTION, SOLUTION INTRAVENOUS at 23:45

## 2020-12-31 RX ADMIN — ONDANSETRON 4 MG: 4 TABLET, ORALLY DISINTEGRATING ORAL at 04:11

## 2020-12-31 RX ADMIN — POLYETHYLENE GLYCOL 3350 17 G: 17 POWDER, FOR SOLUTION ORAL at 08:42

## 2020-12-31 RX ADMIN — HEPARIN SODIUM 5000 UNITS: 5000 INJECTION, SOLUTION INTRAVENOUS; SUBCUTANEOUS at 05:52

## 2020-12-31 RX ADMIN — HEPARIN SODIUM 5000 UNITS: 5000 INJECTION, SOLUTION INTRAVENOUS; SUBCUTANEOUS at 21:45

## 2020-12-31 RX ADMIN — BUPROPION HYDROCHLORIDE 100 MG: 100 TABLET, FILM COATED ORAL at 14:14

## 2020-12-31 RX ADMIN — OXYCODONE HYDROCHLORIDE 5 MG: 5 TABLET ORAL at 04:10

## 2020-12-31 RX ADMIN — Medication 1 PATCH: at 08:48

## 2020-12-31 RX ADMIN — AMIODARONE HYDROCHLORIDE 150 MG: 1.5 INJECTION, SOLUTION INTRAVENOUS at 23:00

## 2020-12-31 RX ADMIN — PANTOPRAZOLE SODIUM 40 MG: 40 TABLET, DELAYED RELEASE ORAL at 08:40

## 2020-12-31 RX ADMIN — ASPIRIN 81 MG CHEWABLE TABLET 81 MG: 81 TABLET CHEWABLE at 08:41

## 2020-12-31 RX ADMIN — HYDROMORPHONE HYDROCHLORIDE 0.5 MG: 1 INJECTION, SOLUTION INTRAMUSCULAR; INTRAVENOUS; SUBCUTANEOUS at 08:41

## 2020-12-31 RX ADMIN — GABAPENTIN 100 MG: 100 CAPSULE ORAL at 08:40

## 2020-12-31 ASSESSMENT — ACTIVITIES OF DAILY LIVING (ADL)
ADLS_ACUITY_SCORE: 14

## 2020-12-31 ASSESSMENT — MIFFLIN-ST. JEOR: SCORE: 1171

## 2020-12-31 NOTE — PROGRESS NOTES
Cardiovascular Surgery Progress Note  12/31/2020         Assessment and Plan:     Rajani Guo is a 70 year old female with PMH of hypertension, pulmonary hypertension, dyslipidemia, diverticulosis, current tobacco use, DVT, arterial thrombus (March, 2019), DMT2 (on insulin), GERD, GIB, and osteoporosis. Her recent history should be noted for multiple GI bleeds since 2019 (last admitted 9/16/2020), attributed to 2 small AVMs in her small bowel without active bleeding. She was followed by her Cardiologist (Dr Thanh Morse) and had complained of 6 months of progressive DESAI and dizziness with poor quality of life and decreasing ADL tolerance. She was seen in the cardiac surgery clinic for evaluation of aortic and mitral stenosis on 12/7 and deemed an appropriate surgical candidate.   She is now s/p elective mechanical AVR and mechanical MVR on 12/29/20 with Dr. Luc Lara.    Cardiovascular:   Hx of severe mitral and aortic stenosis  S/p elective mechanical AVR and mechanical MVR   HLD, HTN, pulmonary HTN   No arrhythmia, HD stable. Remains in NSR.   Routine Post-op ECHO ordered on 12/31  ASA 81 mg, atorvastatin   Chest tubes: mediastinal tubes with slow intermittent air leak 12/31. Will need to pass water seal or clamping trial before removal.   TPW: removed 12/31    Pulmonary:  - Extubated POD #0 to 4 lpm via NC; Saturating well on 1 lpm.   - Supplemental O2 PRN to keep sats > 92%. Wean off as tolerated.  - Pulm toilet, IS, activity and deep breathing.     Neurology / MSK:  - Neuro intact  - Acute post-operative pain well controlled with acetaminophen, PO oxycodone PRN, IV dilaudid PRN, weaning off gabapentin 12/31.      / Renal:  - No Hx of renal disease. Most recent creatinine WNL, adequate UOP.   - Lasix 20 mg PO x 1 on 12/31, assess needs daily     GI / FEN:   Hx Diverticulosis without diverticulitis  Hx GI bleed attributed to small AVMs in small bowel  GERD  - Regular diet, continue bowel regimen  -  Replace electrolytes as needed, hepatic enzymes WNL besides slight elevation of AST.    Endocrine:  T2DM on home insulin   Stress induced hyperglycemia  - Initially managed on insulin drip postop, transitioned to medium corrective sliding scale 12/31. Monitor for long-acting needs as diet progresses.      Infectious Disease:  - Stress induced leukocytosis  - WBC 13.8, remains afebrile with Tmax 100.4 F on scheduled tylenol. No signs or symptoms of infection, possible atelectasis.   - Completed perioperative antibiotics    Hematology:   Hx Chronic anemia   Acute blood loss anemia and thrombocytopenia  Hgb 10.0; Plt 150, no signs or symptoms of active bleeding  Hx Acute limb ischemia, March 2019  - Acute occlusion of left superficial femoral artery, treated with directed lysis and angioplasty. Transitioned from Apixaban to aspirin.     Anticoagulation:   ASA 81 mg and coumadin for mechanical AVR and MVR.  INR goal 2.5 - 3.5. Most recent INR 1.59 and trending up.    NO low intensity heparin gtt bridging to therapeutic INR.     Prophylaxis:   - Stress ulcer prophylaxis: Pantoprazole 40 mg daily for 30 days  - DVT prophylaxis: Subcutaneous heparin, SCD    Disposition:   - Transferred to  on 12/30  - Therapies recommendations pending      Discussed with CVTS Fellow as needed.  Staff surgeons have been informed of changes through both verbal and written communication.      Quinton Handy PA-C  Cardiothoracic Surgery  Pager [phone]    9:04 AM   December 31, 2020        Interval History:     No overnight events.  Up from ICU last night.   States pain is well managed on current regimen. Slept well overnight.  Tolerating diet, not yet passing flatus, no BM. No nausea or vomiting.  Breathing well without complaints on 1-2 lpm.   Working with therapies and ambulating in halls with assistance.   Denies chest pain, palpitations, dizziness, syncopal symptoms, fevers, chills, myalgias, or sternal popping/clicking.          "Physical Exam:   Blood pressure (!) 143/71, pulse 83, temperature 98.5  F (36.9  C), temperature source Oral, resp. rate 18, height 1.626 m (5' 4\"), weight 66.1 kg (145 lb 11.6 oz), SpO2 94 %, not currently breastfeeding.  Vitals:    12/29/20 0605 12/30/20 0400 12/31/20 0500   Weight: 64.6 kg (142 lb 6.7 oz) 67.8 kg (149 lb 7.6 oz) 66.1 kg (145 lb 11.6 oz)      Weight; + 1.5 kg since admit and trending down.   24 hr Fluid status; net loss 500 mL.   MAPs: 80 - 107    Gen: A&Ox4, NAD  Neuro: no focal deficits but seems a little confused this AM.   CV: RRR, normal S1 S2, no murmurs, rubs or gallops.     * removed TPW without complication   Pulm: CTA, no wheezing or rhonchi, normal breathing on 2 lpm  Abd: nondistended, normal BS, soft, nontender  Ext: no peripheral edema  Incision: clean, dry, intact, no erythema, sternum stable  Tubes/drain sites: dressing clean and dry, serosanguinous output, SLOW intermittent AIR LEAK noted. 24 hr output ~250 mL.          Data:    Imaging:  reviewed recent imaging, no acute concerns    Labs:  BMP  Recent Labs   Lab 12/31/20  0541 12/30/20  0317 12/29/20 1920 12/29/20  1735 12/29/20  1437    143 142  --  146*   POTASSIUM 4.0 3.7 4.2 4.3 4.2   CHLORIDE 107 110* 112*  --  114*   KAYLEEN 8.3* 8.0* 8.1*  --  7.5*   CO2 25 27 27  --  24   BUN 10 9 11  --  9   CR 0.66 0.59 0.65  --  0.65   * 102* 150*  --  134*     CBC  Recent Labs   Lab 12/31/20  0541 12/30/20  0317 12/29/20  1920 12/29/20  1437   WBC 13.8* 12.6* 14.1* 13.6*   RBC 3.56* 3.65* 3.76* 3.45*   HGB 10.0* 10.2* 10.2* 9.6*   HCT 32.1* 32.7* 33.8* 31.2*   MCV 90 90 90 90   MCH 28.1 27.9 27.1 27.8   MCHC 31.2* 31.2* 30.2* 30.8*   RDW 18.7* 18.7* 18.7* 18.7*    164 183 168     INR  Recent Labs   Lab 12/31/20  0541 12/29/20  1920 12/29/20  1437 12/29/20  1319   INR 1.59* 1.25* 1.52* 1.75*      Liver Function Studies -   Recent Labs   Lab Test 12/30/20  0317   PROTTOTAL 5.5*   ALBUMIN 2.8*   BILITOTAL 0.4   ALKPHOS " 49   AST 62*   ALT 20     GLUCOSE:   Recent Labs   Lab 12/31/20  0853 12/31/20  0602 12/31/20  0541 12/31/20  0402 12/31/20  0158 12/31/20  0102 12/30/20  2354 12/30/20  0317 12/30/20  0317 12/29/20  1920 12/29/20  1920 12/29/20  1437 12/29/20  1437 12/29/20  1323 12/29/20  1228   GLC  --   --  135*  --   --   --   --   --  102*  --  150*  --  134* 157* 143*   * 149*  --  125* 139* 154* 148*   < >  --    < >  --    < >  --   --   --     < > = values in this interval not displayed.

## 2020-12-31 NOTE — CONSULTS
Smoking Cessation Consult   2020    Patient: Rajani Guo      :  1950                    MRN:[id number]      Mitral and aortic incompetence [I08.0] @HX     History of Present Illness: Assessment: Ms. Guo is a 70 year old lady with a medical history of HTN, pulmonary HTN, tobacco use, HLD, DVT and arterial thrombus had been on elliquis prior to recent GIB now controlled, mitral stenosis and mitral regurgitation with severe mitral annular calcification and aortic stenosis and aortic regurgitation who is admitted to the ICU  s/p AVR, MVR.    Reason for Consult: Patient identified as current heavy smoker per patient chart.     Asked patient if she would be interested in sharing about her tobacco use and in learning about more options and resources for quitting, staying quit, and in developing a relapse prevention plan, she agreed.       Symptoms of craving or withdrawal: Patient is not experiencing symptoms of withdrawal.  Patient has agreed to  Nicotine Replacement Therapy while inpatient to help manage symptoms of withdrawal and cravings. 21 mg patch.     Motivational Interviewing:     MI Intervention: Expressed Empathy/Understanding, Supported Autonomy, Collaboration, Evocation, Permission to raise concern or advise, Open-ended questions, Reflections: simple and complex, Change talk (evoked) and Reframe    Patients last cigarette/vape/e-cig/smokeless tobacco: Last cigarette was on day of admission .     Patients main reason for smoking include: Social, physical, emotional, and stress relief.  Daughter lives with patient and is a large component of stress. Daughter is also a current heavy smoker. She does endorse enjoying cigarette with coffee/meals and acknowledges smoking is a habit for her.     Top Reasons to quit smoking: Her heart health. Patient seemed very overwhelmed with current hospital admission.     Types of Tobacco and Amount   Cigarettes 1-2  ppd   E-Cigs    Smokeless  "Tobacco    Cigars    Pipes    Waterpipes      Fagerstrom Test for Nicotine Dependence   How soon after waking do you smoke your first cigarette Within 5 minutes=3  5-30 minutes=2  31-60 minute=1  >61 minutes=0              Do you find it difficult to refrain from smoking in places where it is forbidden? e.g. Holiness, restaurants, etc? Yes=1  No=0           Which cigarette would you hate to give up? The first in the morning=1  Any other=0         How many cigarettes a day do you smoke? 10 or less=0  11-20=1  21-30=2  31 or more=3    Do you smoke more frequently in the morning?   Yes=1  No=0    Do you smoke even if you are sick in bed most of the day? Yes=1  No=0                                                                                                                                            Total Score    SCORE 1-2 = Low Dependence          3-4 = Low to Mod Dependence     5-7 = Moderate Dependence       8+ = High Dependence     Stage of Behavior Change:   Pre-contemplation - No intention    Contemplation - Change on the horizon    Preparation - Getting Ready    Action - Consistently changed (within 6 months)    Maintenance - Staying quit (more than 6 months)    Relapse - Recycling      Patients Motivation to Quit Scale    Importance (1-10):    Confidence  (1-10):    Can Patient Imagine a Future without Smoking:    Quit Attempt Date:     Final Quit Date:       Education/Recommendations    Education:    -Provided educational workbook, \"Quitting for Good with Treatment and Support\".   -Unable to complete full cessation consult due to patient nausea and fatigue. Will attempt second visit while inpatient   Recommendations:   -Encouraged patient to use workbook to help him/her understand why he/she smokes, come up with 5 reasons to quit, and to imagine what his/her future looks like without smoking. -Encouraged him/her to develop strategies to distract himself/herself to get past cravings.   -Developed Smoking " Cessation Relapse Prevention Plan that includes recommendation of:   -Wellbutrin (bupropion)/Chantix (varenicline) to be used as prescribed after establishing Target Quit Date. PCP and Pulmonologist to be notified and to monitor for side effects  -Use 7/14/21 mg patch daily,  continue the initial patch for 4-6 weeks of smoking abstinence based on assessment of patients dependence level. Taper every 2-4 weeks in 7 mg steps as tolerated.   -Use 2/4 mg lozenges or gum, take first thing in the morning and/as needed for cravings and urges to smoke. May be used every 1-2 hours.       Time Spent: I spent 30 minutes with patient. Will notify provider of recommendations.    Gave contact information and will call 48 hours after discharge to provide support.      Klaudia Lopez RRT, CTTS  Chronic Pulmonary Disease Specialist  Office: [phone]  Pager: [phone]  Hours: M-F 8-4:30

## 2020-12-31 NOTE — PROGRESS NOTES
[facility]AL ANESTHESIA PAIN SERVICE (RAPS) EVALUATION:  - Time: 06:30 pm.  Called to evaluate patient for pain  - Evaluation: Patient reports pain intensity with current therapy 6/10 at rest and 8/10 with activity  General: healthy, alert and no distress  Catheter system integrity: intact  Skin: dressing clean, dry and intact   .  Current vitals:   Temp: 37.3  C (99.1  F) Temp src: Oral BP: 113/56 Pulse: 70   Resp: 16 SpO2: 99 % O2 Device: Nasal cannula Oxygen Delivery: 2 LPM      - Assessment/Plan    PAIN: moderately controlled     INTERVENTION:   12 ml Bolus administered    MEDICATION: PF bupivacaine 0.25 %, total bolus 12 mL, 6 mL via each catheter    PROCEDURE: Clinician bolus via erector spinae nerve block catheter; administered without complication with negative aspirate before and between each mL.    No symptoms of local anesthetic systemic toxicity (LAST). Remained with and assessed patient for 30 min post-injection. BP, P and MAP stable    POST-PROCEDURE: Bedside RN aware of need to continue BP, P and MAP monitoring Q 10 min for an additional 30 min. Contact RAPS (jobcode ID ) if any of the following: patient experiencing any untoward effects, SBP< 90, P < 50 or > 120, MAP < 60       - patient can be evaluated to receive local anesthetic bolus Q 12 hr PRN pain not controlled with continuous infusion.  Bedside nurse must page RAPS to request bolus    Sarah Hernandez MD  [facility] anesthesia resident    RAPS Contact Info (24 hour job code pager is the last 4 digits) For in-house use only:  LOCK8 phone: [place] [phone], [employer] [phone], [facility] [phone], then enter call-back number.    Text: Use [employer] on the Intranet <Paging/Directory> tab and enter Jobcode ID.   If no call back at any time, contact the hospital  and ask for RAPS attending or backup

## 2020-12-31 NOTE — PROGRESS NOTES
CLINICAL NUTRITION SERVICES    Received an admission nutrition risk screen for positive (Malnutrition Score: 2; Have you recently lost weight without trying in the last six months?: yes;  If yes, how much weight have you lost?: unsure; Have you been eating poorly because of a decreased appetite?: no; Weight Loss Score: 2)    Wt Hx: 67.6 kg (10/21/19), 67 kg (7/8/20), 66.7 kg (9/29/20), 66.2 kg (11/16/20), 66.1 kg (12/31/20) - Wt stable. No significant/severe wt loss.     Follow Up / Monitoring:   Per protocol    Damaris Horne, MS, RD, LD, [facility]   6C Pgr: [phone]

## 2020-12-31 NOTE — PLAN OF CARE
POD #2 AVR & MVR, 12/29    Neuro: A&O x4, lethargic.  Cardiac: VSS. SR.  Respiratory: 1L O2. DESAI.  GI/: Catheter in place, to be removed today.  Diet/Appetite: Clear liquid diet, advance as tolerated.  Activity: Pivot with assist x2.  Pain: C/O incisional pain, OnQ in place, scheduled tylenol, prn oxy, prn dilaudid.   Skin: Dressings CDI.  LDAs: CT x2 to one canister, -20 suction. Intermittent air leak present.    Plan: Insulin gtt running. Continue to monitor.

## 2020-12-31 NOTE — PROGRESS NOTES
REGIONAL ANESTHESIA PAIN SERVICE FOLLOW UP NOTE  Rajani Guo is a 70 year old female with mitral and arotic incompetence and severe stenosis who is now POD #2 s/p AVR and MVR with mechanical valves; bilateral T5 erector spinae nerve block catheters placed on 12/29/20 for analgesia    Subjective and Interval History: Overnight no acute events.  Last evening patient was resumed on warfarin 5 mg PO, INR today 1.59. Patient reports adequate pain control.  Denies LE weakness, paresthesias, circumoral numbness, metallic taste or tinnitus.  Ambulating with assistance.  Tolerating regular diet        Antithrombotic/Thrombolytic Therapy: Last dose Heparin SQ administered today at 0552     Medications related to Pain Management (From now, onward)    Start     Dose/Rate Route Frequency Ordered Stop    01/01/21 0000  acetaminophen (TYLENOL) tablet 650 mg      650 mg Oral EVERY 4 HOURS PRN 12/29/20 1500      12/30/20 0800  aspirin (ASA) chewable tablet 81 mg      81 mg Oral DAILY 12/29/20 1500      12/30/20 0800  senna-docusate (SENOKOT-S/PERICOLACE) 8.6-50 MG per tablet 1 tablet      1 tablet Oral 2 TIMES DAILY 12/29/20 1500      12/30/20 0800  senna-docusate (SENOKOT-S/PERICOLACE) 8.6-50 MG per tablet 2 tablet      2 tablet Oral 2 TIMES DAILY 12/29/20 1500      12/30/20 0800  polyethylene glycol (MIRALAX) Packet 17 g      17 g Oral DAILY 12/29/20 1500      12/29/20 1530  acetaminophen (TYLENOL) tablet 975 mg      975 mg Oral EVERY 8 HOURS 12/29/20 1500 01/01/21 1529    12/29/20 1500  oxyCODONE (ROXICODONE) tablet 5-10 mg      5-10 mg Oral EVERY 4 HOURS PRN 12/29/20 1500      12/29/20 1500  HYDROmorphone (PF) (DILAUDID) injection 0.3-0.5 mg      0.3-0.5 mg Intravenous EVERY 2 HOURS PRN 12/29/20 1500      12/29/20 1500  lidocaine 1 % 0.1-1 mL      0.1-1 mL Other EVERY 1 HOUR PRN 12/29/20 1500      12/29/20 1500  lidocaine (LMX4) cream       Topical EVERY 1 HOUR PRN 12/29/20 1500              Objective  Lab Results     Recent  "Labs   Lab Test 12/31/20  0541   WBC 13.8*   RBC 3.56*   HGB 10.0*   HCT 32.1*   MCV 90   MCH 28.1   MCHC 31.2*   RDW 18.7*        Lab Results   Component Value Date    INR 1.59 12/31/2020    INR 1.25 12/29/2020    INR 1.52 12/29/2020    INR 1.75 12/29/2020       /58 (BP Location: Right arm)   Pulse 68   Temp 97.9  F (36.6  C) (Oral)   Resp 18   Ht 1.626 m (5' 4\")   Wt 66.1 kg (145 lb 11.6 oz)   SpO2 96%   BMI 25.01 kg/m       Exam:  Constitutional: alert and no distress  Neuro/MSK:  Normal strength BLE and overall symmetric  Skin: erector spinae (ES) catheter sites c/d/i, no tenderness, erythema, heme, edema.      Assessment  Patient currently achieving adequate pain control with erector spinae (ES) catheters/infusion and multimodal analgesia.  Patient is meeting activity goals. No weakness or paresthesias. No evidence of adverse side effects associated with local anesthetic.     Plan  1025 bilateral erector spinae (ES) catheters removed without complication, dark tips intact.  Insertion sites c/d/i. Small bandage applied  - okay to administer Heparin SQ at least 1 hour after catheter removal.    - Primary service and Bedside RN aware of plans.  - RAPS will sign off    Thank you for allowing us the opportunity to participate in the care of Rajani Lozano MD    Department of Anesthesiology  Pain Management Division  "

## 2020-12-31 NOTE — PROGRESS NOTES
REGIONAL ANESTHESIA PAIN SERVICE FOLLOW UP NOTE  Rajani Guo is a 70 year old female with mitral and arotic incompetence and severe stenosis who is now POD #2 s/p AVR and MVR with mechanical valves; bilateral T5 erector spinae nerve block catheters placed on 12/29/20 for analgesia    Subjective and Interval History: Overnight no acute events.  Last evening patient was resumed on warfarin 5 mg PO, INR today 1.59. Patient reports adequate pain control.  Denies LE weakness, paresthesias, circumoral numbness, metallic taste or tinnitus.  Ambulating with assistance.  Tolerating regular diet        Antithrombotic/Thrombolytic Therapy: Last dose Heparin SQ administered today at 0552     Medications related to Pain Management (From now, onward)    Start     Dose/Rate Route Frequency Ordered Stop    01/01/21 0000  acetaminophen (TYLENOL) tablet 650 mg      650 mg Oral EVERY 4 HOURS PRN 12/29/20 1500      12/30/20 0800  aspirin (ASA) chewable tablet 81 mg      81 mg Oral DAILY 12/29/20 1500      12/30/20 0800  senna-docusate (SENOKOT-S/PERICOLACE) 8.6-50 MG per tablet 1 tablet      1 tablet Oral 2 TIMES DAILY 12/29/20 1500      12/30/20 0800  senna-docusate (SENOKOT-S/PERICOLACE) 8.6-50 MG per tablet 2 tablet      2 tablet Oral 2 TIMES DAILY 12/29/20 1500      12/30/20 0800  polyethylene glycol (MIRALAX) Packet 17 g      17 g Oral DAILY 12/29/20 1500      12/29/20 1530  acetaminophen (TYLENOL) tablet 975 mg      975 mg Oral EVERY 8 HOURS 12/29/20 1500 01/01/21 1529    12/29/20 1500  oxyCODONE (ROXICODONE) tablet 5-10 mg      5-10 mg Oral EVERY 4 HOURS PRN 12/29/20 1500      12/29/20 1500  HYDROmorphone (PF) (DILAUDID) injection 0.3-0.5 mg      0.3-0.5 mg Intravenous EVERY 2 HOURS PRN 12/29/20 1500      12/29/20 1500  lidocaine 1 % 0.1-1 mL      0.1-1 mL Other EVERY 1 HOUR PRN 12/29/20 1500      12/29/20 1500  lidocaine (LMX4) cream       Topical EVERY 1 HOUR PRN 12/29/20 1500              Objective  Lab Results     Recent  "Labs   Lab Test 12/31/20  0541   WBC 13.8*   RBC 3.56*   HGB 10.0*   HCT 32.1*   MCV 90   MCH 28.1   MCHC 31.2*   RDW 18.7*        Lab Results   Component Value Date    INR 1.59 12/31/2020    INR 1.25 12/29/2020    INR 1.52 12/29/2020    INR 1.75 12/29/2020       /60 (BP Location: Right arm, Cuff Size: Adult Regular)   Pulse 83   Temp 98.5  F (36.9  C) (Oral)   Resp 18   Ht 1.626 m (5' 4\")   Wt 66.1 kg (145 lb 11.6 oz)   SpO2 95%   BMI 25.01 kg/m       Exam:  Constitutional: alert and no distress  Neuro/MSK:  Normal strength BLE and overall symmetric  Skin: erector spinae (ES) catheter sites c/d/i, no tenderness, erythema, heme, edema.      Assessment  Patient currently achieving adequate pain control with erector spinae (ES) catheters/infusion and multimodal analgesia.  Patient is meeting activity goals. No weakness or paresthesias. No evidence of adverse side effects associated with local anesthetic.     Plan  1025 bilateral erector spinae (ES) catheters removed without complication, dark tips intact.  Insertion sites c/d/i. Small bandage applied  - okay to administer Heparin SQ at least 1 hour after catheter removal.    - Primary service and Bedside RN aware of plans.  - RAPS will sign off    Thank you for allowing us the opportunity to participate in the care of Rajani Guo  - discussed plan with attending anesthesiologist    FRANCESCA Martinez [facility]   Regional Anesthesia Pain Service      RAPS Contact Info (for in-house use only):  Job code ID: [id number]   [employer] 0599  Peds [phone]  [employer] phone: dial * * * [phone], enter jobcode ID, then enter call-back number.    Text: Use [employer] on the Intranet <Paging/Directory> tab and enter Jobcode ID.   If no call back at any time, contact the hospital  and ask for RAPS attending or backup     "

## 2021-01-01 ENCOUNTER — APPOINTMENT (OUTPATIENT)
Dept: OCCUPATIONAL THERAPY | Facility: CLINIC | Age: 71
DRG: 219 | End: 2021-01-01
Attending: PHYSICIAN ASSISTANT
Payer: COMMERCIAL

## 2021-01-01 ENCOUNTER — APPOINTMENT (OUTPATIENT)
Dept: GENERAL RADIOLOGY | Facility: CLINIC | Age: 71
DRG: 219 | End: 2021-01-01
Attending: PHYSICIAN ASSISTANT
Payer: COMMERCIAL

## 2021-01-01 ENCOUNTER — APPOINTMENT (OUTPATIENT)
Dept: PHYSICAL THERAPY | Facility: CLINIC | Age: 71
DRG: 219 | End: 2021-01-01
Attending: SURGERY
Payer: COMMERCIAL

## 2021-01-01 LAB
ALBUMIN SERPL-MCNC: 2.4 G/DL (ref 3.4–5)
ALP SERPL-CCNC: 60 U/L (ref 40–150)
ALT SERPL W P-5'-P-CCNC: 14 U/L (ref 0–50)
ANION GAP SERPL CALCULATED.3IONS-SCNC: 7 MMOL/L (ref 3–14)
AST SERPL W P-5'-P-CCNC: 27 U/L (ref 0–45)
BILIRUB DIRECT SERPL-MCNC: 0.1 MG/DL (ref 0–0.2)
BILIRUB SERPL-MCNC: 0.2 MG/DL (ref 0.2–1.3)
BUN SERPL-MCNC: 16 MG/DL (ref 7–30)
CALCIUM SERPL-MCNC: 8.1 MG/DL (ref 8.5–10.1)
CHLORIDE SERPL-SCNC: 105 MMOL/L (ref 94–109)
CO2 SERPL-SCNC: 25 MMOL/L (ref 20–32)
CREAT SERPL-MCNC: 0.61 MG/DL (ref 0.52–1.04)
ERYTHROCYTE [DISTWIDTH] IN BLOOD BY AUTOMATED COUNT: 18.5 % (ref 10–15)
GFR SERPL CREATININE-BSD FRML MDRD: >90 ML/MIN/{1.73_M2}
GLUCOSE BLDC GLUCOMTR-MCNC: 154 MG/DL (ref 70–99)
GLUCOSE BLDC GLUCOMTR-MCNC: 157 MG/DL (ref 70–99)
GLUCOSE BLDC GLUCOMTR-MCNC: 162 MG/DL (ref 70–99)
GLUCOSE BLDC GLUCOMTR-MCNC: 180 MG/DL (ref 70–99)
GLUCOSE BLDC GLUCOMTR-MCNC: 191 MG/DL (ref 70–99)
GLUCOSE BLDC GLUCOMTR-MCNC: 191 MG/DL (ref 70–99)
GLUCOSE BLDC GLUCOMTR-MCNC: 215 MG/DL (ref 70–99)
GLUCOSE SERPL-MCNC: 189 MG/DL (ref 70–99)
HCT VFR BLD AUTO: 29.3 % (ref 35–47)
HGB BLD-MCNC: 9.2 G/DL (ref 11.7–15.7)
INR PPP: 2.23 (ref 0.86–1.14)
INTERPRETATION ECG - MUSE: NORMAL
MAGNESIUM SERPL-MCNC: 2.3 MG/DL (ref 1.6–2.3)
MCH RBC QN AUTO: 28 PG (ref 26.5–33)
MCHC RBC AUTO-ENTMCNC: 31.4 G/DL (ref 31.5–36.5)
MCV RBC AUTO: 89 FL (ref 78–100)
PLATELET # BLD AUTO: 173 10E9/L (ref 150–450)
POTASSIUM SERPL-SCNC: 3.6 MMOL/L (ref 3.4–5.3)
PROT SERPL-MCNC: 5.7 G/DL (ref 6.8–8.8)
RBC # BLD AUTO: 3.29 10E12/L (ref 3.8–5.2)
SODIUM SERPL-SCNC: 137 MMOL/L (ref 133–144)
WBC # BLD AUTO: 12.1 10E9/L (ref 4–11)

## 2021-01-01 PROCEDURE — 71045 X-RAY EXAM CHEST 1 VIEW: CPT

## 2021-01-01 PROCEDURE — 258N000003 HC RX IP 258 OP 636: Performed by: STUDENT IN AN ORGANIZED HEALTH CARE EDUCATION/TRAINING PROGRAM

## 2021-01-01 PROCEDURE — 36415 COLL VENOUS BLD VENIPUNCTURE: CPT | Performed by: PHYSICIAN ASSISTANT

## 2021-01-01 PROCEDURE — 250N000011 HC RX IP 250 OP 636: Performed by: STUDENT IN AN ORGANIZED HEALTH CARE EDUCATION/TRAINING PROGRAM

## 2021-01-01 PROCEDURE — 99231 SBSQ HOSP IP/OBS SF/LOW 25: CPT | Mod: GC | Performed by: ANESTHESIOLOGY

## 2021-01-01 PROCEDURE — 200N000002 HC R&B ICU UMMC

## 2021-01-01 PROCEDURE — 250N000013 HC RX MED GY IP 250 OP 250 PS 637: Performed by: PHYSICIAN ASSISTANT

## 2021-01-01 PROCEDURE — 999N001017 HC STATISTIC GLUCOSE BY METER IP

## 2021-01-01 PROCEDURE — 97165 OT EVAL LOW COMPLEX 30 MIN: CPT | Mod: GO | Performed by: OCCUPATIONAL THERAPIST

## 2021-01-01 PROCEDURE — 250N000013 HC RX MED GY IP 250 OP 250 PS 637: Performed by: NURSE PRACTITIONER

## 2021-01-01 PROCEDURE — 97110 THERAPEUTIC EXERCISES: CPT | Mod: GP | Performed by: PHYSICAL THERAPIST

## 2021-01-01 PROCEDURE — 83735 ASSAY OF MAGNESIUM: CPT | Performed by: PHYSICIAN ASSISTANT

## 2021-01-01 PROCEDURE — 80048 BASIC METABOLIC PNL TOTAL CA: CPT | Performed by: PHYSICIAN ASSISTANT

## 2021-01-01 PROCEDURE — 93005 ELECTROCARDIOGRAM TRACING: CPT

## 2021-01-01 PROCEDURE — 80076 HEPATIC FUNCTION PANEL: CPT | Performed by: PHYSICIAN ASSISTANT

## 2021-01-01 PROCEDURE — 85610 PROTHROMBIN TIME: CPT | Performed by: STUDENT IN AN ORGANIZED HEALTH CARE EDUCATION/TRAINING PROGRAM

## 2021-01-01 PROCEDURE — 250N000013 HC RX MED GY IP 250 OP 250 PS 637: Performed by: STUDENT IN AN ORGANIZED HEALTH CARE EDUCATION/TRAINING PROGRAM

## 2021-01-01 PROCEDURE — 250N000013 HC RX MED GY IP 250 OP 250 PS 637: Performed by: SURGERY

## 2021-01-01 PROCEDURE — 250N000011 HC RX IP 250 OP 636: Performed by: PHYSICIAN ASSISTANT

## 2021-01-01 PROCEDURE — 97530 THERAPEUTIC ACTIVITIES: CPT | Mod: GP | Performed by: PHYSICAL THERAPIST

## 2021-01-01 PROCEDURE — 97535 SELF CARE MNGMENT TRAINING: CPT | Mod: GO | Performed by: OCCUPATIONAL THERAPIST

## 2021-01-01 PROCEDURE — 97530 THERAPEUTIC ACTIVITIES: CPT | Mod: GO | Performed by: OCCUPATIONAL THERAPIST

## 2021-01-01 PROCEDURE — 93010 ELECTROCARDIOGRAM REPORT: CPT | Performed by: INTERNAL MEDICINE

## 2021-01-01 PROCEDURE — 250N000012 HC RX MED GY IP 250 OP 636 PS 637: Performed by: PHYSICIAN ASSISTANT

## 2021-01-01 PROCEDURE — 71045 X-RAY EXAM CHEST 1 VIEW: CPT | Mod: 26

## 2021-01-01 PROCEDURE — 36415 COLL VENOUS BLD VENIPUNCTURE: CPT | Performed by: STUDENT IN AN ORGANIZED HEALTH CARE EDUCATION/TRAINING PROGRAM

## 2021-01-01 PROCEDURE — 85027 COMPLETE CBC AUTOMATED: CPT | Performed by: PHYSICIAN ASSISTANT

## 2021-01-01 RX ORDER — LIDOCAINE 4 G/G
1 PATCH TOPICAL
Status: DISCONTINUED | OUTPATIENT
Start: 2021-01-01 | End: 2021-01-01 | Stop reason: CLARIF

## 2021-01-01 RX ORDER — ATROPINE SULFATE 0.1 MG/ML
INJECTION INTRAVENOUS
Status: DISCONTINUED
Start: 2021-01-01 | End: 2021-01-01 | Stop reason: HOSPADM

## 2021-01-01 RX ORDER — LIDOCAINE 4 G/G
1 PATCH TOPICAL
Status: DISCONTINUED | OUTPATIENT
Start: 2021-01-01 | End: 2021-01-06 | Stop reason: HOSPADM

## 2021-01-01 RX ORDER — OXYBUTYNIN CHLORIDE 5 MG/1
5 TABLET ORAL 2 TIMES DAILY
Status: DISCONTINUED | OUTPATIENT
Start: 2021-01-01 | End: 2021-01-06 | Stop reason: HOSPADM

## 2021-01-01 RX ORDER — POTASSIUM CHLORIDE 7.45 MG/ML
10 INJECTION INTRAVENOUS
Status: DISPENSED | OUTPATIENT
Start: 2021-01-01 | End: 2021-01-01

## 2021-01-01 RX ORDER — BUPROPION HYDROCHLORIDE 150 MG/1
150 TABLET, EXTENDED RELEASE ORAL 2 TIMES DAILY
Status: DISCONTINUED | OUTPATIENT
Start: 2021-01-02 | End: 2021-01-06 | Stop reason: HOSPADM

## 2021-01-01 RX ORDER — WARFARIN SODIUM 4 MG/1
4 TABLET ORAL
Status: COMPLETED | OUTPATIENT
Start: 2021-01-01 | End: 2021-01-01

## 2021-01-01 RX ORDER — POTASSIUM CHLORIDE 29.8 MG/ML
20 INJECTION INTRAVENOUS
Status: DISCONTINUED | OUTPATIENT
Start: 2021-01-01 | End: 2021-01-01

## 2021-01-01 RX ADMIN — INSULIN ASPART 1 UNITS: 100 INJECTION, SOLUTION INTRAVENOUS; SUBCUTANEOUS at 07:49

## 2021-01-01 RX ADMIN — ACETAMINOPHEN 975 MG: 325 TABLET, FILM COATED ORAL at 00:25

## 2021-01-01 RX ADMIN — OXYCODONE HYDROCHLORIDE 5 MG: 5 TABLET ORAL at 22:22

## 2021-01-01 RX ADMIN — AMIODARONE HYDROCHLORIDE 0.5 MG/MIN: 50 INJECTION, SOLUTION INTRAVENOUS at 08:13

## 2021-01-01 RX ADMIN — INSULIN ASPART 2 UNITS: 100 INJECTION, SOLUTION INTRAVENOUS; SUBCUTANEOUS at 16:29

## 2021-01-01 RX ADMIN — DOCUSATE SODIUM AND SENNOSIDES 2 TABLET: 8.6; 5 TABLET, FILM COATED ORAL at 19:35

## 2021-01-01 RX ADMIN — LIDOCAINE 1 PATCH: 560 PATCH PERCUTANEOUS; TOPICAL; TRANSDERMAL at 12:59

## 2021-01-01 RX ADMIN — HYDROMORPHONE HYDROCHLORIDE 0.5 MG: 1 INJECTION, SOLUTION INTRAMUSCULAR; INTRAVENOUS; SUBCUTANEOUS at 22:33

## 2021-01-01 RX ADMIN — HEPARIN SODIUM 5000 UNITS: 5000 INJECTION, SOLUTION INTRAVENOUS; SUBCUTANEOUS at 06:07

## 2021-01-01 RX ADMIN — OXYCODONE HYDROCHLORIDE 5 MG: 5 TABLET ORAL at 03:48

## 2021-01-01 RX ADMIN — ATORVASTATIN CALCIUM 40 MG: 40 TABLET, FILM COATED ORAL at 07:40

## 2021-01-01 RX ADMIN — INSULIN ASPART 1 UNITS: 100 INJECTION, SOLUTION INTRAVENOUS; SUBCUTANEOUS at 13:03

## 2021-01-01 RX ADMIN — HEPARIN SODIUM 5000 UNITS: 5000 INJECTION, SOLUTION INTRAVENOUS; SUBCUTANEOUS at 13:28

## 2021-01-01 RX ADMIN — WARFARIN SODIUM 4 MG: 4 TABLET ORAL at 18:15

## 2021-01-01 RX ADMIN — OXYCODONE HYDROCHLORIDE 5 MG: 5 TABLET ORAL at 06:02

## 2021-01-01 RX ADMIN — Medication 12.5 MG: at 19:37

## 2021-01-01 RX ADMIN — DOCUSATE SODIUM AND SENNOSIDES 2 TABLET: 8.6; 5 TABLET, FILM COATED ORAL at 07:38

## 2021-01-01 RX ADMIN — INSULIN ASPART 2 UNITS: 100 INJECTION, SOLUTION INTRAVENOUS; SUBCUTANEOUS at 04:01

## 2021-01-01 RX ADMIN — BUPROPION HYDROCHLORIDE 100 MG: 100 TABLET, FILM COATED ORAL at 19:35

## 2021-01-01 RX ADMIN — Medication 12.5 MG: at 07:40

## 2021-01-01 RX ADMIN — ASPIRIN 81 MG CHEWABLE TABLET 81 MG: 81 TABLET CHEWABLE at 07:40

## 2021-01-01 RX ADMIN — HEPARIN SODIUM 5000 UNITS: 5000 INJECTION, SOLUTION INTRAVENOUS; SUBCUTANEOUS at 22:16

## 2021-01-01 RX ADMIN — OXYBUTYNIN CHLORIDE 5 MG: 5 TABLET ORAL at 19:36

## 2021-01-01 RX ADMIN — MELATONIN TAB 3 MG 3 MG: 3 TAB at 22:22

## 2021-01-01 RX ADMIN — AMIODARONE HYDROCHLORIDE 150 MG: 1.5 INJECTION, SOLUTION INTRAVENOUS at 09:32

## 2021-01-01 RX ADMIN — BUPROPION HYDROCHLORIDE 100 MG: 100 TABLET, FILM COATED ORAL at 07:40

## 2021-01-01 RX ADMIN — Medication 1 PATCH: at 07:45

## 2021-01-01 RX ADMIN — INSULIN ASPART 1 UNITS: 100 INJECTION, SOLUTION INTRAVENOUS; SUBCUTANEOUS at 23:22

## 2021-01-01 RX ADMIN — POTASSIUM CHLORIDE 10 MEQ: 7.46 INJECTION, SOLUTION INTRAVENOUS at 08:35

## 2021-01-01 RX ADMIN — PANTOPRAZOLE SODIUM 40 MG: 40 TABLET, DELAYED RELEASE ORAL at 07:39

## 2021-01-01 RX ADMIN — AMIODARONE HYDROCHLORIDE 1 MG/MIN: 50 INJECTION, SOLUTION INTRAVENOUS at 03:39

## 2021-01-01 RX ADMIN — OXYCODONE HYDROCHLORIDE 5 MG: 5 TABLET ORAL at 13:21

## 2021-01-01 RX ADMIN — HYDROMORPHONE HYDROCHLORIDE 0.5 MG: 1 INJECTION, SOLUTION INTRAMUSCULAR; INTRAVENOUS; SUBCUTANEOUS at 08:23

## 2021-01-01 RX ADMIN — ACETAMINOPHEN 975 MG: 325 TABLET, FILM COATED ORAL at 07:37

## 2021-01-01 RX ADMIN — ONDANSETRON 4 MG: 2 INJECTION INTRAMUSCULAR; INTRAVENOUS at 10:13

## 2021-01-01 RX ADMIN — INSULIN ASPART 1 UNITS: 100 INJECTION, SOLUTION INTRAVENOUS; SUBCUTANEOUS at 19:29

## 2021-01-01 RX ADMIN — POTASSIUM CHLORIDE 10 MEQ: 7.46 INJECTION, SOLUTION INTRAVENOUS at 07:01

## 2021-01-01 RX ADMIN — BUPROPION HYDROCHLORIDE 100 MG: 100 TABLET, FILM COATED ORAL at 13:28

## 2021-01-01 RX ADMIN — POLYETHYLENE GLYCOL 3350 17 G: 17 POWDER, FOR SOLUTION ORAL at 07:42

## 2021-01-01 ASSESSMENT — MIFFLIN-ST. JEOR: SCORE: 1180

## 2021-01-01 ASSESSMENT — ACTIVITIES OF DAILY LIVING (ADL)
ADLS_ACUITY_SCORE: 15
ADLS_ACUITY_SCORE: 16
ADLS_ACUITY_SCORE: 16
ADLS_ACUITY_SCORE: 15
ADLS_ACUITY_SCORE: 15
ADLS_ACUITY_SCORE: 16

## 2021-01-01 NOTE — PROGRESS NOTES
CV ICU PROGRESS NOTE  January 1, 2021      CO-MORBIDITIES:   Mitral and aortic incompetence  Mitral and aortic incompetence  Severe Mitral Stenosis   Severe aortic stenosis  Atrial fibrillation     ASSESSMENT: Rajani Guo is a 70 year old female with PMH of hypertension, pulmonary hypertension, dyslipidemia, current tobacco use, h/o DVT, h/o arterial thrombus, diabetes, GERD, osteoporosis who is s/p AVR and MVR with mechanical valves by Dr. Lara on 12/29. She recovered well, however was re-admitted to the CVICU on 01/01 following the onset of afib with RVR with hypotension. She has recovered well following conversion to sinus after amiodarone.     TODAY'S PROGRESS:   - Amiodarone bolus, hold off on gtt     PLAN:  Neuro/ pain/ sedation:  #Post-op pain  - Monitor neurological status. Notify the MD for any acute changes in exam.  - APAP / oxycodone / bupropion  - BL erector spinae catheters - appreciate RAPS team's assistance     Pulmonary care:  #Tobacco dependence   - Extubated 12/29  - Titrate FiO2 for SpO2 >92%  - Nicoderm patch      Cardiovascular:  #HLD  #HTN  #Severe aortic stenosis and severe mitral stenosis- s/p AVR and MVR 12/29  #Pulmonary HTN   #Afib with RVR, now sinus  - Monitor hemodynamic status.   - MAP >65  - ASA / atorvastatin / metoprolol / amio bolus (complete) / warfarin started 12/31 /   - Chest tube 150 ml / 24 hours     GI/Nutrition:  #H/o GI bleed September 2020  #GERD  - Completed pill study through [facility] 10/2020 and was found to have 2 small AVMs that were not actively bleeding. Tx with PPI   - Diet as tolerated  - Bowel regimen      Fluids/ Electrolytes/Renal:  #No active issues   - Continue to monitor intake and output.     Endocrine:  #IDDM (A1c 5.7)  #Stress hyperglycemia  - Sliding scale insulin   - Hold PTA metformin      ID/ Antibiotics:  #Periop-pharmacoprophylasix  - Completed vancomycin and cefazolin    Heme:  # H/o DVT and arterial thrombus - apixaban stopped due to GIB  #  Anemia     - Hgb goal >7  - Continue warfarin      Prophylaxis:    - Mechanical prophylaxis for DVT.   - Warfarin  - Protonix qd     Lines/ tubes/ drains:  - PIV   - CT    Disposition:  - CVICU. Will likely transfer to floor tomorrow if remains sinus / rate controlled      Patient seen, findings and plan discussed with CV ICU staff, Dr. Tracy.    Sebastien Mojica MD  Surgery Resident PGY3    ====================================    SUBJECTIVE:   Tired this morning, didn't sleep much overnight. A bit dizzy prior to conversion to sinus. Tolerated breakfast. Voiding. Denies fevers / chills.     OBJECTIVE:   1. VITAL SIGNS:   Temp:  [97.8  F (36.6  C)-99  F (37.2  C)] 98.7  F (37.1  C)  Pulse:  [] 65  Resp:  [18-20] 18  BP: ()/(49-87) 131/71  SpO2:  [88 %-100 %] 100 %  Resp: 18      2. INTAKE/ OUTPUT:   I/O last 3 completed shifts:  In: 1673 [P.O.:840; I.V.:833]  Out: 1150 [Urine:1000; Chest Tube:150]    3. PHYSICAL EXAMINATION:   General: Sitting up in the chair. NAD.   Neuro: A&Ox3, NAD  Resp: Breathing non-labored on room air   CV: Afib 110s, converted to sinus jeremy in 30s, now improved rates  Abdomen: Soft, Non-distended, Non-tender  Incisions: dressing WDI over sternal incision. Chest tube dressing WDI.   Extremities: warm and well perfused.     4. INVESTIGATIONS:     Arterial Blood Gases   Recent Labs   Lab 12/29/20  1920 12/29/20  1735 12/29/20  1437 12/29/20  1323   PH 7.37 7.35 7.31* 7.32*   PCO2 43 44 49* 48*   PO2 85 110* 91 291*   HCO3 25 24 24 25     Complete Blood Count   Recent Labs   Lab 01/01/21  0307 12/31/20  2148 12/31/20  0541 12/30/20  0317   WBC 12.1* 12.4* 13.8* 12.6*   HGB 9.2* 9.4* 10.0* 10.2*    183 150 164     Basic Metabolic Panel  Recent Labs   Lab 01/01/21  0307 12/31/20  2148 12/31/20  0541 12/30/20  0317    139 137 143   POTASSIUM 3.6 4.1 4.0 3.7   CHLORIDE 105 107 107 110*   CO2 25 24 25 27   BUN 16 15 10 9   CR 0.61 0.68 0.66 0.59   * 193* 135* 102*      Liver Function Tests  Recent Labs   Lab 01/01/21  0307 12/31/20  2148 12/31/20  0541 12/30/20  0317 12/29/20  1920 12/29/20  1437   AST 27  --   --  62* 59* 43   ALT 14  --   --  20 22 17   ALKPHOS 60  --   --  49 51 44   BILITOTAL 0.2  --   --  0.4 0.5 0.4   ALBUMIN 2.4*  --   --  2.8* 3.0* 2.6*   INR 2.23* 1.85* 1.59*  --  1.25* 1.52*     Pancreatic Enzymes  No lab results found in last 7 days.  Coagulation Profile  Recent Labs   Lab 01/01/21  0307 12/31/20 2148 12/31/20  0541 12/29/20 1920 12/29/20  1437 12/29/20  1319   INR 2.23* 1.85* 1.59* 1.25* 1.52* 1.75*   PTT  --   --   --  34 33 38*         5. RADIOLOGY:   Recent Results (from the past 24 hour(s))   XR Chest Port 1 View   Result Value    Radiologist flags small left apical pneumothorax (Urgent)    Narrative    EXAM: XR CHEST PORT 1 VW  12/31/2020 10:15 PM      HISTORY: Chest pain    COMPARISON: X-ray 12/31/2020    FINDINGS: Single view of the chest. Upright radiograph of the chest.  Intact median sternotomy wires and aortic and mitral valve  replacements and mediastinal clips. Mediastinal drains unchanged. New,  small left apical pneumothorax. No right-sided pneumothorax. Small  right pleural effusion with overlying right basilar opacity. Dense  retrocardiac opacity. No aggressive osseous lesions.      Impression    IMPRESSION:    1. New small left apical pneumothorax.  2. Unchanged small right pleural effusion.  3. Dense retrocardiac opacities and right basilar opacity, similar to  prior.    [Urgent Result: small left apical pneumothorax]    Finding was identified on 12/31/2020 10:18 PM.     Virginia Palacios MD was contacted by Dr. Springer at 12/31/2020 10:22 PM  and verbalized understanding of the urgent finding.     I have personally reviewed the examination and initial interpretation  and I agree with the findings.    ERICKSON HIGGINS MD   XR Chest Port 1 View    Narrative    EXAM: XR CHEST PORT 1 VW  1/1/2021 2:24 AM      HISTORY: s/p MVR and  AVR    COMPARISON: X-ray 12/31/2020    FINDINGS: Single upright view of the chest. Intact median sternotomy  wires and aortic and mitral valve replacements and mediastinal clips.  Mediastinal drains appear unchanged. Decreased size of the small left  apical pneumothorax. No right-sided pneumothorax. Small right pleural  effusion with overlying right basilar opacity. Unchanged retrocardiac  opacity favoring atelectasis. No aggressive appearing osseous lesions.      Impression    IMPRESSION:    1. Decreased to minimal left apical pneumothorax.  2. Unchanged small right pleural effusion.  3. Dense retrocardiac opacities and right basilar opacity, stable.    I have personally reviewed the examination and initial interpretation  and I agree with the findings.    ERICKSON HIGGINS MD       =========================================

## 2021-01-01 NOTE — PROGRESS NOTES
"Cross-cover note: 9:44 PM 12/31/2020 12/31/2020    General Surgery Cross Cover Note    Called by nursing regarding new afib with RVR and relative hypotension     Per patient she is having new CP, SOB, back and neck pain. She feels lightheaded and \"feels really bad\"     B/P: 121/59, T: 98, P: 162, R: 18    PE:  Tired, breathless, sitting up in bed   Shallow, sating high 90s on 2L, lungs clear, irregularly irregular    Abd soft, non-tender, non-distended  Extr. Warm to touch  Incisions clean, dry, intact    Plan:  -CXR- showing new small left apical pneumothorax  -CBC, BMP, Mag Phos, troponin  -Discussed with maninder resident and Marcus Greene MD, initially plan to bolus amio and start gtt, became acutely hypotensive to SBPs in the 70s-80s, symptomatic with CP, lightheadedness, SOB, with continued rates in the 160s, initiated transfer to the CVICU, CVICU moonlighter accepted patient and appropriate charge nurses were notified, transfer order placed   -If systolic BP remains in the 70s or if MAPs decrease, okay to give 250cc Bolus    - Will hold amio bolus for now until safe transfer is complete     Virginia Palacios MD  General Surgery, PGY-1        "

## 2021-01-01 NOTE — PROGRESS NOTES
Transfer  Transferred to: 4E  Via: bed  Reason for transfer: Pt inappropriate for 6C- worsening patient condition  Family: Pt would like daughter updated in AM, does not want to worry her overnight  Belongings: Sent with pt  Chart: Sent with pt  Medications: Meds from bin sent with pt  Report called to: KAREEM Jett

## 2021-01-01 NOTE — PLAN OF CARE
Changes this shift: continues to be in afib with RVR, amio bolus given and gtt started. HR now 100-140. One-time 2g mag given. PRN oxycodone and tylenol given for pain. CTs have intermittent air leak, Vaseline gauze applied without improvement. Confused shortly following arrival to , improved throughout night. Daughter updated on phone.     Plan: Continue to monitor and notify MD with pertinent changes.

## 2021-01-01 NOTE — PLAN OF CARE
PT: Cx, pt not medically appropriate for any significant activity at this time. Per OT tachycardic to 148 bpm with pivoting. PT hoping to trial gait and stairs when appropriate.

## 2021-01-01 NOTE — PROGRESS NOTES
"   01/01/21 0800   Quick Adds   Type of Visit Initial Occupational Therapy Evaluation   Living Environment   People in home child(suad), adult   Current Living Arrangements house   Home Accessibility stairs to enter home;stairs within home   Number of Stairs, Main Entrance other (see comments)  (patient lethargic and with difficulty recalling # of stairs)   Transportation Anticipated family or friend will provide   Living Environment Comments Lives in house with adult daughter who is home 24 hours (worked as  - was laid off because of COVID) patient mainly stays on 1st level of home   Self-Care   Usual Activity Tolerance moderate   Current Activity Tolerance poor   Equipment Currently Used at Home none   Activity/Exercise/Self-Care Comment Patient reports IND at baseline but also states that daughter is \"a great nurse\"   Disability/Function   Hearing Difficulty or Deaf no   Wear Glasses or Blind yes   Concentrating, Remembering or Making Decisions Difficulty no   Difficulty Communicating no   Difficulty Eating/Swallowing no   Walking or Climbing Stairs Difficulty no   Dressing/Bathing Difficulty no   Toileting issues no   Doing Errands Independently Difficulty (such as shopping) no   Fall history within last six months no   Change in Functional Status Since Onset of Current Illness/Injury yes   General Information   Onset of Illness/Injury or Date of Surgery 12/31/21   Referring Physician Quinton Handy PA   Additional Occupational Profile Info/Pertinent History of Current Problem Rajani Guo is a 70 year old female with PMH of hypertension, pulmonary hypertension, dyslipidemia, current tobacco use, h/o DVT, h/o arterial thrombus, diabetes, GERD, osteoporosis POD#1 from AVR. MVR with mechanical valves by Dr. Lara on 12/29.   Existing Precautions/Restrictions fall;sternal;cardiac   Left Upper Extremity (Weight-bearing Status)   (10# lifting restriction)   Right Upper Extremity (Weight-bearing " Status)   (10# lifting restriction)   Cognitive Status Examination   Cognitive Status Comments OT: No concerns noted at this time   Visual Perception   Impact of Vision Impairment on Function (Vision) OT: No acute changes reported   Range of Motion Comprehensive   Comment, General Range of Motion OT: BUE WFL w/in precautions   Strength Comprehensive (MMT)   Comment, General Manual Muscle Testing (MMT) Assessment OT: NT formally 2/2 precautions   Bed Mobility   Comment (Bed Mobility) OT: mod A   Transfers   Transfer Comments OT: min A   Balance   Balance Comments OT: min A   Activities of Daily Living   BADL Assessment/Intervention   (OT: LB dressing max A)   Clinical Impression   Criteria for Skilled Therapeutic Interventions Met (OT) yes   OT Diagnosis decreased ind in ADLS/IADLS   OT Problem List-Impairments impacting ADL problems related to;activity tolerance impaired;balance;pain;post-surgical precautions;strength   Assessment of Occupational Performance 1-3 Performance Deficits   Planned Therapy Interventions (OT) ADL retraining;IADL retraining;balance training;bed mobility training;strengthening;transfer training;home program guidelines;progressive activity/exercise   Clinical Decision Making Complexity (OT) low complexity   Therapy Frequency (OT) 6x/week   Predicted Duration of Therapy 1/25/20   Risks and Benefits of Treatment have been explained. Yes   Patient, Family & other staff in agreement with plan of care Yes   OT Discharge Planning    OT Discharge Recommendation (DC Rec) [facility]-Motivated patient will benefit from intensive, interdisciplinary therapy.  Anticipate will be able to tolerate 3 hours of therapy per day   OT Rationale for DC Rec to increase ind in ADLS/IADLS   Total Evaluation Time (Minutes)   Total Evaluation Time (Minutes) 4

## 2021-01-01 NOTE — PROGRESS NOTES
Transfer Note:    Transferred from:     For: higher level of care. Afib with RVR with hypotension  Belongings: cell phone, glasses, personal bag, dentures  2 CT placed to suction, air leak present. Dressings changed  2 RN skin assessment done with:Manuel HIRSCH RN. Blanchable redness on coccyx, sternal incision and CT sites.   250 NS bolus given on arrival. Amio bolus given and gtt started  Daughter updated on pt status and transfer.     Continue to monitor.

## 2021-01-01 NOTE — PLAN OF CARE
"Mitral and arotic incompetence and severe stenosis who is now POD # 2s/p AVR and MVR with mechanical valves. Pmhx: current tobacco use, dm2, Gerd, osteoporosis HTN    Code status: Full     Team: CVTS  Changed: Insulin drip discontinued                    Whittaker removed                    Spinal catheter removed  PRN: Magnesium replacement (1.7)            IV diladud 0.5 mg x 1    Neuro: AO*4, legtheric, often times falls asleep when talking  Cardiac/Tele:  SR, click heard upon ausculting   Respiratory: 1L NC sating in mid 90s  GI/: whittaker pulled at 1530 , pt has not urinated or feel the urge to urinate,  LBM 12/28 scheduled stool softener given AM  Diet/Appetite: Advanced to regular diet today, poor diet  Skin: Sternal incisional site CDI, chest tube dressing CDI,   Endocrine: ACHS, with carb coverage, calorie count  LDAs: two chest tube to one canister (air leak, CVTS informed and told to closely monitor)  Activity: assist x 2   Pain: c/o of body aching \"everywhere\", main pain is incision site     Plan: Encourage to urinate and if not urination occurs bladder scan at 1930     Kia Lopez, RN  Time cared for 0700 - 1930    "

## 2021-01-01 NOTE — PROVIDER NOTIFICATION
Pt flipped into AFib with rates 150's-160's at 2115. Pt experiencing new onset chest pain radiating to arms and neck. New pain at chest tube sites. Complaining of shortness of breath and dizziness. Paged on-call surgery resident. Stat EKG, Xray, and labs ordered. Will continue to monitor pt.

## 2021-01-02 ENCOUNTER — APPOINTMENT (OUTPATIENT)
Dept: PHYSICAL THERAPY | Facility: CLINIC | Age: 71
DRG: 219 | End: 2021-01-02
Attending: SURGERY
Payer: COMMERCIAL

## 2021-01-02 LAB
ABO + RH BLD: NORMAL
ABO + RH BLD: NORMAL
ALBUMIN SERPL-MCNC: 2.5 G/DL (ref 3.4–5)
ALP SERPL-CCNC: 61 U/L (ref 40–150)
ALT SERPL W P-5'-P-CCNC: 15 U/L (ref 0–50)
ANION GAP SERPL CALCULATED.3IONS-SCNC: 6 MMOL/L (ref 3–14)
AST SERPL W P-5'-P-CCNC: 23 U/L (ref 0–45)
BILIRUB DIRECT SERPL-MCNC: 0.1 MG/DL (ref 0–0.2)
BILIRUB SERPL-MCNC: 0.3 MG/DL (ref 0.2–1.3)
BLD GP AB SCN SERPL QL: NORMAL
BLD PROD TYP BPU: NORMAL
BLD PROD TYP BPU: NORMAL
BLD UNIT ID BPU: 0
BLD UNIT ID BPU: 0
BLOOD BANK CMNT PATIENT-IMP: NORMAL
BLOOD PRODUCT CODE: NORMAL
BLOOD PRODUCT CODE: NORMAL
BPU ID: NORMAL
BPU ID: NORMAL
BUN SERPL-MCNC: 11 MG/DL (ref 7–30)
CA-I BLD-MCNC: 4.5 MG/DL (ref 4.4–5.2)
CALCIUM SERPL-MCNC: 8.2 MG/DL (ref 8.5–10.1)
CHLORIDE SERPL-SCNC: 105 MMOL/L (ref 94–109)
CO2 SERPL-SCNC: 26 MMOL/L (ref 20–32)
CREAT SERPL-MCNC: 0.56 MG/DL (ref 0.52–1.04)
ERYTHROCYTE [DISTWIDTH] IN BLOOD BY AUTOMATED COUNT: 18.4 % (ref 10–15)
GFR SERPL CREATININE-BSD FRML MDRD: >90 ML/MIN/{1.73_M2}
GLUCOSE BLDC GLUCOMTR-MCNC: 136 MG/DL (ref 70–99)
GLUCOSE BLDC GLUCOMTR-MCNC: 141 MG/DL (ref 70–99)
GLUCOSE BLDC GLUCOMTR-MCNC: 150 MG/DL (ref 70–99)
GLUCOSE BLDC GLUCOMTR-MCNC: 153 MG/DL (ref 70–99)
GLUCOSE BLDC GLUCOMTR-MCNC: 167 MG/DL (ref 70–99)
GLUCOSE BLDC GLUCOMTR-MCNC: 179 MG/DL (ref 70–99)
GLUCOSE BLDC GLUCOMTR-MCNC: 247 MG/DL (ref 70–99)
GLUCOSE SERPL-MCNC: 153 MG/DL (ref 70–99)
HCT VFR BLD AUTO: 28 % (ref 35–47)
HGB BLD-MCNC: 8.7 G/DL (ref 11.7–15.7)
INR PPP: 3.72 (ref 0.86–1.14)
INTERPRETATION ECG - MUSE: NORMAL
MAGNESIUM SERPL-MCNC: 1.6 MG/DL (ref 1.6–2.3)
MCH RBC QN AUTO: 27.8 PG (ref 26.5–33)
MCHC RBC AUTO-ENTMCNC: 31.1 G/DL (ref 31.5–36.5)
MCV RBC AUTO: 90 FL (ref 78–100)
PHOSPHATE SERPL-MCNC: 2.5 MG/DL (ref 2.5–4.5)
PLATELET # BLD AUTO: 228 10E9/L (ref 150–450)
POTASSIUM SERPL-SCNC: 3.6 MMOL/L (ref 3.4–5.3)
PROT SERPL-MCNC: 5.8 G/DL (ref 6.8–8.8)
RBC # BLD AUTO: 3.13 10E12/L (ref 3.8–5.2)
SODIUM SERPL-SCNC: 137 MMOL/L (ref 133–144)
SPECIMEN EXP DATE BLD: NORMAL
TRANSFUSION STATUS PATIENT QL: NORMAL
WBC # BLD AUTO: 9.3 10E9/L (ref 4–11)

## 2021-01-02 PROCEDURE — 250N000011 HC RX IP 250 OP 636: Performed by: PHYSICIAN ASSISTANT

## 2021-01-02 PROCEDURE — 86901 BLOOD TYPING SEROLOGIC RH(D): CPT | Performed by: SURGERY

## 2021-01-02 PROCEDURE — 85027 COMPLETE CBC AUTOMATED: CPT | Performed by: PHYSICIAN ASSISTANT

## 2021-01-02 PROCEDURE — 36415 COLL VENOUS BLD VENIPUNCTURE: CPT | Performed by: PHYSICIAN ASSISTANT

## 2021-01-02 PROCEDURE — 80076 HEPATIC FUNCTION PANEL: CPT | Performed by: PHYSICIAN ASSISTANT

## 2021-01-02 PROCEDURE — 250N000013 HC RX MED GY IP 250 OP 250 PS 637: Performed by: STUDENT IN AN ORGANIZED HEALTH CARE EDUCATION/TRAINING PROGRAM

## 2021-01-02 PROCEDURE — 250N000013 HC RX MED GY IP 250 OP 250 PS 637: Performed by: SURGERY

## 2021-01-02 PROCEDURE — 36415 COLL VENOUS BLD VENIPUNCTURE: CPT | Performed by: SURGERY

## 2021-01-02 PROCEDURE — 82330 ASSAY OF CALCIUM: CPT | Performed by: SURGERY

## 2021-01-02 PROCEDURE — 83735 ASSAY OF MAGNESIUM: CPT | Performed by: PHYSICIAN ASSISTANT

## 2021-01-02 PROCEDURE — 97116 GAIT TRAINING THERAPY: CPT | Mod: GP

## 2021-01-02 PROCEDURE — 99231 SBSQ HOSP IP/OBS SF/LOW 25: CPT | Mod: GC | Performed by: ANESTHESIOLOGY

## 2021-01-02 PROCEDURE — 999N001017 HC STATISTIC GLUCOSE BY METER IP

## 2021-01-02 PROCEDURE — 250N000013 HC RX MED GY IP 250 OP 250 PS 637: Performed by: NURSE PRACTITIONER

## 2021-01-02 PROCEDURE — 36415 COLL VENOUS BLD VENIPUNCTURE: CPT | Performed by: STUDENT IN AN ORGANIZED HEALTH CARE EDUCATION/TRAINING PROGRAM

## 2021-01-02 PROCEDURE — 80048 BASIC METABOLIC PNL TOTAL CA: CPT | Performed by: PHYSICIAN ASSISTANT

## 2021-01-02 PROCEDURE — 86850 RBC ANTIBODY SCREEN: CPT | Performed by: SURGERY

## 2021-01-02 PROCEDURE — 84100 ASSAY OF PHOSPHORUS: CPT | Performed by: PHYSICIAN ASSISTANT

## 2021-01-02 PROCEDURE — 214N000001 HC R&B CCU UMMC

## 2021-01-02 PROCEDURE — 97530 THERAPEUTIC ACTIVITIES: CPT | Mod: GP

## 2021-01-02 PROCEDURE — 86900 BLOOD TYPING SEROLOGIC ABO: CPT | Performed by: SURGERY

## 2021-01-02 PROCEDURE — 85610 PROTHROMBIN TIME: CPT | Performed by: STUDENT IN AN ORGANIZED HEALTH CARE EDUCATION/TRAINING PROGRAM

## 2021-01-02 PROCEDURE — 250N000011 HC RX IP 250 OP 636: Performed by: SURGERY

## 2021-01-02 PROCEDURE — 250N000013 HC RX MED GY IP 250 OP 250 PS 637: Performed by: PHYSICIAN ASSISTANT

## 2021-01-02 RX ORDER — WARFARIN SODIUM 1 MG/1
2 TABLET ORAL
Status: COMPLETED | OUTPATIENT
Start: 2021-01-02 | End: 2021-01-02

## 2021-01-02 RX ORDER — MAGNESIUM SULFATE HEPTAHYDRATE 40 MG/ML
2 INJECTION, SOLUTION INTRAVENOUS ONCE
Status: COMPLETED | OUTPATIENT
Start: 2021-01-02 | End: 2021-01-02

## 2021-01-02 RX ORDER — POTASSIUM CHLORIDE 1.5 G/1.58G
40 POWDER, FOR SOLUTION ORAL ONCE
Status: COMPLETED | OUTPATIENT
Start: 2021-01-02 | End: 2021-01-02

## 2021-01-02 RX ORDER — ACETAMINOPHEN 325 MG/1
975 TABLET ORAL 3 TIMES DAILY
Status: DISCONTINUED | OUTPATIENT
Start: 2021-01-02 | End: 2021-01-05

## 2021-01-02 RX ORDER — TRAZODONE HYDROCHLORIDE 50 MG/1
50 TABLET, FILM COATED ORAL AT BEDTIME
Status: DISCONTINUED | OUTPATIENT
Start: 2021-01-02 | End: 2021-01-03

## 2021-01-02 RX ADMIN — INSULIN ASPART 1 UNITS: 100 INJECTION, SOLUTION INTRAVENOUS; SUBCUTANEOUS at 11:52

## 2021-01-02 RX ADMIN — Medication 2.5 MG: at 10:00

## 2021-01-02 RX ADMIN — ACETAMINOPHEN 975 MG: 325 TABLET, FILM COATED ORAL at 14:19

## 2021-01-02 RX ADMIN — Medication 1 PATCH: at 08:26

## 2021-01-02 RX ADMIN — LIDOCAINE 1 PATCH: 560 PATCH PERCUTANEOUS; TOPICAL; TRANSDERMAL at 08:10

## 2021-01-02 RX ADMIN — BUPROPION HYDROCHLORIDE 150 MG: 150 TABLET, EXTENDED RELEASE ORAL at 09:11

## 2021-01-02 RX ADMIN — DOCUSATE SODIUM AND SENNOSIDES 1 TABLET: 8.6; 5 TABLET, FILM COATED ORAL at 08:10

## 2021-01-02 RX ADMIN — MAGNESIUM SULFATE IN WATER 2 G: 40 INJECTION, SOLUTION INTRAVENOUS at 08:11

## 2021-01-02 RX ADMIN — PANTOPRAZOLE SODIUM 40 MG: 40 TABLET, DELAYED RELEASE ORAL at 08:10

## 2021-01-02 RX ADMIN — ASPIRIN 81 MG CHEWABLE TABLET 81 MG: 81 TABLET CHEWABLE at 08:10

## 2021-01-02 RX ADMIN — DOCUSATE SODIUM AND SENNOSIDES 1 TABLET: 8.6; 5 TABLET, FILM COATED ORAL at 19:23

## 2021-01-02 RX ADMIN — ATORVASTATIN CALCIUM 40 MG: 40 TABLET, FILM COATED ORAL at 08:10

## 2021-01-02 RX ADMIN — INSULIN ASPART 3 UNITS: 100 INJECTION, SOLUTION INTRAVENOUS; SUBCUTANEOUS at 21:31

## 2021-01-02 RX ADMIN — POTASSIUM CHLORIDE 40 MEQ: 1.5 POWDER, FOR SOLUTION ORAL at 08:10

## 2021-01-02 RX ADMIN — WARFARIN SODIUM 2 MG: 1 TABLET ORAL at 17:18

## 2021-01-02 RX ADMIN — INSULIN ASPART 1 UNITS: 100 INJECTION, SOLUTION INTRAVENOUS; SUBCUTANEOUS at 08:31

## 2021-01-02 RX ADMIN — TRAZODONE HYDROCHLORIDE 50 MG: 50 TABLET ORAL at 19:23

## 2021-01-02 RX ADMIN — Medication 12.5 MG: at 19:23

## 2021-01-02 RX ADMIN — INSULIN ASPART 1 UNITS: 100 INJECTION, SOLUTION INTRAVENOUS; SUBCUTANEOUS at 03:39

## 2021-01-02 RX ADMIN — BUPROPION HYDROCHLORIDE 150 MG: 150 TABLET, EXTENDED RELEASE ORAL at 20:15

## 2021-01-02 RX ADMIN — ACETAMINOPHEN 975 MG: 325 TABLET, FILM COATED ORAL at 19:23

## 2021-01-02 RX ADMIN — OXYBUTYNIN CHLORIDE 5 MG: 5 TABLET ORAL at 08:31

## 2021-01-02 RX ADMIN — OXYBUTYNIN CHLORIDE 5 MG: 5 TABLET ORAL at 19:23

## 2021-01-02 RX ADMIN — Medication 12.5 MG: at 08:10

## 2021-01-02 RX ADMIN — HEPARIN SODIUM 5000 UNITS: 5000 INJECTION, SOLUTION INTRAVENOUS; SUBCUTANEOUS at 14:18

## 2021-01-02 RX ADMIN — HEPARIN SODIUM 5000 UNITS: 5000 INJECTION, SOLUTION INTRAVENOUS; SUBCUTANEOUS at 21:31

## 2021-01-02 RX ADMIN — HEPARIN SODIUM 5000 UNITS: 5000 INJECTION, SOLUTION INTRAVENOUS; SUBCUTANEOUS at 05:52

## 2021-01-02 RX ADMIN — MELATONIN TAB 3 MG 3 MG: 3 TAB at 21:31

## 2021-01-02 RX ADMIN — ACETAMINOPHEN 975 MG: 325 TABLET, FILM COATED ORAL at 09:53

## 2021-01-02 ASSESSMENT — ACTIVITIES OF DAILY LIVING (ADL)
ADLS_ACUITY_SCORE: 15
ADLS_ACUITY_SCORE: 14

## 2021-01-02 ASSESSMENT — MIFFLIN-ST. JEOR: SCORE: 1174

## 2021-01-02 NOTE — PLAN OF CARE
Neuro: Pt A&Ox4 with forgetfulness/confusion present overnight. Pt slept on/off overnight. Pt c/o back pain - PRN Oxycodone given x1 and PRN Dilaudid given x1. Pt up to bedside commode with 1 assist.    Cardiac: SR. HR 70s. MAPS>65, SBP<140.    Respiratory: On 2L O2/NC. Lung sounds clear. Coughing done independently.     GI/: Tolerating regular diet with low intake present. Voiding in commode. LBM 12/28.     Incisons: Chest incision CDI/DEEPA. Chest tubes to suction with air leak present and 5-10ml/hr of serosanguinous output present.    Lines: PIVx2.    Daughter called RN for update at ~0630.  Continue to monitor and update MD as needed. Continue plan of care.       Problem: Adult Inpatient Plan of Care  Goal: Plan of Care Review  Outcome: No Change

## 2021-01-02 NOTE — PLAN OF CARE
Hx: Rajani is a CVTS Pt c/ PMH of HTN, pHTN, dyslipidemia, tobacco use, DVT hx, arterial thrombus hx, DM, and GERD who was admitted 12/29 for MVR & TVR that was c/b AF RVR c/ hypotension s/p amio gtt infusion. Pt is being treated for aortic stenosis and mitral valve stenosis s/p AVR/MVR on 12/29. Currently CT in place. Pt is also being treated for IDDM/stress induced hyperglucemia Tx c/ sliding scale and anemia.  Neuro: A&OX4, Ambulation: SBA.  V/S: VSS, Pt is on RA in the daytime. Pain: denies.   BGL: 130 - 140 mg/dL range at AC. Sliding scale insulin administered per protocol.  Tele/CV: SR, Pt denies CP. Pt denies palpitations. Valve click noted on exam. S1, S2.  Resp: LS are clear, equal bilaterally. Pt denies SOB.  : Voids spontaneously.  GI: Complains of constipation. PRN senna administered in CVICU, Pt is passing flatus.  LDTs: R/L mediastinal CT set to -20 cm suction, absence of CT unger, minimal OP since arrival to 6C.  Plan: Continue to monitor Pt on 6C unit.  Other: Magnesium and potassium replaced in the CVICU prior to arrival to 6C unit.

## 2021-01-02 NOTE — PROGRESS NOTES
The following items were received with pt from :  Glasses  Dentures  cell phone  Clothing  Shoes  Jacket  ID card  Blue luggage

## 2021-01-02 NOTE — PROGRESS NOTES
CV ICU PROGRESS NOTE  January 2, 2021      CO-MORBIDITIES:   Mitral and aortic incompetence  Mitral and aortic incompetence  Severe Mitral Stenosis   Severe aortic stenosis  Atrial fibrillation     ASSESSMENT: Rajani Guo is a 70 year old female with PMH of hypertension, pulmonary hypertension, dyslipidemia, current tobacco use, h/o DVT, h/o arterial thrombus, diabetes, GERD, osteoporosis who is s/p AVR and MVR with mechanical valves by Dr. Lara on 12/29. She recovered well, however was re-admitted to the CVICU on 01/01 following the onset of afib with RVR with hypotension. She recovered well following conversion to sinus after amiodarone, though with delirium overnight. Plan for return to floor today.     TODAY'S PROGRESS:   - Replete lytes   - Decrease oxycodone dose  - Schedule APAP  - Transfer to floor    PLAN:  Neuro/ pain/ sedation:  #Post-op pain  #Delirium  - Monitor neurological status. Notify the MD for any acute changes in exam.  - APAP / oxycodone / bupropion  - Delirium precautions     Pulmonary care:  #Tobacco dependence   - Extubated 12/29  - Titrate FiO2 for SpO2 >92%  - Nicoderm patch      Cardiovascular:  #HLD  #HTN  #Severe aortic stenosis and severe mitral stenosis s/p AVR and MVR 12/29  #Pulmonary HTN   #Afib with RVR, resolved  - Monitor hemodynamic status.   - MAP >65  - ASA / atorvastatin / metoprolol / amio bolus / warfarin started 12/31   - Chest tube 110 ml / 24 hours     GI/Nutrition:  #H/o GI bleed September 2020  #GERD  - 2 small AVMs that were not actively bleeding. Tx with PPI   - Regular diet  - Bowel regimen      Fluids/ Electrolytes/Renal:  #No active issues   - Continue to monitor intake and output.     Endocrine:  #IDDM (A1c 5.7)  #Stress hyperglycemia  - Sliding scale insulin   - Hold PTA metformin      ID/ Antibiotics:  #Periop-pharmacoprophylasix  - Completed vancomycin and cefazolin    Heme:  # H/o DVT and arterial thrombus (apixaban stopped due to GIB)  # Anemia     -  Hgb goal >7  - Continue warfarin. INR 3.7 today, but will continue current dose given amiodarone yesterday     Prophylaxis:    - Mechanical prophylaxis for DVT.   - Warfarin  - Protonix qd     Lines/ tubes/ drains:  - PIV   - CT    Disposition:  - CVICU, transfer to floor     Patient seen, findings and plan discussed with CV ICU staff, Dr. Tracy.    Sebastien Mojica MD  Surgery Resident PGY3    ====================================    SUBJECTIVE:   Overnight called her daughter to tell her that she had just had surgery. Seemed associated with oxycodone dose. Feels well this morning. No chest pain or dizziness.      OBJECTIVE:   1. VITAL SIGNS:   Temp:  [98.2  F (36.8  C)-99.2  F (37.3  C)] 98.2  F (36.8  C)  Pulse:  [60-78] 73  Resp:  [13-25] 24  BP: (103-149)/(50-79) 121/57  SpO2:  [92 %-100 %] 93 %  Resp: 24      2. INTAKE/ OUTPUT:   I/O last 3 completed shifts:  In: 776 [P.O.:235; I.V.:541]  Out: 1290 [Urine:1150; Chest Tube:140]    3. PHYSICAL EXAMINATION:   General: Sitting up in chair. NAD.   Neuro: A&Ox3, NAD  Resp: Breathing non-labored on room air   CV: RRR, 70s  Abdomen: Soft, Non-distended, Non-tender  Incisions: Dressing WDI over sternal incision. Chest tube output serosanguinous    Extremities: warm and well perfused.     4. INVESTIGATIONS:     Arterial Blood Gases   Recent Labs   Lab 12/29/20  1920 12/29/20  1735 12/29/20  1437 12/29/20  1323   PH 7.37 7.35 7.31* 7.32*   PCO2 43 44 49* 48*   PO2 85 110* 91 291*   HCO3 25 24 24 25     Complete Blood Count   Recent Labs   Lab 01/02/21  0346 01/01/21  0307 12/31/20  2148 12/31/20  0541   WBC 9.3 12.1* 12.4* 13.8*   HGB 8.7* 9.2* 9.4* 10.0*    173 183 150     Basic Metabolic Panel  Recent Labs   Lab 01/02/21  0346 01/01/21  0307 12/31/20  2148 12/31/20  0541    137 139 137   POTASSIUM 3.6 3.6 4.1 4.0   CHLORIDE 105 105 107 107   CO2 26 25 24 25   BUN 11 16 15 10   CR 0.56 0.61 0.68 0.66   * 189* 193* 135*     Liver Function Tests  Recent  Labs   Lab 01/02/21 0346 01/01/21  0307 12/31/20 2148 12/31/20  0541 12/30/20  0317 12/29/20  1920   AST 23 27  --   --  62* 59*   ALT 15 14  --   --  20 22   ALKPHOS 61 60  --   --  49 51   BILITOTAL 0.3 0.2  --   --  0.4 0.5   ALBUMIN 2.5* 2.4*  --   --  2.8* 3.0*   INR 3.72* 2.23* 1.85* 1.59*  --  1.25*     Pancreatic Enzymes  No lab results found in last 7 days.  Coagulation Profile  Recent Labs   Lab 01/02/21 0346 01/01/21 0307 12/31/20 2148 12/31/20  0541 12/29/20  1920 12/29/20  1437 12/29/20  1319   INR 3.72* 2.23* 1.85* 1.59* 1.25* 1.52* 1.75*   PTT  --   --   --   --  34 33 38*         5. RADIOLOGY:   No results found for this or any previous visit (from the past 24 hour(s)).    =========================================

## 2021-01-02 NOTE — PLAN OF CARE
The patient transferred with SBA without a rolling walker and with one depending on patients needs.   She c/o pain at the sternum. She was given oxycodone and tylenol within 30 minutes she pain had decreased.       The patient transferred to  bed 13-1 without complications, report was given to RN prior to transfer. All of her belongings were brought with to her new unit.   Narcisa Culver RN on 1/2/2021 at 3:19 PM

## 2021-01-03 ENCOUNTER — APPOINTMENT (OUTPATIENT)
Dept: GENERAL RADIOLOGY | Facility: CLINIC | Age: 71
DRG: 219 | End: 2021-01-03
Attending: SURGERY
Payer: COMMERCIAL

## 2021-01-03 ENCOUNTER — APPOINTMENT (OUTPATIENT)
Dept: GENERAL RADIOLOGY | Facility: CLINIC | Age: 71
DRG: 219 | End: 2021-01-03
Attending: PHYSICIAN ASSISTANT
Payer: COMMERCIAL

## 2021-01-03 ENCOUNTER — HEALTH MAINTENANCE LETTER (OUTPATIENT)
Age: 71
End: 2021-01-03

## 2021-01-03 ENCOUNTER — APPOINTMENT (OUTPATIENT)
Dept: OCCUPATIONAL THERAPY | Facility: CLINIC | Age: 71
DRG: 219 | End: 2021-01-03
Attending: SURGERY
Payer: COMMERCIAL

## 2021-01-03 LAB
ALBUMIN UR-MCNC: 10 MG/DL
ANION GAP SERPL CALCULATED.3IONS-SCNC: 6 MMOL/L (ref 3–14)
APPEARANCE UR: ABNORMAL
BACTERIA #/AREA URNS HPF: ABNORMAL /HPF
BILIRUB UR QL STRIP: NEGATIVE
BLD PROD TYP BPU: NORMAL
BLD UNIT ID BPU: 0
BLD UNIT ID BPU: 0
BLOOD PRODUCT CODE: NORMAL
BLOOD PRODUCT CODE: NORMAL
BPU ID: NORMAL
BPU ID: NORMAL
BUN SERPL-MCNC: 6 MG/DL (ref 7–30)
CALCIUM SERPL-MCNC: 8.2 MG/DL (ref 8.5–10.1)
CHLORIDE SERPL-SCNC: 110 MMOL/L (ref 94–109)
CO2 SERPL-SCNC: 26 MMOL/L (ref 20–32)
COLOR UR AUTO: YELLOW
CREAT SERPL-MCNC: 0.54 MG/DL (ref 0.52–1.04)
ERYTHROCYTE [DISTWIDTH] IN BLOOD BY AUTOMATED COUNT: 18.2 % (ref 10–15)
GFR SERPL CREATININE-BSD FRML MDRD: >90 ML/MIN/{1.73_M2}
GLUCOSE BLDC GLUCOMTR-MCNC: 117 MG/DL (ref 70–99)
GLUCOSE BLDC GLUCOMTR-MCNC: 140 MG/DL (ref 70–99)
GLUCOSE BLDC GLUCOMTR-MCNC: 154 MG/DL (ref 70–99)
GLUCOSE BLDC GLUCOMTR-MCNC: 156 MG/DL (ref 70–99)
GLUCOSE BLDC GLUCOMTR-MCNC: 173 MG/DL (ref 70–99)
GLUCOSE BLDC GLUCOMTR-MCNC: 200 MG/DL (ref 70–99)
GLUCOSE SERPL-MCNC: 136 MG/DL (ref 70–99)
GLUCOSE UR STRIP-MCNC: 70 MG/DL
HCT VFR BLD AUTO: 28 % (ref 35–47)
HGB BLD-MCNC: 8.7 G/DL (ref 11.7–15.7)
HGB UR QL STRIP: ABNORMAL
INR PPP: 2.86 (ref 0.86–1.14)
KETONES UR STRIP-MCNC: NEGATIVE MG/DL
LEUKOCYTE ESTERASE UR QL STRIP: ABNORMAL
MAGNESIUM SERPL-MCNC: 1.7 MG/DL (ref 1.6–2.3)
MCH RBC QN AUTO: 28 PG (ref 26.5–33)
MCHC RBC AUTO-ENTMCNC: 31.1 G/DL (ref 31.5–36.5)
MCV RBC AUTO: 90 FL (ref 78–100)
MUCOUS THREADS #/AREA URNS LPF: PRESENT /LPF
NITRATE UR QL: NEGATIVE
NUM BPU REQUESTED: 2
PH UR STRIP: 7 PH (ref 5–7)
PHOSPHATE SERPL-MCNC: 2.9 MG/DL (ref 2.5–4.5)
PLATELET # BLD AUTO: 277 10E9/L (ref 150–450)
POTASSIUM SERPL-SCNC: 3.8 MMOL/L (ref 3.4–5.3)
RADIOLOGIST FLAGS: ABNORMAL
RBC # BLD AUTO: 3.11 10E12/L (ref 3.8–5.2)
RBC #/AREA URNS AUTO: 25 /HPF (ref 0–2)
SODIUM SERPL-SCNC: 142 MMOL/L (ref 133–144)
SOURCE: ABNORMAL
SP GR UR STRIP: 1.02 (ref 1–1.03)
SQUAMOUS #/AREA URNS AUTO: 2 /HPF (ref 0–1)
TRANS CELLS #/AREA URNS HPF: <1 /HPF (ref 0–1)
TRANSFUSION STATUS PATIENT QL: NORMAL
UROBILINOGEN UR STRIP-MCNC: 2 MG/DL (ref 0–2)
WBC # BLD AUTO: 7.1 10E9/L (ref 4–11)
WBC #/AREA URNS AUTO: 123 /HPF (ref 0–5)

## 2021-01-03 PROCEDURE — P9059 PLASMA, FRZ BETWEEN 8-24HOUR: HCPCS | Performed by: PHYSICIAN ASSISTANT

## 2021-01-03 PROCEDURE — 999N001063 HC STATISTIC THAWING COMPONENT: Performed by: PHYSICIAN ASSISTANT

## 2021-01-03 PROCEDURE — 87086 URINE CULTURE/COLONY COUNT: CPT | Performed by: SURGERY

## 2021-01-03 PROCEDURE — 999N001017 HC STATISTIC GLUCOSE BY METER IP

## 2021-01-03 PROCEDURE — 214N000001 HC R&B CCU UMMC

## 2021-01-03 PROCEDURE — 250N000013 HC RX MED GY IP 250 OP 250 PS 637: Performed by: STUDENT IN AN ORGANIZED HEALTH CARE EDUCATION/TRAINING PROGRAM

## 2021-01-03 PROCEDURE — 71045 X-RAY EXAM CHEST 1 VIEW: CPT

## 2021-01-03 PROCEDURE — 83735 ASSAY OF MAGNESIUM: CPT | Performed by: PHYSICIAN ASSISTANT

## 2021-01-03 PROCEDURE — 999N000065 XR CHEST PORT 1 VW

## 2021-01-03 PROCEDURE — 97530 THERAPEUTIC ACTIVITIES: CPT | Mod: GO

## 2021-01-03 PROCEDURE — 71045 X-RAY EXAM CHEST 1 VIEW: CPT | Mod: 26 | Performed by: RADIOLOGY

## 2021-01-03 PROCEDURE — 36415 COLL VENOUS BLD VENIPUNCTURE: CPT | Performed by: PHYSICIAN ASSISTANT

## 2021-01-03 PROCEDURE — 250N000009 HC RX 250: Performed by: STUDENT IN AN ORGANIZED HEALTH CARE EDUCATION/TRAINING PROGRAM

## 2021-01-03 PROCEDURE — 80048 BASIC METABOLIC PNL TOTAL CA: CPT | Performed by: PHYSICIAN ASSISTANT

## 2021-01-03 PROCEDURE — 250N000012 HC RX MED GY IP 250 OP 636 PS 637: Performed by: PHYSICIAN ASSISTANT

## 2021-01-03 PROCEDURE — 97110 THERAPEUTIC EXERCISES: CPT | Mod: GO

## 2021-01-03 PROCEDURE — 250N000013 HC RX MED GY IP 250 OP 250 PS 637: Performed by: PHYSICIAN ASSISTANT

## 2021-01-03 PROCEDURE — 85027 COMPLETE CBC AUTOMATED: CPT | Performed by: PHYSICIAN ASSISTANT

## 2021-01-03 PROCEDURE — 0W9B3ZZ DRAINAGE OF LEFT PLEURAL CAVITY, PERCUTANEOUS APPROACH: ICD-10-PCS | Performed by: SURGERY

## 2021-01-03 PROCEDURE — 86900 BLOOD TYPING SEROLOGIC ABO: CPT | Performed by: PHYSICIAN ASSISTANT

## 2021-01-03 PROCEDURE — 87186 SC STD MICRODIL/AGAR DIL: CPT | Performed by: SURGERY

## 2021-01-03 PROCEDURE — 250N000013 HC RX MED GY IP 250 OP 250 PS 637: Performed by: NURSE PRACTITIONER

## 2021-01-03 PROCEDURE — 87088 URINE BACTERIA CULTURE: CPT | Performed by: SURGERY

## 2021-01-03 PROCEDURE — 250N000013 HC RX MED GY IP 250 OP 250 PS 637: Performed by: SURGERY

## 2021-01-03 PROCEDURE — 85610 PROTHROMBIN TIME: CPT | Performed by: PHYSICIAN ASSISTANT

## 2021-01-03 PROCEDURE — 250N000011 HC RX IP 250 OP 636: Performed by: PHYSICIAN ASSISTANT

## 2021-01-03 PROCEDURE — 84100 ASSAY OF PHOSPHORUS: CPT | Performed by: PHYSICIAN ASSISTANT

## 2021-01-03 PROCEDURE — 81001 URINALYSIS AUTO W/SCOPE: CPT | Performed by: PHYSICIAN ASSISTANT

## 2021-01-03 RX ORDER — POTASSIUM CHLORIDE 750 MG/1
10 TABLET, EXTENDED RELEASE ORAL ONCE
Status: COMPLETED | OUTPATIENT
Start: 2021-01-03 | End: 2021-01-03

## 2021-01-03 RX ORDER — BENZTROPINE MESYLATE 0.5 MG/1
1-2 TABLET ORAL 3 TIMES DAILY PRN
Status: DISCONTINUED | OUTPATIENT
Start: 2021-01-03 | End: 2021-01-06 | Stop reason: HOSPADM

## 2021-01-03 RX ORDER — LIDOCAINE HYDROCHLORIDE 10 MG/ML
30 INJECTION, SOLUTION EPIDURAL; INFILTRATION; INTRACAUDAL; PERINEURAL ONCE
Status: COMPLETED | OUTPATIENT
Start: 2021-01-03 | End: 2021-01-03

## 2021-01-03 RX ORDER — FUROSEMIDE 40 MG
40 TABLET ORAL ONCE
Status: COMPLETED | OUTPATIENT
Start: 2021-01-03 | End: 2021-01-03

## 2021-01-03 RX ORDER — WARFARIN SODIUM 4 MG/1
4 TABLET ORAL
Status: COMPLETED | OUTPATIENT
Start: 2021-01-03 | End: 2021-01-03

## 2021-01-03 RX ORDER — NICOTINE POLACRILEX 4 MG
15-30 LOZENGE BUCCAL
Status: DISCONTINUED | OUTPATIENT
Start: 2021-01-03 | End: 2021-01-03

## 2021-01-03 RX ORDER — POTASSIUM CHLORIDE 750 MG/1
10 TABLET, EXTENDED RELEASE ORAL 2 TIMES DAILY
Status: DISCONTINUED | OUTPATIENT
Start: 2021-01-03 | End: 2021-01-06 | Stop reason: HOSPADM

## 2021-01-03 RX ORDER — OLANZAPINE 10 MG/2ML
5 INJECTION, POWDER, FOR SOLUTION INTRAMUSCULAR
Status: DISCONTINUED | OUTPATIENT
Start: 2021-01-03 | End: 2021-01-05

## 2021-01-03 RX ORDER — QUETIAPINE FUMARATE 25 MG/1
25 TABLET, FILM COATED ORAL AT BEDTIME
Status: DISCONTINUED | OUTPATIENT
Start: 2021-01-03 | End: 2021-01-05

## 2021-01-03 RX ORDER — FUROSEMIDE 40 MG
40 TABLET ORAL DAILY
Status: DISCONTINUED | OUTPATIENT
Start: 2021-01-03 | End: 2021-01-03

## 2021-01-03 RX ORDER — FUROSEMIDE 20 MG
20 TABLET ORAL
Status: DISCONTINUED | OUTPATIENT
Start: 2021-01-04 | End: 2021-01-06 | Stop reason: HOSPADM

## 2021-01-03 RX ORDER — GABAPENTIN 300 MG/1
300 CAPSULE ORAL AT BEDTIME
Status: DISCONTINUED | OUTPATIENT
Start: 2021-01-03 | End: 2021-01-06 | Stop reason: HOSPADM

## 2021-01-03 RX ORDER — DEXTROSE MONOHYDRATE 25 G/50ML
25-50 INJECTION, SOLUTION INTRAVENOUS
Status: DISCONTINUED | OUTPATIENT
Start: 2021-01-03 | End: 2021-01-03

## 2021-01-03 RX ADMIN — MELATONIN TAB 3 MG 3 MG: 3 TAB at 21:22

## 2021-01-03 RX ADMIN — HEPARIN SODIUM 5000 UNITS: 5000 INJECTION, SOLUTION INTRAVENOUS; SUBCUTANEOUS at 05:52

## 2021-01-03 RX ADMIN — OXYBUTYNIN CHLORIDE 5 MG: 5 TABLET ORAL at 07:42

## 2021-01-03 RX ADMIN — Medication 12.5 MG: at 07:42

## 2021-01-03 RX ADMIN — INSULIN GLARGINE 5 UNITS: 100 INJECTION, SOLUTION SUBCUTANEOUS at 22:38

## 2021-01-03 RX ADMIN — FUROSEMIDE 40 MG: 40 TABLET ORAL at 17:31

## 2021-01-03 RX ADMIN — ACETAMINOPHEN 975 MG: 325 TABLET, FILM COATED ORAL at 17:25

## 2021-01-03 RX ADMIN — QUETIAPINE FUMARATE 25 MG: 25 TABLET ORAL at 21:22

## 2021-01-03 RX ADMIN — ACETAMINOPHEN 975 MG: 325 TABLET, FILM COATED ORAL at 07:42

## 2021-01-03 RX ADMIN — BUPROPION HYDROCHLORIDE 150 MG: 150 TABLET, EXTENDED RELEASE ORAL at 21:25

## 2021-01-03 RX ADMIN — ATORVASTATIN CALCIUM 40 MG: 40 TABLET, FILM COATED ORAL at 07:42

## 2021-01-03 RX ADMIN — OXYBUTYNIN CHLORIDE 5 MG: 5 TABLET ORAL at 21:21

## 2021-01-03 RX ADMIN — BUPROPION HYDROCHLORIDE 150 MG: 150 TABLET, EXTENDED RELEASE ORAL at 07:41

## 2021-01-03 RX ADMIN — POTASSIUM CHLORIDE 10 MEQ: 750 TABLET, EXTENDED RELEASE ORAL at 17:26

## 2021-01-03 RX ADMIN — LIDOCAINE 1 PATCH: 560 PATCH PERCUTANEOUS; TOPICAL; TRANSDERMAL at 07:42

## 2021-01-03 RX ADMIN — ASPIRIN 81 MG CHEWABLE TABLET 81 MG: 81 TABLET CHEWABLE at 07:41

## 2021-01-03 RX ADMIN — INSULIN ASPART 1 UNITS: 100 INJECTION, SOLUTION INTRAVENOUS; SUBCUTANEOUS at 05:52

## 2021-01-03 RX ADMIN — WARFARIN SODIUM 4 MG: 4 TABLET ORAL at 17:26

## 2021-01-03 RX ADMIN — Medication 12.5 MG: at 21:22

## 2021-01-03 RX ADMIN — Medication 1 PATCH: at 10:00

## 2021-01-03 RX ADMIN — GABAPENTIN 300 MG: 300 CAPSULE ORAL at 21:22

## 2021-01-03 RX ADMIN — POTASSIUM CHLORIDE 10 MEQ: 750 TABLET, EXTENDED RELEASE ORAL at 21:22

## 2021-01-03 RX ADMIN — PANTOPRAZOLE SODIUM 40 MG: 40 TABLET, DELAYED RELEASE ORAL at 07:42

## 2021-01-03 RX ADMIN — ACETAMINOPHEN 975 MG: 325 TABLET, FILM COATED ORAL at 21:21

## 2021-01-03 RX ADMIN — INSULIN ASPART 1 UNITS: 100 INJECTION, SOLUTION INTRAVENOUS; SUBCUTANEOUS at 10:05

## 2021-01-03 RX ADMIN — LIDOCAINE HYDROCHLORIDE 30 ML: 10 INJECTION, SOLUTION EPIDURAL; INFILTRATION; INTRACAUDAL; PERINEURAL at 20:00

## 2021-01-03 ASSESSMENT — ACTIVITIES OF DAILY LIVING (ADL)
ADLS_ACUITY_SCORE: 14
ADLS_ACUITY_SCORE: 14
ADLS_ACUITY_SCORE: 15
ADLS_ACUITY_SCORE: 14

## 2021-01-03 ASSESSMENT — MIFFLIN-ST. JEOR: SCORE: 1167.26

## 2021-01-03 NOTE — PLAN OF CARE
D: Admitted 12/29/20 s/p AVR and MVR w/ mechanical valves c/b onset of afib with RVR with hypotension and stress induced hyperglycemia. Hx of HTN, pulm HTN, dyslipidemia, current tobacco use, h/o DVT, h/o arterial thrombus, diabetes, GERD, and osteoporosis.     I: Monitored vitals and assessed pt status.   Changed: trazodone started this evening.    Saline locked PIV x2   PRN:     A: A0x4, forgetful at times. VSS. On 2L O2 NC at nocs. Afebrile. Urinating adequately. Last BM 1/2 x2. Insulin coverage given, blood glucose 117-247, Q4 checks. Pain back and incision, scheduled tylenol given for relief. Regular diet. +1 BLE. CT x1 to -20 suction, CDI. Up stand by assist. Bed alarm on for safety.     P: Continue to monitor Pt status and report changes to CVTS.

## 2021-01-03 NOTE — PROGRESS NOTES
Cardiovascular Surgery Progress Note  01/03/2021         Assessment and Plan:     Rajani Guo is a 70 year old female with PMH of hypertension, pulmonary hypertension, dyslipidemia, diverticulosis, current tobacco use, DVT, arterial thrombus (March, 2019), DMT2 (on insulin), GERD, GIB, and osteoporosis. Her recent history should be noted for multiple GI bleeds since 2019 (last admitted 9/16/2020), attributed to 2 small AVMs in her small bowel without active bleeding. She was followed by her Cardiologist (Dr Thanh Morse) and had complained of 6 months of progressive DESAI and dizziness with poor quality of life and decreasing ADL tolerance. She was seen in the cardiac surgery clinic for evaluation of aortic and mitral stenosis on 12/7 and deemed an appropriate surgical candidate.   She is now s/p elective mechanical AVR and mechanical MVR on 12/29/20 with Dr. Luc Lara. Initially transferred to 6C POD#1, had episode of a-fib with RVR on evening of 12/31 with hypotension and was transferred back to ICU. Is now is sinus rhythm, and back on 6C since 1/2.     Cardiovascular:   Hx of severe mitral and aortic stenosis  S/p elective mechanical AVR and mechanical MVR   HLD, HTN, pulmonary HTN   No arrhythmia, HD stable. Remains in NSR.   Routine Post-op ECHO ordered on 12/31- borderline EF 50-55%  ASA 81 mg, atorvastatin   Chest tubes: mediastinal tubes removed 1/3, f/u cxr when patient agreeable.   Metoprolol 12.5 mg po bid.   Resume Lipitor 1/3, LFT's normalized.    TPW: removed 12/31  Chest tubes removed 1/3, cxr ordered when tolerated.     Pulmonary:  - Extubated POD #0 to 4 lpm via NC; Saturating well on 1-2 lpm.   - Supplemental O2 PRN to keep sats > 92%. Wean off as tolerated.  - Pulm toilet, IS, activity and deep breathing.     Neurology / MSK:  - Severe delirium this afternoon after being woken from nap 1/3. Patient trying to leave AMA, not oriented to time or place. Unaware she had heart surgery. Did not know  who her daughter was. Very paranoid of staff and threatening those who came near her. Neurology consulted. Ok to wait on CT of head. Patient will not agree to getting one done. Patient currently paranoid and delirious and will not cooperate. Most likely not stroke and very classic delirium per Neurology. Can get one at later time if condition does not improve.   - sitter  - delirium precautions, try to get window bed or private room.    - Seroquel at bedtime per Neurology   - restart gabapentin 300 mg at bedtime, patient did not sleep last night due to restless leg and lack of sleep likely contributing to mental status.  - One time dose of PRN Zyprexa available to use as last result should patient get combative.   - Acute post-operative pain well controlled with acetaminophen. Stop oxycodone and all other narcotics. Stop trazodone.    - Wellbutrin SR restarted started 1/2  - Patient calming down and starting to be more cooperative with daughter present.      / Renal:  - No Hx of renal disease. Most recent creatinine WNL, adequate UOP.   - Lasix 20 mg PO x 1 on 12/31, assess needs daily.   - lasix 40 mg once 1/3. Weight down trending. Change to lasix 20 mg po bid 1/4.      GI / FEN:   Hx Diverticulosis without diverticulitis  Hx GI bleed attributed to small AVMs in small bowel  GERD  - Regular diet, continue bowel regimen  - Replace electrolytes as needed, hepatic enzymes WNL besides slight elevation of AST.    Endocrine:  T2DM on home insulin   Stress induced hyperglycemia  - Initially managed on insulin drip postop, transitioned to medium corrective sliding scale 12/31. Monitor for long-acting needs as diet progresses.    - start Lantus 5 units (levimir 5 units PTA), also take Metformin at home. Add back as indicated.     Infectious Disease:  - Stress induced leukocytosis  - WBC normalized, remains afebrile on scheduled tylenol. No signs or symptoms of infection, possible atelectasis.   - Completed perioperative  antibiotics  - Get UA due to altered mental status.     Hematology:   Hx Chronic anemia   Acute blood loss anemia and thrombocytopenia  Hgb 8.7; Plt 277, no signs or symptoms of active bleeding  Hx Acute limb ischemia, March 2019  - Acute occlusion of left superficial femoral artery, treated with directed lysis and angioplasty. Transitioned from Apixaban to aspirin.     Anticoagulation:   ASA 81 mg and coumadin for mechanical AVR and MVR.  INR goal 2.5 - 3.5. Most recent INR 2.86         Prophylaxis:   - Stress ulcer prophylaxis: Pantoprazole 40 mg daily for 30 days  - DVT prophylaxis: Subcutaneous heparin, SCD  - nicotine gum instead of patch, patient took off    Disposition:   - Transferred to  on 12/30, back to  12/31, now on floor since 1/2.   - Therapies recommendations pending      Discussed with CVTS Fellow as needed.  Staff surgeons have been informed of changes through both verbal and written communication.      Alise Nicholson PA-C  Cardiothoracic Surgery  Pager [phone]  January 3, 2021          Interval History:     No overnight events. Patient seen this AM and was in pleasant mood and alert and oriented.   States pain is well managed on current regimen. She did not sleep overnight due to her restless legs. She said dher medication she normally takes was not available to her.   Tolerating diet, not yet passing flatus, + BM. No nausea or vomiting.  Breathing well without complaints on 1-2 lpm.   Working with therapies and ambulating in halls with assistance.   Denies chest pain, palpitations, dizziness, syncopal symptoms, fevers, chills, myalgias, or sternal popping/clicking.    This afternoon I returned to remove patient's chest tubes. She was sleeping. Upon waking she seemed a little confused but was cooperate and followed instructions when removing tubes. Within about 5 minutes of removing chest tubes she was wondering wolfe, combative, and disoriented. Daughter was able to be brought in  "the calm down patient. As of 1645, patient and daughter together in room. Patient still confused but no longer combative and more agreeable.          Physical Exam:   Blood pressure 108/55, pulse 73, temperature 97.8  F (36.6  C), temperature source Oral, resp. rate 18, height 1.626 m (5' 4\"), weight 65.7 kg (144 lb 14.4 oz), SpO2 94 %, not currently breastfeeding.  Vitals:    01/01/21 0400 01/02/21 0400 01/03/21 0216   Weight: 67 kg (147 lb 11.3 oz) 66.4 kg (146 lb 6.2 oz) 65.7 kg (144 lb 14.4 oz)         Gen: A&Ox1, combative, trying to leave AMA.   Neuro: no focal deficits this am or PM. Acute onset of delirium after nap.   CV: RRR, normal S1 S2, no murmurs, rubs or gallops.   Pulm: CTA, no wheezing or rhonchi, normal breathing on 1-2 lpm  Abd: nondistended, normal BS, soft, nontender  Ext: no peripheral edema  Incision: clean, dry, intact, no erythema, sternum stable  Tubes/drain sites: dressing clean and dry, serosanguinous output. No air leak, 100/20 ml.   Chest tubes removed without immediate complication.          Data:    Imaging:  reviewed recent imaging, no acute concerns    Labs:  BMP  Recent Labs   Lab 01/03/21 0544 01/02/21 0346 01/01/21 0307 12/31/20  2148    137 137 139   POTASSIUM 3.8 3.6 3.6 4.1   CHLORIDE 110* 105 105 107   KAYLEEN 8.2* 8.2* 8.1* 8.3*   CO2 26 26 25 24   BUN 6* 11 16 15   CR 0.54 0.56 0.61 0.68   * 153* 189* 193*     CBC  Recent Labs   Lab 01/03/21  0544 01/02/21  0346 01/01/21 0307 12/31/20  2148   WBC 7.1 9.3 12.1* 12.4*   RBC 3.11* 3.13* 3.29* 3.43*   HGB 8.7* 8.7* 9.2* 9.4*   HCT 28.0* 28.0* 29.3* 30.4*   MCV 90 90 89 89   MCH 28.0 27.8 28.0 27.4   MCHC 31.1* 31.1* 31.4* 30.9*   RDW 18.2* 18.4* 18.5* 18.6*    228 173 183     INR  Recent Labs   Lab 01/03/21  0544 01/02/21  0346 01/01/21  0307 12/31/20  2148   INR 2.86* 3.72* 2.23* 1.85*      Liver Function Studies -   Recent Labs   Lab Test 12/30/20  0317   PROTTOTAL 5.5*   ALBUMIN 2.8*   BILITOTAL 0.4 "   ALKPHOS 49   AST 62*   ALT 20     GLUCOSE:   Recent Labs   Lab 01/03/21  1143 01/03/21  1004 01/03/21  0548 01/03/21  0544 01/03/21  0209 01/02/21  2057 01/02/21  1800 01/02/21  0346 01/02/21  0346 01/01/21  0307 01/01/21  0307 12/31/20  2148 12/31/20  2148 12/31/20  0541 12/31/20  0541 12/30/20 0317 12/30/20 0317   GLC  --   --   --  136*  --   --   --   --  153*  --  189*  --  193*  --  135*  --  102*   * 173* 140*  --  117* 247* 150*   < >  --    < >  --    < >  --    < >  --    < >  --     < > = values in this interval not displayed.

## 2021-01-03 NOTE — CONSULTS
[facility]  Neurology Consultation    Patient Name:  Rajani Guo  MRN:  [id number]    :  1950  Date of Service:  January 3, 2021  Primary care provider:  Tamiko Coley      Neurology consultation service was asked to see Rajani Guo by Alise Lepe PA-C to evaluate AMS.    History of Present Illness:   Rajani Guo is a 70 year old female with a history of hypertension, pulmonary hypertension, dyslipidemia, current tobacco use, h/o DVT, h/o arterial thrombus, diabetes, GERD, osteoporosis and mitral/aortic stenosis who was admitted after MVR/AVR on 20. She was admitted to CV ICU due to cardiogenic shock and post op vent dependence. Transferred to the floor on ; transferred back to CV ICU on 20 due to Afib w/ RVR and hypotension; then transferred back to floor on . On , started on warfarin for mech valves and had supratherapeutic INR 3.72 on one occasion;otherwise, INR has remained in the therapeutic range with INR of 2.86 this morning. Course has been complicated by sundowning/delirium while in the ICU per notes. Today, pt reported she did not sleep well last night; this afternoon, she took a nap and woke up paranoid/agitated and became uncooperative. Per medication review, pt was on oxycodone and received doses yesterday but has not gotten any CNS acting meds today. Neurology consulted for AMS.     ROS  A 10-point ROS was not performed as pt was no cooperative.    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     GI bleed      History  of partial thyroidectomy 06/02/2018     Hyperlipidemia LDL goal <100 01/18/2018     Hypertension      Insomnia, unspecified type 01/18/2018     Mitral regurgitation      Mitral stenosis      Pulmonary hypertension (H)      Tobacco use disorder      Past Surgical History:   Procedure Laterality Date     COLONOSCOPY N/A 9/4/2019    Procedure: COLONOSCOPY;  Surgeon: Marie Todd MD;  Location:  GI     CV CORONARY ANGIOGRAM N/A 11/16/2020    Procedure: CV CORONARY ANGIOGRAM;  Surgeon: Joey Rivas MD;  Location:  HEART CARDIAC CATH LAB     CV FRACTIONAL FLOW RESERVE N/A 11/16/2020    Procedure: Fractional Flow Reserve;  Surgeon: Joey Rivas MD;  Location:  HEART CARDIAC CATH LAB     CV RIGHT HEART CATH N/A 11/16/2020    Procedure: CV RIGHT HEART CATH;  Surgeon: Joey Rivas MD;  Location:  HEART CARDIAC CATH LAB     ESOPHAGOSCOPY, GASTROSCOPY, DUODENOSCOPY (EGD), COMBINED N/A 9/4/2019    Procedure: ESOPHAGOGASTRODUODENOSCOPY (EGD);  Surgeon: Marie Todd MD;  Location:  GI     ESOPHAGOSCOPY, GASTROSCOPY, DUODENOSCOPY (EGD), COMBINED N/A 9/17/2020    Procedure: ESOPHAGOGASTRODUODENOSCOPY (EGD);  Surgeon: Ten Ibrahim DO;  Location:  GI     IR ANGIOGRAM THROUGH CATHETER FOLLOW UP  3/5/2019     IR LOWER EXTREMITY ANGIOGRAM LEFT  3/4/2019     KNEE SURGERY Right 2013    right knee torn meniscus surgery     OVARY SURGERY  1971    1 ovary removed     RELEASE CARPAL TUNNEL Right 1988     RELEASE TRIGGER FINGER BILATERAL       REPLACE VALVES AORTIC AND MITRAL, COMBINED N/A 12/29/2020    Procedure: Median Stertonomy, REPLACEMENT AORTIC Valve  with 19mm St Abdoulaye Osborn  Mechanical Heart Valve AND MITRAL VALVE Replacement with 27mm St Abdoulaye Mechanical Heart Valve, on pump oxygenation;  Surgeon: Luc Lara MD;  Location: [facility] OR     SHOULDER SURGERY Left     rotator cuff repair, plate placement     THYROID SURGERY  1988    partial thyroidectomy       Medications    Medications Prior to Admission   Medication Sig Dispense Refill Last Dose     atorvastatin (LIPITOR) 40 MG tablet TAKE 1 TABLET BY MOUTH EVERY DAY 90 tablet 3 12/29/2020 at 0400     buPROPion (ZYBAN) 150 MG 12 hr tablet TAKE 1 TABLET BY MOUTH 2 TIMES DAILY 60 tablet 3 12/29/2020 at 0400     nicotine (NICODERM CQ) 21 MG/24HR 24 hr patch Place 1 patch onto the skin daily 30 patch 1 12/29/2020 at Unknown time     oxybutynin (DITROPAN) 5 MG tablet TAKE 1 TABLET BY MOUTH TWICE A  tablet 3 12/29/2020 at 0400     pantoprazole (PROTONIX) 40 MG EC tablet TAKE 1 TABLET BY MOUTH EVERY DAY 90 tablet 1 12/29/2020 at 0400     traZODone (DESYREL) 50 MG tablet TAKE 1-2 TABLETS ( MG) BY MOUTH AT BEDTIME 180 tablet 1 Past Week at Unknown time     aspirin (ASA) 81 MG EC tablet Take 1 tablet (81 mg) by mouth daily (Patient taking differently: Take 81 mg by mouth every evening ) 100 tablet 3 More than a month at Unknown time     calcium carb 1250 mg, 500 mg [place],/vitamin D 200 unit (CALCIUM 500/D) 500-200 MG-UNIT per tablet Take 1 tablet by mouth 2 times daily    12/19/2020     ferrous sulfate (FEROSUL) 325 (65 Fe) MG tablet Take 325 mg by mouth 2 times daily   12/19/2020     gabapentin (NEURONTIN) 300 MG capsule Take 1 capsule (300 mg) by mouth At Bedtime 90 capsule 0 12/19/2020     glucosamine-chondroitin 500-400 MG CAPS per capsule Take 1 capsule by mouth 2 times daily    12/19/2020     insulin detemir (LEVEMIR PEN) 100 UNIT/ML pen Inject 5 Units Subcutaneous At Bedtime    More than a month at Unknown time     insulin pen needle (29G X 12MM) 29G X 12MM miscellaneous Use 1 pen needles daily or as directed. 100 each 11      Melatonin 10 MG TABS tablet Take 10 mg by mouth At Bedtime   12/28/2020 at 2200     metFORMIN (GLUCOPHAGE) 1000 MG tablet TAKE 1 TABLET BY MOUTH TWICE A DAY WITH MEALS 180 tablet 1 12/19/2020     Multiple Vitamin (MULTI-VITAMINS) TABS Take 1 tablet by mouth daily    12/19/2020     senna-docusate  "(SENOKOT-S/PERICOLACE) 8.6-50 MG tablet Take 1-2 tablets by mouth 2 times daily (Patient taking differently: Take 1 tablet by mouth 2 times daily ) 100 tablet 1 12/19/2020     vitamin C (ASCORBIC ACID) 500 MG tablet Take 1 tablet (500 mg) by mouth daily 100 tablet 1 12/19/2020       Allergies  Allergies   Allergen Reactions     Indomethacin Other (See Comments)     Dizziness and disorientation     Tramadol Nausea and Vomiting       Social History  Social History     Tobacco Use     Smoking status: Current Every Day Smoker     Packs/day: 1.00     Years: 58.00     Pack years: 58.00     Start date: 12/20/1962     Smokeless tobacco: Never Used     Tobacco comment: using 21 mg patch while inpatient   Substance Use Topics     Alcohol use: Yes     Comment: 2 glasses of wine a week       Family History    Family History   Problem Relation Age of Onset     Diabetes Type 2  Mother      Lung Cancer Father      Diabetes Sister      Coronary Artery Disease Sister      Diabetes Brother      Diabetes Brother      Diabetes Type 2  Brother      Coronary Artery Disease Sister      Asthma Sister      Glaucoma No family hx of      Macular Degeneration No family hx of      Anesthesia Reaction No family hx of          Physical Examination   Vitals: /55 (BP Location: Left arm)   Pulse 73   Temp 97.8  F (36.6  C) (Oral)   Resp 18   Ht 1.626 m (5' 4\")   Wt 65.7 kg (144 lb 14.4 oz)   SpO2 94%   BMI 24.87 kg/m    General: Adult female patient, NAD, pacing in hallway  HEENT: NC/AT, no icterus, op pink and moist  Cardiac: RR  Chest: non-labored on RA  Extremities: Warm, no edema  Skin: No rash or lesion   Psych: Hyperactive, easily irritable, uncooperative, paranoid  Neuro:  Mental status: Awake, alert, attentive. Not willing to answer orientation questions. Ignores questions mostly; when asked her name, said  \" you can look it up\"; when asked what she is doing on the computer, she said \"i'm looking someone up\" and would not " continue the convo further. Language is fluent and sensical. Does not follow commands due to lack of cooperation.  Cranial nerves: Conjugate gaze, EOMI grossly intact, face symmetric, hearing intact to conversation, no dysarthria.   Motor: Normal bulk and tone. No abnormal movements. Moving all extremities antigravity briskly, appears to have 4-5/5 strength throughout though pt is not willing to comply with examination.  Reflexes: unable to assess  Sensory: unable to assess  Coordination: unable to assess  Gait: Casual gait stable, slightly wide based gait, able to ambulate without assistance    Investigations     Most Recent 3 CBC's:  Recent Labs   Lab Test 01/03/21  0544 01/02/21  0346 01/01/21  0307   WBC 7.1 9.3 12.1*   HGB 8.7* 8.7* 9.2*   MCV 90 90 89    228 173     Most Recent 3 BMP's:  Recent Labs   Lab Test 01/03/21  0544 01/02/21  0346 01/01/21  0307    137 137   POTASSIUM 3.8 3.6 3.6   CHLORIDE 110* 105 105   CO2 26 26 25   BUN 6* 11 16   CR 0.54 0.56 0.61   ANIONGAP 6 6 7   KAYLEEN 8.2* 8.2* 8.1*   * 153* 189*     Most Recent 2 LFT's:  Recent Labs   Lab Test 01/02/21  0346 01/01/21  0307   AST 23 27   ALT 15 14   ALKPHOS 61 60   BILITOTAL 0.3 0.2     Most Recent 3 INR's:  Recent Labs   Lab Test 01/03/21  0544 01/02/21  0346 01/01/21  0307   INR 2.86* 3.72* 2.23*     Impression  #Encephalopathy, likely Hyperactive Delirium  70 year old female with a history of hypertension, pulmonary hypertension, dyslipidemia, current tobacco use, h/o DVT, h/o arterial thrombus, diabetes, GERD, osteoporosis and mitral/aortic stenosis who is now s/p MVR/AVR on 12/29/20. Neurology consulted for AMS, described as paranoia/agitation of sudden onset.    Differential for encephalopathy includes metabolic derangements, hypoxic ischemic encephalopathy, endocrine abnormalities, seizure, uremia, psychogenic, infection/inflammation, toxins/drugs.   No history to suggest seizure activity, thus, no indication for EEG.  Low suspicion for meningitis/encephalitis. Given history of recent MVR/AVR and initiation of warfarin with INR being supratherapeutic at 3.72 on a single occasion, team reports concern for possible ICH. However, paranoia/hyperactivity would not be expected in ICH and typically, would expect to see lethargy/somnolence. Furthermore, she is moving all of her extremities and ambulating on her own with no clear focal deficits to suspect stroke as etiology. CT head is reasonable to obtain if she will tolerate it - though will defer to the primary team.     Overall, her presentation seems more behavioral and seems consistent with hyperactive delirium, which would be supported by her known ongoing sundowning/delirium as reported in the notes, her sleep being disrupted (napping in the middle of the day, staying up at night) and her age. Recommend delirium precautions and utilizing PRN's as needed for agitation. No further recommendations from neurology.    Recommendations  - Consider CT head if pt will tolerate it (nonurgent), will defer to primary team on this  - Use PRN's for agitation as indicated   - Delirium precautions (frequent reorientation, normal day night cycles (blinds up and awake during day, sleeping at night), minimize frequent interruptions, clutter and loud noises, encourage family visitations, consider melatonin at 1900 nightly)  - Avoid sensory deprivation (provide patient with eyeglasses or hearing aids if using)   - Treat any underlying infection, correct metabolic derangements as able.  - Avoid CNS acting drugs (including opiates, benzodiazepines, anti-cholinergics); consider local measures or scheduled pain regimens that minimize opioids for pain  - Supportive medical cares, including correction of any electrolyte abnormalities    Patient was discussed with Dr. Marrero.     Talya Payne MD  Neurology PGY-3  [phone]

## 2021-01-03 NOTE — PHARMACY-CONSULT NOTE
Pharmacy Delirium Chart Review    Upon chart review, the following medications may contribute to possible patient delirium:    -Bupropion SR: restarted 1/2; confusion incidence ~8%; neuropsychiatric symptoms such as psychosis, hallucinations, delusions, concentration disturbance, paranoia, and confusion have all been reported    -Oxybutynin: anticholinergic-associated CNS effects; per 7/24/2020 clinic visit progress note, patient did not like side effects from this medication (unclear what those side effects were - if they attributable to CNS adverse effects)    -Oxycodone: last dose 1/2pm, stopped    -Trazodone: last dose 1/2pm, stopped    Please consult unit pharmacist with further questions.    Klaudia Kuhn, PharmD, BCIDP  Pager: [phone]

## 2021-01-03 NOTE — PLAN OF CARE
Critical Result: CXR noted post CT removal: Significantly increased left-sided pneumothorax compared to prior  Study. CVTS PA-C notified. Plan is to place pigtail stat, 2 units FFP. Will continue to monitor.    Hx: Rajnai is a CVTS Pt c/ PMH of HTN, pHTN, dyslipidemia, tobacco use, DVT hx, arterial thrombus hx, DM, and GERD who was admitted 12/29 for MVR & TVR that was c/b AF RVR c/ hypotension s/p amio gtt infusion. Pt is being treated for aortic stenosis and mitral valve stenosis s/p AVR/MVR on 12/29. Currently CT in place. Pt is also being treated for IDDM/stress induced hyperglucemia Tx c/ sliding scale and anemia.  Neuro: A&OX4, Ambulation: SBA.  V/S: VSS, Pt is on RA in the daytime. Pain: denies.   BGL: 150 - 200 mg/dL range at AC. Sliding scale insulin administered per protocol.  Tele/CV: SR, Pt denies CP. Pt denies palpitations. Valve click noted on exam. S1, S2.  Resp: LS are clear, equal bilaterally. Pt denies SOB. CT planned to be removed today.  : Voids spontaneously.  GI: Complains of diarrhea d/t laxatives.  LDTs: R/L mediastinal CT removed today by provider. Sites covered, RAMON.  Plan: Plan for discharge tomorrow (01/04) per team.
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
## 2021-01-04 ENCOUNTER — APPOINTMENT (OUTPATIENT)
Dept: GENERAL RADIOLOGY | Facility: CLINIC | Age: 71
DRG: 219 | End: 2021-01-04
Attending: PHYSICIAN ASSISTANT
Payer: COMMERCIAL

## 2021-01-04 ENCOUNTER — APPOINTMENT (OUTPATIENT)
Dept: PHYSICAL THERAPY | Facility: CLINIC | Age: 71
DRG: 219 | End: 2021-01-04
Attending: SURGERY
Payer: COMMERCIAL

## 2021-01-04 ENCOUNTER — APPOINTMENT (OUTPATIENT)
Dept: OCCUPATIONAL THERAPY | Facility: CLINIC | Age: 71
DRG: 219 | End: 2021-01-04
Attending: SURGERY
Payer: COMMERCIAL

## 2021-01-04 LAB
ANION GAP SERPL CALCULATED.3IONS-SCNC: 6 MMOL/L (ref 3–14)
APTT PPP: 37 SEC (ref 22–37)
BUN SERPL-MCNC: 9 MG/DL (ref 7–30)
CALCIUM SERPL-MCNC: 8.5 MG/DL (ref 8.5–10.1)
CHLORIDE SERPL-SCNC: 109 MMOL/L (ref 94–109)
CO2 SERPL-SCNC: 27 MMOL/L (ref 20–32)
CREAT SERPL-MCNC: 0.58 MG/DL (ref 0.52–1.04)
ERYTHROCYTE [DISTWIDTH] IN BLOOD BY AUTOMATED COUNT: 18.2 % (ref 10–15)
GFR SERPL CREATININE-BSD FRML MDRD: >90 ML/MIN/{1.73_M2}
GLUCOSE BLDC GLUCOMTR-MCNC: 129 MG/DL (ref 70–99)
GLUCOSE BLDC GLUCOMTR-MCNC: 134 MG/DL (ref 70–99)
GLUCOSE BLDC GLUCOMTR-MCNC: 153 MG/DL (ref 70–99)
GLUCOSE BLDC GLUCOMTR-MCNC: 155 MG/DL (ref 70–99)
GLUCOSE BLDC GLUCOMTR-MCNC: 196 MG/DL (ref 70–99)
GLUCOSE BLDC GLUCOMTR-MCNC: 211 MG/DL (ref 70–99)
GLUCOSE SERPL-MCNC: 126 MG/DL (ref 70–99)
HCT VFR BLD AUTO: 25.3 % (ref 35–47)
HGB BLD-MCNC: 7.9 G/DL (ref 11.7–15.7)
INR PPP: 1.93 (ref 0.86–1.14)
MAGNESIUM SERPL-MCNC: 1.6 MG/DL (ref 1.6–2.3)
MCH RBC QN AUTO: 27.9 PG (ref 26.5–33)
MCHC RBC AUTO-ENTMCNC: 31.2 G/DL (ref 31.5–36.5)
MCV RBC AUTO: 89 FL (ref 78–100)
PLATELET # BLD AUTO: 322 10E9/L (ref 150–450)
POTASSIUM SERPL-SCNC: 3.6 MMOL/L (ref 3.4–5.3)
RBC # BLD AUTO: 2.83 10E12/L (ref 3.8–5.2)
SODIUM SERPL-SCNC: 143 MMOL/L (ref 133–144)
UFH PPP CHRO-ACNC: <0.1 IU/ML
WBC # BLD AUTO: 7.2 10E9/L (ref 4–11)

## 2021-01-04 PROCEDURE — 214N000001 HC R&B CCU UMMC

## 2021-01-04 PROCEDURE — 85520 HEPARIN ASSAY: CPT | Performed by: PHYSICIAN ASSISTANT

## 2021-01-04 PROCEDURE — 250N000013 HC RX MED GY IP 250 OP 250 PS 637: Performed by: NURSE PRACTITIONER

## 2021-01-04 PROCEDURE — 85610 PROTHROMBIN TIME: CPT | Performed by: STUDENT IN AN ORGANIZED HEALTH CARE EDUCATION/TRAINING PROGRAM

## 2021-01-04 PROCEDURE — 80048 BASIC METABOLIC PNL TOTAL CA: CPT | Performed by: PHYSICIAN ASSISTANT

## 2021-01-04 PROCEDURE — 36415 COLL VENOUS BLD VENIPUNCTURE: CPT | Performed by: PHYSICIAN ASSISTANT

## 2021-01-04 PROCEDURE — 97116 GAIT TRAINING THERAPY: CPT | Mod: GP | Performed by: REHABILITATION PRACTITIONER

## 2021-01-04 PROCEDURE — 36415 COLL VENOUS BLD VENIPUNCTURE: CPT | Performed by: STUDENT IN AN ORGANIZED HEALTH CARE EDUCATION/TRAINING PROGRAM

## 2021-01-04 PROCEDURE — 71046 X-RAY EXAM CHEST 2 VIEWS: CPT | Mod: 76

## 2021-01-04 PROCEDURE — 71046 X-RAY EXAM CHEST 2 VIEWS: CPT

## 2021-01-04 PROCEDURE — 999N000128 HC STATISTIC PERIPHERAL IV START W/O US GUIDANCE

## 2021-01-04 PROCEDURE — 71046 X-RAY EXAM CHEST 2 VIEWS: CPT | Mod: 26 | Performed by: RADIOLOGY

## 2021-01-04 PROCEDURE — 85730 THROMBOPLASTIN TIME PARTIAL: CPT | Performed by: PHYSICIAN ASSISTANT

## 2021-01-04 PROCEDURE — 250N000013 HC RX MED GY IP 250 OP 250 PS 637: Performed by: STUDENT IN AN ORGANIZED HEALTH CARE EDUCATION/TRAINING PROGRAM

## 2021-01-04 PROCEDURE — 250N000013 HC RX MED GY IP 250 OP 250 PS 637: Performed by: PHYSICIAN ASSISTANT

## 2021-01-04 PROCEDURE — 85027 COMPLETE CBC AUTOMATED: CPT | Performed by: PHYSICIAN ASSISTANT

## 2021-01-04 PROCEDURE — 99222 1ST HOSP IP/OBS MODERATE 55: CPT | Performed by: PSYCHIATRY & NEUROLOGY

## 2021-01-04 PROCEDURE — 250N000013 HC RX MED GY IP 250 OP 250 PS 637: Performed by: PSYCHIATRY & NEUROLOGY

## 2021-01-04 PROCEDURE — 250N000011 HC RX IP 250 OP 636: Performed by: PHYSICIAN ASSISTANT

## 2021-01-04 PROCEDURE — 999N001017 HC STATISTIC GLUCOSE BY METER IP

## 2021-01-04 PROCEDURE — 97530 THERAPEUTIC ACTIVITIES: CPT | Mod: GP | Performed by: REHABILITATION PRACTITIONER

## 2021-01-04 PROCEDURE — 83735 ASSAY OF MAGNESIUM: CPT | Performed by: PHYSICIAN ASSISTANT

## 2021-01-04 PROCEDURE — 250N000013 HC RX MED GY IP 250 OP 250 PS 637: Performed by: SURGERY

## 2021-01-04 PROCEDURE — 97535 SELF CARE MNGMENT TRAINING: CPT | Mod: GO | Performed by: OCCUPATIONAL THERAPIST

## 2021-01-04 RX ORDER — POTASSIUM CHLORIDE 750 MG/1
20 TABLET, EXTENDED RELEASE ORAL ONCE
Status: COMPLETED | OUTPATIENT
Start: 2021-01-04 | End: 2021-01-04

## 2021-01-04 RX ORDER — CEFEPIME HYDROCHLORIDE 1 G/1
1 INJECTION, POWDER, FOR SOLUTION INTRAMUSCULAR; INTRAVENOUS EVERY 12 HOURS
Status: DISCONTINUED | OUTPATIENT
Start: 2021-01-04 | End: 2021-01-06 | Stop reason: HOSPADM

## 2021-01-04 RX ORDER — LANOLIN ALCOHOL/MO/W.PET/CERES
3 CREAM (GRAM) TOPICAL EVERY 24 HOURS
Status: DISCONTINUED | OUTPATIENT
Start: 2021-01-04 | End: 2021-01-06 | Stop reason: HOSPADM

## 2021-01-04 RX ORDER — NICOTINE 21 MG/24HR
1 PATCH, TRANSDERMAL 24 HOURS TRANSDERMAL DAILY
Status: DISCONTINUED | OUTPATIENT
Start: 2021-01-05 | End: 2021-01-06 | Stop reason: HOSPADM

## 2021-01-04 RX ORDER — CEFTRIAXONE 1 G/1
1 INJECTION, POWDER, FOR SOLUTION INTRAMUSCULAR; INTRAVENOUS EVERY 24 HOURS
Status: DISCONTINUED | OUTPATIENT
Start: 2021-01-04 | End: 2021-01-04

## 2021-01-04 RX ORDER — MAGNESIUM SULFATE HEPTAHYDRATE 40 MG/ML
2 INJECTION, SOLUTION INTRAVENOUS ONCE
Status: COMPLETED | OUTPATIENT
Start: 2021-01-04 | End: 2021-01-04

## 2021-01-04 RX ORDER — HEPARIN SODIUM 10000 [USP'U]/100ML
0-5000 INJECTION, SOLUTION INTRAVENOUS CONTINUOUS
Status: DISCONTINUED | OUTPATIENT
Start: 2021-01-04 | End: 2021-01-06

## 2021-01-04 RX ORDER — WARFARIN SODIUM 4 MG/1
4 TABLET ORAL
Status: COMPLETED | OUTPATIENT
Start: 2021-01-04 | End: 2021-01-04

## 2021-01-04 RX ADMIN — MAGNESIUM SULFATE IN WATER 2 G: 40 INJECTION, SOLUTION INTRAVENOUS at 15:00

## 2021-01-04 RX ADMIN — ATORVASTATIN CALCIUM 40 MG: 40 TABLET, FILM COATED ORAL at 08:45

## 2021-01-04 RX ADMIN — OXYBUTYNIN CHLORIDE 5 MG: 5 TABLET ORAL at 20:21

## 2021-01-04 RX ADMIN — ACETAMINOPHEN 975 MG: 325 TABLET, FILM COATED ORAL at 15:48

## 2021-01-04 RX ADMIN — FUROSEMIDE 20 MG: 20 TABLET ORAL at 08:44

## 2021-01-04 RX ADMIN — OXYBUTYNIN CHLORIDE 5 MG: 5 TABLET ORAL at 08:55

## 2021-01-04 RX ADMIN — LIDOCAINE 1 PATCH: 560 PATCH PERCUTANEOUS; TOPICAL; TRANSDERMAL at 08:46

## 2021-01-04 RX ADMIN — POTASSIUM CHLORIDE 10 MEQ: 750 TABLET, EXTENDED RELEASE ORAL at 08:44

## 2021-01-04 RX ADMIN — POTASSIUM CHLORIDE 20 MEQ: 750 TABLET, EXTENDED RELEASE ORAL at 13:02

## 2021-01-04 RX ADMIN — CEFTRIAXONE 1 G: 1 INJECTION, POWDER, FOR SOLUTION INTRAMUSCULAR; INTRAVENOUS at 11:49

## 2021-01-04 RX ADMIN — PANTOPRAZOLE SODIUM 40 MG: 40 TABLET, DELAYED RELEASE ORAL at 08:44

## 2021-01-04 RX ADMIN — BUPROPION HYDROCHLORIDE 150 MG: 150 TABLET, EXTENDED RELEASE ORAL at 09:07

## 2021-01-04 RX ADMIN — Medication 12.5 MG: at 08:45

## 2021-01-04 RX ADMIN — POTASSIUM CHLORIDE 10 MEQ: 750 TABLET, EXTENDED RELEASE ORAL at 20:21

## 2021-01-04 RX ADMIN — ACETAMINOPHEN 650 MG: 325 TABLET, FILM COATED ORAL at 04:21

## 2021-01-04 RX ADMIN — GABAPENTIN 300 MG: 300 CAPSULE ORAL at 22:34

## 2021-01-04 RX ADMIN — FUROSEMIDE 20 MG: 20 TABLET ORAL at 15:47

## 2021-01-04 RX ADMIN — ACETAMINOPHEN 975 MG: 325 TABLET, FILM COATED ORAL at 08:44

## 2021-01-04 RX ADMIN — INSULIN GLARGINE 5 UNITS: 100 INJECTION, SOLUTION SUBCUTANEOUS at 22:34

## 2021-01-04 RX ADMIN — QUETIAPINE FUMARATE 25 MG: 25 TABLET ORAL at 22:34

## 2021-01-04 RX ADMIN — Medication 12.5 MG: at 20:21

## 2021-01-04 RX ADMIN — ASPIRIN 81 MG CHEWABLE TABLET 81 MG: 81 TABLET CHEWABLE at 08:44

## 2021-01-04 RX ADMIN — DOCUSATE SODIUM AND SENNOSIDES 1 TABLET: 8.6; 5 TABLET, FILM COATED ORAL at 08:45

## 2021-01-04 RX ADMIN — HEPARIN SODIUM 800 UNITS/HR: 10000 INJECTION, SOLUTION INTRAVENOUS at 12:57

## 2021-01-04 RX ADMIN — CEFEPIME HYDROCHLORIDE 1 G: 1 INJECTION, POWDER, FOR SOLUTION INTRAMUSCULAR; INTRAVENOUS at 12:44

## 2021-01-04 RX ADMIN — MELATONIN TAB 3 MG 3 MG: 3 TAB at 18:41

## 2021-01-04 RX ADMIN — WARFARIN SODIUM 4 MG: 4 TABLET ORAL at 18:41

## 2021-01-04 RX ADMIN — ACETAMINOPHEN 975 MG: 325 TABLET, FILM COATED ORAL at 20:20

## 2021-01-04 RX ADMIN — BUPROPION HYDROCHLORIDE 150 MG: 150 TABLET, EXTENDED RELEASE ORAL at 20:21

## 2021-01-04 ASSESSMENT — MIFFLIN-ST. JEOR: SCORE: 1174.07

## 2021-01-04 ASSESSMENT — ACTIVITIES OF DAILY LIVING (ADL)
ADLS_ACUITY_SCORE: 14
ADLS_ACUITY_SCORE: 15
ADLS_ACUITY_SCORE: 14
ADLS_ACUITY_SCORE: 15
ADLS_ACUITY_SCORE: 14
ADLS_ACUITY_SCORE: 14

## 2021-01-04 NOTE — CONSULTS
INTERVENTIONAL RADIOLOGY CONSULT    Rajani Guo is a 70 year old female with enlarging pneumothorax. IR was consulted for chest tube placement. The patient had a chest tube placed by cardiothoracic surgery last evening at 9:34 PM and pneumothorax has decreased since.    Moreno Miller PA-C  Interventional Radiology  [phone] (IR pager)

## 2021-01-04 NOTE — PROGRESS NOTES
Cardiovascular Surgery Progress Note  01/04/2021         Assessment and Plan:     Rajani Guo is a 70 year old female with PMH of hypertension, pulmonary hypertension, dyslipidemia, diverticulosis, current tobacco use, DVT, arterial thrombus (March, 2019), DMT2 (on insulin), GERD, GIB, and osteoporosis. Her recent history should be noted for multiple GI bleeds since 2019 (last admitted 9/16/2020), attributed to 2 small AVMs in her small bowel without active bleeding. She was followed by her Cardiologist (Dr Thanh Morse) and had complained of 6 months of progressive DESAI and dizziness with poor quality of life and decreasing ADL tolerance. She was seen in the cardiac surgery clinic for evaluation of aortic and mitral stenosis on 12/7 and deemed an appropriate surgical candidate.   She is now s/p elective mechanical AVR and mechanical MVR on 12/29/20 with Dr. Luc Lara. Initially transferred to 6C POD#1, had episode of a-fib with RVR on evening of 12/31 with hypotension and was transferred back to ICU. Is now is sinus rhythm, and back on 6C since 1/2.      Cardiovascular:   Hx of severe mitral and aortic stenosis  S/p elective mechanical AVR and mechanical MVR   HLD, HTN, pulmonary HTN   No arrhythmia, HD stable. Remains in NSR.   Routine Post-op ECHO ordered on 12/31- borderline EF 50-55%  ASA 81 mg, atorvastatin   Chest tubes: mediastinal tubes removed 1/3, f/u cxr when patient agreeable.   Metoprolol 12.5 mg po bid.   Resume Lipitor 1/3, LFT's normalized.    TPW: removed 12/31  Left pleural tube placed 1/3 for large L pneumothorax, pneumo is now resolved.      Pulmonary:  - Extubated POD #0 to 4 lpm via NC; Saturating well on 1-2 lpm.   - Supplemental O2 PRN to keep sats > 92%. Wean off as tolerated.  - Pulm toilet, IS, activity and deep breathing.   - CLAMPING trial 1/4, CXR at 5 pm.       Neurology / MSK:  - Severe delirium this afternoon after being woken from nap 1/3. Patient trying to leave AMA, not  oriented to time or place. Unaware she had heart surgery. Did not know who her daughter was. Very paranoid of staff and threatening those who came near her. Neurology consulted. Ok to wait on CT of head. Patient will not agree to getting one done. Patient currently paranoid and delirious and will not cooperate. Most likely not stroke and very classic delirium per Neurology. Can get one at later time if condition does not improve.   - Remains with a Sitter. Delirium precautions, try to get window bed or private room.    - Seroquel at bedtime per Neurology.   - Restart gabapentin 300 mg at bedtime, patient did not sleep last night due to restless leg and lack of sleep likely contributing to mental status.    - One time dose of PRN Zyprexa available to use as last result should patient get combative.   - Acute post-operative pain well controlled with acetaminophen. Stop oxycodone and all other narcotics. Stop trazodone.    - Wellbutrin SR restarted started 1/2   - Patient calming down and starting to be more cooperative with daughter present.       / Renal:  - No Hx of renal disease. Most recent creatinine WNL, adequate UOP.   - Lasix 20 mg PO x 1 on 12/31, assess needs daily.   - Lasix 40 mg once 1/3. Weight down trending. Now on lasix 20 mg po bid 1/4.       GI / FEN:   Hx Diverticulosis without diverticulitis  Hx GI bleed attributed to small AVMs in small bowel  GERD  - Regular diet, continue bowel regimen  - Replace electrolytes as needed, hepatic enzymes WNL besides slight elevation of AST.     Endocrine:  T2DM on home insulin   Stress induced hyperglycemia  - Initially managed on insulin drip postop, transitioned to medium corrective sliding scale 12/31. Monitor for long-acting needs as diet progresses.    - Started Lantus 5 units (levimir 5 units PTA), also take Metformin at home. Add back as indicated.      Infectious Disease:  - Stress induced leukocytosis  - WBC normalized, remains afebrile on scheduled  tylenol. No signs or symptoms of infection, possible atelectasis.   - Completed perioperative antibiotics  - UA 1/3 due to altered mental status was positive. Started Cefepime 1/4 for + UA, will await cultures and sensitivities.      Hematology:   Hx Chronic anemia   Acute blood loss anemia and thrombocytopenia  Hgb 7.9; Plt WNL, no signs or symptoms of active bleeding  Hx Acute limb ischemia, March 2019  - Acute occlusion of left superficial femoral artery, treated with directed lysis and angioplasty. Transitioned from Apixaban to aspirin.      Anticoagulation:   - ASA 81 mg and coumadin for mechanical AVR and MVR.    - INR goal 2.5 - 3.5. Most recent INR 1.93   - Will resume Low int Hep gtt 1/4 due to INR decreasing after FFP 1/3.       Prophylaxis:   - Stress ulcer prophylaxis: Pantoprazole 40 mg daily for 30 days  - DVT prophylaxis: Subcutaneous heparin, SCD  - nicotine gum instead of patch, patient took off     Disposition:   - Transferred to  on 12/30, back to  12/31, now on floor since 1/2.   - Therapies recommendations to Home pending CT removal      Discussed with CVTS Fellow as needed.  Staff surgeons have been informed of changes through both verbal and written communication.      Quinton Handy PA-C  Cardiothoracic Surgery  Pager [phone]    9:17 AM   January 4, 2021        Interval History:     No overnight events and much calmer this AM. Feels like she needs to sleep more but is being kept up during the day (per orders). No further paranoid vs racing anxious thoughts.   Feels gloomy because the weather is cloudy.     States pain is well managed on current regimen. Slept well overnight she thinks.   Tolerating diet, is passing flatus, + BM 2 days ago. No nausea or vomiting.  Breathing well without complaints.   Working with therapies and ambulating in halls with assistance.   Denies chest pain, palpitations, dizziness, syncopal symptoms, fevers, chills, myalgias, or sternal popping/clicking.      "    Physical Exam:   Blood pressure (!) 150/70, pulse 70, temperature 98.3  F (36.8  C), temperature source Oral, resp. rate 18, height 1.626 m (5' 4\"), weight 66.4 kg (146 lb 6.4 oz), SpO2 99 %, not currently breastfeeding.  Vitals:    01/02/21 0400 01/03/21 0216 01/04/21 0418   Weight: 66.4 kg (146 lb 6.2 oz) 65.7 kg (144 lb 14.4 oz) 66.4 kg (146 lb 6.4 oz)      Weight; + 1.9 kg since admit and trending up and down.   24 hr Fluid status; net loss 500 mL.   MAPs: 94 - 107    Gen: A&Ox4, NAD  Neuro: no focal deficits   CV: RRR, normal S1 S2, no murmurs, rubs or gallops.   Pulm: CTA, no wheezing or rhonchi, normal breathing on RA  Abd: nondistended, normal BS, soft, nontender  Ext: trace peripheral edema  Incision: clean, dry, intact, no erythema, sternum stable  Tubes/drain sites: dressing clean and dry, serosanguinous output, no air leak. 24 hr output minimal.          Data:    Imaging:  reviewed recent imaging, no acute concerns    Labs:  BMP  Recent Labs   Lab 01/04/21 0521 01/03/21  0544 01/02/21  0346 01/01/21  0307    142 137 137   POTASSIUM 3.6 3.8 3.6 3.6   CHLORIDE 109 110* 105 105   KAYLEEN 8.5 8.2* 8.2* 8.1*   CO2 27 26 26 25   BUN 9 6* 11 16   CR 0.58 0.54 0.56 0.61   * 136* 153* 189*     CBC  Recent Labs   Lab 01/04/21  0521 01/03/21  0544 01/02/21  0346 01/01/21  0307   WBC 7.2 7.1 9.3 12.1*   RBC 2.83* 3.11* 3.13* 3.29*   HGB 7.9* 8.7* 8.7* 9.2*   HCT 25.3* 28.0* 28.0* 29.3*   MCV 89 90 90 89   MCH 27.9 28.0 27.8 28.0   MCHC 31.2* 31.1* 31.1* 31.4*   RDW 18.2* 18.2* 18.4* 18.5*    277 228 173     INR  Recent Labs   Lab 01/04/21  0521 01/03/21  0544 01/02/21  0346 01/01/21  0307   INR 1.93* 2.86* 3.72* 2.23*      Liver Function Studies -   Recent Labs   Lab Test 01/02/21  0346   PROTTOTAL 5.8*   ALBUMIN 2.5*   BILITOTAL 0.3   ALKPHOS 61   AST 23   ALT 15     GLUCOSE:   Recent Labs   Lab 01/04/21  0751 01/04/21  0521 01/04/21  0305 01/03/21  2144 01/03/21  1721 01/03/21  1143 " 01/03/21  1004 01/03/21  0544 01/03/21  0544 01/02/21  0346 01/02/21  0346 01/01/21  0307 01/01/21  0307 12/31/20 2148 12/31/20 2148 12/31/20 0541 12/31/20  0541   GLC  --  126*  --   --   --   --   --   --  136*  --  153*  --  189*  --  193*  --  135*   *  --  155* 156* 154* 200* 173*   < >  --    < >  --    < >  --    < >  --    < >  --     < > = values in this interval not displayed.

## 2021-01-04 NOTE — PROCEDURES
Chest Tube Insertion    Consent obtained from both patient and daughter. Timeout performed. Patient prepped with chlorhexidine solution and draped in sterile fashion. Local anesthetic infiltrated into the left anterior 3rd intercostal space in approximately the mid clavicular line including infiltration of the periosteum. A small incision was made and a 16 gauge access needle inserted into the left pleural space until return of air was noted. A guidewire was inserted without resistance, followed by serial dilation and insertion of a 14 Fr pigtail catheter without resistance. The tube was sutures into place and covered with an appropriate dressing. The tube was attached to a pleurvac system and -20 suction applied. A transient air leak was noted which resolved after a few seconds. The patient tolerated the procedure well. A chest radiograph was obtained and verified resolution of pneumothorax and proper positioning of the tube.     Marcus Greene MD  Cardiothoracic Surgery Fellow  [phone]

## 2021-01-04 NOTE — PROGRESS NOTES
"General acute hospital  Neurology Consultation - Progress Note    Patient Name:  Rajani Guo  MRN:  [id number]    :  1950  Date of Service:  2021  Primary care provider:  Tamiko Coley      Interval Events:   Nursing notes reviewed. Yesterday evening, obtained UA which revealed UTI and CXR which revealed increasing pneumothorax, requiring chest tube placement. Otherwise, her mental status improved after my initial encounter; pts daughter came to visit and pt became more cooperative. Recommended CT head if pt will tolerate it - it appears pt was not amenable to this.    This morning, pt is able to recall all the events of yesterday; states she felt confused like she couldn't trust anyone except her daughter. She is feeling significantly better now though.     Physical Examination   Vitals: BP (!) 150/70 (BP Location: Right arm)   Pulse 70   Temp 98.3  F (36.8  C) (Oral)   Resp 18   Ht 1.626 m (5' 4\")   Wt 66.4 kg (146 lb 6.4 oz)   SpO2 99%   BMI 25.13 kg/m    General: Adult female patient, NAD, sitting in chair  HEENT: NC/AT, no icterus, op pink and moist  Cardiac: RR  Chest: non-labored on RA  Extremities: Warm, edema present in BLE  Skin: No rash or lesion   Psych: affect congruent, cooperative  Neuro:  Mental status: Awake, alert, attentive. Oriented to self, place and time. Follows complex commands. Able to recall only 1/3 words. Language is fluent.   Cranial nerves: VFF, Conjugate gaze, EOMI grossly intact, face symmetric, facial sensation intact, hearing intact to conversation, no dysarthria, tongue midline  Motor: Normal bulk and tone. No abnormal movements. No drift in RUE; unable to assess LUE due to pain. Strength in RUE and BLE 5/5 throughout both distally and proximally. Strength testing in LUE limited due to chest tube placement and pain, hand  4+/5.  Reflexes: 2+ and symmetric at bilateral bicep, brachioradialis, patellae.   Sensory: intact to " light touch throughout  Coordination: Difficulty with FNF in RUE though no overt dysmetria, unable to assess LUE due to pain. HS intact bilaterally.  Gait: deferred    Investigations     Most Recent 3 CBC's:  Recent Labs   Lab Test 01/04/21  0521 01/03/21  0544 01/02/21  0346   WBC 7.2 7.1 9.3   HGB 7.9* 8.7* 8.7*   MCV 89 90 90    277 228     Most Recent 3 BMP's:  Recent Labs   Lab Test 01/04/21  0521 01/03/21  0544 01/02/21  0346    142 137   POTASSIUM 3.6 3.8 3.6   CHLORIDE 109 110* 105   CO2 27 26 26   BUN 9 6* 11   CR 0.58 0.54 0.56   ANIONGAP 6 6 6   KAYLEEN 8.5 8.2* 8.2*   * 136* 153*     Most Recent Urinalysis:  Recent Labs   Lab Test 01/03/21  1830 07/01/20  1401   COLOR Yellow Salma   APPEARANCE Slightly Cloudy Cloudy   URINEGLC 70* Negative   URINEBILI Negative Negative   URINEKETONE Negative Negative   SG 1.019 1.020   UBLD Small* Trace*   URINEPH 7.0 8.5*   PROTEIN 10* Negative   UROBILINOGEN  --  0.2   NITRITE Negative Positive*   LEUKEST Large* Trace*   RBCU 25* O - 2   WBCU 123* 0 - 5     Most Recent 3 INR's:  Recent Labs   Lab Test 01/04/21  0521 01/03/21  0544 01/02/21  0346   INR 1.93* 2.86* 3.72*       Impression  #Encephalopathy, likely multifactorial  #Hyperactive Delirium  #Toxic/metabolic encephalopathy  70 year old female with a history of hypertension, pulmonary hypertension, dyslipidemia, current tobacco use, h/o DVT, h/o arterial thrombus, diabetes, GERD, osteoporosis and mitral/aortic stenosis who is now s/p MVR/AVR on 12/29/20. Neurology consulted for AMS, described as paranoia/agitation of sudden onset.     Differential for encephalopathy includes metabolic derangements, hypoxic ischemic encephalopathy, endocrine abnormalities, seizure, uremia, psychogenic, infection/inflammation, toxins/drugs.   No history to suggest seizure activity, thus, no indication for EEG. Low suspicion for meningitis/encephalitis. Given history of recent MVR/AVR and initiation of warfarin with  INR being supratherapeutic at 3.72 on a single occasion, team reports concern for possible ICH. However, paranoia/hyperactivity would not be expected in ICH and typically, would expect to see lethargy/somnolence. Furthermore, she is moving all of her extremities and ambulating on her own with no clear focal deficits to suspect stroke as etiology. CT head is reasonable to obtain if she will tolerate it - though will defer to the primary team.      Overall, her presentation seems more behavioral and seems consistent with hyperactive delirium, which would be supported by her known ongoing sundowning/delirium as reported in the notes, her sleep being disrupted (napping in the middle of the day, staying up at night) and her age. Recommend delirium precautions and utilizing PRN's as needed for agitation. No further recommendations from neurology.    Update 1/4: Mental status improved yesterday when pts daughter arrived - this type of waxing and waning would be typical in delirium. Her newly diagnosed UTI is also likely contributing. Overall, encephalopathy seems multifactorial; likely toxic/metabolic with a component of delirium. Recommendations unchanged from prior. CT head is not likely to be revealing given her improvement and nonfocal exam; if she develops AMS again, primary team can consider CTH. She probably has some mild cognitive impairment given she only recalled 1/3 words; while we could consider neuropsych testing, unlikely to  and could be further discussed as an outpatient.     Recommendations  - Can consider CT head if pt has AMS again though no need at this time given she is improved, will defer to primary team on this  - Use PRN's for agitation as indicated   - Delirium precautions (frequent reorientation, normal day night cycles (blinds up and awake during day, sleeping at night), minimize frequent interruptions, clutter and loud noises, encourage family visitations, consider melatonin at  1900 nightly)  - Avoid sensory deprivation (provide patient with eyeglasses or hearing aids if using)   - Treat any underlying infection, correct metabolic derangements as able.  - Avoid CNS acting drugs (including opiates, benzodiazepines, anti-cholinergics); consider local measures or scheduled pain regimens that minimize opioids for pain  - Supportive medical cares, including correction of any electrolyte abnormalities     Patient was seen and discussed with Dr. Zuniga.    Talya Payne MD  Neurology PGY-3  [phone]

## 2021-01-04 NOTE — PLAN OF CARE
D: Admitted 12/29/20 s/p AVR and MVR w/ mechanical valves c/b onset of afib with RVR with hypotension and stress induced hyperglycemia. Hx of HTN, pulm HTN, dyslipidemia, current tobacco use, h/o DVT, h/o arterial thrombus, diabetes, GERD, and osteoporosis.     I: Monitored vitals and assessed pt status.   Changed: 2 units FFP given; Right CT placed 1/4; Seroquel and Lantus started this evening  Saline locked x2 PIV   PRN: tylenol @ 0421     A: A0x4, forgetful, very sleepy during night. Patient can be uncooperative at times. VSS. On 2L O2 NC at nocs. Afebrile. Urinating adequately. No BM this shift. CT site intact, 40 mL out. Blood glucose stable, lantus given at HS. Pain at chest tube site, PRN tylenol given for relief. Tolerated 2 units FFP well, no reaction. Dyspnea on exertion. Up stand by assist. 1:1 sitter at bedside.     P: Continue to monitor Pt status and report changes to CVTS.

## 2021-01-05 ENCOUNTER — APPOINTMENT (OUTPATIENT)
Dept: GENERAL RADIOLOGY | Facility: CLINIC | Age: 71
DRG: 219 | End: 2021-01-05
Attending: PHYSICIAN ASSISTANT
Payer: COMMERCIAL

## 2021-01-05 PROBLEM — Z98.890 S/P MVR (MITRAL VALVE REPAIR): Status: ACTIVE | Noted: 2021-01-05

## 2021-01-05 LAB
ANION GAP SERPL CALCULATED.3IONS-SCNC: 5 MMOL/L (ref 3–14)
BACTERIA SPEC CULT: ABNORMAL
BUN SERPL-MCNC: 8 MG/DL (ref 7–30)
CALCIUM SERPL-MCNC: 8.4 MG/DL (ref 8.5–10.1)
CHLORIDE SERPL-SCNC: 109 MMOL/L (ref 94–109)
CO2 SERPL-SCNC: 28 MMOL/L (ref 20–32)
CREAT SERPL-MCNC: 0.59 MG/DL (ref 0.52–1.04)
ERYTHROCYTE [DISTWIDTH] IN BLOOD BY AUTOMATED COUNT: 17.5 % (ref 10–15)
GFR SERPL CREATININE-BSD FRML MDRD: >90 ML/MIN/{1.73_M2}
GLUCOSE BLDC GLUCOMTR-MCNC: 146 MG/DL (ref 70–99)
GLUCOSE BLDC GLUCOMTR-MCNC: 216 MG/DL (ref 70–99)
GLUCOSE BLDC GLUCOMTR-MCNC: 242 MG/DL (ref 70–99)
GLUCOSE SERPL-MCNC: 146 MG/DL (ref 70–99)
HCT VFR BLD AUTO: 27.5 % (ref 35–47)
HGB BLD-MCNC: 8.4 G/DL (ref 11.7–15.7)
INR PPP: 1.81 (ref 0.86–1.14)
LACTATE BLD-SCNC: 1.5 MMOL/L (ref 0.7–2)
MAGNESIUM SERPL-MCNC: 1.8 MG/DL (ref 1.6–2.3)
MCH RBC QN AUTO: 27.3 PG (ref 26.5–33)
MCHC RBC AUTO-ENTMCNC: 30.5 G/DL (ref 31.5–36.5)
MCV RBC AUTO: 89 FL (ref 78–100)
PLATELET # BLD AUTO: 410 10E9/L (ref 150–450)
POTASSIUM SERPL-SCNC: 3.6 MMOL/L (ref 3.4–5.3)
RBC # BLD AUTO: 3.08 10E12/L (ref 3.8–5.2)
SODIUM SERPL-SCNC: 143 MMOL/L (ref 133–144)
SPECIMEN SOURCE: ABNORMAL
UFH PPP CHRO-ACNC: 0.22 IU/ML
UFH PPP CHRO-ACNC: 0.33 IU/ML
WBC # BLD AUTO: 8.2 10E9/L (ref 4–11)

## 2021-01-05 PROCEDURE — 71045 X-RAY EXAM CHEST 1 VIEW: CPT

## 2021-01-05 PROCEDURE — 999N001017 HC STATISTIC GLUCOSE BY METER IP

## 2021-01-05 PROCEDURE — 214N000001 HC R&B CCU UMMC

## 2021-01-05 PROCEDURE — 85027 COMPLETE CBC AUTOMATED: CPT | Performed by: PHYSICIAN ASSISTANT

## 2021-01-05 PROCEDURE — 250N000013 HC RX MED GY IP 250 OP 250 PS 637: Performed by: NURSE PRACTITIONER

## 2021-01-05 PROCEDURE — 83735 ASSAY OF MAGNESIUM: CPT | Performed by: PHYSICIAN ASSISTANT

## 2021-01-05 PROCEDURE — 93010 ELECTROCARDIOGRAM REPORT: CPT | Performed by: INTERNAL MEDICINE

## 2021-01-05 PROCEDURE — 250N000011 HC RX IP 250 OP 636: Performed by: PHYSICIAN ASSISTANT

## 2021-01-05 PROCEDURE — 85520 HEPARIN ASSAY: CPT | Performed by: SURGERY

## 2021-01-05 PROCEDURE — 83605 ASSAY OF LACTIC ACID: CPT | Performed by: SURGERY

## 2021-01-05 PROCEDURE — 250N000013 HC RX MED GY IP 250 OP 250 PS 637: Performed by: PHYSICIAN ASSISTANT

## 2021-01-05 PROCEDURE — 80048 BASIC METABOLIC PNL TOTAL CA: CPT | Performed by: PHYSICIAN ASSISTANT

## 2021-01-05 PROCEDURE — 250N000013 HC RX MED GY IP 250 OP 250 PS 637: Performed by: SURGERY

## 2021-01-05 PROCEDURE — 250N000013 HC RX MED GY IP 250 OP 250 PS 637: Performed by: PSYCHIATRY & NEUROLOGY

## 2021-01-05 PROCEDURE — 71045 X-RAY EXAM CHEST 1 VIEW: CPT | Mod: 26 | Performed by: RADIOLOGY

## 2021-01-05 PROCEDURE — 36415 COLL VENOUS BLD VENIPUNCTURE: CPT | Performed by: PHYSICIAN ASSISTANT

## 2021-01-05 PROCEDURE — 36415 COLL VENOUS BLD VENIPUNCTURE: CPT | Performed by: STUDENT IN AN ORGANIZED HEALTH CARE EDUCATION/TRAINING PROGRAM

## 2021-01-05 PROCEDURE — 36415 COLL VENOUS BLD VENIPUNCTURE: CPT | Performed by: SURGERY

## 2021-01-05 PROCEDURE — 250N000013 HC RX MED GY IP 250 OP 250 PS 637: Performed by: STUDENT IN AN ORGANIZED HEALTH CARE EDUCATION/TRAINING PROGRAM

## 2021-01-05 PROCEDURE — 85610 PROTHROMBIN TIME: CPT | Performed by: STUDENT IN AN ORGANIZED HEALTH CARE EDUCATION/TRAINING PROGRAM

## 2021-01-05 PROCEDURE — 250N000009 HC RX 250: Performed by: PHYSICIAN ASSISTANT

## 2021-01-05 RX ORDER — WARFARIN SODIUM 3 MG/1
6 TABLET ORAL
Status: COMPLETED | OUTPATIENT
Start: 2021-01-05 | End: 2021-01-05

## 2021-01-05 RX ORDER — MAGNESIUM SULFATE HEPTAHYDRATE 40 MG/ML
2 INJECTION, SOLUTION INTRAVENOUS ONCE
Status: COMPLETED | OUTPATIENT
Start: 2021-01-05 | End: 2021-01-05

## 2021-01-05 RX ORDER — METOPROLOL TARTRATE 1 MG/ML
5 INJECTION, SOLUTION INTRAVENOUS ONCE
Status: COMPLETED | OUTPATIENT
Start: 2021-01-05 | End: 2021-01-05

## 2021-01-05 RX ORDER — AMIODARONE HYDROCHLORIDE 200 MG/1
400 TABLET ORAL 2 TIMES DAILY
Status: DISCONTINUED | OUTPATIENT
Start: 2021-01-05 | End: 2021-01-06 | Stop reason: HOSPADM

## 2021-01-05 RX ORDER — METOPROLOL TARTRATE 25 MG/1
25 TABLET, FILM COATED ORAL 2 TIMES DAILY
Status: DISCONTINUED | OUTPATIENT
Start: 2021-01-05 | End: 2021-01-06 | Stop reason: HOSPADM

## 2021-01-05 RX ORDER — POTASSIUM CHLORIDE 750 MG/1
20 TABLET, EXTENDED RELEASE ORAL 2 TIMES DAILY
Status: COMPLETED | OUTPATIENT
Start: 2021-01-05 | End: 2021-01-05

## 2021-01-05 RX ADMIN — CEFEPIME HYDROCHLORIDE 1 G: 1 INJECTION, POWDER, FOR SOLUTION INTRAMUSCULAR; INTRAVENOUS at 13:52

## 2021-01-05 RX ADMIN — ACETAMINOPHEN 975 MG: 325 TABLET, FILM COATED ORAL at 08:20

## 2021-01-05 RX ADMIN — LIDOCAINE 1 PATCH: 560 PATCH PERCUTANEOUS; TOPICAL; TRANSDERMAL at 08:22

## 2021-01-05 RX ADMIN — METFORMIN HYDROCHLORIDE 500 MG: 500 TABLET ORAL at 17:19

## 2021-01-05 RX ADMIN — HEPARIN SODIUM 1250 UNITS/HR: 10000 INJECTION, SOLUTION INTRAVENOUS at 11:56

## 2021-01-05 RX ADMIN — PANTOPRAZOLE SODIUM 40 MG: 40 TABLET, DELAYED RELEASE ORAL at 08:18

## 2021-01-05 RX ADMIN — DOCUSATE SODIUM AND SENNOSIDES 1 TABLET: 8.6; 5 TABLET, FILM COATED ORAL at 08:18

## 2021-01-05 RX ADMIN — AMIODARONE HYDROCHLORIDE 400 MG: 200 TABLET ORAL at 19:46

## 2021-01-05 RX ADMIN — ACETAMINOPHEN 650 MG: 325 TABLET, FILM COATED ORAL at 22:15

## 2021-01-05 RX ADMIN — Medication 12.5 MG: at 16:10

## 2021-01-05 RX ADMIN — ACETAMINOPHEN 650 MG: 325 TABLET, FILM COATED ORAL at 04:46

## 2021-01-05 RX ADMIN — AMIODARONE HYDROCHLORIDE 150 MG: 1.5 INJECTION, SOLUTION INTRAVENOUS at 13:31

## 2021-01-05 RX ADMIN — OXYBUTYNIN CHLORIDE 5 MG: 5 TABLET ORAL at 08:18

## 2021-01-05 RX ADMIN — OXYBUTYNIN CHLORIDE 5 MG: 5 TABLET ORAL at 19:46

## 2021-01-05 RX ADMIN — GABAPENTIN 300 MG: 300 CAPSULE ORAL at 19:51

## 2021-01-05 RX ADMIN — AMIODARONE HYDROCHLORIDE 150 MG: 1.5 INJECTION, SOLUTION INTRAVENOUS at 11:26

## 2021-01-05 RX ADMIN — MAGNESIUM SULFATE IN WATER 2 G: 40 INJECTION, SOLUTION INTRAVENOUS at 11:51

## 2021-01-05 RX ADMIN — ASPIRIN 81 MG CHEWABLE TABLET 81 MG: 81 TABLET CHEWABLE at 08:18

## 2021-01-05 RX ADMIN — POTASSIUM CHLORIDE 20 MEQ: 750 TABLET, EXTENDED RELEASE ORAL at 19:46

## 2021-01-05 RX ADMIN — FUROSEMIDE 20 MG: 20 TABLET ORAL at 16:10

## 2021-01-05 RX ADMIN — POTASSIUM CHLORIDE 10 MEQ: 750 TABLET, EXTENDED RELEASE ORAL at 11:39

## 2021-01-05 RX ADMIN — ATORVASTATIN CALCIUM 40 MG: 40 TABLET, FILM COATED ORAL at 08:18

## 2021-01-05 RX ADMIN — POTASSIUM CHLORIDE 10 MEQ: 750 TABLET, EXTENDED RELEASE ORAL at 19:45

## 2021-01-05 RX ADMIN — POTASSIUM CHLORIDE 20 MEQ: 750 TABLET, EXTENDED RELEASE ORAL at 08:18

## 2021-01-05 RX ADMIN — WARFARIN SODIUM 6 MG: 3 TABLET ORAL at 17:19

## 2021-01-05 RX ADMIN — BUPROPION HYDROCHLORIDE 150 MG: 150 TABLET, EXTENDED RELEASE ORAL at 19:46

## 2021-01-05 RX ADMIN — FUROSEMIDE 20 MG: 20 TABLET ORAL at 08:20

## 2021-01-05 RX ADMIN — CEFEPIME HYDROCHLORIDE 1 G: 1 INJECTION, POWDER, FOR SOLUTION INTRAMUSCULAR; INTRAVENOUS at 01:17

## 2021-01-05 RX ADMIN — Medication 12.5 MG: at 08:18

## 2021-01-05 RX ADMIN — METOPROLOL TARTRATE 25 MG: 25 TABLET, FILM COATED ORAL at 19:45

## 2021-01-05 RX ADMIN — MELATONIN TAB 3 MG 3 MG: 3 TAB at 19:45

## 2021-01-05 RX ADMIN — Medication 1 PATCH: at 08:22

## 2021-01-05 RX ADMIN — MAGNESIUM SULFATE IN WATER 2 G: 40 INJECTION, SOLUTION INTRAVENOUS at 16:10

## 2021-01-05 RX ADMIN — INSULIN GLARGINE 5 UNITS: 100 INJECTION, SOLUTION SUBCUTANEOUS at 19:52

## 2021-01-05 RX ADMIN — METOPROLOL TARTRATE 5 MG: 5 INJECTION INTRAVENOUS at 12:01

## 2021-01-05 RX ADMIN — BUPROPION HYDROCHLORIDE 150 MG: 150 TABLET, EXTENDED RELEASE ORAL at 08:18

## 2021-01-05 ASSESSMENT — ACTIVITIES OF DAILY LIVING (ADL)
ADLS_ACUITY_SCORE: 14

## 2021-01-05 ASSESSMENT — MIFFLIN-ST. JEOR: SCORE: 1154.11

## 2021-01-05 NOTE — PROGRESS NOTES
HX: 70 year old with PMH of aortic stenosis, mitral stenosis, hypertension, pulmonary hypertension, dyslipidemia, diverticulosis, current tobacco use, DVT, arterial thrombus (March, 2019), DMT2 (on insulin), GERD, GIB, and osteoporosis. Recent multiple GI bleeds since 2019 (last admitted 9/16/2020).  S/P mechanical AVR and mechanical MVR on 12/29/20. Readmitted to CVICU for symptomatic Afib with RVR.    Cardiac:SR 60-70s. Converted to a-fib 120-130s at 100, BPs 80s-100s with MAPs 60s-80s. Received amiodarone  mg x2, metoprolol IV 5 mg x1, Magnesium 2 gm IV.   VS: BPs 130/60 prior to a-fib then /60s when in a-fib.   IV: PIV x2 with heparin drip at 1250 units/hour per protocol. INR 1.81 this AM, to receive 6 mg warfarin this evening.   Tubes: 1 CT discontinued.   Neuro: A/Ox4. Somewhat disoriented upon waking from deep sleep but reoriented quickly.   Resp: John shortness of breath. Placed on 2L during a-fib but removed when up to bathroom. RA sats %.   GI/: Voiding. No BM this shift, took 1 senna this AM.   Endo:Gucoses covered per sliding scale. Did not eat lunch.   Skin: Intact other than incisions, CT sites.  Pain: Denies pain, on scheduled tylenol.   Social: No visitors present, patient spoke with daughter on cell phone.   Plan: Monitor for conversion to SR. Start PO metoprolol and amiodarone. Continue current cares and notify providers with questions or concerns.

## 2021-01-05 NOTE — PROGRESS NOTES
Care Management Follow Up    Length of Stay (days): 7    Expected Discharge Date: 01/06/21     Concerns to be Addressed: Anticoagulation referral and PLC consult, home care    Patient plan of care discussed at interdisciplinary rounds: Yes    Anticipated Discharge Disposition: Home      Anticipated Discharge Services: Home, outpt INR and warfarin monitoring     Anticipated Discharge DME: N/A    Patient/family educated on Medicare website which has current facility and service quality ratings: Yes  Education Provided on the Discharge Plan: Yes  Patient/Family in Agreement with the Plan: Yes    Referrals Placed by CM/SW: [facility], [facility]  Private pay costs discussed: N/A    Additional Information:  This writer met with pt to introduce self and follow up on discharge planning. PT/OT now recommending home with  PT/OT to which pt is agreeable, but asked writer to follow up with daughter. This writer called and spoke with daughter, Ailin who confirmed that she is agreeable to home care coming to the home for RN/PT/OT. This writer offered choice and Ailin stated she would prefer to stay within the  system as pt's PCP is also through .   This writer also discussed outpt INR and warfarin monitoring with daughter who confirmed she would like this arranged through pt's PCP, Dr. Tamiko Coley at [facility]. PLC consult arranged for warfarin education for pt and daughter.   Daughter confirmed she is able to provide 24/7 assistance and transport pt to follow up appointments.     Intervention:  Referral made to [facility] for RN evaluation post hospitalization. Assess vital signs, respiratory and cardiac status, activity tolerance, hydration, nutritional status, med setup and management. INR lab draws with results to [facility].  PT/OT eval and treat for deconditioning, strengthening and endurance.   Referral made to  [facility] for INR and Warfarin Monitoring (Goal= 2.5-3.5). Indication for Anticoagulation: Mechanical AVR and Mechanical MVR. Expected duration of therapy: Indefinite. Dr. Tamiko Coley to follow.    CC will continue to monitor patient's medical condition and progress towards discharge.  Debbie Caputo RN BSN  6C Unit Care Coordinator  Phone number: [phone]  Pager: [phone]

## 2021-01-05 NOTE — PROGRESS NOTES
Cardiovascular Surgery Progress Note  01/05/2021         Assessment and Plan:     Rajani Guo is a 70 year old female with PMH of hypertension, pulmonary hypertension, dyslipidemia, diverticulosis, current tobacco use, DVT, arterial thrombus (March, 2019), DMT2 (on insulin), GERD, GIB, and osteoporosis. Her recent history should be noted for multiple GI bleeds since 2019 (last admitted 9/16/2020), attributed to 2 small AVMs in her small bowel without active bleeding. She was followed by her Cardiologist (Dr Thanh Morse) and had complained of 6 months of progressive DESAI and dizziness with poor quality of life and decreasing ADL tolerance. She was seen in the cardiac surgery clinic for evaluation of aortic and mitral stenosis on 12/7 and deemed an appropriate surgical candidate.   She is now s/p elective mechanical AVR and mechanical MVR on 12/29/20 with Dr. Luc Lara. Initially transferred to 6C POD#1, had episode of a-fib with RVR on evening of 12/31 with hypotension and was transferred back to ICU. Is now back on 6C since 1/2.      Cardiovascular:   Hx of severe mitral and aortic stenosis  S/p elective mechanical AVR and mechanical MVR   HLD, HTN, pulmonary HTN   Post-op Atrial Fibrillation   Routine Post-op ECHO ordered on 12/31; borderline EF 50-55%  Back in A-fib with RVR on 1/5, remained HD stable.  IV Amio bolus x 2 and IV Metop 5 mg x 1 given for rate control. Started PO Amio 400 mg BID and increased metoprolol to 25 mg PO BID.  Tolerating ASA 81 mg and Lipitor.   TPW: removed 12/31       Pulmonary:  - Extubated POD #0 to 4 lpm via NC; now saturating well and weaned to RA.  - Pulm toilet, IS, activity and deep breathing.   - Left pleural tube placed 1/3 for large L pneumothorax, pneumo is now resolved. Passed clamping trial 1/4 and L chest tube removed 1/5 without complication.      Neurology / MSK:  - Severe delirium after being woken from nap 1/3. Patient tried to leave AMA, not oriented to time or  place. Unaware she had heart surgery. Did not know who her daughter was. Very paranoid of staff and threatening those who came near her. Neurology consulted.  Patient did not agree to getting CT. Patient was paranoid and delirious and did not cooperate. Most likely not stroke and very classic delirium per Neurology. Can get one at later time if condition does not improve. Did not need to get CT head.    - Has been off her Sitter since 1/2. Continued on delirium precautions, try to get window bed or private room.  Did not use PRN Zyprexa.  Was on Seroquel q PM, but will trial OFF 1/5 (before discharge).   - Restart gabapentin 300 mg at bedtime, patient with reduced sleep due to restless leg and lack of sleep likely contributed to mental status.    - Acute post-operative pain well controlled with acetaminophen. Stopped oxycodone and all other narcotics. Stopped trazodone.   - Wellbutrin SR restarted started 1/2.       / Renal:  - No Hx of renal disease. Most recent creatinine WNL, adequate UOP.   - Tolerating diuresis and weight trending down. Now on lasix 20 mg po bid since 1/4.       GI / FEN:   Hx Diverticulosis without diverticulitis  Hx GI bleed attributed to small AVMs in small bowel  GERD  - Regular diet, continue bowel regimen.    - Replace electrolytes as needed, hepatic enzymes WNL.     Endocrine:  T2DM on home insulin   Stress induced hyperglycemia  - Initially managed on insulin drip postop, transitioned to medium corrective sliding scale 12/31. Monitor for long-acting needs as diet progresses.    - Started Lantus 5 units (levimir 5 units PTA) and Metformin 500 mg PO BID (1000 mg at home).       Infectious Disease:  UTI  - UA 1/3 due to altered mental status, came back positive. Started Cefepime 1/4 for + UA, will continue until 1/6 and consider treatment completed (3 days).   Stress induced leukocytosis  - WBC WNL, remains afebrile on scheduled tylenol. No other signs or symptoms of infection, possible  "atelectasis.     Hematology:   Hx Chronic anemia   Acute blood loss anemia and thrombocytopenia  Hgb 8.4; Plt WNL, no signs or symptoms of active bleeding  Hx Acute limb ischemia, March 2019  - Acute occlusion of left superficial femoral artery, treated with directed lysis and angioplasty. Transitioned from Apixaban to aspirin.      Anticoagulation:   - ASA 81 mg and coumadin for mechanical AVR and MVR.    - INR goal 2.5 - 3.5. Most recent INR 1.81 and decreasing.    - Currently on Low int Hep gtt 1/4 due to INR decreasing after FFP 1/3.       Prophylaxis:   - Stress ulcer prophylaxis: Pantoprazole 40 mg daily for 30 days  - DVT prophylaxis: Subcutaneous heparin, SCD  - Nicotine gum instead of patch, patient took off     Disposition:   - Transferred to  on 12/30, back to  12/31, now on floor since 1/2.   - Therapies recommendations to Home pending CT removal and INR    Discussed with CVTS Fellow as needed.  Staff surgeons have been informed of changes through both verbal and written communication.      Quinton Handy PA-C  Cardiothoracic Surgery  Pager [phone]    7:30 AM   January 5, 2021        Interval History:     No overnight events.  Slept well overnight.  States pain is well managed on current regimen. L sided ch tube site pain improving.   Tolerating diet, is passing flatus, + a lot of BM yesterday. No nausea or vomiting.  Breathing well without complaints.   Working with therapies and ambulating in halls without assistance.   Denies chest pain, palpitations, dizziness, syncopal symptoms, fevers, chills, myalgias, or sternal popping/clicking.         Physical Exam:   Blood pressure (!) 147/75, pulse 70, temperature 98.1  F (36.7  C), temperature source Oral, resp. rate 16, height 1.626 m (5' 4\"), weight 64.4 kg (142 lb), SpO2 97 %, not currently breastfeeding.  Vitals:    01/03/21 0216 01/04/21 0418 01/05/21 0438   Weight: 65.7 kg (144 lb 14.4 oz) 66.4 kg (146 lb 6.4 oz) 64.4 kg (142 lb)      Weight; " -0.2 kg since admit and trending down.   24 hr Fluid status; net even    MAPs: 101- 105    Gen: A&Ox4, NAD  Neuro: no focal deficits   CV: irregularly irregular, normal S1 S2, no murmurs, rubs or gallops.   Pulm: CTA, no wheezing or rhonchi, normal breathing on RA  Abd: nondistended, normal BS, soft, nontender  Ext: no peripheral edema  Incision: clean, dry, intact, no erythema, sternum stable  Tubes/drain sites: dressing clean and dry, serosanguinous output, no air leak. 24 hr output 40 mL.      * pulled L apical pleural tube without immediate complication         Data:    Imaging:  reviewed recent imaging, no acute concerns.   Follow up CXR after ch tube pull is pending.     Labs:  BMP  Recent Labs   Lab 01/05/21 0133 01/04/21 0521 01/03/21 0544 01/02/21 0346    143 142 137   POTASSIUM 3.6 3.6 3.8 3.6   CHLORIDE 109 109 110* 105   KAYLEEN 8.4* 8.5 8.2* 8.2*   CO2 28 27 26 26   BUN 8 9 6* 11   CR 0.59 0.58 0.54 0.56   * 126* 136* 153*     CBC  Recent Labs   Lab 01/05/21 0133 01/04/21 0521 01/03/21 0544 01/02/21 0346   WBC 8.2 7.2 7.1 9.3   RBC 3.08* 2.83* 3.11* 3.13*   HGB 8.4* 7.9* 8.7* 8.7*   HCT 27.5* 25.3* 28.0* 28.0*   MCV 89 89 90 90   MCH 27.3 27.9 28.0 27.8   MCHC 30.5* 31.2* 31.1* 31.1*   RDW 17.5* 18.2* 18.2* 18.4*    322 277 228     INR  Recent Labs   Lab 01/05/21 0133 01/04/21 0521 01/03/21 0544 01/02/21  0346   INR 1.81* 1.93* 2.86* 3.72*      Liver Function Studies -   Recent Labs   Lab Test 01/02/21  0346   PROTTOTAL 5.8*   ALBUMIN 2.5*   BILITOTAL 0.3   ALKPHOS 61   AST 23   ALT 15     GLUCOSE:   Recent Labs   Lab 01/05/21  0133 01/04/21  2231 01/04/21  1753 01/04/21  1634 01/04/21  1203 01/04/21  0751 01/04/21  0521 01/04/21  0305 01/03/21  0544 01/03/21  0544 01/02/21  0346 01/02/21  0346 01/01/21  0307 01/01/21  0307 12/31/20  2148 12/31/20  2148   *  --   --   --   --   --  126*  --   --  136*  --  153*  --  189*  --  193*   BGM  --  211* 153* 129* 196* 134*  --   155*   < >  --    < >  --    < >  --    < >  --     < > = values in this interval not displayed.

## 2021-01-05 NOTE — PROGRESS NOTES
Pt OOB with SBA to BR.  Stating some pain to L chest where CT is placed, improved with APAP dosing and repositioning.    Pt awoke around 0100 with blood coming from PIV site.  Found to have a torn portion of her TKO line with blood backing up into the lines.  Pt had removed clothing, EKG pads, removed CT clamp (this writer had clamped the CT using turing clamp and stopcock in place) searching for bleeding.  Pt was slightly confused, reminded to call the nurse if she was felt anything was wrong or needed assistance.  EKG patches replaced, CT dressing changed (it was partially loose), pt cleaned up and re-situated.  New TKO line initiated.      Heparin gtt infusing, dosing changed per protocol, pending recheck this AM at 0830.    Cookie Worthy RN on 1/5/2021 at 5:16 AM

## 2021-01-05 NOTE — PLAN OF CARE
"BP (!) 142/65   Pulse 74   Temp 97.9  F (36.6  C) (Oral)   Resp 16   Ht 1.626 m (5' 4\")   Wt 66.4 kg (146 lb 6.4 oz)   SpO2 97%   BMI 25.13 kg/m        Pt continues recovery post CABG, one chest tube remains on left for pneumothorax and has been clamped most of the day.   Pt comfortable and alert, expressed frustration with yesterdays confusion. Sitter was pulled mid day, and patient calling as needed.  Pt acknowledged to CVTS, and daughter acknowledged to Nursing some memory changes may have been presenting prior to surgery.   IV antibiotic initiated for UTI.  Heparin continues to be titrated.   Mg and K were replaced as ordered.  Bowel is pending, refused regiment this a.m. hypoactive bowel sound.      CVTS is primary notify of changes.    "

## 2021-01-05 NOTE — PROGRESS NOTES
CLINICAL NUTRITION SERVICES    Reviewed nutrition risk factors due to LOS. Pt is tolerating diet, eating well per nursing documentation (% meals with fair to good appetite, only one occasion of 25% per RN flowsheets). Pt reports good appetite, but has found the variety of food lacking (some food item outages over the holidays). Tries to aim for dairy, fruit, vegetables, and protein with meals. No significant wt loss noted. No nutrition issues identified at this time. RD will follow via rounds at this time, unless consulted.    Kaleigh Wang RD, LD  Pager: [phone]

## 2021-01-05 NOTE — OP NOTE
CO-SURGEON NOTE    Procedure Date: 12/29/2020      PREOPERATIVE DIAGNOSIS:   1. Severe mitral stenosis.   2. Severe aortic stenosis.      POSTOPERATIVE DIAGNOSIS:   1. Severe mitral stenosis.   2. Severe aortic stenosis.      PROCEDURE:   1. Mitral valve replacement with 27 mm St. Abdoulaye mechanical valve.   2. Aortic valve replacement 19-mm St. Abdoulaye [place] mechanical valve.      SURGEON:  Luc Quiñones MD      COSURGEON:  Chris Ken MD PhD. The role of a second attending surgeon was required due to the complexity of the case as well as the absence of any qualified resident or fellow.      ASSISTANT:  Emilia Martin PA-C.      OPERATIVE INDICATIONS:  The patient is a 70-year-old female with severe aortic and mitral stenosis.  Decision was made to proceed with mitral and aortic valve replacement.  I discussed risks and benefits of surgery, patient's family, including risk of death, stroke, bleeding, wound, renal failure, arrhythmias.  She has agreed to proceed with surgery.      OPERATIVE FINDINGS:  The patient's sternum of adequate quality.  Pericardial space is free of any adhesions.  There was mild plaques in the ascending aortic valve was trileaflet in nature and severely calcified.  The mitral valve also was extremely calcified with severe mitral annular calcification extending both the posterior and the anterior annulus.  There was significant left atrial enlargement.      DESCRIPTION OF OPERATION:  Please see the separate operative note by Dr. Quiñones for details. I specifically performed a portion of the aortotomy closure in addition to assisting with the remainder of the procedure.      LUC QUIÑONES MD

## 2021-01-06 ENCOUNTER — TELEPHONE (OUTPATIENT)
Dept: FAMILY MEDICINE | Facility: CLINIC | Age: 71
End: 2021-01-06

## 2021-01-06 ENCOUNTER — PATIENT OUTREACH (OUTPATIENT)
Dept: CARE COORDINATION | Facility: CLINIC | Age: 71
End: 2021-01-06

## 2021-01-06 ENCOUNTER — DOCUMENTATION ONLY (OUTPATIENT)
Dept: FAMILY MEDICINE | Facility: CLINIC | Age: 71
End: 2021-01-06

## 2021-01-06 ENCOUNTER — APPOINTMENT (OUTPATIENT)
Dept: CARDIOLOGY | Facility: CLINIC | Age: 71
DRG: 219 | End: 2021-01-06
Attending: PHYSICIAN ASSISTANT
Payer: COMMERCIAL

## 2021-01-06 VITALS
SYSTOLIC BLOOD PRESSURE: 107 MMHG | BODY MASS INDEX: 25.01 KG/M2 | TEMPERATURE: 97.8 F | HEART RATE: 55 BPM | DIASTOLIC BLOOD PRESSURE: 61 MMHG | HEIGHT: 64 IN | RESPIRATION RATE: 18 BRPM | WEIGHT: 146.5 LBS | OXYGEN SATURATION: 98 %

## 2021-01-06 DIAGNOSIS — Z98.890 S/P MVR (MITRAL VALVE REPAIR): Primary | ICD-10-CM

## 2021-01-06 DIAGNOSIS — Z98.890 S/P MVR (MITRAL VALVE REPAIR): ICD-10-CM

## 2021-01-06 LAB
ANION GAP SERPL CALCULATED.3IONS-SCNC: 6 MMOL/L (ref 3–14)
BUN SERPL-MCNC: 13 MG/DL (ref 7–30)
CALCIUM SERPL-MCNC: 8.4 MG/DL (ref 8.5–10.1)
CHLORIDE SERPL-SCNC: 109 MMOL/L (ref 94–109)
CO2 SERPL-SCNC: 23 MMOL/L (ref 20–32)
CREAT SERPL-MCNC: 0.68 MG/DL (ref 0.52–1.04)
ERYTHROCYTE [DISTWIDTH] IN BLOOD BY AUTOMATED COUNT: 18 % (ref 10–15)
GFR SERPL CREATININE-BSD FRML MDRD: 89 ML/MIN/{1.73_M2}
GLUCOSE BLDC GLUCOMTR-MCNC: 129 MG/DL (ref 70–99)
GLUCOSE BLDC GLUCOMTR-MCNC: 143 MG/DL (ref 70–99)
GLUCOSE BLDC GLUCOMTR-MCNC: 209 MG/DL (ref 70–99)
GLUCOSE SERPL-MCNC: 150 MG/DL (ref 70–99)
HCT VFR BLD AUTO: 27.6 % (ref 35–47)
HGB BLD-MCNC: 8.3 G/DL (ref 11.7–15.7)
INR PPP: 2.12 (ref 0.86–1.14)
MAGNESIUM SERPL-MCNC: 2 MG/DL (ref 1.6–2.3)
MCH RBC QN AUTO: 27 PG (ref 26.5–33)
MCHC RBC AUTO-ENTMCNC: 30.1 G/DL (ref 31.5–36.5)
MCV RBC AUTO: 90 FL (ref 78–100)
PLATELET # BLD AUTO: 391 10E9/L (ref 150–450)
POTASSIUM SERPL-SCNC: 4.7 MMOL/L (ref 3.4–5.3)
RBC # BLD AUTO: 3.07 10E12/L (ref 3.8–5.2)
SODIUM SERPL-SCNC: 138 MMOL/L (ref 133–144)
UFH PPP CHRO-ACNC: 0.22 IU/ML
UFH PPP CHRO-ACNC: <0.1 IU/ML
WBC # BLD AUTO: 9.2 10E9/L (ref 4–11)

## 2021-01-06 PROCEDURE — 250N000013 HC RX MED GY IP 250 OP 250 PS 637: Performed by: PHYSICIAN ASSISTANT

## 2021-01-06 PROCEDURE — 255N000002 HC RX 255 OP 636: Performed by: INTERNAL MEDICINE

## 2021-01-06 PROCEDURE — 93306 TTE W/DOPPLER COMPLETE: CPT | Mod: 26 | Performed by: INTERNAL MEDICINE

## 2021-01-06 PROCEDURE — 250N000011 HC RX IP 250 OP 636: Performed by: PHYSICIAN ASSISTANT

## 2021-01-06 PROCEDURE — 36415 COLL VENOUS BLD VENIPUNCTURE: CPT | Performed by: PHYSICIAN ASSISTANT

## 2021-01-06 PROCEDURE — 999N001017 HC STATISTIC GLUCOSE BY METER IP

## 2021-01-06 PROCEDURE — 85027 COMPLETE CBC AUTOMATED: CPT | Performed by: PHYSICIAN ASSISTANT

## 2021-01-06 PROCEDURE — 80048 BASIC METABOLIC PNL TOTAL CA: CPT | Performed by: PHYSICIAN ASSISTANT

## 2021-01-06 PROCEDURE — 999N000208 ECHOCARDIOGRAM COMPLETE

## 2021-01-06 PROCEDURE — 85520 HEPARIN ASSAY: CPT | Performed by: PHYSICIAN ASSISTANT

## 2021-01-06 PROCEDURE — 250N000013 HC RX MED GY IP 250 OP 250 PS 637: Performed by: STUDENT IN AN ORGANIZED HEALTH CARE EDUCATION/TRAINING PROGRAM

## 2021-01-06 PROCEDURE — 83735 ASSAY OF MAGNESIUM: CPT | Performed by: PHYSICIAN ASSISTANT

## 2021-01-06 PROCEDURE — 85520 HEPARIN ASSAY: CPT | Performed by: SURGERY

## 2021-01-06 PROCEDURE — 85610 PROTHROMBIN TIME: CPT | Performed by: PHYSICIAN ASSISTANT

## 2021-01-06 PROCEDURE — 250N000013 HC RX MED GY IP 250 OP 250 PS 637: Performed by: SURGERY

## 2021-01-06 PROCEDURE — 36415 COLL VENOUS BLD VENIPUNCTURE: CPT | Performed by: SURGERY

## 2021-01-06 RX ORDER — METOPROLOL TARTRATE 25 MG/1
25 TABLET, FILM COATED ORAL 2 TIMES DAILY
Qty: 90 TABLET | Refills: 0 | Status: SHIPPED | OUTPATIENT
Start: 2021-01-06 | End: 2021-02-19

## 2021-01-06 RX ORDER — ACETAMINOPHEN 325 MG/1
650 TABLET ORAL EVERY 4 HOURS PRN
Qty: 1 BOTTLE | Refills: 0 | Status: SHIPPED | OUTPATIENT
Start: 2021-01-06 | End: 2021-02-11

## 2021-01-06 RX ORDER — FUROSEMIDE 20 MG
20 TABLET ORAL DAILY
Qty: 5 TABLET | Refills: 0 | Status: SHIPPED | OUTPATIENT
Start: 2021-01-06 | End: 2021-01-13

## 2021-01-06 RX ORDER — BUPROPION HYDROCHLORIDE 150 MG/1
150 TABLET, EXTENDED RELEASE ORAL 2 TIMES DAILY
Qty: 90 TABLET | Refills: 0 | Status: SHIPPED | OUTPATIENT
Start: 2021-01-06 | End: 2021-03-10

## 2021-01-06 RX ORDER — WARFARIN SODIUM 3 MG/1
6 TABLET ORAL
Status: DISCONTINUED | OUTPATIENT
Start: 2021-01-06 | End: 2021-01-06 | Stop reason: HOSPADM

## 2021-01-06 RX ORDER — WARFARIN SODIUM 2 MG/1
TABLET ORAL
Qty: 120 TABLET | Refills: 0 | Status: ON HOLD | OUTPATIENT
Start: 2021-01-06 | End: 2021-01-27

## 2021-01-06 RX ORDER — AMIODARONE HYDROCHLORIDE 400 MG/1
TABLET ORAL
Qty: 35 TABLET | Refills: 0 | Status: ON HOLD | OUTPATIENT
Start: 2021-01-06 | End: 2021-01-27

## 2021-01-06 RX ORDER — POTASSIUM CHLORIDE 750 MG/1
10 TABLET, EXTENDED RELEASE ORAL 2 TIMES DAILY
Qty: 10 TABLET | Refills: 0 | Status: SHIPPED | OUTPATIENT
Start: 2021-01-06 | End: 2021-01-13

## 2021-01-06 RX ORDER — MAGNESIUM SULFATE HEPTAHYDRATE 40 MG/ML
2 INJECTION, SOLUTION INTRAVENOUS ONCE
Status: COMPLETED | OUTPATIENT
Start: 2021-01-06 | End: 2021-01-06

## 2021-01-06 RX ADMIN — AMIODARONE HYDROCHLORIDE 400 MG: 200 TABLET ORAL at 07:57

## 2021-01-06 RX ADMIN — DOCUSATE SODIUM AND SENNOSIDES 1 TABLET: 8.6; 5 TABLET, FILM COATED ORAL at 08:00

## 2021-01-06 RX ADMIN — OXYBUTYNIN CHLORIDE 5 MG: 5 TABLET ORAL at 07:59

## 2021-01-06 RX ADMIN — ACETAMINOPHEN 650 MG: 325 TABLET, FILM COATED ORAL at 08:11

## 2021-01-06 RX ADMIN — HEPARIN SODIUM 1250 UNITS/HR: 10000 INJECTION, SOLUTION INTRAVENOUS at 05:18

## 2021-01-06 RX ADMIN — POTASSIUM CHLORIDE 10 MEQ: 750 TABLET, EXTENDED RELEASE ORAL at 07:59

## 2021-01-06 RX ADMIN — CEFEPIME HYDROCHLORIDE 1 G: 1 INJECTION, POWDER, FOR SOLUTION INTRAMUSCULAR; INTRAVENOUS at 00:15

## 2021-01-06 RX ADMIN — ATORVASTATIN CALCIUM 40 MG: 40 TABLET, FILM COATED ORAL at 07:58

## 2021-01-06 RX ADMIN — Medication 1 PATCH: at 08:00

## 2021-01-06 RX ADMIN — CEFEPIME HYDROCHLORIDE 1 G: 1 INJECTION, POWDER, FOR SOLUTION INTRAMUSCULAR; INTRAVENOUS at 12:32

## 2021-01-06 RX ADMIN — MAGNESIUM SULFATE IN WATER 2 G: 40 INJECTION, SOLUTION INTRAVENOUS at 09:06

## 2021-01-06 RX ADMIN — ACETAMINOPHEN 650 MG: 325 TABLET, FILM COATED ORAL at 02:06

## 2021-01-06 RX ADMIN — METOPROLOL TARTRATE 25 MG: 25 TABLET, FILM COATED ORAL at 07:59

## 2021-01-06 RX ADMIN — ASPIRIN 81 MG CHEWABLE TABLET 81 MG: 81 TABLET CHEWABLE at 07:58

## 2021-01-06 RX ADMIN — PANTOPRAZOLE SODIUM 40 MG: 40 TABLET, DELAYED RELEASE ORAL at 07:58

## 2021-01-06 RX ADMIN — BUPROPION HYDROCHLORIDE 150 MG: 150 TABLET, EXTENDED RELEASE ORAL at 07:58

## 2021-01-06 RX ADMIN — FUROSEMIDE 20 MG: 20 TABLET ORAL at 07:59

## 2021-01-06 RX ADMIN — HUMAN ALBUMIN MICROSPHERES AND PERFLUTREN 5 ML: 10; .22 INJECTION, SOLUTION INTRAVENOUS at 10:30

## 2021-01-06 RX ADMIN — METFORMIN HYDROCHLORIDE 500 MG: 500 TABLET ORAL at 07:58

## 2021-01-06 ASSESSMENT — ACTIVITIES OF DAILY LIVING (ADL)
ADLS_ACUITY_SCORE: 14

## 2021-01-06 ASSESSMENT — MIFFLIN-ST. JEOR: SCORE: 1174.52

## 2021-01-06 NOTE — CONSULTS
I saw Rajani on 6C for warfarin education. She told me she was on it several years ago and believes it caused her to become depressed. She was attentive and asked questions. Uses [facility] for meds and will have home care when she discharges.   Literature given: Guide to Warfarin Therapy

## 2021-01-06 NOTE — PROGRESS NOTES
Patient was referred to [facility] clinic r/t hospitalization from 12/20-   Related due mitral and aortic incompetence, pt had both valves replaced during hospital stay and was put on warfarin indefinitely. Per hospital record patient has historically been on warfarin a number of years ago.     Patient received IV amiodarone during hospital course and was transitioned to start PO amiodarone 400 mg BID.    Patient received  Cefepime for positive UA - course was 1/4-1/6, increased risk for bleeding when taken with warfarin. Pt completed course of Abx.    Pt has scheduled INR appt on 1/7/21 in clinic @ [facility] lab.      Washington Dorman RN

## 2021-01-06 NOTE — PLAN OF CARE
D:pt a-tach/a-flutter 100's to 130's, pt asymptomatic, up with stand by assist steady gait  A:pt states she feels ok  P:per Primary continue to monitor

## 2021-01-06 NOTE — TELEPHONE ENCOUNTER
used to confirm appointment.     COVID-19 Screening:    Does the patient OR patient’s household members have any of the following symptoms?     • Temperature: Fever >100 F or >38.0°C?  Rash or red eye No  • Respiratory symptoms: New or worsening cough, shortness of breath, or sore throat? No  • GI symptoms: New onset of nausea, vomiting or diarrhea?  Abdominal pain, weakness, bruising or bleeding. No  • Miscellaneous: New onset of loss of taste or smell, chills, repeated shaking with chills, muscle pain or headache?  No    Exposure to anyone with symptoms of the COVID-19 or tested positive for COVID-19? NO     Have you had a COVID-19 test? Yes   If yes when? Prior to surgery   What were your results? Negative     Has a doctor recently put in orders for you to be tested for COVID-19? NO     Has someone in your home recently tested or had testing ordered for COVID? NO   If so what were the results?     Have you been in contact with someone confirmed to have COVID? NO        Lab requesting orders for standing INR. Pt has active [facility] referral. Order entered per protocol.

## 2021-01-06 NOTE — PROGRESS NOTES
Reviewed, see documentation only with plan for dosing and follow up from same date (01/06/2021).    Madelaine Sotelo, PharmD BCACP  Anticoagulation Clinical Pharmacist

## 2021-01-06 NOTE — PLAN OF CARE
D: S/p mechanical AVR and MVR on 12/29. Hx includes HTN, pHTN, dyslipidemia, diverticulosis, current tobacco use, DVT, atrial thrombus (3/2019), DM2, GERD, recent GIB (9/2020)    I: Monitored vitals and assessed pt status.   Changed: Converted from afib (110s-130s) to sinus (50s-60s). Heparin gtt adjusted per orders.  Running: Heparin at 1400 units/hr. Xa check at 1300.  PRN: tylenol x1    A: A0x4. VSS, on room air. Sinus jeremy/rhythm on monitor. Afebrile. Pain partially controlled with current regimen. No BM this shift. Voiding without difficulty. Sternal incision WNL and DEEPA. Dressings CDI.  Blood sugars ACHS/0200. Up with SBA. Did not sleep well over night - endorsed being anxious in regards to long hospital stay.       Temp:  [97.6  F (36.4  C)-99  F (37.2  C)] 98.1  F (36.7  C)  Pulse:  [] 66  Resp:  [16-22] 18  BP: ()/(51-94) 101/58  SpO2:  [97 %-100 %] 97 %      P: Continue to await therapeutic INR. Continue to monitor Pt status and report changes to treatment team.

## 2021-01-06 NOTE — PROGRESS NOTES
ANTICOAGULATION  MANAGEMENT: Discharge Review    Rajani Guo chart reviewed for anticoagulation continuity of care    Hospital Admission on 12/29/2020 for PROCEDURES PERFORMED:   Date: 12/29/20.  Surgeon: Dr. Luc Laar  1. Mitral valve replacement with 27 mm St. Abdoulaye mechanical valve.   2. Aortic valve replacement 19-mm St. Abdoulaye [place] mechanical valve.     Discharge disposition: Home    Results:    Recent labs: (last 7 days)     12/31/20  0541 12/31/20  2148 01/01/21  0307 01/02/21  0346 01/03/21  0544 01/04/21  0521 01/05/21  0133 01/06/21  0555   INR 1.59* 1.85* 2.23* 3.72* 2.86* 1.93* 1.81* 2.12*        Medication changes affecting anticoagulation: Yes: amiodarone 400BID 7 days    Additional factors affecting anticoagulation: No    Plan     Patient is new start to warfarin. Routing to [facility] for consult.     Julieta Pavon, RN, BSN, PHN

## 2021-01-06 NOTE — PLAN OF CARE
After Visit Summary was reviewed and questions answered a hard copy was given to the patient for home reference and another copy is in her hard chart. She signed the signature page and this was placed in her chart. Patient left Unit 6C via wheel chair. Her daughter will pick her up at the front entrance of the hospital. Discharge scripts will be picked up at their local [facility] pharmacy on the way home.      Refer to AVS, notes, doc flow sheets, and MAR for other specifics.

## 2021-01-06 NOTE — PROGRESS NOTES
Care Management Follow Up    Length of Stay (days): 8    Expected Discharge Date: 01/06/21     Concerns to be Addressed: Outpt Anticoagulation follow up, home care vs OP CR  Patient plan of care discussed at interdisciplinary rounds: Yes    Anticipated Discharge Disposition: Home with daughter     Anticipated Discharge Services: [facility], OP [facility]   Anticipated Discharge DME: N/A    Education Provided on the Discharge Plan: Yes  Patient/Family in Agreement with the Plan: Yes    Referrals Placed by CM/SW: [facility]   Private pay costs discussed: N/A    Additional Information:  Per PA, pt medically ready for discharge today and will need INR lab drawn tomorrow. Per discussion with [facility] they are unable to start services until 1/9, will need to schedule clinic INR lab draw for tomorrow.  This writer called daughterAilin to update on follow up and home care plan. Ailin stated that upon further consideration she prefers to bring her mom to OP CR rather than have home therapies. This writer updated [facility] liason to discontinue referral.    Intervention:  Appointment made on 1/7/2020 at 11:15am at the [facility] for INR lab draw.   Referral made to [facility] for INR and Warfarin Monitoring (Goal= 2.5-3.5). Indication for Anticoagulation: Mechanical AVR and Mechanical MVR. Expected duration of therapy: Indefinite. Dr. Tamiko Coley to follow.    CC will continue to monitor patient's medical condition and progress towards discharge.  Debbie Caputo RN BSN  6C Unit Care Coordinator  Phone number: [phone]  Pager: [phone]

## 2021-01-06 NOTE — DISCHARGE INSTRUCTIONS
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 Do not reach backwards or use arms to push out of chair. Do not let people pull on your arms to assist with standing. Avoid twisting or reaching too far across your body.  Avoid strenuous activities such as bowling, vacuuming, raking, shoveling, golf or tennis for 12 weeks after your surgery. It is okay to resume sex if you feel comfortable in doing so. You may have to try different positions with your partner.  Splint your chest incision by hugging a pillow or bringing your arms across your chest when coughing or sneezing.     No driving for 4 weeks after surgery or while on pain medication.     Shower or wash your incisions daily with soap and water (or as instructed), pat dry. Keep wound clean and dry, showers are okay after discharge, but don't let spray hit directly on incision. No baths or swimming for 1 month. Cover chest tube sites with gauze until they stop draining, then leave open to air. It is not abnormal for chest tube sites to drain yellowish/clear fluid for up to 2-3 weeks after surgery.   Watch for signs of infection: increased redness, tenderness, warmth or any drainage that appears infected (pus like) or is persistent.  Also a temperature > 100.5 F or chills. Call your surgeon or primary care provider's office immediately. Remove any skin glue left on incisions after 10-14 days. This will not affect your incision and can speed up healing.    Exercise is very important in your recovery. Please follow the guidelines set up for you in your cardiac rehab classes at the hospital. If outpatient cardiac rehab was ordered for you, we highly recommend you participate. If you have problems arranging your cardiac rehab, please  "call [phone] for all locations, with the exception of [place], please call [phone] and [facility] [place], please call [phone].    Avoid sitting for prolonged periods of time, try to walk every hour during the day. If you have a leg incision, elevate your leg often when you are not walking.    Check your weight when you get home from the hospital and continue to check it daily through your recovery for at least a month. If you notice a weight gain of 2-3 pounds in a week, notify your primary care physician, cardiologist or surgeon.    MONITOR your blood glucose levels three times daily at home. You should be between 100-200. You are on Metformin without insulin right now, may need to restart insulin as your diet progresses.     Bowel activity may be slow after surgery. If necessary, you may take an over the counter laxative such as Milk of Magnesia or Miralax. You may have stool softeners prescribed (docusate sodium, Senokot). We recommend using stool softeners while using narcotics for pain (oxycodone/percocet, hydrocodone/vicodin, hydromorphone/dilaudid).      Wean OFF of narcotics (oxycodone, dilaudid, hydrocodone) as soon as possible. You should continue taking acetaminophen as long as you have any surgical pain as the first choice for pain control and add narcotics as necessary for pain to be tolerable.      DENTAL VISITS AFTER SURGERY  You have had your heart valve repaired or replaced, we do not recommend having any dental work done for 6 months and you will need to take an antibiotic prior to dental visits from now on.  Please notify your dentist before any procedure for the proper treatment needed. The antibiotic is taken by mouth one hour prior to visit. This includes routine cleanings.  You can sometimes hear a mechanical valve \"clicking,\" this is normal and not a sign of something wrong.    DO NOT SMOKE.  IF YOU NEED HELP QUITTING, PLEASE TALK WITH YOUR CARDIOLOGIST OR PRIMARY DOCTOR.    You are " on a blood thinner for mechanical heart valves, follow the instructions you were given in the hospital and DO NOT SKIP this medication. Try and take it the same time everyday. Your primary care physician or coumadin clinic will manage the dosing. INR goal is 2.5 - 3.5 for life.     REGARDING PRESCRIPTION REFILLS.  If you need a refill on your pain medication contact us to discuss your pain and a possible one time refill.   All other medications will be adjusted, discontinued and re-filled by your primary care physician and/or your cardiologist as they were prior to your surgery. We have given you enough for one to three month with possibly one refill.    POST-OPERATIVE CLINIC VISITS  You have a follow up TELEPHONE visit with [facility] Advance Care Practitioners on 1/12/20 at 3 pm.  You will then return to the care of your primary provider and your cardiologist. Future medication refills should come from your PCP or Cardiologist.   You should see your primary care provider in 2-4 weeks after discharge.   It is important to see your cardiologist (Dr Dev Morse) about 4-6 weeks after discharge.    If you do not hear from a  in 7 days, please call [phone] (choose option 1) and request to be seen with a general cardiologist or someone that you have seen in the past.   If there is a need to return to see CT Surgery please call our  at [phone].    SURGICAL QUESTIONS  Please call Anita Martin with surgical recovery and medication questions, the phone number is listed below.  She can assist you with your needs and contact other surgery care team members as indicated.    On weekends or after hours, please call [phone] and ask the  to   page the Cardiothoracic Surgery fellow on call.      Thank you,    Your Cardiothoracic Surgery Team  Anita Martin RN Care Coordinator-  [phone]

## 2021-01-06 NOTE — PLAN OF CARE
"/61   Pulse 55   Temp 97.8  F (36.6  C) (Oral)   Resp 18   Ht 1.626 m (5' 4\")   Wt 66.5 kg (146 lb 8 oz)   SpO2 98%   BMI 25.15 kg/m      Patient alert and oriented x 4 and cooperative with nursing cares and medications. Appetite good and she is voiding adequate urine output. Lasix given with morning medications. 2+ edema in legs and feet.  INR therapeutic at 2.12 so heparin drip was discontinued this morning at 9:00 am. Nolan Reeves RN, from [facility] did warfarin teaching with the patient.  Magnesium of 2.0 was treated with 2 gm IV. Cardiac echogram was done at the bedside. Patient is ordered to discharge today. Her daughter will pick her up at 3:00 pm. Discharge orders are signed and discharge scripts were sent to the patient's local pharmacy. Amiodarone and metoprolol teaching was done as these are new mediations. Patient is educated on the rest of her medications. Chest tube site dressings were changed and patient was taught how to change the dressings after discharge. Surgical incision is clean, dry and open to air. Patient has been up and about in her room and independent in ADL.     Refer to doc flow sheets, MAR and notes for other specifics. Continue nursing cares and discharge process.   "

## 2021-01-06 NOTE — PLAN OF CARE
Physical Therapy Discharge Summary    Reason for therapy discharge:    Discharged to home.    Progress towards therapy goal(s). See goals on Care Plan in [facility] electronic health record for goal details.  Goals partially met.  Barriers to achieving goals:   discharge from facility.    Therapy recommendation(s):    Continued therapy is recommended.  Rationale/Recommendations:  Pt would benefit from additional skilled OP PT to address functional mobility, transfers, strength and gait.

## 2021-01-06 NOTE — PLAN OF CARE
"6C OT- Attempted to see pt this AM, pt declined therapy in the AM stating, \"I'm just not feeling up to it. I barely slept last night and I'm just feeling tired.\" Therapist provided various seated activities, pt declined. Agreeable to OT at a later time, will check back pending therapist's schedule.   "

## 2021-01-06 NOTE — DISCHARGE SUMMARY
[facility], [place]   [facility] Discharge Summary     Rajani Guo MRN# [id number]   Age: 70 year old YOB: 1950     Admitting Physician:  Luc Lara MD  Discharge Physician:  JAYNA Jack  Primary Care Physician:        Tamiko Coley     DATE OF ADMISSION: 12/29/2020      DATE OF DISCHARGE: January 6, 2021     Admit Wt: 64.6 kg   Discharge Wt: 66.5 kg          Primary Diagnoses:   1. Severe mitral stenosis, s/p mechanical MVR   2. Severe aortic stenosis, s/p mechanical AVR  3. Paroxysmal atrial fibrillation  4. Lifelong anticoagulation for mechanical valves, INR goal 2.5 - 3.5  5. Episodic post-op delirium with paranoia, resolved.   6. DMT2, discharging off insulin, may need to restart in future   7. UTI, completed treatment           Secondary Diagnoses:   1. HTN  2. HLD  3. Hx Diverticulitis   4. Hx GI bleed from small bowel   5. GERD   6. Hx of tobacco use     PROCEDURES PERFORMED:   Date: 12/29/20.  Surgeon: Dr. Luc Lara  1. Mitral valve replacement with 27 mm [place]aye mechanical valve.   2. Aortic valve replacement 19-mm St. Abdoulaye [place] mechanical valve.     INTRAOPERATIVE COMPLICATIONS:  none    PATHOLOGY RESULTS:    1. Surgical Pathology 12/29/20-     A. Aortic valve and leaflets: nodular calcification with fibrosis and myxoid change     B. Mitral valve and leaflets: nodular calcification with fibrosis and myxoid change     CULTURE RESULTS:    1. Urine culture 1/3/21- positive for Pseudomonas aeruginosa.     CONSULTS:    1.  PT/OT  2.  Interventional Radiology   3.  Neurology     BRIEF HISTORY OF ILLNESS:  Rajani Guo is a 70 year old female with PMH of hypertension, pulmonary hypertension, dyslipidemia, diverticulosis, current tobacco use, DVT, arterial thrombus (March, 2019), DMT2 (on insulin), GERD, GIB, and osteoporosis. Her recent history should be noted for multiple GI bleeds since 2019 (last admitted 9/16/2020),  attributed to 2 small AVMs in her small bowel without active bleeding. She was followed by her Cardiologist (Dr Thanh Morse) and had complained of 6 months of progressive DESAI and dizziness with poor quality of life and decreasing ADL tolerance. She was seen in the cardiac surgery clinic for evaluation of aortic and mitral stenosis on 12/7, deemed an appropriate surgical candidate, and scheduled for elective valve replacements.     HOSPITAL COURSE: Rajani Guo is a 70 year old female who on 12/29/2020 underwent the above-named procedures.  She tolerated the operation well and postoperatively was admitted to the CVICU.  She was extubated on POD # 0 to 4 lpm via NC with neuro status intact. She was initially transferred to  POD#1, had episode of a-fib with RVR on evening of 12/31 with hypotension and was transferred back to ICU. She returned to the post-surgical telemetry unit on POD # 4.      Cardiovascular:   Hx of severe mitral and aortic stenosis  S/p elective mechanical AVR and mechanical MVR   HLD, HTN, pulmonary HTN   Post-op Atrial Fibrillation   Routine Post-op ECHO ordered on 12/31; borderline EF 50-55%  Back in A-fib with RVR on 1/5, remained HD stable. IV Amio bolus x 2 and IV Metop 5 mg IV x 1 given for rate control. Started PO Amio 400 mg BID and increased metoprolol to 25 mg PO BID. She converted to NSR at 4 am on 1/6 while on PO amio and 25 mg Metoprolol.  Tolerated ASA 81 mg and Lipitor.   TPW: removed 12/31.       Pulmonary:  - Extubated POD #0 to 4 lpm via NC; weaned to RA without issues.   - Pulm toilet, IS, activity and deep breathing.   - Left pleural tube placed 1/3 for large L pneumothorax, pneumo resolved. Passed clamping trial 1/4 and L chest tube removed 1/5 without complication.      Neurology / MSK:  She had severe delirium after being woken from nap 1/3. Patient tried to leave AMA, not oriented to time or place. Unaware she had heart surgery. Did not know who her daughter was. Very  paranoid of staff and threatening those who came near her. Neurology consulted.  Patient did not agree to getting CT. She was paranoid and delirious and did not cooperate. Most likely not a stroke and very classic delirium per Neurology. Said to get Head CT at later time if condition did not improve. Did not need to get CT head as her delirium resolved.   Had been off her Sitter since 1/2. Continued on delirium precautions.  Did not use PRN Zyprexa.  Was on Seroquel q PM, but trialed OFF 1/5 and slept well.  - Restarted gabapentin 300 mg at bedtime, patient with reduced sleep due to restless leg and lack of sleep likely contributed to mental status.    - Acute post-operative pain well controlled with acetaminophen. Discontinued oxycodone and all other narcotics. Stopped trazodone.   - Wellbutrin SR restarted started 1/2.       / Renal:  No Hx of renal disease. Most recent creatinine WNL, adequate UOP.   Tolerating diuresis and weight trending down. Stable on lasix 20 mg PO daily since 1/4.      GI / FEN:   Hx Diverticulosis without diverticulitis  Hx GI bleed attributed to small AVMs in small bowel  GERD  - Regular diet, continue bowel regimen.    - Replace electrolytes as needed, hepatic enzymes WNL.     Endocrine:  T2DM on home insulin   Stress induced hyperglycemia  Initially managed on insulin drip postop, transitioned to medium corrective sliding scale 12/31. Monitor for long-acting needs as diet progresses.    Started Lantus 5 units (levimir 5 units PTA) and Metformin 500 mg PO BID. She was discharged on her home dose of 1000 mg without insulin. Recommending checking blood glucose three times daily, may need to restart insulin as PO intake increases.       Infectious Disease:  UTI  UA 1/3 due to altered mental status, came back positive. Started Cefepime 1/4 for + UA, continued through 1/6 and treatment completed (3 days).   Stress induced leukocytosis  WBC WNL, remains afebrile on scheduled tylenol. No  "other signs or symptoms of infection, possible atelectasis.      Hematology:   Hx Chronic anemia   Acute blood loss anemia and thrombocytopenia  Hgb stable 8's; Plt WNL, no signs or symptoms of active bleeding  Hx Acute limb ischemia, March 2019  Acute occlusion of left superficial femoral artery, treated with directed lysis and angioplasty. Transitioned from Apixaban to aspirin.      Anticoagulation:   - ASA 81 mg and coumadin for mechanical AVR and MVR.    - INR goal 2.5 - 3.5. Most recent INR 2.12 and increasing.        Prophylaxis:   - Stress ulcer prophylaxis: Pantoprazole 40 mg daily for 30 days  - DVT prophylaxis: Subcutaneous heparin, SCD  - Nicotine gum instead of patch    Prior to discharge, her pain was controlled well, she was able to perform most ADLs and ambulate without difficulty, and had full return of bowel and bladder function.  On January 6, 2021, she was discharged to home in stable condition.    Patient discharged on aspirin:  Yes 81 mg  Patient discharged on beta blocker: yes    Patient discharged on ACE Inhibitor/ARB: not indicated at this time, consider in future due to Hx DMT2           Discharge Disposition:     Discharged to home            Condition on Discharge:     Discharge condition: Stable   Discharge vitals: Blood pressure 107/61, pulse 55, temperature 97.8  F (36.6  C), temperature source Oral, resp. rate 18, height 1.626 m (5' 4\"), weight 66.5 kg (146 lb 8 oz), SpO2 98 %, not currently breastfeeding.     Code status on discharge: Full Code     Vitals:    01/04/21 0418 01/05/21 0438 01/06/21 0525   Weight: 66.4 kg (146 lb 6.4 oz) 64.4 kg (142 lb) 66.5 kg (146 lb 8 oz)       DAY OF DISCHARGE PHYSICAL EXAM:  MAPs: 78 - 96  Gen:  NAD, conversational  CV: RRR, S1S2 normal, no murmurs, rubs, or gallops  Pulm:  CTA, no rhonchi or wheezes  Abd:  Soft, nondistended, NTTP  Ext: trace dependent edema, + 1 pitting  Incision: Clean, dry, intact, no erythema  Chest Tube sites: Dressings clean " and dry    BMP  Recent Labs   Lab 01/06/21  0555 01/05/21  0133 01/04/21  0521 01/03/21  0544    143 143 142   POTASSIUM 4.7 3.6 3.6 3.8   CHLORIDE 109 109 109 110*   KAYLEEN 8.4* 8.4* 8.5 8.2*   CO2 23 28 27 26   BUN 13 8 9 6*   CR 0.68 0.59 0.58 0.54   * 146* 126* 136*     CBC  Recent Labs   Lab 01/06/21  0555 01/05/21  0133 01/04/21  0521 01/03/21  0544   WBC 9.2 8.2 7.2 7.1   RBC 3.07* 3.08* 2.83* 3.11*   HGB 8.3* 8.4* 7.9* 8.7*   HCT 27.6* 27.5* 25.3* 28.0*   MCV 90 89 89 90   MCH 27.0 27.3 27.9 28.0   MCHC 30.1* 30.5* 31.2* 31.1*   RDW 18.0* 17.5* 18.2* 18.2*    410 322 277     INR  Recent Labs   Lab 01/06/21  0555 01/05/21  0133 01/04/21  0521 01/03/21  0544   INR 2.12* 1.81* 1.93* 2.86*      Liver Function Studies -   Recent Labs   Lab Test 01/02/21 0346   PROTTOTAL 5.8*   ALBUMIN 2.5*   BILITOTAL 0.3   ALKPHOS 61   AST 23   ALT 15     Recent Labs   Lab 01/06/21  0721 01/06/21  0555 01/06/21  0209 01/05/21  2041 01/05/21  1625 01/05/21  0944 01/05/21  0133 01/04/21  2231 01/04/21  0521 01/04/21  0521 01/03/21  0544 01/03/21  0544 01/02/21  0346 01/02/21  0346 01/01/21  0307 01/01/21  0307   GLC  --  150*  --   --   --   --  146*  --   --  126*  --  136*  --  153*  --  189*   *  --  209* 242* 216* 146*  --  211*   < >  --    < >  --    < >  --    < >  --     < > = values in this interval not displayed.     CXR 1/5/2020-   Single view of the chest. Postsurgical changes and aortic and mitral valve replacement.  Median sternotomy wires appear intact. Stable position of left-sided pigtail catheter chest  tube with no appreciable left-sided pneumothorax. Trachea is midline. Stable   cardiomediastinal silhouette. Pulmonary vasculature is within normal limits. Small bilateral  pleural effusions. Linear atelectasis versus scarring in the right lung base. No appreciable  right-sided pneumothorax.                                                                IMPRESSION:  No appreciable  left-sided pneumothorax with stable position of left-sided chest tube.    DISCHARGE INSTRUCTIONS:  You had a sternotomy, avoid lifting anything greater than ten pounds for 6 weeks after surgery and then less than 20 pounds for an additional 6 weeks. Do not reach backwards or use arms to push out of chair. Do not let people pull on your arms to assist with standing. Avoid twisting or reaching too far across your body.  Avoid strenuous activities such as bowling, vacuuming, raking, shoveling, golf or tennis for 12 weeks after your surgery. It is okay to resume sex if you feel comfortable in doing so. You may have to try different positions with your partner.  Splint your chest incision by hugging a pillow or bringing your arms across your chest when coughing or sneezing.     No driving for 4 weeks after surgery or while on pain medication.     Shower or wash your incisions daily with soap and water (or as instructed), pat dry. Keep wound clean and dry, showers are okay after discharge, but don't let spray hit directly on incision. No baths or swimming for 1 month. Cover chest tube sites with gauze until they stop draining, then leave open to air. It is not abnormal for chest tube sites to drain yellowish/clear fluid for up to 2-3 weeks after surgery.   Watch for signs of infection: increased redness, tenderness, warmth or any drainage that appears infected (pus like) or is persistent.  Also a temperature > 100.5 F or chills. Call your surgeon or primary care provider's office immediately. Remove any skin glue left on incisions after 10-14 days. This will not affect your incision and can speed up healing.    Exercise is very important in your recovery. Please follow the guidelines set up for you in your cardiac rehab classes at the hospital. If outpatient cardiac rehab was ordered for you, we highly recommend you participate. If you have problems arranging your cardiac rehab, please call [phone] for all locations,  "with the exception of [place], please call [phone] and [place], please call [phone].    Avoid sitting for prolonged periods of time, try to walk every hour during the day. If you have a leg incision, elevate your leg often when you are not walking.    Check your weight when you get home from the hospital and continue to check it daily through your recovery for at least a month. If you notice a weight gain of 2-3 pounds in a week, notify your primary care physician, cardiologist or surgeon.    MONITOR your blood glucose levels three times daily at home. You should be between 100-200. You are on Metformin without insulin right now, may need to restart insulin as your diet progresses.     Bowel activity may be slow after surgery. If necessary, you may take an over the counter laxative such as Milk of Magnesia or Miralax. You may have stool softeners prescribed (docusate sodium, Senokot). We recommend using stool softeners while using narcotics for pain (oxycodone/percocet, hydrocodone/vicodin, hydromorphone/dilaudid).      Wean OFF of narcotics (oxycodone, dilaudid, hydrocodone) as soon as possible. You should continue taking acetaminophen as long as you have any surgical pain as the first choice for pain control and add narcotics as necessary for pain to be tolerable.      DENTAL VISITS AFTER SURGERY  You have had your heart valve repaired or replaced, we do not recommend having any dental work done for 6 months and you will need to take an antibiotic prior to dental visits from now on.  Please notify your dentist before any procedure for the proper treatment needed. The antibiotic is taken by mouth one hour prior to visit. This includes routine cleanings.  You can sometimes hear a mechanical valve \"clicking,\" this is normal and not a sign of something wrong.    DO NOT SMOKE.  IF YOU NEED HELP QUITTING, PLEASE TALK WITH YOUR CARDIOLOGIST OR PRIMARY DOCTOR.    You are on a blood thinner for mechanical " heart valves, follow the instructions you were given in the hospital and DO NOT SKIP this medication. Try and take it the same time everyday. Your primary care physician or coumadin clinic will manage the dosing. INR goal is 2.5 - 3.5 for life.     REGARDING PRESCRIPTION REFILLS.  If you need a refill on your pain medication contact us to discuss your pain and a possible one time refill.   All other medications will be adjusted, discontinued and re-filled by your primary care physician and/or your cardiologist as they were prior to your surgery. We have given you enough for one to three month with possibly one refill.    POST-OPERATIVE CLINIC VISITS  You have a follow up TELEPHONE visit with [facility] Advance Care Practitioners on 1/12/20 at 3 pm.  You will then return to the care of your primary provider and your cardiologist. Future medication refills should come from your PCP or Cardiologist.   You should see your primary care provider in 2-4 weeks after discharge.   It is important to see your cardiologist (Dr Dev Morse) about 4-6 weeks after discharge.      PRE-ADMISSION MEDICATIONS:  No current facility-administered medications on file prior to encounter.        atorvastatin (LIPITOR) 40 MG tablet, TAKE 1 TABLET BY MOUTH EVERY DAY       buPROPion (ZYBAN) 150 MG 12 hr tablet, TAKE 1 TABLET BY MOUTH 2 TIMES DAILY       nicotine (NICODERM CQ) 21 MG/24HR 24 hr patch, Place 1 patch onto the skin daily       oxybutynin (DITROPAN) 5 MG tablet, TAKE 1 TABLET BY MOUTH TWICE A DAY       pantoprazole (PROTONIX) 40 MG EC tablet, TAKE 1 TABLET BY MOUTH EVERY DAY       aspirin (ASA) 81 MG EC tablet, Take 1 tablet (81 mg) by mouth daily (Patient taking differently: Take 81 mg by mouth every evening )       calcium carb 1250 mg, 500 mg [place],/vitamin D 200 unit (CALCIUM 500/D) 500-200 MG-UNIT per tablet, Take 1 tablet by mouth 2 times daily        ferrous sulfate (FEROSUL) 325 (65 Fe) MG tablet, Take 325 mg by mouth 2  times daily       glucosamine-chondroitin 500-400 MG CAPS per capsule, Take 1 capsule by mouth 2 times daily        insulin pen needle (29G X 12MM) 29G X 12MM miscellaneous, Use 1 pen needles daily or as directed.       Melatonin 10 MG TABS tablet, Take 10 mg by mouth At Bedtime       metFORMIN (GLUCOPHAGE) 1000 MG tablet, TAKE 1 TABLET BY MOUTH TWICE A DAY WITH MEALS       Multiple Vitamin (MULTI-VITAMINS) TABS, Take 1 tablet by mouth daily        senna-docusate (SENOKOT-S/PERICOLACE) 8.6-50 MG tablet, Take 1-2 tablets by mouth 2 times daily (Patient taking differently: Take 1 tablet by mouth 2 times daily )       vitamin C (ASCORBIC ACID) 500 MG tablet, Take 1 tablet (500 mg) by mouth daily         DISCHARGE MEDICATIONS:    Rajani Guo   Home Medication Instructions JENNIE:[id number]    Printed on:01/06/21 1358   Medication Information                      acetaminophen (TYLENOL) 325 MG tablet  Take 2 tablets (650 mg) by mouth every 4 hours as needed for pain             amiodarone (PACERONE) 400 MG tablet  Take 400 mg PO BID for 7 days, then take 400 mg PO daily for 21 days, then stop.             aspirin (ASA) 81 MG EC tablet  Take 1 tablet (81 mg) by mouth daily             atorvastatin (LIPITOR) 40 MG tablet  TAKE 1 TABLET BY MOUTH EVERY DAY             buPROPion (WELLBUTRIN SR) 150 MG 12 hr tablet  Take 1 tablet (150 mg) by mouth 2 times daily             buPROPion (ZYBAN) 150 MG 12 hr tablet  TAKE 1 TABLET BY MOUTH 2 TIMES DAILY             calcium carb 1250 mg, 500 mg [place],/vitamin D 200 unit (CALCIUM 500/D) 500-200 MG-UNIT per tablet  Take 1 tablet by mouth 2 times daily              ferrous sulfate (FEROSUL) 325 (65 Fe) MG tablet  Take 325 mg by mouth 2 times daily             furosemide (LASIX) 20 MG tablet  Take 1 tablet (20 mg) by mouth daily for 5 days             gabapentin (NEURONTIN) 300 MG capsule  Take 1 capsule (300 mg) by mouth At Bedtime             glucosamine-chondroitin 500-400 MG CAPS  per capsule  Take 1 capsule by mouth 2 times daily              insulin pen needle (29G X 12MM) 29G X 12MM miscellaneous  Use 1 pen needles daily or as directed.             Melatonin 10 MG TABS tablet  Take 10 mg by mouth At Bedtime             metFORMIN (GLUCOPHAGE) 1000 MG tablet  TAKE 1 TABLET BY MOUTH TWICE A DAY WITH MEALS             metoprolol tartrate (LOPRESSOR) 25 MG tablet  Take 1 tablet (25 mg) by mouth 2 times daily             Multiple Vitamin (MULTI-VITAMINS) TABS  Take 1 tablet by mouth daily              nicotine (NICODERM CQ) 21 MG/24HR 24 hr patch  Place 1 patch onto the skin daily             nicotine (NICORETTE) 2 MG gum  Place 1 each (2 mg) inside cheek every hour as needed for smoking cessation             oxybutynin (DITROPAN) 5 MG tablet  TAKE 1 TABLET BY MOUTH TWICE A DAY             pantoprazole (PROTONIX) 40 MG EC tablet  TAKE 1 TABLET BY MOUTH EVERY DAY             potassium chloride ER (KLOR-CON M) 10 MEQ CR tablet  Take 1 tablet (10 mEq) by mouth 2 times daily for 5 days             senna-docusate (SENOKOT-S/PERICOLACE) 8.6-50 MG tablet  Take 1-2 tablets by mouth 2 times daily             vitamin C (ASCORBIC ACID) 500 MG tablet  Take 1 tablet (500 mg) by mouth daily             warfarin ANTICOAGULANT (COUMADIN) 2 MG tablet  Take 6 mg PO daily at 6 pm until next INR check, then dose per INR goal 2.5-3.5.               CC:Tamiko Willams      [facility]   Cardiothoracic Surgery  Office phone: [phone]  Office fax: [phone]

## 2021-01-06 NOTE — PROVIDER NOTIFICATION
D:pt ambulating back from bathroom, syst 90's, Afib-130's, expressed lightheaded  I:placed back into bed, semi gamboa,verified with MT if HR changes, MD notified  A:pt states she feels somewhat better, rate currently a-fib 120  P:continue to monitor

## 2021-01-07 ENCOUNTER — CARE COORDINATION (OUTPATIENT)
Dept: CARDIOLOGY | Facility: CLINIC | Age: 71
End: 2021-01-07

## 2021-01-07 ENCOUNTER — ALLIED HEALTH/NURSE VISIT (OUTPATIENT)
Dept: NURSING | Facility: CLINIC | Age: 71
End: 2021-01-07
Payer: COMMERCIAL

## 2021-01-07 ENCOUNTER — TELEPHONE (OUTPATIENT)
Dept: FAMILY MEDICINE | Facility: CLINIC | Age: 71
End: 2021-01-07

## 2021-01-07 ENCOUNTER — ANTICOAGULATION THERAPY VISIT (OUTPATIENT)
Dept: FAMILY MEDICINE | Facility: CLINIC | Age: 71
End: 2021-01-07

## 2021-01-07 VITALS — TEMPERATURE: 97.9 F | HEART RATE: 51 BPM | SYSTOLIC BLOOD PRESSURE: 96 MMHG | DIASTOLIC BLOOD PRESSURE: 51 MMHG

## 2021-01-07 DIAGNOSIS — Z98.890 S/P MVR (MITRAL VALVE REPAIR): ICD-10-CM

## 2021-01-07 DIAGNOSIS — I95.9 HYPOTENSION, UNSPECIFIED HYPOTENSION TYPE: Primary | ICD-10-CM

## 2021-01-07 LAB
CAPILLARY BLOOD COLLECTION: NORMAL
INR PPP: 2 (ref 0.86–1.14)
INTERPRETATION ECG - MUSE: NORMAL

## 2021-01-07 PROCEDURE — 85610 PROTHROMBIN TIME: CPT | Performed by: FAMILY MEDICINE

## 2021-01-07 PROCEDURE — 99207 PR NO CHARGE NURSE ONLY: CPT

## 2021-01-07 PROCEDURE — 36416 COLLJ CAPILLARY BLOOD SPEC: CPT | Performed by: FAMILY MEDICINE

## 2021-01-07 NOTE — PROGRESS NOTES
ANTICOAGULATION MANAGEMENT     Patient Name:  Rajani Guo  Date:  2021    ASSESSMENT /SUBJECTIVE:    Today's INR result of 2.0 is subtherapeutic. Goal INR of 2.5-3.5      Warfarin dose taken: Warfarin taken as instructed    Diet: No new diet changes affecting INR    Medication changes/ interactions: Amiodarone    Previous INR: Subtherapeutic     S/S of bleeding or thromboembolism: No    New injury or illness: No    Upcoming surgery, procedure or cardioversion: No    Additional findings: New MVR & AVR.  Completed antibiotics yesterday.  Chart Reviewed by [facility].       PLAN:    Telephone call with Rajani regarding INR result and instructed:     Warfarin Dosing Instructions: 10 mg boost dose then change your warfarin dose to 8 mg Thu, Sun; 6 mg all other days  . (9.5 % change)    Instructed patient to follow up no later than: 4 days  Lab visit scheduled    Education provided: Monitoring for bleeding signs and symptoms and Monitoring for clotting signs and symptoms      Rajani,  verbalizes understanding and agrees to warfarin dosing plan. Also sent a [employer] message with dosing information.    Instructed to call the Anticoagulation Clinic for any changes, questions or concerns. (#[phone])        Gladys Díaz RN      OBJECTIVE:  Recent labs: (last 7 days)     20  2148 21  0307 21  0346 21  0544 21  0521 21  0133 21  0555 21  1127   INR 1.85* 2.23* 3.72* 2.86* 1.93* 1.81* 2.12* 2.00*         No question data found.  Anticoagulation Summary  As of 2021    INR goal:  2.5-3.5   TTR:  --   INR used for dosin.00 (2021)   Warfarin maintenance plan:  8 mg (2 mg x 4) every Sun, Thu; 6 mg (2 mg x 3) all other days   Full warfarin instructions:  : 10 mg; Otherwise 8 mg every Sun, Thu; 6 mg all other days   Weekly warfarin total:  46 mg   Plan last modified:  Gladys Díaz RN (2021)   Next INR check:  2021   Priority:  High   Target end date:   Indefinite    Indications    S/P MVR (mitral valve repair) [Z98.890]             Anticoagulation Episode Summary     INR check location:      Preferred lab:      Send INR reminders to:  GURVINDER DARDEN    Comments:  MVR & AVR placed 12/31/2020       Anticoagulation Care Providers     Provider Role Specialty Phone number    Quinton Handy PA Referring Cardiovascular & Thoracic Surgery [phone]    Lakeshia Rubio APRN CNP Referring Internal Medicine [phone]

## 2021-01-07 NOTE — PROGRESS NOTES
"HOW ARE YOU DOING  Tell me how you have been doing since you were discharged from the hospital?  Patient states she is fatigued, weak and hypotensive (per PCC).  Patient states she has been using her arms for balance against walls and furniture and daughter says patient is not \"behaving herself\" by doing too much.      ACTIVITY  How is your activity tolerance? Easily fatigued.  Legs feel weak.  Patient doesn't think she is getting better fast enough.     POST OP MONITORING  How is your pain on a 0-10 scale, how are you managing your pain? Pain is manageable with two tylenol. Instructed patient she could take up to three 325 mg tabs of Tylenol if needed.  Especially at hs as she is a side sleeper and it is difficult for her to sleep on her back.  Patient has tried sleeping in her recliner. Patient states pain is mostly in the L chest tube site and her back.    Are you still doing sternal precautions? Patient is using arms to pull herself up one stair, or she holds on to her daughter.     Do you hear any clicking when you are moving or taking a deep breath?  No.    Are you weighing yourself daily?  Yes, but not consistently.  Weight is up 2.5 pounds since last night.  Weight today was 148 and baseline is 150.  Patient states she does have pitting edema in her lower legs.  Instructed patient to have her daughter get her some compression stockings, and to sit with her legs elevated.  Instructed patient to weight every am only before eating or getting dressed.     Patient is not resting enough.  She is trying to exercise.  Instructed patient to only walk around inside the home for now, rest throughout the day and she will get her exercise when she starts cardiac rehab.  Patient verbalized understanding.      Are you using the inspirometer? Yes      SIGNS AND SYMPTOMS OF INFECTION  1. INCREASE IN PAIN no  2. FEVER no  3. DRAINAGE no    If Yes, color:                 5. REDNESS no    6. SWELLING no    Sternal incision is " C/D/I.  Chest tube sites are dry and healing.     ASSISTANCE  Do you have someone at home to assist you with your daily activities?  Daughter Sarai.       MEDICATIONS  Is someone helping you to set up your medications?  Daughter.   Do you have any questions about your medications?  Reviewed all medications.  Discontinued one script for Wellbutrin as patient had two.  Had patient resume her Iron and other supplements and explained reason for Protonix.  BP today was 114/50 in the am and 96/51 at the INR clinic.  Pulse is in the mid 50's.  Patient was told to hold her metoprolol for BP < 100/60.  Discussed this with surgery PA and patient should not hold metoprolol if asymptomatic, will have patient check BPs a couple times a day and if needed metoprolol dose can be decreased.      Are you on a blood thinner?  yes  Who is managing your INRs?  PC, 1st draw today.      FOLLOW UP  Are you scheduled for cardiac rehab? Canceled appointment with Rehab on the [facility] and faxed order to Card Rehab to [facility] in [place].       You are scheduled to see our surgery advanced practice provider for post operative follow up on 01/12.   You are scheduled to see your cardiologist on, patient to call and make and appointment  You are scheduled to see your primary care physician on 01/13    Will call patient again tomorrow and get BP and pulse results.       CONTACT INFORMATION  Please feel free to call us with any other questions or symptoms that are concerning for you at [phone], if it is after 4:30 in the afternoon, or a weekend please call [phone] and ask for the on call specialist.  We want to do everything we can to help prevent you needing to return to the ED, so please do not hesitate to call us.

## 2021-01-07 NOTE — PROGRESS NOTES
RPH:  Please review.  New MVR & AVR.  Thank you.  Gladys Díaz, RN  Anticoagulation Nurse - [facility]

## 2021-01-07 NOTE — PROGRESS NOTES
"SUBJECTIVE:  Patient reports feeling tired, disorientated, blurry vision, thoughts are a bit slower than normal, weakness    2 glass of water and 1 cup of coffee with 2 toaster pastries. She reports her eating has been sporadic.  Last BM was 1 week ago.     Denies chest pains  Breathing is \"heavy\" when walking. Recently quit smoking.    This Am before medications  /53  P 55  Blood sugar 208 this AM  Usually 120-125    OBJECTIVE:  P 51  BP 96/51  Temp- 97.9    Pitting edema, especially in Right LE +1, edema in left LE. Taking lasix 20mg.  Encourage fluid intake and elevate legs.       ASSESSMENT/PLAN:  Per encounter diagnoses and orders.    Huddled with Dr Lora in clinic.  Hold BP medication for blood pressure below 100/60.  Follow up with Cardiac Care Clinic.  Patient would also like MTM referral as she has started over 6 new medications.    Lakeshia Dee RN      "

## 2021-01-07 NOTE — PROGRESS NOTES
[facility]: Post-Discharge Note  SITUATION                                                      Admission:    Admission Date: 12/29/20   Reason for Admission: Severe mitral stenosis, s/p mechanical MVR  Discharge:   Discharge Date: 01/06/21  Discharge Diagnosis: Severe mitral stenosis, s/p mechanical MVR  Discharge Service: Cardiothoracic surgery    BACKGROUND                                                      BRIEF HISTORY OF ILLNESS:  Rajani Guo is a 70 year old female with PMH of hypertension, pulmonary hypertension, dyslipidemia, diverticulosis, current tobacco use, DVT, arterial thrombus (March, 2019), DMT2 (on insulin), GERD, GIB, and osteoporosis. Her recent history should be noted for multiple GI bleeds since 2019 (last admitted 9/16/2020), attributed to 2 small AVMs in her small bowel without active bleeding. She was followed by her Cardiologist (Dr Thanh Morse) and had complained of 6 months of progressive DESAI and dizziness with poor quality of life and decreasing ADL tolerance. She was seen in the cardiac surgery clinic for evaluation of aortic and mitral stenosis on 12/7, deemed an appropriate surgical candidate, and scheduled for elective valve replacements.      HOSPITAL COURSE: Rajani Guo is a 70 year old female who on 12/29/2020 underwent the above-named procedures.  She tolerated the operation well and postoperatively was admitted to the CVICU.  She was extubated on POD # 0 to 4 lpm via NC with neuro status intact. She was initially transferred to  POD#1, had episode of a-fib with RVR on evening of 12/31 with hypotension and was transferred back to ICU. She returned to the post-surgical telemetry unit on POD # 4.     ASSESSMENT      Discharge Assessment  Patient reports symptoms are: Unchanged  Does the patient have all of their medications?: Yes  Does patient know what their new medications are for?: Yes(Patient has scheduled an appointment with her MTM to help her  sort out her medications)  Does patient have a follow-up appointment scheduled?: Yes  Does patient have any other questions or concerns?: No    Post-op  Did the patient have surgery or a procedure: Yes  Incision: healing;closed  Fever: No  Chills: No  Eating & Drinking: eating and drinking without complaints/concerns  PO Intake: regular diet  Bowel Function: normal  Urinary Status: voiding without complaint/concerns    PLAN                                                      Outpatient Plan:      You have a follow up TELEPHONE visit with [facility] Surgery Advance Care Practitioners on 1/12/20 at 3 pm.  You will then return to the care of your primary provider and your cardiologist. Future medication refills should come from your PCP or Cardiologist.     You should see your primary care provider in 2-4 weeks after discharge.   It is important to see your cardiologist (Dr Dev Morse) about 4-6 weeks after discharge.    Future Appointments   Date Time Provider Department Center   1/7/2021 11:15 AM NE LAB [facility]   1/13/2021  1:40 PM [facility] Coley MD [facility]   1/21/2021  8:00 AM 1, Ur Cardiac Rehab [facility]           Sandra Whatley CMA

## 2021-01-07 NOTE — PROGRESS NOTES
Patient has called back. She said that the number that was on her AVS:        She is looking to get scheduled with Cardiology. MA was informed of the patient calling. She will be contacting the patient.     Funmilayo Ferrari,

## 2021-01-07 NOTE — PATIENT INSTRUCTIONS
Hold Blood Pressure medication for blood pressure less than 100/60    Follow up with Cardiac Care Clinic    Eat heart healthy diet

## 2021-01-07 NOTE — PROGRESS NOTES
Chart reviewed with ACC RN.  INR 2.0, at cusp of needing heparin or Lovenox balanced with starting amiodarone 01/05/2021, which should start to increase INR as levels increase.      Recommend 10mg boost today, and maintenance dose increase ~10% to 8mg Thurs/Sun, 6mg ROW, with recheck Monday.    Madelaine Sotelo, PharmD BCACP  Anticoagulation Clinical Pharmacist

## 2021-01-07 NOTE — PLAN OF CARE
Occupational Therapy Discharge Summary    Reason for therapy discharge:    Discharged to home.    Progress towards therapy goal(s). See goals on Care Plan in [facility] electronic health record for goal details.  Goals partially met.  Barriers to achieving goals:   discharge from facility.    Therapy recommendation(s):    Continued therapy is recommended.  Rationale/Recommendations:  Pt would benefit from OP CR to increase ADL/IADL independence. Recommend daughter to assist with heavy ADL/IADLs to maintain sternal precautions.

## 2021-01-07 NOTE — TELEPHONE ENCOUNTER
"Routing to Cardiac Rehab team as FYI for further follow up.     Patient presented to clinic today with daughter for INR labs. Daughter requested patient be assessed due to increased fatigue, weakness, brain fog, and SOB with activity.     See  \"Allied Health/Nurse Visit\" from from 1/7/21 for further details.     Vitals:  P 51  BP 96/51  Temp- 97.9  SPO2 98% on RA    RN huddled with provider on site - recommended follow up with cardiac rehab team regarding symptoms. Advised to hold metoprolol if BP remains below 100/60.     Porsha Lopez, RN, BSN, PHN  [facility]: [place]  "

## 2021-01-07 NOTE — TELEPHONE ENCOUNTER
Review note with cardiac surgery team.  Ok for patient to hold metoprolol for today.  Patient is to take BP at home and report results to surgery team and medication adjustments may need to be made.

## 2021-01-08 ENCOUNTER — PATIENT OUTREACH (OUTPATIENT)
Dept: NURSING | Facility: CLINIC | Age: 71
End: 2021-01-08
Payer: COMMERCIAL

## 2021-01-08 ENCOUNTER — NURSE TRIAGE (OUTPATIENT)
Dept: NURSING | Facility: CLINIC | Age: 71
End: 2021-01-08

## 2021-01-08 ENCOUNTER — CARE COORDINATION (OUTPATIENT)
Dept: CARDIOLOGY | Facility: CLINIC | Age: 71
End: 2021-01-08

## 2021-01-08 NOTE — PROGRESS NOTES
Called patient to follow up on BPs and weight from yesterdays status.  Patient states she developed severe uncontrollable diarrhea at ~ 7 pm last evening and when it didn't stop she called the nurse line and was told to take imodium, eat yogurt and drink Gatorade and water.  Patient states today the diarrhea stopped at about 4 am today and she is able to eat and drink without problems. Patient denies nausea, vomiting and fever.  BPs today were 130/80 and 117/83 and weight is down 3 pounds but this was most likely due to diarrhea.  Patient states she continues to have lower extremity edema.  Informed patient to continue to manage the diarrhea as instructed, eat and drink as tolerated and rest for the rest of the weekend and this writer would call her again on Monday.  Instructed patient to go to the ER if diarrhea returns and she is not able to control it or she develops vomiting, fever or abdominal cramping. Patient verbalized understanding and patient's daughter also agreed to plan.  Updated Quinton Handy PA-C of patient's symptoms.

## 2021-01-08 NOTE — TELEPHONE ENCOUNTER
Pt is calling.    Double valve replacement. Discharged home yesterday. Has been put on numerous new medications.   Diarrhea started around 6:30pm today. Started out loose and now is watery yellow. No blood seen. No mucous.  No nausea or vomiting. No fever. No pain.  Feels well other than the diarrhea.  Care advice reviewed in detail. She verbalized understanding.    Additional Information    Negative: Shock suspected (e.g., cold/pale/clammy skin, too weak to stand, low BP, rapid pulse)    Negative: Difficult to awaken or acting confused (e.g., disoriented, slurred speech)    Negative: Sounds like a life-threatening emergency to the triager    Negative: Vomiting also present and worse than the diarrhea    Negative: [1] Blood in stool AND [2] without diarrhea    Negative: Diarrhea in a cancer patient who is currently (or recently) receiving chemotherapy or radiation therapy, or cancer patient who has metastatic or end-stage cancer and is receiving palliative care    Negative: [1] SEVERE abdominal pain (e.g., excruciating) AND [2] present > 1 hour    Negative: [1] SEVERE abdominal pain AND [2] age > 60    Negative: [1] Blood in the stool AND [2] moderate or large amount of blood    Negative: Black or tarry bowel movements  (Exception: chronic-unchanged  black-grey bowel movements AND is taking iron pills or Pepto-bismol)    Negative: [1] Drinking very little AND [2] dehydration suspected (e.g., no urine > 12 hours, very dry mouth, very lightheaded)    Negative: Patient sounds very sick or weak to the triager    Negative: [1] SEVERE diarrhea (e.g., 7 or more times / day more than normal) AND [2] age > 60 years    Negative: [1] Constant abdominal pain AND [2] present > 2 hours    Negative: [1] Fever > 103 F (39.4 C) AND [2] not able to get the fever down using Fever Care Advice    Negative: [1] SEVERE diarrhea (e.g., 7 or more times / day more than normal) AND [2] present > 24 hours (1 day)    Negative: [1] MODERATE  diarrhea (e.g., 4-6 times / day more than normal) AND [2] present > 48 hours (2 days)    Negative: [1] MODERATE diarrhea (e.g., 4-6 times / day more than normal) AND [2] age > 70 years    Negative: Fever > 101 F (38.3 C)    Negative: Fever present > 3 days (72 hours)    Negative: Abdominal pain  (Exception: Pain clears with each passage of diarrhea stool)    Negative: [1] Blood in the stool AND [2] small amount of blood   (Exception: only on toilet paper. Reason: diarrhea can cause rectal irritation with blood on wiping)    Negative: [1] Mucus or pus in stool AND [2] present > 2 days AND [3] diarrhea is more than mild    Negative: [1] Recent antibiotic therapy (i.e., within last 2 months) AND [2] diarrhea present > 3 days since antibiotic was stopped    Negative: [1] Recent hospitalization AND [2] diarrhea present > 3 days    Negative: Weak immune system (e.g., HIV positive, cancer chemo, splenectomy, organ transplant, chronic steroids)    Negative: Tube feedings (e.g., nasogastric, g-tube, j-tube)    Negative: Travel to a foreign country in past month    Negative: [1] MILD diarrhea (e.g., 1-3 or more stools than normal in past 24 hours) without known cause AND [2] present >  7 days    Negative: Diarrhea is a chronic symptom (recurrent or ongoing AND present > 4 weeks)    SEVERE diarrhea (e.g., 7 or more times / day more than normal)    Protocols used: DIARRHEA-A-    Gita Abreu RN  [facility] Nurse Advisor  1/8/2021 at 12:46 AM

## 2021-01-10 DIAGNOSIS — F17.200 TOBACCO USE DISORDER: ICD-10-CM

## 2021-01-10 DIAGNOSIS — K29.80 DUODENITIS: ICD-10-CM

## 2021-01-10 DIAGNOSIS — G47.00 INSOMNIA, UNSPECIFIED TYPE: ICD-10-CM

## 2021-01-11 ENCOUNTER — ANTICOAGULATION THERAPY VISIT (OUTPATIENT)
Dept: FAMILY MEDICINE | Facility: CLINIC | Age: 71
End: 2021-01-11

## 2021-01-11 ENCOUNTER — PATIENT OUTREACH (OUTPATIENT)
Dept: NURSING | Facility: CLINIC | Age: 71
End: 2021-01-11
Payer: COMMERCIAL

## 2021-01-11 DIAGNOSIS — Z98.890 S/P MVR (MITRAL VALVE REPAIR): ICD-10-CM

## 2021-01-11 LAB — INR PPP: 5.4 (ref 0.86–1.14)

## 2021-01-11 PROCEDURE — 85610 PROTHROMBIN TIME: CPT | Performed by: FAMILY MEDICINE

## 2021-01-11 PROCEDURE — 36416 COLLJ CAPILLARY BLOOD SPEC: CPT | Performed by: FAMILY MEDICINE

## 2021-01-11 SDOH — ECONOMIC STABILITY: FOOD INSECURITY: WITHIN THE PAST 12 MONTHS, YOU WORRIED THAT YOUR FOOD WOULD RUN OUT BEFORE YOU GOT MONEY TO BUY MORE.: NEVER TRUE

## 2021-01-11 SDOH — ECONOMIC STABILITY: TRANSPORTATION INSECURITY
IN THE PAST 12 MONTHS, HAS THE LACK OF TRANSPORTATION KEPT YOU FROM MEDICAL APPOINTMENTS OR FROM GETTING MEDICATIONS?: NO

## 2021-01-11 SDOH — ECONOMIC STABILITY: INCOME INSECURITY: HOW HARD IS IT FOR YOU TO PAY FOR THE VERY BASICS LIKE FOOD, HOUSING, MEDICAL CARE, AND HEATING?: NOT HARD AT ALL

## 2021-01-11 SDOH — ECONOMIC STABILITY: FOOD INSECURITY: WITHIN THE PAST 12 MONTHS, THE FOOD YOU BOUGHT JUST DIDN'T LAST AND YOU DIDN'T HAVE MONEY TO GET MORE.: NEVER TRUE

## 2021-01-11 SDOH — ECONOMIC STABILITY: TRANSPORTATION INSECURITY
IN THE PAST 12 MONTHS, HAS LACK OF TRANSPORTATION KEPT YOU FROM MEETINGS, WORK, OR FROM GETTING THINGS NEEDED FOR DAILY LIVING?: NO

## 2021-01-11 ASSESSMENT — ACTIVITIES OF DAILY LIVING (ADL): DEPENDENT_IADLS:: INDEPENDENT

## 2021-01-11 NOTE — LETTER
[facility]  Complex Care Plan  About Me:    Patient Name:  Rajani Guo    YOB: 1950  Age:         70 year old   [place] MRN:    [id number] Telephone Information:  Home Phone [phone]   Mobile [phone]       Address:  [address] [facility] [address] Email address:  [email or web address]      Emergency Contact(s)    Name Relationship Lgl Grd Work Phone Home Phone Mobile Phone   1CANDICE BROWN* Daughter No  [phone]           Primary language:  English     needed? No   [employer]:  [phone] op. 1  Other communication barriers: Glasses  Preferred Method of Communication:  Gay  Current living arrangement: I live in a private home with family  Mobility Status/ Medical Equipment: Independent    Health Maintenance  Health Maintenance Reviewed: Due/Overdue   Health Maintenance Due   Topic Date Due     ZOSTER IMMUNIZATION (2 of 3) 01/19/2016     MEDICARE ANNUAL WELLNESS VISIT  03/13/2020     DIABETIC FOOT EXAM  03/13/2020     EYE EXAM  04/09/2020     PHQ-2  01/01/2021         My Access Plan  Medical Emergency 911   Primary Clinic Line [facility] - [phone]   24 Hour Appointment Line [phone] or  [phone]) (toll-free)   24 Hour Nurse Line [phone] (toll-free)   Preferred Urgent Care     Preferred Hospital [facility]  [phone]   Preferred Pharmacy [facility]/pharmacy #[id number] - [address] OF [facility]     Behavioral Health Crisis Line The National Suicide Prevention Lifeline at 1-818.873.6647 or 911             My Care Team Members  Patient Care Team       Relationship Specialty Notifications Start End    Tamiko Coley MD PCP - General Family Practice  3/11/19     Phone: [phone] Fax: [phone] [address] [facility] [address]    Tamiko Coley MD Assigned PCP    3/17/19     Phone: [phone] Fax: [phone]         [address] [facility] [address]    Lora Pal MD MD Cardiology  10/12/20     Phone: [phone] Fax: [phone]         [address] [facility] [address] [facility] [address]    Hugo Patrick MD Assigned OBGYN Provider   10/23/20     Phone: [phone] Fax: [phone] [address] [facility] [address]    Edilberto Damon MD Assigned Surgical Provider   10/23/20     Phone: [phone] Fax: [phone]         [address] [facility] [address]    [facility]  1/6/21     Phone: [phone]         Odell Acosta RN Lead Care Coordinator Primary Care -  Admissions 1/11/21     Phone: [phone] Fax: [phone]                My Care Plans  Self Management and Treatment Plan  Goals and (Comments)  Goals        General    #1  Pain Management (pt-stated)     Notes - Note created  1/11/2021  5:55 PM by Odell Acosta RN    Goal Statement: I will work to reduce my pain with Tylenol, ice, heat, and massage.  Date Goal set: 1/11/2021  Barriers: Recent open heart surgery  Strengths: Engaged in care coordination  Date to Achieve By: 7/11/2021  Patient expressed understanding of goal: Yes  Action steps to achieve this goal:  1. I will take my Tylenol on a scheduled basis to keep ahead of the pain.  2. I will use alternating ice and heat to reduce the pain that I have incisionally.  3. I will have my daughter massage the muscles that are tight and achy and causing pain.        #2  Functional (pt-stated)     Notes - Note created  1/11/2021  5:57 PM by Odell Acosta, RN    Goal Statement: I will work on walking a little bit more each day by walking further in the distance or further in the time walked.  Date Goal set: 1/11/2021  Barriers: Recent open heart surgery  Strengths: Engaged in care coordination  Date to Achieve By: 7/11/2021  Patient expressed understanding of goal:  Yes  Action steps to achieve this goal:  1. I will walk every day more than just to the bathroom and back.  2. I will increase the time walked.  3. I will increase the distance walked.               Action Plans on File:                       Advance Care Plans/Directives Type:        My Medical and Care Information  Problem List   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 3/4/2019      Other cardiomyopathies (H)     Severe Mitral Stenosis      Moderate mitral insufficiency     Aortic insufficiency, Moderate -- Echo 8/13/19     Severe Aortic Stenosis     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    Care Coordination Start Date: 1/11/2021   Frequency of Care Coordination: 2 weeks   Form Last Updated: 01/11/2021

## 2021-01-11 NOTE — PROGRESS NOTES
ANTICOAGULATION MANAGEMENT     Patient Name:  Rajani Guo  Date:  2021    ASSESSMENT /SUBJECTIVE:    Today's INR result of 5.40 is supratherapeutic. Goal INR of 2.5-3.5      Warfarin dose taken: Warfarin taken as instructedDiet: No new diet changes affecting INR - not eating as much as normal due to diarrhea over weekend. Taking imodium sometimes to help and eating greek yogurt.     Medication changes/ interactions: on amiodarone    Previous INR: Subtherapeutic     S/S of bleeding or thromboembolism: No    New injury or illness: No    Upcoming surgery, procedure or cardioversion: No    Additional findings: MVR &AVR 2020      PLAN:    Telephone call with Rajani regarding INR result and instructed:     Warfarin Dosing Instructions: per H will decrease dose 17%, hold today and given 2mg ,6mg wednesday     Instructed patient to follow up no later than: Thursday 1-14  Lab visit scheduled    Education provided: Contact [facility] Anticoagulation: [phone]  with any changes, questions or concerns at       Rajani verbalizes understanding and agrees to warfarin dosing plan.    Instructed to call the Anticoagulation Clinic for any changes, questions or concerns. (#[phone])        Julieta Jo RN      OBJECTIVE:  Recent labs: (last 7 days)     21  0133 21  0555 21  1127 21  1154   INR 1.81* 2.12* 2.00* 5.40*         No question data found.  Anticoagulation Summary  As of 2021    INR goal:  2.5-3.5   TTR:  --   INR used for dosin.40 (2021)   Warfarin maintenance plan:  4 mg (2 mg x 2) every Tue, Sat; 6 mg (2 mg x 3) all other days   Full warfarin instructions:  : Hold; : 2 mg; Otherwise 4 mg every Tue, Sat; 6 mg all other days   Weekly warfarin total:  38 mg   Plan last modified:  Julieta Jo, RN (2021)   Next INR check:  2021   Priority:  High   Target end date:  Indefinite    Indications    S/P MVR (mitral valve repair)  [Z98.890]             Anticoagulation Episode Summary     INR check location:      Preferred lab:      Send INR reminders to:  GURVINDER DARDEN    Comments:  MVR & AVR placed 12/31/2020       Anticoagulation Care Providers     Provider Role Specialty Phone number    Quinton Handy PA Referring Cardiovascular & Thoracic Surgery [phone]    Lakeshia Rubio, APRN CNP Referring Internal Medicine [phone]         Julieta Pavon, RN, BSN, PHN

## 2021-01-11 NOTE — PROGRESS NOTES
Chart reviewed.  Had diarrhea over weekend.   Hold warfarin today, and decrease Tuesday dose to 2mg or 4mg based on health status and if patient eating more solids/greens again.      Also decrease dose 17% per protocol now that amiodarone load started 01/06/2021 has affected warfarin levels.      Needs INR recheck by 01/14/2021    Madelaine Sotelo, PharmD BCACP  Anticoagulation Clinical Pharmacist

## 2021-01-11 NOTE — LETTER
[place] CARE COORDINATION  [address]  [place] [address]    January 11, 2021    Rajani Guo  [address] [facility] [address]      Dear Rajani,    I am a clinic care coordinator who works with Tamiko Coley MD at [facility]. I wanted to thank you for spending the time to talk with me.  Below is a description of clinic care coordination and how I can further assist you.      The clinic care coordination team is made up of a registered nurse,  and community health worker who understand the health care system. The goal of clinic care coordination is to help you manage your health and improve access to the health care system in the most efficient manner. The team can assist you in meeting your health care goals by providing education, coordinating services, strengthening the communication among your providers and supporting you with any resource needs.    Please feel free to contact me at [phone] with any questions or concerns. We are focused on providing you with the highest-quality healthcare experience possible and that all starts with you.     Sincerely,     Odell Andrew MSN, RN, PHN, [facility]   Primary Care Clinical RN Care Coordinator  [facility]  1/11/2021   5:58 PM  [email or web address]  Office: [phone]      Enclosed: I have enclosed a copy of the Complex Care Plan. This has helpful information and goals that we have talked about. Please keep this in an easy to access place to use as needed.

## 2021-01-11 NOTE — PROGRESS NOTES
Clinic Care Coordination Contact    Clinic Care Coordination Contact  OUTREACH    Referral Information:  Referral Source: IP Report    Primary Diagnosis: Cardiovascular - other    Chief Complaint   Patient presents with     Clinic Care Coordination - Post Hospital     RN CC nurse care coordinator         Universal Utilization: The patient uses the [facility] system and the [facility].  Clinic Utilization  Difficulty keeping appointments:: No  Compliance Concerns: No  No-Show Concerns: No  No PCP office visit in Past Year: No  Utilization    Last refreshed: 1/11/2021  5:02 PM: Hospital Admissions 4           Last refreshed: 1/11/2021  5:02 PM: ED Visits 4           Last refreshed: 1/11/2021  5:02 PM: No Show Count (past year) 1              Current as of: 1/11/2021  5:02 PM              Clinical Concerns:  Current Medical Concerns: The patient recently underwent mitral and aortic valve replacements with an open heart procedure.  The patient states that she is having some incisional pain as well as pain between her shoulder blades.  Patient is using ice and alternating with heat to reduce the inflammation.  And her daughter is performing massage on her back in the house very painful areas.  Patient states that following a sneeze she had pain that went straight through to her back although now it is subsiding.  The patient's daughter helps her with her medications and takes her to all of her appointments.  The patient seems at times to have a little cognitive challenge especially with remembering things that have happened.    Medication reconciliation was completed with the patient and marked as reviewed.  Health maintenance was reviewed and questions were answered with the patient.  The assessment for hypertension was completed with the patient today as well as the social determinants of health and they were marked as reviewed.    Medication reconciliation status: Medications reviewed and reconciled.   Continue medications without change.     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 3/4/2019      Other cardiomyopathies (H)     Severe Mitral Stenosis      Moderate mitral insufficiency     Aortic insufficiency, Moderate -- Echo 8/13/19     Severe Aortic Stenosis     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  Education Provided to patient: Options for care coordination.  Treatment of persistent pain following her surgery.  Pain  Pain (GOAL):: Yes  Type: Acute (<3mo)  Location of chronic pain:: incisional pain  Radiating: No  Progression: Improving  Description of pain: Aching, Shooting  Chronic pain severity:: 6  Limitation of routine activities due to chronic pain:: No  Alleviating Factors: Rest, Ice, Heat, Pain Medication  Aggravating Factors: Coughing, Activity  Health Maintenance Reviewed: Due/Overdue   Health Maintenance Due   Topic Date Due     ZOSTER IMMUNIZATION (2 of 3) 01/19/2016     MEDICARE ANNUAL WELLNESS VISIT  03/13/2020     DIABETIC FOOT  "EXAM  03/13/2020     EYE EXAM  04/09/2020     PHQ-2  01/01/2021       Clinical Pathway: Clinic Care Coordination Hypertension Assessment    Discharge:  Hospital summary: Replacement of aortic valve with mitral valve repair.  Day of hospital discharge: 1/6/2021  What recommendations were made for follow up after your recent hospitalization?  Follow-up with PCP as well as cardiology.  Cardiac rehab to follow  Have the follow up appointments been scheduled? Yes  If not, can I help you set up these appointments? No     Do you have after-hour contact numbers for your providers? yes     Hypertension:                      Symptom Review:  Hypertension Symptoms  Anxiety: No  Blurred vision: Yes  Chest pain: : No  Cough: No  Numbness and Tingling in hands or feet: No  Weakness: No  Dizziness or Light-Headedness: No  Fatigue: Yes  Headaches: No  Neck pain: No  Orthopnea: No  Palpitations: No  Fluid retention:: Yes  Location: : ankles  Shortness of breath: No  Nosebleeds: No  Sweats: No  Home BP monitoring: : No  Overall your hypertension symptoms are (GOAL):: Stable    Medications:   \"How many new medications are you on since your hospitalization?\"           0 - 1  \"How many of your current medicines changed (dose, timing, name, etc.) while you were in the hospital?\" 0 - 1  \"Do you have questions about your medications?\" No  For patients on insulin: \"Did you start on insulin in the hospital or did you have your insulin dose changed?\"No  Medication reconciliation completed? Yes  Was MTM referral placed (*Make sure to put transitions as reason for referral)?  No     Activity:  Patient currently is not engaged in exercise:   Patient is able to perform ADLS/IADLS with assistance.  Patient referred to NONE.    Diet:  Reviewed well balanced diet with consideration to medical conditions/limitations.   Reviewed low sodium diet guidelines appropriate for patient.    Referred to NONE.    Emotions:  Patient does have adequate " support.  Patient is not feeling down or depressed.    Follow up:   Referred patient to NONE.      Medication Management:  Patient is knowledgeable on medications and is adherent.  No financial concerns reported at this time.  Medication reconciliation was completed with the patient and there are no other questions or concerns at this time.    Functional Status:  Dependent ADLs:: Independent  Dependent IADLs:: Independent  Bed or wheelchair confined:: No  Mobility Status: Independent  Fallen 2 or more times in the past year?: No  Any fall with injury in the past year?: No    Living Situation:  Current living arrangement:: I live in a private home with family  Type of residence:: Private home - no stairs    Lifestyle & Psychosocial Needs:     Social Needs     Financial resource strain: Not hard at all     Food insecurity     Worry: Never true     Inability: Never true     Transportation needs     Medical: No     Non-medical: No     Diet:: Diabetic diet  Inadequate nutrition (GOAL):: No  Tube Feeding: No  Inadequate activity/exercise (GOAL):: No  Significant changes in sleep pattern (GOAL): No  Transportation means:: Family, Regular car     Tenriism or spiritual beliefs that impact treatment:: No  Mental health DX:: No  Mental health management concern (GOAL):: No  Informal Support system:: Children   Socioeconomic History     Marital status:      Spouse name: Not on file     Number of children: 1     Years of education: Not on file     Highest education level: Not on file     Tobacco Use     Smoking status: Current Every Day Smoker     Packs/day: 1.00     Years: 58.00     Pack years: 58.00     Start date: 12/20/1962     Smokeless tobacco: Never Used     Tobacco comment: using 21 mg patch while inpatient   Substance and Sexual Activity     Alcohol use: Yes     Comment: 2 glasses of wine a week     Drug use: Yes     Types: Marijuana     Comment: occasional     Sexual activity: Never               Resources and  Interventions:  Current Resources:      Community Resources: None  Supplies used at home:: Diabetic Supplies  Equipment Currently Used at Home: none   )   )    Advance Care Plan/Directive  Advanced Care Plans/Directives on file:: No  Advanced Care Plan/Directive Status: Considering Options    Referrals Placed: None     Goals:   Goals        General    #1  Pain Management (pt-stated)     Notes - Note created  1/11/2021  5:55 PM by Odell Acosta RN    Goal Statement: I will work to reduce my pain with Tylenol, ice, heat, and massage.  Date Goal set: 1/11/2021  Barriers: Recent open heart surgery  Strengths: Engaged in care coordination  Date to Achieve By: 7/11/2021  Patient expressed understanding of goal: Yes  Action steps to achieve this goal:  1. I will take my Tylenol on a scheduled basis to keep ahead of the pain.  2. I will use alternating ice and heat to reduce the pain that I have incisionally.  3. I will have my daughter massage the muscles that are tight and achy and causing pain.        #2  Functional (pt-stated)     Notes - Note created  1/11/2021  5:57 PM by Odell Acosta RN    Goal Statement: I will work on walking a little bit more each day by walking further in the distance or further in the time walked.  Date Goal set: 1/11/2021  Barriers: Recent open heart surgery  Strengths: Engaged in care coordination  Date to Achieve By: 7/11/2021  Patient expressed understanding of goal: Yes  Action steps to achieve this goal:  1. I will walk every day more than just to the bathroom and back.  2. I will increase the time walked.  3. I will increase the distance walked.              Patient/Caregiver understanding: The patient has a good understanding of the disease process and what she needs to do to achieve her goals.    Outreach Frequency: 2 weeks  Future Appointments              Tomorrow UC CVTS [facility] Heart [facility]    In 2 days Tamiko Coley MD [facility] [place]  [place]    In 3 days [place] LAB [facility] [place]    In 2 weeks Thanh Morse MD [facility]          Plan: 1.  The patient will make and attend all follow-up appointments including cardiac rehab.  2.  The patient will walk every day multiple times increasing the time and/or distance walked.  3.  The patient will take her medications for pain such as Tylenol on a scheduled basis.          Odell Andrew MSN, RN, PHN, CCM   Primary Care Clinical RN Care Coordinator  [facility]  1/11/2021   6:08 PM  [email or web address]  Office: [phone]

## 2021-01-11 NOTE — PROGRESS NOTES
Routing to [facility] to advise - MVR & AVR placed 12/31/2020    Julieta Pavon RN, BSN, PHN

## 2021-01-12 ENCOUNTER — VIRTUAL VISIT (OUTPATIENT)
Dept: CARDIOLOGY | Facility: CLINIC | Age: 71
End: 2021-01-12
Attending: PHYSICIAN ASSISTANT
Payer: COMMERCIAL

## 2021-01-12 DIAGNOSIS — Z95.2 S/P MVR (MITRAL VALVE REPLACEMENT): ICD-10-CM

## 2021-01-12 DIAGNOSIS — Z95.2 S/P AVR (AORTIC VALVE REPLACEMENT): Primary | ICD-10-CM

## 2021-01-12 PROCEDURE — 99024 POSTOP FOLLOW-UP VISIT: CPT | Mod: 95 | Performed by: PHYSICIAN ASSISTANT

## 2021-01-12 RX ORDER — INSULIN DETEMIR 100 [IU]/ML
INJECTION, SOLUTION SUBCUTANEOUS
COMMUNITY
Start: 2020-09-28 | End: 2021-01-13

## 2021-01-12 RX ORDER — TRAZODONE HYDROCHLORIDE 50 MG/1
TABLET, FILM COATED ORAL
COMMUNITY
Start: 2020-10-06 | End: 2021-07-28

## 2021-01-12 NOTE — PROGRESS NOTES
CARDIOTHORACIC SURGERY FOLLOW-UP VISIT     Rajani Guo   1950   [id number]      Reason for visit: Post-Op telephone visit s/p mitral valve replacement with 27 mm St. Abdoulaye mechanical valve and aortic valve replacement 19-mm St. Abdoulaye [place] mechanical valve on 12/29/2020 with Dr. Luc Lara    HPI: Rajani Guo is a 70 year old year old female seen in clinic for a routine telephone follow-up appointment after surgery. Patient has past medical history of hypertension, pulmonary hypertension, dyslipidemia, diverticulosis, current tobacco use, DVT, arterial thrombus (March, 2019), DMT2 (on insulin), GERD, GIB, and osteoporosis. She was followed by her Cardiologist (Dr Thanh Morse) and had complained of 6 months of progressive DESAI and dizziness with poor quality of life and decreasing ADL tolerance. Her recent history should be noted for multiple GI bleeds since 2019 (last admitted 9/16/2020), attributed to 2 small AVMs in her small bowel without active bleeding. Hospital course was remarkable for a-fib with RVR. Started on amio and converted to NSR. Patient was discharged to home on 01/06/21.    Patient has been doing well since discharge and now returns to clinic for postop telephone visit.     Patient reports incision is healing well.    Patient denies any fever, chills, chest pain, palpitations, edema, SOB, lightheadedness, nausea and vomiting.    Patient is having normal bowel movements and voiding without problems.    She has not been attending cardiac rehabilitation but will call to start soon.      Patient is on Coumadin. Her last INR 1/11 was 5.4. Instructed to hold coumadin on 1/11,1/12 and 11/13. Recheck INR 11/14. Follow up with her PCP as scheduled.  Weight has been stable overall 145 lbs.  Blood glucose levels have been under good control.      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     GI bleed      History of partial thyroidectomy 06/02/2018     Hyperlipidemia LDL goal <100 01/18/2018     Hypertension      Insomnia, unspecified type 01/18/2018     Mitral regurgitation      Mitral stenosis      Pulmonary hypertension (H)      Tobacco use disorder        PAST SURGICAL HISTORY:  Past Surgical History:   Procedure Laterality Date     COLONOSCOPY N/A 9/4/2019    Procedure: COLONOSCOPY;  Surgeon: [place] Todd MD;  Location:  GI     CV CORONARY ANGIOGRAM N/A 11/16/2020    Procedure: CV CORONARY ANGIOGRAM;  Surgeon: Joey Rivas MD;  Location: [facility] CARDIAC CATH LAB     CV FRACTIONAL FLOW RESERVE N/A 11/16/2020    Procedure: Fractional Flow Reserve;  Surgeon: Joey Rivas MD;  Location: [facility] CARDIAC CATH LAB     CV RIGHT HEART CATH N/A 11/16/2020    Procedure: CV RIGHT HEART CATH;  Surgeon: Joey Rivas MD;  Location:  HEART CARDIAC CATH LAB     ESOPHAGOSCOPY, GASTROSCOPY, DUODENOSCOPY (EGD), COMBINED N/A 9/4/2019    Procedure: ESOPHAGOGASTRODUODENOSCOPY (EGD);  Surgeon: [place] Todd MD;  Location:  GI     ESOPHAGOSCOPY, GASTROSCOPY, DUODENOSCOPY (EGD), COMBINED N/A 9/17/2020    Procedure: ESOPHAGOGASTRODUODENOSCOPY (EGD);  Surgeon: Ten Ibrahim DO;  Location:  GI     IR ANGIOGRAM THROUGH CATHETER FOLLOW UP  3/5/2019     IR LOWER EXTREMITY ANGIOGRAM LEFT  3/4/2019     KNEE SURGERY Right 2013    right knee torn meniscus surgery     OVARY SURGERY  1971    1 ovary removed     RELEASE CARPAL TUNNEL Right 1988     RELEASE TRIGGER FINGER BILATERAL       REPLACE VALVES AORTIC AND MITRAL, COMBINED N/A 12/29/2020    Procedure: Median  Stertonomy, REPLACEMENT AORTIC Valve  with 19mm St Abdoulaye [place]  Mechanical Heart Valve AND MITRAL VALVE Replacement with 27mm St Abdoulaye Mechanical Heart Valve, on pump oxygenation;  Surgeon: Luc Lara MD;  Location: [facility] OR     SHOULDER SURGERY Left     rotator cuff repair, plate placement     THYROID SURGERY  1988    partial thyroidectomy       CURRENT MEDICATIONS:   Current Outpatient Medications   Medication     acetaminophen (TYLENOL) 325 MG tablet     aspirin (ASA) 81 MG EC tablet     atorvastatin (LIPITOR) 40 MG tablet     buPROPion (WELLBUTRIN SR) 150 MG 12 hr tablet     calcium carb 1250 mg, 500 mg [place],/vitamin D 200 unit (CALCIUM 500/D) 500-200 MG-UNIT per tablet     ferrous sulfate (FEROSUL) 325 (65 Fe) MG tablet     furosemide (LASIX) 20 MG tablet     gabapentin (NEURONTIN) 300 MG capsule     glucosamine-chondroitin 500-400 MG CAPS per capsule     insulin pen needle (29G X 12MM) 29G X 12MM miscellaneous     Melatonin 10 MG TABS tablet     metFORMIN (GLUCOPHAGE) 1000 MG tablet     metoprolol tartrate (LOPRESSOR) 25 MG tablet     nicotine (NICODERM CQ) 21 MG/24HR 24 hr patch     oxybutynin (DITROPAN) 5 MG tablet     pantoprazole (PROTONIX) 40 MG EC tablet     potassium chloride ER (KLOR-CON M) 10 MEQ CR tablet     senna-docusate (SENOKOT-S/PERICOLACE) 8.6-50 MG tablet     vitamin C (ASCORBIC ACID) 500 MG tablet     warfarin ANTICOAGULANT (COUMADIN) 2 MG tablet     amiodarone (PACERONE) 400 MG tablet     LEVEMIR FLEXTOUCH 100 UNIT/ML pen     Multiple Vitamin (MULTI-VITAMINS) TABS     nicotine (NICORETTE) 2 MG gum     traZODone (DESYREL) 50 MG tablet     No current facility-administered medications for this visit.        ALLERGIES:      Allergies   Allergen Reactions     Indomethacin Other (See Comments)     Dizziness and disorientation     Tramadol Nausea and Vomiting         ROS:  Review of symptoms otherwise negative unless commented about in HPI.     LABS:  Last Basic Metabolic Panel:  Lab Results    Component Value Date     01/06/2021      Lab Results   Component Value Date    POTASSIUM 4.7 01/06/2021     Lab Results   Component Value Date    CHLORIDE 109 01/06/2021     Lab Results   Component Value Date    KAYLEEN 8.4 01/06/2021     Lab Results   Component Value Date    CO2 23 01/06/2021     Lab Results   Component Value Date    BUN 13 01/06/2021     Lab Results   Component Value Date    CR 0.68 01/06/2021     Lab Results   Component Value Date     01/06/2021       Last CBC:   Lab Results   Component Value Date    WBC 9.2 01/06/2021     Lab Results   Component Value Date    RBC 3.07 01/06/2021     Lab Results   Component Value Date    HGB 8.3 01/06/2021     Lab Results   Component Value Date    HCT 27.6 01/06/2021     No components found for: MCT  Lab Results   Component Value Date    MCV 90 01/06/2021     Lab Results   Component Value Date    MCH 27.0 01/06/2021     Lab Results   Component Value Date    MCHC 30.1 01/06/2021     Lab Results   Component Value Date    RDW 18.0 01/06/2021     Lab Results   Component Value Date     01/06/2021       INR:  Lab Results   Component Value Date    INR 5.40 01/11/2021    INR 2.00 01/07/2021    INR 2.12 01/06/2021    INR 1.81 01/05/2021    INR 1.93 01/04/2021    INR 2.86 01/03/2021    INR 3.72 01/02/2021    INR 2.23 01/01/2021    INR 1.85 12/31/2020    INR 1.59 12/31/2020    INR 1.25 12/29/2020    INR 1.52 12/29/2020         IMAGING:  None    PHYSICAL EXAM:   There were no vitals taken for this visit.  General: alert and oriented x 3, pleasant, no acute distress, normal mood.  Neuro: no focal deficits   CV: no peripheral edema  Pulm: easy work of breathing  Incision: incisions clean dry and intact without erythema, swelling or drainage per pt.    PROCEDURES: None       ASSESSMENT/PLAN:  Rajani Guo is a 70 year old female status post  mitral valve replacement with 27 mm St. Abdoulaye mechanical valve and aortic valve replacement 19-mm St. Abdoulaye [place]  mechanical valve on 12/29/2020 with Dr. Luc Lara  who returns to clinic for postop visit.     1. Surgically doing well overall.  Incisions are healing well with no signs of infection. Increasing activity and strength overall.   2. Hemodynamics are stable. No medication changes were needed today.  3. Follow up with your cardiologist, Dr Thanh Morse as scheduled.  4. Start outpatient Cardiac Rehab until completed.   5. Continue sternal precautions for 12 weeks from surgery date.   6. No driving for 4 weeks from surgery date.  7. Follow up with PCP for INR checks.       The total time spent with the patient was 25 minutes, > 50% of which was spent in counseling.    CC  Tamiko Coley           Phone call duration: 25minutes

## 2021-01-12 NOTE — LETTER
1/12/2021      RE: Rajani Guo  [address] [facility] [address]       Dear Colleague,    Thank you for the opportunity to participate in the care of your patient, Rajani Guo, at the [facility] HEART [facility] at [facility]. Please see a copy of my visit note below.    Rajani is a 70 year old who is being evaluated via a billable video visit.      How would you like to obtain your AVS? [email or web address]     Connecting through [email or web address]    If the video visit is dropped, the invitation should be resent by: Text to cell phone: [phone]     Will anyone else be joining your video visit? Daughter Jennifer will be in the same room      CARDIOTHORACIC SURGERY FOLLOW-UP VISIT     Rajani Guo   1950   [id number]      Reason for visit: Post-Op telephone visit s/p mitral valve replacement with 27 mm St. Abdoulaye mechanical valve and aortic valve replacement 19-mm St. Abdoulaye [place] mechanical valve on 12/29/2020 with Dr. Luc Lara    HPI: Rajani Guo is a 70 year old year old female seen in clinic for a routine telephone follow-up appointment after surgery. Patient has past medical history of hypertension, pulmonary hypertension, dyslipidemia, diverticulosis, current tobacco use, DVT, arterial thrombus (March, 2019), DMT2 (on insulin), GERD, GIB, and osteoporosis. She was followed by her Cardiologist (Dr Thanh Morse) and had complained of 6 months of progressive DESAI and dizziness with poor quality of life and decreasing ADL tolerance. Her recent history should be noted for multiple GI bleeds since 2019 (last admitted 9/16/2020), attributed to 2 small AVMs in her small bowel without active bleeding. Hospital course was remarkable for a-fib with RVR. Started on amio and converted to NSR. Patient was discharged to home on 01/06/21.    Patient has been doing well since discharge and now returns to clinic for postop telephone visit.     Patient reports incision is healing  well.    Patient denies any fever, chills, chest pain, palpitations, edema, SOB, lightheadedness, nausea and vomiting.    Patient is having normal bowel movements and voiding without problems.    She has not been attending cardiac rehabilitation but will call to start soon.      Patient is on Coumadin. Her last INR 1/11 was 5.4. Instructed to hold coumadin on 1/11,1/12 and 11/13. Recheck INR 11/14. Follow up with her PCP as scheduled.  Weight has been stable overall 145 lbs.  Blood glucose levels have been under good control.      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     GI bleed      History of partial thyroidectomy 06/02/2018     Hyperlipidemia LDL goal <100 01/18/2018     Hypertension      Insomnia, unspecified type 01/18/2018     Mitral regurgitation      Mitral stenosis      Pulmonary hypertension (H)      Tobacco use disorder        PAST SURGICAL HISTORY:  Past Surgical History:   Procedure Laterality Date     COLONOSCOPY N/A 9/4/2019    Procedure: COLONOSCOPY;  Surgeon: Marie Todd MD;  Location:  GI     CV CORONARY ANGIOGRAM N/A 11/16/2020    Procedure: CV CORONARY ANGIOGRAM;  Surgeon: Joey Rivas MD;  Location: [facility] CARDIAC CATH LAB     CV FRACTIONAL FLOW RESERVE N/A 11/16/2020    Procedure: Fractional Flow Reserve;  Surgeon: Joey Rivas MD;  Location: [facility] CARDIAC CATH LAB     CV RIGHT HEART CATH N/A 11/16/2020    Procedure: CV RIGHT HEART CATH;  Surgeon: Joey Rivas MD;  Location: [facility] CARDIAC CATH LAB     ESOPHAGOSCOPY, GASTROSCOPY,  DUODENOSCOPY (EGD), COMBINED N/A 9/4/2019    Procedure: ESOPHAGOGASTRODUODENOSCOPY (EGD);  Surgeon: Marie Todd MD;  Location:  GI     ESOPHAGOSCOPY, GASTROSCOPY, DUODENOSCOPY (EGD), COMBINED N/A 9/17/2020    Procedure: ESOPHAGOGASTRODUODENOSCOPY (EGD);  Surgeon: Ten Ibrahim DO;  Location:  GI     IR ANGIOGRAM THROUGH CATHETER FOLLOW UP  3/5/2019     IR LOWER EXTREMITY ANGIOGRAM LEFT  3/4/2019     KNEE SURGERY Right 2013    right knee torn meniscus surgery     OVARY SURGERY  1971    1 ovary removed     RELEASE CARPAL TUNNEL Right 1988     RELEASE TRIGGER FINGER BILATERAL       REPLACE VALVES AORTIC AND MITRAL, COMBINED N/A 12/29/2020    Procedure: Median Stertonomy, REPLACEMENT AORTIC Valve  with 19mm St Abdoulaye [place]  Mechanical Heart Valve AND MITRAL VALVE Replacement with 27mm St Abdoulaye Mechanical Heart Valve, on pump oxygenation;  Surgeon: Luc Lara MD;  Location: [facility] OR     SHOULDER SURGERY Left     rotator cuff repair, plate placement     THYROID SURGERY  1988    partial thyroidectomy       CURRENT MEDICATIONS:   Current Outpatient Medications   Medication     acetaminophen (TYLENOL) 325 MG tablet     aspirin (ASA) 81 MG EC tablet     atorvastatin (LIPITOR) 40 MG tablet     buPROPion (WELLBUTRIN SR) 150 MG 12 hr tablet     calcium carb 1250 mg, 500 mg [place],/vitamin D 200 unit (CALCIUM 500/D) 500-200 MG-UNIT per tablet     ferrous sulfate (FEROSUL) 325 (65 Fe) MG tablet     furosemide (LASIX) 20 MG tablet     gabapentin (NEURONTIN) 300 MG capsule     glucosamine-chondroitin 500-400 MG CAPS per capsule     insulin pen needle (29G X 12MM) 29G X 12MM miscellaneous     Melatonin 10 MG TABS tablet     metFORMIN (GLUCOPHAGE) 1000 MG tablet     metoprolol tartrate (LOPRESSOR) 25 MG tablet     nicotine (NICODERM CQ) 21 MG/24HR 24 hr patch     oxybutynin (DITROPAN) 5 MG tablet     pantoprazole (PROTONIX) 40 MG EC tablet     potassium chloride ER (KLOR-CON M) 10 MEQ CR tablet     senna-docusate  (SENOKOT-S/PERICOLACE) 8.6-50 MG tablet     vitamin C (ASCORBIC ACID) 500 MG tablet     warfarin ANTICOAGULANT (COUMADIN) 2 MG tablet     amiodarone (PACERONE) 400 MG tablet     LEVEMIR FLEXTOUCH 100 UNIT/ML pen     Multiple Vitamin (MULTI-VITAMINS) TABS     nicotine (NICORETTE) 2 MG gum     traZODone (DESYREL) 50 MG tablet     No current facility-administered medications for this visit.        ALLERGIES:      Allergies   Allergen Reactions     Indomethacin Other (See Comments)     Dizziness and disorientation     Tramadol Nausea and Vomiting         ROS:  Review of symptoms otherwise negative unless commented about in HPI.     LABS:  Last Basic Metabolic Panel:  Lab Results   Component Value Date     01/06/2021      Lab Results   Component Value Date    POTASSIUM 4.7 01/06/2021     Lab Results   Component Value Date    CHLORIDE 109 01/06/2021     Lab Results   Component Value Date    KAYLEEN 8.4 01/06/2021     Lab Results   Component Value Date    CO2 23 01/06/2021     Lab Results   Component Value Date    BUN 13 01/06/2021     Lab Results   Component Value Date    CR 0.68 01/06/2021     Lab Results   Component Value Date     01/06/2021       Last CBC:   Lab Results   Component Value Date    WBC 9.2 01/06/2021     Lab Results   Component Value Date    RBC 3.07 01/06/2021     Lab Results   Component Value Date    HGB 8.3 01/06/2021     Lab Results   Component Value Date    HCT 27.6 01/06/2021     No components found for: MCT  Lab Results   Component Value Date    MCV 90 01/06/2021     Lab Results   Component Value Date    MCH 27.0 01/06/2021     Lab Results   Component Value Date    MCHC 30.1 01/06/2021     Lab Results   Component Value Date    RDW 18.0 01/06/2021     Lab Results   Component Value Date     01/06/2021       INR:  Lab Results   Component Value Date    INR 5.40 01/11/2021    INR 2.00 01/07/2021    INR 2.12 01/06/2021    INR 1.81 01/05/2021    INR 1.93 01/04/2021    INR 2.86 01/03/2021     INR 3.72 01/02/2021    INR 2.23 01/01/2021    INR 1.85 12/31/2020    INR 1.59 12/31/2020    INR 1.25 12/29/2020    INR 1.52 12/29/2020         IMAGING:  None    PHYSICAL EXAM:   There were no vitals taken for this visit.  General: alert and oriented x 3, pleasant, no acute distress, normal mood.  Neuro: no focal deficits   CV: no peripheral edema  Pulm: easy work of breathing  Incision: incisions clean dry and intact without erythema, swelling or drainage per pt.    PROCEDURES: None       ASSESSMENT/PLAN:  Rajani Guo is a 70 year old female status post  mitral valve replacement with 27 mm St. Abdoulaye mechanical valve and aortic valve replacement 19-mm St. Abdoulaye [place] mechanical valve on 12/29/2020 with Dr. Luc Lara  who returns to clinic for postop visit.     1. Surgically doing well overall.  Incisions are healing well with no signs of infection. Increasing activity and strength overall.   2. Hemodynamics are stable. No medication changes were needed today.  3. Follow up with your cardiologist, Dr Thanh Morse as scheduled.  4. Start outpatient Cardiac Rehab until completed.   5. Continue sternal precautions for 12 weeks from surgery date.   6. No driving for 4 weeks from surgery date.  7. Follow up with PCP for INR checks.       The total time spent with the patient was 25 minutes, > 50% of which was spent in counseling.    LEIDA Coley       Phone call duration: 25minutes      Please do not hesitate to contact me if you have any questions/concerns.     Sincerely,     Cardiovascular Thoracic Surgery

## 2021-01-12 NOTE — PROGRESS NOTES
Rajani is a 70 year old who is being evaluated via a billable video visit.      How would you like to obtain your AVS? [employer]     Connecting through [employer]    If the video visit is dropped, the invitation should be resent by: Text to cell phone: [phone]     Will anyone else be joining your video visit? Daughter Jennifer will be in the same room

## 2021-01-13 ENCOUNTER — VIRTUAL VISIT (OUTPATIENT)
Dept: FAMILY MEDICINE | Facility: CLINIC | Age: 71
End: 2021-01-13
Payer: COMMERCIAL

## 2021-01-13 DIAGNOSIS — Z95.2 S/P AVR (AORTIC VALVE REPLACEMENT): ICD-10-CM

## 2021-01-13 DIAGNOSIS — E78.5 HYPERLIPIDEMIA LDL GOAL <70: ICD-10-CM

## 2021-01-13 DIAGNOSIS — K92.2 GASTROINTESTINAL HEMORRHAGE, UNSPECIFIED GASTROINTESTINAL HEMORRHAGE TYPE: ICD-10-CM

## 2021-01-13 DIAGNOSIS — I48.0 PAF (PAROXYSMAL ATRIAL FIBRILLATION) (H): ICD-10-CM

## 2021-01-13 DIAGNOSIS — F41.9 ANXIETY: ICD-10-CM

## 2021-01-13 DIAGNOSIS — Z98.890 S/P MVR (MITRAL VALVE REPAIR): Primary | ICD-10-CM

## 2021-01-13 DIAGNOSIS — E11.59 TYPE 2 DIABETES MELLITUS WITH OTHER CIRCULATORY COMPLICATION, WITH LONG-TERM CURRENT USE OF INSULIN (H): ICD-10-CM

## 2021-01-13 DIAGNOSIS — Z79.4 TYPE 2 DIABETES MELLITUS WITH OTHER CIRCULATORY COMPLICATION, WITH LONG-TERM CURRENT USE OF INSULIN (H): ICD-10-CM

## 2021-01-13 DIAGNOSIS — I73.9 PVD (PERIPHERAL VASCULAR DISEASE) (H): ICD-10-CM

## 2021-01-13 PROBLEM — I05.0 SEVERE MITRAL STENOSIS BY PRIOR ECHOCARDIOGRAM: Status: RESOLVED | Noted: 2020-09-29 | Resolved: 2021-01-13

## 2021-01-13 PROBLEM — I34.0 MODERATE MITRAL INSUFFICIENCY: Status: RESOLVED | Noted: 2020-09-29 | Resolved: 2021-01-13

## 2021-01-13 PROBLEM — R01.1 NEWLY RECOGNIZED MURMUR: Status: RESOLVED | Noted: 2018-07-21 | Resolved: 2021-01-13

## 2021-01-13 PROBLEM — I35.0 AORTIC STENOSIS: Status: RESOLVED | Noted: 2020-09-29 | Resolved: 2021-01-13

## 2021-01-13 PROCEDURE — 99214 OFFICE O/P EST MOD 30 MIN: CPT | Mod: 95 | Performed by: FAMILY MEDICINE

## 2021-01-13 RX ORDER — BUPROPION HYDROCHLORIDE 150 MG/1
TABLET, FILM COATED, EXTENDED RELEASE ORAL
Qty: 60 TABLET | Refills: 3 | OUTPATIENT
Start: 2021-01-13

## 2021-01-13 RX ORDER — BUPROPION HYDROCHLORIDE 150 MG/1
150 TABLET, EXTENDED RELEASE ORAL 2 TIMES DAILY
Qty: 60 TABLET | Refills: 1 | Status: ON HOLD | OUTPATIENT
Start: 2021-01-13 | End: 2021-01-27

## 2021-01-13 RX ORDER — INSULIN DETEMIR 100 [IU]/ML
INJECTION, SOLUTION SUBCUTANEOUS
Status: ON HOLD | COMMUNITY
Start: 2021-01-13 | End: 2021-01-27

## 2021-01-13 RX ORDER — TRAZODONE HYDROCHLORIDE 50 MG/1
TABLET, FILM COATED ORAL
Qty: 180 TABLET | Refills: 1 | OUTPATIENT
Start: 2021-01-13

## 2021-01-13 RX ORDER — PANTOPRAZOLE SODIUM 40 MG/1
TABLET, DELAYED RELEASE ORAL
Qty: 90 TABLET | Refills: 1 | Status: SHIPPED | OUTPATIENT
Start: 2021-01-13 | End: 2021-06-25

## 2021-01-13 NOTE — PROGRESS NOTES
Rajani is a 70 year old who is being evaluated via a billable telephone visit.      What phone number would you like to be contacted at? [phone]  How would you like to obtain your AVS? [employer]   Left message on machine to call back    Assessment & Plan     S/P MVR (mitral valve repair)  Stable   Saw Cardiology and doing well    S/P AVR (aortic valve replacement)  As above    Gastrointestinal hemorrhage, unspecified gastrointestinal hemorrhage type  Due to AVM  Advised follow up HGB check    Hyperlipidemia LDL goal <70  On Lipitor    Type 2 diabetes mellitus with other circulatory complication, with long-term current use of insulin (H)  On 2 units Levemir  Monitors accucheck TID  She will call me if sugars are Higher    PVD (peripheral vascular disease) (H)  Stable     PAF (paroxysmal atrial fibrillation) (H)  On coumadin    Anxiety  refilled  - buPROPion (WELLBUTRIN SR) 150 MG 12 hr tablet; Take 1 tablet (150 mg) by mouth 2 times daily    Review of external notes as documented above   Review of prior external note(s) from - re notes from Hospital  Review of the result(s) of each unique test - re labs done by Cardiology and INR                     No follow-ups on file.    Tamiko Coley MD  [facility] [place]    Subjective     Rajani is a 70 year old who presents to clinic today for the following health issues {    HPI         Hospital Follow-up Visit:    Hospital/Nursing Home/IP Rehab Facility: [facility]  Date of Admission: 12/29/20  Date of Discharge: 1/6/20  Reason(s) for Admission: Mitral and aortic incompetence    1. Severe mitral stenosis, s/p mechanical MVR   2. Severe aortic stenosis, s/p mechanical AVR  3. Paroxysmal atrial fibrillation  4. Lifelong anticoagulation for mechanical valves, INR goal 2.5 - 3.5  5. Episodic post-op delirium with paranoia, resolved.   6. DMT2, discharging off insulin, may need to restart in future   7. UTI, completed treatment            Secondary Diagnoses:   1. HTN  2. HLD  3. Hx Diverticulitis   4. Hx GI bleed from small bowel   5. GERD   6. Hx of tobacco use     Was your hospitalization related to COVID-19? No   Problems taking medications regularly:  Yes  Medication changes since discharge: Not on coumadin for 2 day from yesterday  Problems adhering to non-medication therapy:  None    Summary of hospitalization:  [facility] discharge summary reviewed  Diagnostic Tests/Treatments reviewed.  Follow up needed: cardiology  Other Healthcare Providers Involved in Patient s Care:         is going to start Therapy  Update since discharge: improved.       Post Discharge Medication Reconciliation: discharge medications reconciled, continue medications without change.  Plan of care communicated with patient              Diabetes Follow-up      How often are you checking your blood sugar?  157-158    What concerns do you have today about your diabetes? none     Do you have any of these symptoms? (Select all that apply)  No numbness or tingling in feet.  No redness, sores or blisters on feet.  No complaints of excessive thirst.  No reports of blurry vision.  No significant changes to weight.            BP Readings from Last 2 Encounters:   01/07/21 96/51   01/06/21 107/61     Hemoglobin A1C (%)   Date Value   12/29/2020 5.8 (H)   09/16/2020 5.7 (H)     LDL Cholesterol Calculated (mg/dL)   Date Value   04/13/2020 54   03/04/2019 44               Hyperlipidemia Follow-Up      Are you regularly taking any medication or supplement to lower your cholesterol?   Yes- statins    Are you having muscle aches or other side effects that you think could be caused by your cholesterol lowering medication?  Yes- statins    Hypertension Follow-up      Do you check your blood pressure regularly outside of the clinic? Yes     Are you following a low salt diet? Yes    Are your blood pressures ever more than 140 on the top number (systolic) OR more   than 90 on the  bottom number (diastolic), for example 140/90? No            Review of Systems   CONSTITUTIONAL: NEGATIVE for fever, chills, change in weight  INTEGUMENTARY/SKIN: NEGATIVE for worrisome rashes, moles or lesions  ENT/MOUTH: NEGATIVE for ear, mouth and throat problems  RESP: NEGATIVE for significant cough or SOB  CV: NEGATIVE for chest pain, palpitations or peripheral edema  GI: NEGATIVE for nausea, abdominal pain, heartburn, or change in bowel habits  Pt says she feels weak  Saw her Cardiologist yesterday  Has appointment to see INR nurse Tomorrow      Objective         /77  Pulse 57  Physical Exam   healthy, alert and no distress  PSYCH: Alert and oriented times 3; coherent speech, normal   rate and volume, able to articulate logical thoughts, able   to abstract reason, no tangential thoughts, no hallucinations   or delusions  Her affect is normal  RESP: No cough, no audible wheezing, able to talk in full sentences  Remainder of exam unable to be completed due to telephone visits    Reviewed labs and INR  Pt is seeing INR nurse  Advised follow up 2 weeks HGB check  Pt is going in for Cardiac Rehab and will follow up with Cardiology  She has been advised to call Cardiology with her Blood Pressure readings        2:35 PM -end visit  Phone call duration: 28 minutes  Total Time spent 35 mnutes

## 2021-01-14 ENCOUNTER — ANTICOAGULATION THERAPY VISIT (OUTPATIENT)
Dept: FAMILY MEDICINE | Facility: CLINIC | Age: 71
End: 2021-01-14

## 2021-01-14 DIAGNOSIS — Z98.890 S/P MVR (MITRAL VALVE REPAIR): ICD-10-CM

## 2021-01-14 LAB
CAPILLARY BLOOD COLLECTION: NORMAL
INR PPP: 1.9 (ref 0.86–1.14)

## 2021-01-14 PROCEDURE — 85610 PROTHROMBIN TIME: CPT | Performed by: FAMILY MEDICINE

## 2021-01-14 PROCEDURE — 36416 COLLJ CAPILLARY BLOOD SPEC: CPT | Performed by: FAMILY MEDICINE

## 2021-01-14 NOTE — PROGRESS NOTES
Chart reviewed with ACC RN, due to cardiology advising a more aggressive hold, INR now subtherapeutic.  Continue current dose as previously set, and recheck Monday.  If we see a rapid rise to INR >3.1 will plan on another dose adjustment/decrease.      Amiodarone dose decreased to 400mg PO as of 01/13/2021.    Madelaine Sotelo, PharmD [facility]  Anticoagulation Clinical Pharmacist

## 2021-01-14 NOTE — PROGRESS NOTES
ANTICOAGULATION MANAGEMENT     Patient Name:  Rajani Guo  Date:  2021    ASSESSMENT /SUBJECTIVE:    Today's INR result of 1.9 is subtherapeutic. Goal INR of 2.0-3.0      Warfarin dose taken: Pt held - per Cardiology instructions (see note from )    Diet: No new diet changes affecting INR    Medication changes/ interactions: No new medications/supplements affecting INR    Previous INR: Supratherapeutic     S/S of bleeding or thromboembolism: No    New injury or illness: No    Upcoming surgery, procedure or cardioversion: No    Additional findings: None      PLAN:    Telephone call with Rajani regarding INR result and instructed:     Warfarin Dosing Instructions: Continue your current warfarin dose 4 mg every Tue, Sat; 6mg all other days    Instructed patient to follow up no later than:     Lab visit scheduled    Education provided: Target INR goal and significance of current INR result      Rajani verbalizes understanding and agrees to warfarin dosing plan.    Instructed to call the Anticoagulation Clinic for any changes, questions or concerns. (#[phone])        Matthew Finn RN      OBJECTIVE:  Recent labs: (last 7 days)     21  1154 21  1153   INR 5.40* 1.90*         No question data found.  Anticoagulation Summary  As of 2021    INR goal:  2.5-3.5   TTR:  --   INR used for dosin.90 (2021)   Warfarin maintenance plan:  4 mg (2 mg x 2) every Tue, Sat; 6 mg (2 mg x 3) all other days   Full warfarin instructions:  4 mg every Tue, Sat; 6 mg all other days   Weekly warfarin total:  38 mg   No change documented:  Matthew Finn RN   Plan last modified:  Julieta Jo RN (2021)   Next INR check:  2021   Priority:  High   Target end date:  Indefinite    Indications    S/P MVR (mitral valve repair) [Z98.890]             Anticoagulation Episode Summary     INR check location:      Preferred lab:      Send INR reminders to:  GURVINDER DARDEN    Comments:   MVR & AVR placed 12/31/2020       Anticoagulation Care Providers     Provider Role Specialty Phone number    Quinton Handy PA Referring Cardiovascular & Thoracic Surgery [phone]    Lakeshia Rubio APRN CNP Referring Internal Medicine [phone]

## 2021-01-18 ENCOUNTER — ANTICOAGULATION THERAPY VISIT (OUTPATIENT)
Dept: FAMILY MEDICINE | Facility: CLINIC | Age: 71
End: 2021-01-18

## 2021-01-18 ENCOUNTER — OFFICE VISIT (OUTPATIENT)
Dept: LAB | Facility: CLINIC | Age: 71
End: 2021-01-18
Payer: COMMERCIAL

## 2021-01-18 DIAGNOSIS — Z98.890 S/P MVR (MITRAL VALVE REPAIR): ICD-10-CM

## 2021-01-18 DIAGNOSIS — K29.80 DUODENITIS: ICD-10-CM

## 2021-01-18 DIAGNOSIS — D64.9 ANEMIA: ICD-10-CM

## 2021-01-18 LAB
ERYTHROCYTE [DISTWIDTH] IN BLOOD BY AUTOMATED COUNT: 17.5 % (ref 10–15)
HCT VFR BLD AUTO: 31.7 % (ref 35–47)
HGB BLD-MCNC: 9.7 G/DL (ref 11.7–15.7)
INR PPP: 3.7 (ref 0.86–1.14)
MCH RBC QN AUTO: 26.4 PG (ref 26.5–33)
MCHC RBC AUTO-ENTMCNC: 30.6 G/DL (ref 31.5–36.5)
MCV RBC AUTO: 86 FL (ref 78–100)
PLATELET # BLD AUTO: 607 10E9/L (ref 150–450)
RBC # BLD AUTO: 3.67 10E12/L (ref 3.8–5.2)
WBC # BLD AUTO: 8.5 10E9/L (ref 4–11)

## 2021-01-18 PROCEDURE — 36415 COLL VENOUS BLD VENIPUNCTURE: CPT | Performed by: FAMILY MEDICINE

## 2021-01-18 PROCEDURE — 85027 COMPLETE CBC AUTOMATED: CPT | Performed by: FAMILY MEDICINE

## 2021-01-18 PROCEDURE — 85610 PROTHROMBIN TIME: CPT | Performed by: FAMILY MEDICINE

## 2021-01-18 NOTE — PROGRESS NOTES
Chart reviewed with ACC RN.   INR range 2.5-3.5,new start MVR & AVR with amiodarone.    Suggest 6mgSun/Thurs, 4mg ROW, IR recheck Thursday.  This is ~15% decrease due to rapid rise once restarting after 3 day hold with recent supra therapeutic INR.    Madelaine Sotelo, PharmD BCACP  Anticoagulation Clinical Pharmacist

## 2021-01-18 NOTE — PROGRESS NOTES
ANTICOAGULATION MANAGEMENT     Patient Name:  Rajani Guo  Date:  1/18/2021    ASSESSMENT /SUBJECTIVE:    Today's INR result of 3.7 is supratherapeutic. Goal INR of 2.5-3.5      Warfarin dose taken: Warfarin taken as instructed    Diet: No new diet changes affecting INR    Medication changes/ interactions: No new medications/supplements affecting INR    Previous INR: Subtherapeutic     S/S of bleeding or thromboembolism: No    New injury or illness: No    Upcoming surgery, procedure or cardioversion: No    Additional findings:     Day 19 of new start.  Huddled with [facility] for ACC management; see note below.      PLAN:    Telephone call with Rajani regarding INR result and instructed:     Warfarin Dosing Instructions: 4 mg today, tomorrow and Wednesday    Instructed patient to follow up no later than: Thursday 1/21/21.  Lab visit scheduled    Education provided: None required      Rajani verbalizes understanding and agrees to warfarin dosing plan.    Instructed to call the Anticoagulation Clinic for any changes, questions or concerns. (#[phone])        Peg Rivas, KAREEM      OBJECTIVE:  Recent labs: (last 7 days)     01/14/21  1153 01/18/21  1233   INR 1.90* 3.70*         No question data found.  Anticoagulation Summary  As of 1/18/2021    INR goal:  2.5-3.5   TTR:  63.5 % (3 d)   INR used for dosing:  3.70 (1/18/2021)   Warfarin maintenance plan:  6 mg (2 mg x 3) every Sun, Thu; 4 mg (2 mg x 2) all other days   Full warfarin instructions:  6 mg every Sun, Thu; 4 mg all other days   Weekly warfarin total:  32 mg   Plan last modified:  Zina Ruiz RN (1/18/2021)   Next INR check:  1/21/2021   Priority:  High   Target end date:  Indefinite    Indications    S/P MVR (mitral valve repair) [Z98.890]             Anticoagulation Episode Summary     INR check location:      Preferred lab:      Send INR reminders to:  GURVINDER DARDEN    Comments:  MVR & AVR placed 12/31/2020       Anticoagulation Care  Providers     Provider Role Specialty Phone number    Quinton Handy PA Referring Cardiovascular & Thoracic Surgery [phone]    Lakeshia Rubio APRN CNP Referring Internal Medicine [phone]

## 2021-01-19 ENCOUNTER — TELEPHONE (OUTPATIENT)
Dept: RESPIRATORY THERAPY | Facility: CLINIC | Age: 71
End: 2021-01-19

## 2021-01-19 NOTE — TELEPHONE ENCOUNTER
Post-hosp Smoking Cessation Follow-up   2021    Patient: Rajani Guo      :  1950                    MRN:[id number]        Spoke to patient RE: post-hosp smoking cessation counseling follow-up. Patient shared she remains smoke-free since her surgery. She is using 21 mg nicotine patches and pretzel sticks for cravings and urges.  Congratulated patient on her resilency and strength.   Pt agreeable to continued follow up phone support.    Klaudia Lopez, RRT, CTTS  Chronic Pulmonary Disease Specialist  Office: [phone]  Pager: [phone]  Hours: M-F 8-4:30

## 2021-01-21 ENCOUNTER — ANTICOAGULATION THERAPY VISIT (OUTPATIENT)
Dept: FAMILY MEDICINE | Facility: CLINIC | Age: 71
End: 2021-01-21

## 2021-01-21 DIAGNOSIS — Z98.890 S/P MVR (MITRAL VALVE REPAIR): ICD-10-CM

## 2021-01-21 LAB
CAPILLARY BLOOD COLLECTION: NORMAL
INR PPP: 5.1 (ref 0.86–1.14)
INR PPP: 5.1 (ref 0.9–1.1)

## 2021-01-21 PROCEDURE — 36416 COLLJ CAPILLARY BLOOD SPEC: CPT | Performed by: FAMILY MEDICINE

## 2021-01-21 PROCEDURE — 85610 PROTHROMBIN TIME: CPT | Performed by: FAMILY MEDICINE

## 2021-01-21 NOTE — PROGRESS NOTES
ANTICOAGULATION MANAGEMENT     Patient Name:  Rajani Guo  Date:  2021    ASSESSMENT /SUBJECTIVE:    Today's INR result of 5.1 is supratherapeutic. Goal INR of 2.5-3.5      Warfarin dose taken: Warfarin taken as instructed    Diet: No new diet changes affecting INR    Medication changes/ interactions: Amiodarone started 15/21 400 mg bid    Previous INR: Supratherapeutic     S/S of bleeding or thromboembolism: No    New injury or illness: No    Upcoming surgery, procedure or cardioversion: No    Additional findings: Huddled with Megan Hyman, PharmD. See note.      PLAN:    Telephone call with Rajani regarding INR result and instructed:     Warfarin Dosing Instructions: 2 mg today then change your warfarin dose to 2 mg F and 4 mg ROW  . (18.8% % change)    Instructed patient to follow up no later than: 4 days  Lab visit scheduled    Education provided: Monitoring for bleeding signs and symptoms      Alvina verbalizes understanding and agrees to warfarin dosing plan.    Instructed to call the Anticoagulation Clinic for any changes, questions or concerns. (#[phone])        Sherry Luevano RN      OBJECTIVE:  Recent labs: (last 7 days)     21  1233 21  1350   INR 3.70* 5.1* 5.10*         No question data found.  Anticoagulation Summary  As of 2021    INR goal:  2.5-3.5   TTR:  37.3 % (6 d)   INR used for dosin.1 (2021)   Warfarin maintenance plan:  2 mg (2 mg x 1) every Fri; 4 mg (2 mg x 2) all other days   Full warfarin instructions:  : 2 mg; Otherwise 2 mg every Fri; 4 mg all other days   Weekly warfarin total:  26 mg   Plan last modified:  Megan Hyman, [facility] (2021)   Next INR check:  2021   Priority:  High   Target end date:  Indefinite    Indications    S/P MVR (mitral valve repair) [Z98.890]             Anticoagulation Episode Summary     INR check location:      Preferred lab:      Send INR reminders to:  GURVINDER DARDEN    Comments:  MVR & AVR placed  12/31/2020       Anticoagulation Care Providers     Provider Role Specialty Phone number    Quinton Handy PA Referring Cardiovascular & Thoracic Surgery [phone]    Lakeshia Rubio APRN CNP Referring Internal Medicine [phone]

## 2021-01-21 NOTE — PROGRESS NOTES
Anticoagulation    Chart reviewed for new warfarin start with AVR/MVR/Afib goal 2.5-3.5 with interacting Amiodarone thru February 3rd.     Lab Results   Component Value Date    INR 5.10 01/21/2021    INR 5.1 01/21/2021    INR 3.70 01/18/2021       Recommendation:    Due to acute thrombotic risk, gradual reduction. 2 mg x 1 today partial hold then reduced 19% to 2 mg on Friday and 4 mg rest of week.    Megan Hyman, [facility]

## 2021-01-24 ENCOUNTER — HOSPITAL ENCOUNTER (OUTPATIENT)
Facility: CLINIC | Age: 71
Setting detail: OBSERVATION
Discharge: NURSING, REHAB OR LONG-TERM CARE | End: 2021-01-27
Attending: EMERGENCY MEDICINE | Admitting: EMERGENCY MEDICINE
Payer: COMMERCIAL

## 2021-01-24 ENCOUNTER — APPOINTMENT (OUTPATIENT)
Dept: CT IMAGING | Facility: CLINIC | Age: 71
End: 2021-01-24
Attending: EMERGENCY MEDICINE
Payer: COMMERCIAL

## 2021-01-24 ENCOUNTER — TELEPHONE (OUTPATIENT)
Dept: CARDIOLOGY | Facility: CLINIC | Age: 71
End: 2021-01-24

## 2021-01-24 DIAGNOSIS — R29.6 FALLING: ICD-10-CM

## 2021-01-24 DIAGNOSIS — E78.5 HYPERLIPIDEMIA, UNSPECIFIED HYPERLIPIDEMIA TYPE: ICD-10-CM
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
## 2021-01-24 DIAGNOSIS — R79.1 ABNORMAL COAGULATION PROFILE: ICD-10-CM

## 2021-01-24 DIAGNOSIS — Z95.2 S/P AVR (AORTIC VALVE REPLACEMENT): ICD-10-CM

## 2021-01-24 DIAGNOSIS — I10 ESSENTIAL HYPERTENSION, MALIGNANT: ICD-10-CM

## 2021-01-24 DIAGNOSIS — Z98.890 S/P MVR (MITRAL VALVE REPAIR): Primary | ICD-10-CM

## 2021-01-24 DIAGNOSIS — Z11.52 ENCOUNTER FOR SCREENING LABORATORY TESTING FOR SEVERE ACUTE RESPIRATORY SYNDROME CORONAVIRUS 2 (SARS-COV-2): ICD-10-CM

## 2021-01-24 DIAGNOSIS — Z95.2 H/O MITRAL VALVE REPLACEMENT WITH MECHANICAL VALVE: ICD-10-CM

## 2021-01-24 DIAGNOSIS — Z95.2 H/O MECHANICAL AORTIC VALVE REPLACEMENT: ICD-10-CM

## 2021-01-24 DIAGNOSIS — R53.1 ASTHENIA: ICD-10-CM

## 2021-01-24 DIAGNOSIS — Z79.84 LONG TERM CURRENT USE OF ORAL HYPOGLYCEMIC DRUG: ICD-10-CM

## 2021-01-24 DIAGNOSIS — N39.0 URINARY TRACT INFECTION WITHOUT HEMATURIA, SITE UNSPECIFIED: ICD-10-CM

## 2021-01-24 DIAGNOSIS — Z79.4 TYPE 2 DIABETES MELLITUS WITHOUT COMPLICATION, WITH LONG-TERM CURRENT USE OF INSULIN (H): ICD-10-CM

## 2021-01-24 DIAGNOSIS — E11.9 TYPE 2 DIABETES MELLITUS WITHOUT COMPLICATION, WITH LONG-TERM CURRENT USE OF INSULIN (H): ICD-10-CM

## 2021-01-24 LAB
ALBUMIN UR-MCNC: 10 MG/DL
ANION GAP SERPL CALCULATED.3IONS-SCNC: 6 MMOL/L (ref 3–14)
APPEARANCE UR: ABNORMAL
BACTERIA #/AREA URNS HPF: ABNORMAL /HPF
BASOPHILS # BLD AUTO: 0 10E9/L (ref 0–0.2)
BASOPHILS NFR BLD AUTO: 0.3 %
BILIRUB UR QL STRIP: NEGATIVE
BUN SERPL-MCNC: 16 MG/DL (ref 7–30)
CALCIUM SERPL-MCNC: 8.4 MG/DL (ref 8.5–10.1)
CHLORIDE SERPL-SCNC: 106 MMOL/L (ref 94–109)
CO2 SERPL-SCNC: 25 MMOL/L (ref 20–32)
COLOR UR AUTO: YELLOW
CREAT SERPL-MCNC: 0.69 MG/DL (ref 0.52–1.04)
DIFFERENTIAL METHOD BLD: ABNORMAL
EOSINOPHIL # BLD AUTO: 0.2 10E9/L (ref 0–0.7)
EOSINOPHIL NFR BLD AUTO: 3.4 %
ERYTHROCYTE [DISTWIDTH] IN BLOOD BY AUTOMATED COUNT: 16.3 % (ref 10–15)
ERYTHROCYTE [DISTWIDTH] IN BLOOD BY AUTOMATED COUNT: 16.4 % (ref 10–15)
GFR SERPL CREATININE-BSD FRML MDRD: 89 ML/MIN/{1.73_M2}
GLUCOSE BLDC GLUCOMTR-MCNC: 95 MG/DL (ref 70–99)
GLUCOSE SERPL-MCNC: 104 MG/DL (ref 70–99)
GLUCOSE UR STRIP-MCNC: NEGATIVE MG/DL
HCT VFR BLD AUTO: 29.6 % (ref 35–47)
HCT VFR BLD AUTO: 30.8 % (ref 35–47)
HGB BLD-MCNC: 8.9 G/DL (ref 11.7–15.7)
HGB BLD-MCNC: 9.1 G/DL (ref 11.7–15.7)
HGB UR QL STRIP: ABNORMAL
HYALINE CASTS #/AREA URNS LPF: 3 /LPF (ref 0–2)
IMM GRANULOCYTES # BLD: 0 10E9/L (ref 0–0.4)
IMM GRANULOCYTES NFR BLD: 0.3 %
INR PPP: 4.94 (ref 0.86–1.14)
KETONES UR STRIP-MCNC: NEGATIVE MG/DL
LABORATORY COMMENT REPORT: NORMAL
LEUKOCYTE ESTERASE UR QL STRIP: ABNORMAL
LYMPHOCYTES # BLD AUTO: 0.7 10E9/L (ref 0.8–5.3)
LYMPHOCYTES NFR BLD AUTO: 10.3 %
MCH RBC QN AUTO: 25.4 PG (ref 26.5–33)
MCH RBC QN AUTO: 25.5 PG (ref 26.5–33)
MCHC RBC AUTO-ENTMCNC: 29.5 G/DL (ref 31.5–36.5)
MCHC RBC AUTO-ENTMCNC: 30.1 G/DL (ref 31.5–36.5)
MCV RBC AUTO: 85 FL (ref 78–100)
MCV RBC AUTO: 86 FL (ref 78–100)
MONOCYTES # BLD AUTO: 0.6 10E9/L (ref 0–1.3)
MONOCYTES NFR BLD AUTO: 8.7 %
MUCOUS THREADS #/AREA URNS LPF: PRESENT /LPF
NEUTROPHILS # BLD AUTO: 5.5 10E9/L (ref 1.6–8.3)
NEUTROPHILS NFR BLD AUTO: 77 %
NITRATE UR QL: POSITIVE
NRBC # BLD AUTO: 0 10*3/UL
NRBC BLD AUTO-RTO: 0 /100
PH UR STRIP: 5.5 PH (ref 5–7)
PLATELET # BLD AUTO: 349 10E9/L (ref 150–450)
PLATELET # BLD AUTO: 349 10E9/L (ref 150–450)
POTASSIUM SERPL-SCNC: 3.8 MMOL/L (ref 3.4–5.3)
RBC # BLD AUTO: 3.5 10E12/L (ref 3.8–5.2)
RBC # BLD AUTO: 3.57 10E12/L (ref 3.8–5.2)
RBC #/AREA URNS AUTO: 11 /HPF (ref 0–2)
SARS-COV-2 RNA RESP QL NAA+PROBE: NEGATIVE
SODIUM SERPL-SCNC: 138 MMOL/L (ref 133–144)
SOURCE: ABNORMAL
SP GR UR STRIP: 1.02 (ref 1–1.03)
SPECIMEN SOURCE: NORMAL
SQUAMOUS #/AREA URNS AUTO: 5 /HPF (ref 0–1)
TRANS CELLS #/AREA URNS HPF: 1 /HPF (ref 0–1)
TROPONIN I SERPL-MCNC: <0.015 UG/L (ref 0–0.04)
UROBILINOGEN UR STRIP-MCNC: NORMAL MG/DL (ref 0–2)
WBC # BLD AUTO: 7.2 10E9/L (ref 4–11)
WBC # BLD AUTO: 8.1 10E9/L (ref 4–11)
WBC #/AREA URNS AUTO: 113 /HPF (ref 0–5)

## 2021-01-24 PROCEDURE — 86901 BLOOD TYPING SEROLOGIC RH(D): CPT | Performed by: PHYSICIAN ASSISTANT

## 2021-01-24 PROCEDURE — 36415 COLL VENOUS BLD VENIPUNCTURE: CPT | Performed by: PHYSICIAN ASSISTANT

## 2021-01-24 PROCEDURE — 70450 CT HEAD/BRAIN W/O DYE: CPT | Mod: 26 | Performed by: RADIOLOGY

## 2021-01-24 PROCEDURE — 99220 PR INITIAL OBSERVATION CARE,LEVEL III: CPT | Performed by: PHYSICIAN ASSISTANT

## 2021-01-24 PROCEDURE — 85610 PROTHROMBIN TIME: CPT | Performed by: EMERGENCY MEDICINE

## 2021-01-24 PROCEDURE — 250N000011 HC RX IP 250 OP 636: Performed by: EMERGENCY MEDICINE

## 2021-01-24 PROCEDURE — 87088 URINE BACTERIA CULTURE: CPT | Mod: 91 | Performed by: EMERGENCY MEDICINE

## 2021-01-24 PROCEDURE — U0005 INFEC AGEN DETEC AMPLI PROBE: HCPCS | Performed by: EMERGENCY MEDICINE

## 2021-01-24 PROCEDURE — 93010 ELECTROCARDIOGRAM REPORT: CPT | Performed by: EMERGENCY MEDICINE

## 2021-01-24 PROCEDURE — 84484 ASSAY OF TROPONIN QUANT: CPT | Performed by: EMERGENCY MEDICINE

## 2021-01-24 PROCEDURE — 250N000013 HC RX MED GY IP 250 OP 250 PS 637: Performed by: PHYSICIAN ASSISTANT

## 2021-01-24 PROCEDURE — 86850 RBC ANTIBODY SCREEN: CPT | Performed by: PHYSICIAN ASSISTANT

## 2021-01-24 PROCEDURE — 81001 URINALYSIS AUTO W/SCOPE: CPT | Performed by: EMERGENCY MEDICINE

## 2021-01-24 PROCEDURE — 87086 URINE CULTURE/COLONY COUNT: CPT | Performed by: EMERGENCY MEDICINE

## 2021-01-24 PROCEDURE — 85025 COMPLETE CBC W/AUTO DIFF WBC: CPT | Performed by: EMERGENCY MEDICINE

## 2021-01-24 PROCEDURE — U0003 INFECTIOUS AGENT DETECTION BY NUCLEIC ACID (DNA OR RNA); SEVERE ACUTE RESPIRATORY SYNDROME CORONAVIRUS 2 (SARS-COV-2) (CORONAVIRUS DISEASE [COVID-19]), AMPLIFIED PROBE TECHNIQUE, MAKING USE OF HIGH THROUGHPUT TECHNOLOGIES AS DESCRIBED BY CMS-2020-01-R: HCPCS | Performed by: EMERGENCY MEDICINE

## 2021-01-24 PROCEDURE — 999N001017 HC STATISTIC GLUCOSE BY METER IP

## 2021-01-24 PROCEDURE — G0378 HOSPITAL OBSERVATION PER HR: HCPCS

## 2021-01-24 PROCEDURE — 80048 BASIC METABOLIC PNL TOTAL CA: CPT | Performed by: EMERGENCY MEDICINE

## 2021-01-24 PROCEDURE — 93005 ELECTROCARDIOGRAM TRACING: CPT | Performed by: EMERGENCY MEDICINE

## 2021-01-24 PROCEDURE — C9803 HOPD COVID-19 SPEC COLLECT: HCPCS | Performed by: EMERGENCY MEDICINE

## 2021-01-24 PROCEDURE — 86900 BLOOD TYPING SEROLOGIC ABO: CPT | Performed by: PHYSICIAN ASSISTANT

## 2021-01-24 PROCEDURE — 99285 EMERGENCY DEPT VISIT HI MDM: CPT | Mod: 25 | Performed by: EMERGENCY MEDICINE

## 2021-01-24 PROCEDURE — 96365 THER/PROPH/DIAG IV INF INIT: CPT | Performed by: EMERGENCY MEDICINE

## 2021-01-24 PROCEDURE — 85027 COMPLETE CBC AUTOMATED: CPT | Performed by: PHYSICIAN ASSISTANT

## 2021-01-24 PROCEDURE — 70450 CT HEAD/BRAIN W/O DYE: CPT

## 2021-01-24 PROCEDURE — 87186 SC STD MICRODIL/AGAR DIL: CPT | Performed by: EMERGENCY MEDICINE

## 2021-01-24 RX ORDER — ONDANSETRON 4 MG/1
4 TABLET, ORALLY DISINTEGRATING ORAL EVERY 6 HOURS PRN
Status: DISCONTINUED | OUTPATIENT
Start: 2021-01-24 | End: 2021-01-27 | Stop reason: HOSPADM

## 2021-01-24 RX ORDER — ATORVASTATIN CALCIUM 40 MG/1
40 TABLET, FILM COATED ORAL DAILY
Status: DISCONTINUED | OUTPATIENT
Start: 2021-01-25 | End: 2021-01-27 | Stop reason: HOSPADM

## 2021-01-24 RX ORDER — METOPROLOL TARTRATE 25 MG/1
25 TABLET, FILM COATED ORAL 2 TIMES DAILY
Status: DISCONTINUED | OUTPATIENT
Start: 2021-01-24 | End: 2021-01-27 | Stop reason: HOSPADM

## 2021-01-24 RX ORDER — PANTOPRAZOLE SODIUM 40 MG/1
40 TABLET, DELAYED RELEASE ORAL DAILY
Status: DISCONTINUED | OUTPATIENT
Start: 2021-01-25 | End: 2021-01-27 | Stop reason: HOSPADM

## 2021-01-24 RX ORDER — OXYBUTYNIN CHLORIDE 5 MG/1
5 TABLET ORAL 2 TIMES DAILY
Status: DISCONTINUED | OUTPATIENT
Start: 2021-01-24 | End: 2021-01-27 | Stop reason: HOSPADM

## 2021-01-24 RX ORDER — FERROUS SULFATE 325(65) MG
325 TABLET ORAL 2 TIMES DAILY
Status: DISCONTINUED | OUTPATIENT
Start: 2021-01-24 | End: 2021-01-27 | Stop reason: HOSPADM

## 2021-01-24 RX ORDER — CEFTRIAXONE 1 G/1
1 INJECTION, POWDER, FOR SOLUTION INTRAMUSCULAR; INTRAVENOUS EVERY 24 HOURS
Status: DISCONTINUED | OUTPATIENT
Start: 2021-01-25 | End: 2021-01-27

## 2021-01-24 RX ORDER — BUPROPION HYDROCHLORIDE 150 MG/1
150 TABLET, EXTENDED RELEASE ORAL 2 TIMES DAILY
Status: DISCONTINUED | OUTPATIENT
Start: 2021-01-24 | End: 2021-01-27 | Stop reason: HOSPADM

## 2021-01-24 RX ORDER — CEFTRIAXONE 2 G/1
2 INJECTION, POWDER, FOR SOLUTION INTRAMUSCULAR; INTRAVENOUS ONCE
Status: COMPLETED | OUTPATIENT
Start: 2021-01-24 | End: 2021-01-24

## 2021-01-24 RX ORDER — ONDANSETRON 2 MG/ML
4 INJECTION INTRAMUSCULAR; INTRAVENOUS EVERY 6 HOURS PRN
Status: DISCONTINUED | OUTPATIENT
Start: 2021-01-24 | End: 2021-01-27 | Stop reason: HOSPADM

## 2021-01-24 RX ORDER — AMIODARONE HYDROCHLORIDE 200 MG/1
400 TABLET ORAL DAILY
Status: DISCONTINUED | OUTPATIENT
Start: 2021-01-25 | End: 2021-01-27 | Stop reason: HOSPADM

## 2021-01-24 RX ORDER — TRAZODONE HYDROCHLORIDE 50 MG/1
50-100 TABLET, FILM COATED ORAL AT BEDTIME
Status: DISCONTINUED | OUTPATIENT
Start: 2021-01-24 | End: 2021-01-27 | Stop reason: HOSPADM

## 2021-01-24 RX ORDER — ASPIRIN 81 MG/1
81 TABLET ORAL DAILY
Status: DISCONTINUED | OUTPATIENT
Start: 2021-01-25 | End: 2021-01-27 | Stop reason: HOSPADM

## 2021-01-24 RX ORDER — ACETAMINOPHEN 325 MG/1
650 TABLET ORAL EVERY 4 HOURS PRN
Status: DISCONTINUED | OUTPATIENT
Start: 2021-01-24 | End: 2021-01-27 | Stop reason: HOSPADM

## 2021-01-24 RX ORDER — GABAPENTIN 300 MG/1
300 CAPSULE ORAL AT BEDTIME
Status: DISCONTINUED | OUTPATIENT
Start: 2021-01-24 | End: 2021-01-27 | Stop reason: HOSPADM

## 2021-01-24 RX ADMIN — TRAZODONE HYDROCHLORIDE 100 MG: 50 TABLET ORAL at 23:08

## 2021-01-24 RX ADMIN — CEFTRIAXONE SODIUM 2 G: 2 INJECTION, POWDER, FOR SOLUTION INTRAMUSCULAR; INTRAVENOUS at 19:08

## 2021-01-24 RX ADMIN — METOPROLOL TARTRATE 25 MG: 25 TABLET, FILM COATED ORAL at 23:07

## 2021-01-24 RX ADMIN — BUPROPION HYDROCHLORIDE 150 MG: 150 TABLET, FILM COATED, EXTENDED RELEASE ORAL at 23:07

## 2021-01-24 RX ADMIN — OXYBUTYNIN CHLORIDE 5 MG: 5 TABLET ORAL at 23:07

## 2021-01-24 RX ADMIN — FERROUS SULFATE TAB 325 MG (65 MG ELEMENTAL FE) 325 MG: 325 (65 FE) TAB at 23:07

## 2021-01-24 RX ADMIN — GABAPENTIN 300 MG: 300 CAPSULE ORAL at 23:07

## 2021-01-24 NOTE — ED TRIAGE NOTES
Pt presents to triage in wheelchair. Pt woke up today and fell to knees due to weakness. Was unable to stand up after fall. Pt did not hit head. Hours later, pt was moving from couch to chair and had a near fall. Pt denies dizziness, lightheadedness, blurry vision, headache etc. Coumadin dose has been changed a lot lately and is unsure if this is related. Pt feels ok upon presentation but is unsure what caused fall. Baseline pt does not use walker/cane etc. Ambulates independently.

## 2021-01-24 NOTE — LETTER
Health Information Management Services               Recipient:    [facility]  Attn: Gab Rodrigues      Sender:    Ronnie Mult[facility]  P: [phone]      Date: January 27, 2021  Patient Name:  Rajani Guo  Routing Message:            The documents accompanying this notice contain confidential information belonging to the sender.  This information is intended only for the use of the individual or entity named above.  The authorized recipient of this information is prohibited from disclosing this information to any other party and is required to destroy the information after its stated need has been fulfilled, unless otherwise required by state law.      If you are not the intended recipient, you are hereby notified that any disclosure, copy, distribution or action taken in reliance on the contents of these documents is strictly prohibited.  If you have received this document in error, please return it by fax to [phone] with a note on the cover sheet explaining why you are returning it (e.g. not your patient, not your provider, etc.).  If you need further assistance, please call [facility] Centralized Transcription at [phone].  Documents may also be returned by mail to [employer], , [facility] [address], [place], [place] [address].

## 2021-01-24 NOTE — TELEPHONE ENCOUNTER
Alvina Guo is a 70-year-old female status post aortic and mitral valve replacement with mechanical prosthesis on 12/31/2020.  She paged today after experiencing a fall with increased lower extremity, bilateral weakness.  The fall was unwitnessed.  She explains she did not hit her head or lose consciousness.  She is on Coumadin for her mechanical valves.  Her most recent INR was 5.1 on 21 January.  She denies unilateral weakness in the lower and upper extremities, headache, changes in her vision, changes in her smile, or changes in her speech.  Her daughter is currently with her.  She remains weak in the legs with the inability to stand from a seated position.  These changes are new in her recovery from surgery.  I recommend she be right her local emergency room for evaluation given her high risk for head bleed or stroke due to her supratherapeutic INR and history of valvular surgery.  She agrees and does not feel that she needs EMS and her daughter will take her to the emergency room.    Savage Spangler MD  Cardiothoracic Surgery  [phone]

## 2021-01-24 NOTE — LETTER
Transition Communication Hand-off for Care Transitions to Next Level of Care Provider    Name: Rajani Guo  : 1950  MRN #: [id number]  Primary Care Provider: Tamiko Coley     Primary Clinic: [address] [place] [address]  [place] [address]     Reason for Hospitalization:  UTI (urinary tract infection) [N39.0]  Urinary tract infection without hematuria, site unspecified [N39.0]  Admit Date/Time: 2021  4:12 PM  Discharge Date:  21  Payor Source: Payor: [facility] / Plan: [employer]ARE ADVANTAGE / Product Type: HMO /            Reason for Communication Hand-off Referral: Other Pt d/c to TCU for further rehab    Discharge Plan:  Pt's daughter to transport pt 21 at 1pm to:   [facility]  [address] [facility] [address]  P: [phone]  Admissions, attn: Lilia (P: [phone])  F: [phone]  Nurse to Nurse: [phone]  Concern for non-adherence with plan of care:   N  Discharge Needs Assessment:  Needs      Most Recent Value   Equipment Currently Used at Home  none            Follow-up plan:    Future Appointments   Date Time Provider Department Center   2021  7:00 PM Bin Lerma, PT [facility]       Any outstanding tests or procedures:        Referrals     Future Labs/Procedures    [facility] Referral     [facility] Referral     Process Instructions:        This referral will be filtered to a centralized scheduling office at [facility] and the patient will receive a call to schedule an appointment at a [facility] location most convenient for them.    Comments:    MTM referral reason            Patient had a hospital or ED visit in last 6 months and has more than 10   PTA or Discharge medications       This service is designed to help you get the most from your medications.  A specially trained pharmacist will work closely with you and your doctors  to solve any problems related to  your medications and to help you get the   best results from taking them.      The [facility] staff will call you to schedule an appointment.        Occupational Therapy Adult Consult     Comments:    Evaluate and treat as clinically indicated.    Reason:  Weakness, falls    Physical Therapy Adult Consult     Comments:    Evaluate and treat as clinically indicated.    Reason: Weakness, falls            Key Recommendations:      SAVANNA Gonzales    AVS/Discharge Summary is the source of truth; this is a helpful guide for improved communication of patient story

## 2021-01-24 NOTE — ED PROVIDER NOTES
"    [place] EMERGENCY DEPARTMENT ([facility])  January 24, 2021  History     Chief Complaint   Patient presents with     Fall     The history is provided by the patient, medical records and a relative.     Rajani Guo is a 70 year old female with history of aortic and mitral valve replacement with mechanical prosthesis (12/29/20- on Coumadin) who presents for evaluation after a fall today with bilateral lower extremity weakness.  Patient reports she had been doing well since her surgery on 12/29 until today when she suddenly fell while walking from the bathroom to the family room.  She states she had not just urinated or had a bowel movement.  She fell forward and caught herself on a table with her arms.  She did not experience headache, lightheadedness, or dizziness prior to the fall.  This was not a witnessed fall.  Patient denies numbness in her legs.  She does state that she \"loses feeling\" in her knees which causes them to buckle.  She also reports her thighs feel weak.  Walking and getting into her car was difficult due to the weak feeling in her legs.  No head injury, no loss of consciousness.  No headache, neck pain, or dizziness.  No unilateral weakness.  No changes in vision or speech.  No numbness in groin, or loss of bladder or bowel control.  No abdominal discomfort.    Most recent INR was on 1/21/2021, was 5.1.    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     GI bleed      History of partial thyroidectomy 06/02/2018     Hyperlipidemia LDL goal <100 01/18/2018     " Hypertension      Insomnia, unspecified type 01/18/2018     Mitral regurgitation      Mitral stenosis      Pulmonary hypertension (H)      Tobacco use disorder        PAST SURGICAL HISTORY:   Past Surgical History:   Procedure Laterality Date     COLONOSCOPY N/A 9/4/2019    Procedure: COLONOSCOPY;  Surgeon: Marie Todd MD;  Location:  GI     CV CORONARY ANGIOGRAM N/A 11/16/2020    Procedure: CV CORONARY ANGIOGRAM;  Surgeon: Joey Rivas MD;  Location:  HEART CARDIAC CATH LAB     CV FRACTIONAL FLOW RESERVE N/A 11/16/2020    Procedure: Fractional Flow Reserve;  Surgeon: Joey Rivas MD;  Location:  HEART CARDIAC CATH LAB     CV RIGHT HEART CATH MEASUREMENTS RECORDED N/A 11/16/2020    Procedure: CV RIGHT HEART CATH;  Surgeon: Joey Rivas MD;  Location:  HEART CARDIAC CATH LAB     ESOPHAGOSCOPY, GASTROSCOPY, DUODENOSCOPY (EGD), COMBINED N/A 9/4/2019    Procedure: ESOPHAGOGASTRODUODENOSCOPY (EGD);  Surgeon: Marie Todd MD;  Location:  GI     ESOPHAGOSCOPY, GASTROSCOPY, DUODENOSCOPY (EGD), COMBINED N/A 9/17/2020    Procedure: ESOPHAGOGASTRODUODENOSCOPY (EGD);  Surgeon: Ten Ibrahim DO;  Location:  GI     IR ANGIOGRAM THROUGH CATHETER FOLLOW UP  3/5/2019     IR LOWER EXTREMITY ANGIOGRAM LEFT  3/4/2019     KNEE SURGERY Right 2013    right knee torn meniscus surgery     OVARY SURGERY  1971    1 ovary removed     RELEASE CARPAL TUNNEL Right 1988     RELEASE TRIGGER FINGER BILATERAL       REPLACE VALVES AORTIC AND MITRAL, COMBINED N/A 12/29/2020    Procedure: Median Stertonomy, REPLACEMENT AORTIC Valve  with 19mm St Abdoulaye Sullivan  Mechanical Heart Valve AND MITRAL VALVE Replacement with 27mm St Abdoulaye Mechanical Heart Valve, on pump oxygenation;  Surgeon: Luc Lara MD;  Location: [facility] OR     SHOULDER SURGERY Left     rotator cuff repair, plate placement     THYROID SURGERY  1988    partial thyroidectomy       Past medical history, past surgical history,  medications, and allergies were reviewed with the patient. Additional pertinent items: None    FAMILY HISTORY:   Family History   Problem Relation Age of Onset     Diabetes Type 2  Mother      Lung Cancer Father      Diabetes Sister      Coronary Artery Disease Sister      Diabetes Brother      Diabetes Brother      Diabetes Type 2  Brother      Coronary Artery Disease Sister      Asthma Sister      Glaucoma No family hx of      Macular Degeneration No family hx of      Anesthesia Reaction No family hx of        SOCIAL HISTORY:   Social History     Tobacco Use     Smoking status: Former Smoker     Packs/day: 1.00     Years: 58.00     Pack years: 58.00     Start date: 1962     Quit date: 2020     Years since quittin.0     Smokeless tobacco: Never Used     Tobacco comment: using 21 mg patch while inpatient   Substance Use Topics     Alcohol use: Not Currently     Comment: 2 glasses of wine a week     Social history was reviewed with the patient. Additional pertinent items: None      Current Discharge Medication List      CONTINUE these medications which have NOT CHANGED    Details   warfarin ANTICOAGULANT (COUMADIN) 2 MG tablet Take 6 mg PO daily at 6 pm until next INR check, then dose per INR goal 2.5-3.5.  Qty: 120 tablet, Refills: 0    Associated Diagnoses: H/O mitral valve replacement with mechanical valve; H/O mechanical aortic valve replacement      acetaminophen (TYLENOL) 325 MG tablet Take 2 tablets (650 mg) by mouth every 4 hours as needed for pain  Qty: 1 Bottle, Refills: 0    Associated Diagnoses: H/O mitral valve replacement with mechanical valve; H/O mechanical aortic valve replacement      amiodarone (PACERONE) 400 MG tablet Take 400 mg PO BID for 7 days, then take 400 mg PO daily for 21 days, then stop.  Qty: 35 tablet, Refills: 0    Associated Diagnoses: H/O mitral valve replacement with mechanical valve; H/O mechanical aortic valve replacement      aspirin (ASA) 81 MG EC tablet Take 1  tablet (81 mg) by mouth daily  Qty: 100 tablet, Refills: 3    Comments: Future refills by PCP Dr. Tamiko Coley with phone number [phone].  Associated Diagnoses: PVD (peripheral vascular disease) (H)      atorvastatin (LIPITOR) 40 MG tablet TAKE 1 TABLET BY MOUTH EVERY DAY  Qty: 90 tablet, Refills: 3    Associated Diagnoses: Type 2 diabetes mellitus without complication, with long-term current use of insulin (H)      !! buPROPion (WELLBUTRIN SR) 150 MG 12 hr tablet Take 1 tablet (150 mg) by mouth 2 times daily  Qty: 60 tablet, Refills: 1    Associated Diagnoses: Anxiety      !! buPROPion (WELLBUTRIN SR) 150 MG 12 hr tablet Take 1 tablet (150 mg) by mouth 2 times daily  Qty: 90 tablet, Refills: 0    Associated Diagnoses: H/O mitral valve replacement with mechanical valve; H/O mechanical aortic valve replacement      calcium carb 1250 mg, 500 mg [place],/vitamin D 200 unit (CALCIUM 500/D) 500-200 MG-UNIT per tablet Take 1 tablet by mouth 2 times daily       ferrous sulfate (FEROSUL) 325 (65 Fe) MG tablet Take 325 mg by mouth 2 times daily      gabapentin (NEURONTIN) 300 MG capsule Take 1 capsule (300 mg) by mouth At Bedtime  Qty: 90 capsule, Refills: 0    Associated Diagnoses: Peripheral vascular disease, unspecified (H)      glucosamine-chondroitin 500-400 MG CAPS per capsule Take 1 capsule by mouth 2 times daily       insulin pen needle (29G X 12MM) 29G X 12MM miscellaneous Use 1 pen needles daily or as directed.  Qty: 100 each, Refills: 11    Associated Diagnoses: Type 2 diabetes mellitus without complication, with long-term current use of insulin (H)      LEVEMIR FLEXTOUCH 100 UNIT/ML pen INJECT 2 UNITS SUBCUTANEOUS AT BEDTIME  Qty:        Melatonin 10 MG TABS tablet Take 10 mg by mouth At Bedtime      metFORMIN (GLUCOPHAGE) 1000 MG tablet TAKE 1 TABLET BY MOUTH TWICE A DAY WITH MEALS  Qty: 180 tablet, Refills: 1    Associated Diagnoses: Type 2 diabetes mellitus without complication, with long-term current use of  insulin (H)      metoprolol tartrate (LOPRESSOR) 25 MG tablet Take 1 tablet (25 mg) by mouth 2 times daily  Qty: 90 tablet, Refills: 0    Associated Diagnoses: H/O mitral valve replacement with mechanical valve; H/O mechanical aortic valve replacement      Multiple Vitamin (MULTI-VITAMINS) TABS Take 1 tablet by mouth daily       nicotine (NICODERM CQ) 21 MG/24HR 24 hr patch Place 1 patch onto the skin daily  Qty: 30 patch, Refills: 1    Associated Diagnoses: Smoker      nicotine (NICORETTE) 2 MG gum Place 1 each (2 mg) inside cheek every hour as needed for smoking cessation  Qty: 60 each, Refills: 0    Associated Diagnoses: H/O mitral valve replacement with mechanical valve; H/O mechanical aortic valve replacement      oxybutynin (DITROPAN) 5 MG tablet TAKE 1 TABLET BY MOUTH TWICE A DAY  Qty: 180 tablet, Refills: 3    Associated Diagnoses: Urge incontinence of urine      pantoprazole (PROTONIX) 40 MG EC tablet TAKE 1 TABLET BY MOUTH EVERY DAY  Qty: 90 tablet, Refills: 1    Associated Diagnoses: Duodenitis      senna-docusate (SENOKOT-S/PERICOLACE) 8.6-50 MG tablet Take 1-2 tablets by mouth 2 times daily  Qty: 100 tablet, Refills: 1    Comments: Too prevent constipation  Associated Diagnoses: Constipation, unspecified constipation type      traZODone (DESYREL) 50 MG tablet TAKE 1 2 TABLETS (50 100 MG) BY MOUTH AT BEDTIME      vitamin C (ASCORBIC ACID) 500 MG tablet Take 1 tablet (500 mg) by mouth daily  Qty: 100 tablet, Refills: 1    Associated Diagnoses: Iron deficiency anemia due to chronic blood loss       !! - Potential duplicate medications found. Please discuss with provider.             Allergies   Allergen Reactions     Indomethacin Other (See Comments)     Dizziness and disorientation     Tramadol Nausea and Vomiting        Review of Systems  A complete review of systems was performed with pertinent positives and negatives noted in the HPI, and all other systems negative.    Physical Exam   BP:  136/66  Pulse: 64  Temp: 98.8  F (37.1  C)  Resp: 16  Weight: 64.9 kg (143 lb)  SpO2: 98 %      Physical Exam  Vitals signs and nursing note reviewed.   Constitutional:       General: She is not in acute distress.     Appearance: She is well-developed. She is not ill-appearing, toxic-appearing or diaphoretic.      Comments: Comfortably resting, lying in bed, NAD, nondiaphoretic, lucid, fully conversant, no  respiratory distress, alert and oriented.     HENT:      Head: Normocephalic and atraumatic.      Mouth/Throat:      Pharynx: No oropharyngeal exudate.   Eyes:      General: No scleral icterus.     Pupils: Pupils are equal, round, and reactive to light.   Neck:      Musculoskeletal: Normal range of motion and neck supple.   Cardiovascular:      Rate and Rhythm: Normal rate.      Heart sounds: Normal heart sounds.   Pulmonary:      Effort: Pulmonary effort is normal. No respiratory distress.      Breath sounds: Normal breath sounds.   Abdominal:      General: Bowel sounds are normal.      Palpations: Abdomen is soft.      Tenderness: There is no abdominal tenderness.   Musculoskeletal:         General: No tenderness.   Skin:     General: Skin is warm and dry.      Coloration: Skin is not pale.      Findings: No erythema or rash.   Neurological:      Mental Status: She is alert and oriented to person, place, and time.      Comments: Patient nerves II through XII were tested and intact.  Extraocular muscles intact, without any nystagmus.  Strength in all 4 extremities with 5/5 and sensation was intact in all extremities.  Gait appears to be unsteady           ED Course   4:25 PM  The patient was seen and examined by Carlos Jamison MD in Room ED21.       Procedures             EKG Interpretation:      Interpreted by Carlos Jamison MD  Time reviewed: 1635  Symptoms at time of EKG: Weakness  Rhythm: normal sinus   Rate: normal  Axis: normal  Ectopy: none  Conduction: normal  ST Segments/ T Waves: No ST-T wave  changes  Q Waves: none  Comparison to prior: Unchanged    Clinical Impression: normal EKG                        Results for orders placed or performed during the hospital encounter of 01/24/21 (from the past 24 hour(s))   CBC with platelets differential   Result Value Ref Range    WBC 7.2 4.0 - 11.0 10e9/L    RBC Count 3.57 (L) 3.8 - 5.2 10e12/L    Hemoglobin 9.1 (L) 11.7 - 15.7 g/dL    Hematocrit 30.8 (L) 35.0 - 47.0 %    MCV 86 78 - 100 fl    MCH 25.5 (L) 26.5 - 33.0 pg    MCHC 29.5 (L) 31.5 - 36.5 g/dL    RDW 16.3 (H) 10.0 - 15.0 %    Platelet Count 349 150 - 450 10e9/L    Diff Method Automated Method     % Neutrophils 77.0 %    % Lymphocytes 10.3 %    % Monocytes 8.7 %    % Eosinophils 3.4 %    % Basophils 0.3 %    % Immature Granulocytes 0.3 %    Nucleated RBCs 0 0 /100    Absolute Neutrophil 5.5 1.6 - 8.3 10e9/L    Absolute Lymphocytes 0.7 (L) 0.8 - 5.3 10e9/L    Absolute Monocytes 0.6 0.0 - 1.3 10e9/L    Absolute Eosinophils 0.2 0.0 - 0.7 10e9/L    Absolute Basophils 0.0 0.0 - 0.2 10e9/L    Abs Immature Granulocytes 0.0 0 - 0.4 10e9/L    Absolute Nucleated RBC 0.0    INR   Result Value Ref Range    INR 4.94 (H) 0.86 - 1.14   Basic metabolic panel   Result Value Ref Range    Sodium 138 133 - 144 mmol/L    Potassium 3.8 3.4 - 5.3 mmol/L    Chloride 106 94 - 109 mmol/L    Carbon Dioxide 25 20 - 32 mmol/L    Anion Gap 6 3 - 14 mmol/L    Glucose 104 (H) 70 - 99 mg/dL    Urea Nitrogen 16 7 - 30 mg/dL    Creatinine 0.69 0.52 - 1.04 mg/dL    GFR Estimate 89 >60 mL/min/[1.73_m2]    GFR Estimate If Black >90 >60 mL/min/[1.73_m2]    Calcium 8.4 (L) 8.5 - 10.1 mg/dL   Troponin I   Result Value Ref Range    Troponin I ES <0.015 0.000 - 0.045 ug/L   EKG 12-lead, tracing only   Result Value Ref Range    Interpretation ECG Click View Image link to view waveform and result    Head CT w/o contrast    Narrative    CT HEAD W/O CONTRAST 1/24/2021 4:55 PM    History: Trauma -???Head Injury   ICD-10:    Comparison: MRI brain  8/1/2019    Technique: Using multidetector thin collimation helical acquisition  technique, axial, coronal and sagittal CT images from the skull base  to the vertex were obtained without intravenous contrast.    Findings: There is no intracranial hemorrhage, mass effect, or midline  shift. Gray/white matter differentiation in both cerebral hemispheres  is preserved. Ventricles are proportionate to the cerebral sulci. The  basal cisterns are clear.    The bony calvaria and the bones of the skull base are normal. The  visualized portions of the paranasal sinuses and mastoid air cells are  clear.       Impression    Impression:  No acute intracranial pathology..     I have personally reviewed the examination and initial interpretation  and I agree with the findings.    ISAEL TATE MD   UA with Microscopic   Result Value Ref Range    Color Urine Yellow     Appearance Urine Slightly Cloudy     Glucose Urine Negative NEG^Negative mg/dL    Bilirubin Urine Negative NEG^Negative    Ketones Urine Negative NEG^Negative mg/dL    Specific Gravity Urine 1.018 1.003 - 1.035    Blood Urine Trace (A) NEG^Negative    pH Urine 5.5 5.0 - 7.0 pH    Protein Albumin Urine 10 (A) NEG^Negative mg/dL    Urobilinogen mg/dL Normal 0.0 - 2.0 mg/dL    Nitrite Urine Positive (A) NEG^Negative    Leukocyte Esterase Urine Large (A) NEG^Negative    Source Midstream Urine     WBC Urine 113 (H) 0 - 5 /HPF    RBC Urine 11 (H) 0 - 2 /HPF    Bacteria Urine Few (A) NEG^Negative /HPF    Squamous Epithelial /HPF Urine 5 (H) 0 - 1 /HPF    Transitional Epi 1 0 - 1 /HPF    Mucous Urine Present (A) NEG^Negative /LPF    Hyaline Casts 3 (H) 0 - 2 /LPF   Urine Culture    Specimen: Urine clean catch; Midstream Urine   Result Value Ref Range    Specimen Description Midstream Urine     Special Requests Specimen received in preservative     Culture Micro PENDING    Asymptomatic SARS-CoV-2 COVID-19 Virus (Coronavirus) by PCR    Specimen: Nasopharyngeal   Result  Value Ref Range    SARS-CoV-2 Virus Specimen Source Nasopharyngeal     SARS-CoV-2 PCR Result NEGATIVE     SARS-CoV-2 PCR Comment       Testing was performed using the Xpert Xpress SARS-CoV-2 Assay on the Cepheid Gene-Xpert   Instrument Systems. Additional information about this Emergency Use Authorization (EUA)   assay can be found via the Lab Guide.     Glucose by meter   Result Value Ref Range    Glucose 95 70 - 99 mg/dL     Medications   Warfarin Therapy Reminder (Check START DATE - warfarin may be starting in the FUTURE) (has no administration in time range)   warfarin-No DOSE today (has no administration in time range)   aspirin EC tablet 81 mg (has no administration in time range)   atorvastatin (LIPITOR) tablet 40 mg (has no administration in time range)   buPROPion (WELLBUTRIN SR) 12 hr tablet 150 mg (has no administration in time range)   ferrous sulfate (FEROSUL) tablet 325 mg (has no administration in time range)   gabapentin (NEURONTIN) capsule 300 mg (has no administration in time range)   metoprolol tartrate (LOPRESSOR) tablet 25 mg (has no administration in time range)   oxybutynin (DITROPAN) tablet 5 mg (has no administration in time range)   pantoprazole (PROTONIX) EC tablet 40 mg (has no administration in time range)   traZODone (DESYREL) tablet  mg (has no administration in time range)   acetaminophen (TYLENOL) tablet 650 mg (has no administration in time range)   melatonin tablet 1 mg (has no administration in time range)   ondansetron (ZOFRAN-ODT) ODT tab 4 mg (has no administration in time range)     Or   ondansetron (ZOFRAN) injection 4 mg (has no administration in time range)   metFORMIN (GLUCOPHAGE) tablet 1,000 mg (has no administration in time range)   cefTRIAXone (ROCEPHIN) 1 g vial to attach to  mL bag for ADULTS or NS 50 mL bag for PEDS (has no administration in time range)   nicotine (NICORETTE) gum 2 mg (has no administration in time range)   amiodarone (PACERONE) TABS 400  mg (has no administration in time range)   cefTRIAXone (ROCEPHIN) 2 g vial to attach to  ml bag for ADULTS or NS 50 ml bag for PEDS (0 g Intravenous Stopped 1/24/21 2037)             Assessments & Plan (with Medical Decision Making)   This is a 70-year-old female patient with a significant past medical history who is presenting today with increasing weakness that started today.  She stated that she was walking from the bathroom into the living room plan she felt like her legs gave way.  She did not fall or hit her head.  She continues to feel weak but has on physical exam good strength and sensation throughout.  Her vitals are otherwise stable and EKG shows normal sinus rhythm without signs of acute ischemia.  All of her labs are reviewed which does show a slight elevation in her INR which appears to be an issue that she has been dealing with for a little while.  The rest of her exam is completely unremarkable.  Her UA does show that she has a urinary tract infection which is most likely the cause of her symptoms.  The plan at this time is to start her on some ceftriaxone and admit her to the observation service for continued management.  I have reviewed the nursing notes.    I have reviewed the findings, diagnosis, plan and need for follow up with the patient.    Current Discharge Medication List          Final diagnoses:   Urinary tract infection without hematuria, site unspecified   I, Emmie Saldana, am serving as a trained medical scribe to document services personally performed by Carlos Jamison MD, based on the provider's statements to me.     ICarlos MD, was physically present and have reviewed and verified the accuracy of this note documented by Emmie Saldana.     --  Carlos Jamison MD  1/24/2021   [facility] EMERGENCY DEPARTMENT     Carlos Jamison MD  01/24/21 [id number]

## 2021-01-25 ENCOUNTER — APPOINTMENT (OUTPATIENT)
Dept: CARDIOLOGY | Facility: CLINIC | Age: 71
End: 2021-01-25
Attending: NURSE PRACTITIONER
Payer: COMMERCIAL

## 2021-01-25 ENCOUNTER — APPOINTMENT (OUTPATIENT)
Dept: PHYSICAL THERAPY | Facility: CLINIC | Age: 71
End: 2021-01-25
Attending: PHYSICIAN ASSISTANT
Payer: COMMERCIAL

## 2021-01-25 LAB
ABO + RH BLD: NORMAL
ABO + RH BLD: NORMAL
ANION GAP SERPL CALCULATED.3IONS-SCNC: 8 MMOL/L (ref 3–14)
BLD GP AB SCN SERPL QL: NORMAL
BLOOD BANK CMNT PATIENT-IMP: NORMAL
BUN SERPL-MCNC: 12 MG/DL (ref 7–30)
CALCIUM SERPL-MCNC: 8.3 MG/DL (ref 8.5–10.1)
CHLORIDE SERPL-SCNC: 109 MMOL/L (ref 94–109)
CO2 SERPL-SCNC: 24 MMOL/L (ref 20–32)
CREAT SERPL-MCNC: 0.61 MG/DL (ref 0.52–1.04)
ERYTHROCYTE [DISTWIDTH] IN BLOOD BY AUTOMATED COUNT: 16.3 % (ref 10–15)
GFR SERPL CREATININE-BSD FRML MDRD: >90 ML/MIN/{1.73_M2}
GLUCOSE BLDC GLUCOMTR-MCNC: 131 MG/DL (ref 70–99)
GLUCOSE BLDC GLUCOMTR-MCNC: 99 MG/DL (ref 70–99)
GLUCOSE SERPL-MCNC: 97 MG/DL (ref 70–99)
HCT VFR BLD AUTO: 30 % (ref 35–47)
HGB BLD-MCNC: 9.1 G/DL (ref 11.7–15.7)
INR PPP: 4.9 (ref 0.86–1.14)
INTERPRETATION ECG - MUSE: NORMAL
MCH RBC QN AUTO: 25.9 PG (ref 26.5–33)
MCHC RBC AUTO-ENTMCNC: 30.3 G/DL (ref 31.5–36.5)
MCV RBC AUTO: 85 FL (ref 78–100)
PLATELET # BLD AUTO: 339 10E9/L (ref 150–450)
POTASSIUM SERPL-SCNC: 4 MMOL/L (ref 3.4–5.3)
RBC # BLD AUTO: 3.52 10E12/L (ref 3.8–5.2)
SODIUM SERPL-SCNC: 142 MMOL/L (ref 133–144)
SPECIMEN EXP DATE BLD: NORMAL
WBC # BLD AUTO: 7.6 10E9/L (ref 4–11)

## 2021-01-25 PROCEDURE — 97530 THERAPEUTIC ACTIVITIES: CPT | Mod: GP

## 2021-01-25 PROCEDURE — 85027 COMPLETE CBC AUTOMATED: CPT | Performed by: PHYSICIAN ASSISTANT

## 2021-01-25 PROCEDURE — 36415 COLL VENOUS BLD VENIPUNCTURE: CPT | Performed by: PHYSICIAN ASSISTANT

## 2021-01-25 PROCEDURE — 99225 PR SUBSEQUENT OBSERVATION CARE,LEVEL II: CPT | Performed by: INTERNAL MEDICINE

## 2021-01-25 PROCEDURE — 250N000013 HC RX MED GY IP 250 OP 250 PS 637: Performed by: INTERNAL MEDICINE

## 2021-01-25 PROCEDURE — 250N000011 HC RX IP 250 OP 636: Performed by: PHYSICIAN ASSISTANT

## 2021-01-25 PROCEDURE — 93306 TTE W/DOPPLER COMPLETE: CPT

## 2021-01-25 PROCEDURE — 96361 HYDRATE IV INFUSION ADD-ON: CPT

## 2021-01-25 PROCEDURE — 96376 TX/PRO/DX INJ SAME DRUG ADON: CPT | Mod: 59

## 2021-01-25 PROCEDURE — 250N000013 HC RX MED GY IP 250 OP 250 PS 637: Performed by: PHYSICIAN ASSISTANT

## 2021-01-25 PROCEDURE — 258N000003 HC RX IP 258 OP 636: Performed by: NURSE PRACTITIONER

## 2021-01-25 PROCEDURE — G0378 HOSPITAL OBSERVATION PER HR: HCPCS

## 2021-01-25 PROCEDURE — 999N001017 HC STATISTIC GLUCOSE BY METER IP

## 2021-01-25 PROCEDURE — 999N000007 HC SITE CHECK

## 2021-01-25 PROCEDURE — 999N000128 HC STATISTIC PERIPHERAL IV START W/O US GUIDANCE

## 2021-01-25 PROCEDURE — 97162 PT EVAL MOD COMPLEX 30 MIN: CPT | Mod: GP

## 2021-01-25 PROCEDURE — 97116 GAIT TRAINING THERAPY: CPT | Mod: GP

## 2021-01-25 PROCEDURE — 93306 TTE W/DOPPLER COMPLETE: CPT | Mod: 26 | Performed by: INTERNAL MEDICINE

## 2021-01-25 PROCEDURE — 85610 PROTHROMBIN TIME: CPT | Performed by: PHYSICIAN ASSISTANT

## 2021-01-25 PROCEDURE — 80048 BASIC METABOLIC PNL TOTAL CA: CPT | Performed by: PHYSICIAN ASSISTANT

## 2021-01-25 RX ORDER — SODIUM CHLORIDE 9 MG/ML
INJECTION, SOLUTION INTRAVENOUS CONTINUOUS
Status: ACTIVE | OUTPATIENT
Start: 2021-01-25 | End: 2021-01-25

## 2021-01-25 RX ADMIN — METOPROLOL TARTRATE 25 MG: 25 TABLET, FILM COATED ORAL at 20:17

## 2021-01-25 RX ADMIN — GABAPENTIN 300 MG: 300 CAPSULE ORAL at 21:31

## 2021-01-25 RX ADMIN — SODIUM CHLORIDE 1000 ML: 9 INJECTION, SOLUTION INTRAVENOUS at 10:07

## 2021-01-25 RX ADMIN — ASPIRIN 81 MG: 81 TABLET, COATED ORAL at 08:26

## 2021-01-25 RX ADMIN — FERROUS SULFATE TAB 325 MG (65 MG ELEMENTAL FE) 325 MG: 325 (65 FE) TAB at 08:27

## 2021-01-25 RX ADMIN — METFORMIN HYDROCHLORIDE 1000 MG: 500 TABLET ORAL at 18:11

## 2021-01-25 RX ADMIN — PANTOPRAZOLE SODIUM 40 MG: 40 TABLET, DELAYED RELEASE ORAL at 08:27

## 2021-01-25 RX ADMIN — CEFTRIAXONE SODIUM 1 G: 1 INJECTION, POWDER, FOR SOLUTION INTRAMUSCULAR; INTRAVENOUS at 18:16

## 2021-01-25 RX ADMIN — SODIUM CHLORIDE: 9 INJECTION, SOLUTION INTRAVENOUS at 09:43

## 2021-01-25 RX ADMIN — FERROUS SULFATE TAB 325 MG (65 MG ELEMENTAL FE) 325 MG: 325 (65 FE) TAB at 20:17

## 2021-01-25 RX ADMIN — TRAZODONE HYDROCHLORIDE 100 MG: 50 TABLET ORAL at 21:31

## 2021-01-25 RX ADMIN — METFORMIN HYDROCHLORIDE 1000 MG: 500 TABLET ORAL at 08:26

## 2021-01-25 RX ADMIN — AMIODARONE HYDROCHLORIDE 400 MG: 200 TABLET ORAL at 10:08

## 2021-01-25 RX ADMIN — SODIUM CHLORIDE: 9 INJECTION, SOLUTION INTRAVENOUS at 14:19

## 2021-01-25 RX ADMIN — BUPROPION HYDROCHLORIDE 150 MG: 150 TABLET, FILM COATED, EXTENDED RELEASE ORAL at 20:16

## 2021-01-25 RX ADMIN — ATORVASTATIN CALCIUM 40 MG: 40 TABLET, FILM COATED ORAL at 08:28

## 2021-01-25 RX ADMIN — BUPROPION HYDROCHLORIDE 150 MG: 150 TABLET, FILM COATED, EXTENDED RELEASE ORAL at 08:28

## 2021-01-25 RX ADMIN — OXYBUTYNIN CHLORIDE 5 MG: 5 TABLET ORAL at 08:28

## 2021-01-25 RX ADMIN — METOPROLOL TARTRATE 25 MG: 25 TABLET, FILM COATED ORAL at 08:29

## 2021-01-25 RX ADMIN — OXYBUTYNIN CHLORIDE 5 MG: 5 TABLET ORAL at 20:17

## 2021-01-25 NOTE — PLAN OF CARE
Outpatient/Observation goals to be met before discharge home:  -diagnostic tests and consults completed and resulted - Not Met  -vital signs normal or at patient baseline - Not Met, positive ortho BP from sitting to standing  /55 (BP Location: Right arm)   Pulse 57   Temp 98.3  F (36.8  C) (Oral)   Resp 17   Wt 64.5 kg (142 lb 1.6 oz)   SpO2 97%   Breastfeeding No   BMI 24.39 kg/m    -tolerating oral antibiotics or has plans for home infusion setup - Not Met  -infection is improving - Met  -returns to baseline functional status - Not Met, PT assessed and recommending TCU. Continue to trend INR, currently still elevated (4.94 prior), 4.9 this am, will continue to monitor and allow to trend down.  -safe disposition plan has been identified - Not Met, tentative plans for   SW consult and TCU placement. Patient & daughter are in agreement, patient hoping for TCU in [facility].     Nurse to notify provider when observation goals have been met and patient is ready for discharge

## 2021-01-25 NOTE — PHARMACY-ANTICOAGULATION SERVICE
Clinical Pharmacy - Warfarin Dosing Consult     Pharmacy has been consulted to manage this patient s warfarin therapy.  Indication: Mechanical Mitral Valve Replacement;Mechanical Aortic Valve Replacement  Therapy Goal: INR 2.5-3.5  Warfarin Prior to Admission: Yes  Warfarin PTA Regimen: 2mg Fri, 4mg ROW  Significant drug interactions: Amio PTA  Recent documented change in oral intake/nutrition: No  Dose Comments: No dose, supratherapeutic    INR   Date Value Ref Range Status   01/24/2021 4.94 (H) 0.86 - 1.14 Final   01/21/2021 5.10 (HH) 0.86 - 1.14 Final     Comment:     This test is intended for monitoring Coumadin therapy.  Results are not   accurate in patients with prolonged INR due to factor deficiency.  Critical Value called to and read back by  CHRIS SILVERMAN RN 01.21.21 @9120 BY          Recommend holding warfarin today.  Pharmacy will monitor Rajani Guo daily and order warfarin doses to achieve specified goal.      Please contact pharmacy as soon as possible if the warfarin needs to be held for a procedure or if the warfarin goals change.      Farhana Camejo, PharmD, BCPS

## 2021-01-25 NOTE — ED NOTES
[facility]    ED Nurse to Floor Handoff     Rajani Guo is a 70 year old female who speaks English and lives with family members,  in a home  They arrived in the ED by car from home    ED Chief Complaint: Fall    ED Dx;   Final diagnoses:   Urinary tract infection without hematuria, site unspecified         Needed?: No    Allergies:   Allergies   Allergen Reactions     Indomethacin Other (See Comments)     Dizziness and disorientation     Tramadol Nausea and Vomiting   .  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     GI bleed      History of partial thyroidectomy 06/02/2018     Hyperlipidemia LDL goal <100 01/18/2018     Hypertension      Insomnia, unspecified type 01/18/2018     Mitral regurgitation      Mitral stenosis      Pulmonary hypertension (H)      Tobacco use disorder       Baseline Mental status: WDL  Current Mental Status changes: at basesline    Infection present or suspected this encounter: yes urinary  Sepsis suspected: No  Isolation type: No active isolations  Patient tested for COVID 19 prior to admission: YES     Activity level - Baseline/Home:  Independent  Activity Level - Current:   Stand with Assist    Bariatric equipment needed?: No    In the ED these meds were given:   Medications   Warfarin Therapy Reminder (Check START DATE - warfarin may be starting in the FUTURE) (has no administration in time range)   warfarin-No DOSE today (has no administration in time range)   cefTRIAXone (ROCEPHIN) 2 g vial to attach to   ml bag for ADULTS or NS 50 ml bag for PEDS (0 g Intravenous Stopped 1/24/21 2037)       Drips running?  No    Home pump  No    Current LDAs  Peripheral IV 01/04/21 Right;Posterior Lower forearm (Active)   Number of days: 20       Peripheral IV 01/04/21 Right;Posterior Upper forearm (Active)   Number of days: 20       Peripheral IV 01/24/21 Left Upper arm (Active)   Number of days: 0       Left Groin Interventional Procedure Access (Active)   Number of days: 692       Right Groin Interventional Procedure Access (Active)   Number of days: 692       Right Groin Interventional Procedure Access (Active)   Number of days: 69       Wound Coccyx Pressure injury suspected hospital acquired (Active)   Number of days: 22       Incision/Surgical Site 12/29/20 Bilateral Chest (Active)   Number of days: 26       Incision/Surgical Site 01/05/21 Left;Upper Chest (Active)   Number of days: 19       Labs results:   Labs Ordered and Resulted from Time of ED Arrival Up to the Time of Departure from the ED   CBC WITH PLATELETS DIFFERENTIAL - Abnormal; Notable for the following components:       Result Value    RBC Count 3.57 (*)     Hemoglobin 9.1 (*)     Hematocrit 30.8 (*)     MCH 25.5 (*)     MCHC 29.5 (*)     RDW 16.3 (*)     Absolute Lymphocytes 0.7 (*)     All other components within normal limits   INR - Abnormal; Notable for the following components:    INR 4.94 (*)     All other components within normal limits   BASIC METABOLIC PANEL - Abnormal; Notable for the following components:    Glucose 104 (*)     Calcium 8.4 (*)     All other components within normal limits   ROUTINE UA WITH MICROSCOPIC - Abnormal; Notable for the following components:    Blood Urine Trace (*)     Protein Albumin Urine 10 (*)     Nitrite Urine Positive (*)     Leukocyte Esterase Urine Large (*)     WBC Urine 113 (*)     RBC Urine 11 (*)     Bacteria Urine Few (*)     Squamous Epithelial /HPF Urine 5 (*)     Mucous Urine Present (*)     Hyaline Casts 3  (*)     All other components within normal limits   TROPONIN I   SARS-COV-2 (COVID-19) VIRUS RT-PCR   DISCHARGE PLANNING   URINE CULTURE AEROBIC BACTERIAL       Imaging Studies:   Recent Results (from the past 24 hour(s))   Head CT w/o contrast    Narrative    CT HEAD W/O CONTRAST 1/24/2021 4:55 PM    History: Trauma -???Head Injury   ICD-10:    Comparison: MRI brain 8/1/2019    Technique: Using multidetector thin collimation helical acquisition  technique, axial, coronal and sagittal CT images from the skull base  to the vertex were obtained without intravenous contrast.    Findings: There is no intracranial hemorrhage, mass effect, or midline  shift. Gray/white matter differentiation in both cerebral hemispheres  is preserved. Ventricles are proportionate to the cerebral sulci. The  basal cisterns are clear.    The bony calvaria and the bones of the skull base are normal. The  visualized portions of the paranasal sinuses and mastoid air cells are  clear.       Impression    Impression:  No acute intracranial pathology..     I have personally reviewed the examination and initial interpretation  and I agree with the findings.    ISAEL TATE MD       Recent vital signs:   BP (!) 149/70   Pulse 66   Temp 98.8  F (37.1  C) (Oral)   Resp 16   Wt 64.9 kg (143 lb)   SpO2 99%   Breastfeeding No   BMI 24.55 kg/m      [place] Coma Scale Score: 15 (01/24/21 1805)       Cardiac Rhythm: Normal Sinus  Pt needs tele? No  Skin/wound Issues: None    Code Status: Full Code    Pain control: pt had none    Nausea control: pt had none    Abnormal labs/tests/findings requiring intervention: see results    Family present during ED course? No   Family Comments/Social Situation comments: none    Tasks needing completion: None    Marci Lopez, RN  [phone] [facility]

## 2021-01-25 NOTE — PROGRESS NOTES
Patient interviewed and examined. Labs, imaging and chart notes reviewed.  Rajani Guo presented to the ED yesterday after a fall at home. She had mechanical aortic and mitral valve replacements on 12/31 for severe symptomatic valvular disease. The post op course was complicated by delirium, pneumothorax requiring chest tube replacement paroxysmal atrial fibrillation and UTI. She was discharged on day 7. Since returning home she has been weak and has had several falls, including just before arrival. She has had some difficulty controlling her INR. In the ED she was noted to have probable UTI. INR was elevated at 5.  Head CT had no hemorrhage. EKG was normal. Hemoglobin was stable at 9.1. She does have a history of past GI bleeds related to AVM. Electrolytes and renal function was normal. She was admitted for treatment of UTI and observation after the fall with supratherapeutic INR and evaluation for cause of the recurrent falls.    Today she feels well. She has remained afebrile. The UTI is being treated with ceftriaxone. UC is pending. She was evaluated by PT and appears deconditioned and in need of acute rehab following the recent hospitalization and major surgery.    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     GI bleed      History of partial thyroidectomy 06/02/2018     Hyperlipidemia LDL goal <100 01/18/2018     Hypertension      Insomnia, unspecified type 01/18/2018     Mitral regurgitation      Mitral stenosis      Pulmonary hypertension (H)      Tobacco use disorder        Past Surgical History:   Procedure  Laterality Date     COLONOSCOPY N/A 9/4/2019    Procedure: COLONOSCOPY;  Surgeon: Marie Todd MD;  Location:  GI     CV CORONARY ANGIOGRAM N/A 11/16/2020    Procedure: CV CORONARY ANGIOGRAM;  Surgeon: Joey Rivas MD;  Location:  HEART CARDIAC CATH LAB     CV FRACTIONAL FLOW RESERVE N/A 11/16/2020    Procedure: Fractional Flow Reserve;  Surgeon: Joey Rivas MD;  Location:  HEART CARDIAC CATH LAB     CV RIGHT HEART CATH MEASUREMENTS RECORDED N/A 11/16/2020    Procedure: CV RIGHT HEART CATH;  Surgeon: Joey Rivas MD;  Location:  HEART CARDIAC CATH LAB     ESOPHAGOSCOPY, GASTROSCOPY, DUODENOSCOPY (EGD), COMBINED N/A 9/4/2019    Procedure: ESOPHAGOGASTRODUODENOSCOPY (EGD);  Surgeon: Marie Todd MD;  Location:  GI     ESOPHAGOSCOPY, GASTROSCOPY, DUODENOSCOPY (EGD), COMBINED N/A 9/17/2020    Procedure: ESOPHAGOGASTRODUODENOSCOPY (EGD);  Surgeon: Ten Ibrahim DO;  Location:  GI     IR ANGIOGRAM THROUGH CATHETER FOLLOW UP  3/5/2019     IR LOWER EXTREMITY ANGIOGRAM LEFT  3/4/2019     KNEE SURGERY Right 2013    right knee torn meniscus surgery     OVARY SURGERY  1971    1 ovary removed     RELEASE CARPAL TUNNEL Right 1988     RELEASE TRIGGER FINGER BILATERAL       REPLACE VALVES AORTIC AND MITRAL, COMBINED N/A 12/29/2020    Procedure: Median Stertonomy, REPLACEMENT AORTIC Valve  with 19mm St Abdoulaye Newberg  Mechanical Heart Valve AND MITRAL VALVE Replacement with 27mm St Abdoulaye Mechanical Heart Valve, on pump oxygenation;  Surgeon: Luc Lara MD;  Location:  OR     SHOULDER SURGERY Left     rotator cuff repair, plate placement     THYROID SURGERY  1988    partial thyroidectomy       Family History   Problem Relation Age of Onset     Diabetes Type 2  Mother      Lung Cancer Father      Diabetes Sister      Coronary Artery Disease Sister      Diabetes Brother      Diabetes Brother      Diabetes Type 2  Brother      Coronary Artery Disease Sister      Asthma  Sister      Glaucoma No family hx of      Macular Degeneration No family hx of      Anesthesia Reaction No family hx of        Social History     Tobacco Use     Smoking status: Former Smoker     Packs/day: 1.00     Years: 58.00     Pack years: 58.00     Start date: 1962     Quit date: 2020     Years since quittin.0     Smokeless tobacco: Never Used     Tobacco comment: using 21 mg patch while inpatient   Substance Use Topics     Alcohol use: Not Currently     Comment: 2 glasses of wine a week     Physical Exam:  Elderly female in NAD.  /55 (BP Location: Right arm)   Pulse 57   Temp 98.3  F (36.8  C) (Oral)   Resp 17   Wt 64.5 kg (142 lb 1.6 oz)   SpO2 97%   Breastfeeding No   BMI 24.39 kg/m    HEENT: PERRLA. EOMI.  Lungs: Clear. Sternotomy incision stable, clean and dry.  Cardiac: RRR. Normal S1 and S2.  Abdomen: Soft. Non tender.  Extrem: No edema.  Neuro: CN intact, motor, sensory intact.  Psych: Normal mood and affect.    Results for orders placed or performed during the hospital encounter of 21 (from the past 48 hour(s))   CBC with platelets differential   Result Value Ref Range    WBC 7.2 4.0 - 11.0 10e9/L    RBC Count 3.57 (L) 3.8 - 5.2 10e12/L    Hemoglobin 9.1 (L) 11.7 - 15.7 g/dL    Hematocrit 30.8 (L) 35.0 - 47.0 %    MCV 86 78 - 100 fl    MCH 25.5 (L) 26.5 - 33.0 pg    MCHC 29.5 (L) 31.5 - 36.5 g/dL    RDW 16.3 (H) 10.0 - 15.0 %    Platelet Count 349 150 - 450 10e9/L    Diff Method Automated Method     % Neutrophils 77.0 %    % Lymphocytes 10.3 %    % Monocytes 8.7 %    % Eosinophils 3.4 %    % Basophils 0.3 %    % Immature Granulocytes 0.3 %    Nucleated RBCs 0 0 /100    Absolute Neutrophil 5.5 1.6 - 8.3 10e9/L    Absolute Lymphocytes 0.7 (L) 0.8 - 5.3 10e9/L    Absolute Monocytes 0.6 0.0 - 1.3 10e9/L    Absolute Eosinophils 0.2 0.0 - 0.7 10e9/L    Absolute Basophils 0.0 0.0 - 0.2 10e9/L    Abs Immature Granulocytes 0.0 0 - 0.4 10e9/L    Absolute Nucleated RBC 0.0     INR   Result Value Ref Range    INR 4.94 (H) 0.86 - 1.14   Basic metabolic panel   Result Value Ref Range    Sodium 138 133 - 144 mmol/L    Potassium 3.8 3.4 - 5.3 mmol/L    Chloride 106 94 - 109 mmol/L    Carbon Dioxide 25 20 - 32 mmol/L    Anion Gap 6 3 - 14 mmol/L    Glucose 104 (H) 70 - 99 mg/dL    Urea Nitrogen 16 7 - 30 mg/dL    Creatinine 0.69 0.52 - 1.04 mg/dL    GFR Estimate 89 >60 mL/min/[1.73_m2]    GFR Estimate If Black >90 >60 mL/min/[1.73_m2]    Calcium 8.4 (L) 8.5 - 10.1 mg/dL   Troponin I   Result Value Ref Range    Troponin I ES <0.015 0.000 - 0.045 ug/L   EKG 12-lead, tracing only   Result Value Ref Range    Interpretation ECG Click View Image link to view waveform and result    Head CT w/o contrast    Narrative    CT HEAD W/O CONTRAST 1/24/2021 4:55 PM    History: Trauma -???Head Injury   ICD-10:    Comparison: MRI brain 8/1/2019    Technique: Using multidetector thin collimation helical acquisition  technique, axial, coronal and sagittal CT images from the skull base  to the vertex were obtained without intravenous contrast.    Findings: There is no intracranial hemorrhage, mass effect, or midline  shift. Gray/white matter differentiation in both cerebral hemispheres  is preserved. Ventricles are proportionate to the cerebral sulci. The  basal cisterns are clear.    The bony calvaria and the bones of the skull base are normal. The  visualized portions of the paranasal sinuses and mastoid air cells are  clear.       Impression    Impression:  No acute intracranial pathology..     I have personally reviewed the examination and initial interpretation  and I agree with the findings.    ISAEL TATE MD   UA with Microscopic   Result Value Ref Range    Color Urine Yellow     Appearance Urine Slightly Cloudy     Glucose Urine Negative NEG^Negative mg/dL    Bilirubin Urine Negative NEG^Negative    Ketones Urine Negative NEG^Negative mg/dL    Specific Gravity Urine 1.018 1.003 - 1.035    Blood  Urine Trace (A) NEG^Negative    pH Urine 5.5 5.0 - 7.0 pH    Protein Albumin Urine 10 (A) NEG^Negative mg/dL    Urobilinogen mg/dL Normal 0.0 - 2.0 mg/dL    Nitrite Urine Positive (A) NEG^Negative    Leukocyte Esterase Urine Large (A) NEG^Negative    Source Midstream Urine     WBC Urine 113 (H) 0 - 5 /HPF    RBC Urine 11 (H) 0 - 2 /HPF    Bacteria Urine Few (A) NEG^Negative /HPF    Squamous Epithelial /HPF Urine 5 (H) 0 - 1 /HPF    Transitional Epi 1 0 - 1 /HPF    Mucous Urine Present (A) NEG^Negative /LPF    Hyaline Casts 3 (H) 0 - 2 /LPF   Urine Culture    Specimen: Urine clean catch; Midstream Urine   Result Value Ref Range    Specimen Description Midstream Urine     Special Requests Specimen received in preservative     Culture Micro Culture in progress    Asymptomatic SARS-CoV-2 COVID-19 Virus (Coronavirus) by PCR    Specimen: Nasopharyngeal   Result Value Ref Range    SARS-CoV-2 Virus Specimen Source Nasopharyngeal     SARS-CoV-2 PCR Result NEGATIVE     SARS-CoV-2 PCR Comment       Testing was performed using the Xpert Xpress SARS-CoV-2 Assay on the Cepheid Gene-Xpert   Instrument Systems. Additional information about this Emergency Use Authorization (EUA)   assay can be found via the Lab Guide.     Glucose by meter   Result Value Ref Range    Glucose 95 70 - 99 mg/dL   Social Work IP Consult    Narrative    Ronnie Multani MSW     1/25/2021  1:52 PM  Care Management Initial Consult    General Information  Assessment completed with: Patient, Family,    Type of CM/SW Visit: Initial Assessment    Primary Care Provider verified and updated as needed:  No   Readmission within the last 30 days:   Yes; last admitted   12/29/20        Advance Care Planning:    Not discussed        Communication Assessment  Patient's communication style: spoken language (English or   Bilingual)    Hearing Difficulty or Deaf: no   Wear Glasses or Blind: yes    Cognitive  Cognitive/Neuro/Behavioral: WDL  Level of Consciousness:  "alert    Arousal Level: opens eyes spontaneously  Orientation: oriented x   4  Mood/Behavior: calm, cooperative  Best Language: 0 - No   aphasia  Speech: clear, spontaneous, logical    Living Environment:   People in home:   Pt's daughter Ailin    Current living Arrangements:  House      Able to return to prior arrangements: no; pt has TCU recs       Family/Social Support:  Care provided by:    Provides care for:                  Description of Support System:       Pt identified her daughter Ailin \"Sarai\" as her main support.   Pt lives with dtr who assists with errands, driving pt to   [employer]. Pt's daughter is on leave of absence from work to   provide cares for pt at home.     Current Resources:   Skilled Home Care Services:    Community Resources:    Equipment currently used at home: none  Supplies currently used at home:      Employment/Financial:  Employment Status:   Not employed.       Financial Concerns:   Pt daughter inquired about financial   assistance through missing work d/t providing cares for pt while   at home. SW provided general education on FMLA and encouraged   pt/pt's daughter to contact her PCP for FMLA paperwork.           Lifestyle & Psychosocial Needs:     Social Needs     Financial resource strain: Not hard at all     Food insecurity     Worry: Never true     Inability: Never true     Transportation needs     Medical: No     Non-medical: No     Socioeconomic History     Marital status:      Spouse name: Not on file     Number of children: 1     Years of education: Not on file     Highest education level: Not on file     Tobacco Use     Smoking status: Former Smoker     Packs/day: 1.00     Years: 58.00     Pack years: 58.00     Start date: 1962     Quit date: 2020     Years since quittin.0     Smokeless tobacco: Never Used     Tobacco comment: using 21 mg patch while inpatient   Substance and Sexual Activity     Alcohol use: Not Currently     Comment: 2 " "glasses of wine a week     Drug use: Yes     Types: Marijuana     Comment: occasional     Sexual activity: Never       Functional Status:  Prior to admission patient needed assistance:       Pt identified herself as independent prior to admission. Pt   states she was able to mobilize herself, and was able to do daily   functioning with only minimal assistance from her daughter. Pt   had surgery on 12/29/20 and had mental health episode 3-4 days   afterward (see below).        Mental Health Status:      Pt had surgery on 12/29/20 and had mental health/delerium   episode 3-4 days after the surgery while pt was in the hospital.   Pt described beliefs that \"everyone was against me\", she did not   know where she lived, and she \"didn't trust anyone.\" She stated   she spoke to her sister over the phone which helped de-escalate   her back to a more calm, baseline demeanor. Pt and pt's daughter   stated she had never had any similar occurance before or since   this episode. They stated that, per her doctor, this episode   could have been d/t to several factors, including trauma from   surgery, side effects of medicine.      During this assessment, pt did not present or report any current   mental health concerns.     Chemical Dependency Status:            No issues identified.     Values/Beliefs:  Spiritual, Cultural Beliefs, Evangelical Practices, Values that   affect care:  No.               Additional Information:  Pt anticipated to be medically ready for discharge tomorrow. Pt   has TCU recommendations for further rehab.     SW met with pt and pt's daughter Ailin \"Candy\" at beside to   introduce self, role, and to discuss discharge planning. Pt and   daughter discussed how pt has been living with dtr for the past 3   years; daugther has been her main support.     Pt's daughter inquired about financial assistance through missing   work d/t providing cares for pt while at home. SW provided   general education on FMLA and " encouraged pt/pt's daughter to   contact her PCP for FMLA paperwork.     SW provided pt with MOON form and provided overview; pt signed   form 01/25/21 at 1:15pm.     SW discussed pt's TCU recommendations for further rehab and   provided general overview of TCU, and answered questions. SW   provided pt and daughter with TCU list earlier in the day, and   they identified the following in order of preference.     Pt's discharge disposition status is as follows:    PENDING REFERERALS    1. [facility] ([facility])  [address], [facility] [address]  P: [phone]  01/25/21 1:28PM SW sent referral    2. [facility] ([facility])  [address] [facility] [address] [phone]  01/25/21 1:28PM SW sent referral    3. [facility] ([facility])   [address] [place] [address]  P: [phone]  01/25/21 1:28PM SW sent referral    SW will continue to follow pt for discharge planning. SW will f/u   on referrals with TCU's listed above as pt nears time of   discharge.      Ronnie Multani, MSW, SW  Acute Care Float   [facility]        ABO/Rh type and screen   Result Value Ref Range    ABO A     RH(D) Neg     Antibody Screen Neg     Test Valid Only At          [facility]    Specimen Expires 01/27/2021    CBC with platelets   Result Value Ref Range    WBC 8.1 4.0 - 11.0 10e9/L    RBC Count 3.50 (L) 3.8 - 5.2 10e12/L    Hemoglobin 8.9 (L) 11.7 - 15.7 g/dL    Hematocrit 29.6 (L) 35.0 - 47.0 %    MCV 85 78 - 100 fl    MCH 25.4 (L) 26.5 - 33.0 pg    MCHC 30.1 (L) 31.5 - 36.5 g/dL    RDW 16.4 (H) 10.0 - 15.0 %    Platelet Count 349 150 - 450 10e9/L   Glucose by meter   Result Value Ref Range    Glucose 131 (H) 70 - 99 mg/dL   Basic metabolic panel   Result Value Ref Range    Sodium 142 133 - 144 mmol/L    Potassium 4.0 3.4 - 5.3 mmol/L    Chloride 109 94 - 109 mmol/L    Carbon Dioxide 24 20 - 32 mmol/L    Anion Gap 8 3 -  14 mmol/L    Glucose 97 70 - 99 mg/dL    Urea Nitrogen 12 7 - 30 mg/dL    Creatinine 0.61 0.52 - 1.04 mg/dL    GFR Estimate >90 >60 mL/min/[1.73_m2]    GFR Estimate If Black >90 >60 mL/min/[1.73_m2]    Calcium 8.3 (L) 8.5 - 10.1 mg/dL   CBC with platelets   Result Value Ref Range    WBC 7.6 4.0 - 11.0 10e9/L    RBC Count 3.52 (L) 3.8 - 5.2 10e12/L    Hemoglobin 9.1 (L) 11.7 - 15.7 g/dL    Hematocrit 30.0 (L) 35.0 - 47.0 %    MCV 85 78 - 100 fl    MCH 25.9 (L) 26.5 - 33.0 pg    MCHC 30.3 (L) 31.5 - 36.5 g/dL    RDW 16.3 (H) 10.0 - 15.0 %    Platelet Count 339 150 - 450 10e9/L   INR   Result Value Ref Range    INR 4.90 (H) 0.86 - 1.14   Glucose by meter   Result Value Ref Range    Glucose 99 70 - 99 mg/dL   Echo Complete    Narrative    [id number]  [id number]  [id number]  [id number]^YOGI^LIZA^ANTOLIN           [facility]  Echocardiography Laboratory  [address] [place] [id number]     Name: CRISTIAN GAMING  MRN: [id number]  : 1950  Study Date: 2021 12:01 PM  Age: 70 yrs  Gender: Female  Patient Location: [facility]  Reason For Study: Aortic Valve Replacement  Ordering Physician: LIZA CALIX  Performed By: Laila Sotelo RDCS     BSA: 1.7 m2  Height: 64 in  Weight: 142 lb  HR: 68  BP: 135/55 mmHg  _____________________________________________________________________________  __        Procedure  Complete Portable Echo Adult.  _____________________________________________________________________________  __        Interpretation Summary  Borderline (EF 50-55%) reduced left ventricular function is present.  Right ventricular function, chamber size, wall motion, and thickness are  normal.  A mechanical mitral valve is present - 27mm St Abdoulaye. Doppler interrogation of  the mitral valve is normal. The mean gradient across the mitral valve is 3  mmHg.  Mechanical aortic valve is present - 19mm St Abdoulaye Sheridan. Doppler  interrogation of the aortic valve is normal. The peak  velocity across the  aortic valve is 2.3 m/sec. The mean gradient is 10 mmHg. The V2/V1 ratio is  0.41.  This study was compared with the study from 1/6/21: There has been no change.  _____________________________________________________________________________  __        Left Ventricle  Left ventricular size is normal. Left ventricular wall thickness is normal.  Borderline (EF 50-55%) reduced left ventricular function is present. Diastolic  function not assessed due to presence of prosthetic mitral valve.     Right Ventricle  Right ventricular function, chamber size, wall motion, and thickness are  normal.     Mitral Valve  A mechanical mitral valve is present - 27mm St Abdoulaye. Doppler interrogation of  the mitral valve is normal. The mean gradient across the mitral valve is 3  mmHg at 59 BPM.     Aortic Valve  Mechanical aortic valve is present - 19mm St Abdoulaye Pickens. Doppler  interrogation of the aortic valve is normal. The peak velocity across the  aortic valve is 2.3 m/sec. The mean gradient is 10 mmHg. The V2/V1 ratio is  0.41.        Tricuspid Valve  The tricuspid valve is normal. Mild tricuspid insufficiency is present. The  right ventricular systolic pressure is approximated at 29.3 mmHg plus the  right atrial pressure.     Pulmonic Valve  The pulmonic valve is normal. Trace pulmonic insufficiency is present.     Vessels  The aorta root cannot be assessed. The pulmonary artery cannot be assessed.  Dilation of the inferior vena cava is present with normal respiratory  variation in diameter. IVC diameter and respiratory changes fall into an  intermediate range suggesting an RA pressure of 8 mmHg.     Pericardium  No pericardial effusion is present.     Compared to Previous Study  This study was compared with the study from 1/6/21 . There has been no change.     _____________________________________________________________________________  __  MMode/2D Measurements & Calculations  IVSd: 0.82 cm  LVIDd: 5.2  cm  LVIDs: 3.8 cm  LVPWd: 0.84 cm  FS: 26.4 %     LV mass(C)d: 151.4 grams  LV mass(C)dI: 89.5 grams/m2  asc Aorta Diam: 3.2 cm  RWT: 0.33        Doppler Measurements & Calculations  MV max PG: 10.2 mmHg  MV mean PG: 3.4 mmHg  MV V2 VTI: 42.4 cm  MV P1/2t max hermelindo: 161.1 cm/sec  MV P1/2t: 95.2 msec  MVA(P1/2t): 2.3 cm2  MV dec slope: 495.5 cm/sec2  Ao V2 max: 226.8 cm/sec  Ao max P.6 mmHg  Ao V2 mean: 146.1 cm/sec  Ao mean P.8 mmHg  Ao V2 VTI: 51.0 cm  LV V1 max PG: 3.5 mmHg  LV V1 max: 93.7 cm/sec  LV V1 VTI: 20.7 cm  TR max hermelindo: 270.7 cm/sec  TR max P.3 mmHg  AV Hermelindo Ratio (DI): 0.41        _____________________________________________________________________________  __        Report approved by: Guille Camejo 2021 12:47 PM        Impression:  1. UTI. On ceftriaxone. Await UC (she had a UTI and antibiotics earlier in the month and has potential for resistant organism.  2. Deconditioning after major surgery and recent hospitalization. SHe would benefit from acute rehab.  3. Supratherapeutic INR. No active bleeding. Hold coumadin until INR is less than 4. Current antibiotic may further complicate INR management. Ultimate goal is 2.5-3.5.    Alexander Jeffrey MD

## 2021-01-25 NOTE — CONSULTS
"Care Management Initial Consult    General Information  Assessment completed with: Patient, Family,    Type of CM/SW Visit: Initial Assessment    Primary Care Provider verified and updated as needed:  No   Readmission within the last 30 days:   Yes; last admitted 12/29/20        Advance Care Planning:    Not discussed        Communication Assessment  Patient's communication style: spoken language (English or Bilingual)    Hearing Difficulty or Deaf: no   Wear Glasses or Blind: yes    Cognitive  Cognitive/Neuro/Behavioral: WDL  Level of Consciousness: alert  Arousal Level: opens eyes spontaneously  Orientation: oriented x 4  Mood/Behavior: calm, cooperative  Best Language: 0 - No aphasia  Speech: clear, spontaneous, logical    Living Environment:   People in home:   Pt's daughter Ailin    Current living Arrangements:  House      Able to return to prior arrangements: no; pt has TCU recs       Family/Social Support:  Care provided by:    Provides care for:                  Description of Support System:       Pt identified her daughter Ailin \"Amanday\" as her main support. Pt lives with dtr who assists with errands, driving pt to appoitnments. Pt's daughter is on leave of absence from work to provide cares for pt at home.     Current Resources:   Skilled Home Care Services:    Community Resources:    Equipment currently used at home: none  Supplies currently used at home:      Employment/Financial:  Employment Status:   Not employed.       Financial Concerns:   Pt daughter inquired about financial assistance through missing work d/t providing cares for pt while at home. SW provided general education on FMLA and encouraged pt/pt's daughter to contact her PCP for FMLA paperwork.           Lifestyle & Psychosocial Needs:     Social Needs     Financial resource strain: Not hard at all     Food insecurity     Worry: Never true     Inability: Never true     Transportation needs     Medical: No     Non-medical: No " "    Socioeconomic History     Marital status:      Spouse name: Not on file     Number of children: 1     Years of education: Not on file     Highest education level: Not on file     Tobacco Use     Smoking status: Former Smoker     Packs/day: 1.00     Years: 58.00     Pack years: 58.00     Start date: 1962     Quit date: 2020     Years since quittin.0     Smokeless tobacco: Never Used     Tobacco comment: using 21 mg patch while inpatient   Substance and Sexual Activity     Alcohol use: Not Currently     Comment: 2 glasses of wine a week     Drug use: Yes     Types: Marijuana     Comment: occasional     Sexual activity: Never       Functional Status:  Prior to admission patient needed assistance:       Pt identified herself as independent prior to admission. Pt states she was able to mobilize herself, and was able to do daily functioning with only minimal assistance from her daughter. Pt had surgery on 20 and had mental health episode 3-4 days afterward (see below).        Mental Health Status:      Pt had surgery on 20 and had mental health/delerium episode 3-4 days after the surgery while pt was in the hospital. Pt described beliefs that \"everyone was against me\", she did not know where she lived, and she \"didn't trust anyone.\" She stated she spoke to her sister over the phone which helped de-escalate her back to a more calm, baseline demeanor. Pt and pt's daughter stated she had never had any similar occurance before or since this episode. They stated that, per her doctor, this episode could have been d/t to several factors, including trauma from surgery, side effects of medicine.      During this assessment, pt did not present or report any current mental health concerns.     Chemical Dependency Status:            No issues identified.     Values/Beliefs:  Spiritual, Cultural Beliefs, Druze Practices, Values that affect care:  No.               Additional Information:  Pt " "anticipated to be medically ready for discharge tomorrow. Pt has TCU recommendations for further rehab.     SW met with pt and pt's daughter Ailin \"Candy\" at [facility] to introduce self, role, and to discuss discharge planning. Pt and daughter discussed how pt has been living with dtr for the past 3 years; daugther has been her main support.     Pt's daughter inquired about financial assistance through missing work d/t providing cares for pt while at home. SW provided general education on FMLA and encouraged pt/pt's daughter to contact her PCP for FMLA paperwork.     SW provided pt with [facility]ON form and provided overview; pt signed form 01/25/21 at 1:15pm.     SW discussed pt's TCU recommendations for further rehab and provided general overview of TCU, and answered questions. SW provided pt and daughter with TCU list earlier in the day, and they identified the following in order of preference.     Pt's discharge disposition status is as follows:    PENDING REFERERALS    1. [facility] ([facility])  [address] [facility] [address]  P: [phone]  01/25/21 1:28PM SW sent referral    2. [facility] ([facility])  [address] [facility] [address] [phone]  01/25/21 1:28PM SW sent referral    3. [facility] ([facility])   [address], [place] [address]  P: [phone]  01/25/21 1:28PM SW sent referral    SW will continue to follow pt for discharge planning. SW will f/u on referrals with TCU's listed above as pt nears time of discharge.      Ronnie Multani, MSW, [facility]  Acute Care Float   [facility]       "

## 2021-01-25 NOTE — PLAN OF CARE
Observation goals PRIOR TO DISCHARGE    Comments:     -diagnostic tests and consults completed and resulted: not met     Labs in AM  PT consult in AM  SW consult in AM    -vital signs normal or at patient baseline: met    /70 HR 64 Temp 98.4F (oral) O2 98% (room air)     -tolerating oral antibiotics or has plans for home infusion setup: not met       -infection is improving: met    Patient still reports fatigue  Denies fevers/chills/malsiase     -returns to baseline functional status: not met    Reports ongoing weakness significant for her.     -safe disposition plan has been identified: not met

## 2021-01-25 NOTE — PLAN OF CARE
-diagnostic tests and consults completed and resulted: in progress  -vital signs normal or at patient baseline:  BP (!) 144/68 (BP Location: Right arm)   Pulse 66   Temp 98.7  F (37.1  C) (Oral)   Resp 16   Wt 64.5 kg (142 lb 1.6 oz)   SpO2 99%   Breastfeeding No   BMI 24.39 kg/m      -tolerating oral antibiotics or has plans for home infusion setup: in progress  -infection is improving: in progress  -returns to baseline functional status: in progress  -safe disposition plan has been identified: in progress    Patient AO x 4, assist x 1 with a gait belt, patient able to make needs known. Will continue to monitor

## 2021-01-25 NOTE — H&P
[facility] ED Observation Admission Note    Chief Complaint   Patient presents with     Fall       Assessment/Plan:  Rajani Guo is a 70 year old female with history of aortic and mitral valve replacement with mechanical prosthesis (12/29/20- on Coumadin) who presents for evaluation after a fall today with bilateral lower extremity weakness.     #UTI: patient presenting with acute lower extremity weakness since this morning. Reports  she fell onto her knees today while walking from the bathroom to the living room due to weakness. Reports her knees become weak and just buckled under her. She was not able to get up on her own and was assisted by her daughter. She did not have LOC at the time of the fall. Denies hitting her head. Patient also reports that hours later, she had a near fall incident but she was able to catch herself and subsequently lowered herself to the ground. She denies headache, lightheadedness, or dizziness prior to the fall. Patient was recently hospitalized from 12/29/2020 to 1/6/2021 for elective mechanical AVR and mechanical MVR. States she has been doing well since her surgery up until this morning. Currently denies chest pain, SOB, or vision changes. Denies numbness or tingling in the lower extremities. Denies fever or chills. In the ED, vitally stable. LAB: BMP is remarkable. CBC shows no leukocytosis. HGB stable at 9.1 (basline 8-10). . Trop x 1 negative. EKG without ischemic changes. Glucose 104. INR 4.9 (was 5.10 on 1/21).  UA shows large LE, 113 wbc's, 11 rbc's few bacteria suggestive of infection process. UCx pending. Covid19 negative. CT head obtained shows no acute intracranial pathology. In the ED, patient was given a dose of Ceftriaxone IV and was subsequently admitted for continue IV antibiotics and PT assessment.   -VS per routine  -Ceftriaxone 1 g q 24 hours, transition for oral abx    - Tylenol PRN for pain/fever  - Await for UCx   - PT  -SW     #Elevated INR: On coumadin  "for mechanical AVR and mechanical MVR. INR 4.9. Will hold Coumadin   -Pharmacy to does Coumadin   -INR in am      #History of GI bleed - duodenal ulcer requiring transfusion August 2019, September 2019   Hgb 9.1 today; in 1/8/21 was 9.7, no significant change. INR 4.9 today, slightly down from 5.10 on 1/21/21.  No signs and symptoms of GI bleed noted.    - Type and cross   - Continue with PTA PPI   - Hemoglobin trending q 6 hours  - Hold Coumadin  - Pharmacy to dose Coumadin    - INR in am      #Hypertension  #Hyperlipidemia  Stable upon admission. On PTA statin and Metoprolol noted.   - Continue with PTA Lipitor and Metoprolol     Type 2 diabetes mellitus  Hgb A1c 5.8% in 12/2020.  Pt has been on Levemir  and Metformin, however her levemir was discontinued on her last hospitalization. Glucose 104.   - Continue with PTA metformin    - Sliding scale insulin    - Glucose monitor x 3 with meals and HS  - Assess for hypoglycemia     HPI:    Rajani Guo is a 70 year old female with history of aortic and mitral valve replacement with mechanical prosthesis (12/29/20- on Coumadin) who presents for evaluation after a fall today with bilateral lower extremity weakness.  Patient reports she had been doing well since her surgery on 12/29 until today when she suddenly fell while walking from the bathroom to the family room.  She states she had not just urinated or had a bowel movement.  She fell forward and caught herself on a table with her arms.  She did not experience headache, lightheadedness, or dizziness prior to the fall.  This was not a witnessed fall.  Patient denies numbness in her legs.  She does state that she \"loses feeling\" in her knees which causes them to buckle.  She also reports her thighs feel weak.  Walking and getting into her car was difficult due to the weak feeling in her legs.  No head injury, no loss of consciousness.  No headache, neck pain, or dizziness.  No unilateral weakness.  No changes in " vision or speech.  No numbness in groin, or loss of bladder or bowel control.  No abdominal discomfort.     In the ED, vitally stable. LAB: BMP is remarkable. CBC shows no leukocytosis. HGB stable at 9.1 (basline 8-10). . Trop x 1 negative. EKG without ischemic changes. Glucose 104. INR 4.9 (was 5.10 on 1/21).  UA shows large LE, 113 wbc's, 11 rbc's few bacteria suggestive of infection process. UCx pending. Covid19 negative. CT head obtained shows no acute intracranial pathology. In the ED, patient was given a dose of Ceftriaxone IV and was subsequently admitted for continue IV antibiotics and PT assessment.     On admission to the observation unit the patient was stable.    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     GI bleed      History of partial thyroidectomy 06/02/2018     Hyperlipidemia LDL goal <100 01/18/2018     Hypertension      Insomnia, unspecified type 01/18/2018     Mitral regurgitation      Mitral stenosis      Pulmonary hypertension (H)      Tobacco use disorder        Past Surgical History:   Procedure Laterality Date     COLONOSCOPY N/A 9/4/2019    Procedure: COLONOSCOPY;  Surgeon: Marie Todd MD;  Location:  GI     CV CORONARY ANGIOGRAM N/A 11/16/2020    Procedure: CV CORONARY ANGIOGRAM;  Surgeon: Joye Rivas MD;  Location:  HEART CARDIAC CATH LAB     CV FRACTIONAL FLOW RESERVE N/A 11/16/2020    Procedure: Fractional Flow Reserve;  Surgeon: Joey Rivas MD;  Location:  HEART CARDIAC CATH LAB     CV RIGHT HEART CATH MEASUREMENTS RECORDED N/A  11/16/2020    Procedure: CV RIGHT HEART CATH;  Surgeon: Joey Rivas MD;  Location: [facility] HEART CARDIAC CATH LAB     ESOPHAGOSCOPY, GASTROSCOPY, DUODENOSCOPY (EGD), COMBINED N/A 9/4/2019    Procedure: ESOPHAGOGASTRODUODENOSCOPY (EGD);  Surgeon: Marie Todd MD;  Location:  GI     ESOPHAGOSCOPY, GASTROSCOPY, DUODENOSCOPY (EGD), COMBINED N/A 9/17/2020    Procedure: ESOPHAGOGASTRODUODENOSCOPY (EGD);  Surgeon: Ten Ibrahim DO;  Location:  GI     IR ANGIOGRAM THROUGH CATHETER FOLLOW UP  3/5/2019     IR LOWER EXTREMITY ANGIOGRAM LEFT  3/4/2019     KNEE SURGERY Right 2013    right knee torn meniscus surgery     OVARY SURGERY  1971    1 ovary removed     RELEASE CARPAL TUNNEL Right 1988     RELEASE TRIGGER FINGER BILATERAL       REPLACE VALVES AORTIC AND MITRAL, COMBINED N/A 12/29/2020    Procedure: Median Stertonomy, REPLACEMENT AORTIC Valve  with 19mm St Abdoulaye [place]  Mechanical Heart Valve AND MITRAL VALVE Replacement with 27mm St Abdoulaye Mechanical Heart Valve, on pump oxygenation;  Surgeon: Luc Lara MD;  Location: [facility] OR     SHOULDER SURGERY Left     rotator cuff repair, plate placement     THYROID SURGERY  1988    partial thyroidectomy       Family History   Problem Relation Age of Onset     Diabetes Type 2  Mother      Lung Cancer Father      Diabetes Sister      Coronary Artery Disease Sister      Diabetes Brother      Diabetes Brother      Diabetes Type 2  Brother      Coronary Artery Disease Sister      Asthma Sister      Glaucoma No family hx of      Macular Degeneration No family hx of      Anesthesia Reaction No family hx of        Social History     Socioeconomic History     Marital status:      Spouse name: Not on file     Number of children: 1     Years of education: Not on file     Highest education level: Not on file   Occupational History     Not on file   Social Needs     Financial resource strain: Not hard at all     Food insecurity     Worry: Never true     Inability:  Never true     Transportation needs     Medical: No     Non-medical: No   Tobacco Use     Smoking status: Former Smoker     Packs/day: 1.00     Years: 58.00     Pack years: 58.00     Start date: 1962     Quit date: 2020     Years since quittin.0     Smokeless tobacco: Never Used     Tobacco comment: using 21 mg patch while inpatient   Substance and Sexual Activity     Alcohol use: Not Currently     Comment: 2 glasses of wine a week     Drug use: Yes     Types: Marijuana     Comment: occasional     Sexual activity: Never   Lifestyle     Physical activity     Days per week: Not on file     Minutes per session: Not on file     Stress: Not on file   Relationships     Social connections     Talks on phone: Not on file     Gets together: Not on file     Attends [employer] service: Not on file     Active member of club or organization: Not on file     Attends meetings of clubs or organizations: Not on file     Relationship status: Not on file     Intimate partner violence     Fear of current or ex partner: Not on file     Emotionally abused: Not on file     Physically abused: Not on file     Forced sexual activity: Not on file   Other Topics Concern     Parent/sibling w/ CABG, MI or angioplasty before 65F 55M? Not Asked   Social History Narrative    , has one daughter who lives with her in patient's house.  Is full code.  (last updated 10/8/2020)        No current facility-administered medications on file prior to encounter.        warfarin ANTICOAGULANT (COUMADIN) 2 MG tablet, Take 6 mg PO daily at 6 pm until next INR check, then dose per INR goal 2.5-3.5.       acetaminophen (TYLENOL) 325 MG tablet, Take 2 tablets (650 mg) by mouth every 4 hours as needed for pain       amiodarone (PACERONE) 400 MG tablet, Take 400 mg PO BID for 7 days, then take 400 mg PO daily for 21 days, then stop.       aspirin (ASA) 81 MG EC tablet, Take 1 tablet (81 mg) by mouth daily       atorvastatin (LIPITOR) 40 MG  tablet, TAKE 1 TABLET BY MOUTH EVERY DAY       buPROPion (WELLBUTRIN SR) 150 MG 12 hr tablet, Take 1 tablet (150 mg) by mouth 2 times daily       buPROPion (WELLBUTRIN SR) 150 MG 12 hr tablet, Take 1 tablet (150 mg) by mouth 2 times daily       calcium carb 1250 mg, 500 mg [place],/vitamin D 200 unit (CALCIUM 500/D) 500-200 MG-UNIT per tablet, Take 1 tablet by mouth 2 times daily        ferrous sulfate (FEROSUL) 325 (65 Fe) MG tablet, Take 325 mg by mouth 2 times daily       gabapentin (NEURONTIN) 300 MG capsule, Take 1 capsule (300 mg) by mouth At Bedtime       glucosamine-chondroitin 500-400 MG CAPS per capsule, Take 1 capsule by mouth 2 times daily        insulin pen needle (29G X 12MM) 29G X 12MM miscellaneous, Use 1 pen needles daily or as directed.       LEVEMIR FLEXTOUCH 100 UNIT/ML pen, INJECT 2 UNITS SUBCUTANEOUS AT BEDTIME       Melatonin 10 MG TABS tablet, Take 10 mg by mouth At Bedtime       metFORMIN (GLUCOPHAGE) 1000 MG tablet, TAKE 1 TABLET BY MOUTH TWICE A DAY WITH MEALS       metoprolol tartrate (LOPRESSOR) 25 MG tablet, Take 1 tablet (25 mg) by mouth 2 times daily       Multiple Vitamin (MULTI-VITAMINS) TABS, Take 1 tablet by mouth daily        nicotine (NICODERM CQ) 21 MG/24HR 24 hr patch, Place 1 patch onto the skin daily       nicotine (NICORETTE) 2 MG gum, Place 1 each (2 mg) inside cheek every hour as needed for smoking cessation       oxybutynin (DITROPAN) 5 MG tablet, TAKE 1 TABLET BY MOUTH TWICE A DAY       pantoprazole (PROTONIX) 40 MG EC tablet, TAKE 1 TABLET BY MOUTH EVERY DAY       senna-docusate (SENOKOT-S/PERICOLACE) 8.6-50 MG tablet, Take 1-2 tablets by mouth 2 times daily (Patient taking differently: Take 1 tablet by mouth 2 times daily )       traZODone (DESYREL) 50 MG tablet, TAKE 1 2 TABLETS (50 100 MG) BY MOUTH AT BEDTIME       vitamin C (ASCORBIC ACID) 500 MG tablet, Take 1 tablet (500 mg) by mouth daily        Data:    Results for orders placed or performed during the hospital  encounter of 01/24/21   Head CT w/o contrast     Status: None    Narrative    CT HEAD W/O CONTRAST 1/24/2021 4:55 PM    History: Trauma -???Head Injury   ICD-10:    Comparison: MRI brain 8/1/2019    Technique: Using multidetector thin collimation helical acquisition  technique, axial, coronal and sagittal CT images from the skull base  to the vertex were obtained without intravenous contrast.    Findings: There is no intracranial hemorrhage, mass effect, or midline  shift. Gray/white matter differentiation in both cerebral hemispheres  is preserved. Ventricles are proportionate to the cerebral sulci. The  basal cisterns are clear.    The bony calvaria and the bones of the skull base are normal. The  visualized portions of the paranasal sinuses and mastoid air cells are  clear.       Impression    Impression:  No acute intracranial pathology..     I have personally reviewed the examination and initial interpretation  and I agree with the findings.    ISAEL TATE MD   CBC with platelets differential     Status: Abnormal   Result Value Ref Range    WBC 7.2 4.0 - 11.0 10e9/L    RBC Count 3.57 (L) 3.8 - 5.2 10e12/L    Hemoglobin 9.1 (L) 11.7 - 15.7 g/dL    Hematocrit 30.8 (L) 35.0 - 47.0 %    MCV 86 78 - 100 fl    MCH 25.5 (L) 26.5 - 33.0 pg    MCHC 29.5 (L) 31.5 - 36.5 g/dL    RDW 16.3 (H) 10.0 - 15.0 %    Platelet Count 349 150 - 450 10e9/L    Diff Method Automated Method     % Neutrophils 77.0 %    % Lymphocytes 10.3 %    % Monocytes 8.7 %    % Eosinophils 3.4 %    % Basophils 0.3 %    % Immature Granulocytes 0.3 %    Nucleated RBCs 0 0 /100    Absolute Neutrophil 5.5 1.6 - 8.3 10e9/L    Absolute Lymphocytes 0.7 (L) 0.8 - 5.3 10e9/L    Absolute Monocytes 0.6 0.0 - 1.3 10e9/L    Absolute Eosinophils 0.2 0.0 - 0.7 10e9/L    Absolute Basophils 0.0 0.0 - 0.2 10e9/L    Abs Immature Granulocytes 0.0 0 - 0.4 10e9/L    Absolute Nucleated RBC 0.0    INR     Status: Abnormal   Result Value Ref Range    INR 4.94 (H) 0.86  - 1.14   Basic metabolic panel     Status: Abnormal   Result Value Ref Range    Sodium 138 133 - 144 mmol/L    Potassium 3.8 3.4 - 5.3 mmol/L    Chloride 106 94 - 109 mmol/L    Carbon Dioxide 25 20 - 32 mmol/L    Anion Gap 6 3 - 14 mmol/L    Glucose 104 (H) 70 - 99 mg/dL    Urea Nitrogen 16 7 - 30 mg/dL    Creatinine 0.69 0.52 - 1.04 mg/dL    GFR Estimate 89 >60 mL/min/[1.73_m2]    GFR Estimate If Black >90 >60 mL/min/[1.73_m2]    Calcium 8.4 (L) 8.5 - 10.1 mg/dL   Troponin I     Status: None   Result Value Ref Range    Troponin I ES <0.015 0.000 - 0.045 ug/L   UA with Microscopic     Status: Abnormal   Result Value Ref Range    Color Urine Yellow     Appearance Urine Slightly Cloudy     Glucose Urine Negative NEG^Negative mg/dL    Bilirubin Urine Negative NEG^Negative    Ketones Urine Negative NEG^Negative mg/dL    Specific Gravity Urine 1.018 1.003 - 1.035    Blood Urine Trace (A) NEG^Negative    pH Urine 5.5 5.0 - 7.0 pH    Protein Albumin Urine 10 (A) NEG^Negative mg/dL    Urobilinogen mg/dL Normal 0.0 - 2.0 mg/dL    Nitrite Urine Positive (A) NEG^Negative    Leukocyte Esterase Urine Large (A) NEG^Negative    Source Midstream Urine     WBC Urine 113 (H) 0 - 5 /HPF    RBC Urine 11 (H) 0 - 2 /HPF    Bacteria Urine Few (A) NEG^Negative /HPF    Squamous Epithelial /HPF Urine 5 (H) 0 - 1 /HPF    Transitional Epi 1 0 - 1 /HPF    Mucous Urine Present (A) NEG^Negative /LPF    Hyaline Casts 3 (H) 0 - 2 /LPF   Asymptomatic SARS-CoV-2 COVID-19 Virus (Coronavirus) by PCR     Status: None    Specimen: Nasopharyngeal   Result Value Ref Range    SARS-CoV-2 Virus Specimen Source Nasopharyngeal     SARS-CoV-2 PCR Result NEGATIVE     SARS-CoV-2 PCR Comment       Testing was performed using the Xpert Xpress SARS-CoV-2 Assay on the Cepheid Gene-Xpert   Instrument Systems. Additional information about this Emergency Use Authorization (EUA)   assay can be found via the Lab Guide.     EKG 12-lead, tracing only     Status: None  (Preliminary result)   Result Value Ref Range    Interpretation ECG Click View Image link to view waveform and result    Urine Culture     Status: None (Preliminary result)    Specimen: Urine clean catch; Midstream Urine   Result Value Ref Range    Specimen Description Midstream Urine     Special Requests Specimen received in preservative     Culture Micro PENDING              EKG Interpretation:      Interpreted by Carlos Jamison MD  Time reviewed: 1635  Symptoms at time of EKG: Weakness  Rhythm: normal sinus   Rate: normal  Axis: normal  Ectopy: none  Conduction: normal  ST Segments/ T Waves: No ST-T wave changes  Q Waves: none  Comparison to prior: Unchanged     Clinical Impression: normal EKG    ROS:  REVIEW OF SYSTEMS:   General: Fatigue    EYES: Negative for vision changes or eye problems   ENT: No problems with ears, nose or throat. No difficulty swallowing.   RESP: No coughing, wheezing or shortness of breath   CV: No chest pains or palpitations   GI: No nausea, vomiting, heartburn, abdominal pain, diarrhea, constipation or change in bowel habits   : No urinary frequency or dysuria, bladder or kidney problems   MUSCULOSKELETAL:  Lower extremity weakness bilaterally  NEUROLOGIC: No headaches, numbness, tingling, weakness, problems with balance or coordination   PSYCHIATRIC: No problems with anxiety, depression or mental health   HEME/IMMUNE/ALLERGY: No history of bleeding or clotting problems or anemia. No allergies or immune system problems   ENDOCRINE: No history of thyroid disease, diabetes or other endocrine disorders   SKIN: No rashes,worrisome lesions or skin problems      PCP: Tamiko Coley MD  CARDIAC RISK: HLD, HTN,     10 point ROS negative other than the symptoms noted above.      Exam:    Vitals:  B/P: 144/68, T: 98.7, P: 66, R: 16    Physical Exam   Constitutional: Pt is oriented to person, place, and time.Pt appears well-developed and well-nourished.   HENT:   Head: Normocephalic and  atraumatic.   Eyes: Conjunctivae are normal. Pupils are equal, round, and reactive to light.   Neck: Normal range of motion. Neck supple.   Cardiovascular: Normal rate, regular rhythm, normal heart sounds and intact distal pulses.    Pulmonary/Chest: Effort normal and breath sounds normal. No respiratory distress. Pt has no wheezes. Pt has no rales  Abdominal: Soft. Bowel sounds are normal. Pt exhibits no distension and no mass. No tenderness. Pt has no rebound and no guarding.   Musculoskeletal: Normal range of motion. Pt exhibits no edema.   Neurological: Pt is alert and oriented to person, place, and time. Normal reflexes.   Skin: Skin is warm and dry. No rash noted.   Psychiatric: Pt has a normal mood and affect. Behavior is normal. Judgment and thought content normal.     Consults: PT/SW  FEN: Regular   DVT prophylaxis: Warfarin   Code Status: Full  Disposition: Stable vital signs. Patient return to baseline.  All labs/images reviewed    Signed:  Beulah Sorto PA-C  January 24, 2021 at 9:53 PM

## 2021-01-25 NOTE — ED NOTES
Pt ambulated for a road test per provider order. Pt favors the right leg for strength and the left leg has more weakness. Pt appears dizzy and weak. Will continue to monitor.

## 2021-01-25 NOTE — PROGRESS NOTES
General acute hospital  Emergency Department Observation Unit Daily Progress Note          Assessment & Plan:   Rajani Guo is a 70 year old female with history of aortic and mitral valve replacement with mechanical prosthesis (12/29/20- on Coumadin) who presents for evaluation after a fall today with bilateral lower extremity weakness.     ##Weakness:  ##Falls:  ##UTI:   Rajani pesented to the ED with acute lower extremity weakness starting the am of admission. Reports she fell onto her knees while walking from the bathroom to the living room due to weakness. Reports her knees become weak and just buckled under her. She was not able to get up on her own and was assisted by her daughter. She did not have LOC at the time of the fall. Denies hitting her head. Denies any injuries. Patient also reports that hours later, she had a near fall incident but she was able to catch herself and subsequently lowered herself to the ground. She denies headache, lightheadedness, or dizziness prior to the fall. Patient was recently hospitalized from 12/29/2020 to 1/6/2021 for elective mechanical AVR and mechanical MVR. States she has been doing well since her surgery until yesterday am. Currently denies chest pain, SOB, or vision changes. Denies numbness or tingling in the lower extremities. Denies fever or chills. In the ED, vitally stable. LAB: BMP is remarkable. CBC shows no leukocytosis. HGB stable at 9.1 (basline 8-10). . Trop x 1 negative. EKG without ischemic changes. Glucose 104. INR 4.9 (was 5.10 on 1/21).  UA shows large LE, 113 wbc's, 11 rbc's few bacteria suggestive of infection process. UCx pending. Covid19 negative. CT head obtained shows no acute intracranial pathology. In the ED, patient was given a dose of Ceftriaxone IV and was subsequently admitted for continue IV antibiotics and PT assessment.   - IVF  - Ceftriaxone 1 g q 24 hours, transition for oral abx    - Tylenol PRN for  pain/fever  - Follow UCx   - PT  - SW      ##Supratherapeutic INR:  ##S/p mechanical AVR and mechanical MVR:  - INR 4.9. Will hold Coumadin   - Pharmacy to does Coumadin   - Trend INR and allow to trend down       ##History of GI bleed - duodenal ulcer requiring transfusion August 2019, September 2019:   Hgb 9.1 today, appears to be at baseline.No signs and symptoms of GI bleed noted.    - Continue with PTA PPI   - Trend hgb       ##Hypertension:  ##Hyperlipidemia:  Stable upon admission. On statin and Metoprolol PTA  - Continue with PTA Lipitor and Metoprolol     ##Type 2 diabetes mellitus:  Hgb A1c 5.8% in 12/2020.  Pt has been on Levemir  and Metformin, however her levemir was discontinued on her last hospitalization.   - Continue with PTA metformin    - Sliding scale insulin    - Glucose monitor x 3 with meals and HS  - Assess for hypoglycemia          FEN: ADAT  Lines: PIV  Prophylaxis: On Coumadin          Consults:   PT         Discharge Planning:   Pending improved symptoms and PT assessment         Interval History:   Resting in bed. No acute complaints.     ROS:   Constitutional: No fevers/chills. Tolerating diet.   Cardiovascular: No chest pain or palpitations.   Respiratory: No cough or SOB.   GI: No abdominal pain. No N/V.      : No urinary complaints.            Physical Exam:   /62 (BP Location: Left arm)   Pulse 61   Temp 99.2  F (37.3  C) (Oral)   Resp 16   Wt 64.5 kg (142 lb 1.6 oz)   SpO2 98%   Breastfeeding No   BMI 24.39 kg/m       GENERAL: Alert and oriented x 3. NAD.   HEENT: Anicteric sclera. Mucous membranes moist.   CV: RRR. S1, S2. No murmurs appreciated.   RESPIRATORY: Effort normal. Lungs CTAB with no wheezing, rales, rhonchi.   GI: Abdomen soft and non distended with normoactive bowel sounds present in all quadrants. No tenderness, rebound, guarding.   NEUROLOGICAL: No focal deficits. Moves all extremities.    EXTREMITIES: No peripheral edema. Intact bilateral pedal  pulses.   SKIN: No jaundice. No rashes.      Medication list reviewed.   Today's labs and imaging were reviewed.     FRANCESCA Woodall, CNP  Emergency Department Observation Unit    Results for orders placed or performed during the hospital encounter of 01/24/21   Head CT w/o contrast     Status: None    Narrative    CT HEAD W/O CONTRAST 1/24/2021 4:55 PM    History: Trauma -???Head Injury   ICD-10:    Comparison: MRI brain 8/1/2019    Technique: Using multidetector thin collimation helical acquisition  technique, axial, coronal and sagittal CT images from the skull base  to the vertex were obtained without intravenous contrast.    Findings: There is no intracranial hemorrhage, mass effect, or midline  shift. Gray/white matter differentiation in both cerebral hemispheres  is preserved. Ventricles are proportionate to the cerebral sulci. The  basal cisterns are clear.    The bony calvaria and the bones of the skull base are normal. The  visualized portions of the paranasal sinuses and mastoid air cells are  clear.       Impression    Impression:  No acute intracranial pathology..     I have personally reviewed the examination and initial interpretation  and I agree with the findings.    ISAEL TATE MD   CBC with platelets differential     Status: Abnormal   Result Value Ref Range    WBC 7.2 4.0 - 11.0 10e9/L    RBC Count 3.57 (L) 3.8 - 5.2 10e12/L    Hemoglobin 9.1 (L) 11.7 - 15.7 g/dL    Hematocrit 30.8 (L) 35.0 - 47.0 %    MCV 86 78 - 100 fl    MCH 25.5 (L) 26.5 - 33.0 pg    MCHC 29.5 (L) 31.5 - 36.5 g/dL    RDW 16.3 (H) 10.0 - 15.0 %    Platelet Count 349 150 - 450 10e9/L    Diff Method Automated Method     % Neutrophils 77.0 %    % Lymphocytes 10.3 %    % Monocytes 8.7 %    % Eosinophils 3.4 %    % Basophils 0.3 %    % Immature Granulocytes 0.3 %    Nucleated RBCs 0 0 /100    Absolute Neutrophil 5.5 1.6 - 8.3 10e9/L    Absolute Lymphocytes 0.7 (L) 0.8 - 5.3 10e9/L    Absolute Monocytes 0.6 0.0 - 1.3  10e9/L    Absolute Eosinophils 0.2 0.0 - 0.7 10e9/L    Absolute Basophils 0.0 0.0 - 0.2 10e9/L    Abs Immature Granulocytes 0.0 0 - 0.4 10e9/L    Absolute Nucleated RBC 0.0    INR     Status: Abnormal   Result Value Ref Range    INR 4.94 (H) 0.86 - 1.14   Basic metabolic panel     Status: Abnormal   Result Value Ref Range    Sodium 138 133 - 144 mmol/L    Potassium 3.8 3.4 - 5.3 mmol/L    Chloride 106 94 - 109 mmol/L    Carbon Dioxide 25 20 - 32 mmol/L    Anion Gap 6 3 - 14 mmol/L    Glucose 104 (H) 70 - 99 mg/dL    Urea Nitrogen 16 7 - 30 mg/dL    Creatinine 0.69 0.52 - 1.04 mg/dL    GFR Estimate 89 >60 mL/min/[1.73_m2]    GFR Estimate If Black >90 >60 mL/min/[1.73_m2]    Calcium 8.4 (L) 8.5 - 10.1 mg/dL   Troponin I     Status: None   Result Value Ref Range    Troponin I ES <0.015 0.000 - 0.045 ug/L   UA with Microscopic     Status: Abnormal   Result Value Ref Range    Color Urine Yellow     Appearance Urine Slightly Cloudy     Glucose Urine Negative NEG^Negative mg/dL    Bilirubin Urine Negative NEG^Negative    Ketones Urine Negative NEG^Negative mg/dL    Specific Gravity Urine 1.018 1.003 - 1.035    Blood Urine Trace (A) NEG^Negative    pH Urine 5.5 5.0 - 7.0 pH    Protein Albumin Urine 10 (A) NEG^Negative mg/dL    Urobilinogen mg/dL Normal 0.0 - 2.0 mg/dL    Nitrite Urine Positive (A) NEG^Negative    Leukocyte Esterase Urine Large (A) NEG^Negative    Source Midstream Urine     WBC Urine 113 (H) 0 - 5 /HPF    RBC Urine 11 (H) 0 - 2 /HPF    Bacteria Urine Few (A) NEG^Negative /HPF    Squamous Epithelial /HPF Urine 5 (H) 0 - 1 /HPF    Transitional Epi 1 0 - 1 /HPF    Mucous Urine Present (A) NEG^Negative /LPF    Hyaline Casts 3 (H) 0 - 2 /LPF   Asymptomatic SARS-CoV-2 COVID-19 Virus (Coronavirus) by PCR     Status: None    Specimen: Nasopharyngeal   Result Value Ref Range    SARS-CoV-2 Virus Specimen Source Nasopharyngeal     SARS-CoV-2 PCR Result NEGATIVE     SARS-CoV-2 PCR Comment       Testing was performed  using the Xpert Xpress SARS-CoV-2 Assay on the Cepheid Gene-Xpert   Instrument Systems. Additional information about this Emergency Use Authorization (EUA)   assay can be found via the Lab Guide.     Glucose by meter     Status: None   Result Value Ref Range    Glucose 95 70 - 99 mg/dL   CBC with platelets     Status: Abnormal   Result Value Ref Range    WBC 8.1 4.0 - 11.0 10e9/L    RBC Count 3.50 (L) 3.8 - 5.2 10e12/L    Hemoglobin 8.9 (L) 11.7 - 15.7 g/dL    Hematocrit 29.6 (L) 35.0 - 47.0 %    MCV 85 78 - 100 fl    MCH 25.4 (L) 26.5 - 33.0 pg    MCHC 30.1 (L) 31.5 - 36.5 g/dL    RDW 16.4 (H) 10.0 - 15.0 %    Platelet Count 349 150 - 450 10e9/L   Basic metabolic panel     Status: Abnormal   Result Value Ref Range    Sodium 142 133 - 144 mmol/L    Potassium 4.0 3.4 - 5.3 mmol/L    Chloride 109 94 - 109 mmol/L    Carbon Dioxide 24 20 - 32 mmol/L    Anion Gap 8 3 - 14 mmol/L    Glucose 97 70 - 99 mg/dL    Urea Nitrogen 12 7 - 30 mg/dL    Creatinine 0.61 0.52 - 1.04 mg/dL    GFR Estimate >90 >60 mL/min/[1.73_m2]    GFR Estimate If Black >90 >60 mL/min/[1.73_m2]    Calcium 8.3 (L) 8.5 - 10.1 mg/dL   CBC with platelets     Status: Abnormal   Result Value Ref Range    WBC 7.6 4.0 - 11.0 10e9/L    RBC Count 3.52 (L) 3.8 - 5.2 10e12/L    Hemoglobin 9.1 (L) 11.7 - 15.7 g/dL    Hematocrit 30.0 (L) 35.0 - 47.0 %    MCV 85 78 - 100 fl    MCH 25.9 (L) 26.5 - 33.0 pg    MCHC 30.3 (L) 31.5 - 36.5 g/dL    RDW 16.3 (H) 10.0 - 15.0 %    Platelet Count 339 150 - 450 10e9/L   Glucose by meter     Status: Abnormal   Result Value Ref Range    Glucose 131 (H) 70 - 99 mg/dL   INR     Status: Abnormal   Result Value Ref Range    INR 4.90 (H) 0.86 - 1.14   EKG 12-lead, tracing only     Status: None   Result Value Ref Range    Interpretation ECG Click View Image link to view waveform and result    ABO/Rh type and screen     Status: None   Result Value Ref Range    ABO A     RH(D) Neg     Antibody Screen Neg     Test Valid Only At           [facility]    Specimen Expires 01/27/2021    Urine Culture     Status: None (Preliminary result)    Specimen: Urine clean catch; Midstream Urine   Result Value Ref Range    Specimen Description Midstream Urine     Special Requests Specimen received in preservative     Culture Micro PENDING

## 2021-01-25 NOTE — PROGRESS NOTES
Observation goals PRIOR TO DISCHARGE    Comments:      -diagnostic tests and consults completed and resulted: not met      Labs in AM  PT consult in AM  SW consult in AM     -vital signs normal or at patient baseline: met     Blood pressure 119/68, pulse 65, temperature 98.9  F (37.2  C), temperature source Oral, resp. rate 16, weight 64.5 kg (142 lb 1.6 oz), SpO2 96 %, not currently breastfeeding.        -tolerating oral antibiotics or has plans for home infusion setup: not met        -infection is improving: met     Patient still reports fatigue  Denies fevers/chills/malsiase      -returns to baseline functional status: not met     Reports ongoing weakness significant for her.      -safe disposition plan has been identified: not met

## 2021-01-25 NOTE — PLAN OF CARE
Outpatient/Observation goals to be met before discharge home:  -diagnostic tests and consults completed and resulted - Not Met  -vital signs normal or at patient baseline - Not Met, positive ortho BP from sitting to standing  /55 (BP Location: Right arm)   Pulse 57   Temp 98.3  F (36.8  C) (Oral)   Resp 17   Wt 64.5 kg (142 lb 1.6 oz)   SpO2 97%   Breastfeeding No   BMI 24.39 kg/m    -tolerating oral antibiotics or has plans for home infusion setup - Not Met  -infection is improving - Met  -returns to baseline functional status - Not Met  -safe disposition plan has been identified - Not Met    Nurse to notify provider when observation goals have been met and patient is ready for discharge

## 2021-01-25 NOTE — PROGRESS NOTES
01/25/21 1500   Quick Adds   Type of Visit Initial PT Evaluation   Living Environment   People in home child(suad), adult   Current Living Arrangements house   Home Accessibility stairs to enter home   Number of Stairs, Main Entrance 3   Transportation Anticipated family or friend will provide   Living Environment Comments Lives in house with adult daughter who is home 24 hours (worked as  - was laid off because of COVID) patient mainly stays on 1st level of home   Self-Care   Usual Activity Tolerance moderate   Current Activity Tolerance poor   Equipment Currently Used at Home none   Disability/Function   Hearing Difficulty or Deaf no   Wear Glasses or Blind yes   Concentrating, Remembering or Making Decisions Difficulty no   Difficulty Communicating no   Difficulty Eating/Swallowing no   Walking or Climbing Stairs Difficulty no   Dressing/Bathing Difficulty no   Toileting issues no   Doing Errands Independently Difficulty (such as shopping) no   Fall history within last six months no   Change in Functional Status Since Onset of Current Illness/Injury yes   General Information   Onset of Illness/Injury or Date of Surgery 01/24/21   Referring Physician Beulah Sorto PA-C   Pertinent History of Current Problem (include personal factors and/or comorbidities that impact the POC) 70 year old female with history of aortic and mitral valve replacement with mechanical prosthesis (12/29/20- on Coumadin) who presents for evaluation after a fall today with bilateral lower extremity weakness.    General Observations activity: progression to ambulate with assist   Cognition   Orientation Status (Cognition) oriented x 4   Affect/Mental Status (Cognition) WNL   Follows Commands (Cognition) WNL   Pain Assessment   Patient Currently in Pain No   Posture    Posture Forward head position;Protracted shoulders   Range of Motion (ROM)   ROM Quick Adds ROM WFL   Strength   Manual Muscle Testing Quick Adds Strength WFL    Bed Mobility   Comment (Bed Mobility) supine to sit with min Ax1   Transfers   Transfer Safety Comments STS with min-mod Ax1 and FWW   Sit-Stand Transfer   Assistive Device (Sit-Stand Transfers)   (PT anterior assist)   Gait/Stairs (Locomotion)   Comment (Gait/Stairs) Ambulates 60ft with FWW and min Ax1   Balance   Balance Comments Often LOB posterior and R requiring constant min A   Clinical Impression   Criteria for Skilled Therapeutic Intervention yes, treatment indicated   PT Diagnosis (PT) impaired functional mobility   Influenced by the following impairments decreased functional strength, impaired balance, orthostasis   Clinical Presentation Evolving/Changing   Clinical Presentation Rationale clinical judgement   Clinical Decision Making (Complexity) low complexity   Therapy Frequency (PT) 2x/week   Planned Therapy Interventions (PT) balance training;bed mobility training;gait training;ROM (range of motion);stair training;strengthening;transfer training   Anticipated Equipment Needs at Discharge (PT) walker, rolling   Risk & Benefits of therapy have been explained evaluation/treatment results reviewed;care plan/treatment goals reviewed;patient;risks/benefits reviewed;current/potential barriers reviewed;participants voiced agreement with care plan;participants included   PT Discharge Planning    PT Discharge Recommendation (DC Rec) Transitional Care Facility   PT Rationale for DC Rec Pt below baseline and declining since surg in Dec. Would benefit from additional PT in order to increase fucntional balance, strenght and IND mobility   PT Brief overview of current status  min Ax1 with FWW   Total Evaluation Time   Total Evaluation Time (Minutes) 10

## 2021-01-26 LAB
BACTERIA SPEC CULT: ABNORMAL
BACTERIA SPEC CULT: ABNORMAL
GLUCOSE BLDC GLUCOMTR-MCNC: 105 MG/DL (ref 70–99)
GLUCOSE BLDC GLUCOMTR-MCNC: 107 MG/DL (ref 70–99)
GLUCOSE BLDC GLUCOMTR-MCNC: 137 MG/DL (ref 70–99)
GLUCOSE BLDC GLUCOMTR-MCNC: 173 MG/DL (ref 70–99)
GLUCOSE BLDC GLUCOMTR-MCNC: 45 MG/DL (ref 70–99)
GLUCOSE BLDC GLUCOMTR-MCNC: 64 MG/DL (ref 70–99)
GLUCOSE BLDC GLUCOMTR-MCNC: 99 MG/DL (ref 70–99)
INR PPP: 3.21 (ref 0.86–1.14)
Lab: ABNORMAL
SPECIMEN SOURCE: ABNORMAL

## 2021-01-26 PROCEDURE — 85610 PROTHROMBIN TIME: CPT | Performed by: PHYSICIAN ASSISTANT

## 2021-01-26 PROCEDURE — 250N000013 HC RX MED GY IP 250 OP 250 PS 637: Performed by: INTERNAL MEDICINE

## 2021-01-26 PROCEDURE — 96375 TX/PRO/DX INJ NEW DRUG ADDON: CPT

## 2021-01-26 PROCEDURE — G0378 HOSPITAL OBSERVATION PER HR: HCPCS

## 2021-01-26 PROCEDURE — 250N000013 HC RX MED GY IP 250 OP 250 PS 637: Performed by: PHYSICIAN ASSISTANT

## 2021-01-26 PROCEDURE — 96376 TX/PRO/DX INJ SAME DRUG ADON: CPT

## 2021-01-26 PROCEDURE — 36415 COLL VENOUS BLD VENIPUNCTURE: CPT | Performed by: PHYSICIAN ASSISTANT

## 2021-01-26 PROCEDURE — 99225 PR SUBSEQUENT OBSERVATION CARE,LEVEL II: CPT | Performed by: INTERNAL MEDICINE

## 2021-01-26 PROCEDURE — 250N000013 HC RX MED GY IP 250 OP 250 PS 637: Performed by: NURSE PRACTITIONER

## 2021-01-26 PROCEDURE — 258N000001 HC RX 258: Performed by: NURSE PRACTITIONER

## 2021-01-26 PROCEDURE — 999N001017 HC STATISTIC GLUCOSE BY METER IP

## 2021-01-26 PROCEDURE — 250N000011 HC RX IP 250 OP 636: Performed by: PHYSICIAN ASSISTANT

## 2021-01-26 RX ORDER — DEXTROSE MONOHYDRATE 25 G/50ML
25 INJECTION, SOLUTION INTRAVENOUS ONCE
Status: COMPLETED | OUTPATIENT
Start: 2021-01-26 | End: 2021-01-26

## 2021-01-26 RX ORDER — AMOXICILLIN 250 MG
1 CAPSULE ORAL 2 TIMES DAILY
Status: DISCONTINUED | OUTPATIENT
Start: 2021-01-26 | End: 2021-01-27 | Stop reason: HOSPADM

## 2021-01-26 RX ORDER — DEXTROSE MONOHYDRATE 25 G/50ML
25-50 INJECTION, SOLUTION INTRAVENOUS
Status: DISCONTINUED | OUTPATIENT
Start: 2021-01-26 | End: 2021-01-27 | Stop reason: HOSPADM

## 2021-01-26 RX ORDER — DEXTROSE MONOHYDRATE 25 G/50ML
INJECTION, SOLUTION INTRAVENOUS
Status: DISCONTINUED
Start: 2021-01-26 | End: 2021-01-27 | Stop reason: HOSPADM

## 2021-01-26 RX ORDER — WARFARIN SODIUM 2 MG/1
2 TABLET ORAL
Status: COMPLETED | OUTPATIENT
Start: 2021-01-26 | End: 2021-01-26

## 2021-01-26 RX ORDER — CIPROFLOXACIN 250 MG/1
250 TABLET, FILM COATED ORAL EVERY 12 HOURS SCHEDULED
Status: DISCONTINUED | OUTPATIENT
Start: 2021-01-26 | End: 2021-01-27 | Stop reason: HOSPADM

## 2021-01-26 RX ORDER — NICOTINE POLACRILEX 4 MG
15-30 LOZENGE BUCCAL
Status: DISCONTINUED | OUTPATIENT
Start: 2021-01-26 | End: 2021-01-27 | Stop reason: HOSPADM

## 2021-01-26 RX ADMIN — AMIODARONE HYDROCHLORIDE 400 MG: 200 TABLET ORAL at 09:05

## 2021-01-26 RX ADMIN — METFORMIN HYDROCHLORIDE 1000 MG: 500 TABLET ORAL at 17:30

## 2021-01-26 RX ADMIN — METOPROLOL TARTRATE 25 MG: 25 TABLET, FILM COATED ORAL at 20:24

## 2021-01-26 RX ADMIN — OXYBUTYNIN CHLORIDE 5 MG: 5 TABLET ORAL at 09:06

## 2021-01-26 RX ADMIN — CIPROFLOXACIN HYDROCHLORIDE 250 MG: 250 TABLET, FILM COATED ORAL at 20:16

## 2021-01-26 RX ADMIN — DOCUSATE SODIUM 50 MG AND SENNOSIDES 8.6 MG 1 TABLET: 8.6; 5 TABLET, FILM COATED ORAL at 11:25

## 2021-01-26 RX ADMIN — METFORMIN HYDROCHLORIDE 1000 MG: 500 TABLET ORAL at 09:03

## 2021-01-26 RX ADMIN — OXYBUTYNIN CHLORIDE 5 MG: 5 TABLET ORAL at 20:17

## 2021-01-26 RX ADMIN — FERROUS SULFATE TAB 325 MG (65 MG ELEMENTAL FE) 325 MG: 325 (65 FE) TAB at 20:16

## 2021-01-26 RX ADMIN — ASPIRIN 81 MG: 81 TABLET, COATED ORAL at 09:03

## 2021-01-26 RX ADMIN — CEFTRIAXONE SODIUM 1 G: 1 INJECTION, POWDER, FOR SOLUTION INTRAMUSCULAR; INTRAVENOUS at 19:17

## 2021-01-26 RX ADMIN — WARFARIN SODIUM 2 MG: 2 TABLET ORAL at 17:30

## 2021-01-26 RX ADMIN — DEXTROSE MONOHYDRATE 25 ML: 500 INJECTION PARENTERAL at 12:56

## 2021-01-26 RX ADMIN — GABAPENTIN 300 MG: 300 CAPSULE ORAL at 22:15

## 2021-01-26 RX ADMIN — TRAZODONE HYDROCHLORIDE 100 MG: 50 TABLET ORAL at 22:17

## 2021-01-26 RX ADMIN — FERROUS SULFATE TAB 325 MG (65 MG ELEMENTAL FE) 325 MG: 325 (65 FE) TAB at 09:04

## 2021-01-26 RX ADMIN — BUPROPION HYDROCHLORIDE 150 MG: 150 TABLET, FILM COATED, EXTENDED RELEASE ORAL at 20:28

## 2021-01-26 RX ADMIN — ATORVASTATIN CALCIUM 40 MG: 40 TABLET, FILM COATED ORAL at 09:04

## 2021-01-26 RX ADMIN — BUPROPION HYDROCHLORIDE 150 MG: 150 TABLET, FILM COATED, EXTENDED RELEASE ORAL at 09:06

## 2021-01-26 RX ADMIN — METOPROLOL TARTRATE 25 MG: 25 TABLET, FILM COATED ORAL at 09:05

## 2021-01-26 RX ADMIN — PANTOPRAZOLE SODIUM 40 MG: 40 TABLET, DELAYED RELEASE ORAL at 09:03

## 2021-01-26 RX ADMIN — DOCUSATE SODIUM 50 MG AND SENNOSIDES 8.6 MG 1 TABLET: 8.6; 5 TABLET, FILM COATED ORAL at 20:25

## 2021-01-26 NOTE — PLAN OF CARE
Outpatient/Observation goals to be met before discharge home:  -diagnostic tests and consults completed and resulted - Not Met  -vital signs normal or at patient baseline - in progress  -tolerating oral antibiotics or has plans for home infusion setup - Not Met  -infection is improving - Met  -returns to baseline functional status - Not Met, PT assessed and recommending TCU. Continue to trend INR, currently still elevated (4.94 prior), 4.9 this am, will continue to monitor and allow to trend down.  -safe disposition plan has been identified - Not Met, tentative plans for   SW consult and TCU placement. Patient & daughter are in agreement, patient hoping for TCU in [facility].     Nurse to notify provider when observation goals have been met and patient is ready for discharge

## 2021-01-26 NOTE — PLAN OF CARE
Outpatient/Observation goals to be met before discharge home:  -diagnostic tests and consults completed and resulted - Not Met  -vital signs normal or at patient baseline - in progress  -tolerating oral antibiotics or has plans for home infusion setup - Not Met  -infection is improving - Met  -returns to baseline functional status - Not Met, PT assessed and recommending TCU. Continue to trend INR, currently still elevated (4.94 prior), 4.9 this am, will continue to monitor and allow to trend down.  -safe disposition plan has been identified - Not Met, tentative plans for   SW consult and TCU placement. Patient & daughter are in agreement, patient hoping for TCU in [facility].     Nurse to notify provider when observation goals have been met and patient is ready for discharge  BP (!) 142/65 (BP Location: Right arm)   Pulse 59   Temp 98.3  F (36.8  C) (Oral)   Resp 16   Wt 64.5 kg (142 lb 1.6 oz)   SpO2 96%   Breastfeeding No   BMI 24.39 kg/m

## 2021-01-26 NOTE — PLAN OF CARE
Outpatient/Observation goals to be met before discharge home:  -diagnostic tests and consults completed and resulted - Not Met  -vital signs normal or at patient baseline - in progress  -tolerating oral antibiotics or has plans for home infusion setup - Not Met  -infection is improving - Met  -returns to baseline functional status - Not Met, PT assessed and recommending TCU. Continue to trend INR, currently still elevated (4.94 prior), 4.9 this am, will continue to monitor and allow to trend down.  -safe disposition plan has been identified - Not Met, tentative plans for   SW consult and TCU placement. Patient & daughter are in agreement, patient hoping for TCU in [facility].     Nurse to notify provider when observation goals have been met and patient is ready for discharge

## 2021-01-26 NOTE — UTILIZATION REVIEW
Admission Status; Secondary Review Determination         Under the authority of the Utilization Management Committee, the utilization review process indicated a secondary review on the above patient.  The review outcome is based on review of the medical records, discussions with staff, and applying clinical experience noted on the date of the review.        (x)     Observation Status    RATIONALE FOR DETERMINATION   The patient is a 70-year-old female who was admitted on 01/24/2021.  She came in with weakness and was seen in the emergency room and found to have an abnormal urine analysis consistent with urinary tract infection.   -Start ceftriaxone IV  -Await urine culture results.  Her hemoglobin was 9.1 which is similar to her baseline noted back on 1/6/2021 when it was 9.2.  Her white count has been normal and was 7.2.  She felt better the subsequent day on Rocephin.  She did have a recent valve replacement surgery.  The case is discussed with Narcisa nurse practitioner on observation.  Currently she is just waiting disposition to a TCU.  Based on diagnosis and treatment, observation status remains appropriate.      The severity of illness, intensity of service provided, expected LOS and risk for adverse outcome make the care complex, high risk and appropriate for hospital admission.        The information on this document is developed by the utilization review team in order for the business office to ensure compliance.  This only denotes the appropriateness of proper admission status and does not reflect the quality of care rendered.         The definitions of Inpatient Status and Observation Status used in making the determination above are those provided in the CMS Coverage Manual, Chapter 1 and Chapter 6, section 70.4.      Sincerely,     Tommy Ballard MD  Physician Advisor  Utilization Review/ Case Management  [facility].

## 2021-01-26 NOTE — PROGRESS NOTES
[facility]     Medicine Progress Note - Emergency Department Observation Unit       Date of Admission:  1/24/2021  Assessment & Plan       Rajani Guo is a 70 year old female with history of aortic and mitral valve replacement with mechanical prosthesis (12/29/20- on Coumadin) who presents for evaluation after a fall today with bilateral lower extremity weakness.       ##Weakness:  ##Falls:  ##UTI:    Patient was recently hospitalized from 12/29/2020 to 1/6/2021 for elective mechanical AVR and mechanical MVR. States she has been doing well since her surgery until 1/23. Reports she fell onto her knees while walking from the bathroom to the living room due to weakness. Reports her knees become weak and just buckled under her. She was not able to get up on her own and was assisted by her daughter. She did not have LOC at the time of the fall. Denies hitting her head. Patient also reports that hours later, she had a near fall incident but she was able to catch herself and subsequently lowered herself to the ground. She denies headache, lightheadedness, or dizziness prior to the fall.  UA shows large LE, 113 wbc's, 11 rbc's few bacteria suggestive of infection process. UCx Pseudomonas aeruginosa and Ecoli. Sensitivities pending.   Covid19 negative. CT head obtained shows no acute intracranial pathology. PT consulted and recommending TCU. SW is working on placement.   - IVF  - Continue on Cipro BID.   - Tylenol PRN for pain/fever  - SANJANA      ## Hypoglycemia: Pt on Metformin and sliding scale insulin  BG dropped to 45  - Decreasing to Low sliding scale   - Continue Metformin     ##Supratherapeutic INR:  ##S/p mechanical AVR and mechanical MVR:  - INR 3.21  - Pharmacy to does Coumadin       ##History of GI bleed - duodenal ulcer requiring transfusion August 2019, September 2019:   Hgb 9.1 on admission, appears to be at baseline.No signs and symptoms of GI bleed noted.     - Continue with PTA PPI   - CBC in am     ##Hypertension:  ##Hyperlipidemia:  Stable upon admission. On statin and Metoprolol PTA  - Continue with PTA Lipitor and Metoprolol     ##Type 2 diabetes mellitus:  Hgb A1c 5.8% in 12/2020.  Pt has been on Levemir  and Metformin, however her levemir was discontinued on her last hospitalization.   ## Hypoglycemia: Pt on Metformin and sliding scale insulin  BG dropped to 45    - Glucose monitor x 3 with meals and HS  - Decreasing to Low sliding scale   - Continue Metformin  - Assess for hypoglycemia     ##Supratherapeutic INR:  ##S/p mechanical AVR and mechanical MVR:  - INR 3.21  - Pharmacy to does Coumadin       ##History of GI bleed - duodenal ulcer requiring transfusion August 2019, September 2019:   Hgb 9.1 on admission, appears to be at baseline.No signs and symptoms of GI bleed noted.    - Continue with PTA PPI   - CBC in am     ##Hypertension:  ##Hyperlipidemia:  Stable upon admission. On statin and Metoprolol PTA  - Continue with PTA Lipitor and Metoprolol        Diet: Moderate Consistent CHO Diet    DVT Prophylaxis: Low Risk/Ambulatory with no VTE prophylaxis indicated  Baraajs Catheter: not present  Code Status: Full Code           Disposition Plan   Expected discharge: Tomorrow, recommended to transitional care unit once safe disposition plan/ TCU bed available.  Entered: FRANCESCA Chavez CNP 01/26/2021, 3:43 PM       The patient's care was discussed with the Attending Physician, Dr. Jeffrey, Bedside Nurse and Patient.    FRANCESCA Chavez CNP    ______________________________________________________________________    Interval History   No events overnight.     Data reviewed today: I reviewed all medications, new labs and imaging results over the last 24 hours.    Physical Exam   Vital Signs: Temp: 98.7  F (37.1  C) Temp src: Oral BP: 133/64 Pulse: 64   Resp: 18 SpO2: 97 % O2 Device: None (Room air)    Weight: 142 lbs 1.6 oz  GENERAL: Alert and  oriented x 3. NAD.   HEENT: Anicteric sclera. Mucous membranes moist.   CV: RRR. S1, S2. No murmurs appreciated.   RESPIRATORY: Effort normal. Lungs CTAB with no wheezing, rales, rhonchi.   GI: Abdomen soft and non distended with normoactive bowel sounds present in all quadrants. No tenderness, rebound, guarding.   NEUROLOGICAL: No focal deficits. Moves all extremities.    EXTREMITIES: No peripheral edema. Intact bilateral pedal pulses.   SKIN: No jaundice. No rashes.     Data   Recent Labs   Lab 01/26/21  0545 01/25/21  0602 01/24/21  2351 01/24/21  1633   WBC  --  7.6 8.1 7.2   HGB  --  9.1* 8.9* 9.1*   MCV  --  85 85 86   PLT  --  339 349 349   INR 3.21* 4.90*  --  4.94*   NA  --  142  --  138   POTASSIUM  --  4.0  --  3.8   CHLORIDE  --  109  --  106   CO2  --  24  --  25   BUN  --  12  --  16   CR  --  0.61  --  0.69   ANIONGAP  --  8  --  6   KAYLEEN  --  8.3*  --  8.4*   GLC  --  97  --  104*   TROPI  --   --   --  <0.015     No results found for this or any previous visit (from the past 24 hour(s)).  Medications     Warfarin Therapy Reminder         amiodarone  400 mg Oral Daily     aspirin  81 mg Oral Daily     atorvastatin  40 mg Oral Daily     buPROPion  150 mg Oral BID     cefTRIAXone  1 g Intravenous Q24H     ciprofloxacin  250 mg Oral Q12H POOJA     dextrose         ferrous sulfate  325 mg Oral BID     gabapentin  300 mg Oral At Bedtime     insulin aspart  1-3 Units Subcutaneous TID AC     insulin aspart  1-3 Units Subcutaneous At Bedtime     metFORMIN  1,000 mg Oral BID w/meals     metoprolol tartrate  25 mg Oral BID     oxybutynin  5 mg Oral BID     pantoprazole  40 mg Oral Daily     senna-docusate  1 tablet Oral BID     traZODone   mg Oral At Bedtime     warfarin ANTICOAGULANT  2 mg Oral ONCE at 18:00

## 2021-01-26 NOTE — PROGRESS NOTES
Care Management Follow Up    Length of Stay (days): 0    Expected Discharge Date: 01/26/21     Concerns to be Addressed: other (see comments)  TCU placement  Patient plan of care discussed at interdisciplinary rounds: Yes    Anticipated Discharge Disposition: Transitional Care       Patient/family educated on Medicare website which has current facility and service quality ratings: yes  Education Provided on the Discharge Plan:    Patient/Family in Agreement with the Plan: yes    Referrals Placed by CM/SW:  TCU referrals pending  Private pay costs discussed: Not applicable    Additional Information:  SW following for TCU placement.      Per Rounds, pt is medically stable to discharge when TCU placement found.    8:47am:  SANJANA called and left voicemail with Citlali in Admissions at [facility] inquiring re: follow up of referral.    9:44am:  Citlali with Admissions at [facility] states they do not have a contract with [employer] (pt's insurance).    4:20pm:  Left vm for Lilia in Admissions at [facility], [phone]  Left vm for Nenita in Admissions at [facility], [phone].      Updated Sparkle, CNP.    Will continue to follow.      SAVANNA VALENZUELA LISW  She/her/hers  Social Work Services  Emergency Department & Obs  [phone] phone  [phone] pager  8:00am-8:30pm Mon-Sat    On-call pager, [phone], 4:00pm to midnight

## 2021-01-26 NOTE — PROGRESS NOTES
Patient interviewed and examined. Labs, imaging and chart notes reviewed.  Rajani Guo presented to the ED 1/24 with a fall and weakness in her legs. She has had several falls at home recently. She had mechanical aortic and mitral valve replacement on 12/31. The post op course was complicated by delirium, paroxysmal atrial fibrillation, URI and recurred PTX requiring chest tube drainage. IN the ED EKG was unremarkable and she was in NSR. CBC was stable compared to post op labs. INR was 4.94, head CT was negative. UA was positive for UTI. She was admitted to ED observation due to failure to thrive and frequent falls whie on coumadin. She was evaluated by PT found to be deconditioned with significant LE weakness and in need of acute physical rehabilitation. Her UC is growing >100K pseudomonas and >100 K E coli. Sensitivities are pending. Antibiotic has been switched from ceftriaxone to cipro. She has no ather acute complaints today. She is awaiting TCU placement.    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     GI bleed      History of partial thyroidectomy 06/02/2018     Hyperlipidemia LDL goal <100 01/18/2018     Hypertension      Insomnia, unspecified type 01/18/2018     Mitral regurgitation      Mitral stenosis      Pulmonary hypertension (H)      Tobacco use disorder        Past Surgical History:   Procedure Laterality Date     COLONOSCOPY N/A 9/4/2019    Procedure: COLONOSCOPY;  Surgeon: Marie Todd MD;  Location:  GI     CV CORONARY ANGIOGRAM N/A 11/16/2020    Procedure: CV CORONARY ANGIOGRAM;   Surgeon: Joey Rivas MD;  Location:  HEART CARDIAC CATH LAB     CV FRACTIONAL FLOW RESERVE N/A 11/16/2020    Procedure: Fractional Flow Reserve;  Surgeon: Joey Rivas MD;  Location:  HEART CARDIAC CATH LAB     CV RIGHT HEART CATH MEASUREMENTS RECORDED N/A 11/16/2020    Procedure: CV RIGHT HEART CATH;  Surgeon: Joey Rivas MD;  Location:  HEART CARDIAC CATH LAB     ESOPHAGOSCOPY, GASTROSCOPY, DUODENOSCOPY (EGD), COMBINED N/A 9/4/2019    Procedure: ESOPHAGOGASTRODUODENOSCOPY (EGD);  Surgeon: Marie Todd MD;  Location:  GI     ESOPHAGOSCOPY, GASTROSCOPY, DUODENOSCOPY (EGD), COMBINED N/A 9/17/2020    Procedure: ESOPHAGOGASTRODUODENOSCOPY (EGD);  Surgeon: Ten Ibrahim DO;  Location:  GI     IR ANGIOGRAM THROUGH CATHETER FOLLOW UP  3/5/2019     IR LOWER EXTREMITY ANGIOGRAM LEFT  3/4/2019     KNEE SURGERY Right 2013    right knee torn meniscus surgery     OVARY SURGERY  1971    1 ovary removed     RELEASE CARPAL TUNNEL Right 1988     RELEASE TRIGGER FINGER BILATERAL       REPLACE VALVES AORTIC AND MITRAL, COMBINED N/A 12/29/2020    Procedure: Median Stertonomy, REPLACEMENT AORTIC Valve  with 19mm St Abdoulaye Talala  Mechanical Heart Valve AND MITRAL VALVE Replacement with 27mm St Abdoulaye Mechanical Heart Valve, on pump oxygenation;  Surgeon: Luc Lara MD;  Location: [facility] OR     SHOULDER SURGERY Left     rotator cuff repair, plate placement     THYROID SURGERY  1988    partial thyroidectomy       Family History   Problem Relation Age of Onset     Diabetes Type 2  Mother      Lung Cancer Father      Diabetes Sister      Coronary Artery Disease Sister      Diabetes Brother      Diabetes Brother      Diabetes Type 2  Brother      Coronary Artery Disease Sister      Asthma Sister      Glaucoma No family hx of      Macular Degeneration No family hx of      Anesthesia Reaction No family hx of        Social History     Tobacco Use     Smoking status: Former Smoker     Packs/day: 1.00      Years: 58.00     Pack years: 58.00     Start date: 1962     Quit date: 2020     Years since quittin.0     Smokeless tobacco: Never Used     Tobacco comment: using 21 mg patch while inpatient   Substance Use Topics     Alcohol use: Not Currently     Comment: 2 glasses of wine a week     Physical Exam:  Middle aged female in NAD.  /64 (BP Location: Right arm)   Pulse 64   Temp 98.7  F (37.1  C) (Oral)   Resp 18   Wt 64.5 kg (142 lb 1.6 oz)   SpO2 97%   Breastfeeding No   BMI 24.39 kg/m    HEENT: PERRLA EOMI  Lungs: Clear.  Cardiac: RRR. Crisp prosthetic closure sounds.  Abdomen: Soft.  Extrem: No edema.  Neuro: Alert, no deficits.  Psych: Normal mood and affect.    Impression:  1. Generalized weakness and falls. She has had functional decline after major surgery and needs acute rehab to regain LE strength.  2. Elevated INR. She has now drifted back into the therapeutic range. Pharmacy managing coumadin dosing.  3. UTI. Despite multiple species, with systemic functional decline recent bladder catheterization during surgery  and new prosthetic valves this should be treated. Antibiotic sensitivities pending. Cipro for present.    Alexander Jeffrey MD

## 2021-01-27 ENCOUNTER — DOCUMENTATION ONLY (OUTPATIENT)
Dept: FAMILY MEDICINE | Facility: CLINIC | Age: 71
End: 2021-01-27

## 2021-01-27 ENCOUNTER — PATIENT OUTREACH (OUTPATIENT)
Dept: CARE COORDINATION | Facility: CLINIC | Age: 71
End: 2021-01-27

## 2021-01-27 VITALS
DIASTOLIC BLOOD PRESSURE: 61 MMHG | TEMPERATURE: 99 F | HEART RATE: 61 BPM | BODY MASS INDEX: 24.39 KG/M2 | WEIGHT: 142.1 LBS | RESPIRATION RATE: 16 BRPM | SYSTOLIC BLOOD PRESSURE: 124 MMHG | OXYGEN SATURATION: 95 %

## 2021-01-27 DIAGNOSIS — Z98.890 S/P MVR (MITRAL VALVE REPAIR): ICD-10-CM

## 2021-01-27 DIAGNOSIS — Z95.2 S/P AVR (AORTIC VALVE REPLACEMENT): ICD-10-CM

## 2021-01-27 LAB
ERYTHROCYTE [DISTWIDTH] IN BLOOD BY AUTOMATED COUNT: 16.3 % (ref 10–15)
GLUCOSE BLDC GLUCOMTR-MCNC: 157 MG/DL (ref 70–99)
GLUCOSE BLDC GLUCOMTR-MCNC: 95 MG/DL (ref 70–99)
HCT VFR BLD AUTO: 31.1 % (ref 35–47)
HGB BLD-MCNC: 9.6 G/DL (ref 11.7–15.7)
INR PPP: 1.87 (ref 0.86–1.14)
MCH RBC QN AUTO: 26.1 PG (ref 26.5–33)
MCHC RBC AUTO-ENTMCNC: 30.9 G/DL (ref 31.5–36.5)
MCV RBC AUTO: 85 FL (ref 78–100)
PLATELET # BLD AUTO: 324 10E9/L (ref 150–450)
RBC # BLD AUTO: 3.68 10E12/L (ref 3.8–5.2)
WBC # BLD AUTO: 8.6 10E9/L (ref 4–11)

## 2021-01-27 PROCEDURE — 85027 COMPLETE CBC AUTOMATED: CPT | Performed by: PHYSICIAN ASSISTANT

## 2021-01-27 PROCEDURE — 85610 PROTHROMBIN TIME: CPT | Performed by: PHYSICIAN ASSISTANT

## 2021-01-27 PROCEDURE — 250N000013 HC RX MED GY IP 250 OP 250 PS 637: Performed by: NURSE PRACTITIONER

## 2021-01-27 PROCEDURE — 999N001017 HC STATISTIC GLUCOSE BY METER IP

## 2021-01-27 PROCEDURE — G0378 HOSPITAL OBSERVATION PER HR: HCPCS

## 2021-01-27 PROCEDURE — 99217 PR OBSERVATION CARE DISCHARGE: CPT | Performed by: NURSE PRACTITIONER

## 2021-01-27 PROCEDURE — 250N000013 HC RX MED GY IP 250 OP 250 PS 637: Performed by: INTERNAL MEDICINE

## 2021-01-27 PROCEDURE — 36415 COLL VENOUS BLD VENIPUNCTURE: CPT | Performed by: PHYSICIAN ASSISTANT

## 2021-01-27 PROCEDURE — 250N000013 HC RX MED GY IP 250 OP 250 PS 637: Performed by: PHYSICIAN ASSISTANT

## 2021-01-27 RX ORDER — CIPROFLOXACIN 250 MG/1
250 TABLET, FILM COATED ORAL EVERY 12 HOURS
Qty: 3 TABLET | Refills: 0 | DISCHARGE
Start: 2021-01-27 | End: 2021-02-10

## 2021-01-27 RX ORDER — NICOTINE 21 MG/24HR
1 PATCH, TRANSDERMAL 24 HOURS TRANSDERMAL DAILY
Status: DISCONTINUED | OUTPATIENT
Start: 2021-01-27 | End: 2021-01-27 | Stop reason: HOSPADM

## 2021-01-27 RX ORDER — AMIODARONE HYDROCHLORIDE 400 MG/1
TABLET ORAL
Qty: 35 TABLET | Refills: 0 | Status: SHIPPED | OUTPATIENT
Start: 2021-01-27 | End: 2021-01-27

## 2021-01-27 RX ORDER — WARFARIN SODIUM 1 MG/1
2 TABLET ORAL DAILY
Qty: 2 TABLET | Refills: 0 | Status: SHIPPED | OUTPATIENT
Start: 2021-01-27 | End: 2021-01-27

## 2021-01-27 RX ORDER — WARFARIN SODIUM 1 MG/1
2 TABLET ORAL DAILY
Qty: 2 TABLET | Refills: 0 | DISCHARGE
Start: 2021-01-27 | End: 2021-03-11

## 2021-01-27 RX ORDER — CIPROFLOXACIN 250 MG/1
250 TABLET, FILM COATED ORAL EVERY 12 HOURS
Qty: 3 TABLET | Refills: 0 | DISCHARGE
Start: 2021-01-27 | End: 2021-01-27

## 2021-01-27 RX ORDER — AMIODARONE HYDROCHLORIDE 400 MG/1
TABLET ORAL
Qty: 35 TABLET | Refills: 0 | DISCHARGE
Start: 2021-01-27 | End: 2021-02-11

## 2021-01-27 RX ADMIN — FERROUS SULFATE TAB 325 MG (65 MG ELEMENTAL FE) 325 MG: 325 (65 FE) TAB at 08:37

## 2021-01-27 RX ADMIN — AMIODARONE HYDROCHLORIDE 400 MG: 200 TABLET ORAL at 08:36

## 2021-01-27 RX ADMIN — METOPROLOL TARTRATE 25 MG: 25 TABLET, FILM COATED ORAL at 08:38

## 2021-01-27 RX ADMIN — CIPROFLOXACIN HYDROCHLORIDE 250 MG: 250 TABLET, FILM COATED ORAL at 08:37

## 2021-01-27 RX ADMIN — BUPROPION HYDROCHLORIDE 150 MG: 150 TABLET, FILM COATED, EXTENDED RELEASE ORAL at 08:37

## 2021-01-27 RX ADMIN — OXYBUTYNIN CHLORIDE 5 MG: 5 TABLET ORAL at 08:38

## 2021-01-27 RX ADMIN — Medication 1 PATCH: at 10:27

## 2021-01-27 RX ADMIN — ATORVASTATIN CALCIUM 40 MG: 40 TABLET, FILM COATED ORAL at 08:42

## 2021-01-27 RX ADMIN — ASPIRIN 81 MG: 81 TABLET, COATED ORAL at 08:36

## 2021-01-27 RX ADMIN — PANTOPRAZOLE SODIUM 40 MG: 40 TABLET, DELAYED RELEASE ORAL at 08:39

## 2021-01-27 RX ADMIN — METFORMIN HYDROCHLORIDE 1000 MG: 500 TABLET ORAL at 08:38

## 2021-01-27 RX ADMIN — DOCUSATE SODIUM 50 MG AND SENNOSIDES 8.6 MG 1 TABLET: 8.6; 5 TABLET, FILM COATED ORAL at 08:39

## 2021-01-27 ASSESSMENT — ENCOUNTER SYMPTOMS
DIETARY ISSUES: ADEQUATE INTAKE
NO PATIENT REPORTED PAIN: 1
ACTIVITY IMPAIRMENT: NORMAL

## 2021-01-27 NOTE — PROGRESS NOTES
"Care Management Discharge Note    Discharge Date: 01/27/21       Discharge Disposition: Transitional Care    Pt's daughter to transport pt 01/27/21 at 1pm to:   [facility]  [address] , [facility] [address]  P: [phone]  Admissions, attn: Lilia (P: [phone])  F: [phone]  Nurse to Nurse: [phone]        Discharge Services:  IP Rehab     Discharge Transportation: Pt states her daughter Ailin will transport her to TCU. As of 10:18 AM, pt stated she has not contacted her daughter, but \"it won't be a problem. \"    Private pay costs discussed: Not applicable    PAS Confirmation Code:  [id number]  Patient/family educated on Medicare website which has current facility and service quality ratings: yes    Education Provided on the Discharge Plan:  Yes  Persons Notified of Discharge Plans: Yes  Patient/Family in Agreement with the Plan: yes    Handoff Referral Completed: Yes    Additional Information:  SANJANA received call from Lilia, stella for [facility] (P: [phone]) stating TCU has accepted pt.     SANJANA met with pt at bedside to confirm pt is agreeable. Pt states she is agreeable, but mentioned some concerns about Covid in this building. Pt stated her daughter can transport her at 1pm, and pt will contact daughter soon to confirm this.     SANJANA spoke with Lilia of [facility] to provide update. SANJANA inquired about Covid status, and Lilia stated this TCU currently has no Covid cases.     SANJANA met with pt again to relay that TCU has no Covid cases. Pt stated at 10:18 AM that she has not contacted her daughter to confirm daughter can transport pt at 1pm today, but stated \"it won't be a problem\".    CNP and bedside RN updated.     Addendum 11:37 AM     SW met with pt at bedside at 11:35AM;  Pt confirmed she spoke with her daughter, and daughter plans to transport pt from hospital today at 1pm.       Ronnie Multani, MSW, SW  [facility] " [place]

## 2021-01-27 NOTE — PROGRESS NOTES
The patient had a hypoglycemic episode early this afternoon with blood sugar of 45. This resolved with D50. She ate lunch late. Although the episode could be related to the late meal and excessive dose of short acting insulin, I note that she was started on Cipro today, which has potential to adversely affect glycemic control. We are awaiting UC sensitivities. One of the organisms is pseudomonas and the other e coli. If there are further hypoglycemic events I would recommend discontinuation of Cipro and await the urine culture sensitivities in AM.    Alexander Jeffrey. MD

## 2021-01-27 NOTE — PLAN OF CARE
BP (!) 150/67 (BP Location: Right arm)   Pulse 65   Temp 98.9  F (37.2  C) (Oral)   Resp 18   Wt 64.5 kg (142 lb 1.6 oz)   SpO2 96%   Breastfeeding No   BMI 24.39 kg/m      -diagnostic tests and consults completed and resulted - not met. PT and SW awaiting placement to TCU.   -vital signs normal or at patient baseline -- hypertensive   -tolerating oral antibiotics or has plans for home infusion setup - pt tolerates oral abx   -infection is improving - in progress  -returns to baseline functional status - not met   -safe disposition plan has been identified - not met, awaiting TCU placement

## 2021-01-27 NOTE — PROGRESS NOTES
/61 (BP Location: Right arm)   Pulse 61   Temp 99  F (37.2  C) (Oral)   Resp 16   Wt 64.5 kg (142 lb 1.6 oz)   SpO2 95%   Breastfeeding No   BMI 24.39 kg/m    Patient's condition and vital Signs are stable/WNL.  Discharge instructions reviewed with patient and questions answered. Patient verbalizes understanding. IV removed. Pain under control.  Patient is tolerating regular diet and denies any N/V. Patient to be discharged to TCU via car. Patient has all belongings.

## 2021-01-27 NOTE — PLAN OF CARE
/61 (BP Location: Right arm)   Pulse 63   Temp 98.7  F (37.1  C) (Oral)   Resp 16   Wt 64.5 kg (142 lb 1.6 oz)   SpO2 94%   Breastfeeding No   BMI 24.39 kg/m      -diagnostic tests and consults completed and resulted - not met. PT and SW awaiting placement to TCU.   -vital signs normal or at patient baseline -- VSS  -tolerating oral antibiotics or has plans for home infusion setup - pt tolerates oral abx   -infection is improving - in progress  -returns to baseline functional status - not met   -safe disposition plan has been identified - not met, awaiting TCU placement

## 2021-01-27 NOTE — PLAN OF CARE
Outpatient/Observation goals to be met before discharge home:  -diagnostic tests and consults completed and resulted - Not Met, SW consulting for TCU Placement  -vital signs normal or at patient baseline - in progress  -tolerating oral antibiotics or has plans for home infusion setup - Not Met  -infection is improving - Met  -returns to baseline functional status - Not Met, PT assessed and recommending TCU. Continue to trend INR,  (4.94 prior), 3.21 this am, pharmacy to dose Coumadin.  -safe disposition plan has been identified - Not Met, tentative plans for discharge tomorrow to TCU. Patient & daughter are in agreement, patient hoping for TCU in [facility].     Nurse to notify provider when observation goals have been met and patient is ready for discharge

## 2021-01-27 NOTE — PLAN OF CARE
Outpatient/Observation goals to be met before discharge home:  -diagnostic tests and consults completed and resulted - Not Met  -vital signs normal or at patient baseline - in progress  -tolerating oral antibiotics or has plans for home infusion setup - Not Met  -infection is improving - Met  -returns to baseline functional status - Not Met, PT assessed and recommending TCU. Continue to trend INR, currently still elevated (4.94 prior), 4.9 this am, will continue to monitor and allow to trend down.  -safe disposition plan has been identified - Not Met, tentative plans for   SW consult and TCU placement. Patient & daughter are in agreement, patient hoping for TCU in [facility].     Nurse to notify provider when observation goals have been met and patient is ready for discharge

## 2021-01-27 NOTE — PROGRESS NOTES
Observation goals PRIOR TO DISCHARGE         -diagnostic tests and consults completed and resulted: Met         -vital signs normal or at patient baseline: Met        -tolerating oral antibiotics or has plans for home infusion setup: Met        -infection is improving: Met        -returns to baseline functional status: Not met, transferring to TCU        -safe disposition plan has been identified: Not met

## 2021-01-27 NOTE — PLAN OF CARE
Outpatient/Observation goals to be met before discharge home:  -diagnostic tests and consults completed and resulted - Not Met, SW consulting for TCU Placement  -vital signs normal or at patient baseline - in progress  -tolerating oral antibiotics or has plans for home infusion setup - Not Met  -infection is improving - Met  -returns to baseline functional status - Not Met, PT assessed and recommending TCU. Continue to trend INR,  (4.94 prior), 3.21 this am, pharmacy to dose Coumadin.  -safe disposition plan has been identified - Not Met, tentative plans for   SW consult and TCU placement. Patient & daughter are in agreement, patient hoping for TCU in [facility].     Nurse to notify provider when observation goals have been met and patient is ready for discharge

## 2021-01-27 NOTE — DISCHARGE SUMMARY
[facility]   Hospitalist Discharge Summary      Date of Admission:  1/24/2021  Date of Discharge:  1/27/2021  Discharging Provider: FRANCESCA Chavez CNP  Discharge Team: Emergency Department Observation Unit    Discharge Diagnoses   Weakness  Falls  UTI    Follow-ups Needed After Discharge   {Additional follow-up instructions/to-do's for PCP    : Hospital follow up    Unresulted Labs Ordered in the Past 30 Days of this Admission     No orders found from 12/25/2020 to 1/25/2021.      These results will be followed up by PCP    Discharge Disposition   Discharged to rehabilitation facility  Condition at discharge: Stable    Hospital Course       ##Weakness:  ##Falls:  ##UTI:    Patient was recently hospitalized from 12/29/2020 to 1/6/2021 for elective mechanical AVR and mechanical MVR. States she has been doing well since her surgery until 1/23. Reports she fell onto her knees while walking from the bathroom to the living room due to weakness. Reports her knees become weak and just buckled under her. She was not able to get up on her own and was assisted by her daughter. She did not have LOC at the time of the fall. Denies hitting her head. Patient also reports that hours later, she had a near fall incident but she was able to catch herself and subsequently lowered herself to the ground. She denies headache, lightheadedness, or dizziness prior to the fall.  UA shows large LE, 113 wbc's, 11 rbc's few bacteria suggestive of infection process. UCx Pseudomonas aeruginosa and Ecoli. Sensitive to cipro.  Covid19 negative. CT head obtained shows no acute intracranial pathology. PT consulted and recommending TCU and patient was discharged to TCU today.      ##Supratherapeutic INR:  ##S/p mechanical AVR and mechanical MVR:  - INR 1.87  - Per discussion with pharmacy recommend 2 mg today and tomorrow and repeat INR on Friday.      ##History of GI bleed - duodenal ulcer requiring  transfusion August 2019, September 2019:   Hgb 9.6 on admission, appears to be at baseline.No signs and symptoms of GI bleed noted.    - Continue with PTA PPI      ##Hypertension:  ##Hyperlipidemia:  Stable upon admission. On statin and Metoprolol PTA  - Continue with PTA Lipitor and Metoprolol     ##Type 2 diabetes mellitus:  Hgb A1c 5.8% in 12/2020.  Pt has been on Levemir  and Metformin, however her levemir was discontinued on her last hospitalization.   ## Hypoglycemia: Pt on Metformin and sliding scale insulin  BG dropped to 45, most likely related to poor po intake. BG improved prior to discharge.    - Continue Metformin  - Assess for hypoglycemia       Consultations This Hospital Stay   PHARMACY TO DOSE WARFARIN  PHYSICAL THERAPY ADULT IP CONSULT  SOCIAL WORK IP CONSULT  VASCULAR ACCESS CARE ADULT IP CONSULT  PHARMACY TO DOSE WARFARIN  PHYSICAL THERAPY ADULT IP CONSULT  OCCUPATIONAL THERAPY ADULT IP CONSULT    Code Status   Full Code    Time Spent on this Encounter   I, FRANCESCA Chavez CNP, personally saw the patient today and spent less than or equal to 30 minutes discharging this patient.       FRANCESCA Chavez CNP  [facility] [address] OBSERVATION [address] [address]  Phone: [phone]  Fax: [phone]  ______________________________________________________________________    Physical Exam   Vital Signs: Temp: 99  F (37.2  C) Temp src: Oral BP: 124/61 Pulse: 61   Resp: 16 SpO2: 95 % O2 Device: None (Room air)    Weight: 142 lbs 1.6 oz  GENERAL: Alert and oriented x 3. NAD.   HEENT: Anicteric sclera. Mucous membranes moist.   CV: RRR. S1, S2. No murmurs appreciated.   RESPIRATORY: Effort normal. Lungs CTAB with no wheezing, rales, rhonchi.   GI: Abdomen soft and non distended with normoactive bowel sounds present in all quadrants. No tenderness, rebound, guarding.   NEUROLOGICAL: No focal deficits. Moves all extremities.    EXTREMITIES: No  peripheral edema. Intact bilateral pedal pulses.   SKIN: No jaundice. No rashes.           Primary Care Physician   Tamiko Coley    Discharge Orders       Significant Results and Procedures   Results for orders placed or performed during the hospital encounter of 21   Head CT w/o contrast    Narrative    CT HEAD W/O CONTRAST 2021 4:55 PM    History: Trauma -???Head Injury   ICD-10:    Comparison: MRI brain 2019    Technique: Using multidetector thin collimation helical acquisition  technique, axial, coronal and sagittal CT images from the skull base  to the vertex were obtained without intravenous contrast.    Findings: There is no intracranial hemorrhage, mass effect, or midline  shift. Gray/white matter differentiation in both cerebral hemispheres  is preserved. Ventricles are proportionate to the cerebral sulci. The  basal cisterns are clear.    The bony calvaria and the bones of the skull base are normal. The  visualized portions of the paranasal sinuses and mastoid air cells are  clear.       Impression    Impression:  No acute intracranial pathology..     I have personally reviewed the examination and initial interpretation  and I agree with the findings.    ISAEL TATE MD   Echo Complete    Narrative    [id number]  [id number]  [id number]  [id number]^YOGI^LIZA^ANTOLIN           [facility],[place]  Echocardiography Laboratory  [address] [place], [place] [address]     Name: CRISTIAN GAMING  MRN: [id number]  : 1950  Study Date: 2021 12:01 PM  Age: 70 yrs  Gender: Female  Patient Location: [facility]  Reason For Study: Aortic Valve Replacement  Ordering Physician: LIZA CALIX  Performed By: Laila Sotelo RDCS     BSA: 1.7 m2  Height: 64 in  Weight: 142 lb  HR: 68  BP: 135/55 mmHg  _____________________________________________________________________________  __        Procedure  Complete Portable Echo  Adult.  _____________________________________________________________________________  __        Interpretation Summary  Borderline (EF 50-55%) reduced left ventricular function is present.  Right ventricular function, chamber size, wall motion, and thickness are  normal.  A mechanical mitral valve is present - 27mm St Abdoulaye. Doppler interrogation of  the mitral valve is normal. The mean gradient across the mitral valve is 3  mmHg.  Mechanical aortic valve is present - 19mm St Abdoulaye [place]. Doppler  interrogation of the aortic valve is normal. The peak velocity across the  aortic valve is 2.3 m/sec. The mean gradient is 10 mmHg. The V2/V1 ratio is  0.41.  This study was compared with the study from 1/6/21: There has been no change.  _____________________________________________________________________________  __        Left Ventricle  Left ventricular size is normal. Left ventricular wall thickness is normal.  Borderline (EF 50-55%) reduced left ventricular function is present. Diastolic  function not assessed due to presence of prosthetic mitral valve.     Right Ventricle  Right ventricular function, chamber size, wall motion, and thickness are  normal.     Mitral Valve  A mechanical mitral valve is present - 27mm St Abdoulaye. Doppler interrogation of  the mitral valve is normal. The mean gradient across the mitral valve is 3  mmHg at 59 BPM.     Aortic Valve  Mechanical aortic valve is present - 19mm St Abdoulaye Bath. Doppler  interrogation of the aortic valve is normal. The peak velocity across the  aortic valve is 2.3 m/sec. The mean gradient is 10 mmHg. The V2/V1 ratio is  0.41.        Tricuspid Valve  The tricuspid valve is normal. Mild tricuspid insufficiency is present. The  right ventricular systolic pressure is approximated at 29.3 mmHg plus the  right atrial pressure.     Pulmonic Valve  The pulmonic valve is normal. Trace pulmonic insufficiency is present.     Vessels  The aorta root cannot be assessed. The  pulmonary artery cannot be assessed.  Dilation of the inferior vena cava is present with normal respiratory  variation in diameter. IVC diameter and respiratory changes fall into an  intermediate range suggesting an RA pressure of 8 mmHg.     Pericardium  No pericardial effusion is present.     Compared to Previous Study  This study was compared with the study from 21 . There has been no change.     _____________________________________________________________________________  __  MMode/2D Measurements & Calculations  IVSd: 0.82 cm  LVIDd: 5.2 cm  LVIDs: 3.8 cm  LVPWd: 0.84 cm  FS: 26.4 %     LV mass(C)d: 151.4 grams  LV mass(C)dI: 89.5 grams/m2  asc Aorta Diam: 3.2 cm  RWT: 0.33        Doppler Measurements & Calculations  MV max PG: 10.2 mmHg  MV mean PG: 3.4 mmHg  MV V2 VTI: 42.4 cm  MV P1/2t max hermelindo: 161.1 cm/sec  MV P1/2t: 95.2 msec  MVA(P1/2t): 2.3 cm2  MV dec slope: 495.5 cm/sec2  Ao V2 max: 226.8 cm/sec  Ao max P.6 mmHg  Ao V2 mean: 146.1 cm/sec  Ao mean P.8 mmHg  Ao V2 VTI: 51.0 cm  LV V1 max PG: 3.5 mmHg  LV V1 max: 93.7 cm/sec  LV V1 VTI: 20.7 cm  TR max hermelindo: 270.7 cm/sec  TR max P.3 mmHg  AV Hermelindo Ratio (DI): 0.41        _____________________________________________________________________________  __        Report approved by: Guille Camejo 2021 12:47 PM            Discharge Medications   Current Discharge Medication List      CONTINUE these medications which have NOT CHANGED    Details   warfarin ANTICOAGULANT (COUMADIN) 2 MG tablet Take 6 mg PO daily at 6 pm until next INR check, then dose per INR goal 2.5-3.5.  Qty: 120 tablet, Refills: 0    Associated Diagnoses: H/O mitral valve replacement with mechanical valve; H/O mechanical aortic valve replacement      acetaminophen (TYLENOL) 325 MG tablet Take 2 tablets (650 mg) by mouth every 4 hours as needed for pain  Qty: 1 Bottle, Refills: 0    Associated Diagnoses: H/O mitral valve replacement with mechanical valve; H/O  mechanical aortic valve replacement      amiodarone (PACERONE) 400 MG tablet Take 400 mg PO BID for 7 days, then take 400 mg PO daily for 21 days, then stop.  Qty: 35 tablet, Refills: 0    Associated Diagnoses: H/O mitral valve replacement with mechanical valve; H/O mechanical aortic valve replacement      aspirin (ASA) 81 MG EC tablet Take 1 tablet (81 mg) by mouth daily  Qty: 100 tablet, Refills: 3    Comments: Future refills by PCP Dr. Tamiko Coley with phone number [phone].  Associated Diagnoses: PVD (peripheral vascular disease) (H)      atorvastatin (LIPITOR) 40 MG tablet TAKE 1 TABLET BY MOUTH EVERY DAY  Qty: 90 tablet, Refills: 3    Associated Diagnoses: Type 2 diabetes mellitus without complication, with long-term current use of insulin (H)      !! buPROPion (WELLBUTRIN SR) 150 MG 12 hr tablet Take 1 tablet (150 mg) by mouth 2 times daily  Qty: 60 tablet, Refills: 1    Associated Diagnoses: Anxiety      !! buPROPion (WELLBUTRIN SR) 150 MG 12 hr tablet Take 1 tablet (150 mg) by mouth 2 times daily  Qty: 90 tablet, Refills: 0    Associated Diagnoses: H/O mitral valve replacement with mechanical valve; H/O mechanical aortic valve replacement      calcium carb 1250 mg, 500 mg [place],/vitamin D 200 unit (CALCIUM 500/D) 500-200 MG-UNIT per tablet Take 1 tablet by mouth 2 times daily       ferrous sulfate (FEROSUL) 325 (65 Fe) MG tablet Take 325 mg by mouth 2 times daily      gabapentin (NEURONTIN) 300 MG capsule Take 1 capsule (300 mg) by mouth At Bedtime  Qty: 90 capsule, Refills: 0    Associated Diagnoses: Peripheral vascular disease, unspecified (H)      glucosamine-chondroitin 500-400 MG CAPS per capsule Take 1 capsule by mouth 2 times daily       insulin pen needle (29G X 12MM) 29G X 12MM miscellaneous Use 1 pen needles daily or as directed.  Qty: 100 each, Refills: 11    Associated Diagnoses: Type 2 diabetes mellitus without complication, with long-term current use of insulin (H)      LEVEMIR FLEXTOUCH  100 UNIT/ML pen INJECT 2 UNITS SUBCUTANEOUS AT BEDTIME  Qty:        Melatonin 10 MG TABS tablet Take 10 mg by mouth At Bedtime      metFORMIN (GLUCOPHAGE) 1000 MG tablet TAKE 1 TABLET BY MOUTH TWICE A DAY WITH MEALS  Qty: 180 tablet, Refills: 1    Associated Diagnoses: Type 2 diabetes mellitus without complication, with long-term current use of insulin (H)      metoprolol tartrate (LOPRESSOR) 25 MG tablet Take 1 tablet (25 mg) by mouth 2 times daily  Qty: 90 tablet, Refills: 0    Associated Diagnoses: H/O mitral valve replacement with mechanical valve; H/O mechanical aortic valve replacement      Multiple Vitamin (MULTI-VITAMINS) TABS Take 1 tablet by mouth daily       nicotine (NICODERM CQ) 21 MG/24HR 24 hr patch Place 1 patch onto the skin daily  Qty: 30 patch, Refills: 1    Associated Diagnoses: Smoker      nicotine (NICORETTE) 2 MG gum Place 1 each (2 mg) inside cheek every hour as needed for smoking cessation  Qty: 60 each, Refills: 0    Associated Diagnoses: H/O mitral valve replacement with mechanical valve; H/O mechanical aortic valve replacement      oxybutynin (DITROPAN) 5 MG tablet TAKE 1 TABLET BY MOUTH TWICE A DAY  Qty: 180 tablet, Refills: 3    Associated Diagnoses: Urge incontinence of urine      pantoprazole (PROTONIX) 40 MG EC tablet TAKE 1 TABLET BY MOUTH EVERY DAY  Qty: 90 tablet, Refills: 1    Associated Diagnoses: Duodenitis      senna-docusate (SENOKOT-S/PERICOLACE) 8.6-50 MG tablet Take 1-2 tablets by mouth 2 times daily  Qty: 100 tablet, Refills: 1    Comments: Too prevent constipation  Associated Diagnoses: Constipation, unspecified constipation type      traZODone (DESYREL) 50 MG tablet TAKE 1 2 TABLETS (50 100 MG) BY MOUTH AT BEDTIME      vitamin C (ASCORBIC ACID) 500 MG tablet Take 1 tablet (500 mg) by mouth daily  Qty: 100 tablet, Refills: 1    Associated Diagnoses: Iron deficiency anemia due to chronic blood loss       !! - Potential duplicate medications found. Please discuss with  provider.        Allergies   Allergies   Allergen Reactions     Indomethacin Other (See Comments)     Dizziness and disorientation     Tramadol Nausea and Vomiting

## 2021-01-27 NOTE — PHARMACY-ANTICOAGULATION SERVICE
Clinical Pharmacy- Warfarin Discharge Note  This patient is currently on warfarin for the treatment of Mechanical heart valve.  INR Goal= 2.5-3.5  Expected length of therapy lifetime.    Warfarin PTA Regimen: 2mg Fri, 4mg ROW      Anticoagulation Dose History     Recent Dosing and Labs Latest Ref Rng & Units 1/18/2021 1/21/2021 1/21/2021 1/24/2021 1/25/2021 1/26/2021 1/27/2021    Warfarin 2 mg - - - - - - 2 mg -    INR 0.86 - 1.14 3.70(H) 5.1(A) 5.10(HH) 4.94(H) 4.90(H) 3.21(H) 1.87(H)          Vitamin K doses administered during the last 7 days: none  FFP administered during the last 7 days: none    Recommend discharging the patient on a warfarin regimen of 2 mg with a prescription for warfarin 2mg tablets.      The patient should have an INR checked in 2 days (Friday, 1/29/21).    Cholo Nielsen, PGY1 PharmD Resident

## 2021-01-27 NOTE — PLAN OF CARE
Outpatient/Observation goals to be met before discharge home:  -diagnostic tests and consults completed and resulted - Not Met, SW consulting for TCU Placement  -vital signs normal or at patient baseline - in progress  -tolerating oral antibiotics or has plans for home infusion setup - Not Met  -infection is improving - Met  -returns to baseline functional status - Not Met, PT assessed and recommending TCU. Continue to trend INR,  (4.94 prior), 3.21 this am, pharmacy to dose Coumadin.  -safe disposition plan has been identified - Not Met, tentative plans for   SW consult and TCU placement. Patient & daughter are in agreement, patient hoping for TCU in [facility].     Nurse to notify provider when observation goals have been met and patient is ready for discharge

## 2021-01-27 NOTE — PROGRESS NOTES
Clinic Care Coordination Contact  [facility]       Clinical Data: Care Coordinator Outreach  Outreach attempted x 1.  The patient is a current inpatient at the hospital [facility].  Plan: Care Coordinator sent care coordination introduction letter on 1/11/2021 via mail. Care Coordinator will try to reach patient again upon discharge from the hospital.    RN CC nurse care coordinator to monitor the chart for discharge from the hospital.        Odell Andrew MSN, RN, PHN, [facility]   Primary Care Clinical RN Care Coordinator  [facility]  1/27/2021   9:06 AM  [email or web address]  Office: [phone]

## 2021-01-27 NOTE — PROGRESS NOTES
ANTICOAGULATION  MANAGEMENT: Discharge Review    Rajani Guo chart reviewed for anticoagulation continuity of care    Hospital Admission on 1/24-1/27 for Weakness, Falls, UTI.    Discharge disposition: TCU    Results:    Recent labs: (last 7 days)     01/21/21 01/21/21  1350 01/24/21  1633 01/25/21  0602 01/26/21  0545 01/27/21  0556   INR 5.1* 5.10* 4.94* 4.90* 3.21* 1.87*     Anticoagulation inpatient management:     2 mg 127-128    Anticoagulation discharge instructions:     Warfarin dosing: decrease dose to 2 mg dialy until friday   Bridging: No   INR goal change: No      Medication changes affecting anticoagulation: Yes: Cipro for 3 days     Additional factors affecting anticoagulation: No    Plan     Recommend to check INR on 1/29    Patient not contacted  ACC will resume monitoring upon discharge from TCU    Anticoagulation Calendar updated    Janae Davila RN

## 2021-01-27 NOTE — PROGRESS NOTES
Rajani Guo is a 70 year old female patient.  1. 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     Allergies   Allergen Reactions     Indomethacin Other (See Comments)     Dizziness and disorientation     Tramadol Nausea and Vomiting     Active Problems:    UTI (urinary tract infection)    Blood pressure 124/61, pulse 61, temperature 99  F (37.2  C), temperature source Oral, resp. rate 16, weight 64.5 kg (142 lb 1.6 oz), SpO2 95 %, not currently breastfeeding.    Subjective:  Symptoms:  Stable.    Diet:  Adequate intake.    Activity level: Normal.    Pain:  She reports no pain.      Objective:  General Appearance:  Comfortable.    Vital signs: (most recent): Blood pressure 124/61, pulse 61, temperature 99  F (37.2  C), temperature source Oral, resp. rate 16, weight 64.5 kg (142 lb 1.6 oz), SpO2 95 %, not currently breastfeeding.  Vital signs are normal.    Output: Producing urine.  Stool output assessment: still BRBPR.    HEENT: Normal HEENT exam.    Lungs:  Normal effort and normal respiratory rate.  Breath sounds clear to auscultation.    Heart: Normal rate.  Regular rhythm.  S1 normal and S2 normal.    Abdomen: Abdomen is soft.  Bowel sounds  are normal.   There is no abdominal tenderness.     Extremities: Normal range of motion.    Pulses: Distal pulses are intact.    Neurological: Patient is alert.    Pupils:  Pupils are equal, round, and reactive to light.    Skin:  Warm.      Assessment:    Condition: In stable condition.  Improving.   (70 yof h.o AVR MVR presents after fall. Leg weakness-found to have UTI-Cipro with improvements. PT input appreciated-to TCU.        ).       Nelson Rollins MD, MD  1/27/2021    The pt was seen and examined by myself. The case was reviewed and the plan was discussed with the MARJ.

## 2021-01-27 NOTE — PROGRESS NOTES
ANTICOAGULATION  MANAGEMENT     Interacting Medication Review    Interacting medication(s): Ciprofloxacin (Cipro) with warfarin.    Duration: 3 days (1/27 to 1/30)    Indication: UTI    New medication?: Yes, interaction may increase INR and risk of bleeding     Interacting medication(s): Amiodarone with warfarin.    Duration: 28 days, Take 400 mg PO BID for 7 days, then take 400 mg PO daily for 21 days, then stop.        New medication?: Yes, interaction may increase INR and risk of bleeding          PLAN     Patient is currentl inpatient will address when discharged    Patient was not contacted    No adjustment to Anticoagulation Calendar was required    Janae Davila RN

## 2021-01-27 NOTE — PROGRESS NOTES
Observation goals PRIOR TO DISCHARGE          -diagnostic tests and consults completed and resulted: Met         -vital signs normal or at patient baseline: Met        -tolerating oral antibiotics or has plans for home infusion setup: Met        -infection is improving: Met        -returns to baseline functional status: Not met, transferring to TCU         -safe disposition plan has been identified: Met

## 2021-01-28 NOTE — PLAN OF CARE
Physical Therapy Discharge Summary    Reason for therapy discharge:    Discharged to transitional care facility.    Progress towards therapy goal(s). See goals on Care Plan in [facility] electronic health record for goal details.  Goals partially met.  Barriers to achieving goals:   discharge from facility.    Therapy recommendation(s):    Continued therapy is recommended.  Rationale/Recommendations:  TCU.

## 2021-02-10 ENCOUNTER — OFFICE VISIT (OUTPATIENT)
Dept: FAMILY MEDICINE | Facility: CLINIC | Age: 71
End: 2021-02-10
Payer: COMMERCIAL

## 2021-02-10 VITALS
DIASTOLIC BLOOD PRESSURE: 68 MMHG | WEIGHT: 140.5 LBS | TEMPERATURE: 97.9 F | SYSTOLIC BLOOD PRESSURE: 112 MMHG | OXYGEN SATURATION: 98 % | BODY MASS INDEX: 24.12 KG/M2 | HEART RATE: 60 BPM

## 2021-02-10 DIAGNOSIS — M62.81 GENERALIZED MUSCLE WEAKNESS: ICD-10-CM

## 2021-02-10 DIAGNOSIS — Z91.81 PERSONAL HISTORY OF FALL: Primary | ICD-10-CM

## 2021-02-10 PROBLEM — Z92.89 HISTORY OF CT SCAN OF HEAD: Status: ACTIVE | Noted: 2021-02-10

## 2021-02-10 PROCEDURE — 99214 OFFICE O/P EST MOD 30 MIN: CPT | Performed by: PHYSICIAN ASSISTANT

## 2021-02-10 NOTE — PROGRESS NOTES
"    Assessment & Plan     Personal history of fall  Referrals placed.   - Wheelchair Order for DME - ONLY FOR DME  - NEUROLOGY ADULT REFERRAL    Generalized muscle weakness  - Wheelchair Order for DME - ONLY FOR DME  - NEUROLOGY ADULT REFERRAL    Right knee pain  Advised physical therapy for knee pain and tylenol as needed 1000mg TID.                  No follow-ups on file.    Zofia Steinberg PA-C  [facility] MARIE Gerard is a 70 year old who presents for the following health issues     HPI       Pt is requesting wheelchair with foot rest. After her heart surgery on 1/24/21. She has been sent to the [facility] after falling at home.   She is discharging her tomorrow. Will be starting home healthcare.   Would be using the wheelchair when leaving the house, has a walker while in the house.      Pt needs referral for neurology. Has appointment next week in [place].   She believes her pain is coming from her knee. She torn her meniscus years ago. She did physical therapy at the time and that improved it.   No swelling in the knee.   Right knee.   Weakness in the knee and pain in the tailbone. Tailbone pain with sitting and standing.   No numbness/tingling in the right leg.   The right leg does not drag. \"I don't have much control\"  Not taking anything for the pain right now. Does feel like she needs to take something for the pain.           Review of Systems   Constitutional, HEENT, cardiovascular, pulmonary, gi and gu systems are negative, except as otherwise noted.      Objective    /68 (BP Location: Left arm, Patient Position: Chair, Cuff Size: Adult Regular)   Pulse 60   Temp 97.9  F (36.6  C) (Oral)   Wt 63.7 kg (140 lb 8 oz)   SpO2 98%   Breastfeeding No   BMI 24.12 kg/m    Body mass index is 24.12 kg/m .  Physical Exam   GENERAL: healthy, alert and no distress  MS: no gross musculoskeletal defects noted, no edema- Patient is able to move to the exam table with " help. Right knee full range of motion with no internal derangement. No pain with palpation of the right knee.   SKIN: no suspicious lesions or rashes-minimal pink areas of skin noted at the coccyx but no skin break down.

## 2021-02-11 ENCOUNTER — TELEPHONE (OUTPATIENT)
Dept: FAMILY MEDICINE | Facility: CLINIC | Age: 71
End: 2021-02-11

## 2021-02-11 DIAGNOSIS — Z79.4 TYPE 2 DIABETES MELLITUS WITHOUT COMPLICATION, WITH LONG-TERM CURRENT USE OF INSULIN (H): ICD-10-CM

## 2021-02-11 DIAGNOSIS — E11.9 TYPE 2 DIABETES MELLITUS WITHOUT COMPLICATION, WITH LONG-TERM CURRENT USE OF INSULIN (H): ICD-10-CM

## 2021-02-11 NOTE — TELEPHONE ENCOUNTER
Spoke with Eloise. Has ordes to see her for homecare. Needs to verify that Dr. Coley will follow her for orders. Advised that Dr. Coley will follow pt. No further questions or concerns.    Klaudia Pickett RN  [facility]

## 2021-02-11 NOTE — TELEPHONE ENCOUNTER
Reason for Call: Request for an order or referral:    Order or referral being requested: Homecare    Date needed: as soon as possible    Has the patient been seen by the PCP for this problem? Not Applicable    Additional comments: Eloise from Homecare calling to follow-up if provider will send order for home care for patient.    Phone number Patient can be reached at:  Other phone number:  [phone]    Best Time:  Anytime    Can we leave a detailed message on this number?  YES    Call taken on 2/11/2021 at 12:07 PM by Rinku Henderson

## 2021-02-16 ENCOUNTER — TELEPHONE (OUTPATIENT)
Dept: FAMILY MEDICINE | Facility: CLINIC | Age: 71
End: 2021-02-16

## 2021-02-16 ENCOUNTER — PATIENT OUTREACH (OUTPATIENT)
Dept: NURSING | Facility: CLINIC | Age: 71
End: 2021-02-16
Payer: COMMERCIAL

## 2021-02-16 ASSESSMENT — ACTIVITIES OF DAILY LIVING (ADL): DEPENDENT_IADLS:: INDEPENDENT

## 2021-02-16 NOTE — TELEPHONE ENCOUNTER
Reason for Call: Request for an order or referral:    Order or referral being requested: verbal orders for OT, once a week for seven weeks to work on ADL's, upper extremity strengthening, dynamic standing balance, fall risk reduction, activity tolerance and cognition.    Date needed: as soon as possible    Can we leave a detailed message on this number?  YES    Call taken on 2/16/2021 at 3:38 PM by Maame Michaels

## 2021-02-16 NOTE — PROGRESS NOTES
Clinic Care Coordination Contact    Follow Up Progress Note      Assessment: The RN CC contacted the patient by phone for a follow-up call.  The patient's concern today is making arrangements to have someone from home care coming on the days that her daughter needs to return to work.  The RN CC explained that they would not be coming for the entire day and that other arrangements would need to be made in order to care for the patient.  They may have options for private pay although that has been very tight to get the way it is.  The RN CC spoke with the patient regarding her pain level and she stated that it is much less.  Patient is using her walker in the home, and is hoping to get a wheelchair for when she needs to leave the home.  The RN CC encouraged the patient to perform the exercises given to her from physical therapy both at the hospital and at the TCU.  The RN CC reviewed using Tylenol as a pain reliever by taking it at a scheduled 3 times a day.  Patient stated understanding.    Medication reconciliation was completed with the patient and marked as reviewed.  Health maintenance was reviewed and questions were answered with the patient.  The assessment for hypertension was completed with the patient today as well as the social determinants of health and they were marked as reviewed.      Goals addressed this encounter:   Goals Addressed                 This Visit's Progress      #1  Pain Management (pt-stated)   20%     Goal Statement: I will work to reduce my pain with Tylenol, ice, heat, and massage.  Date Goal set: 1/11/2021  Barriers: Recent open heart surgery  Strengths: Engaged in care coordination  Date to Achieve By: 7/11/2021  Patient expressed understanding of goal: Yes  Action steps to achieve this goal:  1. I will take my Tylenol on a scheduled basis to keep ahead of the pain.  2. I will use alternating ice and heat to reduce the pain that I have incisionally.  3. I will have my daughter  massage the muscles that are tight and achy and causing pain.          #2  Functional (pt-stated)   30%     Goal Statement: I will work on walking a little bit more each day by walking further in the distance or further in the time walked.  Date Goal set: 1/11/2021  Barriers: Recent open heart surgery  Strengths: Engaged in care coordination  Date to Achieve By: 7/11/2021  Patient expressed understanding of goal: Yes  Action steps to achieve this goal:  1. I will walk every day more than just to the bathroom and back.  2. I will increase the time walked.  3. I will increase the distance walked.               Intervention/Education provided during outreach: Reviewed with the patient the importance of taking her Tylenol on a scheduled basis.  And just gradually increasing her activity to include a longer time and/or a longer distance.     Outreach Frequency: 4 weeks    Plan:   1.  The patient will take her Tylenol on a scheduled basis along with her other medications as prescribed by the provider.  2.  The patient will gradually increase the amount of time and/or distance that she is walking.  3.  The patient will not leave the house with out her walker to keep her safe.  4.  The CHW will reach out to the patient in 4 weeks, and the RN CC will monitor the chart in 6 weeks.    RN CC Nurse Care Coordinator will follow up in 6 weeks/  CHW will reach out in 4 weeks.        Odell Andrew MSN, RN, PHN, CCM   Primary Care Clinical RN Care Coordinator  [facility]  2/16/2021   2:48 PM  [email or web address]  Office: [phone]

## 2021-02-16 NOTE — TELEPHONE ENCOUNTER
Called with verbal ok for orders requested per RN protocol.    Klaudia Pickett RN  [facility]

## 2021-02-17 ENCOUNTER — MEDICAL CORRESPONDENCE (OUTPATIENT)
Dept: HEALTH INFORMATION MANAGEMENT | Facility: CLINIC | Age: 71
End: 2021-02-17

## 2021-02-17 ENCOUNTER — TELEPHONE (OUTPATIENT)
Dept: FAMILY MEDICINE | Facility: CLINIC | Age: 71
End: 2021-02-17

## 2021-02-17 NOTE — TELEPHONE ENCOUNTER
Please forward message to Zofia Steinberg as she saw Pt after Hospital  Also send message To MD who prescribed these meds

## 2021-02-17 NOTE — TELEPHONE ENCOUNTER
Nora from [facility] saw patient yesterday and her daughter was there and said patient has fallen 3 times in the last 24 hours. Also there may be a possible interaction with medication her new medication Amiodarolne with metoperol her heart rat has been low.Please call Nora with any questions.  Cecille Vaughn,

## 2021-02-17 NOTE — TELEPHONE ENCOUNTER
ANTICOAGULATION     Rajani HERRERA Brant is overdue for INR check.      spoke with Rajani who declined to schedule a lab appointment at this time. If calling back please schedule as soon as possible.     Pt discharged from TCU 02/11 and has not had INR since. Pt states that she is working on getting home care services and can not go to the lab to get INR drawn. Will follow up with home care 02/17.    Matthew Finn RN

## 2021-02-17 NOTE — TELEPHONE ENCOUNTER
Spoke with Nora SERNA with [facility] ([phone]). She will draw an INR 02/18.

## 2021-02-17 NOTE — TELEPHONE ENCOUNTER
Spoke with Nora, nurse home care . She states that she is wondering if there is any concern with medication interaction between amiodarone and metoprolol? Pt was discharged from TCU on 02/11. Home care admission done 02/13, BP at this visit was 101/58, pulse 58. Pt was prescribed amiodarone at the TCU and discharged on this medication. She is taking 400 mg once daily in AM. Taking metoprolol 25 mg BID. Nora saw patient yesterday and pulse was 64. Daughter reported that pt had fallen 3x in 24 hours. There is no concern for injury. Pt was sure that she did not hit her head, no bruising. Pt was supposed to see a neurologist yesterday, but had to cancel because she could not ambulate out to the car without risk for falling. She could not even make it to the car. Daughter is with pt at all times and told Nora that she has been following pt around with a chair to have her sit if she starts to become weak. She basically has to be there every moment because she never knows when pt might fall.  They have re-scheduled appt with neurologist for Friday. Nora discussed with patient what to do in the case of any fall or injury to the head. Pt is also on an anticoagulant, she advised pt to be very careful. Patient has not seen PT yet.  Discussed with Nora that pt should f/u with neurology. Safety is very important given hx of falls. Recommended checking with DME regarding wheelchair that was prescribed for patient on 02/10 at appt with Maryan. Also recommended TCU f/u appt with PCP. Nora states that she will call pt's daughter to discuss.

## 2021-02-18 ENCOUNTER — TELEPHONE (OUTPATIENT)
Dept: FAMILY MEDICINE | Facility: CLINIC | Age: 71
End: 2021-02-18

## 2021-02-18 ENCOUNTER — ANTICOAGULATION THERAPY VISIT (OUTPATIENT)
Dept: FAMILY MEDICINE | Facility: CLINIC | Age: 71
End: 2021-02-18

## 2021-02-18 ENCOUNTER — MEDICAL CORRESPONDENCE (OUTPATIENT)
Dept: HEALTH INFORMATION MANAGEMENT | Facility: CLINIC | Age: 71
End: 2021-02-18

## 2021-02-18 DIAGNOSIS — Z98.890 S/P MVR (MITRAL VALVE REPAIR): ICD-10-CM

## 2021-02-18 DIAGNOSIS — Z95.2 S/P AVR (AORTIC VALVE REPLACEMENT): ICD-10-CM

## 2021-02-18 LAB — INR PPP: 1.3 (ref 0.9–1.1)

## 2021-02-18 NOTE — TELEPHONE ENCOUNTER
Verbal orders given as requested.  Robin verbalized understanding.    Elvira Waters RN  [facility]

## 2021-02-18 NOTE — PROGRESS NOTES
patient is dual MVR & AVR replacement, needs bridge until INR at least >2.     Heparin 5000U Q8H  Subcutaneous   Or Lovenox 60mg BID    Message sent to cardiac care team regarding preference.    15% dose increase, needs INR recheck early next week.    Madelaine Sotelo, PharmD BCACP  Anticoagulation Clinical Pharmacist

## 2021-02-18 NOTE — PROGRESS NOTES
3 mg daily per Discharge from TCU on 2/11 , no boost or ensure, eating one good meal per day, snacking rest of day, patient is inactive due to fall risk, no diarrhea, alcohol. No shortness of breath or chest pain. No bleeding/bruising, not currently bridging per home care.    Per notes, new valve, pharmacist to dose for first 90 days.  Routing to [facility] to review. Home care nurse is in home and would like call back. Advised patient has to be dosed by pharmacist due to new valve and that we will call back with dosing as soon as able.    Please call Samara home care nurse, with dosing at [phone].    Janae Davila RN

## 2021-02-18 NOTE — TELEPHONE ENCOUNTER
My understanding is that patient was falling prior to her admission into the TCU and it continued while in TCU and they were requesting the neurology referral.   Home health nurse could do orthostatic bps to determine if this is influencing, but from what patient and daughter told me at the visit this was not acutely new.   If this is new and has changed would likely need new evaluation.   Zofia Steinberg PA-C

## 2021-02-18 NOTE — TELEPHONE ENCOUNTER
Please call Decrease metoprolol to 1/2 tablet BID  Have pt seen in clinic Tomorrow  or go to Urgent care

## 2021-02-18 NOTE — PROGRESS NOTES
Cardiac team has not responded, calces placed 12/29/2021, >6 weeks post op, will use Lovenox 60mg BID.  CrCl calc at 88ml/min.  Order sent to [facility] in [place] Jud[place], SNEHAL Sotelo, PharmD BCACP  Anticoagulation Clinical Pharmacist

## 2021-02-18 NOTE — TELEPHONE ENCOUNTER
Spoke with Nora, gave verbal orders for Lovenox education.    Patient has 7AM appt with Emelyn at clinic 02/19/2021, would it be possible to have RN education or at least assistance with AM dose at that time?    Madelaine Sotelo, PharmD BCACP  Anticoagulation Clinical Pharmacist

## 2021-02-18 NOTE — TELEPHONE ENCOUNTER
Spoke with Nora with [facility]. Gave her provider's message as written. States it started before the TCU and that's why she ended up in the TCU. BP running really low and pulse has been low. BP sitting was 98/52 without standing. She instructed pt if dizzy or feels weak to use home BP cuff and how to check her own pulse. She advised that she should check it before taking med in am. If < 55 should call PCP. Is on quite a few meds that can cause bradycardia. She is wondering hold perimeters for BP and HR. Nurse recommended she be seen for TCU follow up.They will continue to monitor. Has orders for anything below 50 to call MD. Was 90/45, first time diastolic was 42. Please advise.    Klaudia Pickett RN  [facility]

## 2021-02-18 NOTE — PROGRESS NOTES
Spoke with home care nurse, Samara, preferred pharmacy is [facility]. Will send once we hear back from cardiac team, please call daughter when sent so she can go  bridge materials for teaching with home care tomorrow.    Spoke with daughter, dinorah, who reports she is not familiar or comfortable with administering bridging medication without teaching from home care nurse. She was advised to have pharmacist go over injection teaching when picking up medication due to clot risk with patient and need for bridging to begin as soon as possible. Daughter reports she will do this but would feel more comfortable with nurse to come out and do teaching.  Scheduled with Samara for a nurse to go out to home in the morning to do lovenox teaching for patient to get started.  Educated daughter on s/s of stroke/clot to watch for and when to call home care vs 911.  Daughter verbalizes understanding. Verified tablet size of warfarin in home of 3mg and 2 mg and gave dosing to both Samara and Dinorah.      Patient to have INR rechecked on Monday and home care to do teaching for bridging tomorrow morning.      Dosing and recheck giving to home care. Will await cardiac care team     Janae Davila RN

## 2021-02-18 NOTE — PROGRESS NOTES
Called and spoke with Paty, daughter, and notified that lovenox is received by [facility] pharmacy. She will pick it up tonight and bring with to 7 am appt with MD due to patient low BP.  She will have clinic nurse do education on injections.  IF this cant be done in clinic, home care has PRN scheduled to come out and educate if needed.      Janae Davila RN

## 2021-02-18 NOTE — TELEPHONE ENCOUNTER
Reason for Call: Request for an order or referral:    Order or referral being requested: verbal orders for PT, one time a week for one week, two times a week for two weeks, and then one time a week for four weeks.    Date needed: as soon as possible    Can we leave a detailed message on this number?  YES    Call taken on 2/18/2021 at 8:21 AM by Maame Michaels

## 2021-02-19 ENCOUNTER — OFFICE VISIT (OUTPATIENT)
Dept: FAMILY MEDICINE | Facility: CLINIC | Age: 71
End: 2021-02-19
Payer: COMMERCIAL

## 2021-02-19 ENCOUNTER — TELEPHONE (OUTPATIENT)
Dept: FAMILY MEDICINE | Facility: CLINIC | Age: 71
End: 2021-02-19

## 2021-02-19 ENCOUNTER — TELEPHONE (OUTPATIENT)
Dept: CARDIOLOGY | Facility: CLINIC | Age: 71
End: 2021-02-19

## 2021-02-19 VITALS
OXYGEN SATURATION: 100 % | SYSTOLIC BLOOD PRESSURE: 102 MMHG | HEART RATE: 60 BPM | TEMPERATURE: 98.3 F | DIASTOLIC BLOOD PRESSURE: 60 MMHG

## 2021-02-19 DIAGNOSIS — Z95.2 S/P AORTIC VALVE AND MITRAL VALVE REPLACEMENT: Primary | ICD-10-CM

## 2021-02-19 DIAGNOSIS — R29.6 FALLS FREQUENTLY: Primary | ICD-10-CM

## 2021-02-19 DIAGNOSIS — R00.1 SINUS BRADYCARDIA: ICD-10-CM

## 2021-02-19 PROCEDURE — 99495 TRANSJ CARE MGMT MOD F2F 14D: CPT | Performed by: PHYSICIAN ASSISTANT

## 2021-02-19 NOTE — PROGRESS NOTES
Assessment & Plan     Falls frequently  - [facility], HAND, AND CHIROPRACTIC REFERRAL; Future    Sinus bradycardia  - discontinue Metoprolol  - Home BP checks  - Follow-up in 3 weeks.             Return in about 3 weeks (around 3/12/2021) for Follow up with Dr. Coley.    Emelyn Overton PA-C  [facility] MARIE Gerard is a 70 year old who presents for the following health issues  accompanied by her daughter :    \[facility]\""         Hospital Follow-up Visit:    Hospital/Nursing Home/IP Rehab Facility: TCU  Date of Admission: 1/27/2021  Date of Discharge: 2/18/2021  Reason(s) for Admission: Bradycardia, falls.       Was your hospitalization related to COVID-19? No   Problems taking medications regularly:  None  Medication changes since discharge: None  Problems adhering to non-medication therapy:  None    Summary of hospitalization:  Discharge summary unavailable  Diagnostic Tests/Treatments reviewed.  Follow up needed: INR nursing orders, Enoxaparin instructions, neurology and cardiology follow up.  Other Healthcare Providers Involved in Patient s Care:         Specialist appointment - Neurology and Physical Therapy  Update since discharge: improved.       Post Discharge Medication Reconciliation: discharge medications reconciled and changed, per note/orders.  Plan of care communicated with patient and caregiver                  Review of Systems   Constitutional, HEENT, cardiovascular, pulmonary, GI, , musculoskeletal, neuro, skin, endocrine and psych systems are negative, except as otherwise noted.      Objective    /60   Pulse 60   Temp 98.3  F (36.8  C) (Oral)   SpO2 100%   There is no height or weight on file to calculate BMI.  Physical Exam   GENERAL: healthy, alert and no distress  NECK: no adenopathy, no asymmetry, masses, or scars and thyroid normal to palpation  RESP: lungs clear to auscultation - no rales, rhonchi or wheezes  CV: regular rate and rhythm, normal S1 S2,  no S3 or S4, no murmur, click or rub, no peripheral edema and peripheral pulses strong  MS: no gross musculoskeletal defects noted, no edema  SKIN: no suspicious lesions or rashes  PSYCH: mentation appears normal, affect normal/bright

## 2021-02-19 NOTE — TELEPHONE ENCOUNTER
Confirmed with Nora no need for home care to do additional visit today.  Nora also spoke with patient this AM, and they felt comfortable with training they received in clinic.    Madelaine Sotelo, PharmD BCACP  Anticoagulation Clinical Pharmacist

## 2021-02-19 NOTE — PROGRESS NOTES
{PROVIDER CHARTING PREFERENCE:544069}    Subjective   Rajani is a 70 year old who presents for the following health issues  accompanied by her daughter:    HPI       Patient presents with:  RECHECK: TCU follow up, hypotension and bradcardia, inr      {additonal problems for provider to add (Optional):434599}    Review of Systems   {ROS COMP (Optional):392871}      Objective    /60   Pulse 60   Temp 98.3  F (36.8  C) (Oral)   SpO2 100%   There is no height or weight on file to calculate BMI.  Physical Exam   {Exam List (Optional):497747}    {Diagnostic Test Results (Optional):683052}    {AMBULATORY ATTESTATION (Optional):502802}

## 2021-02-19 NOTE — TELEPHONE ENCOUNTER
Reason for call:  Order   Order or referral being requested: Physical Therapy in home services  Reason for request: Patient is needing home care PT orders  Date needed: as soon as possible  Has the patient been seen by the PCP for this problem? YES  Additional comments: [employer] is the [employer]    Please correct the Physical Therapy order given today to be Home Care Physical therapy.     Phone number to reach Ailin Guo [phone]    Best Time:  anytime    Can we leave a detailed message on this number?  YES    Travel screening: Not Applicable     Daughter is confused as to by this. Patient has home care. Patient has PT and OT.     Patient got a call to get her scheduled for out patient pt.     Please contact her back.

## 2021-02-19 NOTE — TELEPHONE ENCOUNTER
----- Message from Thanh Morse MD sent at 2/19/2021 10:12 AM CST -----  PLEASE SCHEDULE ECHO.  HER INRS WERE LOW.    I ORDERED.

## 2021-02-22 ENCOUNTER — MEDICAL CORRESPONDENCE (OUTPATIENT)
Dept: HEALTH INFORMATION MANAGEMENT | Facility: CLINIC | Age: 71
End: 2021-02-22

## 2021-02-22 ENCOUNTER — ANTICOAGULATION THERAPY VISIT (OUTPATIENT)
Dept: FAMILY MEDICINE | Facility: CLINIC | Age: 71
End: 2021-02-22

## 2021-02-22 ENCOUNTER — TELEPHONE (OUTPATIENT)
Dept: FAMILY MEDICINE | Facility: CLINIC | Age: 71
End: 2021-02-22

## 2021-02-22 DIAGNOSIS — Z95.2 S/P AVR (AORTIC VALVE REPLACEMENT): ICD-10-CM

## 2021-02-22 DIAGNOSIS — Z98.890 S/P MVR (MITRAL VALVE REPAIR): ICD-10-CM

## 2021-02-22 LAB — INR PPP: 2.2 (ref 0.9–1.1)

## 2021-02-22 NOTE — TELEPHONE ENCOUNTER
Routed to please advise.    Lilia BSN-RN  Triage Nurse  [facility]: [facility]

## 2021-02-22 NOTE — PROGRESS NOTES
Valve team stated OK to stop Lovenox since INR >2, but message received after original notice:    Thanh Morse MD Engelhart, JAYNA Cabral; Madelaine Sotelo, [facility]             I give lovenox for another 24 hours since she is not fully anticoagulated now though INR is 2.2     She should get lovenox tomorrow and repeat INR on Wednesday and if over 2, then she can stop lovenox.     Thank you.      Advised to inject another 24 hours.  Home care scheduled 02/25/2021.    Madelaine Sotelo, PharmD BCACP  Anticoagulation Clinical Pharmacist

## 2021-02-22 NOTE — TELEPHONE ENCOUNTER
Emelyn,  Referral pended. Please advise.    Klaudia Pickett RN  [facility]

## 2021-02-22 NOTE — TELEPHONE ENCOUNTER
Ok to the orders below and ok to take the over the counter vitamins at the doses she is suggesting

## 2021-02-22 NOTE — TELEPHONE ENCOUNTER
Reason for call:  Home Healthcare Reason for Call:  Home Health Care    Nora with [facility] called regarding (reason for call):     Orders are needed for this patient.     Need to know if patient was discontinued the orders for metoprolol tartrate (LOPRESSOR)    Patient wants to change vitamins to over the counter from prescription.    She would like to change to the following      Calcium with vitamin D     Currently is taking Calcium 500 with vitamin D 200 mg 2 times daily.    She would like to take over the counter 600 Calcium with 20 mcq of Vitamin D 2 times daily    Currently taking: Glucosamine-CHONDROITIN 500 mg 2 time daily    Wants to change to she would like to change with Glucosamine-CHONDROITIN 1500 mg over the counter 1 time daily.    This could have adverse reaction for her INR/ warfarin absorption     Needs Skill Nursing 2 times per week for 8 weeks    Phone Number Homecare Nurse can be reached at: [phone]    Can we leave a detailed message on this number? YES

## 2021-02-22 NOTE — PROGRESS NOTES
ANTICOAGULATION MANAGEMENT     Patient Name:  Rajani Guo  Date:  2021    ASSESSMENT /SUBJECTIVE:    Today's INR result of 2.2 is subtherapeutic. Goal INR of 2.5-3.5      Warfarin dose taken: Warfarin taken as instructed    Diet: No new diet changes affecting INR    Medication changes/ interactions: No new medications/supplements affecting INR. Continues on Amiodarone.    Previous INR: Subtherapeutic     S/S of bleeding or thromboembolism: No    New injury or illness: No    Upcoming surgery, procedure or cardioversion: No    Additional findings: Continues on lovenox      PLAN:    Telephone call with home care nurse Samara regarding INR result and instructed:     Warfarin Dosing Instructions: Continue your current warfarin dose 6mg every Thu; 4mg all other days    Instructed patient to follow up no later than:   Orders given to  Homecare nurse/facility to recheck    Education provided: Target INR goal and significance of current INR result      Samara verbalizes understanding and agrees to warfarin dosing plan.    Instructed to call the Anticoagulation Clinic for any changes, questions or concerns. (#[phone])        Matthew Finn RN      OBJECTIVE:  Recent labs: (last 7 days)     21   INR 1.3* 2.2*         No question data found.  Anticoagulation Summary  As of 2021    INR goal:  2.5-3.5   TTR:  7.7 % (1.3 mo)   INR used for dosin.2 (2021)   Warfarin maintenance plan:  6 mg (2 mg x 3) every Thu; 4 mg (2 mg x 2) all other days   Full warfarin instructions:  6 mg every Thu; 4 mg all other days   Weekly warfarin total:  30 mg   Plan last modified:  Madelaine Sotelo [facility] (2021)   Next INR check:  2021   Priority:  High   Target end date:  Indefinite    Indications    S/P MVR (mitral valve repair) [Z98.890]  S/P AVR (aortic valve replacement) [Z95.2]             Anticoagulation Episode Summary     INR check location:      Preferred lab:      Send INR reminders to:   GURVINDER DARDEN    Comments:  MVR & AVR placed 12/31/2020       Anticoagulation Care Providers     Provider Role Specialty Phone number    Quinton Handy PA Referring Cardiovascular & Thoracic Surgery [phone]    Lakeshia Rubio APRN CNP Referring Internal Medicine [phone]    Sparkle Bird APRN CNP Referring Emergency Medicine [phone]

## 2021-02-23 RX ORDER — AMIODARONE HYDROCHLORIDE 200 MG/1
TABLET ORAL
COMMUNITY
Start: 2021-02-23 | End: 2021-03-18

## 2021-02-23 RX ORDER — RIBOFLAVIN (VITAMIN B2) 100 MG
TABLET ORAL
COMMUNITY
Start: 2021-02-23

## 2021-02-23 NOTE — TELEPHONE ENCOUNTER
Emelyn,  You had placed orders for [facility] outpatient PT for pt on 2/19/2021. They are requesting homecare PT instead.    Klaudia Pickett RN  [facility]

## 2021-02-23 NOTE — TELEPHONE ENCOUNTER
Called home care nurseNora. Notified her of message from provider. She verbalized understanding. Gave her verbal order for SN 2 x per week for 8 weeks.   Pt's medication list updated in Epic to reflect the following that she is currently taking OTC:  -600 Calcium with 20 mcg (800 international unit(s) of Vitamin D 2 times daily.  -Glucosamine-CHONDROITIN 1500 mg over the counter 1 time daily.  Prescription from outside provider:  -Amiodarone-- they have her on 200 mg; take two tablets (400 mg) in the morning.

## 2021-02-23 NOTE — TELEPHONE ENCOUNTER
Cannot understand note as written.  Is the patient already receiving Physical Therapy at home and if so what are the original orders.  I can adjust or add to them if needed.  Isrrael MICHEL

## 2021-02-24 ENCOUNTER — ANCILLARY PROCEDURE (OUTPATIENT)
Dept: CARDIOLOGY | Facility: CLINIC | Age: 71
End: 2021-02-24
Attending: INTERNAL MEDICINE
Payer: COMMERCIAL

## 2021-02-24 DIAGNOSIS — Z95.2 S/P AORTIC VALVE AND MITRAL VALVE REPLACEMENT: ICD-10-CM

## 2021-02-24 PROCEDURE — 93306 TTE W/DOPPLER COMPLETE: CPT | Performed by: INTERNAL MEDICINE

## 2021-02-25 ENCOUNTER — TELEPHONE (OUTPATIENT)
Dept: CARE COORDINATION | Facility: CLINIC | Age: 71
End: 2021-02-25

## 2021-02-25 ENCOUNTER — ANTICOAGULATION THERAPY VISIT (OUTPATIENT)
Dept: FAMILY MEDICINE | Facility: CLINIC | Age: 71
End: 2021-02-25

## 2021-02-25 DIAGNOSIS — Z98.890 S/P MVR (MITRAL VALVE REPAIR): ICD-10-CM

## 2021-02-25 DIAGNOSIS — Z95.2 S/P AVR (AORTIC VALVE REPLACEMENT): ICD-10-CM

## 2021-02-25 LAB — INR PPP: 2 (ref 0.9–1.1)

## 2021-02-25 NOTE — PROGRESS NOTES
ANTICOAGULATION MANAGEMENT     Patient Name:  Rajani Guo  Date:  2021    ASSESSMENT /SUBJECTIVE:    Today's INR result of 2.0 is subtherapeutic. Goal INR of 2.5-3.5      Warfarin dose taken: Warfarin taken as instructed    Diet: No new diet changes affecting INR    Medication changes/ interactions: No new medications/supplements affecting INR    Previous INR: Subtherapeutic     S/S of bleeding or thromboembolism: No    New injury or illness: No    Upcoming surgery, procedure or cardioversion: No    Additional findings: Patient to continue lovenox through the weekend and new rx sent to preferred pharmacy of [facility].      PLAN:    Telephone call with home care nurse Samara regarding INR result and instructed:     Warfarin Dosing Instructions: Change your warfarin dose to 6 mg on sun/thur and 4 mg all other days  . (6.7 % change)    Instructed patient to follow up no later than: 21  Orders given to  Homecare nurse/facility to recheck    Education provided: Monitoring for clotting signs and symptoms      Samara, home care, verbalizes understanding and agrees to warfarin dosing plan.    Instructed to call the Anticoagulation Clinic for any changes, questions or concerns. (#[phone])        Janae Davila RN      OBJECTIVE:  Recent labs: (last 7 days)     21   INR 2.2* 2.0*         No question data found.  Anticoagulation Summary  As of 2021    INR goal:  2.5-3.5   TTR:  7.2 % (1.4 mo)   INR used for dosin.0 (2021)   Warfarin maintenance plan:  6 mg (2 mg x 3) every Sun, Thu; 4 mg (2 mg x 2) all other days   Full warfarin instructions:  6 mg every Sun, Thu; 4 mg all other days   Weekly warfarin total:  32 mg   Plan last modified:  Janae Davila RN (2021)   Next INR check:  3/1/2021   Priority:  High   Target end date:  Indefinite    Indications    S/P MVR (mitral valve repair) [Z98.890]  S/P AVR (aortic valve replacement) [Z95.2]             Anticoagulation  Episode Summary     INR check location:      Preferred lab:      Send INR reminders to:  GURVINDER DARDEN    Comments:  MVR & AVR placed 12/31/2020       Anticoagulation Care Providers     Provider Role Specialty Phone number    Quinton Handy PA Referring Cardiovascular & Thoracic Surgery [phone]    Lakeshia Rubio, APRN CNP Referring Internal Medicine [phone]    Sparkle Bird APRN CNP Referring Emergency Medicine [phone]

## 2021-02-25 NOTE — TELEPHONE ENCOUNTER
Dear Emelyn Overton PA-C,  Thank you for the referral for Physical therapy Home Care.  She does already have [facility] coming in to see her with Therapy, so unable to complete this referral.    Thank you  Ai Carrera RN, BSN  [facility]  RN Care Coordinator  [phone]

## 2021-03-01 ENCOUNTER — TELEPHONE (OUTPATIENT)
Dept: FAMILY MEDICINE | Facility: CLINIC | Age: 71
End: 2021-03-01

## 2021-03-01 ENCOUNTER — ANTICOAGULATION THERAPY VISIT (OUTPATIENT)
Dept: FAMILY MEDICINE | Facility: CLINIC | Age: 71
End: 2021-03-01

## 2021-03-01 ENCOUNTER — OFFICE VISIT (OUTPATIENT)
Dept: FAMILY MEDICINE | Facility: CLINIC | Age: 71
End: 2021-03-01
Payer: COMMERCIAL

## 2021-03-01 VITALS
BODY MASS INDEX: 23.49 KG/M2 | HEIGHT: 65 IN | TEMPERATURE: 97.9 F | OXYGEN SATURATION: 99 % | RESPIRATION RATE: 18 BRPM | DIASTOLIC BLOOD PRESSURE: 62 MMHG | SYSTOLIC BLOOD PRESSURE: 124 MMHG | WEIGHT: 141 LBS | HEART RATE: 79 BPM

## 2021-03-01 DIAGNOSIS — Z79.4 TYPE 2 DIABETES MELLITUS WITHOUT COMPLICATION, WITH LONG-TERM CURRENT USE OF INSULIN (H): ICD-10-CM

## 2021-03-01 DIAGNOSIS — Z95.2 S/P AVR (AORTIC VALVE REPLACEMENT): ICD-10-CM

## 2021-03-01 DIAGNOSIS — Z95.2 H/O MITRAL VALVE REPLACEMENT WITH MECHANICAL VALVE: ICD-10-CM

## 2021-03-01 DIAGNOSIS — Z95.2 H/O MECHANICAL AORTIC VALVE REPLACEMENT: ICD-10-CM

## 2021-03-01 DIAGNOSIS — R29.6 FALLS FREQUENTLY: ICD-10-CM

## 2021-03-01 DIAGNOSIS — Z12.31 VISIT FOR SCREENING MAMMOGRAM: ICD-10-CM

## 2021-03-01 DIAGNOSIS — Z53.9 DIAGNOSIS NOT YET DEFINED: Primary | ICD-10-CM

## 2021-03-01 DIAGNOSIS — R54 FRAILTY: ICD-10-CM

## 2021-03-01 DIAGNOSIS — J44.9 CHRONIC OBSTRUCTIVE PULMONARY DISEASE, UNSPECIFIED COPD TYPE (H): ICD-10-CM

## 2021-03-01 DIAGNOSIS — Z98.890 S/P MVR (MITRAL VALVE REPAIR): ICD-10-CM

## 2021-03-01 DIAGNOSIS — K92.2 GASTROINTESTINAL HEMORRHAGE, UNSPECIFIED GASTROINTESTINAL HEMORRHAGE TYPE: Primary | ICD-10-CM

## 2021-03-01 DIAGNOSIS — I73.9 PERIPHERAL VASCULAR DISEASE, UNSPECIFIED (H): ICD-10-CM

## 2021-03-01 DIAGNOSIS — R30.0 DYSURIA: ICD-10-CM

## 2021-03-01 DIAGNOSIS — E11.9 TYPE 2 DIABETES MELLITUS WITHOUT COMPLICATION, WITH LONG-TERM CURRENT USE OF INSULIN (H): ICD-10-CM

## 2021-03-01 LAB — INR PPP: 2.4 (ref 0.9–1.1)

## 2021-03-01 PROCEDURE — G0180 MD CERTIFICATION HHA PATIENT: HCPCS | Performed by: FAMILY MEDICINE

## 2021-03-01 PROCEDURE — 99214 OFFICE O/P EST MOD 30 MIN: CPT | Performed by: FAMILY MEDICINE

## 2021-03-01 RX ORDER — GABAPENTIN 300 MG/1
300 CAPSULE ORAL AT BEDTIME
Qty: 90 CAPSULE | Refills: 0 | Status: CANCELLED | OUTPATIENT
Start: 2021-03-01

## 2021-03-01 RX ORDER — BUPROPION HYDROCHLORIDE 150 MG/1
150 TABLET, EXTENDED RELEASE ORAL 2 TIMES DAILY
Qty: 90 TABLET | Refills: 0 | Status: CANCELLED | OUTPATIENT
Start: 2021-03-01

## 2021-03-01 RX ORDER — ATORVASTATIN CALCIUM 40 MG/1
40 TABLET, FILM COATED ORAL DAILY
Qty: 90 TABLET | Refills: 3 | Status: SHIPPED | OUTPATIENT
Start: 2021-03-01 | End: 2021-06-21

## 2021-03-01 ASSESSMENT — MIFFLIN-ST. JEOR: SCORE: 1162.95

## 2021-03-01 ASSESSMENT — PAIN SCALES - GENERAL: PAINLEVEL: NO PAIN (0)

## 2021-03-01 NOTE — PROGRESS NOTES
Assessment & Plan     Falls frequently  Advised stop gabapentinn  Hold lasix for now as BP tends on the Lower side     Type 2 diabetes mellitus without complication, with long-term current use of insulin (H)  Stable   - atorvastatin (LIPITOR) 40 MG tablet; Take 1 tablet (40 mg) by mouth daily  - Potassium; Future  - EYE ADULT REFERRAL; Future  - Basic metabolic panel; Future    Frailty  Pt lives with daughter  Has Home PT  Encouraged to do This    Peripheral vascular disease, unspecified (H)  Stable     S/P AVR (aortic valve replacement)  Stable     H/O mitral valve replacement with mechanical valve  Stable     H/O mechanical aortic valve replacement  Stable     S/P MVR (mitral valve repair)  Stable     Visit for screening mammogram  Advised   - MA Screening Digital Bilateral; Future    Dysuria  Labs pending   - *UA reflex to Microscopic and Culture ([place] and [facility] (except [place] and [place])  - Urine Culture Aerobic Bacterial    Gastrointestinal hemorrhage, unspecified gastrointestinal hemorrhage type  Doing well  Will check labs  - CBC with platelets; Future    Chronic obstructive pulmonary disease, unspecified COPD type (H)  Stable     Return in about 1 week (around 3/8/2021) for Lab Work.    Tamiko Coley MD  [facility] MARIE Gerard is a 70 year old who presents for the following health issues  accompanied by her daughter:    [facility] the [place]         Hospital Follow-up Visit:    Hospital/Nursing Home/IP Rehab Facility: [facility]  Date of Admission: 1/24/21   Date of Discharge: 1/27/21  Reason(s) for Admission: fall and UTI    Weakness  Falls  UTI  [facility]     Was your hospitalization related to COVID-19? No   Problems taking medications regularly:  None  Medication changes since discharge: None  Problems adhering to non-medication therapy:  None    Summary of hospitalization:  CareEverywhere information obtained and  "reviewed  Diagnostic Tests/Treatments reviewed.  Follow up needed: has neuro appointment   Other Healthcare Providers Involved in Patient s Care:         Homecare  Update since discharge: improved.       Post Discharge Medication Reconciliation: discharge medications reconciled and changed, per note/orders.  Plan of care communicated with patient            Review of Systems       Pt has had Frequent falls  Her Blood Pressure-Toprol was held  Her BP today is better  Pt is Not compliant with PT per her daughter  Blood sugars average 124  No Blurry visiion  No numbness or tingling  Pt continues to smoke  COPD is stable   No sob  CONSTITUTIONAL:NEGATIVE  for chills and fever   ENT/MOUTH: NEGATIVE for ear, mouth and throat problems  RESP: NEGATIVE for significant cough or SOB  CV: NEGATIVE for chest pain, palpitations or peripheral edema  GI: NEGATIVE for nausea, abdominal pain, heartburn, or change in bowel habits  NEURO: pt is Frail since her surgery but recovering  PSYCHIATRIC: Depression is stable       Objective    /62   Pulse 79   Temp 97.9  F (36.6  C) (Oral)   Resp 18   Ht 1.647 m (5' 4.84\")   Wt 64 kg (141 lb)   SpO2 99%   BMI 23.58 kg/m    Body mass index is 23.58 kg/m .  Physical Exam   GENERAL: alert, no distress, frail and elderly  NECK: no adenopathy, no asymmetry, masses, or scars and thyroid normal to palpation  RESP: lungs clear to auscultation - no rales, rhonchi or wheezes  CV: regular rate and rhythm, normal S1 S2, no S3 or S4, no murmur, click or rub, no peripheral edema and peripheral pulses strong  ABDOMEN: soft, nontender, no hepatosplenomegaly, no masses and bowel sounds normal  MS: no gross musculoskeletal defects noted, no edema  SKIN: no suspicious lesions or rashes  PSYCH: mentation appears normal  Diabetic foot exam: normal DP and PT pulses    Pending             "

## 2021-03-01 NOTE — TELEPHONE ENCOUNTER
Home care nurse saw patient today, she noted patient has low BP and foul smelling urine. Please be sure to address at 2:40 appt today with .

## 2021-03-01 NOTE — PATIENT INSTRUCTIONS
We are working hard to begin vaccinating more people against COVID-19. Currently, we are only vaccinating individuals age 70 and older and Phase 1a workers - healthcare workers who are unable to do their job remotely. Vaccine availability is very limited.    If you are 70 or older, or a healthcare worker who is unable to do your job remotely, please log in to [employer] using this link to see if we have an open appointment and schedule an appointment.  If there are no appointments left, you will be unable to schedule and need to check back later.  If you are a healthcare worker, you will be asked to provide proof of employment at your appointment. If you cannot, you will be turned away.    Vaccine appointments are being added as they become available. Please check your [employer] account frequently for availability. If you have technical difficulty using [employer], call [phone] for assistance.    You can learn more about the [facility]'s phased approach to administering the vaccine, with details on each phase, [email or web address].      As vaccine supply increases and we are able to open appointments to more groups, we will share that information on our website [email or web address]. Check this website to stay up to date on COVID-19 vaccination information.

## 2021-03-01 NOTE — TELEPHONE ENCOUNTER
Dr. Coley will address at [facility] [place] today with the patient.  Marie Hyde MA

## 2021-03-01 NOTE — PROGRESS NOTES
[facility] Neurology Consultation    Rajani Guo MRN# [id number]   Age: 70 year old YOB: 1950     Requesting physician: Tamiko Iyer     Reason for Consultation: balance difficulties      History of Presenting Symptoms:   Rajani Guo is a 70 year old female who presents today for evaluation of balance difficulties.    Patient had mitral valve replacement and aortic valve replacement surgery at the end of December. She notes that since this surgery she has had issues with balance and multiple falls. She was discharged from the hospital on 1/6/2021. She presented to the hospital again on 1/24/2020 due to balance problems. She was found to have a UTI. CT brain was unremarkable. She was evaluated by physical therapy and it was recommended she go to rehab. Patient was discharged to rehab and spent 2 weeks working with physical and occupational therapy. She is currently getting physical therapy at home and has a session today.     She currently walks with a walker at home. Previously she was not using any device. She thinks it is both a problem with strength and getting the legs to do what she wants them to do. She denies any numbness or tingling in the hands or feet. She has fallen multiple times, but she hasn't hurt herself. Per daughter sometimes her walking has been variable, sometimes bad and sometimes alright.     Patient reports that she had significant stress around the time of the surgery.       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)     Fatigue     SOB (shortness of breath)     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)     Anemia, iron deficiency     Left leg Arterial Thrombus, Tx'd with Lytics -- 3/4/19 (?cardioembolic)     Mitral valve insufficiency, unspecified etiology     Aortic valve insufficiency, etiology of cardiac valve disease unspecified     Severe aortic stenosis     Moderate aortic insufficiency     Dyspnea on exertion     Status post coronary angiogram     Mitral and aortic incompetence     S/P MVR (mitral valve repair)     S/P AVR (aortic valve replacement)     UTI (urinary tract infection)     Trigger middle finger of right hand     Personal history of colonic polyps     Sensorineural hearing loss, bilateral     COPD (chronic obstructive pulmonary disease) (H)     History of CT scan of head 2021     Frailty     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   Findings: There is no intracranial hemorrhage, mass effect, or midline shift. Gray/whi     History of partial thyroidectomy 06/02/2018     Hyperlipidemia LDL goal <100 01/18/2018     Hypertension      Insomnia,  unspecified type 01/18/2018     Mitral regurgitation      Mitral stenosis      Pulmonary hypertension (H)      Tobacco use disorder         Past Surgical History:     Past Surgical History:   Procedure Laterality Date     COLONOSCOPY N/A 9/4/2019    Procedure: COLONOSCOPY;  Surgeon: Marie Todd MD;  Location:  GI     CV CORONARY ANGIOGRAM N/A 11/16/2020    Procedure: CV CORONARY ANGIOGRAM;  Surgeon: Joey Rivas MD;  Location:  HEART CARDIAC CATH LAB     CV FRACTIONAL FLOW RESERVE N/A 11/16/2020    Procedure: Fractional Flow Reserve;  Surgeon: Joey Rivas MD;  Location:  HEART CARDIAC CATH LAB     CV RIGHT HEART CATH MEASUREMENTS RECORDED N/A 11/16/2020    Procedure: CV RIGHT HEART CATH;  Surgeon: Joey Rivas MD;  Location:  HEART CARDIAC CATH LAB     ESOPHAGOSCOPY, GASTROSCOPY, DUODENOSCOPY (EGD), COMBINED N/A 9/4/2019    Procedure: ESOPHAGOGASTRODUODENOSCOPY (EGD);  Surgeon: Marie Todd MD;  Location:  GI     ESOPHAGOSCOPY, GASTROSCOPY, DUODENOSCOPY (EGD), COMBINED N/A 9/17/2020    Procedure: ESOPHAGOGASTRODUODENOSCOPY (EGD);  Surgeon: Ten Ibrahim DO;  Location:  GI     IR ANGIOGRAM THROUGH CATHETER FOLLOW UP  3/5/2019     IR LOWER EXTREMITY ANGIOGRAM LEFT  3/4/2019     KNEE SURGERY Right 2013    right knee torn meniscus surgery     OVARY SURGERY  1971    1 ovary removed     RELEASE CARPAL TUNNEL Right 1988     RELEASE TRIGGER FINGER BILATERAL       REPLACE VALVES AORTIC AND MITRAL, COMBINED N/A 12/29/2020    Procedure: Median Stertonomy, REPLACEMENT AORTIC Valve  with 19mm St Abdoulaye Ruby  Mechanical Heart Valve AND MITRAL VALVE Replacement with 27mm St Abdoulaye Mechanical Heart Valve, on pump oxygenation;  Surgeon: Luc Lara MD;  Location: [facility] OR     SHOULDER SURGERY Left     rotator cuff repair, plate placement     THYROID SURGERY  1988    partial thyroidectomy        Social History:     Social History     Tobacco Use     Smoking status: Former  Smoker     Packs/day: 1.00     Years: 58.00     Pack years: 58.00     Start date: 1962     Quit date: 2020     Years since quittin.1     Smokeless tobacco: Never Used     Tobacco comment: using 21 mg patch while inpatient   Substance Use Topics     Alcohol use: Not Currently     Comment: 2 glasses of wine a week     Drug use: Yes     Types: Marijuana     Comment: occasional        Family History:     Family History   Problem Relation Age of Onset     Diabetes Type 2  Mother      Lung Cancer Father      Diabetes Sister      Coronary Artery Disease Sister      Diabetes Brother      Diabetes Brother      Diabetes Type 2  Brother      Coronary Artery Disease Sister      Asthma Sister      Glaucoma No family hx of      Macular Degeneration No family hx of      Anesthesia Reaction No family hx of         Medications:     Current Outpatient Medications   Medication Sig     acetaminophen (TYLENOL) 325 MG tablet TAKE 2 TABLETS (650 MG) BY MOUTH EVERY 4 HOURS AS NEEDED FOR PAIN     amiodarone (PACERONE) 200 MG tablet Take two tablets (400 mg) in the morning.     aspirin (ASA) 81 MG EC tablet Take 1 tablet (81 mg) by mouth daily     atorvastatin (LIPITOR) 40 MG tablet Take 1 tablet (40 mg) by mouth daily     buPROPion (WELLBUTRIN SR) 150 MG 12 hr tablet Take 1 tablet (150 mg) by mouth 2 times daily     Calcium Carb-Cholecalciferol 600-800 MG-UNIT TABS Take 1 tablet by mouth 2 times daily     enoxaparin ANTICOAGULANT (LOVENOX) 60 MG/0.6ML syringe Inject 0.6 mLs (60 mg) Subcutaneous 2 times daily Until INR therapeutic     ferrous sulfate (FEROSUL) 325 (65 Fe) MG tablet Take 325 mg by mouth 2 times daily     glucosamine-chondroitinoitin 500-400 MG tablet Patient is taking 1500 mg OTC 1 time daily     insulin pen needle (29G X 12MM) 29G X 12MM miscellaneous Use 1 pen needles daily or as directed.     Melatonin 10 MG TABS tablet Take 10 mg by mouth At Bedtime     metFORMIN (GLUCOPHAGE) 1000 MG tablet TAKE 1 TABLET  "BY MOUTH TWICE A DAY WITH MEALS     Multiple Vitamin (MULTI-VITAMINS) TABS Take 1 tablet by mouth daily      nicotine (NICODERM CQ) 21 MG/24HR 24 hr patch Place 1 patch onto the skin daily     oxybutynin (DITROPAN) 5 MG tablet TAKE 1 TABLET BY MOUTH TWICE A DAY     pantoprazole (PROTONIX) 40 MG EC tablet TAKE 1 TABLET BY MOUTH EVERY DAY     senna-docusate (SENOKOT-S/PERICOLACE) 8.6-50 MG tablet Take 1-2 tablets by mouth 2 times daily (Patient taking differently: Take 1 tablet by mouth 2 times daily )     traZODone (DESYREL) 50 MG tablet TAKE 1 2 TABLETS (50 100 MG) BY MOUTH AT BEDTIME     vitamin C (ASCORBIC ACID) 500 MG tablet Take 1 tablet (500 mg) by mouth daily     warfarin ANTICOAGULANT (COUMADIN) 1 MG tablet Take 2 tablets (2 mg) by mouth daily     warfarin ANTICOAGULANT (COUMADIN) 2 MG tablet TAKE 3 TABLETS BY MOUTH EVERY DAY AT 6 PM UNTIL NEXT INR CHECK, THEN DOSE PER INR GOAR 2.5 3.5     No current facility-administered medications for this visit.         Allergies:     Allergies   Allergen Reactions     Indomethacin Other (See Comments)     Dizziness and disorientation     Tramadol Nausea and Vomiting        Review of Systems:   As above     Physical Exam:   Vitals: BP (!) 157/83   Pulse 79   Ht 1.626 m (5' 4\")   Wt 64 kg (141 lb)   SpO2 98%   BMI 24.20 kg/m     General: Seated comfortably in no acute distress.  HEENT: Neck supple with normal range of motion. No paracervical muscle tenderness or tightness.  Optic discs sharp and vasculature normal on funduscopic exam.   Heart: Regular rate  Lungs: breathing comfortably  Extremities: no edema  Skin: No rashes  Neurologic:     Mental Status: Fully alert, attentive and oriented. Normal memory and fund of knowledge. Language normal, speech clear and fluent, no paraphasic errors.      Cranial Nerves: Visual fields intact. PERRL. EOMI with normal smooth pursuit. Facial sensation intact/symmetric. Facial movements symmetric. Hearing not formally tested but " intact to conversation. Palate elevation symmetric, uvula midline. No dysarthria. Shoulder shrug strong bilaterally. Tongue protrusion midline.     Motor: No tremors or other abnormal movements observed. Muscle tone normal throughout. Normal/symmetric rapid finger tapping. Strength 5/5 throughout upper and lower extremities. Intermittent give way in bilateral lower extremity hip flexion, but is able to give brief 5/5 strength. Unable to stand from seated position without using hands.     Deep Tendon Reflexes: 2+/symmetric throughout upper extremities. Lower extremities reflexes are 1+. Toes downgoing bilaterally.     Sensory: vibration is 10 seconds in right foot, 25 seconds in left foot, normal in hands (25 seconds in left hand). Intact/symmetric to light touch throughout upper and lower extremities. Mild decreased temperature sensation in bilateral feet compared to hands. Negative Romberg.      Coordination: Finger-nose-finger and heel-shin intact without dysmetria. Rapid alternating movements intact/symmetric with normal speed and rhythm.     Gait: Gait with normal stance width. Gait is cautious with astasia-abasia quality. Able to do heel, toe, and tandem gait with intermittent variations in extra steps.          Data: Pertinent prior to visit   Imaging:  CT head 1/24/2021  Impression:  No acute intracranial pathology..     Procedures:  None    Laboratory:  B12 457 10/2020         Assessment and Plan:   Assessment:  Rajani Guo is a 70 year old female who presents today for evaluation of balance difficulties. She reports balance problems starting following AVR/MVR surgery in December 2020. She was hospitalized in January 2021 for the balance difficulties and was discharged to rehab for 2 weeks. 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 Strength is 5/5 in all extremities and there is no evidence of hyperreflexia or increased  tone to suggest myelopathy. In addition no exam findings were present to suggest peripheral neuropathy. CT brain showed no structural brain problem to explain symptoms. We will also check CK level today. 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 Patient is currently receiving home physical therapy and I encouraged to do daily exercises as instructed by therapist.     Patient also endorses chronic issues with short term memory. We discussed checking vitamin B12/MMA and TSH today.      Plan:  - Continue PT  - CK level  - TSH/B12/MMA    Follow up in Neurology clinic in 3 months or earlier as needed should new concerns arise.    Bin Alcaraz MD   of Neurology  [facility]    The total time of this encounter amounted to 70 minutes. This time included time spent with the patient, prep work, ordering tests, and performing post visit documentation.

## 2021-03-01 NOTE — PROGRESS NOTES
ANTICOAGULATION MANAGEMENT     Patient Name:  Rajani Guo  Date:  3/1/2021    ASSESSMENT /SUBJECTIVE:    Today's INR result of 2.4 is subtherapeutic. Goal INR of 2.5-3.5      Warfarin dose taken: Warfarin taken as instructed    Diet: No new diet changes affecting INR    Medication changes/ interactions: No new medications/supplements affecting INR    Previous INR: Subtherapeutic     S/S of bleeding or thromboembolism: No    New injury or illness: Yes: Dysuria. Obtaining urine sample today to check for UTI    Upcoming surgery, procedure or cardioversion: No    Additional findings: Stopping lovenox today per [facility]      PLAN:    Telephone call with home care nurse Nora regarding INR result and instructed:     Warfarin Dosing Instructions: Change your warfarin dose to 6mg every Sun, Tue, Thu; 4mg all other days  . (6.3 % change)    Instructed patient to follow up no later than:   Orders given to  Homecare nurse/facility to recheck    Education provided: Target INR goal and significance of current INR result      Nora verbalizes understanding and agrees to warfarin dosing plan.    Instructed to call the Anticoagulation Clinic for any changes, questions or concerns. (#[phone])        Matthew Finn RN      OBJECTIVE:  Recent labs: (last 7 days)     21   INR 2.0* 2.4         No question data found.  Anticoagulation Summary  As of 3/1/2021    INR goal:  2.5-3.5   TTR:  6.5 % (1.5 mo)   INR used for dosin.4 (3/1/2021)   Warfarin maintenance plan:  6 mg (2 mg x 3) every Sun, Thu; 4 mg (2 mg x 2) all other days   Full warfarin instructions:  6 mg every Sun, Thu; 4 mg all other days   Weekly warfarin total:  32 mg   Plan last modified:  Janae Davila RN (2021)   Next INR check:     Priority:  High   Target end date:  Indefinite    Indications    S/P MVR (mitral valve repair) [Z98.890]  S/P AVR (aortic valve replacement) [Z95.2]             Anticoagulation Episode Summary     INR check  location:      Preferred lab:      Send INR reminders to:  GURVINDER DARDEN    Comments:  MVR & AVR placed 12/31/2020       Anticoagulation Care Providers     Provider Role Specialty Phone number    Quinton Handy PA Referring Cardiovascular & Thoracic Surgery [phone]    Lakeshia Rubio, FRANCESCA CNP Referring Internal Medicine [phone]    Sparkle Bird APRN CNP Referring Emergency Medicine [phone]

## 2021-03-02 ENCOUNTER — OFFICE VISIT (OUTPATIENT)
Dept: NEUROLOGY | Facility: CLINIC | Age: 71
End: 2021-03-02
Attending: PHYSICIAN ASSISTANT
Payer: COMMERCIAL

## 2021-03-02 VITALS
HEIGHT: 64 IN | DIASTOLIC BLOOD PRESSURE: 83 MMHG | WEIGHT: 141 LBS | OXYGEN SATURATION: 98 % | BODY MASS INDEX: 24.07 KG/M2 | HEART RATE: 79 BPM | SYSTOLIC BLOOD PRESSURE: 157 MMHG

## 2021-03-02 DIAGNOSIS — E11.9 TYPE 2 DIABETES MELLITUS WITHOUT COMPLICATION, WITH LONG-TERM CURRENT USE OF INSULIN (H): ICD-10-CM

## 2021-03-02 DIAGNOSIS — N39.0 URINARY TRACT INFECTION WITHOUT HEMATURIA, SITE UNSPECIFIED: Primary | ICD-10-CM

## 2021-03-02 DIAGNOSIS — R93.89 ABNORMAL FINDINGS ON DIAGNOSTIC IMAGING OF OTHER SPECIFIED BODY STRUCTURES: ICD-10-CM

## 2021-03-02 DIAGNOSIS — Z79.4 TYPE 2 DIABETES MELLITUS WITHOUT COMPLICATION, WITH LONG-TERM CURRENT USE OF INSULIN (H): ICD-10-CM

## 2021-03-02 DIAGNOSIS — K92.2 GASTROINTESTINAL HEMORRHAGE, UNSPECIFIED GASTROINTESTINAL HEMORRHAGE TYPE: ICD-10-CM

## 2021-03-02 DIAGNOSIS — R30.0 DYSURIA: ICD-10-CM

## 2021-03-02 DIAGNOSIS — R26.89 IMBALANCE: Primary | ICD-10-CM

## 2021-03-02 LAB
ALBUMIN UR-MCNC: 30 MG/DL
ANION GAP SERPL CALCULATED.3IONS-SCNC: 5 MMOL/L (ref 3–14)
APPEARANCE UR: ABNORMAL
BACTERIA #/AREA URNS HPF: ABNORMAL /HPF
BILIRUB UR QL STRIP: NEGATIVE
BUN SERPL-MCNC: 13 MG/DL (ref 7–30)
CALCIUM SERPL-MCNC: 9.2 MG/DL (ref 8.5–10.1)
CHLORIDE SERPL-SCNC: 107 MMOL/L (ref 94–109)
CK SERPL-CCNC: 46 U/L (ref 30–225)
CO2 SERPL-SCNC: 28 MMOL/L (ref 20–32)
COLOR UR AUTO: YELLOW
CREAT SERPL-MCNC: 0.64 MG/DL (ref 0.52–1.04)
ERYTHROCYTE [DISTWIDTH] IN BLOOD BY AUTOMATED COUNT: 20.6 % (ref 10–15)
GFR SERPL CREATININE-BSD FRML MDRD: >90 ML/MIN/{1.73_M2}
GLUCOSE SERPL-MCNC: 124 MG/DL (ref 70–99)
GLUCOSE UR STRIP-MCNC: NEGATIVE MG/DL
HCT VFR BLD AUTO: 37.1 % (ref 35–47)
HGB BLD-MCNC: 10.9 G/DL (ref 11.7–15.7)
HGB UR QL STRIP: ABNORMAL
KETONES UR STRIP-MCNC: NEGATIVE MG/DL
LEUKOCYTE ESTERASE UR QL STRIP: ABNORMAL
MCH RBC QN AUTO: 25.6 PG (ref 26.5–33)
MCHC RBC AUTO-ENTMCNC: 29.4 G/DL (ref 31.5–36.5)
MCV RBC AUTO: 87 FL (ref 78–100)
NITRATE UR QL: POSITIVE
NON-SQ EPI CELLS #/AREA URNS LPF: ABNORMAL /LPF
PH UR STRIP: 6 PH (ref 5–7)
PLATELET # BLD AUTO: 343 10E9/L (ref 150–450)
POTASSIUM SERPL-SCNC: 4.6 MMOL/L (ref 3.4–5.3)
RBC # BLD AUTO: 4.26 10E12/L (ref 3.8–5.2)
RBC #/AREA URNS AUTO: ABNORMAL /HPF
SODIUM SERPL-SCNC: 140 MMOL/L (ref 133–144)
SOURCE: ABNORMAL
SP GR UR STRIP: 1.02 (ref 1–1.03)
T4 FREE SERPL-MCNC: 0.93 NG/DL (ref 0.76–1.46)
TSH SERPL DL<=0.005 MIU/L-ACNC: 5.26 MU/L (ref 0.4–4)
UROBILINOGEN UR STRIP-MCNC: NORMAL MG/DL (ref 0–2)
VIT B12 SERPL-MCNC: 397 PG/ML (ref 193–986)
WBC # BLD AUTO: 7.3 10E9/L (ref 4–11)
WBC #/AREA URNS AUTO: ABNORMAL /HPF

## 2021-03-02 PROCEDURE — 84439 ASSAY OF FREE THYROXINE: CPT | Performed by: FAMILY MEDICINE

## 2021-03-02 PROCEDURE — 87186 SC STD MICRODIL/AGAR DIL: CPT | Performed by: FAMILY MEDICINE

## 2021-03-02 PROCEDURE — 87088 URINE BACTERIA CULTURE: CPT | Performed by: FAMILY MEDICINE

## 2021-03-02 PROCEDURE — 85027 COMPLETE CBC AUTOMATED: CPT | Performed by: FAMILY MEDICINE

## 2021-03-02 PROCEDURE — 82607 VITAMIN B-12: CPT | Performed by: FAMILY MEDICINE

## 2021-03-02 PROCEDURE — 87086 URINE CULTURE/COLONY COUNT: CPT | Performed by: FAMILY MEDICINE

## 2021-03-02 PROCEDURE — 81001 URINALYSIS AUTO W/SCOPE: CPT | Performed by: FAMILY MEDICINE

## 2021-03-02 PROCEDURE — 82550 ASSAY OF CK (CPK): CPT | Performed by: FAMILY MEDICINE

## 2021-03-02 PROCEDURE — 36415 COLL VENOUS BLD VENIPUNCTURE: CPT | Performed by: FAMILY MEDICINE

## 2021-03-02 PROCEDURE — 83921 ORGANIC ACID SINGLE QUANT: CPT | Performed by: INTERNAL MEDICINE

## 2021-03-02 PROCEDURE — 99215 OFFICE O/P EST HI 40 MIN: CPT | Performed by: INTERNAL MEDICINE

## 2021-03-02 PROCEDURE — 84443 ASSAY THYROID STIM HORMONE: CPT | Performed by: FAMILY MEDICINE

## 2021-03-02 PROCEDURE — 80048 BASIC METABOLIC PNL TOTAL CA: CPT | Performed by: FAMILY MEDICINE

## 2021-03-02 RX ORDER — NITROFURANTOIN 25; 75 MG/1; MG/1
100 CAPSULE ORAL 2 TIMES DAILY
Qty: 10 CAPSULE | Refills: 0 | Status: SHIPPED | OUTPATIENT
Start: 2021-03-02 | End: 2021-03-07

## 2021-03-02 ASSESSMENT — PAIN SCALES - GENERAL: PAINLEVEL: NO PAIN (0)

## 2021-03-02 ASSESSMENT — MIFFLIN-ST. JEOR: SCORE: 1149.57

## 2021-03-02 NOTE — NURSING NOTE
"Rajani Guo's goals for this visit include: consult  She requests these members of her care team be copied on today's visit information:     PCP: Tamiko Coley    Referring Provider:  Zofia Steinberg PA-C  [facility] [address] [address]    BP (!) 157/83   Pulse 79   Ht 1.626 m (5' 4\")   Wt 64 kg (141 lb)   SpO2 98%   BMI 24.20 kg/m      Do you need any medication refills at today's visit? n  "

## 2021-03-02 NOTE — LETTER
3/2/2021         RE: Rajani Guo  [address] [facility] [address]        Dear Colleague,    Thank you for referring your patient, Rajani Guo, to the [facility] [place]. Please see a copy of my visit note below.    [facility] Neurology Consultation    Rajani Guo MRN# [id number]   Age: 70 year old YOB: 1950     Requesting physician: Tamiko Iyer     Reason for Consultation: balance difficulties      History of Presenting Symptoms:   Rajani Guo is a 70 year old female who presents today for evaluation of balance difficulties.    Patient had mitral valve replacement and aortic valve replacement surgery at the end of December. She notes that since this surgery she has had issues with balance and multiple falls. She was discharged from the hospital on 1/6/2021. She presented to the hospital again on 1/24/2020 due to balance problems. She was found to have a UTI. CT brain was unremarkable. She was evaluated by physical therapy and it was recommended she go to rehab. Patient was discharged to rehab and spent 2 weeks working with physical and occupational therapy. She is currently getting physical therapy at home and has a session today.     She currently walks with a walker at home. Previously she was not using any device. She thinks it is both a problem with strength and getting the legs to do what she wants them to do. She denies any numbness or tingling in the hands or feet. She has fallen multiple times, but she hasn't hurt herself. Per daughter sometimes her walking has been variable, sometimes bad and sometimes alright.     Patient reports that she had significant stress around the time of the surgery.       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)     Fatigue     SOB (shortness of breath)     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)     Anemia, iron deficiency     Left leg Arterial Thrombus, Tx'd with Lytics -- 3/4/19 (?cardioembolic)     Mitral valve insufficiency, unspecified etiology     Aortic valve insufficiency, etiology of cardiac valve disease unspecified     Severe aortic stenosis     Moderate aortic insufficiency     Dyspnea on exertion     Status post coronary angiogram     Mitral and aortic incompetence     S/P MVR (mitral valve repair)     S/P AVR (aortic valve replacement)     UTI (urinary tract infection)     Trigger middle finger of right hand     Personal history of colonic polyps     Sensorineural hearing loss, bilateral     COPD (chronic obstructive pulmonary disease) (H)     History of CT scan of head 2021     Frailty     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   Findings: There is no intracranial hemorrhage, mass effect, or midline shift. Gray/whi     History of partial thyroidectomy 06/02/2018     Hyperlipidemia LDL goal <100 01/18/2018     Hypertension      Insomnia, unspecified type 01/18/2018     Mitral regurgitation      Mitral stenosis      Pulmonary hypertension (H)      Tobacco use disorder         Past Surgical History:     Past Surgical History:   Procedure Laterality Date     COLONOSCOPY N/A 9/4/2019    Procedure: COLONOSCOPY;  Surgeon: Marie Todd MD;  Location:  GI     CV CORONARY ANGIOGRAM N/A 11/16/2020    Procedure: CV CORONARY ANGIOGRAM;  Surgeon: Joey Rivas MD;  Location:  HEART CARDIAC CATH LAB     CV FRACTIONAL FLOW RESERVE N/A 11/16/2020    Procedure: Fractional Flow Reserve;  Surgeon: Joey Rivas MD;  Location:  HEART CARDIAC CATH LAB     CV RIGHT HEART CATH MEASUREMENTS RECORDED N/A 11/16/2020    Procedure: CV RIGHT HEART CATH;  Surgeon: Joey Rivas MD;  Location:  HEART CARDIAC CATH LAB     ESOPHAGOSCOPY, GASTROSCOPY, DUODENOSCOPY (EGD), COMBINED N/A 9/4/2019    Procedure: ESOPHAGOGASTRODUODENOSCOPY (EGD);  Surgeon: Marie Todd MD;  Location:  GI     ESOPHAGOSCOPY, GASTROSCOPY, DUODENOSCOPY (EGD), COMBINED N/A 9/17/2020    Procedure: ESOPHAGOGASTRODUODENOSCOPY (EGD);  Surgeon: Ten Ibrahim DO;  Location:  GI     IR ANGIOGRAM THROUGH CATHETER FOLLOW UP  3/5/2019     IR LOWER EXTREMITY ANGIOGRAM LEFT  3/4/2019     KNEE SURGERY Right 2013    right knee torn meniscus surgery     OVARY SURGERY  1971    1 ovary removed     RELEASE CARPAL TUNNEL Right 1988     RELEASE TRIGGER FINGER BILATERAL       REPLACE VALVES AORTIC AND MITRAL, COMBINED N/A 12/29/2020    Procedure: Median Stertonomy, REPLACEMENT AORTIC Valve  with 19mm St Abdoulaye [place]  Mechanical Heart Valve AND MITRAL VALVE Replacement with 27mm St Abdoulaye Mechanical Heart Valve, on pump  oxygenation;  Surgeon: Luc Lara MD;  Location: [facility] OR     SHOULDER SURGERY Left     rotator cuff repair, plate placement     THYROID SURGERY  1988    partial thyroidectomy        Social History:     Social History     Tobacco Use     Smoking status: Former Smoker     Packs/day: 1.00     Years: 58.00     Pack years: 58.00     Start date: 1962     Quit date: 2020     Years since quittin.1     Smokeless tobacco: Never Used     Tobacco comment: using 21 mg patch while inpatient   Substance Use Topics     Alcohol use: Not Currently     Comment: 2 glasses of wine a week     Drug use: Yes     Types: Marijuana     Comment: occasional        Family History:     Family History   Problem Relation Age of Onset     Diabetes Type 2  Mother      Lung Cancer Father      Diabetes Sister      Coronary Artery Disease Sister      Diabetes Brother      Diabetes Brother      Diabetes Type 2  Brother      Coronary Artery Disease Sister      Asthma Sister      Glaucoma No family hx of      Macular Degeneration No family hx of      Anesthesia Reaction No family hx of         Medications:     Current Outpatient Medications   Medication Sig     acetaminophen (TYLENOL) 325 MG tablet TAKE 2 TABLETS (650 MG) BY MOUTH EVERY 4 HOURS AS NEEDED FOR PAIN     amiodarone (PACERONE) 200 MG tablet Take two tablets (400 mg) in the morning.     aspirin (ASA) 81 MG EC tablet Take 1 tablet (81 mg) by mouth daily     atorvastatin (LIPITOR) 40 MG tablet Take 1 tablet (40 mg) by mouth daily     buPROPion (WELLBUTRIN SR) 150 MG 12 hr tablet Take 1 tablet (150 mg) by mouth 2 times daily     Calcium Carb-Cholecalciferol 600-800 MG-UNIT TABS Take 1 tablet by mouth 2 times daily     enoxaparin ANTICOAGULANT (LOVENOX) 60 MG/0.6ML syringe Inject 0.6 mLs (60 mg) Subcutaneous 2 times daily Until INR therapeutic     ferrous sulfate (FEROSUL) 325 (65 Fe) MG tablet Take 325 mg by mouth 2 times daily     glucosamine-chondroitinoitin 500-400 MG tablet  "Patient is taking 1500 mg OTC 1 time daily     insulin pen needle (29G X 12MM) 29G X 12MM miscellaneous Use 1 pen needles daily or as directed.     Melatonin 10 MG TABS tablet Take 10 mg by mouth At Bedtime     metFORMIN (GLUCOPHAGE) 1000 MG tablet TAKE 1 TABLET BY MOUTH TWICE A DAY WITH MEALS     Multiple Vitamin (MULTI-VITAMINS) TABS Take 1 tablet by mouth daily      nicotine (NICODERM CQ) 21 MG/24HR 24 hr patch Place 1 patch onto the skin daily     oxybutynin (DITROPAN) 5 MG tablet TAKE 1 TABLET BY MOUTH TWICE A DAY     pantoprazole (PROTONIX) 40 MG EC tablet TAKE 1 TABLET BY MOUTH EVERY DAY     senna-docusate (SENOKOT-S/PERICOLACE) 8.6-50 MG tablet Take 1-2 tablets by mouth 2 times daily (Patient taking differently: Take 1 tablet by mouth 2 times daily )     traZODone (DESYREL) 50 MG tablet TAKE 1 2 TABLETS (50 100 MG) BY MOUTH AT BEDTIME     vitamin C (ASCORBIC ACID) 500 MG tablet Take 1 tablet (500 mg) by mouth daily     warfarin ANTICOAGULANT (COUMADIN) 1 MG tablet Take 2 tablets (2 mg) by mouth daily     warfarin ANTICOAGULANT (COUMADIN) 2 MG tablet TAKE 3 TABLETS BY MOUTH EVERY DAY AT 6 PM UNTIL NEXT INR CHECK, THEN DOSE PER INR GOAR 2.5 3.5     No current facility-administered medications for this visit.         Allergies:     Allergies   Allergen Reactions     Indomethacin Other (See Comments)     Dizziness and disorientation     Tramadol Nausea and Vomiting        Review of Systems:   As above     Physical Exam:   Vitals: BP (!) 157/83   Pulse 79   Ht 1.626 m (5' 4\")   Wt 64 kg (141 lb)   SpO2 98%   BMI 24.20 kg/m     General: Seated comfortably in no acute distress.  HEENT: Neck supple with normal range of motion. No paracervical muscle tenderness or tightness.  Optic discs sharp and vasculature normal on funduscopic exam.   Heart: Regular rate  Lungs: breathing comfortably  Extremities: no edema  Skin: No rashes  Neurologic:     Mental Status: Fully alert, attentive and oriented. Normal memory and " fund of knowledge. Language normal, speech clear and fluent, no paraphasic errors.      Cranial Nerves: Visual fields intact. PERRL. EOMI with normal smooth pursuit. Facial sensation intact/symmetric. Facial movements symmetric. Hearing not formally tested but intact to conversation. Palate elevation symmetric, uvula midline. No dysarthria. Shoulder shrug strong bilaterally. Tongue protrusion midline.     Motor: No tremors or other abnormal movements observed. Muscle tone normal throughout. Normal/symmetric rapid finger tapping. Strength 5/5 throughout upper and lower extremities. Intermittent give way in bilateral lower extremity hip flexion, but is able to give brief 5/5 strength. Unable to stand from seated position without using hands.     Deep Tendon Reflexes: 2+/symmetric throughout upper extremities. Lower extremities reflexes are 1+. Toes downgoing bilaterally.     Sensory: vibration is 10 seconds in right foot, 25 seconds in left foot, normal in hands (25 seconds in left hand). Intact/symmetric to light touch throughout upper and lower extremities. Mild decreased temperature sensation in bilateral feet compared to hands. Negative Romberg.      Coordination: Finger-nose-finger and heel-shin intact without dysmetria. Rapid alternating movements intact/symmetric with normal speed and rhythm.     Gait: Gait with normal stance width. Gait is cautious with astasia-abasia quality. Able to do heel, toe, and tandem gait with intermittent variations in extra steps.          Data: Pertinent prior to visit   Imaging:  CT head 1/24/2021  Impression:  No acute intracranial pathology..     Procedures:  None    Laboratory:  B12 457 10/2020         Assessment and Plan:   Assessment:  Rajani Guo is a 70 year old female who presents today for evaluation of balance difficulties. She reports balance problems starting following AVR/MVR surgery in December 2020. She was hospitalized in January 2021 for the balance  difficulties and was discharged to rehab for 2 weeks. 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 Strength is 5/5 in all extremities and there is no evidence of hyperreflexia or increased tone to suggest myelopathy. In addition no exam findings were present to suggest peripheral neuropathy. CT brain showed no structural brain problem to explain symptoms. We will also check CK level today. 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 Patient is currently receiving home physical therapy and I encouraged to do daily exercises as instructed by therapist.     Patient also endorses chronic issues with short term memory. We discussed checking vitamin B12/MMA and TSH today.      Plan:  - Continue PT  - CK level  - TSH/B12/MMA    Follow up in Neurology clinic in 3 months or earlier as needed should new concerns arise.    Bin Alcaraz MD   of Neurology  [facility]    The total time of this encounter amounted to 70 minutes. This time included time spent with the patient, prep work, ordering tests, and performing post visit documentation.        Again, thank you for allowing me to participate in the care of your patient.        Sincerely,        Bin Alcaraz MD

## 2021-03-03 ENCOUNTER — TELEPHONE (OUTPATIENT)
Dept: RESPIRATORY THERAPY | Facility: CLINIC | Age: 71
End: 2021-03-03

## 2021-03-03 NOTE — TELEPHONE ENCOUNTER
Smoking Cessation Counseling Follow-Up Phone Call    Patient provided Smoking Cessation Consult during last hospitalization on 1/6/21. Called patient today for smoking cessation counseling follow-up support.   Patient states she is not doing so good and is currently smoking 10 cig/day. She states there has been a lot of stress in her life. However, I commended her on not resuming her previous 1-2 ppd day habit. Invited her to consider reaffirming her commitment to quit, remember her top 5 reasons to quit and use the stress as a opportunity to reflect on doing something different than reaching for a cigarettes. She was appreciative.     Encouraged patient to continue to:  -Use 21 mg patch daily for 4-6 weeks of smoking abstinence based on assessment of patients dependence level. Taper every 2-4 weeks in 7 mg steps as tolerated.      Patient agreed to future follow-up phone calls.     Klaudia Lopez, RRT, CTTS  Chronic Pulmonary Disease Specialist  Office: [phone]  Pager: [phone]  Hours: M-F 8-4:30

## 2021-03-04 ENCOUNTER — ANTICOAGULATION THERAPY VISIT (OUTPATIENT)
Dept: FAMILY MEDICINE | Facility: CLINIC | Age: 71
End: 2021-03-04

## 2021-03-04 ENCOUNTER — MEDICAL CORRESPONDENCE (OUTPATIENT)
Dept: HEALTH INFORMATION MANAGEMENT | Facility: CLINIC | Age: 71
End: 2021-03-04

## 2021-03-04 DIAGNOSIS — Z95.2 S/P AVR (AORTIC VALVE REPLACEMENT): ICD-10-CM

## 2021-03-04 DIAGNOSIS — Z53.9 DIAGNOSIS NOT YET DEFINED: Primary | ICD-10-CM

## 2021-03-04 DIAGNOSIS — Z98.890 S/P MVR (MITRAL VALVE REPAIR): ICD-10-CM

## 2021-03-04 LAB
BACTERIA SPEC CULT: ABNORMAL
BACTERIA SPEC CULT: ABNORMAL
INR PPP: 2.3 (ref 0.9–1.1)
METHYLMALONATE SERPL-SCNC: 0.28 UMOL/L (ref 0–0.4)
SPECIMEN SOURCE: ABNORMAL

## 2021-03-04 NOTE — PROGRESS NOTES
ANTICOAGULATION MANAGEMENT     Patient Name:  Rajani Guo  Date:  3/4/2021    ASSESSMENT /SUBJECTIVE:    Today's INR result of 2.3 is subtherapeutic. Goal INR of 2.5-3.5      Warfarin dose taken: Warfarin taken as instructed    Diet: No new diet changes affecting INR    Medication changes/ interactions: Stopped gabapentin, started macrobid (-).    Previous INR: Subtherapeutic     S/S of bleeding or thromboembolism: No    New injury or illness: UTI (treating with macrobid)    Upcoming surgery, procedure or cardioversion: No    Additional findings: Pt has been off of lovenox since Monday      PLAN:    Telephone call with home care nurse Nora regarding INR result and instructed:     Warfarin Dosing Instructions: Take 8mg today then change your warfarin dose to 4mg every Mon, Wed, Fri; 6mg all other days  . (5.9 % change)    Discussed with [facility] Madelaine    Instructed patient to follow up no later than:  Orders given to  Homecare nurse/facility to recheck    Education provided: Target INR goal and significance of current INR result      Nora verbalizes understanding and agrees to warfarin dosing plan.    Instructed to call the Anticoagulation Clinic for any changes, questions or concerns. (#[phone])        Matthew Finn RN      OBJECTIVE:  Recent labs: (last 7 days)     21   INR 2.4 2.3*         No question data found.  Anticoagulation Summary  As of 3/4/2021    INR goal:  2.5-3.5   TTR:  6.1 % (1.6 mo)   INR used for dosin.3 (3/4/2021)   Warfarin maintenance plan:  4 mg (2 mg x 2) every Mon, Wed, Fri; 6 mg (2 mg x 3) all other days   Full warfarin instructions:  4 mg every Mon, Wed, Fri; 6 mg all other days   Weekly warfarin total:  36 mg   Plan last modified:  Matthew Finn, RN (3/4/2021)   Next INR check:     Priority:  High   Target end date:  Indefinite    Indications    S/P MVR (mitral valve repair) [Z98.890]  S/P AVR (aortic valve replacement) [Z95.2]              Anticoagulation Episode Summary     INR check location:      Preferred lab:      Send INR reminders to:  GURVINDER DARDEN    Comments:  MVR & AVR placed 12/31/2020       Anticoagulation Care Providers     Provider Role Specialty Phone number    Quinton Handy PA Referring Cardiovascular & Thoracic Surgery [phone]    Lakeshia Rubio, FRANCESCA CNP Referring Internal Medicine [phone]    Sparkle Bird APRN CNP Referring Emergency Medicine [phone]

## 2021-03-04 NOTE — PROGRESS NOTES
Chart reviewed with ACC RN.  Agree with plan for boost today to 8mg and increase weekly dose ~6% today.  If INR still on low end of range next week, recommend further dose increase 5-10%.    Madelaine Sotelo, PharmD BCACP  Anticoagulation Clinical Pharmacist

## 2021-03-08 ENCOUNTER — ANTICOAGULATION THERAPY VISIT (OUTPATIENT)
Dept: FAMILY MEDICINE | Facility: CLINIC | Age: 71
End: 2021-03-08

## 2021-03-08 DIAGNOSIS — Z98.890 S/P MVR (MITRAL VALVE REPAIR): ICD-10-CM

## 2021-03-08 DIAGNOSIS — Z95.2 H/O MECHANICAL AORTIC VALVE REPLACEMENT: ICD-10-CM

## 2021-03-08 DIAGNOSIS — Z95.2 S/P AVR (AORTIC VALVE REPLACEMENT): ICD-10-CM

## 2021-03-08 DIAGNOSIS — Z95.2 H/O MITRAL VALVE REPLACEMENT WITH MECHANICAL VALVE: ICD-10-CM

## 2021-03-08 LAB — INR PPP: 4.1 (ref 0.9–1.1)

## 2021-03-08 NOTE — PROGRESS NOTES
Chart reviewed with ACC RN.  Agree with partial hold today due to elevated INR post COVID vaccination.    Madelaine Sotelo, PharmD BCACP  Anticoagulation Clinical Pharmacist

## 2021-03-08 NOTE — PROGRESS NOTES
ANTICOAGULATION MANAGEMENT     Patient Name:  Rajani Guo  Date:  3/8/2021    ASSESSMENT /SUBJECTIVE:    Today's INR result of 4.1 is supratherapeutic. Goal INR of 2.5-3.5      Warfarin dose taken: Warfarin taken as instructed    Diet: No new diet changes affecting INR    Medication changes/ interactions: Potential interaction between Amiodarone and warfarin which may affect subsequent INRs    Previous INR: Subtherapeutic     S/S of bleeding or thromboembolism: No    New injury or illness: No    Upcoming surgery, procedure or cardioversion: No    Additional findings: did receive COVID vaccine on Sat 3/6 and did have nausea and dizziness 24 hours after, feeling better today       PLAN:    Telephone call with home care nurse Nora regarding INR result and instructed:     Warfarin Dosing Instructions: 2mg then continue your current warfarin dose of 4mg MWF and 6mg AOD    Instructed patient to follow up no later than: 3 daysdOrders given to  Homecare nurse/facility to recheck    Education provided: Target INR goal and significance of current INR result    Nora verbalizes understanding and agrees to warfarin dosing plan.    Instructed to call the Anticoagulation Clinic for any changes, questions or concerns. (#[phone])        Gretchen Barton RN      OBJECTIVE:  Recent labs: (last 7 days)     03/04/21 03/08/21   INR 2.3* 4.1*         No question data found.  Anticoagulation Summary  As of 3/8/2021    INR goal:  2.5-3.5   TTR:  9.9 % (1.7 mo)   INR used for dosing:  No new INR was available at the time of this encounter.   Warfarin maintenance plan:  4 mg (2 mg x 2) every Mon, Wed, Fri; 6 mg (2 mg x 3) all other days   Full warfarin instructions:  3/8: 2 mg; Otherwise 4 mg every Mon, Wed, Fri; 6 mg all other days   Weekly warfarin total:  36 mg   Plan last modified:  Matthew Finn RN (3/4/2021)   Next INR check:  3/11/2021   Priority:  High   Target end date:  Indefinite    Indications    S/P MVR (mitral valve  repair) [Z98.890]  S/P AVR (aortic valve replacement) [Z95.2]             Anticoagulation Episode Summary     INR check location:      Preferred lab:      Send INR reminders to:  GURVINDER DARDEN    Comments:  MVR & AVR placed 12/31/2020. [facility] manage until 03/31/2021      Anticoagulation Care Providers     Provider Role Specialty Phone number    Quinton Handy PA Referring Cardiovascular & Thoracic Surgery [phone]    Lakeshia Rubio, FRANCESCA CNP Referring Internal Medicine [phone]    Sparkle Bird APRN CNP Referring Emergency Medicine [phone]

## 2021-03-10 ENCOUNTER — MEDICAL CORRESPONDENCE (OUTPATIENT)
Dept: HEALTH INFORMATION MANAGEMENT | Facility: CLINIC | Age: 71
End: 2021-03-10

## 2021-03-10 RX ORDER — BUPROPION HYDROCHLORIDE 150 MG/1
TABLET, EXTENDED RELEASE ORAL
Qty: 60 TABLET | Refills: 1 | Status: SHIPPED | OUTPATIENT
Start: 2021-03-10 | End: 2021-04-12

## 2021-03-11 ENCOUNTER — ANTICOAGULATION THERAPY VISIT (OUTPATIENT)
Dept: FAMILY MEDICINE | Facility: CLINIC | Age: 71
End: 2021-03-11

## 2021-03-11 ENCOUNTER — OFFICE VISIT (OUTPATIENT)
Dept: FAMILY MEDICINE | Facility: CLINIC | Age: 71
End: 2021-03-11
Payer: COMMERCIAL

## 2021-03-11 VITALS
BODY MASS INDEX: 24.75 KG/M2 | DIASTOLIC BLOOD PRESSURE: 64 MMHG | WEIGHT: 145 LBS | SYSTOLIC BLOOD PRESSURE: 136 MMHG | HEART RATE: 84 BPM | RESPIRATION RATE: 18 BRPM | OXYGEN SATURATION: 99 % | HEIGHT: 64 IN | TEMPERATURE: 98.1 F

## 2021-03-11 DIAGNOSIS — I73.9 PAD (PERIPHERAL ARTERY DISEASE) (H): ICD-10-CM

## 2021-03-11 DIAGNOSIS — Z95.2 S/P AVR (AORTIC VALVE REPLACEMENT): ICD-10-CM

## 2021-03-11 DIAGNOSIS — Z87.19 HISTORY OF GI BLEED: ICD-10-CM

## 2021-03-11 DIAGNOSIS — Z79.01 CURRENT USE OF LONG TERM ANTICOAGULATION: ICD-10-CM

## 2021-03-11 DIAGNOSIS — I10 ESSENTIAL HYPERTENSION: Primary | ICD-10-CM

## 2021-03-11 DIAGNOSIS — Z98.890 S/P MVR (MITRAL VALVE REPAIR): ICD-10-CM

## 2021-03-11 DIAGNOSIS — I48.0 PAF (PAROXYSMAL ATRIAL FIBRILLATION) (H): ICD-10-CM

## 2021-03-11 LAB — INR PPP: 2.8 (ref 0.9–1.1)

## 2021-03-11 PROCEDURE — 99214 OFFICE O/P EST MOD 30 MIN: CPT | Performed by: FAMILY MEDICINE

## 2021-03-11 RX ORDER — WARFARIN SODIUM 2 MG/1
TABLET ORAL
Status: CANCELLED | OUTPATIENT
Start: 2021-03-11

## 2021-03-11 ASSESSMENT — MIFFLIN-ST. JEOR: SCORE: 1167.72

## 2021-03-11 ASSESSMENT — PAIN SCALES - GENERAL: PAINLEVEL: NO PAIN (0)

## 2021-03-11 NOTE — PROGRESS NOTES
ANTICOAGULATION MANAGEMENT     Patient Name:  Rajani Guo  Date:  3/11/2021    ASSESSMENT /SUBJECTIVE:    Today's INR result of 2.8 is therapeutic. Goal INR of 2.5-3.5      Warfarin dose taken: Warfarin taken as instructed    Diet: No new diet changes affecting INR    Medication changes/ interactions: No new medications/supplements affecting INR    Previous INR: Supratherapeutic     S/S of bleeding or thromboembolism: No    New injury or illness: No    Upcoming surgery, procedure or cardioversion: No    Additional findings: Received covid vaccine last Saturday.      PLAN:    Telephone call with Rajani regarding INR result and instructed:     Warfarin Dosing Instructions: Change dose to 6 mg daily until recheck. This will keep pt's weekly total at 36 mg by Monday. If stable, pt should resume 36 mg weekly pattern (4 mg MWF + 6 mg AOD). Plan verified and approved by [facility].    Instructed patient to follow up no later than: 4 days  Orders given to  Homecare nurse/facility to recheck    Education provided: Monitoring for bleeding signs and symptoms, Monitoring for clotting signs and symptoms and Importance of notifying clinic for changes in medications; a sooner lab recheck maybe needed.      STANISLAV Bhandari RN verbalizes understanding and agrees to warfarin dosing plan.    Instructed to call the Anticoagulation Clinic for any changes, questions or concerns. (#[phone])        Kimberly Domingo RN      OBJECTIVE:  Recent labs: (last 7 days)     21   INR 4.1* 2.8*         No question data found.  Anticoagulation Summary  As of 3/11/2021    INR goal:  2.5-3.5   TTR:  12.3 % (1.8 mo)   INR used for dosin.8 (3/11/2021)   Warfarin maintenance plan:  4 mg (2 mg x 2) every Mon, Wed, Fri; 6 mg (2 mg x 3) all other days   Full warfarin instructions:  4 mg every Mon, Wed, Fri; 6 mg all other days   Weekly warfarin total:  36 mg   Plan last modified:  Matthew Finn, KAREEM (3/4/2021)   Next INR check:   3/15/2021   Priority:  High   Target end date:  Indefinite    Indications    S/P MVR (mitral valve repair) [Z98.890]  S/P AVR (aortic valve replacement) [Z95.2]             Anticoagulation Episode Summary     INR check location:      Preferred lab:      Send INR reminders to:  GURVINDER DARDEN    Comments:  MVR & AVR placed 12/31/2020. [facility] manage until 03/31/2021      Anticoagulation Care Providers     Provider Role Specialty Phone number    Quinton Handy PA Referring Cardiovascular & Thoracic Surgery [phone]    Lakeshia Rubio, APRN CNP Referring Internal Medicine [phone]    Sparkle Bird APRN CNP Referring Emergency Medicine [phone]

## 2021-03-11 NOTE — PROGRESS NOTES
"    Assessment & Plan   (I10) Essential hypertension  (primary encounter diagnosis)  Comment: doing Much better  Plan: bp is stable today    (I73.9) PAD (peripheral artery disease) (H)  Comment: doing well  Plan:     (I48.0) PAF (paroxysmal atrial fibrillation) (H)  Comment: on coumadin  Plan: good rate control    (Z87.19) History of GI bleed  Comment: hgbis getting better  Plan: recheck 1 month    (Z98.890) S/P MVR (mitral valve repair)  Comment: stable   Plan: stable     (Z95.2) S/P AVR (aortic valve replacement)  Comment: stable   Plan: stable     (Z79.01) Current use of long term anticoagulation  Comment: for Prosthetic valves  Plan: doing well  INR goal 2.5  To 3.5  Pt is doing Home PT and oT  No further falls   Feels stronger            {Provider  Link to MDM Help Grid :150    Return in about 3 months (around 6/11/2021) for Med check.    Tamiko Coley MD  [facility] MARIE Gerard is a 70 year old who presents for the following health issues HPI         Hypertension Follow-up      Do you check your blood pressure regularly outside of the clinic? Yes     Are you following a low salt diet? Yes    Are your blood pressures ever more than 140 on the top number (systolic) OR more   than 90 on the bottom number (diastolic), for example 140/90? No  Review of Systems   CONSTITUTIONAL: NEGATIVE for fever, chills, change in weight  ENT/MOUTH: NEGATIVE for ear, mouth and throat problems  RESP: NEGATIVE for significant cough or SOB  CV: NEGATIVE for chest pain, palpitations or peripheral edema      Objective    /64   Pulse 84   Temp 98.1  F (36.7  C) (Oral)   Resp 18   Ht 1.626 m (5' 4\")   Wt 65.8 kg (145 lb)   SpO2 99%   BMI 24.89 kg/m    Body mass index is 24.89 kg/m .  Physical Exam   GENERAL: healthy, alert and no distress  NECK: no adenopathy, no asymmetry, masses, or scars and thyroid normal to palpation  RESP: lungs clear to auscultation - no rales, rhonchi or wheezes  CV: " regular rate and rhythm, normal S1 S2, no S3 or S4, no murmur, click or rub, no peripheral edema and peripheral pulses strong  ABDOMEN: soft, nontender, no hepatosplenomegaly, no masses and bowel sounds normal  MS: no gross musculoskeletal defects noted, no edema    Anticoagulation Therapy Visit on 03/08/2021   Component Date Value Ref Range Status     INR 03/08/2021 4.1* 0.90 - 1.10 Final

## 2021-03-12 PROBLEM — I35.1 AORTIC VALVE INSUFFICIENCY, ETIOLOGY OF CARDIAC VALVE DISEASE UNSPECIFIED: Status: RESOLVED | Noted: 2020-10-13 | Resolved: 2021-03-12

## 2021-03-12 PROBLEM — Z87.19 HISTORY OF GI BLEED: Status: ACTIVE | Noted: 2021-03-12

## 2021-03-12 PROBLEM — I74.3 EMBOLISM AND THROMBOSIS OF ARTERIES OF LOWER EXTREMITY (H): Status: RESOLVED | Noted: 2020-10-10 | Resolved: 2021-03-12

## 2021-03-12 PROBLEM — Z79.01 CURRENT USE OF LONG TERM ANTICOAGULATION: Status: ACTIVE | Noted: 2021-03-12

## 2021-03-12 PROBLEM — I35.1 MODERATE AORTIC INSUFFICIENCY: Status: RESOLVED | Noted: 2020-10-21 | Resolved: 2021-03-12

## 2021-03-12 PROBLEM — I08.0 MITRAL AND AORTIC INCOMPETENCE: Status: RESOLVED | Noted: 2020-12-02 | Resolved: 2021-03-12

## 2021-03-12 PROBLEM — I48.91 ATRIAL FIBRILLATION (H): Status: ACTIVE | Noted: 2021-03-12

## 2021-03-12 PROBLEM — I73.9 PAD (PERIPHERAL ARTERY DISEASE) (H): Status: ACTIVE | Noted: 2021-03-12

## 2021-03-12 PROBLEM — I48.0 PAF (PAROXYSMAL ATRIAL FIBRILLATION) (H): Status: ACTIVE | Noted: 2021-03-12

## 2021-03-12 PROBLEM — I34.0 MITRAL VALVE INSUFFICIENCY, UNSPECIFIED ETIOLOGY: Status: RESOLVED | Noted: 2020-10-13 | Resolved: 2021-03-12

## 2021-03-15 ENCOUNTER — TELEPHONE (OUTPATIENT)
Dept: OBGYN | Facility: CLINIC | Age: 71
End: 2021-03-15

## 2021-03-15 ENCOUNTER — ANTICOAGULATION THERAPY VISIT (OUTPATIENT)
Dept: FAMILY MEDICINE | Facility: CLINIC | Age: 71
End: 2021-03-15

## 2021-03-15 DIAGNOSIS — Z95.2 S/P AVR (AORTIC VALVE REPLACEMENT): ICD-10-CM

## 2021-03-15 DIAGNOSIS — Z95.2 S/P AVR (AORTIC VALVE REPLACEMENT): Primary | ICD-10-CM

## 2021-03-15 DIAGNOSIS — Z98.890 S/P MVR (MITRAL VALVE REPAIR): ICD-10-CM

## 2021-03-15 LAB — INR PPP: 4.6 (ref 0.9–1.1)

## 2021-03-15 NOTE — PROGRESS NOTES
ANTICOAGULATION MANAGEMENT     Patient Name:  Rajani Guo  Date:  3/15/2021    ASSESSMENT /SUBJECTIVE:    Today's INR result of 4.60 is supratherapeutic. Goal INR of 2.5-3.5      Warfarin dose taken: Warfarin taken as instructed    Diet: Change in alcohol intake may be affecting INR. Had a bloody antonia over the weekend     Medication changes/ interactions: No new medications/supplements affecting INR    Previous INR: Therapeutic 2.8    S/S of bleeding or thromboembolism: No    New injury or illness: No    Upcoming surgery, procedure or cardioversion: No    Additional findings: None      PLAN:    Telephone call with home care nurse Elisabet regarding INR result and instructed:     Warfarin Dosing Instructions: 2mg today then continue your current warfarin dose of 4mg Mon,Wed,Fri; 6mg all other days    Instructed patient to follow up no later than: Thursday, 3/18  Orders given to  Homecare nurse/facility to recheck    Education provided:  Potential interaction between warfarin and alcohol -- try to avoid alcohol until we have a maintenance dose established, then limit to 1-2 drinks in 24 hours.       Elisabet, [facility] verbalizes understanding and agrees to warfarin dosing plan.    Instructed to call the Anticoagulation Clinic for any changes, questions or concerns. (#[phone])        Isabel Vazquez RN CACP       OBJECTIVE:  Recent labs: (last 7 days)     03/11/21 03/15/21   INR 2.8* 4.60*         Anticoagulation Summary  As of 3/15/2021    INR goal:  2.5-3.5   TTR:  14.1 % (2 mo)   INR used for dosin.60 (3/15/2021)   Warfarin maintenance plan:  4 mg (2 mg x 2) every Mon, Wed, Fri; 6 mg (2 mg x 3) all other days   Full warfarin instructions:  3/15: 2 mg; Otherwise 4 mg every Mon, Wed, Fri; 6 mg all other days   Weekly warfarin total:  36 mg   Plan last modified:  Matthew Finn RN (3/4/2021)   Next INR check:  3/18/2021   Priority:  High   Target end date:  Indefinite    Indications    S/P MVR (mitral  valve repair) [Z98.890]  S/P AVR (aortic valve replacement) [Z95.2]             Anticoagulation Episode Summary     INR check location:      Preferred lab:      Send INR reminders to:  GURVINDER DARDEN    Comments:  MVR & AVR placed 12/31/2020. [facility] manage until 03/31/2021      Anticoagulation Care Providers     Provider Role Specialty Phone number    Quinton Handy PA Referring Cardiovascular & Thoracic Surgery [phone]    Lakeshia Rubio, FRANCESCA CNP Referring Internal Medicine [phone]    Sparkle Bird APRN CNP Referring Emergency Medicine [phone]

## 2021-03-15 NOTE — PROGRESS NOTES
Tentative plan: Take 2mg today, then resume 4mg Mon,Wed,Fri; 6mg all other days. Planned to leave maintenance dose the same due to last 4 days being 6mg doses. Recheck the INR again on Thursday.    Only change noted was one bloody antonia over the weekend. Dosing given to homecare nurse but will call back once dosing is finalized. (Elisabet, [facility] [phone])

## 2021-03-15 NOTE — TELEPHONE ENCOUNTER
Pt is wanting to get in ASAP for a pessary check.  [facility] only has 15 Min appointment times.    Where would be a good place to fit in pt?    Thank you.  DONIS Lebron

## 2021-03-15 NOTE — TELEPHONE ENCOUNTER
Reason for Call:  Other prescription    Detailed comments: patient calling back to check on her refill request for Warfarin. She states she only has enough for 3 days.    She also clarified that she would like this sent to [facility] in [place].    Phone Number Patient can be reached at: Home number on file [phone] (home)    Best Time: anytime    Can we leave a detailed message on this number? YES    Call taken on 3/15/2021 at 3:30 PM by Fausto Betancourt

## 2021-03-15 NOTE — TELEPHONE ENCOUNTER
Reason for Call:  Medication or medication refill:    Do you use a [facility]?  Name of the pharmacy and phone number for the current request:  [facility] [phone]    Name of the medication requested:   Warfarin 2mg     Other request: none    Can we leave a detailed message on this number? YES    Phone number patient can be reached at: Home number on file [phone] (home)    Best Time: any    Call taken on 3/15/2021 at 10:50 AM by Terrie Bae

## 2021-03-15 NOTE — PROGRESS NOTES
Chart reviewed with ACC RN, agree with plan for partial hold today & resume regular dose, recheck end of week.    Madelaine Sotelo, PharmD BCACP  Anticoagulation Clinical Pharmacist

## 2021-03-16 RX ORDER — WARFARIN SODIUM 2 MG/1
TABLET ORAL
Qty: 270 TABLET | Refills: 1 | Status: SHIPPED | OUTPATIENT
Start: 2021-03-16 | End: 2021-08-16

## 2021-03-16 NOTE — TELEPHONE ENCOUNTER
I called patient and she said she has noticed an odor and she hasn't had her pessary checked since 10/2021.  I put her on schedule 2:30 on 3/17 after her mammogram  CAMILLA Muñoz 3/16/2021

## 2021-03-16 NOTE — TELEPHONE ENCOUNTER
Routing to INR nurse to approve or decline. Not sure if pt is managed under care of PCP or cardiologist. Per chart review, warfarin only listed as historical.   INR at last check 03/15 was 4.6  Dosing: Warfarin Dosing Instructions: 2mg today then continue your current warfarin dose of 4mg Mon,Wed,Fri; 6mg all other days

## 2021-03-16 NOTE — TELEPHONE ENCOUNTER
Anticoagulation Monitoring Return Date Recheck   Latest Ref Rng & Units     3/15/2021 3/18/2021      Anticoagulation Monitoring Instructions   Latest Ref Rng & Units    3/15/2021 3/15: 2 mg; Otherwise 4 mg every Mon, Wed, Fri; 6 mg all other days   Prescription approved per [facility] Refill Protocol.  Gladys Díaz, RN  Anticoagulation Nurse - [facility]

## 2021-03-17 ENCOUNTER — OFFICE VISIT (OUTPATIENT)
Dept: OBGYN | Facility: CLINIC | Age: 71
End: 2021-03-17
Payer: COMMERCIAL

## 2021-03-17 ENCOUNTER — ANCILLARY PROCEDURE (OUTPATIENT)
Dept: MAMMOGRAPHY | Facility: CLINIC | Age: 71
End: 2021-03-17
Attending: FAMILY MEDICINE
Payer: COMMERCIAL

## 2021-03-17 VITALS
WEIGHT: 142.8 LBS | DIASTOLIC BLOOD PRESSURE: 76 MMHG | BODY MASS INDEX: 24.51 KG/M2 | HEART RATE: 90 BPM | SYSTOLIC BLOOD PRESSURE: 174 MMHG | OXYGEN SATURATION: 98 %

## 2021-03-17 DIAGNOSIS — N81.89 PELVIC RELAXATION: Primary | ICD-10-CM

## 2021-03-17 DIAGNOSIS — Z12.31 VISIT FOR SCREENING MAMMOGRAM: ICD-10-CM

## 2021-03-17 DIAGNOSIS — N81.6 RECTOCELE: ICD-10-CM

## 2021-03-17 DIAGNOSIS — N81.11 MIDLINE CYSTOCELE: ICD-10-CM

## 2021-03-17 DIAGNOSIS — Z96.0 PRESENCE OF PESSARY: ICD-10-CM

## 2021-03-17 PROCEDURE — 77067 SCR MAMMO BI INCL CAD: CPT | Mod: TC | Performed by: RADIOLOGY

## 2021-03-17 PROCEDURE — 99212 OFFICE O/P EST SF 10 MIN: CPT | Performed by: OBSTETRICS & GYNECOLOGY

## 2021-03-17 NOTE — PROGRESS NOTES
"Rajani Guo is a 70 year old female, .  She presents today for follow up regarding pelvic relaxation.  She has been doing well with the pessary.  Her symptoms have significantly improved and she has been happy using the pessary.  She has not had any unusual discharge, incontinence, or bleeding.   She does not have any bladder discomfort.  She has noted an odor and her urine has become darker.  She does not have pain with urination.  She gets up once at night and she goes \"a lot.\"   She had UTI 2 weeks ago, and she was given Macrobid for it.      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   Findings: There is no intracranial hemorrhage, mass effect, or midline shift. Gray/whi     History of partial thyroidectomy 2018     Hyperlipidemia LDL goal <100 2018     Hypertension      Insomnia, unspecified type 2018     Mitral regurgitation      Mitral stenosis      Pulmonary hypertension (H)      Tobacco use disorder      Past Surgical History:   Procedure Laterality Date     COLONOSCOPY N/A 2019    Procedure: COLONOSCOPY;  Surgeon: Marie Todd MD;  Location:  GI     CV CORONARY ANGIOGRAM N/A 2020    Procedure: CV " CORONARY ANGIOGRAM;  Surgeon: Joey Rivas MD;  Location:  HEART CARDIAC CATH LAB     CV FRACTIONAL FLOW RESERVE N/A 11/16/2020    Procedure: Fractional Flow Reserve;  Surgeon: Joey Rivas MD;  Location:  HEART CARDIAC CATH LAB     CV RIGHT HEART CATH MEASUREMENTS RECORDED N/A 11/16/2020    Procedure: CV RIGHT HEART CATH;  Surgeon: Joey Rivas MD;  Location:  HEART CARDIAC CATH LAB     ESOPHAGOSCOPY, GASTROSCOPY, DUODENOSCOPY (EGD), COMBINED N/A 9/4/2019    Procedure: ESOPHAGOGASTRODUODENOSCOPY (EGD);  Surgeon: Marie Todd MD;  Location:  GI     ESOPHAGOSCOPY, GASTROSCOPY, DUODENOSCOPY (EGD), COMBINED N/A 9/17/2020    Procedure: ESOPHAGOGASTRODUODENOSCOPY (EGD);  Surgeon: Ten Ibrahim DO;  Location:  GI     IR ANGIOGRAM THROUGH CATHETER FOLLOW UP  3/5/2019     IR LOWER EXTREMITY ANGIOGRAM LEFT  3/4/2019     KNEE SURGERY Right 2013    right knee torn meniscus surgery     OVARY SURGERY  1971    1 ovary removed     RELEASE CARPAL TUNNEL Right 1988     RELEASE TRIGGER FINGER BILATERAL       REPLACE VALVES AORTIC AND MITRAL, COMBINED N/A 12/29/2020    Procedure: Median Stertonomy, REPLACEMENT AORTIC Valve  with 19mm St Abdoulaye [place]  Mechanical Heart Valve AND MITRAL VALVE Replacement with 27mm St Abdoulaye Mechanical Heart Valve, on pump oxygenation;  Surgeon: Luc Lara MD;  Location: [facility] OR     SHOULDER SURGERY Left     rotator cuff repair, plate placement     THYROID SURGERY  1988    partial thyroidectomy     Current Outpatient Medications   Medication Sig Dispense Refill     acetaminophen (TYLENOL) 325 MG tablet TAKE 2 TABLETS (650 MG) BY MOUTH EVERY 4 HOURS AS NEEDED FOR PAIN       amiodarone (PACERONE) 200 MG tablet Take two tablets (400 mg) in the morning.       aspirin (ASA) 81 MG EC tablet Take 1 tablet (81 mg) by mouth daily 100 tablet 3     atorvastatin (LIPITOR) 40 MG tablet Take 1 tablet (40 mg) by mouth daily 90 tablet 3     buPROPion (WELLBUTRIN SR) 150 MG 12  hr tablet TAKE 1 TABLET BY MOUTH 2 TIMES DAILY 60 tablet 1     Calcium Carb-Cholecalciferol 600-800 MG-UNIT TABS Take 1 tablet by mouth 2 times daily 1 tablet 0     ferrous sulfate (FEROSUL) 325 (65 Fe) MG tablet Take 325 mg by mouth 2 times daily       glucosamine-chondroitinoitin 500-400 MG tablet Patient is taking 1500 mg OTC 1 time daily       insulin pen needle (29G X 12MM) 29G X 12MM miscellaneous Use 1 pen needles daily or as directed. 100 each 11     Melatonin 10 MG TABS tablet Take 10 mg by mouth At Bedtime       metFORMIN (GLUCOPHAGE) 1000 MG tablet TAKE 1 TABLET BY MOUTH TWICE A DAY WITH MEALS 180 tablet 1     Multiple Vitamin (MULTI-VITAMINS) TABS Take 1 tablet by mouth daily        oxybutynin (DITROPAN) 5 MG tablet TAKE 1 TABLET BY MOUTH TWICE A  tablet 3     pantoprazole (PROTONIX) 40 MG EC tablet TAKE 1 TABLET BY MOUTH EVERY DAY 90 tablet 1     senna-docusate (SENOKOT-S/PERICOLACE) 8.6-50 MG tablet Take 1-2 tablets by mouth 2 times daily (Patient taking differently: Take 1 tablet by mouth 2 times daily ) 100 tablet 1     traZODone (DESYREL) 50 MG tablet TAKE 1 2 TABLETS (50 100 MG) BY MOUTH AT BEDTIME       vitamin C (ASCORBIC ACID) 500 MG tablet Take 1 tablet (500 mg) by mouth daily 100 tablet 1     warfarin ANTICOAGULANT (COUMADIN) 2 MG tablet Take 2 tabs (4 mg) by mouth Mon, Wed, Fri and 3 tabs (6mg) all other days or as directed by your [facility] Clinic 270 tablet 1     enoxaparin ANTICOAGULANT (LOVENOX) 60 MG/0.6ML syringe Inject 0.6 mLs (60 mg) Subcutaneous 2 times daily Until INR therapeutic (Patient not taking: Reported on 3/17/2021) 10 mL 1     nicotine (NICODERM CQ) 21 MG/24HR 24 hr patch Place 1 patch onto the skin daily (Patient not taking: Reported on 3/17/2021) 30 patch 1     Social History     Socioeconomic History     Marital status:      Spouse name: Not on file     Number of children: 1     Years of education: Not on file     Highest education level: Not on file    Occupational History     Not on file   Social Needs     Financial resource strain: Not hard at all     Food insecurity     Worry: Never true     Inability: Never true     Transportation needs     Medical: No     Non-medical: No   Tobacco Use     Smoking status: Current Every Day Smoker     Packs/day: 1.00     Years: 58.00     Pack years: 58.00     Start date: 1962     Last attempt to quit: 2020     Years since quittin.2     Smokeless tobacco: Never Used   Substance and Sexual Activity     Alcohol use: Not Currently     Comment: 2 glasses of wine a week     Drug use: Yes     Types: Marijuana     Comment: occasional     Sexual activity: Never   Lifestyle     Physical activity     Days per week: Not on file     Minutes per session: Not on file     Stress: Not on file   Relationships     Social connections     Talks on phone: Not on file     Gets together: Not on file     Attends Yarsani service: Not on file     Active member of club or organization: Not on file     Attends meetings of clubs or organizations: Not on file     Relationship status: Not on file     Intimate partner violence     Fear of current or ex partner: Not on file     Emotionally abused: Not on file     Physically abused: Not on file     Forced sexual activity: Not on file   Other Topics Concern     Parent/sibling w/ CABG, MI or angioplasty before 65F 55M? Not Asked   Social History Narrative    , has one daughter who lives with her in patient's house.  Is full code.  (last updated 10/8/2020)      Family History   Problem Relation Age of Onset     Diabetes Type 2  Mother      Lung Cancer Father      Diabetes Sister      Coronary Artery Disease Sister      Diabetes Brother      Diabetes Brother      Diabetes Type 2  Brother      Coronary Artery Disease Sister      Asthma Sister      Glaucoma No family hx of      Macular Degeneration No family hx of      Anesthesia Reaction No family hx of        Review of Systems:  10 point  ROS of systems including Constitutional, Eyes, Respiratory, Cardiovascular, Gastroenterology, Genitourinary, Integumentary, Muscularskeletal, Psychiatric were all negative except for pertinent positives noted in my HPI and in the PMH.    Exam:   BP (!) 174/76 (BP Location: Right arm, Cuff Size: Adult Regular)   Pulse 90   Wt 64.8 kg (142 lb 12.8 oz)   SpO2 98%   BMI 24.51 kg/m    General:  WNWD female, NAD  Alert  Oriented x 3  Gait:  Normal  Skin:  Normal skin turgor  HEENT:  NC/AT, EOMI  Abdomen:  Non-tender, non-distended.  Vulva: No external lesions, normal hair distribution, no adenopathy  BUS:  Normal, no masses noted  Urethra:  No hypermobility seen  Urethral meatus:  No masses noted  Vagina: the stalk of the Gellhorn pessary is seen at the introitus.  The pessary was removed and cleaned.  The vaginal inspection did not reveal any abrasions or other lesions  Perianal:  No masses noted  Extremities:  No clubbing, no cyanosis and no edema.        Assessment  pessary check  Pelvic relaxation  Cystocele  Rectocele        Plan  The pessary was removed and cleaned and reinserted.   RTC 3-4 months for pessary check (she has other medical issues she is dealing with, in addition to the COVID-19 pandemic, making her an at risk patient.   Questions seem to be answered.     Hugo Patrick MD

## 2021-03-18 ENCOUNTER — TELEPHONE (OUTPATIENT)
Dept: FAMILY MEDICINE | Facility: CLINIC | Age: 71
End: 2021-03-18

## 2021-03-18 ENCOUNTER — ANTICOAGULATION THERAPY VISIT (OUTPATIENT)
Dept: FAMILY MEDICINE | Facility: CLINIC | Age: 71
End: 2021-03-18

## 2021-03-18 DIAGNOSIS — Z98.890 S/P MVR (MITRAL VALVE REPAIR): ICD-10-CM

## 2021-03-18 DIAGNOSIS — Z95.2 S/P AVR (AORTIC VALVE REPLACEMENT): ICD-10-CM

## 2021-03-18 DIAGNOSIS — I48.0 PAF (PAROXYSMAL ATRIAL FIBRILLATION) (H): Primary | ICD-10-CM

## 2021-03-18 LAB — INR PPP: 2.8 (ref 0.9–1.1)

## 2021-03-18 RX ORDER — AMIODARONE HYDROCHLORIDE 200 MG/1
TABLET ORAL
Qty: 60 TABLET | Refills: 0 | Status: SHIPPED | OUTPATIENT
Start: 2021-03-18 | End: 2021-04-19

## 2021-03-18 NOTE — TELEPHONE ENCOUNTER
Patient calling her medications have been on and off in taking so she is calling to make sure she is suppose to be taking this medication. Please call an advise.  Cecille Vaughn,

## 2021-03-18 NOTE — TELEPHONE ENCOUNTER
Reason for Call:  Medication or medication refill:    Do you use a [facility]?  Name of the pharmacy and phone number for the current request:  [facility] in [place].    Name of the medication requested: amiodarone (PACERONE) 200 MG tablet    Other request: Patient was discharged from TCU.  Patient was told she needs to get refill thru her PCP, however, request was sent to the TCU and denied by them, so patient needs refill ASAP.  She took her last pill today.    If any questions or problems, call patient's daughterKaitlin (ph: [phone])    Call taken on 3/18/2021 at 12:07 PM by Maame Michaels

## 2021-03-18 NOTE — PROGRESS NOTES
ANTICOAGULATION MANAGEMENT     Patient Name:  Rajani Guo  Date:  3/18/2021    ASSESSMENT /SUBJECTIVE:    Today's INR result of 2.80 is therapeutic. Goal INR of 2.5-3.5      Warfarin dose taken: Warfarin taken as instructed    Diet: No new diet changes affecting INR    Medication changes/ interactions: No new medications/supplements affecting INR    Previous INR: Supratherapeutic 4.60    S/S of bleeding or thromboembolism: No    New injury or illness: No    Upcoming surgery, procedure or cardioversion: No    Additional findings: None      PLAN:    Telephone call with home care nurse Nora [facility] regarding INR result and instructed:     Warfarin Dosing Instructions: Continue your current warfarin dose 4mg Mon,Wed,Fri; 6mg all other days    Instructed patient to follow up no later than: 4 days  Orders given to  Homecare nurse/facility to recheck    Education provided: None required      Nora verbalizes understanding and agrees to warfarin dosing plan.    Instructed to call the Anticoagulation Clinic for any changes, questions or concerns. (#[phone])        Isabel Vazquez RN CACP       OBJECTIVE:  Recent labs: (last 7 days)     03/15/21 03/18/21   INR 4.60* 2.80*         INR assessment THER    Recheck INR In: 4 DAYS    INR Location Homecare INR      Anticoagulation Summary  As of 3/18/2021    INR goal:  2.5-3.5   TTR:  15.3 % (2.1 mo)   INR used for dosin.80 (3/18/2021)   Warfarin maintenance plan:  4 mg (2 mg x 2) every Mon, Wed, Fri; 6 mg (2 mg x 3) all other days   Full warfarin instructions:  4 mg every Mon, Wed, Fri; 6 mg all other days   Weekly warfarin total:  36 mg   No change documented:  Isabel Vazquez RN   Plan last modified:  Matthew Finn RN (3/4/2021)   Next INR check:  3/22/2021   Priority:  High   Target end date:  Indefinite    Indications    S/P MVR (mitral valve repair) [Z98.890]  S/P AVR (aortic valve replacement) [Z95.2]             Anticoagulation Episode  Summary     INR check location:      Preferred lab:      Send INR reminders to:  GURVINDER DARDEN    Comments:  MVR & AVR placed 12/31/2020. [facility] manage until 03/31/2021      Anticoagulation Care Providers     Provider Role Specialty Phone number    Quinton Handy PA Referring Cardiovascular & Thoracic Surgery [phone]    Lakeshia Rubio APRN CNP Referring Internal Medicine [phone]    Sparkle Bird APRN CNP Referring Emergency Medicine [phone]

## 2021-03-18 NOTE — PROGRESS NOTES
Tentative plan: Continue 4mg Mon,Wed,Fri; 6mg all other days. Recheck the INR again on Monday 3/22.    INR on Monday was elevated due to increased alcohol and booster doses of warfarin given. Maintenance dose was left the same on Monday after a partial hold. Will route to clinical pharmacist for review of plan. If warfarin dosing adjustments should be made, ACC to call Nora at [facility] ([phone]).

## 2021-03-18 NOTE — PROGRESS NOTES
Anticoagulation    Chart reviewed. Agree with current plan with transient factors noted from 3/15.    Megan Hyman, [facility]

## 2021-03-19 ENCOUNTER — PATIENT OUTREACH (OUTPATIENT)
Dept: NURSING | Facility: CLINIC | Age: 71
End: 2021-03-19
Payer: COMMERCIAL

## 2021-03-19 NOTE — PROGRESS NOTES
Clinic Care Coordination Contact  Community Health Worker Follow Up    Goals:   Goals Addressed as of 3/19/2021 at 12:27 PM                 Today    2/16/21      #1  Pain Management (pt-stated)   20%  20%    Added 1/11/21 by Odell Acosta, RN     Goal Statement: I will work to reduce my pain with Tylenol, ice, heat, and massage.  Date Goal set: 1/11/2021  Barriers: Recent open heart surgery  Strengths: Engaged in care coordination  Date to Achieve By: 7/11/2021  Patient expressed understanding of goal: Yes  Action steps to achieve this goal:  1. I will take my Tylenol on a scheduled basis to keep ahead of the pain.  2. I will use alternating ice and heat to reduce the pain that I have incisionally.  3. I will have my daughter massage the muscles that are tight and achy and causing pain.          #2  Functional (pt-stated)   30%  30%    Added 1/11/21 by Odell Acosta RN     Goal Statement: I will work on walking a little bit more each day by walking further in the distance or further in the time walked.  Date Goal set: 1/11/2021  Barriers: Recent open heart surgery  Strengths: Engaged in care coordination  Date to Achieve By: 7/11/2021  Patient expressed understanding of goal: Yes  Action steps to achieve this goal:  1. I will walk every day more than just to the bathroom and back.  2. I will increase the time walked.  3. I will increase the distance walked.              Intervention and Education during outreach: CHW called patient to check on CC goals.    CHW Plan: CHW will route to CC RN for awareness. CHW will outreach to patient again in one month.    CHW spoke with patient today. CHW asked if patient was taking Tylenol on a scheduled basis along with her other medications as prescribed by the provider. Patient said no, she is only taking it as needed. CHW asked if patient was gradually increasing the amount of time and/or distance walking. Patient said yes. CHW asked if patient is always keeping her walker  with her when she leaves the house. Patient said yes.    CHW asked if patient had other questions. Patient said no, she is good. CHW told patient to call the clinic with non-emergent questions.    Abbey Flor, MPH  Community Health Worker   Office: [phone]  [facility], and [facility]  [address] [place] [address]  [facility]  [address] [place], [place] [address] [place] [address]  [address] [place] [address]  [email or web address]

## 2021-03-19 NOTE — TELEPHONE ENCOUNTER
Rx was sent yesterday for amiodarone to [facility]. Notified Nora. She will let family know.

## 2021-03-22 ENCOUNTER — ANTICOAGULATION THERAPY VISIT (OUTPATIENT)
Dept: FAMILY MEDICINE | Facility: CLINIC | Age: 71
End: 2021-03-22

## 2021-03-22 DIAGNOSIS — Z95.2 S/P AVR (AORTIC VALVE REPLACEMENT): ICD-10-CM

## 2021-03-22 DIAGNOSIS — Z98.890 S/P MVR (MITRAL VALVE REPAIR): ICD-10-CM

## 2021-03-22 LAB — INR PPP: 4 (ref 0.9–1.1)

## 2021-03-22 NOTE — PROGRESS NOTES
Tentative plan: Decrease maintenance dose to 6mg Sun,Wed,Fri; 4mg all other days (~6% decrease). Recheck the INR again Thursday.     Only noted change was edema in her feet and ankles, this is new from last week.     If warfarin dose changes need to be made, ACC to call Nora at [phone] with changes.

## 2021-03-22 NOTE — PROGRESS NOTES
Chart reviewed with ACC RN, agree with plan for dose adjustment.    Madelaine Sotelo, PharmD BCACP  Anticoagulation Clinical Pharmacist

## 2021-03-23 ENCOUNTER — TELEPHONE (OUTPATIENT)
Dept: FAMILY MEDICINE | Facility: CLINIC | Age: 71
End: 2021-03-23

## 2021-03-23 DIAGNOSIS — Z98.890 S/P MVR (MITRAL VALVE REPAIR): ICD-10-CM

## 2021-03-23 DIAGNOSIS — Z95.2 S/P AVR (AORTIC VALVE REPLACEMENT): ICD-10-CM

## 2021-03-23 NOTE — TELEPHONE ENCOUNTER
Call from STANISLAV Melendez RN, who reports that the patient missed her dose last night and she is wondering about an increase for tonight.    Will give 6 mg tonight and tomorrow; recheck on Thursday.  Calendar updated. Zina Ruvalcaba RN March 23, 2021 4:26 PM

## 2021-03-24 ENCOUNTER — MEDICAL CORRESPONDENCE (OUTPATIENT)
Dept: HEALTH INFORMATION MANAGEMENT | Facility: CLINIC | Age: 71
End: 2021-03-24

## 2021-03-25 ENCOUNTER — ANTICOAGULATION THERAPY VISIT (OUTPATIENT)
Dept: FAMILY MEDICINE | Facility: CLINIC | Age: 71
End: 2021-03-25

## 2021-03-25 DIAGNOSIS — Z95.2 S/P AVR (AORTIC VALVE REPLACEMENT): ICD-10-CM

## 2021-03-25 DIAGNOSIS — Z98.890 S/P MVR (MITRAL VALVE REPAIR): ICD-10-CM

## 2021-03-25 LAB — INR PPP: 2.6 (ref 0.9–1.1)

## 2021-03-25 NOTE — PROGRESS NOTES
ANTICOAGULATION MANAGEMENT     Patient Name:  Rajani Guo  Date:  3/25/2021    ASSESSMENT /SUBJECTIVE:    Today's INR result of 2.60 is therapeutic. Goal INR of 2.5-3.5      Warfarin dose taken: Warfarin taken as instructed    Diet: No new diet changes affecting INR    Medication changes/ interactions: No new medications/supplements affecting INR    Previous INR: Supratherapeutic     S/S of bleeding or thromboembolism: No    New injury or illness: No    Upcoming surgery, procedure or cardioversion: No    Additional findings: None      PLAN:    Telephone call with home care nurse KAREEM Pascual regarding INR result and instructed:     Warfarin Dosing Instructions: Continue your current warfarin dose 6mg every Sun, Wed, & Fri; 4mg all other days of the week.     Instructed patient to follow up no later than: 3/29/21  Orders given to  Homecare nurse/facility to recheck    Education provided: Target INR goal and significance of current INR result, Importance of therapeutic range and Importance of following up for INR monitoring at instructed interval      KAREEM Pascual verbalizes understanding and agrees to warfarin dosing plan.    Instructed to call the Anticoagulation Clinic for any changes, questions or concerns. (#[phone])        Washington Dorman RN      OBJECTIVE:  Recent labs: (last 7 days)     21   INR 4.0* 2.60*         No question data found.  Anticoagulation Summary  As of 3/25/2021    INR goal:  2.5-3.5   TTR:  19.9 % (2.3 mo)   INR used for dosin.60 (3/25/2021)   Warfarin maintenance plan:  6 mg (2 mg x 3) every Sun, Wed, Fri; 4 mg (2 mg x 2) all other days   Full warfarin instructions:  6 mg every Sun, Wed, Fri; 4 mg all other days   Weekly warfarin total:  34 mg   Plan last modified:  Isabel Vazquez RN (3/22/2021)   Next INR check:     Priority:  High   Target end date:  Indefinite    Indications    S/P MVR (mitral valve repair) [Z98.890]  S/P AVR (aortic valve replacement) [Z95.2]              Anticoagulation Episode Summary     INR check location:      Preferred lab:      Send INR reminders to:  GURVINDER DARDEN    Comments:  MVR & AVR placed 12/31/2020. H manage until 03/31/2021      Anticoagulation Care Providers     Provider Role Specialty Phone number    Quinton Hnady PA Referring Cardiovascular & Thoracic Surgery [phone]    Lakeshia Rubio, FRANCESCA CNP Referring Internal Medicine [phone]    Sparkle Bird, APRN CNP Referring Emergency Medicine [phone]

## 2021-03-29 ENCOUNTER — ANTICOAGULATION THERAPY VISIT (OUTPATIENT)
Dept: FAMILY MEDICINE | Facility: CLINIC | Age: 71
End: 2021-03-29

## 2021-03-29 ENCOUNTER — NURSE TRIAGE (OUTPATIENT)
Dept: FAMILY MEDICINE | Facility: CLINIC | Age: 71
End: 2021-03-29

## 2021-03-29 DIAGNOSIS — Z98.890 S/P MVR (MITRAL VALVE REPAIR): ICD-10-CM

## 2021-03-29 DIAGNOSIS — Z95.2 S/P AVR (AORTIC VALVE REPLACEMENT): ICD-10-CM

## 2021-03-29 DIAGNOSIS — I48.0 PAF (PAROXYSMAL ATRIAL FIBRILLATION) (H): ICD-10-CM

## 2021-03-29 LAB — INR PPP: 3.7 (ref 0.9–1.1)

## 2021-03-29 NOTE — TELEPHONE ENCOUNTER
"Patient has 8/10 pain with urination that started about 2 days ago  Also c/o vaginal discharge with foul odor but stated this is not new for her  Protocol care advice is to be seen today  Provider has no openings today and patient declined to see anyone else  Next available with Dr. Coley is a virtual appointment on 3/31/2021- scheduled patient  Advised her to call back if symptoms worsen or if she changes her mind and would like to be seen sooner with another provider  She agreed    Patient asked if provider would like labs ordered before the virtual appointment  Call back to patient needed only if previsit labs are ordered      Additional Information    Negative: Shock suspected (e.g., cold/pale/clammy skin, too weak to stand, low BP, rapid pulse)    Negative: Sounds like a life-threatening emergency to the triager    Negative: Unable to urinate (or only a few drops) and bladder feels very full    Negative: Vomiting    Negative: Patient sounds very sick or weak to the triager    SEVERE pain with urination    Answer Assessment - Initial Assessment Questions  1. SEVERITY: \"How bad is the pain?\"  (e.g., Scale 1-10; mild, moderate, or severe)    - MILD (1-3): complains slightly about urination hurting    - MODERATE (4-7): interferes with normal activities      - SEVERE (8-10): excruciating, unwilling or unable to urinate because of the pain       8/10  2. FREQUENCY: \"How many times have you had painful urination today?\"       3-4 times a day   3. PATTERN: \"Is pain present every time you urinate or just sometimes?\"       Every time I urinate   4. ONSET: \"When did the painful urination start?\"       2 days ago  5. FEVER: \"Do you have a fever?\" If so, ask: \"What is your temperature, how was it measured, and when did it start?\"      No   6. PAST UTI: \"Have you had a urine infection before?\" If so, ask: \"When was the last time?\" and \"What happened that time?\"       Yes, but did not have symptoms before   7. CAUSE: \"What do " "you think is causing the painful urination?\"  (e.g., UTI, scratch, Herpes sore)      UTI  8. OTHER SYMPTOMS: \"Do you have any other symptoms?\" (e.g., flank pain, vaginal discharge, genital sores, urgency, blood in urine)      Vaginal discharge with foul odor  9. PREGNANCY: \"Is there any chance you are pregnant?\" \"When was your last menstrual period?\"      n/a    Protocols used: URINATION PAIN - FEMALE-A-OH    Elvira Waters RN  [facility]    "

## 2021-03-29 NOTE — PROGRESS NOTES
"ANTICOAGULATION MANAGEMENT     Patient Name:  Rajani Guo  Date:  3/29/2021    ASSESSMENT /SUBJECTIVE:    Today's INR result of 3.7 is supratherapeutic. Goal INR of 2.5-3.5      Warfarin dose taken: Warfarin taken as instructed    Diet: No new diet changes affecting INR    Medication changes/ interactions: No new medications/supplements affecting INR    Previous INR: Therapeutic     S/S of bleeding or thromboembolism: No    New injury or illness: Patient having \"UTI\" symptoms going to OV.  Patient had a fall on Saturday, denies LOC, hitting head    Upcoming surgery, procedure or cardioversion: No    Additional findings: None      PLAN:    Telephone call with home care nurse Constantin regarding INR result and instructed:     Warfarin Dosing Instructions: Continue your current warfarin dose 6 mg Sun/Wed/Fri and 4 mg all other days    Instructed patient to follow up no later than: 4/1/2020  Orders given to  Homecare nurse/facility to recheck    Education provided: Target INR goal and significance of current INR result, Importance of therapeutic range, Importance of following up for INR monitoring at instructed interval, Importance of taking warfarin as instructed, Monitoring for bleeding signs and symptoms, When to seek medical attention/emergency care and Importance of notifying clinic for changes in medications; a sooner lab recheck maybe needed.      Constantin verbalizes understanding and agrees to warfarin dosing plan.    Instructed to call the Anticoagulation Clinic for any changes, questions or concerns. (#[phone])        Klaudia Archuleta RN      OBJECTIVE:  Recent labs: (last 7 days)     03/25/21 03/29/21   INR 2.60* 3.7*         No question data found.  Anticoagulation Summary  As of 3/29/2021    INR goal:  2.5-3.5   TTR:  23.3 % (2.4 mo)   INR used for dosing:  3.7 (3/29/2021)   Warfarin maintenance plan:  6 mg (2 mg x 3) every Sun, Wed, Fri; 4 mg (2 mg x 2) all other days   Full warfarin instructions:  " 6 mg every Sun, Wed, Fri; 4 mg all other days   Weekly warfarin total:  34 mg   Plan last modified:  Isabel Vazquez RN (3/22/2021)   Next INR check:     Priority:  High   Target end date:  Indefinite    Indications    S/P MVR (mitral valve repair) [Z98.890]  S/P AVR (aortic valve replacement) [Z95.2]             Anticoagulation Episode Summary     INR check location:      Preferred lab:      Send INR reminders to:  GURVINDER DARDEN    Comments:  MVR & AVR placed 12/31/2020. [facility] manage until 03/31/2021      Anticoagulation Care Providers     Provider Role Specialty Phone number    Quinton Handy PA Referring Cardiovascular & Thoracic Surgery [phone]    Lakeshia Rubio APRN CNP Referring Internal Medicine [phone]    Sparkle Bird APRN CNP Referring Emergency Medicine [phone]

## 2021-03-30 ENCOUNTER — MEDICAL CORRESPONDENCE (OUTPATIENT)
Dept: HEALTH INFORMATION MANAGEMENT | Facility: CLINIC | Age: 71
End: 2021-03-30

## 2021-03-30 ENCOUNTER — VIRTUAL VISIT (OUTPATIENT)
Dept: FAMILY MEDICINE | Facility: CLINIC | Age: 71
End: 2021-03-30
Payer: COMMERCIAL

## 2021-03-30 DIAGNOSIS — R30.0 DYSURIA: Primary | ICD-10-CM

## 2021-03-30 PROCEDURE — 99213 OFFICE O/P EST LOW 20 MIN: CPT | Mod: 95 | Performed by: FAMILY MEDICINE

## 2021-03-30 NOTE — PATIENT INSTRUCTIONS
Please go to ER if any weakness/worsenig symptoms  Please drop Urine sample to our Lab today  Sincerely,  Tamiko Coley MD

## 2021-03-30 NOTE — PROGRESS NOTES
Rajani is a 70 year old who is being evaluated via a billable video visit.      How would you like to obtain your AVS? MyChart  If the video visit is dropped, the invitation should be resent by: Text to cell phone: [phone]  Will anyone else be joining your video visit? No      Video Start Time: 11.49 AM    Assessment & Plan     Dysuria  Pt will Drop Urine sample to lab  - *UA reflex to Microscopic and Culture ([place] and [facility] (except [place] and [place]); Future  - Urine Culture Aerobic Bacterial; Future    Review of the result(s) of each unique test - previous labs  Ordering of each unique test       if worse /weakness or any worsening go to ART Coley MD  [facility] [place]    Subjective   Rajani is a 70 year old who presents for the following health issues HPI     Genitourinary - Female  Onset/Duration: 1 week  Description:   Painful urination (Dysuria): YES           Frequency: no  Blood in urine (Hematuria): no  Delay in urine (Hesitency): no  Intensity: moderate  Progression of Symptoms:  improving  Accompanying Signs & Symptoms:  Fever/chills: no  Flank pain: no  Nausea and vomiting: no  Vaginal symptoms: discharge  Abdominal/Pelvic Pain: had cramping  History:   History of frequent UTI s: YES  History of kidney stones: no  Sexually Active: no  Possibility of pregnancy: No  Precipitating or alleviating factors: when going to bathroom has pain   Therapies tried and outcome:  none   Review of Systems   Rest of the ROS is Negative except see above and Problem list [stable]  Blood Pressure has been stable per patioent  130-150/80s      Objective           Vitals:  No vitals were obtained today due to virtual visit.  No fever  BP is stable   Pulse 91    Physical Exam   GENERAL: alert and no distress  EYES: Eyes grossly normal to inspection.  No discharge or erythema, or obvious scleral/conjunctival abnormalities.  RESP: No audible wheeze, cough, or visible cyanosis.  No  visible retractions or increased work of breathing.    SKIN: Visible skin clear. No significant rash, abnormal pigmentation or lesions.  NEURO: Cranial nerves grossly intact.  Mentation and speech appropriate for age.  PSYCH: Mentation appears normal, affect normal/bright, judgement and insight intact, normal speech and appearance well-groomed.    Pending             Video-Visit Details    Type of service:  Video Visit    Video End Time:11.56 AM    Originating Location (pt. Location): Home    Distant Location (provider location):  [facility]     Platform used for Video Visit: AmWell   12:00 PM -visit complete

## 2021-03-31 DIAGNOSIS — R30.0 DYSURIA: ICD-10-CM

## 2021-03-31 DIAGNOSIS — R30.0 DYSURIA: Primary | ICD-10-CM

## 2021-03-31 LAB
ALBUMIN UR-MCNC: NEGATIVE MG/DL
APPEARANCE UR: ABNORMAL
BACTERIA #/AREA URNS HPF: ABNORMAL /HPF
BILIRUB UR QL STRIP: NEGATIVE
COLOR UR AUTO: YELLOW
GLUCOSE UR STRIP-MCNC: NEGATIVE MG/DL
HGB UR QL STRIP: NEGATIVE
KETONES UR STRIP-MCNC: NEGATIVE MG/DL
LEUKOCYTE ESTERASE UR QL STRIP: ABNORMAL
NITRATE UR QL: NEGATIVE
NON-SQ EPI CELLS #/AREA URNS LPF: ABNORMAL /LPF
PH UR STRIP: 7 PH (ref 5–7)
RBC #/AREA URNS AUTO: ABNORMAL /HPF
SOURCE: ABNORMAL
SP GR UR STRIP: 1.01 (ref 1–1.03)
UROBILINOGEN UR STRIP-ACNC: 0.2 EU/DL (ref 0.2–1)
WBC #/AREA URNS AUTO: ABNORMAL /HPF

## 2021-03-31 PROCEDURE — 87186 SC STD MICRODIL/AGAR DIL: CPT | Performed by: FAMILY MEDICINE

## 2021-03-31 PROCEDURE — 87088 URINE BACTERIA CULTURE: CPT | Performed by: FAMILY MEDICINE

## 2021-03-31 PROCEDURE — 81001 URINALYSIS AUTO W/SCOPE: CPT | Performed by: FAMILY MEDICINE

## 2021-03-31 PROCEDURE — 87086 URINE CULTURE/COLONY COUNT: CPT | Performed by: FAMILY MEDICINE

## 2021-03-31 RX ORDER — NITROFURANTOIN 25; 75 MG/1; MG/1
100 CAPSULE ORAL 2 TIMES DAILY
Qty: 14 CAPSULE | Refills: 0 | Status: SHIPPED | OUTPATIENT
Start: 2021-03-31 | End: 2021-04-07

## 2021-04-01 ENCOUNTER — ANTICOAGULATION THERAPY VISIT (OUTPATIENT)
Dept: FAMILY MEDICINE | Facility: CLINIC | Age: 71
End: 2021-04-01

## 2021-04-01 DIAGNOSIS — Z98.890 S/P MVR (MITRAL VALVE REPAIR): ICD-10-CM

## 2021-04-01 DIAGNOSIS — Z95.2 S/P AVR (AORTIC VALVE REPLACEMENT): ICD-10-CM

## 2021-04-01 LAB — INR PPP: 3.5 (ref 0.9–1.1)

## 2021-04-01 RX ORDER — AMIODARONE HYDROCHLORIDE 200 MG/1
TABLET ORAL
Qty: 60 TABLET | Refills: 0 | OUTPATIENT
Start: 2021-04-01

## 2021-04-01 NOTE — TELEPHONE ENCOUNTER
Routing refill request to provider for review/approval because:  Drug not on the G refill protocol     Requested Prescriptions   Pending Prescriptions Disp Refills     amiodarone (PACERONE) 200 MG tablet [Pharmacy Med Name: AMIODARONE  MG TABLET] 60 tablet 0     Sig: TAKE TWO TABLETS (400 MG) IN THE MORNING.       There is no refill protocol information for this order

## 2021-04-01 NOTE — PROGRESS NOTES
ANTICOAGULATION MANAGEMENT     Patient Name:  Rajani Guo  Date:  4/1/2021    ASSESSMENT /SUBJECTIVE:    Today's INR result of 3.5 is therapeutic. Goal INR of 2.5-3.5      Warfarin dose taken: Warfarin taken as instructed    Diet: No new diet changes affecting INR    Medication changes/ interactions: No new medications/supplements affecting INR -- started macrobid last night, 7 day course    Previous INR: Supratherapeutic 3.7    S/S of bleeding or thromboembolism: No    New injury or illness: Yes: UTI    Upcoming surgery, procedure or cardioversion: No    Additional findings: None      PLAN:    Telephone call with home care nurse [facility]ora [facility] regarding INR result and instructed:     Warfarin Dosing Instructions: Continue your current warfarin dose 6mg Sun,Wed,Fri; 4mg all other days    Instructed patient to follow up no later than: 4 days  Orders given to  Homecare nurse/facility to recheck    Education provided: None required      Nora verbalizes understanding and agrees to warfarin dosing plan.    Instructed to call the Anticoagulation Clinic for any changes, questions or concerns. (#[phone])        Isabel Vazquez RN CACP       OBJECTIVE:  Recent labs: (last 7 days)     03/29/21 04/01/21   INR 3.7* 3.5*         INR assessment THER    Recheck INR In: 4 DAYS    INR Location Homecare INR      Anticoagulation Summary  As of 4/1/2021    INR goal:  2.5-3.5   TTR:  22.4 % (2.5 mo)   INR used for dosing:  3.5 (4/1/2021)   Warfarin maintenance plan:  6 mg (2 mg x 3) every Sun, Wed, Fri; 4 mg (2 mg x 2) all other days   Full warfarin instructions:  6 mg every Sun, Wed, Fri; 4 mg all other days   Weekly warfarin total:  34 mg   Plan last modified:  Isabel Vazquez RN (3/22/2021)   Next INR check:  4/5/2021   Priority:  High   Target end date:  Indefinite    Indications    S/P MVR (mitral valve repair) [Z98.890]  S/P AVR (aortic valve replacement) [Z95.2]             Anticoagulation Episode  Summary     INR check location:      Preferred lab:      Send INR reminders to:  GURVINDER DARDEN    Comments:  MVR & AVR placed 12/31/2020. [facility] manage until 03/31/2021      Anticoagulation Care Providers     Provider Role Specialty Phone number    Quinton Handy PA Referring Cardiovascular & Thoracic Surgery [phone]    Lakeshia Rubio APRN CNP Referring Internal Medicine [phone]    Sparkle Bird APRN CNP Referring Emergency Medicine [phone]

## 2021-04-02 LAB
BACTERIA SPEC CULT: ABNORMAL
BACTERIA SPEC CULT: ABNORMAL
Lab: ABNORMAL
SPECIMEN SOURCE: ABNORMAL

## 2021-04-05 ENCOUNTER — PATIENT OUTREACH (OUTPATIENT)
Dept: CARE COORDINATION | Facility: CLINIC | Age: 71
End: 2021-04-05

## 2021-04-05 ENCOUNTER — ANTICOAGULATION THERAPY VISIT (OUTPATIENT)
Dept: FAMILY MEDICINE | Facility: CLINIC | Age: 71
End: 2021-04-05

## 2021-04-05 DIAGNOSIS — Z98.890 S/P MVR (MITRAL VALVE REPAIR): ICD-10-CM

## 2021-04-05 DIAGNOSIS — Z95.2 S/P AVR (AORTIC VALVE REPLACEMENT): ICD-10-CM

## 2021-04-05 LAB — INR PPP: 3.9 (ref 0.9–1.1)

## 2021-04-05 NOTE — PROGRESS NOTES
ANTICOAGULATION MANAGEMENT     Patient Name:  Rajani Guo  Date:  4/5/2021    ASSESSMENT /SUBJECTIVE:    Today's INR result of 3.9 is supratherapeutic. Goal INR of 2.5-3.5      Warfarin dose taken: Warfarin taken as instructed    Diet: No new diet changes affecting INR    Medication changes/ interactions: will finish abx on 4/7/21    Previous INR: Therapeutic     S/S of bleeding or thromboembolism: No    New injury or illness: Yes: UTI sxs resolving        Upcoming surgery, procedure or cardioversion: No    Additional findings: none      PLAN:    Telephone call with home care nurse Nora regarding INR result and instructed:     Warfarin Dosing Instructions: 2 mg tonight then continue your current warfarin dose of 6 mg Sun W F; 4 mg ROW    Instructed patient to follow up no later than: 3 days  Orders given to  Homecare nurse/facility to recheck    Education provided: Monitoring for bleeding signs and symptoms and When to seek medical attention/emergency care      Nora verbalizes understanding and agrees to warfarin dosing plan.    Instructed to call the Anticoagulation Clinic for any changes, questions or concerns. (#[phone])        Peg Rivas RN      OBJECTIVE:  Recent labs: (last 7 days)     04/01/21 04/05/21   INR 3.5* 3.9*         INR assessment SUPRA    Recheck INR In: 3 DAYS    INR Location Homecare INR      Anticoagulation Summary  As of 4/5/2021    INR goal:  2.5-3.5   TTR:  21.3 % (2.7 mo)   INR used for dosing:  3.9 (4/5/2021)   Warfarin maintenance plan:  6 mg (2 mg x 3) every Sun, Wed, Fri; 4 mg (2 mg x 2) all other days   Full warfarin instructions:  4/5: 2 mg; Otherwise 6 mg every Sun, Wed, Fri; 4 mg all other days   Weekly warfarin total:  34 mg   Plan last modified:  Isabel Vazquez RN (3/22/2021)   Next INR check:  4/8/2021   Priority:  High   Target end date:  Indefinite    Indications    S/P MVR (mitral valve repair) [Z98.890]  S/P AVR (aortic valve replacement) [Z95.2]              Anticoagulation Episode Summary     INR check location:      Preferred lab:      Send INR reminders to:  GURVINDER DARDEN    Comments:  MVR & AVR placed 12/31/2020. H manage until 03/31/2021      Anticoagulation Care Providers     Provider Role Specialty Phone number    Quinton Handy PA Referring Cardiovascular & Thoracic Surgery [phone]    Lakeshia Rubio, FRANCESCA CNP Referring Internal Medicine [phone]    Sparkle Bird, APRN CNP Referring Emergency Medicine [phone]

## 2021-04-05 NOTE — LETTER
[facility]  Complex Care Plan  About Me:    Patient Name:  Rajani Guo    YOB: 1950  Age:         70 year old   [place] MRN:    [id number] Telephone Information:  Home Phone [phone]   Mobile [phone]       Address:  [address] [facility] [address] Email address:  [email or web address]      Emergency Contact(s)    Name Relationship Lgl Grd Work Phone Home Phone Mobile Phone   CANDICE PYLE* Daughter No  [phone]           Primary language:  English     needed? No   [place] [employer]:  [phone] op. 1  Other communication barriers:    Preferred Method of Communication:  Gay  Current living arrangement:    Mobility Status/ Medical Equipment:      Health Maintenance  Health Maintenance Reviewed:      My Access Plan  Medical Emergency 911   Primary Clinic Line [facility] - [phone]   24 Hour Appointment Line [phone] or  [phone]) (toll-free)   24 Hour Nurse Line [phone] (toll-free)   Preferred Urgent Care     Preferred Hospital     Preferred Pharmacy [id number] - [address] OF [facility]     Behavioral Health Crisis Line The National Suicide Prevention Lifeline at 1-365.833.6085 or 911             My Care Team Members  Patient Care Team       Relationship Specialty Notifications Start End    Tamiko Coley MD PCP - General Family Practice  3/11/19     Phone: [phone] Fax: [phone] [address] [facility] [address]    Tamiko Coley MD Assigned PCP   3/17/19     Phone: [phone] Fax: [phone] [address] [facility] [address]    Lora Pal MD MD Cardiology  10/12/20     Phone: [phone] Fax: [phone]         [address] [place] [address] [address] [facility] [address]    Hugo Patrick MD Assigned OBGYN Provider   10/23/20     Phone: [phone] Fax: [phone]          [address] [facility] [address]    Edilberto Damon MD Assigned Surgical Provider   10/23/20     Phone: [phone] Fax: [phone]         [address] [facility] [address]    [facility] [place] [facility]  1/6/21     Phone: [phone]         Odell Acosta, RN Lead Care Coordinator Primary Care - [facility] Admissions 1/11/21     Phone: [phone] Fax: [phone]        Luc Lara MD Assigned Heart and Vascular Provider   1/17/21     Phone: [phone] Fax: [phone]         [address] [facility] [address] [facility] [address]    Klaudia Lopez, RT Chronic Pulmonary Disease Specialist Respiratory Therapy  1/19/21     Smoking Cessation Counseling    Phone: [phone] Pager: [phone] Fax: [phone]        [address] [facility] [address]    Bin Alcaraz MD Assigned Neuroscience Provider   3/7/21     Phone: [phone] Fax: [phone]         [address] [facility] [address]    Ai Greene MA Community Health Worker Primary Care - [facility]  4/1/21             My Care Plans  Self Management and Treatment Plan  Goals and (Comments)  Goals        General    #1  Pain Management (pt-stated)     Notes - Note created  1/11/2021  5:55 PM by Odell Acosta RN    Goal Statement: I will work to reduce my pain with Tylenol, ice, heat, and massage.  Date Goal set: 1/11/2021  Barriers: Recent open heart surgery  Strengths: Engaged in care coordination  Date to Achieve By: 7/11/2021  Patient expressed understanding of goal: Yes  Action steps to achieve this goal:  1. I will take my Tylenol on a scheduled basis to keep ahead of the pain.  2. I will use alternating ice and heat to reduce the pain that I have incisionally.  3. I will have my daughter massage the muscles that are tight and achy and causing pain.        #2  Functional (pt-stated)     Notes - Note created  1/11/2021  5:57 PM by Odell Acosta RN    Goal Statement: I will work on  walking a little bit more each day by walking further in the distance or further in the time walked.  Date Goal set: 1/11/2021  Barriers: Recent open heart surgery  Strengths: Engaged in care coordination  Date to Achieve By: 7/11/2021  Patient expressed understanding of goal: Yes  Action steps to achieve this goal:  1. I will walk every day more than just to the bathroom and back.  2. I will increase the time walked.  3. I will increase the distance walked.               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 3/4/2019      Other cardiomyopathies (H)     Aortic insufficiency, Moderate -- Echo 8/13/19     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)     Fatigue     SOB (shortness of breath)     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)     Anemia, iron deficiency     Severe aortic stenosis     Dyspnea on exertion     Status post coronary angiogram     S/P MVR (mitral valve repair)     S/P AVR (aortic valve replacement)     UTI (urinary tract infection)     Trigger middle finger of right hand     Personal history of colonic polyps     Sensorineural hearing loss, bilateral     COPD (chronic obstructive pulmonary disease) (H)     History of CT scan of head 2021     Frailty     Atrial fibrillation (H)     Current use of long term anticoagulation     History of GI bleed     PAD (peripheral artery disease) (H)     PAF (paroxysmal atrial fibrillation) (H)      Current Medications and Allergies:  See printed  Medication Report.    Care Coordination Start Date: 1/11/2021   Frequency of Care Coordination: monthly   Form Last Updated: 04/05/2021

## 2021-04-05 NOTE — PROGRESS NOTES
Clinic Care Coordination Contact  Care Team Conversations    Care Coordination Clinician Chart Review  Situation: Patient chart reviewed by care coordinator.       Background: Care Coordination initial assessment and enrollment to Care Coordination was 1/11/2021.   Patient centered goals were developed with participation from patient.  RN CC handed patient off to CHW for continued outreach every 30 days.        Assessment: Per chart review, patient outreach completed by CC CHW on RN.  Patient is actively working to accomplish goals.  Patient's goals remain appropriate and relevant at this time.   Patient is due for updated Complex Care Plan.  Annual assessment will be due 1/11/2022.      Goals       #1  Pain Management (pt-stated)      Goal Statement: I will work to reduce my pain with Tylenol, ice, heat, and massage.  Date Goal set: 1/11/2021  Barriers: Recent open heart surgery  Strengths: Engaged in care coordination  Date to Achieve By: 7/11/2021  Patient expressed understanding of goal: Yes  Action steps to achieve this goal:  1. I will take my Tylenol on a scheduled basis to keep ahead of the pain.  2. I will use alternating ice and heat to reduce the pain that I have incisionally.  3. I will have my daughter massage the muscles that are tight and achy and causing pain.          #2  Functional (pt-stated)      Goal Statement: I will work on walking a little bit more each day by walking further in the distance or further in the time walked.  Date Goal set: 1/11/2021  Barriers: Recent open heart surgery  Strengths: Engaged in care coordination  Date to Achieve By: 7/11/2021  Patient expressed understanding of goal: Yes  Action steps to achieve this goal:  1. I will walk every day more than just to the bathroom and back.  2. I will increase the time walked.  3. I will increase the distance walked.                  Plan/Recommendations: The patient will continue working with Care Coordination to achieve goals as  above.  CHW will involve RN CC as needed or if patient is ready to move to maintenance.  RN CC will continue to monitor progress to goals and CHW outreaches every 6 weeks.   Care Plan updated and mailed to patient: Yes, Gay.        Odell Andrew MSN, RN, PHN, [facility]   Primary Care Clinical RN Care Coordinator  [facility]  4/5/2021   4:48 PM  [email or web address]  Office: [phone]

## 2021-04-05 NOTE — TELEPHONE ENCOUNTER
Spoke to pharmacist and informed him of below message from provider.  Klaudia ZAMORA CMA ([facility])

## 2021-04-06 ENCOUNTER — TRANSFERRED RECORDS (OUTPATIENT)
Dept: HEALTH INFORMATION MANAGEMENT | Facility: CLINIC | Age: 71
End: 2021-04-06

## 2021-04-08 ENCOUNTER — ANTICOAGULATION THERAPY VISIT (OUTPATIENT)
Dept: FAMILY MEDICINE | Facility: CLINIC | Age: 71
End: 2021-04-08

## 2021-04-08 DIAGNOSIS — Z98.890 S/P MVR (MITRAL VALVE REPAIR): ICD-10-CM

## 2021-04-08 DIAGNOSIS — Z95.2 S/P AVR (AORTIC VALVE REPLACEMENT): ICD-10-CM

## 2021-04-08 DIAGNOSIS — Z95.2 H/O MECHANICAL AORTIC VALVE REPLACEMENT: ICD-10-CM

## 2021-04-08 DIAGNOSIS — Z95.2 H/O MITRAL VALVE REPLACEMENT WITH MECHANICAL VALVE: ICD-10-CM

## 2021-04-08 LAB — INR PPP: 3.9 (ref 0.9–1.1)

## 2021-04-08 NOTE — PROGRESS NOTES
ANTICOAGULATION MANAGEMENT     Patient Name:  Rajani Guo  Date:  4/8/2021    ASSESSMENT /SUBJECTIVE:    Today's INR result of 3.9 is supratherapeutic. Goal INR of 2.5-3.5      Warfarin dose taken: Warfarin taken as instructed    Diet: No new diet changes affecting INR    Medication changes/ interactions: Completed abx on 4/7    Previous INR: Supratherapeutic     S/S of bleeding or thromboembolism: No    New injury or illness: No    Upcoming surgery, procedure or cardioversion: No    Additional findings: None      PLAN:    Telephone call with home care nurse micaela regarding INR result and instructed:     Warfarin Dosing Instructions: hold tonight then continue your current warfarin dose of 6 mg on sun/wed/fri and 4 mg all other days    Instructed patient to follow up no later than: 04/12  Orders given to  Homecare nurse/facility to recheck    Education provided: None required      Micaela, home care, verbalizes understanding and agrees to warfarin dosing plan.    Instructed to call the Anticoagulation Clinic for any changes, questions or concerns. (#[phone])        Janae Davila RN      OBJECTIVE:  Recent labs: (last 7 days)     04/05/21 04/08/21   INR 3.9* 3.9*         No question data found.  Anticoagulation Summary  As of 4/8/2021    INR goal:  2.5-3.5   TTR:  20.5 % (2.8 mo)   INR used for dosing:  3.9 (4/8/2021)   Warfarin maintenance plan:  6 mg (2 mg x 3) every Sun, Wed, Fri; 4 mg (2 mg x 2) all other days   Full warfarin instructions:  6 mg every Sun, Wed, Fri; 4 mg all other days   Weekly warfarin total:  34 mg   Plan last modified:  Isabel Vazquez RN (3/22/2021)   Next INR check:     Priority:  High   Target end date:  Indefinite    Indications    S/P MVR (mitral valve repair) [Z98.890]  S/P AVR (aortic valve replacement) [Z95.2]             Anticoagulation Episode Summary     INR check location:      Preferred lab:      Send INR reminders to:  GURVINDER DARDEN    Comments:  MVR & AVR placed  12/31/2020. [facility] manage until 03/31/2021      Anticoagulation Care Providers     Provider Role Specialty Phone number    Quinton Handy PA Referring Cardiovascular & Thoracic Surgery [phone]    Lakeshia Rubio APRN CNP Referring Internal Medicine [phone]    Sparkle Bird APRN CNP Referring Emergency Medicine [phone]

## 2021-04-11 NOTE — TELEPHONE ENCOUNTER
Routing refill request to provider for review/approval because:  Dx?  BP Readings from Last 3 Encounters:   03/17/21 (!) 174/76   03/11/21 136/64   03/02/21 (!) 157/83

## 2021-04-12 ENCOUNTER — ANTICOAGULATION THERAPY VISIT (OUTPATIENT)
Dept: FAMILY MEDICINE | Facility: CLINIC | Age: 71
End: 2021-04-12

## 2021-04-12 DIAGNOSIS — Z95.2 S/P AVR (AORTIC VALVE REPLACEMENT): ICD-10-CM

## 2021-04-12 DIAGNOSIS — Z98.890 S/P MVR (MITRAL VALVE REPAIR): ICD-10-CM

## 2021-04-12 LAB — INR PPP: 2.7 (ref 0.9–1.1)

## 2021-04-12 RX ORDER — BUPROPION HYDROCHLORIDE 150 MG/1
TABLET, EXTENDED RELEASE ORAL
Qty: 60 TABLET | Refills: 1 | Status: SHIPPED | OUTPATIENT
Start: 2021-04-12 | End: 2021-04-19

## 2021-04-12 NOTE — PROGRESS NOTES
ANTICOAGULATION MANAGEMENT     Patient Name:  Rajani Guo  Date:  2021    ASSESSMENT /SUBJECTIVE:    Today's INR result of 2.7 is therapeutic. Goal INR of 2.5-3.5      Warfarin dose taken: Warfarin taken as instructed held dose thursday    Diet: No new diet changes affecting INR    Medication changes/ interactions: Patient completed antibiotics on Wed last week    Previous INR: Supratherapeutic 3.9    S/S of bleeding or thromboembolism: No    New injury or illness: No    Upcoming surgery, procedure or cardioversion: No    Additional findings: None      PLAN:    Telephone call with Rajani regarding INR result and instructed:     Warfarin Dosing Instructions: Continue your current warfarin dose 6 mg Sun, Wed, Fri;4 mg all other days    Instructed patient to follow up no later than: 3 days  Orders given to  Homecare nurse/facility to recheck    Education provided: Target INR goal and significance of current INR result      Elisabet verbalizes understanding and agrees to warfarin dosing plan.    Instructed to call the Anticoagulation Clinic for any changes, questions or concerns. (#[phone])        Maliha Osborn RN      OBJECTIVE:  Recent labs: (last 7 days)     21   INR 3.9* 2.7*         INR assessment THER    Recheck INR In: 3 DAYS    INR Location Homecare INR      Anticoagulation Summary  As of 2021    INR goal:  2.5-3.5   TTR:  22.6 % (2.9 mo)   INR used for dosin.7 (2021)   Warfarin maintenance plan:  6 mg (2 mg x 3) every Sun, Wed, Fri; 4 mg (2 mg x 2) all other days   Full warfarin instructions:  6 mg every Sun, Wed, Fri; 4 mg all other days   Weekly warfarin total:  34 mg   Plan last modified:  Isabel Vazquez RN (3/22/2021)   Next INR check:  4/15/2021   Priority:  High   Target end date:  Indefinite    Indications    S/P MVR (mitral valve repair) [Z98.890]  S/P AVR (aortic valve replacement) [Z95.2]             Anticoagulation Episode Summary     INR check  location:      Preferred lab:      Send INR reminders to:  GURVINDER DARDEN    Comments:  MVR & AVR placed 12/31/2020. [facility] manage until 03/31/2021      Anticoagulation Care Providers     Provider Role Specialty Phone number    Quinton Handy PA Referring Cardiovascular & Thoracic Surgery [phone]    Lakeshia Rubio APRN CNP Referring Internal Medicine [phone]    Sparkle Bird APRN CNP Referring Emergency Medicine [phone]

## 2021-04-14 ENCOUNTER — TELEPHONE (OUTPATIENT)
Dept: FAMILY MEDICINE | Facility: CLINIC | Age: 71
End: 2021-04-14

## 2021-04-14 DIAGNOSIS — I48.0 PAF (PAROXYSMAL ATRIAL FIBRILLATION) (H): ICD-10-CM

## 2021-04-14 RX ORDER — AMIODARONE HYDROCHLORIDE 200 MG/1
TABLET ORAL
Qty: 60 TABLET | Refills: 0 | Status: CANCELLED | OUTPATIENT
Start: 2021-04-14

## 2021-04-14 NOTE — TELEPHONE ENCOUNTER
"Routing to cardiology to advise: Are you able to manage/prescribe amiodarone?     Refill request was received by primary care, Dr. Coley on 2021 and Dr. Coley denied refill request and advised:   \"Tamiko Coley MD This medicine is From her cardiologist-I do not prescribe this\"  Pt was last seen by Adriana Bains on 2021. Nora confirmed that pt is taking 200 m tabs by mouth daily (total daily dose is 400 mg) after discharge from TCU.    Verbal order given as requested for PT visits.     Isabel Sandoval RN  "

## 2021-04-14 NOTE — TELEPHONE ENCOUNTER
Reason for Call: Request for an order or referral:    Order or referral being requested: verbal orders to continue seeing Rajani 2x a week, education and monitoring for 9 weeks     amiodorone 200mg requested     Date needed: as soon as possible    Has the patient been seen by the PCP for this problem? YES    Additional comments: none    Phone number Patient can be reached at:  Other phone number:  [phone]    Best Time:  Any     Can we leave a detailed message on this number?  YES    Call taken on 4/14/2021 at 9:08 AM by Terrie Bae

## 2021-04-15 ENCOUNTER — ANTICOAGULATION THERAPY VISIT (OUTPATIENT)
Dept: FAMILY MEDICINE | Facility: CLINIC | Age: 71
End: 2021-04-15

## 2021-04-15 DIAGNOSIS — Z98.890 S/P MVR (MITRAL VALVE REPAIR): ICD-10-CM

## 2021-04-15 DIAGNOSIS — Z95.2 S/P AVR (AORTIC VALVE REPLACEMENT): ICD-10-CM

## 2021-04-15 LAB — INR PPP: 3 (ref 0.9–1.1)

## 2021-04-16 ENCOUNTER — PATIENT OUTREACH (OUTPATIENT)
Dept: NURSING | Facility: CLINIC | Age: 71
End: 2021-04-16
Payer: COMMERCIAL

## 2021-04-16 ENCOUNTER — MEDICAL CORRESPONDENCE (OUTPATIENT)
Dept: HEALTH INFORMATION MANAGEMENT | Facility: CLINIC | Age: 71
End: 2021-04-16

## 2021-04-16 NOTE — PROGRESS NOTES
Clinic Care Coordination Contact  Community Health Worker Follow Up    Goals:   Goals Addressed as of 4/16/2021 at 2:35 PM                 Today    3/19/21      #1  Pain Management (pt-stated)   30%  20%    Added 1/11/21 by Odell Acosta, RN     Goal Statement: I will work to reduce my pain with Tylenol, ice, heat, and massage.  Date Goal set: 1/11/2021  Barriers: Recent open heart surgery  Strengths: Engaged in care coordination  Date to Achieve By: 7/11/2021  Patient expressed understanding of goal: Yes  Action steps to achieve this goal:  1. I will take my Tylenol on a scheduled basis to keep ahead of the pain.  2. I will use alternating ice and heat to reduce the pain that I have incisionally.  3. I will have my daughter massage the muscles that are tight and achy and causing pain.          #2  Functional (pt-stated)   40%  30%    Added 1/11/21 by Odell Acosta RN     Goal Statement: I will work on walking a little bit more each day by walking further in the distance or further in the time walked.  Date Goal set: 1/11/2021  Barriers: Recent open heart surgery  Strengths: Engaged in care coordination  Date to Achieve By: 7/11/2021  Patient expressed understanding of goal: Yes  Action steps to achieve this goal:  1. I will walk every day more than just to the bathroom and back.  2. I will increase the time walked.  3. I will increase the distance walked.            CHW spoke with patient she said she was in [employer] shopping. CHW asked patient if  she was taking Tylenol as scheduled she yes and CHW asked about applying heat and cool presses for pain. She said she really doesn't have pain maybe a headache every now and then.     She said she does have an appointment with Dr. Coley coming up. CHW asked is she walking more. She said yes her daughter has her at [employer] right now. CHW asked did she have any new concerns she said no.    Intervention and Education during outreach: CHW advised patient to call clinic with  any non-emergent questions.    CHW Plan: CHW will call patient in 1 month.    Ai LY Community Health Worker  Clinic Care Coordination  [facility] : [place], [place] & [place]  Phone: [phone]

## 2021-04-19 ENCOUNTER — OFFICE VISIT (OUTPATIENT)
Dept: FAMILY MEDICINE | Facility: CLINIC | Age: 71
End: 2021-04-19
Payer: COMMERCIAL

## 2021-04-19 ENCOUNTER — TELEPHONE (OUTPATIENT)
Dept: CARDIOLOGY | Facility: CLINIC | Age: 71
End: 2021-04-19

## 2021-04-19 ENCOUNTER — TRANSFERRED RECORDS (OUTPATIENT)
Dept: HEALTH INFORMATION MANAGEMENT | Facility: CLINIC | Age: 71
End: 2021-04-19

## 2021-04-19 VITALS
WEIGHT: 148 LBS | BODY MASS INDEX: 25.27 KG/M2 | OXYGEN SATURATION: 100 % | TEMPERATURE: 97 F | HEART RATE: 83 BPM | HEIGHT: 64 IN | SYSTOLIC BLOOD PRESSURE: 130 MMHG | RESPIRATION RATE: 20 BRPM | DIASTOLIC BLOOD PRESSURE: 74 MMHG

## 2021-04-19 DIAGNOSIS — Z95.2 H/O MECHANICAL AORTIC VALVE REPLACEMENT: ICD-10-CM

## 2021-04-19 DIAGNOSIS — Z95.2 H/O MITRAL VALVE REPLACEMENT WITH MECHANICAL VALVE: ICD-10-CM

## 2021-04-19 DIAGNOSIS — D50.9 IRON DEFICIENCY ANEMIA, UNSPECIFIED IRON DEFICIENCY ANEMIA TYPE: ICD-10-CM

## 2021-04-19 DIAGNOSIS — I48.0 PAF (PAROXYSMAL ATRIAL FIBRILLATION) (H): Primary | ICD-10-CM

## 2021-04-19 DIAGNOSIS — E11.9 TYPE 2 DIABETES MELLITUS WITHOUT COMPLICATION, WITHOUT LONG-TERM CURRENT USE OF INSULIN (H): ICD-10-CM

## 2021-04-19 DIAGNOSIS — K92.2 GASTROINTESTINAL HEMORRHAGE, UNSPECIFIED GASTROINTESTINAL HEMORRHAGE TYPE: ICD-10-CM

## 2021-04-19 DIAGNOSIS — F41.9 ANXIETY: ICD-10-CM

## 2021-04-19 LAB
CHOLEST SERPL-MCNC: 135 MG/DL
FERRITIN SERPL-MCNC: 23 NG/ML (ref 8–252)
HBA1C MFR BLD: 5.8 % (ref 0–5.6)
HDLC SERPL-MCNC: 56 MG/DL
HGB BLD-MCNC: 12.5 G/DL (ref 11.7–15.7)
IRON SATN MFR SERPL: 24 % (ref 15–46)
IRON SERPL-MCNC: 81 UG/DL (ref 35–180)
LDLC SERPL CALC-MCNC: 45 MG/DL
NONHDLC SERPL-MCNC: 79 MG/DL
TIBC SERPL-MCNC: 334 UG/DL (ref 240–430)
TRIGL SERPL-MCNC: 170 MG/DL

## 2021-04-19 PROCEDURE — 99214 OFFICE O/P EST MOD 30 MIN: CPT | Performed by: FAMILY MEDICINE

## 2021-04-19 PROCEDURE — 83036 HEMOGLOBIN GLYCOSYLATED A1C: CPT | Performed by: FAMILY MEDICINE

## 2021-04-19 PROCEDURE — 82728 ASSAY OF FERRITIN: CPT | Performed by: FAMILY MEDICINE

## 2021-04-19 PROCEDURE — 36415 COLL VENOUS BLD VENIPUNCTURE: CPT | Performed by: FAMILY MEDICINE

## 2021-04-19 PROCEDURE — 83540 ASSAY OF IRON: CPT | Performed by: FAMILY MEDICINE

## 2021-04-19 PROCEDURE — 80061 LIPID PANEL: CPT | Performed by: FAMILY MEDICINE

## 2021-04-19 PROCEDURE — 83550 IRON BINDING TEST: CPT | Performed by: FAMILY MEDICINE

## 2021-04-19 PROCEDURE — 85018 HEMOGLOBIN: CPT | Performed by: FAMILY MEDICINE

## 2021-04-19 RX ORDER — BUPROPION HYDROCHLORIDE 150 MG/1
150 TABLET, EXTENDED RELEASE ORAL 2 TIMES DAILY
Qty: 60 TABLET | Refills: 6 | Status: SHIPPED | OUTPATIENT
Start: 2021-04-19 | End: 2021-08-16

## 2021-04-19 ASSESSMENT — ANXIETY QUESTIONNAIRES
5. BEING SO RESTLESS THAT IT IS HARD TO SIT STILL: NEARLY EVERY DAY
7. FEELING AFRAID AS IF SOMETHING AWFUL MIGHT HAPPEN: MORE THAN HALF THE DAYS
IF YOU CHECKED OFF ANY PROBLEMS ON THIS QUESTIONNAIRE, HOW DIFFICULT HAVE THESE PROBLEMS MADE IT FOR YOU TO DO YOUR WORK, TAKE CARE OF THINGS AT HOME, OR GET ALONG WITH OTHER PEOPLE: VERY DIFFICULT
GAD7 TOTAL SCORE: 15
1. FEELING NERVOUS, ANXIOUS, OR ON EDGE: SEVERAL DAYS
2. NOT BEING ABLE TO STOP OR CONTROL WORRYING: MORE THAN HALF THE DAYS
3. WORRYING TOO MUCH ABOUT DIFFERENT THINGS: MORE THAN HALF THE DAYS
6. BECOMING EASILY ANNOYED OR IRRITABLE: MORE THAN HALF THE DAYS

## 2021-04-19 ASSESSMENT — PATIENT HEALTH QUESTIONNAIRE - PHQ9
5. POOR APPETITE OR OVEREATING: NEARLY EVERY DAY
SUM OF ALL RESPONSES TO PHQ QUESTIONS 1-9: 2

## 2021-04-19 ASSESSMENT — MIFFLIN-ST. JEOR: SCORE: 1181.32

## 2021-04-19 NOTE — TELEPHONE ENCOUNTER
Per Dr. Morse, patient to stop amiodarone.  INR to be checked Thursday.   Patient and Homecare nurse informed and verbalized understanding.      Gretchen Amos RN  Cardiology Care Coordinator  [facility]  [phone] option 1    '

## 2021-04-19 NOTE — PROGRESS NOTES
"    Assessment & Plan     Type 2 diabetes mellitus without complication, without long-term current use of insulin (H)  Pending   Has been controlled  - HEMOGLOBIN A1C  - Lipid panel reflex to direct LDL Non-fasting  - Albumin Random Urine Quantitative with Creat Ratio  - EYE ADULT REFERRAL; Future  - metFORMIN (GLUCOPHAGE) 1000 MG tablet; TAKE 1 TABLET BY MOUTH TWICE A DAY WITH MEALS    PAF (paroxysmal atrial fibrillation) (H)  Stable     H/O mitral valve replacement with mechanical valve  Stable     H/O mechanical aortic valve replacement  Stable     Iron deficiency anemia, unspecified iron deficiency anemia type  Labs pending     - Ferritin  - Iron and iron binding capacity  - Hemoglobin    Anxiety  Stable     Gastrointestinal hemorrhage, unspecified gastrointestinal hemorrhage type  Doing well Now  BMI:   Estimated body mass index is 25.4 kg/m  as calculated from the following:    Height as of this encounter: 1.626 m (5' 4\").    Weight as of this encounter: 67.1 kg (148 lb).           Return in about 3 months (around 7/19/2021) for Medicare wellness Exam.    Tamiko Coley MD  [facility] MARIE Gerard is a 70 year old who presents for the following health issues  accompanied by her daughter:    History of Present Illness       Hypertension: She presents for follow up of hypertension.  She does check blood pressure  regularly outside of the clinic. Outpatient blood pressures have not been over 140/90. She follows a low salt diet.     She eats 2-3 servings of fruits and vegetables daily.She consumes 1 sweetened beverage(s) daily.She exercises with enough effort to increase her heart rate 9 or less minutes per day.  She exercises with enough effort to increase her heart rate 3 or less days per week.   She is taking medications regularly.       Diabetes Follow-up      How often are you checking your blood sugar? Not at all    What concerns do you have today about your diabetes? None     Do " you have any of these symptoms? (Select all that apply)  No numbness or tingling in feet.  No redness, sores or blisters on feet.  No complaints of excessive thirst.  No reports of blurry vision.  No significant changes to weight.    Have you had a diabetic eye exam in the last 12 months? No                Hyperlipidemia Follow-Up      Are you regularly taking any medication or supplement to lower your cholesterol?   Yes- statin    Doing well    Pt also needs HGB checked    Had GI bleed    BP Readings from Last 2 Encounters:   21 130/74   21 (!) 174/76     Hemoglobin A1C (%)   Date Value   2020 5.8 (H)   2020 5.7 (H)     LDL Cholesterol Calculated (mg/dL)   Date Value   2020 54   2019 44         Anxiety Follow-Up    How are you doing with your anxiety since your last visit? Stable     Are you having other symptoms that might be associated with anxiety? No    Have you had a significant life event? No     Are you feeling depressed? No    Do you have any concerns with your use of alcohol or other drugs? No    Social History     Tobacco Use     Smoking status: Current Every Day Smoker     Packs/day: 1.00     Years: 58.00     Pack years: 58.00     Start date: 1962     Last attempt to quit: 2020     Years since quittin.3     Smokeless tobacco: Never Used   Substance Use Topics     Alcohol use: Not Currently     Comment: 2 glasses of wine a week     Drug use: Yes     Types: Marijuana     Comment: occasional     No flowsheet data found.  PHQ 3/13/2019 2020 2021   PHQ-9 Total Score 3 4 2   Q9: Thoughts of better off dead/self-harm past 2 weeks Not at all Not at all Not at all     Last PHQ-9 2021   1.  Little interest or pleasure in doing things 0   2.  Feeling down, depressed, or hopeless 0   3.  Trouble falling or staying asleep, or sleeping too much 0   4.  Feeling tired or having little energy 1   5.  Poor appetite or overeating 0   6.  Feeling bad about  "yourself 0   7.  Trouble concentrating 1   8.  Moving slowly or restless 0   Q9: Thoughts of better off dead/self-harm past 2 weeks 0   PHQ-9 Total Score 2   Difficulty at work, home, or with people Somewhat difficult     Review of Systems   CONSTITUTIONAL: NEGATIVE for fever, chills, change in weight  ENT/MOUTH: NEGATIVE for ear, mouth and throat problems  RESP: NEGATIVE for significant cough or SOB  CV: NEGATIVE for chest pain, palpitations or peripheral edema  GI: NEGATIVE for nausea, abdominal pain, heartburn, or change in bowel habits  PSYCHIATRIC: NEGATIVE for changes in mood or affect      Objective    /74   Pulse 83   Temp 97  F (36.1  C) (Oral)   Resp 20   Ht 1.626 m (5' 4\")   Wt 67.1 kg (148 lb)   SpO2 100%   BMI 25.40 kg/m    Body mass index is 25.4 kg/m .  Physical Exam   GENERAL: healthy, alert and no distress  NECK: no adenopathy, no asymmetry, masses, or scars and thyroid normal to palpation  RESP: lungs clear to auscultation - no rales, rhonchi or wheezes  CV: regular rate and rhythm, normal S1 S2, no S3 or S4, no murmur, click or rub, no peripheral edema and peripheral pulses strong  ABDOMEN: soft, nontender, no hepatosplenomegaly, no masses and bowel sounds normal  MS: no gross musculoskeletal defects noted, no edema    Pending   Anticoagulation Therapy Visit on 04/15/2021   Component Date Value Ref Range Status     INR 04/15/2021 3.0* 0.90 - 1.10 Final               "

## 2021-04-19 NOTE — TELEPHONE ENCOUNTER
Patient and homecare nurse informed of MD's response and both verbalized understanding.  Med list updated.  INR is getting checked on Thursday 4/22.    Will route to INR as DANIELA Amos RN  Cardiology Care Coordinator  [facility]  [phone] option 1

## 2021-04-19 NOTE — TELEPHONE ENCOUNTER
[facility] Call Center    Phone Message    May a detailed message be left on voicemail: yes     Reason for Call: Medication Refill Request    Has the patient contacted the pharmacy for the refill? Yes   Name of medication being requested: amiodarone (PACERONE) 200 MG tablet  Provider who prescribed the medication: n/a-pts PCC, but they are refusing to fill it  Pharmacy: [id number] - [facility], [address] [facility]  Date medication is needed: asap- pt is out         Action Taken: Message routed to:  [facility] heart    Travel Screening: Not Applicable

## 2021-04-19 NOTE — TELEPHONE ENCOUNTER
Recommend to stop amio.     We need to let the INR clinic know that we are stopping amiodarone. INR will come down slowly.     DR.CAN

## 2021-04-19 NOTE — TELEPHONE ENCOUNTER
Lito Donald,    Please advise if patient is to stop amiodarone.  Was started on amiodarone s/p valve replacements on 12/29/20 due to afib RVR and according to discharge, patient was to stop amiodarone after 1 month.  Spoke to patient who reports that she did continue to take amiodarone and it was refilled by her primary care provider last month.  States she is confused onto if she is to continue amiodarone.    Patient also reports that she has not seen cardiology since her valve replacements in Dec. Echo was completed iin Feb.  She is doing well and denies any shortness of breath or dizziness.  Patient has difficulty getting into clinic and would prefer a virtual visit with Dr. Morse.  This is scheduled for 6/1/21 3:30 with Dr. Morse virtually.    Dr. Morse, please advise on amiodarone refill.  I did copy notes below from hospital discharge.    Gretchen Amos RN  Cardiology Care Coordinator  [facility]  [phone] option 1                       Admitting Physician:               Luc Lara MD  Discharge Physician:              JAYNA Jack  Primary Care Physician:        Tamiko Coley     DATE OF ADMISSION: 12/29/2020       DATE OF DISCHARGE: January 6, 2021      1. Severe mitral stenosis, s/p mechanical MVR   2. Severe aortic stenosis, s/p mechanical AVR  3. Paroxysmal atrial fibrillation  4. Lifelong anticoagulation for mechanical valves, INR goal 2.5 - 3.5  5. Episodic post-op delirium with paranoia, resolved.   6. DMT2, discharging off insulin, may need to restart in future   7. UTI, completed treatment   Cardiovascular:   Hx of severe mitral and aortic stenosis  S/p elective mechanical AVR and mechanical MVR   HLD, HTN, pulmonary HTN   Post-op Atrial Fibrillation   Routine Post-op ECHO ordered on 12/31; borderline EF 50-55%  Back in A-fib with RVR on 1/5, remained HD stable. IV Amio bolus x 2 and IV Metop 5 mg IV x 1 given for rate control. Started PO Amio 400 mg BID and  increased metoprolol to 25 mg PO BID. She converted to NSR at 4 am on 1/6 while on PO amio and 25 mg Metoprolol.  Tolerated ASA 81 mg and Lipitor.   TPW: removed 12/31.     Discharged on amiodarone with the following instructions:  amiodarone (PACERONE) 400 MG tablet  Take 400 mg PO BID for 7 days, then take 400 mg PO daily for 21 days, then stop.

## 2021-04-21 ENCOUNTER — TRANSFERRED RECORDS (OUTPATIENT)
Dept: HEALTH INFORMATION MANAGEMENT | Facility: CLINIC | Age: 71
End: 2021-04-21

## 2021-04-21 ASSESSMENT — ANXIETY QUESTIONNAIRES: GAD7 TOTAL SCORE: 15

## 2021-04-22 ENCOUNTER — ANTICOAGULATION THERAPY VISIT (OUTPATIENT)
Dept: FAMILY MEDICINE | Facility: CLINIC | Age: 71
End: 2021-04-22

## 2021-04-22 DIAGNOSIS — I05.0 SEVERE MITRAL STENOSIS BY PRIOR ECHOCARDIOGRAM: ICD-10-CM

## 2021-04-22 DIAGNOSIS — I35.0 AORTIC VALVE STENOSIS, ETIOLOGY OF CARDIAC VALVE DISEASE UNSPECIFIED: ICD-10-CM

## 2021-04-22 DIAGNOSIS — R82.90 NONSPECIFIC FINDING ON EXAMINATION OF URINE: Primary | ICD-10-CM

## 2021-04-22 DIAGNOSIS — Z98.890 S/P MVR (MITRAL VALVE REPAIR): ICD-10-CM

## 2021-04-22 DIAGNOSIS — Z95.2 S/P AVR (AORTIC VALVE REPLACEMENT): ICD-10-CM

## 2021-04-22 LAB
ALBUMIN UR-MCNC: NEGATIVE MG/DL
APPEARANCE UR: ABNORMAL
BACTERIA #/AREA URNS HPF: ABNORMAL /HPF
BILIRUB UR QL STRIP: NEGATIVE
COLOR UR AUTO: YELLOW
GLUCOSE UR STRIP-MCNC: 100 MG/DL
HGB UR QL STRIP: NEGATIVE
INR PPP: 3.6 (ref 0.9–1.1)
KETONES UR STRIP-MCNC: NEGATIVE MG/DL
LEUKOCYTE ESTERASE UR QL STRIP: ABNORMAL
NITRATE UR QL: POSITIVE
NON-SQ EPI CELLS #/AREA URNS LPF: ABNORMAL /LPF
PH UR STRIP: 5.5 PH (ref 5–7)
RBC #/AREA URNS AUTO: ABNORMAL /HPF
SOURCE: ABNORMAL
SP GR UR STRIP: 1.02 (ref 1–1.03)
UROBILINOGEN UR STRIP-ACNC: 0.2 EU/DL (ref 0.2–1)
WBC #/AREA URNS AUTO: ABNORMAL /HPF

## 2021-04-22 PROCEDURE — 87086 URINE CULTURE/COLONY COUNT: CPT | Performed by: SURGERY

## 2021-04-22 PROCEDURE — 81001 URINALYSIS AUTO W/SCOPE: CPT | Performed by: SURGERY

## 2021-04-22 NOTE — PROGRESS NOTES
Elisabet RN with [facility] calling with an INR of 3.6. Please callback as soon as possible #[phone].  Of note, patient has stopped Amiodarone. MARY Vera, RN

## 2021-04-22 NOTE — PROGRESS NOTES
ANTICOAGULATION MANAGEMENT     Patient Name:  Rajani Guo  Date:  4/22/2021    ASSESSMENT /SUBJECTIVE:    Today's INR result of 3.6 is supratherapeutic. Goal INR of 2.0-3.0      Warfarin dose taken: Warfarin taken as instructed    Diet: No new diet changes affecting INR    Medication changes/ interactions: Stopped Amiodarone    Previous INR: Therapeutic     S/S of bleeding or thromboembolism: No    New injury or illness: No    Upcoming surgery, procedure or cardioversion: No    Additional findings: None      PLAN:    Telephone call with home care nurse Elisabet regarding INR result and instructed:     Warfarin Dosing Instructions: Change your warfarin dose to 6 mg Sun; 4 mg all other days  . (11 % change)    Instructed patient to follow up no later than: 4 days  Orders given to  Homecare nurse/facility to recheck    Education provided: Monitoring for bleeding signs and symptoms and Monitoring for clotting signs and symptoms      Elisabet verbalizes understanding and agrees to warfarin dosing plan.    Instructed to call the Anticoagulation Clinic for any changes, questions or concerns. (#[phone])        Gladys Díaz RN      OBJECTIVE:  Recent labs: (last 7 days)     04/22/21   INR 3.6*         No question data found.  Anticoagulation Summary  As of 4/22/2021    INR goal:  2.5-3.5   TTR:  29.4 % (3.2 mo)   INR used for dosing:  3.6 (4/22/2021)   Warfarin maintenance plan:  6 mg (2 mg x 3) every Sun; 4 mg (2 mg x 2) all other days   Full warfarin instructions:  6 mg every Sun; 4 mg all other days   Weekly warfarin total:  30 mg   Plan last modified:  Gladys Díaz RN (4/22/2021)   Next INR check:  4/26/2021   Priority:  High   Target end date:  Indefinite    Indications    S/P MVR (mitral valve repair) [Z98.890]  S/P AVR (aortic valve replacement) [Z95.2]             Anticoagulation Episode Summary     INR check location:      Preferred lab:      Send INR reminders to:  GURVINDER DARDEN    Comments:  MVR & AVR  placed 12/31/2020. [facility] manage until 03/31/2021      Anticoagulation Care Providers     Provider Role Specialty Phone number    Quinton Handy PA Referring Cardiovascular & Thoracic Surgery [phone]    Lakeshia Rubio APRN CNP Referring Internal Medicine [phone]    Sparkle Bird APRN CNP Referring Emergency Medicine [phone]

## 2021-04-23 LAB
BACTERIA SPEC CULT: NORMAL
Lab: NORMAL
SPECIMEN SOURCE: NORMAL

## 2021-04-26 ENCOUNTER — ANTICOAGULATION THERAPY VISIT (OUTPATIENT)
Dept: FAMILY MEDICINE | Facility: CLINIC | Age: 71
End: 2021-04-26

## 2021-04-26 DIAGNOSIS — Z98.890 S/P MVR (MITRAL VALVE REPAIR): ICD-10-CM

## 2021-04-26 DIAGNOSIS — Z95.2 S/P AVR (AORTIC VALVE REPLACEMENT): ICD-10-CM

## 2021-04-26 LAB — INR PPP: 2.1 (ref 0.9–1.1)

## 2021-04-26 NOTE — PROGRESS NOTES
ANTICOAGULATION MANAGEMENT     Patient Name:  Rajani Guo  Date:  2021    ASSESSMENT /SUBJECTIVE:    Today's INR result of 2.1 is subtherapeutic. Goal INR of 2.5-3.5      Warfarin dose taken: Warfarin taken as instructed    Diet: No new diet changes affecting INR    Medication changes/ interactions: Stopped taking amiodarone as of 21    Previous INR: Supratherapeutic     S/S of bleeding or thromboembolism: No    New injury or illness: No    Upcoming surgery, procedure or cardioversion: No    Additional findings: None      PLAN:    Telephone call with home care nurse KAREEM Melendez regarding INR result and instructed:     Warfarin Dosing Instructions: 6mg tonight then change your warfarin dose to 6mg every Sun & Thu; 4mg all other days of the week.   . (6.7 % change)    Instructed patient to follow up no later than: 21  Orders given to  Homecare nurse/facility to recheck    Education provided: Please call back if any changes to your diet, medications or how you've been taking warfarin, Target INR goal and significance of current INR result, Importance of therapeutic range and Contact [facility] Anticoagulation: [phone]  with any changes, questions or concerns.       KAREEM Melendez verbalizes understanding and agrees to warfarin dosing plan.    Instructed to call the Anticoagulation Clinic for any changes, questions or concerns. (#[phone])        Washington Dorman RN      OBJECTIVE:  Recent labs: (last 7 days)     21   INR 3.6* 2.1*         No question data found.  Anticoagulation Summary  As of 2021    INR goal:  2.5-3.5   TTR:  30.9 % (3.4 mo)   INR used for dosin.1 (2021)   Warfarin maintenance plan:  6 mg (2 mg x 3) every Sun, Thu; 4 mg (2 mg x 2) all other days   Full warfarin instructions:  : 6 mg; Otherwise 6 mg every Sun, Thu; 4 mg all other days   Weekly warfarin total:  32 mg   Plan last modified:  Washington Dorman, RN (2021)   Next INR  check:  4/29/2021   Priority:  High   Target end date:  Indefinite    Indications    S/P MVR (mitral valve repair) [Z98.890]  S/P AVR (aortic valve replacement) [Z95.2]             Anticoagulation Episode Summary     INR check location:      Preferred lab:      Send INR reminders to:  GURVINDER DARDEN    Comments:  MVR & AVR placed 12/31/2020. [facility] manage until 03/31/2021      Anticoagulation Care Providers     Provider Role Specialty Phone number    Quinton Handy PA Referring Cardiovascular & Thoracic Surgery [phone]    Lakeshia Rubio, FRANCESCA CNP Referring Internal Medicine [phone]    Sparkle Bird APRN CNP Referring Emergency Medicine [phone]

## 2021-04-29 ENCOUNTER — ANTICOAGULATION THERAPY VISIT (OUTPATIENT)
Dept: FAMILY MEDICINE | Facility: CLINIC | Age: 71
End: 2021-04-29

## 2021-04-29 DIAGNOSIS — Z98.890 S/P MVR (MITRAL VALVE REPAIR): ICD-10-CM

## 2021-04-29 DIAGNOSIS — Z95.2 S/P AVR (AORTIC VALVE REPLACEMENT): ICD-10-CM

## 2021-04-29 LAB — INR PPP: 2.8 (ref 0.9–1.1)

## 2021-04-29 NOTE — PROGRESS NOTES
ANTICOAGULATION MANAGEMENT     Patient Name:  Rajani Guo  Date:  2021    ASSESSMENT /SUBJECTIVE:    Today's INR result of 2.8 is therapeutic. Goal INR of 2.5-3.5      Warfarin dose taken: Warfarin taken as instructed    Diet: No new diet changes affecting INR    Medication changes/ interactions: No new medications/supplements affecting INR    Previous INR: Subtherapeutic     S/S of bleeding or thromboembolism: No    New injury or illness: No    Upcoming surgery, procedure or cardioversion: No    Additional findings: None      PLAN:    Telephone call with home care nurse Nora regarding INR result and instructed:     Warfarin Dosing Instructions: Continue your current warfarin dose 6 mg on sun/thu and 4 mg all other days    Instructed patient to follow up no later than: 1 week  Orders given to  Homecare nurse/facility to recheck    Education provided: None required      Nora verbalizes understanding and agrees to warfarin dosing plan.    Instructed to call the Anticoagulation Clinic for any changes, questions or concerns. (#[phone])        Janae Davila RN      OBJECTIVE:  Recent labs: (last 7 days)     21   INR 2.1* 2.8*         No question data found.  Anticoagulation Summary  As of 2021    INR goal:  2.5-3.5   TTR:  31.2 % (3.5 mo)   INR used for dosin.8 (2021)   Warfarin maintenance plan:  6 mg (2 mg x 3) every Sun, Thu; 4 mg (2 mg x 2) all other days   Full warfarin instructions:  6 mg every Sun, Thu; 4 mg all other days   Weekly warfarin total:  32 mg   No change documented:  Janae Davila RN   Plan last modified:  Washington Dorman RN (2021)   Next INR check:  2021   Priority:  High   Target end date:  Indefinite    Indications    S/P MVR (mitral valve repair) [Z98.890]  S/P AVR (aortic valve replacement) [Z95.2]             Anticoagulation Episode Summary     INR check location:      Preferred lab:      Send INR reminders to:  GURVINDER DARDEN    Comments:   MVR & AVR placed 12/31/2020. [facility] manage until 03/31/2021      Anticoagulation Care Providers     Provider Role Specialty Phone number    Quinton Handy PA Referring Cardiovascular & Thoracic Surgery [phone]    Lakeshia Rubio, FRANCESCA CNP Referring Internal Medicine [phone]    Sparkle Bird, APRN CNP Referring Emergency Medicine [phone]

## 2021-05-03 DIAGNOSIS — Z53.9 DIAGNOSIS NOT YET DEFINED: Primary | ICD-10-CM

## 2021-05-03 PROCEDURE — G0179 MD RECERTIFICATION HHA PT: HCPCS | Performed by: FAMILY MEDICINE

## 2021-05-04 NOTE — PROGRESS NOTES
[facility] Neurology Follow Up Visit    Rajani Guo MRN# [id number]   Age: 70 year old YOB: 1950     Brief history of symptoms: The patient was initially seen in neurologic consultation on 3/2/21 for evaluation of imbalance. Please see the comprehensive neurologic consultation note from that date in the Epic records for details.     Interval history:   Walking is much better now. She did home physical therapy and has almost completed it. She doesn't use walker or cane when she is at home. She uses walker when she is out of the house due to longer distances walking. She denies any falls. She denies any numbness or tingling in the extremities.        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)     Fatigue     SOB (shortness of breath)     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)     Anemia, iron deficiency     Severe aortic stenosis     Dyspnea on exertion     Status post coronary angiogram     S/P MVR (mitral valve repair)     S/P AVR (aortic valve replacement)     UTI (urinary tract infection)     Trigger middle finger of right hand     Personal history of colonic polyps     Sensorineural hearing loss, bilateral     COPD (chronic obstructive pulmonary disease) (H)     History of CT scan of head 2021     Frailty     Atrial fibrillation (H)     Current use of long term anticoagulation     History of GI bleed     PAD (peripheral artery disease) (H)     PAF (paroxysmal  atrial fibrillation) (H)     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   Findings: There is no intracranial hemorrhage, mass effect, or midline shift. Gray/whi     History of partial thyroidectomy 06/02/2018     Hyperlipidemia LDL goal <100 01/18/2018     Hypertension      Insomnia, unspecified type 01/18/2018     Mitral regurgitation      Mitral stenosis      Pulmonary hypertension (H)      Tobacco use disorder         Past Surgical History:     Past Surgical History:   Procedure Laterality Date     COLONOSCOPY N/A 9/4/2019    Procedure: COLONOSCOPY;  Surgeon: Marie Todd MD;  Location:  GI     CV CORONARY ANGIOGRAM N/A 11/16/2020    Procedure: CV CORONARY ANGIOGRAM;  Surgeon: Joey Rivas MD;  Location:  HEART CARDIAC CATH LAB     CV FRACTIONAL FLOW RESERVE N/A 11/16/2020    Procedure: Fractional Flow Reserve;  Surgeon: Joey Rivas MD;  Location:  HEART CARDIAC CATH LAB     CV RIGHT HEART CATH MEASUREMENTS RECORDED N/A 11/16/2020    Procedure: CV RIGHT HEART CATH;  Surgeon: Joey Rivas MD;  Location:  HEART CARDIAC CATH LAB     ESOPHAGOSCOPY, GASTROSCOPY, DUODENOSCOPY (EGD), COMBINED N/A 9/4/2019     Procedure: ESOPHAGOGASTRODUODENOSCOPY (EGD);  Surgeon: Marie Todd MD;  Location:  GI     ESOPHAGOSCOPY, GASTROSCOPY, DUODENOSCOPY (EGD), COMBINED N/A 2020    Procedure: ESOPHAGOGASTRODUODENOSCOPY (EGD);  Surgeon: Ten Ibrahim DO;  Location:  GI     IR ANGIOGRAM THROUGH CATHETER FOLLOW UP  3/5/2019     IR LOWER EXTREMITY ANGIOGRAM LEFT  3/4/2019     KNEE SURGERY Right     right knee torn meniscus surgery     OVARY SURGERY  1971    1 ovary removed     RELEASE CARPAL TUNNEL Right      RELEASE TRIGGER FINGER BILATERAL       REPLACE VALVES AORTIC AND MITRAL, COMBINED N/A 2020    Procedure: Median Stertonomy, REPLACEMENT AORTIC Valve  with 19mm St Abdoulaye [place]  Mechanical Heart Valve AND MITRAL VALVE Replacement with 27mm St Abdoulaye Mechanical Heart Valve, on pump oxygenation;  Surgeon: Luc Lara MD;  Location: [facility] OR     SHOULDER SURGERY Left     rotator cuff repair, plate placement     THYROID SURGERY      partial thyroidectomy        Social History:     Social History     Tobacco Use     Smoking status: Current Every Day Smoker     Packs/day: 1.00     Years: 58.00     Pack years: 58.00     Start date: 1962     Last attempt to quit: 2020     Years since quittin.3     Smokeless tobacco: Never Used   Substance Use Topics     Alcohol use: Not Currently     Comment: 2 glasses of wine a week     Drug use: Yes     Types: Marijuana     Comment: occasional        Family History:     Family History   Problem Relation Age of Onset     Diabetes Type 2  Mother      Lung Cancer Father      Diabetes Sister      Coronary Artery Disease Sister      Diabetes Brother      Diabetes Brother      Diabetes Type 2  Brother      Coronary Artery Disease Sister      Asthma Sister      Glaucoma No family hx of      Macular Degeneration No family hx of      Anesthesia Reaction No family hx of         Medications:     Current Outpatient Medications   Medication Sig     acetaminophen  (TYLENOL) 325 MG tablet TAKE 2 TABLETS (650 MG) BY MOUTH EVERY 4 HOURS AS NEEDED FOR PAIN     aspirin (ASA) 81 MG EC tablet Take 1 tablet (81 mg) by mouth daily     atorvastatin (LIPITOR) 40 MG tablet Take 1 tablet (40 mg) by mouth daily     buPROPion (WELLBUTRIN SR) 150 MG 12 hr tablet Take 1 tablet (150 mg) by mouth 2 times daily     Calcium Carb-Cholecalciferol 600-800 MG-UNIT TABS Take 1 tablet by mouth 2 times daily     enoxaparin ANTICOAGULANT (LOVENOX) 60 MG/0.6ML syringe Inject 0.6 mLs (60 mg) Subcutaneous 2 times daily Until INR therapeutic     ferrous sulfate (FEROSUL) 325 (65 Fe) MG tablet Take 325 mg by mouth 2 times daily     glucosamine-chondroitinoitin 500-400 MG tablet Patient is taking 1500 mg OTC 1 time daily     insulin pen needle (29G X 12MM) 29G X 12MM miscellaneous Use 1 pen needles daily or as directed.     Melatonin 10 MG TABS tablet Take 10 mg by mouth At Bedtime     metFORMIN (GLUCOPHAGE) 1000 MG tablet TAKE 1 TABLET BY MOUTH TWICE A DAY WITH MEALS     Multiple Vitamin (MULTI-VITAMINS) TABS Take 1 tablet by mouth daily      nicotine (NICODERM CQ) 21 MG/24HR 24 hr patch Place 1 patch onto the skin daily     oxybutynin (DITROPAN) 5 MG tablet TAKE 1 TABLET BY MOUTH TWICE A DAY     pantoprazole (PROTONIX) 40 MG EC tablet TAKE 1 TABLET BY MOUTH EVERY DAY     traZODone (DESYREL) 50 MG tablet TAKE 1 2 TABLETS (50 100 MG) BY MOUTH AT BEDTIME     vitamin C (ASCORBIC ACID) 500 MG tablet Take 1 tablet (500 mg) by mouth daily     warfarin ANTICOAGULANT (COUMADIN) 2 MG tablet Take 2 tabs (4 mg) by mouth Mon, Wed, Fri and 3 tabs (6mg) all other days or as directed by your [facility]     No current facility-administered medications for this visit.         Allergies:     Allergies   Allergen Reactions     Indomethacin Other (See Comments)     Dizziness and disorientation     Tramadol Nausea and Vomiting        Review of Systems:   A comprehensive 10 point review of systems (constitutional, ENT, cardiac,  "peripheral vascular, lymphatic, respiratory, GI, , Musculoskeletal, skin, Neurological) was performed and found to be negative except as described in this note.      Physical Exam:   Vitals: BP (!) 168/83   Pulse 83   Ht 1.626 m (5' 4\")   Wt 68.5 kg (151 lb 1.6 oz)   BMI 25.94 kg/m     General: Seated comfortably in no acute distress.  Lungs: breathing comfortably  Neurologic:     Mental Status: Fully alert, attentive. Normal memory and fund of knowledge. Language normal, speech clear and fluent, no paraphasic errors.      Cranial Nerves: Visual fields intact. EOMI with normal smooth pursuit. Facial sensation intact/symmetric. Facial movements symmetric. Hearing not formally tested but intact to conversation. Palate elevation symmetric, uvula midline. No dysarthria. Shoulder shrug strong bilaterally. Tongue protrusion midline.     Motor: No tremors or other abnormal movements observed. Muscle tone normal throughout. Normal/symmetric rapid finger tapping. Strength 5/5 throughout upper and lower extremities.     Deep Tendon Reflexes: 1+/symmetric throughout upper and lower extremities.Toes downgoing bilaterally.     Sensory: Intact/symmetric to light touch, and proprioception throughout upper and lower extremities. Vibration is 11-12 seconds in bilateral great toes, normal in the hands. Temperature is mildly decreased in the feet compared to hands. Negative Romberg.      Coordination: Finger-nose-finger and heel-shin intact without dysmetria. Rapid alternating movements intact/symmetric with normal speed and rhythm.     Gait: Normal, steady casual gait. Minimal difficulties with heel and toe gaits. Able to perform tandem without losing balance. Mild difficulties standing in tandem for prolonged time.     Data reviewed on previous visits    Imaging:  CT head 1/24/2021  Impression:  No acute intracranial pathology..      Laboratory:  B12 457 10/2020    Pertinent Investigations since last visit:   3/2021 - Normal " B12/MMA, TSH 5 with normal free T4, CK 46         Assessment and Plan:   Rajani Guo is a 70 year old female who presents today for follow-up of balance difficulties. She reported balance problems starting following AVR/MVR surgery in December 2020. She was hospitalized in January 2021 for the balance difficulties and was discharged to rehab for 2 weeks. 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 Patient has had significant improvement in gait since last visit with physical therapy. She has minimal difficulties on gait examination today. I encouraged patient to continue physical therapy exercises and follow-up as needed.      Follow up in Neurology clinic as needed should new concerns arise.    Bin Alcaraz MD   of Neurology  [facility]    The total time of this encounter today amounted to 20 minutes. This time included time spent with the patient, prep work, ordering tests, and performing post visit documentation.

## 2021-05-06 ENCOUNTER — ANTICOAGULATION THERAPY VISIT (OUTPATIENT)
Dept: FAMILY MEDICINE | Facility: CLINIC | Age: 71
End: 2021-05-06

## 2021-05-06 DIAGNOSIS — Z95.2 S/P AVR (AORTIC VALVE REPLACEMENT): ICD-10-CM

## 2021-05-06 DIAGNOSIS — Z98.890 S/P MVR (MITRAL VALVE REPAIR): ICD-10-CM

## 2021-05-06 LAB — INR PPP: 1.9 (ref 0.9–1.1)

## 2021-05-06 NOTE — PROGRESS NOTES
ANTICOAGULATION MANAGEMENT     Patient Name:  Rajani Guo  Date:  2021    ASSESSMENT /SUBJECTIVE:    Today's INR result of 1.9 is subtherapeutic. Goal INR of 2.5-3.5      Warfarin dose taken: Warfarin taken as instructed    Diet: No new diet changes affecting INR    Medication changes/ interactions: noted that patient discontinued amiodarone about 3 weeks ago    Previous INR: Therapeutic     S/S of bleeding or thromboembolism: No    New injury or illness: No    Upcoming surgery, procedure or cardioversion: No    Additional findings: None      PLAN:    Telephone call with home care nurse Cem regarding INR result and instructed:     Warfarin Dosing Instructions: boost today / take 6mg then change your warfarin dose to 4mg Mon/Thur and 6mg ROW. (18 % change)    Instructed patient to follow up no later than: Mon 5/10 (home care usually sees her on Monday)  Orders given to  Homecare nurse/facility to recheck    Education provided: Target INR goal and significance of current INR result, Importance of therapeutic range and Potential interaction between warfarin and stopping amiodarone      Suzanne and Ai verbalized understanding and agreed to warfarin dosing plan.    Instructed to call the Anticoagulation Clinic for any changes, questions or concerns. (#[phone])        Marie Lai RN      OBJECTIVE:  Recent labs: (last 7 days)     21   INR 1.9*         No question data found.  Anticoagulation Summary  As of 2021    INR goal:  2.5-3.5   TTR:  31.4 % (3.7 mo)   INR used for dosin.9 (2021)   Warfarin maintenance plan:  4 mg (2 mg x 2) every Mon, Thu; 6 mg (2 mg x 3) all other days   Full warfarin instructions:  : 6 mg; Otherwise 4 mg every Mon, Thu; 6 mg all other days   Weekly warfarin total:  38 mg   Plan last modified:  Marie Lai, RN (2021)   Next INR check:  5/10/2021   Priority:  High   Target end date:  Indefinite    Indications    S/P MVR  (mitral valve repair) [Z98.890]  S/P AVR (aortic valve replacement) [Z95.2]             Anticoagulation Episode Summary     INR check location:      Preferred lab:      Send INR reminders to:  GURVINDER DARDEN    Comments:        Anticoagulation Care Providers     Provider Role Specialty Phone number    Quinton Handy PA Referring Cardiovascular & Thoracic Surgery [phone]    Lakeshia Rubio, FRANCESCA CNP Referring Internal Medicine [phone]    Sparkle Bird APRN CNP Referring Emergency Medicine [phone]

## 2021-05-10 ENCOUNTER — ANTICOAGULATION THERAPY VISIT (OUTPATIENT)
Dept: FAMILY MEDICINE | Facility: CLINIC | Age: 71
End: 2021-05-10

## 2021-05-10 ENCOUNTER — MEDICAL CORRESPONDENCE (OUTPATIENT)
Dept: HEALTH INFORMATION MANAGEMENT | Facility: CLINIC | Age: 71
End: 2021-05-10

## 2021-05-10 DIAGNOSIS — Z95.2 S/P AVR (AORTIC VALVE REPLACEMENT): ICD-10-CM

## 2021-05-10 DIAGNOSIS — Z98.890 S/P MVR (MITRAL VALVE REPAIR): ICD-10-CM

## 2021-05-10 LAB — INR PPP: 2.6 (ref 0.9–1.1)

## 2021-05-10 NOTE — PROGRESS NOTES
ANTICOAGULATION MANAGEMENT     Patient Name:  Rajani Guo  Date:  5/10/2021    ASSESSMENT /SUBJECTIVE:    Today's INR result of 2.6 is therapeutic. Goal INR of 2.5-3.5      Warfarin dose taken: Warfarin taken as instructed    Diet: No new diet changes affecting INR    Medication changes/ interactions: No new medications/supplements affecting INR    Previous INR: Subtherapeutic     S/S of bleeding or thromboembolism: No    New injury or illness: No    Upcoming surgery, procedure or cardioversion: No    Additional findings: None      PLAN:    Telephone call with home care nurse Chen regarding INR result and instructed:     Warfarin Dosing Instructions: Continue your current warfarin dose 4mg Monday/Thu & 6mg AOD    Instructed patient to follow up no later than: 1 week  Orders given to  Homecare nurse/facility to recheck    Education provided: Contact [facility]: [phone]  with any changes, questions or concerns.       Chen RN verbalizes understanding and agrees to warfarin dosing plan.    Instructed to call the Anticoagulation Clinic for any changes, questions or concerns. (#[phone])        Julieta Jo RN      OBJECTIVE:  Recent labs: (last 7 days)     05/06/21 05/10/21   INR 1.9* 2.6*         No question data found.  Anticoagulation Summary  As of 5/10/2021    INR goal:  2.5-3.5   TTR:  30.8 % (3.8 mo)   INR used for dosin.6 (5/10/2021)   Warfarin maintenance plan:  4 mg (2 mg x 2) every Mon, Thu; 6 mg (2 mg x 3) all other days   Full warfarin instructions:  4 mg every Mon, Thu; 6 mg all other days   Weekly warfarin total:  38 mg   Plan last modified:  Marie Lai, RN (2021)   Next INR check:     Priority:  High   Target end date:  Indefinite    Indications    S/P MVR (mitral valve repair) [Z98.890]  S/P AVR (aortic valve replacement) [Z95.2]             Anticoagulation Episode Summary     INR check location:      Preferred lab:      Send INR reminders  to:  GURVINDER DARDEN    Comments:        Anticoagulation Care Providers     Provider Role Specialty Phone number    Quinton Handy PA Referring Cardiovascular & Thoracic Surgery [phone]    Lakeshia Rubio, APRN CNP Referring Internal Medicine [phone]    Sparkle Bird, APRN CNP Referring Emergency Medicine [phone]         Julieta Pavon, RN, BSN, PHN

## 2021-05-17 ENCOUNTER — ANTICOAGULATION THERAPY VISIT (OUTPATIENT)
Dept: FAMILY MEDICINE | Facility: CLINIC | Age: 71
End: 2021-05-17

## 2021-05-17 ENCOUNTER — MEDICAL CORRESPONDENCE (OUTPATIENT)
Dept: HEALTH INFORMATION MANAGEMENT | Facility: CLINIC | Age: 71
End: 2021-05-17

## 2021-05-17 ENCOUNTER — TELEPHONE (OUTPATIENT)
Dept: FAMILY MEDICINE | Facility: CLINIC | Age: 71
End: 2021-05-17

## 2021-05-17 DIAGNOSIS — Z95.2 S/P AVR (AORTIC VALVE REPLACEMENT): ICD-10-CM

## 2021-05-17 DIAGNOSIS — Z98.890 S/P MVR (MITRAL VALVE REPAIR): ICD-10-CM

## 2021-05-17 LAB — INR PPP: 5.6 (ref 0.9–1.1)

## 2021-05-17 NOTE — PROGRESS NOTES
ANTICOAGULATION MANAGEMENT     Patient Name:  Rajani Guo  Date:  2021    ASSESSMENT /SUBJECTIVE:    Today's INR result of 5.6 is supratherapeutic. Goal INR of 2.5-3.5      Warfarin dose taken: Warfarin taken as instructed    Diet: Change in alcohol intake may be affecting INR. had alcoholic drinks on Friday      Medication changes/ interactions: No new medications/supplements affecting INR    Previous INR: Therapeutic     S/S of bleeding or thromboembolism: Yes: fell on Friday but denies hitting head or any bleeding     New injury or illness: No    Upcoming surgery, procedure or cardioversion: No    Additional findings: None      PLAN:    Telephone call with home care nurse Rajani regarding INR result and instructed:     Warfarin Dosing Instructions: hold tonights dose, take 4mg tomorrow, 6mg Wed    Instructed patient to follow up no later than: 3 days  Orders given to  Homecare nurse/facility to recheck    Education provided: Potential interaction between warfarin and alcohol, Monitoring for bleeding signs and symptoms, Monitoring for clotting signs and symptoms, When to seek medical attention/emergency care and Contact [facility] Anticoagulation: [phone]  with any changes, questions or concerns.       Rajani verbalizes understanding and agrees to warfarin dosing plan.    Instructed to call the Anticoagulation Clinic for any changes, questions or concerns. (#[phone])        Julieta Jo RN      OBJECTIVE:  Recent labs: (last 7 days)     21  1003   INR 5.6*         No question data found.  Anticoagulation Summary  As of 2021    INR goal:  2.5-3.5   TTR:  30.7 % (4.1 mo)   INR used for dosin.6 (2021)   Warfarin maintenance plan:  4 mg (2 mg x 2) every Mon, Thu; 6 mg (2 mg x 3) all other days   Full warfarin instructions:  : Hold; Otherwise 4 mg every Mon, Thu; 6 mg all other days   Weekly warfarin total:  38 mg   Plan last modified:  Marie Lai, RN  (5/6/2021)   Next INR check:  5/20/2021   Priority:  High   Target end date:  Indefinite    Indications    S/P MVR (mitral valve repair) [Z98.890]  S/P AVR (aortic valve replacement) [Z95.2]             Anticoagulation Episode Summary     INR check location:      Preferred lab:      Send INR reminders to:  GURVINDER DARDEN    Comments:        Anticoagulation Care Providers     Provider Role Specialty Phone number    Quinton Handy PA Referring Cardiovascular & Thoracic Surgery [phone]    Lakeshia Rubio, APRN CNP Referring Internal Medicine [phone]    Sparkle Bird APRN CNP Referring Emergency Medicine [phone]         Julieta Pavon, RN, BSN, PHN
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
## 2021-05-18 ENCOUNTER — PATIENT OUTREACH (OUTPATIENT)
Dept: CARE COORDINATION | Facility: CLINIC | Age: 71
End: 2021-05-18

## 2021-05-18 NOTE — TELEPHONE ENCOUNTER
Called and left detailed message for Nora SERNA [facility] care.    Advised patient to call [phone] with additional questions or concerns

## 2021-05-18 NOTE — PROGRESS NOTES
Clinic Care Coordination Contact  Care Team Conversations    The CHW is due to contact the patient now.  Therefore the RN CC will put her contact date out 2 weeks.  Then at that time a 6 week chart review can be performed on the chart.     Odell Andrew MSN, RN, PHN, CCM   Primary Care Clinical RN Care Coordinator  [facility]  5/18/2021   3:35 PM  [email or web address]  Office: [phone]

## 2021-05-18 NOTE — TELEPHONE ENCOUNTER
I approve of requested home care orders.  OT to check if pt should be Driving    Tamiko Coley MD

## 2021-05-20 ENCOUNTER — ANTICOAGULATION THERAPY VISIT (OUTPATIENT)
Dept: FAMILY MEDICINE | Facility: CLINIC | Age: 71
End: 2021-05-20

## 2021-05-20 DIAGNOSIS — Z95.2 S/P AVR (AORTIC VALVE REPLACEMENT): ICD-10-CM

## 2021-05-20 DIAGNOSIS — Z98.890 S/P MVR (MITRAL VALVE REPAIR): ICD-10-CM

## 2021-05-20 LAB — INR PPP: 2.2 (ref 0.9–1.1)

## 2021-05-20 NOTE — PROGRESS NOTES
ANTICOAGULATION MANAGEMENT     Patient Name:  Rajani Guo  Date:  2021    ASSESSMENT /SUBJECTIVE:    Today's INR result of 2.2 is subtherapeutic. Goal INR of 2.5-3.5      Warfarin dose taken: Warfarin taken as instructed    Diet: No new diet changes affecting INR    Medication changes/ interactions: No new medications/supplements affecting INR    Previous INR: Supratherapeutic     S/S of bleeding or thromboembolism: No    New injury or illness: No    Upcoming surgery, procedure or cardioversion: No    Additional findings: None      PLAN:    Telephone call with home care nurse Nora regarding INR result and instructed:     Warfarin Dosing Instructions: Continue your current warfarin dose 4 mg on mon/thur and 6 mg all other days    Instructed patient to follow up no later than:   Orders given to  Homecare nurse/facility to recheck    Education provided: None required      Nora, home care, verbalizes understanding and agrees to warfarin dosing plan.    Instructed to call the Anticoagulation Clinic for any changes, questions or concerns. (#[phone])        Janae Davila RN      OBJECTIVE:  Recent labs: (last 7 days)     21  1003 21   INR 5.6* 2.2*         No question data found.  Anticoagulation Summary  As of 2021    INR goal:  2.5-3.5   TTR:  30.7 % (4.2 mo)   INR used for dosin.2 (2021)   Warfarin maintenance plan:  4 mg (2 mg x 2) every Mon, Thu; 6 mg (2 mg x 3) all other days   Full warfarin instructions:  4 mg every Mon, Thu; 6 mg all other days   Weekly warfarin total:  38 mg   Plan last modified:  Marie Lai RN (2021)   Next INR check:     Priority:  High   Target end date:  Indefinite    Indications    S/P MVR (mitral valve repair) [Z98.890]  S/P AVR (aortic valve replacement) [Z95.2]             Anticoagulation Episode Summary     INR check location:      Preferred lab:      Send INR reminders to:  GURVINDER DARDEN    Comments:        Anticoagulation  Care Providers     Provider Role Specialty Phone number    Quinton Handy PA Referring Cardiovascular & Thoracic Surgery [phone]    Lakeshia Rubio APRN CNP Referring Internal Medicine [phone]    Saprkle Bird APRN CNP Referring Emergency Medicine [phone]

## 2021-05-24 ENCOUNTER — ANTICOAGULATION THERAPY VISIT (OUTPATIENT)
Dept: FAMILY MEDICINE | Facility: CLINIC | Age: 71
End: 2021-05-24

## 2021-05-24 ENCOUNTER — PATIENT OUTREACH (OUTPATIENT)
Dept: NURSING | Facility: CLINIC | Age: 71
End: 2021-05-24
Payer: COMMERCIAL

## 2021-05-24 ENCOUNTER — PATIENT OUTREACH (OUTPATIENT)
Dept: CARE COORDINATION | Facility: CLINIC | Age: 71
End: 2021-05-24

## 2021-05-24 DIAGNOSIS — Z98.890 S/P MVR (MITRAL VALVE REPAIR): ICD-10-CM

## 2021-05-24 DIAGNOSIS — Z95.2 S/P AVR (AORTIC VALVE REPLACEMENT): ICD-10-CM

## 2021-05-24 LAB — INR PPP: 2.9 (ref 0.9–1.1)

## 2021-05-24 NOTE — PROGRESS NOTES
Clinic Care Coordination Contact  [facility]/Voicemail       Clinical Data: CHW Outreach  Outreach attempted x 1. Left message on patient's voicemail with call back information and requested return call.    Plan: CHW will try to reach patient again in 5-7 business days.    Ai LY Community Health Worker  Clinic Care Coordination  [facility] Clinics : [place], [place] & [place]  Phone: [phone]    Notes:    - How are you?    - Are you walking daily?   Have you been able to increase your time and distance?    Taking Tylenol when needed? Daughter massing muscles when tight and achy?

## 2021-05-24 NOTE — PROGRESS NOTES
ANTICOAGULATION MANAGEMENT     Patient Name:  Rajani Guo  Date:  2021    ASSESSMENT /SUBJECTIVE:    Today's INR result of 2.9 is therapeutic. Goal INR of 2.5-3.5      Warfarin dose taken: Warfarin taken as instructed    Diet: No new diet changes affecting INR    Medication changes/ interactions: No new medications/supplements affecting INR    Previous INR: Subtherapeutic     S/S of bleeding or thromboembolism: No    New injury or illness: No    Upcoming surgery, procedure or cardioversion: No    Additional findings: None      PLAN:    Telephone call with home care nurse Nora regarding INR result and instructed:     Warfarin Dosing Instructions: Continue your current warfarin dose 4 mg M TH; 6 mg ROW    Instructed patient to follow up no later than: 4 days  holiday on   Orders given to  Homecare nurse/facility to recheck    Education provided: None required      Nora verbalizes understanding and agrees to warfarin dosing plan.    Instructed to call the Anticoagulation Clinic for any changes, questions or concerns. (#[phone])        Peg Rivas RN      OBJECTIVE:  Recent labs: (last 7 days)     21   INR 2.2* 2.9*         INR assessment THER    Recheck INR In: 4 DAYS    INR Location Homecare INR      Anticoagulation Summary  As of 2021    INR goal:  2.5-3.5   TTR:  31.5 % (4.3 mo)   INR used for dosin.9 (2021)   Warfarin maintenance plan:  4 mg (2 mg x 2) every Mon, Thu; 6 mg (2 mg x 3) all other days   Full warfarin instructions:  4 mg every Mon, Thu; 6 mg all other days   Weekly warfarin total:  38 mg   Plan last modified:  Marie Lai, RN (2021)   Next INR check:  2021   Priority:  High   Target end date:  Indefinite    Indications    S/P MVR (mitral valve repair) [Z98.890]  S/P AVR (aortic valve replacement) [Z95.2]             Anticoagulation Episode Summary     INR check location:      Preferred lab:      Send INR reminders to:  GURVINDER  ADELSO    Comments:        Anticoagulation Care Providers     Provider Role Specialty Phone number    Quinton Handy PA Referring Cardiovascular & Thoracic Surgery [phone]    Lakeshia Rubio, FRANCESCA SCHULZ Referring Internal Medicine [phone]    Sparkle Bird, FRANCESCA SCHULZ Referring Emergency Medicine [phone]

## 2021-05-24 NOTE — PROGRESS NOTES
Clinic Care Coordination Contact  Community Health Worker Follow Up    Goals:   Goals Addressed as of 5/24/2021 at 3:09 PM                 Today    4/16/21      #1  Pain Management (pt-stated)   50%  30%    Added 1/11/21 by Odell Acosta, RN     Goal Statement: I will work to reduce my pain with Tylenol, ice, heat, and massage.  Date Goal set: 1/11/2021  Barriers: Recent open heart surgery  Strengths: Engaged in care coordination  Date to Achieve By: 7/11/2021  Patient expressed understanding of goal: Yes  Action steps to achieve this goal:  1. I will take my Tylenol on a scheduled basis to keep ahead of the pain.  2. I will use alternating ice and heat to reduce the pain that I have incisionally.  3. I will have my daughter massage the muscles that are tight and achy and causing pain.          #2  Functional (pt-stated)   60%  40%    Added 1/11/21 by Odell Acosta RN     Goal Statement: I will work on walking a little bit more each day by walking further in the distance or further in the time walked.  Date Goal set: 1/11/2021  Barriers: Recent open heart surgery  Strengths: Engaged in care coordination  Date to Achieve By: 7/11/2021  Patient expressed understanding of goal: Yes  Action steps to achieve this goal:  1. I will walk every day more than just to the bathroom and back.  2. I will increase the time walked.  3. I will increase the distance walked.            CHW spoke with patient she said she is doing good. She said  she is doing a lot of walking in the stores with daughter and she is walking further. She said she is using her cane.She still has her Tylenol but doesn't need it as much.    She said her in home nurse Samara was out this morning and went over something's with her. She said daughter will massage legs when they get tight and achy. Patient has no new concerns at this time.     Intervention and Education during outreach: CHW advised patient to call clinis with non-emergent concerns.     CHW Plan:  CHW will call in 1 month.    Ai LY, Community Health Worker  Clinic Care Coordination  [facility] Clinics : [place], [place] & [place]  Phone: [phone]

## 2021-05-26 ENCOUNTER — MEDICAL CORRESPONDENCE (OUTPATIENT)
Dept: HEALTH INFORMATION MANAGEMENT | Facility: CLINIC | Age: 71
End: 2021-05-26

## 2021-05-28 ENCOUNTER — ANTICOAGULATION THERAPY VISIT (OUTPATIENT)
Dept: FAMILY MEDICINE | Facility: CLINIC | Age: 71
End: 2021-05-28

## 2021-05-28 DIAGNOSIS — Z95.2 S/P AVR (AORTIC VALVE REPLACEMENT): ICD-10-CM

## 2021-05-28 DIAGNOSIS — Z98.890 S/P MVR (MITRAL VALVE REPAIR): ICD-10-CM

## 2021-05-28 LAB — INR PPP: 3.1 (ref 0.9–1.1)

## 2021-05-28 NOTE — PROGRESS NOTES
ANTICOAGULATION MANAGEMENT     Patient Name:  Rajani Guo  Date:  5/28/2021    ASSESSMENT /SUBJECTIVE:    Today's INR result of 3.1 is therapeutic. Goal INR of 2.5-3.5      Warfarin dose taken: Less warfarin taken than planned which may be affecting INR    Diet: No new diet changes affecting INR    Medication changes/ interactions: No new medications/supplements affecting INR    Previous INR: Therapeutic     S/S of bleeding or thromboembolism: No    New injury or illness: No    Upcoming surgery, procedure or cardioversion: No    Additional findings: None      PLAN:    Telephone call with home care nurse Samara regarding INR result and instructed:     Warfarin Dosing Instructions: Continue your current warfarin dose 4mg every Mon, Thu; 6mg all other days    Instructed patient to follow up no later than: 1 week  Orders given to  Homecare nurse/facility to recheck    Education provided: Target INR goal and significance of current INR result      Samara verbalizes understanding and agrees to warfarin dosing plan.    Instructed to call the Anticoagulation Clinic for any changes, questions or concerns. (#[phone])        Matthew Finn RN      OBJECTIVE:  Recent labs: (last 7 days)     05/24/21 05/28/21   INR 2.9* 3.1*         No question data found.  Anticoagulation Summary  As of 5/28/2021    INR goal:  2.5-3.5   TTR:  33.6 % (4.4 mo)   INR used for dosing:  3.1 (5/28/2021)   Warfarin maintenance plan:  4 mg (2 mg x 2) every Mon, Thu; 6 mg (2 mg x 3) all other days   Full warfarin instructions:  4 mg every Mon, Thu; 6 mg all other days   Weekly warfarin total:  38 mg   Plan last modified:  Marie Lai RN (5/6/2021)   Next INR check:     Priority:  High   Target end date:  Indefinite    Indications    S/P MVR (mitral valve repair) [Z98.890]  S/P AVR (aortic valve replacement) [Z95.2]             Anticoagulation Episode Summary     INR check location:      Preferred lab:      Send INR reminders to:  GURVINDER  ADELSO    Comments:        Anticoagulation Care Providers     Provider Role Specialty Phone number    Quinton Handy PA Referring Cardiovascular & Thoracic Surgery [phone]    Lakeshia Rubio, FRANCESCA SCUHLZ Referring Internal Medicine [phone]    Sparkle Bird, FRANCESCA SCHULZ Referring Emergency Medicine [phone]

## 2021-06-01 ENCOUNTER — TELEPHONE (OUTPATIENT)
Dept: FAMILY MEDICINE | Facility: CLINIC | Age: 71
End: 2021-06-01

## 2021-06-01 ENCOUNTER — VIRTUAL VISIT (OUTPATIENT)
Dept: CARDIOLOGY | Facility: CLINIC | Age: 71
End: 2021-06-01
Payer: COMMERCIAL

## 2021-06-01 DIAGNOSIS — I25.10 CORONARY ARTERY DISEASE INVOLVING NATIVE CORONARY ARTERY OF NATIVE HEART WITHOUT ANGINA PECTORIS: ICD-10-CM

## 2021-06-01 DIAGNOSIS — Z95.2 S/P AORTIC VALVE AND MITRAL VALVE REPLACEMENT: Primary | ICD-10-CM

## 2021-06-01 DIAGNOSIS — Z87.19 HISTORY OF GI BLEED: ICD-10-CM

## 2021-06-01 DIAGNOSIS — F17.200 CURRENT SMOKER: ICD-10-CM

## 2021-06-01 DIAGNOSIS — E11.9 TYPE 2 DIABETES MELLITUS WITHOUT COMPLICATION, WITHOUT LONG-TERM CURRENT USE OF INSULIN (H): ICD-10-CM

## 2021-06-01 PROCEDURE — 99215 OFFICE O/P EST HI 40 MIN: CPT | Mod: GT | Performed by: INTERNAL MEDICINE

## 2021-06-01 NOTE — PROGRESS NOTES
Rajani Guo is a 70 year old who is being evaluated via a billable video visit.      How would you like to obtain your AVS? Mail a copy  If the video visit is dropped, the invitation should be resent by: Send to e-mail at: [email or web address]  Will anyone else be joining your video visit? No      Video-Visit Details    Type of service:  Video Visit  Video Start Time: 3:30 PM  Video End Time: 3:40 PM    Originating Location (pt. Location): Home    Distant Location (provider location):  [facility] [employer]     Platform used for Video Visit: Yun Yun    Referring provider: Tamiko Coley MD    Chief complaint: Dyspnea on exertion    HPI: Ms. Rajani Guo is a 69 year old  female with PMH significant for aortic and mitral valve stenosis, aortic and mitral regurgitation, recurrent GI bleeding, hypertension, type 2 diabetes, peripheral arterial disease status left SFA angioplasty in 2019.    Patient is being seen today through telephone encounter at request of Dr. Coley to discuss the recent echocardiogram showing severe mitral and severe aortic valve disease.  Prior echocardiograms dating back to March 2019 showed evidence of both aortic and mitral valve disease however the most recent study on 10/9/2020 reports worsening valve disease.      Patient tells me that over the last 6 months or so she feels short of breath even walking in a grocery store.  She cannot do her ADLs without shortness of breath.  Reports occasional chest pressure.  Reports feeling dizzy from time to time.  No syncope.  Reports occasional palpitations.    Patient has history of multiple GI bleeding since 2019.  Patient was recently admitted to [facility] on 9/16/2020 with weakness and dizziness.  She was found to have hemoglobin drop to 7.2.  Hemoglobin remained stable throughout the hospitalization.  Patient received PRBC in the ED and had EGD which was unremarkable.  Subsequently she was readmitted again on 10/8/2020  with fatigue and shortness of breath and found to have hemoglobin at 6.2.  Patient received 2 more units and hemoglobin on discharge was 7.7.  Since being discharged she followed-up with [facility] and had pill camera study which showed 2 small AVMs in the small bowel.  There were not active bleeding.  Last colonoscopy on 9/4/2019 showed diverticulosis throughout the colon.  There were no evidence of GI bleeding.    Patient presented with acute limb ischemia in March 2019 and found to have acute occlusion of left SFA.  Patient underwent catheter directed lysis and angioplasty.  She was initially on apixaban which was later transitioned to aspirin.    Most recent labs 10/13/2020 show hemoglobin improved to 9 with normal platelet count.  BMP is normal.  Lipid profile is normal.     The patient's risk factor profile is: (+) HTN, (+) diabetes, (+) hyperlipidemia, (+) tobacco use.    I have personally reviewed echocardiogram 10/9/2020 which shows normal biventricular function, severe calcific aortic stenosis, moderate aortic regurgitation, moderate to severe mitral regurgitation and severe mitral annular calcification and severe mitral stenosis.    Medications, personal, family, and social history reviewed with patient and revised.    Interval history 6/1/2021:  Patient is being seen today through telehealth video visit for follow-up.  Patient is status post mechanical MVR and mechanical AVR on 12/29/2020 for severe mitral stenosis and severe aortic stenosis respectively.  She had the surgery with Dr. Luc Lara at [facility].  She has history of falls.  She was admitted to hospital in January of this year for fall.  Today patient tells me that she has completed OT PT.  She has started driving.  Denies shortness of breath, chest pain, palpitations, syncope.  She feels mildly lightheaded in the morning when she first gets up otherwise feels well.  She has home health nurse.  She was told her legs are looking good with no  swelling.  She wears compression stockings.  Patient continues to smoke and has no plans to quit at this time.  Patient tells me that she is going to see a neurologist for imbalance and falls.    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   Findings: There is no intracranial hemorrhage, mass effect, or midline shift. Gray/whi     History of partial thyroidectomy 06/02/2018     Hyperlipidemia LDL goal <100 01/18/2018     Hypertension      Insomnia, unspecified type 01/18/2018     Mitral regurgitation      Mitral stenosis      Pulmonary hypertension (H)      Tobacco use disorder        CURRENT MEDICATIONS:  Current Outpatient Medications   Medication Sig Dispense Refill     acetaminophen (TYLENOL) 325 MG tablet TAKE 2 TABLETS (650 MG) BY MOUTH EVERY 4 HOURS AS NEEDED FOR PAIN       aspirin (ASA) 81 MG EC tablet Take 1 tablet (81 mg) by mouth daily 100 tablet 3     atorvastatin (LIPITOR) 40 MG tablet Take 1 tablet (40 mg) by mouth daily 90 tablet 3     buPROPion (WELLBUTRIN SR) 150 MG 12 hr tablet Take 1 tablet (150 mg) by mouth 2 times daily 60 tablet 6     Calcium Carb-Cholecalciferol 600-800 MG-UNIT TABS Take 1 tablet by mouth 2  times daily 1 tablet 0     enoxaparin ANTICOAGULANT (LOVENOX) 60 MG/0.6ML syringe Inject 0.6 mLs (60 mg) Subcutaneous 2 times daily Until INR therapeutic 10 mL 1     ferrous sulfate (FEROSUL) 325 (65 Fe) MG tablet Take 325 mg by mouth 2 times daily       glucosamine-chondroitinoitin 500-400 MG tablet Patient is taking 1500 mg OTC 1 time daily       insulin pen needle (29G X 12MM) 29G X 12MM miscellaneous Use 1 pen needles daily or as directed. 100 each 11     Melatonin 10 MG TABS tablet Take 10 mg by mouth At Bedtime       metFORMIN (GLUCOPHAGE) 1000 MG tablet TAKE 1 TABLET BY MOUTH TWICE A DAY WITH MEALS 180 tablet 1     Multiple Vitamin (MULTI-VITAMINS) TABS Take 1 tablet by mouth daily        nicotine (NICODERM CQ) 21 MG/24HR 24 hr patch Place 1 patch onto the skin daily 30 patch 1     oxybutynin (DITROPAN) 5 MG tablet TAKE 1 TABLET BY MOUTH TWICE A  tablet 3     pantoprazole (PROTONIX) 40 MG EC tablet TAKE 1 TABLET BY MOUTH EVERY DAY 90 tablet 1     traZODone (DESYREL) 50 MG tablet TAKE 1 2 TABLETS (50 100 MG) BY MOUTH AT BEDTIME       vitamin C (ASCORBIC ACID) 500 MG tablet Take 1 tablet (500 mg) by mouth daily 100 tablet 1     warfarin ANTICOAGULANT (COUMADIN) 2 MG tablet Take 2 tabs (4 mg) by mouth Mon, Wed, Fri and 3 tabs (6mg) all other days or as directed by your [facility] 270 tablet 1       PAST SURGICAL HISTORY:  Past Surgical History:   Procedure Laterality Date     COLONOSCOPY N/A 9/4/2019    Procedure: COLONOSCOPY;  Surgeon: Marie Todd MD;  Location:  GI     CV CORONARY ANGIOGRAM N/A 11/16/2020    Procedure: CV CORONARY ANGIOGRAM;  Surgeon: Joey Rivas MD;  Location:  HEART CARDIAC CATH LAB     CV FRACTIONAL FLOW RESERVE N/A 11/16/2020    Procedure: Fractional Flow Reserve;  Surgeon: Joey Rivas MD;  Location:  HEART CARDIAC CATH LAB     CV RIGHT HEART CATH MEASUREMENTS RECORDED N/A 11/16/2020    Procedure: CV RIGHT HEART CATH;  Surgeon: Joey Rivas  MD;  Location:  HEART CARDIAC CATH LAB     ESOPHAGOSCOPY, GASTROSCOPY, DUODENOSCOPY (EGD), COMBINED N/A 2019    Procedure: ESOPHAGOGASTRODUODENOSCOPY (EGD);  Surgeon: Marie Todd MD;  Location:  GI     ESOPHAGOSCOPY, GASTROSCOPY, DUODENOSCOPY (EGD), COMBINED N/A 2020    Procedure: ESOPHAGOGASTRODUODENOSCOPY (EGD);  Surgeon: Ten Ibrahim DO;  Location:  GI     IR ANGIOGRAM THROUGH CATHETER FOLLOW UP  3/5/2019     IR LOWER EXTREMITY ANGIOGRAM LEFT  3/4/2019     KNEE SURGERY Right     right knee torn meniscus surgery     OVARY SURGERY  1971    1 ovary removed     RELEASE CARPAL TUNNEL Right      RELEASE TRIGGER FINGER BILATERAL       REPLACE VALVES AORTIC AND MITRAL, COMBINED N/A 2020    Procedure: Median Stertonomy, REPLACEMENT AORTIC Valve  with 19mm St Abdoulaye [place]  Mechanical Heart Valve AND MITRAL VALVE Replacement with 27mm St Abdoulaye Mechanical Heart Valve, on pump oxygenation;  Surgeon: Luc Lara MD;  Location: [facility] OR     SHOULDER SURGERY Left     rotator cuff repair, plate placement     THYROID SURGERY      partial thyroidectomy       ALLERGIES:     Allergies   Allergen Reactions     Indomethacin Other (See Comments)     Dizziness and disorientation     Tramadol Nausea and Vomiting       FAMILY HISTORY:  Family History   Problem Relation Age of Onset     Diabetes Type 2  Mother      Lung Cancer Father      Diabetes Sister      Coronary Artery Disease Sister      Diabetes Brother      Diabetes Brother      Diabetes Type 2  Brother      Coronary Artery Disease Sister      Asthma Sister      Glaucoma No family hx of      Macular Degeneration No family hx of      Anesthesia Reaction No family hx of          SOCIAL HISTORY:  Social History     Tobacco Use     Smoking status: Current Every Day Smoker     Packs/day: 1.00     Years: 58.00     Pack years: 58.00     Start date: 1962     Last attempt to quit: 2020     Years since quittin.4     Smokeless  tobacco: Never Used   Substance Use Topics     Alcohol use: Not Currently     Comment: 2 glasses of wine a week     Drug use: Yes     Types: Marijuana     Comment: occasional       ROS:   Constitutional: No fever, chills, or sweats. Weight stable.    Cardiovascular: As per HPI.   Exam:  Physical Exam Elements attainable via telehealth:       Constitutional - alert and no distress    Eyes - no redness, no discharge    Respiratory - no cough, no labored breathing    Skin - no discoloration or lesions on the face or the arm.    Neurological -alert, normal speech,and affect, no tremor.    I have reviewed the labs and personally reviewed the imaging below and made my comment in the assessment and plan.    Labs:  CBC RESULTS:   Lab Results   Component Value Date    WBC 7.3 03/02/2021    RBC 4.26 03/02/2021    HGB 12.5 04/19/2021    HCT 37.1 03/02/2021    MCV 87 03/02/2021    MCH 25.6 (L) 03/02/2021    MCHC 29.4 (L) 03/02/2021    RDW 20.6 (H) 03/02/2021     03/02/2021       BMP RESULTS:  Lab Results   Component Value Date     03/02/2021    POTASSIUM 4.6 03/02/2021    CHLORIDE 107 03/02/2021    CO2 28 03/02/2021    ANIONGAP 5 03/02/2021     (H) 03/02/2021    BUN 13 03/02/2021    CR 0.64 03/02/2021    GFRESTIMATED >90 03/02/2021    GFRESTBLACK >90 03/02/2021    KAYLEEN 9.2 03/02/2021        INR RESULTS:  Lab Results   Component Value Date    INR 3.1 (A) 05/28/2021    INR 2.9 (A) 05/24/2021    INR 2.2 (A) 05/20/2021    INR 5.6 (A) 05/17/2021     Echocardiogram 2/24/2021:  Borderline (EF 50-55%) reduced left ventricular function is present.  Right ventricular function, chamber size, wall motion, and thickness are  normal.  A mechanical mitral valve is present - 27mm St Abdoulaye. The mean gradient across  the mitral valve is 7 mmHg at 81 bpm. Doppler interrogation of the mitral  valve is normal.  Mechanical aortic valve is present - 19mm St Abdoulaye [place]. Doppler  interrogation of the aortic valve is normal. The peak  velocity across the  aortic valve is 2.3 m/sec. The mean gradient is 13 mmHg. The V2/V1 ratio is  0.38.  This study was compared with the study from 1/25/21: There has been no change.    Coronary angiogram 11/16/2020         Right sided filling pressures are mildly elevated.    Severely elevated PCWP with V-wave to 40 mmHg. LVEDP moderately elevated at 20 mmHg.    Severe mitral stenosis with MV gradient of 18 mmHg    Severe aortic stenosis with AV gradient of 38 mmHg and KRISTOFER 0.65 cm^2.    Non-obstructive coronary disease.              Echocardiogram [facility] 10/9/2020  There is severe mitral stenosis.  Severe valvular aortic stenosis.  There is moderate to mod-severe (2-3+) mitral regurgitation.  There is moderate (2+) aortic regurgitation.  There is severe mitral annular calcification.  The visual ejection fraction is estimated at 60-65%.  Left ventricular systolic function is normal.  Right ventricular systolic pressure is elevated, consistent with moderate  pulmonary hypertension.  The ascending aorta is Mildly dilated.  The degree of aortic stenosis has progressed to severe stenosis.      EKG 10/8/2020: sinus rhythm, normal.    Assessment and Plan:   Ms. Rjaani Guo is a 69 year old  female with PMH significant for current smoker, severe aortic and severe mitral valve stenosis status post mechanical AVR and mechanical MVR in December 2020, recurrent GI bleeding secondary to small bowel AVM, hypertension, type 2 diabetes, and peripheral arterial disease status left SFA angioplasty in 3/2019.    #S/P mechanical AVR, S/P mechanical MVR 12/29/2020  Currently asymptomatic.  Her major symptom was DESAI before valve surgery which has resolved now.  Most recent echo in February of this year showed normal functioning mechanical valves.  EF is 50 to 55%.  Continue current medications  -Aspirin 81 mg  -Warfarin  -Atorvastatin 40 mg    #Current smoker  - No plans to quit.  Counseled against smoking.  Reminded patient the  results of coronary angiogram which showed nonobstructive coronary artery disease.    # Recurrent GI bleeding secondary to small bowel AVMs  -Patient follows with [facility].  Last seen in April of this year.  She is on iron.  Hemoglobin stable at 12.5.    #Nonobstructive coronary artery disease  -Patient currently asymptomatic  -Continue current treatment.  Blood pressure and LDL well controlled.    #Type 2 diabetes  -Well-controlled.  On Metformin.    #PAD  -Reports no claudication.  Biggest risk factor is her current smoking status.  She tells me that she has hard time quitting.    Plan summary:  -No medication changes today.    Return to clinic with MARJ in 1 year.    Total time spent 40 minutes including face-to-face video visit, medical documentation and review of labs and imaging.    Please donot hesitate to contact me if you have any questions or concerns. Again, thank you for allowing me to participate in the care of your patient.    Thanh GARNER MD  [facility] Division of [facility]iology  Pager [phone]

## 2021-06-01 NOTE — LETTER
6/1/2021      RE: Rajani Guo  [address] [facility] [address]       Dear Colleague,    Thank you for the opportunity to participate in the care of your patient, Rajani Guo, at the [facility] at [facility]. Please see a copy of my visit note below.    Rajani Guo is a 70 year old who is being evaluated via a billable video visit.      How would you like to obtain your AVS? Mail a copy  If the video visit is dropped, the invitation should be resent by: Send to e-mail at: [email or web address]  Will anyone else be joining your video visit? No      Video-Visit Details    Type of service:  Video Visit  Video Start Time: 3:30 PM  Video End Time: 3:40 PM    Originating Location (pt. Location): Home    Distant Location (provider location):  [facility]     Platform used for Video Visit: seedtag    Referring provider: Tamiko Coley MD    Chief complaint: Dyspnea on exertion    HPI: Ms. Rajani Guo is a 69 year old  female with PMH significant for aortic and mitral valve stenosis, aortic and mitral regurgitation, recurrent GI bleeding, hypertension, type 2 diabetes, peripheral arterial disease status left SFA angioplasty in 2019.    Patient is being seen today through telephone encounter at request of Dr. Coley to discuss the recent echocardiogram showing severe mitral and severe aortic valve disease.  Prior echocardiograms dating back to March 2019 showed evidence of both aortic and mitral valve disease however the most recent study on 10/9/2020 reports worsening valve disease.      Patient tells me that over the last 6 months or so she feels short of breath even walking in a grocery store.  She cannot do her ADLs without shortness of breath.  Reports occasional chest pressure.  Reports feeling dizzy from time to time.  No syncope.  Reports occasional palpitations.    Patient has history of multiple GI  bleeding since 2019.  Patient was recently admitted to [facility] on 9/16/2020 with weakness and dizziness.  She was found to have hemoglobin drop to 7.2.  Hemoglobin remained stable throughout the hospitalization.  Patient received PRBC in the ED and had EGD which was unremarkable.  Subsequently she was readmitted again on 10/8/2020 with fatigue and shortness of breath and found to have hemoglobin at 6.2.  Patient received 2 more units and hemoglobin on discharge was 7.7.  Since being discharged she followed-up with [facility] and had pill camera study which showed 2 small AVMs in the small bowel.  There were not active bleeding.  Last colonoscopy on 9/4/2019 showed diverticulosis throughout the colon.  There were no evidence of GI bleeding.    Patient presented with acute limb ischemia in March 2019 and found to have acute occlusion of left SFA.  Patient underwent catheter directed lysis and angioplasty.  She was initially on apixaban which was later transitioned to aspirin.    Most recent labs 10/13/2020 show hemoglobin improved to 9 with normal platelet count.  BMP is normal.  Lipid profile is normal.     The patient's risk factor profile is: (+) HTN, (+) diabetes, (+) hyperlipidemia, (+) tobacco use.    I have personally reviewed echocardiogram 10/9/2020 which shows normal biventricular function, severe calcific aortic stenosis, moderate aortic regurgitation, moderate to severe mitral regurgitation and severe mitral annular calcification and severe mitral stenosis.    Medications, personal, family, and social history reviewed with patient and revised.    Interval history 6/1/2021:  Patient is being seen today through telehealth video visit for follow-up.  Patient is status post mechanical MVR and mechanical AVR on 12/29/2020 for severe mitral stenosis and severe aortic stenosis respectively.  She had the surgery with Dr. Luc Lara at [facility].  She has history of falls.  She was admitted to hospital in  January of this year for fall.  Today patient tells me that she has completed OT PT.  She has started driving.  Denies shortness of breath, chest pain, palpitations, syncope.  She feels mildly lightheaded in the morning when she first gets up otherwise feels well.  She has home health nurse.  She was told her legs are looking good with no swelling.  She wears compression stockings.  Patient continues to smoke and has no plans to quit at this time.  Patient tells me that she is going to see a neurologist for imbalance and falls.    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   Findings: There is no intracranial hemorrhage, mass effect, or midline shift. Gray/whi     History of partial thyroidectomy 06/02/2018     Hyperlipidemia LDL goal <100 01/18/2018     Hypertension      Insomnia, unspecified type 01/18/2018     Mitral regurgitation      Mitral stenosis      Pulmonary hypertension (H)      Tobacco use disorder        CURRENT MEDICATIONS:  Current Outpatient Medications   Medication Sig Dispense Refill     acetaminophen (TYLENOL) 325 MG tablet TAKE 2 TABLETS (650 MG) BY MOUTH EVERY 4 HOURS AS NEEDED FOR PAIN       aspirin (ASA) 81  MG EC tablet Take 1 tablet (81 mg) by mouth daily 100 tablet 3     atorvastatin (LIPITOR) 40 MG tablet Take 1 tablet (40 mg) by mouth daily 90 tablet 3     buPROPion (WELLBUTRIN SR) 150 MG 12 hr tablet Take 1 tablet (150 mg) by mouth 2 times daily 60 tablet 6     Calcium Carb-Cholecalciferol 600-800 MG-UNIT TABS Take 1 tablet by mouth 2 times daily 1 tablet 0     enoxaparin ANTICOAGULANT (LOVENOX) 60 MG/0.6ML syringe Inject 0.6 mLs (60 mg) Subcutaneous 2 times daily Until INR therapeutic 10 mL 1     ferrous sulfate (FEROSUL) 325 (65 Fe) MG tablet Take 325 mg by mouth 2 times daily       glucosamine-chondroitinoitin 500-400 MG tablet Patient is taking 1500 mg OTC 1 time daily       insulin pen needle (29G X 12MM) 29G X 12MM miscellaneous Use 1 pen needles daily or as directed. 100 each 11     Melatonin 10 MG TABS tablet Take 10 mg by mouth At Bedtime       metFORMIN (GLUCOPHAGE) 1000 MG tablet TAKE 1 TABLET BY MOUTH TWICE A DAY WITH MEALS 180 tablet 1     Multiple Vitamin (MULTI-VITAMINS) TABS Take 1 tablet by mouth daily        nicotine (NICODERM CQ) 21 MG/24HR 24 hr patch Place 1 patch onto the skin daily 30 patch 1     oxybutynin (DITROPAN) 5 MG tablet TAKE 1 TABLET BY MOUTH TWICE A  tablet 3     pantoprazole (PROTONIX) 40 MG EC tablet TAKE 1 TABLET BY MOUTH EVERY DAY 90 tablet 1     traZODone (DESYREL) 50 MG tablet TAKE 1 2 TABLETS (50 100 MG) BY MOUTH AT BEDTIME       vitamin C (ASCORBIC ACID) 500 MG tablet Take 1 tablet (500 mg) by mouth daily 100 tablet 1     warfarin ANTICOAGULANT (COUMADIN) 2 MG tablet Take 2 tabs (4 mg) by mouth Mon, Wed, Fri and 3 tabs (6mg) all other days or as directed by your [facility] 270 tablet 1       PAST SURGICAL HISTORY:  Past Surgical History:   Procedure Laterality Date     COLONOSCOPY N/A 9/4/2019    Procedure: COLONOSCOPY;  Surgeon: Marie Todd MD;  Location:  GI     CV CORONARY ANGIOGRAM N/A 11/16/2020    Procedure: CV CORONARY ANGIOGRAM;  Surgeon:  Joey Rivas MD;  Location:  HEART CARDIAC CATH LAB     CV FRACTIONAL FLOW RESERVE N/A 11/16/2020    Procedure: Fractional Flow Reserve;  Surgeon: Joey Rivas MD;  Location:  HEART CARDIAC CATH LAB     CV RIGHT HEART CATH MEASUREMENTS RECORDED N/A 11/16/2020    Procedure: CV RIGHT HEART CATH;  Surgeon: Joey Rivas MD;  Location:  HEART CARDIAC CATH LAB     ESOPHAGOSCOPY, GASTROSCOPY, DUODENOSCOPY (EGD), COMBINED N/A 9/4/2019    Procedure: ESOPHAGOGASTRODUODENOSCOPY (EGD);  Surgeon: Marie Todd MD;  Location:  GI     ESOPHAGOSCOPY, GASTROSCOPY, DUODENOSCOPY (EGD), COMBINED N/A 9/17/2020    Procedure: ESOPHAGOGASTRODUODENOSCOPY (EGD);  Surgeon: Ten Ibrahim DO;  Location:  GI     IR ANGIOGRAM THROUGH CATHETER FOLLOW UP  3/5/2019     IR LOWER EXTREMITY ANGIOGRAM LEFT  3/4/2019     KNEE SURGERY Right 2013    right knee torn meniscus surgery     OVARY SURGERY  1971    1 ovary removed     RELEASE CARPAL TUNNEL Right 1988     RELEASE TRIGGER FINGER BILATERAL       REPLACE VALVES AORTIC AND MITRAL, COMBINED N/A 12/29/2020    Procedure: Median Stertonomy, REPLACEMENT AORTIC Valve  with 19mm St Abdoulaye [place]  Mechanical Heart Valve AND MITRAL VALVE Replacement with 27mm St Abdoulaye Mechanical Heart Valve, on pump oxygenation;  Surgeon: Luc Lara MD;  Location: [facility]     SHOULDER SURGERY Left     rotator cuff repair, plate placement     THYROID SURGERY  1988    partial thyroidectomy       ALLERGIES:     Allergies   Allergen Reactions     Indomethacin Other (See Comments)     Dizziness and disorientation     Tramadol Nausea and Vomiting       FAMILY HISTORY:  Family History   Problem Relation Age of Onset     Diabetes Type 2  Mother      Lung Cancer Father      Diabetes Sister      Coronary Artery Disease Sister      Diabetes Brother      Diabetes Brother      Diabetes Type 2  Brother      Coronary Artery Disease Sister      Asthma Sister      Glaucoma No family hx of       Macular Degeneration No family hx of      Anesthesia Reaction No family hx of          SOCIAL HISTORY:  Social History     Tobacco Use     Smoking status: Current Every Day Smoker     Packs/day: 1.00     Years: 58.00     Pack years: 58.00     Start date: 1962     Last attempt to quit: 2020     Years since quittin.4     Smokeless tobacco: Never Used   Substance Use Topics     Alcohol use: Not Currently     Comment: 2 glasses of wine a week     Drug use: Yes     Types: Marijuana     Comment: occasional       ROS:   Constitutional: No fever, chills, or sweats. Weight stable.    Cardiovascular: As per HPI.   Exam:  Physical Exam Elements attainable via telehealth:       Constitutional - alert and no distress    Eyes - no redness, no discharge    Respiratory - no cough, no labored breathing    Skin - no discoloration or lesions on the face or the arm.    Neurological -alert, normal speech,and affect, no tremor.    I have reviewed the labs and personally reviewed the imaging below and made my comment in the assessment and plan.    Labs:  CBC RESULTS:   Lab Results   Component Value Date    WBC 7.3 2021    RBC 4.26 2021    HGB 12.5 2021    HCT 37.1 2021    MCV 87 2021    MCH 25.6 (L) 2021    MCHC 29.4 (L) 2021    RDW 20.6 (H) 2021     2021       BMP RESULTS:  Lab Results   Component Value Date     2021    POTASSIUM 4.6 2021    CHLORIDE 107 2021    CO2 28 2021    ANIONGAP 5 2021     (H) 2021    BUN 13 2021    CR 0.64 2021    GFRESTIMATED >90 2021    GFRESTBLACK >90 2021    KAYLEEN 9.2 2021        INR RESULTS:  Lab Results   Component Value Date    INR 3.1 (A) 2021    INR 2.9 (A) 2021    INR 2.2 (A) 2021    INR 5.6 (A) 2021     Echocardiogram 2021:  Borderline (EF 50-55%) reduced left ventricular function is present.  Right ventricular function,  chamber size, wall motion, and thickness are  normal.  A mechanical mitral valve is present - 27mm St Abdoulaye. The mean gradient across  the mitral valve is 7 mmHg at 81 bpm. Doppler interrogation of the mitral  valve is normal.  Mechanical aortic valve is present - 19mm St Abdoulaye [place]. Doppler  interrogation of the aortic valve is normal. The peak velocity across the  aortic valve is 2.3 m/sec. The mean gradient is 13 mmHg. The V2/V1 ratio is  0.38.  This study was compared with the study from 1/25/21: There has been no change.    Coronary angiogram 11/16/2020         Right sided filling pressures are mildly elevated.    Severely elevated PCWP with V-wave to 40 mmHg. LVEDP moderately elevated at 20 mmHg.    Severe mitral stenosis with MV gradient of 18 mmHg    Severe aortic stenosis with AV gradient of 38 mmHg and KRISTOFER 0.65 cm^2.    Non-obstructive coronary disease.              Echocardiogram [facility] 10/9/2020  There is severe mitral stenosis.  Severe valvular aortic stenosis.  There is moderate to mod-severe (2-3+) mitral regurgitation.  There is moderate (2+) aortic regurgitation.  There is severe mitral annular calcification.  The visual ejection fraction is estimated at 60-65%.  Left ventricular systolic function is normal.  Right ventricular systolic pressure is elevated, consistent with moderate  pulmonary hypertension.  The ascending aorta is Mildly dilated.  The degree of aortic stenosis has progressed to severe stenosis.      EKG 10/8/2020: sinus rhythm, normal.    Assessment and Plan:   Ms. Rajani Gou is a 69 year old  female with PMH significant for current smoker, severe aortic and severe mitral valve stenosis status post mechanical AVR and mechanical MVR in December 2020, recurrent GI bleeding secondary to small bowel AVM, hypertension, type 2 diabetes, and peripheral arterial disease status left SFA angioplasty in 3/2019.    #S/P mechanical AVR, S/P mechanical MVR 12/29/2020  Currently asymptomatic.  Her  major symptom was DESAI before valve surgery which has resolved now.  Most recent echo in February of this year showed normal functioning mechanical valves.  EF is 50 to 55%.  Continue current medications  -Aspirin 81 mg  -Warfarin  -Atorvastatin 40 mg    #Current smoker  - No plans to quit.  Counseled against smoking.  Reminded patient the results of coronary angiogram which showed nonobstructive coronary artery disease.    # Recurrent GI bleeding secondary to small bowel AVMs  -Patient follows with [facility].  Last seen in April of this year.  She is on iron.  Hemoglobin stable at 12.5.    #Nonobstructive coronary artery disease  -Patient currently asymptomatic  -Continue current treatment.  Blood pressure and LDL well controlled.    #Type 2 diabetes  -Well-controlled.  On Metformin.    #PAD  -Reports no claudication.  Biggest risk factor is her current smoking status.  She tells me that she has hard time quitting.    Plan summary:  -No medication changes today.    Return to clinic with MARJ in 1 year.    Total time spent 40 minutes including face-to-face video visit, medical documentation and review of labs and imaging.    Please donot hesitate to contact me if you have any questions or concerns. Again, thank you for allowing me to participate in the care of your patient.    Thanh GARNER MD  [facility] Division of Cardiology  Pager [phone]      Please do not hesitate to contact me if you have any questions/concerns.     Sincerely,     Thanh Garner MD

## 2021-06-01 NOTE — TELEPHONE ENCOUNTER
Reason for Call:  Form, our goal is to have forms completed with 72 hours, however, some forms may require a visit or additional information.    Type of letter, form or note:  medical    Who is the form from?: Patient    Where did the form come from: Patient or family brought in       What clinic location was the form placed at?: [facility]    Where the form was placed: DR LEDESMA Box/Folder    What number is listed as a contact on the form?: [phone]       Additional comments: Please fax forms to [phone] when completed. Also patient stated she needed to have Dr Ledesma give an order or script for a handicapped sticker. If any questions please call patient.    Call taken on 6/1/2021 at 11:22 AM by Janna Solano

## 2021-06-01 NOTE — PATIENT INSTRUCTIONS
Thank you for coming to the [facility] @ [facility]; please note the following instructions:    1. Dr. Law Can would like you to return for a cardiac follow up in 1 year  (June) with Cardiology Practitioner .  We will contact you regarding your appointment when the time draws closer or you may call [phone] to arrange an appointment.  Mean while, if you should have any questions or concerns regarding your heart health, please contact us.  Thank you for choosing [facility] for your care.            If you have any questions regarding your visit please contact your care team:     Cardiology  Telephone Number   Gretchen GU, RN  Eloise BACA,RN  Lynette CARROLL, SOURAV SKELTON, MA  Huy GOLDBERG, DONN   [phone]   (select option 1)    *After hours: [phone]     For scheduling appts:     [phone] or    [phone] (select option 1)    *After hours: [phone]     For the Device Clinic (Pacemakers and ICD's)  RN's :  Gita Machado   During business hours: [phone]    *After business hours:  [phone] (select option 4)      Normal test result notifications will be released via [employer] or mailed within 7 business days.  All other test results, will be communicated via telephone once reviewed by your cardiologist.    If you need a medication refill please contact your pharmacy.  Please allow 3 business days for your refill to be completed.    As always, thank you for trusting us with your health care needs!

## 2021-06-01 NOTE — LETTER
6/1/2021      RE: Rajani Guo  [address] [facility] [address]       Rajani Guo is a 70 year old who is being evaluated via a billable video visit.      How would you like to obtain your AVS? Mail a copy  If the video visit is dropped, the invitation should be resent by: Send to e-mail at: [email or web address]  Will anyone else be joining your video visit? No      Video-Visit Details    Type of service:  Video Visit  Video Start Time: 3:30 PM  Video End Time: 3:40 PM    Originating Location (pt. Location): Home    Distant Location (provider location):  [facility] HEART [facility] [employer]     Platform used for Video Visit: [employer]    Referring provider: Tamiko Coley MD    Chief complaint: Dyspnea on exertion    HPI: Ms. Rajani Guo is a 69 year old  female with PMH significant for aortic and mitral valve stenosis, aortic and mitral regurgitation, recurrent GI bleeding, hypertension, type 2 diabetes, peripheral arterial disease status left SFA angioplasty in 2019.    Patient is being seen today through telephone encounter at request of Dr. Coley to discuss the recent echocardiogram showing severe mitral and severe aortic valve disease.  Prior echocardiograms dating back to March 2019 showed evidence of both aortic and mitral valve disease however the most recent study on 10/9/2020 reports worsening valve disease.      Patient tells me that over the last 6 months or so she feels short of breath even walking in a grocery store.  She cannot do her ADLs without shortness of breath.  Reports occasional chest pressure.  Reports feeling dizzy from time to time.  No syncope.  Reports occasional palpitations.    Patient has history of multiple GI bleeding since 2019.  Patient was recently admitted to [facility] on 9/16/2020 with weakness and dizziness.  She was found to have hemoglobin drop to 7.2.  Hemoglobin remained stable throughout the hospitalization.  Patient received PRBC in the ED and had  EGD which was unremarkable.  Subsequently she was readmitted again on 10/8/2020 with fatigue and shortness of breath and found to have hemoglobin at 6.2.  Patient received 2 more units and hemoglobin on discharge was 7.7.  Since being discharged she followed-up with [facility] and had pill camera study which showed 2 small AVMs in the small bowel.  There were not active bleeding.  Last colonoscopy on 9/4/2019 showed diverticulosis throughout the colon.  There were no evidence of GI bleeding.    Patient presented with acute limb ischemia in March 2019 and found to have acute occlusion of left SFA.  Patient underwent catheter directed lysis and angioplasty.  She was initially on apixaban which was later transitioned to aspirin.    Most recent labs 10/13/2020 show hemoglobin improved to 9 with normal platelet count.  BMP is normal.  Lipid profile is normal.     The patient's risk factor profile is: (+) HTN, (+) diabetes, (+) hyperlipidemia, (+) tobacco use.    I have personally reviewed echocardiogram 10/9/2020 which shows normal biventricular function, severe calcific aortic stenosis, moderate aortic regurgitation, moderate to severe mitral regurgitation and severe mitral annular calcification and severe mitral stenosis.    Medications, personal, family, and social history reviewed with patient and revised.    Interval history 6/1/2021:  Patient is being seen today through telehealth video visit for follow-up.  Patient is status post mechanical MVR and mechanical AVR on 12/29/2020 for severe mitral stenosis and severe aortic stenosis respectively.  She had the surgery with Dr. Luc Lara at [facility].  She has history of falls.  She was admitted to hospital in January of this year for fall.  Today patient tells me that she has completed OT PT.  She has started driving.  Denies shortness of breath, chest pain, palpitations, syncope.  She feels mildly lightheaded in the morning when she first gets up otherwise feels well.   She has home health nurse.  She was told her legs are looking good with no swelling.  She wears compression stockings.  Patient continues to smoke and has no plans to quit at this time.  Patient tells me that she is going to see a neurologist for imbalance and falls.    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   Findings: There is no intracranial hemorrhage, mass effect, or midline shift. Gray/whi     History of partial thyroidectomy 06/02/2018     Hyperlipidemia LDL goal <100 01/18/2018     Hypertension      Insomnia, unspecified type 01/18/2018     Mitral regurgitation      Mitral stenosis      Pulmonary hypertension (H)      Tobacco use disorder        CURRENT MEDICATIONS:  Current Outpatient Medications   Medication Sig Dispense Refill     acetaminophen (TYLENOL) 325 MG tablet TAKE 2 TABLETS (650 MG) BY MOUTH EVERY 4 HOURS AS NEEDED FOR PAIN       aspirin (ASA) 81 MG EC tablet Take 1 tablet (81 mg) by mouth daily 100 tablet 3     atorvastatin (LIPITOR) 40 MG tablet Take 1 tablet (40 mg) by mouth daily 90 tablet 3     buPROPion (WELLBUTRIN SR) 150 MG 12 hr tablet Take 1 tablet (150 mg) by mouth 2 times daily 60 tablet 6      Calcium Carb-Cholecalciferol 600-800 MG-UNIT TABS Take 1 tablet by mouth 2 times daily 1 tablet 0     enoxaparin ANTICOAGULANT (LOVENOX) 60 MG/0.6ML syringe Inject 0.6 mLs (60 mg) Subcutaneous 2 times daily Until INR therapeutic 10 mL 1     ferrous sulfate (FEROSUL) 325 (65 Fe) MG tablet Take 325 mg by mouth 2 times daily       glucosamine-chondroitinoitin 500-400 MG tablet Patient is taking 1500 mg OTC 1 time daily       insulin pen needle (29G X 12MM) 29G X 12MM miscellaneous Use 1 pen needles daily or as directed. 100 each 11     Melatonin 10 MG TABS tablet Take 10 mg by mouth At Bedtime       metFORMIN (GLUCOPHAGE) 1000 MG tablet TAKE 1 TABLET BY MOUTH TWICE A DAY WITH MEALS 180 tablet 1     Multiple Vitamin (MULTI-VITAMINS) TABS Take 1 tablet by mouth daily        nicotine (NICODERM CQ) 21 MG/24HR 24 hr patch Place 1 patch onto the skin daily 30 patch 1     oxybutynin (DITROPAN) 5 MG tablet TAKE 1 TABLET BY MOUTH TWICE A  tablet 3     pantoprazole (PROTONIX) 40 MG EC tablet TAKE 1 TABLET BY MOUTH EVERY DAY 90 tablet 1     traZODone (DESYREL) 50 MG tablet TAKE 1 2 TABLETS (50 100 MG) BY MOUTH AT BEDTIME       vitamin C (ASCORBIC ACID) 500 MG tablet Take 1 tablet (500 mg) by mouth daily 100 tablet 1     warfarin ANTICOAGULANT (COUMADIN) 2 MG tablet Take 2 tabs (4 mg) by mouth Mon, Wed, Fri and 3 tabs (6mg) all other days or as directed by your [facility] 270 tablet 1       PAST SURGICAL HISTORY:  Past Surgical History:   Procedure Laterality Date     COLONOSCOPY N/A 9/4/2019    Procedure: COLONOSCOPY;  Surgeon: Marie Todd MD;  Location:  GI     CV CORONARY ANGIOGRAM N/A 11/16/2020    Procedure: CV CORONARY ANGIOGRAM;  Surgeon: Joey Rivas MD;  Location:  HEART CARDIAC CATH LAB     CV FRACTIONAL FLOW RESERVE N/A 11/16/2020    Procedure: Fractional Flow Reserve;  Surgeon: Joey Rivas MD;  Location: [facility] CARDIAC CATH LAB     CV RIGHT HEART CATH MEASUREMENTS RECORDED N/A  11/16/2020    Procedure: CV RIGHT HEART CATH;  Surgeon: Joey Rivas MD;  [facility] CARDIAC CATH LAB     ESOPHAGOSCOPY, GASTROSCOPY, DUODENOSCOPY (EGD), COMBINED N/A 9/4/2019    Procedure: ESOPHAGOGASTRODUODENOSCOPY (EGD);  Surgeon: Marie Todd MD;  Location:  GI     ESOPHAGOSCOPY, GASTROSCOPY, DUODENOSCOPY (EGD), COMBINED N/A 9/17/2020    Procedure: ESOPHAGOGASTRODUODENOSCOPY (EGD);  Surgeon: Ten Ibrahim DO;  Location:  GI     IR ANGIOGRAM THROUGH CATHETER FOLLOW UP  3/5/2019     IR LOWER EXTREMITY ANGIOGRAM LEFT  3/4/2019     KNEE SURGERY Right 2013    right knee torn meniscus surgery     OVARY SURGERY  1971    1 ovary removed     RELEASE CARPAL TUNNEL Right 1988     RELEASE TRIGGER FINGER BILATERAL       REPLACE VALVES AORTIC AND MITRAL, COMBINED N/A 12/29/2020    Procedure: Median Stertonomy, REPLACEMENT AORTIC Valve  with 19mm St Abdoulaye [place]  Mechanical Heart Valve AND MITRAL VALVE Replacement with 27mm St Abdoulaye Mechanical Heart Valve, on pump oxygenation;  Surgeon: Luc Lara MD;  Location: [facility] OR     SHOULDER SURGERY Left     rotator cuff repair, plate placement     THYROID SURGERY  1988    partial thyroidectomy       ALLERGIES:     Allergies   Allergen Reactions     Indomethacin Other (See Comments)     Dizziness and disorientation     Tramadol Nausea and Vomiting       FAMILY HISTORY:  Family History   Problem Relation Age of Onset     Diabetes Type 2  Mother      Lung Cancer Father      Diabetes Sister      Coronary Artery Disease Sister      Diabetes Brother      Diabetes Brother      Diabetes Type 2  Brother      Coronary Artery Disease Sister      Asthma Sister      Glaucoma No family hx of      Macular Degeneration No family hx of      Anesthesia Reaction No family hx of          SOCIAL HISTORY:  Social History     Tobacco Use     Smoking status: Current Every Day Smoker     Packs/day: 1.00     Years: 58.00     Pack years: 58.00     Start date: 12/20/1962      Last attempt to quit: 2020     Years since quittin.4     Smokeless tobacco: Never Used   Substance Use Topics     Alcohol use: Not Currently     Comment: 2 glasses of wine a week     Drug use: Yes     Types: Marijuana     Comment: occasional       ROS:   Constitutional: No fever, chills, or sweats. Weight stable.    Cardiovascular: As per HPI.   Exam:  Physical Exam Elements attainable via telehealth:       Constitutional - alert and no distress    Eyes - no redness, no discharge    Respiratory - no cough, no labored breathing    Skin - no discoloration or lesions on the face or the arm.    Neurological -alert, normal speech,and affect, no tremor.    I have reviewed the labs and personally reviewed the imaging below and made my comment in the assessment and plan.    Labs:  CBC RESULTS:   Lab Results   Component Value Date    WBC 7.3 2021    RBC 4.26 2021    HGB 12.5 2021    HCT 37.1 2021    MCV 87 2021    MCH 25.6 (L) 2021    MCHC 29.4 (L) 2021    RDW 20.6 (H) 2021     2021       BMP RESULTS:  Lab Results   Component Value Date     2021    POTASSIUM 4.6 2021    CHLORIDE 107 2021    CO2 28 2021    ANIONGAP 5 2021     (H) 2021    BUN 13 2021    CR 0.64 2021    GFRESTIMATED >90 2021    GFRESTBLACK >90 2021    KAYLEEN 9.2 2021        INR RESULTS:  Lab Results   Component Value Date    INR 3.1 (A) 2021    INR 2.9 (A) 2021    INR 2.2 (A) 2021    INR 5.6 (A) 2021     Echocardiogram 2021:  Borderline (EF 50-55%) reduced left ventricular function is present.  Right ventricular function, chamber size, wall motion, and thickness are  normal.  A mechanical mitral valve is present - 27mm St Abdoulaye. The mean gradient across  the mitral valve is 7 mmHg at 81 bpm. Doppler interrogation of the mitral  valve is normal.  Mechanical aortic valve is present - 19mm St  Abdoulaye [place]. Doppler  interrogation of the aortic valve is normal. The peak velocity across the  aortic valve is 2.3 m/sec. The mean gradient is 13 mmHg. The V2/V1 ratio is  0.38.  This study was compared with the study from 1/25/21: There has been no change.    Coronary angiogram 11/16/2020         Right sided filling pressures are mildly elevated.    Severely elevated PCWP with V-wave to 40 mmHg. LVEDP moderately elevated at 20 mmHg.    Severe mitral stenosis with MV gradient of 18 mmHg    Severe aortic stenosis with AV gradient of 38 mmHg and KRISTOFER 0.65 cm^2.    Non-obstructive coronary disease.              Echocardiogram [facility] 10/9/2020  There is severe mitral stenosis.  Severe valvular aortic stenosis.  There is moderate to mod-severe (2-3+) mitral regurgitation.  There is moderate (2+) aortic regurgitation.  There is severe mitral annular calcification.  The visual ejection fraction is estimated at 60-65%.  Left ventricular systolic function is normal.  Right ventricular systolic pressure is elevated, consistent with moderate  pulmonary hypertension.  The ascending aorta is Mildly dilated.  The degree of aortic stenosis has progressed to severe stenosis.      EKG 10/8/2020: sinus rhythm, normal.    Assessment and Plan:   Ms. Rajani Guo is a 69 year old  female with PMH significant for current smoker, severe aortic and severe mitral valve stenosis status post mechanical AVR and mechanical MVR in December 2020, recurrent GI bleeding secondary to small bowel AVM, hypertension, type 2 diabetes, and peripheral arterial disease status left SFA angioplasty in 3/2019.    #S/P mechanical AVR, S/P mechanical MVR 12/29/2020  Currently asymptomatic.  Her major symptom was DESAI before valve surgery which has resolved now.  Most recent echo in February of this year showed normal functioning mechanical valves.  EF is 50 to 55%.  Continue current medications  -Aspirin 81 mg  -Warfarin  -Atorvastatin 40 mg    #Current  smoker  - No plans to quit.  Counseled against smoking.  Reminded patient the results of coronary angiogram which showed nonobstructive coronary artery disease.    # Recurrent GI bleeding secondary to small bowel AVMs  -Patient follows with [facility].  Last seen in April of this year.  She is on iron.  Hemoglobin stable at 12.5.    #Nonobstructive coronary artery disease  -Patient currently asymptomatic  -Continue current treatment.  Blood pressure and LDL well controlled.    #Type 2 diabetes  -Well-controlled.  On Metformin.    #PAD  -Reports no claudication.  Biggest risk factor is her current smoking status.  She tells me that she has hard time quitting.    Plan summary:  -No medication changes today.    Return to clinic with MARJ in 1 year.    Total time spent 40 minutes including face-to-face video visit, medical documentation and review of labs and imaging.    Please donot hesitate to contact me if you have any questions or concerns. Again, thank you for allowing me to participate in the care of your patient.    Thanh GARNER MD  [facility] Division of Cardiology  Pager [phone]

## 2021-06-02 ENCOUNTER — MEDICAL CORRESPONDENCE (OUTPATIENT)
Dept: HEALTH INFORMATION MANAGEMENT | Facility: CLINIC | Age: 71
End: 2021-06-02

## 2021-06-04 ENCOUNTER — ANTICOAGULATION THERAPY VISIT (OUTPATIENT)
Dept: FAMILY MEDICINE | Facility: CLINIC | Age: 71
End: 2021-06-04

## 2021-06-04 DIAGNOSIS — Z98.890 S/P MVR (MITRAL VALVE REPAIR): ICD-10-CM

## 2021-06-04 DIAGNOSIS — Z95.2 S/P AVR (AORTIC VALVE REPLACEMENT): ICD-10-CM

## 2021-06-04 LAB — INR PPP: 1.6 (ref 0.9–1.1)

## 2021-06-04 NOTE — TELEPHONE ENCOUNTER
The Form has been completed by the provider, confirmed faxed to the fax number on the form and listed below and a copy has been sent to be added to the chart. Funmilayo Ferrari,

## 2021-06-04 NOTE — PROGRESS NOTES
ANTICOAGULATION MANAGEMENT     Patient Name:  Rajani Guo  Date:  2021    ASSESSMENT /SUBJECTIVE:    Today's INR result of 1.6 is subtherapeutic. Goal INR of 2.5-3.5      Warfarin dose taken: Warfarin taken as instructed    Diet: No new diet changes affecting INR    Medication changes/ interactions: No new medications/supplements affecting INR    Previous INR: Therapeutic     S/S of bleeding or thromboembolism: No    New injury or illness: No    Upcoming surgery, procedure or cardioversion: No    Additional findings: None      PLAN:    Warfarin Dosing Instructions: 12 mg tonight then change your warfarin dose to 6 mg daily  . (10.5 % change)    Instructed patient to follow up no later than: 21  Orders given to  Homecare nurse/facility to recheck    Education provided: Monitoring for clotting signs and symptoms    Telephone call with home care nurse Nora whom verbalizes understanding and agrees to plan    Instructed to call the Anticoagulation Clinic for any changes, questions or concerns. (#[phone])        Janae Davila RN      OBJECTIVE:  Recent labs: (last 7 days)     21   INR 1.6*         No question data found.  Anticoagulation Summary  As of 2021    INR goal:  2.5-3.5   TTR:  33.9 % (4.7 mo)   INR used for dosin.6 (2021)   Warfarin maintenance plan:  4 mg (2 mg x 2) every Mon, Thu; 6 mg (2 mg x 3) all other days   Full warfarin instructions:  4 mg every Mon, Thu; 6 mg all other days   Weekly warfarin total:  38 mg   Plan last modified:  Marie Lai RN (2021)   Next INR check:  2021   Priority:  High   Target end date:  Indefinite    Indications    S/P MVR (mitral valve repair) [Z98.890]  S/P AVR (aortic valve replacement) [Z95.2]             Anticoagulation Episode Summary     INR check location:      Preferred lab:      Send INR reminders to:  GURVINDER DARDEN    Comments:        Anticoagulation Care Providers     Provider Role Specialty Phone number     Quinton Handy PA Referring Cardiovascular & Thoracic Surgery [phone]    Lakeshia Rubio APRN CNP Referring Internal Medicine [phone]    Sparkle Bird APRN CNP Referring Emergency Medicine [phone]

## 2021-06-07 ENCOUNTER — TELEPHONE (OUTPATIENT)
Dept: FAMILY MEDICINE | Facility: CLINIC | Age: 71
End: 2021-06-07

## 2021-06-07 ENCOUNTER — ANTICOAGULATION THERAPY VISIT (OUTPATIENT)
Dept: FAMILY MEDICINE | Facility: CLINIC | Age: 71
End: 2021-06-07

## 2021-06-07 ENCOUNTER — PATIENT OUTREACH (OUTPATIENT)
Dept: CARE COORDINATION | Facility: CLINIC | Age: 71
End: 2021-06-07

## 2021-06-07 DIAGNOSIS — I38 HEART FAILURE DUE TO VALVULAR DISEASE, UNSPECIFIED HEART FAILURE TYPE (H): Primary | ICD-10-CM

## 2021-06-07 DIAGNOSIS — R29.6 FALLS FREQUENTLY: ICD-10-CM

## 2021-06-07 DIAGNOSIS — I50.9 HEART FAILURE DUE TO VALVULAR DISEASE, UNSPECIFIED HEART FAILURE TYPE (H): Primary | ICD-10-CM

## 2021-06-07 DIAGNOSIS — Z95.2 S/P AVR (AORTIC VALVE REPLACEMENT): ICD-10-CM

## 2021-06-07 DIAGNOSIS — Z91.148 NONCOMPLIANCE WITH MEDICATION REGIMEN: ICD-10-CM

## 2021-06-07 DIAGNOSIS — Z98.890 S/P MVR (MITRAL VALVE REPAIR): ICD-10-CM

## 2021-06-07 LAB — INR PPP: 3 (ref 0.9–1.1)

## 2021-06-07 NOTE — TELEPHONE ENCOUNTER
Reached out to patient to relay provider's message as written. Verbalized understanding. Scheduled for appointment 06/21/2021. She stated she would not be driving in the meantime.    Kyara Payton, MATAN RN  [facility], [place]

## 2021-06-07 NOTE — PROGRESS NOTES
Clinic Care Coordination Contact  Care Team Conversations    Care Coordination Clinician Chart Review  Situation: Patient chart reviewed by care coordinator.       Background: Care Coordination initial assessment and enrollment to Care Coordination was 1/11/2021.   Patient centered goals were developed with participation from patient.  RN CC handed patient off to CHW for continued outreach every 30 days.        Assessment: Per chart review, patient outreach completed by CC CHW on 5/24/2021.  Patient is actively working to accomplish goals.  Patient's goals remain appropriate and relevant at this time.   Patient is not due for updated Complex Care Plan.  Annual assessment will be due 1/11/2022.      Goals       #1  Pain Management (pt-stated)      Goal Statement: I will work to reduce my pain with Tylenol, ice, heat, and massage.  Date Goal set: 1/11/2021  Barriers: Recent open heart surgery  Strengths: Engaged in care coordination  Date to Achieve By: 7/11/2021  Patient expressed understanding of goal: Yes  Action steps to achieve this goal:  1. I will take my Tylenol on a scheduled basis to keep ahead of the pain.  2. I will use alternating ice and heat to reduce the pain that I have incisionally.  3. I will have my daughter massage the muscles that are tight and achy and causing pain.          #2  Functional (pt-stated)      Goal Statement: I will work on walking a little bit more each day by walking further in the distance or further in the time walked.  Date Goal set: 1/11/2021  Barriers: Recent open heart surgery  Strengths: Engaged in care coordination  Date to Achieve By: 7/11/2021  Patient expressed understanding of goal: Yes  Action steps to achieve this goal:  1. I will walk every day more than just to the bathroom and back.  2. I will increase the time walked.  3. I will increase the distance walked.                  Plan/Recommendations: The patient will continue working with Care Coordination to achieve  goals as above.  CHW will involve RN CC as needed or if patient is ready to move to maintenance.  RN CC will continue to monitor progress to goals and CHW outreaches every 6 weeks.   Care Plan updated and mailed to patient: Kaylyn Andrew MSN, RN, PHN, CCM   Primary Care Clinical RN Care Coordinator  [facility]  6/7/2021   4:32 PM  [email or web address]  Office: [phone]

## 2021-06-07 NOTE — PROGRESS NOTES
ANTICOAGULATION MANAGEMENT     Patient Name:  Rajani Guo  Date:  6/7/2021    ASSESSMENT /SUBJECTIVE:    Today's INR result of 3.0 is therapeutic. Goal INR of 2.5-3.5      Warfarin dose taken: Warfarin taken as instructed    Diet: No new diet changes affecting INR    Medication changes/ interactions: No new medications/supplements affecting INR    Previous INR: Therapeutic     S/S of bleeding or thromboembolism: No    New injury or illness: No    Upcoming surgery, procedure or cardioversion: No    Additional findings: discharging from home care - will schedule next INR lab draw       PLAN:    Warfarin Dosing Instructions: Continue your current warfarin dose 6mg daily     Instructed patient to follow up no later than: 2 weeks  Lab visit scheduled    Education provided: Contact [facility] Anticoagulation: [phone]  with any changes, questions or concerns.     Telephone call with Rajani pineda home care nurse whom verbalizes understanding and agrees to plan    Instructed to call the Anticoagulation Clinic for any changes, questions or concerns. (#[phone])        Julieta Jo RN      OBJECTIVE:  Recent labs: (last 7 days)     06/04/21 06/07/21   INR 1.6* 3.0*         No question data found.  Anticoagulation Summary  As of 6/7/2021    INR goal:  2.5-3.5   TTR:  33.9 % (4.8 mo)   INR used for dosing:  3.0 (6/7/2021)   Warfarin maintenance plan:  6 mg (2 mg x 3) every day   Full warfarin instructions:  6 mg every day   Weekly warfarin total:  42 mg   No change documented:  Julieta Jo RN   Plan last modified:  Janae Davila RN (6/4/2021)   Next INR check:  6/14/2021   Priority:  High   Target end date:  Indefinite    Indications    S/P MVR (mitral valve repair) [Z98.890]  S/P AVR (aortic valve replacement) [Z95.2]             Anticoagulation Episode Summary     INR check location:      Preferred lab:      Send INR reminders to:  GURVINDER DARDEN    Comments:        Anticoagulation Care  Providers     Provider Role Specialty Phone number    Quinton Handy PA Referring Cardiovascular & Thoracic Surgery [phone]    Lakeshia Rubio, APRN CNP Referring Internal Medicine [phone]    Sparkle Bird, APRN CNP Referring Emergency Medicine [phone]         Julieta Pavon RN, BSN, PHN  Anticoagulation Nurse  [phone]

## 2021-06-07 NOTE — TELEPHONE ENCOUNTER
"Received call from CARINE Bhandari with [facility]. She stated that they will be discharging patient d/t non compliance with homebound status.She also states that patient is not safe to leave home by herself. She has had falls when attempting this before and is really unsafe to be driving. There is a lot of \"confusion\" and this has been discussed with patient about reaction time and whatnot. Daughter is in complete agreement and does not want patient to drive/leave home either. She couldn't follow her own medication list (doubling up on some medications and not putting medications in some days).     Routing update to PCP.    MATA GonsalezN RN  [facility], [place]  "

## 2021-06-07 NOTE — PROGRESS NOTES
Clinic Care Coordination Contact  Care Team Conversations    The RN CC nurse care coordinator notes that the CHW is attempting to contact the patient at this time.  Therefore the RN CC will wait 2-3 weeks to make a chart review on the patient progress.      Odell Andrew MSN, RN, PHN, [facility]   Primary Care Clinical RN Care Coordinator  [facility]  6/7/2021   2:00 PM  [email or web address]  Office: [phone]

## 2021-06-08 ENCOUNTER — PATIENT OUTREACH (OUTPATIENT)
Dept: CARE COORDINATION | Facility: CLINIC | Age: 71
End: 2021-06-08

## 2021-06-08 ENCOUNTER — OFFICE VISIT (OUTPATIENT)
Dept: NEUROLOGY | Facility: CLINIC | Age: 71
End: 2021-06-08
Payer: COMMERCIAL

## 2021-06-08 VITALS
BODY MASS INDEX: 25.8 KG/M2 | HEIGHT: 64 IN | WEIGHT: 151.1 LBS | DIASTOLIC BLOOD PRESSURE: 83 MMHG | HEART RATE: 83 BPM | SYSTOLIC BLOOD PRESSURE: 168 MMHG

## 2021-06-08 DIAGNOSIS — R26.89 IMBALANCE: Primary | ICD-10-CM

## 2021-06-08 PROCEDURE — 99213 OFFICE O/P EST LOW 20 MIN: CPT | Performed by: INTERNAL MEDICINE

## 2021-06-08 ASSESSMENT — PAIN SCALES - GENERAL: PAINLEVEL: NO PAIN (0)

## 2021-06-08 ASSESSMENT — MIFFLIN-ST. JEOR: SCORE: 1195.39

## 2021-06-08 NOTE — NURSING NOTE
"Rajani Guo's goals for this visit include: return  She requests these members of her care team be copied on today's visit information:     PCP: Tamiko Coley    Referring Provider:  No referring provider defined for this encounter.    BP (!) 168/83   Pulse 83   Ht 1.626 m (5' 4\")   Wt 68.5 kg (151 lb 1.6 oz)   BMI 25.94 kg/m      Do you need any medication refills at today's visit? n  "
No

## 2021-06-08 NOTE — PROGRESS NOTES
Clinic Care Coordination Contact  [facility]/Voicemail       Clinical Data: Care Coordinator Outreach  Outreach attempted x 1.  Left message on patient's voicemail with call back information and requested return call.  Plan: Care Coordinator sent care coordination introduction letter on 1/11/2021 via [employer]. Care Coordinator will try to reach patient again in 3-5 business days.        Odell Andrew MSN, RN, PHN, CCM   Primary Care Clinical RN Care Coordinator  [facility]  6/8/2021   8:56 AM  [email or web address]  Office: [phone]

## 2021-06-08 NOTE — LETTER
6/8/2021         RE: Rajani Guo  [address] [facility] [address]        Dear Colleague,    Thank you for referring your patient, Rajani Guo, to the [facility] NEUROLOGY [facility] [place]. Please see a copy of my visit note below.    [facility] Neurology Follow Up Visit    Rajani Guo MRN# [id number]   Age: 70 year old YOB: 1950     Brief history of symptoms: The patient was initially seen in neurologic consultation on 3/2/21 for evaluation of imbalance. Please see the comprehensive neurologic consultation note from that date in the Epic records for details.     Interval history:   Walking is much better now. She did home physical therapy and has almost completed it. She doesn't use walker or cane when she is at home. She uses walker when she is out of the house due to longer distances walking. She denies any falls. She denies any numbness or tingling in the extremities.        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)     Fatigue     SOB (shortness of breath)     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)     Anemia, iron deficiency     Severe aortic stenosis     Dyspnea on exertion     Status post coronary angiogram     S/P MVR (mitral valve repair)     S/P AVR (aortic valve replacement)     UTI (urinary tract infection)     Trigger middle finger of right hand     Personal history of colonic polyps     Sensorineural hearing loss,  bilateral     COPD (chronic obstructive pulmonary disease) (H)     History of CT scan of head 2021     Frailty     Atrial fibrillation (H)     Current use of long term anticoagulation     History of GI bleed     PAD (peripheral artery disease) (H)     PAF (paroxysmal atrial fibrillation) (H)     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   Findings: There is no intracranial hemorrhage, mass effect, or midline shift. Gray/whi     History of partial thyroidectomy 06/02/2018     Hyperlipidemia LDL goal <100 01/18/2018     Hypertension      Insomnia, unspecified type 01/18/2018     Mitral regurgitation      Mitral stenosis      Pulmonary hypertension (H)      Tobacco use disorder         Past Surgical History:     Past Surgical History:   Procedure Laterality Date     COLONOSCOPY N/A 9/4/2019    Procedure: COLONOSCOPY;  Surgeon: Marie Todd MD;  Location:  GI     CV CORONARY ANGIOGRAM N/A 11/16/2020    Procedure: CV CORONARY ANGIOGRAM;  Surgeon: Joey Rivas MD;  Location: [facility] CARDIAC CATH LAB     CV FRACTIONAL FLOW RESERVE N/A 11/16/2020    Procedure: Fractional Flow Reserve;  Surgeon: Joey Rivas MD;  Location: [facility]  CARDIAC CATH LAB     CV RIGHT HEART CATH MEASUREMENTS RECORDED N/A 2020    Procedure: CV RIGHT HEART CATH;  Surgeon: Joey Rivas MD;  [facility]: [facility] HEART CARDIAC CATH LAB     ESOPHAGOSCOPY, GASTROSCOPY, DUODENOSCOPY (EGD), COMBINED N/A 2019    Procedure: ESOPHAGOGASTRODUODENOSCOPY (EGD);  Surgeon: Marie Todd MD;  Location:  GI     ESOPHAGOSCOPY, GASTROSCOPY, DUODENOSCOPY (EGD), COMBINED N/A 2020    Procedure: ESOPHAGOGASTRODUODENOSCOPY (EGD);  Surgeon: Ten Ibrahim DO;  Location:  GI     IR ANGIOGRAM THROUGH CATHETER FOLLOW UP  3/5/2019     IR LOWER EXTREMITY ANGIOGRAM LEFT  3/4/2019     KNEE SURGERY Right     right knee torn meniscus surgery     OVARY SURGERY  1971    1 ovary removed     RELEASE CARPAL TUNNEL Right      RELEASE TRIGGER FINGER BILATERAL       REPLACE VALVES AORTIC AND MITRAL, COMBINED N/A 2020    Procedure: Median Stertonomy, REPLACEMENT AORTIC Valve  with 19mm St Abdoulaye Irving  Mechanical Heart Valve AND MITRAL VALVE Replacement with 27mm St Abdoulaye Mechanical Heart Valve, on pump oxygenation;  Surgeon: Luc Lara MD;  Location: [facility]     SHOULDER SURGERY Left     rotator cuff repair, plate placement     THYROID SURGERY      partial thyroidectomy        Social History:     Social History     Tobacco Use     Smoking status: Current Every Day Smoker     Packs/day: 1.00     Years: 58.00     Pack years: 58.00     Start date: 1962     Last attempt to quit: 2020     Years since quittin.3     Smokeless tobacco: Never Used   Substance Use Topics     Alcohol use: Not Currently     Comment: 2 glasses of wine a week     Drug use: Yes     Types: Marijuana     Comment: occasional        Family History:     Family History   Problem Relation Age of Onset     Diabetes Type 2  Mother      Lung Cancer Father      Diabetes Sister      Coronary Artery Disease Sister      Diabetes Brother      Diabetes Brother      Diabetes Type 2  Brother       Coronary Artery Disease Sister      Asthma Sister      Glaucoma No family hx of      Macular Degeneration No family hx of      Anesthesia Reaction No family hx of         Medications:     Current Outpatient Medications   Medication Sig     acetaminophen (TYLENOL) 325 MG tablet TAKE 2 TABLETS (650 MG) BY MOUTH EVERY 4 HOURS AS NEEDED FOR PAIN     aspirin (ASA) 81 MG EC tablet Take 1 tablet (81 mg) by mouth daily     atorvastatin (LIPITOR) 40 MG tablet Take 1 tablet (40 mg) by mouth daily     buPROPion (WELLBUTRIN SR) 150 MG 12 hr tablet Take 1 tablet (150 mg) by mouth 2 times daily     Calcium Carb-Cholecalciferol 600-800 MG-UNIT TABS Take 1 tablet by mouth 2 times daily     enoxaparin ANTICOAGULANT (LOVENOX) 60 MG/0.6ML syringe Inject 0.6 mLs (60 mg) Subcutaneous 2 times daily Until INR therapeutic     ferrous sulfate (FEROSUL) 325 (65 Fe) MG tablet Take 325 mg by mouth 2 times daily     glucosamine-chondroitinoitin 500-400 MG tablet Patient is taking 1500 mg OTC 1 time daily     insulin pen needle (29G X 12MM) 29G X 12MM miscellaneous Use 1 pen needles daily or as directed.     Melatonin 10 MG TABS tablet Take 10 mg by mouth At Bedtime     metFORMIN (GLUCOPHAGE) 1000 MG tablet TAKE 1 TABLET BY MOUTH TWICE A DAY WITH MEALS     Multiple Vitamin (MULTI-VITAMINS) TABS Take 1 tablet by mouth daily      nicotine (NICODERM CQ) 21 MG/24HR 24 hr patch Place 1 patch onto the skin daily     oxybutynin (DITROPAN) 5 MG tablet TAKE 1 TABLET BY MOUTH TWICE A DAY     pantoprazole (PROTONIX) 40 MG EC tablet TAKE 1 TABLET BY MOUTH EVERY DAY     traZODone (DESYREL) 50 MG tablet TAKE 1 2 TABLETS (50 100 MG) BY MOUTH AT BEDTIME     vitamin C (ASCORBIC ACID) 500 MG tablet Take 1 tablet (500 mg) by mouth daily     warfarin ANTICOAGULANT (COUMADIN) 2 MG tablet Take 2 tabs (4 mg) by mouth Mon, Wed, Fri and 3 tabs (6mg) all other days or as directed by your [facility]     No current facility-administered medications for this visit.   "       Allergies:     Allergies   Allergen Reactions     Indomethacin Other (See Comments)     Dizziness and disorientation     Tramadol Nausea and Vomiting        Review of Systems:   A comprehensive 10 point review of systems (constitutional, ENT, cardiac, peripheral vascular, lymphatic, respiratory, GI, , Musculoskeletal, skin, Neurological) was performed and found to be negative except as described in this note.      Physical Exam:   Vitals: BP (!) 168/83   Pulse 83   Ht 1.626 m (5' 4\")   Wt 68.5 kg (151 lb 1.6 oz)   BMI 25.94 kg/m     General: Seated comfortably in no acute distress.  Lungs: breathing comfortably  Neurologic:     Mental Status: Fully alert, attentive. Normal memory and fund of knowledge. Language normal, speech clear and fluent, no paraphasic errors.      Cranial Nerves: Visual fields intact. EOMI with normal smooth pursuit. Facial sensation intact/symmetric. Facial movements symmetric. Hearing not formally tested but intact to conversation. Palate elevation symmetric, uvula midline. No dysarthria. Shoulder shrug strong bilaterally. Tongue protrusion midline.     Motor: No tremors or other abnormal movements observed. Muscle tone normal throughout. Normal/symmetric rapid finger tapping. Strength 5/5 throughout upper and lower extremities.     Deep Tendon Reflexes: 1+/symmetric throughout upper and lower extremities.Toes downgoing bilaterally.     Sensory: Intact/symmetric to light touch, and proprioception throughout upper and lower extremities. Vibration is 11-12 seconds in bilateral great toes, normal in the hands. Temperature is mildly decreased in the feet compared to hands. Negative Romberg.      Coordination: Finger-nose-finger and heel-shin intact without dysmetria. Rapid alternating movements intact/symmetric with normal speed and rhythm.     Gait: Normal, steady casual gait. Minimal difficulties with heel and toe gaits. Able to perform tandem without losing balance. Mild " difficulties standing in tandem for prolonged time.     Data reviewed on previous visits    Imaging:  CT head 1/24/2021  Impression:  No acute intracranial pathology..      Laboratory:  B12 457 10/2020    Pertinent Investigations since last visit:   3/2021 - Normal B12/MMA, TSH 5 with normal free T4, CK 46         Assessment and Plan:   Rajani Guo is a 70 year old female who presents today for follow-up of balance difficulties. She reported balance problems starting following AVR/MVR surgery in December 2020. She was hospitalized in January 2021 for the balance difficulties and was discharged to rehab for 2 weeks. 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 Patient has had significant improvement in gait since last visit with physical therapy. She has minimal difficulties on gait examination today. I encouraged patient to continue physical therapy exercises and follow-up as needed.      Follow up in Neurology clinic as needed should new concerns arise.    Bin Alcaraz MD   of Neurology  [facility]    The total time of this encounter today amounted to 20 minutes. This time included time spent with the patient, prep work, ordering tests, and performing post visit documentation.        Again, thank you for allowing me to participate in the care of your patient.        Sincerely,        Bin Alcaraz MD

## 2021-06-09 ENCOUNTER — ANTICOAGULATION THERAPY VISIT (OUTPATIENT)
Dept: FAMILY MEDICINE | Facility: CLINIC | Age: 71
End: 2021-06-09

## 2021-06-09 DIAGNOSIS — Z98.890 S/P MVR (MITRAL VALVE REPAIR): ICD-10-CM

## 2021-06-09 DIAGNOSIS — Z95.2 S/P AVR (AORTIC VALVE REPLACEMENT): ICD-10-CM

## 2021-06-09 NOTE — PROGRESS NOTES
Patient is back on home bound - home care.  Spoke with home care nurse Elisabet and gave INR recheck date of 6/15/2021 with no changes to dosing.   Elisabet verbalized understanding.     Julieta Pavon, RN, BSN, PHN  Anticoagulation Nurse  [phone]

## 2021-06-11 NOTE — PROGRESS NOTES
Clinic Care Coordination Contact  [facility]/Voicemail       Clinical Data: Care Coordinator Outreach  Outreach attempted x 3.  Left message on patient's voicemail with call back information and requested return call.  Plan: Care Coordinator sent care coordination introduction letter on 4/5/2021 via [employer]. Care Coordinator will try to reach patient again in 10 business days.          Odell Andrew MSN, RN, PHN, CCM   Primary Care Clinical RN Care Coordinator  [facility]  6/11/2021   2:27 PM  [email or web address]  Office: [phone]

## 2021-06-15 ENCOUNTER — ANTICOAGULATION THERAPY VISIT (OUTPATIENT)
Dept: FAMILY MEDICINE | Facility: CLINIC | Age: 71
End: 2021-06-15

## 2021-06-15 ENCOUNTER — TELEPHONE (OUTPATIENT)
Dept: FAMILY MEDICINE | Facility: CLINIC | Age: 71
End: 2021-06-15

## 2021-06-15 DIAGNOSIS — Z98.890 S/P MVR (MITRAL VALVE REPAIR): ICD-10-CM

## 2021-06-15 DIAGNOSIS — Z95.2 S/P AVR (AORTIC VALVE REPLACEMENT): ICD-10-CM

## 2021-06-15 LAB — INR PPP: 3 (ref 0.9–1.1)

## 2021-06-15 NOTE — TELEPHONE ENCOUNTER
[facility] needs verbal order for 1x week for 9 weeks ,education,safe with medications and monitor UTI she was going to be discharged but patient agreed to stop driving.  Cecille Vaughn,

## 2021-06-15 NOTE — PROGRESS NOTES
ANTICOAGULATION MANAGEMENT     Patient Name:  Rajani Guo  Date:  6/15/2021    ASSESSMENT /SUBJECTIVE:    Today's INR result of 3.0 is therapeutic. Goal INR of 2.5-3.5      Warfarin dose taken: Warfarin taken as instructed    Diet: No new diet changes affecting INR    Medication changes/ interactions: No new medications/supplements affecting INR    Previous INR: Therapeutic     S/S of bleeding or thromboembolism: No    New injury or illness: No    Upcoming surgery, procedure or cardioversion: No    Additional findings: None      PLAN:    Warfarin Dosing Instructions: Continue your current warfarin dose 6mg daily    Instructed patient to follow up no later than: 1 week  Orders given to  Homecare nurse/facility to recheck    Education provided: Target INR goal and significance of current INR result    Telephone call with home care nurse Samara whom verbalizes understanding and agrees to plan    Instructed to call the Anticoagulation Clinic for any changes, questions or concerns. (#[phone])        Matthew Finn RN      OBJECTIVE:  Recent labs: (last 7 days)     06/15/21   INR 3.0*         No question data found.  Anticoagulation Summary  As of 6/15/2021    INR goal:  2.5-3.5   TTR:  37.4 % (5 mo)   INR used for dosing:  3.0 (6/15/2021)   Warfarin maintenance plan:  6 mg (2 mg x 3) every day   Full warfarin instructions:  6 mg every day   Weekly warfarin total:  42 mg   No change documented:  Matthew Finn RN   Plan last modified:  Janae Davila RN (6/4/2021)   Next INR check:  6/22/2021   Priority:  High   Target end date:  Indefinite    Indications    S/P MVR (mitral valve repair) [Z98.890]  S/P AVR (aortic valve replacement) [Z95.2]             Anticoagulation Episode Summary     INR check location:      Preferred lab:      Send INR reminders to:  GURVINDER DARDEN    Comments:  home care      Anticoagulation Care Providers     Provider Role Specialty Phone number    Quinton Handy PA Referring  Cardiovascular & Thoracic Surgery [phone]    Lakeshia Rubio, FRANCESCA CNP Referring Internal Medicine [phone]    Sparkle Bird APRN CNP Referring Emergency Medicine [phone]

## 2021-06-15 NOTE — TELEPHONE ENCOUNTER
Called Elisabet with [facility].   Provided with Verbal orders as requested, per RN protocol.    MATA GonsalezN KAREEM  [facility]

## 2021-06-16 ENCOUNTER — MEDICAL CORRESPONDENCE (OUTPATIENT)
Dept: HEALTH INFORMATION MANAGEMENT | Facility: CLINIC | Age: 71
End: 2021-06-16

## 2021-06-21 ENCOUNTER — MEDICAL CORRESPONDENCE (OUTPATIENT)
Dept: HEALTH INFORMATION MANAGEMENT | Facility: CLINIC | Age: 71
End: 2021-06-21

## 2021-06-21 ENCOUNTER — OFFICE VISIT (OUTPATIENT)
Dept: FAMILY MEDICINE | Facility: CLINIC | Age: 71
End: 2021-06-21
Payer: COMMERCIAL

## 2021-06-21 VITALS
SYSTOLIC BLOOD PRESSURE: 153 MMHG | HEART RATE: 90 BPM | DIASTOLIC BLOOD PRESSURE: 73 MMHG | BODY MASS INDEX: 25.78 KG/M2 | HEIGHT: 64 IN | RESPIRATION RATE: 18 BRPM | OXYGEN SATURATION: 98 % | TEMPERATURE: 97.9 F | WEIGHT: 151 LBS

## 2021-06-21 DIAGNOSIS — R41.3 MEMORY PROBLEM: Primary | ICD-10-CM

## 2021-06-21 DIAGNOSIS — Z95.2 S/P AVR (AORTIC VALVE REPLACEMENT): ICD-10-CM

## 2021-06-21 DIAGNOSIS — I10 HYPERTENSION GOAL BP (BLOOD PRESSURE) < 140/90: ICD-10-CM

## 2021-06-21 DIAGNOSIS — E78.5 HYPERLIPIDEMIA, UNSPECIFIED HYPERLIPIDEMIA TYPE: ICD-10-CM

## 2021-06-21 LAB
ANION GAP SERPL CALCULATED.3IONS-SCNC: 7 MMOL/L (ref 3–14)
BUN SERPL-MCNC: 15 MG/DL (ref 7–30)
CALCIUM SERPL-MCNC: 8.8 MG/DL (ref 8.5–10.1)
CHLORIDE SERPL-SCNC: 105 MMOL/L (ref 94–109)
CO2 SERPL-SCNC: 26 MMOL/L (ref 20–32)
CREAT SERPL-MCNC: 0.7 MG/DL (ref 0.52–1.04)
ERYTHROCYTE [DISTWIDTH] IN BLOOD BY AUTOMATED COUNT: 15.9 % (ref 10–15)
ERYTHROCYTE [SEDIMENTATION RATE] IN BLOOD BY WESTERGREN METHOD: 7 MM/H (ref 0–30)
FOLATE SERPL-MCNC: 63.8 NG/ML
GFR SERPL CREATININE-BSD FRML MDRD: 88 ML/MIN/{1.73_M2}
GLUCOSE SERPL-MCNC: 105 MG/DL (ref 70–99)
HCT VFR BLD AUTO: 37.7 % (ref 35–47)
HGB BLD-MCNC: 12.1 G/DL (ref 11.7–15.7)
MCH RBC QN AUTO: 30.3 PG (ref 26.5–33)
MCHC RBC AUTO-ENTMCNC: 32.1 G/DL (ref 31.5–36.5)
MCV RBC AUTO: 95 FL (ref 78–100)
PLATELET # BLD AUTO: 301 10E9/L (ref 150–450)
POTASSIUM SERPL-SCNC: 4.5 MMOL/L (ref 3.4–5.3)
RBC # BLD AUTO: 3.99 10E12/L (ref 3.8–5.2)
SODIUM SERPL-SCNC: 138 MMOL/L (ref 133–144)
T4 FREE SERPL-MCNC: 1.02 NG/DL (ref 0.76–1.46)
TSH SERPL DL<=0.005 MIU/L-ACNC: 4.58 MU/L (ref 0.4–4)
VIT B12 SERPL-MCNC: 412 PG/ML (ref 193–986)
WBC # BLD AUTO: 9.3 10E9/L (ref 4–11)

## 2021-06-21 PROCEDURE — 85027 COMPLETE CBC AUTOMATED: CPT | Performed by: FAMILY MEDICINE

## 2021-06-21 PROCEDURE — 36415 COLL VENOUS BLD VENIPUNCTURE: CPT | Performed by: FAMILY MEDICINE

## 2021-06-21 PROCEDURE — 99214 OFFICE O/P EST MOD 30 MIN: CPT | Performed by: FAMILY MEDICINE

## 2021-06-21 PROCEDURE — 84443 ASSAY THYROID STIM HORMONE: CPT | Performed by: FAMILY MEDICINE

## 2021-06-21 PROCEDURE — 80048 BASIC METABOLIC PNL TOTAL CA: CPT | Performed by: FAMILY MEDICINE

## 2021-06-21 PROCEDURE — 82607 VITAMIN B-12: CPT | Performed by: FAMILY MEDICINE

## 2021-06-21 PROCEDURE — 82746 ASSAY OF FOLIC ACID SERUM: CPT | Performed by: FAMILY MEDICINE

## 2021-06-21 PROCEDURE — 84439 ASSAY OF FREE THYROXINE: CPT | Performed by: FAMILY MEDICINE

## 2021-06-21 PROCEDURE — 85652 RBC SED RATE AUTOMATED: CPT | Performed by: FAMILY MEDICINE

## 2021-06-21 RX ORDER — LOSARTAN POTASSIUM 25 MG/1
25 TABLET ORAL DAILY
Qty: 30 TABLET | Refills: 0 | Status: SHIPPED | OUTPATIENT
Start: 2021-06-21 | End: 2021-07-09

## 2021-06-21 RX ORDER — ATORVASTATIN CALCIUM 40 MG/1
40 TABLET, FILM COATED ORAL DAILY
Qty: 90 TABLET | Refills: 3 | Status: SHIPPED | OUTPATIENT
Start: 2021-06-21 | End: 2022-07-19

## 2021-06-21 ASSESSMENT — MIFFLIN-ST. JEOR: SCORE: 1194.93

## 2021-06-21 ASSESSMENT — PAIN SCALES - GENERAL: PAINLEVEL: NO PAIN (0)

## 2021-06-21 NOTE — PROGRESS NOTES
Assessment & Plan     Memory problem  Labs pending   Advised as below  - Vitamin B12  - Folate  - TSH with free T4 reflex  - Erythrocyte sedimentation rate auto  - NEUROLOGY ADULT REFERRAL  - Basic metabolic panel  - CBC with platelets  - NEUROPSYCHOLOGY REFERRAL  - Albumin Random Urine Quantitative with Creat Ratio    Hyperlipidemia  refilled  - atorvastatin (LIPITOR) 40 MG tablet; Take 1 tablet (40 mg) by mouth daily  - EYE ADULT REFERRAL; Future    Hypertension goal BP (blood pressure) < 140/90  Advised start cozaar  SEE EPIC care orders  The potential side effects of this medication have been discussed with the patient.  Call if any significant problems with these are experienced.  Follow up 2-3 weeks BP and Potassium check  - losartan (COZAAR) 25 MG tablet; Take 1 tablet (25 mg) by mouth daily      Return in about 3 weeks (around 7/12/2021) for BP Recheck.    Tamiko Coley MD  [facility]    Subjective   Rajani Guo is a 70 year old  With known History of Diabetes 2, Lipid, Hypertension  Atrial Fib, s/p AVR  who comes in with memory Problems  Daughter has Noticed some Memory issues in the last several Months-worse in the last 3 months since valve replacement  Forgets conversation  Does not remember what happened   She is Not  driving  Forgets things easily easily  Pt Lives with daughter  Occasionally cooks  Has left [place] on  Forgets address       HPI   Chief Complaint   Patient presents with     Memory Loss     todiscuss safty issue     Six Item Cognitive Impairment Test   (6CIT):      What year is it?                               Correct - 0 points    What month is it?                               Correct - 0 points      Give the patient an address to remember with five components:   [place] ( first and last name - 2 components)   [address]  (number and name of street - 2 components)   [place] ( city - 1 component)      About what time is it (within the hour)? Correct  "- 0 points    Count backwards from 20 to 1:   Correct - 0 points    Say the months of the year in reverse: Correct - 0 points    Repeat the address phrase:   1 error - 2 points    Total 6CIT Score:      2/28    Interpretation: The 6CIT uses an inverse score and questions are weighted to produce a total out of 28. Scores of 0-7 are considered normal and 8 or more significant.    Advantages The test has high sensitivity without compromising specificity even in mild dementia. It is easy to translate linguistically and culturally.  Disadvantages The main disadvantage is in the scoring and weighting of the test, which is initially confusing, however computer models have simplified this greatly.    Probability Statistics: At the 7/8 cut off: Overall figures sensitivity 90% specificity 100%, in mild dementia sensitivity = 78% , specificity = 100%  Brother had memory Problems    Pt scored a 22 on MMIC      Review of Systems   Rest of the ROS is Negative except see above and Problem list [stable]        Objective    BP (!) 153/73   Pulse 90   Temp 97.9  F (36.6  C) (Oral)   Resp 18   Ht 1.626 m (5' 4\")   Wt 68.5 kg (151 lb)   SpO2 98%   BMI 25.92 kg/m    Body mass index is 25.92 kg/m .  Physical Exam   GENERAL: healthy, alert and no distress  NECK: no adenopathy, no asymmetry, masses, or scars and thyroid normal to palpation  RESP: lungs clear to auscultation - no rales, rhonchi or wheezes  CV: regular rate and rhythm, normal S1 S2, no S3 or S4, no murmur, click or rub, no peripheral edema and peripheral pulses strong  ABDOMEN: soft, nontender, no hepatosplenomegaly, no masses and bowel sounds normal  MS: no gross musculoskeletal defects noted, no edema  NEURO: Normal strength and tone, sensory exam grossly normal, mentation intact, 6-CIT score as above, cranial nerves 2-12 intact and mild memory issues  Pt scored 22/35 on Memory test  PSYCH: affect normal/bright  Able to tell date and where she is   Knows her " michelle's CHRISTIAN  Knows the day    Pending   Recent CT head normal   Had MRI last year

## 2021-06-22 ENCOUNTER — ANTICOAGULATION THERAPY VISIT (OUTPATIENT)
Dept: FAMILY MEDICINE | Facility: CLINIC | Age: 71
End: 2021-06-22

## 2021-06-22 DIAGNOSIS — Z95.2 S/P AVR (AORTIC VALVE REPLACEMENT): ICD-10-CM

## 2021-06-22 DIAGNOSIS — Z98.890 S/P MVR (MITRAL VALVE REPAIR): ICD-10-CM

## 2021-06-22 LAB — INR PPP: 4.5 (ref 0.9–1.1)

## 2021-06-22 NOTE — PROGRESS NOTES
ANTICOAGULATION MANAGEMENT     Rajani Guo 70 year old female is on warfarin with supratherapeutic INR result. (Goal INR 2.5-3.5)    Recent labs: (last 7 days)     06/22/21   INR 4.5*       ASSESSMENT     Source(s): Home Care/Facility Nurse     Warfarin doses taken: Warfarin taken as instructed  Diet: No new diet changes identified  New illness, injury, or hospitalization: No  Medication/supplement changes: None noted  Signs or symptoms of bleeding or clotting: No  Previous INR: Therapeutic last 2(+) visits  Additional findings: None     PLAN     Recommended plan for no diet, medication or health factor changes affecting INR     Dosing Instructions:  Decrease your warfarin dose (9.5% change) with next INR in 1 week       Summary  As of 6/22/2021    Full warfarin instructions:  6/22: Hold; Otherwise 4 mg every Mon, Fri; 6 mg all other days   Next INR check:  6/29/2021             Telephone call with home care nurse Samara whom verbalizes understanding and agrees to plan    Orders given to  Homecare nurse/facility to recheck    Education provided: Target INR goal and significance of current INR result    Plan made per [facility] anticoagulation protocol    Matthew Finn RN  Anticoagulation Clinic  6/22/2021    _______________________________________________________________________     Anticoagulation Episode Summary     Current INR goal:  2.5-3.5   TTR:  37.3 % (5.3 mo)   Target end date:  Indefinite   Send INR reminders to:  GURVINDER DARDEN    Indications    S/P MVR (mitral valve repair) [Z98.890]  S/P AVR (aortic valve replacement) [Z95.2]           Comments:  home care         Anticoagulation Care Providers     Provider Role Specialty Phone number    Quinton Handy PA Referring Cardiovascular & Thoracic Surgery [phone]    Lakeshia Rubio, FRANCESCA SCHULZ Referring Internal Medicine [phone]    Sparkle Bird APRN CNP Referring Emergency Medicine [phone]

## 2021-06-23 DIAGNOSIS — R41.3 MEMORY PROBLEM: ICD-10-CM

## 2021-06-23 DIAGNOSIS — K29.80 DUODENITIS: ICD-10-CM

## 2021-06-23 LAB
CREAT UR-MCNC: 50 MG/DL
MICROALBUMIN UR-MCNC: 22 MG/L
MICROALBUMIN/CREAT UR: 44.65 MG/G CR (ref 0–25)

## 2021-06-23 PROCEDURE — 82043 UR ALBUMIN QUANTITATIVE: CPT | Performed by: FAMILY MEDICINE

## 2021-06-24 ENCOUNTER — TELEPHONE (OUTPATIENT)
Dept: FAMILY MEDICINE | Facility: CLINIC | Age: 71
End: 2021-06-24

## 2021-06-24 NOTE — TELEPHONE ENCOUNTER
"Tried to call Rosalba with [employer] [facility] at [phone]. Received a message that \"call can not be completed at this time\".    Please try to call again later.    India BACA RN, BSN  [facility]    "

## 2021-06-24 NOTE — TELEPHONE ENCOUNTER
Rosalba from [facility] needs OT orders 1x week for 5 weeks for cognitive care,upper extremity strength,activity tolerance and ADL's.Please call Rosalba at [phone] secure line.  Cecille Vaughn,

## 2021-06-25 ENCOUNTER — TRANSFERRED RECORDS (OUTPATIENT)
Dept: HEALTH INFORMATION MANAGEMENT | Facility: CLINIC | Age: 71
End: 2021-06-25

## 2021-06-25 LAB — RETINOPATHY: POSITIVE

## 2021-06-28 RX ORDER — PANTOPRAZOLE SODIUM 40 MG/1
TABLET, DELAYED RELEASE ORAL
Qty: 90 TABLET | Refills: 1 | Status: SHIPPED | OUTPATIENT
Start: 2021-06-28 | End: 2021-12-22

## 2021-06-28 NOTE — TELEPHONE ENCOUNTER
Called [facility] at [phone]. Spoke with  and was transferred to Rosalba Gonzalez OT. Verbal orders given as requested.     Isabel Sandoval RN

## 2021-06-29 ENCOUNTER — ANTICOAGULATION THERAPY VISIT (OUTPATIENT)
Dept: FAMILY MEDICINE | Facility: CLINIC | Age: 71
End: 2021-06-29

## 2021-06-29 ENCOUNTER — PATIENT OUTREACH (OUTPATIENT)
Dept: CARE COORDINATION | Facility: CLINIC | Age: 71
End: 2021-06-29

## 2021-06-29 DIAGNOSIS — Z95.2 S/P AVR (AORTIC VALVE REPLACEMENT): ICD-10-CM

## 2021-06-29 DIAGNOSIS — Z98.890 S/P MVR (MITRAL VALVE REPAIR): ICD-10-CM

## 2021-06-29 LAB — INR PPP: 2.2 (ref 0.9–1.1)

## 2021-06-29 NOTE — PROGRESS NOTES
Clinic Care Coordination Contact  [facility]/Voicemail       Clinical Data: Care Coordinator Outreach  Outreach attempted x 4.  Left message on patient's voicemail with call back information and requested return call.  Plan: Care Coordinator will send unable to contact letter with care coordinator contact information via [employer]. Care Coordinator will do no further outreaches at this time.          Odell Andrew MSN, RN, PHN, CCM   Primary Care Clinical RN Care Coordinator  [facility]  6/29/2021   10:30 AM  [email or web address]  Office: [phone]

## 2021-06-29 NOTE — LETTER
[facility] CARE COORDINATION  [address]  [place] [address]    June 29, 2021    Rajani Guo  [address] [place] [address]      Dear Rajani,    I have been attempting to reach you since our last contact. I would like to continue to work with you and provide any additional support you may need on achieving your health care related goals. I would appreciate if you would give me a call at [phone] to let me know if you would like to continue working together. I know that there are many things that can affect our ability to communicate and I hope we can continue to work together.    All of us at the [facility] are invested in your health and are here to assist you in meeting your goals.     Sincerely,    Odell Acosta RN

## 2021-06-29 NOTE — PROGRESS NOTES
ANTICOAGULATION MANAGEMENT     Rajani Guo 70 year old female is on warfarin with subtherapeutic INR result. (Goal INR 2.5-3.5)    Recent labs: (last 7 days)     06/29/21   INR 2.2*       ASSESSMENT     Source(s): Home Care/Facility Nurse     Warfarin doses taken: Warfarin taken as instructed  Diet: No new diet changes identified  New illness, injury, or hospitalization: No  Medication/supplement changes: None noted  Signs or symptoms of bleeding or clotting: No  Previous INR: Supratherapeutic  Additional findings: None     PLAN     Recommended plan for no diet, medication or health factor changes affecting INR     Dosing Instructions: Booster dose then continue your current warfarin dose with next INR in 1 week       Summary  As of 6/29/2021    Full warfarin instructions:  4 mg every Mon, Fri; 6 mg all other days   Next INR check:  7/6/2021             Telephone call with home care nurse Sheron whom verbalizes understanding and agrees to plan    Orders given to  Homecare nurse/facility to recheck    Education provided: None required    Plan made per [facility] anticoagulation protocol    Janae Davila RN  Anticoagulation Clinic  6/29/2021    _______________________________________________________________________     Anticoagulation Episode Summary     Current INR goal:  2.5-3.5   TTR:  37.5 % (5.5 mo)   Target end date:  Indefinite   Send INR reminders to:  GURVINDER DARDEN    Indications    S/P MVR (mitral valve repair) [Z98.890]  S/P AVR (aortic valve replacement) [Z95.2]           Comments:  home care         Anticoagulation Care Providers     Provider Role Specialty Phone number    Quinton Handy PA Referring Cardiovascular & Thoracic Surgery [phone]    Lakeshia Rubio, FRANCESCA SCHULZ Referring Internal Medicine [phone]    Sparkle Bird APRN CNP Referring Emergency Medicine [phone]

## 2021-07-06 ENCOUNTER — ANTICOAGULATION THERAPY VISIT (OUTPATIENT)
Dept: FAMILY MEDICINE | Facility: CLINIC | Age: 71
End: 2021-07-06

## 2021-07-06 ENCOUNTER — TELEPHONE (OUTPATIENT)
Dept: FAMILY MEDICINE | Facility: CLINIC | Age: 71
End: 2021-07-06

## 2021-07-06 DIAGNOSIS — Z98.890 S/P MVR (MITRAL VALVE REPAIR): ICD-10-CM

## 2021-07-06 DIAGNOSIS — Z95.2 S/P AVR (AORTIC VALVE REPLACEMENT): ICD-10-CM

## 2021-07-06 LAB — INR PPP: 2.5 (ref 0.9–1.1)

## 2021-07-06 NOTE — TELEPHONE ENCOUNTER
Nurse Nora from [facility] is calling ([phone]).    She stated that patient  has docusate sodium at home and she is wondering if she should be on this, and if ok to take BID, or should she be on sennakot BID.    Patient is also having very dark, foul smelling urine, her daughter stated that she seems to be more confused.  Patient does not have a fever, flank pain, abdominal pain, frequency, or urgency.  Patient advised home care nurse she is urinating normal amounts, however patient is confused about how much she really is.  She is drinking fluids well.    Patient stated that her daughter brought in urine for a sample and she is wondering if she has a UTI.    Looks like a complete UA was not completed.    Results for 6/23/21:  Creatinine Urine mg/dL 50      Albumin Urine mg/L mg/L 22     Albumin Urine mg/g Cr 0 - 25 mg/g Cr 44.65High           Routed to   RN pool and PCP.    Lilia AUGUSTN-RN  Triage Nurse  [facility]: [facility]

## 2021-07-06 NOTE — TELEPHONE ENCOUNTER
Nora with [facility] notified of provider's message as written. She verbalized understanding and there are no further questions.    MATA GonsalezN RN  [facility] [place]

## 2021-07-06 NOTE — PROGRESS NOTES
ANTICOAGULATION MANAGEMENT     Rajani Guo 70 year old female is on warfarin with therapeutic INR result. (Goal INR 2.5-3.5)    Recent labs: (last 7 days)     07/06/21   INR 2.5*       ASSESSMENT     Source(s): Chart Review and Home Care/Facility Nurse     Warfarin doses taken: Warfarin taken as instructed  Diet: No new diet changes identified  New illness, injury, or hospitalization: No  Medication/supplement changes: None noted  Signs or symptoms of bleeding or clotting: No  Previous INR: subtherapeutic  Additional findings: None     PLAN     Recommended plan for no diet, medication or health factor changes affecting INR     Dosing Instructions: Continue your current warfarin dose with next INR in 1 week       Summary  As of 7/6/2021    Full warfarin instructions:  4 mg every Mon, Fri; 6 mg all other days   Next INR check:  7/13/2021             Telephone call with home care nurse Samara who verbalizes understanding and agrees to plan    Orders given to  Homecare nurse/facility to recheck    Education provided: Please call back if any changes to your diet, medications or how you've been taking warfarin    Plan made per [facility] anticoagulation protocol    Gladys Díaz, RN  Anticoagulation Clinic  7/6/2021    _______________________________________________________________________     Anticoagulation Episode Summary     Current INR goal:  2.5-3.5   TTR:  36.0 % (5.7 mo)   Target end date:  Indefinite   Send INR reminders to:  GURVINDER DARDEN    Indications    S/P MVR (mitral valve repair) [Z98.890]  S/P AVR (aortic valve replacement) [Z95.2]           Comments:  home care         Anticoagulation Care Providers     Provider Role Specialty Phone number    Quinton Handy PA Referring Cardiovascular & Thoracic Surgery [phone]    Lakeshia Rubio APRN CNP Referring Internal Medicine [phone]    Sparkle Bird APRN CNP Referring Emergency Medicine [phone]

## 2021-07-06 NOTE — TELEPHONE ENCOUNTER
"Dr. Coley- please advise regarding question about stool softener. They are setting up docusate sodium BID, purchasing OTC. \"She stated that patient  has docusate sodium at home and she is wondering if she should be on this, and if ok to take BID, or should she be on sennakot BID.\"    Called Nora and notified her of message from Dr. Coley, \"If confused -go to ER.\" Nora verbalized understanding. She will notify patient and daughter. She states that the daughter states that they dropped off a urine same last week for UA/UC. Notified nurse that a urine albumin was completed 06/23, but this test does not look for infection/UTI.  "

## 2021-07-07 ENCOUNTER — VIRTUAL VISIT (OUTPATIENT)
Dept: FAMILY MEDICINE | Facility: CLINIC | Age: 71
End: 2021-07-07
Payer: COMMERCIAL

## 2021-07-07 ENCOUNTER — ALLIED HEALTH/NURSE VISIT (OUTPATIENT)
Dept: FAMILY MEDICINE | Facility: CLINIC | Age: 71
End: 2021-07-07

## 2021-07-07 VITALS — SYSTOLIC BLOOD PRESSURE: 130 MMHG | DIASTOLIC BLOOD PRESSURE: 64 MMHG

## 2021-07-07 DIAGNOSIS — E11.51 TYPE II DIABETES MELLITUS WITH PERIPHERAL CIRCULATORY DISORDER (H): ICD-10-CM

## 2021-07-07 DIAGNOSIS — R39.15 URINARY URGENCY: ICD-10-CM

## 2021-07-07 DIAGNOSIS — N89.8 VAGINAL DISCHARGE: ICD-10-CM

## 2021-07-07 DIAGNOSIS — Z01.30 BP CHECK: Primary | ICD-10-CM

## 2021-07-07 DIAGNOSIS — N30.00 ACUTE CYSTITIS WITHOUT HEMATURIA: ICD-10-CM

## 2021-07-07 DIAGNOSIS — R39.15 URINARY URGENCY: Primary | ICD-10-CM

## 2021-07-07 LAB
ALBUMIN UR-MCNC: NEGATIVE MG/DL
AMORPH CRY #/AREA URNS HPF: ABNORMAL /HPF
APPEARANCE UR: ABNORMAL
BACTERIA #/AREA URNS HPF: ABNORMAL /HPF
BILIRUB UR QL STRIP: NEGATIVE
COLOR UR AUTO: YELLOW
GLUCOSE UR STRIP-MCNC: NEGATIVE MG/DL
HGB UR QL STRIP: NEGATIVE
KETONES UR STRIP-MCNC: ABNORMAL MG/DL
LEUKOCYTE ESTERASE UR QL STRIP: ABNORMAL
NITRATE UR QL: NEGATIVE
NON-SQ EPI CELLS #/AREA URNS LPF: ABNORMAL /LPF
PH UR STRIP: 7 PH (ref 5–7)
RBC #/AREA URNS AUTO: ABNORMAL /HPF
SOURCE: ABNORMAL
SP GR UR STRIP: 1.02 (ref 1–1.03)
SPECIMEN SOURCE: NORMAL
UROBILINOGEN UR STRIP-ACNC: 0.2 EU/DL (ref 0.2–1)
WBC #/AREA URNS AUTO: ABNORMAL /HPF
WET PREP SPEC: NORMAL

## 2021-07-07 PROCEDURE — 87086 URINE CULTURE/COLONY COUNT: CPT | Performed by: FAMILY MEDICINE

## 2021-07-07 PROCEDURE — 81001 URINALYSIS AUTO W/SCOPE: CPT | Performed by: FAMILY MEDICINE

## 2021-07-07 PROCEDURE — 87186 SC STD MICRODIL/AGAR DIL: CPT | Performed by: FAMILY MEDICINE

## 2021-07-07 PROCEDURE — 99207 PR NO CHARGE NURSE ONLY: CPT | Performed by: FAMILY MEDICINE

## 2021-07-07 PROCEDURE — 87210 SMEAR WET MOUNT SALINE/INK: CPT | Performed by: FAMILY MEDICINE

## 2021-07-07 PROCEDURE — 87088 URINE BACTERIA CULTURE: CPT | Performed by: FAMILY MEDICINE

## 2021-07-07 PROCEDURE — 99213 OFFICE O/P EST LOW 20 MIN: CPT | Mod: 95 | Performed by: FAMILY MEDICINE

## 2021-07-07 NOTE — PROGRESS NOTES
Rajani Guo is a 70 year old who is being evaluated via a billable video visit.      How would you like to obtain your AVS? MyChart  If the video visit is dropped, the invitation should be resent by: Text to cell phone: [phone]  Will anyone else be joining your video visit? No      Video Start Time: 4.32 PM    Assessment & Plan       ICD-10-CM    1. Urinary urgency  R39.15 *UA reflex to Microscopic and Culture ([facility] (except [place] and [place])     Urine Culture Aerobic Bacterial     CANCELED: *UA reflex to Microscopic and Culture ([facility] (except [place] and [place])     CANCELED: Urine Culture Aerobic Bacterial   2. Vaginal discharge  N89.8 Wet prep     Pt will drop of Urine sample and do a wet Prep  Will Treat after Looking at labs  Drink Fluids  Follow up 1 week if not better/sooner if worse    Sincerely,  Tamiko Coley MD  '    Tamiko Coley MD  [facility] [place]    Subjective   Rajani Guo is a 70 year old who presents for the following health issues   HPI     Chief Complaint   Patient presents with     UTI     urine culture     Smelly Urine  No dysuria  No urgency  No back pain  Has a vaginal discharge  Yellow color discharge  No back pain  No fever or chills  No fever or chlls  Review of Systems   Rest of the ROS is Negative except see above and Problem list [stable]        Objective           Vitals:  Pt has Home Health Nurse       Physical Exam   GENERAL: alert and no distress  EYES: Eyes grossly normal to inspection.  No discharge or erythema, or obvious scleral/conjunctival abnormalities.  RESP: No audible wheeze, cough, or visible cyanosis.  No visible retractions or increased work of breathing.    SKIN: Visible skin clear. No significant rash, abnormal pigmentation or lesions.  NEURO: Cranial nerves grossly intact.  Mentation and speech appropriate for age.  PSYCH: Mentation appears normal, affect normal/bright, judgement and  insight intact, normal speech and appearance well-groomed.    Pending     Video-Visit Details    Type of service:  Video Visit    Video End Time:4:40 PM    Originating Location (pt. Location): Home    Distant Location (provider location):  [facility]     Platform used for Video Visit: [employer]

## 2021-07-07 NOTE — TELEPHONE ENCOUNTER
Called and left detailed message for pt on pt's identified voicemail with provider's message. Asked that she call back to [phone] to schedule. Ask to speak with the nurse if any further questions or concerns.    Klaudia Pickett RN  [facility]

## 2021-07-07 NOTE — PROGRESS NOTES
Rajani Guo was evaluated at [facility] on July 7, 2021 at which time her blood pressure was:    BP Readings from Last 3 Encounters:   07/07/21 130/64   06/21/21 (!) 153/73   06/08/21 (!) 168/83     Pulse Readings from Last 3 Encounters:   06/21/21 90   06/08/21 83   04/19/21 83       Reviewed lifestyle modifications for blood pressure control and reduction: including making healthy food choices, managing weight, getting regular exercise, smoking cessation, reducing alcohol consumption, monitoring blood pressure regularly.     Symptoms: None    BP Goal:< 140/90 mmHg    BP Assessment:  BP at goal    Potential Reasons for BP too high: NA - Not applicable    BP Follow-Up Plan: Recheck BP in 6 months at pharmacy    Recommendation to Provider: none    Note completed by:Perla Roy Rph  [facility]  [address] [place] [address]  SNEHAL Rangel [id number]  [phone]

## 2021-07-08 ENCOUNTER — TELEPHONE (OUTPATIENT)
Dept: RESPIRATORY THERAPY | Facility: CLINIC | Age: 71
End: 2021-07-08

## 2021-07-08 DIAGNOSIS — I10 HYPERTENSION GOAL BP (BLOOD PRESSURE) < 140/90: ICD-10-CM

## 2021-07-08 RX ORDER — NITROFURANTOIN 25; 75 MG/1; MG/1
100 CAPSULE ORAL 2 TIMES DAILY
Qty: 14 CAPSULE | Refills: 0 | Status: SHIPPED | OUTPATIENT
Start: 2021-07-08 | End: 2021-07-15

## 2021-07-08 NOTE — TELEPHONE ENCOUNTER
Smoking Cessation Counseling Follow-Up Phone Call    Patient provided Smoking Cessation Consult during last hospitalization on 1/6/21. Called patient today for smoking cessation counseling follow-up support, at 6 months. Patient states she is still smoking 1/2 ppd and acknowledges that stress, morning coffee, and visiting with friends are big triggers. On a scale of readiness to continue to cut back from 1/2 ppd, 1 being not ready to cut back and 10 being very ready, patient says she is at a 1 right now.  Encouraged patient to continue to assess her readiness and importance to quit.     Klaudia Lopez, RRT, CTTS  Chronic Pulmonary Disease Specialist  Office: [phone]  Pager: [phone]  Hours: M-F 8-4:30

## 2021-07-09 RX ORDER — LOSARTAN POTASSIUM 25 MG/1
25 TABLET ORAL DAILY
Qty: 30 TABLET | Refills: 0 | Status: SHIPPED | OUTPATIENT
Start: 2021-07-09 | End: 2021-08-05

## 2021-07-09 NOTE — TELEPHONE ENCOUNTER
"Prescription approved per [facility] Refill Protocol.      Requested Prescriptions   Pending Prescriptions Disp Refills     losartan (COZAAR) 25 MG tablet [Pharmacy Med Name: LOSARTAN POTASSIUM 25 MG TAB] 30 tablet 0     Sig: TAKE 1 TABLET (25 MG) BY MOUTH DAILY       Angiotensin-II Receptors Passed - 7/8/2021 12:28 AM        Passed - Last blood pressure under 140/90 in past 12 months     BP Readings from Last 3 Encounters:   07/07/21 130/64   06/21/21 (!) 153/73   06/08/21 (!) 168/83                 Passed - Recent (12 mo) or future (30 days) visit within the authorizing provider's specialty     Patient has had an office visit with the authorizing provider or a provider within the authorizing providers department within the previous 12 mos or has a future within next 30 days. See \"Patient Info\" tab in inbasket, or \"Choose Columns\" in Meds & Orders section of the refill encounter.              Passed - Medication is active on med list        Passed - Patient is age 18 or older        Passed - No active pregnancy on record        Passed - Normal serum creatinine on file in past 12 months     Recent Labs   Lab Test 06/21/21  1208   CR 0.70       Ok to refill medication if creatinine is low          Passed - Normal serum potassium on file in past 12 months     Recent Labs   Lab Test 06/21/21  1208   POTASSIUM 4.5                    Passed - No positive pregnancy test in past 12 months           India BACA RN, BSN  [facility]    "

## 2021-07-12 DIAGNOSIS — Z53.9 DIAGNOSIS NOT YET DEFINED: Primary | ICD-10-CM

## 2021-07-12 LAB
BACTERIA SPEC CULT: ABNORMAL
BACTERIA SPEC CULT: ABNORMAL
Lab: ABNORMAL
SPECIMEN SOURCE: ABNORMAL

## 2021-07-12 PROCEDURE — G0179 MD RECERTIFICATION HHA PT: HCPCS | Performed by: FAMILY MEDICINE

## 2021-07-12 RX ORDER — CEPHALEXIN 500 MG/1
500 CAPSULE ORAL 2 TIMES DAILY
Qty: 14 CAPSULE | Refills: 0 | Status: SHIPPED | OUTPATIENT
Start: 2021-07-12 | End: 2021-07-19

## 2021-07-13 ENCOUNTER — ANTICOAGULATION THERAPY VISIT (OUTPATIENT)
Dept: FAMILY MEDICINE | Facility: CLINIC | Age: 71
End: 2021-07-13

## 2021-07-13 DIAGNOSIS — Z98.890 S/P MVR (MITRAL VALVE REPAIR): Primary | ICD-10-CM

## 2021-07-13 DIAGNOSIS — Z95.2 S/P AVR (AORTIC VALVE REPLACEMENT): ICD-10-CM

## 2021-07-13 LAB — INR PPP: 1.8

## 2021-07-13 NOTE — PROGRESS NOTES
ANTICOAGULATION MANAGEMENT     Rajani Guo 70 year old female is on warfarin with subtherapeutic INR result. (Goal INR 2.5-3.5)    Recent labs: (last 7 days)     07/13/21  0000   INR 1.8       ASSESSMENT     Source(s): Home Care/Facility Nurse     Warfarin doses taken: Warfarin taken as instructed  Diet: No new diet changes identified  New illness, injury, or hospitalization: Yes: UTI and patient has been nauseous and vomiting due to stomach bug which may cause not getting all her warfarin absorbed  Medication/supplement changes: cephalexin started 7/12 for 7 days  Signs or symptoms of bleeding or clotting: No  Previous INR: Therapeutic last visit; previously outside of goal range  Additional findings: None     PLAN     Recommended plan for temporary change(s) affecting INR     Dosing Instructions: Booster dose then continue your current warfarin dose with next INR in 3 days   (conservative boost due to abx started yesterday)    Summary  As of 7/13/2021    Full warfarin instructions:  7/13: 12 mg; Otherwise 4 mg every Mon, Fri; 6 mg all other days   Next INR check:               Telephone call with home care nurse Elisabet who verbalizes understanding and agrees to plan    Orders given to  Homecare nurse/facility to recheck    Education provided: Monitoring for bleeding signs and symptoms    Plan made per [facility] anticoagulation protocol    Janae Davila RN  Anticoagulation Clinic  7/13/2021    _______________________________________________________________________     Anticoagulation Episode Summary     Current INR goal:  2.5-3.5   TTR:  34.6 % (6 mo)   Target end date:  Indefinite   Send INR reminders to:  GURVINDER DARDEN    Indications    S/P MVR (mitral valve repair) [Z98.890]  S/P AVR (aortic valve replacement) [Z95.2]           Comments:  home care         Anticoagulation Care Providers     Provider Role Specialty Phone number    Quinton Handy PA Referring Cardiovascular & Thoracic Surgery [phone]     Lakeshia Rubio, APRN CNP Referring Internal Medicine [phone]    Sparkle Bird APRN CNP Referring Emergency Medicine [phone]

## 2021-07-14 ENCOUNTER — MEDICAL CORRESPONDENCE (OUTPATIENT)
Dept: HEALTH INFORMATION MANAGEMENT | Facility: CLINIC | Age: 71
End: 2021-07-14

## 2021-07-16 ENCOUNTER — ANTICOAGULATION THERAPY VISIT (OUTPATIENT)
Dept: FAMILY MEDICINE | Facility: CLINIC | Age: 71
End: 2021-07-16

## 2021-07-16 DIAGNOSIS — Z98.890 S/P MVR (MITRAL VALVE REPAIR): Primary | ICD-10-CM

## 2021-07-16 DIAGNOSIS — Z95.2 S/P AVR (AORTIC VALVE REPLACEMENT): ICD-10-CM

## 2021-07-16 LAB — INR PPP: 2.1

## 2021-07-16 NOTE — PROGRESS NOTES
ANTICOAGULATION MANAGEMENT     Rajani Guo 70 year old female is on warfarin with subtherapeutic INR result. (Goal INR 2.5-3.5)    Recent labs: (last 7 days)     07/16/21  0000   INR 2.1       ASSESSMENT     Source(s): Home Care/Facility Nurse     Warfarin doses taken: Warfarin taken as instructed  Diet: No new diet changes identified  New illness, injury, or hospitalization: No  Medication/supplement changes: continue abx  Keflex 500mg until 7/19  Signs or symptoms of bleeding or clotting: No  Previous INR: Subtherapeutic  Additional findings: UTI symptoms resolved     PLAN     Recommended plan for temporary change(s) affecting INR     Dosing Instructions: Booster dose then continue your current warfarin dose with next INR in 5 days       Summary  As of 7/16/2021    Full warfarin instructions:  7/16: 5 mg; Otherwise 4 mg every Mon, Fri; 6 mg all other days   Next INR check:               Detailed voice message left for home care nurse Elisabet with dosing instructions and follow up date.     Orders given to  Homecare nurse/facility to recheck    Education provided: Contact [phone]  with any changes, questions or concerns.     Plan made per [facility] anticoagulation protocol    Julieta Jo RN  Anticoagulation Clinic  7/16/2021    _______________________________________________________________________     Anticoagulation Episode Summary     Current INR goal:  2.5-3.5   TTR:  34.0 % (6.1 mo)   Target end date:  Indefinite   Send INR reminders to:  GURVINDER DARDEN    Indications    S/P MVR (mitral valve repair) [Z98.890]  S/P AVR (aortic valve replacement) [Z95.2]           Comments:  home care         Anticoagulation Care Providers     Provider Role Specialty Phone number    Quinton Handy PA Referring Cardiovascular & Thoracic Surgery [phone]    Lakeshia Rubio, FRANCESCA CNP Referring Internal Medicine [phone]    Sparkle Bird, APRN CNP Referring Emergency Medicine [phone]           Julieta Pavon, RN, BSN, PHN  Anticoagulation Nurse  [phone]

## 2021-07-16 NOTE — PROGRESS NOTES
Elisabet RN reporting INR of 2.1 today.     Please call [phone]    RN will be back Tuesday 7/21 for visit  Patient is still on abx for a couple more days.    Julieta Pavon, RN, BSN, PHN  Anticoagulation Nurse  [phone]

## 2021-07-20 ENCOUNTER — ANTICOAGULATION THERAPY VISIT (OUTPATIENT)
Dept: FAMILY MEDICINE | Facility: CLINIC | Age: 71
End: 2021-07-20

## 2021-07-20 ENCOUNTER — TELEPHONE (OUTPATIENT)
Dept: FAMILY MEDICINE | Facility: CLINIC | Age: 71
End: 2021-07-20

## 2021-07-20 DIAGNOSIS — Z95.2 S/P AVR (AORTIC VALVE REPLACEMENT): ICD-10-CM

## 2021-07-20 DIAGNOSIS — Z98.890 S/P MVR (MITRAL VALVE REPAIR): Primary | ICD-10-CM

## 2021-07-20 LAB — INR PPP: 1.3

## 2021-07-20 NOTE — TELEPHONE ENCOUNTER
Nora calling to request verbal order for Medical social worker to see patient ongoing med care, and PCA services. Ok to leave voicemail.

## 2021-07-20 NOTE — PROGRESS NOTES
ANTICOAGULATION MANAGEMENT     Rajani Guo 70 year old female is on warfarin with subtherapeutic INR result. (Goal INR 2.5-3.5)    Recent labs: (last 7 days)     07/20/21  0000   INR 1.3       ASSESSMENT     Source(s): Patient/Caregiver Call and Home Care/Facility Nurse       Warfarin doses taken: Warfarin taken as instructed - spoke with both patient and Home Care nurse - denied any missed doses of warfarin. Home Care nurse has been working with patient on medication management.   Diet: No new diet changes identified  New illness, injury, or hospitalization: No  Medication/supplement changes: Completed course of Keflex on 7/19/21.   Signs or symptoms of bleeding or clotting: No  Previous INR: Subtherapeutic  Additional findings: Home Care has been working with patient on medication management, report that patient is having difficulty with independence and unclear if patient will be able to manage medication setup by self. HC nurse reports OT eval determined that patient has cognitive deficits and needs 24/7 care. Daughter lives with patient but unable to manage medication.    Home Care is planning to have  assessment for future planning and available benefits.     Plan for patient to start bridging with Lovenox due to INR of 1.3 - Patient has bridged in past with lovenox but when asked if she had any lovenox injections in the home she stated yes but Home Care nurse stated that it was Novolog and not Lovenox. Script sent to patient preferred pharmacy and went over purpose and how to inject the medication via syringe.     Rajani gave [facility] verbal ok to speak with Sarai regarding patient health, questions/concerns, and anticoagulation management.      PLAN     Recommended plan for ongoing change(s) affecting INR     Dosing Instructions:  Increase your warfarin dose (10.5% change) with next INR in 3 days       Summary  As of 7/20/2021    Full warfarin instructions:  7/20: 12 mg; Otherwise 6 mg every day    Next INR check:  7/23/2021             Telephone call with home care nurse Samara RN who agrees to plan and repeated back plan correctly    Orders given to  Homecare nurse/facility to recheck    Education provided: Target INR goal and significance of current INR result, Importance of therapeutic range, Importance of following up for INR monitoring at instructed interval, Importance of taking warfarin as instructed, Monitoring for bleeding signs and symptoms, Monitoring for clotting signs and symptoms, When to seek medical attention/emergency care, Lovenox/Heparin education provided: role of enoxaparin/heparin in bridge therapy, prescribed dose and frequency, recommended injection site and site rotation, adjusting syringe/pushing out medication to obtain prescribed dose and proper technique for administration of subcutaneous injection  and Contact [phone]  with any changes, questions or concerns.     Plan made with [facility] Pharmacist Madelaine Dorman RN  Anticoagulation Clinic  7/20/2021    _______________________________________________________________________     Anticoagulation Episode Summary     Current INR goal:  2.5-3.5   TTR:  33.3 % (6.2 mo)   Target end date:  Indefinite   Send INR reminders to:  GURVINDER DARDEN    Indications    S/P MVR (mitral valve repair) [Z98.890]  S/P AVR (aortic valve replacement) [Z95.2]           Comments:  home care  Pt gave verbal ok to speak with Sarai on 7/20/21         Anticoagulation Care Providers     Provider Role Specialty Phone number    Quinton Handy PA Referring Cardiovascular & Thoracic Surgery [phone]    Lakeshia Rubio, FRANCESCA SCHULZ Referring Internal Medicine [phone]    Sparkle Bird, APRN ZEUS Referring Emergency Medicine [phone]

## 2021-07-21 NOTE — TELEPHONE ENCOUNTER
Left detailed message on Nora SERNA case manger identified voice mail.  Advised patient to call [phone] at her earliest convenience with additional questions or concerns.

## 2021-07-22 ENCOUNTER — HOSPITAL ENCOUNTER (EMERGENCY)
Facility: CLINIC | Age: 71
Discharge: HOME | End: 2021-07-23
Attending: EMERGENCY MEDICINE | Admitting: EMERGENCY MEDICINE
Payer: COMMERCIAL

## 2021-07-22 ENCOUNTER — APPOINTMENT (OUTPATIENT)
Dept: GENERAL RADIOLOGY | Facility: CLINIC | Age: 71
End: 2021-07-22
Attending: EMERGENCY MEDICINE
Payer: COMMERCIAL

## 2021-07-22 ENCOUNTER — HOSPITAL ENCOUNTER (EMERGENCY)
Facility: CLINIC | Age: 71
Discharge: LEFT BEFORE DISCHARGE | End: 2021-07-22
Payer: COMMERCIAL

## 2021-07-22 ENCOUNTER — APPOINTMENT (OUTPATIENT)
Dept: ULTRASOUND IMAGING | Facility: CLINIC | Age: 71
End: 2021-07-22
Attending: PHYSICIAN ASSISTANT
Payer: COMMERCIAL

## 2021-07-22 DIAGNOSIS — M25.471 RIGHT ANKLE SWELLING: ICD-10-CM

## 2021-07-22 DIAGNOSIS — M79.661 PAIN OF RIGHT LOWER LEG: ICD-10-CM

## 2021-07-22 DIAGNOSIS — R79.1 SUBTHERAPEUTIC INTERNATIONAL NORMALIZED RATIO (INR): ICD-10-CM

## 2021-07-22 LAB
ANION GAP SERPL CALCULATED.3IONS-SCNC: 3 MMOL/L (ref 3–14)
BASOPHILS # BLD AUTO: 0 10E3/UL (ref 0–0.2)
BASOPHILS NFR BLD AUTO: 0 %
BUN SERPL-MCNC: 13 MG/DL (ref 7–30)
CALCIUM SERPL-MCNC: 8.8 MG/DL (ref 8.5–10.1)
CHLORIDE BLD-SCNC: 107 MMOL/L (ref 94–109)
CO2 SERPL-SCNC: 29 MMOL/L (ref 20–32)
CREAT SERPL-MCNC: 0.8 MG/DL (ref 0.52–1.04)
EOSINOPHIL # BLD AUTO: 0.3 10E3/UL (ref 0–0.7)
EOSINOPHIL NFR BLD AUTO: 4 %
ERYTHROCYTE [DISTWIDTH] IN BLOOD BY AUTOMATED COUNT: 15.2 % (ref 10–15)
GFR SERPL CREATININE-BSD FRML MDRD: 75 ML/MIN/1.73M2
GLUCOSE BLD-MCNC: 69 MG/DL (ref 70–99)
HCT VFR BLD AUTO: 38.6 % (ref 35–47)
HGB BLD-MCNC: 12.2 G/DL (ref 11.7–15.7)
IMM GRANULOCYTES # BLD: 0 10E3/UL
IMM GRANULOCYTES NFR BLD: 0 %
INR PPP: 1.77 (ref 0.85–1.15)
LYMPHOCYTES # BLD AUTO: 1.3 10E3/UL (ref 0.8–5.3)
LYMPHOCYTES NFR BLD AUTO: 19 %
MCH RBC QN AUTO: 30.1 PG (ref 26.5–33)
MCHC RBC AUTO-ENTMCNC: 31.6 G/DL (ref 31.5–36.5)
MCV RBC AUTO: 95 FL (ref 78–100)
MONOCYTES # BLD AUTO: 0.7 10E3/UL (ref 0–1.3)
MONOCYTES NFR BLD AUTO: 11 %
NEUTROPHILS # BLD AUTO: 4.4 10E3/UL (ref 1.6–8.3)
NEUTROPHILS NFR BLD AUTO: 66 %
NRBC # BLD AUTO: 0 10E3/UL
NRBC BLD AUTO-RTO: 0 /100
PLATELET # BLD AUTO: 275 10E3/UL (ref 150–450)
POTASSIUM BLD-SCNC: 3.8 MMOL/L (ref 3.4–5.3)
RBC # BLD AUTO: 4.05 10E6/UL (ref 3.8–5.2)
SODIUM SERPL-SCNC: 139 MMOL/L (ref 133–144)
WBC # BLD AUTO: 6.8 10E3/UL (ref 4–11)

## 2021-07-22 PROCEDURE — 85004 AUTOMATED DIFF WBC COUNT: CPT | Performed by: EMERGENCY MEDICINE

## 2021-07-22 PROCEDURE — 93971 EXTREMITY STUDY: CPT | Mod: RT

## 2021-07-22 PROCEDURE — 85610 PROTHROMBIN TIME: CPT | Performed by: EMERGENCY MEDICINE

## 2021-07-22 PROCEDURE — 80048 BASIC METABOLIC PNL TOTAL CA: CPT | Performed by: EMERGENCY MEDICINE

## 2021-07-22 PROCEDURE — 36415 COLL VENOUS BLD VENIPUNCTURE: CPT | Performed by: EMERGENCY MEDICINE

## 2021-07-22 PROCEDURE — 99285 EMERGENCY DEPT VISIT HI MDM: CPT | Mod: 25

## 2021-07-22 PROCEDURE — 73610 X-RAY EXAM OF ANKLE: CPT | Mod: RT

## 2021-07-22 ASSESSMENT — ENCOUNTER SYMPTOMS
DIARRHEA: 0
VOMITING: 0
COLOR CHANGE: 1
LIGHT-HEADEDNESS: 0
SHORTNESS OF BREATH: 0
FEVER: 0
MYALGIAS: 1

## 2021-07-23 ENCOUNTER — ANTICOAGULATION THERAPY VISIT (OUTPATIENT)
Dept: FAMILY MEDICINE | Facility: CLINIC | Age: 71
End: 2021-07-23

## 2021-07-23 ENCOUNTER — TELEPHONE (OUTPATIENT)
Dept: FAMILY MEDICINE | Facility: CLINIC | Age: 71
End: 2021-07-23

## 2021-07-23 VITALS
SYSTOLIC BLOOD PRESSURE: 152 MMHG | BODY MASS INDEX: 26.09 KG/M2 | WEIGHT: 152 LBS | HEART RATE: 76 BPM | OXYGEN SATURATION: 98 % | DIASTOLIC BLOOD PRESSURE: 80 MMHG | TEMPERATURE: 98.4 F | RESPIRATION RATE: 18 BRPM

## 2021-07-23 DIAGNOSIS — Z98.890 S/P MVR (MITRAL VALVE REPAIR): Primary | ICD-10-CM

## 2021-07-23 DIAGNOSIS — Z95.2 S/P AVR (AORTIC VALVE REPLACEMENT): ICD-10-CM

## 2021-07-23 LAB
HOLD SPECIMEN: NORMAL
HOLD SPECIMEN: NORMAL

## 2021-07-23 NOTE — ED PROVIDER NOTES
History   Chief Complaint:  Leg Swelling        The history is provided by the patient.      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 Rajani reports right lower leg muscle pain and swelling which increases with pressure. Additionally, their are red spots throughout the leg. She denies injuring the leg, using a heating pad, or that the leg is itchy. She also denies shortness of breath, fever, vomitting, diarrhea, rash, and light headedness. Currently she is taking Lovenox shots managed by her home care nurse.     Routine home care - 7/20/20:  INR: 1.3    Review of Systems   Constitutional: Negative for fever.   Respiratory: Negative for shortness of breath.    Cardiovascular: Positive for leg swelling.   Gastrointestinal: Negative for diarrhea and vomiting.   Musculoskeletal: Positive for myalgias.   Skin: Positive for color change. Negative for rash.   Neurological: Negative for light-headedness.   All other systems reviewed and are negative.    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  GI bleed  CT scan of head  Partial thyroidectomy  Hyperlipidema   Hypertension  Insomnia  Mitral stenosis  Tobacco use disorder  Memory problem  Atrial fibrillation  PAD  Long term anticoagulation  Dyspnea on excurtion   Heart failure  due to valvular disease   Anemia   PVT   COPD  Sensorineural hearing loss     Past Surgical History:    S/P AVR  S/P MVR   Colonoscopy  CV coronary angiogram  CV fractional flow ratio wire  CV right heart cath measurements recorded  Esophagoscopy, gastroscopy, duodenoscopy  IR angiogram through catheter follow up  IR lower extremity angiogram left  Knee surgery  Ovary surgery  Release carpal tunnel  Release trigger finger bilateral  Replace valves aortic and mitral  Shoulder surgery thyroid surgery    thyroidectomy    Family History:    Diabetes type 2  Lung cancer  Coronary artery disease  Asthma  Glaucoma  macular degeneration  Anesthesia reaction     Social History:  Presents with her daughter   PCP: Tamiko Coley   Tobacco Usage: Smokes daily      Physical Exam     Patient Vitals for the past 24 hrs:   BP Temp Temp src Pulse Resp SpO2 Weight   07/22/21 2334 (!) 152/80 -- -- 76 18 98 % --   07/22/21 2330 (!) 152/80 -- -- 87 -- -- --   07/22/21 2056 (!) 156/73 98.4  F (36.9  C) Oral 83 18 99 % 68.9 kg (152 lb)       Physical Exam  General: Well-nourished, appears to be resting comfortably when I enter the room  Eyes: PERRL, conjunctivae pink no scleral icterus or conjunctival injection  ENT:  Moist mucus membranes, posterior oropharynx clear without erythema or exudates  Respiratory:  Lungs clear to auscultation bilaterally, no crackles/rubs/wheezes.  Good air movement  CV: Normal rate and rhythm, no murmurs/rubs/gallops. Symmetric and normal DP pulses and capillary refill/temp/color.  GI:  Abdomen soft and non-distended.  Normoactive BS.  No tenderness, guarding or rebound  Skin: Warm, dry.  No rashes or petechiae  Musculoskeletal: Right ankle swollen compared to left. Mildly tender over posterior ankle. No crepitus. No redness or warmth. Normal ROm at ankle without significant pain.  Neuro: Alert and oriented to person/place/time  Psychiatric: Normal affect      Emergency Department Course     Imaging:  US Lower  Extremity Venous Duplex RIght:  No deep venous thrombosis in the right lower extremity.  Per radiology.     XR Ankle G/E 3 views, right:  No fracture or dislocation. Mild arthritis in the ankle joint. Multiple vascular calcifications.  Per radiology.     Laboratory:  CBC: WBC 6.8, HGB 12.2,    BMP: Glucose: 69(L) o/w WNL (Creatinine 0.80)      INR: 1.77(H)    Emergency Department Course:    Reviewed:  I reviewed nursing notes, vitals, past medical history and care everywhere    Assessments:  2140 I obtained history and examined the patient as noted above.   2337 I rechecked the patient and explained findings.     Disposition:  The patient was discharged to home.       Impression & Plan     Medical Decision Making:  Rajani Guo is a 70 year old female with therapeutic tenderness of the right-sided ankle swelling today.  No signs of DVT on the ultrasound.  She has some areas that appear to be chronic venous stasis dermatitis.  No cellulitis.  No signs of septic joint on examination.  White blood cell count is normal as is her creatinine.  No other findings on examination or work-up.  At this time, with reasonable clinical confidence, I do believe he safe for discharge home.         Covid-19  Rajani Guo was evaluated during a global COVID-19 pandemic, which necessitated consideration that the patient might be at risk for infection with the SARS-CoV-2 virus that causes COVID-19.   Applicable protocols for evaluation were followed during the patient's care.   COVID-19 was considered as part of the patient's evaluation.    Diagnosis:    ICD-10-CM    1. Right ankle swelling  M25.471    2. Pain of right lower leg  M79.661    3. Subtherapeutic international normalized ratio (INR)  R79.1        Discharge Medications:  Discharge Medication List as of 7/23/2021 12:04 AM          Scribe Disclosure:  I, Veronica Carter (trainee) and Abby Mcpherson (), am serving as a scribe at 9:38 PM on 7/22/2021 to  document services personally performed by Zofia Mancilla MD based on my observations and the provider's statements to me.          Zofia Mancilla MD  07/23/21 2363

## 2021-07-23 NOTE — DISCHARGE INSTRUCTIONS
*Continue to wear your compression stockings.  Elevate your leg as much possible..  *No new medications.  Continue current medications including the Lovenox until instructed otherwise.  *Follow-up with your primary care doctor in the next 2 to 3 days.  *Return if you develop fever, worsening pain, spreading redness or warmth,, faint or feel like you will faint or become worse in any way.

## 2021-07-23 NOTE — TELEPHONE ENCOUNTER
Called pt to inform her we would not be able to fit her in to Dr. Coley's scheduled until 7/28/21. Pt declined appointment due to transportation. Scheduled with Dr. Gregorio on 7/27/21 at 11:20am for hospital follow up.  Vivian Conway-  Juan Carlos

## 2021-07-23 NOTE — TELEPHONE ENCOUNTER
Reason for Call:  Same Day Appointment, Requested Provider:      PCP: Tamiko Coley    Reason for visit: hospital follow up       Additional comments: Pt calling for she was just discharged from the hospital and was told to follow up with her PCP in the next three days and she cannot wait until 07/28/21 or 07/29/21 to make an apt.  She would like to see if her PCP can fit her in on 07/26/21 as soon as possible. Pt would like a call back today to see if that can be arranged    Can we leave a detailed message on this number? YES    Phone number patient can be reached at: Home number on file [phone] (home)    Best Time: anytime    Call taken on 7/23/2021 at 9:21 AM by Louis Marroquin

## 2021-07-23 NOTE — PROGRESS NOTES
ANTICOAGULATION MANAGEMENT     Rajani Guo 70 year old female is on warfarin with subtherapeutic INR result. (Goal INR 2.5-3.5)    Recent labs: (last 7 days)     07/22/21  2250   INR 1.77*       ASSESSMENT     Source(s): home care nurse and daughter candy     Warfarin doses taken: Warfarin taken as instructed  Diet: No new diet changes identified  New illness, injury, or hospitalization: Yes: 7/22 ER visit for right lower leg muscle pain and swelling. Negative findings for DVT/fracture   Medication/supplement changes: None noted  Signs or symptoms of bleeding or clotting: Yes: leg swelling, myalgias, redness/color change  Previous INR: Subtherapeutic  Additional findings: Bridging with Enoxaparin until INR >= 2.5 x 2 refill sent to pharmacy      PLAN     Recommended plan for no diet, medication or health factor changes affecting INR     Dosing Instructions: Take booster dose of 10mg tonight, the continue 6mg daily and recheck INR on Monday with homecare. 7/26 continue lovenox injections (refill sent)    Summary  As of 7/23/2021    Full warfarin instructions:  7/23: 8 mg; Otherwise 6 mg every day   Next INR check:  7/26/2021             Telephone call with  Home care nurse and daughter Sarai who agrees to plan and repeated back plan correctly    Orders given to  Homecare nurse/facility to recheck    Education provided: Contact [phone]  with any changes, questions or concerns.     Plan made per [facility] anticoagulation protocol    Julieta Jo, RN  Anticoagulation Clinic  7/23/2021    _______________________________________________________________________     Anticoagulation Episode Summary     Current INR goal:  2.5-3.5   TTR:  32.8 % (6.3 mo)   Target end date:  Indefinite   Send INR reminders to:  GURVINDER DARDEN    Indications    S/P MVR (mitral valve repair) [Z98.890]  S/P AVR (aortic valve replacement) [Z95.2]           Comments:  home care  Pt gave verbal ok to speak with Sarai on 7/20/21          Anticoagulation Care Providers     Provider Role Specialty Phone number    Quinton Handy PA Referring Cardiovascular & Thoracic Surgery [phone]    Lakeshia Rubio, APRN CNP Referring Internal Medicine [phone]    Sparkle Bird, FRANCESCA CNP Referring Emergency Medicine [phone]          Julieta Pavon, RN, BSN, PHN  Anticoagulation Nurse  [phone]

## 2021-07-23 NOTE — ED TRIAGE NOTES
Noted right lower leg swelling and pain with palpation that started yesterday. On warfarin for heart surgery Dec 2020. INR Tuesday was 1.3.

## 2021-07-26 ENCOUNTER — TELEPHONE (OUTPATIENT)
Dept: FAMILY MEDICINE | Facility: CLINIC | Age: 71
End: 2021-07-26

## 2021-07-26 DIAGNOSIS — Z95.2 S/P AVR (AORTIC VALVE REPLACEMENT): Primary | ICD-10-CM

## 2021-07-26 DIAGNOSIS — Z79.01 CURRENT USE OF LONG TERM ANTICOAGULATION: ICD-10-CM

## 2021-07-26 DIAGNOSIS — I48.91 ATRIAL FIBRILLATION (H): ICD-10-CM

## 2021-07-26 NOTE — TELEPHONE ENCOUNTER
Samara called [facility] again (notes from [facility] also noted in 7/23/21 encounter) with the update that patient will be in the clinic tomorrow and requested the INR to be check instead in the clinic.     Homecare will plan to check the INR again on Friday, 7/30.

## 2021-07-26 NOTE — PROGRESS NOTES
Received VM from home care RN Samara stating that [facility] does not have a nurse that can see Rajani today for an INR. This writer called back and left a VM for Samara. Order given for Rajani to take 6mg tonight and for INR to be rechecked tomorrow.

## 2021-07-27 ENCOUNTER — ANTICOAGULATION THERAPY VISIT (OUTPATIENT)
Dept: FAMILY MEDICINE | Facility: CLINIC | Age: 71
End: 2021-07-27

## 2021-07-27 ENCOUNTER — OFFICE VISIT (OUTPATIENT)
Dept: FAMILY MEDICINE | Facility: CLINIC | Age: 71
End: 2021-07-27
Payer: COMMERCIAL

## 2021-07-27 VITALS
WEIGHT: 152 LBS | OXYGEN SATURATION: 98 % | BODY MASS INDEX: 25.95 KG/M2 | DIASTOLIC BLOOD PRESSURE: 78 MMHG | SYSTOLIC BLOOD PRESSURE: 134 MMHG | RESPIRATION RATE: 12 BRPM | HEART RATE: 95 BPM | HEIGHT: 64 IN | TEMPERATURE: 97.7 F

## 2021-07-27 DIAGNOSIS — Z79.01 CHRONIC ANTICOAGULATION: ICD-10-CM

## 2021-07-27 DIAGNOSIS — G47.00 INSOMNIA, UNSPECIFIED TYPE: Primary | ICD-10-CM

## 2021-07-27 DIAGNOSIS — Z95.2 S/P AVR (AORTIC VALVE REPLACEMENT): ICD-10-CM

## 2021-07-27 DIAGNOSIS — Z95.2 S/P MVR (MITRAL VALVE REPLACEMENT): ICD-10-CM

## 2021-07-27 DIAGNOSIS — L95.0 LIVEDO VASCULITIS: ICD-10-CM

## 2021-07-27 DIAGNOSIS — Z98.890 S/P MVR (MITRAL VALVE REPAIR): Primary | ICD-10-CM

## 2021-07-27 DIAGNOSIS — M79.89 LEG SWELLING: Primary | ICD-10-CM

## 2021-07-27 LAB — INR BLD: 1.9 (ref 0.9–1.1)

## 2021-07-27 PROCEDURE — 99213 OFFICE O/P EST LOW 20 MIN: CPT | Performed by: FAMILY MEDICINE

## 2021-07-27 PROCEDURE — 85610 PROTHROMBIN TIME: CPT | Performed by: FAMILY MEDICINE

## 2021-07-27 ASSESSMENT — MIFFLIN-ST. JEOR: SCORE: 1199.47

## 2021-07-27 NOTE — PROGRESS NOTES
RP: Can you review chart?  Patient has been given multiple boost doses but still sub.  She is currently on Lovenox.    Tentative plan: Boost dose today of 12 mg; and increase maint dose 10%?  Please advise.  Thank you.  Gladys Díaz, RN  Anticoagulation Nurse - [facility], [place]

## 2021-07-27 NOTE — PATIENT INSTRUCTIONS
[facility]    If you have any questions regarding to your visit please contact your care team:       Team Red:   Clinic Hours Telephone Number   [address] 7am-6pm  Monday - Thursday   7am-5pm  Fridays  [phone]  (Appointment scheduling available 24/7)    Questions about your recent visit?   Team Line  [phone]   Urgent Care - [place] and [facility] - 11am-8pm Monday-Friday Saturday-Sunday- 9am-5pm   [place] - 5pm-8pm Monday-Friday Saturday-Sunday- 9am-5pm  [phone] - [place]  [phone] - [place]       What options do I have for a visit other than an office visit? We offer electronic visits (e-visits) and telephone visits, when medically appropriate.  Please check with your medical insurance to see if these types of visits are covered, as you will be responsible for any charges that are not paid by your insurance.      You can use Face to Face Live (secure electronic communication) to access to your chart, send your primary care provider a message, or make an appointment. Ask a team member how to get started.     For a price quote for your services, please call our Consumer Price Line at [phone] or our Imaging Cost estimation line at [phone] (for imaging tests).

## 2021-07-27 NOTE — PROGRESS NOTES
ANTICOAGULATION MANAGEMENT     Rajani Guo 70 year old female is on warfarin with subtherapeutic INR result. (Goal INR 2.5-3.5)    Recent labs: (last 7 days)     07/27/21  1218   INR 1.9*       ASSESSMENT     Source(s): Chart Review and Patient/Caregiver Call     Warfarin doses taken: Warfarin taken as instructed  Diet: No new diet changes identified  New illness, injury, or hospitalization: No  Medication/supplement changes: None noted  Signs or symptoms of bleeding or clotting: No  Previous INR: Subtherapeutic  Additional findings: Bridging with Enoxaparin until INR >= 2.5 x 2 confirmed tablet size and color; no new supplements; no changes with alcohol; daugther setsup medications. Also gave dosing in number of tablets     PLAN     Recommended plan for no diet, medication or health factor changes affecting INR     Dosing Instructions: Booster dose then Increase your warfarin dose (9.5% change) with next INR in 1 week       Summary  As of 7/27/2021    Full warfarin instructions:  7/27: 12 mg; Otherwise 8 mg every Wed, Sat; 6 mg all other days   Next INR check:  7/30/2021             Telephone call with Rajani who verbalizes understanding and agrees to plan    Orders given to  Homecare nurse/facility to recheck    Education provided: Please call back if any changes to your diet, medications or how you've been taking warfarin, Monitoring for bleeding signs and symptoms and Monitoring for clotting signs and symptoms    Plan made with [facility] Pharmacist Hien Díaz, RN  Anticoagulation Clinic  7/27/2021    _______________________________________________________________________     Anticoagulation Episode Summary     Current INR goal:  2.5-3.5   TTR:  32.0 % (6.4 mo)   Target end date:  Indefinite   Send INR reminders to:  GURVINDER DARDEN    Indications    S/P MVR (mitral valve repair) [Z98.890]  S/P AVR (aortic valve replacement) [Z95.2]           Comments:  home care  Pt gave verbal ok to speak  with Sarai on 7/20/21         Anticoagulation Care Providers     Provider Role Specialty Phone number    Quinton Handy PA Referring Cardiovascular & Thoracic Surgery [phone]    Lakeshia Rubio APRN CNP Referring Internal Medicine [phone]    Sparkle Bird APRN CNP Referring Emergency Medicine [phone]

## 2021-07-27 NOTE — PROGRESS NOTES
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 vasculitis     Erythematous patches could be vasculitis, etiology no clear.    Chronic anticoagulation    On Lovenox and Warfarin  - INR; Future    S/P MVR (mitral valve replacement)  - INR; Future    S/P AVR (aortic valve replacement)  - INR; Future    Return in about 20 days (around 8/16/2021) for Follow up with PCP.    Dennis Gregorio MD  [facility]   Rajani Guo is a 70 year old who presents for the following health issues  accompanied by her daughter:    HPI     ED/UC Followup:    Facility:  [facility]  Date of visit: 7/22/21  Reason for visit: swelling right leg  Current Status: ok     Patient was seen in the ER for Rt leg swelling and some erythema.  Work up for blood clot and cellulitis were negative.  Thought to be stasis dermatitis.  Asked to use compression stocking  She reports that still has some redness, not pain, leg does not feel warmer than rest of body.  Swelling gone down with compression stocking    s/p MVRand AVR with labile INR    Will recheck INR    Review of Systems   Constitutional, HEENT, cardiovascular, pulmonary, gi and gu systems are negative, except as otherwise noted.      Objective    There were no vitals taken for this visit.  There is no height or weight on file to calculate BMI.  Physical Exam   GENERAL: healthy, alert and no distress  RESP: lungs clear to auscultation - no rales, rhonchi or wheezes  CV: regular rate and rhythm, no murmur, click or rub  MS: Rt Leg        In compression stocking. Has red/pink blanchable patches lower posterior third of leg, mild  swelling,

## 2021-07-27 NOTE — PROGRESS NOTES
[facility] consult:     Have we asked the patient about new nutritional supplements like boost/ensure that she may have started since mid-June?      I called [facility] to inquire about the last refill date to determine whether it coincides with the last therapeutic INR. Last refill date = 6/12/21.  Suggest confirming the markings on her current warfarin tablets to ensure they are 2 mg tabs and asking whether she is taking them as written on the bottle from [facility] or as instructed by [facility] RN?     IF above are negaive, I agree with the boost (12 mg x 1) and new maintenance you have planned ( 8 mg x 2; 6 mg ROW). Continue enoxaparin. Subtherapeutic INRs may likely be related to her cognitive issues and poor medication management.     Hien Mcduffie, [facility]

## 2021-07-27 NOTE — CONFIDENTIAL NOTE
[facility] consult:    Have we asked the patient about new nutritional supplements like boost/ensure that she may have started since mid-June?     I called [facility] to inquire about the last refill date to determine whether it coincides with the last therapeutic INR. Last refill date = 6/12/21.  Suggest confirming the markings on her current warfarin tablets to ensure they are 2 mg tabs and asking whether she is taking them as written on the bottle from [facility] or as instructed by [facility] RN?    IF above are negaive, I agree with the boost (12 mg x 1) and new maintenance you have planned ( 8 mg x 2; 6 mg ROW). Continue enoxaparin. Subtherapeutic INRs may likely be related to her cognitive issues and poor medication management.    Hien Mcduffie, [facility]

## 2021-07-28 RX ORDER — TRAZODONE HYDROCHLORIDE 50 MG/1
TABLET, FILM COATED ORAL
Qty: 180 TABLET | Refills: 1 | Status: SHIPPED | OUTPATIENT
Start: 2021-07-28 | End: 2021-08-16

## 2021-07-28 NOTE — TELEPHONE ENCOUNTER
Routing refill request to provider for review/approval because:  Medication is reported/historical          Pending Prescriptions:                       Disp   Refills    traZODone (DESYREL) 50 MG tablet [Pharmacy*180 ta*1        Sig: TAKE 1-2 TABLETS ( MG) BY MOUTH AT BEDTIME        Radu Shannon RN

## 2021-07-30 ENCOUNTER — ANTICOAGULATION THERAPY VISIT (OUTPATIENT)
Dept: FAMILY MEDICINE | Facility: CLINIC | Age: 71
End: 2021-07-30

## 2021-07-30 DIAGNOSIS — Z98.890 S/P MVR (MITRAL VALVE REPAIR): Primary | ICD-10-CM

## 2021-07-30 DIAGNOSIS — Z95.2 S/P AVR (AORTIC VALVE REPLACEMENT): ICD-10-CM

## 2021-07-30 LAB — INR (EXTERNAL): 2.1 (ref 2.5–3.5)

## 2021-07-30 NOTE — PROGRESS NOTES
Anticoagulation Management    Unable to reach Nora today.    Today's INR result of 2.1 is subtherapeutic (goal INR of 2.5-3.5).  Result received from: Home Care    Follow up required to confirm warfarin dose taken and assess for changes    [facility]      Anticoagulation clinic to follow up    Matthew Finn RN

## 2021-07-30 NOTE — PROGRESS NOTES
ANTICOAGULATION MANAGEMENT     Rajani Guo 70 year old female is on warfarin with subtherapeutic INR result. (Goal INR 2.5-3.5)    Recent labs: (last 7 days)     07/30/21  1031   INR 2.1*       ASSESSMENT     Source(s): Chart Review and Home Care/Facility Nurse     Warfarin doses taken: Warfarin taken as instructed  Diet: No new diet changes identified  New illness, injury, or hospitalization: Recent trip to ED for leg swelling. Ultrasound negative for DVT.  Medication/supplement changes: None noted  Signs or symptoms of bleeding or clotting: No  Previous INR: Subtherapeutic  Additional findings: Bridging with Enoxaparin until INR >= 2.3 or INR >= 2.0 x 2 ([facility] protocol goal INR 2-3)     PLAN     Recommended plan for no diet, medication or health factor changes affecting INR     Dosing Instructions: Booster dose then Increase your warfarin dose (13% change) with next INR in 4 days       Summary  As of 7/30/2021    Full warfarin instructions:  7/30: 12 mg; Otherwise 6 mg every Mon, Thu; 8 mg all other days   Next INR check:  8/3/2021             Telephone call with home care nurse Nora who verbalizes understanding and agrees to plan    Orders given to  Homecare nurse/facility to recheck    Education provided: Target INR goal and significance of current INR result    Plan made per [facility] anticoagulation protocol    Matthew Finn RN  Anticoagulation Clinic  7/30/2021    _______________________________________________________________________     Anticoagulation Episode Summary     Current INR goal:  2.5-3.5   TTR:  31.5 % (6.5 mo)   Target end date:  Indefinite   Send INR reminders to:  GURVINDER DARDEN    Indications    S/P MVR (mitral valve repair) [Z98.890]  S/P AVR (aortic valve replacement) [Z95.2]           Comments:  home care  Pt gave verbal ok to speak with Candy on 7/20/21         Anticoagulation Care Providers     Provider Role Specialty Phone number    Quinton Handy PA Referring Cardiovascular &  Thoracic Surgery [phone]    Lakeshia Rubio, FRANCESCA CNP Referring Internal Medicine [phone]    Sparkle Bird APRN CNP Referring Emergency Medicine [phone]

## 2021-07-30 NOTE — PROGRESS NOTES
Nora SERNA called and left message patients INR is 2.1 today please call [phone] with orders.     Julieta Pavon RN, BSN, PHN  Anticoagulation Nurse  [phone]

## 2021-08-03 ENCOUNTER — ANTICOAGULATION THERAPY VISIT (OUTPATIENT)
Dept: ANTICOAGULATION | Facility: CLINIC | Age: 71
End: 2021-08-03

## 2021-08-03 DIAGNOSIS — Z98.890 S/P MVR (MITRAL VALVE REPAIR): Primary | ICD-10-CM

## 2021-08-03 DIAGNOSIS — Z95.2 S/P AVR (AORTIC VALVE REPLACEMENT): ICD-10-CM

## 2021-08-03 LAB — INR (EXTERNAL): 2.4 (ref 0.9–1.1)

## 2021-08-03 NOTE — PROGRESS NOTES
ANTICOAGULATION MANAGEMENT     Rajani Guo 70 year old female is on warfarin with subtherapeutic INR result. (Goal INR 2.5-3.5)    Recent labs: (last 7 days)     08/03/21  1150   INR 2.4*       ASSESSMENT     Source(s): Chart Review and Home Care/Facility Nurse     Warfarin doses taken: Warfarin taken as instructed  Diet: No new diet changes identified  New illness, injury, or hospitalization: Yes: Reports the patient thinks she may have a UTI, has not gone in to get it tested.   Medication/supplement changes: None noted  Signs or symptoms of bleeding or clotting: No  Previous INR: Subtherapeutic  Additional findings: Bridging with Enoxaparin until INR >= 2.5 x 2 - Advised that pt is to continue bridging.      PLAN     Recommended plan for temporary change(s) affecting INR     Dosing Instructions: Booster dose then continue your current warfarin dose with next INR in 2 days       Summary  As of 8/3/2021    Full warfarin instructions:  8/3: 10 mg; Otherwise 6 mg every Mon, Thu; 8 mg all other days   Next INR check:  8/5/2021             Telephone call with Rajani who verbalizes understanding and agrees to plan    Orders given to  Homecare nurse/facility to recheck    Education provided: Target INR goal and significance of current INR result, Importance of therapeutic range, Monitoring for bleeding signs and symptoms, Monitoring for clotting signs and symptoms and Contact [phone]  with any changes, questions or concerns.     Plan made per [facility] anticoagulation protocol    Washington Dorman RN  Anticoagulation Clinic  8/3/2021    _______________________________________________________________________     Anticoagulation Episode Summary     Current INR goal:  2.5-3.5   TTR:  30.9 % (6.7 mo)   Target end date:  Indefinite   Send INR reminders to:  GURVINDER DARDEN    Indications    S/P MVR (mitral valve repair) [Z98.890]  S/P AVR (aortic valve replacement) [Z95.2]           Comments:  home care  Pt gave verbal ok to speak  with Sarai on 7/20/21         Anticoagulation Care Providers     Provider Role Specialty Phone number    Quinton Handy PA Referring Cardiovascular & Thoracic Surgery [phone]    Lakeshia Rubio APRN CNP Referring Internal Medicine [phone]    Sparkle Bird APRN CNP Referring Emergency Medicine [phone]

## 2021-08-05 ENCOUNTER — TELEPHONE (OUTPATIENT)
Dept: FAMILY MEDICINE | Facility: CLINIC | Age: 71
End: 2021-08-05

## 2021-08-05 ENCOUNTER — ANTICOAGULATION THERAPY VISIT (OUTPATIENT)
Dept: ANTICOAGULATION | Facility: CLINIC | Age: 71
End: 2021-08-05

## 2021-08-05 DIAGNOSIS — Z98.890 S/P MVR (MITRAL VALVE REPAIR): Primary | ICD-10-CM

## 2021-08-05 DIAGNOSIS — R82.90 CLOUDY URINE: Primary | ICD-10-CM

## 2021-08-05 DIAGNOSIS — I10 HYPERTENSION GOAL BP (BLOOD PRESSURE) < 140/90: ICD-10-CM

## 2021-08-05 DIAGNOSIS — Z95.2 S/P AVR (AORTIC VALVE REPLACEMENT): ICD-10-CM

## 2021-08-05 LAB — INR (EXTERNAL): 2.8 (ref 0.9–1.1)

## 2021-08-05 RX ORDER — LOSARTAN POTASSIUM 25 MG/1
25 TABLET ORAL DAILY
Qty: 30 TABLET | Refills: 0 | Status: SHIPPED | OUTPATIENT
Start: 2021-08-05 | End: 2021-08-16

## 2021-08-05 NOTE — PROGRESS NOTES
ANTICOAGULATION MANAGEMENT     Rajani Guo 70 year old female is on warfarin with therapeutic INR result. (Goal INR 2.5-3.5)    Recent labs: (last 7 days)     08/05/21  1314   INR 2.8*       ASSESSMENT     Source(s): Chart Review and Home Care/Facility Nurse     Warfarin doses taken: Warfarin taken as instructed  Diet: No new diet changes identified  New illness, injury, or hospitalization: Still needs to be tested to see if she has a UTI  Medication/supplement changes: None noted  Signs or symptoms of bleeding or clotting: No  Previous INR: Subtherapeutic  Additional findings: None     PLAN     Recommended plan for no diet, medication or health factor changes affecting INR     Dosing Instructions:  Increase your warfarin dose (4% change)  Stop bridging with Enoxaparin with next INR in 5 days       Summary  As of 8/5/2021    Full warfarin instructions:  6 mg every Mon; 8 mg all other days   Next INR check:  8/10/2021             Telephone call with home care nurse Nora who verbalizes understanding and agrees to plan    Orders given to  Homecare nurse/facility to recheck    Education provided: Contact [phone]  with any changes, questions or concerns.     Plan made with [facility] Pharmacist Madelaine Vazquez, RN  Anticoagulation Clinic  8/5/2021    _______________________________________________________________________     Anticoagulation Episode Summary     Current INR goal:  2.5-3.5   TTR:  31.3 % (6.7 mo)   Target end date:  Indefinite   Send INR reminders to:  GURVINDER DARDEN    Indications    S/P MVR (mitral valve repair) [Z98.890]  S/P AVR (aortic valve replacement) [Z95.2]           Comments:  home care  Pt gave verbal ok to speak with Candy on 7/20/21         Anticoagulation Care Providers     Provider Role Specialty Phone number    Quinton Handy PA Referring Cardiovascular & Thoracic Surgery [phone]    Lakeshia Rubio APRN CNP Referring Internal Medicine  [phone]    Sparkle Bird APRN [facility] Emergency Medicine [phone]

## 2021-08-05 NOTE — TELEPHONE ENCOUNTER
Patient notified of provider message as written. Patient verbalized understanding. Agrees with plan.    Staci Thompson RN

## 2021-08-05 NOTE — TELEPHONE ENCOUNTER
Please advise:    Patient calling about UTI symptoms.     She was recently treated for UTI. Switched to Keflex x 5 days starting 7/12/21. Symptoms resolved. She noticed this week symptoms are returning again. Symptoms are malodorous, dark urine. She denies urgency, frequency and burning with urination. No fever of flank pain.    Patient has physical with PCP 8/16/21  Consult with urology scheduled 8/23/21    Staci Thompson RN

## 2021-08-08 DIAGNOSIS — N39.41 URGE INCONTINENCE OF URINE: ICD-10-CM

## 2021-08-09 ENCOUNTER — MEDICAL CORRESPONDENCE (OUTPATIENT)
Dept: HEALTH INFORMATION MANAGEMENT | Facility: CLINIC | Age: 71
End: 2021-08-09

## 2021-08-09 ENCOUNTER — LAB (OUTPATIENT)
Dept: LAB | Facility: CLINIC | Age: 71
End: 2021-08-09
Payer: COMMERCIAL

## 2021-08-09 DIAGNOSIS — R82.90 CLOUDY URINE: ICD-10-CM

## 2021-08-09 DIAGNOSIS — R30.0 DYSURIA: Primary | ICD-10-CM

## 2021-08-09 LAB
ALBUMIN UR-MCNC: NEGATIVE MG/DL
AMORPH CRY #/AREA URNS HPF: ABNORMAL /HPF
APPEARANCE UR: ABNORMAL
BACTERIA #/AREA URNS HPF: ABNORMAL /HPF
BILIRUB UR QL STRIP: NEGATIVE
COLOR UR AUTO: YELLOW
GLUCOSE UR STRIP-MCNC: NEGATIVE MG/DL
HGB UR QL STRIP: ABNORMAL
KETONES UR STRIP-MCNC: NEGATIVE MG/DL
LEUKOCYTE ESTERASE UR QL STRIP: ABNORMAL
NITRATE UR QL: POSITIVE
PH UR STRIP: 6 [PH] (ref 5–7)
RBC #/AREA URNS AUTO: ABNORMAL /HPF
SP GR UR STRIP: >=1.03 (ref 1–1.03)
SQUAMOUS #/AREA URNS AUTO: ABNORMAL /LPF
UROBILINOGEN UR STRIP-ACNC: 0.2 E.U./DL
WBC #/AREA URNS AUTO: ABNORMAL /HPF
WBC CLUMPS #/AREA URNS HPF: PRESENT /HPF

## 2021-08-09 PROCEDURE — 87086 URINE CULTURE/COLONY COUNT: CPT

## 2021-08-09 PROCEDURE — 81001 URINALYSIS AUTO W/SCOPE: CPT

## 2021-08-09 RX ORDER — CEPHALEXIN 500 MG/1
500 CAPSULE ORAL 2 TIMES DAILY
Qty: 14 CAPSULE | Refills: 0 | Status: SHIPPED | OUTPATIENT
Start: 2021-08-09 | End: 2021-08-16

## 2021-08-09 RX ORDER — OXYBUTYNIN CHLORIDE 5 MG/1
TABLET ORAL
Qty: 180 TABLET | Refills: 3 | Status: SHIPPED | OUTPATIENT
Start: 2021-08-09 | End: 2021-10-12

## 2021-08-10 ENCOUNTER — ANTICOAGULATION THERAPY VISIT (OUTPATIENT)
Dept: FAMILY MEDICINE | Facility: CLINIC | Age: 71
End: 2021-08-10

## 2021-08-10 ENCOUNTER — TELEPHONE (OUTPATIENT)
Dept: FAMILY MEDICINE | Facility: CLINIC | Age: 71
End: 2021-08-10

## 2021-08-10 DIAGNOSIS — Z98.890 S/P MVR (MITRAL VALVE REPAIR): Primary | ICD-10-CM

## 2021-08-10 DIAGNOSIS — Z95.2 S/P AVR (AORTIC VALVE REPLACEMENT): ICD-10-CM

## 2021-08-10 LAB — INR (EXTERNAL): 2.6 (ref 0.9–1.1)

## 2021-08-10 NOTE — PROGRESS NOTES
ANTICOAGULATION MANAGEMENT     Rajani Guo 70 year old female is on warfarin with therapeutic INR result. (Goal INR 2.5-3.5)    Recent labs: (last 7 days)     08/10/21  1033   INR 2.6*       ASSESSMENT     Source(s): Chart Review and Patient/Caregiver Call     Warfarin doses taken: Warfarin taken as instructed  Diet: No new diet changes identified  New illness, injury, or hospitalization: Yes: UTI  Medication/supplement changes: cephalexin started yesterday, may increase risk of bleeding  Signs or symptoms of bleeding or clotting: No  Previous INR: Therapeutic last visit; previously outside of goal range  Additional findings: None     PLAN     Recommended plan for no diet, medication or health factor changes affecting INR     Dosing Instructions: Continue your current warfarin dose with next INR in 1 week       Summary  As of 8/10/2021    Full warfarin instructions:  6 mg every Mon; 8 mg all other days   Next INR check:               Telephone call with  Nora who verbalizes understanding and agrees to plan    Orders given to  Homecare nurse/facility to recheck    Education provided: Target INR goal and significance of current INR result    Plan made per [facility] anticoagulation protocol    Lesley Ortega RN  Anticoagulation Clinic  8/10/2021    _______________________________________________________________________     Anticoagulation Episode Summary     Current INR goal:  2.5-3.5   TTR:  32.9 % (6.9 mo)   Target end date:  Indefinite   Send INR reminders to:  GURVINDER DARDEN    Indications    S/P MVR (mitral valve repair) [Z98.890]  S/P AVR (aortic valve replacement) [Z95.2]           Comments:  home care  Pt gave verbal ok to speak with Candy on 7/20/21         Anticoagulation Care Providers     Provider Role Specialty Phone number    Quinton Handy PA Referring Cardiovascular & Thoracic Surgery [phone]    Lakeshia Rubio APRN CNP Referring Internal Medicine [phone]    Sparkle Bird,  APRN CNP [facility] Emergency Medicine [phone]

## 2021-08-10 NOTE — TELEPHONE ENCOUNTER
Received call from Nora with [facility]. She states that pt is up for re-certification. They were going to discharge patient, but with new UTI, they are going to keep her on. She requests verbal orders for SN visits 1 per week for 6 weeks with 3 PRN, and medical SW for eval. Verbal orders given as requested.    Isabel Sandoval RN

## 2021-08-11 LAB — BACTERIA UR CULT: NORMAL

## 2021-08-16 ENCOUNTER — ANTICOAGULATION THERAPY VISIT (OUTPATIENT)
Dept: ANTICOAGULATION | Facility: CLINIC | Age: 71
End: 2021-08-16

## 2021-08-16 ENCOUNTER — OFFICE VISIT (OUTPATIENT)
Dept: FAMILY MEDICINE | Facility: CLINIC | Age: 71
End: 2021-08-16
Payer: COMMERCIAL

## 2021-08-16 VITALS
HEIGHT: 64 IN | OXYGEN SATURATION: 99 % | BODY MASS INDEX: 26.63 KG/M2 | DIASTOLIC BLOOD PRESSURE: 76 MMHG | TEMPERATURE: 98.8 F | WEIGHT: 156 LBS | SYSTOLIC BLOOD PRESSURE: 138 MMHG | RESPIRATION RATE: 18 BRPM | HEART RATE: 89 BPM

## 2021-08-16 DIAGNOSIS — I38 HEART FAILURE DUE TO VALVULAR DISEASE, UNSPECIFIED HEART FAILURE TYPE (H): ICD-10-CM

## 2021-08-16 DIAGNOSIS — F41.9 ANXIETY: ICD-10-CM

## 2021-08-16 DIAGNOSIS — I73.9 PVD (PERIPHERAL VASCULAR DISEASE) (H): ICD-10-CM

## 2021-08-16 DIAGNOSIS — E78.5 HYPERLIPIDEMIA LDL GOAL <70: ICD-10-CM

## 2021-08-16 DIAGNOSIS — J44.9 CHRONIC OBSTRUCTIVE PULMONARY DISEASE, UNSPECIFIED COPD TYPE (H): ICD-10-CM

## 2021-08-16 DIAGNOSIS — I48.0 PAF (PAROXYSMAL ATRIAL FIBRILLATION) (H): ICD-10-CM

## 2021-08-16 DIAGNOSIS — E11.59 TYPE 2 DIABETES MELLITUS WITH OTHER CIRCULATORY COMPLICATION, WITHOUT LONG-TERM CURRENT USE OF INSULIN (H): ICD-10-CM

## 2021-08-16 DIAGNOSIS — R41.3 MEMORY PROBLEM: ICD-10-CM

## 2021-08-16 DIAGNOSIS — I10 HYPERTENSION GOAL BP (BLOOD PRESSURE) < 140/90: ICD-10-CM

## 2021-08-16 DIAGNOSIS — G47.00 INSOMNIA, UNSPECIFIED TYPE: ICD-10-CM

## 2021-08-16 DIAGNOSIS — Z87.19 HISTORY OF GI BLEED: ICD-10-CM

## 2021-08-16 DIAGNOSIS — Z98.890 S/P MVR (MITRAL VALVE REPAIR): ICD-10-CM

## 2021-08-16 DIAGNOSIS — Z98.890 S/P MVR (MITRAL VALVE REPAIR): Primary | ICD-10-CM

## 2021-08-16 DIAGNOSIS — Z95.2 S/P AVR (AORTIC VALVE REPLACEMENT): ICD-10-CM

## 2021-08-16 DIAGNOSIS — Z87.891 PERSONAL HISTORY OF TOBACCO USE: ICD-10-CM

## 2021-08-16 DIAGNOSIS — I50.9 HEART FAILURE DUE TO VALVULAR DISEASE, UNSPECIFIED HEART FAILURE TYPE (H): ICD-10-CM

## 2021-08-16 DIAGNOSIS — Z00.00 ENCOUNTER FOR MEDICARE ANNUAL WELLNESS EXAM: ICD-10-CM

## 2021-08-16 PROBLEM — R06.09 DYSPNEA ON EXERTION: Status: RESOLVED | Noted: 2020-10-21 | Resolved: 2021-08-16

## 2021-08-16 PROBLEM — I35.0 SEVERE AORTIC STENOSIS: Status: RESOLVED | Noted: 2020-10-21 | Resolved: 2021-08-16

## 2021-08-16 PROBLEM — I35.1 AORTIC INSUFFICIENCY: Status: RESOLVED | Noted: 2020-09-29 | Resolved: 2021-08-16

## 2021-08-16 LAB
ERYTHROCYTE [DISTWIDTH] IN BLOOD BY AUTOMATED COUNT: 15.1 % (ref 10–15)
HBA1C MFR BLD: 5.9 % (ref 0–5.6)
HCT VFR BLD AUTO: 38.3 % (ref 35–47)
HGB BLD-MCNC: 12.3 G/DL (ref 11.7–15.7)
INR (EXTERNAL): 2.4 (ref 2.5–3.5)
MCH RBC QN AUTO: 31.1 PG (ref 26.5–33)
MCHC RBC AUTO-ENTMCNC: 32.1 G/DL (ref 31.5–36.5)
MCV RBC AUTO: 97 FL (ref 78–100)
PLATELET # BLD AUTO: 287 10E3/UL (ref 150–450)
RBC # BLD AUTO: 3.96 10E6/UL (ref 3.8–5.2)
WBC # BLD AUTO: 9.4 10E3/UL (ref 4–11)

## 2021-08-16 PROCEDURE — 36415 COLL VENOUS BLD VENIPUNCTURE: CPT | Performed by: FAMILY MEDICINE

## 2021-08-16 PROCEDURE — 83036 HEMOGLOBIN GLYCOSYLATED A1C: CPT | Performed by: FAMILY MEDICINE

## 2021-08-16 PROCEDURE — 99397 PER PM REEVAL EST PAT 65+ YR: CPT | Mod: 25 | Performed by: FAMILY MEDICINE

## 2021-08-16 PROCEDURE — 99213 OFFICE O/P EST LOW 20 MIN: CPT | Mod: 25 | Performed by: FAMILY MEDICINE

## 2021-08-16 PROCEDURE — 85027 COMPLETE CBC AUTOMATED: CPT | Performed by: FAMILY MEDICINE

## 2021-08-16 PROCEDURE — G0296 VISIT TO DETERM LDCT ELIG: HCPCS | Performed by: FAMILY MEDICINE

## 2021-08-16 RX ORDER — BUPROPION HYDROCHLORIDE 150 MG/1
150 TABLET, EXTENDED RELEASE ORAL 2 TIMES DAILY
Qty: 60 TABLET | Refills: 6 | Status: SHIPPED | OUTPATIENT
Start: 2021-08-16 | End: 2021-12-22

## 2021-08-16 RX ORDER — LOSARTAN POTASSIUM 25 MG/1
25 TABLET ORAL DAILY
Qty: 90 TABLET | Refills: 3 | Status: SHIPPED | OUTPATIENT
Start: 2021-08-16 | End: 2022-03-28

## 2021-08-16 RX ORDER — WARFARIN SODIUM 2 MG/1
TABLET ORAL
Qty: 270 TABLET | Refills: 1 | Status: SHIPPED | OUTPATIENT
Start: 2021-08-16 | End: 2021-12-22

## 2021-08-16 RX ORDER — CITALOPRAM HYDROBROMIDE 10 MG/1
10 TABLET ORAL DAILY
Qty: 30 TABLET | Refills: 6 | Status: SHIPPED | OUTPATIENT
Start: 2021-08-16 | End: 2021-10-12

## 2021-08-16 RX ORDER — TRAZODONE HYDROCHLORIDE 50 MG/1
TABLET, FILM COATED ORAL
Qty: 180 TABLET | Refills: 3 | Status: SHIPPED | OUTPATIENT
Start: 2021-08-16 | End: 2022-03-28

## 2021-08-16 RX ORDER — CITALOPRAM HYDROBROMIDE 10 MG/1
10 TABLET ORAL DAILY
Qty: 30 TABLET | Refills: 0 | Status: SHIPPED | OUTPATIENT
Start: 2021-08-16 | End: 2021-08-16

## 2021-08-16 RX ORDER — LOSARTAN POTASSIUM 25 MG/1
25 TABLET ORAL DAILY
Qty: 30 TABLET | Refills: 0 | Status: SHIPPED | OUTPATIENT
Start: 2021-08-16 | End: 2021-08-16

## 2021-08-16 ASSESSMENT — ENCOUNTER SYMPTOMS
WEAKNESS: 0
JOINT SWELLING: 1
CONSTIPATION: 0
CHILLS: 1
COUGH: 0
ABDOMINAL PAIN: 0
HEADACHES: 0
PALPITATIONS: 0
SHORTNESS OF BREATH: 0
SORE THROAT: 0
DYSURIA: 0
PARESTHESIAS: 0
ARTHRALGIAS: 1
NERVOUS/ANXIOUS: 1
CHILLS: 0
HEMATURIA: 0
HEMATOCHEZIA: 0
FREQUENCY: 0
NAUSEA: 0
HEARTBURN: 0
MYALGIAS: 1
BREAST MASS: 0
DIARRHEA: 0
FEVER: 0
EYE PAIN: 0
WEAKNESS: 1
DIZZINESS: 0

## 2021-08-16 ASSESSMENT — ACTIVITIES OF DAILY LIVING (ADL)
CURRENT_FUNCTION: TRANSPORTATION REQUIRES ASSISTANCE
CURRENT_FUNCTION: LAUNDRY REQUIRES ASSISTANCE
CURRENT_FUNCTION: SHOPPING REQUIRES ASSISTANCE
CURRENT_FUNCTION: MEDICATION ADMINISTRATION REQUIRES ASSISTANCE

## 2021-08-16 ASSESSMENT — MIFFLIN-ST. JEOR: SCORE: 1217.61

## 2021-08-16 NOTE — PROGRESS NOTES
"SUBJECTIVE:   Rajani Guo is a 70 year old female who presents for Preventive Visit.      Patient has been advised of split billing requirements and indicates understanding: Yes   Are you in the first 12 months of your Medicare coverage?  No    Healthy Habits:     In general, how would you rate your overall health?  Fair    Frequency of exercise:  None    Do you usually eat at least 4 servings of fruit and vegetables a day, include whole grains    & fiber and avoid regularly eating high fat or \"junk\" foods?  No    Taking medications regularly:  0    Medication side effects:  None    Ability to successfully perform activities of daily living:  Transportation requires assistance, shopping requires assistance, laundry requires assistance and medication administration requires assistance    Home Safety:  No safety concerns identified    Hearing Impairment:  Difficulty following a conversation in a noisy restaurant or crowded room, need to ask people to speak up or repeat themselves, difficulty understanding soft or whispered speech and difficulty understanding speech on the telephone    In the past 6 months, have you been bothered by leaking of urine? Yes    In general, how would you rate your overall mental or emotional health?  Fair      PHQ-2 Total Score: 2    Additional concerns today:  Yes  History of Present Illness       Hyperlipidemia:  She presents for follow up of hyperlipidemia.  She is taking medication to lower cholesterol. She is not having myalgia or other side effects to statin medications.    Hypertension: She presents for follow up of hypertension.  She does check blood pressure  regularly outside of the clinic. Outside blood pressures have been over 140/90. She follows a low salt diet.     She eats 2-3 servings of fruits and vegetables daily.She consumes 0 sweetened beverage(s) daily.She exercises with enough effort to increase her heart rate 10 to 19 minutes per day.  She exercises with enough " effort to increase her heart rate 3 or less days per week.   She is taking medications regularly.    Do you feel safe in your environment? Yes    Have you ever done Advance Care Planning? (For example, a Health Directive, POLST, or a discussion with a medical provider or your loved ones about your wishes): No, advance care planning information given to patient to review.  Patient declined advance care planning discussion at this time.       Fall risk  Fallen 2 or more times in the past year?: Yes  Any fall with injury in the past year?: No    Cognitive Screening   1) Repeat 3 items (Leader, Season, Table)    2) Clock draw: NORMAL  3) 3 item recall: Recalls 3 objects  Results: 3 items recalled: COGNITIVE IMPAIRMENT LESS LIKELY    Mini-CogTM Copyright [employer]LE[employer]. Licensed by the author for use in [facility]; reprinted with permission ([email or web address]@[email or web address]). All rights reserved.      Do you have sleep apnea, excessive snoring or daytime drowsiness?: yes    Reviewed and updated as needed this visit by clinical staff  Tobacco  Allergies  Meds   Med Hx  Surg Hx  Fam Hx  Soc Hx        Reviewed and updated as needed this visit by Provider                Social History     Tobacco Use     Smoking status: Current Every Day Smoker     Packs/day: 1.00     Years: 59.00     Pack years: 59.00     Start date: 1962     Last attempt to quit: 2020     Years since quittin.6     Smokeless tobacco: Never Used   Substance Use Topics     Alcohol use: Not Currently     Comment: 2 glasses of wine a week     If you drink alcohol do you typically have >3 drinks per day or >7 drinks per week? No    Alcohol Use 2021   Prescreen: >3 drinks/day or >7 drinks/week? Yes   Prescreen: >3 drinks/day or >7 drinks/week? -   AUDIT SCORE  9     AUDIT - Alcohol Use Disorders Identification Test - Reproduced from the World Health Organization Audit 2001 (Second Edition) 2021   1.  How often do you have a drink containing  alcohol? 2 to 3 times a week   2.  How many drinks containing alcohol do you have on a typical day when you are drinking? 3 or 4   3.  How often do you have five or more drinks on one occasion? Monthly   4.  How often during the last year have you found that you were not able to stop drinking once you had started? Never   5.  How often during the last year have you failed to do what was normally expected of you because of drinking? Less than monthly   6.  How often during the last year have you needed a first drink in the morning to get yourself going after a heavy drinking session? Never   7.  How often during the last year have you had a feeling of guilt or remorse after drinking? Less than monthly   8.  How often during the last year have you been unable to remember what happened the night before because of your drinking? Less than monthly   9.  Have you or someone else been injured because of your drinking? No   10. Has a relative, friend, doctor or other health care worker been concerned about your drinking or suggested you cut down? No   TOTAL SCORE 9           Diabetes Follow-up    How often are you checking your blood sugar? A few times a month  What time of day are you checking your blood sugars (select all that apply)?  Before meals  Have you had any blood sugars above 200?  No  Have you had any blood sugars below 70?  No    What symptoms do you notice when your blood sugar is low?  None    What concerns do you have today about your diabetes? None     Do you have any of these symptoms? (Select all that apply)  Doing well      BP Readings from Last 2 Encounters:   08/16/21 138/76   07/27/21 134/78     Hemoglobin A1C (%)   Date Value   08/16/2021 5.9 (H)   04/19/2021 5.8 (H)   12/29/2020 5.8 (H)     LDL Cholesterol Calculated (mg/dL)   Date Value   04/19/2021 45   04/13/2020 54         Vascular Disease Follow-up      How often do you take nitroglycerin? Never    Do you take an aspirin every day?  Yes        Anxiety Follow-Up    How are you doing with your anxiety since your last visit? Worsened     Are you having other symptoms that might be associated with anxiety? Yes:  see ARACELI    Have you had a significant life event? Health Concerns     Are you feeling depressed? Occasionally as Not able to go out due To Health issues and Memory    Daughter Lives with her and Drives    She has Home Health who checks Her INR    Do you have any concerns with your use of alcohol or other drugs? No     CHF is stable     No sob    No weight gain    Doing well    Social History     Tobacco Use     Smoking status: Current Every Day Smoker     Packs/day: 1.00     Years: 59.00     Pack years: 59.00     Start date: 1962     Last attempt to quit: 2020     Years since quittin.6     Smokeless tobacco: Never Used   Substance Use Topics     Alcohol use: Not Currently     Comment: 2 glasses of wine a week     Drug use: Yes     Types: Marijuana     Comment: occasional     ARACELI-7 SCORE 2021   Total Score 15     PHQ 3/13/2019 2020 2021   PHQ-9 Total Score 3 4 2   Q9: Thoughts of better off dead/self-harm past 2 weeks Not at all Not at all Not at all     Last PHQ-9 2021   1.  Little interest or pleasure in doing things 0   2.  Feeling down, depressed, or hopeless 0   3.  Trouble falling or staying asleep, or sleeping too much 0   4.  Feeling tired or having little energy 1   5.  Poor appetite or overeating 0   6.  Feeling bad about yourself 0   7.  Trouble concentrating 1   8.  Moving slowly or restless 0   Q9: Thoughts of better off dead/self-harm past 2 weeks 0   PHQ-9 Total Score 2   Difficulty at work, home, or with people Somewhat difficult     ARACELI-7  2021   1. Feeling nervous, anxious, or on edge 1   2. Not being able to stop or control worrying 2   3. Worrying too much about different things 2   4. Trouble relaxing 3   5. Being so restless that it is hard to sit still 3   6. Becoming easily  annoyed or irritable 2   7. Feeling afraid, as if something awful might happen 2   ARACELI-7 Total Score 15   If you checked any problems, how difficult have they made it for you to do your work, take care of things at home, or get along with other people? Very difficult     Current providers sharing in care for this patient include:   Patient Care Team:  Tamiko Coley MD as PCP - General (Family Practice)  Tamiko Coley MD as Assigned PCP  Lora Pal MD as MD (Cardiology)  Hugo Patrick MD as Assigned OBGYN Provider  Edilberto Damon MD as Assigned Surgical Provider  [facility], [facility] ([facility]  Klaudia Lopez RT as Chronic Pulmonary Disease Specialist (Respiratory Therapy)  Bin Alcaraz MD as Assigned Neuroscience Provider  Thanh Morse MD as Assigned Heart and Vascular Provider      COPD Follow-Up    Overall, how are your COPD symptoms since your last clinic visit?  Better    How much fatigue or shortness of breath do you have when you are walking?  Same as usual    How much shortness of breath do you have when you are resting?  Same as usual    How often do you cough? Never    Have you noticed any change in your sputum/phlegm?  No    Have you experienced a recent fever? No    Please describe how far you can walk without stopping to rest:  2-5 blocks    How many flights of stairs are you able to walk up without stopping?  None    Have you had any Emergency Room Visits, Urgent Care Visits, or Hospital Admissions because of your COPD since your last office visit?  No    History   Smoking Status     Current Every Day Smoker     Packs/day: 1.00     Years: 59.00     Start date: 12/20/1962     Last attempt to quit: 12/26/2020   Smokeless Tobacco     Never Used     No results found for: FEV1, [id number]  Pt states she is able To walk with assistance and with winter coming up she wants a handicap form  She does not Drive But daughter can park closer  The following health  maintenance items are reviewed in Epic and correct as of today:  Health Maintenance Due   Topic Date Due     COPD ACTION PLAN  Never done     ZOSTER IMMUNIZATION (2 of 3) 01/19/2016     Lab work is in process  Labs reviewed in EPIC  BP Readings from Last 3 Encounters:   08/16/21 138/76   07/27/21 134/78   07/22/21 (!) 152/80    Wt Readings from Last 3 Encounters:   08/16/21 70.8 kg (156 lb)   07/27/21 68.9 kg (152 lb)   07/22/21 68.9 kg (152 lb)                  Patient Active Problem List   Diagnosis     Hyperlipidemia LDL goal <70     Age-related osteoporosis without current pathological fracture     Insomnia, unspecified type     Smoker     History of partial thyroidectomy     Essential hypertension     PVD (peripheral vascular disease) (H)     Claudication of left lower extremity (H)     History of anemia     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 3/4/2019      Other cardiomyopathies (H)     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)     Fatigue     SOB (shortness of breath)     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)     Anemia, iron deficiency     Status post coronary angiogram     S/P MVR (mitral valve repair)     S/P AVR (aortic valve replacement)     UTI (urinary tract infection)     Trigger middle finger of right hand     Personal history of colonic polyps     Sensorineural hearing loss, bilateral     COPD (chronic obstructive pulmonary disease) (H)     History of CT scan of head 2021     Frailty     Atrial fibrillation (H)     Current use of long term anticoagulation     History of GI bleed     PAD (peripheral artery disease) (H)     PAF (paroxysmal atrial fibrillation) (H)     Memory problem     Type 2 diabetes mellitus with other circulatory complications (H)     Anxiety     Type 2 diabetes mellitus with other circulatory complication, without long-term current use of insulin (H)     Past Surgical History:   Procedure Laterality Date     COLONOSCOPY N/A 9/4/2019     Procedure: COLONOSCOPY;  Surgeon: Marie Todd MD;  Location:  GI     CV CORONARY ANGIOGRAM N/A 2020    Procedure: CV CORONARY ANGIOGRAM;  Surgeon: Joey Rivas MD;  Location:  HEART CARDIAC CATH LAB     CV FRACTIONAL FLOW RATIO WIRE N/A 2020    Procedure: Fractional Flow Reserve;  Surgeon: Joey Rivas MD;  Location:  HEART CARDIAC CATH LAB     CV RIGHT HEART CATH MEASUREMENTS RECORDED N/A 2020    Procedure: CV RIGHT HEART CATH;  Surgeon: Joey Rivas MD;  Location:  HEART CARDIAC CATH LAB     ESOPHAGOSCOPY, GASTROSCOPY, DUODENOSCOPY (EGD), COMBINED N/A 2019    Procedure: ESOPHAGOGASTRODUODENOSCOPY (EGD);  Surgeon: Marie Todd MD;  Location:  GI     ESOPHAGOSCOPY, GASTROSCOPY, DUODENOSCOPY (EGD), COMBINED N/A 2020    Procedure: ESOPHAGOGASTRODUODENOSCOPY (EGD);  Surgeon: Ten Ibrahim DO;  Location:  GI     IR ANGIOGRAM THROUGH CATHETER FOLLOW UP  3/5/2019     IR LOWER EXTREMITY ANGIOGRAM LEFT  3/4/2019     KNEE SURGERY Right     right knee torn meniscus surgery     OVARY SURGERY  1971    1 ovary removed     RELEASE CARPAL TUNNEL Right      RELEASE TRIGGER FINGER BILATERAL       REPLACE VALVES AORTIC AND MITRAL, COMBINED N/A 2020    Procedure: Median Stertonomy, REPLACEMENT AORTIC Valve  with 19mm St Abdoulaye [place]  Mechanical Heart Valve AND MITRAL VALVE Replacement with 27mm St Abdoulaye Mechanical Heart Valve, on pump oxygenation;  Surgeon: Luc Lara MD;  Location:  OR     SHOULDER SURGERY Left     rotator cuff repair, plate placement     THYROID SURGERY      partial thyroidectomy       Social History     Tobacco Use     Smoking status: Current Every Day Smoker     Packs/day: 1.00     Years: 59.00     Pack years: 59.00     Start date: 1962     Last attempt to quit: 2020     Years since quittin.6     Smokeless tobacco: Never Used   Substance Use Topics     Alcohol use: Not Currently      Comment: 2 glasses of wine a week     Family History   Problem Relation Age of Onset     Diabetes Type 2  Mother      Lung Cancer Father      Diabetes Sister      Coronary Artery Disease Sister      Diabetes Brother      Diabetes Brother      Diabetes Type 2  Brother      Coronary Artery Disease Sister      Asthma Sister      Glaucoma No family hx of      Macular Degeneration No family hx of      Anesthesia Reaction No family hx of          Current Outpatient Medications   Medication Sig Dispense Refill     acetaminophen (TYLENOL) 325 MG tablet TAKE 2 TABLETS (650 MG) BY MOUTH EVERY 4 HOURS AS NEEDED FOR PAIN       aspirin (ASA) 81 MG EC tablet Take 1 tablet (81 mg) by mouth daily 100 tablet 3     atorvastatin (LIPITOR) 40 MG tablet Take 1 tablet (40 mg) by mouth daily 90 tablet 3     buPROPion (WELLBUTRIN SR) 150 MG 12 hr tablet Take 1 tablet (150 mg) by mouth 2 times daily 60 tablet 6     Calcium Carb-Cholecalciferol 600-800 MG-UNIT TABS Take 1 tablet by mouth 2 times daily 1 tablet 0     citalopram (CELEXA) 10 MG tablet Take 1 tablet (10 mg) by mouth daily 30 tablet 6     ferrous sulfate (FEROSUL) 325 (65 Fe) MG tablet Take 325 mg by mouth 2 times daily       glucosamine-chondroitinoitin 500-400 MG tablet Patient is taking 1500 mg OTC 1 time daily       losartan (COZAAR) 25 MG tablet Take 1 tablet (25 mg) by mouth daily 90 tablet 3     Melatonin 10 MG TABS tablet Take 10 mg by mouth At Bedtime       metFORMIN (GLUCOPHAGE) 1000 MG tablet TAKE 1 TABLET BY MOUTH TWICE A DAY WITH MEALS 180 tablet 1     Multiple Vitamin (MULTI-VITAMINS) TABS Take 1 tablet by mouth daily        oxybutynin (DITROPAN) 5 MG tablet TAKE 1 TABLET BY MOUTH TWICE A  tablet 3     pantoprazole (PROTONIX) 40 MG EC tablet TAKE 1 TABLET BY MOUTH EVERY DAY 90 tablet 1     traZODone (DESYREL) 50 MG tablet TAKE 1-2 TABLETS ( MG) BY MOUTH AT BEDTIME 180 tablet 3     vitamin C (ASCORBIC ACID) 500 MG tablet Take 1 tablet (500 mg) by mouth  daily 100 tablet 1     warfarin ANTICOAGULANT (COUMADIN) 2 MG tablet Take 2 tabs (4 mg) by mouth Mon, Wed, Fri and 3 tabs (6mg) all other days or as directed by your [facility] 270 tablet 1     Allergies   Allergen Reactions     Indomethacin Other (See Comments)     Dizziness and disorientation     Tramadol Nausea and Vomiting     Recent Labs   Lab Test 08/16/21  1508 07/22/21  2250 06/21/21  1208 04/19/21  1518 03/02/21  1114 01/02/21  0346 01/01/21  0307 12/30/20  0317 12/29/20  1735 04/13/20  0914 03/05/19  0605 03/04/19  0930   A1C 5.9*  --   --  5.8*  --   --   --   --  5.8* 5.8*   < > 6.5*   LDL  --   --   --  45  --   --   --   --   --  54  --  44   HDL  --   --   --  56  --   --   --   --   --  58  --  59   TRIG  --   --   --  170*  --   --   --   --   --  66  --  94   ALT  --   --   --   --   --  15 14 20  --  15  --   --    CR  --  0.80 0.70  --  0.64 0.56 0.61 0.59  --  0.60  --  0.56   GFRESTIMATED  --  75 88  --  >90 >90 >90 >90  --  >90  --  >90   GFRESTBLACK  --   --  >90  --  >90 >90 >90 >90  --  >90  --  >90   POTASSIUM  --  3.8 4.5  --  4.6 3.6 3.6 3.7 4.3 3.6  --   --    TSH  --   --  4.58*  --  5.26*  --   --   --   --   --    < >  --     < > = values in this interval not displayed.      Pt UTD with mammogram    FHS-7: No flowsheet data found.      Pertinent mammograms are reviewed under the imaging tab.    Review of Systems   Constitutional: Negative for chills and fever.   HENT: Positive for hearing loss. Negative for congestion, ear pain and sore throat.    Eyes: Negative for pain and visual disturbance.   Respiratory: Negative for cough and shortness of breath.    Cardiovascular: Positive for peripheral edema. Negative for chest pain and palpitations.   Gastrointestinal: Negative for abdominal pain, constipation, diarrhea, heartburn, hematochezia and nausea.   Breasts:  Negative for tenderness, breast mass and discharge.   Genitourinary: Positive for urgency. Negative for dysuria, frequency,  "genital sores, hematuria, pelvic pain, vaginal bleeding and vaginal discharge.   Musculoskeletal: Positive for arthralgias, joint swelling and myalgias.   Skin: Negative for rash.   Neurological: Negative for dizziness, weakness, headaches and paresthesias.   Psychiatric/Behavioral: Negative for mood changes. The patient is nervous/anxious.    Rest of the ROS is Negative except see above and Problem list [stable]      OBJECTIVE:   /76   Pulse 89   Temp 98.8  F (37.1  C) (Oral)   Resp 18   Ht 1.626 m (5' 4\")   Wt 70.8 kg (156 lb)   SpO2 99%   BMI 26.78 kg/m   Estimated body mass index is 26.78 kg/m  as calculated from the following:    Height as of this encounter: 1.626 m (5' 4\").    Weight as of this encounter: 70.8 kg (156 lb).  Physical Exam  GENERAL APPEARANCE: alert, no distress, elderly and frail  EYES: Eyes grossly normal to inspection, PERRL and conjunctivae and sclerae normal  HENT: ear canals and TM's normal, nose and mouth without ulcers or lesions, oropharynx clear and oral mucous membranes moist  NECK: no adenopathy, no asymmetry, masses, or scars and thyroid normal to palpation  RESP: lungs clear to auscultation - no rales, rhonchi or wheezes  BREAST: normal without masses, tenderness or nipple discharge and no palpable axillary masses or adenopathy  CV: regular rate and rhythm, normal S1 S2, no S3 or S4, no murmur, click or rub,  and peripheral pulses strong  1 + pedal edema  ABDOMEN: soft, nontender, no hepatosplenomegaly, no masses and bowel sounds normal  MS: no musculoskeletal defects are noted and gait is age appropriate without ataxia  SKIN: no suspicious lesions or rashes  NEURO: Normal strength and tone, sensory exam grossly normal, mentation intact and speech normal  PSYCH: anxious    Diagnostic Test Results:  Labs reviewed in Epic  Pending     ASSESSMENT / PLAN:   1. Encounter for Medicare annual wellness exam      2. Chronic obstructive pulmonary disease, unspecified COPD type " (H)  Stable     3. Type 2 diabetes mellitus with other circulatory complication, without long-term current use of insulin (H)  Labs pending   - Hemoglobin A1c  - metFORMIN (GLUCOPHAGE) 1000 MG tablet; TAKE 1 TABLET BY MOUTH TWICE A DAY WITH MEALS  Dispense: 180 tablet; Refill: 1    4. Anxiety  Add Celexa  Follow up 2 months  SEE EPIC care orders  The potential side effects of this medication have been discussed with the patient.  Call if any significant problems with these are experienced.    - buPROPion (WELLBUTRIN SR) 150 MG 12 hr tablet; Take 1 tablet (150 mg) by mouth 2 times daily  Dispense: 60 tablet; Refill: 6  - citalopram (CELEXA) 10 MG tablet; Take 1 tablet (10 mg) by mouth daily  Dispense: 30 tablet; Refill: 6    5. S/P AVR (aortic valve replacement)  Stable   - warfarin ANTICOAGULANT (COUMADIN) 2 MG tablet; Take 2 tabs (4 mg) by mouth Mon, Wed, Fri and 3 tabs (6mg) all other days or as directed by your [facility]  Dispense: 270 tablet; Refill: 1    6. Hypertension goal BP (blood pressure) < 140/90  controlled  - losartan (COZAAR) 25 MG tablet; Take 1 tablet (25 mg) by mouth daily  Dispense: 90 tablet; Refill: 3    7. PVD (peripheral vascular disease) (H)  Stable     8. PAF (paroxysmal atrial fibrillation) (H)  Labs pending   - CBC with platelets; Future  - CBC with platelets    9. Memory problem  Has appointment with neuro    10. Hyperlipidemia LDL goal <70  Labs reviewed     11. History of GI bleed  Doing well now    12. 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, unspecified heart failure type (H)  Stable     13. S/P MVR (mitral valve repair)  Stable     14. Personal history of tobacco use  Discussed qutting-Pt declines  - Prof fee: Shared Decisionmaking for Lung Cancer Screening  - CT Chest Lung Cancer Scrn Low Dose wo; Future    15. Insomnia, unspecified type  refilled  - traZODone (DESYREL) 50 MG tablet; TAKE 1-2 TABLETS ( MG) BY MOUTH AT BEDTIME  Dispense: 180 tablet; Refill: 3    Patient has been  "advised of split billing requirements and indicates understanding: handouts  COUNSELING:  Reviewed preventive health counseling, as reflected in patient instructions       Regular exercise       Healthy diet/nutrition       Vision screening       Hearing screening       Dental care       Bladder control       Fall risk prevention       Advanced Planning     Estimated body mass index is 26.78 kg/m  as calculated from the following:    Height as of this encounter: 1.626 m (5' 4\").    Weight as of this encounter: 70.8 kg (156 lb).        She reports that she has been smoking. She started smoking about 58 years ago. She has a 59.00 pack-year smoking history. She has never used smokeless tobacco.  Tobacco Cessation Action Plan:   Information offered: Patient not interested at this time      Appropriate preventive services were discussed with this patient, including applicable screening as appropriate for cardiovascular disease, diabetes, osteopenia/osteoporosis, and glaucoma.  As appropriate for age/gender, discussed screening for colorectal cancer, prostate cancer, breast cancer, and cervical cancer. Checklist reviewing preventive services available has been given to the patient.    Reviewed patients plan of care and provided an AVS. The Complex Care Plan (for patients with higher acuity and needing more deliberate coordination of services) for Rajani meets the Care Plan requirement. This Care Plan has been established and reviewed with the Patient.    Counseling Resources:  ATP IV Guidelines  Pooled Cohorts Equation Calculator  Breast Cancer Risk Calculator  Breast Cancer: Medication to Reduce Risk  FRAX Risk Assessment  ICSI Preventive Guidelines  Dietary Guidelines for Americans, 2010  USDA's MyPlate  ASA Prophylaxis  Lung CA Screening    Tamiko Coley MD  [facility]    Identified Health Risks:    The patient was provided with suggestions to help her develop a healthy physical lifestyle.  She is " at risk for lack of exercise and has been provided with information to increase physical activity for the benefit of her well-being.  The patient reports that she drinks more than one alcoholic drink per day and sometimes engages in binge or excessive drinking. She was counseled and given information about possible harmful effects of excessive alcohol intake as well as where to get help for alcohol problems.  The patient was counseled and encouraged to consider modifying their diet and eating habits. She was provided with information on recommended healthy diet options.  The patient reports that she has difficulty with activities of daily living. I have asked that the patient make a follow up appointment in 8 weeks where this issue will be further evaluated and addressed.  The patient was provided with written information regarding signs of hearing loss.  Information on urinary incontinence and treatment options given to patient.  The patient was provided with suggestions to help her develop a healthy emotional lifestyle.  She is at risk for falling and has been provided with information to reduce the risk of falling at home.  Lung Cancer Screening Shared Decision Making Visit     Rajani Guo is eligible for lung cancer screening on the basis of the information provided in my signed lung cancer screening order.     I have discussed with patient the risks and benefits of screening for lung cancer with low-dose CT.     The risks include:  radiation exposure: one low dose chest CT has as much ionizing radiation as about 15 chest x-rays or 6 months of background radiation living in [place]    false positives: 96% of positive findings/nodules are NOT cancer, but some might still require additional diagnostic evaluation, including biopsy  over-diagnosis: some slow growing cancers that might never have been clinically significant will be detected and treated unnecessarily     The benefit of early detection of lung  cancer is contingent upon adherence to annual screening or more frequent follow up if indicated.     Furthermore, reaping the benefits of screening requires Rajani Guo to be willing and physically able to undergo diagnostic procedures, if indicated. Although no specific guide is available for determining severity of comorbidities, it is reasonable to withhold screening in patients who have greater mortality risk from other diseases.     We did discuss that the only way to prevent lung cancer is to not smoke. Smoking cessation counseling was given, duration < 3 minutes.

## 2021-08-16 NOTE — PATIENT INSTRUCTIONS
Patient Education   Personalized Prevention Plan  You are due for the preventive services outlined below.  Your care team is available to assist you in scheduling these services.  If you have already completed any of these items, please share that information with your care team to update in your medical record.  Health Maintenance Due   Topic Date Due     ANNUAL REVIEW OF HM ORDERS  Never done     COPD Action Plan  Never done     Zoster (Shingles) Vaccine (2 of 3) 01/19/2016     Your Health Risk Assessment indicates you feel you are not in good health    A healthy lifestyle helps keep the body fit and the mind alert. It helps protect you from disease, helps you fight disease, and helps prevent chronic disease (disease that doesn't go away) from getting worse. This is important as you get older and begin to notice twinges in muscles and joints and a decline in the strength and stamina you once took for granted. A healthy lifestyle includes good healthcare, good nutrition, weight control, recreation, and regular exercise. Avoid harmful substances and do what you can to keep safe. Another part of a healthy lifestyle is stay mentally active and socially involved.    Good healthcare     Have a wellness visit every year.     If you have new symptoms, let us know right away. Don't wait until the next checkup.     Take medicines exactly as prescribed and keep your medicines in a safe place. Tell us if your medicine causes problems.   Healthy diet and weight control     Eat 3 or 4 small, nutritious, low-fat, high-fiber meals a day. Include a variety of fruits, vegetables, and whole-grain foods.     Make sure you get enough calcium in your diet. Calcium, vitamin D, and exercise help prevent osteoporosis (bone thinning).     If you live alone, try eating with others when you can. That way you get a good meal and have company while you eat it.     Try to keep a healthy weight. If you eat more calories than your body uses for  energy, it will be stored as fat and you will gain weight.     Recreation   Recreation is not limited to sports and team events. It includes any activity that provides relaxation, interest, enjoyment, and exercise. Recreation provides an outlet for physical, mental, and social energy. It can give a sense of worth and achievement. It can help you stay healthy.    Mental Exercise and Social Involvement  Mental and emotional health is as important as physical health. Keep in touch with friends and family. Stay as active as possible. Continue to learn and challenge yourself.   Things you can do to stay mentally active are:    Learn something new, like a foreign language or musical instrument.     Play SCRABBLE or do crossword puzzles. If you cannot find people to play these games with you at home, you can play them with others on your computer through the Internet.     Join a games club--anything from card games to chess or checkers or lawn bowling.     Start a new hobby.     Go back to school.     Volunteer.     Read.   Keep up with world events.    Exercise for a Healthier Heart  You may wonder how you can improve the health of your heart. If you re thinking about exercise, you re on the right track. You don t need to become an athlete. But you do need a certain amount of brisk exercise to help strengthen your heart. If you have been diagnosed with a heart condition, your healthcare provider may advise exercise to help stabilize your condition. To help make exercise a habit, choose safe, fun activities.      Exercise with a friend. When activity is fun, you're more likely to stick with it.   Before you start  Check with your healthcare provider before starting an exercise program. This is especially important if you have not been active for a while. It's also important if you have a long-term (chronic) health problem such as heart disease, diabetes, or obesity. Or if you are at high risk for having these problems.    Why exercise?  Exercising regularly offers many healthy rewards. It can help you do all of the following:     Improve your blood cholesterol level to help prevent further heart trouble    Lower your blood pressure to help prevent a stroke or heart attack    Control diabetes, or reduce your risk of getting this disease    Improve your heart and lung function    Reach and stay at a healthy weight    Make your muscles stronger so you can stay active    Prevent falls and fractures by slowing the loss of bone mass (osteoporosis)    Manage stress better    Reduce your blood pressure    Improve your sense of self and your body image  Exercise tips      Ease into your routine. Set small goals. Then build on them. If you are not sure what your activity level should be, talk with your healthcare provider first before starting an exercise routine.    Exercise on most days. Aim for a total of 150 minutes (2 hours and 30 minutes) or more of moderate-intensity aerobic activity each week. Or 75 minutes (1 hour and 15 minutes) or more of vigorous-intensity aerobic activity each week. Or try for a combination of both. Moderate activity means that you breathe heavier and your heart rate increases but you can still talk. Think about doing 40 minutes of moderate exercise, 3 to 4 times a week. For best results, activity should last for about 40 minutes to lower blood pressure and cholesterol. It's OK to work up to the 40-minute period over time. Examples of moderate-intensity activity are walking 1 mile in 15 minutes. Or doing 30 to 45 minutes of yard work.    Step up your daily activity level.  Along with your exercise program, try being more active the whole day. Walk instead of drive. Or park further away so that you take more steps each day. Do more household tasks or yard work. You may not be able to meet the advised mount of physical activity. But doing some moderate- or vigorous-intensity aerobic activity can help reduce your  risk for heart disease. Your healthcare provider can help you figure out what is best for you.    Choose 1 or more activities you enjoy.  Walking is one of the easiest things you can do. You can also try swimming, riding a bike, dancing, or taking an exercise class.    When to call your healthcare provider  Call your healthcare provider if you have any of these:     Chest pain or feel dizzy or lightheaded    Burning, tightness, pressure, or heaviness in your chest, neck, shoulders, back, or arms    Abnormal shortness of breath    More joint or muscle pain    A very fast or irregular heartbeat (palpitations)  [employer] last reviewed this educational content on 7/1/2019 2000-2021 [employer]. All rights reserved. This information is not intended as a substitute for professional medical care. Always follow your healthcare professional's instructions.         Patient Education   Alcohol Use     Many people can enjoy a glass of wine or beer without any negative consequences to their health. According to the Centers for Disease Control and Prevention (CDC), having one or fewer drinks per day for women and two or fewer per day for men is considered moderate drinking.     When people drink more than moderately, it can become concerning. Excessive drinking is defined as consuming 15 drinks or more per week for men and 8 drinks or more per week for women. There are various health problems associated with excessive drinking, which include:    Damage to vital organs like the heart, brain, liver and pancreas    Harm to the digestive tract    Weaken the immune system    Higher risk for heart disease and cancer    There are many resources available to people who are addicted to alcohol. A counselor or other health care provider can give you support. So can a , , or rabbi who is trained in substance abuse counseling. Friends and family may also help once you are connected with professionals.            Understanding USDA MyPlate  The USDA has guidelines to help you make healthy food choices. These are called MyPlate. MyPlate shows the food groups that make up healthy meals using the image of a place setting. Before you eat, think about the healthiest choices for what to put on your plate or in your cup or bowl. To learn more about building a healthy plate, visit www.choosemyplate.gov.    The food groups    Fruits. Any fruit or 100% fruit juice counts as part of the Fruit Group. Fruits may be fresh, canned, frozen, or dried, and may be whole, cut-up, or pureed. Make 1/2 of your plate fruits and vegetables.    Vegetables. Any vegetable or 100% vegetable juice counts as a member of the Vegetable Group. Vegetables may be fresh, frozen, canned, or dried. They can be served raw or cooked and may be whole, cut-up, or mashed. Make 1/2 of your plate fruits and vegetables.    Grains. All foods made from grains are part of the Grains Group. These include wheat, rice, oats, cornmeal, and barley. Grains are often used to make foods such as bread, pasta, oatmeal, cereal, tortillas, and grits. Grains should be no more than 1/4 of your plate. At least half of your grains should be whole grains.    Protein. This group includes meat, poultry, seafood, beans and peas, eggs, processed soy products (such as tofu), nuts (including nut butters), and seeds. Make protein choices no more than 1/4 of your plate. Meat and poultry choices should be lean or low fat.    Dairy. The Dairy Group includes all fluid milk products and foods made from milk that contain calcium, such as yogurt and cheese. (Foods that have little calcium, such as cream, butter, and cream cheese, are not part of this group.) Most dairy choices should be low-fat or fat-free.    Oils. Oils aren't a food group, but they do contain essential nutrients. However it's important to watch your intake of oils. These are fats that are liquid at room temperature. They include  canola, corn, olive, soybean, vegetable, and sunflower oil. Foods that are mainly oil include mayonnaise, certain salad dressings, and soft margarines. You likely already get your daily oil allowance from the foods you eat.  Things to limit  Eating healthy also means limiting these things in your diet:       Salt (sodium). Many processed foods have a lot of sodium. To keep sodium intake down, eat fresh vegetables, meats, poultry, and seafood when possible. Purchase low-sodium, reduced-sodium, or no-salt-added food products at the store. And don't add salt to your meals at home. Instead, season them with herbs and spices such as dill, oregano, cumin, and paprika. Or try adding flavor with lemon or lime zest and juice.    Saturated fat. Saturated fats are most often found in animal products such as beef, pork, and chicken. They are often solid at room temperature, such as butter. To reduce your saturated fat intake, choose leaner cuts of meat and poultry. And try healthier cooking methods such as grilling, broiling, roasting, or baking. For a simple lower-fat swap, use plain nonfat yogurt instead of mayonnaise when making potato salad or macaroni salad.    Added sugars. These are sugars added to foods. They are in foods such as ice cream, candy, soda, fruit drinks, sports drinks, energy drinks, cookies, pastries, jams, and syrups. Cut down on added sugars by sharing sweet treats with a family member or friend. You can also choose fruit for dessert, and drink water or other unsweetened beverages.     [email or web address] last reviewed this educational content on 6/1/2020 2000-2021 [employer]. All rights reserved. This information is not intended as a substitute for professional medical care. Always follow your healthcare professional's instructions.        Activities of Daily Living    Your Health Risk Assessment indicates you have difficulties with activities of daily living such as housework, bathing, preparing  meals, taking medication, etc. Please make a follow up appointment for us to address this issue in more detail.    Signs of Hearing Loss      Hearing much better with one ear can be a sign of hearing loss.   Hearing loss is a problem shared by many people. In fact, it is one of the most common health problems, particularly as people age. Most people age 65 and older have some hearing loss. By age 80, almost everyone does. Hearing loss often occurs slowly over the years. So you may not realize your hearing has gotten worse.  Have your hearing checked  Call your healthcare provider if you:    Have to strain to hear normal conversation    Have to watch other people s faces very carefully to follow what they re saying    Need to ask people to repeat what they ve said    Often misunderstand what people are saying    Turn the volume of the television or radio up so high that others complain    Feel that people are mumbling when they re talking to you    Find that the effort to hear leaves you feeling tired and irritated    Notice, when using the phone, that you hear better with one ear than the other  [employer] last reviewed this educational content on 1/1/2020 2000-2021 [employer]. All rights reserved. This information is not intended as a substitute for professional medical care. Always follow your healthcare professional's instructions.          Urinary Incontinence, Female (Adult)   Urinary incontinence means loss of bladder control. This problem affects many women, especially as they get older. If you have incontinence, you may be embarrassed to ask for help. But know that this problem can be treated.   Types of Incontinence  There are different types of incontinence. Two of the main types are described here. You can have more than one type.     Stress incontinence. With this type, urine leaks when pressure (stress) is put on the bladder. This may happen when you cough, sneeze, or laugh. Stress  incontinence most often occurs because the pelvic floor muscles that support the bladder and urethra are weak. This can happen after pregnancy and vaginal childbirth or a hysterectomy. It can also be due to excess body weight or hormone changes.    Urge incontinence (also called overactive bladder). With this type, a sudden urge to urinate is felt often. This may happen even though there may not be much urine in the bladder. The need to urinate often during the night is common. Urge incontinence most often occurs because of bladder spasms. This may be due to bladder irritation or infection. Damage to bladder nerves or pelvic muscles, constipation, and certain medicines can also lead to urge incontinence.  Treatment depends on the cause. Further evaluation is needed to find the type you have. This will likely include an exam and certain tests. Based on the results, you and your healthcare provider can then plan treatment. Until a diagnosis is made, the home care tips below can help ease symptoms.   Home care    Do pelvic floor muscle exercises, if they are prescribed. The pelvic floor muscles help support the bladder and urethra. Many women find that their symptoms improve when doing special exercises that strengthen these muscles. To do the exercises, contract the muscles you would use to stop your stream of urine. But do this when you re not urinating. Hold for 10 seconds, then relax. Repeat 10 to 20 times in a row, at least 3 times a day. Your healthcare provider may give you other instructions for how to do the exercises and how often.    Keep a bladder diary. This helps track how often and how much you urinate over a set period of time. Bring this diary with you to your next visit with the provider. The information can help your provider learn more about your bladder problem.    Lose weight, if advised to by your provider. Extra weight puts pressure on the bladder. Your provider can help you create a weight-loss  plan that s right for you. This may include exercising more and making certain diet changes.    Don't have foods and drinks that may irritate the bladder. These can include alcohol and caffeinated drinks.    Quit smoking. Smoking and other tobacco use can lead to a long-term (chronic) cough that strains the pelvic floor muscles. Smoking may also damage the bladder and urethra. Talk with your provider about treatments or methods you can use to quit smoking.    If drinking large amounts of fluid makes you have symptoms, you may be advised to limit your fluid intake. You may also be advised to drink most of your fluids during the day and to limit fluids at night.    If you re worried about urine leakage or accidents, you may wear absorbent pads to catch urine. Change the pads often. This helps reduce discomfort. It may also reduce the risk of skin or bladder infections.    Follow-up care  Follow up with your healthcare provider, or as directed. It may take some to find the right treatment for your problem. But healthy lifestyle changes can be made right away. These include such things as exercising on a regular basis, eating a healthy diet, losing weight (if needed), and quitting smoking. Your treatment plan may include special therapies or medicines. Certain procedures or surgery may also be options. Talk about any questions you have with your provider.   When to seek medical advice  Call the healthcare provider right away if any of these occur:    Fever of 100.4 F (38 C) or higher, or as directed by your provider    Bladder pain or fullness    Belly swelling    Nausea or vomiting    Back pain    Weakness, dizziness, or fainting  [employer] last reviewed this educational content on 1/1/2020 2000-2021 [employer]. All rights reserved. This information is not intended as a substitute for professional medical care. Always follow your healthcare professional's instructions.        Your Health Risk  Assessment indicates you feel you are not in good emotional health.    Recreation   Recreation is not limited to sports and team events. It includes any activity that provides relaxation, interest, enjoyment, and exercise. Recreation provides an outlet for physical, mental, and social energy. It can give a sense of worth and achievement. It can help you stay healthy.    Mental Exercise and Social Involvement  Mental and emotional health is as important as physical health. Keep in touch with friends and family. Stay as active as possible. Continue to learn and challenge yourself.   Things you can do to stay mentally active are:    Learn something new, like a foreign language or musical instrument.     Play SCRABBLE or do crossword puzzles. If you cannot find people to play these games with you at home, you can play them with others on your computer through the Internet.     Join a games club--anything from card games to chess or checkers or lawn bowling.     Start a new hobby.     Go back to school.     Volunteer.     Read.   Keep up with world events.    Preventing Falls at Home  A person can fall for many reasons. Older adults may fall because reaction time slows down as we age. Your muscles and joints may get stiff, weak, or less flexible because of illness, medicines, or a physical condition.   Other health problems that make falls more likely include:     Arthritis    Dizziness or lightheadedness when you stand up (orthostatic hypotension)    History of a stroke    Dizziness    Anemia    Certain medicines taken for mental illness or to control blood pressure.    Problems with balance or gait    Bladder or urinary problems    History of falling    Changes in vision (vision impairment)    Changes in thinking skills and memory (cognitive impairment)  Injuries from a fall can include serious injuries such as broken bones, dislocated joints, internal bleeding and cuts. Injuries like these can limit your independence.    Prevention tips  To help prevent falls and fall-related injuries, follow the tips below.    Floors  To make floors safer:     Put nonskid pads under area rugs.    Remove small rugs.    Replace worn floor coverings.    Tack carpets firmly to each step on carpeted stairs. Put nonskid strips on the edges of uncarpeted stairs.    Keep floors and stairs free of clutter and cords.    Arrange furniture so there are clear pathways.    Clean up any spills right away.  Bathrooms    To make bathrooms safer:     Install grab bars in the tub or shower.    Apply nonskid strips or put a nonskid rubber mat in the tub or shower.    Sit on a bath chair to bathe.    Use bathmats with nonskid backing.  Lighting  To improve visibility in your home:      Keep a flashlight in each room. Or put a lamp next to the bed within easy reach.    Put nightlights in the bedrooms, hallways, kitchen, and bathrooms.    Make sure all stairways have good lighting.    Take your time when going up and down stairs.    Put handrails on both sides of stairs and in walkways for more support. To prevent injury to your wrist or arm, don t use handrails to pull yourself up.    Install grab bars to pull yourself up.    Move or rearrange items that you use often. This will make them easier to find or reach.    Look at your home to find any safety hazards. Especially look at doorways, walkways, and the driveway. Remove or repair any safety problems that you find.  Other changes to make    Look around to find any safety hazards. Look closely at doorways, walkways, and the driveway. Remove or repair any safety problems that you find.    Wear shoes that fit well.    Take your time when going up and down stairs.    Put handrails on both sides of stairs and in walkways for more support. To prevent injury to your wrist or arm, don t use handrails to pull yourself up.    Install grab bars wherever needed to pull yourself up.    Arrange items that you use often. This  will make them easier to find or reach.    [employer] last reviewed this educational content on 3/1/2020    8293-5402 [employer]. All rights reserved. This information is not intended as a substitute for professional medical care. Always follow your healthcare professional's instructions.           Lung Cancer Screening   Frequently Asked Questions  If you are at high-risk for lung cancer, getting screened with low-dose computed tomography (LDCT) every year can help save your life. This handout offers answers to some of the most common questions about lung cancer screening. If you have other questions, please call [phone] ([phone]).     What is it?  Lung cancer screening uses special X-ray technology to create an image of your lung tissue. The exam is quick and easy and takes less than 10 seconds. We don t give you any medicine or use any needles. You can eat before and after the exam. You don t need to change your clothes as long as the clothing on your chest doesn t contain metal. But, you do need to be able to hold your breath for at least 6 seconds during the exam.    What is the goal of lung cancer screening?  The goal of lung cancer screening is to save lives. Many times, lung cancer is not found until a person starts having physical symptoms. Lung cancer screening can help detect lung cancer in the earliest stages when it may be easier to treat.    Who should be screened for lung cancer?  We suggest lung cancer screening for anyone who is at high-risk for lung cancer. You are in the high-risk group if you:      are between the ages of 55 and 79, and    have smoked at least 1 pack of cigarettes a day for 30 or more years, and    still smoke or have quit within the past 15 years.    However, if you have a new cough or shortness of breath, you should talk to your doctor before being screened.    Some national lung health advocacy groups also recommend screening for people ages 50 to  79 who have smoked an average of 1 pack of cigarettes a day for 20 years. They must also have at least 1 other risk factor for lung cancer, not including exposure to secondhand smoke. Other risk factors are having had cancer in the past, emphysema, pulmonary fibrosis, COPD, a family history of lung cancer, or exposure to certain materials such as arsenic, asbestos, beryllium, cadmium, chromium, diesel fumes, nickel, radon or silica. Your care team can help you know if you have one of these risk factors.     Why does it matter if I have symptoms?  Certain symptoms can be a sign that you have a condition in your lungs that should be checked and treated by your doctor. These symptoms include fever, chest pain, a new or changing cough, shortness of breath that you have never felt before, coughing up blood or unexplained weight loss. Having any of these symptoms can greatly affect the results of lung cancer screening.       Should all smokers get an LDCT lung cancer screening exam?  It depends. Lung cancer screening is for a very specific group of men and women who have a history of heavy smoking over a long period of time (see  Who should be screened for lung cancer  above).  I am in the high-risk group, but have been diagnosed with cancer in the past. Is LDCT lung cancer screening right for me?  In some cases, you should not have LDCT lung screening, such as when your doctor is already following your cancer with CT scan studies. Your doctor will help you decide if LDCT lung screening is right for you.  Do I need to have a screening exam every year?  Yes. If you are in the high-risk group described earlier, you should get an LDCT lung cancer screening exam every year until you are 79, or are no longer willing or able to undergo screening and possible procedures to diagnose and treat lung cancer.  How effective is LDCT at preventing death from lung cancer?  Studies have shown that LDCT lung cancer screening can lower the  risk of death from lung cancer by 20 percent in people who are at high-risk.  What are the risks?  There are some risks and limitations of LDCT lung cancer screening. We want to make sure you understand the risks and benefits, so please let us know if you have any questions. Your doctor may want to talk with you more about these risks.    Radiation exposure: As with any exam that uses radiation, there is a very small increased risk of cancer. The amount of radiation in LDCT is small--about the same amount a person would get from a mammogram. Your doctor orders the exam when he or she feels the potential benefits outweigh the risks.    False negatives: No test is perfect, including LDCT. It is possible that you may have a medical condition, including lung cancer, that is not found during your exam. This is called a false negative result.    False positives and more testing: LDCT very often finds something in the lung that could be cancer, but in fact is not. This is called a false positive result. False positive tests often cause anxiety. To make sure these findings are not cancer, you may need to have more tests. These tests will be done only if you give us permission. Sometimes patients need a treatment that can have side effects, such as a biopsy. For more information on false positives, see  What can I expect from the results?     Findings not related to lung cancer: Your LDCT exam also takes pictures of areas of your body next to your lungs. In a very small number of cases, the CT scan will show an abnormal finding in one of these areas, such as your kidneys, adrenal glands, liver or thyroid. This finding may not be serious, but you may need more tests. Your doctor can help you decide what other tests you may need, if any.  What can I expect from the results?  About 1 out of 4 LDCT exams will find something that may need more tests. Most of the time, these findings are lung nodules. Lung nodules are very small  collections of tissue in the lung. These nodules are very common, and the vast majority--more than 97 percent--are not cancer (benign). Most are normal lymph nodes or small areas of scarring from past infections.  But, if a small lung nodule is found to be cancer, the cancer can be cured more than 90 percent of the time. To know if the nodule is cancer, we may need to get more images before your next yearly screening exam. If the nodule has suspicious features (for example, it is large, has an odd shape or grows over time), we will refer you to a specialist for further testing.  Will my doctor also get the results?  Yes. Your doctor will get a copy of your results.  Is it okay to keep smoking now that there s a cancer screening exam?  No. Tobacco is one of the strongest cancer-causing agents. It causes not only lung cancer, but other cancers and cardiovascular (heart) diseases as well. The damage caused by smoking builds over time. This means that the longer you smoke, the higher your risk of disease. While it is never too late to quit, the sooner you quit, the better.  Where can I find help to quit smoking?  The best way to prevent lung cancer is to stop smoking. If you have already quit smoking, congratulations and keep it up! For help on quitting smoking, please call [employer] at 3-644-229-VELC (7784) or the American [employer] at 1-869.997.3240 to find local resources near you.  One-on-one health coaching:  If you d prefer to work individually with a health care provider on tobacco cessation, we offer:      Medication Therapy Management:  Our specially trained pharmacists work closely with you and your doctor to help you quit smoking.  Call [phone] or [phone] (toll free).     Can Do: Health coaching offered by [facility].  [email or web address]

## 2021-08-16 NOTE — PROGRESS NOTES
ANTICOAGULATION MANAGEMENT     Rajani Guo 70 year old female is on warfarin with subtherapeutic INR result. (Goal INR 2.5-3.5)    Recent labs: (last 7 days)     08/16/21  1010   INR 2.4*       ASSESSMENT     Source(s): Chart Review and Home Care/Facility Nurse     Warfarin doses taken: Warfarin taken as instructed  Diet: No new diet changes identified  New illness, injury, or hospitalization: No  Medication/supplement changes: Cephalexin stopped on TODAY may decrease INR once out of system  Signs or symptoms of bleeding or clotting: No  Previous INR: Therapeutic last visit; previously outside of goal range  Additional findings: None     PLAN     Recommended plan for ongoing change(s) affecting INR     Dosing Instructions:  Increase your warfarin dose (3.7% change) with next INR in 1 week       Summary  As of 8/16/2021    Full warfarin instructions:  8 mg every day   Next INR check:  8/24/2021             Telephone call with home care nurse Nora who verbalizes understanding and agrees to plan    Orders given to  Homecare nurse/facility to recheck    Education provided: None required    Plan made per [facility] anticoagulation protocol    Sherry Luevano RN  Anticoagulation Clinic  8/16/2021    _______________________________________________________________________     Anticoagulation Episode Summary     Current INR goal:  2.5-3.5   TTR:  33.4 % (7.1 mo)   Target end date:  Indefinite   Send INR reminders to:  GURVINDER DARDEN    Indications    S/P MVR (mitral valve repair) [Z98.890]  S/P AVR (aortic valve replacement) [Z95.2]           Comments:  home care  Pt gave verbal ok to speak with Candy on 7/20/21         Anticoagulation Care Providers     Provider Role Specialty Phone number    Quinton Handy PA Referring Cardiovascular & Thoracic Surgery [phone]    Lakeshia Rubio, APRN CNP Referring Internal Medicine [phone]    Sparkle Bird, APRN CNP Referring Emergency Medicine [phone]

## 2021-08-19 ENCOUNTER — MEDICAL CORRESPONDENCE (OUTPATIENT)
Dept: HEALTH INFORMATION MANAGEMENT | Facility: CLINIC | Age: 71
End: 2021-08-19

## 2021-08-23 ENCOUNTER — OFFICE VISIT (OUTPATIENT)
Dept: UROLOGY | Facility: CLINIC | Age: 71
End: 2021-08-23
Payer: COMMERCIAL

## 2021-08-23 VITALS — HEART RATE: 97 BPM | OXYGEN SATURATION: 99 % | DIASTOLIC BLOOD PRESSURE: 84 MMHG | SYSTOLIC BLOOD PRESSURE: 157 MMHG

## 2021-08-23 DIAGNOSIS — N81.10 VAGINAL PROLAPSE: ICD-10-CM

## 2021-08-23 DIAGNOSIS — N39.0 RECURRENT UTI: Primary | ICD-10-CM

## 2021-08-23 DIAGNOSIS — I10 ESSENTIAL HYPERTENSION: ICD-10-CM

## 2021-08-23 LAB
ALBUMIN UR-MCNC: NEGATIVE MG/DL
APPEARANCE UR: CLEAR
BACTERIA #/AREA URNS HPF: ABNORMAL /HPF
BILIRUB UR QL STRIP: NEGATIVE
COLOR UR AUTO: YELLOW
GLUCOSE UR STRIP-MCNC: NEGATIVE MG/DL
HGB UR QL STRIP: ABNORMAL
KETONES UR STRIP-MCNC: NEGATIVE MG/DL
LEUKOCYTE ESTERASE UR QL STRIP: ABNORMAL
NITRATE UR QL: POSITIVE
PH UR STRIP: 5.5 [PH] (ref 5–7)
RBC #/AREA URNS AUTO: ABNORMAL /HPF
SP GR UR STRIP: 1.01 (ref 1–1.03)
UROBILINOGEN UR STRIP-ACNC: 0.2 E.U./DL
WBC #/AREA URNS AUTO: ABNORMAL /HPF

## 2021-08-23 PROCEDURE — 99213 OFFICE O/P EST LOW 20 MIN: CPT | Mod: 25 | Performed by: UROLOGY

## 2021-08-23 PROCEDURE — 51798 US URINE CAPACITY MEASURE: CPT | Performed by: UROLOGY

## 2021-08-23 PROCEDURE — 81001 URINALYSIS AUTO W/SCOPE: CPT | Performed by: UROLOGY

## 2021-08-23 PROCEDURE — 87186 SC STD MICRODIL/AGAR DIL: CPT | Performed by: UROLOGY

## 2021-08-23 PROCEDURE — 87086 URINE CULTURE/COLONY COUNT: CPT | Performed by: UROLOGY

## 2021-08-23 PROCEDURE — 87088 URINE BACTERIA CULTURE: CPT | Performed by: UROLOGY

## 2021-08-23 RX ORDER — ESTRADIOL 0.1 MG/G
1 CREAM VAGINAL
Qty: 42.5 G | Refills: 4 | Status: SHIPPED | OUTPATIENT
Start: 2021-08-23 | End: 2022-08-04

## 2021-08-23 ASSESSMENT — PAIN SCALES - GENERAL: PAINLEVEL: NO PAIN (0)

## 2021-08-23 NOTE — PROGRESS NOTES
Bladder Scan performed.191ml maximum residual urine detected after 3 scans. MD neetu BARBOZA RN Specialty Triage 8/23/2021 10:28 AM

## 2021-08-23 NOTE — PROGRESS NOTES
S: Patient is a pleasant 70-year-old female who was seen today for a consultation with regard to patient's recurrent urinary tract infections.  Patient had foot surgery done earlier this year and since then she has had several urinary tract infections.  She complains of foul-smelling urine with frequency and urgency.  She has no fever nausea vomiting or gross hematuria.  Urine cultures have shown evidence of mixed bacteria along with E. coli and Klebsiella pneumonia.  She has history of vaginal prolapse with pessary placement by Dr. Patrick.  The last time she had a change for in March of this year.  She also have history of urinary retention which has improved since pessary placement.  Current Outpatient Medications   Medication Sig Dispense Refill     acetaminophen (TYLENOL) 325 MG tablet TAKE 2 TABLETS (650 MG) BY MOUTH EVERY 4 HOURS AS NEEDED FOR PAIN       aspirin (ASA) 81 MG EC tablet Take 1 tablet (81 mg) by mouth daily 100 tablet 3     atorvastatin (LIPITOR) 40 MG tablet Take 1 tablet (40 mg) by mouth daily 90 tablet 3     buPROPion (WELLBUTRIN SR) 150 MG 12 hr tablet Take 1 tablet (150 mg) by mouth 2 times daily 60 tablet 6     Calcium Carb-Cholecalciferol 600-800 MG-UNIT TABS Take 1 tablet by mouth 2 times daily 1 tablet 0     citalopram (CELEXA) 10 MG tablet Take 1 tablet (10 mg) by mouth daily 30 tablet 6     estradiol (ESTRACE) 0.1 MG/GM vaginal cream Place 1 g vaginally twice a week 42.5 g 4     ferrous sulfate (FEROSUL) 325 (65 Fe) MG tablet Take 325 mg by mouth 2 times daily       glucosamine-chondroitinoitin 500-400 MG tablet Patient is taking 1500 mg OTC 1 time daily       losartan (COZAAR) 25 MG tablet Take 1 tablet (25 mg) by mouth daily 90 tablet 3     Melatonin 10 MG TABS tablet Take 10 mg by mouth At Bedtime       metFORMIN (GLUCOPHAGE) 1000 MG tablet TAKE 1 TABLET BY MOUTH TWICE A DAY WITH MEALS 180 tablet 1     Multiple Vitamin (MULTI-VITAMINS) TABS Take 1 tablet by mouth daily         oxybutynin (DITROPAN) 5 MG tablet TAKE 1 TABLET BY MOUTH TWICE A  tablet 3     pantoprazole (PROTONIX) 40 MG EC tablet TAKE 1 TABLET BY MOUTH EVERY DAY 90 tablet 1     traZODone (DESYREL) 50 MG tablet TAKE 1-2 TABLETS ( MG) BY MOUTH AT BEDTIME 180 tablet 3     vitamin C (ASCORBIC ACID) 500 MG tablet Take 1 tablet (500 mg) by mouth daily 100 tablet 1     warfarin ANTICOAGULANT (COUMADIN) 2 MG tablet Take 2 tabs (4 mg) by mouth Mon, Wed, Fri and 3 tabs (6mg) all other days or as directed by your [facility] 270 tablet 1     Allergies   Allergen Reactions     Indomethacin Other (See Comments)     Dizziness and disorientation     Tramadol Nausea and Vomiting     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   Findings: There is no intracranial hemorrhage, mass effect, or midline shift. Gray/whi     History of partial thyroidectomy 06/02/2018     Hyperlipidemia LDL goal <100 01/18/2018     Hypertension      Insomnia, unspecified type 01/18/2018     Mitral regurgitation      Mitral stenosis      Pulmonary hypertension (H)      Tobacco use disorder      Past Surgical History:   Procedure Laterality Date     COLONOSCOPY N/A 9/4/2019    Procedure:  COLONOSCOPY;  Surgeon: Marie Todd MD;  Location:  GI     CV CORONARY ANGIOGRAM N/A 11/16/2020    Procedure: CV CORONARY ANGIOGRAM;  Surgeon: Joey Rivas MD;  Location:  HEART CARDIAC CATH LAB     CV FRACTIONAL FLOW RATIO WIRE N/A 11/16/2020    Procedure: Fractional Flow Reserve;  Surgeon: Joey Rivas MD;  Location:  HEART CARDIAC CATH LAB     CV RIGHT HEART CATH MEASUREMENTS RECORDED N/A 11/16/2020    Procedure: CV RIGHT HEART CATH;  Surgeon: Joey Rivas MD;  Location:  HEART CARDIAC CATH LAB     ESOPHAGOSCOPY, GASTROSCOPY, DUODENOSCOPY (EGD), COMBINED N/A 9/4/2019    Procedure: ESOPHAGOGASTRODUODENOSCOPY (EGD);  Surgeon: Marie Todd MD;  Location:  GI     ESOPHAGOSCOPY, GASTROSCOPY, DUODENOSCOPY (EGD), COMBINED N/A 9/17/2020    Procedure: ESOPHAGOGASTRODUODENOSCOPY (EGD);  Surgeon: Ten Ibrahim DO;  Location:  GI     IR ANGIOGRAM THROUGH CATHETER FOLLOW UP  3/5/2019     IR LOWER EXTREMITY ANGIOGRAM LEFT  3/4/2019     KNEE SURGERY Right 2013    right knee torn meniscus surgery     OVARY SURGERY  1971    1 ovary removed     RELEASE CARPAL TUNNEL Right 1988     RELEASE TRIGGER FINGER BILATERAL       REPLACE VALVES AORTIC AND MITRAL, COMBINED N/A 12/29/2020    Procedure: Median Stertonomy, REPLACEMENT AORTIC Valve  with 19mm St Abdoulaye Bondurant  Mechanical Heart Valve AND MITRAL VALVE Replacement with 27mm St Abdoulaye Mechanical Heart Valve, on pump oxygenation;  Surgeon: Luc Lara MD;  Location:  OR     SHOULDER SURGERY Left     rotator cuff repair, plate placement     THYROID SURGERY  1988    partial thyroidectomy      Family History   Problem Relation Age of Onset     Diabetes Type 2  Mother      Lung Cancer Father      Diabetes Sister      Coronary Artery Disease Sister      Diabetes Brother      Diabetes Brother      Diabetes Type 2  Brother      Coronary Artery Disease Sister      Asthma Sister      Glaucoma No family hx of      Macular Degeneration  No family hx of      Anesthesia Reaction No family hx of      Social History     Socioeconomic History     Marital status:      Spouse name: None     Number of children: 1     Years of education: None     Highest education level: None   Occupational History     None   Tobacco Use     Smoking status: Current Every Day Smoker     Packs/day: 1.00     Years: 59.00     Pack years: 59.00     Start date: 1962     Last attempt to quit: 2020     Years since quittin.6     Smokeless tobacco: Never Used   Substance and Sexual Activity     Alcohol use: Not Currently     Comment: 2 glasses of wine a week     Drug use: Yes     Types: Marijuana     Comment: occasional     Sexual activity: Never   Other Topics Concern     Parent/sibling w/ CABG, MI or angioplasty before 65F 55M? Not Asked   Social History Narrative    , has one daughter who lives with her in patient's house.  Is full code.  (last updated 10/8/2020)      Social Determinants of Health     Financial Resource Strain: Low Risk      Difficulty of Paying Living Expenses: Not hard at all   Food Insecurity: No Food Insecurity     Worried About Running Out of Food in the Last Year: Never true     Ran Out of Food in the Last Year: Never true   Transportation Needs: No Transportation Needs     Lack of Transportation (Medical): No     Lack of Transportation (Non-Medical): No   Physical Activity:      Days of Exercise per Week:      Minutes of Exercise per Session:    Stress:      Feeling of Stress :    Social Connections:      Frequency of Communication with Friends and Family:      Frequency of Social Gatherings with Friends and Family:      Attends Denominational Services:      Active Member of Clubs or Organizations:      Attends Club or Organization Meetings:      Marital Status:    Intimate Partner Violence:      Fear of Current or Ex-Partner:      Emotionally Abused:      Physically Abused:      Sexually Abused:        REVIEW OF  SYSTEMS  =================  C: NEGATIVE for fever, chills, change in weight  I: NEGATIVE for worrisome rashes, moles or lesions  E/M: NEGATIVE for ear, mouth and throat problems  R: NEGATIVE for significant cough or SHORTNESS OF BREATH  CV:  NEGATIVE for chest pain, palpitations or peripheral edema  GI: NEGATIVE for nausea, abdominal pain, heartburn, or change in bowel habits  NEURO: NEGATIVE numbness/weakness  : see HPI  PSYCH: NEGATIVE depression/anxiety  LYmph: no new enlarged lymph nodes  Ortho: no new trauma/movements      Physical Exam:  BP (!) 157/84 (BP Location: Right arm, Patient Position: Sitting, Cuff Size: Adult Regular)   Pulse 97   SpO2 99%    Patient is pleasant, in no acute distress, good general condition.  Heart:  negative, PMI normal  Lung: no evidence of respiratory distress    Abdomen: Soft, nondistended, non tender. No masses. No rebound or guarding.   Exam: Normal vaginal dryness with pessary in place.  Atrophic vaginitis.  Bladder scan with residual less than 200 mL.  Skin: Warm and dry.  No redness.  Neuro: grossly normal  Musculaskeletal: moving all extremities  Psych normal mood and affect  Musculoskeletal  moving all extremities  Hematologic/Lymphatic/Immunologic: normal ant/post cervical, axillary, supraclavicular and inguinal nodes    Assessment/Plan:   #1 recurrent urine tract infection with incomplete bladder emptying: We will start patient on Estrace vaginal cream.    #2 vaginal prolapse: Attempted to remove the pessary by me today was unsuccessful therefore patient was scheduled to see Dr. Patrick for pessary change and exam.    #3 HTN: Rajani Guo to follow up with Primary Care provider regarding elevated blood pressure.

## 2021-08-24 ENCOUNTER — ANTICOAGULATION THERAPY VISIT (OUTPATIENT)
Dept: ANTICOAGULATION | Facility: CLINIC | Age: 71
End: 2021-08-24

## 2021-08-24 ENCOUNTER — OFFICE VISIT (OUTPATIENT)
Dept: OBGYN | Facility: CLINIC | Age: 71
End: 2021-08-24
Payer: COMMERCIAL

## 2021-08-24 VITALS
HEART RATE: 92 BPM | DIASTOLIC BLOOD PRESSURE: 81 MMHG | WEIGHT: 155.6 LBS | OXYGEN SATURATION: 98 % | BODY MASS INDEX: 26.71 KG/M2 | SYSTOLIC BLOOD PRESSURE: 159 MMHG

## 2021-08-24 DIAGNOSIS — N81.6 RECTOCELE: ICD-10-CM

## 2021-08-24 DIAGNOSIS — Z98.890 S/P MVR (MITRAL VALVE REPAIR): Primary | ICD-10-CM

## 2021-08-24 DIAGNOSIS — Z96.0 PRESENCE OF PESSARY: ICD-10-CM

## 2021-08-24 DIAGNOSIS — N81.11 MIDLINE CYSTOCELE: ICD-10-CM

## 2021-08-24 DIAGNOSIS — N81.89 PELVIC RELAXATION: Primary | ICD-10-CM

## 2021-08-24 DIAGNOSIS — N39.0 RECURRENT UTI: Primary | ICD-10-CM

## 2021-08-24 DIAGNOSIS — Z95.2 S/P AVR (AORTIC VALVE REPLACEMENT): ICD-10-CM

## 2021-08-24 LAB — INR (EXTERNAL): 2.2 (ref 0.9–1.1)

## 2021-08-24 PROCEDURE — 99212 OFFICE O/P EST SF 10 MIN: CPT | Performed by: OBSTETRICS & GYNECOLOGY

## 2021-08-24 RX ORDER — CEPHALEXIN 500 MG/1
500 CAPSULE ORAL 3 TIMES DAILY
Qty: 9 CAPSULE | Refills: 0 | Status: SHIPPED | OUTPATIENT
Start: 2021-08-24 | End: 2021-08-27

## 2021-08-24 NOTE — PROGRESS NOTES
Rajani Guo is a 70 year old female, .  She presents today for follow up regarding pelvic relaxation.  She has been doing well with the pessary.  Her symptoms have significantly improved and she has been happy using the pessary.  She has not had any unusual discharge, incontinence, or bleeding.   She does not have any bladder discomfort.  She still has UTIs and she has seen Dr. Saldaña for this.       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   Findings: There is no intracranial hemorrhage, mass effect, or midline shift. Gray/whi     History of partial thyroidectomy 2018     Hyperlipidemia LDL goal <100 2018     Hypertension       Insomnia, unspecified type 2018     Mitral regurgitation       Mitral stenosis       Pulmonary hypertension (H)       Tobacco use disorder           Past Surgical History         Past Surgical History:   Procedure Laterality Date     COLONOSCOPY N/A 2019     Procedure: COLONOSCOPY;  Surgeon: Marie Todd MD;  Location:  GI     CV CORONARY ANGIOGRAM N/A 2020     Procedure: CV CORONARY ANGIOGRAM;  Surgeon: Joey Rivas MD;  Location: [facility]  CARDIAC CATH LAB     CV FRACTIONAL FLOW RESERVE N/A 11/16/2020     Procedure: Fractional Flow Reserve;  Surgeon: Joey Rivas MD;  Location:  HEART CARDIAC CATH LAB     CV RIGHT HEART CATH MEASUREMENTS RECORDED N/A 11/16/2020     Procedure: CV RIGHT HEART CATH;  Surgeon: Joey Rivas MD;  Location:  HEART CARDIAC CATH LAB     ESOPHAGOSCOPY, GASTROSCOPY, DUODENOSCOPY (EGD), COMBINED N/A 9/4/2019     Procedure: ESOPHAGOGASTRODUODENOSCOPY (EGD);  Surgeon: Marie Todd MD;  Location:  GI     ESOPHAGOSCOPY, GASTROSCOPY, DUODENOSCOPY (EGD), COMBINED N/A 9/17/2020     Procedure: ESOPHAGOGASTRODUODENOSCOPY (EGD);  Surgeon: Ten Ibrahim DO;  Location:  GI     IR ANGIOGRAM THROUGH CATHETER FOLLOW UP   3/5/2019     IR LOWER EXTREMITY ANGIOGRAM LEFT   3/4/2019     KNEE SURGERY Right 2013     right knee torn meniscus surgery     OVARY SURGERY   1971     1 ovary removed     RELEASE CARPAL TUNNEL Right 1988     RELEASE TRIGGER FINGER BILATERAL         REPLACE VALVES AORTIC AND MITRAL, COMBINED N/A 12/29/2020     Procedure: Median Stertonomy, REPLACEMENT AORTIC Valve  with 19mm [place]  Mechanical Heart Valve AND MITRAL VALVE Replacement with 27mm [place] Mechanical Heart Valve, on pump oxygenation;  Surgeon: Luc Lara MD;  Location: [facility]     SHOULDER SURGERY Left       rotator cuff repair, plate placement     THYROID SURGERY   1988     partial thyroidectomy         Current Outpatient Prescriptions          Current Outpatient Medications   Medication Sig Dispense Refill     acetaminophen (TYLENOL) 325 MG tablet TAKE 2 TABLETS (650 MG) BY MOUTH EVERY 4 HOURS AS NEEDED FOR PAIN         amiodarone (PACERONE) 200 MG tablet Take two tablets (400 mg) in the morning.         aspirin (ASA) 81 MG EC tablet Take 1 tablet (81 mg) by mouth daily 100 tablet 3     atorvastatin (LIPITOR) 40 MG tablet Take 1 tablet (40 mg) by mouth daily 90 tablet 3     buPROPion (WELLBUTRIN SR) 150 MG 12 hr  tablet TAKE 1 TABLET BY MOUTH 2 TIMES DAILY 60 tablet 1     Calcium Carb-Cholecalciferol 600-800 MG-UNIT TABS Take 1 tablet by mouth 2 times daily 1 tablet 0     ferrous sulfate (FEROSUL) 325 (65 Fe) MG tablet Take 325 mg by mouth 2 times daily         glucosamine-chondroitinoitin 500-400 MG tablet Patient is taking 1500 mg OTC 1 time daily         insulin pen needle (29G X 12MM) 29G X 12MM miscellaneous Use 1 pen needles daily or as directed. 100 each 11     Melatonin 10 MG TABS tablet Take 10 mg by mouth At Bedtime         metFORMIN (GLUCOPHAGE) 1000 MG tablet TAKE 1 TABLET BY MOUTH TWICE A DAY WITH MEALS 180 tablet 1     Multiple Vitamin (MULTI-VITAMINS) TABS Take 1 tablet by mouth daily          oxybutynin (DITROPAN) 5 MG tablet TAKE 1 TABLET BY MOUTH TWICE A  tablet 3     pantoprazole (PROTONIX) 40 MG EC tablet TAKE 1 TABLET BY MOUTH EVERY DAY 90 tablet 1     senna-docusate (SENOKOT-S/PERICOLACE) 8.6-50 MG tablet Take 1-2 tablets by mouth 2 times daily (Patient taking differently: Take 1 tablet by mouth 2 times daily ) 100 tablet 1     traZODone (DESYREL) 50 MG tablet TAKE 1 2 TABLETS (50 100 MG) BY MOUTH AT BEDTIME         vitamin C (ASCORBIC ACID) 500 MG tablet Take 1 tablet (500 mg) by mouth daily 100 tablet 1     warfarin ANTICOAGULANT (COUMADIN) 2 MG tablet Take 2 tabs (4 mg) by mouth Mon, Wed, Fri and 3 tabs (6mg) all other days or as directed by your [facility] 270 tablet 1     enoxaparin ANTICOAGULANT (LOVENOX) 60 MG/0.6ML syringe Inject 0.6 mLs (60 mg) Subcutaneous 2 times daily Until INR therapeutic (Patient not taking: Reported on 3/17/2021) 10 mL 1     nicotine (NICODERM CQ) 21 MG/24HR 24 hr patch Place 1 patch onto the skin daily (Patient not taking: Reported on 3/17/2021) 30 patch 1         Social History   Social History            Socioeconomic History     Marital status:        Spouse name: Not on file     Number of children: 1     Years of education: Not on file     Highest  education level: Not on file   Occupational History     Not on file   Social Needs     Financial resource strain: Not hard at all     Food insecurity       Worry: Never true       Inability: Never true     Transportation needs       Medical: No       Non-medical: No   Tobacco Use     Smoking status: Current Every Day Smoker       Packs/day: 1.00       Years: 58.00       Pack years: 58.00       Start date: 1962       Last attempt to quit: 2020       Years since quittin.2     Smokeless tobacco: Never Used   Substance and Sexual Activity     Alcohol use: Not Currently       Comment: 2 glasses of wine a week     Drug use: Yes       Types: Marijuana       Comment: occasional     Sexual activity: Never   Lifestyle     Physical activity       Days per week: Not on file       Minutes per session: Not on file     Stress: Not on file   Relationships     Social connections       Talks on phone: Not on file       Gets together: Not on file       Attends Yarsanism service: Not on file       Active member of club or organization: Not on file       Attends meetings of clubs or organizations: Not on file       Relationship status: Not on file     Intimate partner violence       Fear of current or ex partner: Not on file       Emotionally abused: Not on file       Physically abused: Not on file       Forced sexual activity: Not on file   Other Topics Concern     Parent/sibling w/ CABG, MI or angioplasty before 65F 55M? Not Asked   Social History Narrative     , has one daughter who lives with her in patient's house.  Is full code.  (last updated 10/8/2020)          Family History         Family History   Problem Relation Age of Onset     Diabetes Type 2  Mother       Lung Cancer Father       Diabetes Sister       Coronary Artery Disease Sister       Diabetes Brother       Diabetes Brother       Diabetes Type 2  Brother       Coronary Artery Disease Sister       Asthma Sister       Glaucoma No family hx of        Macular Degeneration No family hx of       Anesthesia Reaction No family hx of              Review of Systems:  10 point ROS of systems including Constitutional, Eyes, Respiratory, Cardiovascular, Gastroenterology, Genitourinary, Integumentary, Muscularskeletal, Psychiatric were all negative except for pertinent positives noted in my HPI and in the PMH.     Exam:   BP (!) 159/81 (BP Location: Right arm, Cuff Size: Adult Regular)   Pulse 92   Wt 70.6 kg (155 lb 9.6 oz)   SpO2 98%   BMI 26.71 kg/m      General:  WNWD female, NAD  Alert  Oriented x 3  Gait:  Normal  Skin:  Normal skin turgor  HEENT:  NC/AT, EOMI  Abdomen:  Non-tender, non-distended.  Vulva: No external lesions, normal hair distribution, no adenopathy  BUS:  Normal, no masses noted  Urethra:  No hypermobility seen  Urethral meatus:  No masses noted  Vagina: the stalk of the Gellhorn pessary is seen at the introitus.  The pessary was removed and cleaned.  The vaginal inspection did not reveal any abrasions or other lesions  Perianal:  No masses noted  Extremities:  No clubbing, no cyanosis and no edema.        Assessment  pessary check  Pelvic relaxation  Cystocele  Rectocele        Plan  The pessary was removed and cleaned and reinserted.   RTC 3-4 months for pessary check   Questions seem to be answered.      Hugo Patrick MD

## 2021-08-24 NOTE — PROGRESS NOTES
ANTICOAGULATION MANAGEMENT     Rajani Guo 70 year old female is on warfarin with subtherapeutic INR result. (Goal INR 2.5-3.5)    Recent labs: (last 7 days)     08/24/21  1124   INR 2.2*       ASSESSMENT     Source(s): Chart Review and Patient/Caregiver Call     Warfarin doses taken: Warfarin taken as instructed  Diet: No new diet changes identified  New illness, injury, or hospitalization: No  Medication/supplement changes: Citalopram started on 8/16 may increase risk of bleeding, but not expected to affect INR  Signs or symptoms of bleeding or clotting: No  Previous INR: Subtherapeutic  Additional findings: None     PLAN     Recommended plan for ongoing change(s) affecting INR     Dosing Instructions:  Increase your warfarin dose (10.3% change) with next INR in 1 week       Summary  As of 8/24/2021    Full warfarin instructions:  10 mg every Tue, Thu, Sat; 8 mg all other days   Next INR check:               Telephone call with Home Care nurse Nora who verbalizes understanding and agrees to plan    Orders given to  Homecare nurse/facility to recheck    Education provided: Goal range and significance of current result    Plan made per [facility] anticoagulation protocol    Lesley Ortega RN  Anticoagulation Clinic  8/24/2021    _______________________________________________________________________     Anticoagulation Episode Summary     Current INR goal:  2.5-3.5   TTR:  32.2 % (7.4 mo)   Target end date:  Indefinite   Send INR reminders to:  GURVINDER DARDEN    Indications    S/P MVR (mitral valve repair) [Z98.890]  S/P AVR (aortic valve replacement) [Z95.2]           Comments:  home care  Pt gave verbal ok to speak with Candy on 7/20/21         Anticoagulation Care Providers     Provider Role Specialty Phone number    Quinton Handy PA Referring Cardiovascular & Thoracic Surgery [phone]    Lakeshia Rubio, APRN CNP Referring Internal Medicine [phone]    Sparkle Bird, APRN CNP  Referring Emergency Medicine [phone]

## 2021-08-25 LAB — BACTERIA UR CULT: ABNORMAL

## 2021-08-31 ENCOUNTER — OFFICE VISIT (OUTPATIENT)
Dept: NEUROLOGY | Facility: CLINIC | Age: 71
End: 2021-08-31
Payer: COMMERCIAL

## 2021-08-31 VITALS
WEIGHT: 154 LBS | SYSTOLIC BLOOD PRESSURE: 160 MMHG | DIASTOLIC BLOOD PRESSURE: 76 MMHG | HEART RATE: 108 BPM | BODY MASS INDEX: 26.43 KG/M2

## 2021-08-31 DIAGNOSIS — R41.3 MEMORY CHANGE: Primary | ICD-10-CM

## 2021-08-31 LAB — INR (EXTERNAL): 2.8 (ref 2.5–3.5)

## 2021-08-31 PROCEDURE — 99214 OFFICE O/P EST MOD 30 MIN: CPT | Performed by: INTERNAL MEDICINE

## 2021-08-31 ASSESSMENT — PAIN SCALES - GENERAL: PAINLEVEL: NO PAIN (0)

## 2021-08-31 NOTE — PROGRESS NOTES
[facility] Follow Up Visit    Rajani Guo MRN# [id number]   Age: 70 year old YOB: 1950     Brief history of symptoms: The patient was initially seen in neurologic consultation on 3/2/21 for evaluation of imbalance. Please see the comprehensive neurologic consultation note from that date in the Epic records for details.     Interval history:   Walking is doing really well.     She continues to have issues with short term memory. She will walk into a room and forget why she went in there. She says a lot of half sentences. She will forget conversations. This problem first started in December 2020 after surgery. Problems have been pretty steady since then.     She has had several UTIs. Symptoms don't better antibiotics.     No issues with cooking or paying bills.    She is on Celexa and Buproprion for depression. Depression comes and goes. Daughter thinks she seems depressed. No hallucinations.       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)     Anemia, iron deficiency     Status post coronary angiogram     S/P MVR (mitral valve repair)     S/P AVR (aortic valve replacement)     UTI (urinary tract infection)     Trigger middle finger of right hand     Personal history of colonic polyps     Sensorineural hearing loss, bilateral     COPD (chronic obstructive pulmonary disease) (H)     History of CT scan of head 2021      Frailty     Atrial fibrillation (H)     Current use of long term anticoagulation     History of GI bleed     PAD (peripheral artery disease) (H)     PAF (paroxysmal atrial fibrillation) (H)     Memory problem     Type 2 diabetes mellitus with other circulatory complications (H)     Anxiety     Type 2 diabetes mellitus with other circulatory complication, without long-term current use of insulin (H)     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   Findings: There is no intracranial hemorrhage, mass effect, or midline shift. Gray/whi     History of partial thyroidectomy 06/02/2018     Hyperlipidemia LDL goal <100 01/18/2018     Hypertension      Insomnia, unspecified type 01/18/2018     Mitral regurgitation      Mitral stenosis      Pulmonary hypertension (H)      Tobacco use disorder         Past Surgical History:     Past Surgical History:   Procedure Laterality Date     COLONOSCOPY N/A 9/4/2019    Procedure: COLONOSCOPY;  Surgeon: Marie Todd MD;  Location:  GI     CV CORONARY ANGIOGRAM N/A 11/16/2020    Procedure: CV CORONARY ANGIOGRAM;  Surgeon: Joey Rivas MD;  Location:  HEART CARDIAC CATH LAB     CV FRACTIONAL FLOW RATIO  WIRE N/A 2020    Procedure: Fractional Flow Reserve;  Surgeon: Joey Rivas MD;  Location:  HEART CARDIAC CATH LAB     CV RIGHT HEART CATH MEASUREMENTS RECORDED N/A 2020    Procedure: CV RIGHT HEART CATH;  Surgeon: Joey Rivas MD;  Location:  HEART CARDIAC CATH LAB     ESOPHAGOSCOPY, GASTROSCOPY, DUODENOSCOPY (EGD), COMBINED N/A 2019    Procedure: ESOPHAGOGASTRODUODENOSCOPY (EGD);  Surgeon: Marie Todd MD;  Location:  GI     ESOPHAGOSCOPY, GASTROSCOPY, DUODENOSCOPY (EGD), COMBINED N/A 2020    Procedure: ESOPHAGOGASTRODUODENOSCOPY (EGD);  Surgeon: Ten Ibrahim DO;  Location:  GI     IR ANGIOGRAM THROUGH CATHETER FOLLOW UP  3/5/2019     IR LOWER EXTREMITY ANGIOGRAM LEFT  3/4/2019     KNEE SURGERY Right     right knee torn meniscus surgery     OVARY SURGERY  1971    1 ovary removed     RELEASE CARPAL TUNNEL Right      RELEASE TRIGGER FINGER BILATERAL       REPLACE VALVES AORTIC AND MITRAL, COMBINED N/A 2020    Procedure: Median Stertonomy, REPLACEMENT AORTIC Valve  with 19mm St Abdoulaye [place]  Mechanical Heart Valve AND MITRAL VALVE Replacement with 27mm [place] Mechanical Heart Valve, on pump oxygenation;  Surgeon: Luc Lara MD;  Location:  OR     SHOULDER SURGERY Left     rotator cuff repair, plate placement     THYROID SURGERY      partial thyroidectomy        Social History:     Social History     Tobacco Use     Smoking status: Current Every Day Smoker     Packs/day: 1.00     Years: 59.00     Pack years: 59.00     Start date: 1962     Last attempt to quit: 2020     Years since quittin.6     Smokeless tobacco: Never Used   Substance Use Topics     Alcohol use: Not Currently     Comment: 2 glasses of wine a week     Drug use: Yes     Types: Marijuana     Comment: occasional        Family History:     Family History   Problem Relation Age of Onset     Diabetes Type 2  Mother      Lung Cancer Father      Diabetes Sister       Coronary Artery Disease Sister      Diabetes Brother      Diabetes Brother      Diabetes Type 2  Brother      Coronary Artery Disease Sister      Asthma Sister      Glaucoma No family hx of      Macular Degeneration No family hx of      Anesthesia Reaction No family hx of         Medications:     Current Outpatient Medications   Medication Sig     acetaminophen (TYLENOL) 325 MG tablet TAKE 2 TABLETS (650 MG) BY MOUTH EVERY 4 HOURS AS NEEDED FOR PAIN     aspirin (ASA) 81 MG EC tablet Take 1 tablet (81 mg) by mouth daily     atorvastatin (LIPITOR) 40 MG tablet Take 1 tablet (40 mg) by mouth daily     buPROPion (WELLBUTRIN SR) 150 MG 12 hr tablet Take 1 tablet (150 mg) by mouth 2 times daily     Calcium Carb-Cholecalciferol 600-800 MG-UNIT TABS Take 1 tablet by mouth 2 times daily     citalopram (CELEXA) 10 MG tablet Take 1 tablet (10 mg) by mouth daily     estradiol (ESTRACE) 0.1 MG/GM vaginal cream Place 1 g vaginally twice a week     ferrous sulfate (FEROSUL) 325 (65 Fe) MG tablet Take 325 mg by mouth 2 times daily     glucosamine-chondroitinoitin 500-400 MG tablet Patient is taking 1500 mg OTC 1 time daily     losartan (COZAAR) 25 MG tablet Take 1 tablet (25 mg) by mouth daily     Melatonin 10 MG TABS tablet Take 10 mg by mouth At Bedtime     metFORMIN (GLUCOPHAGE) 1000 MG tablet TAKE 1 TABLET BY MOUTH TWICE A DAY WITH MEALS     Multiple Vitamin (MULTI-VITAMINS) TABS Take 1 tablet by mouth daily      oxybutynin (DITROPAN) 5 MG tablet TAKE 1 TABLET BY MOUTH TWICE A DAY     pantoprazole (PROTONIX) 40 MG EC tablet TAKE 1 TABLET BY MOUTH EVERY DAY     traZODone (DESYREL) 50 MG tablet TAKE 1-2 TABLETS ( MG) BY MOUTH AT BEDTIME     vitamin C (ASCORBIC ACID) 500 MG tablet Take 1 tablet (500 mg) by mouth daily     warfarin ANTICOAGULANT (COUMADIN) 2 MG tablet Take 2 tabs (4 mg) by mouth Mon, Wed, Fri and 3 tabs (6mg) all other days or as directed by your [facility]     No current facility-administered  medications for this visit.        Allergies:     Allergies   Allergen Reactions     Indomethacin Other (See Comments)     Dizziness and disorientation     Tramadol Nausea and Vomiting        Review of Systems:   A comprehensive 10 point review of systems (constitutional, ENT, cardiac, peripheral vascular, lymphatic, respiratory, GI, , Musculoskeletal, skin, Neurological) was performed and found to be negative except as described in this note.      Physical Exam:   Vitals: BP (!) 160/76 (BP Location: Right arm, Patient Position: Sitting, Cuff Size: Adult Regular)   Pulse 108   Wt 69.9 kg (154 lb)   BMI 26.43 kg/m     General: Seated comfortably in no acute distress.  Lungs: breathing comfortably  Neurologic:     Mental Status: MoCA 21/30 (-1 repeating numbers, -2 serial 7s, -5 delayed recall, -1 repeating sentence)     Cranial Nerves: Visual fields intact. EOMI with normal smooth pursuit. No dysarthria.      Motor: No tremors or other abnormal movements observed.      Sensory: Negative Romberg.      Gait: Normal, steady casual gait. Minimal difficulties with heel and toe gaits. Able to perform tandem without losing balance.      Data reviewed on previous visits    Imaging:  CT head 1/24/2021  Impression:  No acute intracranial pathology..      Laboratory:  B12 457 10/2020    Pertinent Investigations since last visit:   3/2021 - Normal B12/MMA, TSH 5 with normal free T4, CK 46    TSH, B12 normal (6/2021)         Assessment and Plan:   Rajani Guo is a 70 year old female who presents today for follow-up of balance and memory difficulties. She reported balance and memory problems starting following AVR/MVR surgery in December 2020. She was hospitalized in January 2021 for the balance difficulties and was discharged to rehab for 2 weeks. 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 Gait has improved with physical therapy. She continues to  have memory issues today. MoCA today is 21/30 with most prominent deficits in short term memory. Of note, patient scored 5/5 on delayed recall with category cues, 0/5 without them. We discussed how depression and fatigue can frequently cause secondary memory complaints. CT brain and B12/TSH were previously unremarkable. Neuropsychology tested ordered to better define any cognitive deficits and comment on any possible contributions from mood disorder.     Follow up in Neurology clinic after neuropsychology testing    Bin Alcaraz MD   of Neurology  [facility]    The total time of this encounter today amounted to 31 minutes. This time included time spent with the patient, prep work, ordering tests, and performing post visit documentation.

## 2021-08-31 NOTE — LETTER
8/31/2021         RE: Rajani Guo  [address] [facility] [address]        Dear Colleague,    Thank you for referring your patient, Rajani Guo, to the [facility] [place]. Please see a copy of my visit note below.    [facility] Neurology Follow Up Visit    Rajani Guo MRN# [id number]   Age: 70 year old YOB: 1950     Brief history of symptoms: The patient was initially seen in neurologic consultation on 3/2/21 for evaluation of imbalance. Please see the comprehensive neurologic consultation note from that date in the Epic records for details.     Interval history:   Walking is doing really well.     She continues to have issues with short term memory. She will walk into a room and forget why she went in there. She says a lot of half sentences. She will forget conversations. This problem first started in December 2020 after surgery. Problems have been pretty steady since then.     She has had several UTIs. Symptoms don't better antibiotics.     No issues with cooking or paying bills.    She is on Celexa and Buproprion for depression. Depression comes and goes. Daughter thinks she seems depressed. No hallucinations.       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)     Anemia, iron deficiency     Status post coronary angiogram     S/P MVR (mitral valve repair)     S/P AVR (aortic valve  replacement)     UTI (urinary tract infection)     Trigger middle finger of right hand     Personal history of colonic polyps     Sensorineural hearing loss, bilateral     COPD (chronic obstructive pulmonary disease) (H)     History of CT scan of head 2021     Frailty     Atrial fibrillation (H)     Current use of long term anticoagulation     History of GI bleed     PAD (peripheral artery disease) (H)     PAF (paroxysmal atrial fibrillation) (H)     Memory problem     Type 2 diabetes mellitus with other circulatory complications (H)     Anxiety     Type 2 diabetes mellitus with other circulatory complication, without long-term current use of insulin (H)     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   Findings: There is no intracranial hemorrhage, mass effect, or midline shift. Gray/whi     History of partial thyroidectomy 06/02/2018     Hyperlipidemia LDL goal <100 01/18/2018     Hypertension      Insomnia, unspecified type 01/18/2018     Mitral regurgitation      Mitral stenosis      Pulmonary hypertension (H)      Tobacco use disorder         Past Surgical History:     Past Surgical History:   Procedure Laterality Date     COLONOSCOPY N/A 9/4/2019     Procedure: COLONOSCOPY;  Surgeon: Marie Todd MD;  Location:  GI     CV CORONARY ANGIOGRAM N/A 2020    Procedure: CV CORONARY ANGIOGRAM;  Surgeon: Joey Rivas MD;  Location:  HEART CARDIAC CATH LAB     CV FRACTIONAL FLOW RATIO WIRE N/A 2020    Procedure: Fractional Flow Reserve;  Surgeon: Joey Rivas MD;  Location:  HEART CARDIAC CATH LAB     CV RIGHT HEART CATH MEASUREMENTS RECORDED N/A 2020    Procedure: CV RIGHT HEART CATH;  Surgeon: Joey Rivas MD;  Location:  HEART CARDIAC CATH LAB     ESOPHAGOSCOPY, GASTROSCOPY, DUODENOSCOPY (EGD), COMBINED N/A 2019    Procedure: ESOPHAGOGASTRODUODENOSCOPY (EGD);  Surgeon: Marie Todd MD;  Location:  GI     ESOPHAGOSCOPY, GASTROSCOPY, DUODENOSCOPY (EGD), COMBINED N/A 2020    Procedure: ESOPHAGOGASTRODUODENOSCOPY (EGD);  Surgeon: Ten Ibrahim DO;  Location:  GI     IR ANGIOGRAM THROUGH CATHETER FOLLOW UP  3/5/2019     IR LOWER EXTREMITY ANGIOGRAM LEFT  3/4/2019     KNEE SURGERY Right     right knee torn meniscus surgery     OVARY SURGERY  1971    1 ovary removed     RELEASE CARPAL TUNNEL Right      RELEASE TRIGGER FINGER BILATERAL       REPLACE VALVES AORTIC AND MITRAL, COMBINED N/A 2020    Procedure: Median Stertonomy, REPLACEMENT AORTIC Valve  with 19mm St Abdoulaye [place]  Mechanical Heart Valve AND MITRAL VALVE Replacement with 27mm St Abdoulaye Mechanical Heart Valve, on pump oxygenation;  Surgeon: Luc Lara MD;  Location:  OR     SHOULDER SURGERY Left     rotator cuff repair, plate placement     THYROID SURGERY      partial thyroidectomy        Social History:     Social History     Tobacco Use     Smoking status: Current Every Day Smoker     Packs/day: 1.00     Years: 59.00     Pack years: 59.00     Start date: 1962     Last attempt to quit: 2020     Years since quittin.6     Smokeless tobacco: Never Used   Substance Use Topics     Alcohol use: Not  Currently     Comment: 2 glasses of wine a week     Drug use: Yes     Types: Marijuana     Comment: occasional        Family History:     Family History   Problem Relation Age of Onset     Diabetes Type 2  Mother      Lung Cancer Father      Diabetes Sister      Coronary Artery Disease Sister      Diabetes Brother      Diabetes Brother      Diabetes Type 2  Brother      Coronary Artery Disease Sister      Asthma Sister      Glaucoma No family hx of      Macular Degeneration No family hx of      Anesthesia Reaction No family hx of         Medications:     Current Outpatient Medications   Medication Sig     acetaminophen (TYLENOL) 325 MG tablet TAKE 2 TABLETS (650 MG) BY MOUTH EVERY 4 HOURS AS NEEDED FOR PAIN     aspirin (ASA) 81 MG EC tablet Take 1 tablet (81 mg) by mouth daily     atorvastatin (LIPITOR) 40 MG tablet Take 1 tablet (40 mg) by mouth daily     buPROPion (WELLBUTRIN SR) 150 MG 12 hr tablet Take 1 tablet (150 mg) by mouth 2 times daily     Calcium Carb-Cholecalciferol 600-800 MG-UNIT TABS Take 1 tablet by mouth 2 times daily     citalopram (CELEXA) 10 MG tablet Take 1 tablet (10 mg) by mouth daily     estradiol (ESTRACE) 0.1 MG/GM vaginal cream Place 1 g vaginally twice a week     ferrous sulfate (FEROSUL) 325 (65 Fe) MG tablet Take 325 mg by mouth 2 times daily     glucosamine-chondroitinoitin 500-400 MG tablet Patient is taking 1500 mg OTC 1 time daily     losartan (COZAAR) 25 MG tablet Take 1 tablet (25 mg) by mouth daily     Melatonin 10 MG TABS tablet Take 10 mg by mouth At Bedtime     metFORMIN (GLUCOPHAGE) 1000 MG tablet TAKE 1 TABLET BY MOUTH TWICE A DAY WITH MEALS     Multiple Vitamin (MULTI-VITAMINS) TABS Take 1 tablet by mouth daily      oxybutynin (DITROPAN) 5 MG tablet TAKE 1 TABLET BY MOUTH TWICE A DAY     pantoprazole (PROTONIX) 40 MG EC tablet TAKE 1 TABLET BY MOUTH EVERY DAY     traZODone (DESYREL) 50 MG tablet TAKE 1-2 TABLETS ( MG) BY MOUTH AT BEDTIME     vitamin C (ASCORBIC ACID)  500 MG tablet Take 1 tablet (500 mg) by mouth daily     warfarin ANTICOAGULANT (COUMADIN) 2 MG tablet Take 2 tabs (4 mg) by mouth Mon, Wed, Fri and 3 tabs (6mg) all other days or as directed by your [facility]     No current facility-administered medications for this visit.        Allergies:     Allergies   Allergen Reactions     Indomethacin Other (See Comments)     Dizziness and disorientation     Tramadol Nausea and Vomiting        Review of Systems:   A comprehensive 10 point review of systems (constitutional, ENT, cardiac, peripheral vascular, lymphatic, respiratory, GI, , Musculoskeletal, skin, Neurological) was performed and found to be negative except as described in this note.      Physical Exam:   Vitals: BP (!) 160/76 (BP Location: Right arm, Patient Position: Sitting, Cuff Size: Adult Regular)   Pulse 108   Wt 69.9 kg (154 lb)   BMI 26.43 kg/m     General: Seated comfortably in no acute distress.  Lungs: breathing comfortably  Neurologic:     Mental Status: MoCA 21/30 (-1 repeating numbers, -2 serial 7s, -5 delayed recall, -1 repeating sentence)     Cranial Nerves: Visual fields intact. EOMI with normal smooth pursuit. No dysarthria.      Motor: No tremors or other abnormal movements observed.      Sensory: Negative Romberg.      Gait: Normal, steady casual gait. Minimal difficulties with heel and toe gaits. Able to perform tandem without losing balance.      Data reviewed on previous visits    Imaging:  CT head 1/24/2021  Impression:  No acute intracranial pathology..      Laboratory:  B12 457 10/2020    Pertinent Investigations since last visit:   3/2021 - Normal B12/MMA, TSH 5 with normal free T4, CK 46    TSH, B12 normal (6/2021)         Assessment and Plan:   Rajani Guo is a 70 year old female who presents today for follow-up of balance and memory difficulties. She reported balance and memory problems starting following AVR/MVR surgery in December 2020. She was hospitalized in January 2021  for the balance difficulties and was discharged to rehab for 2 weeks. 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 Gait has improved with physical therapy. She continues to have memory issues today. MoCA today is 21/30 with most prominent deficits in short term memory. Of note, patient scored 5/5 on delayed recall with category cues, 0/5 without them. We discussed how depression and fatigue can frequently cause secondary memory complaints. CT brain and B12/TSH were previously unremarkable. Neuropsychology tested ordered to better define any cognitive deficits and comment on any possible contributions from mood disorder.     Follow up in Neurology clinic after neuropsychology testing    Bin Alcaraz MD   of Neurology  [facility]    The total time of this encounter today amounted to 31 minutes. This time included time spent with the patient, prep work, ordering tests, and performing post visit documentation.        Again, thank you for allowing me to participate in the care of your patient.        Sincerely,        Bin Alcaraz MD

## 2021-09-01 ENCOUNTER — ANTICOAGULATION THERAPY VISIT (OUTPATIENT)
Dept: FAMILY MEDICINE | Facility: CLINIC | Age: 71
End: 2021-09-01

## 2021-09-01 DIAGNOSIS — Z98.890 S/P MVR (MITRAL VALVE REPAIR): Primary | ICD-10-CM

## 2021-09-01 DIAGNOSIS — Z95.2 S/P AVR (AORTIC VALVE REPLACEMENT): ICD-10-CM

## 2021-09-01 NOTE — PROGRESS NOTES
ANTICOAGULATION MANAGEMENT     Rajani Guo 70 year old female is on warfarin with therapeutic INR result. (Goal INR 2.5-3.5)    Recent labs: (last 7 days)     08/31/21  0807   INR 2.8       ASSESSMENT     Source(s): Chart Review and Home Care/Facility Nurse     Warfarin doses taken: Warfarin taken as instructed  Diet: No new diet changes identified  New illness, injury, or hospitalization: No  Medication/supplement changes: None noted  Signs or symptoms of bleeding or clotting: No  Previous INR: Subtherapeutic  Additional findings: None     PLAN     Recommended plan for no diet, medication or health factor changes affecting INR     Dosing Instructions: Continue your current warfarin dose with next INR in 1 week       Summary  As of 9/1/2021    Full warfarin instructions:  10 mg every Tue, Thu, Sat; 8 mg all other days   Next INR check:  9/7/2021             Telephone call with home care nurse Elisabet who verbalizes understanding and agrees to plan    Orders given to  Homecare nurse/facility to recheck    Education provided: Contact [phone]  with any changes, questions or concerns.     Plan made per [facility] anticoagulation protocol    Julieta Jo RN  Anticoagulation Clinic  9/1/2021    _______________________________________________________________________     Anticoagulation Episode Summary     Current INR goal:  2.5-3.5   TTR:  32.7 % (7.6 mo)   Target end date:  Indefinite   Send INR reminders to:  GURVINDER DARDEN    Indications    S/P MVR (mitral valve repair) [Z98.890]  S/P AVR (aortic valve replacement) [Z95.2]           Comments:  home care  Pt gave verbal ok to speak with Candy on 7/20/21         Anticoagulation Care Providers     Provider Role Specialty Phone number    Quinton Handy PA Referring Cardiovascular & Thoracic Surgery [phone]    Lakeshia Rubio APRN CNP Referring Internal Medicine [phone]    Sparkle Bird APRN CNP Referring Emergency Medicine [phone]           Julieta Pavon, RN, BSN, PHN  Anticoagulation Nurse  [phone]

## 2021-09-01 NOTE — PROGRESS NOTES
VM left from [facility] home care RN with INR results from 8/31/21 of 2.8.    Return call to [facility] [phone]    Verbalized dosing as 10mg Tue/Fri & 8mg AOD    Julieta Pavon, RN, BSN, PHN  Anticoagulation Nurse  [phone]

## 2021-09-07 ENCOUNTER — ANTICOAGULATION THERAPY VISIT (OUTPATIENT)
Dept: FAMILY MEDICINE | Facility: CLINIC | Age: 71
End: 2021-09-07

## 2021-09-07 ENCOUNTER — ANCILLARY PROCEDURE (OUTPATIENT)
Dept: CT IMAGING | Facility: CLINIC | Age: 71
End: 2021-09-07
Attending: FAMILY MEDICINE
Payer: COMMERCIAL

## 2021-09-07 DIAGNOSIS — Z98.890 S/P MVR (MITRAL VALVE REPAIR): Primary | ICD-10-CM

## 2021-09-07 DIAGNOSIS — Z95.2 S/P AVR (AORTIC VALVE REPLACEMENT): ICD-10-CM

## 2021-09-07 DIAGNOSIS — Z87.891 PERSONAL HISTORY OF TOBACCO USE: ICD-10-CM

## 2021-09-07 LAB — INR (EXTERNAL): 3.2 (ref 2.5–3.5)

## 2021-09-07 PROCEDURE — 71271 CT THORAX LUNG CANCER SCR C-: CPT | Mod: TC | Performed by: RADIOLOGY

## 2021-09-07 NOTE — PROGRESS NOTES
ANTICOAGULATION MANAGEMENT     Rajani Guo 70 year old female is on warfarin with therapeutic INR result. (Goal INR 2.5-3.5)    No results for input(s): INR in the last 168 hours.    ASSESSMENT     Source(s): Chart Review and Home Care/Facility Nurse     Warfarin doses taken: Warfarin taken as instructed  Diet: No new diet changes identified  New illness, injury, or hospitalization: No  Medication/supplement changes: abx completed.  Signs or symptoms of bleeding or clotting: No  Previous INR: Therapeutic last visit; previously outside of goal range  Additional findings: pt had some wine over the weekend     PLAN     Recommended plan for no diet, medication or health factor changes affecting INR     Dosing Instructions: Continue your current warfarin dose with next INR in 1 week       Summary  As of 9/7/2021    Full warfarin instructions:  10 mg every Tue, Thu, Sat; 8 mg all other days   Next INR check:               Telephone call with home care nurse Nora [phone] who verbalizes understanding and agrees to plan and who agrees to plan and repeated back plan correctly    Orders given to  Homecare nurse/facility to recheck    Education provided: Please call back if any changes to your diet, medications or how you've been taking warfarin    Plan made per [facility] anticoagulation protocol    Amparo Weinstein, RN  Anticoagulation Clinic  9/7/2021    _______________________________________________________________________     Anticoagulation Episode Summary     Current INR goal:  2.5-3.5   TTR:  32.7 % (7.6 mo)   Target end date:  Indefinite   Send INR reminders to:  GURVINDER DARDEN    Indications    S/P MVR (mitral valve repair) [Z98.890]  S/P AVR (aortic valve replacement) [Z95.2]           Comments:  home care  Pt gave verbal ok to speak with Candy on 7/20/21         Anticoagulation Care Providers     Provider Role Specialty Phone number    Quinton Hnady PA Referring Cardiovascular & Thoracic Surgery  [phone]    Lakeshia Rubio, FRANCESCA CNP Referring Internal Medicine [phone]    Sparkle Bird APRN CNP Referring Emergency Medicine [phone]

## 2021-09-08 ENCOUNTER — TELEPHONE (OUTPATIENT)
Dept: FAMILY MEDICINE | Facility: CLINIC | Age: 71
End: 2021-09-08

## 2021-09-08 NOTE — TELEPHONE ENCOUNTER
Patient needs note from Dr. Coley for DMV that she is okay to drive. The letter needs to be on company letter head and signed.   Please call patient at [phone] when she can pick that up. Needs before Friday or she will lose her drivers license.   Chen Kim RN on 9/8/2021 at 3:00 PM

## 2021-09-09 ENCOUNTER — TELEPHONE (OUTPATIENT)
Dept: FAMILY MEDICINE | Facility: CLINIC | Age: 71
End: 2021-09-09

## 2021-09-09 NOTE — TELEPHONE ENCOUNTER
Received call from Laurent with [facility] to report incidental finding as listed below:    CT Chest, 09/07/2021  Under impression #2: Coronary artery calcium, mod to severe    MATA GonsalezN RN  [facility]

## 2021-09-09 NOTE — TELEPHONE ENCOUNTER
Patient returned call, PCP unable to provide letter, recommended that patient see her Neurologist due to her memory issues.  Patient will do this, neurologist should be able to provider letter to her to send to [employer].        Lakeshia Dee RN

## 2021-09-09 NOTE — TELEPHONE ENCOUNTER
Left message on answering machine for patient to call back to the nurse at [phone].    Klaudia Pickett RN  [facility]

## 2021-09-10 NOTE — TELEPHONE ENCOUNTER
Patient scheduled for next Wednesday, 09/15/2021.    MATA GonsalezN RN  [facility]

## 2021-09-14 ENCOUNTER — ANTICOAGULATION THERAPY VISIT (OUTPATIENT)
Dept: FAMILY MEDICINE | Facility: CLINIC | Age: 71
End: 2021-09-14

## 2021-09-14 ENCOUNTER — TELEPHONE (OUTPATIENT)
Dept: FAMILY MEDICINE | Facility: CLINIC | Age: 71
End: 2021-09-14

## 2021-09-14 DIAGNOSIS — Z95.2 S/P AVR (AORTIC VALVE REPLACEMENT): ICD-10-CM

## 2021-09-14 DIAGNOSIS — Z98.890 S/P MVR (MITRAL VALVE REPAIR): Primary | ICD-10-CM

## 2021-09-14 LAB — INR (EXTERNAL): 3.8 (ref 0.9–1.1)

## 2021-09-14 NOTE — TELEPHONE ENCOUNTER
Fax to be reviewed by the provider Home Care Plan of Care Certification     Who is the it from? [facility] 8/12/2021 - 10/10/2021  This was faxed to [facility]   Where was the fax placed? Provider's desk to sign  What number is listed as a contact on the fax?     [facility]  Phone: [phone] Fax: [phone]    Please fax to above    Additional comments:

## 2021-09-14 NOTE — PROGRESS NOTES
ANTICOAGULATION MANAGEMENT     Rajani Guo 70 year old female is on warfarin with supratherapeutic INR result. (Goal INR 2.5-3.5)    Recent labs: (last 7 days)     09/14/21  0951   INR 3.8*       ASSESSMENT     Source(s): Home Care/Facility Nurse     Warfarin doses taken: Warfarin taken as instructed  Diet: Change in alcohol intake may be affecting INR. Increase etoh over the weekend   New illness, injury, or hospitalization: No  Medication/supplement changes: None noted  Signs or symptoms of bleeding or clotting: No  Previous INR: Therapeutic last 2(+) visits  Additional findings: None     PLAN     Recommended plan for temporary change(s) affecting INR     Dosing Instructions: Partial hold then continue your current warfarin dose with next INR in 8 days       Summary  As of 9/14/2021    Full warfarin instructions:  9/14: 4 mg; Otherwise 10 mg every Tue, Thu, Sat; 8 mg all other days   Next INR check:  9/22/2021             Telephone call with home care nurse Samara who verbalizes understanding and agrees to plan    Orders given to  Homecare nurse/facility to recheck    Education provided: Goal range and significance of current result    Plan made per [facility] anticoagulation protocol    Matthew Finn RN  Anticoagulation Clinic  9/14/2021    _______________________________________________________________________     Anticoagulation Episode Summary     Current INR goal:  2.5-3.5   TTR:  35.2 % (8.1 mo)   Target end date:  Indefinite   Send INR reminders to:  GURVINDER DARDEN    Indications    S/P MVR (mitral valve repair) [Z98.890]  S/P AVR (aortic valve replacement) [Z95.2]           Comments:  home care  Pt gave verbal ok to speak with Candy on 7/20/21         Anticoagulation Care Providers     Provider Role Specialty Phone number    Quinton Handy PA Referring Cardiovascular & Thoracic Surgery [phone]    Lakeshia Rubio, APRN CNP Referring Internal Medicine [phone]    Sparkle Bird, APRN  [facility] Emergency Medicine [phone]

## 2021-09-15 ENCOUNTER — VIRTUAL VISIT (OUTPATIENT)
Dept: FAMILY MEDICINE | Facility: CLINIC | Age: 71
End: 2021-09-15
Payer: COMMERCIAL

## 2021-09-15 VITALS — SYSTOLIC BLOOD PRESSURE: 134 MMHG | DIASTOLIC BLOOD PRESSURE: 70 MMHG

## 2021-09-15 DIAGNOSIS — Z53.9 DIAGNOSIS NOT YET DEFINED: Primary | ICD-10-CM

## 2021-09-15 DIAGNOSIS — E78.5 HYPERLIPIDEMIA LDL GOAL <70: ICD-10-CM

## 2021-09-15 DIAGNOSIS — R93.1 ELEVATED CORONARY ARTERY CALCIUM SCORE: Primary | ICD-10-CM

## 2021-09-15 DIAGNOSIS — I10 ESSENTIAL HYPERTENSION: ICD-10-CM

## 2021-09-15 DIAGNOSIS — E11.59 TYPE 2 DIABETES MELLITUS WITH OTHER CIRCULATORY COMPLICATION, WITHOUT LONG-TERM CURRENT USE OF INSULIN (H): ICD-10-CM

## 2021-09-15 DIAGNOSIS — F17.200 SMOKER: ICD-10-CM

## 2021-09-15 DIAGNOSIS — Z98.890 STATUS POST CORONARY ANGIOGRAM: ICD-10-CM

## 2021-09-15 PROCEDURE — 99213 OFFICE O/P EST LOW 20 MIN: CPT | Mod: 95 | Performed by: FAMILY MEDICINE

## 2021-09-15 PROCEDURE — G0179 MD RECERTIFICATION HHA PT: HCPCS | Performed by: FAMILY MEDICINE

## 2021-09-15 NOTE — PROGRESS NOTES
Rajani Guo is a 70 year old who is being evaluated via a billable video visit.      How would you like to obtain your AVS? MyChart  If the video visit is dropped, the invitation should be resent by: Text to cell phone: [phone]  Will anyone else be joining your video visit? No    Video Start Time: 1.50 AM    Assessment & Plan     Elevated coronary artery calcium score  Discussed Risk factors and important to control all her risk factors   Stop smoking    Hyperlipidemia LDL goal <70  On High Intensity statins and stable     Smoker  Needs to quit-declines help    Essential hypertension  BP now was stable   Keep BP less than 140/90    Status post coronary angiogram in 2020  Results reviewed epic    Type 2 diabetes mellitus with other circulatory complication, without long-term current use of insulin (H)  Has been controlled  Follow up if any further chest pain or sob/ fatigue  Return in about 3 months (around 12/15/2021) for Diabetes.    Tamiko Coley MD  [facility] [place]    Subjective   Rajani Guo is a 70 year old who presents for the following health issues HPI   Chief Complaint   Patient presents with     Lab Result Notice     Ct result     Here to talk about elevated calcium score on CT scan  Pt  Does have cardiac risk factors  Diabetes -under control  Lipids under control and on statins  BP has been elevated but today  Pt had coronary angiogram last year -see cardiology notes  Pt  Has no chest pain  No sob  Review of Systems   CONSTITUTIONAL: NEGATIVE for fever, chills, change in weight  ENT/MOUTH: NEGATIVE for ear, mouth and throat problems  RESP: NEGATIVE for significant cough or SOB  CV: NEGATIVE for chest pain, palpitations or peripheral edema  GI: NEGATIVE for nausea, abdominal pain, heartburn, or change in bowel habits  PSYCHIATRIC: NEGATIVE for changes in mood or affect  ROS otherwise negative      Objective    Vitals - Patient Reported  Systolic (Patient Reported): 134  Diastolic  (Patient Reported): 70      Vitals:  No vitals were obtained today due to virtual visit.    Physical Exam   GENERAL: Healthy, alert and no distress  EYES: Eyes grossly normal to inspection.  No discharge or erythema, or obvious scleral/conjunctival abnormalities.  RESP: No audible wheeze, cough, or visible cyanosis.  No visible retractions or increased work of breathing.    SKIN: Visible skin clear. No significant rash, abnormal pigmentation or lesions.  NEURO: Cranial nerves grossly intact.  Mentation and speech appropriate for age.  PSYCH: Mentation appears normal, affect normal/bright, judgement and insight intact, normal speech and appearance well-groomed.    Anticoagulation Therapy Visit on 09/14/2021   Component Date Value Ref Range Status     INR (External) 09/14/2021 3.8* 0.9 - 1.1 Final       Video-Visit Details    Type of service:  Video Visit    Video End Time:2.00 PM    Originating Location (pt. Location): Home    Distant Location (provider location):  [facility]     Platform used for Video Visit: Alen   2:14 PM -end visit

## 2021-09-16 NOTE — TELEPHONE ENCOUNTER
Confirmed faxed back signed copy of the Home Care Certification. A copy has been sent to be added to the chart. Funmilayo Ferrari,

## 2021-09-22 ENCOUNTER — ANTICOAGULATION THERAPY VISIT (OUTPATIENT)
Dept: FAMILY MEDICINE | Facility: CLINIC | Age: 71
End: 2021-09-22

## 2021-09-22 DIAGNOSIS — Z95.2 S/P AVR (AORTIC VALVE REPLACEMENT): ICD-10-CM

## 2021-09-22 DIAGNOSIS — Z98.890 S/P MVR (MITRAL VALVE REPAIR): Primary | ICD-10-CM

## 2021-09-22 LAB — INR (EXTERNAL): 3.5 (ref 0.9–1.1)

## 2021-09-22 NOTE — PROGRESS NOTES
RE: Rajani Guo  MR#: [id number]  : 1950  DOS: Sep 28, 2021  JUNIOR:  2021      NEUROPSYCHOLOGICAL CONSULTATION      REASON FOR REFERRAL:  Rajani Guo is a 70 year old female with 14 years of education.  The patient was referred for a neuropsychological evaluation by Dr. Alcaraz to assess the patient's cognitive and emotional functioning secondary to memory concerns.  The patient was informed that the evaluation included multiple measures of performance and symptom validity, and the patient was encouraged to provide their best effort at all times.  The nature of the neuropsychological evaluation was also discussed, including limits of confidentiality (for suspected child or elder abuse, potential homicide or suicide, and court orders). The patient was also informed that the report would be placed on the electronic medical records system.  The patient was also given an opportunity to ask questions. The patient indicated that they understood the information and consented to participate in the evaluation.    PROCEDURES:   Review of records and clinical interview,  Mental Status-orientation, Wide Range Achievement Test-4, Wechsler Adult Intelligence Scale-IV, Maynard Verbal Learning Test-Revised, Clock Drawing Test, Verbal Fluency Test, Geriatric Depression Scale, Mc Anxiety Inventory, Ethan Complex Figure Test, Trailmaking Test, NAB Naming Test, TOMM, Judgment of Line Orientation Test, Stroop Test and Wechsler Memory Scale-IV (selected subtests)    REVIEW OF RECORDS: Medical records from 21 noted that the patient was continuing to have issues with short term memory. The records said that she  will walk into a room and forget why she went in there. She says a lot of half sentences. She will forget conversations. This problem first started in 2020 after surgery. Problems have been pretty steady since then. She has had several UTIs. Symptoms don't better antibiotics. No issues with  cooking or paying bills. She is on Celexa and Bupropion for depression. Depression comes and goes. Daughter thinks she seems depressed. No hallucinations.  Records noted other significant medical history includes hyperlipidemia, osteoporosis, insomnia, smoking, partial thyroidectomy, hypertension, peripheral vascular disease, anemia, claudication of the left lower extremity, cardiomyopathy, GI bleed, fatigue, shortness of breath, heart failure due to valvular disease, urinary tract infection, trigger middle finger of right hand, colon polyps, sensorineural hearing loss, chronic obstructive pulmonary disease (COPD), atrial fibrillation, type 2 diabetes, and anxiety.  A CT scan of the head report from 1/24/21 noted  No acute intracranial pathology.  Records noted that the patient is being treated with estradiol, Wellbutrin, Celexa, losartan, metformin, trazodone, Coumadin, Ditropan, pantoprazole, atorvastatin, calcium carbonate-cholecalciferol, glucosamine-chondroitin, acetaminophen, vitamin C, aspirin, multivitamins, ferrous sulfate, and melatonin.     CLINICAL INTERVIEW: The patient was interviewed with her daughter, Sarai.  On interview, the patient and her daughter noted that the patient was having increasing difficulty with her memory.  They noted that she tends to forget things frequently and the patient acknowledged losing track of conversations.  The patient said she has a short attention span as well.  The patient's daughter noted that the patient will also tend to repeat herself frequently.  Further, the patient's daughter said that the patient will not remember what is said to her in doctors appointments, and the patient also has confusion about her medication regime.  They noted that the patient's memory difficulties began after heart surgery on December 29, 2020.  The daughter noted the patient's memory has gotten progressively worse over time.  The patient also noted difficulty with word finding and  "decision making.  The patient reported that she does not drive, because someone took her license away.  In terms of financial management, the daughter noted the patient forgot to pay a bill one time, but this is not a typical occurrence.  They noted that the patient has help from a visiting nurse to manage her medications, but this is ending next week.  The patient denied any difficulty with basic household chores.    In terms of her mood, the patient and her daughter noted that her mood \"varies.\"  They both noted that the patient's mood is \"up-and-down\" and the daughter said that the patient can be in a good mood one minute and become angry in just a few minutes.  The patient confirmed that she is taking medication to manage her mood.  The patient denied difficulty with sleep, and indicated that trazodone helps her sleep.  She denied difficulty with appetite.  Her daughter noted that the patient tends to eat more snack foods than regular meals.  The patient denied any previous contact with mental health professionals.  She also denied any suicidal or homicidal ideation, and denied any history of suicide attempts.  Further, the patient denied any history of hallucinations or delusions.  In terms of alcohol use, the patient noted that she drinks about 4 glasses of wine in a week.  The patient states that she smokes about 1.5 packs of cigarettes per day.  The patient reported that she smokes marijuana, but has not smoked marijuana since last Wednesday (about 6 days ago) because of this appointment.    Medically, the patient confirmed her history of multiple medical issues, including COPD, atrial fibrillation, type 2 diabetes, heart disease, hypertension, hyperlipidemia, insomnia, thyroid difficulties, and hearing loss.  The patient said she also has recurrent urinary tract infections (UTI), but this was evaluated recently and she does not currently have a UTI.  The patient denied any history of traumatic brain injury " "with loss of consciousness.  She also denied any history of multiple sclerosis, stroke, seizures, Parkinson's disease, Covid-19 infection, sleep apnea, cancer, liver disease, or kidney disease.  The patient noted that, although she had surgery on her thyroid several years ago, she does not need to take thyroid medication currently.  The patient reported that she wears eyeglasses all the time.  The patient and her daughter noted that she needs hearing aids but does not have them.  They also noted that the patient's medications listed in the electronic medical record were up-to-date.    Academically, the patient said that she earned an associates degree and she \"loved school.\"  She noted that she was a \"pretty good\" student in school and had over a 3.0 grade point average.  The patient denied ever being in special education classes or having to repeat a grade.  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  The patient reported that she  after a 40-year marriage.  She said that she has one daughter and she lives at home with her daughter.    BEHAVIORAL OBSERVATIONS:  On examination, the patient was casually but appropriately dressed and groomed. The patient was cooperative with the evaluation and was talkative. The patient appeared to put forth her best effort on all tasks. Thus, the results of this evaluation are a reasonably valid reflection of the patient s current level of functioning. There were no indications of hallucinations, delusions or unusual thought processes. There were no demonstrations of a practical memory problem in inevitable. The patient was a good historian, with a self-report consistent with medical records. The patient was generally well oriented and her affect was appropriate.     RESULTS: Formal performance avidity measures were within expected limits and consistent with the above-noted observations.  Psychometric estimates of the patient's premorbid global " cognitive functioning based upon single word reading ability were in the average range.  Likewise, measures of her current verbally mediated intellectual functioning were also in the average range.  Thus, there was no evidence for significant change or decline in global cognitive functioning.    Measures of attention/concentration were generally within expected limits.  For example, a digit span task was in the average range.  Likewise, a simple visual scanning task that integrates attention was in the average range.  Speed of information processing was variable.  Specifically, psychomotor speed was in the low average range.  However, motor-free processing speed was at the lower end of the average range.    Measures of the patient's verbal memory functioning indicated evidence for mild variability.  For example, immediate recall on a story memory and a word list learning task were both in the low average range.  Delayed recall on the word list learning task was in the average range, while delayed recall on the story memory task was in the low range.  Delayed verbal recognition memory was in the average range, however.  Visual memory was better and generally within expected limits.  Specifically, immediate and delayed visual recall on a complex figure drawing task was in the average range.  Visual recognition memory for this task was also in the average range.  Thus, there was no evidence for rapid forgetting of previously learned information, but she demonstrated inefficient encoding and variable retrieval of newly learned information.    Expressive language functioning was also mildly variable.  Specifically, phonemic fluency was in the average range, but semantic fluency was in the low average range.  Confrontation naming was in the average range, as was vocabulary ability.  Receptive language functioning, based upon her performance during interview, was within expected limits.    Visual-spatial and visual  constructional abilities were generally within expected limits and a personal strength.  For example, motor-free visual reasoning was in the average range.  Further, motor-free line orientation judgment was within expected limits.  Additionally, complex figure drawing and clock drawing tasks did not indicate evidence for significant visual-spatial distortions.    Measures of the patient's executive functioning were also variable.  For example, a complex visual scanning task that integrates cognitive flexibility was in the low average range.  However, a measure of response inhibition that integrates processing speed was in the average range.  Further, verbal and visual reasoning were in the average range, but verbal fluency was variable.    Assessment of the patient's emotional functioning was completed utilizing the clinical interview and self-report measures of depression and anxiety.  On the self-report measures, the patient endorsed moderate symptoms of depression and mild anxiety symptoms.    SUMMARY: In summary, the neuropsychological assessment results indicated no evidence for significant change or decline in global cognitive functioning.  There was evidence for variable speed of information processing and variable verbal memory functioning, particularly with encoding and retrieval.  However, visual memory was better, and there was no evidence for rapid forgetting of previously learned verbal or visual information.  Expressive language functioning and executive functioning were also mildly variable, but visual-spatial abilities were within expected limits.  The patient also endorsed moderate depressive and mild anxiety symptoms, consistent with a depressive disorder.  The pattern of results indicated that a progressive dementing condition was unlikely.  Further, she did not meet the criteria for mild cognitive impairment (MCI).  The specific etiology of her cognitive difficulties could not be completely  determined, but multiple factors are likely contributing, including her emotional distress and multiple medical issues, including heart/vascular issues, COPD, and metabolic/infectious issues like UTIs.    RECOMMENDATIONS:   1.  Given these results, continued neurological consultation is recommended to assist with treatment planning.  2.  Repeat neuroimaging might be considered as well to evaluate for any structural or vascular changes to the brain since her previous CT scan in January, 2021, given her reported cognitive decline following heart surgery.  3.  Given the slowed processing speed, and difficulties with executive functioning and memory, the patient is encouraged to continue to avoid driving at this time.  One option to consider might be a formal driving evaluation, such as through the occupational therapy services at [facility]: [email or web address]-overview/[email or web address]-services  4. A referral for cognitive rehabilitation therapy, such as with a speech therapist, is recommended. Options for this service include [facility] at [email or web address]/[email or web address]/[email or web address]/speech-language-and-swallowing-therapy or STUART Fuentes at [facility] at [email or web address]/.  5.  Given her depressive and anxiety symptoms, a referral for psychiatric consultation to address medication management for her emotional distress and psychiatric symptoms is recommended. The [facility] Department of Psychiatry is one option for this service at [email or web address]/care/[email or web address]/[email or web address]-adult or [phone].  6.  Additionally, a referral for psychotherapeutic intervention may also be helpful. A highly structured cognitive-behavioral intervention focused on coping and stress management would likely be the most beneficial. One  option for this service is through the [facility] at [email or web address]/[email or web address]/health-psychology or [phone].  7. A full medical evaluation of any treatable causes of cognitive decline is also recommended, if this has not already been accomplished. Evaluation of any infectious processes, vitamin deficiencies or other metabolic abnormalities is recommended, to rule out these as possible contributors.   8. The patient may find the following attention and organizational strategies helpful:     Use cell phone reminders to help monitor upcoming appointments and due dates as well as other important tasks (e.g., when to take medications). Set the reminder to go off several times prior to the event with advanced notice (e.g., one week before, two days before, the day before, and the morning of the event).     She should attempt to reduce distractions at home. Having a clutter-free work environment and removing or at least making it harder to access potential distractions would help optimize her attention. She might also consider facing away from high traffic areas and using earplugs or noise cancellation headphones when desiring a quiet work environment.    Taking short breaks during her day may help optimize and maintain her concentration.     Make to-do lists and prioritize tasks. Break down large tasks into smaller steps and check off each small step when completed.   9. In order to promote learning and memorization of new verbal material, it is recommended that the patient add structure to the material she is learning to promote subsequent recall (e.g., use mnemonic devices, acronyms, make up a story or song). Additionally, she is encouraged to use visual aids, such as flash cards, diagrams, charts, etc.   10.  The patient is strongly encouraged to work closely with her treating healthcare providers to carefully manage her vascular risk factors.   11. Given her reported  hearing difficulties, a referral for an audiology evaluation may be helpful.   12. The results of the evaluation now constitute a baseline of the patient s cognitive functioning.  Given the evidence for difficulties with cognitive functioning and her complex medical history, a referral for a neuropsychological reevaluation in approximately one year is recommended in order to clarify the differential diagnosis, document any changes in cognitive functioning, and provide further recommendations and prognosis to the patient, family and treatment team.    Results and recommendations were provided to the patient via telephone on 9/28/2021 and her questions were answered. Thank you for referring this interesting individual. If you have any questions, please feel free to contact me.      Nirav Krishna, PhD, ABPP, LP  Professor and Licensed Psychologist [id number]  Board Certified Clinical Neuropsychologist    Time Spent:      Minutes Date Code Units   Total Time-Neuropsychologist Professional 216 9/28/2021 47050 1     9/28/2021 89118 3   Neuropsychologist Admin/scoring 0 9/28/2021 71967 0     9/28/2021 71412 0   Diagnostic Interview  17 9/28/2021 43026 1   Psychometrician Time-Test Administration/Score 181 9/28/2021 44661 1     9/28/2021 02612 5   Diagnosis R41.3 F32.9 J44.9

## 2021-09-22 NOTE — PROGRESS NOTES
ANTICOAGULATION MANAGEMENT     Rajain Guo 70 year old female is on warfarin with therapeutic INR result. (Goal INR 2.5-3.5)    Recent labs: (last 7 days)     09/22/21  1334   INR 3.5*       ASSESSMENT     Source(s): Chart Review and Home Care/Facility Nurse     Warfarin doses taken: Warfarin taken as instructed  Diet: No new diet changes identified  New illness, injury, or hospitalization: No  Medication/supplement changes: None noted  Signs or symptoms of bleeding or clotting: No  Previous INR: Supratherapeutic  Additional findings: 1 held dose last week. Will change maint dose     PLAN     Recommended plan for no diet, medication or health factor changes affecting INR     Dosing Instructions:  Decrease your warfarin dose (3% change) with next INR in 1 week       Summary  As of 9/22/2021    Full warfarin instructions:  10 mg every Tue, Sat; 8 mg all other days   Next INR check:  9/29/2021             Telephone call with home care nurse Samara who verbalizes understanding and agrees to plan    Orders given to  Homecare nurse/facility to recheck    Education provided: Please call back if any changes to your diet, medications or how you've been taking warfarin    Plan made per [facility] anticoagulation protocol    Gladys Díaz, RN  Anticoagulation Clinic  9/22/2021    _______________________________________________________________________     Anticoagulation Episode Summary     Current INR goal:  2.5-3.5   TTR:  34.1 % (8.3 mo)   Target end date:  Indefinite   Send INR reminders to:  GURVINDER DARDEN    Indications    S/P MVR (mitral valve repair) [Z98.890]  S/P AVR (aortic valve replacement) [Z95.2]           Comments:  home care  Pt gave verbal ok to speak with Candy on 7/20/21         Anticoagulation Care Providers     Provider Role Specialty Phone number    Quinton Handy PA Referring Cardiovascular & Thoracic Surgery [phone]    Lakeshia Rubio APRN CNP Referring Internal Medicine  [phone]    Sparkle Bird APRN [facility] [phone]

## 2021-09-23 ENCOUNTER — E-VISIT (OUTPATIENT)
Dept: FAMILY MEDICINE | Facility: CLINIC | Age: 71
End: 2021-09-23
Payer: COMMERCIAL

## 2021-09-23 ENCOUNTER — TELEPHONE (OUTPATIENT)
Dept: FAMILY MEDICINE | Facility: CLINIC | Age: 71
End: 2021-09-23

## 2021-09-23 DIAGNOSIS — N39.0 ACUTE UTI (URINARY TRACT INFECTION): Primary | ICD-10-CM

## 2021-09-23 DIAGNOSIS — R30.0 DYSURIA: Primary | ICD-10-CM

## 2021-09-23 PROCEDURE — 99421 OL DIG E/M SVC 5-10 MIN: CPT | Performed by: FAMILY MEDICINE

## 2021-09-23 NOTE — PATIENT INSTRUCTIONS
Dear Rajani Guo    After reviewing your responses, I've been able to diagnose you with a urinary tract infection, which is a common infection of the bladder with bacteria.  This is not a sexually transmitted infection, though urinating immediately after intercourse can help prevent infections.  Drinking lots of fluids is also helpful to clear your current infection and prevent the next one.      I have sent a prescription for antibiotics to your pharmacy to treat this infection.    It is important that you take all of your prescribed medication even if your symptoms are improving after a few doses.  Taking all of your medicine helps prevent the symptoms from returning.     If your symptoms worsen, you develop pain in your back or stomach, develop fevers, or are not improving in 5 days, please contact your primary care provider for an appointment or visit any of our convenient Walk-in or Urgent Care Centers to be seen, which can be found on our website here.    Thanks again for choosing us as your health care partner,    Tamiko Coley MD    Urinary Tract Infections in Women  Urinary tract infections (UTIs) are most often caused by bacteria. These bacteria enter the urinary tract. The bacteria may come from inside the body. Or they may travel from the skin outside the rectum or vagina into the urethra. Female anatomy makes it easy for bacteria from the bowel to enter a woman s urinary tract, which is the most common source of UTI. This means women develop UTIs more often than men. Pain in or around the urinary tract is a common UTI symptom. But the only way to know for sure if you have a UTI for the healthcare provider to test your urine. The two tests that may be done are the urinalysis and urine culture.     Types of UTIs    Cystitis. A bladder infection (cystitis) is the most common UTI in women. You may have urgent or frequent need to pee. You may also have pain, burning when you pee, and bloody  urine.    Urethritis. This is an inflamed urethra, which is the tube that carries urine from the bladder to outside the body. You may have lower stomach or back pain. You may also have urgent or frequent need to pee.    Pyelonephritis. This is a kidney infection. If not treated, it can be serious and damage your kidneys. In severe cases, you may need to stay in the hospital. You may have a fever and lower back pain.    Medicines to treat a UTI  Most UTIs are treated with antibiotics. These kill the bacteria. The length of time you need to take them depends on the type of infection. It may be as short as 3 days. If you have repeated UTIs, you may need a low-dose antibiotic for several months. Take antibiotics exactly as directed. Don t stop taking them until all of the medicine is gone. If you stop taking the antibiotic too soon, the infection may not go away. You may also develop a resistance to the antibiotic. This can make it much harder to treat.   Lifestyle changes to treat and prevent UTIs   The lifestyle changes below will help get rid of your UTI. They may also help prevent future UTIs.     Drink plenty of fluids. This includes water, juice, or other caffeine-free drinks. Fluids help flush bacteria out of your body.    Empty your bladder. Always empty your bladder when you feel the urge to pee. And always pee before going to sleep. Urine that stays in your bladder can lead to infection. Try to pee before and after sex as well.    Practice good personal hygiene. Wipe yourself from front to back after using the toilet. This helps keep bacteria from getting into the urethra.    Use condoms during sex. These help prevent UTIs caused by sexually transmitted bacteria. Also don't use spermicides during sex. These can increase the risk for UTIs. Choose other forms of birth control instead. For women who tend to get UTIs after sex, a low-dose of a preventive antibiotic may be used. Be sure to discuss this option with  your healthcare provider.    Follow up with your healthcare provider as directed. He or she may test to make sure the infection has cleared. If needed, more treatment may be started.  Pedro last reviewed this educational content on 7/1/2019 2000-2021 [employer]. All rights reserved. This information is not intended as a substitute for professional medical care. Always follow your healthcare professional's instructions.

## 2021-09-23 NOTE — TELEPHONE ENCOUNTER
Patient notified of provider message as written. Patient verbalized understanding.     Staci Thompson RN

## 2021-09-23 NOTE — TELEPHONE ENCOUNTER
Received call from patient. She stated that she had an E-Visit today to address UTI symptoms. It was recommended that she f/u with Urologist, however they are booked up through November but would like to be seen sooner than this. She is curious about next steps- referral to alternate Urologist outside [place]?    MATA GonsalezN RN  [facility], [place]

## 2021-09-23 NOTE — TELEPHONE ENCOUNTER
Patient has Uti symptoms again. She is having dark, smelly urine and more urinary frequency. Patient reports her last UTI was about 1 month ago. Denies fever, back pain, dysuria. E visit advised. Patient requesting message to provider for orders for UA/UC.     Staci Thompson RN

## 2021-09-24 NOTE — TELEPHONE ENCOUNTER
Spoke with pt. Gave her provider's message as written. States she doesn't drive and her daughter that would be the one to drive her wouldn't be able to until after 5pm.     Pt would like to know if she should start the antibiotics prescribed. No antibiotics listed on med list. Please advise.    Also routing to specialty to assist with scheduling pt. States she can't wait until November to see urology.    Klaudia Pickett RN  [facility]

## 2021-09-24 NOTE — TELEPHONE ENCOUNTER
Called pt and spoke to her. She is scheduled to see MD at 1115.    Mary BARBOZA RN Specialty Triage 9/24/2021 4:36 PM

## 2021-09-27 ENCOUNTER — OFFICE VISIT (OUTPATIENT)
Dept: UROLOGY | Facility: CLINIC | Age: 71
End: 2021-09-27
Payer: COMMERCIAL

## 2021-09-27 VITALS — DIASTOLIC BLOOD PRESSURE: 72 MMHG | SYSTOLIC BLOOD PRESSURE: 124 MMHG | HEART RATE: 89 BPM | OXYGEN SATURATION: 98 %

## 2021-09-27 DIAGNOSIS — N39.0 RECURRENT UTI: Primary | ICD-10-CM

## 2021-09-27 LAB
ALBUMIN UR-MCNC: NEGATIVE MG/DL
APPEARANCE UR: CLEAR
BACTERIA #/AREA URNS HPF: ABNORMAL /HPF
BILIRUB UR QL STRIP: NEGATIVE
COLOR UR AUTO: YELLOW
GLUCOSE UR STRIP-MCNC: NEGATIVE MG/DL
HGB UR QL STRIP: ABNORMAL
KETONES UR STRIP-MCNC: ABNORMAL MG/DL
LEUKOCYTE ESTERASE UR QL STRIP: NEGATIVE
NITRATE UR QL: NEGATIVE
PH UR STRIP: 5.5 [PH] (ref 5–7)
RBC #/AREA URNS AUTO: ABNORMAL /HPF
SP GR UR STRIP: 1.02 (ref 1–1.03)
UROBILINOGEN UR STRIP-ACNC: 0.2 E.U./DL
WBC #/AREA URNS AUTO: ABNORMAL /HPF

## 2021-09-27 PROCEDURE — 81001 URINALYSIS AUTO W/SCOPE: CPT | Performed by: UROLOGY

## 2021-09-27 PROCEDURE — 99213 OFFICE O/P EST LOW 20 MIN: CPT | Performed by: UROLOGY

## 2021-09-27 NOTE — PROGRESS NOTES
Chief Complaint   Patient presents with     RECHECK       Rajani Guo is a 70 year old female who presents today for follow up of   Chief Complaint   Patient presents with     RECHECK    f/u for recurrent uti.  She thinks she has UTI again.  She c/o smelling urine and darker color.  She has no dysuria or hematuria.  She is on estrogen cream    Current Outpatient Medications   Medication Sig Dispense Refill     acetaminophen (TYLENOL) 325 MG tablet TAKE 2 TABLETS (650 MG) BY MOUTH EVERY 4 HOURS AS NEEDED FOR PAIN       aspirin (ASA) 81 MG EC tablet Take 1 tablet (81 mg) by mouth daily 100 tablet 3     atorvastatin (LIPITOR) 40 MG tablet Take 1 tablet (40 mg) by mouth daily 90 tablet 3     buPROPion (WELLBUTRIN SR) 150 MG 12 hr tablet Take 1 tablet (150 mg) by mouth 2 times daily 60 tablet 6     Calcium Carb-Cholecalciferol 600-800 MG-UNIT TABS Take 1 tablet by mouth 2 times daily 1 tablet 0     citalopram (CELEXA) 10 MG tablet Take 1 tablet (10 mg) by mouth daily 30 tablet 6     estradiol (ESTRACE) 0.1 MG/GM vaginal cream Place 1 g vaginally twice a week 42.5 g 4     ferrous sulfate (FEROSUL) 325 (65 Fe) MG tablet Take 325 mg by mouth 2 times daily       glucosamine-chondroitinoitin 500-400 MG tablet Patient is taking 1500 mg OTC 1 time daily       losartan (COZAAR) 25 MG tablet Take 1 tablet (25 mg) by mouth daily 90 tablet 3     Melatonin 10 MG TABS tablet Take 10 mg by mouth At Bedtime       metFORMIN (GLUCOPHAGE) 1000 MG tablet TAKE 1 TABLET BY MOUTH TWICE A DAY WITH MEALS 180 tablet 1     Multiple Vitamin (MULTI-VITAMINS) TABS Take 1 tablet by mouth daily        oxybutynin (DITROPAN) 5 MG tablet TAKE 1 TABLET BY MOUTH TWICE A  tablet 3     pantoprazole (PROTONIX) 40 MG EC tablet TAKE 1 TABLET BY MOUTH EVERY DAY 90 tablet 1     traZODone (DESYREL) 50 MG tablet TAKE 1-2 TABLETS ( MG) BY MOUTH AT BEDTIME 180 tablet 3     vitamin C (ASCORBIC ACID) 500 MG tablet Take 1 tablet (500 mg) by mouth daily  100 tablet 1     warfarin ANTICOAGULANT (COUMADIN) 2 MG tablet Take 2 tabs (4 mg) by mouth Mon, Wed, Fri and 3 tabs (6mg) all other days or as directed by your [facility] 270 tablet 1     Allergies   Allergen Reactions     Indomethacin Other (See Comments)     Dizziness and disorientation     Tramadol Nausea and Vomiting      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   Findings: There is no intracranial hemorrhage, mass effect, or midline shift. Gray/whi     History of partial thyroidectomy 06/02/2018     Hyperlipidemia LDL goal <100 01/18/2018     Hypertension      Insomnia, unspecified type 01/18/2018     Mitral regurgitation      Mitral stenosis      Pulmonary hypertension (H)      Tobacco use disorder      Past Surgical History:   Procedure Laterality Date     COLONOSCOPY N/A 9/4/2019    Procedure: COLONOSCOPY;  Surgeon: Marie Todd MD;  Location:  GI     CV CORONARY ANGIOGRAM N/A 11/16/2020    Procedure: CV CORONARY ANGIOGRAM;  Surgeon: Joey Rivas MD;  Location:  HEART CARDIAC CATH LAB     CV FRACTIONAL FLOW RATIO WIRE N/A 11/16/2020    Procedure: Fractional Flow Reserve;  Surgeon: Joey Rivas MD;  Location:   HEART CARDIAC CATH LAB     CV RIGHT HEART CATH MEASUREMENTS RECORDED N/A 11/16/2020    Procedure: CV RIGHT HEART CATH;  Surgeon: Joey Rivas MD;  Location: [facility]     ESOPHAGOSCOPY, GASTROSCOPY, DUODENOSCOPY (EGD), COMBINED N/A 9/4/2019    Procedure: ESOPHAGOGASTRODUODENOSCOPY (EGD);  Surgeon: Marie Todd MD;  Location:  GI     ESOPHAGOSCOPY, GASTROSCOPY, DUODENOSCOPY (EGD), COMBINED N/A 9/17/2020    Procedure: ESOPHAGOGASTRODUODENOSCOPY (EGD);  Surgeon: Ten Ibrahim DO;  Location:  GI     IR ANGIOGRAM THROUGH CATHETER FOLLOW UP  3/5/2019     IR LOWER EXTREMITY ANGIOGRAM LEFT  3/4/2019     KNEE SURGERY Right 2013    right knee torn meniscus surgery     OVARY SURGERY  1971    1 ovary removed     RELEASE CARPAL TUNNEL Right 1988     RELEASE TRIGGER FINGER BILATERAL       REPLACE VALVES AORTIC AND MITRAL, COMBINED N/A 12/29/2020    Procedure: Median Stertonomy, REPLACEMENT AORTIC Valve  with 19mm St Abdoulaye Ryde  Mechanical Heart Valve AND MITRAL VALVE Replacement with 27mm St Abdoulaye Mechanical Heart Valve, on pump oxygenation;  Surgeon: Luc Lara MD;  Location: [facility] OR     SHOULDER SURGERY Left     rotator cuff repair, plate placement     THYROID SURGERY  1988    partial thyroidectomy     Family History   Problem Relation Age of Onset     Diabetes Type 2  Mother      Lung Cancer Father      Diabetes Sister      Coronary Artery Disease Sister      Diabetes Brother      Diabetes Brother      Diabetes Type 2  Brother      Coronary Artery Disease Sister      Asthma Sister      Glaucoma No family hx of      Macular Degeneration No family hx of      Anesthesia Reaction No family hx of      Social History     Socioeconomic History     Marital status:      Spouse name: None     Number of children: 1     Years of education: None     Highest education level: None   Occupational History     None   Tobacco Use     Smoking status: Current Every Day Smoker     Packs/day: 1.00      Years: 59.00     Pack years: 59.00     Start date: 1962     Last attempt to quit: 2020     Years since quittin.7     Smokeless tobacco: Never Used   Substance and Sexual Activity     Alcohol use: Not Currently     Comment: 2 glasses of wine a week     Drug use: Yes     Types: Marijuana     Comment: occasional     Sexual activity: Never   Other Topics Concern     Parent/sibling w/ CABG, MI or angioplasty before 65F 55M? Not Asked   Social History Narrative    , has one daughter who lives with her in patient's house.  Is full code.  (last updated 10/8/2020)      Social Determinants of Health     Financial Resource Strain: Low Risk      Difficulty of Paying Living Expenses: Not hard at all   Food Insecurity: No Food Insecurity     Worried About Running Out of Food in the Last Year: Never true     Ran Out of Food in the Last Year: Never true   Transportation Needs: No Transportation Needs     Lack of Transportation (Medical): No     Lack of Transportation (Non-Medical): No   Physical Activity:      Days of Exercise per Week:      Minutes of Exercise per Session:    Stress:      Feeling of Stress :    Social Connections:      Frequency of Communication with Friends and Family:      Frequency of Social Gatherings with Friends and Family:      Attends Taoism Services:      Active Member of Clubs or Organizations:      Attends Club or Organization Meetings:      Marital Status:    Intimate Partner Violence:      Fear of Current or Ex-Partner:      Emotionally Abused:      Physically Abused:      Sexually Abused:        REVIEW OF SYSTEMS  =================  C: NEGATIVE for fever, chills, change in weight  I: NEGATIVE for worrisome rashes, moles or lesions  E/M: NEGATIVE for ear, mouth and throat problems  R: NEGATIVE for significant cough or SHORTNESS OF BREATH  CV:  NEGATIVE for chest pain, palpitations or peripheral edema  GI: NEGATIVE for nausea, abdominal pain, heartburn, or change in bowel  habits  NEURO: NEGATIVE numbness/weakness  : see HPI  PSYCH: NEGATIVE depression/anxiety  LYmph: no new enlarged lymph nodes  Ortho: no new trauma/movements    Physical Exam:  /72 (BP Location: Right arm, Patient Position: Chair, Cuff Size: Adult Large)   Pulse 89   SpO2 98%    Patient is pleasant, in no acute distress, good general condition.  Lung: no evidence of respiratory distress    Abdomen: Soft, nondistended, non tender. No masses. No rebound or guarding.   Exam: normal female  Skin: Warm and dry.  No redness.  Psych: normal mood and affect  Neuro: alert and oriented  Musculaskeletal: moving all extremities    Assessment/Plan:   (N39.0) Recurrent UTI  (primary encounter diagnosis)  Comment: UA unremarkable  Plan: UA reflex to Microscopic and Culture [[id number]],        Urine Microscopic        Reassurance.  UA prn.

## 2021-09-27 NOTE — TELEPHONE ENCOUNTER
Nora RN with [facility] called the clinic requesting verbal orders for home care skilled nursin time a week X 2 weeks for UTI, medication management.    Called and gave okay for verbal orders for Home care Skilled nursing as requested.    Per [facility] protocol/Policies: Helene Abrams, KAREEM for Dr. Coley  Patient who has bee seen by [facility] primary care provider within the past 2 years (9/15/21)

## 2021-09-27 NOTE — PATIENT INSTRUCTIONS
Patient Education   Resources to Help You Quit Smoking  If you have quit smoking or are thinking about quitting, congratulations! It can be hard to quit smoking, but the benefits are well worth it. To help you quit and stay smoke-free, there are many resources that can help.  Your health plan  If you have health insurance, call them for more details about their phone coaching programs.    [facility] [place] and [facility]:  5-402-837-BLUE (1-427.295.7075)    [employer]:  3-186-854-QUIT (1-770.697.3809)    [facility]:  2-285-540-BLUE ([phone])    [facility]:  [phone]    [employer]:  [phone]    [employer] members:  [phone]    [facility]:   [phone]    [facility]:  [phone]    [facility]:  [phone]  Other resources  American Cancer Society: 1-375.428.1543  The American Cancer Society can help you find local resources to quit smoking.  QUITPLAN: 9-751-060-PLAN (1-224.204.9291)  Offers a telephone helpline, gum, patches and lozenges. These services are free for the uninsured and those without coverage. The online program is free to everyone at www.quitplan.com.  American Lung Association: -260-LUNG-USA (1-571.585.1810)  Provides a lung helpline as well as an online program, self-help book and group clinic support for quitting smoking. www.lung.org/stop-smoking  National Cancer [place]:  4-155-359-QUIT (1-479.914.7292)  Offers a telephone hotline, online text chat and a website with tools, information and support for smokers who want to quit. www.smokefree.gov  Medication Therapy Management (MTM):  [phone] (toll free)  mtm@[place].org  This is a clinic program to help you quit smoking. It offers one-on-one sessions with a pharmacist. Call or email to find out if your insurance covers MTM and to schedule an appointment at all locations.  For informational purposes only.  Not to replace the advice of your health care provider. Copyright   2013 [employer]. All rights reserved. Clinically reviewed by Cora Parsons MD, [facility] Lung Cancer Screening Program. [employer] 763351 - Rev 06/19.

## 2021-09-28 ENCOUNTER — TELEPHONE (OUTPATIENT)
Dept: NEUROPSYCHOLOGY | Facility: CLINIC | Age: 71
End: 2021-09-28

## 2021-09-28 ENCOUNTER — OFFICE VISIT (OUTPATIENT)
Dept: NEUROPSYCHOLOGY | Facility: CLINIC | Age: 71
End: 2021-09-28
Attending: INTERNAL MEDICINE
Payer: COMMERCIAL

## 2021-09-28 ENCOUNTER — ANTICOAGULATION THERAPY VISIT (OUTPATIENT)
Dept: FAMILY MEDICINE | Facility: CLINIC | Age: 71
End: 2021-09-28

## 2021-09-28 DIAGNOSIS — R41.3 MEMORY LOSS: Primary | ICD-10-CM

## 2021-09-28 DIAGNOSIS — Z98.890 S/P MVR (MITRAL VALVE REPAIR): Primary | ICD-10-CM

## 2021-09-28 DIAGNOSIS — Z95.2 S/P AVR (AORTIC VALVE REPLACEMENT): ICD-10-CM

## 2021-09-28 DIAGNOSIS — J44.9 CHRONIC OBSTRUCTIVE PULMONARY DISEASE, UNSPECIFIED COPD TYPE (H): ICD-10-CM

## 2021-09-28 DIAGNOSIS — F32.A DEPRESSION, UNSPECIFIED DEPRESSION TYPE: ICD-10-CM

## 2021-09-28 LAB — INR (EXTERNAL): 3.9 (ref 2.5–3.5)

## 2021-09-28 PROCEDURE — 90791 PSYCH DIAGNOSTIC EVALUATION: CPT | Performed by: PSYCHOLOGIST

## 2021-09-28 PROCEDURE — 96133 NRPSYC TST EVAL PHYS/QHP EA: CPT | Performed by: PSYCHOLOGIST

## 2021-09-28 PROCEDURE — 96132 NRPSYC TST EVAL PHYS/QHP 1ST: CPT | Performed by: PSYCHOLOGIST

## 2021-09-28 PROCEDURE — 96139 PSYCL/NRPSYC TST TECH EA: CPT | Performed by: PSYCHOLOGIST

## 2021-09-28 PROCEDURE — 96138 PSYCL/NRPSYC TECH 1ST: CPT | Performed by: PSYCHOLOGIST

## 2021-09-28 NOTE — NURSING NOTE
The patient was seen for neuropsychological evaluation at the request of Dr. Bin Alcaraz, for the purposes of diagnostic clarification and treatment planning. 181 minutes of test administration and scoring were provided by this writer, Mckinley Baires. Please see Dr. Nirav Krishna's report for a full interpretation of the findings.

## 2021-09-28 NOTE — PROGRESS NOTES
ANTICOAGULATION MANAGEMENT     Rajani Guo 70 year old female is on warfarin with supratherapeutic INR result. (Goal INR 2.5-3.5)    Recent labs: (last 7 days)     09/28/21  0940   INR 3.9*       ASSESSMENT     Source(s): Chart Review and Home Care/Facility Nurse     Warfarin doses taken: Warfarin taken as instructed  Diet: No new diet changes identified  New illness, injury, or hospitalization: No  Medication/supplement changes: None noted  Signs or symptoms of bleeding or clotting: No  Previous INR: Supratherapeutic  Additional findings: smelly urine     PLAN     Recommended plan for no diet, medication or health factor changes affecting INR     Dosing Instructions: Partial hold then Decrease your warfarin dose (3% change) with next INR in 1 week       Summary  As of 9/28/2021    Full warfarin instructions:  10 mg every Tue, Sat; 8 mg all other days   Next INR check:               Telephone call with home care nurse [facility] nurse who verbalizes understanding and agrees to plan    Orders given to  Homecare nurse/facility to recheck    Education provided: Contact [phone]  with any changes, questions or concerns.     Plan made per [facility] anticoagulation protocol    Julieta Jo RN  Anticoagulation Clinic  9/28/2021    _______________________________________________________________________     Anticoagulation Episode Summary     Current INR goal:  2.5-3.5   TTR:  33.3 % (8.5 mo)   Target end date:  Indefinite   Send INR reminders to:  GURVINDER DARDEN    Indications    S/P MVR (mitral valve repair) [Z98.890]  S/P AVR (aortic valve replacement) [Z95.2]           Comments:  home care  Pt gave verbal ok to speak with Sarai on 7/20/21         Anticoagulation Care Providers     Provider Role Specialty Phone number    Quinton Handy PA Referring Cardiovascular & Thoracic Surgery [phone]    Lakeshia Rubio, FRANCESCA CNP Referring Internal Medicine [phone]    Sparkle Bird, APRN CNP  Referring Emergency Medicine [phone]          Julieta Pavon, RN, BSN, PHN  [facility] Nurse  [phone]

## 2021-09-28 NOTE — TELEPHONE ENCOUNTER
[facility] Call Center    Phone Message    May a detailed message be left on voicemail: yes     Reason for Call: Other: Please have Dr. Krishna call this pt back again. Thank you.     Action Taken: Message routed to:  [facility] ([facility]    Travel Screening: Not Applicable

## 2021-09-28 NOTE — PROGRESS NOTES
Nora SERNA called back to report patient already took 10mg this AM - advised to hold tomorrows dose.     [facility] calender updated.     Julieta Pavon, RN, BSN, PHN  Anticoagulation Nurse  [phone]

## 2021-09-28 NOTE — PROGRESS NOTES
NAME  Rajani Guo  2021           MRN  [id number]   [employer]             1950    TECH  KB           AGE  70    STATION  OP           SEX  Female                 HANDEDNESS Right                 EDUCATION 14                                    TOMM      WAIS-IV      ORIENTATION      Trial 1  50    Digit Span RDS 9    Time  -2     Trial 2  50          Personal Info.     Trial 3  0    DCT      Place            E-Score  0    Presidents 3 /6                       WRAT-4 READING     WAIS-IV      TRAIL MAKING TEST      SS  103      Raw SSa    Time Errors SSa T   %ile  58    Digit Span 22 8   A 38 0 8 47   Grade Equivalence 12.7    Similarities 26 11   B 148 0 6 35         Vocabulary 33 9                Matrix Reasoning 12 9                Coding  31 6                                   VIQ (est)  100                               HVLT   1   WMS-IV LOGICAL MEMORY OA  SANDRA-O COMPLEX FIGURE       Raw T     Raw SSa/%ile     Raw T %ile   Trial 1  4    LM I  24 7   Time to Copy 171  >16   Trial 2  6    LM II  7 4   Copy  32  >16   Trial 3  8    Recognition 19 51-75   Short Delay Recall 17.5 56 73   Learning  4          Long Delay Recall 13.5 47 38   Total Recall 18 39         Recognition Total 19 43 24   Delayed Recall 7 44                Percent Retention 88% 50                True Positives 12                 False Positives 1                 Disc. Index 11 53                                    NAB NAMING  1  COWAT FAS     ANIMAL FLUENCY      Raw 31 /31    Raw 38     Raw 13      z 0.33     SS 10     SS 6      T 57     T 48     T 33                         CLOCK DRAWING      TIM   H  STROOP       Command Borderline   Raw 26      Raw Corrected T    Copy  Normal    %ile 56     Word 76 90 41                Color 55 66 41                C/W 30 45 50                Intf. 7  57                       GDS      ANDREA             Raw  17    Raw  11           Interpretation Moderate    Interpretation Mild

## 2021-09-28 NOTE — LETTER
2021      RE: Rajani Guo  [address] [facility] [address]     RE: Rajani Guo  MR#: [id number]  : 1950  DOS: Sep 28, 2021  JUNIOR:  2021      NEUROPSYCHOLOGICAL CONSULTATION      REASON FOR REFERRAL:  Rajani Guo is a 70 year old female with 14 years of education.  The patient was referred for a neuropsychological evaluation by Dr. Alcaraz to assess the patient's cognitive and emotional functioning secondary to memory concerns.  The patient was informed that the evaluation included multiple measures of performance and symptom validity, and the patient was encouraged to provide their best effort at all times.  The nature of the neuropsychological evaluation was also discussed, including limits of confidentiality (for suspected child or elder abuse, potential homicide or suicide, and court orders). The patient was also informed that the report would be placed on the electronic medical records system.  The patient was also given an opportunity to ask questions. The patient indicated that they understood the information and consented to participate in the evaluation.    PROCEDURES:   Review of records and clinical interview,  Mental Status-orientation, Wide Range Achievement Test-4, Wechsler Adult Intelligence Scale-IV, Maynard Verbal Learning Test-Revised, Clock Drawing Test, Verbal Fluency Test, Geriatric Depression Scale, Mc Anxiety Inventory, Ethan Complex Figure Test, Trailmaking Test, NAB Naming Test, TOMM, Judgment of Line Orientation Test, Stroop Test and Wechsler Memory Scale-IV (selected subtests)    REVIEW OF RECORDS: Medical records from 21 noted that the patient was continuing to have issues with short term memory. The records said that she  will walk into a room and forget why she went in there. She says a lot of half sentences. She will forget conversations. This problem first started in 2020 after surgery. Problems have been pretty steady since  then. She has had several UTIs. Symptoms don't better antibiotics. No issues with cooking or paying bills. She is on Celexa and Bupropion for depression. Depression comes and goes. Daughter thinks she seems depressed. No hallucinations.  Records noted other significant medical history includes hyperlipidemia, osteoporosis, insomnia, smoking, partial thyroidectomy, hypertension, peripheral vascular disease, anemia, claudication of the left lower extremity, cardiomyopathy, GI bleed, fatigue, shortness of breath, heart failure due to valvular disease, urinary tract infection, trigger middle finger of right hand, colon polyps, sensorineural hearing loss, chronic obstructive pulmonary disease (COPD), atrial fibrillation, type 2 diabetes, and anxiety.  A CT scan of the head report from 1/24/21 noted  No acute intracranial pathology.  Records noted that the patient is being treated with estradiol, Wellbutrin, Celexa, losartan, metformin, trazodone, Coumadin, Ditropan, pantoprazole, atorvastatin, calcium carbonate-cholecalciferol, glucosamine-chondroitin, acetaminophen, vitamin C, aspirin, multivitamins, ferrous sulfate, and melatonin.     CLINICAL INTERVIEW: The patient was interviewed with her daughter, Sarai.  On interview, the patient and her daughter noted that the patient was having increasing difficulty with her memory.  They noted that she tends to forget things frequently and the patient acknowledged losing track of conversations.  The patient said she has a short attention span as well.  The patient's daughter noted that the patient will also tend to repeat herself frequently.  Further, the patient's daughter said that the patient will not remember what is said to her in doctors appointments, and the patient also has confusion about her medication regime.  They noted that the patient's memory difficulties began after heart surgery on December 29, 2020.  The daughter noted the patient's memory has gotten  "progressively worse over time.  The patient also noted difficulty with word finding and decision making.  The patient reported that she does not drive, because someone took her license away.  In terms of financial management, the daughter noted the patient forgot to pay a bill one time, but this is not a typical occurrence.  They noted that the patient has help from a visiting nurse to manage her medications, but this is ending next week.  The patient denied any difficulty with basic household chores.    In terms of her mood, the patient and her daughter noted that her mood \"varies.\"  They both noted that the patient's mood is \"up-and-down\" and the daughter said that the patient can be in a good mood one minute and become angry in just a few minutes.  The patient confirmed that she is taking medication to manage her mood.  The patient denied difficulty with sleep, and indicated that trazodone helps her sleep.  She denied difficulty with appetite.  Her daughter noted that the patient tends to eat more snack foods than regular meals.  The patient denied any previous contact with mental health professionals.  She also denied any suicidal or homicidal ideation, and denied any history of suicide attempts.  Further, the patient denied any history of hallucinations or delusions.  In terms of alcohol use, the patient noted that she drinks about 4 glasses of wine in a week.  The patient states that she smokes about 1.5 packs of cigarettes per day.  The patient reported that she smokes marijuana, but has not smoked marijuana since last Wednesday (about 6 days ago) because of this appointment.    Medically, the patient confirmed her history of multiple medical issues, including COPD, atrial fibrillation, type 2 diabetes, heart disease, hypertension, hyperlipidemia, insomnia, thyroid difficulties, and hearing loss.  The patient said she also has recurrent urinary tract infections (UTI), but this was evaluated recently and she " "does not currently have a UTI.  The patient denied any history of traumatic brain injury with loss of consciousness.  She also denied any history of multiple sclerosis, stroke, seizures, Parkinson's disease, Covid-19 infection, sleep apnea, cancer, liver disease, or kidney disease.  The patient noted that, although she had surgery on her thyroid several years ago, she does not need to take thyroid medication currently.  The patient reported that she wears eyeglasses all the time.  The patient and her daughter noted that she needs hearing aids but does not have them.  They also noted that the patient's medications listed in the electronic medical record were up-to-date.    Academically, the patient said that she earned an associates degree and she \"loved school.\"  She noted that she was a \"pretty good\" student in school and had over a 3.0 grade point average.  The patient denied ever being in special education classes or having to repeat a grade.  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  The patient reported that she  after a 40-year marriage.  She said that she has one daughter and she lives at home with her daughter.    BEHAVIORAL OBSERVATIONS:  On examination, the patient was casually but appropriately dressed and groomed. The patient was cooperative with the evaluation and was talkative. The patient appeared to put forth her best effort on all tasks. Thus, the results of this evaluation are a reasonably valid reflection of the patient s current level of functioning. There were no indications of hallucinations, delusions or unusual thought processes. There were no demonstrations of a practical memory problem in inevitable. The patient was a good historian, with a self-report consistent with medical records. The patient was generally well oriented and her affect was appropriate.     RESULTS: Formal performance avidity measures were within expected limits and consistent with " the above-noted observations.  Psychometric estimates of the patient's premorbid global cognitive functioning based upon single word reading ability were in the average range.  Likewise, measures of her current verbally mediated intellectual functioning were also in the average range.  Thus, there was no evidence for significant change or decline in global cognitive functioning.    Measures of attention/concentration were generally within expected limits.  For example, a digit span task was in the average range.  Likewise, a simple visual scanning task that integrates attention was in the average range.  Speed of information processing was variable.  Specifically, psychomotor speed was in the low average range.  However, motor-free processing speed was at the lower end of the average range.    Measures of the patient's verbal memory functioning indicated evidence for mild variability.  For example, immediate recall on a story memory and a word list learning task were both in the low average range.  Delayed recall on the word list learning task was in the average range, while delayed recall on the story memory task was in the low range.  Delayed verbal recognition memory was in the average range, however.  Visual memory was better and generally within expected limits.  Specifically, immediate and delayed visual recall on a complex figure drawing task was in the average range.  Visual recognition memory for this task was also in the average range.  Thus, there was no evidence for rapid forgetting of previously learned information, but she demonstrated inefficient encoding and variable retrieval of newly learned information.    Expressive language functioning was also mildly variable.  Specifically, phonemic fluency was in the average range, but semantic fluency was in the low average range.  Confrontation naming was in the average range, as was vocabulary ability.  Receptive language functioning, based upon her  performance during interview, was within expected limits.    Visual-spatial and visual constructional abilities were generally within expected limits and a personal strength.  For example, motor-free visual reasoning was in the average range.  Further, motor-free line orientation judgment was within expected limits.  Additionally, complex figure drawing and clock drawing tasks did not indicate evidence for significant visual-spatial distortions.    Measures of the patient's executive functioning were also variable.  For example, a complex visual scanning task that integrates cognitive flexibility was in the low average range.  However, a measure of response inhibition that integrates processing speed was in the average range.  Further, verbal and visual reasoning were in the average range, but verbal fluency was variable.    Assessment of the patient's emotional functioning was completed utilizing the clinical interview and self-report measures of depression and anxiety.  On the self-report measures, the patient endorsed moderate symptoms of depression and mild anxiety symptoms.    SUMMARY: In summary, the neuropsychological assessment results indicated no evidence for significant change or decline in global cognitive functioning.  There was evidence for variable speed of information processing and variable verbal memory functioning, particularly with encoding and retrieval.  However, visual memory was better, and there was no evidence for rapid forgetting of previously learned verbal or visual information.  Expressive language functioning and executive functioning were also mildly variable, but visual-spatial abilities were within expected limits.  The patient also endorsed moderate depressive and mild anxiety symptoms, consistent with a depressive disorder.  The pattern of results indicated that a progressive dementing condition was unlikely.  Further, she did not meet the criteria for mild cognitive impairment  (MCI).  The specific etiology of her cognitive difficulties could not be completely determined, but multiple factors are likely contributing, including her emotional distress and multiple medical issues, including heart/vascular issues, COPD, and metabolic/infectious issues like UTIs.    RECOMMENDATIONS:   1.  Given these results, continued neurological consultation is recommended to assist with treatment planning.  2.  Repeat neuroimaging might be considered as well to evaluate for any structural or vascular changes to the brain since her previous CT scan in January, 2021, given her reported cognitive decline following heart surgery.  3.  Given the slowed processing speed, and difficulties with executive functioning and memory, the patient is encouraged to continue to avoid driving at this time.  One option to consider might be a formal driving evaluation, such as through the occupational therapy services at [facility]: [email or web address] access hospital.[facility]/nvficov-nokmf-qpgtrfwoegoziw-institute/outcomes-overview/drivers-services  4. A referral for cognitive rehabilitation therapy, such as with a speech therapist, is recommended. Options for this service include [facility] at [email or web address]/sitecore/content/[facility]/home/specialties/speech-language-and-swallowing-therapy or DULCE MARIA Fuentes at [facility] ([facility] at [email or web address]/provider/eric-slp/.  5.  Given her depressive and anxiety symptoms, a referral for psychiatric consultation to address medication management for her emotional distress and psychiatric symptoms is recommended. The [facility] Department of Psychiatry is one option for this service at [email or web address].[email or web address]/care/specialties/psychiatry-adult or [phone].  6.  Additionally, a referral for psychotherapeutic intervention may also be helpful. A highly structured cognitive-behavioral  intervention focused on coping and stress management would likely be the most beneficial. One option for this service is through the [facility] at [email or web address].[email or web address]/care/treatments/health-psychology or [phone].  7. A full medical evaluation of any treatable causes of cognitive decline is also recommended, if this has not already been accomplished. Evaluation of any infectious processes, vitamin deficiencies or other metabolic abnormalities is recommended, to rule out these as possible contributors.   8. The patient may find the following attention and organizational strategies helpful:     Use cell phone reminders to help monitor upcoming appointments and due dates as well as other important tasks (e.g., when to take medications). Set the reminder to go off several times prior to the event with advanced notice (e.g., one week before, two days before, the day before, and the morning of the event).     She should attempt to reduce distractions at home. Having a clutter-free work environment and removing or at least making it harder to access potential distractions would help optimize her attention. She might also consider facing away from high traffic areas and using earplugs or noise cancellation headphones when desiring a quiet work environment.    Taking short breaks during her day may help optimize and maintain her concentration.     Make to-do lists and prioritize tasks. Break down large tasks into smaller steps and check off each small step when completed.   9. In order to promote learning and memorization of new verbal material, it is recommended that the patient add structure to the material she is learning to promote subsequent recall (e.g., use mnemonic devices, acronyms, make up a story or song). Additionally, she is encouraged to use visual aids, such as flash cards, diagrams, charts, etc.   10.  The patient is strongly encouraged to work closely with her  treating healthcare providers to carefully manage her vascular risk factors.   11. Given her reported hearing difficulties, a referral for an audiology evaluation may be helpful.   12. The results of the evaluation now constitute a baseline of the patient s cognitive functioning.  Given the evidence for difficulties with cognitive functioning and her complex medical history, a referral for a neuropsychological reevaluation in approximately one year is recommended in order to clarify the differential diagnosis, document any changes in cognitive functioning, and provide further recommendations and prognosis to the patient, family and treatment team.    Results and recommendations were provided to the patient via telephone on 9/28/2021 and her questions were answered. Thank you for referring this interesting individual. If you have any questions, please feel free to contact me.      Nirav Krishna, PhD, ABPP, LP  Professor and Licensed Psychologist [id number]  Board Certified Clinical Neuropsychologist    Time Spent:      Minutes Date Code Units   Total Time-Neuropsychologist Professional 216 9/28/2021 05023 1     9/28/2021 95336 3   Neuropsychologist Admin/scoring 0 9/28/2021 04351 0     9/28/2021 62145 0   Diagnostic Interview  17 9/28/2021 18869 1   Psychometrician Time-Test Administration/Score 181 9/28/2021 29094 1     9/28/2021 32433 5   Diagnosis R41.3 F32.9 J44.9

## 2021-09-28 NOTE — PROGRESS NOTES
Alvina called back stating that patient is not completley sure if she took warfarin this morning. Will check INR 10/01 just to be safe.

## 2021-09-30 ENCOUNTER — TELEPHONE (OUTPATIENT)
Dept: FAMILY MEDICINE | Facility: CLINIC | Age: 71
End: 2021-09-30

## 2021-09-30 DIAGNOSIS — F41.9 ANXIETY: ICD-10-CM

## 2021-10-01 ENCOUNTER — ANTICOAGULATION THERAPY VISIT (OUTPATIENT)
Dept: FAMILY MEDICINE | Facility: CLINIC | Age: 71
End: 2021-10-01

## 2021-10-01 DIAGNOSIS — Z95.2 S/P AVR (AORTIC VALVE REPLACEMENT): ICD-10-CM

## 2021-10-01 DIAGNOSIS — Z98.890 S/P MVR (MITRAL VALVE REPAIR): Primary | ICD-10-CM

## 2021-10-01 LAB — INR (EXTERNAL): 1.2 (ref 0.9–1.1)

## 2021-10-01 NOTE — PROGRESS NOTES
ANTICOAGULATION MANAGEMENT     Rajani Guo 70 year old female is on warfarin with subtherapeutic INR result. (Goal INR 2.5-3.5)    Recent labs: (last 7 days)     10/01/21  1113   INR 1.2*       ASSESSMENT     Source(s): Chart Review and Home Care/Facility Nurse     Warfarin doses taken: Warfarin recently held for Supra which may be affecting INR  Diet: No new diet changes identified  New illness, injury, or hospitalization: No  Medication/supplement changes: None noted  Signs or symptoms of bleeding or clotting: No  Previous INR: Supratherapeutic  Additional findings: patient's INRs drop quickly with Full Holds     PLAN     Recommended plan for no diet, medication or health factor changes affecting INR     Dosing Instructions: Booster dose then continue your current warfarin dose with next INR in 1 week       Summary  As of 10/1/2021    Full warfarin instructions:  10/1: 10 mg; Otherwise 10 mg every Tue; 8 mg all other days   Next INR check:               Telephone call with home care nurse Samara who verbalizes understanding and agrees to plan    Orders given to  Homecare nurse/facility to recheck    Education provided: Please call back if any changes to your diet, medications or how you've been taking warfarin    Plan made per [facility] anticoagulation protocol    Gladys Díaz, RN  Anticoagulation Clinic  10/1/2021    _______________________________________________________________________     Anticoagulation Episode Summary     Current INR goal:  2.5-3.5   TTR:  33.3 % (8.6 mo)   Target end date:  Indefinite   Send INR reminders to:  GURVINDER DARDEN    Indications    S/P MVR (mitral valve repair) [Z98.890]  S/P AVR (aortic valve replacement) [Z95.2]           Comments:  home care  Pt gave verbal ok to speak with Candy on 7/20/21         Anticoagulation Care Providers     Provider Role Specialty Phone number    Quinton Handy PA Referring Cardiovascular & Thoracic Surgery [phone]    Lakeshia Rubio  FRANCESCA Barrera CNP Referring Internal Medicine [phone]    Sparkle Bird APRN CNP Referring Emergency Medicine [phone]

## 2021-10-03 RX ORDER — CITALOPRAM HYDROBROMIDE 10 MG/1
TABLET ORAL
Qty: 30 TABLET | Refills: 6 | OUTPATIENT
Start: 2021-10-03

## 2021-10-05 RX ORDER — CITALOPRAM HYDROBROMIDE 10 MG/1
10 TABLET ORAL DAILY
Qty: 30 TABLET | Refills: 6 | Status: CANCELLED | OUTPATIENT
Start: 2021-10-05

## 2021-10-05 NOTE — TELEPHONE ENCOUNTER
I tried to reach the pharmacy. Was on hold for over 30 minutes. Funmilayo Ferrari,     FAMILY HISTORY:  F/H of alcoholism, father

## 2021-10-05 NOTE — TELEPHONE ENCOUNTER
Should have refills on the prescription sent on 8/16/2021.  Renewals are typically assigned a new prescription number.   Is she calling in an old prescription number?  Please verify refills      Elvira Waters RN  [facility]

## 2021-10-05 NOTE — TELEPHONE ENCOUNTER
Got a hold of the pharmacy. They have it on file and they will get this ready for her.     No more action needed. Funmilayo Ferrari,

## 2021-10-05 NOTE — TELEPHONE ENCOUNTER
Reason for call:  Medication   If this is a refill request, has the caller requested the refill from the pharmacy already? Yes  Will the patient be using a [facility]? No  Name of the pharmacy and phone number for the current request:    [id number] - [place], [place] - [address]      Name of the medication requested:citalopram (CELEXA) 10 MG tablet    Other request: Patient is calling in. She said they have the refill on file though they need an approval from the provider to fill her medications. She is almost out of her medications.     Phone number to reach patient:  Cell number on file:    Telephone Information:   Mobile [phone]   Best Time:      Can we leave a detailed message on this number?  YES    Travel screening: Not Applicable

## 2021-10-05 NOTE — TELEPHONE ENCOUNTER
Called and spoke with Rajani. Let her know that they will be getting this ready for her. Funmilayo Ferrari,

## 2021-10-08 ENCOUNTER — TELEPHONE (OUTPATIENT)
Dept: FAMILY MEDICINE | Facility: CLINIC | Age: 71
End: 2021-10-08

## 2021-10-08 DIAGNOSIS — Z98.890 S/P MVR (MITRAL VALVE REPAIR): Primary | ICD-10-CM

## 2021-10-08 DIAGNOSIS — Z95.2 S/P AVR (AORTIC VALVE REPLACEMENT): ICD-10-CM

## 2021-10-08 LAB — INR (EXTERNAL): 2.7 (ref 0.9–1.1)

## 2021-10-08 NOTE — TELEPHONE ENCOUNTER
ANTICOAGULATION MANAGEMENT     Rajani Guo 70 year old female is on warfarin with therapeutic INR result. (Goal INR )    Recent labs: (last 7 days)     10/08/21  1504   INR 2.7*       ASSESSMENT     Source(s): Home Care/Facility Nurse       Warfarin doses taken: Warfarin taken as instructed    Diet: No new diet changes identified    New illness, injury, or hospitalization: No    Medication/supplement changes: None noted    Signs or symptoms of bleeding or clotting: No    Previous INR: Subtherapeutic    Additional findings: None     PLAN     Recommended plan for no diet, medication or health factor changes affecting INR     Dosing Instructions: Continue your current warfarin dose with next INR in 1 week       Summary  As of 10/8/2021    Full warfarin instructions:  10 mg every Tue; 8 mg all other days   Next INR check:  10/15/2021             Telephone call with home care nurse Madelaine who verbalizes understanding and agrees to plan    Lab visit scheduled    Education provided: Goal range and significance of current result    Plan made per [facility] anticoagulation protocol    Matthew Finn RN  Anticoagulation Clinic  10/8/2021    _______________________________________________________________________     Anticoagulation Episode Summary     Current INR goal:  2.5-3.5   TTR:  32.8 % (8.9 mo)   Target end date:  Indefinite   Send INR reminders to:  GURVINDER DARDEN    Indications    S/P MVR (mitral valve repair) [Z98.890]  S/P AVR (aortic valve replacement) [Z95.2]           Comments:  home care  Pt gave verbal ok to speak with Candy on 7/20/21         Anticoagulation Care Providers     Provider Role Specialty Phone number    Quinton Handy PA Referring Cardiovascular & Thoracic Surgery [phone]    Lakeshia Rubio, FRANCESCA CNP Referring Internal Medicine [phone]    Sparkle Bird APRN CNP Referring Emergency Medicine [phone]

## 2021-10-10 ENCOUNTER — HEALTH MAINTENANCE LETTER (OUTPATIENT)
Age: 71
End: 2021-10-10

## 2021-10-12 ENCOUNTER — OFFICE VISIT (OUTPATIENT)
Dept: FAMILY MEDICINE | Facility: CLINIC | Age: 71
End: 2021-10-12
Payer: COMMERCIAL

## 2021-10-12 VITALS
HEART RATE: 99 BPM | TEMPERATURE: 98.7 F | HEIGHT: 64 IN | RESPIRATION RATE: 18 BRPM | BODY MASS INDEX: 26.12 KG/M2 | DIASTOLIC BLOOD PRESSURE: 74 MMHG | WEIGHT: 153 LBS | SYSTOLIC BLOOD PRESSURE: 139 MMHG | OXYGEN SATURATION: 99 %

## 2021-10-12 DIAGNOSIS — Z23 NEED FOR PROPHYLACTIC VACCINATION AND INOCULATION AGAINST INFLUENZA: ICD-10-CM

## 2021-10-12 DIAGNOSIS — R41.89 COGNITIVE DECLINE: ICD-10-CM

## 2021-10-12 DIAGNOSIS — R35.0 URINARY FREQUENCY: Primary | ICD-10-CM

## 2021-10-12 DIAGNOSIS — Z23 HIGH PRIORITY FOR 2019-NCOV VACCINE: ICD-10-CM

## 2021-10-12 DIAGNOSIS — R41.3 MEMORY PROBLEM: ICD-10-CM

## 2021-10-12 DIAGNOSIS — N39.41 URGE INCONTINENCE OF URINE: ICD-10-CM

## 2021-10-12 DIAGNOSIS — F41.9 ANXIETY: ICD-10-CM

## 2021-10-12 LAB
ALBUMIN UR-MCNC: NEGATIVE MG/DL
APPEARANCE UR: CLEAR
BACTERIA #/AREA URNS HPF: ABNORMAL /HPF
BILIRUB UR QL STRIP: NEGATIVE
COLOR UR AUTO: YELLOW
GLUCOSE UR STRIP-MCNC: NEGATIVE MG/DL
HGB UR QL STRIP: NEGATIVE
KETONES UR STRIP-MCNC: NEGATIVE MG/DL
LEUKOCYTE ESTERASE UR QL STRIP: ABNORMAL
NITRATE UR QL: POSITIVE
PH UR STRIP: 7 [PH] (ref 5–7)
RBC #/AREA URNS AUTO: ABNORMAL /HPF
SP GR UR STRIP: 1.01 (ref 1–1.03)
SQUAMOUS #/AREA URNS AUTO: ABNORMAL /LPF
UROBILINOGEN UR STRIP-ACNC: 0.2 E.U./DL
WBC #/AREA URNS AUTO: ABNORMAL /HPF

## 2021-10-12 PROCEDURE — 87086 URINE CULTURE/COLONY COUNT: CPT | Performed by: FAMILY MEDICINE

## 2021-10-12 PROCEDURE — 91300 COVID-19,PF,PFIZER (12+ YRS): CPT | Performed by: FAMILY MEDICINE

## 2021-10-12 PROCEDURE — 0003A COVID-19,PF,PFIZER (12+ YRS): CPT | Performed by: FAMILY MEDICINE

## 2021-10-12 PROCEDURE — 90662 IIV NO PRSV INCREASED AG IM: CPT | Performed by: FAMILY MEDICINE

## 2021-10-12 PROCEDURE — 99215 OFFICE O/P EST HI 40 MIN: CPT | Mod: 25 | Performed by: FAMILY MEDICINE

## 2021-10-12 PROCEDURE — G0008 ADMIN INFLUENZA VIRUS VAC: HCPCS | Performed by: FAMILY MEDICINE

## 2021-10-12 PROCEDURE — 81001 URINALYSIS AUTO W/SCOPE: CPT | Performed by: FAMILY MEDICINE

## 2021-10-12 RX ORDER — CITALOPRAM HYDROBROMIDE 20 MG/1
20 TABLET ORAL DAILY
Qty: 30 TABLET | Refills: 0 | Status: SHIPPED | OUTPATIENT
Start: 2021-10-12 | End: 2021-11-08

## 2021-10-12 RX ORDER — NITROFURANTOIN 25; 75 MG/1; MG/1
100 CAPSULE ORAL 2 TIMES DAILY
Qty: 14 CAPSULE | Refills: 0 | Status: SHIPPED | OUTPATIENT
Start: 2021-10-12 | End: 2021-10-19

## 2021-10-12 RX ORDER — OXYBUTYNIN CHLORIDE 5 MG/1
5 TABLET ORAL 2 TIMES DAILY
Qty: 180 TABLET | Refills: 3 | Status: SHIPPED | OUTPATIENT
Start: 2021-10-12 | End: 2022-03-28

## 2021-10-12 ASSESSMENT — ANXIETY QUESTIONNAIRES
3. WORRYING TOO MUCH ABOUT DIFFERENT THINGS: NEARLY EVERY DAY
IF YOU CHECKED OFF ANY PROBLEMS ON THIS QUESTIONNAIRE, HOW DIFFICULT HAVE THESE PROBLEMS MADE IT FOR YOU TO DO YOUR WORK, TAKE CARE OF THINGS AT HOME, OR GET ALONG WITH OTHER PEOPLE: SOMEWHAT DIFFICULT
6. BECOMING EASILY ANNOYED OR IRRITABLE: NEARLY EVERY DAY
7. FEELING AFRAID AS IF SOMETHING AWFUL MIGHT HAPPEN: MORE THAN HALF THE DAYS
1. FEELING NERVOUS, ANXIOUS, OR ON EDGE: MORE THAN HALF THE DAYS
2. NOT BEING ABLE TO STOP OR CONTROL WORRYING: NEARLY EVERY DAY
5. BEING SO RESTLESS THAT IT IS HARD TO SIT STILL: MORE THAN HALF THE DAYS

## 2021-10-12 ASSESSMENT — MIFFLIN-ST. JEOR: SCORE: 1204

## 2021-10-12 NOTE — PROGRESS NOTES
Assessment & Plan     Urinary frequency  UTI  SEE [facility] care orders  The potential side effects of this medication have been discussed with the patient.  Call if any significant problems with these are experienced.  Lots of Fluids  Follow up 1 week if not better/sooner if worse    - UA macro with reflex to Microscopic and Culture - Clinc Collect  - Urine Microscopic Exam  - Urine Culture  - nitroFURantoin macrocrystal-monohydrate (MACROBID) 100 MG capsule; Take 1 capsule (100 mg) by mouth 2 times daily for 7 days    Anxiety  Advised increase Celexa 20 mg  Daily  Sees Psych    - MENTAL HEALTH REFERRAL  - Adult; Outpatient Treatment; Individual/Couples/Family/Group Therapy/Health Psychology; [facility] [phone]; We will contact you to schedule the appointment or please call with any questions; Future  - citalopram (CELEXA) 20 MG tablet; Take 1 tablet (20 mg) by mouth daily  - MENTAL HEALTH REFERRAL  - Adult; Psychiatry; Psychiatry; Collaborative Care Psychiatry Service/Bridge to Long-Term Psychiatry as indicated ([phone]); Yes; Chronic Mental Health without improvement; Yes; We will contact you to schedule the a...; Future    Urge incontinence of urine.ses    - oxybutynin (DITROPAN) 5 MG tablet; Take 1 tablet (5 mg) by mouth 2 times daily    Cognitive decline  Has appointment with britta  Advised   - MR Brain w/o & w Contrast; Future    High priority for 2019-nCoV vaccine  Advised   - COVID-19,PF,PFIZER    Memory problem    - MR Brain w/o & w Contrast; Future      Return in about 2 weeks (around 10/26/2021), or if symptoms worsen or fail to improve, for recheck/Please schedule appointment.    Tamiko Coley MD  [facility] MARIE Guo is a 70 year old who presents for the following health issues    HPI     Genitourinary - Female  Onset/Duration: 2 days  Description:   Painful urination (Dysuria): no           Frequency: YES  Blood in urine (Hematuria):  no  Delay in urine (Hesitency): no  Intensity: moderate  Progression of Symptoms:  worsening  Accompanying Signs & Symptoms:  Fever/chills: no  Flank pain: no  Nausea and vomiting: no  Vaginal symptoms: none  Abdominal/Pelvic Pain: no  History:   History of frequent UTI s: no  History of kidney stones: no  Sexually Active: no  Possibility of pregnancy: No  Precipitating or alleviating factors: None  Therapies tried and outcome: estradiol vaginal cream but not helping     Pt had her neuropsych testing and has appointment to see neurologist  Has cognitive decline  Is not Driving    Review of Systems   CONSTITUTIONAL: NEGATIVE for fever, chills, change in weight  RESP: NEGATIVE for significant cough or SOB  CV: NEGATIVE for chest pain, palpitations or peripheral edema  GI: NEGATIVE for nausea, abdominal pain, heartburn, or change in bowel habits  : as above  PSYCHIATRIC: anxiety      Depression and Anxiety Follow-Up    How are you doing with your depression since your last visit? Feels down as not driving    No suicidal ideation or thoughts        How are you doing with your anxiety since your last visit?  Worsened -see ARACELI    Are you having other symptoms that might be associated with depression or anxiety? Yes:  yes    Have you had a significant life event? Health Concerns     Do you have any concerns with your use of alcohol or other drugs? No    Social History     Tobacco Use     Smoking status: Current Every Day Smoker     Packs/day: 1.00     Years: 59.00     Pack years: 59.00     Start date: 1962     Last attempt to quit: 2020     Years since quittin.7     Smokeless tobacco: Never Used   Substance Use Topics     Alcohol use: Not Currently     Comment: 2 glasses of wine a week     Drug use: Yes     Types: Marijuana     Comment: occasional     PHQ 3/13/2019 2020 2021   PHQ-9 Total Score 3 4 2   Q9: Thoughts of better off dead/self-harm past 2 weeks Not at all Not at all Not at all  "    ARACELI-7 SCORE 4/19/2021   Total Score 15     Last PHQ-9 4/19/2021   1.  Little interest or pleasure in doing things 0   2.  Feeling down, depressed, or hopeless 0   3.  Trouble falling or staying asleep, or sleeping too much 0   4.  Feeling tired or having little energy 1   5.  Poor appetite or overeating 0   6.  Feeling bad about yourself 0   7.  Trouble concentrating 1   8.  Moving slowly or restless 0   Q9: Thoughts of better off dead/self-harm past 2 weeks 0   PHQ-9 Total Score 2   Difficulty at work, home, or with people Somewhat difficult     ARACELI-7  4/19/2021   1. Feeling nervous, anxious, or on edge 1   2. Not being able to stop or control worrying 2   3. Worrying too much about different things 2   4. Trouble relaxing 3   5. Being so restless that it is hard to sit still 3   6. Becoming easily annoyed or irritable 2   7. Feeling afraid, as if something awful might happen 2   ARACELI-7 Total Score 15   If you checked any problems, how difficult have they made it for you to do your work, take care of things at home, or get along with other people? Very difficult       Suicide Assessment Five-step Evaluation and Treatment (SAFE-T)            Objective    /74   Pulse 99   Temp 98.7  F (37.1  C) (Oral)   Resp 18   Ht 1.626 m (5' 4\")   Wt 69.4 kg (153 lb)   SpO2 99%   BMI 26.26 kg/m    Body mass index is 26.26 kg/m .  Physical Exam   GENERAL: healthy, alert and no distress  NECK: no adenopathy, no asymmetry, masses, or scars and thyroid normal to palpation  RESP: lungs clear to auscultation - no rales, rhonchi or wheezes  CV: regular rate and rhythm, normal S1 S2, no S3 or S4, no murmur, click or rub, no peripheral edema and peripheral pulses strong  ABDOMEN: soft, nontender, no hepatosplenomegaly, no masses and bowel sounds normal  MS: no gross musculoskeletal defects noted, no edema  PSYCH: affect normal/    Results for orders placed or performed in visit on 10/12/21   UA macro with reflex to " Microscopic and Culture - Clinc Collect     Status: Abnormal    Specimen: Urine, Midstream   Result Value Ref Range    Color Urine Yellow Colorless, Straw, Light Yellow, Yellow    Appearance Urine Clear Clear    Glucose Urine Negative Negative mg/dL    Bilirubin Urine Negative Negative    Ketones Urine Negative Negative mg/dL    Specific Gravity Urine 1.015 1.003 - 1.035    Blood Urine Negative Negative    pH Urine 7.0 5.0 - 7.0    Protein Albumin Urine Negative Negative mg/dL    Urobilinogen Urine 0.2 0.2, 1.0 E.U./dL    Nitrite Urine Positive (A) Negative    Leukocyte Esterase Urine Large (A) Negative   Urine Microscopic Exam     Status: Abnormal   Result Value Ref Range    Bacteria Urine Many (A) None Seen /HPF    RBC Urine 2-5 (A) 0-2 /HPF /HPF    WBC Urine 10-25 (A) 0-5 /HPF /HPF    Squamous Epithelials Urine Few (A) None Seen /LPF

## 2021-10-13 ENCOUNTER — TELEPHONE (OUTPATIENT)
Dept: FAMILY MEDICINE | Facility: CLINIC | Age: 71
End: 2021-10-13

## 2021-10-13 DIAGNOSIS — N39.0 RECURRENT UTI: Primary | ICD-10-CM

## 2021-10-13 RX ORDER — CEPHALEXIN 500 MG/1
500 CAPSULE ORAL 3 TIMES DAILY
Qty: 9 CAPSULE | Refills: 0 | Status: SHIPPED | OUTPATIENT
Start: 2021-10-13 | End: 2021-10-16

## 2021-10-13 RX ORDER — METHENAMINE HIPPURATE 1000 MG/1
1 TABLET ORAL 2 TIMES DAILY
Qty: 180 TABLET | Refills: 3 | Status: SHIPPED | OUTPATIENT
Start: 2021-10-13 | End: 2022-08-04

## 2021-10-13 NOTE — TELEPHONE ENCOUNTER
"Patient was evaluated and treated by Dr Coley on 10/12/21.   1.  10/12/21 UC: \"in process\"    She was prescribed:     nitroFURantoin macrocrystal-monohydrate (MACROBID) 100 MG capsule; Take 1 capsule (100 mg) by mouth 2 times daily for 7 days      2.  Dr. Saldaña prescribed:    cephALEXin (KEFLEX) 500 MG capsule:  Take 1 capsule (500 mg) by mouth 3 times daily for 3 days, Disp-9 capsule, R-0, E-Prescribe    And     methenamine (HIPREX) 1 g tablet:  Take 1 tablet (1 g) by mouth 2 times daily, Disp-180 tablet, R-3, E-Prescribe    Patient is inquiring as to which prescription to  and start today.  Please call patient ([phone]) with the provider recommendations.    "

## 2021-10-14 ENCOUNTER — MEDICAL CORRESPONDENCE (OUTPATIENT)
Dept: HEALTH INFORMATION MANAGEMENT | Facility: CLINIC | Age: 71
End: 2021-10-14

## 2021-10-14 DIAGNOSIS — Z53.9 DIAGNOSIS NOT YET DEFINED: Primary | ICD-10-CM

## 2021-10-14 LAB — BACTERIA UR CULT: NORMAL

## 2021-10-14 PROCEDURE — G0179 MD RECERTIFICATION HHA PT: HCPCS | Performed by: FAMILY MEDICINE

## 2021-10-15 ENCOUNTER — ANTICOAGULATION THERAPY VISIT (OUTPATIENT)
Dept: FAMILY MEDICINE | Facility: CLINIC | Age: 71
End: 2021-10-15

## 2021-10-15 DIAGNOSIS — Z98.890 S/P MVR (MITRAL VALVE REPAIR): Primary | ICD-10-CM

## 2021-10-15 DIAGNOSIS — Z95.2 S/P AVR (AORTIC VALVE REPLACEMENT): ICD-10-CM

## 2021-10-15 LAB — INR (EXTERNAL): 2.2 (ref 0.9–1.1)

## 2021-10-15 NOTE — PROGRESS NOTES
ANTICOAGULATION MANAGEMENT     Rajani Guo 70 year old female is on warfarin with subtherapeutic INR result. (Goal INR 2.5-3.5)    Recent labs: (last 7 days)     10/15/21  1626   INR 2.2*       ASSESSMENT     Source(s): Home Care/Facility Nurse     Warfarin doses taken: Warfarin taken as instructed  Diet: No new diet changes identified  New illness, injury, or hospitalization: Pt was tested for UTI but culture came back negative.  Medication/supplement changes: Pt was on macrobid, then keflex earlier in the week. When culture came back negative, abx were discontinued.  Signs or symptoms of bleeding or clotting: No  Previous INR: Therapeutic last visit; previously outside of goal range  Additional findings: None. No changes to explain low INR.     PLAN     Recommended plan for no diet, medication or health factor changes affecting INR     Dosing Instructions:  Increase your warfarin dose (10% change) with next INR in 1 week       Summary  As of 10/15/2021    Full warfarin instructions:  8 mg every Sun, Tue, Thu; 10 mg all other days   Next INR check:  10/22/2021             Telephone call with home care nurse Preet who verbalizes understanding and agrees to plan    Orders given to  Homecare nurse/facility to recheck    Education provided: Goal range and significance of current result    Plan made per [facility] anticoagulation protocol    Matthew Finn RN  Anticoagulation Clinic  10/15/2021    _______________________________________________________________________     Anticoagulation Episode Summary     Current INR goal:  2.5-3.5   TTR:  33.0 % (9.1 mo)   Target end date:  Indefinite   Send INR reminders to:  GURVINDER DARDEN    Indications    S/P MVR (mitral valve repair) [Z98.890]  S/P AVR (aortic valve replacement) [Z95.2]           Comments:  home care  Pt gave verbal ok to speak with Candy on 7/20/21         Anticoagulation Care Providers     Provider Role Specialty Phone number    Quinton Handy PA  Referring Cardiovascular & Thoracic Surgery [phone]    Lakeshia Rubio, FRANCESCA CNP Referring Internal Medicine [phone]    Sparkle Bird APRN CNP Referring Emergency Medicine [phone]

## 2021-10-18 ENCOUNTER — TELEPHONE (OUTPATIENT)
Dept: FAMILY MEDICINE | Facility: CLINIC | Age: 71
End: 2021-10-18

## 2021-10-18 NOTE — TELEPHONE ENCOUNTER
Fax to be reviewed by the provider Home Care Plan of Care Certification     Who is the it from? [facility] Cert 10/11/2021 - 12/09/2021  This was faxed to [facility]   Where was the fax placed? Provider's desk to sign  What number is listed as a contact on the fax?     [facility]   Phone: [phone] Fax: [phone]  Please fax to above    Additional comments:

## 2021-10-19 ENCOUNTER — OFFICE VISIT (OUTPATIENT)
Dept: NEUROLOGY | Facility: CLINIC | Age: 71
End: 2021-10-19
Payer: COMMERCIAL

## 2021-10-19 VITALS
BODY MASS INDEX: 26.69 KG/M2 | WEIGHT: 155.5 LBS | DIASTOLIC BLOOD PRESSURE: 77 MMHG | HEART RATE: 83 BPM | SYSTOLIC BLOOD PRESSURE: 145 MMHG

## 2021-10-19 DIAGNOSIS — F32.A DEPRESSION, UNSPECIFIED DEPRESSION TYPE: ICD-10-CM

## 2021-10-19 DIAGNOSIS — R41.3 MEMORY CHANGE: Primary | ICD-10-CM

## 2021-10-19 DIAGNOSIS — F41.9 ANXIETY: ICD-10-CM

## 2021-10-19 PROCEDURE — 99214 OFFICE O/P EST MOD 30 MIN: CPT | Performed by: INTERNAL MEDICINE

## 2021-10-19 ASSESSMENT — PAIN SCALES - GENERAL: PAINLEVEL: NO PAIN (0)

## 2021-10-19 NOTE — LETTER
10/19/2021         RE: Rajani Guo  [address] [place] [facility] [address]        Dear Colleague,    Thank you for referring your patient, Rajani Guo, to the [facility] [place]. Please see a copy of my visit note below.    [facility] Follow Up Visit    Rajani Guo MRN# [id number]   Age: 70 year old YOB: 1950     Brief history of symptoms: The patient was initially seen in neurologic consultation on 3/2/21 for evaluation of imbalance. Please see the comprehensive neurologic consultation note from that date in the Epic records for details.     Interval history:   Patient is here to discuss neuropsychology results.     She continues to have some depression. Her dosage of Celexa was recently changed.     She is still not driving and hasn't done so in the last several months.     Walking is doing really well.       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)     Anemia, iron deficiency     Status post coronary angiogram     S/P MVR (mitral valve repair)     S/P AVR (aortic valve replacement)     UTI (urinary tract infection)     Trigger middle finger of right hand     Personal history of colonic polyps     Sensorineural hearing loss, bilateral     COPD (chronic obstructive pulmonary disease) (H)     History of CT scan of head 2021     Frailty     Atrial fibrillation (H)     Current use  of long term anticoagulation     History of GI bleed     PAD (peripheral artery disease) (H)     PAF (paroxysmal atrial fibrillation) (H)     Memory problem     Type 2 diabetes mellitus with other circulatory complications (H)     Anxiety     Type 2 diabetes mellitus with other circulatory complication, without long-term current use of insulin (H)     Cognitive decline     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   Findings: There is no intracranial hemorrhage, mass effect, or midline shift. Gray/whi     History of partial thyroidectomy 06/02/2018     Hyperlipidemia LDL goal <100 01/18/2018     Hypertension      Insomnia, unspecified type 01/18/2018     Mitral regurgitation      Mitral stenosis      Pulmonary hypertension (H)      Tobacco use disorder         Past Surgical History:     Past Surgical History:   Procedure Laterality Date     COLONOSCOPY N/A 9/4/2019    Procedure: COLONOSCOPY;  Surgeon: Marie Todd MD;  Location:  GI     CV CORONARY ANGIOGRAM N/A 11/16/2020    Procedure: CV CORONARY ANGIOGRAM;  Surgeon: Joey Rivas MD;  Location:  HEART CARDIAC CATH LAB     CV FRACTIONAL FLOW RATIO WIRE N/A 11/16/2020    Procedure:  Fractional Flow Reserve;  Surgeon: Joey Rivas MD;  Location:  HEART CARDIAC CATH LAB     CV RIGHT HEART CATH MEASUREMENTS RECORDED N/A 2020    Procedure: CV RIGHT HEART CATH;  Surgeon: Joey Rivas MD;  Location:  HEART CARDIAC CATH LAB     ESOPHAGOSCOPY, GASTROSCOPY, DUODENOSCOPY (EGD), COMBINED N/A 2019    Procedure: ESOPHAGOGASTRODUODENOSCOPY (EGD);  Surgeon: Marie Todd MD;  Location:  GI     ESOPHAGOSCOPY, GASTROSCOPY, DUODENOSCOPY (EGD), COMBINED N/A 2020    Procedure: ESOPHAGOGASTRODUODENOSCOPY (EGD);  Surgeon: Ten Ibrahim DO;  Location:  GI     IR ANGIOGRAM THROUGH CATHETER FOLLOW UP  3/5/2019     IR LOWER EXTREMITY ANGIOGRAM LEFT  3/4/2019     KNEE SURGERY Right     right knee torn meniscus surgery     OVARY SURGERY  1971    1 ovary removed     RELEASE CARPAL TUNNEL Right      RELEASE TRIGGER FINGER BILATERAL       REPLACE VALVES AORTIC AND MITRAL, COMBINED N/A 2020    Procedure: Median Stertonomy, REPLACEMENT AORTIC Valve  with 19mm St Abdoulaye [place]  Mechanical Heart Valve AND MITRAL VALVE Replacement with 27mm St Abd[place]aye Mechanical Heart Valve, on pump oxygenation;  Surgeon: Luc Lara MD;  Location:  OR     SHOULDER SURGERY Left     rotator cuff repair, plate placement     THYROID SURGERY      partial thyroidectomy        Social History:     Social History     Tobacco Use     Smoking status: Current Every Day Smoker     Packs/day: 1.00     Years: 59.00     Pack years: 59.00     Start date: 1962     Last attempt to quit: 2020     Years since quittin.8     Smokeless tobacco: Never Used   Substance Use Topics     Alcohol use: Not Currently     Comment: 2 glasses of wine a week     Drug use: Yes     Types: Marijuana     Comment: occasional        Family History:     Family History   Problem Relation Age of Onset     Diabetes Type 2  Mother      Lung Cancer Father      Diabetes Sister      Coronary Artery Disease  Sister      Diabetes Brother      Diabetes Brother      Diabetes Type 2  Brother      Coronary Artery Disease Sister      Asthma Sister      Glaucoma No family hx of      Macular Degeneration No family hx of      Anesthesia Reaction No family hx of         Medications:     Current Outpatient Medications   Medication Sig     acetaminophen (TYLENOL) 325 MG tablet TAKE 2 TABLETS (650 MG) BY MOUTH EVERY 4 HOURS AS NEEDED FOR PAIN     aspirin (ASA) 81 MG EC tablet Take 1 tablet (81 mg) by mouth daily     atorvastatin (LIPITOR) 40 MG tablet Take 1 tablet (40 mg) by mouth daily     buPROPion (WELLBUTRIN SR) 150 MG 12 hr tablet Take 1 tablet (150 mg) by mouth 2 times daily     Calcium Carb-Cholecalciferol 600-800 MG-UNIT TABS Take 1 tablet by mouth 2 times daily     citalopram (CELEXA) 20 MG tablet Take 1 tablet (20 mg) by mouth daily     estradiol (ESTRACE) 0.1 MG/GM vaginal cream Place 1 g vaginally twice a week     ferrous sulfate (FEROSUL) 325 (65 Fe) MG tablet Take 325 mg by mouth 2 times daily     glucosamine-chondroitinoitin 500-400 MG tablet Patient is taking 1500 mg OTC 1 time daily     losartan (COZAAR) 25 MG tablet Take 1 tablet (25 mg) by mouth daily     Melatonin 10 MG TABS tablet Take 10 mg by mouth At Bedtime     metFORMIN (GLUCOPHAGE) 1000 MG tablet TAKE 1 TABLET BY MOUTH TWICE A DAY WITH MEALS     methenamine (HIPREX) 1 g tablet Take 1 tablet (1 g) by mouth 2 times daily     Multiple Vitamin (MULTI-VITAMINS) TABS Take 1 tablet by mouth daily      oxybutynin (DITROPAN) 5 MG tablet Take 1 tablet (5 mg) by mouth 2 times daily     pantoprazole (PROTONIX) 40 MG EC tablet TAKE 1 TABLET BY MOUTH EVERY DAY     traZODone (DESYREL) 50 MG tablet TAKE 1-2 TABLETS ( MG) BY MOUTH AT BEDTIME     vitamin C (ASCORBIC ACID) 500 MG tablet Take 1 tablet (500 mg) by mouth daily     warfarin ANTICOAGULANT (COUMADIN) 2 MG tablet Take 2 tabs (4 mg) by mouth Mon, Wed, Fri and 3 tabs (6mg) all other days or as directed by your  [facility]     No current facility-administered medications for this visit.        Allergies:     Allergies   Allergen Reactions     Indomethacin Other (See Comments)     Dizziness and disorientation     Tramadol Nausea and Vomiting        Review of Systems:   As above     Physical Exam:   Vitals: BP (!) 145/77 (BP Location: Right arm, Patient Position: Sitting, Cuff Size: Adult Regular)   Pulse 83   Wt 70.5 kg (155 lb 8 oz)   BMI 26.69 kg/m     General: Seated comfortably in no acute distress.  Lungs: breathing comfortably  Neurologic:     Mental Status: Alert. Normal fund of knowledge. Language normal.     Cranial No dysarthria.      Motor: No tremors or other abnormal movements observed. Strength 5/5 in all extremities. Normal tone.     Sensory: Negative Romberg. Sensation to light touch is intact throughout     Gait: Normal, steady casual gait. Minimal difficulties with heel and toe gaits. Able to perform tandem with mild difficulties.  Coordination: FTN and HTS intact bilaterally.     Data reviewed on previous visits    Imaging:  CT head 1/24/2021  Impression:  No acute intracranial pathology..      Laboratory:  B12 457 10/2020  3/2021 - Normal B12/MMA, TSH 5 with normal free T4, CK 46  TSH, B12 normal (6/2021)  Pertinent Investigations since last visit:   None         Assessment and Plan:   Assessment  Rajani Guo is a 70 year old female who presents today for follow-up of balance and memory difficulties. She reported balance and memory problems starting following AVR/MVR surgery in December 2020. She was hospitalized in January 2021 for the balance difficulties and was discharged to rehab for 2 weeks. 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 Gait has improved with physical therapy. She continues to have memory issues today. MoCA at previous visit was 21/30 with most prominent deficits in short term memory. Neuropsychology testing  was recently completed, which did not show evidence of meaningful cognitive impairment. Symptoms were not suggestive of a neurodegenerative disorder. We discussed how depression / concurrent medial co morbidities can sometimes lead to cognitive complaints. Referral for psychiatry/psychology recently placed by primary care provider. MRI brain also ordered and is scheduled for next week. Occupational therapy referral placed today to given opinion on driving and need for more formal evaluation with Yovany Schuler. Patient would also benefit from cognitive rehabilitation exercises with OT.     Plan  - OT referral  - Follow-up on MRI brain results  - Agree with psychiatry/psychology referrals    Bin Alcaraz MD   of Neurology  [facility]    The total time of this encounter today amounted to 30 minutes. This time included time spent with the patient, prep work, ordering tests, and performing post visit documentation.        Again, thank you for allowing me to participate in the care of your patient.        Sincerely,        Bin Alcaraz MD

## 2021-10-19 NOTE — PROGRESS NOTES
[facility] Neurology Follow Up Visit    Rajani Guo MRN# [id number]   Age: 70 year old YOB: 1950     Brief history of symptoms: The patient was initially seen in neurologic consultation on 3/2/21 for evaluation of imbalance. Please see the comprehensive neurologic consultation note from that date in the Epic records for details.     Interval history:   Patient is here to discuss neuropsychology results.     She continues to have some depression. Her dosage of Celexa was recently changed.     She is still not driving and hasn't done so in the last several months.     Walking is doing really well.       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)     Anemia, iron deficiency     Status post coronary angiogram     S/P MVR (mitral valve repair)     S/P AVR (aortic valve replacement)     UTI (urinary tract infection)     Trigger middle finger of right hand     Personal history of colonic polyps     Sensorineural hearing loss, bilateral     COPD (chronic obstructive pulmonary disease) (H)     History of CT scan of head 2021     Frailty     Atrial fibrillation (H)     Current use of long term anticoagulation     History of GI bleed     PAD (peripheral artery disease) (H)     PAF (paroxysmal atrial fibrillation) (H)     Memory problem     Type 2 diabetes mellitus with other circulatory complications (H)     Anxiety     Type 2 diabetes  mellitus with other circulatory complication, without long-term current use of insulin (H)     Cognitive decline     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   Findings: There is no intracranial hemorrhage, mass effect, or midline shift. Gray/whi     History of partial thyroidectomy 06/02/2018     Hyperlipidemia LDL goal <100 01/18/2018     Hypertension      Insomnia, unspecified type 01/18/2018     Mitral regurgitation      Mitral stenosis      Pulmonary hypertension (H)      Tobacco use disorder         Past Surgical History:     Past Surgical History:   Procedure Laterality Date     COLONOSCOPY N/A 9/4/2019    Procedure: COLONOSCOPY;  Surgeon: Marie Todd MD;  Location:  GI     CV CORONARY ANGIOGRAM N/A 11/16/2020    Procedure: CV CORONARY ANGIOGRAM;  Surgeon: Joey Rivas MD;  Location:  HEART CARDIAC CATH LAB     CV FRACTIONAL FLOW RATIO WIRE N/A 11/16/2020    Procedure: Fractional Flow Reserve;  Surgeon: Joey Rivas MD;  Location:  HEART CARDIAC CATH LAB     CV RIGHT HEART CATH MEASUREMENTS RECORDED N/A 11/16/2020    Procedure: CV RIGHT HEART CATH;  Surgeon: Joey Rivas MD;  [facility]     ESOPHAGOSCOPY, GASTROSCOPY, DUODENOSCOPY (EGD), COMBINED N/A 2019    Procedure: ESOPHAGOGASTRODUODENOSCOPY (EGD);  Surgeon: Marie Todd MD;  Location:  GI     ESOPHAGOSCOPY, GASTROSCOPY, DUODENOSCOPY (EGD), COMBINED N/A 2020    Procedure: ESOPHAGOGASTRODUODENOSCOPY (EGD);  Surgeon: Ten Ibrahim DO;  Location:  GI     IR ANGIOGRAM THROUGH CATHETER FOLLOW UP  3/5/2019     IR LOWER EXTREMITY ANGIOGRAM LEFT  3/4/2019     KNEE SURGERY Right 2013    right knee torn meniscus surgery     OVARY SURGERY  1971    1 ovary removed     RELEASE CARPAL TUNNEL Right      RELEASE TRIGGER FINGER BILATERAL       REPLACE VALVES AORTIC AND MITRAL, COMBINED N/A 2020    Procedure: Median Stertonomy, REPLACEMENT AORTIC Valve  with 19mm St Abdoulaye Emmet  Mechanical Heart Valve AND MITRAL VALVE Replacement with 27mm St Abdoulaye Mechanical Heart Valve, on pump oxygenation;  Surgeon: Luc Lara MD;  Location: [facility] OR     SHOULDER SURGERY Left     rotator cuff repair, plate placement     THYROID SURGERY      partial thyroidectomy        Social History:     Social History     Tobacco Use     Smoking status: Current Every Day Smoker     Packs/day: 1.00     Years: 59.00     Pack years: 59.00     Start date: 1962     Last attempt to quit: 2020     Years since quittin.8     Smokeless tobacco: Never Used   Substance Use Topics     Alcohol use: Not Currently     Comment: 2 glasses of wine a week     Drug use: Yes     Types: Marijuana     Comment: occasional        Family History:     Family History   Problem Relation Age of Onset     Diabetes Type 2  Mother      Lung Cancer Father      Diabetes Sister      Coronary Artery Disease Sister      Diabetes Brother      Diabetes Brother      Diabetes Type 2  Brother      Coronary Artery Disease Sister      Asthma Sister      Glaucoma No family hx of      Macular Degeneration No family hx of      Anesthesia Reaction No family hx of          Medications:     Current Outpatient Medications   Medication Sig     acetaminophen (TYLENOL) 325 MG tablet TAKE 2 TABLETS (650 MG) BY MOUTH EVERY 4 HOURS AS NEEDED FOR PAIN     aspirin (ASA) 81 MG EC tablet Take 1 tablet (81 mg) by mouth daily     atorvastatin (LIPITOR) 40 MG tablet Take 1 tablet (40 mg) by mouth daily     buPROPion (WELLBUTRIN SR) 150 MG 12 hr tablet Take 1 tablet (150 mg) by mouth 2 times daily     Calcium Carb-Cholecalciferol 600-800 MG-UNIT TABS Take 1 tablet by mouth 2 times daily     citalopram (CELEXA) 20 MG tablet Take 1 tablet (20 mg) by mouth daily     estradiol (ESTRACE) 0.1 MG/GM vaginal cream Place 1 g vaginally twice a week     ferrous sulfate (FEROSUL) 325 (65 Fe) MG tablet Take 325 mg by mouth 2 times daily     glucosamine-chondroitinoitin 500-400 MG tablet Patient is taking 1500 mg OTC 1 time daily     losartan (COZAAR) 25 MG tablet Take 1 tablet (25 mg) by mouth daily     Melatonin 10 MG TABS tablet Take 10 mg by mouth At Bedtime     metFORMIN (GLUCOPHAGE) 1000 MG tablet TAKE 1 TABLET BY MOUTH TWICE A DAY WITH MEALS     methenamine (HIPREX) 1 g tablet Take 1 tablet (1 g) by mouth 2 times daily     Multiple Vitamin (MULTI-VITAMINS) TABS Take 1 tablet by mouth daily      oxybutynin (DITROPAN) 5 MG tablet Take 1 tablet (5 mg) by mouth 2 times daily     pantoprazole (PROTONIX) 40 MG EC tablet TAKE 1 TABLET BY MOUTH EVERY DAY     traZODone (DESYREL) 50 MG tablet TAKE 1-2 TABLETS ( MG) BY MOUTH AT BEDTIME     vitamin C (ASCORBIC ACID) 500 MG tablet Take 1 tablet (500 mg) by mouth daily     warfarin ANTICOAGULANT (COUMADIN) 2 MG tablet Take 2 tabs (4 mg) by mouth Mon, Wed, Fri and 3 tabs (6mg) all other days or as directed by your [facility]     No current facility-administered medications for this visit.        Allergies:     Allergies   Allergen Reactions     Indomethacin Other (See Comments)     Dizziness and disorientation     Tramadol Nausea and Vomiting        Review of  Systems:   As above     Physical Exam:   Vitals: BP (!) 145/77 (BP Location: Right arm, Patient Position: Sitting, Cuff Size: Adult Regular)   Pulse 83   Wt 70.5 kg (155 lb 8 oz)   BMI 26.69 kg/m     General: Seated comfortably in no acute distress.  Lungs: breathing comfortably  Neurologic:     Mental Status: Alert. Normal fund of knowledge. Language normal.     Cranial No dysarthria.      Motor: No tremors or other abnormal movements observed. Strength 5/5 in all extremities. Normal tone.     Sensory: Negative Romberg. Sensation to light touch is intact throughout     Gait: Normal, steady casual gait. Minimal difficulties with heel and toe gaits. Able to perform tandem with mild difficulties.  Coordination: FTN and HTS intact bilaterally.     Data reviewed on previous visits    Imaging:  CT head 1/24/2021  Impression:  No acute intracranial pathology..      Laboratory:  B12 457 10/2020  3/2021 - Normal B12/MMA, TSH 5 with normal free T4, CK 46  TSH, B12 normal (6/2021)  Pertinent Investigations since last visit:   None         Assessment and Plan:   Assessment  Rajani Guo is a 70 year old female who presents today for follow-up of balance and memory difficulties. She reported balance and memory problems starting following AVR/MVR surgery in December 2020. She was hospitalized in January 2021 for the balance difficulties and was discharged to rehab for 2 weeks. 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 Gait has improved with physical therapy. She continues to have memory issues today. MoCA at previous visit was 21/30 with most prominent deficits in short term memory. Neuropsychology testing was recently completed, which did not show evidence of meaningful cognitive impairment. Symptoms were not suggestive of a neurodegenerative disorder. We discussed how depression / concurrent medial co morbidities can sometimes lead to cognitive  complaints. Referral for psychiatry/psychology recently placed by primary care provider. MRI brain also ordered and is scheduled for next week. Occupational therapy referral placed today to given opinion on driving and need for more formal evaluation with Yovany Schuler. Patient would also benefit from cognitive rehabilitation exercises with OT.     Plan  - OT referral  - Follow-up on MRI brain results  - Agree with psychiatry/psychology referrals    Bin Alcaraz MD   of Neurology  [facility]    The total time of this encounter today amounted to 30 minutes. This time included time spent with the patient, prep work, ordering tests, and performing post visit documentation.

## 2021-10-20 ENCOUNTER — MEDICAL CORRESPONDENCE (OUTPATIENT)
Dept: HEALTH INFORMATION MANAGEMENT | Facility: CLINIC | Age: 71
End: 2021-10-20
Payer: COMMERCIAL

## 2021-10-22 ENCOUNTER — ANTICOAGULATION THERAPY VISIT (OUTPATIENT)
Dept: ANTICOAGULATION | Facility: CLINIC | Age: 71
End: 2021-10-22

## 2021-10-22 DIAGNOSIS — Z95.2 S/P AVR (AORTIC VALVE REPLACEMENT): ICD-10-CM

## 2021-10-22 DIAGNOSIS — Z98.890 S/P MVR (MITRAL VALVE REPAIR): Primary | ICD-10-CM

## 2021-10-22 LAB — INR (EXTERNAL): 2.5 (ref 0.9–1.1)

## 2021-10-22 NOTE — PROGRESS NOTES
ANTICOAGULATION MANAGEMENT     Rajani Guo 70 year old female is on warfarin with therapeutic INR result. (Goal INR 2.5-3.5)    Recent labs: (last 7 days)     10/22/21  1019   INR 2.5*       ASSESSMENT     Source(s): Chart Review and Patient/Caregiver Call     Warfarin doses taken: Warfarin taken as instructed  Diet: No new diet changes identified  New illness, injury, or hospitalization: Yes: cut finger last night  Medication/supplement changes: None noted  Signs or symptoms of bleeding or clotting: No  Previous INR: Subtherapeutic  Additional findings: Discharged from home care today     PLAN     Recommended plan for no diet, medication or health factor changes affecting INR     Dosing Instructions: Continue your current warfarin dose with next INR in 2 weeks       Summary  As of 10/22/2021    Full warfarin instructions:  8 mg every Sun, Tue, Thu; 10 mg all other days   Next INR check:               Telephone call with Rajani who verbalizes understanding and agrees to plan    Lab visit scheduled    Education provided: Goal range and significance of current result    Plan made per [facility] anticoagulation protocol    Lesley Ortgea RN  Anticoagulation Clinic  10/22/2021    _______________________________________________________________________     Anticoagulation Episode Summary     Current INR goal:  2.5-3.5   TTR:  32.2 % (9.3 mo)   Target end date:  Indefinite   Send INR reminders to:  GURVINDER DARDEN    Indications    S/P MVR (mitral valve repair) [Z98.890]  S/P AVR (aortic valve replacement) [Z95.2]           Comments:  home care  Pt gave verbal ok to speak with Candy on 7/20/21         Anticoagulation Care Providers     Provider Role Specialty Phone number    Quinton Handy PA Referring Cardiovascular & Thoracic Surgery [phone]    Lakeshia Rubio APRN CNP Referring Internal Medicine [phone]    Sparkle Bird APRN CNP Referring Emergency Medicine [phone]

## 2021-11-02 ENCOUNTER — ANCILLARY PROCEDURE (OUTPATIENT)
Dept: MRI IMAGING | Facility: CLINIC | Age: 71
End: 2021-11-02
Attending: FAMILY MEDICINE
Payer: COMMERCIAL

## 2021-11-02 DIAGNOSIS — R41.3 MEMORY PROBLEM: ICD-10-CM

## 2021-11-02 DIAGNOSIS — R41.89 COGNITIVE DECLINE: ICD-10-CM

## 2021-11-02 PROCEDURE — 70553 MRI BRAIN STEM W/O & W/DYE: CPT | Mod: TC | Performed by: RADIOLOGY

## 2021-11-02 PROCEDURE — A9585 GADOBUTROL INJECTION: HCPCS | Performed by: RADIOLOGY

## 2021-11-02 RX ORDER — GADOBUTROL 604.72 MG/ML
7.5 INJECTION INTRAVENOUS ONCE
Status: COMPLETED | OUTPATIENT
Start: 2021-11-02 | End: 2021-11-02

## 2021-11-02 RX ADMIN — GADOBUTROL 7.5 ML: 604.72 INJECTION INTRAVENOUS at 09:08

## 2021-11-04 DIAGNOSIS — F41.9 ANXIETY: ICD-10-CM

## 2021-11-05 ENCOUNTER — LAB (OUTPATIENT)
Dept: LAB | Facility: CLINIC | Age: 71
End: 2021-11-05
Payer: COMMERCIAL

## 2021-11-05 ENCOUNTER — ANTICOAGULATION THERAPY VISIT (OUTPATIENT)
Dept: ANTICOAGULATION | Facility: CLINIC | Age: 71
End: 2021-11-05

## 2021-11-05 DIAGNOSIS — Z98.890 S/P MVR (MITRAL VALVE REPAIR): Primary | ICD-10-CM

## 2021-11-05 DIAGNOSIS — Z79.01 CURRENT USE OF LONG TERM ANTICOAGULATION: ICD-10-CM

## 2021-11-05 DIAGNOSIS — I48.91 ATRIAL FIBRILLATION (H): ICD-10-CM

## 2021-11-05 DIAGNOSIS — Z95.2 S/P AVR (AORTIC VALVE REPLACEMENT): ICD-10-CM

## 2021-11-05 LAB — INR BLD: 3 (ref 0.9–1.1)

## 2021-11-05 PROCEDURE — 85610 PROTHROMBIN TIME: CPT

## 2021-11-05 PROCEDURE — 36416 COLLJ CAPILLARY BLOOD SPEC: CPT

## 2021-11-05 NOTE — PROGRESS NOTES
ANTICOAGULATION MANAGEMENT     Rajani Guo 70 year old female is on warfarin with therapeutic INR result. (Goal INR 2.5-3.5)    Recent labs: (last 7 days)     11/05/21  0807   INR 3.0*       ASSESSMENT     Source(s): Chart Review     Warfarin doses taken: Warfarin taken as instructed  Diet: No new diet changes identified  New illness, injury, or hospitalization: No  Medication/supplement changes: None noted  Signs or symptoms of bleeding or clotting: No  Previous INR: Therapeutic last 2(+) visits  Additional findings: None     PLAN     Recommended plan for no diet, medication or health factor changes affecting INR     Dosing Instructions: Continue your current warfarin dose with next INR in 2 weeks       Summary  As of 11/5/2021    Full warfarin instructions:  8 mg every Sun, Tue, Thu; 10 mg all other days   Next INR check:  12/3/2021             Telephone call with Rajani who verbalizes understanding and agrees to plan spoke to Sarai who sets up Rajani meds     Lab visit scheduled    Education provided: None required and Please call back if any changes to your diet, medications or how you've been taking warfarin    Plan made per [facility] anticoagulation protocol    Lyndsay Monique, RN  Anticoagulation Clinic  11/5/2021    _______________________________________________________________________     Anticoagulation Episode Summary     Current INR goal:  2.5-3.5   TTR:  35.4 % (9.8 mo)   Target end date:  Indefinite   Send INR reminders to:  GURVINDER SALAZAR    Indications    S/P MVR (mitral valve repair) [Z98.890]  S/P AVR (aortic valve replacement) [Z95.2]           Comments:  home care  Pt gave verbal ok to speak with Sarai on 7/20/21         Anticoagulation Care Providers     Provider Role Specialty Phone number    Quinton Handy PA Referring Cardiovascular & Thoracic Surgery [phone]    Lakeshia Rubio, FRANCESCA CNP Referring Internal Medicine [phone]    Sparkle Bird APRN CNP Referring  Emergency Medicine [phone]        Anticoagulation Management    Unable to reach Rajani today.    Today's INR result of 3.0 is therapeutic (goal INR of 2.5-3.5).  Result received from: Clinic Lab    Follow up required to confirm warfarin dose taken    left message with Sarai whom helps with med set up.      Anticoagulation clinic to follow up    Lyndsay Monique RN

## 2021-11-06 NOTE — TELEPHONE ENCOUNTER
Routing refill request to provider for review/approval because:  ARACELI-7 SCORE 4/19/2021   Total Score 15

## 2021-11-08 RX ORDER — CITALOPRAM HYDROBROMIDE 20 MG/1
TABLET ORAL
Qty: 30 TABLET | Refills: 0 | Status: SHIPPED | OUTPATIENT
Start: 2021-11-08 | End: 2021-11-30

## 2021-11-15 ENCOUNTER — HOSPITAL ENCOUNTER (OUTPATIENT)
Dept: OCCUPATIONAL THERAPY | Facility: CLINIC | Age: 71
Setting detail: THERAPIES SERIES
End: 2021-11-15
Attending: INTERNAL MEDICINE
Payer: COMMERCIAL

## 2021-11-15 PROCEDURE — 96125 COGNITIVE TEST BY HC PRO: CPT | Mod: GO,59 | Performed by: OCCUPATIONAL THERAPIST

## 2021-11-15 PROCEDURE — 97166 OT EVAL MOD COMPLEX 45 MIN: CPT | Mod: GO | Performed by: OCCUPATIONAL THERAPIST

## 2021-11-15 ASSESSMENT — ACTIVITIES OF DAILY LIVING (ADL): IADL_QUICK_ADDS: MEAL PLANNING/PREPARATION;HOME/FINANCIAL/MANAGEMENT;COMMUNICATION/COMPUTER USE;COMMUNITY MOBILITY

## 2021-11-15 NOTE — PROGRESS NOTES
[facility]          OUTPATIENT OCCUPATIONAL THERAPY  EVALUATION  PLAN OF TREATMENT FOR OUTPATIENT REHABILITATION  (COMPLETE FOR INITIAL CLAIMS ONLY)  Patient's Last Name, First Name, M.I.  YOB: 1950  Rajani Guo                        Provider's Name  [facility] Medical Record No.  [id number]                               Onset Date:     10/19/21   Start of Care Date:     11/15/21   Type:     ___PT   _X_OT   ___SLP Medical Diagnosis:     Memory change                          OT Diagnosis:     impaired ADLs and IADLs Visits from SOC:  1   _________________________________________________________________________________  Plan of Treatment/Functional Goals:  ADL training,IADL training,Cognitive performance testing,Self care/Home management                    Goals  Goal Identifier: CPT   Goal Description: Pt and caregiver will demonstrate understanding of CPT testing results and any recommendations for level of assist or supervision in her daily life, for increased safety within her daily living skills.   Target Date: 12/15/21     Goal Identifier: pre-driving assessment  Goal Description: Pt will verbalize results of pre-driving screen, to determine readiness to return to driving versus additional testing recommendation.   Target Date: 12/15/21     Goal Identifier: compensatory memory strategies  Goal Description: Pt will implement at least 3 strategies for impaired memory, for improved indepdence in IADLs, within the setting of memory deficits.  Target Date: 12/15/21                    Therapy Frequency: weekly     Predicted Duration of Therapy Intervention (days/wks): 2-3 weeks  JAMES Snider/L         I CERTIFY THE NEED FOR THESE SERVICES FURNISHED UNDER        THIS PLAN OF TREATMENT AND WHILE UNDER MY CARE     (Physician co-signature of this document  indicates review and certification of the therapy plan).                 ,    Certification date from: 11/15/21, Certification date to: 12/15/21               Referring Physician: Bin Alcaraz MD     Initial Assessment        See Epic Evaluation      Start Of Care Date: 11/15/21

## 2021-11-15 NOTE — PROGRESS NOTES
11/15/21 1400   Quick Adds   Quick Adds Certification   Type of Visit Initial Outpatient Occupational Therapy Evaluation   General Information   Start Of Care Date 11/15/21   Referring Physician Bin Alcaraz MD   Orders Evaluate and treat as indicated   Orders Date 10/19/21   Medical Diagnosis Memory change   Onset of Illness/Injury or Date of Surgery 10/19/21   Special Instructions Please give opinion on safety of driving and need for formal driving evaluation / Please give exercises for cognitive rehabilitation   Surgical/Medical History Reviewed Yes   Additional Occupational Profile Info/Pertinent History of Current Problem Pt was seen by Neurology in March 2021 for balance and memory problems, which started following AVR/MVR surgery in December 2020. She was hospitalized in January 2021 for the balance difficulties and was discharged to rehab for 2 weeks. In 3/2021, she was diagnosed with functional gait disorder.   Walking has improved. MoCA on 8/31/21 was 21/30 with most prominent deficits in short term memory. Neuropsychology testing was recently completed, which did not show evidence of meaningful cognitive impairment. Symptoms were not suggestive of a neurodegenerative disorder.   Diagnostic Tests MRI  (11/2/21)   MRI Results Results   MRI Results Multiple small areas of susceptibility related signal loss   Role/Living Environment   Current Community Support Family/friend caregiver   Patient role/Employment history Other/comments   Community/Avocational Activities pt works as a , but has been on medical leave, needs a doctor's note to return   Current Living Environment House   Role/Living Environment Comments pt denies any accessibility concerns in her home.     Patient/family Goals Statement pt here for cognitive assessment and to  determine if she is safe to resume driving.   Fall Risk Screen   Fall screen completed by OT   Have you fallen 2 or more times in the past year? Yes    Have you fallen and had an injury in the past year? No   Timed Up and Go score (seconds) deferred   Is patient a fall risk? Yes   Abuse Screen (yes response referral indicated)   Feels Unsafe at Home or Work/School no   Feels Threatened by Someone no   Does Anyone Try to Keep You From Having Contact with Others or Doing Things Outside Your Home? no   Physical Signs of Abuse Present no   Cognitive Status Examination   Cognitive Comment see CPT results   Coordination   Coordination Comments frequently drops items   Functional Mobility   Ambulation walks without assist or AD   Transfer Skill   Level of [place]: Transfers independent   Tub/Shower Transfer   Tub/Shower Transfer Comments denies any difficulty with showering or shower transfer   Bathing   Level of [place] - Bathing independent   Upper Body Dressing   Level of [place]: Dress Upper Body independent   Lower Body Dressing   Level of [place]: Dress Lower Body independent   Toileting   Level of [place]: Toilet independent   Grooming   Level of [place]: Grooming independent   Eating/Self-Feeding   Level of [place]: Eating independent   Instrumental Activities of Daily Living Assessment   IADL Quick Adds Meal Planning/Preparation;Home/Financial/Management;Communication/Computer Use;Community Mobility   Meal Planning/Preparation does only simple meal prep, has left the stove on a few times   Home/Financial Management Pt has limited income, so she only pays for rent. She denies difficulty in remembering to pay this   Community Mobility not driving currently, states [employer] sent letter and license was taken away   Planned Therapy Interventions   Planned Therapy Interventions ADL training;IADL training;Cognitive performance testing;Self care/Home management   Adult OT Eval Goals   OT Eval Goals (Adult) 1;2;3    OT Goal 1   Goal Identifier CPT    Goal Description Pt and caregiver will demonstrate understanding of CPT testing results and  any recommendations for level of assist or supervision in her daily life, for increased safety within her daily living skills.    Target Date 12/15/21    OT Goal 2   Goal Identifier pre-driving assessment   Goal Description Pt will verbalize results of pre-driving screen, to determine readiness to return to driving versus additional testing recommendation.    Target Date 12/15/21    OT Goal 3   Goal Identifier compensatory memory strategies   Goal Description Pt will implement at least 3 strategies for impaired memory, for improved indepdence in IADLs, within the setting of memory deficits.   Target Date 12/15/21   Clinical Impression   Criteria for Skilled Therapeutic Interventions Met Yes, treatment indicated   OT Diagnosis impaired ADLs and IADLs   Influenced by the following impairments memory, problem solving skills, attention, balance   Identified Performance Deficits medication management, cooking, driving, mobility   Clinical Decision Making (Complexity) Moderate complexity   Therapy Frequency weekly   Predicted Duration of Therapy Intervention (days/wks) 2-3 weeks   Risks and Benefits of Treatment have been explained. Yes   Patient, Family & other staff in agreement with plan of care Yes   Therapy Certification   Certification date from 11/15/21   Certification date to 12/15/21   Total Evaluation Time   OT Eval, Moderate Complexity Minutes (04648) 15

## 2021-11-15 NOTE — PROGRESS NOTES
Cognitive Performance Test    SUMMARY OF TEST:    The Cognitive Performance Test (CPT) is a standardized performance-based assessment to measure working memory/executive function processing capacities that underlie functional performance. Subtasks include common basic and instrumental activities of daily living (ADL/IADL) which are rated based on the manner in which patients respond to task demands of varying complexity. The total CPT score describes a level of functioning that indicates how information is processed, implications for functional activities, potential safety risks and a recommended level of supervision or assist based on cognitive function. The highest total score on this test is in the range of 5.6 to 5.8.    DATE OF TESTING: November 15, 2021      Functional History Interview:    Informants: Pt and her daughter Sarai     Client s perception of memory/ thinking or functional difficulties: Pt loses track of what she is saying in conversations, her daughter tells her that she repeats herself. She is unable to set up her own medications.     Living situation: Lives in a house with her daughter Sarai and her cat     Home maintenance activities: Daughter does lawn care, cleaning dishes.  She maintains her own bedroom space I'ly, makes her bed. She handles care of her cat, but sometimes forgets about changing the litter.      Meal preparation and grocery shopping: some instances of leaving the stove on     Finances and paying bills: Pt pays rent, no difficulty with remembering this. Daughter manages all other bills.     Medication management: Pt was having Home health nursing set up her medications into pillboxes, but currently her daughter sets them up.  Pt and her daughter state she has no concerns with forgetting meds.      Driving and transportation: Pt reports that this past June/July, she received a letter from the DM[employer] stating she needed some additional documentation from her physician.  Her  "physician was unable to get this paperwork so the DMV discontinued her 's license.  Dr. Alcaraz has requested that we start with the pre-driving assessment to help inform return to driving recommendations.       ADL/Mobility/Physical Functioning: Pt reports she is independent and has no difficulty with basic ADLs (dressing, bathing, bed mobility, etc).      Fall Risk Screen:   Has the patient fallen 2 or more times in the last year? Yes   Pt discharged from [facility] in February, had several falls immediately after this.        Has the patient fallen and had an injury in the past year? No       Timed Up and Go Score: deferred    Is the patient a fall risk? Yes, department fall risk interventions implemented      RESULTS OF TESTING:                                                                                         CPT Subtest Results    MEDBOX: 5/6 SHOP/GLOVES: 5/6 PHONE: 5/6   WASH:  5/5 TRAVEL: 5/6 TOAST: 5/5   DRESS: 5/5   TOTAL CPT SCORE: 35/39     Average CPT Score  5/5.6    INTERPRETATION OF TEST RESULTS:    Based on the Cognitive Performance Test, this patient scored at CPT Level 5.  See CPT Levels reference below.    Summary of functional cognitive status:   Rajani demonstrated some impulsivity with certain tasks, like medication setup.  She demo'd a scattered approach to starting medication setup--started by setting up the first pills correctly, but after struggling to follow the directions on the second pill bottle, she then removed all of the first medicine. She needed very clear directions to place all of the \"pills\" into the medbox at once.  Of note, she does have assist for medication setup at home, but this task did demonstrate difficulty in following multiple-part directions.  Rajani also demonstrated great difficulty following a map to a particular destination within the clinic. She needed directions to be given in written format (I.e. instead of folllowing a map, following verbal " "directions \"turn left at hallway, go through double doors.\"  She was able to locate the destination after switching to written instructions.  Of note, she was recall her way back to the clinic she just came out of; even when standing back in the hallway she had started in, she did not recognize being there.   When cued to use a phone book and phone to find out the cost of paint, she at first pointed out \"that information isn't here, I'd have to call someone\" and required prompting that that is exactly what she was supposed to do.  Overall, Rajani's skills were consistent with a score of 5.0 on this test.    Factors affecting performance:  Decreased UE strength/coordination: frequently dropped small objects like beads and coins    Recommendations:    Supervision for ADL/IADL:  Meal preparation (stove/oven use), Shopping, Finances, Driving and Medication management                                                   TIME ADMINISTERING TEST: 45    TIME FOR INTERPRETATION AND PREPARATION OF REPORT: 15    TOTAL TIME: 60      CPT Levels Reference:    Patient's Average CPT Score:  5.0                                                                                                                                                  Individual scores range along a continuum as outlined below.  In addition to cognitive status, other factors may affect safety in a home environment.  Please refer to specific recommendations for this patient.    ___5.6-5.8  Normal functioning (absence of cognitive-functional disability).  Independent in managing personal affairs, monitors and directs own behavior.  Uses complex information to carry out daily activities with safety and accuracy.    Proficient with instrumental activities of daily living (IADL) and learning new activity.  Problems are anticipated, errors are avoided, and consequences of actions are considered.      XX_5.0   Mild cognitive-functional disability; deficits in working memory " "and executive thought processes. Difficulty using complex information. Problems may be observed with recent memory, judgment, reasoning and planning ahead. May be impulsive or have difficulty anticipating consequences.  Safety:  May require assistance to plan ahead; or to manage complex medication schedules, appointments or finances.  Hazardous activities may need to be monitored or limited.  ADL:  Mild functional decline.  Able to complete basic self-care and routine household tasks.  May have difficulty with complex daily tasks such as reading, writing, meal preparation, shopping or driving.   Learns through hands on teaching. Self-centered behavior or difficulty considering the needs of others may be seen related to trouble seeing the  whole picture\". Can appear disorganized or uninhibited.    ___4.5  Mild to moderate cognitive-functional disability. Significant deficits in working memory and executive thought processes. Judgment, reasoning and planning show obvious impairment.  Distractible with inability to shift attention/actions given competing stimuli.  Difficulty with problem solving and managing details. Complex daily tasks performed with inconsistency, difficulty, or error.     Safety:  Medications should be monitored, stove use may require supervision, and driving ability may be affected.  Impaired safety awareness with inability to anticipate potential problems.  May not recognize or respond to emergent situations. Requires frequent check-in support.   ADL:  Mild difficulty with simple everyday self-care tasks. Benefits from structured, routine activity.  Will likely need reminders to complete tasks outside of the routine. Requires assistance with planning and IADL tasks like shopping and finances. Learns concrete tasks through repetition, but performance may not generalize. Tends to be impulsive with poor insight. Self centered behavior or inability to consider the needs of others is " common.    ___4.0  Moderate cognitive-functional disability; abstract to concrete thought processes. Working memory and executive function impairments are obvious. Difficulty with planning and problem solving.  Behavior is goal-directed, but unable to follow multi-step directions, is easily distracted, and may not recognize mistakes.  Inability to anticipate hazards or understand precautions.  Safety:  Recommend 24-hour supervision for safety. Supervision needed for medication management and for hazardous activities. May not be able to follow a restricted diet. Can get lost in unfamiliar surroundings. Generally, persons functioning at level 4 should not be driving.   ADL:  Some decline in quality or frequency of ADL.  [place] enhanced by use of a routine, simple concrete directions, and caregiver set-up of needed items. Complex tasks such as money or home management typically requires assistance.  Relies heavily on vision to guide behavior; will ignore objects/hazards not in plain sight and can be distracted by irrelevant objects. Often has poor insight.  Able to carry out social conversation and may verbally  cover  for deficits leading caregivers to believe they are capable of functioning independently.       ___3.5  Moderate cognitive-functional disability; increased cues needed for task completion. Aware of concrete task steps but needs prompting or cues to initiate and complete simple tasks. Attention span is limited, simple directions may need to be repeated, and re-focus to a topic or task may be required.  Safety:  24-hour supervision required for safety and for assistance with daily tasks. Assistance required with medications, and access to medication should be limited. Meals, nutrition and dietary restrictions need to be monitored.  All hazardous activities should be restricted or supervised. Should not drive. Prone to wandering and can become lost.  ADL:  Moderate functional decline. Familiar tasks  usually requires set-up of supplies and directions to complete steps. May need objects handed to them for task initiation. Function best with a set schedule in familiar surroundings with familiar people. All complex tasks must be done by others. Vocabulary is diminished and speech often unfocused.

## 2021-11-19 ENCOUNTER — ANTICOAGULATION THERAPY VISIT (OUTPATIENT)
Dept: ANTICOAGULATION | Facility: CLINIC | Age: 71
End: 2021-11-19

## 2021-11-19 ENCOUNTER — LAB (OUTPATIENT)
Dept: LAB | Facility: CLINIC | Age: 71
End: 2021-11-19
Payer: COMMERCIAL

## 2021-11-19 DIAGNOSIS — Z79.01 CURRENT USE OF LONG TERM ANTICOAGULATION: ICD-10-CM

## 2021-11-19 DIAGNOSIS — I48.91 ATRIAL FIBRILLATION (H): ICD-10-CM

## 2021-11-19 DIAGNOSIS — Z95.2 S/P AVR (AORTIC VALVE REPLACEMENT): ICD-10-CM

## 2021-11-19 DIAGNOSIS — Z98.890 S/P MVR (MITRAL VALVE REPAIR): Primary | ICD-10-CM

## 2021-11-19 LAB — INR BLD: 3.6 (ref 0.9–1.1)

## 2021-11-19 PROCEDURE — 85610 PROTHROMBIN TIME: CPT

## 2021-11-19 PROCEDURE — 36416 COLLJ CAPILLARY BLOOD SPEC: CPT

## 2021-11-19 NOTE — PROGRESS NOTES
ANTICOAGULATION MANAGEMENT     Rajani Guo 70 year old female is on warfarin with supratherapeutic INR result. (Goal INR 2.5-3.5)    Recent labs: (last 7 days)     11/19/21  0822   INR 3.6*       ASSESSMENT     Source(s): Chart Review and Patient/Caregiver Call     Warfarin doses taken: Warfarin taken as instructed  Diet: No new diet changes identified  New illness, injury, or hospitalization: No  Medication/supplement changes: None noted  Signs or symptoms of bleeding or clotting: No  Previous INR: Therapeutic last 2(+) visits  Additional findings: None     PLAN     Recommended plan for no diet, medication or health factor changes affecting INR     Dosing Instructions: Partial hold then continue your current warfarin dose with next INR in 1 week       Summary  As of 11/19/2021    Full warfarin instructions:  11/19: 9 mg; Otherwise 8 mg every Sun, Tue, Thu; 10 mg all other days   Next INR check:  12/3/2021             Telephone call with Rajani who verbalizes understanding and agrees to plan left message with daughter Sarai    Lab visit scheduled    Education provided: Please call back if any changes to your diet, medications or how you've been taking warfarin    Plan made per [facility] anticoagulation protocol    Lyndsay Monique RN  Anticoagulation Clinic  11/19/2021    _______________________________________________________________________     Anticoagulation Episode Summary     Current INR goal:  2.5-3.5   TTR:  37.6 % (10.3 mo)   Target end date:  Indefinite   Send INR reminders to:  GURVINDER SALAZAR    Indications    S/P MVR (mitral valve repair) [Z98.890]  S/P AVR (aortic valve replacement) [Z95.2]           Comments:  home care  Pt gave verbal ok to speak with Candy on 7/20/21         Anticoagulation Care Providers     Provider Role Specialty Phone number    Quinton Handy PA Referring Cardiovascular & Thoracic Surgery [phone]    Lakeshia Rubio APRN CNP Referring Internal Medicine  [phone]    Sparkle Bird APRN CNP Referring Emergency Medicine [phone]        Anticoagulation Management    Unable to reach Sarai solorzano daughter today.    Today's INR result of 3.6 is supratherapeutic (goal INR of 2.5-3.5).  Result received from: Clinic Lab    Follow up required to confirm warfarin dose taken and assess for changes    left message with dosing and to call back to make apt.      Anticoagulation clinic to follow up    Lyndsay Monique RN

## 2021-11-22 ENCOUNTER — TELEPHONE (OUTPATIENT)
Dept: FAMILY MEDICINE | Facility: CLINIC | Age: 71
End: 2021-11-22
Payer: COMMERCIAL

## 2021-11-22 DIAGNOSIS — Z95.2 S/P AVR (AORTIC VALVE REPLACEMENT): ICD-10-CM

## 2021-11-22 DIAGNOSIS — Z98.890 S/P MVR (MITRAL VALVE REPAIR): Primary | ICD-10-CM

## 2021-11-22 NOTE — TELEPHONE ENCOUNTER
Reason for Call:  Other appointment    Detailed comments: INR appt on Friday would like to be squeezed in on Friday morning in [place]     Phone Number Patient can be reached at: Other phone number:  [phone]    Best Time: anytime     Can we leave a detailed message on this number? YES    Call taken on 11/22/2021 at 8:13 AM by Marta Gonzalez

## 2021-11-22 NOTE — TELEPHONE ENCOUNTER
Returned call to Ailin, explained that there is no openings in the morning at [facility] lab on 11/26/21. Ailin is only able to do morning appointments due to work schedule, assisted with scheduling next INR on 12/3/21.     Washington Dorman RN   [facility]

## 2021-11-23 ENCOUNTER — HOSPITAL ENCOUNTER (OUTPATIENT)
Dept: OCCUPATIONAL THERAPY | Facility: CLINIC | Age: 71
Setting detail: THERAPIES SERIES
End: 2021-11-23
Attending: INTERNAL MEDICINE
Payer: COMMERCIAL

## 2021-11-23 PROCEDURE — 97535 SELF CARE MNGMENT TRAINING: CPT | Mod: GO | Performed by: OCCUPATIONAL THERAPIST

## 2021-11-23 NOTE — TELEPHONE ENCOUNTER
Called and left detailed message with provider's message. Call back to [phone] and ask to speak with the nurse if any further questions.    Klaudia Pickett RN  [facility]

## 2021-11-29 NOTE — PROGRESS NOTES
"   11/23/21 1300   Signing Clinician's Name / Credentials   Signing clinician's name / credentials Severianosathya Villanueva, OTR/L    Session Number   Session Number 2   Adult OT Eval Goals   OT Eval Goals (Adult) 1;2;3    OT Goal 1   Goal Identifier CPT    Goal Description Pt and caregiver will demonstrate understanding of CPT testing results and any recommendations for level of assist or supervision in her daily life, for increased safety within her daily living skills.    Goal Progress Goal met. Pt completed CPT on 11/15 and verbalized understanding of testing results.   Target Date 12/15/21    OT Goal 2   Goal Identifier pre-driving assessment   Goal Description Pt will verbalize results of pre-driving screen, to determine readiness to return to driving versus additional testing recommendation.    Goal Progress goal in progress. This date, pt failed 2 of the 3 Dynavision subtests for pre-driving, and therapist did not recommend resuming driving.  Pt could opt to complete a behind the wheel driving test at [facility] or [facility]; however, therapist advised that she is not likely to pass this test given her scores on the pre-driving components today.    Target Date 12/15/21    OT Goal 3   Goal Identifier compensatory memory strategies   Goal Description Pt will implement at least 3 strategies for impaired memory, for improved indepdence in IADLs, within the setting of memory deficits.   Target Date 12/15/21   Subjective Report   Subjective Report Pt reports that in July of this year, she received a letter from [facility] telling her she was not capable of driving for physical reasons, and needed to get a letter from Doctor in order to maintain license. She states her medical team have all stated \"not ready to drive yet\" and Dr. Alcaraz ordered pre-driving evaluation from this clinic.     Objective Measures   Objective Measures Objective Measure 1;Objective Measure 2;Objective Measure 3;Objective Measure 4   Objective " Measure 1   Objective Measure Symbol Digit Modality   Details 23 (mean is 33, pt scored >1 SD below the mean)    Objective Measure 2   Objective Measure Pre-driving Subtest 1, Processing Speed (Dynavision, mode A, 60 seconds)   Details 39 (FAIL for pre-driving screen, min cutoff score for safe driving is 60 or above).  Pt is well below the cutoff for safe driving on this subtests of reaction time/processing speed.    Objective Measure 3   Objective Measure Pre-driving screen Subtest 2, Selective Attention (Mode A, 4 minutes)   Details 170, reaction time 1.41.  (FAIL, cutoff for safe driving is 200 or above)    Objective Measure 4   Objective Measure Pre-driving screen Subtest 3, Divided Attention (Mode A, 1 flashing digit per second, 60 second trial)    Details Pt scored 12% difference between R and L sides, which is PASS score for divided attention.    Therapeutic Interventions   Therapeutic Interventions Self Care/Home Management   Self Care/Home Management   Self-Care/Home Mgmt/ADL, Compensatory, Meal Prep Minutes (18258) 40 Minutes   Treatment Detail Completed pre-driving screen, including Symbol Digit modality to assess for cognitive processing speed and mental flexibility, as well as 3 Dynavision subtests to assess processing speed, selective attention, and divided attention. See objective measures above for pt's performance.  Therapist recommended no driving at this time, as 2 of the 3 subtests were below the cutoff for safe driving scores.  Therapist educated pt and her daughter on the potential to complete a full driving assessment at [facility] or [facility]; however, these programs often use the same preliminary in-office measures before testing patients behind-the-wheel.  Due to high out of pocket cost of the test, pt is not currently advised to go this route as she is not likely to pass.  Will trial course of OT to work on processing speed, working memory, and reaction time.  However, pt was  educated on the possibility that her scores may not improve and driving may not be a feasible option for her.  She and her daughter verbalized understanding.   Plan   Plan for next session work on reaction time (ball bouncing), and cognitive processing speed (PASAT?)   Total Session Time   Timed Code Treatment Minutes 40   Total Treatment Time (sum of timed and untimed services) 40   AMBULATORY CLINICS ONLY-MEDICAL AND TREATMENT DIAGNOSIS   Medical Diagnosis Memory change   OT Diagnosis impaired ADLs and IADLs

## 2021-11-30 ENCOUNTER — VIRTUAL VISIT (OUTPATIENT)
Dept: PSYCHIATRY | Facility: CLINIC | Age: 71
End: 2021-11-30
Payer: COMMERCIAL

## 2021-11-30 DIAGNOSIS — F33.0 MAJOR DEPRESSIVE DISORDER, RECURRENT EPISODE, MILD (H): Primary | ICD-10-CM

## 2021-11-30 DIAGNOSIS — F41.1 GENERALIZED ANXIETY DISORDER: ICD-10-CM

## 2021-11-30 PROCEDURE — 99204 OFFICE O/P NEW MOD 45 MIN: CPT | Mod: 95 | Performed by: NURSE PRACTITIONER

## 2021-11-30 RX ORDER — CITALOPRAM HYDROBROMIDE 20 MG/1
10 TABLET ORAL DAILY
Qty: 30 TABLET | Refills: 0 | COMMUNITY
Start: 2021-11-30 | End: 2022-01-03
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
## 2021-12-03 ENCOUNTER — LAB (OUTPATIENT)
Dept: LAB | Facility: CLINIC | Age: 71
End: 2021-12-03
Payer: COMMERCIAL

## 2021-12-03 ENCOUNTER — ANTICOAGULATION THERAPY VISIT (OUTPATIENT)
Dept: ANTICOAGULATION | Facility: CLINIC | Age: 71
End: 2021-12-03

## 2021-12-03 DIAGNOSIS — Z95.2 S/P AVR (AORTIC VALVE REPLACEMENT): ICD-10-CM

## 2021-12-03 DIAGNOSIS — Z79.01 CURRENT USE OF LONG TERM ANTICOAGULATION: ICD-10-CM

## 2021-12-03 DIAGNOSIS — I48.91 ATRIAL FIBRILLATION (H): ICD-10-CM

## 2021-12-03 DIAGNOSIS — Z98.890 S/P MVR (MITRAL VALVE REPAIR): Primary | ICD-10-CM

## 2021-12-03 LAB — INR BLD: 3.3 (ref 0.9–1.1)

## 2021-12-03 PROCEDURE — 36416 COLLJ CAPILLARY BLOOD SPEC: CPT

## 2021-12-03 PROCEDURE — 85610 PROTHROMBIN TIME: CPT
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
## 2021-12-17 ENCOUNTER — ANTICOAGULATION THERAPY VISIT (OUTPATIENT)
Dept: ANTICOAGULATION | Facility: CLINIC | Age: 71
End: 2021-12-17

## 2021-12-17 ENCOUNTER — LAB (OUTPATIENT)
Dept: LAB | Facility: CLINIC | Age: 71
End: 2021-12-17
Payer: COMMERCIAL

## 2021-12-17 DIAGNOSIS — I48.91 ATRIAL FIBRILLATION (H): ICD-10-CM

## 2021-12-17 DIAGNOSIS — Z95.2 S/P AVR (AORTIC VALVE REPLACEMENT): ICD-10-CM

## 2021-12-17 DIAGNOSIS — Z98.890 S/P MVR (MITRAL VALVE REPAIR): Primary | ICD-10-CM

## 2021-12-17 DIAGNOSIS — Z79.01 CURRENT USE OF LONG TERM ANTICOAGULATION: ICD-10-CM

## 2021-12-17 LAB — INR BLD: 2.3 (ref 0.9–1.1)

## 2021-12-17 PROCEDURE — 85610 PROTHROMBIN TIME: CPT

## 2021-12-17 PROCEDURE — 36416 COLLJ CAPILLARY BLOOD SPEC: CPT

## 2021-12-17 NOTE — PROGRESS NOTES
Anticoagulation Management    Unable to reach Rajani today.    Today's INR result of 2.3 is subtherapeutic (goal INR of 2.5-3.5).  Result received from: Clinic Lab    Follow up required to confirm warfarin dose taken and assess for changes    [facility]      Anticoagulation clinic to follow up    Mali Malcolm RN  [phone]

## 2021-12-17 NOTE — PROGRESS NOTES
ANTICOAGULATION MANAGEMENT     Rajani Guo 70 year old female is on warfarin with subtherapeutic INR result. (Goal INR 2.5-3.5)    Recent labs: (last 7 days)     12/17/21  0745   INR 2.3*       ASSESSMENT     Source(s): Chart Review and Patient/Caregiver Call     Warfarin doses taken: Warfarin taken as instructed  Diet: Wednesday had brussels sprouts, not normal for the patient  New illness, injury, or hospitalization: No  Medication/supplement changes: None noted  Signs or symptoms of bleeding or clotting: No  Previous INR: Therapeutic last visit; previously outside of goal range  Additional findings: None     PLAN     Recommended plan for temporary change(s) affecting INR     Dosing Instructions: Booster dose then continue your current warfarin dose with next INR in 2 weeks       Summary  As of 12/17/2021    Full warfarin instructions:  12/17: 14 mg; Otherwise 8 mg every Sun, Tue, Thu; 10 mg all other days   Next INR check:  12/31/2021             Telephone call with Rajani who verbalizes understanding and agrees to plan, left detailed voicemail for daughter with dosing.    Lab visit scheduled    Education provided: Importance of consistent vitamin K intake    Plan made per [facility] anticoagulation protocol    Mali Malcolm RN  Anticoagulation Clinic  12/17/2021    _______________________________________________________________________     Anticoagulation Episode Summary     Current INR goal:  2.5-3.5   TTR:  40.5 % (11.2 mo)   Target end date:  Indefinite   Send INR reminders to:  GURVINDER SALAZAR    Indications    S/P MVR (mitral valve repair) [Z98.890]  S/P AVR (aortic valve replacement) [Z95.2]           Comments:  home care  Pt gave verbal ok to speak with Candy on 7/20/21         Anticoagulation Care Providers     Provider Role Specialty Phone number    Quinton Handy PA Referring Cardiovascular & Thoracic Surgery [phone]    Lakeshia Rubio APRN CNP Referring Internal Medicine  [phone]    Sparkle Bird APRN [facility] Emergency Medicine [phone]

## 2021-12-20 DIAGNOSIS — Z95.2 S/P AVR (AORTIC VALVE REPLACEMENT): ICD-10-CM

## 2021-12-20 DIAGNOSIS — F41.9 ANXIETY: ICD-10-CM

## 2021-12-20 DIAGNOSIS — K29.80 DUODENITIS: ICD-10-CM

## 2021-12-22 RX ORDER — PANTOPRAZOLE SODIUM 40 MG/1
TABLET, DELAYED RELEASE ORAL
Qty: 90 TABLET | Refills: 1 | Status: SHIPPED | OUTPATIENT
Start: 2021-12-22 | End: 2022-06-15

## 2021-12-22 RX ORDER — BUPROPION HYDROCHLORIDE 150 MG/1
TABLET, EXTENDED RELEASE ORAL
Qty: 180 TABLET | Refills: 2 | Status: SHIPPED | OUTPATIENT
Start: 2021-12-22 | End: 2022-10-24

## 2021-12-22 RX ORDER — WARFARIN SODIUM 2 MG/1
TABLET ORAL
Qty: 384 TABLET | Refills: 0 | Status: SHIPPED | OUTPATIENT
Start: 2021-12-22 | End: 2022-03-14

## 2021-12-22 RX ORDER — CITALOPRAM HYDROBROMIDE 10 MG/1
TABLET ORAL
Qty: 30 TABLET | Refills: 0 | Status: SHIPPED | OUTPATIENT
Start: 2021-12-22 | End: 2022-01-03

## 2021-12-22 NOTE — TELEPHONE ENCOUNTER
Routing refill request to provider for review/approval because:  Drug not on the [facility] refill protocol   Warfarin sent to [facility]

## 2021-12-27 DIAGNOSIS — F33.0 MAJOR DEPRESSIVE DISORDER, RECURRENT EPISODE, MILD (H): ICD-10-CM

## 2021-12-27 NOTE — TELEPHONE ENCOUNTER
Medication requested: sertraline (ZOLOFT) 50 MG tablet. Take 1 tablet (50 mg) by mouth daily.  Date last ordered: 11/30/21 Qty: 30 Refills: 1    Deny-refill too soon. Last filled omn 11/30/21 for 30 tabs with a refill on it

## 2021-12-29 DIAGNOSIS — F33.0 MAJOR DEPRESSIVE DISORDER, RECURRENT EPISODE, MILD (H): ICD-10-CM

## 2021-12-29 NOTE — TELEPHONE ENCOUNTER
Prior Authorization Not Needed per Insurance    Medication:   Insurance Company: [employer] Part D - Phone [phone] Fax [phone]  Expected CoPay:      Pharmacy Filling the Rx: [facility] #[id number] - [place] [facility], [place] - [address] [facility]  Pharmacy Notified: Yes  Patient Notified: Yes    Called [facility] to clarify request.  Per pharmacist they are not requesting a prior authorization for this medication, it is covered under plan.    Patient is requesting a 90 days supply be sent instead of a 30 days supply of this medication to the pharmacy.     Routing back to clinic for review.  Per pharmacy, no PA needed- refill request for a 90 days supply.

## 2021-12-29 NOTE — TELEPHONE ENCOUNTER
Reason for Call:  Medication refill:    Do you use a [facility]?  No    Name of the pharmacy and phone number for the current request:  [facility] [phone]    Name of the medication requested: Sertraline    Other request:  Patient called, stated the prescription news prior authorization, and that the Pharmacy sends the request to her PCP.     Can we leave a detailed message on this number? Yes    Phone number patient can be reached at: [phone]    Best Time: Yes    Call taken on 12/29/2021 at 2:52 PM by Marco Barfield

## 2021-12-31 ENCOUNTER — ANTICOAGULATION THERAPY VISIT (OUTPATIENT)
Dept: ANTICOAGULATION | Facility: CLINIC | Age: 71
End: 2021-12-31

## 2021-12-31 ENCOUNTER — LAB (OUTPATIENT)
Dept: LAB | Facility: CLINIC | Age: 71
End: 2021-12-31
Payer: COMMERCIAL

## 2021-12-31 DIAGNOSIS — Z95.2 S/P AVR (AORTIC VALVE REPLACEMENT): ICD-10-CM

## 2021-12-31 DIAGNOSIS — Z79.01 CURRENT USE OF LONG TERM ANTICOAGULATION: ICD-10-CM

## 2021-12-31 DIAGNOSIS — I48.91 ATRIAL FIBRILLATION (H): ICD-10-CM

## 2021-12-31 DIAGNOSIS — Z98.890 S/P MVR (MITRAL VALVE REPAIR): Primary | ICD-10-CM

## 2021-12-31 LAB — INR BLD: 2.7 (ref 0.9–1.1)

## 2021-12-31 PROCEDURE — 85610 PROTHROMBIN TIME: CPT

## 2021-12-31 PROCEDURE — 36416 COLLJ CAPILLARY BLOOD SPEC: CPT

## 2021-12-31 NOTE — PROGRESS NOTES
ANTICOAGULATION MANAGEMENT     Rajani Guo 71 year old female is on warfarin with therapeutic INR result. (Goal INR 2.5-3.5)    Recent labs: (last 7 days)     12/31/21  0756   INR 2.7*       ASSESSMENT     Source(s): Chart Review I left a detailed voicemail with the orders below. I have also requested a call back if there have been any missed doses, concerns, illness, fever, or if there have been any changes in medications, activity level, or diet      Warfarin doses taken: Warfarin taken as instructed  Diet: No new diet changes identified  New illness, injury, or hospitalization: No  Medication/supplement changes: None noted  Signs or symptoms of bleeding or clotting: No  Previous INR: Subtherapeutic  Additional findings: None     PLAN     Recommended plan for no diet, medication or health factor changes affecting INR     Dosing Instructions: Continue your current warfarin dose with next INR in 2 weeks       Summary  As of 12/31/2021    Full warfarin instructions:  8 mg every Sun, Tue, Thu; 10 mg all other days   Next INR check:  1/14/2022             Detailed voice message left for  Daughter Alcira with dosing instructions and follow up date.     Contact [phone]  to schedule and with any changes, questions or concerns.     Education provided: Please call back if any changes to your diet, medications or how you've been taking warfarin    Plan made per [facility] anticoagulation protocol    Emelia Rosas RN  Anticoagulation Clinic  12/31/2021    _______________________________________________________________________     Anticoagulation Episode Summary     Current INR goal:  2.5-3.5   TTR:  40.9 % (11.7 mo)   Target end date:  Indefinite   Send INR reminders to:  GURVINDER SALAZAR    Indications    S/P MVR (mitral valve repair) [Z98.890]  S/P AVR (aortic valve replacement) [Z95.2]           Comments:  home care  Pt gave verbal ok to speak with Sarai on 7/20/21         Anticoagulation Care Providers     Provider  Role Specialty Phone number    Quinton Handy PA Referring Cardiovascular & Thoracic Surgery [phone]    Lakeshia Rubio APRN CNP Referring Internal Medicine [phone]    Sparkle Bird APRN CNP Referring Emergency Medicine [phone]

## 2022-01-03 ENCOUNTER — OFFICE VISIT (OUTPATIENT)
Dept: FAMILY MEDICINE | Facility: CLINIC | Age: 72
End: 2022-01-03
Payer: COMMERCIAL

## 2022-01-03 VITALS
SYSTOLIC BLOOD PRESSURE: 130 MMHG | TEMPERATURE: 98.3 F | DIASTOLIC BLOOD PRESSURE: 72 MMHG | HEIGHT: 64 IN | OXYGEN SATURATION: 96 % | BODY MASS INDEX: 27.31 KG/M2 | HEART RATE: 101 BPM | WEIGHT: 160 LBS

## 2022-01-03 DIAGNOSIS — I87.8 VENOUS STASIS: Primary | ICD-10-CM

## 2022-01-03 DIAGNOSIS — R41.89 COGNITIVE DECLINE: ICD-10-CM

## 2022-01-03 DIAGNOSIS — E11.59 TYPE 2 DIABETES MELLITUS WITH OTHER CIRCULATORY COMPLICATION, WITHOUT LONG-TERM CURRENT USE OF INSULIN (H): ICD-10-CM

## 2022-01-03 DIAGNOSIS — I50.9 HEART FAILURE DUE TO VALVULAR DISEASE, UNSPECIFIED HEART FAILURE TYPE (H): ICD-10-CM

## 2022-01-03 DIAGNOSIS — F33.0 MAJOR DEPRESSIVE DISORDER, RECURRENT EPISODE, MILD (H): ICD-10-CM

## 2022-01-03 DIAGNOSIS — J44.9 CHRONIC OBSTRUCTIVE PULMONARY DISEASE, UNSPECIFIED COPD TYPE (H): ICD-10-CM

## 2022-01-03 DIAGNOSIS — M81.0 AGE-RELATED OSTEOPOROSIS WITHOUT CURRENT PATHOLOGICAL FRACTURE: ICD-10-CM

## 2022-01-03 DIAGNOSIS — N18.2 CKD (CHRONIC KIDNEY DISEASE) STAGE 2, GFR 60-89 ML/MIN: ICD-10-CM

## 2022-01-03 DIAGNOSIS — I48.0 PAF (PAROXYSMAL ATRIAL FIBRILLATION) (H): ICD-10-CM

## 2022-01-03 DIAGNOSIS — I73.9 PVD (PERIPHERAL VASCULAR DISEASE) (H): ICD-10-CM

## 2022-01-03 DIAGNOSIS — I38 HEART FAILURE DUE TO VALVULAR DISEASE, UNSPECIFIED HEART FAILURE TYPE (H): ICD-10-CM

## 2022-01-03 LAB
ANION GAP SERPL CALCULATED.3IONS-SCNC: 8 MMOL/L (ref 3–14)
BUN SERPL-MCNC: 11 MG/DL (ref 7–30)
CALCIUM SERPL-MCNC: 8.4 MG/DL (ref 8.5–10.1)
CHLORIDE BLD-SCNC: 111 MMOL/L (ref 94–109)
CO2 SERPL-SCNC: 23 MMOL/L (ref 20–32)
CREAT SERPL-MCNC: 0.68 MG/DL (ref 0.52–1.04)
GFR SERPL CREATININE-BSD FRML MDRD: >90 ML/MIN/1.73M2
GLUCOSE BLD-MCNC: 129 MG/DL (ref 70–99)
HBA1C MFR BLD: 6 % (ref 0–5.6)
POTASSIUM BLD-SCNC: 4.2 MMOL/L (ref 3.4–5.3)
SODIUM SERPL-SCNC: 142 MMOL/L (ref 133–144)

## 2022-01-03 PROCEDURE — 84100 ASSAY OF PHOSPHORUS: CPT | Performed by: FAMILY MEDICINE

## 2022-01-03 PROCEDURE — 82306 VITAMIN D 25 HYDROXY: CPT | Performed by: FAMILY MEDICINE

## 2022-01-03 PROCEDURE — 36415 COLL VENOUS BLD VENIPUNCTURE: CPT | Performed by: FAMILY MEDICINE

## 2022-01-03 PROCEDURE — 80048 BASIC METABOLIC PNL TOTAL CA: CPT | Performed by: FAMILY MEDICINE

## 2022-01-03 PROCEDURE — 83970 ASSAY OF PARATHORMONE: CPT | Performed by: FAMILY MEDICINE

## 2022-01-03 PROCEDURE — 99214 OFFICE O/P EST MOD 30 MIN: CPT | Performed by: FAMILY MEDICINE

## 2022-01-03 PROCEDURE — 83036 HEMOGLOBIN GLYCOSYLATED A1C: CPT | Performed by: FAMILY MEDICINE

## 2022-01-03 RX ORDER — FUROSEMIDE 20 MG
20 TABLET ORAL DAILY PRN
Qty: 30 TABLET | Refills: 1 | Status: SHIPPED | OUTPATIENT
Start: 2022-01-03 | End: 2022-03-03

## 2022-01-03 ASSESSMENT — PATIENT HEALTH QUESTIONNAIRE - PHQ9: SUM OF ALL RESPONSES TO PHQ QUESTIONS 1-9: 4

## 2022-01-03 ASSESSMENT — MIFFLIN-ST. JEOR: SCORE: 1230.76

## 2022-01-03 NOTE — PROGRESS NOTES
Assessment & Plan     CKD (chronic kidney disease) stage 2, GFR 60-89 ml/min  Labs pending   - Basic metabolic panel  (Ca, Cl, CO2, Creat, Gluc, K, Na, BUN); Future  - Basic metabolic panel  (Ca, Cl, CO2, Creat, Gluc, K, Na, BUN)    Chronic obstructive pulmonary disease, unspecified COPD type (H)  Stable     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, unspecified heart failure type (H)  Stable   Advised keep legs elevated  HF action plan discussed   Pt has gained weight due To eating more during the Holidays    Major depressive disorder, recurrent episode, mild (H)  Better  Sees Psych    Type 2 diabetes mellitus with other circulatory complication, without long-term current use of insulin (H)  Pending   - Hemoglobin A1c; Future  - metFORMIN (GLUCOPHAGE) 1000 MG tablet; TAKE 1 TABLET BY MOUTH TWICE A DAY WITH MEALS  - Hemoglobin A1c    PVD (peripheral vascular disease) (H)  Stable     PAF (paroxysmal atrial fibrillation) (H)  Doing well -on coumadin and gets INR check    Venous stasis  Elevate  legs  Low salt diet  Idris hose stockings  - furosemide (LASIX) 20 MG tablet; Take 1 tablet (20 mg) by mouth daily as needed  Pt states she does not need Hospice  sjhe is seeing neurology and in PT  She wants her  Licence back  She will discussed with Neurologist about this  Return in about 3 months (around 4/3/2022) for recheck/Please schedule appointment.    Tamiko Coley MD  [facility] [place] Gerard is a 71 year old who presents for the following health issues  accompanied by her self.    HPI     Chief Complaint   Patient presents with     Home Care/Hospice     Pt states she does not need Home care as she is able to take care of herself  Lives with daughter and is actually taking care of her daughter    Diabetes Follow-up    How often are you checking your blood sugar? One time daily  What time of day are you checking your blood sugars (select all that apply)?  Before meals  Have you had any  blood sugars above 200?  No  Have you had any blood sugars below 70?  No 117-150        What concerns do you have today about your diabetes? None     Do you have any of these symptoms? (Select all that apply)  No numbness or tingling in feet.  No redness, sores or blisters on feet.  No complaints of excessive thirst.  No reports of blurry vision.  No significant changes to weight.              Hyperlipidemia Follow-Up      Are you regularly taking any medication or supplement to lower your cholesterol?   Yes- statins    Are you having muscle aches or other side effects that you think could be caused by your cholesterol lowering medication?  No    Hypertension Follow-up      Do you check your blood pressure regularly outside of the clinic? Yes     Are you following a low salt diet? Yes    Are your blood pressures ever more than 140 on the top number (systolic) OR more   than 90 on the bottom number (diastolic), for example 140/90? No    BP Readings from Last 2 Encounters:   22 130/72   10/19/21 (!) 145/77     Hemoglobin A1C POCT (%)   Date Value   2021 5.8 (H)   2020 5.8 (H)     Hemoglobin A1C (%)   Date Value   2021 5.9 (H)     LDL Cholesterol Calculated (mg/dL)   Date Value   2021 45   2020 54         Depression and Anxiety Follow-Up  How are you doing with your depression since your last visit? Improved   How are you doing with your anxiety since your last visit?  Improved   Are you having other symptoms that might be associated with depression or anxiety? No  Have you had a significant life event? No       Social History     Tobacco Use     Smoking status: Current Every Day Smoker     Packs/day: 1.00     Years: 59.00     Pack years: 59.00     Start date: 1962     Last attempt to quit: 2020     Years since quittin.0     Smokeless tobacco: Never Used   Substance Use Topics     Alcohol use: Not Currently     Comment: 2 glasses of wine a week     Drug use: Yes      Types: Marijuana     Comment: occasional     PHQ 4/8/2020 4/19/2021 1/3/2022   PHQ-9 Total Score 4 2 4   Q9: Thoughts of better off dead/self-harm past 2 weeks Not at all Not at all Not at all     ARACELI-7 SCORE 4/19/2021   Total Score 15     Last PHQ-9 1/3/2022   1.  Little interest or pleasure in doing things 0   2.  Feeling down, depressed, or hopeless 1   3.  Trouble falling or staying asleep, or sleeping too much 0   4.  Feeling tired or having little energy 0   5.  Poor appetite or overeating 0   6.  Feeling bad about yourself 1   7.  Trouble concentrating 2   8.  Moving slowly or restless 0   Q9: Thoughts of better off dead/self-harm past 2 weeks 0   PHQ-9 Total Score 4   Difficulty at work, home, or with people Somewhat difficult       Suicide Assessment Five-step Evaluation and Treatment (SAFE-T)  COPD Follow-Up  Overall, how are your COPD symptoms since your last clinic visit?  No change  How much fatigue or shortness of breath do you have when you are walking?  Same as usual  How much shortness of breath do you have when you are resting?  Same as usual  How often do you cough? Rarely  Have you noticed any change in your sputum/phlegm?  No  Have you experienced a recent fever? No  Please describe how far you can walk without stopping to rest:  2-5 blocks  How many flights of stairs are you able to walk up without stopping?  None  Have you had any Emergency Room Visits, Urgent Care Visits, or Hospital Admissions because of your COPD since your last office visit?  No    History   Smoking Status     Current Every Day Smoker     Packs/day: 1.00     Years: 59.00     Start date: 12/20/1962     Last attempt to quit: 12/26/2020   Smokeless Tobacco     Never Used     No results found for: FEV1, [id number]  Pt has gained weight during Val  No sob  No PND or orthopnea    Review of Systems   CONSTITUTIONAL: NEGATIVE for fever, chills, change in weight  ENT/MOUTH: NEGATIVE for ear, mouth and throat problems  RESP:  "NEGATIVE for significant cough or SOB  CV: NEGATIVE for chest pain, palpitations or peripheral edema  GI: NEGATIVE for nausea, abdominal pain, heartburn, or change in bowel habits  PSYCHIATRIC: NEGATIVE for changes in mood or affect      Objective    /72 (BP Location: Right arm, Patient Position: Sitting, Cuff Size: Adult Regular)   Pulse 101   Temp 98.3  F (36.8  C) (Oral)   Ht 1.626 m (5' 4\")   Wt 72.6 kg (160 lb)   SpO2 96%   BMI 27.46 kg/m    Body mass index is 27.46 kg/m .  Physical Exam   GENERAL: healthy, alert and no distress  NECK: no adenopathy, no asymmetry, masses, or scars and thyroid normal to palpation  RESP: lungs clear to auscultation - no rales, rhonchi or wheezes  CV: regular rates and rhythm, normal S1 S2, no S3 or S4, no murmur, click or rub and peripheral pulses strong  ABDOMEN: soft, nontender, no hepatosplenomegaly, no masses and bowel sounds normal  MS: extremities normal- no gross deformities noted  PSYCH: mentation appears normal, affect normal/bright  Trace pedal edema  Pending     "

## 2022-01-03 NOTE — LETTER
My Depression Action Plan  Name: Rajani Guo   Date of Birth 1950  Date: 1/3/2022    My doctor: Tamiko Coley   My clinic: [facility]  [address] [address]  [phone]          GREEN    ZONE   Good Control    What it looks like:     Things are going generally well. You have normal ups and downs. You may even feel depressed from time to time, but bad moods usually last less than a day.   What you need to do:  1. Continue to care for yourself (see self care plan)  2. Check your depression survival kit and update it as needed  3. Follow your physician s recommendations including any medication.  4. Do not stop taking medication unless you consult with your physician first.           YELLOW         ZONE Getting Worse    What it looks like:     Depression is starting to interfere with your life.     It may be hard to get out of bed; you may be starting to isolate yourself from others.    Symptoms of depression are starting to last most all day and this has happened for several days.     You may have suicidal thoughts but they are not constant.   What you need to do:     1. Call your care team. Your response to treatment will improve if you keep your care team informed of your progress. Yellow periods are signs an adjustment may need to be made.     2. Continue your self-care.  Just get dressed and ready for the day.  Don't give yourself time to talk yourself out of it.    3. Talk to someone in your support network.    4. Open up your Depression Self-Care Plan/Wellness Kit.           RED    ZONE Medical Alert - Get Help    What it looks like:     Depression is seriously interfering with your life.     You may experience these or other symptoms: You can t get out of bed most days, can t work or engage in other necessary activities, you have trouble taking care of basic hygiene, or basic responsibilities, thoughts of suicide or death that will not go  away, self-injurious behavior.     What you need to do:  1. Call your care team and request a same-day appointment. If they are not available (weekends or after hours) call your local crisis line, emergency room or 911.          Depression Self-Care Plan / Wellness Kit    Many people find that medication and therapy are helpful treatments for managing depression. In addition, making small changes to your everyday life can help to boost your mood and improve your wellbeing. Below are some tips for you to consider. Be sure to talk with your medical provider and/or behavioral health consultant if your symptoms are worsening or not improving.     Sleep   Sleep hygiene  means all of the habits that support good, restful sleep. It includes maintaining a consistent bedtime and wake time, using your bedroom only for sleeping or sex, and keeping the bedroom dark and free of distractions like a computer, smartphone, or television.     Develop a Healthy Routine  Maintain good hygiene. Get out of bed in the morning, make your bed, brush your teeth, take a shower, and get dressed. Don t spend too much time viewing media that makes you feel stressed. Find time to relax each day.    Exercise  Get some form of exercise every day. This will help reduce pain and release endorphins, the  feel good  chemicals in your brain. It can be as simple as just going for a walk or doing some gardening, anything that will get you moving.      Diet  Strive to eat healthy foods, including fruits and vegetables. Drink plenty of water. Avoid excessive sugar, caffeine, alcohol, and other mood-altering substances.     Stay Connected with Others  Stay in touch with friends and family members.    Manage Your Mood  Try deep breathing, massage therapy, biofeedback, or meditation. Take part in fun activities when you can. Try to find something to smile about each day.     Psychotherapy  Be open to working with a therapist if your provider recommends it.      Medication  Be sure to take your medication as prescribed. Most anti-depressants need to be taken every day. It usually takes several weeks for medications to work. Not all medicines work for all people. It is important to follow-up with your provider to make sure you have a treatment plan that is working for you. Do not stop your medication abruptly without first discussing it with your provider.    Crisis Resources   These hotlines are for both adults and children. They and are open 24 hours a day, 7 days a week unless noted otherwise.      National Suicide Prevention Lifeline   6-716-201-TALK (4102)      Crisis Text Line    www.crisistextline.org  Text HOME to 868278 from anywhere in the [place], anytime, about any type of crisis. A live, trained crisis counselor will receive the text and respond quickly.      [facility] Lifeline for LGBTQ Youth  A national crisis intervention and suicide lifeline for LGBTQ youth under 25. Provides a safe place to talk without judgement. Call 1-547.576.3004; text START to 615481 or visit www.thetrevorproject.org to talk to a trained counselor.      For [facility] crisis numbers, visit the [facility] website at:  https://[place].gov[place]hs/people-we-serve/adults/health-care/mental-health/resources/crisis-contacts.jsp

## 2022-01-03 NOTE — LETTER
My Heart Failure Action Plan   Name: Rajani Guo    YOB: 1950   Date: 1/3/2022    My doctor: Tamiko Coley [address]  [place] [id number] [phone]  My Diagnosis:    My Exercise Goal: 30 minutes daily  .     My Weight Plan:   Wt Readings from Last 2 Encounters:   01/03/22 72.6 kg (160 lb)   10/19/21 70.5 kg (155 lb 8 oz)     Weigh yourself daily using the same scale. If you gain more than 2 pounds in 24 hours or 5 pounds in a week call the clinic    My Diet Goal: No added salt    Emergency Room Visits:    Our goal is to improve your quality of life and help you avoid a visit to the emergency room or hospital.  If we work together, we can achieve this goal. But, if you feel you need to call 911 or go to the emergency room, please do so.  If you go to the emergency room, please bring your list of medicines and your daily weight chart with you.       GREEN ZONE     Doing well today    Weight gained is no more than 2 pounds a day or 5 pounds a week.    No swelling in feet, ankles, legs or stomach.    No more swelling than usual.    No more trouble breathing than usual.    No change in my sleep.    No other problems. Actions:    I am doing fine.  I will take my medicine, follow my diet, see my doctor, exercise, and watch for symptoms.           YELLOW ZONE         Having a bad day or flare up    Weight gain of more than 2 pounds in one day or 5 pounds in one week.    New swelling in ankle, leg, knee or thigh.    Bloating in belly, pants feel tighter.    Swelling in hands or face.    Coughing or trouble breathing while walking or talking.    Harder to breathe last night.    Have trouble sleeping, wake up short of breath.    Much more tired than usual.    Not eating.    Pain in my chest or bad leg cramps.    Feel weak or dizzy. Actions:    I need to take action and call my doctor or nurse today.                 RED ZONE         Need medical care  now    Weight gain of 5 pounds overnight.    Chest pain or pressure that does not go away.    Feel less alert.    Wheezing or have trouble breathing when at rest.    Cannot sleep lying down.    Cannot take my water pill.    Pass out or faint. Actions:    I need to call my doctor or nurse now!    Call 911 if I have chest pain or cannot breathe.

## 2022-01-04 PROBLEM — I87.8 VENOUS STASIS: Status: ACTIVE | Noted: 2022-01-04

## 2022-01-04 LAB — PHOSPHATE SERPL-MCNC: 3.7 MG/DL (ref 2.5–4.5)

## 2022-01-05 LAB — DEPRECATED CALCIDIOL+CALCIFEROL SERPL-MC: 40 UG/L (ref 20–75)

## 2022-01-10 ENCOUNTER — HOSPITAL ENCOUNTER (OUTPATIENT)
Dept: OCCUPATIONAL THERAPY | Facility: CLINIC | Age: 72
Setting detail: THERAPIES SERIES
End: 2022-01-10
Attending: INTERNAL MEDICINE
Payer: COMMERCIAL

## 2022-01-10 PROCEDURE — 97535 SELF CARE MNGMENT TRAINING: CPT | Mod: GO | Performed by: OCCUPATIONAL THERAPIST

## 2022-01-10 PROCEDURE — 97112 NEUROMUSCULAR REEDUCATION: CPT | Mod: GO | Performed by: OCCUPATIONAL THERAPIST

## 2022-01-10 NOTE — PROGRESS NOTES
Progress note from pt's 3rd OT visit (1st treatment session following her pre-driving assessment on 11/23/2.  Pt cancelled her 2 scheduled OT appointments in December).        01/10/22 1100   Signing Clinician's Name / Credentials   Signing clinician's name / credentials Sonya Villanueva OTR/KEVEN    Session Number   Session Number 3   Adult OT Eval Goals   OT Eval Goals (Adult) 1;2;3    OT Goal 1   Goal Identifier CPT    Goal Description Pt and caregiver will demonstrate understanding of CPT testing results and any recommendations for level of assist or supervision in her daily life, for increased safety within her daily living skills.    Goal Progress Goal met. Pt completed CPT on 11/15 and verbalized understanding of testing results.   Target Date 12/15/21    OT Goal 2   Goal Identifier pre-driving assessment   Goal Description Pt will verbalize results of pre-driving screen, to determine readiness to return to driving versus additional testing recommendation.    Goal Progress goal in progress.  Pt failed 2 of the 3 Dynavision subtests for pre-driving, and therapist did not recommend resuming driving. Following additional practice today, she showed improvements in reaction time but was still under the cutoff for safe driving.   Target Date 12/15/21    OT Goal 3   Goal Identifier compensatory memory strategies   Goal Description Pt will implement at least 3 strategies for impaired memory, for improved indepdence in IADLs, within the setting of memory deficits.   Target Date 12/15/21   Subjective Report   Subjective Report Pt reports no medical changes since last session.  She states she declined doing home care because she doesn't need it; but also did not know any reason why her PCP or other provider had been recommending home care.  She denies any medical changes since last session on 11/23.     Objective Measures   Objective Measure 1   Objective Measure Symbol Digit Modality   Details not tested this date.     Objective Measure 2   Objective Measure Pre-driving Subtest 1, Processing Speed (Dynavision, mode A, 60 seconds).  Safe driving cutoff score is 60 hits per minute.   Details first attempt was 26, second attempt following training with Mode B was 38. third attempt following 3 min with mode B was 47   Objective Measure 3   Objective Measure Pre-driving screen Subtest 2, Selective Attention (Mode A, 4 minutes)   Details not tested this date   Objective Measure 4   Objective Measure Pre-driving screen Subtest 3, Divided Attention (Mode A, 1 flashing digit per second, 60 second trial)    Details not tested this date   Objective Measure 5   Objective Measure Dynavision Mode B   Details 3 min trial, 1.5 second speed: 85/137, reaction time 1.18.  No sig difference between reaction time of the 4 quadrants.  At 1 second speed: 24/183 reaction time 0.88. Second attempt was 28/186, reaction time .89.  Pt c/o dizziness and appeared slightly disoriented with this speed after 3 minutes.    Therapeutic Interventions   Therapeutic Interventions Self Care/Home Management;Neuromuscular Re-education   Neuromuscular Re-education   Neuromuscular Re-ed Minutes (64057) 45   Treatment Detail Completed dynavision exercises to increase reaction time and visual scanning needed for readiness to resume driving. See objective measures above.  Overall, pt's starting score was lower than last session when she completed the pre-driving subtests (26 today compared to 39 last session).  However, with practice/training of Mode B (which elicits faster reaction time as the lights disappear after 1 second), her reaction time did improve to a max of 47.  However, this is still below the cutoff for safe driving (60 hits/min).  Therapist educated pt on observation that her visual scanning in particular seemed to be slow.  Upon further discussion about pt's visual history, she states she has a visual diagnosis (was not sure whether glaucoma, diabetic  "retinopathy?) but stated that someone at an eye clinic \"[address]\" dx her with mild cataract, and that [facility] [place] gave her a new Glasses Rx in June 2021 that she has not yet obtained. Therapist encouraged pt to start wtih getting new glasses Rx, and may also be appropriate for ophthalmology for a \"Useful Field of View\" test to further inform driving capacity.    Plan   Plan for next session continue working on reaction time through Dynavision trials, at home practice with bouncing tennis ball as able.  Work on cognitive processing speed (PASAT?)    Total Session Time   Timed Code Treatment Minutes 45   Total Treatment Time (sum of timed and untimed services) 45   AMBULATORY CLINICS ONLY-MEDICAL AND TREATMENT DIAGNOSIS   Medical Diagnosis Memory change   OT Diagnosis impaired ADLs and IADLs     "

## 2022-01-12 NOTE — PROGRESS NOTES
"[facility] ([facility])  January 13, 2022    Behavioral Health Clinician Progress Note    Patient Name: Rajani Guo      Telemedicine Visit: The patient's condition can be safely assessed and treated via synchronous audio and visual telemedicine encounter.      Reason for Telemedicine Visit: Services only offered telehealth    Originating Site (Patient Location): Patient's home    Distant Site (Provider Location): Provider Remote Setting- Home Office    Consent:  The patient/guardian has verbally consented to: the potential risks and benefits of telemedicine (video visit) versus in person care; bill my insurance or make self-payment for services provided; and responsibility for payment of non-covered services.     Mode of Communication:  Video Conference via [employer]    As the provider I attest to compliance with applicable laws and regulations related to telemedicine.         Service Type:  Individual      Service Location:   "[employer]hart / Email (patient reached)     Session Start Time: 841am  Session End Time: 851am      Session Length: 10 min     Attendees: Patient    Visit Activities (Refresh list every visit): [facility] Only    Diagnostic Assessment Date: 11/2021  See Flowsheets for today's PHQ-9 and ARACELI-7 results  Previous PHQ-9:   PHQ-9 SCORE 4/19/2021 1/3/2022 1/13/2022   PHQ-9 Total Score MyChart - - 10 (Moderate depression)   PHQ-9 Total Score 2 4 10       Previous ARACELI-7:   ARACELI-7 SCORE 4/19/2021   Total Score 15       WHODAS  No flowsheet data found.     CAGE  No flowsheet data found.      DATA  Extended Session (60+ minutes): No  Interactive Complexity: No  Crisis: No    Medication Compliance:  Yes      Chemical Use Review:   Substance Use: Chemical use reviewed, no active concerns identified      Tobacco Use: No current tobacco use.      Current Stressors / Issues:  MH update: Rajani reports that things have been \"up and down\". Medication dose was increased about 2 weeks ago but she " hasn't noticed any difference. She is aware that it may take time to notice any difference. Daughter with whom she lives is a good source of support. Believes that a lot of her depression is because she can't drive but hopes to get driving privileges back. Unsure of when this will be or why they were taken.   Stresses: No  Appetite: varies  Sleep: unremarkable  Therapy: No  SHIVAM: occasional etoh  Preg: NA  Interventions:   Most important: how long before notice improvements with increased dose of medication?       Progress on Treatment Objective(s) / Homework:  Satisfactory progress - ACTION (Actively working towards change); Intervened by reinforcing change plan / affirming steps taken    Motivational Interviewing    MI Intervention: Co-Developed Goal: improve mood, Expressed Empathy/Understanding, Supported Autonomy, Collaboration, Evocation, Permission to raise concern or advise, Open-ended questions, Reflections: simple and complex, Change talk (evoked) and Reframe     Change Talk Expressed by the Patient: Desire to change Ability to change Reasons to change Need to change Committment to change Activation Taking steps    Provider Response to Change Talk: E - Evoked more info from patient about behavior change, A - Affirmed patient's thoughts, decisions, or attempts at behavior change, R - Reflected patient's change talk and S - Summarized patient's change talk statements        Review of Symptoms per patient report:  Depression: Feeling sad, down, or depressed and difficulty sleeping  Karina:  No Symptoms  Psychosis: No Symptoms  Anxiety: No Symptoms  Panic:  No symptoms  Post Traumatic Stress Disorder:  No Symptoms   Eating Disorder: No Symptoms  ADD / ADHD:  No symptoms  Conduct Disorder: No symptoms  Autism Spectrum Disorder: No symptoms  Obsessive Compulsive Disorder: No Symptoms      Changes in Health Issues:   None reported    Assessment: Current Emotional / Mental Status (status of significant  symptoms):  Risk status (Self / Other harm or suicidal ideation)  Patient denies a history of suicidal ideation, suicide attempts, self-injurious behavior, homicidal ideation, homicidal behavior and and other safety concerns  Patient denies current fears or concerns for personal safety.  Patient denies current or recent suicidal ideation or behaviors.  Patient denies current or recent homicidal ideation or behaviors.  Patient denies current or recent self injurious behavior or ideation.  Patient denies other safety concerns.  A safety and risk management plan has not been developed at this time, however patient was encouraged to call [place] [phone] should there be a change in any of these risk factors.    Appearance:   Appropriate   Eye Contact:   Good   Psychomotor Behavior: Normal   Attitude:   Cooperative   Orientation:   All  Speech   Rate / Production: Normal    Volume:  Normal   Mood:    Normal  Affect:    Appropriate   Thought Content:  Clear   Thought Form:  Coherent  Logical   Insight:    Good     Diagnoses:  1. Depression, major, recurrent, moderate (H)        Collateral Reports Completed:  Communicated with Noemi Greer, MSN, APRN, CNP, FMHNP-BC, [facility]    Plan: (Homework, other):  Patient was given information about behavioral services and encouraged to schedule a follow up appointment with the clinic [facility] in conjunction with next CCPS appointment.  She was also given information about mental health symptoms and treatment options .  CD Recommendations: No indications of CD issues.  Vee CORRALES [facility] [place]      VILMA Mike  January 13, 2022

## 2022-01-13 ENCOUNTER — TELEPHONE (OUTPATIENT)
Dept: PSYCHIATRY | Facility: CLINIC | Age: 72
End: 2022-01-13
Payer: COMMERCIAL

## 2022-01-13 ENCOUNTER — VIRTUAL VISIT (OUTPATIENT)
Dept: PSYCHIATRY | Facility: CLINIC | Age: 72
End: 2022-01-13
Payer: COMMERCIAL

## 2022-01-13 ENCOUNTER — VIRTUAL VISIT (OUTPATIENT)
Dept: BEHAVIORAL HEALTH | Facility: CLINIC | Age: 72
End: 2022-01-13
Payer: COMMERCIAL

## 2022-01-13 DIAGNOSIS — F33.0 MAJOR DEPRESSIVE DISORDER, RECURRENT EPISODE, MILD (H): Primary | ICD-10-CM

## 2022-01-13 DIAGNOSIS — F41.1 GENERALIZED ANXIETY DISORDER: ICD-10-CM

## 2022-01-13 DIAGNOSIS — F33.1 DEPRESSION, MAJOR, RECURRENT, MODERATE (H): Primary | ICD-10-CM

## 2022-01-13 PROCEDURE — 99214 OFFICE O/P EST MOD 30 MIN: CPT | Mod: 95 | Performed by: NURSE PRACTITIONER

## 2022-01-13 RX ORDER — SERTRALINE HYDROCHLORIDE 100 MG/1
100 TABLET, FILM COATED ORAL DAILY
Qty: 30 TABLET | Refills: 3 | Status: SHIPPED | OUTPATIENT
Start: 2022-01-13 | End: 2022-04-19

## 2022-01-13 ASSESSMENT — PATIENT HEALTH QUESTIONNAIRE - PHQ9
10. IF YOU CHECKED OFF ANY PROBLEMS, HOW DIFFICULT HAVE THESE PROBLEMS MADE IT FOR YOU TO DO YOUR WORK, TAKE CARE OF THINGS AT HOME, OR GET ALONG WITH OTHER PEOPLE: SOMEWHAT DIFFICULT
SUM OF ALL RESPONSES TO PHQ QUESTIONS 1-9: 10
SUM OF ALL RESPONSES TO PHQ QUESTIONS 1-9: 10

## 2022-01-13 NOTE — PROGRESS NOTES
"Rajani is a 71 year old who is being evaluated via a billable video visit.      How would you like to obtain your AVS? MyChart  If the video visit is dropped, the invitation should be resent by: Send to e-mail at: [email or web address]  Will anyone else be joining your video visit? No      Video Start Time: 9:23 AM  Video-Visit Details    Type of service:  Video Visit    Video End Time:9:50 AM    Originating Location (pt. Location): Home    Distant Location (provider location):  [facility] MENTAL [facility] & ADDICTION [facility]     Platform used for Video Visit: [employer]  Answers for HPI/ROS submitted by the patient on 2022  If you checked off any problems, how difficult have these problems made it for you to do your work, take care of things at home, or get along with other people?: Somewhat difficult  PHQ9 TOTAL SCORE: 10      Outpatient Psychiatric Progress Note    Name: Rajani Guo   : 1950                    Primary Care Provider: Tamiko Coley MD   Therapist: No    PHQ-9 scores:  PHQ-9 SCORE 2021 1/3/2022 2022   PHQ-9 Total Score MyChart - - 10 (Moderate depression)   PHQ-9 Total Score 2 4 10       ARACELI-7 scores:  ARACELI-7 SCORE 2021   Total Score 15       Patient Identification:    Patient is a 71 year old year old,   White Choose not to answer female  who presents for return visit with me.  Patient is currently retired. Patient attended the session alone. Patient prefers to be called: \" Rajani\".    Current medications include: atorvastatin (LIPITOR) 40 MG tablet, Take 1 tablet (40 mg) by mouth daily  buPROPion (WELLBUTRIN SR) 150 MG 12 hr tablet, TAKE 1 TABLET BY MOUTH 2 TIMES DAILY  Calcium Carb-Cholecalciferol 600-800 MG-UNIT TABS, Take 1 tablet by mouth 2 times daily  estradiol (ESTRACE) 0.1 MG/GM vaginal cream, Place 1 g vaginally twice a week  ferrous sulfate (FEROSUL) 325 (65 Fe) MG tablet, Take 325 mg by mouth 2 times daily  furosemide (LASIX) 20 MG tablet, " Take 1 tablet (20 mg) by mouth daily as needed  glucosamine-chondroitinoitin 500-400 MG tablet, Patient is taking 1500 mg OTC 1 time daily  losartan (COZAAR) 25 MG tablet, Take 1 tablet (25 mg) by mouth daily  Melatonin 10 MG TABS tablet, Take 10 mg by mouth At Bedtime  metFORMIN (GLUCOPHAGE) 1000 MG tablet, TAKE 1 TABLET BY MOUTH TWICE A DAY WITH MEALS  methenamine (HIPREX) 1 g tablet, Take 1 tablet (1 g) by mouth 2 times daily  Multiple Vitamin (MULTI-VITAMINS) TABS, Take 1 tablet by mouth daily   oxybutynin (DITROPAN) 5 MG tablet, Take 1 tablet (5 mg) by mouth 2 times daily  pantoprazole (PROTONIX) 40 MG EC tablet, TAKE 1 TABLET BY MOUTH EVERY DAY  sertraline (ZOLOFT) 50 MG tablet, Take 1 tablet (50 mg) by mouth daily  traZODone (DESYREL) 50 MG tablet, TAKE 1-2 TABLETS ( MG) BY MOUTH AT BEDTIME  vitamin C (ASCORBIC ACID) 500 MG tablet, Take 1 tablet (500 mg) by mouth daily  warfarin ANTICOAGULANT (COUMADIN) 2 MG tablet, Take 4 tabs (8 mg) on Sun/Tue/Thu and 5 tabs (10 mg) all other days of the week    No current facility-administered medications on file prior to visit.       The [place] Prescription Monitoring Program has been reviewed and there are no concerns about diversionary activity for controlled substances at this time.      I was able to review most recent Primary Care Provider, specialty provider, and therapy visit notes that I have access to.     Today, patient reports that she is depressed that she cannot drive.  She is having short term memory concerns.  She misses having the ability to work.  She watches a lot of television.  She is always bored.  It is hard to get into reading.  She is always  Hungry and eats unhealthy foods.  She has muscle and joint aches.        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    Social history updates:    She gets social security income only.      Substance use updates:    No alcohol use reported  Tobacco use: Yes Cigarettes  Ready to quit?  No  Nicotine Replacement Therapy tried: None    Vital Signs:   There were no vitals taken for this visit.    Labs:    Most recent laboratory results reviewed and no new labs.     Mental Status Examination:  Appearance:  awake, alert, mild distress and casually dressed  Attitude:  cooperative   Eye Contact:  adequate  Gait and Station: No assistive Devices used and No dizziness or falls  Psychomotor Behavior:  intact station, gait and muscle tone  Oriented to:  time, person, and place  Attention Span and Concentration:  Fair  Speech:   clear, coherent and Speaks: English  Mood:  anxious and depressed  Affect:  mood congruent  Associations:  no loose associations  Thought Process:  goal oriented  Thought Content:  no evidence of suicidal ideation or homicidal ideation, no auditory hallucinations present and no visual hallucinations present  Recent and Remote Memory:  fair Not formally assessed. No amnesia.  Fund of Knowledge: appropriate  Insight:  good  Judgment:  intact  Impulse Control:  intact    Suicide Risk Assessment:  Today Rajani Guo reports that she is having no thoughts to want to end her life or to harm other people. In addition, there are notable risk factors for self-harm, including single status, anxiety, comorbid medical condition of History of stroke, withdrawing and mood change. However, risk is mitigated by  commitment to family, history of seeking help when needed, future oriented, denies suicidal intent or plan and denies homicidal ideation, intent, or plan. Therefore, based on all available evidence including the factors cited above, Rajani Guo does not appear to be at imminent risk for self-harm, does not meet criteria for a 72-hr hold, and therefore remains appropriate for ongoing outpatient level of care.  A thorough assessment of risk factors related to suicide and self-harm have been reviewed and are noted above. The patient convincingly denies suicidality on several occasions. Local community safety resources printed and reviewed for patient to use if needed. There was no deceit detected, and the patient presented in a manner that was believable.     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  300.02 (F41.1) Generalized Anxiety Disorder    Medical comorbidities include:   Patient Active Problem List    Diagnosis Date Noted     Venous stasis 01/04/2022     Priority: Medium     CKD (chronic kidney disease) stage 2, GFR 60-89 ml/min 01/03/2022     Priority: Medium     Major depressive disorder, recurrent episode, mild (H) 01/03/2022     Priority: Medium     Cognitive decline 10/12/2021     Priority: Medium     Type 2 diabetes mellitus with other circulatory complications (H) 08/16/2021     Priority: Medium     Anxiety 08/16/2021     Priority: Medium     Type 2 diabetes mellitus with other circulatory complication, without long-term current use of insulin (H) 08/16/2021     Priority: Medium     Memory problem 06/21/2021     Priority: Medium     Atrial fibrillation (H) 03/12/2021     Priority: Medium     Current use of long term anticoagulation 03/12/2021     Priority: Medium     for Prosthetic valves       History of GI bleed 03/12/2021     Priority: Medium     PAD (peripheral artery disease) (H) 03/12/2021     Priority: Medium     PAF  (paroxysmal atrial fibrillation) (H) 03/12/2021     Priority: Medium     Frailty 03/01/2021     Priority: Medium     History of CT scan of head 2021 02/10/2021     Priority: Medium     CT HEAD W/O CONTRAST 1/24/2021 4:55 PM     History: Trauma -???Head Injury   ICD-10:     Comparison: MRI brain 8/1/2019     Technique: Using multidetector thin collimation helical acquisition  technique, axial, coronal and sagittal CT images from the skull base  to the vertex were obtained without intravenous contrast.     Findings: There is no intracranial hemorrhage, mass effect, or midline  shift. Gray/white matter differentiation in both cerebral hemispheres  is preserved. Ventricles are proportionate to the cerebral sulci. The  basal cisterns are clear.     The bony calvaria and the bones of the skull base are normal. The  visualized portions of the paranasal sinuses and mastoid air cells are  clear.                                                                       Impression:  No acute intracranial pathology..      I have personally reviewed the examination and initial interpretation  and I agree with the findings.     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) 09/29/2020     Priority: Medium     Other cardiomyopathies (H) 09/16/2020     Priority: Medium     History of anemia 09/03/2019     Priority: Medium     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  3/4/2019  09/03/2019     Priority: Medium     Claudication of left lower extremity (H) 03/01/2019     Priority: Medium     PVD (peripheral vascular disease) (H) 02/25/2019     Priority: Medium     Essential hypertension 02/21/2019     Priority: Medium     History of partial thyroidectomy 06/02/2018     Priority: Medium     Partial thyroidectomy 32 years ago due to benign nodules       Smoker      Priority: Medium     Hyperlipidemia LDL goal <70 01/18/2018     Priority: Medium     Age-related osteoporosis without current pathological fracture 01/18/2018     Priority: Medium     Insomnia, unspecified type 01/18/2018     Priority: Medium     Trigger middle finger of right hand 11/27/2015     Priority: Medium     Overview:   And 4th finger.  Injection performed       Personal history of colonic polyps 08/27/2014     Priority: Medium     Sensorineural hearing loss, bilateral 03/28/2012     Priority: Medium     COPD (chronic obstructive pulmonary disease) (H) 02/21/2012     Priority: Medium       Assessment:    Rajani Guo continues to be depressed and bored.  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  She is lonely and isolated despite living with her daughter.  Multiple medical core morbidities have resulted in her limitations.  I increased her sertraline to 100 mg daily.  She will continue the Wellbutrin  mg twice daily as well as the trazodone 50 to 100 mg at bedtime she is looking for a [employer] mobility application to be mailed to her to consider as a transportation option for her..    Medication side effects and alternatives were reviewed. Health promotion activities recommended and reviewed today. All questions addressed. Education and counseling completed regarding risks and benefits of medications and psychotherapy options.    Treatment Plan:        1.  Increase sertraline to 100 mg daily    2.  Continue Wellbutrin  mg twice daily    3.  Continue trazodone 50  to 100 mg at bedtime    4.  A [employer] mobility application will be mailed to you to consider as a transportation option for you        Continue all other medications as reviewed per electronic medical record today.     Safety plan reviewed. To the Emergency Department as needed or call after hours crisis line at [phone] or [phone]. [place] Crisis Text Line. Text [place] to 999332 or Suicide LifeLine Chat: suicideEnbase.org/chat/    To schedule individual or family therapy, call [facility] at [phone].    Schedule an appointment with me in 2 months or sooner as needed. Call [facility] at [phone] to schedule.    Follow up with primary care provider as planned or for acute medical concerns.    Call the psychiatric nurse line with medication questions or concerns at [phone].    WeLink may be used to communicate with your provider, but this is not intended to be used for emergencies.    Crisis Resources:    National Suicide Prevention Lifeline: 554.184.5134 (TTY: 363.395.6176). Call anytime for help.  (www.suicidepreventionlifeline.org)  National [place] [employer] Mental Illness (www.luna.org): 124.552.6642 or 259-646-3625.   Mental Health Association (www.mentalhealth.org): 367.122.2609 or 340-399-7560.  [place] Crisis Text Line: Text [place] to 903949  Suicide LifeLine Chat: suicideEnbase.org/chat    Administrative Billing:   Time spent with patient was 30 minutes and greater than 50% of time or 20 minutes was spent in counseling and coordination of care regarding above diagnoses and treatment plan.    Patient Status:  Patient will continue to be seen for ongoing consultation and stabilization.    Signed:   JACQUELINE Soto-BC   Psychiatry

## 2022-01-13 NOTE — TELEPHONE ENCOUNTER
----- Message from Noemi Greer NP sent at 1/13/2022  9:53 AM CST -----  Would you be able to print off a [employer] application and mail it to Rajani?  Thank you.  Genie

## 2022-01-13 NOTE — PATIENT INSTRUCTIONS
1.  Increase sertraline to 100 mg daily    2.  Continue Wellbutrin  mg twice daily    3.  Continue trazodone 50 to 100 mg at bedtime    4.  A [employer] mobility application will be mailed to you to consider as a transportation option for you        Continue all other medications as reviewed per electronic medical record today.     Safety plan reviewed. To the Emergency Department as needed or call after hours crisis line at [phone] or [phone]. [place] Crisis Text [place]. Text [place] to 324779 or Suicide LifeLine Chat: suicideOmaze.org/chat/    To schedule individual or family therapy, call [facility] at [phone].    Schedule an appointment with me in 2 months or sooner as needed. Call [facility] at [phone] to schedule.    Follow up with primary care provider as planned or for acute medical concerns.    Call the psychiatric nurse line with medication questions or concerns at [phone].    [employer] may be used to communicate with your provider, but this is not intended to be used for emergencies.    Crisis Resources:    National Suicide Prevention Lifeline: 760.893.1374 (TTY: 448.915.7649). Call anytime for help.  (www.suicidepreventionlifeline.org)  National [place] on Mental Illness (www.luna.org): 922.651.8263 or 380-766-7124.   Mental Health Association (www.mentalhealth.org): 684.199.8788 or 085-092-9067.  [place] Crisis Text [place]: Text [place] to 909031  Suicide LifeLine Chat: suicideOmaze.org/chat

## 2022-01-14 ENCOUNTER — LAB (OUTPATIENT)
Dept: LAB | Facility: CLINIC | Age: 72
End: 2022-01-14
Payer: COMMERCIAL

## 2022-01-14 ENCOUNTER — ANTICOAGULATION THERAPY VISIT (OUTPATIENT)
Dept: ANTICOAGULATION | Facility: CLINIC | Age: 72
End: 2022-01-14

## 2022-01-14 DIAGNOSIS — I48.91 ATRIAL FIBRILLATION (H): ICD-10-CM

## 2022-01-14 DIAGNOSIS — Z79.01 CURRENT USE OF LONG TERM ANTICOAGULATION: ICD-10-CM

## 2022-01-14 DIAGNOSIS — Z95.2 S/P AVR (AORTIC VALVE REPLACEMENT): ICD-10-CM

## 2022-01-14 DIAGNOSIS — Z98.890 S/P MVR (MITRAL VALVE REPAIR): Primary | ICD-10-CM

## 2022-01-14 LAB — INR BLD: 3.1 (ref 0.9–1.1)

## 2022-01-14 PROCEDURE — 36416 COLLJ CAPILLARY BLOOD SPEC: CPT

## 2022-01-14 PROCEDURE — 85610 PROTHROMBIN TIME: CPT

## 2022-01-14 ASSESSMENT — PATIENT HEALTH QUESTIONNAIRE - PHQ9: SUM OF ALL RESPONSES TO PHQ QUESTIONS 1-9: 10

## 2022-01-14 NOTE — PROGRESS NOTES
ANTICOAGULATION MANAGEMENT     Rajani Guo 71 year old female is on warfarin with therapeutic INR result. (Goal INR 2.5-3.5)    Recent labs: (last 7 days)     01/14/22  0901   INR 3.1*       ASSESSMENT     Source(s): Chart Review     Warfarin doses taken: Reviewed in chart  Diet:   New illness, injury, or hospitalization:   Medication/supplement changes:   Signs or symptoms of bleeding or clotting:   Previous INR: Therapeutic last 2(+) visits  Additional findings: None     PLAN     Recommended plan for no diet, medication or health factor changes affecting INR     Dosing Instructions: Continue your current warfarin dose with next INR in 3 weeks       Summary  As of 1/14/2022    Full warfarin instructions:  8 mg every Sun, Tue, Thu; 10 mg all other days   Next INR check:  1/28/2022             Detailed voice message left for  Candy with dosing instructions and follow up date.     Contact [phone]  to schedule and with any changes, questions or concerns.     Education provided: Please call back if any changes to your diet, medications or how you've been taking warfarin, Goal range and significance of current result, Importance of notifying clinic for changes in medications; a sooner lab recheck maybe needed., Importance of notifying clinic for diarrhea, nausea/vomiting, reduced intake, and/or illness; a sooner lab recheck maybe needed. and Contact [phone]  with any changes, questions or concerns.     Plan made per [facility] anticoagulation protocol    Lesley Ortega RN  Anticoagulation Clinic  1/14/2022    _______________________________________________________________________     Anticoagulation Episode Summary     Current INR goal:  2.5-3.5   TTR:  43.2 % (1 y)   Target end date:  Indefinite   Send INR reminders to:  GURVINDER SALAZAR    Indications    S/P MVR (mitral valve repair) [Z98.890]  S/P AVR (aortic valve replacement) [Z95.2]           Comments:  home care  Pt gave verbal ok to speak with Candy on  7/20/21         Anticoagulation Care Providers     Provider Role Specialty Phone number    Quinton Handy PA Referring Cardiovascular & Thoracic Surgery [phone]    Lakeshia Rubio, FRANCESCA SCHULZ Referring Internal Medicine [phone]    Sparkle Bird, FRANCESCA CNP Referring Emergency Medicine [phone]

## 2022-01-17 ENCOUNTER — HOSPITAL ENCOUNTER (OUTPATIENT)
Dept: OCCUPATIONAL THERAPY | Facility: CLINIC | Age: 72
Setting detail: THERAPIES SERIES
End: 2022-01-17
Attending: INTERNAL MEDICINE
Payer: COMMERCIAL

## 2022-01-17 PROCEDURE — 97112 NEUROMUSCULAR REEDUCATION: CPT | Mod: GO | Performed by: OCCUPATIONAL THERAPIST

## 2022-01-24 DIAGNOSIS — D50.0 IRON DEFICIENCY ANEMIA DUE TO CHRONIC BLOOD LOSS: ICD-10-CM

## 2022-01-24 NOTE — TELEPHONE ENCOUNTER
Received call from patient.  She states that she realized she has mistakenly been taking double the dose of her vitamin C (ASCORBIC ACID) 500 MG tablet - first started 10/9/2020.  She has not been experiencing any adverse side effects, however she is wondering if she should stay at 500 mg daily, or increase to 1000 mg daily.   She will be needing a refill as well at appropriate dose.    MARY Gonsalez RN  [facility]

## 2022-01-25 RX ORDER — ASCORBIC ACID 500 MG
500 TABLET ORAL DAILY
Qty: 100 TABLET | Refills: 1 | Status: SHIPPED | OUTPATIENT
Start: 2022-01-25 | End: 2022-07-26

## 2022-01-26 DIAGNOSIS — I87.8 VENOUS STASIS: ICD-10-CM

## 2022-01-26 NOTE — PATIENT INSTRUCTIONS
1.  Citalopram 10 mg daily for 7 days then stop  2.  Sertraline 50 mg daily  3.  Wellbutrin  mg daily  4.  Trazodone 50 to 100 mg at bedtime  5.  Consider talk therapy in the future to adjust to life changes        Continue all other medical directions per primary care provider.     Continue all other medications as reviewed per electronic medical record today.     Safety plan reviewed. To the Emergency Department as needed or call after hours crisis line at [phone] or [phone]. [place] Crisis Text [place]: Text [place] to 483991  or  Suicide LifeLine Chat: suicideStupeflix.org/chat/    To schedule individual or family therapy, call [facility] at [phone].     Schedule an appointment with me in 6 weeks or sooner as needed.  Call [facility] at [phone] to schedule.    Follow up with primary care provider as planned or for acute medical concerns.    Call the psychiatric nurse line with medication questions or concerns at [phone].    [employer] may be used to communicate with your provider, but this is not intended to be used for emergencies.    Crisis Resources:    National Suicide Prevention Lifeline: 226.884.2010 (TTY: 891.334.9883). Call anytime for help.  (www.suicidepreventionlifeline.org)  National [place] on Mental Illness (www.luna.org): 438.430.5557 or 624-048-0417.   Mental Health Association (www.mentalhealth.org): 484.663.2798 or 994-181-3776.  [place] Crisis Text [place]: Text [place] to 084683  Suicide LifeLine Chat: suicideStupeflix.org/chat

## 2022-01-28 RX ORDER — FUROSEMIDE 20 MG
TABLET ORAL
Qty: 30 TABLET | Refills: 1 | OUTPATIENT
Start: 2022-01-28

## 2022-02-04 ENCOUNTER — LAB (OUTPATIENT)
Dept: LAB | Facility: CLINIC | Age: 72
End: 2022-02-04
Payer: COMMERCIAL

## 2022-02-04 ENCOUNTER — ANTICOAGULATION THERAPY VISIT (OUTPATIENT)
Dept: ANTICOAGULATION | Facility: CLINIC | Age: 72
End: 2022-02-04

## 2022-02-04 DIAGNOSIS — Z79.01 CURRENT USE OF LONG TERM ANTICOAGULATION: ICD-10-CM

## 2022-02-04 DIAGNOSIS — Z95.2 S/P AVR (AORTIC VALVE REPLACEMENT): ICD-10-CM

## 2022-02-04 DIAGNOSIS — I48.91 ATRIAL FIBRILLATION (H): ICD-10-CM

## 2022-02-04 DIAGNOSIS — Z98.890 S/P MVR (MITRAL VALVE REPAIR): Primary | ICD-10-CM

## 2022-02-04 LAB — INR BLD: 1.4 (ref 0.9–1.1)

## 2022-02-04 PROCEDURE — 36416 COLLJ CAPILLARY BLOOD SPEC: CPT

## 2022-02-04 PROCEDURE — 85610 PROTHROMBIN TIME: CPT

## 2022-02-04 NOTE — PROGRESS NOTES
ANTICOAGULATION MANAGEMENT     Rajani Guo 71 year old female is on warfarin with supratherapeutic INR result. (Goal INR 2.5-3.5)    Recent labs: (last 7 days)     02/04/22  0822   INR 1.4*       ASSESSMENT     Source(s): Chart Review and Patient/Caregiver Call     Warfarin doses taken: Warfarin taken as instructed  Diet: No new diet changes identified  New illness, injury, or hospitalization: No  Medication/supplement changes: None noted  Signs or symptoms of bleeding or clotting: No  Previous INR: Therapeutic last 2(+) visits  Additional findings: None     PLAN     Recommended plan for no diet, medication or health factor changes affecting INR     Dosing Instructions: Booster dose then continue your current warfarin dose with next INR in 1 week       Summary  As of 2/4/2022    Full warfarin instructions:  2/4: 16 mg; Otherwise 8 mg every Sun, Tue, Thu; 10 mg all other days   Next INR check:  2/11/2022             Telephone call with  ashish Gerard daughter who verbalizes understanding and agrees to plan    Lab visit scheduled    Education provided: Please call back if any changes to your diet, medications or how you've been taking warfarin and Monitoring for clotting signs and symptoms    Plan made per [facility] anticoagulation protocol    Lyndsay Monique, RN  Anticoagulation Clinic  2/4/2022    _______________________________________________________________________     Anticoagulation Episode Summary     Current INR goal:  2.5-3.5   TTR:  44.3 % (1 y)   Target end date:  Indefinite   Send INR reminders to:  GURVINDER SALAZAR    Indications    S/P MVR (mitral valve repair) [Z98.890]  S/P AVR (aortic valve replacement) [Z95.2]           Comments:  home care  Pt gave verbal ok to speak with Ashish on 7/20/21         Anticoagulation Care Providers     Provider Role Specialty Phone number    Quinton Handy PA Referring Cardiovascular & Thoracic Surgery [phone]    Lakeshia Rubio APRN CNP  Referring Internal Medicine [phone]    Sparkle Bird, FRANCESCA CNP Referring Emergency Medicine [phone]        Anticoagulation Management    Unable to reach Sarai Gerard daughter today.    Today's INR result of 1.4 is subtherapeutic (goal INR of 2.5-3.5).  Result received from: Clinic Lab    Follow up required to confirm warfarin dose taken and assess for changes, confirm warfarin dose taken, assess for changes , discuss out of range INR  and discuss dosing instructions and confirm understanding of instructions    No instructions provided. Unable to leave voicemail.      Anticoagulation clinic to follow up    Lyndsay Monique RN

## 2022-02-08 NOTE — TELEPHONE ENCOUNTER
"Refill request r'cd from  [facility] via fax for sertraline (ZOLOFT) 100 MG tabletdenied due to Fills on file. Faxed \"not authorized\" back to pharmacy.    Erika Walker RN February 8, 2022 1:40 PM    "

## 2022-02-11 ENCOUNTER — LAB (OUTPATIENT)
Dept: LAB | Facility: CLINIC | Age: 72
End: 2022-02-11
Payer: COMMERCIAL

## 2022-02-11 ENCOUNTER — ANTICOAGULATION THERAPY VISIT (OUTPATIENT)
Dept: ANTICOAGULATION | Facility: CLINIC | Age: 72
End: 2022-02-11

## 2022-02-11 DIAGNOSIS — Z95.2 S/P AVR (AORTIC VALVE REPLACEMENT): ICD-10-CM

## 2022-02-11 DIAGNOSIS — Z98.890 S/P MVR (MITRAL VALVE REPAIR): Primary | ICD-10-CM

## 2022-02-11 DIAGNOSIS — Z79.01 CURRENT USE OF LONG TERM ANTICOAGULATION: ICD-10-CM

## 2022-02-11 DIAGNOSIS — I48.91 ATRIAL FIBRILLATION (H): ICD-10-CM

## 2022-02-11 LAB — INR BLD: 3 (ref 0.9–1.1)

## 2022-02-11 PROCEDURE — 85610 PROTHROMBIN TIME: CPT

## 2022-02-11 PROCEDURE — 36416 COLLJ CAPILLARY BLOOD SPEC: CPT

## 2022-02-11 NOTE — PROGRESS NOTES
ANTICOAGULATION MANAGEMENT     Rajani Guo 71 year old female is on warfarin with therapeutic INR result. (Goal INR 2.5-3.5)    Recent labs: (last 7 days)     02/11/22  1015   INR 3.0*       ASSESSMENT     Source(s): Chart Review and Patient/Caregiver Call     Warfarin doses taken: Warfarin taken as instructed  Diet: No new diet changes identified  New illness, injury, or hospitalization: No  Medication/supplement changes: None noted  Signs or symptoms of bleeding or clotting: No  Previous INR: Subtherapeutic  Additional findings: None     PLAN     Recommended plan for no diet, medication or health factor changes affecting INR     Dosing Instructions: Continue your current warfarin dose with next INR in 2 weeks       Summary  As of 2/11/2022    Full warfarin instructions:  8 mg every Sun, Tue, Thu; 10 mg all other days   Next INR check:  2/25/2022             Telephone call with  Sarai who verbalizes understanding and agrees to plan    Lab visit scheduled    Education provided: Goal range and significance of current result    Plan made per [facility] anticoagulation protocol    eLsley Ortega RN  Anticoagulation Clinic  2/11/2022    _______________________________________________________________________     Anticoagulation Episode Summary     Current INR goal:  2.5-3.5   TTR:  44.9 % (1 y)   Target end date:  Indefinite   Send INR reminders to:  GURVINDER SALAZAR    Indications    S/P MVR (mitral valve repair) [Z98.890]  S/P AVR (aortic valve replacement) [Z95.2]           Comments:  home care  Pt gave verbal ok to speak with Sarai on 7/20/21         Anticoagulation Care Providers     Provider Role Specialty Phone number    Quinton Handy PA Referring Cardiovascular & Thoracic Surgery [phone]    Lakeshia Rubio APRN CNP Referring Internal Medicine [phone]    Sparkle Bird APRN CNP Referring Emergency Medicine [phone]

## 2022-02-25 ENCOUNTER — LAB (OUTPATIENT)
Dept: LAB | Facility: CLINIC | Age: 72
End: 2022-02-25
Payer: COMMERCIAL

## 2022-02-25 ENCOUNTER — ANTICOAGULATION THERAPY VISIT (OUTPATIENT)
Dept: ANTICOAGULATION | Facility: CLINIC | Age: 72
End: 2022-02-25

## 2022-02-25 ENCOUNTER — TELEPHONE (OUTPATIENT)
Dept: NEUROLOGY | Facility: CLINIC | Age: 72
End: 2022-02-25

## 2022-02-25 DIAGNOSIS — Z95.2 S/P AVR (AORTIC VALVE REPLACEMENT): ICD-10-CM

## 2022-02-25 DIAGNOSIS — Z79.01 CURRENT USE OF LONG TERM ANTICOAGULATION: ICD-10-CM

## 2022-02-25 DIAGNOSIS — Z98.890 S/P MVR (MITRAL VALVE REPAIR): Primary | ICD-10-CM

## 2022-02-25 DIAGNOSIS — I48.91 ATRIAL FIBRILLATION (H): ICD-10-CM

## 2022-02-25 LAB — INR BLD: 4.2 (ref 0.9–1.1)

## 2022-02-25 PROCEDURE — 85610 PROTHROMBIN TIME: CPT

## 2022-02-25 PROCEDURE — 36416 COLLJ CAPILLARY BLOOD SPEC: CPT

## 2022-02-25 NOTE — PROGRESS NOTES
ANTICOAGULATION MANAGEMENT     Rajani Guo 71 year old female is on warfarin with supratherapeutic INR result. (Goal INR 2.5-3.5)    Recent labs: (last 7 days)     02/25/22  0840   INR 4.2*       ASSESSMENT     Source(s): Chart Review and Patient/Caregiver Call     Warfarin doses taken: Warfarin taken as instructed  Diet: had a couple drinks yesterday  New illness, injury, or hospitalization: Yes: she fell yesterday  Medication/supplement changes: None noted  Signs or symptoms of bleeding or clotting: No  Previous INR: Therapeutic last visit; previously outside of goal range  Additional findings: None     PLAN     Recommended plan for no diet, medication or health factor changes affecting INR     Dosing Instructions: Continue your current warfarin dose with next INR in 2 weeks       Summary  As of 2/25/2022    Full warfarin instructions:  2/25: 6 mg; Otherwise 8 mg every Sun, Tue, Thu; 10 mg all other days   Next INR check:  3/11/2022             Telephone call with  Sarai who verbalizes understanding and agrees to plan    Lab visit scheduled    Education provided: Goal range and significance of current result    Plan made per [facility] anticoagulation protocol    Lesley Ortega RN  Anticoagulation Clinic  2/25/2022    _______________________________________________________________________     Anticoagulation Episode Summary     Current INR goal:  2.5-3.5   TTR:  46.5 % (1 y)   Target end date:  Indefinite   Send INR reminders to:  GURVINDER SALAZAR    Indications    S/P MVR (mitral valve repair) [Z98.890]  S/P AVR (aortic valve replacement) [Z95.2]           Comments:  Pt gave verbal ok to speak with Sarai on 7/20/21         Anticoagulation Care Providers     Provider Role Specialty Phone number    Quinton Handy PA Referring Cardiovascular & Thoracic Surgery [phone]    Lakeshia Rubio, FRANCESCA CNP Referring Internal Medicine [phone]    Sparkle Bird APRN CNP Referring Emergency Medicine  [phone]

## 2022-02-25 NOTE — TELEPHONE ENCOUNTER
Called and left voicemail stating that we have an opening for Dr. Alcaraz at 930 on March 9th if she is interested. We will hold the spot for the rest of the day.

## 2022-03-01 DIAGNOSIS — I87.8 VENOUS STASIS: ICD-10-CM

## 2022-03-03 RX ORDER — FUROSEMIDE 20 MG
TABLET ORAL
Qty: 90 TABLET | Refills: 3 | Status: SHIPPED | OUTPATIENT
Start: 2022-03-03

## 2022-03-03 NOTE — TELEPHONE ENCOUNTER
"Prescription approved per [facility] Refill Protocol.      Requested Prescriptions   Pending Prescriptions Disp Refills     furosemide (LASIX) 20 MG tablet [Pharmacy Med Name: FUROSEMIDE 20 MG TABLET] 30 tablet 1     Sig: TAKE 1 TABLET BY MOUTH DAILY AS NEEDED       Diuretics (Including Combos) Protocol Passed - 3/1/2022 12:33 PM        Passed - Blood pressure under 140/90 in past 12 months     BP Readings from Last 3 Encounters:   01/03/22 130/72   10/19/21 (!) 145/77   10/12/21 139/74                 Passed - Recent (12 mo) or future (30 days) visit within the authorizing provider's specialty     Patient has had an office visit with the authorizing provider or a provider within the authorizing providers department within the previous 12 mos or has a future within next 30 days. See \"Patient Info\" tab in inbasket, or \"Choose Columns\" in Meds & Orders section of the refill encounter.              Passed - Medication is active on med list        Passed - Patient is age 18 or older        Passed - No active pregancy on record        Passed - Normal serum creatinine on file in past 12 months     Recent Labs   Lab Test 01/03/22  1526   CR 0.68              Passed - Normal serum potassium on file in past 12 months     Recent Labs   Lab Test 01/03/22  1526   POTASSIUM 4.2                    Passed - Normal serum sodium on file in past 12 months     Recent Labs   Lab Test 01/03/22  1526                 Passed - No positive pregnancy test in past 12 months           Chen MAYFIELD RN on 3/3/2022 at 10:07 AM      "

## 2022-03-08 ENCOUNTER — TELEPHONE (OUTPATIENT)
Dept: NEUROLOGY | Facility: CLINIC | Age: 72
End: 2022-03-08
Payer: COMMERCIAL

## 2022-03-08 NOTE — TELEPHONE ENCOUNTER
3/8/22 Called and LVM for patient that there was an opening on 3/17 if she wanted to r/s her May appt, and that I am calling other waitlist patients as well.      Salena Anderson Procedure   Neurology & Neurosurgery Specialties  [facility] [place] [facility]   [phone]

## 2022-03-11 ENCOUNTER — ANTICOAGULATION THERAPY VISIT (OUTPATIENT)
Dept: ANTICOAGULATION | Facility: CLINIC | Age: 72
End: 2022-03-11

## 2022-03-11 ENCOUNTER — LAB (OUTPATIENT)
Dept: LAB | Facility: CLINIC | Age: 72
End: 2022-03-11
Payer: COMMERCIAL

## 2022-03-11 DIAGNOSIS — Z95.2 S/P AVR (AORTIC VALVE REPLACEMENT): ICD-10-CM

## 2022-03-11 DIAGNOSIS — Z79.01 CURRENT USE OF LONG TERM ANTICOAGULATION: ICD-10-CM

## 2022-03-11 DIAGNOSIS — Z98.890 S/P MVR (MITRAL VALVE REPAIR): Primary | ICD-10-CM

## 2022-03-11 DIAGNOSIS — I48.91 ATRIAL FIBRILLATION (H): ICD-10-CM

## 2022-03-11 LAB — INR BLD: 4.8 (ref 0.9–1.1)

## 2022-03-11 PROCEDURE — 36416 COLLJ CAPILLARY BLOOD SPEC: CPT

## 2022-03-11 PROCEDURE — 85610 PROTHROMBIN TIME: CPT

## 2022-03-11 NOTE — PROGRESS NOTES
ANTICOAGULATION MANAGEMENT     Rajani Guo 71 year old female is on warfarin with supratherapeutic INR result. (Goal INR 2.5-3.5)    Recent labs: (last 7 days)     03/11/22  0810   INR 4.8*       ASSESSMENT     Source(s): Chart Review  Previous INR was Supratherapeutic  Medication, diet, health changes since last INR chart reviewed; none identified           PLAN     Unable to reach patient today.    LMTCB    Follow up required to confirm warfarin dose taken and assess for changes and discuss out of range result     Lesley Ortega RN  Anticoagulation Clinic  3/11/2022

## 2022-03-11 NOTE — PROGRESS NOTES
Anticoagulation Management    Unable to reach patient today.    Left message to take 8 mg daily this weekend. Request call back for assessment.      Anticoagulation clinic to follow up    Lesley Ortega RN

## 2022-03-11 NOTE — PROGRESS NOTES
ANTICOAGULATION MANAGEMENT     Rajani Guo 71 year old female is on warfarin with supratherapeutic INR result. (Goal INR 2.5-3.5)    Recent labs: (last 7 days)     03/11/22  0810   INR 4.8*       ASSESSMENT     Source(s): Chart Review and Patient/Caregiver Call     Warfarin doses taken: Warfarin taken as instructed  Diet: Decreased greens/vitamin K in diet; plans to resume previous intake  New illness, injury, or hospitalization: Yes: Fell on Tuesday  Medication/supplement changes: None noted  Signs or symptoms of bleeding or clotting: Yes: has a bruise on the bottom of her foot  Previous INR: Supratherapeutic  Additional findings: None       PLAN     Recommended plan for no diet, medication or health factor changes affecting INR     Dosing Instructions:  Decrease your warfarin dose (12.5% change) with next INR in 1 week       Summary  As of 3/11/2022    Full warfarin instructions:  8 mg every day   Next INR check:  3/18/2022             Telephone call with Sarai who verbalizes understanding and agrees to plan    Lab visit scheduled    Education provided: Goal range and significance of current result    Plan made per [facility] anticoagulation protocol    Lesley Ortega RN  Anticoagulation Clinic  3/11/2022    _______________________________________________________________________     Anticoagulation Episode Summary     Current INR goal:  2.5-3.5   TTR:  45.5 % (1 y)   Target end date:  Indefinite   Send INR reminders to:  GURVINDER SALAZAR    Indications    S/P MVR (mitral valve repair) [Z98.890]  S/P AVR (aortic valve replacement) [Z95.2]           Comments:  Pt gave verbal ok to speak with Sarai on 7/20/21         Anticoagulation Care Providers     Provider Role Specialty Phone number    Quinton Handy PA Referring Cardiovascular & Thoracic Surgery [phone]    Lakeshia Rubio, FRANCESCA CNP Referring Internal Medicine [phone]    Sparkle Bird APRN CNP Referring Emergency Medicine  [phone]

## 2022-03-15 ENCOUNTER — TELEPHONE (OUTPATIENT)
Dept: PSYCHIATRY | Facility: CLINIC | Age: 72
End: 2022-03-15
Payer: COMMERCIAL

## 2022-03-15 NOTE — TELEPHONE ENCOUNTER
Reason for call:  Medication   If this is a refill request, has the caller requested the refill from the pharmacy already? Yes  Will the patient be using a [facility]? No  Name of the pharmacy and phone number for the current request:     [facility] [place]    Name of the medication requested: sertraline (ZOLOFT) 100 MG tablet    Other request: med needs approval per patient    Phone number to reach patient:  Home number on file [phone] (home)    Best Time:  asap    Can we leave a detailed message on this number?  YES    Travel screening: Not Applicable

## 2022-03-18 ENCOUNTER — TELEPHONE (OUTPATIENT)
Dept: FAMILY MEDICINE | Facility: CLINIC | Age: 72
End: 2022-03-18
Payer: COMMERCIAL

## 2022-03-18 NOTE — TELEPHONE ENCOUNTER
Spoke to Sarai Gerard daughter they missed inr apt so dosing given and will reschedule apt for  Next Friday.  David Kohler Rn  [facility]

## 2022-03-21 ENCOUNTER — TELEPHONE (OUTPATIENT)
Dept: FAMILY MEDICINE | Facility: CLINIC | Age: 72
End: 2022-03-21
Payer: COMMERCIAL

## 2022-03-21 NOTE — TELEPHONE ENCOUNTER
"Patient's daughter calling to request an appointment.  Patient has had frequent falls in the past month.  Falls have been occurring weekly.    1st fall was in the grocery store  Patient was not sure how the fall happened, \"she said she just dropped. She was feeling a little dizzy\"  Did hit head but had a big hat on and felt ok so she did not seek care at that time    2nd fall was a slip on ice   Had a bruise on bottom of right foot and ankle.  Did not seek care as the njuries have healed     3rd fall happened when patient bent over to grab something  Daughter is assuming that patient must have felt dizzy  No injuries with this fall    4th fall was occurred as patient got dizzy   Fell on ground on her buttocks and could not get up   Family assisted patient into a chair   No injuries and patient reports feeling, \"ok\"    Family has noticed patient has been weaker and more unsteady on her feet lately.    Offered several appts for this week but daughter declined due to conflicts with her own appointment and work schedule.  Appointment scheduled with Tamiko Samuel for 3/28/2022  Advised to call back if able to get patient in sooner    Elvira Waters RN  [facility]    "

## 2022-03-25 ENCOUNTER — ANTICOAGULATION THERAPY VISIT (OUTPATIENT)
Dept: ANTICOAGULATION | Facility: CLINIC | Age: 72
End: 2022-03-25

## 2022-03-25 ENCOUNTER — LAB (OUTPATIENT)
Dept: LAB | Facility: CLINIC | Age: 72
End: 2022-03-25
Payer: COMMERCIAL

## 2022-03-25 DIAGNOSIS — Z79.01 CURRENT USE OF LONG TERM ANTICOAGULATION: ICD-10-CM

## 2022-03-25 DIAGNOSIS — Z95.2 S/P AVR (AORTIC VALVE REPLACEMENT): ICD-10-CM

## 2022-03-25 DIAGNOSIS — I48.91 ATRIAL FIBRILLATION (H): ICD-10-CM

## 2022-03-25 DIAGNOSIS — Z98.890 S/P MVR (MITRAL VALVE REPAIR): Primary | ICD-10-CM

## 2022-03-25 LAB — INR BLD: 2.9 (ref 0.9–1.1)

## 2022-03-25 PROCEDURE — 36415 COLL VENOUS BLD VENIPUNCTURE: CPT

## 2022-03-25 PROCEDURE — 85610 PROTHROMBIN TIME: CPT

## 2022-03-25 NOTE — PROGRESS NOTES
ANTICOAGULATION MANAGEMENT     Rajani Guo 71 year old female is on warfarin with therapeutic INR result. (Goal INR 2.5-3.5)    Recent labs: (last 7 days)     03/25/22  0952   INR 2.9*       ASSESSMENT     Source(s): Chart Review and Patient/Caregiver Call     Warfarin doses taken: Warfarin taken as instructed  Diet: No new diet changes identified  New illness, injury, or hospitalization: No  Medication/supplement changes: None noted  Signs or symptoms of bleeding or clotting: No  Previous INR: Therapeutic last 2(+) visits  Additional findings: None       PLAN     Recommended plan for no diet, medication or health factor changes affecting INR     Dosing Instructions: Continue your current warfarin dose with next INR in 2 weeks       Summary  As of 3/25/2022    Full warfarin instructions:  8 mg every day   Next INR check:  4/1/2022             Telephone call with Rajani who verbalizes understanding and agrees to plan    Patient elected to schedule next visit 2 weeks    Education provided: Please call back if any changes to your diet, medications or how you've been taking warfarin    Plan made per [facility] anticoagulation protocol    Lyndsay Monique RN  Anticoagulation Clinic  3/25/2022    _______________________________________________________________________     Anticoagulation Episode Summary     Current INR goal:  2.5-3.5   TTR:  44.8 % (1 y)   Target end date:  Indefinite   Send INR reminders to:  GURVINDER SALAZAR    Indications    S/P MVR (mitral valve repair) [Z98.890]  S/P AVR (aortic valve replacement) [Z95.2]           Comments:  Pt gave verbal ok to speak with Candy on 7/20/21         Anticoagulation Care Providers     Provider Role Specialty Phone number    Quinton Handy PA Referring Cardiovascular & Thoracic Surgery [phone]    Lakeshia Rubio, FRANCESCA CNP Referring Internal Medicine [phone]    Sparkle Bird APRN CNP Referring Emergency Medicine [phone]

## 2022-03-28 ENCOUNTER — LAB (OUTPATIENT)
Dept: LAB | Facility: CLINIC | Age: 72
End: 2022-03-28

## 2022-03-28 ENCOUNTER — OFFICE VISIT (OUTPATIENT)
Dept: FAMILY MEDICINE | Facility: CLINIC | Age: 72
End: 2022-03-28
Payer: COMMERCIAL

## 2022-03-28 ENCOUNTER — TELEPHONE (OUTPATIENT)
Dept: NEUROLOGY | Facility: CLINIC | Age: 72
End: 2022-03-28

## 2022-03-28 VITALS
HEART RATE: 95 BPM | BODY MASS INDEX: 26.12 KG/M2 | OXYGEN SATURATION: 97 % | SYSTOLIC BLOOD PRESSURE: 120 MMHG | RESPIRATION RATE: 16 BRPM | WEIGHT: 153 LBS | TEMPERATURE: 99.4 F | HEIGHT: 64 IN | DIASTOLIC BLOOD PRESSURE: 69 MMHG

## 2022-03-28 DIAGNOSIS — I10 HYPERTENSION GOAL BP (BLOOD PRESSURE) < 140/90: ICD-10-CM

## 2022-03-28 DIAGNOSIS — N39.41 URGE INCONTINENCE OF URINE: ICD-10-CM

## 2022-03-28 DIAGNOSIS — R82.90 BAD ODOR OF URINE: ICD-10-CM

## 2022-03-28 DIAGNOSIS — R29.6 FALLS FREQUENTLY: Primary | ICD-10-CM

## 2022-03-28 DIAGNOSIS — R26.9 GAIT DISORDER: ICD-10-CM

## 2022-03-28 DIAGNOSIS — D64.9 ANEMIA, UNSPECIFIED TYPE: ICD-10-CM

## 2022-03-28 DIAGNOSIS — I95.1 ORTHOSTATIC HYPOTENSION: ICD-10-CM

## 2022-03-28 DIAGNOSIS — G47.00 INSOMNIA, UNSPECIFIED TYPE: ICD-10-CM

## 2022-03-28 PROBLEM — I95.9 HYPOTENSION, UNSPECIFIED HYPOTENSION TYPE: Status: ACTIVE | Noted: 2022-03-28

## 2022-03-28 LAB
ALBUMIN SERPL-MCNC: 3.6 G/DL (ref 3.4–5)
ALBUMIN UR-MCNC: ABNORMAL MG/DL
ALP SERPL-CCNC: 58 U/L (ref 40–150)
ALT SERPL W P-5'-P-CCNC: 18 U/L (ref 0–50)
ANION GAP SERPL CALCULATED.3IONS-SCNC: 7 MMOL/L (ref 3–14)
APPEARANCE UR: ABNORMAL
AST SERPL W P-5'-P-CCNC: 20 U/L (ref 0–45)
BACTERIA #/AREA URNS HPF: ABNORMAL /HPF
BILIRUB SERPL-MCNC: 0.3 MG/DL (ref 0.2–1.3)
BILIRUB UR QL STRIP: NEGATIVE
BUN SERPL-MCNC: 14 MG/DL (ref 7–30)
CALCIUM SERPL-MCNC: 8.9 MG/DL (ref 8.5–10.1)
CHLORIDE BLD-SCNC: 110 MMOL/L (ref 94–109)
CHOLEST SERPL-MCNC: 100 MG/DL
CO2 SERPL-SCNC: 23 MMOL/L (ref 20–32)
COLOR UR AUTO: YELLOW
CREAT SERPL-MCNC: 0.66 MG/DL (ref 0.52–1.04)
ERYTHROCYTE [DISTWIDTH] IN BLOOD BY AUTOMATED COUNT: 14 % (ref 10–15)
FASTING STATUS PATIENT QL REPORTED: NO
GFR SERPL CREATININE-BSD FRML MDRD: >90 ML/MIN/1.73M2
GLUCOSE BLD-MCNC: 195 MG/DL (ref 70–99)
GLUCOSE UR STRIP-MCNC: NEGATIVE MG/DL
HCT VFR BLD AUTO: 34.6 % (ref 35–47)
HDLC SERPL-MCNC: 54 MG/DL
HGB BLD-MCNC: 11 G/DL (ref 11.7–15.7)
HGB UR QL STRIP: ABNORMAL
KETONES UR STRIP-MCNC: NEGATIVE MG/DL
LDLC SERPL CALC-MCNC: 35 MG/DL
LEUKOCYTE ESTERASE UR QL STRIP: ABNORMAL
MCH RBC QN AUTO: 31.3 PG (ref 26.5–33)
MCHC RBC AUTO-ENTMCNC: 31.8 G/DL (ref 31.5–36.5)
MCV RBC AUTO: 99 FL (ref 78–100)
NITRATE UR QL: POSITIVE
NONHDLC SERPL-MCNC: 46 MG/DL
PH UR STRIP: 5.5 [PH] (ref 5–7)
PLATELET # BLD AUTO: 284 10E3/UL (ref 150–450)
POTASSIUM BLD-SCNC: 4.4 MMOL/L (ref 3.4–5.3)
PROT SERPL-MCNC: 6.5 G/DL (ref 6.8–8.8)
RBC # BLD AUTO: 3.51 10E6/UL (ref 3.8–5.2)
RBC #/AREA URNS AUTO: ABNORMAL /HPF
SODIUM SERPL-SCNC: 140 MMOL/L (ref 133–144)
SP GR UR STRIP: >=1.03 (ref 1–1.03)
SQUAMOUS #/AREA URNS AUTO: ABNORMAL /LPF
TRIGL SERPL-MCNC: 54 MG/DL
UROBILINOGEN UR STRIP-ACNC: 0.2 E.U./DL
WBC # BLD AUTO: 7.6 10E3/UL (ref 4–11)
WBC #/AREA URNS AUTO: >100 /HPF

## 2022-03-28 PROCEDURE — 81001 URINALYSIS AUTO W/SCOPE: CPT

## 2022-03-28 PROCEDURE — 80053 COMPREHEN METABOLIC PANEL: CPT | Performed by: FAMILY MEDICINE

## 2022-03-28 PROCEDURE — 36415 COLL VENOUS BLD VENIPUNCTURE: CPT | Performed by: FAMILY MEDICINE

## 2022-03-28 PROCEDURE — 80061 LIPID PANEL: CPT | Performed by: FAMILY MEDICINE

## 2022-03-28 PROCEDURE — 87086 URINE CULTURE/COLONY COUNT: CPT

## 2022-03-28 PROCEDURE — 99214 OFFICE O/P EST MOD 30 MIN: CPT | Performed by: FAMILY MEDICINE

## 2022-03-28 PROCEDURE — 85027 COMPLETE CBC AUTOMATED: CPT | Performed by: FAMILY MEDICINE

## 2022-03-28 RX ORDER — OXYBUTYNIN CHLORIDE 5 MG/1
5 TABLET ORAL 2 TIMES DAILY
Qty: 90 TABLET | Refills: 3 | Status: SHIPPED | OUTPATIENT
Start: 2022-03-28 | End: 2022-08-04

## 2022-03-28 RX ORDER — TRAZODONE HYDROCHLORIDE 50 MG/1
25 TABLET, FILM COATED ORAL AT BEDTIME
Qty: 45 TABLET | Refills: 3 | Status: SHIPPED | OUTPATIENT
Start: 2022-03-28

## 2022-03-28 RX ORDER — LOSARTAN POTASSIUM 25 MG/1
12.5 TABLET ORAL DAILY
Qty: 45 TABLET | Refills: 3 | Status: SHIPPED | OUTPATIENT
Start: 2022-03-28 | End: 2022-08-01

## 2022-03-28 ASSESSMENT — PATIENT HEALTH QUESTIONNAIRE - PHQ9
SUM OF ALL RESPONSES TO PHQ QUESTIONS 1-9: 12
SUM OF ALL RESPONSES TO PHQ QUESTIONS 1-9: 12
10. IF YOU CHECKED OFF ANY PROBLEMS, HOW DIFFICULT HAVE THESE PROBLEMS MADE IT FOR YOU TO DO YOUR WORK, TAKE CARE OF THINGS AT HOME, OR GET ALONG WITH OTHER PEOPLE: SOMEWHAT DIFFICULT

## 2022-03-28 ASSESSMENT — ANXIETY QUESTIONNAIRES
5. BEING SO RESTLESS THAT IT IS HARD TO SIT STILL: SEVERAL DAYS
3. WORRYING TOO MUCH ABOUT DIFFERENT THINGS: NEARLY EVERY DAY
1. FEELING NERVOUS, ANXIOUS, OR ON EDGE: MORE THAN HALF THE DAYS
GAD7 TOTAL SCORE: 11
7. FEELING AFRAID AS IF SOMETHING AWFUL MIGHT HAPPEN: SEVERAL DAYS
GAD7 TOTAL SCORE: 11
GAD7 TOTAL SCORE: 11
2. NOT BEING ABLE TO STOP OR CONTROL WORRYING: MORE THAN HALF THE DAYS
6. BECOMING EASILY ANNOYED OR IRRITABLE: SEVERAL DAYS
4. TROUBLE RELAXING: SEVERAL DAYS
7. FEELING AFRAID AS IF SOMETHING AWFUL MIGHT HAPPEN: SEVERAL DAYS

## 2022-03-28 ASSESSMENT — ENCOUNTER SYMPTOMS: FATIGUE: 0

## 2022-03-28 NOTE — TELEPHONE ENCOUNTER
Writer called pt to offer a sooner appt tomorrow with Dr. Alcaraz but pt does not have transportation for tomorrow's appt slot. She will continue to wait for another possible opening in the meantime.    Margareth Gaming, RNCC  Neurology/Neurosurgery/PM&R

## 2022-03-28 NOTE — PROGRESS NOTES
"  Assessment & Plan     Falls frequently  Pt has  a Orthostatic change  in BP    Orthostatics_  Laying --127/82         Sitting -- 115/76  Standing -- 100/63      Advised decrease cozaar  Follow up 1 month or sooner if any other falls  Always use walker  Advised PT eval  - CBC with platelets; Future  - Comprehensive metabolic panel (BMP + Alb, Alk Phos, ALT, AST, Total. Bili, TP); Future  - Comprehensive metabolic panel (BMP + Alb, Alk Phos, ALT, AST, Total. Bili, TP)  - CBC with platelets    Orthostatic hypotension  As above    Bad odor of urine  Pending   - UA Macro with Reflex to Micro and Culture - lab collect; Future  - UA reflex to Microscopic and Culture; Future    Hypertension goal BP (blood pressure) < 140/90  As abobe  - losartan (COZAAR) 25 MG tablet; Take 0.5 tablets (12.5 mg) by mouth daily  - Lipid panel reflex to direct LDL Non-fasting; Future  - Lipid panel reflex to direct LDL Non-fasting    Urge incontinence of urine  decrease  - oxybutynin (DITROPAN) 5 MG tablet; Take 1 tablet (5 mg) by mouth 2 times daily    Insomnia, unspecified type  Decrease   - traZODone (DESYREL) 50 MG tablet; Take 0.5 tablets (25 mg) by mouth At Bedtime    Gait disorder  Advised   Use walker at all Times to Prevent falls  - Physical Therapy Referral; Future  BMI:   Estimated body mass index is 26.26 kg/m  as calculated from the following:    Height as of this encounter: 1.626 m (5' 4\").    Weight as of this encounter: 69.4 kg (153 lb).           Return in about 1 month (around 4/28/2022) for recheck/ sooner if worse or New symptoms.    Tamiko Coley MD  [facility] MARIE Gerard is a 71 year old who presents for the following health issues     Fatigue  Pertinent negatives include no fatigue.   Fall  Pertinent negatives include no fatigue.   History of Present Illness       Mental Health Follow-up:  Patient presents to follow-up on Depression & Anxiety.Patient's depression since last visit has " been:  Medium  The patient is having other symptoms associated with depression.  Patient's anxiety since last visit has been:  Medium  The patient is having other symptoms associated with anxiety.    Patient is not feeling anxious or having panic attacks.  Patient has no concerns about alcohol or drug use.       Today's PHQ-9         PHQ-9 Total Score: 12  PHQ-9 Q9 Thoughts of better off dead/self-harm past 2 weeks :   (P) Not at all    How difficult have these problems made it for you to do your work, take care of things at home, or get along with other people: Somewhat difficult    Today's ARACELI-7 Score: 11    Hypertension: She presents for follow up of hypertension.  She regularly outside of the clinic. Outpatient blood pressures have not been over 140/90. She does not follow a low salt diet.     Vascular Disease:  She presents for follow up of vascular disease.  She never takes nitroglycerin.     She eats 2-3 servings of fruits and vegetables daily.She consumes 1 sweetened beverage(s) daily.She exercises with enough effort to increase her heart rate 20 to 29 minutes per day.  She exercises with enough effort to increase her heart rate 3 or less days per week.   She is taking medications regularly.  pt has been falling ones a week  Has not been using her walker or cane as recommended   Middle of cub Food-was in the freezer section and fell  Slipped o Ice x 1  Third one was when she bent down  To  something  Has been ones a week    No dizziness  No syncope  No seizures  No chest pain  No sob  As far as Depression -Pt plays games Like [employer]   She sleeps well  Says she is Bored at Home  If given a chance she would Like To do stuff  Has Financial problems and taking to a   This Limits what she can do   Daughter Lives with her  No Other Neuro symptoms      Review of Systems   Constitutional: Negative for fatigue.   Rest of the ROS is Negative except see above and Problem list  "[stable]    Exam    /69   Pulse 95   Temp 99.4  F (37.4  C) (Oral)   Resp 16   Ht 1.626 m (5' 4\")   Wt 69.4 kg (153 lb)   SpO2 97%   BMI 26.26 kg/m    Body mass index is 26.26 kg/m .  Physical Exam   GENERAL: healthy, alert and no distress  EYES: Eyes grossly normal to inspection, PERRL and conjunctivae and sclerae normal  NECK: no adenopathy, no asymmetry, masses, or scars and thyroid normal to palpation  RESP: lungs clear to auscultation - no rales, rhonchi or wheezes  CV: regular rate and rhythm, normal S1 S2, no S3 or S4, no murmur, click or rub, no peripheral edema and peripheral pulses strong  ABDOMEN: soft, nontender, no hepatosplenomegaly, no masses and bowel sounds normal  MS: no gross musculoskeletal defects noted, no edema  SKIN: no suspicious lesions or rashes  NEURO: Normal strength and tone, mentation intact and speech normal  PSYCH: mentation appears normal    Lab on 03/25/2022   Component Date Value Ref Range Status     INR 03/25/2022 2.9 (A) 0.9 - 1.1 Final      Labs pending           "

## 2022-03-29 DIAGNOSIS — Z87.440 PERSONAL HISTORY OF URINARY TRACT INFECTION: Primary | ICD-10-CM

## 2022-03-29 RX ORDER — NITROFURANTOIN 25; 75 MG/1; MG/1
100 CAPSULE ORAL 2 TIMES DAILY
Qty: 10 CAPSULE | Refills: 0 | Status: SHIPPED | OUTPATIENT
Start: 2022-03-29 | End: 2022-04-03

## 2022-03-29 ASSESSMENT — PATIENT HEALTH QUESTIONNAIRE - PHQ9: SUM OF ALL RESPONSES TO PHQ QUESTIONS 1-9: 12

## 2022-03-29 ASSESSMENT — ANXIETY QUESTIONNAIRES: GAD7 TOTAL SCORE: 11

## 2022-03-31 LAB — BACTERIA UR CULT: NORMAL

## 2022-04-05 ENCOUNTER — TELEPHONE (OUTPATIENT)
Dept: FAMILY MEDICINE | Facility: CLINIC | Age: 72
End: 2022-04-05

## 2022-04-05 ENCOUNTER — LAB (OUTPATIENT)
Dept: LAB | Facility: CLINIC | Age: 72
End: 2022-04-05
Payer: COMMERCIAL

## 2022-04-05 DIAGNOSIS — D64.9 ANEMIA, UNSPECIFIED TYPE: ICD-10-CM

## 2022-04-05 DIAGNOSIS — R30.0 DYSURIA: ICD-10-CM

## 2022-04-05 LAB
ALBUMIN UR-MCNC: NEGATIVE MG/DL
APPEARANCE UR: ABNORMAL
BACTERIA #/AREA URNS HPF: ABNORMAL /HPF
BILIRUB UR QL STRIP: NEGATIVE
COLOR UR AUTO: YELLOW
ERYTHROCYTE [DISTWIDTH] IN BLOOD BY AUTOMATED COUNT: 14.2 % (ref 10–15)
GLUCOSE UR STRIP-MCNC: NEGATIVE MG/DL
HCT VFR BLD AUTO: 37.2 % (ref 35–47)
HGB BLD-MCNC: 11.9 G/DL (ref 11.7–15.7)
HGB UR QL STRIP: NEGATIVE
HOLD SPECIMEN: NORMAL
KETONES UR STRIP-MCNC: NEGATIVE MG/DL
LEUKOCYTE ESTERASE UR QL STRIP: ABNORMAL
MCH RBC QN AUTO: 31.4 PG (ref 26.5–33)
MCHC RBC AUTO-ENTMCNC: 32 G/DL (ref 31.5–36.5)
MCV RBC AUTO: 98 FL (ref 78–100)
NITRATE UR QL: POSITIVE
PH UR STRIP: 6 [PH] (ref 5–7)
PLATELET # BLD AUTO: 275 10E3/UL (ref 150–450)
RBC # BLD AUTO: 3.79 10E6/UL (ref 3.8–5.2)
RBC #/AREA URNS AUTO: ABNORMAL /HPF
SP GR UR STRIP: 1.01 (ref 1–1.03)
SQUAMOUS #/AREA URNS AUTO: ABNORMAL /LPF
UROBILINOGEN UR STRIP-ACNC: 0.2 E.U./DL
WBC # BLD AUTO: 9.1 10E3/UL (ref 4–11)
WBC #/AREA URNS AUTO: ABNORMAL /HPF

## 2022-04-05 PROCEDURE — 81001 URINALYSIS AUTO W/SCOPE: CPT

## 2022-04-05 PROCEDURE — 87086 URINE CULTURE/COLONY COUNT: CPT

## 2022-04-05 PROCEDURE — 85027 COMPLETE CBC AUTOMATED: CPT

## 2022-04-05 PROCEDURE — 87088 URINE BACTERIA CULTURE: CPT

## 2022-04-05 PROCEDURE — 87186 SC STD MICRODIL/AGAR DIL: CPT

## 2022-04-05 PROCEDURE — 36415 COLL VENOUS BLD VENIPUNCTURE: CPT

## 2022-04-05 NOTE — TELEPHONE ENCOUNTER
Insulin-Treated Diabetes Mellitus Report form received and given to Dr. Coley to complete and sign.    Maame Michaels,

## 2022-04-05 NOTE — TELEPHONE ENCOUNTER
Reason for Call:  Form, our goal is to have forms completed with 72 hours, however, some forms may require a visit or additional information.    Type of letter, form or note:  handicap    Who is the form from?: Patient    Where did the form come from: Patient or family brought in       What clinic location was the form placed at?: [facility]    Where the form was placed: 's box  Box/Folder    What number is listed as a contact on the form?: [phone]       Additional comments: Please call patient when is ready to  .    Call taken on 4/5/2022 at 10:10 AM by Binta Hoff

## 2022-04-07 LAB
BACTERIA UR CULT: ABNORMAL
BACTERIA UR CULT: ABNORMAL

## 2022-04-08 ENCOUNTER — LAB (OUTPATIENT)
Dept: LAB | Facility: CLINIC | Age: 72
End: 2022-04-08
Payer: COMMERCIAL

## 2022-04-08 ENCOUNTER — ANTICOAGULATION THERAPY VISIT (OUTPATIENT)
Dept: ANTICOAGULATION | Facility: CLINIC | Age: 72
End: 2022-04-08

## 2022-04-08 DIAGNOSIS — Z79.01 CURRENT USE OF LONG TERM ANTICOAGULATION: ICD-10-CM

## 2022-04-08 DIAGNOSIS — I48.91 ATRIAL FIBRILLATION (H): ICD-10-CM

## 2022-04-08 DIAGNOSIS — Z95.2 S/P AVR (AORTIC VALVE REPLACEMENT): ICD-10-CM

## 2022-04-08 DIAGNOSIS — Z98.890 S/P MVR (MITRAL VALVE REPAIR): Primary | ICD-10-CM

## 2022-04-08 LAB — INR BLD: 1.8 (ref 0.9–1.1)

## 2022-04-08 PROCEDURE — 85610 PROTHROMBIN TIME: CPT

## 2022-04-08 PROCEDURE — 36415 COLL VENOUS BLD VENIPUNCTURE: CPT

## 2022-04-08 NOTE — TELEPHONE ENCOUNTER
"Form returned from Dr. Coley with note:      \"Patient does not drive due to memory issues.  Needs an okay from neurologist\".    Patient called and informed.  Form mailed back to patient.    Maame Michaels,     "

## 2022-04-08 NOTE — PROGRESS NOTES
ANTICOAGULATION MANAGEMENT     Rajani Guo 71 year old female is on warfarin with subtherapeutic INR result. (Goal INR 2.5-3.5)    Recent labs: (last 7 days)     04/08/22  0912   INR 1.8*       ASSESSMENT     Source(s): Chart Review and Patient/Caregiver Call     Warfarin doses taken: Warfarin taken as instructed  Diet: No new diet changes identified  New illness, injury, or hospitalization: Yes: Fell last night  Medication/supplement changes: Macrobid for UTI on 3/29   Dose decrease on oxybutynin and cozaar  Signs or symptoms of bleeding or clotting: No  Previous INR: Therapeutic last visit; previously outside of goal range  Additional findings: None       PLAN     Recommended plan for no diet, medication or health factor changes affecting INR     Dosing Instructions: booster dose then continue your current warfarin dose with next INR in 1 week       Summary  As of 4/8/2022    Full warfarin instructions:  4/8: 12 mg; Otherwise 8 mg every day   Next INR check:  4/15/2022             Telephone call with  Sarai who verbalizes understanding and agrees to plan    Lab visit scheduled    Education provided: Goal range and significance of current result    Plan made per [facility] anticoagulation protocol    Lesley Ortega RN  Anticoagulation Clinic  4/8/2022    _______________________________________________________________________     Anticoagulation Episode Summary     Current INR goal:  2.5-3.5   TTR:  45.2 % (1 y)   Target end date:  Indefinite   Send INR reminders to:  GURVINDER SALAZAR    Indications    S/P MVR (mitral valve repair) [Z98.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

## 2022-04-12 ENCOUNTER — THERAPY VISIT (OUTPATIENT)
Dept: PHYSICAL THERAPY | Facility: CLINIC | Age: 72
End: 2022-04-12
Payer: COMMERCIAL

## 2022-04-12 DIAGNOSIS — R26.89 BALANCE PROBLEMS: ICD-10-CM

## 2022-04-12 DIAGNOSIS — R26.9 GAIT DISORDER: ICD-10-CM

## 2022-04-12 PROCEDURE — 97161 PT EVAL LOW COMPLEX 20 MIN: CPT | Mod: GP | Performed by: PHYSICAL THERAPIST

## 2022-04-12 PROCEDURE — 97110 THERAPEUTIC EXERCISES: CPT | Mod: GP | Performed by: PHYSICAL THERAPIST

## 2022-04-12 NOTE — PROVIDER NOTIFICATION
[facility]    OUTPATIENT Physical Therapy ORTHOPEDIC EVALUATION  PLAN OF TREATMENT FOR OUTPATIENT REHABILITATION  (COMPLETE FOR INITIAL CLAIMS ONLY)  Patient's Last Name, First Name, M.I.  YOB: 1950  Rajani Guo    Provider s Name:  [facility]   Medical Record No.  [id number]   Start of Care Date:  04/12/22   Onset Date:   01/01/21   Type:     _X__PT   ___OT Medical Diagnosis:    Encounter Diagnosis   Name Primary?    Gait disorder         Treatment Diagnosis:  Lower extremity weakness / Balance deficits        Goals:     04/12/22 0500   Body Part   Goals listed below are for Gait   Goal #1   Goal #1 ambulation   Previous Functional Level No restrictions   Current Functional Level Minutes patient can walk   Performance Level 10 with walker   STG Target Performance Minutes patient will be able to walk   Performance Level 10 with single point cane   Rationale for safe community ambulation   Due Date 05/03/22    LTG Target Performance Minutes patient will be able to  walk   Performance Level 15 with single point cane   Rationale for safe community ambulation   Due Date 06/07/22   Goal #2   Goal #2 balance   Previous Functional Level No issues    Current Functional Level Gregory Balance test score   Performance level 42   STG Target Performance Increase;Gregory Balance score to   Performance level 46   Rationale for safe community ambulation   Due date 05/03/22   LTG Target Performance Increase;Gregory Balance score to   Performance Level 47 or higher   Rationale for safe community ambulation   Due date 06/07/22       Therapy Frequency:   1 time every 2 weeks  Predicted Duration of Therapy Intervention:   8 weeks    Connor Shelton, PT                 I CERTIFY THE NEED FOR THESE SERVICES FURNISHED UNDER        THIS PLAN OF TREATMENT AND WHILE UNDER MY CARE     (Physician  attestation of this document indicates review and certification of the therapy plan).                     Certification Date From:  04/12/22   Certification Date To:  06/07/22    Referring Provider:  Tamiko Coley    Initial Assessment        See Epic Evaluation SOC Date: 04/12/22

## 2022-04-12 NOTE — PROGRESS NOTES
Physical Therapy Initial Evaluation  Subjective:  The history is provided by the patient and a relative.   Therapist Generated HPI Evaluation  Problem details: Has had balance issues since getting out of the hospital for a heart valve replacement in January of 2021. Fell when slipped on ice and step. Also tripped on the single step going from family room to kitchen. Not using a cane or walker very much. Does have a 4WW at home. Doesn't think that she needs a walker or cane. After has a UTI, balance will be affected..                                    Patient Health History    Problem began: 1/1/2021.   Problem occurred: Surgery   Pain is reported as 3/10 on pain scale.  General health as reported by patient is fair.  Pertinent medical history includes: concussions/dizziness, diabetes, depression, heart problems, high blood pressure, implanted device, incontinence, menopausal, osteoporosis and smoking.   Red flags:  Changes in bowel and bladder habits and pain at rest/night.     Surgeries include:  Heart surgery. Other surgery history details: Dual valve replacement.    Current medications:  Anti-depressants, high blood pressure medication, pain medication, cardiac medication and sleep medication.                                           Objective:    Gait:  Uses FWW without loss of balance. Step through gait pattern. Able to ambulate without walker with mild loss of balance. Able to self correct.                   Lumbar/SI Evaluation    Lumbar Myotomes:  Lumbar myotomes: Hip adduction - 4/5 B / Hip abduction - 4-/5 B / Hip extension - 4-/5 B.  T12-L3 (Hip Flex):  Left: 4-    Right: 4-  L2-4 (Quads):  Left:  4-    Right:  4-  L4 (Ankle DF):  Left:  4    Right:  4    S1 (Toe Raise):  Left: 4    Right: 4  Lumbar DTR's:  Lumbar dtr's: Gregory - 42/56.                                                                 General     ROS    Assessment/Plan:    Patient is a 71 year old female with Balance complaints.    Patient  has the following significant findings with corresponding treatment plan.                Diagnosis 1:  Balance deficits  Decreased ROM/flexibility - manual therapy, therapeutic exercise, therapeutic activity and home program  Decreased joint mobility - manual therapy, therapeutic exercise, therapeutic activity and home program  Decreased strength - therapeutic exercise, therapeutic activities and home program  Impaired balance - neuro re-education, gait training, therapeutic activities, adaptive equipment/assistive device and home program  Impaired gait - gait training, assistive devices and home program  Decreased function - therapeutic activities, home program and functional performance testing    Therapy Evaluation Codes:     Cumulative Therapy Evaluation is: Low complexity.    Previous and current functional limitations:  (See Goal Flow Sheet for this information)    Short term and Long term goals: (See Goal Flow Sheet for this information)     Communication ability:  Patient appears to be able to clearly communicate and understand verbal and written communication and follow directions correctly.  Treatment Explanation - The following has been discussed with the patient:   RX ordered/plan of care  Anticipated outcomes  Possible risks and side effects  This patient would benefit from PT intervention to resume normal activities.   Rehab potential is good.    Frequency:  1 X every 2 weeks, once daily  Duration:  for 8 weeks  Discharge Plan:  Achieve all LTG.  Independent in home treatment program.  Reach maximal therapeutic benefit.    Please refer to the daily flowsheet for treatment today, total treatment time and time spent performing 1:1 timed codes.

## 2022-04-15 ENCOUNTER — ANTICOAGULATION THERAPY VISIT (OUTPATIENT)
Dept: ANTICOAGULATION | Facility: CLINIC | Age: 72
End: 2022-04-15

## 2022-04-15 ENCOUNTER — LAB (OUTPATIENT)
Dept: LAB | Facility: CLINIC | Age: 72
End: 2022-04-15
Payer: COMMERCIAL

## 2022-04-15 DIAGNOSIS — Z98.890 S/P MVR (MITRAL VALVE REPAIR): Primary | ICD-10-CM

## 2022-04-15 DIAGNOSIS — Z95.2 S/P AVR (AORTIC VALVE REPLACEMENT): ICD-10-CM

## 2022-04-15 DIAGNOSIS — Z79.01 CURRENT USE OF LONG TERM ANTICOAGULATION: ICD-10-CM

## 2022-04-15 DIAGNOSIS — I48.91 ATRIAL FIBRILLATION (H): ICD-10-CM

## 2022-04-15 LAB — INR BLD: 1.6 (ref 0.9–1.1)

## 2022-04-15 PROCEDURE — 36415 COLL VENOUS BLD VENIPUNCTURE: CPT

## 2022-04-15 PROCEDURE — 85610 PROTHROMBIN TIME: CPT

## 2022-04-15 NOTE — PROGRESS NOTES
ANTICOAGULATION MANAGEMENT     Rajani Guo 71 year old female is on warfarin with subtherapeutic INR result. (Goal INR 2.5-3.5)    Recent labs: (last 7 days)     04/15/22  1320   INR 1.6*       ASSESSMENT     Source(s): Chart Review and Patient/Caregiver Call     Warfarin doses taken: Warfarin taken as instructed  Diet: No new diet changes identified  New illness, injury, or hospitalization: No  Medication/supplement changes: None noted  Signs or symptoms of bleeding or clotting: No  Previous INR: Subtherapeutic  Additional findings: None       PLAN     Recommended plan for no diet, medication or health factor changes affecting INR     Dosing Instructions: booster dose then Increase your warfarin dose (14.3% change) with next INR in 1 week       Summary  As of 4/15/2022    Full warfarin instructions:  4/15: 12 mg; Otherwise 8 mg every Mon, Wed, Fri; 10 mg all other days   Next INR check:  4/22/2022             Telephone call with Rajani who verbalizes understanding and agrees to plan    Lab visit scheduled    Education provided: Goal range and significance of current result    Plan made per [facility] anticoagulation protocol    Lesley Ortega RN  Anticoagulation Clinic  4/15/2022    _______________________________________________________________________     Anticoagulation Episode Summary     Current INR goal:  2.5-3.5   TTR:  43.8 % (1 y)   Target end date:  Indefinite   Send INR reminders to:  GURVINDER SALAZAR    Indications    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

## 2022-04-18 ENCOUNTER — TELEPHONE (OUTPATIENT)
Dept: FAMILY MEDICINE | Facility: CLINIC | Age: 72
End: 2022-04-18
Payer: COMMERCIAL

## 2022-04-18 DIAGNOSIS — F41.9 ANXIETY: ICD-10-CM

## 2022-04-18 DIAGNOSIS — Z95.2 S/P AVR (AORTIC VALVE REPLACEMENT): ICD-10-CM

## 2022-04-18 NOTE — TELEPHONE ENCOUNTER
Reason for Call:  Other call back    Detailed comments: Ailin called her mom missed 4 days last week of Methenamine and she is wondering if that is why her INR was low. Should she change the doses of her medications. Please call if doesn't answer leave a message.    Phone Number Patient can be reached at: Other phone number:  [phone]    Best Time: Anytime    Can we leave a detailed message on this number? YES    Call taken on 4/18/2022 at 4:06 PM by Mayda Jaen Baptiste

## 2022-04-18 NOTE — TELEPHONE ENCOUNTER
According to [employer] and Up to Date, there is no interaction between the methenamine and warfarin.  Ailin notified and will recheck INR on 4/22 as planned.    Gretchen Barton RN  [facility]

## 2022-04-19 DIAGNOSIS — F33.0 MAJOR DEPRESSIVE DISORDER, RECURRENT EPISODE, MILD (H): ICD-10-CM

## 2022-04-19 RX ORDER — SERTRALINE HYDROCHLORIDE 100 MG/1
100 TABLET, FILM COATED ORAL DAILY
Qty: 30 TABLET | Refills: 3 | Status: SHIPPED | OUTPATIENT
Start: 2022-04-19 | End: 2022-05-06

## 2022-04-19 NOTE — TELEPHONE ENCOUNTER
Date of Last Office Visit: 1/13/22   Date of Next Office Visit: 5/6/22  No shows since last visit: none  Cancellations since last visit: none    Medication requested: Sertraline 100 mg Date last ordered: 1/13/22 Qty: 30 Refills: 3  sertraline (ZOLOFT) 100 MG tablet 30 tablet 3 1/13/2022  --   Sig - Route: Take 1 tablet (100 mg) by mouth daily - Oral   Sent to pharmacy as: Sertraline HCl 100 MG Oral Tablet (ZOLOFT)   Class: E-Prescribe   Order: [id number]   E-Prescribing Status: Receipt confirmed by pharmacy (1/13/2022  9:50 AM CST          Review of [place] ?: na      Lapse in medication adherence greater than 5 days?: no  If yes, call patient and gather details: no  Medication refill request verified as identical to current order?: yes.    Result of Last DAM, VPA, Li+ Level, CBC, or Carbamazepine Level (at or since last visit): N/A    Last visit treatment plan:   Treatment Plan:          1.  Increase sertraline to 100 mg daily    2.  Continue Wellbutrin  mg twice daily    3.  Continue trazodone 50 to 100 mg at bedtime    4.  A [employer] mobility application will be mailed to you to consider as a transportation option for you         Continue all other medications as reviewed per electronic medical record today.     Safety plan reviewed. To the Emergency Department as needed or call after hours crisis line at [phone] or [phone]. [place] Crisis Text Line. Text [place] to 610281 or Suicide LifeLine Chat: suicidepreventionlifeline.org/chat/    To schedule individual or family therapy, call [facility] at [phone].    Schedule an appointment with me in 2 months or sooner as needed. Call [facility] at [phone] to schedule.    Follow up with primary care provider as planned or for acute medical concerns.    Call the psychiatric nurse line with medication questions or concerns at [phone].    MyChart may be used to communicate with your provider, but this is not intended  to be used for emergencies.      []Medication refilled per  Medication Refill in Ambulatory Care  policy.  [x]Medication unable to be refilled by RN due to criteria not met as indicated below:    []Eligibility - not seen in the last year   []Supervision - no future appointment   []Compliance - no shows, cancellations or lapse in therapy   []Verification - order discrepancy   []Controlled medication   [x]Medication not included in policy   []90-day supply request   []Other

## 2022-04-20 RX ORDER — WARFARIN SODIUM 2 MG/1
TABLET ORAL
Qty: 350 TABLET | Refills: 0 | Status: SHIPPED | OUTPATIENT
Start: 2022-04-20 | End: 2022-06-01

## 2022-04-20 NOTE — TELEPHONE ENCOUNTER
"Routing refill request to provider for review/approval because:  Drug citalopram (CELEXA) not active on patient's medication list  Citalopram was removed from med list on 1/3/22      Requested Prescriptions   Pending Prescriptions Disp Refills     citalopram (CELEXA) 20 MG tablet [Pharmacy Med Name: CITALOPRAM HBR 20 MG TABLET] 30 tablet 0     Sig: TAKE 1 TABLET BY MOUTH EVERY DAY       SSRIs Protocol Failed - 4/18/2022  6:21 PM        Failed - Medication is active on med list        Passed - Recent (12 mo) or future (30 days) visit within the authorizing provider's specialty     Patient has had an office visit with the authorizing provider or a provider within the authorizing providers department within the previous 12 mos or has a future within next 30 days. See \"Patient Info\" tab in inbasket, or \"Choose Columns\" in Meds & Orders section of the refill encounter.              Passed - Patient is age 18 or older        Passed - No active pregnancy on record        Passed - No positive pregnancy test in last 12 months         Signed Prescriptions Disp Refills    warfarin ANTICOAGULANT (COUMADIN) 2 MG tablet 350 tablet 0     Sig: TAKE 5 tabs (10mg) BY MOUTH on Sat, Sun, Tues, and Thurs and take 4 tabs (8mg) on Mon, Wed, and Fri or as directed BY ANTICOAGULATION CLINIC       Vitamin K Antagonists Failed - 4/18/2022  6:21 PM        Failed - INR is within goal in the past 6 weeks     Confirm INR is within goal in the past 6 weeks.     Recent Labs   Lab Test 04/15/22  1320   INR 1.6*                       Passed - Recent (12 mo) or future (30 days) visit within the authorizing provider's specialty     Patient has had an office visit with the authorizing provider or a provider within the authorizing providers department within the previous 12 mos or has a future within next 30 days. See \"Patient Info\" tab in inbasket, or \"Choose Columns\" in Meds & Orders section of the refill encounter.              Passed - Medication is " active on med list        Passed - Patient is 18 years of age or older        Passed - Patient is not pregnant        Passed - No positive pregnancy on file in past 12 months           Pat Machado RN

## 2022-04-20 NOTE — TELEPHONE ENCOUNTER
Looks like she is on alternative medication.  Please follow up with pt and pharmacy.    Dorie Estrada PA-C

## 2022-04-22 ENCOUNTER — LAB (OUTPATIENT)
Dept: LAB | Facility: CLINIC | Age: 72
End: 2022-04-22
Payer: COMMERCIAL

## 2022-04-22 ENCOUNTER — ANTICOAGULATION THERAPY VISIT (OUTPATIENT)
Dept: ANTICOAGULATION | Facility: CLINIC | Age: 72
End: 2022-04-22

## 2022-04-22 DIAGNOSIS — Z95.2 S/P AVR (AORTIC VALVE REPLACEMENT): ICD-10-CM

## 2022-04-22 DIAGNOSIS — Z79.01 CURRENT USE OF LONG TERM ANTICOAGULATION: ICD-10-CM

## 2022-04-22 DIAGNOSIS — I48.91 ATRIAL FIBRILLATION (H): ICD-10-CM

## 2022-04-22 DIAGNOSIS — Z98.890 S/P MVR (MITRAL VALVE REPAIR): Primary | ICD-10-CM

## 2022-04-22 LAB — INR BLD: 2.4 (ref 0.9–1.1)

## 2022-04-22 PROCEDURE — 85610 PROTHROMBIN TIME: CPT

## 2022-04-22 PROCEDURE — 36415 COLL VENOUS BLD VENIPUNCTURE: CPT

## 2022-04-22 RX ORDER — CITALOPRAM HYDROBROMIDE 20 MG/1
TABLET ORAL
Qty: 30 TABLET | Refills: 0 | OUTPATIENT
Start: 2022-04-22

## 2022-04-22 NOTE — TELEPHONE ENCOUNTER
Spoke with patient and she is not taking the citalopram.   [FreeTextEntry1] : Dr Freeman reviewed MRI with patient and wife, patient seems to have had a MM relapse\par The importance of continued oncological f/u and treatment regimen compliance discussed\par NO  surgical intervention discussed due to patients interim health status change\par CONTINUED  cervical collar compliance\par F/U in the office in 12 months sooner if worsening  or new symptoms.\par \par ALSO SEE ADDENDUM\par

## 2022-04-22 NOTE — PROGRESS NOTES
ANTICOAGULATION MANAGEMENT     Rajani Guo 71 year old female is on warfarin with subtherapeutic INR result. (Goal INR 2.5-3.5)    Recent labs: (last 7 days)     04/22/22  1208   INR 2.4*       ASSESSMENT     Source(s): Chart Review and Patient/Caregiver Call     Warfarin doses taken: Warfarin taken as instructed  Diet: getting meals on wheels  New illness, injury, or hospitalization: No  Medication/supplement changes: None noted  Signs or symptoms of bleeding or clotting: No  Previous INR: Subtherapeutic  Additional findings: None       PLAN     Recommended plan for no diet, medication or health factor changes affecting INR     Dosing Instructions: continue your current warfarin dose with next INR in 1 week       Summary  As of 4/22/2022    Full warfarin instructions:  10 mg every day   Next INR check:  4/29/2022             Telephone call with  Sarai who verbalizes understanding and agrees to plan    Lab visit scheduled    Education provided: Please call back if any changes to your diet, medications or how you've been taking warfarin and Goal range and significance of current result    Plan made per [facility] anticoagulation protocol    Lesley Ortega RN  Anticoagulation Clinic  4/22/2022    _______________________________________________________________________     Anticoagulation Episode Summary     Current INR goal:  2.5-3.5   TTR:  42.4 % (1 y)   Target end date:  Indefinite   Send INR reminders to:  GURVINDER SALAZAR    Indications    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

## 2022-04-29 ENCOUNTER — LAB (OUTPATIENT)
Dept: LAB | Facility: CLINIC | Age: 72
End: 2022-04-29
Payer: COMMERCIAL

## 2022-04-29 ENCOUNTER — ANTICOAGULATION THERAPY VISIT (OUTPATIENT)
Dept: ANTICOAGULATION | Facility: CLINIC | Age: 72
End: 2022-04-29

## 2022-04-29 ENCOUNTER — THERAPY VISIT (OUTPATIENT)
Dept: PHYSICAL THERAPY | Facility: CLINIC | Age: 72
End: 2022-04-29
Payer: COMMERCIAL

## 2022-04-29 DIAGNOSIS — I48.91 ATRIAL FIBRILLATION (H): ICD-10-CM

## 2022-04-29 DIAGNOSIS — Z98.890 S/P MVR (MITRAL VALVE REPAIR): Primary | ICD-10-CM

## 2022-04-29 DIAGNOSIS — Z95.2 S/P AVR (AORTIC VALVE REPLACEMENT): ICD-10-CM

## 2022-04-29 DIAGNOSIS — R26.89 BALANCE PROBLEMS: Primary | ICD-10-CM

## 2022-04-29 DIAGNOSIS — Z79.01 CURRENT USE OF LONG TERM ANTICOAGULATION: ICD-10-CM

## 2022-04-29 LAB — INR BLD: 3.4 (ref 0.9–1.1)

## 2022-04-29 PROCEDURE — 97110 THERAPEUTIC EXERCISES: CPT | Mod: GP | Performed by: PHYSICAL THERAPIST

## 2022-04-29 PROCEDURE — 85610 PROTHROMBIN TIME: CPT

## 2022-04-29 PROCEDURE — 97112 NEUROMUSCULAR REEDUCATION: CPT | Mod: GP | Performed by: PHYSICAL THERAPIST

## 2022-04-29 PROCEDURE — 36416 COLLJ CAPILLARY BLOOD SPEC: CPT

## 2022-04-29 NOTE — PROGRESS NOTES
ANTICOAGULATION MANAGEMENT     Rajani Guo 71 year old female is on warfarin with therapeutic INR result. (Goal INR 2.5-3.5)    Recent labs: (last 7 days)     04/29/22  1142   INR 3.4*       ASSESSMENT     Source(s): Chart Review and Patient/Caregiver Call     Warfarin doses taken: Warfarin taken as instructed  Diet: No new diet changes identified  New illness, injury, or hospitalization: No  Medication/supplement changes: None noted  Signs or symptoms of bleeding or clotting: No  Previous INR: Subtherapeutic  Additional findings: None       PLAN     Recommended plan for no diet, medication or health factor changes affecting INR     Dosing Instructions: continue your current warfarin dose with next INR in 2 weeks       Summary  As of 4/29/2022    Full warfarin instructions:  10 mg every day   Next INR check:  5/13/2022             Telephone call with  Sarai who verbalizes understanding and agrees to plan and who agrees to plan and repeated back plan correctly    Lab visit scheduled    Education provided: Goal range and significance of current result and Contact [phone]  with any changes, questions or concerns.     Plan made per [facility] anticoagulation protocol    Helene Ding RN  Anticoagulation Clinic  4/29/2022    _______________________________________________________________________     Anticoagulation Episode Summary     Current INR goal:  2.5-3.5   TTR:  43.0 % (1 y)   Target end date:  Indefinite   Send INR reminders to:  GURVINDER SALAZAR    Indications    S/P MVR (mitral valve repair) [Z98.890]  S/P AVR (aortic valve replacement) [Z95.2]           Comments:  Pt gave verbal ok to speak with Sarai on 7/20/21         Anticoagulation Care Providers     Provider Role Specialty Phone number    Quinton Handy PA Referring Cardiovascular & Thoracic Surgery [phone]    Lakeshia Rubio, FRANCESCA CNP Referring Internal Medicine [phone]    Sparkle Bird, APRN CNP Referring Emergency  Medicine [phone]

## 2022-05-02 ENCOUNTER — TELEPHONE (OUTPATIENT)
Dept: FAMILY MEDICINE | Facility: CLINIC | Age: 72
End: 2022-05-02
Payer: COMMERCIAL

## 2022-05-02 DIAGNOSIS — R30.0 DYSURIA: Primary | ICD-10-CM

## 2022-05-02 NOTE — TELEPHONE ENCOUNTER
Patient calling about uti symptoms. Had UA 4/5,  Was treated with Macrobid 3/29/22. Symptom improved but never went away. Over the last 2 weeks odor of urine has been worsening. No fever or flank pain.     Please advise-is another round of antibiotics needed?    Staci BACH RN  [facility]

## 2022-05-03 ENCOUNTER — OFFICE VISIT (OUTPATIENT)
Dept: NEUROLOGY | Facility: CLINIC | Age: 72
End: 2022-05-03
Payer: COMMERCIAL

## 2022-05-03 VITALS
HEART RATE: 88 BPM | DIASTOLIC BLOOD PRESSURE: 76 MMHG | SYSTOLIC BLOOD PRESSURE: 140 MMHG | BODY MASS INDEX: 25.85 KG/M2 | WEIGHT: 150.6 LBS

## 2022-05-03 DIAGNOSIS — R41.3 MEMORY CHANGE: Primary | ICD-10-CM

## 2022-05-03 PROCEDURE — 99214 OFFICE O/P EST MOD 30 MIN: CPT | Performed by: INTERNAL MEDICINE

## 2022-05-03 NOTE — PATIENT INSTRUCTIONS
To schedule an appointment with Yovany Schuler please call [phone] or email us at [email or web address].

## 2022-05-03 NOTE — PROGRESS NOTES
[facility] Neurology Follow Up Visit    Rajani Guo MRN# [id number]   Age: 70 year old YOB: 1950     Brief history of symptoms: The patient was initially seen in neurologic consultation on 3/2/21 for evaluation of imbalance. Please see the comprehensive neurologic consultation note from that date in the Epic records for details.     Interval history:   She is still depressed. She is following with psychiatry.     She is still having short term memory problems. She forgets where she puts things and forgets details of conversation.     Patient was seen in OT. Based off of pre-driving screen, there were concerns about safety of driving.       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)     Anemia, iron deficiency     Status post coronary angiogram     S/P MVR (mitral valve repair)     S/P AVR (aortic valve replacement)     UTI (urinary tract infection)     Trigger middle finger of right hand     Personal history of colonic polyps     Sensorineural hearing loss, bilateral     COPD (chronic obstructive pulmonary disease) (H)     History of CT scan of head 2021     Frailty     Atrial fibrillation (H)     Current use of long term anticoagulation     History of GI bleed     PAD (peripheral artery disease) (H)     PAF (paroxysmal atrial fibrillation) (H)     Memory problem     Type 2 diabetes mellitus with other circulatory complications  (H)     Anxiety     Type 2 diabetes mellitus with other circulatory complication, without long-term current use of insulin (H)     Cognitive decline     CKD (chronic kidney disease) stage 2, GFR 60-89 ml/min     Major depressive disorder, recurrent episode, mild (H)     Venous stasis     Hypotension, unspecified hypotension type     Balance problems     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   Findings: There is no intracranial hemorrhage, mass effect, or midline shift. Gray/whi     History of partial thyroidectomy 06/02/2018     Hyperlipidemia LDL goal <100 01/18/2018     Hypertension      Insomnia, unspecified type 01/18/2018     Mitral regurgitation      Mitral stenosis      Pulmonary hypertension (H)      Tobacco use disorder         Past Surgical History:     Past Surgical History:   Procedure Laterality Date     COLONOSCOPY N/A 9/4/2019    Procedure: COLONOSCOPY;  Surgeon: Marie Todd MD;  Location:  GI     CV CORONARY ANGIOGRAM N/A 11/16/2020    Procedure: CV CORONARY ANGIOGRAM;  Surgeon: Joey Rivas MD;  Location:  HEART CARDIAC CATH LAB     CV FRACTIONAL FLOW RATIO WIRE N/A 11/16/2020    Procedure: Fractional Flow  Reserve;  Surgeon: Joey Rivas MD;  Location:  HEART CARDIAC CATH LAB     CV RIGHT HEART CATH MEASUREMENTS RECORDED N/A 2020    Procedure: CV RIGHT HEART CATH;  Surgeon: Joey Rivas MD;  Location:  HEART CARDIAC CATH LAB     ESOPHAGOSCOPY, GASTROSCOPY, DUODENOSCOPY (EGD), COMBINED N/A 2019    Procedure: ESOPHAGOGASTRODUODENOSCOPY (EGD);  Surgeon: Marie Todd MD;  Location:  GI     ESOPHAGOSCOPY, GASTROSCOPY, DUODENOSCOPY (EGD), COMBINED N/A 2020    Procedure: ESOPHAGOGASTRODUODENOSCOPY (EGD);  Surgeon: Ten Ibrahim DO;  Location:  GI     IR ANGIOGRAM THROUGH CATHETER FOLLOW UP  3/5/2019     IR LOWER EXTREMITY ANGIOGRAM LEFT  3/4/2019     KNEE SURGERY Right     right knee torn meniscus surgery     OVARY SURGERY  1971    1 ovary removed     RELEASE CARPAL TUNNEL Right      RELEASE TRIGGER FINGER BILATERAL       REPLACE VALVES AORTIC AND MITRAL, COMBINED N/A 2020    Procedure: Median Stertonomy, REPLACEMENT AORTIC Valve  with 19mm St Abdoulaye [place]  Mechanical Heart Valve AND MITRAL VALVE Replacement with 27mm St Abdoulaye Mechanical Heart Valve, on pump oxygenation;  Surgeon: Luc Lara MD;  Location:  OR     SHOULDER SURGERY Left     rotator cuff repair, plate placement     THYROID SURGERY      partial thyroidectomy        Social History:     Social History     Tobacco Use     Smoking status: Current Every Day Smoker     Packs/day: 1.00     Years: 59.00     Pack years: 59.00     Start date: 1962     Last attempt to quit: 2020     Years since quittin.3     Smokeless tobacco: Never Used   Substance Use Topics     Alcohol use: Not Currently     Comment: 2 glasses of wine a week     Drug use: Yes     Types: Marijuana     Comment: occasional        Family History:     Family History   Problem Relation Age of Onset     Diabetes Type 2  Mother      Lung Cancer Father      Diabetes Sister      Coronary Artery Disease Sister      Diabetes  Brother      Diabetes Brother      Diabetes Type 2  Brother      Coronary Artery Disease Sister      Asthma Sister      Glaucoma No family hx of      Macular Degeneration No family hx of      Anesthesia Reaction No family hx of         Medications:     Current Outpatient Medications   Medication Sig     atorvastatin (LIPITOR) 40 MG tablet Take 1 tablet (40 mg) by mouth daily     buPROPion (WELLBUTRIN SR) 150 MG 12 hr tablet TAKE 1 TABLET BY MOUTH 2 TIMES DAILY     Calcium Carb-Cholecalciferol 600-800 MG-UNIT TABS Take 1 tablet by mouth 2 times daily     estradiol (ESTRACE) 0.1 MG/GM vaginal cream Place 1 g vaginally twice a week     ferrous sulfate (FEROSUL) 325 (65 Fe) MG tablet Take 325 mg by mouth 2 times daily     furosemide (LASIX) 20 MG tablet TAKE 1 TABLET BY MOUTH DAILY AS NEEDED     glucosamine-chondroitinoitin 500-400 MG tablet Patient is taking 1500 mg OTC 1 time daily     losartan (COZAAR) 25 MG tablet Take 0.5 tablets (12.5 mg) by mouth daily     Melatonin 10 MG TABS tablet Take 10 mg by mouth At Bedtime     metFORMIN (GLUCOPHAGE) 1000 MG tablet TAKE 1 TABLET BY MOUTH TWICE A DAY WITH MEALS     methenamine (HIPREX) 1 g tablet Take 1 tablet (1 g) by mouth 2 times daily     Multiple Vitamin (MULTI-VITAMINS) TABS Take 1 tablet by mouth daily      oxybutynin (DITROPAN) 5 MG tablet Take 1 tablet (5 mg) by mouth 2 times daily     pantoprazole (PROTONIX) 40 MG EC tablet TAKE 1 TABLET BY MOUTH EVERY DAY     sertraline (ZOLOFT) 100 MG tablet Take 1 tablet (100 mg) by mouth daily     traZODone (DESYREL) 50 MG tablet Take 0.5 tablets (25 mg) by mouth At Bedtime     vitamin C (ASCORBIC ACID) 500 MG tablet Take 1 tablet (500 mg) by mouth daily     warfarin ANTICOAGULANT (COUMADIN) 2 MG tablet TAKE 5 tabs (10mg) BY MOUTH on Sat, Sun, Tues, and Thurs and take 4 tabs (8mg) on Mon, Wed, and Fri or as directed BY ANTICOAGULATION CLINIC     No current facility-administered medications for this visit.        Allergies:      Allergies   Allergen Reactions     Indomethacin Other (See Comments)     Dizziness and disorientation     Tramadol Nausea and Vomiting        Review of Systems:   As above     Physical Exam:   Vitals: BP (!) 140/76 (BP Location: Right arm, Patient Position: Sitting, Cuff Size: Adult Regular)   Pulse 88   Wt 68.3 kg (150 lb 9.6 oz)   BMI 25.85 kg/m     General: Seated comfortably in no acute distress.  Lungs: breathing comfortably  Neurologic:     Mental Status: MOCA 19/30 (-1 trail, -1 cube copy, -1 clock hands, -2 serial 7s, -5 delayed recall, -1 repeating sentence)     Cranial: No dysarthria.      Motor: No tremors or other abnormal movements observed.     Sensory: Negative Romberg.      Gait: Normal, steady casual gait. Minimal difficulties with heel and toe gaits. Able to perform tandem with mild to moderate difficulties.     Data reviewed on previous visits    Imaging:  CT head 1/24/2021  Impression:  No acute intracranial pathology..     Laboratory:  B12 457 10/2020  3/2021 - Normal B12/MMA, TSH 5 with normal free T4, CK 46  TSH, B12 normal (6/2021)  Pertinent Investigations since last visit:   MRI brain 11/2021  IMPRESSION:  1. Multiple small areas of susceptibility related signal loss  scattered throughout the cerebral hemispheres and cerebellum  suggestive of chronic microhemorrhages, new compared to the prior  exam.   2. Volume loss and white matter T2 hyperintensities which likely  represent mild chronic small vessel ischemic change commensurate with  age.         Assessment and Plan:   Assessment  Rajani Guo is a 70 year old female who presents today for follow-up of balance and memory difficulties. She reported balance and memory problems starting following AVR/MVR surgery in December 2020. She was hospitalized in January 2021 for the balance difficulties and was discharged to rehab for 2 weeks. 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 Gait has improved with physical therapy.     She continues to have memory issues today. MOCA around initial visit last year was 21/30. Neuropsychology testing was performed in 9/2021, which did not show evidence of meaningful cognitive impairment. Symptoms were not suggestive of a neurodegenerative disorder. She continues to have short term memory concerns today. MOCA today is 19/30. She is still feeling depressed and I suspect this is contributing towards memory symptoms. She is following with psychiatry. MRI brain showed several chronic micro hemorrhages, which I don't believe to be contributing significantly towards her memory. Given continued memory symptoms she would benefit from repeat neuropsychology testing around the 1 year sunny to screen for neurodegenerative condition. There were concerns about safety of driving based off of OT pre-driving screen. Information for [facility] vani driving evaluation was given today if patient would like to purse more formal evaluation.     Plan  - Continue to follow with psychiatry   - Repeat neuropsychology testing around September 2022    Bin Alcaraz MD   of Neurology  [facility]    The total time of this encounter today amounted to 30 minutes. This time included time spent with the patient, prep work, ordering tests, and performing post visit documentation.

## 2022-05-03 NOTE — LETTER
5/3/2022         RE: Rajani Guo  [address] [facility] [address]        Dear Colleague,    Thank you for referring your patient, Rajani Guo, to the [facility] [place]. Please see a copy of my visit note below.    [facility] Neurology Follow Up Visit    Rajani Guo MRN# [id number]   Age: 70 year old YOB: 1950     Brief history of symptoms: The patient was initially seen in neurologic consultation on 3/2/21 for evaluation of imbalance. Please see the comprehensive neurologic consultation note from that date in the Epic records for details.     Interval history:   She is still depressed. She is following with psychiatry.     She is still having short term memory problems. She forgets where she puts things and forgets details of conversation.     Patient was seen in OT. Based off of pre-driving screen, there were concerns about safety of driving.       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)     Anemia, iron deficiency     Status post coronary angiogram     S/P MVR (mitral valve repair)     S/P AVR (aortic valve replacement)     UTI (urinary tract infection)     Trigger middle finger of right hand     Personal history of colonic polyps     Sensorineural hearing loss, bilateral     COPD (chronic obstructive pulmonary disease) (H)     History of CT scan of head 2021     Frailty      Atrial fibrillation (H)     Current use of long term anticoagulation     History of GI bleed     PAD (peripheral artery disease) (H)     PAF (paroxysmal atrial fibrillation) (H)     Memory problem     Type 2 diabetes mellitus with other circulatory complications (H)     Anxiety     Type 2 diabetes mellitus with other circulatory complication, without long-term current use of insulin (H)     Cognitive decline     CKD (chronic kidney disease) stage 2, GFR 60-89 ml/min     Major depressive disorder, recurrent episode, mild (H)     Venous stasis     Hypotension, unspecified hypotension type     Balance problems     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   Findings: There is no intracranial hemorrhage, mass effect, or midline shift. Gray/whi     History of partial thyroidectomy 06/02/2018     Hyperlipidemia LDL goal <100 01/18/2018     Hypertension      Insomnia, unspecified type 01/18/2018     Mitral regurgitation      Mitral stenosis      Pulmonary hypertension (H)      Tobacco use disorder         Past Surgical History:     Past Surgical History:   Procedure Laterality Date     COLONOSCOPY N/A 9/4/2019    Procedure: COLONOSCOPY;  Surgeon: Peyton  Marie Tripp MD;  Location:  GI     CV CORONARY ANGIOGRAM N/A 2020    Procedure: CV CORONARY ANGIOGRAM;  Surgeon: Joey Rivas MD;  Location:  HEART CARDIAC CATH LAB     CV FRACTIONAL FLOW RATIO WIRE N/A 2020    Procedure: Fractional Flow Reserve;  Surgeon: Joey Rivas MD;  Location:  HEART CARDIAC CATH LAB     CV RIGHT HEART CATH MEASUREMENTS RECORDED N/A 2020    Procedure: CV RIGHT HEART CATH;  Surgeon: Joey Rivas MD;  Location:  HEART CARDIAC CATH LAB     ESOPHAGOSCOPY, GASTROSCOPY, DUODENOSCOPY (EGD), COMBINED N/A 2019    Procedure: ESOPHAGOGASTRODUODENOSCOPY (EGD);  Surgeon: Marie Todd MD;  Location:  GI     ESOPHAGOSCOPY, GASTROSCOPY, DUODENOSCOPY (EGD), COMBINED N/A 2020    Procedure: ESOPHAGOGASTRODUODENOSCOPY (EGD);  Surgeon: Ten Ibrahim DO;  Location:  GI     IR ANGIOGRAM THROUGH CATHETER FOLLOW UP  3/5/2019     IR LOWER EXTREMITY ANGIOGRAM LEFT  3/4/2019     KNEE SURGERY Right     right knee torn meniscus surgery     OVARY SURGERY  1971    1 ovary removed     RELEASE CARPAL TUNNEL Right      RELEASE TRIGGER FINGER BILATERAL       REPLACE VALVES AORTIC AND MITRAL, COMBINED N/A 2020    Procedure: Median Stertonomy, REPLACEMENT AORTIC Valve  with 19mm St Abdoulaye Van  Mechanical Heart Valve AND MITRAL VALVE Replacement with 27mm St Abdoulaye Mechanical Heart Valve, on pump oxygenation;  Surgeon: Luc Lara MD;  Location:  OR     SHOULDER SURGERY Left     rotator cuff repair, plate placement     THYROID SURGERY      partial thyroidectomy        Social History:     Social History     Tobacco Use     Smoking status: Current Every Day Smoker     Packs/day: 1.00     Years: 59.00     Pack years: 59.00     Start date: 1962     Last attempt to quit: 2020     Years since quittin.3     Smokeless tobacco: Never Used   Substance Use Topics     Alcohol use: Not Currently     Comment: 2 glasses of wine a  week     Drug use: Yes     Types: Marijuana     Comment: occasional        Family History:     Family History   Problem Relation Age of Onset     Diabetes Type 2  Mother      Lung Cancer Father      Diabetes Sister      Coronary Artery Disease Sister      Diabetes Brother      Diabetes Brother      Diabetes Type 2  Brother      Coronary Artery Disease Sister      Asthma Sister      Glaucoma No family hx of      Macular Degeneration No family hx of      Anesthesia Reaction No family hx of         Medications:     Current Outpatient Medications   Medication Sig     atorvastatin (LIPITOR) 40 MG tablet Take 1 tablet (40 mg) by mouth daily     buPROPion (WELLBUTRIN SR) 150 MG 12 hr tablet TAKE 1 TABLET BY MOUTH 2 TIMES DAILY     Calcium Carb-Cholecalciferol 600-800 MG-UNIT TABS Take 1 tablet by mouth 2 times daily     estradiol (ESTRACE) 0.1 MG/GM vaginal cream Place 1 g vaginally twice a week     ferrous sulfate (FEROSUL) 325 (65 Fe) MG tablet Take 325 mg by mouth 2 times daily     furosemide (LASIX) 20 MG tablet TAKE 1 TABLET BY MOUTH DAILY AS NEEDED     glucosamine-chondroitinoitin 500-400 MG tablet Patient is taking 1500 mg OTC 1 time daily     losartan (COZAAR) 25 MG tablet Take 0.5 tablets (12.5 mg) by mouth daily     Melatonin 10 MG TABS tablet Take 10 mg by mouth At Bedtime     metFORMIN (GLUCOPHAGE) 1000 MG tablet TAKE 1 TABLET BY MOUTH TWICE A DAY WITH MEALS     methenamine (HIPREX) 1 g tablet Take 1 tablet (1 g) by mouth 2 times daily     Multiple Vitamin (MULTI-VITAMINS) TABS Take 1 tablet by mouth daily      oxybutynin (DITROPAN) 5 MG tablet Take 1 tablet (5 mg) by mouth 2 times daily     pantoprazole (PROTONIX) 40 MG EC tablet TAKE 1 TABLET BY MOUTH EVERY DAY     sertraline (ZOLOFT) 100 MG tablet Take 1 tablet (100 mg) by mouth daily     traZODone (DESYREL) 50 MG tablet Take 0.5 tablets (25 mg) by mouth At Bedtime     vitamin C (ASCORBIC ACID) 500 MG tablet Take 1 tablet (500 mg) by mouth daily     warfarin  ANTICOAGULANT (COUMADIN) 2 MG tablet TAKE 5 tabs (10mg) BY MOUTH on Sat, Sun, Tues, and Thurs and take 4 tabs (8mg) on Mon, Wed, and Fri or as directed BY ANTICOAGULATION CLINIC     No current facility-administered medications for this visit.        Allergies:     Allergies   Allergen Reactions     Indomethacin Other (See Comments)     Dizziness and disorientation     Tramadol Nausea and Vomiting        Review of Systems:   As above     Physical Exam:   Vitals: BP (!) 140/76 (BP Location: Right arm, Patient Position: Sitting, Cuff Size: Adult Regular)   Pulse 88   Wt 68.3 kg (150 lb 9.6 oz)   BMI 25.85 kg/m     General: Seated comfortably in no acute distress.  Lungs: breathing comfortably  Neurologic:     Mental Status: MOCA 19/30 (-1 trail, -1 cube copy, -1 clock hands, -2 serial 7s, -5 delayed recall, -1 repeating sentence)     Cranial: No dysarthria.      Motor: No tremors or other abnormal movements observed.     Sensory: Negative Romberg.      Gait: Normal, steady casual gait. Minimal difficulties with heel and toe gaits. Able to perform tandem with mild to moderate difficulties.     Data reviewed on previous visits    Imaging:  CT head 1/24/2021  Impression:  No acute intracranial pathology..     Laboratory:  B12 457 10/2020  3/2021 - Normal B12/MMA, TSH 5 with normal free T4, CK 46  TSH, B12 normal (6/2021)  Pertinent Investigations since last visit:   MRI brain 11/2021  IMPRESSION:  1. Multiple small areas of susceptibility related signal loss  scattered throughout the cerebral hemispheres and cerebellum  suggestive of chronic microhemorrhages, new compared to the prior  exam.   2. Volume loss and white matter T2 hyperintensities which likely  represent mild chronic small vessel ischemic change commensurate with  age.         Assessment and Plan:   Assessment  Rajani Guo is a 70 year old female who presents today for follow-up of balance and memory difficulties. She reported balance and memory  problems starting following AVR/MVR surgery in December 2020. She was hospitalized in January 2021 for the balance difficulties and was discharged to rehab for 2 weeks. 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 Gait has improved with physical therapy.     She continues to have memory issues today. MOCA around initial visit last year was 21/30. Neuropsychology testing was performed in 9/2021, which did not show evidence of meaningful cognitive impairment. Symptoms were not suggestive of a neurodegenerative disorder. She continues to have short term memory concerns today. MOCA today is 19/30. She is still feeling depressed and I suspect this is contributing towards memory symptoms. She is following with psychiatry. MRI brain showed several chronic micro hemorrhages, which I don't believe to be contributing significantly towards her memory. Given continued memory symptoms she would benefit from repeat neuropsychology testing around the 1 year sunny to screen for neurodegenerative condition. There were concerns about safety of driving based off of OT pre-driving screen. Information for [employer] driving evaluation was given today if patient would like to purse more formal evaluation.     Plan  - Continue to follow with psychiatry   - Repeat neuropsychology testing around September 2022    Bin Alcaraz MD   of Neurology  [facility]    The total time of this encounter today amounted to 30 minutes. This time included time spent with the patient, prep work, ordering tests, and performing post visit documentation.        Again, thank you for allowing me to participate in the care of your patient.        Sincerely,        Bin Alcaraz MD

## 2022-05-03 NOTE — TELEPHONE ENCOUNTER
Attempted to call patient, no answer. Left detailed voicemail outlining provider recommendations. Advised patient to call back at [phone] to schedule lab appointment and if there are any further questions. [employer] message with e-visit instructions sent as well.     Marta Chi RN   [facility]

## 2022-05-06 ENCOUNTER — VIRTUAL VISIT (OUTPATIENT)
Dept: BEHAVIORAL HEALTH | Facility: CLINIC | Age: 72
End: 2022-05-06
Payer: COMMERCIAL

## 2022-05-06 ENCOUNTER — VIRTUAL VISIT (OUTPATIENT)
Dept: PSYCHIATRY | Facility: CLINIC | Age: 72
End: 2022-05-06
Payer: COMMERCIAL

## 2022-05-06 DIAGNOSIS — F33.0 MAJOR DEPRESSIVE DISORDER, RECURRENT EPISODE, MILD (H): Primary | ICD-10-CM

## 2022-05-06 DIAGNOSIS — F41.1 GENERALIZED ANXIETY DISORDER: ICD-10-CM

## 2022-05-06 DIAGNOSIS — F33.1 DEPRESSION, MAJOR, RECURRENT, MODERATE (H): Primary | ICD-10-CM

## 2022-05-06 PROCEDURE — 90832 PSYTX W PT 30 MINUTES: CPT | Mod: 95 | Performed by: COUNSELOR

## 2022-05-06 PROCEDURE — 99214 OFFICE O/P EST MOD 30 MIN: CPT | Mod: 95 | Performed by: NURSE PRACTITIONER

## 2022-05-06 RX ORDER — SERTRALINE HYDROCHLORIDE 100 MG/1
150 TABLET, FILM COATED ORAL DAILY
Qty: 45 TABLET | Refills: 3 | Status: SHIPPED | OUTPATIENT
Start: 2022-05-06 | End: 2022-08-04

## 2022-05-06 ASSESSMENT — ANXIETY QUESTIONNAIRES
1. FEELING NERVOUS, ANXIOUS, OR ON EDGE: NEARLY EVERY DAY
3. WORRYING TOO MUCH ABOUT DIFFERENT THINGS: NEARLY EVERY DAY
GAD7 TOTAL SCORE: 17
6. BECOMING EASILY ANNOYED OR IRRITABLE: NEARLY EVERY DAY
7. FEELING AFRAID AS IF SOMETHING AWFUL MIGHT HAPPEN: SEVERAL DAYS
2. NOT BEING ABLE TO STOP OR CONTROL WORRYING: NEARLY EVERY DAY
5. BEING SO RESTLESS THAT IT IS HARD TO SIT STILL: MORE THAN HALF THE DAYS
4. TROUBLE RELAXING: MORE THAN HALF THE DAYS
7. FEELING AFRAID AS IF SOMETHING AWFUL MIGHT HAPPEN: SEVERAL DAYS

## 2022-05-06 ASSESSMENT — PATIENT HEALTH QUESTIONNAIRE - PHQ9
10. IF YOU CHECKED OFF ANY PROBLEMS, HOW DIFFICULT HAVE THESE PROBLEMS MADE IT FOR YOU TO DO YOUR WORK, TAKE CARE OF THINGS AT HOME, OR GET ALONG WITH OTHER PEOPLE: VERY DIFFICULT
SUM OF ALL RESPONSES TO PHQ QUESTIONS 1-9: 13
SUM OF ALL RESPONSES TO PHQ QUESTIONS 1-9: 13

## 2022-05-06 NOTE — PROGRESS NOTES
"Rajani is a 71 year old who is being evaluated via a billable video visit.      How would you like to obtain your AVS? MyChart  If the video visit is dropped, the invitation should be resent by: Text to cell phone: [phone]  Will anyone else be joining your video visit? No    Tran Link VF    Video Start Time: 11:01 AM/11:15 AM  Video-Visit Details    Type of service:  Video Visit    Video End Time:11:20 AM    Originating Location (pt. Location): Home    Distant Location (provider location):  [facility] MENTAL [facility] & ADDICTION [facility]     Platform used for Video Visit: [facility]              Outpatient Psychiatric Progress Note    Name: Rajani Guo   : 1950                    Primary Care Provider: Tamiko Coley MD   Therapist: Andre    PHQ-9 scores:  PHQ-9 SCORE 2022 3/28/2022 2022   PHQ-9 Total Score MyChart 10 (Moderate depression) 12 (Moderate depression) 13 (Moderate depression)   PHQ-9 Total Score 10 12 13       ARACELI-7 scores:  ARACELI-7 SCORE 2021 3/28/2022 2022   Total Score - 11 (moderate anxiety) 17 (severe anxiety)   Total Score 15 11 17       Patient Identification:    Patient is a 71 year old year old, single  White Choose not to answer female  who presents for return visit with me.  Patient is currently retired. Patient attended the session alone. Patient prefers to be called: \" Rajani\".    Current medications include: atorvastatin (LIPITOR) 40 MG tablet, Take 1 tablet (40 mg) by mouth daily  buPROPion (WELLBUTRIN SR) 150 MG 12 hr tablet, TAKE 1 TABLET BY MOUTH 2 TIMES DAILY  Calcium Carb-Cholecalciferol 600-800 MG-UNIT TABS, Take 1 tablet by mouth 2 times daily  estradiol (ESTRACE) 0.1 MG/GM vaginal cream, Place 1 g vaginally twice a week  ferrous sulfate (FEROSUL) 325 (65 Fe) MG tablet, Take 325 mg by mouth 2 times daily  furosemide (LASIX) 20 MG tablet, TAKE 1 TABLET BY MOUTH DAILY AS NEEDED  glucosamine-chondroitinoitin 500-400 MG tablet, Patient is taking " 1500 mg OTC 1 time daily  losartan (COZAAR) 25 MG tablet, Take 0.5 tablets (12.5 mg) by mouth daily  Melatonin 10 MG TABS tablet, Take 10 mg by mouth At Bedtime  metFORMIN (GLUCOPHAGE) 1000 MG tablet, TAKE 1 TABLET BY MOUTH TWICE A DAY WITH MEALS  methenamine (HIPREX) 1 g tablet, Take 1 tablet (1 g) by mouth 2 times daily  Multiple Vitamin (MULTI-VITAMINS) TABS, Take 1 tablet by mouth daily   oxybutynin (DITROPAN) 5 MG tablet, Take 1 tablet (5 mg) by mouth 2 times daily  pantoprazole (PROTONIX) 40 MG EC tablet, TAKE 1 TABLET BY MOUTH EVERY DAY  sertraline (ZOLOFT) 100 MG tablet, Take 1 tablet (100 mg) by mouth daily  traZODone (DESYREL) 50 MG tablet, Take 0.5 tablets (25 mg) by mouth At Bedtime  vitamin C (ASCORBIC ACID) 500 MG tablet, Take 1 tablet (500 mg) by mouth daily  warfarin ANTICOAGULANT (COUMADIN) 2 MG tablet, TAKE 5 tabs (10mg) BY MOUTH on Sat, Sun, Tues, and Thurs and take 4 tabs (8mg) on Mon, Wed, and Fri or as directed BY ANTICOAGULATION CLINIC    No current facility-administered medications on file prior to visit.       The [place] Prescription Monitoring Program has been reviewed and there are no concerns about diversionary activity for controlled substances at this time.      I was able to review most recent Primary Care Provider, specialty provider, and therapy visit notes that I have access to.  Interview converted to telephone visit which patient agreed to.    Today, patient reports  that things are worse with living with her daughter.  Rajani forgets things a lot and then her daughter argues with her.  When she went to  Her neurologist and was told that she still could not drive.  She is isolated to her home  She naps and watches television.  Since having cardiac surgery a year and a half ago, she had a psychotic episode while in the hospital and developed memory problems.      [facility] update: Pt reports that their depression and anxiety is worse. Pt reports that due to physical abilities pt is not  cleared to drive yet. Pt says that they will call people and family is in [place] and they aren't able to see them. Pt has tried to contact [employer] for rides and pt is not able to pay for them.  Stressors: Live with daughter, still doesn't have a license and cannot drive. Pt does not feel like their medication is doing anything to help. Pt says that they nap a lot. Pt is going to try to get outside and walk their cat.Pt says that their memory is bad after their heart surgery. Pt reports that have had many doctor appointments and a CT scan.       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    Social history updates:    Patient lives with her daughter.  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  She is unable to drive now    Substance use updates:    No alcohol use reported  Tobacco use: Yes Cigarettes  Ready to quit?  No  Nicotine Replacement Therapy tried: None    Vital Signs:   There were no vitals taken for this visit.    Labs:    Most recent laboratory results reviewed and no new labs.     Mental Status Examination:  Appearance: awake, alert and casual dress  Attitude: cooperative  Eye Contact:  adequate  Gait and Station: No assistive Devices used and No dizziness or falls  Psychomotor Behavior:  intact station, gait and muscle tone  Oriented to:  time, person, and place  Attention Span  and Concentration:  Normal  Speech:   clear, coherent and Speaks: English  Mood:  anxious and depressed  Affect:  mood congruent  Associations:  no loose associations  Thought Process:  goal oriented  Thought Content:  no evidence of suicidal ideation or homicidal ideation, no auditory hallucinations present and no visual hallucinations present  Recent and Remote Memory:  fair Not formally assessed. No amnesia.  Fund of Knowledge: appropriate  Insight:  good  Judgment:  intact  Impulse Control:  intact    Suicide Risk Assessment:  Today Rajani Guo reports no thoughts to harm themself or others. In addition, there are notable risk factors for self-harm, including anxiety. However, risk is mitigated by commitment to family, history of seeking help when needed, future oriented, denies suicidal intent or plan and denies homicidal ideation, intent, or plan. Therefore, based on all available evidence including the factors cited above, Rajani Guo does not appear to be at imminent risk for self-harm, does not meet criteria for a 72-hr hold, and therefore remains appropriate for ongoing outpatient level of care.  A thorough assessment of risk factors related to suicide and self-harm have been reviewed and are noted above. The patient convincingly denies suicidality on several occasions. Local community safety resources printed and reviewed for patient to use if needed. There was no deceit detected, and the patient presented in a manner that was believable.     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  300.02 (F41.1) Generalized Anxiety Disorder    Medical comorbidities include:   Patient Active Problem List    Diagnosis Date Noted     Balance problems 04/12/2022     Priority: Medium     Hypotension, unspecified hypotension type 03/28/2022     Priority: Medium     Venous stasis 01/04/2022     Priority: Medium     CKD (chronic kidney disease) stage  2, GFR 60-89 ml/min 01/03/2022     Priority: Medium     Major depressive disorder, recurrent episode, mild (H) 01/03/2022     Priority: Medium     Cognitive decline 10/12/2021     Priority: Medium     Type 2 diabetes mellitus with other circulatory complications (H) 08/16/2021     Priority: Medium     Anxiety 08/16/2021     Priority: Medium     Type 2 diabetes mellitus with other circulatory complication, without long-term current use of insulin (H) 08/16/2021     Priority: Medium     Memory problem 06/21/2021     Priority: Medium     Atrial fibrillation (H) 03/12/2021     Priority: Medium     Current use of long term anticoagulation 03/12/2021     Priority: Medium     for Prosthetic valves       History of GI bleed 03/12/2021     Priority: Medium     PAD (peripheral artery disease) (H) 03/12/2021     Priority: Medium     PAF (paroxysmal atrial fibrillation) (H) 03/12/2021     Priority: Medium     Frailty 03/01/2021     Priority: Medium     History of CT scan of head 2021 02/10/2021     Priority: Medium     CT HEAD W/O CONTRAST 1/24/2021 4:55 PM     History: Trauma -???Head Injury   ICD-10:     Comparison: MRI brain 8/1/2019     Technique: Using multidetector thin collimation helical acquisition  technique, axial, coronal and sagittal CT images from the skull base  to the vertex were obtained without intravenous contrast.     Findings: There is no intracranial hemorrhage, mass effect, or midline  shift. Gray/white matter differentiation in both cerebral hemispheres  is preserved. Ventricles are proportionate to the cerebral sulci. The  basal cisterns are clear.     The bony calvaria and the bones of the skull base are normal. The  visualized portions of the paranasal sinuses and mastoid air cells are  clear.                                                                       Impression:  No acute intracranial pathology..      I have personally reviewed the examination and initial interpretation  and I agree with  the findings.     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) 09/29/2020     Priority: Medium     Other cardiomyopathies (H) 09/16/2020     Priority: Medium     History of anemia 09/03/2019     Priority: Medium     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 3/4/2019  09/03/2019     Priority: Medium     Claudication of left lower extremity (H) 03/01/2019     Priority: Medium     PVD (peripheral vascular disease) (H) 02/25/2019     Priority: Medium     Essential hypertension 02/21/2019     Priority: Medium     History of partial thyroidectomy 06/02/2018     Priority: Medium     Partial thyroidectomy 32 years ago due to benign nodules       Smoker      Priority: Medium     Hyperlipidemia LDL goal <70 01/18/2018     Priority: Medium     Age-related osteoporosis without current pathological fracture 01/18/2018     Priority: Medium     Insomnia, unspecified type 01/18/2018     Priority: Medium     Trigger middle finger of right hand 11/27/2015     Priority: Medium     Overview:   And 4th finger.  Injection performed       Personal history of colonic polyps 08/27/2014     Priority: Medium     Sensorineural hearing loss, bilateral 03/28/2012     Priority: Medium     COPD (chronic obstructive pulmonary disease) (H) 02/21/2012     Priority: Medium       Assessment:    Rajani Guo met with me to voice her displeasure of not being able to function like she  used to.  She is not able to drive and has balance issues.  Since having cardiac surgery, she reports having memory lapses.  She would like to feel less depressed.  Multiple health issues increase her anxiety.  She has discord with her daughter whom she lives with and would like to be able to argue less often..    Medication side effects and alternatives were reviewed. Health promotion activities recommended and reviewed today. All questions addressed. Education and counseling completed regarding risks and benefits of medications and psychotherapy options.    Treatment Plan:        1.  Sertraline 150 mg daily    2.  Trazodone 25 mg at bedtime as needed for insomnia    3.  Wellbutrin  mg twice daily-for depression and to help you stop smoking    4.  Talk therapy, when able to, to process and manage life stressors and adjustments.        Continue all other medications as reviewed per electronic medical record today.     Safety plan reviewed. To the Emergency Department as needed or call after hours crisis line at [phone] or [phone]. [place] Crisis Text Line. Text [place] to 822973 or Suicide LifeLine Chat: suicidepreventionlifeline.org/chat/    To schedule individual or family therapy, call [facility] at [phone]    Schedule an appointment with me in 6 weeks or sooner as needed. Call [facility] at [phone] to schedule.    Follow up with primary care provider as planned or for acute medical concerns.    Call the psychiatric nurse line with medication questions or concerns at [phone]    MyChart may be used to communicate with your provider, but this is not intended to be used for emergencies.    Crisis Resources:    National Suicide Prevention Lifeline: 726.265.6629 (TTY: 872.748.9898). Call anytime for help.  (www.suicidepreventionlifeline.org)  National [place] on Mental Illness (www.luna.org): 955.231.6417 or 928-597-0715.   Mental Health Association  (www.mentalhealth.org): 140.231.3603 or 987-816-7534.  [place] Crisis Text Line: Text [place] to 180933  Suicide LifeLine Chat: suicidepreventionNanoH2Oline.org/chat    Administrative Billing:   Time spent with patient includes counseling and coordination of care regarding above diagnoses and treatment plan.    Patient Status:  Patient will continue to be seen for ongoing consultation and stabilization.    Signed:   JACQUELINE Soto-BC   Psychiatry

## 2022-05-06 NOTE — NURSING NOTE
Patient denies any changes since echeck-in regarding medication and allergies and states all information entered during echeck-in remains accurate.  Tran Link VF

## 2022-05-06 NOTE — PROGRESS NOTES
[facility] Collaborative Care Psychiatry Service     May 6, 2022      Behavioral Health Clinician Progress Note    Patient Name: Rajani Guo           Service Type:  Individual      Service Location:   MyChart / Email (patient reached)     Session Start Time: 1018am  Session End Time: 1052am      Session Length: 16 - 37      Attendees: Patient     Service Modality:  Video Visit:      Provider verified identity through the following two step process.  Patient provided:  Patient photo and Patient     Telemedicine Visit: The patient's condition can be safely assessed and treated via synchronous audio and visual telemedicine encounter.      Reason for Telemedicine Visit: Services only offered telehealth    Originating Site (Patient Location): Patient's home    Distant Site (Provider Location): Provider Remote Setting- Home Office    Consent:  The patient/guardian has verbally consented to: the potential risks and benefits of telemedicine (video visit) versus in person care; bill my insurance or make self-payment for services provided; and responsibility for payment of non-covered services.     Patient would like the video invitation sent by:  My Chart    Mode of Communication:  Video Conference via [employer]    As the provider I attest to compliance with applicable laws and regulations related to telemedicine.    Visit Activities (Refresh list every visit): [facility] Emergency Department Only    Diagnostic Assessment Date: 2021 Noemi Greer  Treatment Plan Review Date: N/A, will be developed next meeting  See Flowsheets for today's PHQ-9 and ARACELI-7 results  Previous PHQ-9:   PHQ-9 SCORE 2022 3/28/2022 2022   PHQ-9 Total Score MyChart 10 (Moderate depression) 12 (Moderate depression) 13 (Moderate depression)   PHQ-9 Total Score 10 12 13     Previous ARACELI-7:   ARACELI-7 SCORE 2021 3/28/2022 2022   Total Score - 11 (moderate anxiety) 17 (severe anxiety)   Total Score 15 11 17       JORGE LEVEL:  No flowsheet data  "found.    DATA  Extended Session (60+ minutes): No  Interactive Complexity: No  Crisis: No  [facility] Patient: No    Treatment Objective(s) Addressed in This Session:  Target Behavior(s): feeling down, not able to leave the house, unable to drive, naping, socialization    Depressed Mood: Increase interest, engagement, and pleasure in doing things  Decrease frequency and intensity of feeling down, depressed, hopeless  Improve quantity and quality of night time sleep / decrease daytime naps  Feel less tired and more energy during the day   Improve diet, appetite, mindful eating, and / or meal planning  Identify negative self-talk and behaviors: challenge core beliefs, myths, and actions  Improve concentration, focus, and mindfulness in daily activities     Current Stressors / Issues:  MH update: Pt reports that their depression and anxiety is ridiculous. Pt reports that due to physical abilities pt is not cleared to drive yet. Pt says that they will call people and family is in [place] and they aren't able to see them. Pt has tried to contact [employer] for rides and pt is not able to pay for them.   Stressors: Live with daughter, still doesn't have a license and cannot drive. Pt does not feel like their medication is doing anything to help. Pt says that they nap a lot. Pt is going to try to get outside and walk their cat.Pt says that their memory is bad after their heart surgery. Pt reports that have had many doctor appointments and a CT scan.   Side Effects: none   Mood: \"awful\"   Appetite: don't eat like they should, daughter doesn't eat much, gets one meal a day delivered   Sleep: unremarkable, trazodone helps- 2 melatonin wake up at 2am and then can go back to sleep      Suicidality: Pt denies   Self-harm: Pt denies   Substance Use: cannabis use at night   Caffeine: 2 cups of coffee in am   Therapist: no therapy   is helping pt get on SNAP, number for meals isn't right to request food pt can eat "   Interventions: link to library events, [facility] sent   Medication Questions/Requests: wondering if oxygen would help improve memory, frustration with everything         Progress on Treatment Objective(s) / Homework:  Satisfactory progress - ACTION (Actively working towards change); Intervened by reinforcing change plan / affirming steps taken    Motivational Interviewing    MI Intervention: Co-Developed Goal: reduce depression, Expressed Empathy/Understanding, Supported Autonomy, Collaboration, Evocation, Permission to raise concern or advise, Open-ended questions, Reflections: simple and complex, Change talk (evoked) and Reframe     Change Talk Expressed by the Patient: Reasons to change Need to change Taking steps    Provider Response to Change Talk: E - Evoked more info from patient about behavior change, A - Affirmed patient's thoughts, decisions, or attempts at behavior change, R - Reflected patient's change talk and S - Summarized patient's change talk statements    Also provided psychoeducation about behavioral health condition, symptoms, and treatment options    Care Plan review completed: No    Medication Review:  No changes to current psychiatric medication(s)    Medication Compliance:  Yes    Changes in Health Issues:   Yes: UTI's often    Chemical Use Review:   Substance Use: Chemical use reviewed, no active concerns identified      Tobacco Use: No current tobacco use.      Assessment: Current Emotional / Mental Status (status of significant symptoms):  Risk status (Self / Other harm or suicidal ideation)  Patient denies a history of suicidal ideation, suicide attempts, self-injurious behavior, homicidal ideation, homicidal behavior and and other safety concerns  Patient denies current fears or concerns for personal safety.  Patient denies current or recent suicidal ideation or behaviors.  Patient denies current or recent homicidal ideation or behaviors.  Patient denies current or recent self injurious  behavior or ideation.  Patient denies other safety concerns.  A safety and risk management plan has not been developed at this time, however patient was encouraged to call [place] [phone] should there be a change in any of these risk factors.    Appearance:   Appropriate   Eye Contact:   Good   Psychomotor Behavior: Normal   Attitude:   Cooperative   Orientation:   All  Speech   Rate / Production: Normal    Volume:  Normal   Mood:    Normal  Affect:    Appropriate   Thought Content:  Clear   Thought Form:  Coherent  Logical   Insight:    Good     Diagnoses:  1. Depression, major, recurrent, moderate (H)        Collateral Reports Completed:  Communicated with:   [facility]    Plan: (Homework, other):  Patient was given information about behavioral services and encouraged to schedule a follow up appointment with the clinic [facility] same time as provider Noemi.  She was also given information about mental health symptoms and treatment options  and events at the [facility] to attend virtually.  CD Recommendations: No indications of CD issues.     Irena Muhammad, [facility]  May 6, 2022    Answers for HPI/ROS submitted by the patient on 5/6/2022  If you checked off any problems, how difficult have these problems made it for you to do your work, take care of things at home, or get along with other people?: Very difficult  PHQ9 TOTAL SCORE: 13

## 2022-05-07 ASSESSMENT — ANXIETY QUESTIONNAIRES: GAD7 TOTAL SCORE: 17

## 2022-05-07 ASSESSMENT — PATIENT HEALTH QUESTIONNAIRE - PHQ9: SUM OF ALL RESPONSES TO PHQ QUESTIONS 1-9: 13

## 2022-05-13 ENCOUNTER — THERAPY VISIT (OUTPATIENT)
Dept: PHYSICAL THERAPY | Facility: CLINIC | Age: 72
End: 2022-05-13
Payer: COMMERCIAL

## 2022-05-13 ENCOUNTER — LAB (OUTPATIENT)
Dept: LAB | Facility: CLINIC | Age: 72
End: 2022-05-13

## 2022-05-13 ENCOUNTER — ANTICOAGULATION THERAPY VISIT (OUTPATIENT)
Dept: ANTICOAGULATION | Facility: CLINIC | Age: 72
End: 2022-05-13

## 2022-05-13 DIAGNOSIS — Z95.2 S/P AVR (AORTIC VALVE REPLACEMENT): ICD-10-CM

## 2022-05-13 DIAGNOSIS — R26.89 BALANCE PROBLEMS: Primary | ICD-10-CM

## 2022-05-13 DIAGNOSIS — Z98.890 S/P MVR (MITRAL VALVE REPAIR): Primary | ICD-10-CM

## 2022-05-13 DIAGNOSIS — I48.91 ATRIAL FIBRILLATION (H): ICD-10-CM

## 2022-05-13 DIAGNOSIS — Z79.01 CURRENT USE OF LONG TERM ANTICOAGULATION: ICD-10-CM

## 2022-05-13 LAB — INR BLD: 3 (ref 0.9–1.1)

## 2022-05-13 PROCEDURE — 85610 PROTHROMBIN TIME: CPT

## 2022-05-13 PROCEDURE — 97112 NEUROMUSCULAR REEDUCATION: CPT | Mod: GP | Performed by: PHYSICAL THERAPIST

## 2022-05-13 PROCEDURE — 36416 COLLJ CAPILLARY BLOOD SPEC: CPT

## 2022-05-13 PROCEDURE — 97110 THERAPEUTIC EXERCISES: CPT | Mod: GP | Performed by: PHYSICAL THERAPIST

## 2022-05-13 NOTE — PROGRESS NOTES
ANTICOAGULATION MANAGEMENT     Rajani Guo 71 year old female is on warfarin with therapeutic INR result. (Goal INR 2.5-3.5)    Recent labs: (last 7 days)     05/13/22  1447   INR 3.0*       ASSESSMENT     Source(s): Chart Review and Patient/Caregiver Call     Warfarin doses taken: Warfarin taken as instructed  Diet: No new diet changes identified  New illness, injury, or hospitalization: No  Medication/supplement changes: None noted  Signs or symptoms of bleeding or clotting: No  Previous INR: Therapeutic last 2(+) visits  Additional findings: None       PLAN     Recommended plan for no diet, medication or health factor changes affecting INR     Dosing Instructions: continue your current warfarin dose with next INR in 3 weeks       Summary  As of 5/13/2022    Full warfarin instructions:  10 mg every day   Next INR check:               Telephone call with  Sarai who verbalizes understanding and agrees to plan    Lab visit scheduled    Education provided: Please call back if any changes to your diet, medications or how you've been taking warfarin    Plan made per [facility] anticoagulation protocol    Lyndsay Monique RN  Anticoagulation Clinic  5/13/2022    _______________________________________________________________________     Anticoagulation Episode Summary     Current INR goal:  2.5-3.5   TTR:  45.4 % (1 y)   Target end date:  Indefinite   Send INR reminders to:  GURVINDER SALAZAR    Indications    S/P MVR (mitral valve repair) [Z98.890]  S/P AVR (aortic valve replacement) [Z95.2]           Comments:  Pt gave verbal ok to speak with Sarai on 7/20/21         Anticoagulation Care Providers     Provider Role Specialty Phone number    Quinton Handy PA Referring Cardiovascular & Thoracic Surgery [phone]    Lakeshia Rubio APRN CNP Referring Internal Medicine [phone]    Sparkle Bird APRN CNP Referring Emergency Medicine [phone]

## 2022-05-23 NOTE — TELEPHONE ENCOUNTER
Schedule Video visit    UPDATE GIVEN TO . SOFT BP, ELEVATED HR, AND INCREASE IN AXILLARY TEMP
DISCUSSED. PATIENT CONTINUES TO HAVE GOOD URINE OUTPUT. PRN BLOOD CULTURES
ORDERED.

## 2022-05-24 ENCOUNTER — OFFICE VISIT (OUTPATIENT)
Dept: FAMILY MEDICINE | Facility: CLINIC | Age: 72
End: 2022-05-24
Payer: COMMERCIAL

## 2022-05-24 ENCOUNTER — PATIENT OUTREACH (OUTPATIENT)
Dept: NURSING | Facility: CLINIC | Age: 72
End: 2022-05-24

## 2022-05-24 VITALS
OXYGEN SATURATION: 99 % | WEIGHT: 150 LBS | SYSTOLIC BLOOD PRESSURE: 126 MMHG | DIASTOLIC BLOOD PRESSURE: 74 MMHG | HEART RATE: 84 BPM | BODY MASS INDEX: 25.75 KG/M2 | TEMPERATURE: 98.2 F

## 2022-05-24 DIAGNOSIS — N39.0 RECURRENT UTI: ICD-10-CM

## 2022-05-24 DIAGNOSIS — R41.3 MEMORY LOSS: ICD-10-CM

## 2022-05-24 DIAGNOSIS — R35.0 URINARY FREQUENCY: Primary | ICD-10-CM

## 2022-05-24 DIAGNOSIS — R41.89 COGNITIVE DECLINE: ICD-10-CM

## 2022-05-24 PROBLEM — R06.02 SOB (SHORTNESS OF BREATH): Status: ACTIVE | Noted: 2020-10-08

## 2022-05-24 LAB
ALBUMIN UR-MCNC: NEGATIVE MG/DL
APPEARANCE UR: CLEAR
BACTERIA #/AREA URNS HPF: ABNORMAL /HPF
BILIRUB UR QL STRIP: NEGATIVE
COLOR UR AUTO: YELLOW
GLUCOSE UR STRIP-MCNC: NEGATIVE MG/DL
HGB UR QL STRIP: ABNORMAL
KETONES UR STRIP-MCNC: NEGATIVE MG/DL
LEUKOCYTE ESTERASE UR QL STRIP: ABNORMAL
NITRATE UR QL: NEGATIVE
PH UR STRIP: 6 [PH] (ref 5–7)
RBC #/AREA URNS AUTO: ABNORMAL /HPF
SP GR UR STRIP: 1.02 (ref 1–1.03)
SQUAMOUS #/AREA URNS AUTO: ABNORMAL /LPF
UROBILINOGEN UR STRIP-ACNC: 0.2 E.U./DL
WBC #/AREA URNS AUTO: ABNORMAL /HPF

## 2022-05-24 PROCEDURE — 87086 URINE CULTURE/COLONY COUNT: CPT | Performed by: FAMILY MEDICINE

## 2022-05-24 PROCEDURE — 81001 URINALYSIS AUTO W/SCOPE: CPT | Performed by: FAMILY MEDICINE

## 2022-05-24 PROCEDURE — 99214 OFFICE O/P EST MOD 30 MIN: CPT | Performed by: FAMILY MEDICINE

## 2022-05-24 RX ORDER — NITROFURANTOIN 25; 75 MG/1; MG/1
100 CAPSULE ORAL 2 TIMES DAILY
Qty: 14 CAPSULE | Refills: 0 | Status: SHIPPED | OUTPATIENT
Start: 2022-05-24 | End: 2022-05-31

## 2022-05-24 ASSESSMENT — PAIN SCALES - GENERAL: PAINLEVEL: NO PAIN (0)

## 2022-05-24 NOTE — PROGRESS NOTES
Assessment & Plan     Urinary frequency  SEE EPIC care orders  The potential side effects of this medication have been discussed with the patient.  Call if any significant problems with these are experienced.  Drink Fluids  Make appointment Urology  - UA Macro with Reflex to Micro and Culture - lab collect; Future  - Primary Care - Care Coordination Referral  - UA Macro with Reflex to Micro and Culture - lab collect  - Urine Microscopic Exam  - Urine Culture  - nitroFURantoin macrocrystal-monohydrate (MACROBID) 100 MG capsule; Take 1 capsule (100 mg) by mouth 2 times daily for 7 days    Recurrent UTI    - Adult Urology Referral; Future  - Primary Care - Care Coordination Referral  - nitroFURantoin macrocrystal-monohydrate (MACROBID) 100 MG capsule; Take 1 capsule (100 mg) by mouth 2 times daily for 7 days    Memory loss-could be due To depression vs  Dementia  Daughter says a little worse when she has UTI  Has appointment Neuropsych testing  Referral to  who saw Pt Today  For Further help   ? Assisted Living vs [facility]  - Primary Care - Care Coordination Referral  - nitroFURantoin macrocrystal-monohydrate (MACROBID) 100 MG capsule; Take 1 capsule (100 mg) by mouth 2 times daily for 7 days    Will have  assist family  Return in about 3 days (around 5/27/2022) for recheck/ sooner if worse or New symptoms.    Tamiko Coley MD  [facility] [place] Gerard is a 71 year old who presents for the following health issues with known history of recurrent UTI, memory issues, s/p AVR and MVR replacement who comes in with her daughter with complaints of increasing memory problems and recurrent UTI.    HPI   Chief Complaint   Patient presents with     Dizziness     Symptoms are not improving, they actually seem worse.       Memory Loss     Having more issues      Urinary Problem     Frequency and odor      This is a 71-year-old white female with known history of recurrent UTI comes  in with complaints of a strong urine which has an odor.  She denies any dysuria.  She does have some urgency and increased frequency.  Patient has had problems with recurrent UTI.  Her daughter is sick states that her mom gets more confused when she has any UTI.  She has been forgetting more things.  They did have recently a visit with a neurologist and has been scheduled for neuropsych testing.  Neurology notes reviewed.  Daughter usually works 6 days a week.  They do want to talk to the  for further options.    Patient has had no new symptoms regarding her memory issues except the daughter feels that getting a little worse.  There is no new strokelike symptoms.  Review of Systems   CONSTITUTIONAL: NEGATIVE for fever, chills, change in weight  ENT/MOUTH: NEGATIVE for ear, mouth and throat problems  RESP: NEGATIVE for significant cough or SOB  CV: NEGATIVE for chest pain, palpitations or peripheral edema  GI: NEGATIVE for nausea, abdominal pain, heartburn, or change in bowel habits  NEURO: as abiove  PSYCHIATRIC: NEGATIVE for changes in mood or affect      Objective    /74   Pulse 84   Temp 98.2  F (36.8  C) (Temporal)   Wt 68 kg (150 lb)   SpO2 99%   BMI 25.75 kg/m    Body mass index is 25.75 kg/m .  Physical Exam   GENERAL: healthy, alert and no distress  EYES: Eyes grossly normal to inspection  NECK: no adenopathy, no asymmetry, masses, or scars and thyroid normal to palpation  RESP: lungs clear to auscultation - no rales, rhonchi or wheezes  CV: regular rate and rhythm, normal S1 S2, no S3 or S4, no murmur, click or rub, no peripheral edema and peripheral pulses strong  ABDOMEN: soft, nontender, no hepatosplenomegaly, no masses and bowel sounds normal  MS: no gross musculoskeletal defects noted, no edema  NEURO: Normal strength and tone, sensory exam grossly normal, speech normal and cranial nerves 2-12 intact    Results for orders placed or performed in visit on 05/24/22 (from the past  24 hour(s))   UA Macro with Reflex to Micro and Culture - lab collect    Specimen: Urine, NOS   Result Value Ref Range    Color Urine Yellow Colorless, Straw, Light Yellow, Yellow    Appearance Urine Clear Clear    Glucose Urine Negative Negative mg/dL    Bilirubin Urine Negative Negative    Ketones Urine Negative Negative mg/dL    Specific Gravity Urine 1.020 1.003 - 1.035    Blood Urine Trace (A) Negative    pH Urine 6.0 5.0 - 7.0    Protein Albumin Urine Negative Negative mg/dL    Urobilinogen Urine 0.2 0.2, 1.0 E.U./dL    Nitrite Urine Negative Negative    Leukocyte Esterase Urine Large (A) Negative   Urine Microscopic Exam   Result Value Ref Range    Bacteria Urine Many (A) None Seen /HPF    RBC Urine 0-2 0-2 /HPF /HPF    WBC Urine 25-50 (A) 0-5 /HPF /HPF    Squamous Epithelials Urine Few (A) None Seen /LPF     IMPRESSION: MRI   1. Multiple small areas of susceptibility related signal loss  scattered throughout the cerebral hemispheres and cerebellum  suggestive of chronic microhemorrhages, new compared to the prior  exam.   2. Volume loss and white matter T2 hyperintensities which likely  represent mild chronic small vessel ischemic change commensurate with  age.

## 2022-05-24 NOTE — LETTER
[facility] CARE COORDINATION  [address] [facility]  [place] [address]    May 26, 2022    Rajani Guo  [address] [facility] [address]      Dear Rajani,    I am a clinic care coordinator who works with Tamiko Coley MD with the [facility]. I wanted to thank you for spending the time to talk with me.  Below is a description of clinic care coordination and how I can further assist you.    The clinic care coordination team is made up of a registered nurse, , financial resource worker and community health worker who understand the health care system. The goal of clinic care coordination is to help you manage your health and improve access to the health care system. Our team works alongside your provider to assist you in determining your health and social needs. We can help you obtain health care and community resources, providing you with necessary information and education. We can work with you through any barriers and develop a care plan that helps coordinate and strengthen the communication between you and your care team.    Please feel free to contact me with any questions or concerns care coordination and what we can offer.      We are focused on providing you with the highest-quality healthcare experience possible.    Sincerely,     Tiffanie Pastor, MARCELA, MSW   [facility]  Care Coordination  [facility]  [phone]  5/26/2022 10:40 AM    Enclosed: I have enclosed a copy of the Patient Centered Plan of Care. This has helpful information and goals that we have talked about. Please keep this in an easy to access place to use as needed.

## 2022-05-24 NOTE — LETTER
[facility]  Patient Centered Plan of Care  About Me:        Patient Name:  Rajani Guo    YOB: 1950  Age:         71 year old   Opal MRN:    [id number] Telephone Information:  Home Phone [phone]   Mobile [phone]       Address:  [address] [facility] [address] Email address:  [email or web address]      Emergency Contact(s)    Name Relationship Lgl Grd Work Phone Home Phone Mobile Phone   1CANDICE BROWN* Daughter No  [phone]           Primary language:  English     needed? No   [place] [employer] [facility]:  [phone] op. 1  Other communication barriers:Cognitive impairment    Preferred Method of Communication:  Gay  Current living arrangement: I live in a private home with family    Mobility Status/ Medical Equipment: Independent w/Device        Health Maintenance  Health Maintenance Reviewed: Due/Overdue   Health Maintenance Due   Topic Date Due     COPD ACTION PLAN  Never done     ZOSTER IMMUNIZATION (2 of 3) 01/19/2016     COVID-19 Vaccine (4 - Booster for Pfizer series) 02/12/2022     DIABETIC FOOT EXAM  03/01/2022     MICROALBUMIN  06/23/2022     EYE EXAM  06/25/2022         My Access Plan  Medical Emergency 911   Primary Clinic Line [facility] [phone]   24 Hour Appointment Line [phone] or  [phone]) (toll-free)   24 Hour Nurse Line [phone] (toll-free)   Preferred Urgent Care [facility] [phone]     Preferred Hospital [facility]  [phone]     Preferred Pharmacy [id number] [address] [facility] [address] [facility] [address] [facility]     Behavioral Health Crisis Line The National Suicide Prevention Lifeline at 1-227.199.5242 or 911             My Care Team Members  Patient Care Team       Relationship Specialty Notifications Start End    Tamiko Coley MD PCP - General Family Practice  3/11/19      Phone: [phone] Fax: [phone]         [address] [facility] [address]    Tamiko Coley MD Assigned PCP   3/17/19     Phone: [phone] Fax: [phone]         [address] [facility] [address]    Lora Pal MD MD Cardiology  10/12/20     Phone: [phone] Fax: [phone]         [address] [place] [address] [address] [facility] [address]    Hugo Patrick MD Assigned OBGYN Provider   10/23/20     Phone: [phone] Fax: [phone] [address] [facility] [address]    Klaudia Lopez, RT Chronic Pulmonary Disease Specialist Respiratory Therapy  1/19/21     Smoking Cessation Counseling    Phone: [phone] Pager: [phone] Fax: [phone]        [address] [facility] [address]    Bin Alcaraz MD Assigned Neuroscience Provider   3/7/21     Phone: [phone] Fax: [phone]         [address] [place] [address]    Joon Saldaña MD Assigned Surgical Provider   8/29/21     Phone: [phone] Fax: [phone]         [address] [facility] [address]    Bin Alcaraz MD MD Neurology  1/17/22     Phone: [phone] Fax: [phone]         [address] [place] [address]    Noemi Greer NP Assigned Behavioral Health Provider   1/30/22     Phone: [phone] Fax: [phone]         [address] [place] [address]    Luc Lara MD Assigned Heart and Vascular Provider   4/24/22     Phone: [phone] Fax: [phone]         [address] [place] [address] [address] [facility] [address]    Tiffanie Pastor BSW Lead Care Coordinator Primary Care - CC Admissions 5/24/22      Care Coordination [facility], Anastacia Community Health Worker Primary Care - CC Admissions 5/26/22     Phone: [phone] Fax: [phone]                My Care Plans  Self Management and Treatment Plan  Goals and (Comments)   Goals        General     1. Mental Health Management (pt-stated)      Notes -  Note edited  5/26/2022 10:10 AM by Tiffanie Pastor BSW     Goal Statement: I will reach out for emotional support as needed, accept additional home support, review in 3 months   Date Goal set: 5/24/2022  Barriers: Patient is alone large part of the time   Strengths: Patient's dtr lives with her  Date to Achieve By: 8/24/2022  Patient expressed understanding of goal: yes  Action steps to achieve this goal:  1. I will reach out to family if need further support  2. I will consider additional home or community support   3. I will take medications as prescribed, attend scheduled mental health appts         2. Medical (pt-stated)      Notes - Note created  5/26/2022 10:12 AM by Tiffanie Pastor BSW     Goal Statement: Patient/family will coordinate [employer] within 2 months   Date Goal set: 5/24/2022  Barriers: Patient is alone a large part of the time, she has fall risk, balance concerns, is dizzy and gait concerns  Strengths: Patient is accepting of [employer], SW assisted to complete online application for [employer]  Date to Achieve By: 7/24/2022  Patient expressed understanding of goal: yes  Action steps to achieve this goal:  1. I will discuss [employer] when they contact her  2. Patient/family will contact [employer] if they do not receive return response within 10 days, this # given to patient/dtr   3. I will wear [employer] at all times          3. Problem Solving (pt-stated)      Notes - Note created  5/26/2022 10:16 AM by Tiffanie Pastor BSW     Goal Statement: Patient/family will coordinate [place] Cho[place] for [place]ivered services, within 3 months   Date Goal set: 5/24/2022  Barriers: Patient desires further assistance, socialization, she has MA but needs assessment for waivered program for payment of assisted living   Strengths: SW discussed this process and provided this #, patient and dtr will call . Patient has MA  Date to Achieve By: 8/24/2022  Patient expressed understanding of goal:yes  Action steps to  achieve this goal:  1. Patient/family will find time to call [facility]   2. Patient/family will request [place]  3. Patient/family will schedule above assessment                Action Plans on File:            Depression  Heart Failure       Advance Care Plans/Directives Type: none  No data recorded    My Medical and Care Information  Problem List   Patient Active Problem List   Diagnosis     Hyperlipidemia LDL goal <70     Age-related osteoporosis without current pathological fracture     Insomnia, unspecified type     Smoker     History of partial thyroidectomy     Essential hypertension     PVD (peripheral vascular disease) (H)     Claudication of left lower extremity (H)     History of anemia     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 3/4/2019      Other cardiomyopathies (H)     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)     Fatigue     SOB (shortness of breath)     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)     Anemia, iron deficiency     Status post coronary angiogram     S/P MVR (mitral valve repair)     S/P AVR (aortic valve replacement)     UTI (urinary tract infection)     Trigger middle finger of right hand     Personal history of colonic polyps     Sensorineural hearing loss, bilateral     COPD (chronic obstructive pulmonary disease) (H)     History of CT scan of head 2021     Frailty     Atrial fibrillation (H)     Current use of long term anticoagulation     History of GI bleed     PAD (peripheral artery disease) (H)     PAF (paroxysmal atrial fibrillation) (H)     Memory problem     Type 2 diabetes mellitus with other circulatory complications (H)     Anxiety     Type 2 diabetes mellitus with other circulatory complication, without long-term current use of insulin (H)     Cognitive decline     CKD (chronic kidney disease) stage 2, GFR 60-89 ml/min     Major depressive disorder, recurrent episode, mild (H)     Venous stasis     Hypotension, unspecified  hypotension type     Balance problems          Care Coordination Start Date: 5/24/2022   Frequency of Care Coordination: monthly     Form Last Updated: 05/26/2022

## 2022-05-26 LAB — BACTERIA UR CULT: NORMAL

## 2022-05-26 SDOH — ECONOMIC STABILITY: INCOME INSECURITY: IN THE LAST 12 MONTHS, WAS THERE A TIME WHEN YOU WERE NOT ABLE TO PAY THE MORTGAGE OR RENT ON TIME?: NO

## 2022-05-26 SDOH — HEALTH STABILITY: PHYSICAL HEALTH: ON AVERAGE, HOW MANY MINUTES DO YOU ENGAGE IN EXERCISE AT THIS LEVEL?: 0 MIN

## 2022-05-26 SDOH — HEALTH STABILITY: PHYSICAL HEALTH: ON AVERAGE, HOW MANY DAYS PER WEEK DO YOU ENGAGE IN MODERATE TO STRENUOUS EXERCISE (LIKE A BRISK WALK)?: 0 DAYS

## 2022-05-26 ASSESSMENT — SOCIAL DETERMINANTS OF HEALTH (SDOH)
WITHIN THE LAST YEAR, HAVE YOU BEEN HUMILIATED OR EMOTIONALLY ABUSED IN OTHER WAYS BY YOUR PARTNER OR EX-PARTNER?: NO
DO YOU BELONG TO ANY CLUBS OR ORGANIZATIONS SUCH AS CHURCH GROUPS UNIONS, FRATERNAL OR ATHLETIC GROUPS, OR SCHOOL GROUPS?: NO
HOW OFTEN DO YOU ATTENT MEETINGS OF THE CLUB OR ORGANIZATION YOU BELONG TO?: NEVER
WITHIN THE LAST YEAR, HAVE TO BEEN RAPED OR FORCED TO HAVE ANY KIND OF SEXUAL ACTIVITY BY YOUR PARTNER OR EX-PARTNER?: NO
WITHIN THE LAST YEAR, HAVE YOU BEEN AFRAID OF YOUR PARTNER OR EX-PARTNER?: NO
WITHIN THE LAST YEAR, HAVE YOU BEEN KICKED, HIT, SLAPPED, OR OTHERWISE PHYSICALLY HURT BY YOUR PARTNER OR EX-PARTNER?: NO
HOW OFTEN DO YOU GET TOGETHER WITH FRIENDS OR RELATIVES?: MORE THAN THREE TIMES A WEEK
IN A TYPICAL WEEK, HOW MANY TIMES DO YOU TALK ON THE PHONE WITH FAMILY, FRIENDS, OR NEIGHBORS?: MORE THAN THREE TIMES A WEEK
HOW OFTEN DO YOU ATTEND CHURCH OR RELIGIOUS SERVICES?: 1 TO 4 TIMES PER YEAR

## 2022-05-26 ASSESSMENT — LIFESTYLE VARIABLES
HOW OFTEN DO YOU HAVE A DRINK CONTAINING ALCOHOL: NEVER
AUDIT-C TOTAL SCORE: -1
SKIP TO QUESTIONS 9-10: 0
HOW MANY STANDARD DRINKS CONTAINING ALCOHOL DO YOU HAVE ON A TYPICAL DAY: PATIENT DOES NOT DRINK
HOW OFTEN DO YOU HAVE SIX OR MORE DRINKS ON ONE OCCASION: PATIENT DECLINED

## 2022-05-26 ASSESSMENT — ACTIVITIES OF DAILY LIVING (ADL): DEPENDENT_IADLS:: CLEANING;LAUNDRY;SHOPPING;MEDICATION MANAGEMENT;TRANSPORTATION

## 2022-05-26 NOTE — PROGRESS NOTES
Clinic Care Coordination Contact    Clinic Care Coordination Contact  OUTREACH    Referral Information:  Patient and dtr desire information on assisted living, in person visit   Referral Source: PCP    Primary Diagnosis: Psychosocial    Chief Complaint   Patient presents with     Clinic Care Coordination - Face To Face        Universal Utilization: PCP office visit 5/24/2022  Clinic Utilization  Difficulty keeping appointments:: No  Compliance Concerns: No  No-Show Concerns: No  No PCP office visit in Past Year: No  Utilization    Hospital Admissions  0             ED Visits  1             No Show Count (past year)  3                Current as of: 5/26/2022  8:53 AM              Clinical Concerns: Per PCP office visit note 5/24/2022, Patient reported worsening dizziness, she has existing balance problems and gait disorder. Patient has outpatient PT at [facility], last visit was 5/13/2022.  Patient and dtr reported increasing memory concerns, state that patient recently left on the deep fryer.  Patient has a scheduled neuropsychology assessment (date unknown) and will follow up with neurology in 9/2022 after that appt.     Per neurology visit note 5/3/2022,  Patient reported increased short term memory recall, forgetting where she placed items and forgetting details of conversations. Patient was seen in OT, per pre driving screen, there were concerns noted with driving.  Patient reported memory concerns beginning after AVR/MVR surgery in December 2020.  MOCA last year was 21/30.  MOCA this visit was 19/30.  Patient reported feeling depressed in that visit.  Repeat neuropsychology assessment was recommended based on memory changes.  Information was given by OT for [employer] driving assessment, this was not discussed in SW visit 5/24/2022 in clinic    Patient has recurrent UTI's, patient reported concern of UTI in recent clinic visit.  She reported odor and frequency with urination.  Referral to urology and ABX  prescribed.      Patient and dtr wished to discuss assisted living.  SANJANA discussed this process.  SANJA[place] informed [place] of [place]ivered services. Patient has Medical Assistance, but could use waivered service program as partial/payment for assisted living.  SANJANA provided this # and informed this is a lengthy wait for an assessment currently, but to call and initiate the process     SANJANA assisted with online Lifeline application for low income program.  SANJANA provided the # for patient and dtr to call and follow up in 10 days if no return response, SANJANA stated all resources are taking much more time than pre COVID to get in place.  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)     Fatigue     SOB (shortness of breath)     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)     Anemia, iron deficiency     Status post coronary angiogram     S/P MVR (mitral valve repair)     S/P AVR (aortic valve replacement)     UTI (urinary tract infection)     Trigger middle finger of right hand     Personal history of colonic polyps     Sensorineural hearing loss, bilateral     COPD (chronic obstructive pulmonary disease) (H)     History of CT scan of head 2021     Frailty     Atrial fibrillation (H)     Current use of long term anticoagulation     History of GI bleed     PAD (peripheral artery disease) (H)     PAF (paroxysmal atrial fibrillation) (H)     Memory  problem     Type 2 diabetes mellitus with other circulatory complications (H)     Anxiety     Type 2 diabetes mellitus with other circulatory complication, without long-term current use of insulin (H)     Cognitive decline     CKD (chronic kidney disease) stage 2, GFR 60-89 ml/min     Major depressive disorder, recurrent episode, mild (H)     Venous stasis     Hypotension, unspecified hypotension type     Balance problems     Current Behavioral Concerns: Patient has depression dx, takes related medication and sees psychiatry.  No mood concerns noted in visit   Education Provided to patient[place]     Health Maintenance Reviewed: Due/Overdue   Health Maintenance Due   Topic Date Due     COPD ACTION PLAN  Never done     ZOSTER IMMUNIZATION (2 of 3) 01/19/2016     COVID-19 Vaccine (4 - Booster for Pfizer series) 02/12/2022     DIABETIC FOOT EXAM  03/01/2022     MICROALBUMIN  06/23/2022     EYE EXAM  06/25/2022     Clinical Pathway: None    Medication Management:  Medication review status: Medications reviewed in clinic visit 5/24/2022  Patient's dtr sets up and ensures patient takes medications, no concerns with taking with reminders and no concerns with affording medications     Functional Status:  Dependent ADL's:: Ambulation-walker (frequency, urgency related to UTI)  Dependent IADLs:: Cleaning, Laundry, Shopping, Medication Management, Transportation  Bed or wheelchair confined:: No  Mobility Status: Independent w/Device  Fallen 2 or more times in the past year?: Yes  Any fall with injury in the past year?: No    Living Situation:  Current living arrangement:: I live in a private home with family  Type of residence:: Private home - stairs    Lifestyle & Psychosocial Needs:    Social Determinants of Health     Tobacco Use: High Risk     Smoking Tobacco Use: Current Every Day Smoker     Smokeless Tobacco Use: Never Used   Alcohol Use: Not At Risk     Frequency of Alcohol Consumption: Never      Average Number of Drinks: Patient does not drink     Frequency of Binge Drinking: Patient refused   Financial Resource Strain: Low Risk      Difficulty of Paying Living Expenses: Not hard at all   Food Insecurity: No Food Insecurity     Worried About Running Out of Food in the Last Year: Never true     Ran Out of Food in the Last Year: Never true   Transportation Needs: No Transportation Needs     Lack of Transportation (Medical): No     Lack of Transportation (Non-Medical): No   Physical Activity: Inactive     Days of Exercise per Week: 0 days     Minutes of Exercise per Session: 0 min   Stress: Stress Concern Present     Feeling of Stress : To some extent   Social Connections: Moderately Isolated     Frequency of Communication with Friends and Family: More than three times a week     Frequency of Social Gatherings with Friends and Family: More than three times a week     Attends Orthodoxy Services: 1 to 4 times per year     Active Member of Clubs or Organizations: No     Attends Club or Organization Meetings: Never     Marital Status:    Intimate Partner Violence: Not At Risk     Fear of Current or Ex-Partner: No     Emotionally Abused: No     Physically Abused: No     Sexually Abused: No   Depression: At risk     PHQ-2 Score: 5   Housing Stability: Unknown     Unable to Pay for Housing in the Last Year: No     Number of Places Lived in the Last Year: Not on file     Unstable Housing in the Last Year: No     Diet:: Diabetic diet  Inadequate nutrition (GOAL):: No  Tube Feeding: No  Inadequate activity/exercise (GOAL):: No  Significant changes in sleep pattern (GOAL): No  Transportation means:: Family, Regular car     Orthodoxy or spiritual beliefs that impact treatment:: No  Mental health DX:: Yes  Mental health DX how managed:: Medication, Psychiatrist  Mental health management concern (GOAL):: Yes  Chemical Dependency Status: No Current Concerns  Chemical Dependency Management: Other (see comment)  (N/A)  Informal Support system:: Children, Family      Resources and Interventions:  [place]  Current Resources:      Community Resources: Other (see comment) (PT, neurology, INR clinic)  Supplies Currently Used at Home: None, Diabetic Supplies, Incontinence Supplies  Equipment Currently Used at Home: walker, standard, grab bar, tub/shower  Employment Status: retired     Advance Care Plan/Directive  Advanced Care Plans/Directives on file:: No  Advanced Care Plan/Directive Status: Not Applicable    Referrals Placed: [facility], [facility], Assisted Living, Other  ([place] program, plan for [facility]; PCP placed urology referral)       Goals:    Goals        General     1. Mental Health Management (pt-stated)      Notes - Note edited  5/26/2022 10:10 AM by Tiffanie Pastor BSW     Goal Statement: I will reach out for emotional support as needed, accept additional home support, review in 3 months   Date Goal set: 5/24/2022  Barriers: Patient is alone large part of the time   Strengths: Patient's dtr lives with her  Date to Achieve By: 8/24/2022  Patient expressed understanding of goal: yes  Action steps to achieve this goal:  1. I will reach out to family if need further support  2. I will consider additional home or community support   3. I will take medications as prescribed, attend scheduled mental health appts         2. Medical (pt-stated)      Notes - Note created  5/26/2022 10:12 AM by Tiffanie Pastor BSW     Goal Statement: Patient/family will coordinate [employer] within 2 months   Date Goal set: 5/24/2022  Barriers: Patient is alone a large part of the time, she has fall risk, balance concerns, is dizzy and gait concerns  Strengths: Patient is accepting of Lifeline, SW assisted to complete online application for Lifeline  Date to Achieve By: 7/24/2022  Patient expressed understanding of goal: yes  Action steps to achieve this goal:  1. I will discuss  [employer] when they contact her  2. Patient/family will contact [employer] if they do not receive return response within 10 days, this # given to patient/dtr   3. I will wear Lifeline at all times          3. Problem Solving (pt-stated)      Notes - Note created  5/26/2022 10:16 AM by Tiffanie Pastor BSW     Goal Statement: [place], within 3 months   Date Goal set: 5/24/2022  Barriers: Patient desires further assistance, socialization, she has MA but needs assessment for waivered program for payment of assisted living   Strengths: SW discussed this process and provided this #, patient and dtr will call . Patient has MA  Date to Achieve By: 8/24/2022  Patient expressed understanding of goal:yes  Action steps to achieve this goal:  1. Patient/family will find time to call [facility]   2. [place]  3. Patient/family will schedule above assessment               CC SANJANA delegates above goals to CHW to call monthly     Patient/Caregiver understanding:  Patient will reach out for emotional support as needed, accept additional home support.  Patient and family will coordinate [employer].  [place] and [place]    Outreach Frequency: monthly  Future Appointments              Tomorrow Connor Shelton, PT [facility]    Tomorrow FZ LAB [facility], [facility]    In 1 week [place] LAB [facility], [place]    In 2 weeks Connor Shelton, PT [facility]    In 1 month Joon Saldaña MD [facility] MARIE [place]    In 3 months Nirav Krishna, PhD [facility] Neuropsychology [facility]    In 4 months Bin Alcaraz MD [facility] Neurology [facility] [place]  MIGEL Gillis          Plan:   1) Patient will reach out for emotional support as needed, accept additional home support  2) Patient and family will coordinate [employer]  3) [place] and [place]  4) SW will route to CHW to follow as above on goals, Care Coordinator will review progress to goals and plan of care in 6 weeks.  5) SW updated PCP after this visit     MARCELA Payton, MSW   [facility]  Care Coordination  [facility]  [phone]  5/26/2022 10:36 AM

## 2022-05-27 ENCOUNTER — THERAPY VISIT (OUTPATIENT)
Dept: PHYSICAL THERAPY | Facility: CLINIC | Age: 72
End: 2022-05-27

## 2022-05-27 ENCOUNTER — LAB (OUTPATIENT)
Dept: LAB | Facility: CLINIC | Age: 72
End: 2022-05-27
Payer: COMMERCIAL

## 2022-05-27 ENCOUNTER — ANTICOAGULATION THERAPY VISIT (OUTPATIENT)
Dept: ANTICOAGULATION | Facility: CLINIC | Age: 72
End: 2022-05-27

## 2022-05-27 DIAGNOSIS — Z95.2 S/P AVR (AORTIC VALVE REPLACEMENT): ICD-10-CM

## 2022-05-27 DIAGNOSIS — R26.89 BALANCE PROBLEMS: Primary | ICD-10-CM

## 2022-05-27 DIAGNOSIS — N18.2 CKD (CHRONIC KIDNEY DISEASE) STAGE 2, GFR 60-89 ML/MIN: Primary | ICD-10-CM

## 2022-05-27 DIAGNOSIS — I48.91 ATRIAL FIBRILLATION (H): ICD-10-CM

## 2022-05-27 DIAGNOSIS — Z98.890 S/P MVR (MITRAL VALVE REPAIR): Primary | ICD-10-CM

## 2022-05-27 DIAGNOSIS — Z79.01 CURRENT USE OF LONG TERM ANTICOAGULATION: ICD-10-CM

## 2022-05-27 LAB — INR BLD: 5.7 (ref 0.9–1.1)

## 2022-05-27 PROCEDURE — 85610 PROTHROMBIN TIME: CPT

## 2022-05-27 PROCEDURE — 97112 NEUROMUSCULAR REEDUCATION: CPT | Mod: GP | Performed by: PHYSICAL THERAPIST

## 2022-05-27 PROCEDURE — 97110 THERAPEUTIC EXERCISES: CPT | Mod: GP | Performed by: PHYSICAL THERAPIST

## 2022-05-27 PROCEDURE — 36416 COLLJ CAPILLARY BLOOD SPEC: CPT

## 2022-05-27 NOTE — PROGRESS NOTES
ANTICOAGULATION MANAGEMENT     Rajani Guo 71 year old female is on warfarin with supratherapeutic INR result. (Goal INR 2.5-3.5)    Recent labs: (last 7 days)     05/27/22  1145   INR 5.7*       ASSESSMENT     Source(s): Chart Review  Previous INR was Therapeutic last 2(+) visits  Medication, diet, health changes since last INR chart reviewed; none identified           PLAN     Unable to reach Candy today.    LMTCB    Follow up required to confirm warfarin dose taken and assess for changes and discuss out of range result     Lesley Ortega RN  Anticoagulation Clinic  5/27/2022

## 2022-05-27 NOTE — PROGRESS NOTES
ANTICOAGULATION MANAGEMENT     Rajani Guo 71 year old female is on warfarin with supratherapeutic INR result. (Goal INR 2.5-3.5)    Recent labs: (last 7 days)     05/27/22  1145   INR 5.7*       ASSESSMENT     Source(s): Chart Review and Patient/Caregiver Call     Warfarin doses taken: Warfarin taken as instructed  Diet: No new diet changes identified  New illness, injury, or hospitalization: Yes: UTI  Medication/supplement changes: Macrobid  Signs or symptoms of bleeding or clotting: No  Previous INR: Therapeutic last 2(+) visits  Additional findings: None       PLAN     Recommended plan for no diet, medication or health factor changes affecting INR     Dosing Instructions: hold dose then continue your current warfarin dose with next INR in 3 days       Summary  As of 5/27/2022    Full warfarin instructions:  5/27: Hold; Otherwise 10 mg every day   Next INR check:  5/31/2022             Telephone call with  Sarai who verbalizes understanding and agrees to plan    Lab visit scheduled    Education provided: Goal range and significance of current result    Plan made per [facility] anticoagulation protocol    Lesley Ortega RN  Anticoagulation Clinic  5/27/2022    _______________________________________________________________________     Anticoagulation Episode Summary     Current INR goal:  2.5-3.5   TTR:  44.3 % (1 y)   Target end date:  Indefinite   Send INR reminders to:  GURVINDER SALAZAR    Indications    S/P MVR (mitral valve repair) [Z98.890]  S/P AVR (aortic valve replacement) [Z95.2]           Comments:  Pt gave verbal ok to speak with Sarai on 7/20/21         Anticoagulation Care Providers     Provider Role Specialty Phone number    Quinton Handy PA Referring Cardiovascular & Thoracic Surgery [phone]    Lakeshia Rubio, FRANCESCA CNP Referring Internal Medicine [phone]    Sparkle Bird APRN CNP Referring Emergency Medicine [phone]           Pt tolerating PO

## 2022-05-31 ENCOUNTER — ANTICOAGULATION THERAPY VISIT (OUTPATIENT)
Dept: ANTICOAGULATION | Facility: CLINIC | Age: 72
End: 2022-05-31

## 2022-05-31 ENCOUNTER — LAB (OUTPATIENT)
Dept: LAB | Facility: CLINIC | Age: 72
End: 2022-05-31
Payer: COMMERCIAL

## 2022-05-31 DIAGNOSIS — Z98.890 S/P MVR (MITRAL VALVE REPAIR): Primary | ICD-10-CM

## 2022-05-31 DIAGNOSIS — I48.91 ATRIAL FIBRILLATION (H): ICD-10-CM

## 2022-05-31 DIAGNOSIS — Z79.01 CURRENT USE OF LONG TERM ANTICOAGULATION: ICD-10-CM

## 2022-05-31 DIAGNOSIS — F41.9 ANXIETY: ICD-10-CM

## 2022-05-31 DIAGNOSIS — Z95.2 S/P AVR (AORTIC VALVE REPLACEMENT): ICD-10-CM

## 2022-05-31 DIAGNOSIS — N18.2 CKD (CHRONIC KIDNEY DISEASE) STAGE 2, GFR 60-89 ML/MIN: ICD-10-CM

## 2022-05-31 DIAGNOSIS — R30.0 DYSURIA: ICD-10-CM

## 2022-05-31 LAB
ALBUMIN UR-MCNC: NEGATIVE MG/DL
APPEARANCE UR: CLEAR
BACTERIA #/AREA URNS HPF: ABNORMAL /HPF
BILIRUB UR QL STRIP: NEGATIVE
COLOR UR AUTO: YELLOW
CREAT UR-MCNC: 103 MG/DL
GLUCOSE UR STRIP-MCNC: NEGATIVE MG/DL
HGB UR QL STRIP: ABNORMAL
HYALINE CASTS #/AREA URNS LPF: ABNORMAL /LPF
INR BLD: 3.3 (ref 0.9–1.1)
KETONES UR STRIP-MCNC: NEGATIVE MG/DL
LEUKOCYTE ESTERASE UR QL STRIP: ABNORMAL
MICROALBUMIN UR-MCNC: 42 MG/L
MICROALBUMIN/CREAT UR: 40.78 MG/G CR (ref 0–25)
NITRATE UR QL: NEGATIVE
PH UR STRIP: 5.5 [PH] (ref 5–7)
RBC #/AREA URNS AUTO: ABNORMAL /HPF
SP GR UR STRIP: 1.02 (ref 1–1.03)
SQUAMOUS #/AREA URNS AUTO: ABNORMAL /LPF
TRANS CELLS #/AREA URNS HPF: ABNORMAL /HPF
UROBILINOGEN UR STRIP-ACNC: 0.2 E.U./DL
WBC #/AREA URNS AUTO: ABNORMAL /HPF

## 2022-05-31 PROCEDURE — 81001 URINALYSIS AUTO W/SCOPE: CPT

## 2022-05-31 PROCEDURE — 36416 COLLJ CAPILLARY BLOOD SPEC: CPT

## 2022-05-31 PROCEDURE — 82043 UR ALBUMIN QUANTITATIVE: CPT

## 2022-05-31 PROCEDURE — 87086 URINE CULTURE/COLONY COUNT: CPT

## 2022-05-31 PROCEDURE — 85610 PROTHROMBIN TIME: CPT

## 2022-05-31 NOTE — PROGRESS NOTES
ANTICOAGULATION MANAGEMENT     Rajani Guo 71 year old female is on warfarin with therapeutic INR result. (Goal INR 2.5-3.5)    Recent labs: (last 7 days)     05/31/22  0902   INR 3.3*       ASSESSMENT     Source(s): Chart Review  Previous INR was Supratherapeutic  Medication, diet, health changes since last INR chart reviewed; none identified           PLAN     Recommended plan for no diet, medication or health factor changes affecting INR     Dosing Instructions: continue your current warfarin dose with next INR in 2 weeks       Summary  As of 5/31/2022    Full warfarin instructions:  10 mg every day   Next INR check:  6/14/2022             Detailed voice message left for  Sarai with dosing instructions and follow up date.     Contact [phone]  to schedule and with any changes, questions or concerns.     Education provided: Please call back if any changes to your diet, medications or how you've been taking warfarin and Goal range and significance of current result    Plan made per [facility] anticoagulation protocol    Lesley Ortega RN  Anticoagulation Clinic  5/31/2022    _______________________________________________________________________     Anticoagulation Episode Summary     Current INR goal:  2.5-3.5   TTR:  43.5 % (1 y)   Target end date:  Indefinite   Send INR reminders to:  GURVINDER SALAZAR    Indications    S/P MVR (mitral valve repair) [Z98.890]  S/P AVR (aortic valve replacement) [Z95.2]           Comments:  Pt gave verbal ok to speak with Candy on 7/20/21         Anticoagulation Care Providers     Provider Role Specialty Phone number    Quinton Handy PA Referring Cardiovascular & Thoracic Surgery [phone]    Lakeshia Rubio, FRANCESCA SCHULZ Referring Internal Medicine [phone]    Sparkle Bird APRN CNP Referring Emergency Medicine [phone]

## 2022-06-01 RX ORDER — CITALOPRAM HYDROBROMIDE 20 MG/1
TABLET ORAL
Qty: 30 TABLET | Refills: 0 | OUTPATIENT
Start: 2022-06-01

## 2022-06-01 RX ORDER — WARFARIN SODIUM 2 MG/1
TABLET ORAL
Qty: 350 TABLET | Refills: 0 | Status: SHIPPED | OUTPATIENT
Start: 2022-06-01 | End: 2022-10-19

## 2022-06-01 NOTE — TELEPHONE ENCOUNTER
"Requested Prescriptions   Pending Prescriptions Disp Refills     warfarin ANTICOAGULANT (COUMADIN) 2 MG tablet [Pharmacy Med Name: WARFARIN SODIUM 2 MG TABLET] 350 tablet 0     Sig: TAKE 5 TABS (10MG) BY MOUTH ON SAT, SUN, TUES, AND THURS AND TAKE 4 TABS (8MG) ON MON, WED, AND FRI OR AS DIRECTED BY ANTICOAGULATION CLINIC       Vitamin K Antagonists Failed - 5/31/2022  5:24 PM        Failed - INR is within goal in the past 6 weeks     Confirm INR is within goal in the past 6 weeks.     Recent Labs   Lab Test 05/31/22  0902   INR 3.3*                       Passed - Recent (12 mo) or future (30 days) visit within the authorizing provider's specialty     Patient has had an office visit with the authorizing provider or a provider within the authorizing providers department within the previous 12 mos or has a future within next 30 days. See \"Patient Info\" tab in inbasket, or \"Choose Columns\" in Meds & Orders section of the refill encounter.              Passed - Medication is active on med list        Passed - Patient is 18 years of age or older        Passed - Patient is not pregnant        Passed - No positive pregnancy on file in past 12 months         Refused Prescriptions Disp Refills     citalopram (CELEXA) 20 MG tablet [Pharmacy Med Name: CITALOPRAM HBR 20 MG TABLET] 30 tablet 0     Sig: TAKE 1 TABLET BY MOUTH EVERY DAY       SSRIs Protocol Failed - 5/31/2022  5:24 PM        Failed - Medication is active on med list        Passed - Recent (12 mo) or future (30 days) visit within the authorizing provider's specialty     Patient has had an office visit with the authorizing provider or a provider within the authorizing providers department within the previous 12 mos or has a future within next 30 days. See \"Patient Info\" tab in inbasket, or \"Choose Columns\" in Meds & Orders section of the refill encounter.              Passed - Patient is age 18 or older        Passed - No active pregnancy on record        Passed - No " positive pregnancy test in last 12 months           Routing refill request to provider for review/approval because:  INR    Thanks,  Jeri RN  [facility]

## 2022-06-02 LAB — BACTERIA UR CULT: NO GROWTH

## 2022-06-10 ENCOUNTER — THERAPY VISIT (OUTPATIENT)
Dept: PHYSICAL THERAPY | Facility: CLINIC | Age: 72
End: 2022-06-10

## 2022-06-10 ENCOUNTER — ANTICOAGULATION THERAPY VISIT (OUTPATIENT)
Dept: ANTICOAGULATION | Facility: CLINIC | Age: 72
End: 2022-06-10

## 2022-06-10 ENCOUNTER — LAB (OUTPATIENT)
Dept: LAB | Facility: CLINIC | Age: 72
End: 2022-06-10
Payer: COMMERCIAL

## 2022-06-10 DIAGNOSIS — Z95.2 S/P AVR (AORTIC VALVE REPLACEMENT): ICD-10-CM

## 2022-06-10 DIAGNOSIS — Z98.890 S/P MVR (MITRAL VALVE REPAIR): Primary | ICD-10-CM

## 2022-06-10 DIAGNOSIS — I48.91 ATRIAL FIBRILLATION (H): ICD-10-CM

## 2022-06-10 DIAGNOSIS — R26.89 BALANCE PROBLEMS: Primary | ICD-10-CM

## 2022-06-10 DIAGNOSIS — Z79.01 CURRENT USE OF LONG TERM ANTICOAGULATION: ICD-10-CM

## 2022-06-10 LAB — INR BLD: 3.5 (ref 0.9–1.1)

## 2022-06-10 PROCEDURE — 85610 PROTHROMBIN TIME: CPT

## 2022-06-10 PROCEDURE — 36416 COLLJ CAPILLARY BLOOD SPEC: CPT

## 2022-06-10 PROCEDURE — 97110 THERAPEUTIC EXERCISES: CPT | Mod: GP | Performed by: PHYSICAL THERAPIST

## 2022-06-10 PROCEDURE — 97112 NEUROMUSCULAR REEDUCATION: CPT | Mod: GP | Performed by: PHYSICAL THERAPIST

## 2022-06-10 PROCEDURE — 97750 PHYSICAL PERFORMANCE TEST: CPT | Mod: GP | Performed by: PHYSICAL THERAPIST

## 2022-06-10 NOTE — PROGRESS NOTES
[facility]    OUTPATIENT Physical Therapy ORTHOPEDIC EVALUATION  PLAN OF TREATMENT FOR OUTPATIENT REHABILITATION  (COMPLETE FOR INITIAL CLAIMS ONLY)  Patient's Last Name, First Name, M.I.  YOB: 1950  Rajani Guo    Provider s Name:  [facility]   Medical Record No.  [id number]   Start of Care Date:  04/12/22   Onset Date:   01/01/21   Type:     _X__PT   ___OT Medical Diagnosis:    Encounter Diagnosis   Name Primary?    Balance problems Yes        Treatment Diagnosis:  Lower extremity weakness / Balance deficits        Goals:     06/10/22 0500   Body Part   Goals listed below are for Gait   Goal #1   Goal #1 ambulation   Previous Functional Level No restrictions   Current Functional Level Minutes patient can walk   Performance Level 10 with walker   STG Target Performance Minutes patient will be able to walk   Performance Level 10 with single point cane   Rationale for safe community ambulation   Due Date 05/03/22   If goal not met, Why? Unable to walk for 10 minutes    LTG Target Performance Minutes patient will be able to  walk   Performance Level 15 with single point cane   Rationale for safe community ambulation   Due Date 06/07/22   Goal #2   Goal #2 balance   Previous Functional Level No issues    Current Functional Level Gregory Balance test score   Performance level 42   STG Target Performance Increase;Gregory Balance score to   Performance level 46   Rationale for safe community ambulation   Due date 05/03/22   Date Goal Met 06/10/22   LTG Target Performance Increase;Gregory Balance score to   Performance Level 47 or higher   Rationale for safe community ambulation   Due date 06/07/22   Date Goal Met 06/10/22       Therapy Frequency:  1 time every 2 weeks  Predicted Duration of Therapy Intervention:  6 weeks    Connor Shelton PT                 I CERTIFY THE  NEED FOR THESE SERVICES FURNISHED UNDER        THIS PLAN OF TREATMENT AND WHILE UNDER MY CARE     (Physician attestation of this document indicates review and certification of the therapy plan).                     Certification Date From:  06/08/22   Certification Date To:  07/22/22    Referring Provider:  Tamiko Coley    Initial Assessment        See Epic Evaluation SOC Date: 04/12/22

## 2022-06-10 NOTE — PROGRESS NOTES
PROGRESS  REPORT    Progress reporting period is from 4/12/2022 to 6/10/2022.       SUBJECTIVE  Subjective: No falls since last visit. Has been doing HEP every other day. Still wobbly in the morning but has walker to help with balance. Overall balance is improving. Feels wobbly when not using cane or walker.    Current Pain level: 0/10.     Initial Pain level: 0/10.   Changes in function:  Yes (See Goal flowsheet attached for changes in current functional level)  Adverse reaction to treatment or activity: None    OBJECTIVE  Changes noted in objective findings:  The objective findings below are from DOS 6/10/2022.    Gregory - 48/56  Observation - PT assist required on 2 occasions during dynamic balance exercises.    ASSESSMENT/PLAN  Updated problem list and treatment plan: Diagnosis 1:  Lower extremity weakness / Balance deficits  Decreased ROM/flexibility - manual therapy, therapeutic exercise, therapeutic activity and home program  Decreased joint mobility - manual therapy, therapeutic exercise, therapeutic activity and home program  Decreased strength - therapeutic exercise, therapeutic activities and home program  Impaired balance - neuro re-education, gait training, therapeutic activities, adaptive equipment/assistive device and home program  Impaired gait - gait training, assistive devices and home program  Decreased function - therapeutic activities, home program and functional performance testing  STG/LTGs have been met or progress has been made towards goals:  Yes (See Goal flow sheet completed today.)  Assessment of Progress: The patient's condition is improving.  Self Management Plans:  Patient has been instructed in a home treatment program.  Patient is independent in a home treatment program.  I have re-evaluated this patient and find that the nature, scope, duration and intensity of the therapy is appropriate for the medical condition of the patient.  Rajani continues to require the following  intervention to meet STG and LTG's:  PT    Recommendations:  This patient would benefit from continued therapy.     Frequency:  1 X ever 2 weeks, once daily  Duration:  for 6 weeks        Please refer to the daily flowsheet for treatment today, total treatment time and time spent performing 1:1 timed codes.

## 2022-06-10 NOTE — PROGRESS NOTES
ANTICOAGULATION MANAGEMENT     Rajani Guo 71 year old female is on warfarin with therapeutic INR result. (Goal INR 2.5-3.5)    Recent labs: (last 7 days)     06/10/22  1338   INR 3.5*       ASSESSMENT     Source(s): Chart Review and Patient/Caregiver Call     Warfarin doses taken: Warfarin taken as instructed  Diet: No new diet changes identified  New illness, injury, or hospitalization: No  Medication/supplement changes: None noted  Signs or symptoms of bleeding or clotting: No  Previous INR: Therapeutic last 2(+) visits  Additional findings: None       PLAN     Recommended plan for no diet, medication or health factor changes affecting INR     Dosing Instructions: continue your current warfarin dose with next INR in 3 weeks       Summary  As of 6/10/2022    Full warfarin instructions:  10 mg every day   Next INR check:  7/1/2022             Telephone call with Sarai who verbalizes understanding and agrees to plan    Lab visit scheduled    Education provided: Goal range and significance of current result    Plan made per [facility] anticoagulation protocol    Lesley Ortega RN  Anticoagulation Clinic  6/10/2022    _______________________________________________________________________     Anticoagulation Episode Summary     Current INR goal:  2.5-3.5   TTR:  45.0 % (1 y)   Target end date:  Indefinite   Send INR reminders to:  GURVINDER SALAZAR    Indications    S/P MVR (mitral valve repair) [Z98.890]  S/P AVR (aortic valve replacement) [Z95.2]           Comments:  Pt gave verbal ok to speak with Sarai on 7/20/21         Anticoagulation Care Providers     Provider Role Specialty Phone number    Quinton Handy PA Referring Cardiovascular & Thoracic Surgery [phone]    Lakeshia Rubio, FRANCESCA SCHULZ Referring Internal Medicine [phone]    Sparkle Bird APRN CNP Referring Emergency Medicine [phone]

## 2022-06-14 DIAGNOSIS — K29.80 DUODENITIS: ICD-10-CM

## 2022-06-15 RX ORDER — PANTOPRAZOLE SODIUM 40 MG/1
TABLET, DELAYED RELEASE ORAL
Qty: 90 TABLET | Refills: 1 | Status: SHIPPED | OUTPATIENT
Start: 2022-06-15 | End: 2022-08-04

## 2022-06-15 NOTE — TELEPHONE ENCOUNTER
"Requested Prescriptions   Pending Prescriptions Disp Refills     pantoprazole (PROTONIX) 40 MG EC tablet [Pharmacy Med Name: PANTOPRAZOLE SOD DR 40 MG TAB] 90 tablet 1     Sig: TAKE 1 TABLET BY MOUTH EVERY DAY       PPI Protocol Failed - 6/14/2022  9:19 AM        Failed - No diagnosis of osteoporosis on record        Passed - Not on Clopidogrel (unless Pantoprazole ordered)        Passed - Recent (12 mo) or future (30 days) visit within the authorizing provider's specialty     Patient has had an office visit with the authorizing provider or a provider within the authorizing providers department within the previous 12 mos or has a future within next 30 days. See \"Patient Info\" tab in inbasket, or \"Choose Columns\" in Meds & Orders section of the refill encounter.              Passed - Medication is active on med list        Passed - Patient is age 18 or older        Passed - No active pregnacy on record        Passed - No positive pregnancy test in past 12 months           Routing refill request to provider for review/approval because:  osteoporosis dx    Jeri Bejarano RN  [facility]           "

## 2022-06-16 ENCOUNTER — APPOINTMENT (OUTPATIENT)
Dept: CT IMAGING | Facility: CLINIC | Age: 72
End: 2022-06-16
Attending: EMERGENCY MEDICINE
Payer: COMMERCIAL

## 2022-06-16 ENCOUNTER — NURSE TRIAGE (OUTPATIENT)
Dept: NURSING | Facility: CLINIC | Age: 72
End: 2022-06-16
Payer: COMMERCIAL

## 2022-06-16 ENCOUNTER — HOSPITAL ENCOUNTER (EMERGENCY)
Facility: CLINIC | Age: 72
Discharge: HOME | End: 2022-06-17
Attending: EMERGENCY MEDICINE | Admitting: EMERGENCY MEDICINE
Payer: COMMERCIAL

## 2022-06-16 ENCOUNTER — APPOINTMENT (OUTPATIENT)
Dept: GENERAL RADIOLOGY | Facility: CLINIC | Age: 72
End: 2022-06-16
Attending: EMERGENCY MEDICINE
Payer: COMMERCIAL
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
## 2022-06-16 DIAGNOSIS — S90.511A ABRASION OF RIGHT ANKLE, INITIAL ENCOUNTER: ICD-10-CM

## 2022-06-16 DIAGNOSIS — S40.211A ABRASION OF RIGHT SHOULDER, INITIAL ENCOUNTER: ICD-10-CM

## 2022-06-16 DIAGNOSIS — S09.90XA CLOSED HEAD INJURY, INITIAL ENCOUNTER: Primary | ICD-10-CM

## 2022-06-16 LAB
HOLD SPECIMEN: NORMAL
INR PPP: 4.59 (ref 0.85–1.15)

## 2022-06-16 PROCEDURE — 73030 X-RAY EXAM OF SHOULDER: CPT | Mod: RT

## 2022-06-16 PROCEDURE — 70450 CT HEAD/BRAIN W/O DYE: CPT | Mod: 26 | Performed by: RADIOLOGY

## 2022-06-16 PROCEDURE — 73030 X-RAY EXAM OF SHOULDER: CPT | Mod: 26 | Performed by: RADIOLOGY

## 2022-06-16 PROCEDURE — 93010 ELECTROCARDIOGRAM REPORT: CPT | Performed by: EMERGENCY MEDICINE

## 2022-06-16 PROCEDURE — 93005 ELECTROCARDIOGRAM TRACING: CPT | Performed by: EMERGENCY MEDICINE

## 2022-06-16 PROCEDURE — 70450 CT HEAD/BRAIN W/O DYE: CPT

## 2022-06-16 PROCEDURE — 85610 PROTHROMBIN TIME: CPT | Performed by: EMERGENCY MEDICINE

## 2022-06-16 PROCEDURE — 99284 EMERGENCY DEPT VISIT MOD MDM: CPT | Mod: 25 | Performed by: EMERGENCY MEDICINE

## 2022-06-16 PROCEDURE — 36415 COLL VENOUS BLD VENIPUNCTURE: CPT | Performed by: EMERGENCY MEDICINE

## 2022-06-16 PROCEDURE — 99285 EMERGENCY DEPT VISIT HI MDM: CPT | Mod: 25 | Performed by: EMERGENCY MEDICINE

## 2022-06-16 ASSESSMENT — ENCOUNTER SYMPTOMS
CONSTITUTIONAL NEGATIVE: 1
NECK PAIN: 0
COLOR CHANGE: 1
DIZZINESS: 0

## 2022-06-17 ENCOUNTER — APPOINTMENT (OUTPATIENT)
Dept: CT IMAGING | Facility: CLINIC | Age: 72
End: 2022-06-17
Attending: EMERGENCY MEDICINE
Payer: COMMERCIAL

## 2022-06-17 ENCOUNTER — DOCUMENTATION ONLY (OUTPATIENT)
Dept: ANTICOAGULATION | Facility: CLINIC | Age: 72
End: 2022-06-17

## 2022-06-17 VITALS
DIASTOLIC BLOOD PRESSURE: 70 MMHG | SYSTOLIC BLOOD PRESSURE: 150 MMHG | OXYGEN SATURATION: 98 % | RESPIRATION RATE: 10 BRPM | HEIGHT: 64 IN | HEART RATE: 78 BPM | BODY MASS INDEX: 25.61 KG/M2 | TEMPERATURE: 98.7 F | WEIGHT: 150 LBS

## 2022-06-17 LAB
ATRIAL RATE - MUSE: 84 BPM
DIASTOLIC BLOOD PRESSURE - MUSE: NORMAL MMHG
INTERPRETATION ECG - MUSE: NORMAL
P AXIS - MUSE: NORMAL DEGREES
PR INTERVAL - MUSE: 158 MS
QRS DURATION - MUSE: 90 MS
QT - MUSE: 406 MS
QTC - MUSE: 479 MS
R AXIS - MUSE: 19 DEGREES
SYSTOLIC BLOOD PRESSURE - MUSE: NORMAL MMHG
T AXIS - MUSE: 35 DEGREES
VENTRICULAR RATE- MUSE: 84 BPM

## 2022-06-17 PROCEDURE — 70450 CT HEAD/BRAIN W/O DYE: CPT | Mod: 26 | Performed by: RADIOLOGY

## 2022-06-17 PROCEDURE — 70450 CT HEAD/BRAIN W/O DYE: CPT

## 2022-06-17 NOTE — PROGRESS NOTES
ANTICOAGULATION  MANAGEMENT: Discharge Review    Rajani Guo chart reviewed for anticoagulation continuity of care    Emergency room visit on 6/16 for fall hit to head.    Discharge disposition: Home    Results:    Recent labs: (last 7 days)     06/16/22 2057   INR 4.59*     Anticoagulation inpatient management:     not applicable     Anticoagulation discharge instructions:     Warfarin dosing: home regimen continued   Bridging: No   INR goal change: No      Medication changes affecting anticoagulation: No    Additional factors affecting anticoagulation: No     PLAN     Recommend to adjust dose to 6 mg tonight held last night     Spoke with Sarai her daughter who sets up medications and lives with her    Anticoagulation Calendar updated    Lyndsay Monique RN

## 2022-06-17 NOTE — TELEPHONE ENCOUNTER
"Daughter calling. She had valve replacement in 12/2020. She has been having so much memory loss and falling down. Tonight she fell off the 3 steps into her daughter's house and hit her head on the concrete 1/2 hr ago. 2 weeks ago INR=3.3 on Warfarin. There is a bump= quarter sized, right above her right ear. She is always confused. She is using a walker, and is a little wobbly on her feet. Her next INR is scheduled for tomorrow am.     PCP: LUCY Rangel and Dr COLEEN Mast.   Daughter does not have transportation at this time to bring mother to the ER.     Triaged to a disposition of Call 911 now, which she intends to do.    Alla Mobley RN Triage Nurse Advisor 7:50 PM 6/16/2022  Reason for Disposition    [1] ACUTE NEURO SYMPTOM AND [2] present now  (DEFINITION: difficult to awaken OR confused thinking and talking OR slurred speech OR weakness of arms OR unsteady walking)    Taking Coumadin (warfarin) or other strong blood thinner, or known bleeding disorder (e.g., thrombocytopenia)    Additional Information    Negative: Can't remember what happened (amnesia)    Negative: Vomiting once or more    Negative: [1] Loss of vision or double vision AND [2] present now    Negative: Watery or blood-tinged fluid dripping from the NOSE or EARS now  (Exception: tears from crying or nosebleed from nasal trauma)    Negative: [1] One or two \"black eyes\" (bruising, purple color of eyelids) AND [2] onset within 24 hours of head injury    Negative: Large swelling or bruise > 2 inches (5 cm)    Negative: Skin is split open or gaping  (or length > 1/2 inch or 12 mm)    Negative: [1] Bleeding AND [2] won't stop after 10 minutes of direct pressure (using correct technique)    Negative: Sounds like a serious injury to the triager    Negative: [1] Knocked out (unconscious) < 1 minute AND [2] now fine    Negative: [1] SEVERE headache AND [2] not improved 2 hours after pain medicine/ice packs    Negative: Dangerous injury (e.g., " MVA, diving, trampoline, contact sports, fall > 10 feet or 3 meters) or severe blow from hard object (e.g., golf club or baseball bat)    Negative: [1] ACUTE NEURO SYMPTOM AND [2] now fine  (DEFINITION: difficult to awaken OR confused thinking and talking OR slurred speech OR weakness of arms OR unsteady walking)    Negative: Knocked out (unconscious) > 1 minute    Negative: Seizure (convulsion) occurred  (Exception: prior history of seizures and now alert and without Acute Neuro Symptoms)    Negative: Penetrating head injury (e.g., knife, gun shot wound, metal object)    Negative: [1] Major bleeding (e.g., actively dripping or spurting) AND [2] can't be stopped    Negative: [1] Dangerous mechanism of injury (e.g., MVA, diving, trampoline, contact sports, fall > 10 feet or 3 meters) AND [2] NECK pain AND [3] began < 1 hour after injury    Negative: Sounds like a life-threatening emergency to the triager    Negative: [1] Diagnosed with concussion AND [2] within last 14 days    Negative: [1] Traumatic brain injury (mTBI; concussion) AND [2] more than 14 days since head injury    Protocols used: HEAD INJURY-A-

## 2022-06-17 NOTE — ED NOTES
Bed: ED18  Expected date:   Expected time:   Means of arrival:   Comments:  [id number] F   Fall, head injury, anticoags

## 2022-06-17 NOTE — ED TRIAGE NOTES
Pt BIBA after a fall at home. Pt reports she was walking down two stairs and tripped and fell onto concrete. Pt denies LOC. Pt is on blood thinners. Neuro intact. C collar in place. Pt reports right shoulder pain, right ankle pain, and an abrasion to right head.

## 2022-06-17 NOTE — ED PROVIDER NOTES
[facility] EMERGENCY DEPARTMENT ([facility])    6/16/22         History     Chief Complaint   Patient presents with     Fall     HPI  Rajani Guo is a 71 year old female with history of mitral valve repair, aortic valve replacement, A. fib on Coumadin who presents after fall with head injury tonight.  Patient's daughter contacted nurse triage line reporting that she has had problems with memory loss and recurrent falls.  Today she fell off the steps to her daughter's house and struck her head on concrete approximately 7:30 PM tonight.  No loss of consciousness with this.  There is a quarter sized bump right above her right ear.  She was advised to call 911 to have patient brought to the ED for further evaluation.  Daughter reported that patient is chronically confused at baseline, is unsteady on her feet and ambulates with a walker.  Her last INR check was 3.3 2 weeks ago and is due for another INR check tomorrow.    Per [facility] records, patient has had 3 doses of the Pfizer COVID-19 vaccine, last dose given on 10/12/2021.  Her last Tdap was in 2020.    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   Findings: There is no  intracranial hemorrhage, mass effect, or midline shift. Gray/whi     History of partial thyroidectomy 06/02/2018     Hyperlipidemia LDL goal <100 01/18/2018     Hypertension      Insomnia, unspecified type 01/18/2018     Mitral regurgitation      Mitral stenosis      Pulmonary hypertension (H)      Tobacco use disorder      Past Surgical History:   Procedure Laterality Date     COLONOSCOPY N/A 9/4/2019    Procedure: COLONOSCOPY;  Surgeon: Marie Todd MD;  Location:  GI     CV CORONARY ANGIOGRAM N/A 11/16/2020    Procedure: CV CORONARY ANGIOGRAM;  Surgeon: Joey Rivas MD;  Location:  HEART CARDIAC CATH LAB     CV FRACTIONAL FLOW RATIO WIRE N/A 11/16/2020    Procedure: Fractional Flow Reserve;  Surgeon: Joey Rivas MD;  Location:  HEART CARDIAC CATH LAB     CV RIGHT HEART CATH MEASUREMENTS RECORDED N/A 11/16/2020    Procedure: CV RIGHT HEART CATH;  Surgeon: Joey Rivas MD;  Location:  HEART CARDIAC CATH LAB     ESOPHAGOSCOPY, GASTROSCOPY, DUODENOSCOPY (EGD), COMBINED N/A 9/4/2019    Procedure: ESOPHAGOGASTRODUODENOSCOPY (EGD);  Surgeon: Marie Todd MD;  Location:  GI     ESOPHAGOSCOPY, GASTROSCOPY, DUODENOSCOPY (EGD), COMBINED N/A 9/17/2020    Procedure: ESOPHAGOGASTRODUODENOSCOPY (EGD);  Surgeon: Ten Ibrahim DO;  Location:  GI     IR ANGIOGRAM THROUGH CATHETER FOLLOW UP  3/5/2019     IR LOWER EXTREMITY ANGIOGRAM LEFT  3/4/2019     KNEE SURGERY Right 2013    right knee torn meniscus surgery     OVARY SURGERY  1971    1 ovary removed     RELEASE CARPAL TUNNEL Right 1988     RELEASE TRIGGER FINGER BILATERAL       REPLACE VALVES AORTIC AND MITRAL, COMBINED N/A 12/29/2020    Procedure: Median Stertonomy, REPLACEMENT AORTIC Valve  with 19mm St Abdoulaye Wallace  Mechanical Heart Valve AND MITRAL VALVE Replacement with 27mm St Abdoulaye Mechanical Heart Valve, on pump oxygenation;  Surgeon: Luc Lara MD;  Location:  OR     SHOULDER SURGERY Left     rotator cuff  repair, plate placement     THYROID SURGERY  1988    partial thyroidectomy     atorvastatin (LIPITOR) 40 MG tablet  buPROPion (WELLBUTRIN SR) 150 MG 12 hr tablet  Calcium Carb-Cholecalciferol 600-800 MG-UNIT TABS  estradiol (ESTRACE) 0.1 MG/GM vaginal cream  ferrous sulfate (FEROSUL) 325 (65 Fe) MG tablet  furosemide (LASIX) 20 MG tablet  glucosamine-chondroitinoitin 500-400 MG tablet  losartan (COZAAR) 25 MG tablet  Melatonin 10 MG TABS tablet  metFORMIN (GLUCOPHAGE) 1000 MG tablet  methenamine (HIPREX) 1 g tablet  Multiple Vitamin (MULTI-VITAMINS) TABS  oxybutynin (DITROPAN) 5 MG tablet  pantoprazole (PROTONIX) 40 MG EC tablet  sertraline (ZOLOFT) 100 MG tablet  traZODone (DESYREL) 50 MG tablet  vitamin C (ASCORBIC ACID) 500 MG tablet  warfarin ANTICOAGULANT (COUMADIN) 2 MG tablet      Allergies   Allergen Reactions     Indomethacin Other (See Comments)     Dizziness and disorientation     Tramadol Nausea and Vomiting     Family History  Family History   Problem Relation Age of Onset     Diabetes Type 2  Mother      Lung Cancer Father      Diabetes Sister      Coronary Artery Disease Sister      Diabetes Brother      Diabetes Brother      Diabetes Type 2  Brother      Coronary Artery Disease Sister      Asthma Sister      Glaucoma No family hx of      Macular Degeneration No family hx of      Anesthesia Reaction No family hx of      Social History   Social History     Tobacco Use     Smoking status: Current Every Day Smoker     Packs/day: 1.00     Years: 59.00     Pack years: 59.00     Start date: 1962     Last attempt to quit: 2020     Years since quittin.4     Smokeless tobacco: Never Used   Substance Use Topics     Alcohol use: Yes     Comment: 2 glasses of wine a week     Drug use: Yes     Types: Marijuana     Comment: occasional      Past medical history, past surgical history, medications, allergies, family history, and social history were reviewed with the patient. No additional pertinent  "items.       Review of Systems   Constitutional: Negative.    Musculoskeletal: Negative for neck pain.        Pain right shoulder, right ankle, swelling right parietal   Skin: Positive for color change (buise left head right shoulder right ankle laterally).   Neurological: Negative for dizziness.   All other systems reviewed and are negative.    A complete review of systems was performed with pertinent positives and negatives noted in the HPI, and all other systems negative.    Physical Exam   BP: (!) 160/125  Pulse: 72  Temp: 98.7  F (37.1  C)  Resp: 16  Height: 162.6 cm (5' 4\")  Weight: 68 kg (150 lb)  SpO2: 98 %  Physical Exam  Vitals and nursing note reviewed.   Constitutional:       General: She is not in acute distress.  HENT:      Head: Contusion present.        Comments: Contusion is noted  Cardiovascular:      Rate and Rhythm: Normal rate and regular rhythm.      Comments: Mechanical heart valve  Musculoskeletal:        Arms:       Cervical back: Neck supple. No tenderness.        Legs:       Comments: Abrasion top of right shoulder and lateral malleolus of the right ankle   Neurological:      Mental Status: She is alert.         ED Course      Procedures            EKG Interpretation:      Interpreted by Ritchie Jean Baptiste MD  Time reviewed: 10:56 PM   Symptoms at time of EKG: fall   Rhythm: normal sinus   Rate: normal  Axis: normal  Ectopy: none  Conduction: normal  ST Segments/ T Waves: No ST-T wave changes  Q Waves: none  Comparison to prior: No old EKG available    Clinical Impression: normal EKG                    Results for orders placed or performed during the hospital encounter of 06/16/22   XR Shoulder Right 3 Views     Status: None    Narrative    EXAM: XR SHOULDER RIGHT G/E 3 VIEWS  LOCATION: [facility]  DATE/TIME: 6/16/2022 10:19 PM    INDICATION: fall, pain  COMPARISON: None.      Impression    IMPRESSION: Normal joint spaces and alignment. " No fracture.    [place] Draw     Status: None    Narrative    The following orders were created for panel order [place] Draw.  Procedure                               Abnormality         Status                     ---------                               -----------         ------                     Extra Blue Top Tube[[id number]]                              Final result               Extra Red Top Tube[[id number]]                               Final result               Extra Green Top (Lithium...[[id number]]                      Final result               Extra Purple Top Tube[[id number]]                            Final result                 Please view results for these tests on the individual orders.   Extra Blue Top Tube     Status: None   Result Value Ref Range    Hold Specimen JIC    Extra Red Top Tube     Status: None   Result Value Ref Range    Hold Specimen JIC    Extra Green Top (Lithium Heparin) Tube     Status: None   Result Value Ref Range    Hold Specimen JIC    Extra Purple Top Tube     Status: None   Result Value Ref Range    Hold Specimen JIC    INR     Status: Abnormal   Result Value Ref Range    INR 4.59 (H) 0.85 - 1.15   EKG 12-lead, tracing only     Status: None (Preliminary result)   Result Value Ref Range    Systolic Blood Pressure  mmHg    Diastolic Blood Pressure  mmHg    Ventricular Rate 84 BPM    Atrial Rate 84 BPM    CT Interval 158 ms    QRS Duration 90 ms     ms    QTc 479 ms    P Axis  degrees    R AXIS 19 degrees    T Axis 35 degrees    Interpretation ECG Sinus rhythm  Normal ECG        Medications - No data to display     Assessments & Plan (with Medical Decision Making)   71-year-old female with fall from standing on warfarin, there is no loss of consciousness her EKG is normal she has a right scalp contusion with a normal head CT.  She has an abrasion on her right shoulder with a normal shoulder x-ray and an abrasion on her right ankle no imaging indicated.  Her INR is 3.5.   She needs a 6-hour CT scan and then can be discharged home.  Return if you develop a headache otherwise follow-up with your primary care provider.    I have reviewed the nursing notes. I have reviewed the findings, diagnosis, plan and need for follow up with the patient.    New Prescriptions    No medications on file       Final diagnoses:   Minor head injury, initial encounter   Fall, initial encounter   Abrasion of right shoulder, initial encounter   Abrasion of right ankle, initial encounter       --  Ritchie Jean Baptiste MD  [facility] EMERGENCY DEPARTMENT  6/16/2022     Ritchie Jean Baptiste MD  06/16/22 7636

## 2022-06-17 NOTE — DISCHARGE INSTRUCTIONS
Your CT scans of the head and your x-ray of your shoulder are normal  Use Tylenol for pain  Your INR is 4.59 - discuss with the providers managing your INR about what dose of warfarin you should take today  Return to the ER if you develop a headache, confusion, vomiting   Follow-up with your primary care provider in a few days

## 2022-06-17 NOTE — ED NOTES
"     Emergency Department Patient Sign-out       Brief HPI and ED course:  Patient is a 71 year old female signed out to me by Dr. Hook.  See initial ED Provider note for details of the presentation. In brief, patient with mechanical fall and head injury. Patient takes anticoagulant. Initial CT head negative. Plan for repeat CT after 6 hours, discharge if no bleed on 6h CT    Vitals:   Patient Vitals for the past 24 hrs:   BP Temp Temp src Pulse Resp SpO2 Height Weight   06/17/22 0430 (!) 150/70 -- -- 80 12 95 % -- --   06/17/22 0400 (!) 147/81 -- -- 79 8 97 % -- --   06/17/22 0330 (!) 142/82 -- -- 79 9 96 % -- --   06/17/22 0300 (!) 141/105 -- -- 79 8 98 % -- --   06/17/22 0230 (!) 162/108 -- -- 81 8 97 % -- --   06/17/22 0200 (!) 124/113 -- -- 79 20 97 % -- --   06/16/22 2330 (!) 152/85 -- -- 86 10 99 % -- --   06/16/22 2300 (!) 160/78 -- -- 85 11 98 % -- --   06/16/22 2130 (!) 169/82 -- -- 80 9 99 % -- --   06/16/22 2100 (!) 166/80 -- -- 70 25 99 % -- --   06/16/22 2047 (!) 160/125 98.7  F (37.1  C) Oral 72 16 98 % 1.626 m (5' 4\") 68 kg (150 lb)       Received Sign-out Plan:    Pending studies include: CT head at 5 am      Plan:   - f/u repeat CT, likely discharge if no acute findings    Events after assuming care:  After care was assumed, a focused history and physical was performed. Agree with findings relayed by previous provider.     6h head CT also negative. Patient with localized pain superior to the right ear at the site of hematoma, not significant headache otherwise. Patient lives with daughter, has good social supports.     After counseling on the diagnosis, work-up, and treatment plan, the patient was discharged to home. The patient was advised to follow-up with primary care in a few days. The patient was advised to return to the ED if worsening symptoms, headache, vomiting, confusion, or if there are any urgent/life-threatening concerns.     Final diagnoses:   Fall, initial encounter   Abrasion of " right shoulder, initial encounter   Abrasion of right ankle, initial encounter   Closed head injury, initial encounter     New Prescriptions    No medications on file       --  Lucian Salas MD   Emergency Medicine   [facility] EMERGENCY DEPARTMENT  6/16/2022         Lucian Slaas MD  06/17/22 0632

## 2022-06-18 ENCOUNTER — NURSE TRIAGE (OUTPATIENT)
Dept: NURSING | Facility: CLINIC | Age: 72
End: 2022-06-18
Payer: COMMERCIAL

## 2022-06-19 NOTE — TELEPHONE ENCOUNTER
Spoke w/ Dtr Candy after getting pt's verbal consent to do so.   Pt fell off steps, hit head on concrete on 6/16. See 6/16 ED report. Pt on warfarin. She had 2 CT scans at ED, both WNL and was discharged home. ED report says return to ED if headache develops. Dtr says they also mentioned vomiting or dizziness. Pt had some mild lightheadedness earlier today but none now. She vomited one time this afternoon, but other than this has felt well today, acting as usual, mental status at baseline per daughter, walking w/ walker as usual, no unsteadiness, no weakness. Denies head pain. Advised home care. Call back if changes.     Reason for Disposition    [1] Diagnosed with concussion AND [2] within last 14 days    [1] Concussion symptoms getting BETTER (improving) AND [2] present < 2 weeks    Additional Information    Negative: [1] ACUTE NEURO SYMPTOM AND [2] present now  (DEFINITION: difficult to awaken OR confused thinking and talking OR slurred speech OR weakness of arms OR unsteady walking)    Negative: Knocked out (unconscious) > 1 minute    Negative: Seizure (convulsion) occurred  (Exception: prior history of seizures and now alert and without Acute Neuro Symptoms)    Negative: Penetrating head injury (e.g., knife, gun shot wound, metal object)    Negative: [1] Major bleeding (e.g., actively dripping or spurting) AND [2] can't be stopped    Negative: [1] Dangerous mechanism of injury (e.g., MVA, diving, trampoline, contact sports, fall > 10 feet or 3 meters) AND [2] NECK pain AND [3] began < 1 hour after injury    Negative: Sounds like a life-threatening emergency to the triager    Negative: Weakness (i.e., paralysis, loss of muscle strength) of the face, arm or leg on one side of the body    Negative: Loss of speech or garbled speech    Negative: Difficult to awaken or acting confused (e.g., disoriented, slurred speech)    Negative: Sounds like a life-threatening emergency to the triager    Negative: [1] Concussion  suspected AND [2] has not been examined by a HCP    Negative: [1] Mild traumatic brain injury (mTBI; concussion) AND [2] more than 14 days since head injury    Negative: [1] Black eyes on both sides AND [2] onset within 24 hours of head injury    Negative: Concussion symptoms are worsening    Negative: [1] Knocked out (unconscious) > 1 minute AND [2] no head CT Scan or MRI has been performed    Negative: [1] Vomiting once or more AND [2] no head CT Scan or MRI has been performed    Negative: [1] Unsteady on feet (e.g., unable to stand or requires support to walk) AND [2] no head CT Scan or MRI has been performed    Negative: [1] Neck pain after dangerous injury (e.g., MVA, diving, trampoline, contact sports, fall > 10 feet or 3 meters) AND [2] no neck xray has been performed (e.g., c-spine xray or CT)    Negative: Patient sounds very sick or weak to the triager    Negative: [1] SEVERE headache (e.g., excruciating, pain scale 8-10) AND [2] not improved after pain medications    Negative: [1] Concussion symptoms SAME (not worse) AND [2]  taking Coumadin (warfarin) or other strong blood thinner, or known bleeding disorder (e.g., thrombocytopenia)    Negative: [1] Concussion symptoms SAME (not worse) AND [2]  known bleeding disorder (e.g., thrombocytopenia)    Negative: [1] Concussion symptoms staying SAME (not worse) AND [2] age > 60 years    Negative: [1] Concussion symptoms staying SAME (not worse or better) AND [2] present > 3 days    Negative: [1] Concussion symptoms getting BETTER (improving) BUT [2] present > 2 weeks    Negative: [1] Concussion symptoms staying SAME  (not worse or better) AND [2] present < 3 days    Protocols used: HEAD INJURY-A-AH, CONCUSSION (MTBI) LESS THAN 14 DAYS AGO FOLLOW-UP CALL-A-

## 2022-06-20 ENCOUNTER — TELEPHONE (OUTPATIENT)
Dept: FAMILY MEDICINE | Facility: CLINIC | Age: 72
End: 2022-06-20
Payer: COMMERCIAL

## 2022-06-20 NOTE — TELEPHONE ENCOUNTER
-  Daughter would like a consent to communicate form mailed out to patient's home so that patient can add her sister. Please mail. Address on file verified      Patient's daughter, Sarai called (signed consent to communicate PHI on file)    Patient had a fall with head injury on 6/16/2022.  Is on warfarin.  Was seen in [facility] ED on 6/16/2022 and had head CT done which was normal.  Had vomiting on 6/18/2022 and was triaged by FNA.  Symptoms had improved, FNA advised on home care over the weekend.  Per daughter, patient had some confusion yesterday but is much better today.  Daughter stated that patient is almost back to her normal self today.  ED f/u scheduled for 6/24/2022 with Joon Jeffery     Advised to return to ED if having any of the symptoms below-    -altered mental status  -confusion  -lethargy  -severe/worsening HA  -irregular breathing/pulse  -convulsions, tremors, seizures  -bleeding coming from ears/nose  -numbness/weakness  -changes in vision (e.g blurred or double vision)  -nausea/vomiting   -new onset of symptoms other than what initially was present  -any symptoms that worsens  ____________________________________________________________    Daughter also inquiring about paperwork to get patient into an AL facility.  However, they have not found a specific location yet.  Advised to ask the AL facility what their admission process is once they find a facility they are interested in.        Elvira Waters RN  [facility]

## 2022-06-21 ENCOUNTER — LAB (OUTPATIENT)
Dept: LAB | Facility: CLINIC | Age: 72
End: 2022-06-21
Payer: COMMERCIAL

## 2022-06-21 ENCOUNTER — ANTICOAGULATION THERAPY VISIT (OUTPATIENT)
Dept: ANTICOAGULATION | Facility: CLINIC | Age: 72
End: 2022-06-21

## 2022-06-21 DIAGNOSIS — Z79.01 CURRENT USE OF LONG TERM ANTICOAGULATION: ICD-10-CM

## 2022-06-21 DIAGNOSIS — Z98.890 S/P MVR (MITRAL VALVE REPAIR): Primary | ICD-10-CM

## 2022-06-21 DIAGNOSIS — I48.91 ATRIAL FIBRILLATION (H): ICD-10-CM

## 2022-06-21 DIAGNOSIS — Z95.2 S/P AVR (AORTIC VALVE REPLACEMENT): ICD-10-CM

## 2022-06-21 LAB — INR BLD: 2.1 (ref 0.9–1.1)

## 2022-06-21 PROCEDURE — 85610 PROTHROMBIN TIME: CPT

## 2022-06-21 PROCEDURE — 36415 COLL VENOUS BLD VENIPUNCTURE: CPT

## 2022-06-21 NOTE — PROGRESS NOTES
ANTICOAGULATION MANAGEMENT     Rajani Guo 71 year old female is on warfarin with subtherapeutic INR result. (Goal INR 2.5-3.5)    Recent labs: (last 7 days)     06/21/22  0842   INR 2.1*       ASSESSMENT     Source(s): Chart Review  Previous INR was Supratherapeutic  Medication, diet, health changes since last INR: was in the ED on 6/16 for head injury, INR was elevated           PLAN     Unable to reach Candy today.    LMTCB    Follow up required to confirm warfarin dose taken and assess for changes    Lesley Ortega RN  Anticoagulation Clinic  6/21/2022

## 2022-06-21 NOTE — PROGRESS NOTES
ANTICOAGULATION MANAGEMENT     Rajani Guo 71 year old female is on warfarin with subtherapeutic INR result. (Goal INR 2.5-3.5)    Recent labs: (last 7 days)     06/21/22  0842   INR 2.1*       ASSESSMENT     Source(s): Chart Review and Patient/Caregiver Call     Warfarin doses taken: Warfarin taken as instructed  Diet: No new diet changes identified  New illness, injury, or hospitalization: No  Medication/supplement changes: None noted  Signs or symptoms of bleeding or clotting: No  Previous INR: Supratherapeutic  Additional findings: None       PLAN     Recommended plan for no diet, medication or health factor changes affecting INR     Dosing Instructions: continue your current warfarin dose with next INR in 1 week       Summary  As of 6/21/2022    Full warfarin instructions:  10 mg every day   Next INR check:  7/1/2022             Telephone call with  Sarai who verbalizes understanding and agrees to plan    Lab visit scheduled    Education provided: Goal range and significance of current result    Plan made per [facility] anticoagulation protocol    Lesley Ortega RN  Anticoagulation Clinic  6/21/2022    _______________________________________________________________________     Anticoagulation Episode Summary     Current INR goal:  2.5-3.5   TTR:  43.6 % (1 y)   Target end date:  Indefinite   Send INR reminders to:  GURVINDER SALAZAR    Indications    S/P MVR (mitral valve repair) [Z98.890]  S/P AVR (aortic valve replacement) [Z95.2]           Comments:  Pt gave verbal ok to speak with Sarai on 7/20/21         Anticoagulation Care Providers     Provider Role Specialty Phone number    Quinton Handy PA Referring Cardiovascular & Thoracic Surgery [phone]    Lakeshia Rubio, FRANCESCA SCHULZ Referring Internal Medicine [phone]    Sparkle Bird APRN CNP Referring Emergency Medicine [phone]

## 2022-06-21 NOTE — TELEPHONE ENCOUNTER
Authorization to Share Protected Health Information form mailed to patient/daughter Sarai.    Maame Michaels,

## 2022-06-24 ENCOUNTER — OFFICE VISIT (OUTPATIENT)
Dept: FAMILY MEDICINE | Facility: CLINIC | Age: 72
End: 2022-06-24
Payer: COMMERCIAL

## 2022-06-24 VITALS
DIASTOLIC BLOOD PRESSURE: 62 MMHG | HEART RATE: 95 BPM | WEIGHT: 143.8 LBS | BODY MASS INDEX: 24.68 KG/M2 | OXYGEN SATURATION: 98 % | TEMPERATURE: 98.1 F | SYSTOLIC BLOOD PRESSURE: 124 MMHG

## 2022-06-24 DIAGNOSIS — Z23 HIGH PRIORITY FOR 2019-NCOV VACCINE: Primary | ICD-10-CM

## 2022-06-24 DIAGNOSIS — E11.59 TYPE 2 DIABETES MELLITUS WITH OTHER CIRCULATORY COMPLICATION, WITHOUT LONG-TERM CURRENT USE OF INSULIN (H): ICD-10-CM

## 2022-06-24 LAB — HBA1C MFR BLD: 5.9 % (ref 0–5.6)

## 2022-06-24 PROCEDURE — 83036 HEMOGLOBIN GLYCOSYLATED A1C: CPT | Performed by: FAMILY MEDICINE

## 2022-06-24 PROCEDURE — 0054A COVID-19,PF,PFIZER (12+ YRS): CPT | Performed by: FAMILY MEDICINE

## 2022-06-24 PROCEDURE — 36415 COLL VENOUS BLD VENIPUNCTURE: CPT | Performed by: FAMILY MEDICINE

## 2022-06-24 PROCEDURE — 99213 OFFICE O/P EST LOW 20 MIN: CPT | Mod: 25 | Performed by: FAMILY MEDICINE

## 2022-06-24 PROCEDURE — 91305 COVID-19,PF,PFIZER (12+ YRS): CPT | Performed by: FAMILY MEDICINE

## 2022-06-24 NOTE — PROGRESS NOTES
Assessment & Plan       ICD-10-CM    1. High priority for 2019-nCoV vaccine  Z23 COVID-19,PF,PFIZER (12+ Yrs GRAY LABEL)   2. Type 2 diabetes mellitus with other circulatory complication, without long-term current use of insulin (H)  E11.59 HEMOGLOBIN A1C     Adult Eye Referral     HEMOGLOBIN A1C         Review of external notes as documented elsewhere in note         There are no Patient Instructions on file for this visit.    No follow-ups on file.    Joon Palomo MD  [facility] MARIE Gerard is a 71 year old accompanied by her daughter., presenting for the following health issues:  Hospital F/U      HPI     ED/UC Followup:    Facility:  [facility]  Date of visit: 06/16/2022  Reason for visit: Fall with head injury  Current Status: Gradual improvement      Patient presents for ER follow-up visit.  On June 16 she fell down stairs and struck her concrete.  No loss of consciousness reported.  No levels called immediately.  Patient does have some confusion at baseline.  Of note she has taken warfarin.  INR is within goal.  CT head was normal.  She is noted to have a right scalp contusion on exam in the ER.  Repeat CT scan was normal as well.  Since discharge patient denies alarm signs such as nocturnal awakenings due to headache, blurry vision nausea vomiting.    Review of Systems   Constitutional, HEENT, cardiovascular, pulmonary, gi and gu systems are negative, except as otherwise noted.      Objective    /62   Pulse 95   Temp 98.1  F (36.7  C) (Oral)   Wt 65.2 kg (143 lb 12.8 oz)   SpO2 98%   BMI 24.68 kg/m    Body mass index is 24.68 kg/m .  Physical Exam  Vitals reviewed.   Constitutional:       General: She is not in acute distress.     Appearance: Normal appearance. She is well-developed.   HENT:      Head: Normocephalic and atraumatic.      Right Ear: External ear normal.      Left Ear: External ear normal.      Nose: Nose normal.   Eyes:      General: No scleral  icterus.     Conjunctiva/sclera: Conjunctivae normal.   Cardiovascular:      Rate and Rhythm: Normal rate.   Pulmonary:      Effort: Pulmonary effort is normal.   Musculoskeletal:         General: No deformity. Normal range of motion.      Cervical back: Normal range of motion.   Skin:     General: Skin is warm and dry.      Findings: No rash.   Neurological:      Mental Status: She is alert and oriented to person, place, and time.   Psychiatric:         Behavior: Behavior normal.         Thought Content: Thought content normal.         Judgment: Judgment normal.                            .  ..

## 2022-06-27 ENCOUNTER — PATIENT OUTREACH (OUTPATIENT)
Dept: CARE COORDINATION | Facility: CLINIC | Age: 72
End: 2022-06-27

## 2022-06-27 NOTE — PROGRESS NOTES
Clinic Care Coordination Contact    Community Health Worker Follow Up    Care Gaps:     Health Maintenance Due   Topic Date Due     COPD ACTION PLAN  Never done     ZOSTER IMMUNIZATION (2 of 3) 01/19/2016     DIABETIC FOOT EXAM  03/01/2022     EYE EXAM  06/25/2022       Care Gaps Last addressed on 6/27/2022    Goals:    Goals Addressed as of 6/27/2022 at 11:50 AM                    Today       1. Mental Health Management (pt-stated)   10%    Added 5/26/22 by Tiffanie Pastor BSW      Goal Statement: I will reach out for emotional support as needed, accept additional home support, review in 3 months   Date Goal set: 5/24/2022  Barriers: Patient is alone large part of the time   Strengths: Patient's dtr lives with her  Date to Achieve By: 8/24/2022  Patient expressed understanding of goal: yes  Action steps to achieve this goal:  1. I will reach out to family if need further support  2. I will consider additional home or community support   3. I will take medications as prescribed, attend scheduled mental health appts    Discussed 6/27           2. Medical (pt-stated)   40%    Added 5/26/22 by Tiffanie Pastor BSW      Goal Statement: Patient/family will coordinate [employer] within 2 months   Date Goal set: 5/24/2022  Barriers: Patient is alone a large part of the time, she has fall risk, balance concerns, is dizzy and gait concerns  Strengths: Patient is accepting of [employer], SW assisted to complete online application for [employer]  Date to Achieve By: 7/24/2022  Patient expressed understanding of goal: yes  Action steps to achieve this goal:  1. I will discuss [employer] when they contact her  2. Patient/family will contact [employer] if they do not receive return response within 10 days, this # given to patient/dtr   3. I will wear [employer] at all times     Discussed 6/27, has appt at home today @ 4pm with [employer]           3. Problem Solving (pt-stated)   10%    Added 5/26/22 by Tiffanie Pastor BSW      Goal  Statement: Patient/family will coordinate [place] Cho[place] for [place]ivered services, within 3 months   Date Goal set: 5/24/2022  Barriers: Patient desires further assistance, socialization, she has MA but needs assessment for waivered program for payment of assisted living   Strengths: SW discussed this process and provided this #, patient and dtr will call . Patient has MA  Date to Achieve By: 8/24/2022  Patient expressed understanding of goal:yes  Action steps to achieve this goal:  1. Patient/family will find time to call [facility]   2. Patient/family will request [place] Cho[place]  3. Patient/family will schedule above assessment     Discussed 6/27 working with [facility]              Intervention and Education during outreach: N/A    CHW spoke to Rajani today for monthly outreach call, patient was recently enrolled in CC on 5/26/2022.  CHW and patient discussed goals:  [employer]- has home appt today at 4pm to have system set up.  [place] Cho[place]- Patient is working with [facility], has paperwork- no update  Emotional support- Patient continues to take daily medications, finds herself really bored. Continues to be in contact with friends and family on regular basis.  Patient states she only gets dizzy when she stands up from seated position and when getting out of bed, is unable to bend over. She is going to outpatient PT which she states is helping somewhat.    CHW Plan: Continue with monthly outreach call to discuss, support and update CC goal progression    Next CHW outreach call: 7/25/2022    Theresa HORNE  Community Health Worker  [facility] Care Coordination  [email or web address]  [email or web address]  Office: [phone]

## 2022-07-01 ENCOUNTER — LAB (OUTPATIENT)
Dept: LAB | Facility: CLINIC | Age: 72
End: 2022-07-01
Payer: COMMERCIAL

## 2022-07-01 ENCOUNTER — ANTICOAGULATION THERAPY VISIT (OUTPATIENT)
Dept: ANTICOAGULATION | Facility: CLINIC | Age: 72
End: 2022-07-01

## 2022-07-01 DIAGNOSIS — I48.91 ATRIAL FIBRILLATION (H): ICD-10-CM

## 2022-07-01 DIAGNOSIS — Z79.01 CURRENT USE OF LONG TERM ANTICOAGULATION: ICD-10-CM

## 2022-07-01 DIAGNOSIS — Z95.2 S/P AVR (AORTIC VALVE REPLACEMENT): ICD-10-CM

## 2022-07-01 DIAGNOSIS — Z98.890 S/P MVR (MITRAL VALVE REPAIR): Primary | ICD-10-CM

## 2022-07-01 LAB — INR BLD: 3.8 (ref 0.9–1.1)

## 2022-07-01 PROCEDURE — 85610 PROTHROMBIN TIME: CPT

## 2022-07-01 PROCEDURE — 36415 COLL VENOUS BLD VENIPUNCTURE: CPT

## 2022-07-01 NOTE — PROGRESS NOTES
ANTICOAGULATION MANAGEMENT     Rajani Guo 71 year old female is on warfarin with supratherapeutic INR result. (Goal INR 2.5-3.5)    Recent labs: (last 7 days)     07/01/22  1320   INR 3.8*       ASSESSMENT     Source(s): Chart Review  Previous INR was Subtherapeutic  Medication, diet, health changes since last INR chart reviewed; none identified           PLAN     Unable to reach Sarai today.    LMTCB    Follow up required to confirm warfarin dose taken and assess for changes    Lesley Ortega RN  Anticoagulation Clinic  7/1/2022

## 2022-07-01 NOTE — PROGRESS NOTES
ANTICOAGULATION MANAGEMENT     Rajani Guo 71 year old female is on warfarin with supratherapeutic INR result. (Goal INR 2.5-3.5)    Recent labs: (last 7 days)     07/01/22  1320   INR 3.8*       ASSESSMENT     Source(s): Chart Review and Patient/Caregiver Call     Warfarin doses taken: Warfarin taken as instructed  Diet: Change in alcohol intake may be affecting INR. had a couple cocktails last night   New illness, injury, or hospitalization: No  Medication/supplement changes: None noted  Signs or symptoms of bleeding or clotting: No  Previous INR: Subtherapeutic  Additional findings: None       PLAN     Recommended plan for temporary change(s) affecting INR     Dosing Instructions: decrease your warfarin dose (5.7% change) with next INR in 2 weeks       Summary  As of 7/1/2022    Full warfarin instructions:  6 mg every Fri; 10 mg all other days   Next INR check:  7/15/2022             Telephone call with Sarai who verbalizes understanding and agrees to plan    Lab visit scheduled    Education provided: Goal range and significance of current result    Plan made per [facility] anticoagulation protocol    Lesley Ortega RN  Anticoagulation Clinic  7/1/2022    _______________________________________________________________________     Anticoagulation Episode Summary     Current INR goal:  2.5-3.5   TTR:  44.5 % (1 y)   Target end date:  Indefinite   Send INR reminders to:  GURVINDER SALAZAR    Indications    S/P MVR (mitral valve repair) [Z98.890]  S/P AVR (aortic valve replacement) [Z95.2]           Comments:  Pt gave verbal ok to speak with Sarai on 7/20/21         Anticoagulation Care Providers     Provider Role Specialty Phone number    Quinton Handy PA Referring Cardiovascular & Thoracic Surgery [phone]    Lakeshia Rubio, FRANCESCA CNP Referring Internal Medicine [phone]    Sparkle Bird APRN CNP Referring Emergency Medicine [phone]

## 2022-07-07 ENCOUNTER — PATIENT OUTREACH (OUTPATIENT)
Dept: CARE COORDINATION | Facility: CLINIC | Age: 72
End: 2022-07-07

## 2022-07-07 ASSESSMENT — ACTIVITIES OF DAILY LIVING (ADL): DEPENDENT_IADLS:: CLEANING;LAUNDRY;SHOPPING;MEDICATION MANAGEMENT;TRANSPORTATION

## 2022-07-07 NOTE — PROGRESS NOTES
Care Coordination Clinician Chart Review     Situation: Patient chart reviewed by care coordinator.?     Background: Initial assessment and enrollment to Care Coordination was 5/24/2022.?? Patient centered goals were developed with participation from patient.? Lead CC handed patient off to CHW for continued outreach every 30 days.??     Assessment: Per chart review, patient outreach completed by CC CHW on 6/29/2022.? Per CHW call, patient had appt with [facility] 6/29/2022.  She has contacted [facility] to discuss [place].  Patient is taking medications daily, has frequent contact on phone with friends.  She becomes dizzy standing up and getting out of bed.  She is going to outpatient PT which is somewhat helpful.  Patient is actively working to accomplish goal(s).? Patient's goal(s) remain(s) appropriate at this time.? Patient is not due for updated Plan of Care.? Annual assessment will be due 5/24/2023.      Goals        General       1. Mental Health Management (pt-stated)       Goal Statement: I will reach out for emotional support as needed, accept additional home support, review in 3 months   Date Goal set: 5/24/2022  Barriers: Patient is alone large part of the time   Strengths: Patient's dtr lives with her  Date to Achieve By: 8/24/2022  Patient expressed understanding of goal: yes  Action steps to achieve this goal:  1. I will reach out to family if need further support  2. I will consider additional home or community support   3. I will take medications as prescribed, attend scheduled mental health appts    Discussed 6/27           2. Medical (pt-stated)       Goal Statement: Patient/family will coordinate [employer] within 2 months   Date Goal set: 5/24/2022  Barriers: Patient is alone a large part of the time, she has fall risk, balance concerns, is dizzy and gait concerns  Strengths: Patient is accepting of [employer], SW assisted to complete online application for [employer]  Date to Achieve  By: 7/24/2022  Patient expressed understanding of goal: yes  Action steps to achieve this goal:  1. I will discuss [employer] when they contact her  2. Patient/family will contact [employer] if they do not receive return response within 10 days, this # given to patient/dtr   3. I will wear [employer] at all times     Discussed 6/27, has appt at home today @ 4pm with [employer]           3. Problem Solving (pt-stated)       Goal Statement: Patient/family will coordinate [place] services, within 3 months   Date Goal set: 5/24/2022  Barriers: Patient desires further assistance, socialization, she has MA but needs assessment for waivered program for payment of assisted living   Strengths: SW discussed this process and provided this #, patient and dtr will call . Patient has MA  Date to Achieve By: 8/24/2022  Patient expressed understanding of goal:yes  Action steps to achieve this goal:  1. Patient/family will find time to call [facility]   2. Patient/family will request [place]  3. Patient/family will schedule above assessment     Discussed 6/27 working with [facility]          ??     Plan/Recommendations: The patient will continue working with Care Coordination to achieve above goal(s).? CHW will involve Lead CC as needed or if patient is ready to move to maintenance.? Lead CC will continue to monitor CHW s monthly outreaches and progress to goal(s) every 6 weeks.?     Plan of Care updated and sent to patient: No    Tiffanie Pastor, MARECLA, MSW   [facility]  Care Coordination  [facility]  [phone]  7/7/2022 1:18 PM

## 2022-07-11 ENCOUNTER — TELEPHONE (OUTPATIENT)
Dept: FAMILY MEDICINE | Facility: CLINIC | Age: 72
End: 2022-07-11

## 2022-07-11 NOTE — PROGRESS NOTES
7/11/22: Pt missed her dose last night, so she had her take 4 mg this am, and camille advised that she continue with her maint dosing the rest of the week and she has INR scheduled Friday.

## 2022-07-11 NOTE — TELEPHONE ENCOUNTER
Reason for Call:  Other call back    Detailed comments: Patient daughter called needing dosing instructions for patient.    Phone Number Patient can be reached at: Cell number on file:    Telephone Information:   Mobile [phone]       Best Time: ANYTIME    Can we leave a detailed message on this number? YES    Call taken on 7/11/2022 at 4:31 PM by Kaylee Matthews

## 2022-07-11 NOTE — TELEPHONE ENCOUNTER
Spoke with thomas. Pt missed her dose last night, so she had her take 4 mg this am, and camille advised that she continue with her maint dosing the rest of the week and she has INR scheduled Friday.     Nereyda Thompson, MATAN, RN- Coumadin Clinic RN

## 2022-07-14 DIAGNOSIS — I48.91 ATRIAL FIBRILLATION (H): ICD-10-CM

## 2022-07-14 DIAGNOSIS — Z95.2 S/P AVR (AORTIC VALVE REPLACEMENT): ICD-10-CM

## 2022-07-14 DIAGNOSIS — Z98.890 S/P MVR (MITRAL VALVE REPAIR): Primary | ICD-10-CM

## 2022-07-18 ENCOUNTER — ANTICOAGULATION THERAPY VISIT (OUTPATIENT)
Dept: ANTICOAGULATION | Facility: CLINIC | Age: 72
End: 2022-07-18

## 2022-07-18 ENCOUNTER — LAB (OUTPATIENT)
Dept: LAB | Facility: CLINIC | Age: 72
End: 2022-07-18
Payer: COMMERCIAL

## 2022-07-18 ENCOUNTER — TELEPHONE (OUTPATIENT)
Dept: ANTICOAGULATION | Facility: CLINIC | Age: 72
End: 2022-07-18

## 2022-07-18 DIAGNOSIS — Z95.2 S/P AVR (AORTIC VALVE REPLACEMENT): ICD-10-CM

## 2022-07-18 DIAGNOSIS — E78.5 HYPERLIPIDEMIA, UNSPECIFIED HYPERLIPIDEMIA TYPE: ICD-10-CM

## 2022-07-18 DIAGNOSIS — Z98.890 S/P MVR (MITRAL VALVE REPAIR): ICD-10-CM

## 2022-07-18 DIAGNOSIS — I48.91 ATRIAL FIBRILLATION (H): ICD-10-CM

## 2022-07-18 DIAGNOSIS — Z98.890 S/P MVR (MITRAL VALVE REPAIR): Primary | ICD-10-CM

## 2022-07-18 LAB — INR BLD: 5.1 (ref 0.9–1.1)

## 2022-07-18 PROCEDURE — 85610 PROTHROMBIN TIME: CPT

## 2022-07-18 PROCEDURE — 36416 COLLJ CAPILLARY BLOOD SPEC: CPT

## 2022-07-18 NOTE — TELEPHONE ENCOUNTER
Radhika MAYFIELD From [facility] lab calling to report critical value of fingerstick INR at 5.1  2nd draw: 4.9    Please advise.      Thanks! Amparo Weinstein RN

## 2022-07-19 RX ORDER — ATORVASTATIN CALCIUM 40 MG/1
TABLET, FILM COATED ORAL
Qty: 90 TABLET | Refills: 0 | Status: SHIPPED | OUTPATIENT
Start: 2022-07-19 | End: 2022-08-04

## 2022-07-19 NOTE — PATIENT INSTRUCTIONS
1.  Sertraline 150 mg daily  2.  Trazodone 25 mg at bedtime as needed for insomnia  3.  Wellbutrin  mg twice daily-for depression and to help you stop smoking  4.  Talk therapy, when able to, to process and manage life stressors and adjustments.    Continue all other medications as reviewed per electronic medical record today.   Safety plan reviewed. To the Emergency Department as needed or call after hours crisis line at [phone] or [phone]. [place] Crisis Text Line. Text [place] to 584170 or Suicide LifeLine Chat: [employer].org/chat/  To schedule individual or family therapy, call [facility] at [phone]  Schedule an appointment with me in 6 weeks or sooner as needed. Call [facility] at [phone] to schedule.  Follow up with primary care provider as planned or for acute medical concerns.  Call the psychiatric nurse line with medication questions or concerns at [phone]  MyChart may be used to communicate with your provider, but this is not intended to be used for emergencies.    Crisis Resources:    National Suicide Prevention Lifeline: 705.777.8536 (TTY: 716.145.9890). Call anytime for help.  (www.suicidepreventionlifeline.org)  National [place] on Mental Illness (www.luna.org): 557.622.2636 or 132-801-7976.   Mental Health Association (www.mentalhealth.org): 472.454.5988 or 484-748-5041.  [place] Crisis Text Line: Text [place] to 095294  Suicide LifeLine Chat: suicideFaraday Bicycles.org/chat

## 2022-07-23 ENCOUNTER — TELEPHONE (OUTPATIENT)
Dept: NURSING | Facility: CLINIC | Age: 72
End: 2022-07-23

## 2022-07-23 NOTE — TELEPHONE ENCOUNTER
Daughter Ailin is calling and states that she had an INR appointment back one week ago.  Rajani is taking Coumadin and dosage was 10 mg for a long time as it was high.  Dose was lowered and a day was skipped last week as it was so high.  Mon, 8 mg, Tuesday 8 mg, Wednesday 8 mg and missed appointment.  Rajani missed Thursdays dose.  Friday 8 mg.    Rajani had 3 cocktails on Sunday right before getting checked.  FNA paged on call MD Dennis Gregorio to phone FNA back at 9:33 am.  Re paged at 10:10 am and spoke with MD Gregorio and MD advised that he will phone FNA back after looking over her chart. MD advised to give 8 mg on Saturday and 8 mg on Sunday and check INR on Monday, July 25th. FNA relayed message to Ailin.

## 2022-07-26 ENCOUNTER — ANTICOAGULATION THERAPY VISIT (OUTPATIENT)
Dept: ANTICOAGULATION | Facility: CLINIC | Age: 72
End: 2022-07-26

## 2022-07-26 ENCOUNTER — LAB (OUTPATIENT)
Dept: LAB | Facility: CLINIC | Age: 72
End: 2022-07-26
Payer: COMMERCIAL

## 2022-07-26 ENCOUNTER — TELEPHONE (OUTPATIENT)
Dept: FAMILY MEDICINE | Facility: CLINIC | Age: 72
End: 2022-07-26

## 2022-07-26 ENCOUNTER — TELEPHONE (OUTPATIENT)
Dept: ANTICOAGULATION | Facility: CLINIC | Age: 72
End: 2022-07-26

## 2022-07-26 DIAGNOSIS — I48.91 ATRIAL FIBRILLATION (H): ICD-10-CM

## 2022-07-26 DIAGNOSIS — Z98.890 S/P MVR (MITRAL VALVE REPAIR): Primary | ICD-10-CM

## 2022-07-26 DIAGNOSIS — Z95.2 S/P AVR (AORTIC VALVE REPLACEMENT): Primary | ICD-10-CM

## 2022-07-26 DIAGNOSIS — Z98.890 S/P MVR (MITRAL VALVE REPAIR): ICD-10-CM

## 2022-07-26 DIAGNOSIS — Z95.2 S/P AVR (AORTIC VALVE REPLACEMENT): ICD-10-CM

## 2022-07-26 LAB — INR BLD: 1.7 (ref 0.9–1.1)

## 2022-07-26 PROCEDURE — 36416 COLLJ CAPILLARY BLOOD SPEC: CPT

## 2022-07-26 PROCEDURE — 85610 PROTHROMBIN TIME: CPT

## 2022-07-26 NOTE — PROGRESS NOTES
ANTICOAGULATION MANAGEMENT     Rajani Guo 71 year old female is on warfarin with subtherapeutic INR result. (Goal INR 2.5-3.5)    Recent labs: (last 7 days)     07/26/22  0826   INR 1.7*       ASSESSMENT     Source(s): Chart Review and Patient/Caregiver Call     Warfarin doses taken: Missed dose(s) may be affecting INR  Diet: No new diet changes identified  New illness, injury, or hospitalization: No  Medication/supplement changes: None noted  Signs or symptoms of bleeding or clotting: No  Previous INR: Supratherapeutic  Additional findings: Missed doses. she is moving into assisted living 7/29       PLAN     Recommended plan for no diet, medication or health factor changes affecting INR     Dosing Instructions: booster dose then continue your current warfarin dose with next INR in 1 week       Summary  As of 7/26/2022    Full warfarin instructions:  7/26: 12 mg; Otherwise 8 mg every day   Next INR check:  8/3/2022             Telephone call with  Sarai daughter who verbalizes understanding and agrees to plan    Lab visit scheduled    Education provided: Please call back if any changes to your diet, medications or how you've been taking warfarin and Monitoring for clotting signs and symptoms    Plan made per [facility] anticoagulation protocol    Lyndsay Monique RN  Anticoagulation Clinic  7/26/2022    _______________________________________________________________________     Anticoagulation Episode Summary     Current INR goal:  2.5-3.5   TTR:  45.0 % (1 y)   Target end date:  Indefinite   Send INR reminders to:  GURVINDER SALAZAR    Indications    S/P MVR (mitral valve repair) [Z98.890]  S/P AVR (aortic valve replacement) [Z95.2]           Comments:  Pt gave verbal ok to speak with Sarai on 7/20/21         Anticoagulation Care Providers     Provider Role Specialty Phone number    Quinton Handy PA Referring Cardiovascular & Thoracic Surgery [phone]    Lakeshia Rubio, APRN CNP Referring  Internal Medicine [phone]    Sparkle Bird APRN CNP Referring Emergency Medicine [phone]

## 2022-07-26 NOTE — TELEPHONE ENCOUNTER
ANTICOAGULATION CLINIC REFERRAL RENEWAL REQUEST       An annual renewal order is required for all patients referred to [facility] Anticoagulation Clinic.?  Please review and sign the pended referral order for Rajani Guo.       ANTICOAGULATION SUMMARY      Warfarin indication(s)   Mechanical AVR and Mechanical MVR    Mechanical heart valve present?  NO and Mechanical MVR       Current goal range   INR: 2.5-3.5     Goal appropriate for indication? Goal INR 2.5-3.5 standard for indication(s) above     Time in Therapeutic Range (TTR)  (Goal > 60%) 44%       Office visit with referring provider's group within last year yes on 6/24/22       Lyndsay Monique, RN  [facility] Anticoagulation Clinic

## 2022-07-26 NOTE — TELEPHONE ENCOUNTER
Reason for Call:  Form, our goal is to have forms completed with 72 hours, however, some forms may require a visit or additional information.    Type of letter, form or note:medical  Who is the form from?:Patient    Where did the form come from Patient or family brought in       What clinic location was the form placed at? [facility]     Where the form was placed Coley mailbox    What number is listed as a contact on the form?:[phone]       Additional comments:    Call taken on 7/26/2022 at 8:26 AM by Binta Hoff

## 2022-07-27 NOTE — TELEPHONE ENCOUNTER
Forms for [facility] received (POLST & Physician/Healthcare Provider Plan of Care) received and given to Dr. Coley to complete and sign at appointment tomorrow, July 28th, 2022.  Maame Michaels,

## 2022-07-28 ENCOUNTER — OFFICE VISIT (OUTPATIENT)
Dept: FAMILY MEDICINE | Facility: CLINIC | Age: 72
End: 2022-07-28
Payer: COMMERCIAL

## 2022-07-28 ENCOUNTER — MEDICAL CORRESPONDENCE (OUTPATIENT)
Dept: FAMILY MEDICINE | Facility: CLINIC | Age: 72
End: 2022-07-28

## 2022-07-28 VITALS
HEART RATE: 93 BPM | SYSTOLIC BLOOD PRESSURE: 124 MMHG | DIASTOLIC BLOOD PRESSURE: 68 MMHG | BODY MASS INDEX: 24.34 KG/M2 | TEMPERATURE: 96.8 F | OXYGEN SATURATION: 98 % | WEIGHT: 141.8 LBS

## 2022-07-28 DIAGNOSIS — I48.0 PAF (PAROXYSMAL ATRIAL FIBRILLATION) (H): ICD-10-CM

## 2022-07-28 DIAGNOSIS — F17.200 SMOKER: ICD-10-CM

## 2022-07-28 DIAGNOSIS — I10 ESSENTIAL HYPERTENSION: ICD-10-CM

## 2022-07-28 DIAGNOSIS — M81.0 AGE-RELATED OSTEOPOROSIS WITHOUT CURRENT PATHOLOGICAL FRACTURE: ICD-10-CM

## 2022-07-28 DIAGNOSIS — F33.0 MAJOR DEPRESSIVE DISORDER, RECURRENT EPISODE, MILD (H): ICD-10-CM

## 2022-07-28 DIAGNOSIS — R41.89 COGNITIVE DECLINE: Primary | ICD-10-CM

## 2022-07-28 DIAGNOSIS — D50.9 IRON DEFICIENCY ANEMIA, UNSPECIFIED IRON DEFICIENCY ANEMIA TYPE: ICD-10-CM

## 2022-07-28 DIAGNOSIS — R26.89 BALANCE PROBLEMS: ICD-10-CM

## 2022-07-28 DIAGNOSIS — N18.2 CKD (CHRONIC KIDNEY DISEASE) STAGE 2, GFR 60-89 ML/MIN: ICD-10-CM

## 2022-07-28 DIAGNOSIS — I73.9 PAD (PERIPHERAL ARTERY DISEASE) (H): ICD-10-CM

## 2022-07-28 DIAGNOSIS — I42.8 OTHER CARDIOMYOPATHIES (H): ICD-10-CM

## 2022-07-28 PROCEDURE — 99214 OFFICE O/P EST MOD 30 MIN: CPT | Performed by: FAMILY MEDICINE

## 2022-07-28 ASSESSMENT — PATIENT HEALTH QUESTIONNAIRE - PHQ9
SUM OF ALL RESPONSES TO PHQ QUESTIONS 1-9: 9
SUM OF ALL RESPONSES TO PHQ QUESTIONS 1-9: 9

## 2022-07-28 ASSESSMENT — PAIN SCALES - GENERAL: PAINLEVEL: NO PAIN (0)

## 2022-07-28 NOTE — PROGRESS NOTES
Assessment & Plan     Cognitive decline      Balance problems  Uses walker     Major depressive disorder, recurrent episode, mild (H)  On meds   Will need recheck 1 month for medicines adjustment    Smoker  Have discussed quitting  Pt has declined help    Iron deficiency anemia, unspecified iron deficiency anemia type  Stable     CKD (chronic kidney disease) stage 2, GFR 60-89 ml/min  Stable     Age-related osteoporosis without current pathological fracture  On calcium and D  Has Not wanted anything else    PAD (peripheral artery disease) (H)  Doing well  On statins   Not on asa due to coumadin    PAF (paroxysmal atrial fibrillation) (H)  On warfarin  Well controlleds    Other cardiomyopathies (H)  Stable     Essential hypertension  Stable     Polst discussed-Pt is Full code   Pt is Full code56}   all forms filled Out  Nicotine/Tobacco Cessation:  She reports that she has been smoking. She started smoking about 59 years ago. She has a 59.00 pack-year smoking history. She has never used smokeless tobacco.  Nicotine/Tobacco Cessation Plan:   Information offered: Patient not interested at this time      Pt will establish  care in [facility] with MD  She is Moving to assisted Living    No follow-ups on file.    Tamiko Coley MD  [facility] MARIE Gerard is a 71 year old, presenting for the following health issues: with Known history of Hypertensin, hyperlipidemia, chf  , PAF, CKD, PAD who is here to get Forms filled out  Forms    Pt needs forms filled out due to her moving to assisted living due to cognitive issues/memory problems'  She is moving to [facility] and happy about this as will be close to her siblings  Also needs Polst discussion  History of Present Illness       Reason for visit:  Assessment    She eats 0-1 servings of fruits and vegetables daily.She consumes 1 sweetened beverage(s) daily.She exercises with enough effort to increase her heart rate 10 to 19 minutes per day.  She  exercises with enough effort to increase her heart rate 3 or less days per week. She is missing 1 dose(s) of medications per week.    Today's PHQ-9         PHQ-9 Total Score: 9    PHQ-9 Q9 Thoughts of better off dead/self-harm past 2 weeks :   Not at all       her medical conditions are stable   PHQ9 as above but anxious due to her move  Blood pressure is stable   She is stable  on her meds        Review of Systems   CONSTITUTIONAL: NEGATIVE for fever, chills, change in weight  ENT/MOUTH: NEGATIVE for ear, mouth and throat problems  RESP: NEGATIVE for significant cough or SOB  CV: NEGATIVE for chest pain, palpitations or peripheral edema  GI: NEGATIVE for nausea, abdominal pain, heartburn, or change in bowel habits  MUSCULOSKELETAL: arthritis  PSYCHIATRIC: as above  Rest of the ROS is Negative except see above and Problem list [stable]        Objective    /68   Pulse 93   Temp 96.8  F (36  C) (Temporal)   Wt 64.3 kg (141 lb 12.8 oz)   SpO2 98%   BMI 24.34 kg/m    Body mass index is 24.34 kg/m .  Physical Exam   GENERAL: alert and no distress  EYES: Eyes grossly normal to inspection  NECK: no adenopathy, no asymmetry, masses, or scars and thyroid normal to palpation  RESP: lungs clear to auscultation - no rales, rhonchi or wheezes  CV: regular rate and rhythm, normal S1 S2, no S3 or S4, no murmur, click or rub, no peripheral edema and peripheral pulses strong  ABDOMEN: soft, nontender, no hepatosplenomegaly, no masses and bowel sounds normal  MS: no gross musculoskeletal defects noted, no edema  SKIN: no suspicious lesions or rashes  NEURO: Normal strength and tone and sensory exam grossly normal  PSYCH: answers question a appropriately    Lab on 07/26/2022   Component Date Value Ref Range Status     INR 07/26/2022 1.7 (A) 0.9 - 1.1 Final           .  ..

## 2022-07-29 ENCOUNTER — NURSE TRIAGE (OUTPATIENT)
Dept: NURSING | Facility: CLINIC | Age: 72
End: 2022-07-29

## 2022-07-29 NOTE — LETTER
August 1, 2022      Rajani Guo  [address] [facility] [address]    To whom it may concern:  Patient needs to get INR done regularly and This needs to be Followed By INR  Nurse or need to be sent to a facility that Monitors INR and adjusts her coumadin          Sincerely,      Tamiko Coley MD

## 2022-07-30 NOTE — TELEPHONE ENCOUNTER
Nurse Triage SBAR    Situation: Signed medication orders needed for assisted living    Background: Daughter, Ailin, calling. Consent: on file in chart. Put into Assisted living in today.     Assessment: No signed provider medication list. Was not aware they needed it to be signed. Patient is on warfarin. Per daughter they are not allowing the patient to have medication at the assisted living without a signed provider medication list.     [facility] - [phone] or [phone]  Or email to [email or web address] - patients sister    Recommendation:RN spoke with medical records. They stated that [facility] needs to fax over a request marked as urgent to\ [phone].     Contacted Aixa at [facility], she states they are missing trazodone, prn lasix and melatonin and cannot get these medication without signed orders. They also need INR orders for draws and frequency of the INR draws. KAREEM Becker (RN at [facility]) and Aixa stated they are giving what medication the family provided from the family and orders they do have but that they need signed orders on Monday and for further refills. Fax number [phone]. KAREEM Becker [phone]    Ailin was called back and reassured that she will be getting what medications the family provided which included the warfarin. She is very concerned and not reassured. RN connected Ailin with Pita for more reassurance.     PCP or covering provider please advise. Please fax over signed medication orders for [facility].     Veronica Mims RN Nursing Advisor 7/29/2022 8:58 PM     Reason for Disposition    [1] Caller requesting NON-URGENT health information AND [2] PCP's office is the best resource    Additional Information    Negative: Requesting regular office appointment    Negative: RN needs further essential information from caller in order to complete triage    Negative: [1] Caller is not with the adult  (patient) AND [2] reporting urgent symptoms    Negative: Lab result questions    Negative: Medication questions    Negative: Caller can't be reached by phone    Negative: Caller has already spoken to PCP or another triager    Protocols used: INFORMATION ONLY CALL - NO TRIAGE-A-

## 2022-08-01 ENCOUNTER — TELEPHONE (OUTPATIENT)
Dept: FAMILY MEDICINE | Facility: CLINIC | Age: 72
End: 2022-08-01

## 2022-08-01 DIAGNOSIS — R26.9 ABNORMAL GAIT: ICD-10-CM

## 2022-08-01 DIAGNOSIS — I73.9 CLAUDICATION OF LEFT LOWER EXTREMITY (H): Primary | ICD-10-CM

## 2022-08-01 NOTE — LETTER
August 1, 2022      Re:  Rajani Guo  YOB: 1950        Attention:  Zaire email to [email or web address].    The above named patient needs to establish care with a doctor in [facility].    Okay walker use.     Use Lasix 20 mg PRN for any leg swelling.     Sincerely,      Tamiko Coley MD/KAREEM Wilcox BSN

## 2022-08-01 NOTE — TELEPHONE ENCOUNTER
Pt needs to establish with a Doctor at [facility] use   Use Lasix 20 mg PRN for any leg swelling

## 2022-08-01 NOTE — TELEPHONE ENCOUNTER
Team, please print med list. Have Dr Coley sign and please fax to number below    [facility] - [phone] or [phone]    Staci BACH RN  [facility]

## 2022-08-01 NOTE — TELEPHONE ENCOUNTER
All these medicines are on th medicines List  Please check -we gave her a AVS which has all medicines List  If she needs it signed also-Please get me The Med list and I will sign  Letter done that INR need to be done at facility

## 2022-08-01 NOTE — TELEPHONE ENCOUNTER
"Dora RN with [facility] in [facility] ([phone]) called asking for orders/referral for home care and a walker. Pt recently moved into their facility and has orders for \"assessment for PT/OT eval\" but no orders or a company name. Pt has not established with a new provider in [facility] at this time therefore they are reaching out to Dr. Coley who recently saw the patient. They also asked for clarification for lasix.    - Home care order pended for review and signature  - Please indicate parameters for pt using Lasix PRN. Please have TC email the orders to Zaire email to [email or web address].        India BACA RN, BSN  [facility]                   "

## 2022-08-01 NOTE — TELEPHONE ENCOUNTER
Medication list signed by Dr. Coley and faxed to [facility] (f: [phone]) along with letter dated 8-1-2022, regarding INR's.  Maame Michaels,

## 2022-08-01 NOTE — TELEPHONE ENCOUNTER
[facility] calling to let pcp know they are unable to take pt d/t medical insurance coverage. Any questions please call   [phone] hillary

## 2022-08-01 NOTE — TELEPHONE ENCOUNTER
"Team,    Please print letter cued and fax to number provided:  Attention Zaire   \"Please have TC email the orders to Zaire email to [email or web address].\"      Thanks,  KAREEM Wilcox  [facility]     "

## 2022-08-02 NOTE — TELEPHONE ENCOUNTER
HUc -please check  Is there another alternative  Pt needs to establish care with MD in [facility]

## 2022-08-03 DIAGNOSIS — N39.41 URGE INCONTINENCE OF URINE: ICD-10-CM

## 2022-08-03 DIAGNOSIS — N39.0 RECURRENT UTI: ICD-10-CM

## 2022-08-03 DIAGNOSIS — E78.5 HYPERLIPIDEMIA, UNSPECIFIED HYPERLIPIDEMIA TYPE: ICD-10-CM

## 2022-08-03 DIAGNOSIS — F33.0 MAJOR DEPRESSIVE DISORDER, RECURRENT EPISODE, MILD (H): ICD-10-CM

## 2022-08-03 DIAGNOSIS — E11.59 TYPE 2 DIABETES MELLITUS WITH OTHER CIRCULATORY COMPLICATION, WITHOUT LONG-TERM CURRENT USE OF INSULIN (H): ICD-10-CM

## 2022-08-03 DIAGNOSIS — K29.80 DUODENITIS: ICD-10-CM

## 2022-08-03 DIAGNOSIS — N81.10 VAGINAL PROLAPSE: ICD-10-CM

## 2022-08-03 DIAGNOSIS — I10 HYPERTENSION GOAL BP (BLOOD PRESSURE) < 140/90: ICD-10-CM

## 2022-08-04 ENCOUNTER — PATIENT OUTREACH (OUTPATIENT)
Dept: CARE COORDINATION | Facility: CLINIC | Age: 72
End: 2022-08-04

## 2022-08-04 RX ORDER — BUPROPION HYDROCHLORIDE 150 MG/1
TABLET, FILM COATED, EXTENDED RELEASE ORAL
Qty: 60 TABLET | Refills: 10 | Status: SHIPPED | OUTPATIENT
Start: 2022-08-04

## 2022-08-04 RX ORDER — MULTIVITAMIN WITH FOLIC ACID 400 MCG
TABLET ORAL
Qty: 30 TABLET | Refills: 10 | Status: SHIPPED | OUTPATIENT
Start: 2022-08-04

## 2022-08-04 RX ORDER — ESTRADIOL 0.1 MG/G
CREAM VAGINAL
Qty: 42.5 G | Refills: 10 | Status: SHIPPED | OUTPATIENT
Start: 2022-08-04

## 2022-08-04 RX ORDER — OXYBUTYNIN CHLORIDE 5 MG/1
TABLET ORAL
Qty: 60 TABLET | Refills: 3 | Status: SHIPPED | OUTPATIENT
Start: 2022-08-04

## 2022-08-04 RX ORDER — SODIUM PHOSPHATE,MONO-DIBASIC 19G-7G/118
ENEMA (ML) RECTAL
Qty: 90 CAPSULE | Refills: 10 | OUTPATIENT
Start: 2022-08-04

## 2022-08-04 RX ORDER — SERTRALINE HYDROCHLORIDE 100 MG/1
TABLET, FILM COATED ORAL
Qty: 45 TABLET | Refills: 2 | Status: SHIPPED | OUTPATIENT
Start: 2022-08-04

## 2022-08-04 RX ORDER — FERROUS SULFATE 325(65) MG
TABLET ORAL
Qty: 60 TABLET | Refills: 10 | OUTPATIENT
Start: 2022-08-04

## 2022-08-04 RX ORDER — LOSARTAN POTASSIUM 25 MG/1
TABLET ORAL
Qty: 15 TABLET | Refills: 10 | Status: SHIPPED | OUTPATIENT
Start: 2022-08-04

## 2022-08-04 RX ORDER — METHENAMINE HIPPURATE 1000 MG/1
TABLET ORAL
Qty: 60 TABLET | Refills: 10 | Status: SHIPPED | OUTPATIENT
Start: 2022-08-04

## 2022-08-04 RX ORDER — ATORVASTATIN CALCIUM 40 MG/1
TABLET, FILM COATED ORAL
Qty: 30 TABLET | Refills: 0 | Status: SHIPPED | OUTPATIENT
Start: 2022-08-04 | End: 2022-08-19

## 2022-08-04 RX ORDER — PANTOPRAZOLE SODIUM 40 MG/1
TABLET, DELAYED RELEASE ORAL
Qty: 30 TABLET | Refills: 3 | Status: SHIPPED | OUTPATIENT
Start: 2022-08-04

## 2022-08-04 ASSESSMENT — ACTIVITIES OF DAILY LIVING (ADL)
DEPENDENT_IADLS:: CLEANING;LAUNDRY;SHOPPING;MEDICATION MANAGEMENT;TRANSPORTATION
DEPENDENT_IADLS:: CLEANING;LAUNDRY;SHOPPING;MEDICATION MANAGEMENT;TRANSPORTATION

## 2022-08-04 NOTE — PROGRESS NOTES
Clinic Care Coordination Contact    CC referral for assistance with alternate home care for patient.  SW has been following patient.  Per chart review, patient has moved to [facility], assisted living, in [place], [place].  SANJANA spoke with KAREEM Tai coordinator, who informed they do not have PT/OT on site and they have been in communication with clinic/PCP about coordination of these services, this generated a CC referral.  Zaire reports that patient been at [facility] for 4 days and it is going well.  SANJANA discussed PCP recommendation that patient establish care with a [facility] provider.  Zaire provided the # for Eugenio, director ([phone]) to discuss next steps for patient.      SANJANA spoke with Eugenio, , she reports that [facility] group sees patients on site at facility, the plan is for patient to transfer to this provider.  SANJANA requested they provide home care orders as clarifications may be needed, etc.   agreed and stated she will have her team coordinate PT/OT services once new provider in place.  SANJANA thanked Eugenio ALLAN will close to CC based on above, will send disenrollment letter and update PCP    Tiffanie Pastor, CJW, MSW   [facility]  Care Coordination  [facility]  [phone]  8/4/2022 3:36 PM

## 2022-08-04 NOTE — TELEPHONE ENCOUNTER
Routing refill request to provider for review/approval because:  Failed protocols.      Veronica Landry RN

## 2022-08-04 NOTE — LETTER
[facility] CARE COORDINATION  [address] [facility] [address]  [place] [address]    August 4, 2022    Rajani Guo  [address] [facility] [place] [address]      Dear Rajani,    It is my understanding you have moved into new living in [place].   At this time the Care Coordination team will make no further attempts to reach you.      Best of luck in your new home!     MARCELA Payton, MSW Clinic   [facility]  Care [facility] [email or web address]  Office: [phone]  Employed by [facility]

## 2022-08-10 ENCOUNTER — TELEPHONE (OUTPATIENT)
Dept: ANTICOAGULATION | Facility: CLINIC | Age: 72
End: 2022-08-10

## 2022-08-10 NOTE — TELEPHONE ENCOUNTER
ANTICOAGULATION     Rajani Guo is overdue for INR check.      Left VM for Madelaine Gay, director at [facility] in [place], [place] where the pt has recently moved, to clairfy if they are managing INRs with an in-house provider.  Requested call back to [facility].       Washington Dorman RN

## 2022-08-18 ENCOUNTER — DOCUMENTATION ONLY (OUTPATIENT)
Dept: OTHER | Facility: CLINIC | Age: 72
End: 2022-08-18

## 2022-08-18 DIAGNOSIS — E78.5 HYPERLIPIDEMIA, UNSPECIFIED HYPERLIPIDEMIA TYPE: ICD-10-CM

## 2022-08-19 RX ORDER — ATORVASTATIN CALCIUM 40 MG/1
TABLET, FILM COATED ORAL
Qty: 30 TABLET | Refills: 2 | Status: SHIPPED | OUTPATIENT
Start: 2022-08-19 | End: 2022-10-14

## 2022-08-19 NOTE — TELEPHONE ENCOUNTER
Prescription approved per [facility] Refill Protocol.      Gita Taylor RN BSN  [facility]

## 2022-08-24 ENCOUNTER — TELEPHONE (OUTPATIENT)
Dept: ANTICOAGULATION | Facility: CLINIC | Age: 72
End: 2022-08-24

## 2022-08-24 PROBLEM — R26.89 BALANCE PROBLEMS: Status: RESOLVED | Noted: 2022-04-12 | Resolved: 2022-08-24

## 2022-08-24 NOTE — LETTER
[facility] ANTICOAGULATION CLINIC  [address] [facility] [address]  Phone: [phone]  Fax: [phone]   August 24, 2022        Rajani Guo  [address] [place] [address]            Dear Rajani,    You are currently under the care of [facility] Anticoagulation Management Program for your warfarin (Coumadin , Jantoven ) therapy.  We are contacting you because our records show you were due for an INR on 8/3/22.    There are potentially serious risks when taking warfarin without careful monitoring and we want to make sure you are safely managed.  Routine lab monitoring is required for warfarin refills.     Please call [phone]  as soon as possible to schedule an appointment.  If there has been a change in your care or other concerns, please let us know so we can help and or update our records.     Sincerely,       [facility] Anticoagulation Management Program

## 2022-08-24 NOTE — TELEPHONE ENCOUNTER
ANTICOAGULATION     Rajani Guo is overdue for INR check.      Reminder letter sent    Gretchen Barton RN

## 2022-08-24 NOTE — PROGRESS NOTES
Patient did not return for further treatment and no additional progress was noted.  Please refer to the progress note and goal flowsheet completed on 06/10/22 for discharge information.

## 2022-08-29 ENCOUNTER — TRANSFERRED RECORDS (OUTPATIENT)
Dept: FAMILY MEDICINE | Facility: CLINIC | Age: 72
End: 2022-08-29

## 2022-08-29 LAB — RETINOPATHY: NEGATIVE

## 2022-09-05 DIAGNOSIS — E11.59 TYPE 2 DIABETES MELLITUS WITH OTHER CIRCULATORY COMPLICATION, WITHOUT LONG-TERM CURRENT USE OF INSULIN (H): ICD-10-CM

## 2022-09-06 ENCOUNTER — TELEPHONE (OUTPATIENT)
Dept: NEUROPSYCHOLOGY | Facility: CLINIC | Age: 72
End: 2022-09-06

## 2022-09-06 NOTE — TELEPHONE ENCOUNTER
Writer called patient regarding 12:30 PM appointment with Dr. Krishna. Pt stated that she would not be coming to the  appointment and she does not want to reschedule. Writer marked the appointment as a no-show.    Roxy Martinez  Psychometrist  [facility] Adult Neuropsychology

## 2022-09-14 ENCOUNTER — TELEPHONE (OUTPATIENT)
Dept: NEUROPSYCHOLOGY | Facility: CLINIC | Age: 72
End: 2022-09-14

## 2022-09-14 NOTE — TELEPHONE ENCOUNTER
Left Voicemail (1st Attempt) for the patient to call back and schedule the following:    Appointment type: NeuroPsychology New  Provider: Dr. Krishna  Return date: TBD  Specialty phone number: [phone]  Additional appointment(s) needed: none  Additonal Notes: Patient stated to team member on 9/6/2022 that she did not want to reschedule the appointment.    Writer left a voicemail per the request of  NeuroSciences team, as patient has a follow up appointment with Dr. Alcaraz on   9/27/2022, which she should keep.    [facility]   Neurology, NeuroSurgery, NeuroPsychology and Pain Management Specialties  [phone]

## 2022-09-17 ENCOUNTER — HEALTH MAINTENANCE LETTER (OUTPATIENT)
Age: 72
End: 2022-09-17

## 2022-09-27 ENCOUNTER — DOCUMENTATION ONLY (OUTPATIENT)
Dept: ANTICOAGULATION | Facility: CLINIC | Age: 72
End: 2022-09-27

## 2022-09-27 NOTE — LETTER
[facility] ANTICOAGULATION CLINIC  [address] [facility] [address]  Phone: [phone]  Fax: [phone]       September 27, 2022        Rajani Guo  [address] [place] [address]            Dear Rajani,    You are currently under the care of [facility] Anticoagulation Management Program for your warfarin (Coumadin , Jantoven ) therapy.  We are contacting you because our records show you were due for an INR on 8/3/22.    There are potentially serious risks when taking warfarin without careful monitoring and we want to make sure you are safely managed.  Routine lab monitoring is required for warfarin refills.     Please call [phone]  as soon as possible to schedule an appointment.  If there has been a change in your care or other concerns, please let us know so we can help and or update our records.     Sincerely,       [facility] Anticoagulation Management Program

## 2022-09-27 NOTE — PROGRESS NOTES
Anticoagulation clinic notificiation    Dr. Coley,    Your patient, Rajani Guo, is past due for an INR. Their last result was 1.7 on 7/26/22 and was due to come back on 8/3/22.    Rajani Guo received phone calls and letters over the last several weeks in attempt to arrange a follow up appointment.  Rajani Guo will be sent another letter today.     No action is required from you unless you have additional information or if you would like to reach out personally to Rajani Guo.    Please don t hesitate to contact the Anticoagulation Management Program if you have any questions or concerns.     Sincerely,     [facility] Anticoagulation Management Program

## 2022-10-14 DIAGNOSIS — E78.5 HYPERLIPIDEMIA, UNSPECIFIED HYPERLIPIDEMIA TYPE: ICD-10-CM

## 2022-10-14 RX ORDER — ATORVASTATIN CALCIUM 40 MG/1
TABLET, FILM COATED ORAL
Qty: 90 TABLET | Refills: 0 | Status: SHIPPED | OUTPATIENT
Start: 2022-10-14

## 2022-10-18 DIAGNOSIS — Z95.2 S/P AVR (AORTIC VALVE REPLACEMENT): Primary | ICD-10-CM

## 2022-10-19 RX ORDER — WARFARIN SODIUM 2 MG/1
TABLET ORAL
Qty: 120 TABLET | Refills: 0 | Status: SHIPPED | OUTPATIENT
Start: 2022-10-19

## 2022-10-19 NOTE — TELEPHONE ENCOUNTER
ANTICOAGULATION MANAGEMENT:  Medication Refill    Anticoagulation Summary  As of 7/26/2022    Warfarin maintenance plan:  8 mg (2 mg x 4) every day   Next INR check:  8/3/2022   Target end date:  Indefinite    Indications    S/P MVR (mitral valve repair) [Z98.890]  S/P AVR (aortic valve replacement) [Z95.2]             Anticoagulation Care Providers     Provider Role Specialty Phone number    Quinton Handy PA Referring Cardiovascular & Thoracic Surgery [phone]    Lakeshia Rubio APRN CNP Referring Internal Medicine [phone]    Sparkle Bird APRN CNP Referring Emergency Medicine [phone]    Tamiko Coley MD Referring Family Medicine [phone]          Visit with referring provider/group within last year: Yes    [facility] referral signed within last year: Yes    Rajani does NOT meet all criteria for refill: > 2 weeks overdue for lab monitoring . 30 day mackenzie fill approved; patient notified to schedule labs per [facility] protocol    Helene Ding RN  Anticoagulation Clinic

## 2022-10-27 ENCOUNTER — TELEPHONE (OUTPATIENT)
Dept: ANTICOAGULATION | Facility: CLINIC | Age: 72
End: 2022-10-27

## 2022-10-27 NOTE — TELEPHONE ENCOUNTER
ANTICOAGULATION MANAGEMENT PROGRAM    Dr Coley,     Our records indicate that Rajani Guo remains past due to check INR. Rajani Guo was contacted multiple times over at least the last 8 weeks to attempt to arrange a follow up appointment.    Rajani Guo last had an INR checked on 7/26/22 and was due for follow up on 8/3/22     Called patient,Was unable to reach patient and was unable to leave a voicemail. If patient calls, please schedule INR check as soon as possible.      At this time Rajani Guo will be moved to noncompliant status within the program until their referral expires on 7/26/23. While in noncompliant status the patient would continue to be contacted every 6 weeks by the anticoagulation program to attempt to schedule patient. You will be notified of each contact attempt to make you aware of patient's ongoing noncompliance.        Thank you,     [facility] Anticoagulation Management Program

## 2022-11-22 ENCOUNTER — TELEPHONE (OUTPATIENT)
Dept: FAMILY MEDICINE | Facility: CLINIC | Age: 72
End: 2022-11-22

## 2022-11-22 NOTE — TELEPHONE ENCOUNTER
Pt is due for Medicare Annual Wellness visit.      Called pt and mailbox is full.     Scheduling, if pt returns call, ok to offer Medicare Annual Wellness visit with RN and assist with scheduling visit on RN schedule. Visit type is annual. Thanks.     Klaudia Pickett RN  [facility]

## 2022-12-08 ENCOUNTER — TELEPHONE (OUTPATIENT)
Dept: ANTICOAGULATION | Facility: CLINIC | Age: 72
End: 2022-12-08

## 2022-12-08 NOTE — TELEPHONE ENCOUNTER
Anticoagulation Management    Rajani Guo is being followed by the anticoagulation clinic while in noncompliant status. Rajani Guo is receiving every 6 week reminder calls.     Last INR checked on 7/26/22    Called and Was unable to reach patient and was unable to leave a voicemail. If patient calls, please schedule INR check as soon as possible.      Lesley Ortega RN - [facility] Anticoagulation Clinic

## 2023-01-19 ENCOUNTER — ANTICOAGULATION THERAPY VISIT (OUTPATIENT)
Dept: ANTICOAGULATION | Facility: CLINIC | Age: 73
End: 2023-01-19
Payer: COMMERCIAL

## 2023-01-19 DIAGNOSIS — Z95.2 S/P AVR (AORTIC VALVE REPLACEMENT): ICD-10-CM

## 2023-01-19 DIAGNOSIS — Z98.890 S/P MVR (MITRAL VALVE REPAIR): Primary | ICD-10-CM

## 2023-01-19 NOTE — PROGRESS NOTES
ANTICOAGULATION  MANAGEMENT    Rajani Guo is being discharged from the [facility] Anticoagulation Management Program ([facility]).    Reason for discharge: care has been transferred to [facility]     Anticoagulation episode resolved, [facility] referral closed and Standing order discontinued    If patient needs warfarin management in the future, please send a new referral    Lesley Ortega RN

## 2023-01-28 ENCOUNTER — HEALTH MAINTENANCE LETTER (OUTPATIENT)
Age: 73
End: 2023-01-28

## 2023-02-04 NOTE — OP NOTE
Procedure Date: 12/29/2020      PREOPERATIVE DIAGNOSIS:   1. Severe mitral stenosis.   2. Severe aortic stenosis.      POSTOPERATIVE DIAGNOSIS:   1. Severe mitral stenosis.   2. Severe aortic stenosis.      PROCEDURE:   1. Mitral valve replacement with 27 mm St. Abdoulaye mechanical valve.   2. Aortic valve replacement 19-mm St. Abdoulaye Moffat mechanical valve.      SURGEON:  Luc Lara MD      COSURGEON:  Chris Ken MD      NOTE:  A second attending surgeon was requested for the operation because of the complexity of the case as well as the absence of any qualified resident or fellow.      ASSISTANT:  Emilia Martin PA-C.      OPERATIVE INDICATIONS:  The patient is a 70-year-old female with severe aortic and mitral stenosis.  Decision was made to proceed with mitral and aortic valve replacement.  I discussed risks and benefits of surgery, patient's family, including risk of death, stroke, bleeding, wound, renal failure, arrhythmias.  She has agreed to proceed with surgery.      OPERATIVE FINDINGS:  The patient's sternum of adequate quality.  Pericardial space is free of any adhesions.  There was mild plaques in the ascending aortic valve was trileaflet in nature and severely calcified.  The mitral valve also was extremely calcified with severe mitral annular calcification extending both the posterior and the anterior annulus.  There was significant left atrial enlargement.      DESCRIPTION OF OPERATION:  The patient was brought to operating room in stable condition for the emergent general anesthesia, the patient's chest, abdomen and lower extremities prepped and draped in the usual manner.  Median sternotomy was performed.  Preparation for cardiopulmonary bypass included ACT-guided heparinization. Aorta was cannulated with a 20-[place] arterial cannula via a 3 0 tevdek pursestring pledgeted suture x 2.  Venous cannula was inserted in both superior and inferior vena cava.  Full cardiopulmonary bypass was  Pt stable. Okay to transfer to floor per Dr. Amezquita   initiated.  Antegrade and retrograde cardioplegic cannula were placed.  Left ventricular vent was also placed.  Aortic pressure was temporarily reduced and the aorta was cross-clamped, 1.5 liters.  Cold blood cardioplegia administered with antegrade and retrograde route.  Subsequent dose of cardioplegia given at no greater 20 minute intervals via the retrograde route.  Oblique aortotomy was performed.  Aortic valve was sharply excised with annulus and annulus thoroughly debrided of all calcium.  The aortic annulus was roughly sized to a 19-mm St. Abdoulaye valve.  Nonpledgeted sutures were placed in the noncoronary sinus.  Next, our attention was turned to the mitral valve.  Left atriotomy was performed and adequate exposure of mitral valve was obtained using the Estech retractor system.  The calcium was carefully debrided and portion of the leaflet was excised including the subvalvar apparatus, which was also thickened and calcified.  Once adequate debridement of the posterior annulus was performed,  two 0 revdek mattress sutures were placed with pledgets in the posterior annulus.  These sutures were placed on the ventricular side but on the anterior annulus they were placed on the atrial side.  These sutures then passed through the sewing ring with 27 mm St. Abdoulaye mechanical valve, which was seated and tied without difficulty using the Cor-Knot device.  Left atriotomy was closed.  Next, our attention was turned to the aortic valve.  Again, nonpledgeted sutures were placed on the rest of the aortic annulus.  These sutures then passed through the sewing ring of a 19-mm St. Abdoulaye mechanical valve, which was seated and tied without difficulty using the Cor-Knot device.  Aortotomy was closed with double layer 4-0 Prolene suture.  Warm dose of retrograde hotshot cardioplegia was given with high suction, the aortic root vent, the cross-clamp was released.  Organized rhythm resumed without the need for any defibrillations.   Rewarming and reperfusion allowed.  De-airing was confirmed by echocardiography.  The patient was gradually weaned off cardiopulmonary bypass, low dose epinephrine for internal cardiopulmonary bypass, LV function within normal limits and there was normal function of prosthetic valve with no paravalvular leak.  Protamine was given and all cannulas were removed.  Careful hemostasis obtained.  Two right ventricle pacing wires were placed.  Two anterior mediastinal chest tubes placed.  Sternum was closed with surgical steel wires.  Fascia, subcutaneous tissue, and skin of chest were closed in layers.  The patient transferred to ICU in stable critical condition.            KEVIN QUIÑONES MD             D: 2020   T: 2020   MT: PANKAJ      Name:     CRISTIAN GAMING   MRN:      [id number]        Account:        [id number]   :      1950           Procedure Date: 2020      Document: [id number]       cc: Hugo GARNER MD

## 2023-02-04 NOTE — PROGRESS NOTES
ANTICOAGULATION MANAGEMENT     Rajani Guo 71 year old female is on warfarin with supratherapeutic INR result. (Goal INR 2.5-3.5)    Recent labs: (last 7 days)     07/18/22  0739   INR 5.1*       ASSESSMENT     Source(s): Chart Review and Patient/Caregiver Call     Warfarin doses taken: Warfarin taken as instructed and will talk with daughter later today. she sets up pills, did what she was set up for  Diet: Change in alcohol intake may be affecting INR. had 4 drinks last night   New illness, injury, or hospitalization: Yes: fell yesterday at home, has been unsteady on her feet lately. Lots of stress.  Medication/supplement changes: None noted  Signs or symptoms of bleeding or clotting: No  Previous INR: Supratherapeutic  Additional findings: has been having more trouble with her memory, Sarai states that Rajani may be moving to an assisted living soon       PLAN     Recommended plan for temporary change(s) and ongoing change(s) affecting INR     Dosing Instructions: hold dose then decrease your warfarin dose (15.2% change) with next INR in 3 days       Summary  As of 7/18/2022    Full warfarin instructions:  7/18: Hold; Otherwise 8 mg every day   Next INR check:  7/20/2022             Telephone call with  Sarai who verbalizes understanding and agrees to plan    Lab visit scheduled    Education provided: Potential interaction between warfarin and alcohol, Goal range and significance of current result, Importance of therapeutic range and Contact [phone]  with any changes, questions or concerns.     Plan made per [facility] anticoagulation protocol    Helene Ding RN  Anticoagulation Clinic  7/18/2022    _______________________________________________________________________     Anticoagulation Episode Summary     Current INR goal:  2.5-3.5   TTR:  44.4 % (1 y)   Target end date:  Indefinite   Send INR reminders to:  GURVINDER SALAZAR    Indications    S/P MVR (mitral valve repair) [Z98.890]  S/P AVR (aortic valve  replacement) [Z95.2]           Comments:  Pt gave verbal ok to speak with Candy on 7/20/21         Anticoagulation Care Providers     Provider Role Specialty Phone number    Quinton Handy PA Referring Cardiovascular & Thoracic Surgery [phone]    Lakeshia Rubio, FRANCESCA CNP Referring Internal Medicine [phone]    Sparkle Bird, FRANCESCA CNP Referring Emergency Medicine [phone]           yes

## 2023-07-30 ENCOUNTER — HEALTH MAINTENANCE LETTER (OUTPATIENT)
Age: 73
End: 2023-07-30

## 2023-07-31 ENCOUNTER — PATIENT OUTREACH (OUTPATIENT)
Dept: CARE COORDINATION | Facility: CLINIC | Age: 73
End: 2023-07-31
Payer: COMMERCIAL

## 2023-07-31 NOTE — PROGRESS NOTES
Clinic Care Coordination Contact    Clinical Product Navigator RN reviewed chart; patient on payer product coverage.  Review results: Met referral criteria for Care Coordinator; referral to be sent.    Patient identified by insurance provider [facility] as a candidate for primary care coordination.     Patient was admitted to [facility] 7/9/23 - 7/29/23 for Closed fracture of right hip. Patient was discharged to [facility] [place] [place] [facility]. Primary Care Coordination to follow patient through TCU discharge.     Marie Chavarria RN   Clinical Product Navigator   [email or web address]   Office: [phone]

## 2023-07-31 NOTE — PROGRESS NOTES
Clinic Care Coordination Contact  Care Coordination Clinician Chart Review    Situation: Patient chart reviewed by care coordinator.    Background: Clinic Care Coordination Referral placed by CPN team.    Assessment: Upon chart review, patient is not a candidate for Primary Care Clinic Care Coordination enrollment due to reason stated below:  The patient moved to [facility] in 8/2022.  Therefore the patient no longer sees a provider at [facility].    Plan/Recommendations: Clinic Care Coordination Referral/order cancelled. RN/SW CC will perform no further monitoring/outreaches at this time and will remain available as needed. If new needs arise, a new Care Coordination Referral may be placed.    Odell Andrew MSN, RN, PHN, CCM   Primary Care Clinical RN Care Coordinator  [facility]  7/31/2023   4:03 PM  [email or web address]  Office: [phone]

## 2023-10-08 ENCOUNTER — HEALTH MAINTENANCE LETTER (OUTPATIENT)
Age: 73
End: 2023-10-08

## 2024-02-25 ENCOUNTER — HEALTH MAINTENANCE LETTER (OUTPATIENT)
Age: 74
End: 2024-02-25

## 2024-03-20 ENCOUNTER — TELEPHONE (OUTPATIENT)
Dept: FAMILY MEDICINE | Facility: CLINIC | Age: 74
End: 2024-03-20
Payer: COMMERCIAL

## 2024-03-20 NOTE — TELEPHONE ENCOUNTER
Patient Quality Outreach    Patient is due for the following:   Physical Annual Wellness Visit    Next Steps:   None patient has transferred care to [facility]    Type of outreach:    Chart review performed, no outreach needed.    Next Steps:  Reach out within 90 days via  none .    Max number of attempts reached: Yes. Will try again in 90 days if patient still on fail list.    Questions for provider review:    None           SUSHILA WHITE MA  Chart routed to self.

## 2024-07-31 NOTE — Clinical Note
Physical Therapy    Visit Type: treatment    Relevant History/Co-morbidities: Pt lives alone.    SUBJECTIVE  Patient agreed to participate in therapy this date.  Patient in chair reports he is uncomfortable, \"I need to get out of this chair ASAP, my back hurts\"  Patient / Family Goal: maximize function    Pain     Location: back 7.5/10    At onset of session (out of 10): 7     OBJECTIVE     Cognitive Status   Level of Consciousness   - alert  Arousal Alertness   - appropriate responses to stimuli  Affect/Behavior    - cooperative and pleasant  Orientation    - Oriented to: person, place, time and situation  Functional Communication   - Overall Communication Status: within functional limits  Attention Span    - Attention: intact  Following Direction   - follows all commands and directions consistently  Memory   - intact  Safety Awareness/Insight   - intact  Awareness of Deficits   - fully aware of deficits    Patient Activity Tolerance: 1 to 1 activity to rest       Sitting Balance  (JAGDISH = base of support)  Static      - Trial 1 details: supervision, with double LE support and with back unsupported    Standing Balance  (JAGDISH = base of support)  Firm Surface: Double Leg      - Static, Eyes Open       - Trial 1 details: with double UE support, contact guard and minimal assist     - Dynamic, Eyes Open       - Trial 1 details: with double UE support, contact guard and minimal assist       Bed Mobility  - Sit to supine: stand by assist  Transfers  Assistive devices: gait belt, 2-wheeled walker  - Sit to stand: minimal assist  - Stand to sit: minimal assist  - Stand pivot: minimal assist    Ambulation / Gait  - Assistive device: gait belt and 2-wheeled walker  - Distance (feet unless otherwise indicated): 100  - Assist Level: minimal assist  - Surface: even and carpet  - Description: decreased angle/pace and unsteady  Slightly unsteady, no loss of balance, assist to turn walker in tight turns due to back pain.       Sheath inserted in the right femoral artery.  Interventions     Training provided: body mechanics, compensatory techniques, safety training, use of assistive device, balance retraining, bed mobility training, transfer training, activity tolerance and gait training    Skilled input: Verbal instruction/cues and as detailed above  Verbal Consent: Writer verbally educated and received verbal consent for hand placement, positioning of patient, and techniques to be performed today from patient          Education:   - Present and ready to learn: patient  Education provided during session:  - Results of above outlined education: Verbalizes understanding and Needs reinforcement    ASSESSMENT   Patient will benefit from skilled therapy to address listed impairments and functional limitations.  Progress: progressing toward goals  Interferring components: decreased activity tolerance    Discharge needs based on today's assessment:   - Current level of function: significantly below baseline level of function   - Therapy needs at discharge: therapy 5 or more times per week   - Activities of daily living (ADLs) requiring support at discharge: bed mobility, transfers, ambulation and stairs   - Instrumental activities of daily living (IADLs) requiring support at discharge: home management, shopping, meal preparation and community mobility   - Impairments that require further therapy intervention: balance, strength, activity tolerance and pain      AM-PAC  - Generalized Prior Level of Function: IND/MOD I ([facility] 22-24)       Key: MOD A=moderate assistance, IND/MOD I=independent/modified independent  - Generalized Current Level of Function:  Current Mobility Score: 17       AM-PAC Scoring Key= >21 Modified Independent; 20-21 Supervision; 18-19 Minimal assist; 13-18 Moderate assist; 9-12 Max assist; <9 Total assist      PLAN (while hospitalized)  Suggestions for next session as indicated: Standing balance, hip extensor strengthening in closed chain (supine bridging)  Progress gait  as appropriate.    PT Frequency: 3-5 x per week    Visit # since seen by PT:  1    PT/OT Mobility Equipment for Discharge: owns a 4 wheeled walker    Interventions: balance, body mechanics, gait training, ROM, strengthening, neuromuscular re-education, safety education, patient/family training, equipment eval/education, bed mobility, functional transfer training, endurance training, stairs retraining, energy conservation and compensatory technique education  Agreement to plan and goals: patient agrees with goals and treatment plan        GOALS  Review Date: 8/5/2024  Long Term Goals: (to be met by time of discharge from hospital)  Sit to supine: Patient will complete sit to supine modified independent.  Supine to sit: Patient will complete supine to sit modified independent.  Sit to stand: Patient will complete sit to stand transfer with 2-wheeled walker, modified independent.   Stand to sit: Patient will complete stand to sit transfer with 2-wheeled walker, modified independent.   Stand pivot: Patient will complete stand pivot transfer with 2-wheeled walker, modified independent.   Ambulation (even): Patient will ambulate on even surface for 50 feet with 2-wheeled walker, modified independent.         Patient at End of Session:   Location: in bed  Safety measures: alarm system in place/re-engaged, call light within reach and equipment intact  Handoff to: nurse      Therapy procedure time and total treatment time can be found documented on the Time Entry flowsheet

## 2024-09-21 ENCOUNTER — HEALTH MAINTENANCE LETTER (OUTPATIENT)
Age: 74
End: 2024-09-21

## 2024-11-30 ENCOUNTER — HEALTH MAINTENANCE LETTER (OUTPATIENT)
Age: 74
End: 2024-11-30

## 2025-04-05 ENCOUNTER — HEALTH MAINTENANCE LETTER (OUTPATIENT)
Age: 75
End: 2025-04-05

## (undated) DEVICE — KIT RIGHT HEART CATH 60130719

## (undated) DEVICE — CATH ANGIO SUPERTORQUE AL1 6FRX100CM 532-645

## (undated) DEVICE — DRSG ABDOMINAL 07 1/2X8" 7197D

## (undated) DEVICE — INTRO SHEATH AVANTI 4FRX23CM 504604T

## (undated) DEVICE — SU ETHIBOND 3-0 BBDA 36" X588H

## (undated) DEVICE — PACK HEART LEFT CUSTOM

## (undated) DEVICE — SU SILK 0 TIE 6X30" A306H

## (undated) DEVICE — SU ETHIBOND 2-0 V-5 DA 10X30" 10X52

## (undated) DEVICE — BLADE KNIFE SURG 11 371111

## (undated) DEVICE — SU PLEDGET SOFT TFE 13MMX7MMX1.5MM D7044

## (undated) DEVICE — PROTECTOR ARM ONE-STEP TRENDELENBURG 40418

## (undated) DEVICE — WIRE GUIDE 0.035"X150CM EMERALD STR 502542

## (undated) DEVICE — PREP CHLORAPREP 26ML TINTED ORANGE  260815

## (undated) DEVICE — DRAIN CHEST TUBE 36FR STR 8036

## (undated) DEVICE — SU ETHIBOND 2-0 SHDA 30" X563H

## (undated) DEVICE — SPONGE RAY-TEC 4X8" 7318

## (undated) DEVICE — INTRO SHEATH 6FRX25CM PINNACLE RSS606

## (undated) DEVICE — VALVE HEMOSTASIS .096" COPILOT MECH 1003331

## (undated) DEVICE — SU PROLENE 4-0 RB-1DA 36" 8557H

## (undated) DEVICE — BLADE CLIPPER SGL USE 9680

## (undated) DEVICE — Device

## (undated) DEVICE — SU ETHIBOND 2-0 V-7 DA 10X30" PXX77

## (undated) DEVICE — WIRE GUIDE 0.025"X150CM EMERALD J TIP 502524

## (undated) DEVICE — WIPES FOLEY CARE SURESTEP PROVON DFC100

## (undated) DEVICE — TOURNIQUET VASCULAR KIT 7 1/2" 79012

## (undated) DEVICE — BLADE SAW STERNAL 20X30MM KM-32

## (undated) DEVICE — INTRO SHEATH 7FRX25CM PINNACLE RSS706

## (undated) DEVICE — SU ETHIBOND 0 CT-1 CR 8X18" CX21D

## (undated) DEVICE — LEAD PACER MYOCARDIAL BIPOLAR TEMPORARY 53CM 6495F

## (undated) DEVICE — SUCTION CATH AIRLIFE TRI-FLO W/CONTROL PORT 14FR  T60C

## (undated) DEVICE — DRAPE IOBAN INCISE 23X17" 6650EZ

## (undated) DEVICE — TUBING INSUFFLATION W/FILTER CPC TO LUER 620-030-301

## (undated) DEVICE — SU PLEDGET SOFT TFE 3/8"X3/26"X1/16" PCP40

## (undated) DEVICE — GUIDEWIRE OPTOWIRE 3 W/O GAUGE FACTOR CONNECTOR F1032

## (undated) DEVICE — SU STEEL 6 CCS 4X18" M654G

## (undated) DEVICE — DEFIB PRO-PADZ LVP LQD GEL ADULT 8900-2105-01

## (undated) DEVICE — TAPE MEDIPORE 4"X2YD 2864

## (undated) DEVICE — SUCTION MANIFOLD DORNOCH ULTRA CART UL-CL500

## (undated) DEVICE — 0.035IN X 260CM, EMERALD DIAGNOSTIC GUIDEWIRE, FIXED-CORE PTFE COATED, STANDARD, 3MM EXCHANGE J-TIP (EA/1)

## (undated) DEVICE — SU PROLENE 3-0 SHDA 36" 8522H

## (undated) DEVICE — LINEN TOWEL PACK X30 5481

## (undated) DEVICE — CONNECTOR DRAIN CHEST Y EXTENSION SET 19909

## (undated) DEVICE — CATH ANGIO SUPERTORQUE PLUS JL4 6FRX100CM 533620

## (undated) DEVICE — MANIFOLD KIT ANGIO AUTOMATED 014613

## (undated) DEVICE — KIT HAND CONTROL ACIST 014644 AR-P54

## (undated) DEVICE — GLOVE PROTEXIS POWDER FREE 7.0 ORTHOPEDIC 2D73ET70

## (undated) DEVICE — DECANTER BAG 2002S

## (undated) DEVICE — TIES BANDING T50R

## (undated) DEVICE — SU VICRYL 0 CTX 36" J370H

## (undated) DEVICE — NDL COUNTER 20CT 31142493

## (undated) DEVICE — ESU HOLSTER PLASTIC DISP E2400

## (undated) DEVICE — SOL NACL 0.9% IRRIG 3000ML BAG 2B7477

## (undated) DEVICE — CATH ANGIO INFINITI 3DRC 6FRX100CM 534676T

## (undated) DEVICE — CATH ANGIO INFINITI PIGTAIL 145 6 SH 6FRX110CM  534-652S

## (undated) DEVICE — ESU ELEC BLADE 2.75" COATED/INSULATED E1455

## (undated) DEVICE — PREP CHLORHEXIDINE 4% 4OZ (HIBICLENS) 57504

## (undated) DEVICE — ADH SKIN CLOSURE PREMIERPRO EXOFIN 1.0ML 3470

## (undated) DEVICE — CLIP HORIZON MED BLUE 002200

## (undated) DEVICE — LINEN TOWEL PACK X6 WHITE 5487

## (undated) DEVICE — SUCTION DRY CHEST DRAIN OASIS 3600-100

## (undated) DEVICE — SU VICRYL 3-0 FS-1 27" J442H

## (undated) DEVICE — SOL NACL 0.9% 10ML VIAL 0409-4888-02

## (undated) DEVICE — CLOSURE ANGIOSEAL 6FR 610130

## (undated) DEVICE — SU PROLENE 4-0 SHDA 36" 8521H

## (undated) DEVICE — DRSG DRAIN 4X4" 7086

## (undated) DEVICE — PACK ADULT HEART UMMC PV15CG92D

## (undated) DEVICE — DRAPE SLUSH/WARMER 66X44" ORS-320

## (undated) DEVICE — SOL NACL 0.9% IRRIG 1000ML BOTTLE 2F7124

## (undated) DEVICE — TUBING PRESSURE 30"

## (undated) DEVICE — SU PROLENE 5-0 RB-2DA 30" 8710H

## (undated) DEVICE — DRSG TELFA 3X8" 1238

## (undated) RX ORDER — CEFAZOLIN SODIUM 1 G/3ML
INJECTION, POWDER, FOR SOLUTION INTRAMUSCULAR; INTRAVENOUS
Status: DISPENSED
Start: 2020-12-29

## (undated) RX ORDER — HEPARIN SODIUM 1000 [USP'U]/ML
INJECTION, SOLUTION INTRAVENOUS; SUBCUTANEOUS
Status: DISPENSED
Start: 2020-12-29

## (undated) RX ORDER — ASPIRIN 325 MG
TABLET ORAL
Status: DISPENSED
Start: 2020-11-16

## (undated) RX ORDER — GABAPENTIN 100 MG/1
CAPSULE ORAL
Status: DISPENSED
Start: 2020-12-29

## (undated) RX ORDER — SODIUM CHLORIDE 9 MG/ML
INJECTION, SOLUTION INTRAVENOUS
Status: DISPENSED
Start: 2020-11-16

## (undated) RX ORDER — PROPOFOL 10 MG/ML
INJECTION, EMULSION INTRAVENOUS
Status: DISPENSED
Start: 2020-12-29

## (undated) RX ORDER — FENTANYL CITRATE 50 UG/ML
INJECTION, SOLUTION INTRAMUSCULAR; INTRAVENOUS
Status: DISPENSED
Start: 2019-08-02

## (undated) RX ORDER — DEXTROSE MONOHYDRATE 25 G/50ML
INJECTION, SOLUTION INTRAVENOUS
Status: DISPENSED
Start: 2020-12-29

## (undated) RX ORDER — LABETALOL HYDROCHLORIDE 5 MG/ML
INJECTION, SOLUTION INTRAVENOUS
Status: DISPENSED
Start: 2020-12-29

## (undated) RX ORDER — FENTANYL CITRATE 50 UG/ML
INJECTION, SOLUTION INTRAMUSCULAR; INTRAVENOUS
Status: DISPENSED
Start: 2020-12-29

## (undated) RX ORDER — LIDOCAINE 40 MG/G
CREAM TOPICAL
Status: DISPENSED
Start: 2020-11-16

## (undated) RX ORDER — NITROGLYCERIN 5 MG/ML
VIAL (ML) INTRAVENOUS
Status: DISPENSED
Start: 2020-11-16

## (undated) RX ORDER — FENTANYL CITRATE 50 UG/ML
INJECTION, SOLUTION INTRAMUSCULAR; INTRAVENOUS
Status: DISPENSED
Start: 2020-11-16

## (undated) RX ORDER — ASPIRIN 81 MG/1
TABLET, CHEWABLE ORAL
Status: DISPENSED
Start: 2020-11-16

## (undated) RX ORDER — LIDOCAINE HYDROCHLORIDE 20 MG/ML
INJECTION, SOLUTION EPIDURAL; INFILTRATION; INTRACAUDAL; PERINEURAL
Status: DISPENSED
Start: 2020-12-29

## (undated) RX ORDER — POTASSIUM CHLORIDE 750 MG/1
TABLET, EXTENDED RELEASE ORAL
Status: DISPENSED
Start: 2020-11-16

## (undated) RX ORDER — ACETAMINOPHEN 325 MG/1
TABLET ORAL
Status: DISPENSED
Start: 2020-12-29

## (undated) RX ORDER — CHLORHEXIDINE GLUCONATE ORAL RINSE 1.2 MG/ML
SOLUTION DENTAL
Status: DISPENSED
Start: 2020-12-29

## (undated) RX ORDER — GLYCOPYRROLATE 0.2 MG/ML
INJECTION, SOLUTION INTRAMUSCULAR; INTRAVENOUS
Status: DISPENSED
Start: 2020-12-29

## (undated) RX ORDER — FAMOTIDINE 20 MG/1
TABLET, FILM COATED ORAL
Status: DISPENSED
Start: 2020-12-29

## (undated) RX ORDER — HEPARIN SODIUM 1000 [USP'U]/ML
INJECTION, SOLUTION INTRAVENOUS; SUBCUTANEOUS
Status: DISPENSED
Start: 2020-11-16